# Patient Record
Sex: FEMALE | Race: WHITE | Employment: OTHER | URBAN - METROPOLITAN AREA
[De-identification: names, ages, dates, MRNs, and addresses within clinical notes are randomized per-mention and may not be internally consistent; named-entity substitution may affect disease eponyms.]

---

## 2017-02-27 PROBLEM — Z12.11 SCREEN FOR COLON CANCER: Status: ACTIVE | Noted: 2017-02-27

## 2017-02-27 PROBLEM — Z12.39 SCREENING FOR BREAST CANCER: Status: ACTIVE | Noted: 2017-02-27

## 2017-09-06 ENCOUNTER — HOSPITAL ENCOUNTER (INPATIENT)
Age: 62
LOS: 8 days | Discharge: NURSING, REHAB OR LONG-TERM CARE | DRG: 064 | End: 2017-09-15
Attending: EMERGENCY MEDICINE | Admitting: INTERNAL MEDICINE
Payer: COMMERCIAL

## 2017-09-06 ENCOUNTER — APPOINTMENT (OUTPATIENT)
Dept: CT IMAGING | Age: 62
DRG: 064 | End: 2017-09-06
Attending: EMERGENCY MEDICINE
Payer: COMMERCIAL

## 2017-09-06 DIAGNOSIS — R11.2 NON-INTRACTABLE VOMITING WITH NAUSEA, UNSPECIFIED VOMITING TYPE: ICD-10-CM

## 2017-09-06 DIAGNOSIS — J96.01 ACUTE RESPIRATORY FAILURE WITH HYPOXIA (HCC): ICD-10-CM

## 2017-09-06 DIAGNOSIS — I10 ACCELERATED HYPERTENSION: ICD-10-CM

## 2017-09-06 DIAGNOSIS — N95.2 ATROPHIC VAGINITIS: ICD-10-CM

## 2017-09-06 DIAGNOSIS — F41.9 ANXIETY: ICD-10-CM

## 2017-09-06 DIAGNOSIS — I10 ACUTE SPONTANEOUS INTRAVENTRICULAR HEMORRHAGE ASSOC W/ HYPERTENSION (HCC): ICD-10-CM

## 2017-09-06 DIAGNOSIS — F32.89 OTHER DEPRESSION: ICD-10-CM

## 2017-09-06 DIAGNOSIS — I61.5 ACUTE SPONTANEOUS INTRAVENTRICULAR HEMORRHAGE ASSOC W/ HYPERTENSION (HCC): ICD-10-CM

## 2017-09-06 DIAGNOSIS — I61.9 HEMORRHAGIC STROKE (HCC): Primary | ICD-10-CM

## 2017-09-06 DIAGNOSIS — I61.9 STROKE, HEMORRHAGIC (HCC): ICD-10-CM

## 2017-09-06 DIAGNOSIS — R09.02 HYPOXEMIA: ICD-10-CM

## 2017-09-06 DIAGNOSIS — I16.0 HYPERTENSIVE URGENCY, MALIGNANT: ICD-10-CM

## 2017-09-06 DIAGNOSIS — F98.8 ADD (ATTENTION DEFICIT DISORDER): ICD-10-CM

## 2017-09-06 DIAGNOSIS — Z12.39 SCREENING FOR BREAST CANCER: ICD-10-CM

## 2017-09-06 DIAGNOSIS — Z91.89 AT RISK FOR ASPIRATION: ICD-10-CM

## 2017-09-06 DIAGNOSIS — I10 ESSENTIAL HYPERTENSION: ICD-10-CM

## 2017-09-06 PROCEDURE — 99285 EMERGENCY DEPT VISIT HI MDM: CPT | Performed by: EMERGENCY MEDICINE

## 2017-09-06 PROCEDURE — 80053 COMPREHEN METABOLIC PANEL: CPT | Performed by: EMERGENCY MEDICINE

## 2017-09-06 PROCEDURE — 96365 THER/PROPH/DIAG IV INF INIT: CPT | Performed by: EMERGENCY MEDICINE

## 2017-09-06 PROCEDURE — 74011250636 HC RX REV CODE- 250/636

## 2017-09-06 PROCEDURE — 93005 ELECTROCARDIOGRAM TRACING: CPT | Performed by: EMERGENCY MEDICINE

## 2017-09-06 PROCEDURE — 31500 INSERT EMERGENCY AIRWAY: CPT | Performed by: EMERGENCY MEDICINE

## 2017-09-06 PROCEDURE — 85610 PROTHROMBIN TIME: CPT

## 2017-09-06 PROCEDURE — 85025 COMPLETE CBC W/AUTO DIFF WBC: CPT | Performed by: EMERGENCY MEDICINE

## 2017-09-06 PROCEDURE — 96375 TX/PRO/DX INJ NEW DRUG ADDON: CPT | Performed by: EMERGENCY MEDICINE

## 2017-09-06 PROCEDURE — 84484 ASSAY OF TROPONIN QUANT: CPT

## 2017-09-06 PROCEDURE — 77030008771 HC TU NG SALEM SUMP -A: Performed by: EMERGENCY MEDICINE

## 2017-09-06 PROCEDURE — 70450 CT HEAD/BRAIN W/O DYE: CPT

## 2017-09-06 RX ORDER — LABETALOL HYDROCHLORIDE 5 MG/ML
INJECTION, SOLUTION INTRAVENOUS
Status: COMPLETED
Start: 2017-09-06 | End: 2017-09-07

## 2017-09-06 RX ORDER — PROPOFOL 10 MG/ML
INJECTION, EMULSION INTRAVENOUS
Status: COMPLETED
Start: 2017-09-06 | End: 2017-09-07

## 2017-09-06 RX ORDER — ONDANSETRON 2 MG/ML
INJECTION INTRAMUSCULAR; INTRAVENOUS
Status: COMPLETED
Start: 2017-09-06 | End: 2017-09-06

## 2017-09-06 RX ADMIN — ONDANSETRON 4 MG: 2 INJECTION INTRAMUSCULAR; INTRAVENOUS at 23:38

## 2017-09-06 RX ADMIN — LABETALOL HYDROCHLORIDE 20 MG: 5 INJECTION INTRAVENOUS at 23:53

## 2017-09-06 NOTE — Clinical Note
Status[de-identified] Inpatient [101] Type of Bed: Intensive Care [6] Inpatient Hospitalization Certified Necessary for the Following Reasons: 4. Patient requires ICU level of care interventions (further clarification in H&P documentation) Admitting Diagnosis: [facility] [[id number]] Admitting Physician: Rita Song [[id number]] Attending Physician: Rita Song [[id number]] Estimated Length of Stay: 7+ Midnights Discharge Plan[de-identified] 2003 [facility]

## 2017-09-07 ENCOUNTER — APPOINTMENT (OUTPATIENT)
Dept: GENERAL RADIOLOGY | Age: 62
DRG: 064 | End: 2017-09-07
Attending: INTERNAL MEDICINE
Payer: COMMERCIAL

## 2017-09-07 ENCOUNTER — APPOINTMENT (OUTPATIENT)
Dept: CT IMAGING | Age: 62
DRG: 064 | End: 2017-09-07
Attending: INTERNAL MEDICINE
Payer: COMMERCIAL

## 2017-09-07 PROBLEM — I10 ACCELERATED HYPERTENSION: Status: ACTIVE | Noted: 2017-09-07

## 2017-09-07 PROBLEM — I61.9 HEMORRHAGIC STROKE (HCC): Status: ACTIVE | Noted: 2017-09-07

## 2017-09-07 PROBLEM — I61.9 STROKE, HEMORRHAGIC (HCC): Status: ACTIVE | Noted: 2017-09-07

## 2017-09-07 PROBLEM — I61.9 HEMORRHAGIC STROKE (HCC): Status: RESOLVED | Noted: 2017-09-07 | Resolved: 2017-09-07

## 2017-09-07 PROBLEM — I61.5 ACUTE SPONTANEOUS INTRAVENTRICULAR HEMORRHAGE ASSOC W/ HYPERTENSION (HCC): Status: ACTIVE | Noted: 2017-09-07

## 2017-09-07 PROBLEM — I10 ACUTE SPONTANEOUS INTRAVENTRICULAR HEMORRHAGE ASSOC W/ HYPERTENSION (HCC): Status: ACTIVE | Noted: 2017-09-07

## 2017-09-07 PROBLEM — Z91.89 AT RISK FOR ASPIRATION: Status: ACTIVE | Noted: 2017-09-07

## 2017-09-07 PROBLEM — R11.2 NON-INTRACTABLE VOMITING WITH NAUSEA: Status: ACTIVE | Noted: 2017-09-07

## 2017-09-07 LAB
ALBUMIN SERPL-MCNC: 3.7 G/DL (ref 3.2–4.6)
ALBUMIN/GLOB SERPL: 1.1 {RATIO} (ref 1.2–3.5)
ALP SERPL-CCNC: 63 U/L (ref 50–136)
ALT SERPL-CCNC: 29 U/L (ref 12–65)
AMPHET UR QL SCN: NEGATIVE
ANION GAP SERPL CALC-SCNC: 10 MMOL/L (ref 7–16)
ANION GAP SERPL CALC-SCNC: 11 MMOL/L (ref 7–16)
APTT PPP: 25.4 SEC (ref 23.5–31.7)
ARTERIAL PATENCY WRIST A: POSITIVE
ARTERIAL PATENCY WRIST A: POSITIVE
AST SERPL-CCNC: 28 U/L (ref 15–37)
ATRIAL RATE: 83 BPM
BACTERIA SPEC CULT: NORMAL
BARBITURATES UR QL SCN: NEGATIVE
BASE DEFICIT BLDA-SCNC: 4.4 MMOL/L (ref 0–2)
BASE EXCESS BLDA CALC-SCNC: 1.3 MMOL/L (ref 0–3)
BASOPHILS # BLD: 0 K/UL (ref 0–0.2)
BASOPHILS NFR BLD: 0 % (ref 0–2)
BDY SITE: ABNORMAL
BDY SITE: ABNORMAL
BENZODIAZ UR QL: NEGATIVE
BILIRUB SERPL-MCNC: 0.4 MG/DL (ref 0.2–1.1)
BUN SERPL-MCNC: 13 MG/DL (ref 8–23)
BUN SERPL-MCNC: 14 MG/DL (ref 8–23)
CALCIUM SERPL-MCNC: 8.1 MG/DL (ref 8.3–10.4)
CALCIUM SERPL-MCNC: 8.5 MG/DL (ref 8.3–10.4)
CALCULATED P AXIS, ECG09: 75 DEGREES
CALCULATED R AXIS, ECG10: 57 DEGREES
CALCULATED T AXIS, ECG11: 76 DEGREES
CANNABINOIDS UR QL SCN: NEGATIVE
CHLORIDE SERPL-SCNC: 103 MMOL/L (ref 98–107)
CHLORIDE SERPL-SCNC: 99 MMOL/L (ref 98–107)
CO2 SERPL-SCNC: 25 MMOL/L (ref 21–32)
CO2 SERPL-SCNC: 28 MMOL/L (ref 21–32)
COCAINE UR QL SCN: NEGATIVE
COHGB MFR BLD: 0 % (ref 0.5–1.5)
COHGB MFR BLD: 0.3 % (ref 0.5–1.5)
CREAT SERPL-MCNC: 0.69 MG/DL (ref 0.6–1)
CREAT SERPL-MCNC: 0.77 MG/DL (ref 0.6–1)
DIAGNOSIS, 93000: NORMAL
DIFFERENTIAL METHOD BLD: ABNORMAL
DO-HGB BLD-MCNC: 1 % (ref 0–5)
DO-HGB BLD-MCNC: 1 % (ref 0–5)
EOSINOPHIL # BLD: 0.1 K/UL (ref 0–0.8)
EOSINOPHIL NFR BLD: 2 % (ref 0.5–7.8)
ERYTHROCYTE [DISTWIDTH] IN BLOOD BY AUTOMATED COUNT: 12.5 % (ref 11.9–14.6)
ERYTHROCYTE [DISTWIDTH] IN BLOOD BY AUTOMATED COUNT: 12.6 % (ref 11.9–14.6)
FIO2 ON VENT: 40 %
FIO2 ON VENT: 60 %
GLOBULIN SER CALC-MCNC: 3.3 G/DL (ref 2.3–3.5)
GLUCOSE BLD STRIP.AUTO-MCNC: 106 MG/DL (ref 65–100)
GLUCOSE BLD STRIP.AUTO-MCNC: 121 MG/DL (ref 65–100)
GLUCOSE SERPL-MCNC: 108 MG/DL (ref 65–100)
GLUCOSE SERPL-MCNC: 120 MG/DL (ref 65–100)
HCO3 BLDA-SCNC: 19 MMOL/L (ref 22–26)
HCO3 BLDA-SCNC: 25 MMOL/L (ref 22–26)
HCT VFR BLD AUTO: 34.6 % (ref 35.8–46.3)
HCT VFR BLD AUTO: 35 % (ref 35.8–46.3)
HGB BLD-MCNC: 12.2 G/DL (ref 11.7–15.4)
HGB BLD-MCNC: 12.6 G/DL (ref 11.7–15.4)
HGB BLDMV-MCNC: 12.6 GM/DL (ref 11.7–15)
HGB BLDMV-MCNC: 13 GM/DL (ref 11.7–15)
IMM GRANULOCYTES # BLD: 0 K/UL (ref 0–0.5)
IMM GRANULOCYTES NFR BLD: 0.2 % (ref 0–5)
INR BLD: 0.9 (ref 0.9–1.2)
LYMPHOCYTES # BLD: 2.9 K/UL (ref 0.5–4.6)
LYMPHOCYTES NFR BLD: 46 % (ref 13–44)
MCH RBC QN AUTO: 28.4 PG (ref 26.1–32.9)
MCH RBC QN AUTO: 28.8 PG (ref 26.1–32.9)
MCHC RBC AUTO-ENTMCNC: 35.3 G/DL (ref 31.4–35)
MCHC RBC AUTO-ENTMCNC: 36 G/DL (ref 31.4–35)
MCV RBC AUTO: 79.9 FL (ref 79.6–97.8)
MCV RBC AUTO: 80.5 FL (ref 79.6–97.8)
METHADONE UR QL: NEGATIVE
METHGB MFR BLD: 0.5 % (ref 0–1.5)
METHGB MFR BLD: 0.6 % (ref 0–1.5)
MONOCYTES # BLD: 0.5 K/UL (ref 0.1–1.3)
MONOCYTES NFR BLD: 8 % (ref 4–12)
NEUTS SEG # BLD: 2.9 K/UL (ref 1.7–8.2)
NEUTS SEG NFR BLD: 44 % (ref 43–78)
OPIATES UR QL: NEGATIVE
OXYHGB MFR BLDA: 98 % (ref 94–97)
OXYHGB MFR BLDA: 98.6 % (ref 94–97)
P-R INTERVAL, ECG05: 178 MS
PCO2 BLDA: 32 MMHG (ref 35–45)
PCO2 BLDA: 38 MMHG (ref 35–45)
PCP UR QL: NEGATIVE
PEEP RESPIRATORY: 8 CM[H2O]
PEEP RESPIRATORY: 8 CM[H2O]
PH BLDA: 7.41 [PH] (ref 7.35–7.45)
PH BLDA: 7.44 [PH] (ref 7.35–7.45)
PLATELET # BLD AUTO: 254 K/UL (ref 150–450)
PLATELET # BLD AUTO: 269 K/UL (ref 150–450)
PMV BLD AUTO: 9.6 FL (ref 10.8–14.1)
PMV BLD AUTO: 9.6 FL (ref 10.8–14.1)
PO2 BLDA: 164 MMHG (ref 80–105)
PO2 BLDA: 243 MMHG (ref 80–105)
POTASSIUM SERPL-SCNC: 3.6 MMOL/L (ref 3.5–5.1)
POTASSIUM SERPL-SCNC: 3.6 MMOL/L (ref 3.5–5.1)
PROT SERPL-MCNC: 7 G/DL (ref 6.3–8.2)
PT BLD: 11 SECS (ref 9.6–11.6)
Q-T INTERVAL, ECG07: 384 MS
QRS DURATION, ECG06: 76 MS
QTC CALCULATION (BEZET), ECG08: 451 MS
RBC # BLD AUTO: 4.3 M/UL (ref 4.05–5.25)
RBC # BLD AUTO: 4.38 M/UL (ref 4.05–5.25)
RESP RATE: 15
RESP RATE: 15
SAO2 % BLD: 99 % (ref 92–98.5)
SAO2 % BLD: 99 % (ref 92–98.5)
SERVICE CMNT-IMP: ABNORMAL
SERVICE CMNT-IMP: ABNORMAL
SERVICE CMNT-IMP: NORMAL
SODIUM SERPL-SCNC: 137 MMOL/L (ref 136–145)
SODIUM SERPL-SCNC: 139 MMOL/L (ref 136–145)
TROPONIN I BLD-MCNC: 0 NG/ML (ref 0.02–0.05)
VENTILATION MODE VENT: ABNORMAL
VENTILATION MODE VENT: ABNORMAL
VENTRICULAR RATE, ECG03: 83 BPM
VT SETTING VENT: 450 ML
VT SETTING VENT: 450 ML
WBC # BLD AUTO: 6.5 K/UL (ref 4.3–11.1)
WBC # BLD AUTO: 8.9 K/UL (ref 4.3–11.1)

## 2017-09-07 PROCEDURE — 74011250637 HC RX REV CODE- 250/637: Performed by: INTERNAL MEDICINE

## 2017-09-07 PROCEDURE — 95816 EEG AWAKE AND DROWSY: CPT | Performed by: INTERNAL MEDICINE

## 2017-09-07 PROCEDURE — 74011000250 HC RX REV CODE- 250: Performed by: EMERGENCY MEDICINE

## 2017-09-07 PROCEDURE — 65610000001 HC ROOM ICU GENERAL

## 2017-09-07 PROCEDURE — 76937 US GUIDE VASCULAR ACCESS: CPT

## 2017-09-07 PROCEDURE — 36569 INSJ PICC 5 YR+ W/O IMAGING: CPT | Performed by: INTERNAL MEDICINE

## 2017-09-07 PROCEDURE — 85730 THROMBOPLASTIN TIME PARTIAL: CPT | Performed by: INTERNAL MEDICINE

## 2017-09-07 PROCEDURE — 94003 VENT MGMT INPAT SUBQ DAY: CPT

## 2017-09-07 PROCEDURE — 77030005402 HC CATH RAD ART LN KT TELE -B

## 2017-09-07 PROCEDURE — 77030018798 HC PMP KT ENTRL FED COVD -A

## 2017-09-07 PROCEDURE — 80307 DRUG TEST PRSMV CHEM ANLYZR: CPT | Performed by: INTERNAL MEDICINE

## 2017-09-07 PROCEDURE — 77030019605

## 2017-09-07 PROCEDURE — 85027 COMPLETE CBC AUTOMATED: CPT | Performed by: INTERNAL MEDICINE

## 2017-09-07 PROCEDURE — 94002 VENT MGMT INPAT INIT DAY: CPT

## 2017-09-07 PROCEDURE — C1751 CATH, INF, PER/CENT/MIDLINE: HCPCS

## 2017-09-07 PROCEDURE — 74011250636 HC RX REV CODE- 250/636

## 2017-09-07 PROCEDURE — 77030018719 HC DRSG PTCH ANTIMIC J&J -A

## 2017-09-07 PROCEDURE — 74011000258 HC RX REV CODE- 258: Performed by: EMERGENCY MEDICINE

## 2017-09-07 PROCEDURE — 77030018786 HC NDL GD F/USND BARD -B

## 2017-09-07 PROCEDURE — 80048 BASIC METABOLIC PNL TOTAL CA: CPT | Performed by: INTERNAL MEDICINE

## 2017-09-07 PROCEDURE — 77030032995 HC TRNSDUC BLD DBL VAMP KT EDWD -B

## 2017-09-07 PROCEDURE — 02HV33Z INSERTION OF INFUSION DEVICE INTO SUPERIOR VENA CAVA, PERCUTANEOUS APPROACH: ICD-10-PCS | Performed by: INTERNAL MEDICINE

## 2017-09-07 PROCEDURE — 70450 CT HEAD/BRAIN W/O DYE: CPT

## 2017-09-07 PROCEDURE — 99291 CRITICAL CARE FIRST HOUR: CPT | Performed by: INTERNAL MEDICINE

## 2017-09-07 PROCEDURE — 5A1945Z RESPIRATORY VENTILATION, 24-96 CONSECUTIVE HOURS: ICD-10-PCS | Performed by: EMERGENCY MEDICINE

## 2017-09-07 PROCEDURE — 74011250636 HC RX REV CODE- 250/636: Performed by: INTERNAL MEDICINE

## 2017-09-07 PROCEDURE — 82962 GLUCOSE BLOOD TEST: CPT

## 2017-09-07 PROCEDURE — 74011250636 HC RX REV CODE- 250/636: Performed by: EMERGENCY MEDICINE

## 2017-09-07 PROCEDURE — 87641 MR-STAPH DNA AMP PROBE: CPT | Performed by: INTERNAL MEDICINE

## 2017-09-07 PROCEDURE — 74011000250 HC RX REV CODE- 250

## 2017-09-07 PROCEDURE — 0BH17EZ INSERTION OF ENDOTRACHEAL AIRWAY INTO TRACHEA, VIA NATURAL OR ARTIFICIAL OPENING: ICD-10-PCS | Performed by: EMERGENCY MEDICINE

## 2017-09-07 PROCEDURE — 36415 COLL VENOUS BLD VENIPUNCTURE: CPT | Performed by: INTERNAL MEDICINE

## 2017-09-07 PROCEDURE — 71010 XR CHEST SNGL V: CPT

## 2017-09-07 PROCEDURE — 36600 WITHDRAWAL OF ARTERIAL BLOOD: CPT

## 2017-09-07 PROCEDURE — 77030013794 HC KT TRNSDUC BLD EDWD -B

## 2017-09-07 PROCEDURE — 82803 BLOOD GASES ANY COMBINATION: CPT

## 2017-09-07 RX ORDER — LORAZEPAM 2 MG/ML
2 INJECTION INTRAMUSCULAR
Status: DISCONTINUED | OUTPATIENT
Start: 2017-09-07 | End: 2017-09-10

## 2017-09-07 RX ORDER — HEPARIN 100 UNIT/ML
900 SYRINGE INTRAVENOUS EVERY 8 HOURS
Status: DISCONTINUED | OUTPATIENT
Start: 2017-09-07 | End: 2017-09-15 | Stop reason: HOSPADM

## 2017-09-07 RX ORDER — LABETALOL HYDROCHLORIDE 5 MG/ML
20 INJECTION, SOLUTION INTRAVENOUS
Status: COMPLETED | OUTPATIENT
Start: 2017-09-07 | End: 2017-09-07

## 2017-09-07 RX ORDER — LORAZEPAM 2 MG/ML
INJECTION INTRAMUSCULAR
Status: COMPLETED
Start: 2017-09-07 | End: 2017-09-07

## 2017-09-07 RX ORDER — HEPARIN 100 UNIT/ML
900 SYRINGE INTRAVENOUS AS NEEDED
Status: DISCONTINUED | OUTPATIENT
Start: 2017-09-07 | End: 2017-09-15 | Stop reason: HOSPADM

## 2017-09-07 RX ORDER — MORPHINE SULFATE 2 MG/ML
INJECTION, SOLUTION INTRAMUSCULAR; INTRAVENOUS
Status: COMPLETED
Start: 2017-09-07 | End: 2017-09-07

## 2017-09-07 RX ORDER — CHLORHEXIDINE GLUCONATE 1.2 MG/ML
15 RINSE ORAL 2 TIMES DAILY
Status: DISCONTINUED | OUTPATIENT
Start: 2017-09-07 | End: 2017-09-07

## 2017-09-07 RX ORDER — LORAZEPAM 2 MG/ML
INJECTION INTRAMUSCULAR
Status: ACTIVE
Start: 2017-09-07 | End: 2017-09-07

## 2017-09-07 RX ORDER — SODIUM CHLORIDE 0.9 % (FLUSH) 0.9 %
30 SYRINGE (ML) INJECTION AS NEEDED
Status: DISCONTINUED | OUTPATIENT
Start: 2017-09-07 | End: 2017-09-15 | Stop reason: HOSPADM

## 2017-09-07 RX ORDER — ONDANSETRON 2 MG/ML
4 INJECTION INTRAMUSCULAR; INTRAVENOUS
Status: DISCONTINUED | OUTPATIENT
Start: 2017-09-07 | End: 2017-09-15 | Stop reason: HOSPADM

## 2017-09-07 RX ORDER — LORAZEPAM 2 MG/ML
2 INJECTION INTRAMUSCULAR
Status: COMPLETED | OUTPATIENT
Start: 2017-09-07 | End: 2017-09-07

## 2017-09-07 RX ORDER — SODIUM CHLORIDE 9 MG/ML
0-75 INJECTION, SOLUTION INTRAVENOUS CONTINUOUS
Status: DISCONTINUED | OUTPATIENT
Start: 2017-09-07 | End: 2017-09-08

## 2017-09-07 RX ORDER — SODIUM CHLORIDE 0.9 % (FLUSH) 0.9 %
5-10 SYRINGE (ML) INJECTION EVERY 8 HOURS
Status: DISCONTINUED | OUTPATIENT
Start: 2017-09-07 | End: 2017-09-15 | Stop reason: HOSPADM

## 2017-09-07 RX ORDER — ACETAMINOPHEN 500 MG
500 TABLET ORAL
Status: DISCONTINUED | OUTPATIENT
Start: 2017-09-07 | End: 2017-09-15 | Stop reason: HOSPADM

## 2017-09-07 RX ORDER — PROPOFOL 10 MG/ML
50 INJECTION, EMULSION INTRAVENOUS
Status: COMPLETED | OUTPATIENT
Start: 2017-09-07 | End: 2017-09-07

## 2017-09-07 RX ORDER — NICARDIPINE HYDROCHLORIDE 0.1 MG/ML
5-15 INJECTION INTRAVENOUS
Status: DISCONTINUED | OUTPATIENT
Start: 2017-09-07 | End: 2017-09-07

## 2017-09-07 RX ORDER — MORPHINE SULFATE 2 MG/ML
2 INJECTION, SOLUTION INTRAMUSCULAR; INTRAVENOUS
Status: DISCONTINUED | OUTPATIENT
Start: 2017-09-07 | End: 2017-09-10

## 2017-09-07 RX ORDER — LORAZEPAM 2 MG/ML
INJECTION INTRAMUSCULAR
Status: DISCONTINUED
Start: 2017-09-07 | End: 2017-09-07

## 2017-09-07 RX ORDER — SODIUM CHLORIDE 0.9 % (FLUSH) 0.9 %
30 SYRINGE (ML) INJECTION EVERY 8 HOURS
Status: DISCONTINUED | OUTPATIENT
Start: 2017-09-07 | End: 2017-09-15 | Stop reason: HOSPADM

## 2017-09-07 RX ORDER — PROPOFOL 10 MG/ML
5-50 VIAL (ML) INTRAVENOUS
Status: DISCONTINUED | OUTPATIENT
Start: 2017-09-07 | End: 2017-09-09

## 2017-09-07 RX ORDER — SODIUM CHLORIDE 0.9 % (FLUSH) 0.9 %
5-10 SYRINGE (ML) INJECTION AS NEEDED
Status: DISCONTINUED | OUTPATIENT
Start: 2017-09-07 | End: 2017-09-15 | Stop reason: HOSPADM

## 2017-09-07 RX ORDER — CHLORHEXIDINE GLUCONATE 1.2 MG/ML
15 RINSE ORAL 2 TIMES DAILY
Status: DISCONTINUED | OUTPATIENT
Start: 2017-09-07 | End: 2017-09-09

## 2017-09-07 RX ORDER — LABETALOL HYDROCHLORIDE 5 MG/ML
20 INJECTION, SOLUTION INTRAVENOUS
Status: ACTIVE | OUTPATIENT
Start: 2017-08-30 | End: 2017-08-31

## 2017-09-07 RX ADMIN — PROPOFOL 50 MG: 10 INJECTION, EMULSION INTRAVENOUS at 00:20

## 2017-09-07 RX ADMIN — Medication 10 ML: at 06:49

## 2017-09-07 RX ADMIN — MORPHINE SULFATE 2 MG: 2 INJECTION, SOLUTION INTRAMUSCULAR; INTRAVENOUS at 21:04

## 2017-09-07 RX ADMIN — LEVETIRACETAM 1000 MG: 100 INJECTION, SOLUTION INTRAVENOUS at 00:18

## 2017-09-07 RX ADMIN — CHLORHEXIDINE GLUCONATE 15 ML: 1.2 RINSE ORAL at 04:01

## 2017-09-07 RX ADMIN — Medication 30 ML: at 13:06

## 2017-09-07 RX ADMIN — LORAZEPAM 2 MG: 2 INJECTION INTRAMUSCULAR at 00:08

## 2017-09-07 RX ADMIN — NICARDIPINE HYDROCHLORIDE 5 MG/HR: 0.1 INJECTION, SOLUTION INTRAVENOUS at 00:16

## 2017-09-07 RX ADMIN — CHLORHEXIDINE GLUCONATE 15 ML: 1.2 RINSE ORAL at 09:22

## 2017-09-07 RX ADMIN — Medication 10 ML: at 02:38

## 2017-09-07 RX ADMIN — MORPHINE SULFATE 2 MG: 2 INJECTION, SOLUTION INTRAMUSCULAR; INTRAVENOUS at 01:42

## 2017-09-07 RX ADMIN — LABETALOL HYDROCHLORIDE 20 MG: 5 INJECTION INTRAVENOUS at 00:00

## 2017-09-07 RX ADMIN — LORAZEPAM 2 MG: 2 INJECTION INTRAMUSCULAR at 01:39

## 2017-09-07 RX ADMIN — LABETALOL HYDROCHLORIDE 20 MG: 5 INJECTION INTRAVENOUS at 00:19

## 2017-09-07 RX ADMIN — SODIUM CHLORIDE, PRESERVATIVE FREE 900 UNITS: 5 INJECTION INTRAVENOUS at 14:17

## 2017-09-07 RX ADMIN — SODIUM CHLORIDE 250 ML: 900 INJECTION, SOLUTION INTRAVENOUS at 06:10

## 2017-09-07 RX ADMIN — MORPHINE SULFATE 2 MG: 2 INJECTION, SOLUTION INTRAMUSCULAR; INTRAVENOUS at 05:04

## 2017-09-07 RX ADMIN — SODIUM CHLORIDE 1000 ML: 900 INJECTION, SOLUTION INTRAVENOUS at 00:35

## 2017-09-07 RX ADMIN — CHLORHEXIDINE GLUCONATE 15 ML: 1.2 RINSE ORAL at 21:06

## 2017-09-07 RX ADMIN — PROPOFOL 50 MCG/KG/MIN: 10 INJECTION, EMULSION INTRAVENOUS at 00:11

## 2017-09-07 RX ADMIN — LORAZEPAM 2 MG: 2 INJECTION, SOLUTION INTRAMUSCULAR; INTRAVENOUS at 00:15

## 2017-09-07 RX ADMIN — Medication 10 ML: at 21:09

## 2017-09-07 RX ADMIN — SODIUM CHLORIDE, PRESERVATIVE FREE 900 UNITS: 5 INJECTION INTRAVENOUS at 21:07

## 2017-09-07 RX ADMIN — SODIUM CHLORIDE 75 ML/HR: 900 INJECTION, SOLUTION INTRAVENOUS at 06:50

## 2017-09-07 RX ADMIN — Medication 10 ML: at 13:07

## 2017-09-07 RX ADMIN — Medication 30 ML: at 21:09

## 2017-09-07 NOTE — ED NOTES
TRANSFER - OUT REPORT:    Verbal report given to Everton Prado RN on Kiki Marti  being transferred to ICU for routine progression of care       Report consisted of patients Situation, Background, Assessment and   Recommendations(SBAR). Information from the following report(s) SBAR was reviewed with the receiving nurse. Lines:      bilat 18 guages   Opportunity for questions and clarification was provided.       Patient transported with:   Monitor  Registered Nurse

## 2017-09-07 NOTE — PROGRESS NOTES
Dr Bettye Basurto present ordered ativan and morphine to be given at this time.   Preparing for arterial line insertion

## 2017-09-07 NOTE — PROGRESS NOTES
Spiritual Care visit. Initial visit. Visited at bedside with patient and family; patient did not respond, but  conversed and prayed for patient's recovery and health.     Visit by Jere Swan M.Ed., Th.B. ,Staff

## 2017-09-07 NOTE — ED PROVIDER NOTES
HPI Comments: Patient is a 76-year-old female brought in as a stroke alert by EMS.  states that she was in a downstairs bedroom when she called out. Last known normal was at 2230. He found her confused with a left-sided facial droop as well as right upper and lower extremity paralysis. EMS found her with similar symptoms and she  became somewhat more was unresponsive. ruddy arrival to the emergency department the patient began moving her extremities more freely but was not able to communicate clearly. Urgent CT scan was performed which demonstrated intracranial hemorrhage. Patient emergently intubated for airway protection. Patient given labetalol, propofol, Ativan for sedation and blood pressure control. We eventually achieved adequate sedation and blood pressure control. Family updated. Discussed admission with the intensivist.  My colleague spoke with Dr. Lizz Raines of neurosurgery he reported that this bleed was nonsurgical in nature and that medical management would be needed.  states only medication she takes is Premarin and lisinopril. No blood thinners. Patient is a 60 y.o. female presenting with weakness. The history is provided by the EMS personnel and the spouse. No  was used.    Extremity Weakness           Past Medical History:   Diagnosis Date    Anxiety     Depression     HTN (hypertension)     Menopause        Past Surgical History:   Procedure Laterality Date    HX  SECTION      x 3    HX OTHER SURGICAL  2012    Face lift    HX REFRACTIVE SURGERY  2006    HX SANJUANITA AND BSO           Family History:   Problem Relation Age of Onset    Alcohol abuse Mother     Cancer Mother      lung cancer    Cancer Father      throat cancer    Psychiatric Disorder Father      anxiety, depression    No Known Problems Sister        Social History     Social History    Marital status:      Spouse name: N/A    Number of children: N/A    Years of education: N/A     Occupational History    Not on file. Social History Main Topics    Smoking status: Former Smoker    Smokeless tobacco: Never Used    Alcohol use No    Drug use: No    Sexual activity: Yes     Partners: Male     Other Topics Concern    Not on file     Social History Narrative         ALLERGIES: Lisinopril and Nuts [tree nut]    Review of Systems   Unable to perform ROS: Patient unresponsive   Neurological: Positive for weakness. Vitals:    09/07/17 0051 09/07/17 0052 09/07/17 0053 09/07/17 0054   BP: 103/65   105/67   Pulse: 84 79 79 81   Resp: 16 16 11 23   SpO2: 100% 100% 100% 100%   Weight:       Height:                Physical Exam   Constitutional:   Acutely ill minimally responsive female. HENT:   Head: Normocephalic and atraumatic. Eyes: Conjunctivae are normal.   Patient with deviation of her eyes to the left. Pupils reactive   Neck: Normal range of motion. Neck supple. Cardiovascular: Normal rate and regular rhythm. Pulmonary/Chest: Effort normal. No respiratory distress. Abdominal: Soft. She exhibits no distension. Neurological:   On initial examination the patient appeared very confused. She was moving her left upper as well as left lower extremities. She has some decreased movement  Of her right upper and right lower but she was moving them. Patient had garbled speech and did not make any sense. Vitals reviewed. MDM  Number of Diagnoses or Management Options  Accelerated hypertension: new and requires workup  Hemorrhagic stroke [facility]): new and requires workup  Diagnosis management comments: Critically ill patient with intracranial hemorrhage. Intubated in the ED with no complications. With sedation and BP medications we have overshot the blood pressure somewhat and are giving IV fluids to correct for this. Patient does have some decorticate posturing evidenced. Intensivist called for admission.        Amount and/or Complexity of Data Reviewed  Clinical lab tests: ordered and reviewed (Results for orders placed or performed during the hospital encounter of 26/17/66  -METABOLIC PANEL, COMPREHENSIVE       Result                                            Value                         Ref Range                       Sodium                                            137                           136 - 145 mmol/L                Potassium                                         3.6                           3.5 - 5.1 mmol/L                Chloride                                          99                            98 - 107 mmol/L                 CO2                                               28                            21 - 32 mmol/L                  Anion gap                                         10                            7 - 16 mmol/L                   Glucose                                           108 (H)                       65 - 100 mg/dL                  BUN                                               14                            8 - 23 MG/DL                    Creatinine                                        0.69                          0.6 - 1.0 MG/DL                 GFR est AA                                        >60                           >60 ml/min/1.73m2               GFR est non-AA                                    >60                           >60 ml/min/1.73m2               Calcium                                           8.5                           8.3 - 10.4 MG/DL                Bilirubin, total                                  0.4                           0.2 - 1.1 MG/DL                 ALT (SGPT)                                        29                            12 - 65 U/L                     AST (SGOT)                                        28                            15 - 37 U/L                     Alk. phosphatase                                  63                            50 - 136 U/L Protein, total                                    7.0                           6.3 - 8.2 g/dL                  Albumin                                           3.7                           3.2 - 4.6 g/dL                  Globulin                                          3.3                           2.3 - 3.5 g/dL                  A-G Ratio                                         1.1 (L)                       1.2 - 3.5                  -CBC WITH AUTOMATED DIFF       Result                                            Value                         Ref Range                       WBC                                               6.5                           4.3 - 11.1 K/uL                 RBC                                               4.38                          4.05 - 5.25 M/uL                HGB                                               12.6                          11.7 - 15.4 g/dL                HCT                                               35.0 (L)                      35.8 - 46.3 %                   MCV                                               79.9                          79.6 - 97.8 FL                  MCH                                               28.8                          26.1 - 32.9 PG                  MCHC                                              36.0 (H)                      31.4 - 35.0 g/dL                RDW                                               12.5                          11.9 - 14.6 %                   PLATELET                                          269                           150 - 450 K/uL                  MPV                                               9.6 (L)                       10.8 - 14.1 FL                  DF                                                AUTOMATED                                                     NEUTROPHILS                                       44                            43 - 78 % LYMPHOCYTES                                       46 (H)                        13 - 44 %                       MONOCYTES                                         8                             4.0 - 12.0 %                    EOSINOPHILS                                       2                             0.5 - 7.8 %                     BASOPHILS                                         0                             0.0 - 2.0 %                     IMMATURE GRANULOCYTES                             0.2                           0.0 - 5.0 %                     ABS. NEUTROPHILS                                  2.9                           1.7 - 8.2 K/UL                  ABS. LYMPHOCYTES                                  2.9                           0.5 - 4.6 K/UL                  ABS. MONOCYTES                                    0.5                           0.1 - 1.3 K/UL                  ABS. EOSINOPHILS                                  0.1                           0.0 - 0.8 K/UL                  ABS. BASOPHILS                                    0.0                           0.0 - 0.2 K/UL                  ABS. IMM.  GRANS.                                  0.0                           0.0 - 0.5 K/UL             -BLOOD GAS, ARTERIAL       Result                                            Value                         Ref Range                       pH                                                7.44                          7.35 - 7.45                     PCO2                                              38                            35 - 45 mmHg                    PO2                                               243 (H)                       80 - 105 mmHg                   BICARBONATE                                       25                            22 - 26 mmol/L                  BASE EXCESS                                       1.3                           0 - 3 mmol/L                    TOTAL HEMOGLOBIN 12.6                          11.7 - 15.0 GM/DL               O2 SAT                                            99 (H)                        92 - 98.5 %                     Arterial O2 Hgb                                   98.6 (H)                      94 - 97 %                       CARBOXYHEMOGLOBIN                                 0.0 (L)                       0.5 - 1.5 %                     METHEMOGLOBIN                                     0.5                           0.0 - 1.5 %                     DEOXYHEMOGLOBIN                                   1                             0.0 - 5.0 %                     SITE                                              LR                                                            ALLENS TEST                                       POSITIVE                                                      MODE                                              PRVC                                                          FIO2                                              60.0                          %                               Tidal volume                                      450.0                                                         RATE                                              15.0                                                          PEEP/CPAP                                         8.0                                                           Respiratory comment:                                                                                        Dr Sharda Ryan at 9 7 2017 12 37 16 AM. Read back.   -POC PT/INR       Result                                            Value                         Ref Range                       Prothrombin time (POC)                            11.0                          9.6 - 11.6 SECS                 INR (POC)                                         0.9                           0.9 - 1.2                  -GLUCOSE, POC       Result                                            Value                         Ref Range                       Glucose (POC)                                     121 (H)                       65 - 100 mg/dL             -POC TROPONIN-I       Result                                            Value                         Ref Range                       Troponin-I (POC)                                  0 (L)                         0.02 - 0.05 ng/ml          )  Tests in the radiology section of CPT®: ordered and reviewed (Ct Head Wo Cont    Addendum Date: 9/7/2017    Addendum: Report called to ED physician, Aaron Nguyen, at 12:14 AM on 9/7/2017. Result Date: 9/7/2017  Noncontrast CT of the brain. COMPARISON: none INDICATION: Possible stroke. Facial droop. Right-sided. TECHNIQUE: Contiguous axial images were obtained from the skull base through the vertex without IV contrast. Radiation dose reduction techniques were used for this study:  Our CT scanners use one or all of the following: Automated exposure control, adjustment of the mA and/or kVp according to patient's size, iterative reconstruction. FINDINGS: There is an approximately 2.7 x 2.1 cm acute hemorrhage in the left basal ganglia. There is intraventricular extension. The left lateral ventricle, third and the fourth ventricles are nearly filled with acute blood. There is small amount of hemorrhage in the right lateral ventricle. No hydrocephalus at this time. No midline shift. Cerebellum is grossly unremarkable. There is near complete opacification of the right sphenoid sinus. Rest of the visualized paranasal sinuses and the mastoid air cells are aerated. Surrounding bones are intact. IMPRESSION: Acute hemorrhage in the left basal ganglia, with intraventricular extension. No hydrocephalus at this time. Findings likely secondary to hypertension.  DC5     )  Review and summarize past medical records: yes  Discuss the patient with other providers: yes  Independent visualization of images, tracings, or specimens: yes    Risk of Complications, Morbidity, and/or Mortality  Presenting problems: high  Diagnostic procedures: high  Management options: high    Critical Care  Total time providing critical care: 30-74 minutes (60 minutes)    Patient Progress  Patient progress: (Critically ill patient with poor prognosis)    ED Course       Intubation  Date/Time: 9/7/2017 3:08 AM  Performed by: Bony Devine  Authorized by: Bony Devine     Consent:     Consent obtained:  Emergent situation  Pre-procedure details:     Patient status:  Unresponsive    Mallampati score:  III    Pretreatment medications:  None    Paralytics:  Succinylcholine  Procedure details:     Preoxygenation:  Bag valve mask    CPR in progress: no      Intubation method:  Oral    Oral intubation technique:  Direct    Laryngoscope blade: Mac 3    Tube size (mm):  7.0    Tube type:  Cuffed    Number of attempts:  1    Ventilation between attempts: no      Cricoid pressure: no      Tube visualized through cords: yes    Placement assessment:     ETT to lip:  22    Tube secured with: Adhesive tape    Breath sounds:  Equal    Placement verification: chest rise, direct visualization, equal breath sounds and ETCO2 detector    Post-procedure details:     Patient tolerance of procedure:   Tolerated well, no immediate complications

## 2017-09-07 NOTE — INTERDISCIPLINARY ROUNDS
Interdisciplinary team rounds were held 9/7/2017 with the following team members:Care Management, Nursing, Nutrition, Palliative Care, Pastoral Care, Pharmacy, Physical Therapy, Physician, Respiratory Therapy, Speech Therapy and Clinical Coordinator. Plan of care discussed. See clinical pathway and/or care plan for interventions and desired outcomes.

## 2017-09-07 NOTE — PROGRESS NOTES
PICC Placement Note    PRE-PROCEDURE VERIFICATION  Correct Procedure: yes. Time out completed with assistant Kojo Dotson RN and all persons present in agreement with time out. Correct Site:  yes  Temperature: Temp: 97.8 °F (36.6 °C), Temperature Source: Temp Source: Axillary  Recent Labs      09/07/17   0300   09/06/17   2352   BUN  13   < >   --    CREA  0.77   < >   --    PLT  254   < >   --    INR   --    --   0.9   WBC  8.9   < >   --     < > = values in this interval not displayed. Allergies: Banana; Lisinopril; and Nuts [tree nut]  Education materials for [facility] Care given to patient or family. PROCEDURE DETAIL  A triple lumen PICC line was started for vascular access. The following documentation is in addition to the PICC properties in the lines/airways flowsheet :  Lot #: [id number]  xylocaine used: yes  Mid-Arm Circumference: 26 (cm)  Internal Catheter Length: 42 (cm)  Internal Catheter Total Length: 42 (cm)  Vein Selection for PICC:left basilic  Central Line Bundle followed yes  Complication Related to Insertion: none  Both the insertion guidewire and sherlock guidewire were removed intact all ports have positive blood return and were flush well with normal saline. The placement was verified by sapiens ecg. The ECG results state the tip overlies the lower superior vena cava.            Line is okay to use: yes    Azul Maradiaga RN

## 2017-09-07 NOTE — H&P
HISTORY AND PHYSICAL      Jodie Gottron    9/7/2017    Date of Admission:  9/6/2017  PCP:  Dr. Dev Kong    The patient's chart is reviewed and the patient is discussed with the staff. Admission was requested by Dr. Gil Bishop of the [facility] ER for hemorrhagic stroke    HPI:     Jodie Gottron is a 60 y.o. white female with h/o HTN (on amlodipine,hyzaar), anxiety presents with right sided weakness and facial droop after being found by  after calling out for help at 10:30pm. Upon arrival in the ER she was vomiting and agitated with above described weakness. Her SBP was initially 208/105mmHg. Initial GCS not clearly documented. In ER, received labetalol 60mg, propofol 50mg, ativan 4mg, and succinylcholine for intubation. She received 1g of keppra as well for seizure-like activity. CT of head revealed left basal ganglia ICH measured 3 x 2cm with spread to L lateral ventricle and 3rd/4th ventricle with some mild mildline shift. No significant edema thus far. Teleneurology was consulted and recommended ICU admission, SBP goal of 160, tight glucose control and DVT ppx. Dr. Maday Arshad was consulted and did not think surgery would benefit patient, also recommended BP control. History is obtained via chart review, from  and from Bourne (aunt) by phone. Her three children are en route to the hospital from [place].     Review of Systems  Unable to obtain due to patient status    Patient Active Problem List   Diagnosis Code    HTN (hypertension) I10    ADD (attention deficit disorder) F98.8    Depression F32.9    Anxiety F41.9    Atrophic vaginitis N95.2    Screening for breast cancer Z12.39    Stroke, hemorrhagic ([address] I61.9    Accelerated hypertension I10    Non-intractable vomiting with nausea R11.2    At risk for aspiration Z91.89    Acute spontaneous intraventricular hemorrhage assoc w/ hypertension ([address] I61.5, I10       Prior to Admission Medications Prescriptions Last Dose Informant Patient Reported? Taking? ALPRAZolam (XANAX) 0.25 mg tablet Unknown at Unknown time  No No   Sig: Take 1 Tab by mouth three (3) times daily as needed for Anxiety. Max Daily Amount: 0.75 mg. CYANOCOBALAMIN, VITAMIN B-12, (VITAMIN B-12 PO) Unknown at Unknown time  Yes No   Sig: Take  by mouth. FLUoxetine (PROZAC) 20 mg capsule Unknown at Unknown time  Yes No   FLUoxetine (PROZAC) 40 mg capsule Unknown at Unknown time  Yes No   amLODIPine (NORVASC) 5 mg tablet Unknown at Unknown time  No No   Sig: Take 1 Tab by mouth daily. estradiol (ESTRACE) 1 mg tablet Unknown at Unknown time  No No   Sig: Take 1 Tab by mouth daily. estradiol (VAGIFEM) 10 mcg tab vaginal tablet Unknown at Unknown time  No No   Sig: Insert 1 Tab into vagina every Monday and Thursday. losartan-hydroCHLOROthiazide (HYZAAR) 100-12.5 mg per tablet Unknown at Unknown time  No No   Sig: Take 1 Tab by mouth daily. magnesium 250 mg tab Unknown at Unknown time  Yes No   Sig: Take  by mouth.   meloxicam (MOBIC) 15 mg tablet Unknown at Unknown time  No No   Sig: Take 1 Tab by mouth daily. methylphenidate (RITALIN) 20 mg tablet Unknown at Unknown time  Yes No   Sig: Take 20 mg by mouth daily. Facility-Administered Medications: None       Past Medical History:   Diagnosis Date    Anxiety     Depression     HTN (hypertension)     Menopause      Past Surgical History:   Procedure Laterality Date    HX  SECTION      x 3    HX OTHER SURGICAL  2012    Face lift    HX REFRACTIVE SURGERY  2006    HX SANJUANITA AND BSO       Social History     Social History    Marital status:      Spouse name: N/A    Number of children: N/A    Years of education: N/A     Occupational History    Not on file.      Social History Main Topics    Smoking status: Former Smoker    Smokeless tobacco: Never Used    Alcohol use No    Drug use: No    Sexual activity: Yes     Partners: Male     Other Topics Concern  Not on file     Social History Narrative     Family History   Problem Relation Age of Onset    Alcohol abuse Mother     Cancer Mother      lung cancer    Cancer Father      throat cancer    Psychiatric Disorder Father      anxiety, depression    No Known Problems Sister      Allergies   Allergen Reactions    Lisinopril Cough    Nuts [Tree Nut] Unknown (comments)       Current Facility-Administered Medications   Medication Dose Route Frequency    propofol (DIPRIVAN) infusion  5-50 mcg/kg/min IntraVENous TITRATE    sodium chloride (NS) flush 5-10 mL  5-10 mL IntraVENous Q8H    sodium chloride (NS) flush 5-10 mL  5-10 mL IntraVENous PRN    morphine injection 2 mg  2 mg IntraVENous Q1H PRN           Objective:     Vitals:    09/07/17 0051 09/07/17 0052 09/07/17 0053 09/07/17 0054   BP: 103/65   105/67   Pulse: 84 79 79 81   Resp: 16 16 11 23   SpO2: 100% 100% 100% 100%   Weight:       Height:           PHYSICAL EXAM     Constitutional:  the patient is well developed and in no acute distress. Currently quite sedated s/p propofol bolus  EENMT:  Sclera clear, pupils equal, oral mucosa moist  Respiratory: CTAB  Cardiovascular:  RRR without M,G,R  Gastrointestinal: soft and non-tender; with positive bowel sounds. Musculoskeletal: warm without cyanosis. There is no lower leg edema. Skin:  no jaundice or rashes, no wounds   Neurologic:  Moving right side minimally. Some spontaneous movement of L side. CXR: none    CT head: There is an approximately 2.7 x 2.1 cm acute hemorrhage in the left basal  ganglia. There is intraventricular extension. The left lateral ventricle, third  and the fourth ventricles are nearly filled with acute blood. There is small  amount of hemorrhage in the right lateral ventricle. No hydrocephalus at this  time. No midline shift.       Recent Labs      09/06/17   2357  09/06/17   2352   WBC  6.5   --    HGB  12.6   --    HCT  35.0*   --    PLT  269   --    INR   --   0.9 Recent Labs      09/06/17   2357   NA  137   K  3.6   CL  99   GLU  108*   CO2  28   BUN  14   CREA  0.69   CA  8.5   ALB  3.7   TBILI  0.4   ALT  29   SGOT  28     Recent Labs      09/07/17   0020   PH  7.44   PCO2  38   PO2  243*   HCO3  25     No results for input(s): LCAD, LAC in the last 72 hours. Assessment:  (Medical Decision Making)     Hospital Problems  Never Reviewed          Codes Class Noted POA    Stroke, hemorrhagic ([facility] ICD-10-CM: I61.9  ICD-9-CM: 648  9/7/2017 Unknown    In basal ganglia with significant R hemiplegia. Teleneurology following. Non-intractable vomiting with nausea ICD-10-CM: R11.2  ICD-9-CM: 787.01  9/7/2017 Unknown    Likely due to stroke. Monitor for s/sx of aspiration pneumonia    At risk for aspiration ICD-10-CM: Z91.89  ICD-9-CM: V49.89  9/7/2017 Unknown    Intubated. Will assess CXR for possible aspiration event pre-intubation    Acute spontaneous intraventricular hemorrhage assoc w/ hypertension (HCC) ICD-10-CM: I61.5, I10  ICD-9-CM: 074, 401.9  9/7/2017 Unknown    At risk for hydrocephalus and if change in mental status may need CT and consideration of ventriculostomy per neurosurgery. q1h neuro checks          Plan:  (Medical Decision Making)     --Will admit for further medical management of ICH in ICU  --Goal -160mmHg with nicardipine as needed. --Place A-line for tight BP management  --PICC in am  --Normal saline @ 75/h  --HOB 30 degrees  --q1h neuro checks. Acute changes would warrant f/u imaging.  --Daily teleneurology consult. --Not currently on prophylactic seizure medications, but will monitor closely. Received 1g of keppra in ER for questionable seizure-like activity. If seizures will continue keppra, consider fosphenytoin. --SCDs  --Full code for now.   Patient has expressed to her family that she would not want long-term life support in the past.      More than 50% of the time documented was spent in face-to-face contact with the patient and in the care of the patient on the floor/unit where the patient is located. 35minutes of critical care time spent in care of patient in the ICU on admission evaluation.     Ro Rueda MD

## 2017-09-07 NOTE — PROGRESS NOTES
TRANSFER - IN REPORT:    Verbal [place] [facility] [place]  being received from ER for routine progression of care      Report consisted of patients Situation, Background, Assessment and   Recommendations(SBAR). Information from the following report(s) MAR was reviewed with the receiving nurse. Opportunity for questions and clarification was provided. Assessment completed upon patients arrival to unit and care assumed.

## 2017-09-07 NOTE — PROGRESS NOTES
Ventilator check complete; patient has a #7. 0 ET tube secured at the 23 at the lip. Patient is not able to follow commands. Breath sounds are clear. Trachea is midline, Negative for subcutaneous air, and chest excursion is symmetric. Patient is also Negative for cyanosis and is Negative for pitting edema. All alarms are set and audible. Resuscitation bag is at the head of the bed.       Ventilator Settings  Mode FIO2 Rate Tidal Volume Pressure PEEP I:E Ratio   PRVC  35 %  15  450 ml     8 cm H20  1:2.00      Peak airway pressure: 19.7 cm H2O   Minute ventilation: 6.9 l/min     ABG:   Recent Labs      09/07/17   0305  09/07/17   0020   PH  7.41  7.44   PCO2  32*  38   PO2  164*  243*   HCO3  19*  25         Amy Paiz, RT

## 2017-09-07 NOTE — PROGRESS NOTES
Physical Therapy Note:    Participated in interdisciplinary rounds including [place], Wound Care, Palliative Care NP, RN staff, MD, Respiratory therapy, and Nutrition. Patient at this time is not following commands, therefore with decreased ability to participate in therapy safely. Will continue to participate in rounds to determine appropriateness of PT/OT.     Thank you,  Shun Blum, PT, DPT

## 2017-09-07 NOTE — ED TRIAGE NOTES
Pt arrives via EMS for possible stroke. States started at 1030 pm. EMS states facial droop, flaccid right side, and slurring of words.

## 2017-09-07 NOTE — PROGRESS NOTES
Patient was intubated with a number 7.0 ET Tube. Tube placement verified by auscultation and ETCO2 monitor. ET Tube is secured at the 23 cm dwaine at the lip and on the right side. Patient was intubated by Dr Adam Collier on the 1 attempt. Breath sounds are diminished. Patient is Negative for subcutaneous air and chest excursion is symmetric. Trachea is midline. Patient is also Negative for cyanosis and is Negative for pitting edema. Patient placed on ventilator on documented settings. All alarms are set and audible. Resuscitation bag is at the head of the bed. Ventilator Settings  Mode FIO2 Rate Tidal Volume Pressure PEEP I:E Ratio   PRVC  60 % 15 450 ml     8 cm H20         Peak airway pressure:     Minute ventilation: 6.6 l/min     ABG: No results for input(s): PH, PCO2, PO2, HCO3 in the last 72 hours.

## 2017-09-07 NOTE — PROGRESS NOTES
Bedside and Verbal shift change report given to Kaylin by hospitals. Report included the following information SBAR, Kardex, ED Summary, Intake/Output, MAR, Accordion, Recent Results, Med Rec Status and Cardiac Rhythm NSR.

## 2017-09-07 NOTE — PROGRESS NOTES
Dual skin assessment. Scar noted on forehead and abdomen. Ecchymosis on right posterior hip. No skin breakdown noted. Hibiclens bath completed, allevyn applied to sacrum.

## 2017-09-07 NOTE — PROGRESS NOTES
Bilateral a-lines attempted. Great pulses but catheter wouldn't thread.   Will re-attempt in am.  Cal Moses MD

## 2017-09-07 NOTE — ED NOTES
Pt /93 23:45  Pt /105 23:51 MD ordered 20 labetalol, administered 23:52  50mg Succinylcholine administered 23:52     Pt intubated 23:54, positive color change on bag mask CO2 device. Measuring 22 at the lip.

## 2017-09-07 NOTE — PROGRESS NOTES
Problem: Nutrition Deficit  Goal: *Optimize nutritional status  Nutrition:  Nutrition Consult for TF Management. (Dr. Sonali Her)  Assessment:  Anthropometrics:              Ht - 4'10\", wgt - 55.7 kg (ICU bed 9/7/17), BMI 25.6 c/w overweight, edema - none reported. Macronutrient Needs:  Estimated calorie needs - 6365-5481 laura/day (20-25 laura/kg/day)   Estimated protein needs - 49-62 gm pro/day (1.2-1.5 gm pro/kgIBW/day) (GFR >60 ml/min)  Max CHO/day - 193 gm CHO/day (55% laura/day)   Fluid/day - 1.2-1.5 liters/day (1 ml/laura/day)  Intake/Comparative Standards: The patient is currently NPO which meets 0% of her calorie and 0% of her protein needs. Pertinent Labs/Hemodynamic Parameters:              BMP unremarkable; MAP - 105. GI Function/Activity:              Soft abdomen, active bowel sounds. Diet:              NPO. Food/Nutrition History:              60year old lady with a h/o HTN admitted with acute spontaneous intraventricular hemorrhage. She was intubated in the ED for airway protection. Diagnosis (Nutrition): Inadequate energy intake related to NPO status as evidenced by the patient being intubated and ventilated and requires TF for nutrition support. Intervention:  Meals and Snacks: NPO. Enteral Nutrition: Start TF with Jevity 1.2 @ 15 ml/hr with a 30 ml/hr water flush via OGT. Increase TF as tolerated to the goal rate of 45 ml/hr -  1296 calories/day (100% calorie goal), 60 grams protein/day (100% protein goal), 183 grams CHO/day (does not exceed max CHO limit) and 1595 ml water/day (>100% fluid goal). IV Fluid: (TF + water flush) + IVF = 75 ml/hr. Mineral Supplement Therapy: Nutrition Support Orders for Electrolyte Management Replacements are activated and placed on the MAR. Coordination of Nutrition Care by a Nutrition Professional: AM ICU rounds, collaboration with Zaki Cottrell RN. Nutrition Discharge Plan: Based on ST evaluation. Radha López  [phone]

## 2017-09-07 NOTE — ED NOTES
Pt back from CT. Pt vomited, then lost consciousness. MD at bedside, preparing to intubate at this time for protection of airway.

## 2017-09-07 NOTE — PROGRESS NOTES
NS  CT SHOWS A LEFT SIDED DOMINANT HEMISPHERE BASAL GANGLIA  ICH  DEEP LESION  NON SURGICAL LESIONS  SOME IVH  BUT NO HYDROCEPHALUS  KEEP   NOTHING TO Selin Logan MD

## 2017-09-07 NOTE — PROGRESS NOTES
Dr Elizondo [facility] notified of , patient not withdrawing to pain, weak gag reflex, propofol off.  Order received to give NS bolus 250cc and  Stat CT of head

## 2017-09-08 ENCOUNTER — APPOINTMENT (OUTPATIENT)
Dept: GENERAL RADIOLOGY | Age: 62
DRG: 064 | End: 2017-09-08
Attending: INTERNAL MEDICINE
Payer: COMMERCIAL

## 2017-09-08 PROBLEM — J96.00 ACUTE RESPIRATORY FAILURE (HCC): Status: ACTIVE | Noted: 2017-09-08

## 2017-09-08 LAB
ANION GAP SERPL CALC-SCNC: 10 MMOL/L (ref 7–16)
ARTERIAL PATENCY WRIST A: POSITIVE
BASE DEFICIT BLDA-SCNC: 1.2 MMOL/L (ref 0–2)
BDY SITE: ABNORMAL
BUN SERPL-MCNC: 12 MG/DL (ref 8–23)
CALCIUM SERPL-MCNC: 7.9 MG/DL (ref 8.3–10.4)
CHLORIDE SERPL-SCNC: 104 MMOL/L (ref 98–107)
CO2 SERPL-SCNC: 23 MMOL/L (ref 21–32)
COHGB MFR BLD: 0 % (ref 0.5–1.5)
CREAT SERPL-MCNC: 0.5 MG/DL (ref 0.6–1)
DO-HGB BLD-MCNC: 2 % (ref 0–5)
GLUCOSE SERPL-MCNC: 130 MG/DL (ref 65–100)
HCO3 BLDA-SCNC: 22 MMOL/L (ref 22–26)
HGB BLDMV-MCNC: 11.9 GM/DL (ref 11.7–15)
MAGNESIUM SERPL-MCNC: 1.9 MG/DL (ref 1.8–2.4)
METHGB MFR BLD: 0.7 % (ref 0–1.5)
OXYHGB MFR BLDA: 97.6 % (ref 94–97)
PCO2 BLDA: 33 MMHG (ref 35–45)
PEEP RESPIRATORY: 8 CM[H2O]
PH BLDA: 7.45 [PH] (ref 7.35–7.45)
PHOSPHATE SERPL-MCNC: 2.5 MG/DL (ref 2.3–3.7)
PO2 BLDA: 123 MMHG (ref 80–105)
POTASSIUM SERPL-SCNC: 3.4 MMOL/L (ref 3.5–5.1)
RESP RATE: 15
SAO2 % BLD: 98 % (ref 92–98.5)
SERVICE CMNT-IMP: ABNORMAL
SODIUM SERPL-SCNC: 137 MMOL/L (ref 136–145)
VENTILATION MODE VENT: ABNORMAL

## 2017-09-08 PROCEDURE — 65610000001 HC ROOM ICU GENERAL

## 2017-09-08 PROCEDURE — 74011000250 HC RX REV CODE- 250: Performed by: INTERNAL MEDICINE

## 2017-09-08 PROCEDURE — 84100 ASSAY OF PHOSPHORUS: CPT | Performed by: INTERNAL MEDICINE

## 2017-09-08 PROCEDURE — 82803 BLOOD GASES ANY COMBINATION: CPT

## 2017-09-08 PROCEDURE — 80048 BASIC METABOLIC PNL TOTAL CA: CPT | Performed by: INTERNAL MEDICINE

## 2017-09-08 PROCEDURE — 36600 WITHDRAWAL OF ARTERIAL BLOOD: CPT

## 2017-09-08 PROCEDURE — 99233 SBSQ HOSP IP/OBS HIGH 50: CPT | Performed by: INTERNAL MEDICINE

## 2017-09-08 PROCEDURE — 71010 XR CHEST SNGL V: CPT

## 2017-09-08 PROCEDURE — 74011250637 HC RX REV CODE- 250/637: Performed by: INTERNAL MEDICINE

## 2017-09-08 PROCEDURE — 74011000258 HC RX REV CODE- 258: Performed by: INTERNAL MEDICINE

## 2017-09-08 PROCEDURE — 94003 VENT MGMT INPAT SUBQ DAY: CPT

## 2017-09-08 PROCEDURE — 36592 COLLECT BLOOD FROM PICC: CPT

## 2017-09-08 PROCEDURE — 83735 ASSAY OF MAGNESIUM: CPT | Performed by: INTERNAL MEDICINE

## 2017-09-08 PROCEDURE — 74011250636 HC RX REV CODE- 250/636: Performed by: INTERNAL MEDICINE

## 2017-09-08 RX ORDER — NICARDIPINE HYDROCHLORIDE 0.1 MG/ML
5-15 INJECTION INTRAVENOUS
Status: DISCONTINUED | OUTPATIENT
Start: 2017-09-08 | End: 2017-09-08 | Stop reason: SDUPTHER

## 2017-09-08 RX ORDER — POTASSIUM CHLORIDE 20MEQ/15ML
40 LIQUID (ML) ORAL 2 TIMES DAILY
Status: COMPLETED | OUTPATIENT
Start: 2017-09-08 | End: 2017-09-08

## 2017-09-08 RX ORDER — FENTANYL CITRATE-0.9 % NACL/PF 25 MCG/ML
25-200 PLASTIC BAG, INJECTION (ML) INJECTION
Status: DISCONTINUED | OUTPATIENT
Start: 2017-09-09 | End: 2017-09-09

## 2017-09-08 RX ORDER — HYDRALAZINE HYDROCHLORIDE 20 MG/ML
10 INJECTION INTRAMUSCULAR; INTRAVENOUS EVERY 6 HOURS
Status: DISCONTINUED | OUTPATIENT
Start: 2017-09-08 | End: 2017-09-15 | Stop reason: HOSPADM

## 2017-09-08 RX ADMIN — SODIUM CHLORIDE 5 MG/HR: 900 INJECTION, SOLUTION INTRAVENOUS at 12:11

## 2017-09-08 RX ADMIN — SODIUM CHLORIDE, PRESERVATIVE FREE 900 UNITS: 5 INJECTION INTRAVENOUS at 21:35

## 2017-09-08 RX ADMIN — FAMOTIDINE 20 MG: 10 INJECTION, SOLUTION INTRAVENOUS at 09:17

## 2017-09-08 RX ADMIN — POTASSIUM CHLORIDE 40 MEQ: 20 SOLUTION ORAL at 05:25

## 2017-09-08 RX ADMIN — HYDRALAZINE HYDROCHLORIDE 10 MG: 20 INJECTION INTRAMUSCULAR; INTRAVENOUS at 11:17

## 2017-09-08 RX ADMIN — ONDANSETRON 4 MG: 2 INJECTION INTRAMUSCULAR; INTRAVENOUS at 11:49

## 2017-09-08 RX ADMIN — MORPHINE SULFATE 2 MG: 2 INJECTION, SOLUTION INTRAMUSCULAR; INTRAVENOUS at 18:24

## 2017-09-08 RX ADMIN — SODIUM CHLORIDE 5 MG/HR: 900 INJECTION, SOLUTION INTRAVENOUS at 18:23

## 2017-09-08 RX ADMIN — MORPHINE SULFATE 2 MG: 2 INJECTION, SOLUTION INTRAMUSCULAR; INTRAVENOUS at 00:03

## 2017-09-08 RX ADMIN — Medication 30 ML: at 13:37

## 2017-09-08 RX ADMIN — HYDROCHLOROTHIAZIDE: 12.5 CAPSULE ORAL at 10:17

## 2017-09-08 RX ADMIN — HYDRALAZINE HYDROCHLORIDE 10 MG: 20 INJECTION INTRAMUSCULAR; INTRAVENOUS at 23:30

## 2017-09-08 RX ADMIN — FAMOTIDINE 20 MG: 10 INJECTION, SOLUTION INTRAVENOUS at 21:33

## 2017-09-08 RX ADMIN — MORPHINE SULFATE 2 MG: 2 INJECTION, SOLUTION INTRAMUSCULAR; INTRAVENOUS at 06:07

## 2017-09-08 RX ADMIN — Medication 10 ML: at 21:36

## 2017-09-08 RX ADMIN — CHLORHEXIDINE GLUCONATE 15 ML: 1.2 RINSE ORAL at 09:07

## 2017-09-08 RX ADMIN — MORPHINE SULFATE 2 MG: 2 INJECTION, SOLUTION INTRAMUSCULAR; INTRAVENOUS at 07:35

## 2017-09-08 RX ADMIN — Medication 10 ML: at 05:23

## 2017-09-08 RX ADMIN — Medication 30 ML: at 05:24

## 2017-09-08 RX ADMIN — POTASSIUM CHLORIDE 40 MEQ: 20 SOLUTION ORAL at 18:29

## 2017-09-08 RX ADMIN — SODIUM CHLORIDE 5 MG/HR: 900 INJECTION, SOLUTION INTRAVENOUS at 23:26

## 2017-09-08 RX ADMIN — SODIUM CHLORIDE, PRESERVATIVE FREE 900 UNITS: 5 INJECTION INTRAVENOUS at 13:36

## 2017-09-08 RX ADMIN — Medication 30 ML: at 21:35

## 2017-09-08 RX ADMIN — HYDRALAZINE HYDROCHLORIDE 10 MG: 20 INJECTION INTRAMUSCULAR; INTRAVENOUS at 18:26

## 2017-09-08 RX ADMIN — Medication 10 ML: at 13:37

## 2017-09-08 RX ADMIN — MORPHINE SULFATE 2 MG: 2 INJECTION, SOLUTION INTRAMUSCULAR; INTRAVENOUS at 04:08

## 2017-09-08 RX ADMIN — SODIUM CHLORIDE, PRESERVATIVE FREE 900 UNITS: 5 INJECTION INTRAVENOUS at 05:24

## 2017-09-08 RX ADMIN — CHLORHEXIDINE GLUCONATE 15 ML: 1.2 RINSE ORAL at 21:36

## 2017-09-08 NOTE — PROGRESS NOTES
Spiritual Care visit. Follow up visit. Visited with family at bedside. Patient did not respond. Family, however, was encouraged about patient. Prayed for her recovery and health.     Visit by Jose Mooney M.Ed., Th.B. ,Staff

## 2017-09-08 NOTE — INTERDISCIPLINARY ROUNDS
Interdisciplinary team rounds were held 9/8/2017 with the following team members:Nursing, Nutrition, Pastoral Care, Pharmacy, Physical Therapy, Physician, Respiratory Therapy and Clinical Coordinator and the patient. Plan of care discussed. See clinical pathway and/or care plan for interventions and desired outcomes.

## 2017-09-08 NOTE — PROGRESS NOTES
Ventilator check complete; patient has a #7. 0 ET tube secured at the 23 at the lip. Patient is not able to follow commands. Breath sounds are clear. Trachea is midline, Negative for subcutaneous air, and chest excursion is symmetric. Patient is also Negative for cyanosis and is Negative for pitting edema. All alarms are set and audible.   Resuscitation bag is at the head of the bed.       Ventilator Settings  Mode FIO2 Rate Tidal Volume Pressure PEEP I:E Ratio   PRVC  35 %  15  450 ml     8 cm H20  1:2.00       Peak airway pressure: 19.7 cm H2O   Minute ventilation: 6.9 l/min      ABG:        Recent Labs       09/07/17   0305  09/07/17   0020   PH  7.41  7.44   PCO2  32*  38   PO2  164*  243*   HCO3  19*  25

## 2017-09-08 NOTE — PROGRESS NOTES
Problem: Nutrition Deficit  Goal: *Optimize nutritional status  Nutrition F/U:  TF Management for [facility]. Assessment:  Weight 56.7 kg (ICU bed 9/8/17), edema - none reported. The patient was started of TF yesterday and was able to be advanced to her goal rate without difficulty. No bowel movement reported yet , but low gastric residuals were recorded. Noted vomiting today after she was given her blood pressure meds and TF is now on hold. Labs are remarkable for potassium 3.4, given 40 meq liquid potassium earlier today. Macronutrient Needs:  Estimated calorie needs - 2729-6889 laura/day (20-25 laura/kg/day)   Estimated protein needs - 49-62 gm pro/day (1.2-1.5 gm pro/kgIBW/day) (GFR >60 ml/min)  Max CHO/day - 193 gm CHO/day (55% laura/day)   Fluid/day - 1.2-1.5 liters/day (1 ml/laura/day)  Intake/Comparative Standards:  Current intake of TF (Jevity 1.2 @ 45 ml/hr with a 30 ml/hr water flush) provides 1296 calories/day (100% calorie goal), 60 grams protein/day (100% protein goal), 183 grams CHO/day (does not exceed max CHO limit) and 1595 ml water/day (>100% fluid goal). Intervention:   Meals and Snacks: NPO. Enteral Nutrition: Resume TF at goal rate after holding it for 4 hours. Mineral Supplement Therapy: Nutrition Support Orders/Electrolyte Management Replacement Protocols are active on the MAR. 40 meq liquid potassium today. Nutrition-Related Medication Management: May need prn Reglan if high residuals become an ongoing problem. Coordination of Nutrition Care by a Nutrition Professional: AM ICU rounds, collaboration with [facility]. RN. Nutrition Discharge Plan: Too soon to determine. Marya Cadet.  Cristóbal Rosales  [phone]

## 2017-09-08 NOTE — PROGRESS NOTES
Patient is now able to open eyes and focus. Left extremities are following commands. Patient is now on pressure support mode on vent and breathing 14 respirations a minute.

## 2017-09-08 NOTE — PROGRESS NOTES
Care Daily Progress Note: 9/8/2017  Admission Date: 9/6/2017     The patient's chart is reviewed and the patient is discussed with the staff. Monty Che is a 60 y.o. white female with h/o HTN (on amlodipine,hyzaar), anxiety presents with right sided weakness and facial droop after being found by  after calling out for help at 10:30pm. Upon arrival in the ER she was vomiting and agitated with above described weakness. Her SBP was initially 208/105mmHg. Initial GCS not clearly documented.     In ER, received labetalol 60mg, propofol 50mg, ativan 4mg, and succinylcholine for intubation. She received 1g of keppra as well for seizure-like activity. CT of head revealed left basal ganglia ICH measured 3 x 2cm with spread to L lateral ventricle and 3rd/4th ventricle with some mild mildline shift. No significant edema thus far.     Teleneurology was consulted and recommended ICU admission, SBP goal of 160, tight glucose control and DVT ppx. Dr. Soila Torres was consulted and did not think surgery would benefit patient, also recommended BP control.     History is obtained via chart review, from  and from Duyen Chan (aunt) by phone. Her three children are en route to the hospital from [place].     Subjective:       On vent  Starting to wake up  Moving all extremities except RUE    Current Facility-Administered Medications   Medication Dose Route Frequency    potassium chloride (KAON 10%) 20 mEq/15 mL oral liquid 40 mEq  40 mEq Oral BID    losartan/hydroCHLOROthiazide (HYZAAR) 100/12.5 mg   Oral DAILY    famotidine (PF) (PEPCID) 20 mg in sodium chloride 0.9 % 10 mL injection  20 mg IntraVENous Q12H    hydrALAZINE (APRESOLINE) 20 mg/mL injection 10 mg  10 mg IntraVENous Q6H    propofol (DIPRIVAN) infusion  5-50 mcg/kg/min IntraVENous TITRATE    sodium chloride (NS) flush 5-10 mL  5-10 mL IntraVENous Q8H    sodium chloride (NS) flush 5-10 mL  5-10 mL IntraVENous PRN    morphine injection 2 mg  2 mg IntraVENous Q1H PRN    LORazepam (ATIVAN) injection 2 mg  2 mg IntraVENous Q2H PRN    ondansetron (ZOFRAN) injection 4 mg  4 mg IntraVENous Q6H PRN    acetaminophen (TYLENOL) tablet 500 mg  500 mg Per NG tube Q4H PRN    sodium chloride 0.9 % bolus infusion 250 mL  250 mL IntraVENous Q1H PRN    chlorhexidine (PERIDEX) 0.12 % mouthwash 15 mL  15 mL Oral BID    NUTRITIONAL SUPPORT ELECTROLYTE PRN ORDERS   Does Not Apply PRN    sodium chloride (NS) flush 30 mL  30 mL InterCATHeter Q8H    heparin (porcine) pf 900 Units  900 Units InterCATHeter Q8H    sodium chloride (NS) flush 30 mL  30 mL InterCATHeter PRN    heparin (porcine) pf 900 Units  900 Units InterCATHeter PRN       Review of Systems  Unobtainable due to patient status. Objective:     Vitals:    09/08/17 0900 09/08/17 0929 09/08/17 0959 09/08/17 1017   BP: (!) 179/94 152/81 150/80    Pulse: 97 81 81 82   Resp: 15 14 14    Temp:       SpO2: 100% 100% 100%    Weight:       Height:           Intake and Output:   09/06 1901 - 09/08 0700  In: 2016.5 [I.V.:1019.5]  Out: 1935 [Urine:1935]       Physical Exam:          Constitutional:  intubated and mechanically ventilated.   EENMT:  Sclera clear, pupils equal, oral mucosa moist  Respiratory: clear  Cardiovascular:  RRR with no M,G,R;  Gastrointestinal:  soft with no tenderness; positive bowel sounds present  Musculoskeletal:  warm with no cyanosis, no lower leg edema  Skin:  no jaundice or ecchymosis  Neurologic:moving all extremities except RUL   Psychiatric:  Starting to response      LINES:  ETT, James, NG, per iv lines     DRIPS:  None     CXR:  None today       Ventilator Settings  Mode FIO2 Rate Tidal Volume Pressure PEEP   Pressure support  30 %    450 ml  10 cm H2O  8 cm H20      Peak airway pressure: 18.2 cm H2O   Minute ventilation: 5.3 l/min     ABG:   Recent Labs      09/08/17   0340  09/07/17   0305  09/07/17   0020   PH  7.45  7.41  7.44   PCO2  33*  32*  38   PO2  123*  164*  243* HCO3  22  19*  25        LAB  Recent Labs      09/07/17   2340  09/06/17   2354   GLUCPOC  106*  121*     Recent Labs      09/07/17   0300  09/06/17   2357  09/06/17   2352   WBC  8.9  6.5   --    HGB  12.2  12.6   --    HCT  34.6*  35.0*   --    PLT  254  269   --    INR   --    --   0.9     Recent Labs      09/08/17   0405  09/07/17   0300  09/06/17   2357   NA  137  139  137   K  3.4*  3.6  3.6   CL  104  103  99   CO2  23  25  28   GLU  130*  120*  108*   BUN  12  13  14   CREA  0.50*  0.77  0.69   MG  1.9   --    --    PHOS  2.5   --    --    CA  7.9*  8.1*  8.5   ALB   --    --   3.7   SGOT   --    --   28         Assessment:  (Medical Decision Making)     Hospital Problems  Never Reviewed          Codes Class Noted POA    Acute respiratory failure ([facility] ICD-10-CM: J96.00  ICD-9-CM: 518.81  9/8/2017 Unknown        * (Principal)Stroke, hemorrhagic (HCC) ICD-10-CM: I61.9  ICD-9-CM: 547  9/7/2017 Unknown        Non-intractable vomiting with nausea ICD-10-CM: R11.2  ICD-9-CM: 787.01  9/7/2017 Unknown        At risk for aspiration ICD-10-CM: Z91.89  ICD-9-CM: V49.89  9/7/2017 Unknown        Acute spontaneous intraventricular hemorrhage assoc w/ hypertension ([facility] ICD-10-CM: I61.5, I10  ICD-9-CM: 559, 401.9  9/7/2017 Unknown              Plan:  (Medical Decision Making)     -- will try to see ih she can wake up little more before extubating her  - restart home BP meds, BP control  - start TF     More than 50% of the time documented was spent in face-to-face contact with the patient and in the care of the patient on the floor/unit where the patient is located.     Ace Ware MD

## 2017-09-08 NOTE — PROGRESS NOTES
Ventilator check complete; patient has a #7. 0 ET tube secured at the 23 at the lip. Patient is not sedated. Patient is able to follow commands. Breath sounds are clear. Trachea is midline, Negative for subcutaneous air, and chest excursion is symmetric. Patient is also Negative for cyanosis. All alarms are set and audible. Resuscitation bag is at the head of the bed.       Ventilator Settings  Mode FIO2 Rate Tidal Volume Pressure PEEP I:E Ratio   Pressure support  30 %      10 cm H2O  8 cm H20        Peak airway pressure: 18.2 cm H2O   Minute ventilation: 5.3 l/min     ABG:   Recent Labs      09/08/17   0340  09/07/17   0305  09/07/17   0020   PH  7.45  7.41  7.44   PCO2  33*  32*  38   PO2  123*  164*  243*   HCO3  22  19*  25         Angela Smallwood, RT

## 2017-09-08 NOTE — PROGRESS NOTES
Bedside shift report received from [facility] and dual neuro assessment performed. Patient is not withdrawing to pain, but moving spontaneously. She is not following commands. Pupils are sluggish to react. Patient coughs with suction. Will monitor.

## 2017-09-08 NOTE — PROGRESS NOTES
Patient vomited around ET tube. Tube feed stopped at this time and OG tube hooked to suction. Zofran given. Patient's BP has not reduced after adding PO and IV BP medications. Cardene gtt now started at low dose. Will monitor.

## 2017-09-08 NOTE — PROCEDURES
[facility]       Name:  Vanessa Coughlin   MR#:  [id number]   :  1955   Account #:  [de-identified]   Date of Adm:  2017       DATE OF ELECTROENCEPHALOGRAM: 2017     This is a 16-channel EEG to rule out seizure activity with 2   central leads and an EKG strip . The predominant background   rhythm does contain very disorganized 4-6 Hz activity that was   generalized throughout the EEG. There is no spike-and-wave or   sharp-wave activity noted throughout this EEG. There are no   paroxysmal bursts of seizure activity. The patient did not enter   into stage I or stage II sleep. Sleep spindles were not seen. Photic   stimulation was performed and did not elicit change in   background rhythm. Hyperventilation could not be performed. IMPRESSION: This electroencephalogram does not show any seizure   activity. There is no lateralizing slowing. There is generalized   encephalopathic process that may be of a toxic, metabolic, or   degenerative etiology. Clinical correlation is advised.          DO Ottoniel Mcgraw / ОЛЬГА   D:  2017   16:32   T:  2017   05:32   Job #:  [id number]

## 2017-09-09 ENCOUNTER — APPOINTMENT (OUTPATIENT)
Dept: GENERAL RADIOLOGY | Age: 62
DRG: 064 | End: 2017-09-09
Attending: INTERNAL MEDICINE
Payer: COMMERCIAL

## 2017-09-09 LAB
ALBUMIN SERPL-MCNC: 2.8 G/DL (ref 3.2–4.6)
ALBUMIN/GLOB SERPL: 0.8 {RATIO} (ref 1.2–3.5)
ALP SERPL-CCNC: 51 U/L (ref 50–136)
ALT SERPL-CCNC: 24 U/L (ref 12–65)
ANION GAP SERPL CALC-SCNC: 8 MMOL/L (ref 7–16)
ARTERIAL PATENCY WRIST A: POSITIVE
AST SERPL-CCNC: 36 U/L (ref 15–37)
BASE EXCESS BLDA CALC-SCNC: 1.1 MMOL/L (ref 0–3)
BDY SITE: ABNORMAL
BILIRUB SERPL-MCNC: 0.5 MG/DL (ref 0.2–1.1)
BUN SERPL-MCNC: 20 MG/DL (ref 8–23)
CALCIUM SERPL-MCNC: 7.9 MG/DL (ref 8.3–10.4)
CHLORIDE SERPL-SCNC: 106 MMOL/L (ref 98–107)
CO2 SERPL-SCNC: 26 MMOL/L (ref 21–32)
COHGB MFR BLD: 0.1 % (ref 0.5–1.5)
CREAT SERPL-MCNC: 0.45 MG/DL (ref 0.6–1)
DO-HGB BLD-MCNC: 2 % (ref 0–5)
GLOBULIN SER CALC-MCNC: 3.4 G/DL (ref 2.3–3.5)
GLUCOSE SERPL-MCNC: 125 MG/DL (ref 65–100)
HCO3 BLDA-SCNC: 25 MMOL/L (ref 22–26)
HGB BLDMV-MCNC: 12.4 GM/DL (ref 11.7–15)
METHGB MFR BLD: 0.6 % (ref 0–1.5)
OXYHGB MFR BLDA: 97.6 % (ref 94–97)
PCO2 BLDA: 35 MMHG (ref 35–45)
PEEP RESPIRATORY: 8 CM[H2O]
PH BLDA: 7.47 [PH] (ref 7.35–7.45)
PO2 BLDA: 123 MMHG (ref 80–105)
POTASSIUM SERPL-SCNC: 3.8 MMOL/L (ref 3.5–5.1)
PROT SERPL-MCNC: 6.2 G/DL (ref 6.3–8.2)
SAO2 % BLD: 98 % (ref 92–98.5)
SODIUM SERPL-SCNC: 140 MMOL/L (ref 136–145)
VENTILATION MODE VENT: ABNORMAL

## 2017-09-09 PROCEDURE — 80053 COMPREHEN METABOLIC PANEL: CPT | Performed by: INTERNAL MEDICINE

## 2017-09-09 PROCEDURE — 82803 BLOOD GASES ANY COMBINATION: CPT

## 2017-09-09 PROCEDURE — 74011250637 HC RX REV CODE- 250/637: Performed by: INTERNAL MEDICINE

## 2017-09-09 PROCEDURE — 74000 XR ABD (KUB): CPT

## 2017-09-09 PROCEDURE — 74011000250 HC RX REV CODE- 250: Performed by: INTERNAL MEDICINE

## 2017-09-09 PROCEDURE — 71010 XR CHEST SNGL V: CPT

## 2017-09-09 PROCEDURE — 77030018798 HC PMP KT ENTRL FED COVD -A

## 2017-09-09 PROCEDURE — 65610000001 HC ROOM ICU GENERAL

## 2017-09-09 PROCEDURE — 97162 PT EVAL MOD COMPLEX 30 MIN: CPT

## 2017-09-09 PROCEDURE — 99233 SBSQ HOSP IP/OBS HIGH 50: CPT | Performed by: INTERNAL MEDICINE

## 2017-09-09 PROCEDURE — 97530 THERAPEUTIC ACTIVITIES: CPT

## 2017-09-09 PROCEDURE — 74011000258 HC RX REV CODE- 258: Performed by: INTERNAL MEDICINE

## 2017-09-09 PROCEDURE — 77030004950 HC CATH ENTRL NG COVD -A

## 2017-09-09 PROCEDURE — 77030031642 HC BOOT FT ROOKE HFS OSBM -B

## 2017-09-09 PROCEDURE — 36600 WITHDRAWAL OF ARTERIAL BLOOD: CPT

## 2017-09-09 PROCEDURE — 74011250636 HC RX REV CODE- 250/636: Performed by: INTERNAL MEDICINE

## 2017-09-09 PROCEDURE — 94003 VENT MGMT INPAT SUBQ DAY: CPT

## 2017-09-09 PROCEDURE — 77010033678 HC OXYGEN DAILY

## 2017-09-09 RX ORDER — LABETALOL 100 MG/1
100 TABLET, FILM COATED ORAL EVERY 12 HOURS
Status: DISCONTINUED | OUTPATIENT
Start: 2017-09-09 | End: 2017-09-10

## 2017-09-09 RX ADMIN — SODIUM CHLORIDE, PRESERVATIVE FREE 900 UNITS: 5 INJECTION INTRAVENOUS at 22:39

## 2017-09-09 RX ADMIN — Medication 10 ML: at 13:46

## 2017-09-09 RX ADMIN — Medication 30 ML: at 13:46

## 2017-09-09 RX ADMIN — HYDRALAZINE HYDROCHLORIDE 10 MG: 20 INJECTION INTRAMUSCULAR; INTRAVENOUS at 11:54

## 2017-09-09 RX ADMIN — Medication 30 ML: at 22:38

## 2017-09-09 RX ADMIN — Medication 10 ML: at 22:39

## 2017-09-09 RX ADMIN — FAMOTIDINE 20 MG: 10 INJECTION, SOLUTION INTRAVENOUS at 09:05

## 2017-09-09 RX ADMIN — HYDRALAZINE HYDROCHLORIDE 10 MG: 20 INJECTION INTRAMUSCULAR; INTRAVENOUS at 05:33

## 2017-09-09 RX ADMIN — LABETALOL HYDROCHLORIDE 100 MG: 100 TABLET, FILM COATED ORAL at 20:00

## 2017-09-09 RX ADMIN — HYDRALAZINE HYDROCHLORIDE 10 MG: 20 INJECTION INTRAMUSCULAR; INTRAVENOUS at 23:06

## 2017-09-09 RX ADMIN — HYDROCHLOROTHIAZIDE: 12.5 CAPSULE ORAL at 08:58

## 2017-09-09 RX ADMIN — MORPHINE SULFATE 2 MG: 2 INJECTION, SOLUTION INTRAMUSCULAR; INTRAVENOUS at 17:37

## 2017-09-09 RX ADMIN — Medication 30 ML: at 05:20

## 2017-09-09 RX ADMIN — HYDRALAZINE HYDROCHLORIDE 10 MG: 20 INJECTION INTRAMUSCULAR; INTRAVENOUS at 17:14

## 2017-09-09 RX ADMIN — Medication 10 ML: at 05:37

## 2017-09-09 RX ADMIN — LABETALOL HYDROCHLORIDE 100 MG: 100 TABLET, FILM COATED ORAL at 11:52

## 2017-09-09 RX ADMIN — SODIUM CHLORIDE 5 MG/HR: 900 INJECTION, SOLUTION INTRAVENOUS at 10:33

## 2017-09-09 RX ADMIN — FAMOTIDINE 20 MG: 10 INJECTION, SOLUTION INTRAVENOUS at 20:00

## 2017-09-09 RX ADMIN — SODIUM CHLORIDE, PRESERVATIVE FREE 900 UNITS: 5 INJECTION INTRAVENOUS at 05:33

## 2017-09-09 RX ADMIN — SODIUM CHLORIDE 5 MG/HR: 900 INJECTION, SOLUTION INTRAVENOUS at 03:55

## 2017-09-09 RX ADMIN — SODIUM CHLORIDE, PRESERVATIVE FREE 900 UNITS: 5 INJECTION INTRAVENOUS at 13:46

## 2017-09-09 RX ADMIN — Medication 50 MCG/HR: at 00:05

## 2017-09-09 NOTE — PROGRESS NOTES
Fentanyl drip started at 50 mcg/hr per MD orders. Current /71 with . Cardene drip currently at 5 mg/hr. Will continue to monitor closely.

## 2017-09-09 NOTE — PROGRESS NOTES
Ventilator check complete; patient has a #7. 0 ET tube secured at the 22 at the lip. Patient is not sedated. Patient is able to follow commands. Breath sounds are clear. Trachea is midline, Negative for subcutaneous air, and chest excursion is symmetric. Patient is also Negative for cyanosis. All alarms are set and audible. Resuscitation bag is at the head of the bed.       Ventilator Settings  Mode FIO2 Rate Tidal Volume Pressure PEEP I:E Ratio   Pressure support  30 %      5 cm H2O  8 cm H20        Peak airway pressure: 13.7 cm H2O   Minute ventilation: 7 l/min     ABG:   Recent Labs      09/09/17   0340  09/08/17   0340  09/07/17   0305   PH  7.47*  7.45  7.41   PCO2  35  33*  32*   PO2  123*  123*  164*   HCO3  25  22  19*         Angela Smallwood, RT

## 2017-09-09 NOTE — PROGRESS NOTES
Ventilator check complete; patient has a #7. 0 ET tube secured at the 23 at the lip. Patient is not able to follow commands. Breath sounds are clear. Trachea is midline, Negative for subcutaneous air, and chest excursion is symmetric. Patient is also Negative for cyanosis and is Negative for pitting edema. All alarms are set and audible. Resuscitation bag is at the head of the bed.       Ventilator Settings  Mode FIO2 Rate Tidal Volume Pressure PEEP I:E Ratio   Pressure support  30 %    450 ml  8 cm H2O  8 cm H20  1:2      Peak airway pressure: 16.6 cm H2O   Minute ventilation: 6.9 l/min     ABG:   Recent Labs      09/09/17   0340  09/08/17   0340  09/07/17   0305   PH  7.47*  7.45  7.41   PCO2  35  33*  32*   PO2  123*  123*  164*   HCO3  25  22  19*         Torie Montemayor

## 2017-09-09 NOTE — PROGRESS NOTES
Speech Pathology Note:    Bedside Swallow Evaluation orders received. Patient just extubated. Able to attend on left (max cues on right) and follow simple commands with verbal and visual cues (move tongue left to right, open mouth). Attempted to vocalize (\"ahh\" when cued), very weak/breathy vocal sounds. Will benefit from oral care and opportunity to recover from intubation prior to PO trials to decrease aspiration risk and improve opportunity to tolerate PO. Discussed with nursing and family. Will assess first thing tomorrow AM.    Thank you for this consult. Sary Leal, MS, CCC-SLP

## 2017-09-09 NOTE — PROGRESS NOTES
MD Ana Rosa Daniels aware of pt being Sinus Tachy with HR in 110-120's, low grade temp noted at 99.7 orally. Orders received to start Fentanyl drip for pain. Will continue to monitor closely.

## 2017-09-09 NOTE — PROGRESS NOTES
Problem: Mobility Impaired (Adult and Pediatric)  Goal: *Acute Goals and Plan of Care (Insert Text)    LTG:  (1.)Ms. Audrey Serrano will move from supine to sit and sit to supine , scoot up and down and roll side to side in bed with MINIMAL ASSIST within 10 day(s). (2.)Ms. Audrey Serrano will transfer from bed to chair and chair to bed with MINIMAL ASSIST using the least restrictive device within 10 day(s). (3.)Ms. Audrey Serrano will sit EOB with CONTACT GUARD ASSIST for 15min with the least restrictive support doing bedside exercise within 10 day(s). (4.)Ms. Audrey Serrano will stand with Minimum ASSIST for 5min with the least restrictive support performing standing exer within 10 day(s). ________________________________________________________________________________________________      PHYSICAL THERAPY: INITIAL ASSESSMENT, TREATMENT DAY: DAY OF ASSESSMENT, PM 9/9/2017  INPATIENT: Hospital Day: 4  Payor: Charles Lopez / Plan: Juli Jacob PPO / Product Type: PPO /      NAME/AGE/GENDER: Wilbur Quiñonez is a 60 y.o. female           PRIMARY DIAGNOSIS: Stroke, hemorrhagic ([address]  Hemorrhagic stroke ([address] Stroke, hemorrhagic ([address] Stroke, hemorrhagic ([address]        ICD-10: Treatment Diagnosis:       · Generalized Muscle Weakness (M62.81)  · Difficulty in walking, Not elsewhere classified (R26.2)   Precaution/Allergies:  Banana; Lisinopril; and Nuts [tree nut]       ASSESSMENT:      Ms. Audrey Serrano presents supine at arrival with 4 family members present. Pt is flaccid on R UE and LE with minimal L LE use, unable to move L LE on command, but does have fairly good deliberate involuntary L LE motion. Pt spoke 2 words during tx and attempt to smile 2-3x. Eyes are pretty fixed in center with minimal periphery scanning. Able to sit unsupported for 3-4min at a time needing asst to correct sitting forward. Does not use R UE for support.  Her impairments include decreased functional mobility, decreased balance, decreased strength, decreased safety awareness, increased risk for falls. Pt would benefit from further PT while in house to address these impairments to help improve to prior level of independence. Anticipate pt will require continued skilled PT following discharge from hospital due to poor balance, safety and fall risk. This section established at most recent assessment   PROBLEM LIST (Impairments causing functional limitations):  1. Decreased Strength  2. Decreased ADL/Functional Activities  3. Decreased Transfer Abilities  4. Decreased Ambulation Ability/Technique  5. Decreased Balance  6. Decreased Cognition    INTERVENTIONS PLANNED: (Benefits and precautions of physical therapy have been discussed with the patient.)  1. Balance Exercise  2. Bed Mobility  3. Gait Training  4. Neuromuscular Re-education/Strengthening  5. Therapeutic Activites  6. Therapeutic Exercise/Strengthening  7. Transfer Training  8. Group Therapy      TREATMENT PLAN: Frequency/Duration: 3 times a week for duration of hospital stay  Rehabilitation Potential For Stated Goals: Beverley Aden REHABILITATION/EQUIPMENT: (at time of discharge pending progress): Due to the probability of continued deficits (see above) this patient will likely need continued skilled physical therapy after discharge. Equipment:   · None at this time                   HISTORY:   History of Present Injury/Illness (Reason for Referral):  41-year-old female brought in as a stroke alert by EMS.  states that she was in a downstairs bedroom when she called out. Last known normal was at 2230. He found her confused with a left-sided facial droop as well as right upper and lower extremity paralysis. EMS found her with similar symptoms and she  became somewhat more was unresponsive. ruddy arrival to the emergency department the patient began moving her extremities more freely but was not able to communicate clearly. Urgent CT scan was performed which demonstrated intracranial hemorrhage.   Patient emergently intubated for airway protection. Patient given labetalol, propofol, Ativan for sedation and blood pressure control. We eventually achieved adequate sedation and blood pressure control. Family updated. Discussed admission with the intensivist.  My colleague spoke with Dr. Soila Torres of neurosurgery he reported that this bleed was nonsurgical in nature and that medical management would be needed.  states only medication she takes is Premarin and lisinopril. No blood thinners. Past Medical History/Comorbidities:   Ms. Adela Iglesias  has a past medical history of Anxiety; Depression; HTN (hypertension); and Menopause. Ms. Adela Iglesias  has a past surgical history that includes jennifer and bso ();  section; refractive surgery (); and other surgical (). Social History/Living Environment:   Home Environment: Private residence  # Steps to Enter: 4  One/Two Story Residence: Two story, live on 1st floor  # of Interior Steps: 13  Height of Each Step (in): 6 inches  Interior Rails: Right  Lift Chair Available: No  Living Alone: No  Support Systems: Spouse/Significant Other/Partner  Patient Expects to be Discharged to[de-identified] Private residence  Current DME Used/Available at Home: None  Prior Level of Function/Work/Activity:  Completely independent.   No DMe used      Number of Personal Factors/Comorbidities that affect the Plan of Care: 1-2: MODERATE COMPLEXITY   EXAMINATION:   Most Recent Physical Functioning:   Gross Assessment:  AROM: Generally decreased, functional  Strength: Grossly decreased, non-functional  Coordination: Grossly decreased, non-functional  Tone: Abnormal  Sensation: Impaired               Posture:  Posture (WDL): Exceptions to WDL  Posture Assessment: Trunk flexion  Balance:  Sitting: Impaired  Sitting - Static: Fair (occasional)  Sitting - Dynamic: Fair (occasional) Bed Mobility:  Rolling: Maximum assistance  Supine to Sit: Maximum assistance  Sit to Supine: Maximum assistance  Scooting: Maximum assistance  Wheelchair Mobility:     Transfers:     Gait:             Body Structures Involved:  1. Nerves  2. Eyes and Ears  3. Bones  4. Joints  5. Muscles  6. Ligaments Body Functions Affected:  1. Mental  2. Sensory/Pain  3. Cardio  4. Neuromusculoskeletal  5. Movement Related Activities and Participation Affected:  1. Learning and Applying Knowledge  2. General Tasks and Demands  3. Communication  4. Mobility  5. Self Care  6. Domestic Life  7. Community, Social and [place]   Number of elements that affect the Plan of Care: 3: MODERATE COMPLEXITY   CLINICAL PRESENTATION:   Presentation: Evolving clinical presentation with changing clinical characteristics: MODERATE COMPLEXITY   CLINICAL DECISION MAKIN [facility] Mobility Inpatient Short Form  How much difficulty does the patient currently have. .. Unable A Lot A Little None   1. Turning over in bed (including adjusting bedclothes, sheets and blankets)? [ ] 1   [X] 2   [ ] 3   [ ] 4   2. Sitting down on and standing up from a chair with arms ( e.g., wheelchair, bedside commode, etc.)   [ ] 1   [X] 2   [ ] 3   [ ] 4   3. Moving from lying on back to sitting on the side of the bed? [ ] 1   [X] 2   [ ] 3   [ ] 4   How much help from another person does the patient currently need. .. Total A Lot A Little None   4. Moving to and from a bed to a chair (including a wheelchair)? [ ] 1   [X] 2   [ ] 3   [ ] 4   5. Need to walk in hospital room? [X] 1   [ ] 2   [ ] 3   [ ] 4   6. Climbing 3-5 steps with a railing? [X] 1   [ ] 2   [ ] 3   [ ] 4   © , Trustees of [address] [place] [address] [address] [address], under license to [employer]. All rights reserved    Score:  Initial: 10 Most Recent: X (Date: -- )     Interpretation of Tool:  Represents activities that are increasingly more difficult (i.e. Bed mobility, Transfers, Gait).        Score 24 23 22-20 19-15 14-10 9-7 6       Modifier CH CI CJ CK CL CM CN         · Mobility - Walking and Moving Around:               - CURRENT STATUS:    CL - 60%-79% impaired, limited or restricted               - GOAL STATUS:           CL - 60%-79% impaired, limited or restricted               - D/C STATUS:                       ---------------To be determined---------------  Payor: OYLA / Plan: Karan Jacques PPO / Product Type: PPO /       Medical Necessity:     · Skilled intervention continues to be required due to decreased function. Reason for Services/Other Comments:  · Patient continues to require skilled intervention due to medical complications and patient unable to attend/participate in therapy as expected. Use of outcome tool(s) and clinical judgement create a POC that gives a: Questionable prediction of patient's progress: MODERATE COMPLEXITY                 TREATMENT:   (In addition to Assessment/Re-Assessment sessions the following treatments were rendered)   Pre-treatment Symptoms/Complaints:  none  Pain: Initial:   Pain Intensity 1: 0  Post Session:  Not verbal      Therapeutic Activity: (    10min):  Therapeutic activities including Bed transfers, Chair transfers and  to improve mobility, strength, balance and coordination. Required maximal   to promote static and dynamic balance in sitting, promote coordination of bilateral, lower extremity(s) and promote motor control of bilateral, lower extremity(s). Assessment/Reassessment only, no treatment provided today     Braces/Orthotics/Lines/Etc:   · IV  · stafford catheter  · O2 Device: Nasal cannula  Treatment/Session Assessment:    · Response to Treatment:  See above  · Interdisciplinary Collaboration:  · Physical Therapist  · Registered Nurse  · After treatment position/precautions:  · Supine in bed  · Call light within reach  · RN notified  · Family at bedside  · Compliance with Program/Exercises: Will assess as treatment progresses. · Recommendations/Intent for next treatment session:   \"Next visit will focus on advancements to more challenging activities and reduction in assistance provided\".   Total Treatment Duration:  PT Patient Time In/Time Out  Time In: 1430  Time Out: [address], DPT

## 2017-09-09 NOTE — PROGRESS NOTES
Bedside, Verbal and Written shift change report given to Azalia Guevara RN (oncoming nurse) by Madonna Luna RN (offgoing nurse). Report included the following information SBAR, Kardex, ED Summary, Procedure Summary, Intake/Output, MAR, Accordion, Recent Results, Med Rec Status, Cardiac Rhythm Sinus Tachy and Alarm Parameters .

## 2017-09-09 NOTE — PROGRESS NOTES
Patient is now extubated and on 2L NC. Patient is following commands but voice is too weak to talk at this time.

## 2017-09-09 NOTE — PROGRESS NOTES
Care Daily Progress Note: 9/9/2017  Admission Date: 9/6/2017     The patient's chart is reviewed and the patient is discussed with the staff. Harrold Schaumann is a 60 y.o. white female with h/o HTN (on amlodipine,hyzaar), anxiety presents with right sided weakness and facial droop after being found by  after calling out for help at 10:30pm. Upon arrival in the ER she was vomiting and agitated with above described weakness. Her SBP was initially 208/105mmHg. Initial GCS not clearly documented.     In ER, received labetalol 60mg, propofol 50mg, ativan 4mg, and succinylcholine for intubation. She received 1g of keppra as well for seizure-like activity. CT of head revealed left basal ganglia ICH measured 3 x 2cm with spread to L lateral ventricle and 3rd/4th ventricle with some mild mildline shift. No significant edema thus far.     Teleneurology was consulted and recommended ICU admission, SBP goal of 160, tight glucose control and DVT ppx. Dr. Vladimir Youngblood was consulted and did not think surgery would benefit patient, also recommended BP control.     History is obtained via chart review, from  and from Fredy Story (aunt) by phone. Her three children are en route to the hospital from [facility].     Subjective:       On vent  Awake and alert following commands      Current Facility-Administered Medications   Medication Dose Route Frequency    NUTRITIONAL SUPPORT ELECTROLYTE PRN ORDERS   Does Not Apply PRN    losartan/hydroCHLOROthiazide (HYZAAR) 100/12.5 mg   Oral DAILY    famotidine (PF) (PEPCID) 20 mg in sodium chloride 0.9 % 10 mL injection  20 mg IntraVENous Q12H    hydrALAZINE (APRESOLINE) 20 mg/mL injection 10 mg  10 mg IntraVENous Q6H    niCARdipine (CARDENE) 25 mg in 0.9% sodium chloride 250 mL infusion  5-15 mg/hr IntraVENous TITRATE    fentaNYL in normal saline (pf) 25 mcg/mL infusion   mcg/hr IntraVENous TITRATE    propofol (DIPRIVAN) infusion  5-50 mcg/kg/min IntraVENous TITRATE    sodium chloride (NS) flush 5-10 mL  5-10 mL IntraVENous Q8H    sodium chloride (NS) flush 5-10 mL  5-10 mL IntraVENous PRN    morphine injection 2 mg  2 mg IntraVENous Q1H PRN    LORazepam (ATIVAN) injection 2 mg  2 mg IntraVENous Q2H PRN    ondansetron (ZOFRAN) injection 4 mg  4 mg IntraVENous Q6H PRN    acetaminophen (TYLENOL) tablet 500 mg  500 mg Per NG tube Q4H PRN    sodium chloride 0.9 % bolus infusion 250 mL  250 mL IntraVENous Q1H PRN    chlorhexidine (PERIDEX) 0.12 % mouthwash 15 mL  15 mL Oral BID    NUTRITIONAL SUPPORT ELECTROLYTE PRN ORDERS   Does Not Apply PRN    sodium chloride (NS) flush 30 mL  30 mL InterCATHeter Q8H    heparin (porcine) pf 900 Units  900 Units InterCATHeter Q8H    sodium chloride (NS) flush 30 mL  30 mL InterCATHeter PRN    heparin (porcine) pf 900 Units  900 Units InterCATHeter PRN       Review of Systems  Unobtainable due to patient status. Objective:     Vitals:    09/09/17 0629 09/09/17 0712 09/09/17 0812 09/09/17 0858   BP: 140/71   138/70   Pulse: (!) 104  (!) 107 (!) 101   Resp: 13  16    Temp:  99.2 °F (37.3 °C)     SpO2: 100%  99%    Weight:       Height:           Intake and Output:   09/07 1901 - 09/09 0700  In: 1741.4 [I.V.:386.4]  Out: 1950 [Urine:1950]  09/09 0701 - 09/09 1900  In: 770.6 [I.V.:730.6]  Out: 225 [Urine:225]    Physical Exam:          Constitutional:  intubated and mechanically ventilated.   EENMT:  Sclera clear, pupils equal, oral mucosa moist  Respiratory: clear  Cardiovascular:  RRR with no M,G,R;  Gastrointestinal:  soft with no tenderness; positive bowel sounds present  Musculoskeletal:  warm with no cyanosis, no lower leg edema  Skin:  no jaundice or ecchymosis  Neurologic:R hemiplegia with R facial droop, awake and alert   Psychiatric:  Starting to response      LINES:  ETT, James, NG, per iv lines     DRIPS:  None     CXR:  None today       Ventilator Settings  Mode FIO2 Rate Tidal Volume Pressure PEEP   Pressure support  30 %    450 ml  5 cm H2O  8 cm H20      Peak airway pressure: 13.7 cm H2O   Minute ventilation: 7 l/min     ABG:   Recent Labs      09/09/17   0340  09/08/17   0340 09/07/17   0305   PH  7.47*  7.45  7.41   PCO2  35  33*  32*   PO2  123*  123*  164*   HCO3  25  22  19*        LAB  Recent Labs      09/07/17   2340  09/06/17   2354   GLUCPOC  106*  121*     Recent Labs      09/07/17   0300  09/06/17   2357  09/06/17   2352   WBC  8.9  6.5   --    HGB  12.2  12.6   --    HCT  34.6*  35.0*   --    PLT  254  269   --    INR   --    --   0.9     Recent Labs      09/09/17   0447 09/08/17   0405 09/07/17   0300 09/06/17   2357   NA  140  137  139  137   K  3.8  3.4*  3.6  3.6   CL  106  104  103  99   CO2  26  23  25  28   GLU  125*  130*  120*  108*   BUN  20  12  13  14   CREA  0.45*  0.50*  0.77  0.69   MG   --   1.9   --    --    PHOS   --   2.5   --    --    CA  7.9*  7.9*  8.1*  8.5   ALB  2.8*   --    --   3.7   SGOT  36   --    --   28         Assessment:  (Medical Decision Making)     Patient Active Problem List   Diagnosis Code    HTN (hypertension) I10    ADD (attention deficit disorder) F98.8    Depression F32.9    Anxiety F41.9    Atrophic vaginitis N95.2    Screening for breast cancer Z12.39    St[facility] hemorrhagic [facility] I61.9    Accelerated hypertension I10    Non-intractable vomiting with nausea R11.2    At risk for aspiration Z91.89    Acute spontaneous intraventricular hemorrhage assoc w/ hypertension (HCC) I61.5, I10    Acute respiratory failure (HCC) J96.00         Plan:  (Medical Decision Making)     Hospital Problems  Never Reviewed          Codes Class Noted POA    Acute respiratory failure ([facility] ICD-10-CM: J96.00  ICD-9-CM: 518.81  9/8/2017 Unknown    Awake and alert     * (Principal)Stroke, hemorrhagic ([facility] ICD-10-CM: I61.9  ICD-9-CM: 280  9/7/2017 Unknown    With R hemiparesis    Non-intractable vomiting with nausea ICD-10-CM: R11.2  ICD-9-CM: 787.01  9/7/2017 Unknown At risk for aspiration ICD-10-CM: Z91.89  ICD-9-CM: V49.89  9/7/2017 Unknown    Place DHT through nose, then extubate opatient. Will need SLP/PT/OT eval and Rx post extubation    Acute spontaneous intraventricular hemorrhage assoc w/ hypertension (HCC) ICD-10-CM: I61.5, I10  ICD-9-CM: 431, 401.9  9/7/2017 Unknown    On cardene drip to keep SBP<160  On hyzaar at home(receiving currently) add labetalol po for alpha and beta effects 100 mg q 12            More than 50% of the time documented was spent in face-to-face contact with the patient and in the care of the patient on the floor/unit where the patient is located.     Mervat Vazquez MD

## 2017-09-10 ENCOUNTER — APPOINTMENT (OUTPATIENT)
Dept: GENERAL RADIOLOGY | Age: 62
DRG: 064 | End: 2017-09-10
Attending: INTERNAL MEDICINE
Payer: COMMERCIAL

## 2017-09-10 PROBLEM — R09.02 HYPOXEMIA: Status: ACTIVE | Noted: 2017-09-10

## 2017-09-10 PROBLEM — J96.00 ACUTE RESPIRATORY FAILURE (HCC): Status: RESOLVED | Noted: 2017-09-08 | Resolved: 2017-09-10

## 2017-09-10 PROCEDURE — 77010033678 HC OXYGEN DAILY

## 2017-09-10 PROCEDURE — 74011250636 HC RX REV CODE- 250/636: Performed by: INTERNAL MEDICINE

## 2017-09-10 PROCEDURE — 65270000029 HC RM PRIVATE

## 2017-09-10 PROCEDURE — 99233 SBSQ HOSP IP/OBS HIGH 50: CPT | Performed by: INTERNAL MEDICINE

## 2017-09-10 PROCEDURE — 74011000250 HC RX REV CODE- 250: Performed by: INTERNAL MEDICINE

## 2017-09-10 PROCEDURE — 71010 XR CHEST SNGL V: CPT

## 2017-09-10 PROCEDURE — 92610 EVALUATE SWALLOWING FUNCTION: CPT

## 2017-09-10 PROCEDURE — 74011250637 HC RX REV CODE- 250/637: Performed by: INTERNAL MEDICINE

## 2017-09-10 PROCEDURE — 65610000001 HC ROOM ICU GENERAL

## 2017-09-10 PROCEDURE — 97110 THERAPEUTIC EXERCISES: CPT

## 2017-09-10 PROCEDURE — 97530 THERAPEUTIC ACTIVITIES: CPT

## 2017-09-10 PROCEDURE — 97112 NEUROMUSCULAR REEDUCATION: CPT

## 2017-09-10 PROCEDURE — 97167 OT EVAL HIGH COMPLEX 60 MIN: CPT

## 2017-09-10 PROCEDURE — 77030018798 HC PMP KT ENTRL FED COVD -A

## 2017-09-10 RX ORDER — LABETALOL 200 MG/1
200 TABLET, FILM COATED ORAL EVERY 12 HOURS
Status: DISCONTINUED | OUTPATIENT
Start: 2017-09-10 | End: 2017-09-11

## 2017-09-10 RX ORDER — LABETALOL 100 MG/1
100 TABLET, FILM COATED ORAL
Status: COMPLETED | OUTPATIENT
Start: 2017-09-10 | End: 2017-09-10

## 2017-09-10 RX ADMIN — FAMOTIDINE 20 MG: 10 INJECTION, SOLUTION INTRAVENOUS at 21:04

## 2017-09-10 RX ADMIN — Medication 10 ML: at 05:12

## 2017-09-10 RX ADMIN — SODIUM CHLORIDE, PRESERVATIVE FREE 900 UNITS: 5 INJECTION INTRAVENOUS at 21:04

## 2017-09-10 RX ADMIN — Medication 10 ML: at 14:14

## 2017-09-10 RX ADMIN — Medication 30 ML: at 14:14

## 2017-09-10 RX ADMIN — HYDRALAZINE HYDROCHLORIDE 10 MG: 20 INJECTION INTRAMUSCULAR; INTRAVENOUS at 05:12

## 2017-09-10 RX ADMIN — Medication 30 ML: at 21:04

## 2017-09-10 RX ADMIN — SODIUM CHLORIDE, PRESERVATIVE FREE 900 UNITS: 5 INJECTION INTRAVENOUS at 05:13

## 2017-09-10 RX ADMIN — HYDRALAZINE HYDROCHLORIDE 10 MG: 20 INJECTION INTRAMUSCULAR; INTRAVENOUS at 19:17

## 2017-09-10 RX ADMIN — FAMOTIDINE 20 MG: 10 INJECTION, SOLUTION INTRAVENOUS at 08:13

## 2017-09-10 RX ADMIN — LABETALOL HYDROCHLORIDE 100 MG: 100 TABLET, FILM COATED ORAL at 08:11

## 2017-09-10 RX ADMIN — HYDROCHLOROTHIAZIDE: 12.5 CAPSULE ORAL at 08:10

## 2017-09-10 RX ADMIN — HYDRALAZINE HYDROCHLORIDE 10 MG: 20 INJECTION INTRAMUSCULAR; INTRAVENOUS at 14:14

## 2017-09-10 RX ADMIN — MORPHINE SULFATE 2 MG: 2 INJECTION, SOLUTION INTRAMUSCULAR; INTRAVENOUS at 04:01

## 2017-09-10 RX ADMIN — LABETALOL HYDROCHLORIDE 100 MG: 100 TABLET, FILM COATED ORAL at 10:26

## 2017-09-10 RX ADMIN — SODIUM CHLORIDE, PRESERVATIVE FREE 900 UNITS: 5 INJECTION INTRAVENOUS at 14:14

## 2017-09-10 RX ADMIN — Medication 30 ML: at 05:12

## 2017-09-10 RX ADMIN — Medication 10 ML: at 21:04

## 2017-09-10 RX ADMIN — LABETALOL HYDROCHLORIDE 200 MG: 200 TABLET, FILM COATED ORAL at 21:04

## 2017-09-10 RX ADMIN — MORPHINE SULFATE 2 MG: 2 INJECTION, SOLUTION INTRAMUSCULAR; INTRAVENOUS at 08:43

## 2017-09-10 NOTE — PROGRESS NOTES
Spoke to Dr Niko Holland concerning -160 range New orders received for adjustment with BP medication.

## 2017-09-10 NOTE — PROGRESS NOTES
Shift report received from Angel rowland, [facility]. Patient in bed resting comfortably with family/visitors at bedside. Patient alert, eyes open spontaneously. PERRL. Follows commands on left side only. RUE/RLE remain flaccid. Facial droop present on right side. Patient attempts to smile and can raise her eyebrows on command. Aphasic. NSR on the monitor, BP WDL, afebrile. Spo2 97% on 2L NC. Lung sounds clear. James draining clear/yellow urine. Dobhoff in place at 51 cm, infusing Jevity 1.2 @ 45 mL/hr with a 30 mL water flush. Residual 0 ml. Active BS. Transfer orders in place. Continuing to monitor.

## 2017-09-10 NOTE — PROGRESS NOTES
Care Daily Progress Note: 9/10/2017  Admission Date: 9/6/2017     The patient's chart is reviewed and the patient is discussed with the staff. Ray Jordna is a 60 y.o. white female with h/o HTN (on amlodipine,hyzaar), anxiety presents with right sided weakness and facial droop after being found by  after calling out for help at 10:30pm. Upon arrival in the ER she was vomiting and agitated with above described weakness. Her SBP was initially 208/105mmHg. Initial GCS not clearly documented.     In ER, received labetalol 60mg, propofol 50mg, ativan 4mg, and succinylcholine for intubation. She received 1g of keppra as well for seizure-like activity. CT of head revealed left basal ganglia ICH measured 3 x 2cm with spread to L lateral ventricle and 3rd/4th ventricle with some mild mildline shift. No significant edema thus far.     Teleneurology was consulted and recommended ICU admission, SBP goal of 160, tight glucose control and DVT ppx. Dr. Keegan Nash was consulted and did not think surgery would benefit patient, also recommended BP control.     History is obtained via chart review, from  and from Wilbert Husain (aunt) by phone. Her three children are en route to the hospital from [facility].     Subjective:       Extubated yesterday      Current Facility-Administered Medications   Medication Dose Route Frequency    NUTRITIONAL SUPPORT ELECTROLYTE PRN ORDERS   Does Not Apply PRN    labetalol (NORMODYNE) tablet 100 mg  100 mg Oral Q12H    losartan/hydroCHLOROthiazide (HYZAAR) 100/12.5 mg   Oral DAILY    famotidine (PF) (PEPCID) 20 mg in sodium chloride 0.9 % 10 mL injection  20 mg IntraVENous Q12H    hydrALAZINE (APRESOLINE) 20 mg/mL injection 10 mg  10 mg IntraVENous Q6H    niCARdipine (CARDENE) 25 mg in 0.9% sodium chloride 250 mL infusion  5-15 mg/hr IntraVENous TITRATE    sodium chloride (NS) flush 5-10 mL  5-10 mL IntraVENous Q8H    sodium chloride (NS) flush 5-10 mL  5-10 mL IntraVENous PRN    morphine injection 2 mg  2 mg IntraVENous Q1H PRN    LORazepam (ATIVAN) injection 2 mg  2 mg IntraVENous Q2H PRN    ondansetron (ZOFRAN) injection 4 mg  4 mg IntraVENous Q6H PRN    acetaminophen (TYLENOL) tablet 500 mg  500 mg Per NG tube Q4H PRN    sodium chloride 0.9 % bolus infusion 250 mL  250 mL IntraVENous Q1H PRN    NUTRITIONAL SUPPORT ELECTROLYTE PRN ORDERS   Does Not Apply PRN    sodium chloride (NS) flush 30 mL  30 mL InterCATHeter Q8H    heparin (porcine) pf 900 Units  900 Units InterCATHeter Q8H    sodium chloride (NS) flush 30 mL  30 mL InterCATHeter PRN    heparin (porcine) pf 900 Units  900 Units InterCATHeter PRN       Review of Systems  Unobtainable due to patient status. Objective:     Vitals:    09/10/17 0429 09/10/17 0502 09/10/17 0529 09/10/17 0630   BP: 143/64 158/76 150/72 149/76   Pulse: 86 80 96 90   Resp: 15 14 17 15   Temp:       SpO2: 98% 98% 96% 96%   Weight:       Height:           Intake and Output:   09/08 1901 - 09/10 0700  In: 1866.7 [I.V.:884.7]  Out: 2400 [Urine:2400]       Physical Exam:          Constitutional:  intubated and mechanically ventilated.   EENMT:  Sclera clear, pupils equal, oral mucosa moist  Respiratory: clear  Cardiovascular:  RRR with no M,G,R;  Gastrointestinal:  soft with no tenderness; positive bowel sounds present  Musculoskeletal:  warm with no cyanosis, no lower leg edema  Skin:  no jaundice or ecchymosis  Neurologic:moving all extremities except RUL   Psychiatric:  Starting to response      LINES: Heidi James, per iv lines     DRIPS:  None     CXR:  None today   ABG:   Recent Labs      09/09/17   0340  09/08/17   0340   PH  7.47*  7.45   PCO2  35  33*   PO2  123*  123*   HCO3  25  22        LAB    Recent Labs      09/09/17   0447  09/08/17   0405   NA  140  137   K  3.8  3.4*   CL  106  104   CO2  26  23   GLU  125*  130*   BUN  20  12   CREA  0.45*  0.50*   MG   --   1.9   PHOS   --   2.5   CA  7.9*  7.9*   ALB  2.8* --    SGOT  36   --          Assessment:  (Medical Decision Making)     Hospital Problems  Never Reviewed          Codes Class Noted POA    Acute respiratory failure ([facility] extubated  ICD-10-CM: J96.00  ICD-9-CM: 518.81  9/8/2017 Unknown        * (Principal)Stroke, hemorrhagic ([facility] ICD-10-CM: I61.9  ICD-9-CM: 469  9/7/2017 Unknown        Non-intractable vomiting with nausea ICD-10-CM: R11.2  ICD-9-CM: 787.01  9/7/2017 Unknown        At risk for aspiration ICD-10-CM: Z91.89  ICD-9-CM: V49.89  9/7/2017 Unknown        Acute spontaneous intraventricular hemorrhage assoc w/ hypertension ([facility] ICD-10-CM: I61.5, I10  ICD-9-CM: 152, 401.9  9/7/2017 Unknown              Plan:  (Medical Decision Making)     - PT, OT, speech therapy  - control BP, on Po meds  - stable for the floor     More than 50% of the time documented was spent in face-to-face contact with the patient and in the care of the patient on the floor/unit where the patient is located.     Ole Rich MD

## 2017-09-10 NOTE — PROGRESS NOTES
Bedside and Verbal shift change report given to Indira Myles RN by [facility]. Report included the following information SBAR, Kardex, ED Summary, Intake/Output, MAR, Accordion, Recent Results, Med Rec Status, Cardiac Rhythm SR/ST and Alarm Parameters .

## 2017-09-10 NOTE — PROGRESS NOTES
Bedside and Verbal shift change report given to Sarah Mckinney RN by Best Hester RN. Report included the following information SBAR, Kardex, ED Summary, Procedure Summary, Intake/Output, MAR, Accordion, Recent Results, Med Rec Status, Cardiac Rhythm SR/ST and Alarm Parameters . Dual neuro assessment performed.

## 2017-09-10 NOTE — PROGRESS NOTES
Problem: Mobility Impaired (Adult and Pediatric)  Goal: *Acute Goals and Plan of Care (Insert Text)    LTG:  (1.)Ms. Yadira Franz will move from supine to sit and sit to supine , scoot up and down and roll side to side in bed with MINIMAL ASSIST within 10 day(s). (2.)Ms. Yadira Franz will transfer from bed to chair and chair to bed with MINIMAL ASSIST using the least restrictive device within 10 day(s). (3.)Ms. Yadira Franz will sit EOB with CONTACT GUARD ASSIST for 15min with the least restrictive support doing bedside exercise within 10 day(s). (4.)Ms. Yadira Franz will stand with Minimum ASSIST for 5min with the least restrictive support performing standing exer within 10 day(s). ________________________________________________________________________________________________      PHYSICAL THERAPY: Daily Note, Treatment Day: Day of Assessment and 1st and AM 9/10/2017  INPATIENT: Hospital Day: 5  Payor: Deepti Carbajal / Plan: Karan Jacques PPO / Product Type: PPO /      NAME/AGE/GENDER: Devon Faria is a 60 y.o. female           PRIMARY DIAGNOSIS: Stroke, hemorrhagic ([address]  Hemorrhagic stroke ([facility] Stroke, hemorrhagic ([address] Stroke, hemorrhagic ([address]        ICD-10: Treatment Diagnosis:       · Generalized Muscle Weakness (M62.81)  · Difficulty in walking, Not elsewhere classified (R26.2)   Precaution/Allergies:  Banana; Lisinopril; and Nuts [tree nut]       ASSESSMENT:      Ms. Yadira Franz presents supine at arrival with 3 family members present. Patient agreeable to treatment. Patient attempting to verbalize with encouragement and able to say a few words and call her family memebers names when asked with family sitting in front of patient. AAROM and PROM right LE with verbal cues and patient and family educated in importance of ROM to help improve mobility and patients family educated in ROM exercises to be done every 1-2 hours and to encourage patient to help with movement/exercises as much as possible.  AAROM for hip/knee flexion, hip abduction with bent knee and knee straight, foot DF/PF and ankle circles. Passive stretching to right heel cord and plantar fascia all to prepare patient to be able to stand and walk and to improve mobility and strength. Pt would benefit from further PT while in house to address these impairments to help improve to prior level of independence. Anticipate pt will require continued skilled PT following discharge from hospital due to poor balance, safety and fall risk. This section established at most recent assessment   PROBLEM LIST (Impairments causing functional limitations):  1. Decreased Strength  2. Decreased ADL/Functional Activities  3. Decreased Transfer Abilities  4. Decreased Ambulation Ability/Technique  5. Decreased Balance  6. Decreased Cognition    INTERVENTIONS PLANNED: (Benefits and precautions of physical therapy have been discussed with the patient.)  1. Balance Exercise  2. Bed Mobility  3. Gait Training  4. Neuromuscular Re-education/Strengthening  5. Therapeutic Activites  6. Therapeutic Exercise/Strengthening  7. Transfer Training  8. Group Therapy      TREATMENT PLAN: Frequency/Duration: 3 times a week for duration of hospital stay  Rehabilitation Potential For Stated Goals: Manual Lace REHABILITATION/EQUIPMENT: (at time of discharge pending progress): Due to the probability of continued deficits (see above) this patient will likely need continued skilled physical therapy after discharge. Equipment:   · None at this time                   HISTORY:   History of Present Injury/Illness (Reason for Referral):  58-year-old female brought in as a stroke alert by EMS.  states that she was in a downstairs bedroom when she called out. Last known normal was at 2230. He found her confused with a left-sided facial droop as well as right upper and lower extremity paralysis. EMS found her with similar symptoms and she  became somewhat more was unresponsive.  ruddy arrival to the emergency department the patient began moving her extremities more freely but was not able to communicate clearly. Urgent CT scan was performed which demonstrated intracranial hemorrhage. Patient emergently intubated for airway protection. Patient given labetalol, propofol, Ativan for sedation and blood pressure control. We eventually achieved adequate sedation and blood pressure control. Family updated. Discussed admission with the intensivist.  My colleague spoke with Dr. Maday Arshad of neurosurgery he reported that this bleed was nonsurgical in nature and that medical management would be needed.  states only medication she takes is Premarin and lisinopril. No blood thinners. Past Medical History/Comorbidities:   Ms. Bethany Freire  has a past medical history of Anxiety; Depression; HTN (hypertension); and Menopause. Ms. Bethany Freire  has a past surgical history that includes jennifer and bso ();  section; refractive surgery (); and other surgical (). Social History/Living Environment:   Home Environment: Private residence  # Steps to Enter: 4  One/Two Story Residence: Two story, live on 1st floor  # of Interior Steps: 13  Height of Each Step (in): 6 inches  Interior Rails: Right  Lift Chair Available: No  Living Alone: No  Support Systems: Spouse/Significant Other/Partner  Patient Expects to be Discharged to[de-identified] Private residence  Current DME Used/Available at Home: None  Prior Level of Function/Work/Activity:  Completely independent. No DMe used      Number of Personal Factors/Comorbidities that affect the Plan of Care: 1-2: MODERATE COMPLEXITY   EXAMINATION:   Most Recent Physical Functioning:   Gross Assessment:                  Posture:     Balance:    Bed Mobility:     Wheelchair Mobility:     Transfers:     Gait:             Body Structures Involved:  1. Nerves  2. Eyes and Ears  3. Bones  4. Joints  5. Muscles  6.  Ligaments Body Functions Affected:  1. Mental  2. Sensory/Pain  3. Cardio  4. Neuromusculoskeletal  5. Movement Related Activities and Participation Affected:  1. Learning and Applying Knowledge  2. General Tasks and Demands  3. Communication  4. Mobility  5. Self Care  6. Domestic Life  7. Community, Social and [place]   Number of elements that affect the Plan of Care: 3: MODERATE COMPLEXITY   CLINICAL PRESENTATION:   Presentation: Evolving clinical presentation with changing clinical characteristics: MODERATE COMPLEXITY   CLINICAL DECISION MAKIN [facility] Mobility Inpatient Short Form  How much difficulty does the patient currently have. .. Unable A Lot A Little None   1. Turning over in bed (including adjusting bedclothes, sheets and blankets)? [ ] 1   [X] 2   [ ] 3   [ ] 4   2. Sitting down on and standing up from a chair with arms ( e.g., wheelchair, bedside commode, etc.)   [ ] 1   [X] 2   [ ] 3   [ ] 4   3. Moving from lying on back to sitting on the side of the bed? [ ] 1   [X] 2   [ ] 3   [ ] 4   How much help from another person does the patient currently need. .. Total A Lot A Little None   4. Moving to and from a bed to a chair (including a wheelchair)? [ ] 1   [X] 2   [ ] 3   [ ] 4   5. Need to walk in hospital room? [X] 1   [ ] 2   [ ] 3   [ ] 4   6. Climbing 3-5 steps with a railing? [X] 1   [ ] 2   [ ] 3   [ ] 4   © , Trustees of [address] [place] [address], under license to [employer]. All rights reserved    Score:  Initial: 10 Most Recent: X (Date: -- )     Interpretation of Tool:  Represents activities that are increasingly more difficult (i.e. Bed mobility, Transfers, Gait).        Score 24 23 22-20 19-15 14-10 9-7 6       Modifier CH CI CJ CK CL CM CN         · Mobility - Walking and Moving Around:               - CURRENT STATUS:    CL - 60%-79% impaired, limited or restricted               - GOAL STATUS:           CL - 60%-79% impaired, limited or restricted               - D/C STATUS:                       ---------------To be determined---------------  Payor: Rohit Nieves / Plan: [employer] PPO / Product Type: PPO /       Medical Necessity:     · Skilled intervention continues to be required due to decreased function. Reason for Services/Other Comments:  · Patient continues to require skilled intervention due to medical complications and patient unable to attend/participate in therapy as expected. Use of outcome tool(s) and clinical judgement create a POC that gives a: Questionable prediction of patient's progress: MODERATE COMPLEXITY                 TREATMENT:   (In addition to Assessment/Re-Assessment sessions the following treatments were rendered)   Pre-treatment Symptoms/Complaints:  none  Pain: Initial:      Post Session:  Not verbal        Therapeutic Exercise: ( 14 minutes):  AAROM and PROM right LE with verbal cues and patient and family educated in importance of ROM to help improve mobility and patients family educated in ROM exercises to be done every 1-2 hours and to encourage patient to help with movement/exercises as much as possible. AAROM for hip/knee flexion, hip abduction with bent knee and knee straight, foot DF/PF and ankle circles. Passive strtching to right heel cord and plantar fascia all to prepare patient to be able to stand and walk and to improve mobility and strength. Required moderate verbal and manual cues to promote proper body alignment. Progressed range, repetitions and complexity of movement as indicated. Therapeutic Activity: (    min): Therapeutic activities including Bed transfers, Chair transfers and  to improve mobility, strength, balance and coordination. Required maximal   to promote static and dynamic balance in sitting, promote coordination of bilateral, lower extremity(s) and promote motor control of bilateral, lower extremity(s).               Braces/Orthotics/Lines/Etc:   · IV  · stafford catheter  · O2 Device: Nasal cannula  Treatment/Session Assessment:    · Response to Treatment:  See above  · Interdisciplinary Collaboration:  · Physical Therapy Assistant, Occupational Therapist and Registered Nurse  · After treatment position/precautions:  · Supine in bed  · Call light within reach  · RN notified  · Family at bedside  · Compliance with Program/Exercises: Will assess as treatment progresses. · Recommendations/Intent for next treatment session: \"Next visit will focus on advancements to more challenging activities and reduction in assistance provided\".   Total Treatment Duration:  PT Patient Time In/Time Out  Time In: 1000  Time Out: [facility] [place], [place]

## 2017-09-10 NOTE — PROGRESS NOTES
Problem: Dysphagia (Adult)  Goal: *Acute Goals and Plan of Care (Insert Text)  ST. Pt. Will tolerate PO trials with SLP only with no overt s/sx of aspiration/penetration. 2. Pt. Will participate in modified barium swallow with 100% participation to fully evaluate swallow function. LTG: Pt. Will tolerate least restrictive diet without respiratory decline. SPEECH LANGUAGE PATHOLOGY: BEDSIDE SWALLOW NOTE: INITIAL ASSESSMENT     NAME/AGE/GENDER: Christopher Loya is a 60 y.o. female  DATE: 9/10/2017  PRIMARY DIAGNOSIS: Stroke, hemorrhagic ([facility]  Hemorrhagic stroke ([facility]       ICD-10: Treatment Diagnosis: R13.12 Dysphagia, Oropharyngeal Phase  INTERDISCIPLINARY COLLABORATION: Registered Nurse  PRECAUTIONS/ALLERGIES: Banana; Lisinopril; and Nuts [tree nut]   ASSESSMENT:   Based on the objective data described below, Ms. Cruz Castellon presents with increased ability to demonstrate voicing, although inconsistent. Able to say \"yeah\" with mod verbal and visual cues and said \"no\" independently when asked if she went by a different name. Patient with poor bolus control of thin liquids and applesauce, ~50% of bolus escaping anteriorly with each presentation. Poor coordination and control, decreased laryngeal movement with swallow and need for cue to initiate swallow lead to suspicion of aspiration. Indicators of possible aspiration with intake were delayed weak throat clearing, inability to voice immediately post presentations and increased respiratory rate. No overt coughing present, however sensory system may be impaired. Recommend continue NPO with trials with SLP only. Discussed findings with nursing and family. Will follow patient with hopeful improvement of tolerance at bedside of PO trials prior to recommendation for modified barium swallow. Patient will benefit from skilled intervention to address the below impairments. ?????? ? ? This section established at most recent assessment??????????  PROBLEM LIST (Impairments causing functional limitations):  1. Risk for aspiration  2. CVA  REHABILITATION POTENTIAL FOR STATED GOALS: GOOD      PLAN OF CARE:   Patient will benefit from skilled intervention to address the following impairments. RECOMMENDATIONS AND PLANNED INTERVENTIONS (Benefits and precautions of therapy have been discussed with the patient.):  · NPO with alternative means of nutrition  MEDICATIONS:  · Non-oral  COMPENSATORY STRATEGIES/MODIFICATIONS INCLUDING:  · None  OTHER RECOMMENDATIONS (including follow up treatment recommendations): · Family training/education  · Patient education  RECOMMENDED DIET MODIFICATIONS DISCUSSED WITH:  · Nursing  · Family  · Patient  FREQUENCY/DURATION: Continue to follow patient 3 times a week as able or until goals met. RECOMMENDED REHABILITATION/EQUIPMENT: (at time of discharge pending progress): Due to the probability of continued deficits (see above) this patient will likely need continued skilled speech therapy after discharge. SUBJECTIVE:   Able to nod and say \"yeah\" and \"no\" intermittently throughout assessment. Son at bedside. History of Present Injury/Illness: Ms. Ambar Jon  has a past medical history of Anxiety; Depression; HTN (hypertension); and Menopause. .  She also  has a past surgical history that includes jennifer and bso ();  section; refractive surgery (); and other surgical (). Present Symptoms:    Pain Intensity 1: 0  Pain Location 1: Generalized  Pain Intervention(s) 1: Medication (see MAR)  Current Medications:   No current facility-administered medications on file prior to encounter. Current Outpatient Prescriptions on File Prior to Encounter   Medication Sig Dispense Refill    CYANOCOBALAMIN, VITAMIN B-12, (VITAMIN B-12 PO) Take  by mouth.  losartan-hydroCHLOROthiazide (HYZAAR) 100-12.5 mg per tablet Take 1 Tab by mouth daily.  30 Tab 5    estradiol (VAGIFEM) 10 mcg tab vaginal tablet Insert 1 Tab into vagina every Monday and Thursday. 30 Tab 5    estradiol (ESTRACE) 1 mg tablet Take 1 Tab by mouth daily. 30 Tab 5    ALPRAZolam (XANAX) 0.25 mg tablet Take 1 Tab by mouth three (3) times daily as needed for Anxiety. Max Daily Amount: 0.75 mg. 30 Tab 1    amLODIPine (NORVASC) 5 mg tablet Take 1 Tab by mouth daily. 30 Tab 1    magnesium 250 mg tab Take  by mouth.  FLUoxetine (PROZAC) 40 mg capsule          FLUoxetine (PROZAC) 20 mg capsule          methylphenidate (RITALIN) 20 mg tablet Take 20 mg by mouth daily.  meloxicam (MOBIC) 15 mg tablet Take 1 Tab by mouth daily. 30 Tab 2     Current Dietary Status:  NPO with NG tube                 History of reflux:  YES   · Reflux medication:pepcid  Social History/Home Situation:    Home Environment: Private residence  # Steps to Enter: 4  One/Two Story Residence: Two story, live on 1st floor  # of Interior Steps: 13  Height of Each Step (in): 6 inches  Interior Rails: Right  Lift Chair Available: No  Living Alone: No  Support Systems: Spouse/Significant Other/Partner  Patient Expects to be Discharged to[de-identified] Private residence  Current DME Used/Available at Home: None      OBJECTIVE:   Respiratory Status:  Nasal cannula  2 l/min  CXR Results:LLL atelectasis/pneumonia  CT Results:IMPRESSION:     Acute hemorrhage in the left basal ganglia, with intraventricular extension. No  hydrocephalus at this time. No significant change compared to prior CT. Oral Motor Structure/Speech:  Oral-Motor Structure/Motor Speech  Labial: Decreased rate, Decreased seal, Right droop  Dentition: Intact  Oral Hygiene: dry  Lingual: Decreased rate, Decreased strength, Incoordinated (unable to effectively protrude)     Cognitive and Communication Status:  Neurologic State: Drowsy; Eyes open to voice  Orientation Level: Oriented to person (says yeah to correct name and nods head)  Cognition: Decreased attention/concentration;Decreased command following (decreased comprehension)  Perception: Cues to attend right visual field;Cues to attend to right side of body  Perseveration:  (mostly non verbal)  Safety/Judgement: Decreased awareness of environment;Decreased awareness of need for assistance;Decreased awareness of need for safety;Decreased insight into deficits     BEDSIDE SWALLOW EVALUATION  Oral Assessment:  Oral Assessment  Labial: Decreased rate;Decreased seal;Right droop  Dentition: Intact  Oral Hygiene: dry  Lingual: Decreased rate;Decreased strength; Incoordinated (unable to effectively protrude)  P.O. Trials:  Patient Position: upright in bed     The patient was given bite/sip amounts of the following:   Consistency Presented: Thin liquid;Puree  How Presented: Cup/sip;Spoon;SLP-fed/presented     ORAL PHASE:  Bolus Acceptance: Impaired  Bolus Formation/Control: Impaired  Propulsion: Delayed (# of seconds); Discoordination (cue to initiate)  Type of Impairment: Anterior;Delayed; Incomplete;Poor;Premature spillage;Piecemeal;Spillage;Lip closure  Oral Residue: Greater than 50% of bolus     PHARYNGEAL PHASE:  Initiation of Swallow: Delayed (# of seconds)  Laryngeal Elevation: Decreased;Weak  Aspiration Signs/Symptoms: Delayed cough/throat clear; Increase in RR  Vocal Quality: Aphonic; Low volume;Breathy           Pharyngeal Phase Characteristics: Poor endurance; Suspected pharyngeal residue     OTHER OBSERVATIONS:  Rate/bite size: Impaired   Endurance:  Impaired      Comments:        Tool Used: Dysphagia Outcome and Severity Scale (SEPIDEH)     Score Comments   Normal Diet  [ ] 7 With no strategies or extra time needed   Functional Swallow  [ ] 6 May have mild oral or pharyngeal delay         Mild Dysphagia     [ ] 5 Which may require one diet consistency restricted (those who demonstrate penetration which is entirely cleared on MBS would be included)   Mild-Moderate Dysphagia  [ ] 4 With 1-2 diet consistencies restricted         Moderate Dysphagia  [ ] 3 With 2 or more diet consistencies restricted Moderately Severe Dysphagia  [X] 2 With partial PO strategies (trials with ST only)         Severe Dysphagia  [ ] 1 With inability to tolerate any PO safely            Score:  Initial: 2 Most Recent: X (Date: -- )   Interpretation of Tool: The Dysphagia Outcome and Severity Scale (SEPIDEH) is a simple, easy-to-use, 7-point scale developed to systematically rate the functional severity of dysphagia based on objective assessment and make recommendations for diet level, independence level, and type of nutrition. Score 7 6 5 4 3 2 1   Modifier CH CI CJ CK CL CM CN   · Swallowing:               - CURRENT STATUS:           CM - 80%-99% impaired, limited or restricted               - GOAL STATUS:                   CI - 1%-19% impaired, limited or restricted               - D/C STATUS:                       ---------------To be determined---------------  Payor: YOLA / Plan: Radha Armendariz PPO / Product Type: PPO /       TREATMENT:               (In addition to Assessment/Re-Assessment sessions the following treatments were rendered)  Assessment/Reassessment only, no treatment provided today  MODALITIES:                                                                     ORAL MOTOR  EXERCISES:                                                                                                                                                                       LARYNGEAL / PHARYNGEAL EXERCISES:                                                                                                                                      __________________________________________________________________________________________________  Safety:   After treatment position/precautions:  · Call light within reach  · RN notified  · Family at bedside  · Upright in Bed  Treatment Assessment:  Patient actively participated in evaluation with mod cues.   Progression/Medical Necessity:   · Skilled intervention continues to be required due to persistent signs and symptoms of aspiration. Compliance with Program/Exercises: Will assess as treatment progresses. Reason for Continuation of Services/Other Comments:  · Patient continues to require skilled intervention due to medical complications and patient unable to attend/participate in therapy as expected. Recommendations/Intent for next treatment session: \"Treatment next visit will focus on advancements to more challenging activities and PO trials with SLP\". Determination of appropriateness for Modified barium swallow. Total Treatment Duration:  Time In: 0418  Time Out: 1000 [facility].  MS Elvis, CCC-SLP

## 2017-09-10 NOTE — PROGRESS NOTES
Problem: Self Care Deficits Care Plan (Adult)  Goal: *Acute Goals and Plan of Care (Insert Text)  1. Patient will perfom grooming with moderate assistance with objects placed right of midline. 2. Patient will perfom upper body bathing while seated in chair with moderate assistance. 3. Patient will require stand by assist to maintain sitting balance at midline 5 minutes in prep for ADL   4. Patient will increase R UE strength to 2/5 in 50% muscle groups (pre-ADL function)   5. Patient will demonstrate good attention to the right with minimal cues. 6. Patient will require minimal assist for static standing with good midline orientation and ability to take at least 75% weight through B L&UEs at walker in preparation for BLB ADL participation. 7. Patient will require moderate assist for bed to chair/BSC. 6. Patients family will be independent with R UE positioning and PROM HEP for safety and good placement of right shoulder and to prevent contractures      OCCUPATIONAL THERAPY: Initial Assessment, Daily Note and Treatment Day: 1st 9/10/2017  INPATIENT: Hospital Day: 5  Payor: Twyla Lanier / Plan: Beverly Coronel PPO / Product Type: PPO /      NAME/AGE/GENDER: Aubrey Morel is a 60 y.o. female           PRIMARY DIAGNOSIS:  Stroke, hemorrhagic ([address]  Hemorrhagic stroke ([address] Stroke, hemorrhagic ([address] Stroke, hemorrhagic ([address]        ICD-10: Treatment Diagnosis:        · Hemiplegia and hemiparesis following cerebral infarction affecting right dominant side (O38.261)   Precautions/Allergies:         Banana; Lisinopril; and Nuts [tree nut]       ASSESSMENT:      Ms. Geni Downs presents supine in bed with  and adult children present, s/p right side affected stroke and presents with decreased activity tolerance, and decreased independence for self care, functional mobility, and ADL's. At baseline, pt is newly , lives with her  and is independent with all self care.   Pt's family reports patient is normally very active and healthy, pt nods her head in agreement. Currently, she is able to comprehend many commands though she exhibits right side neglect and difficulty with right extremity movement inhibiting follow through with task. Pt tolerates neuromuscular re-ed well with increased sensation noted. Pt and family educated on [address], PROM, positioning, compensatory strategies to increase awareness and decrease right side neglect. Pt is slightly tearful, responds well to 'pep' talk with smile and shaking head in agreement to POC. Family is very supportive and in agreement to continue with training and pt assistance. Ms. Catia Ellington requires skilled OT to maximize independence with self care tasks and functional transfers. Pt left with PTA to complete lower body exercises. She has all needs in reach, pt instructed to call for assistance; RN aware. Recommend rehab/IPR -would benefit pt secondary to her and her family's willingness to particpate. This section established at most recent assessment   PROBLEM LIST (Impairments causing functional limitations):  1. Decreased Strength  2. Decreased ADL/Functional Activities  3. Decreased Transfer Abilities  4. Decreased Ambulation Ability/Technique  5. Decreased Balance  6. Decreased Activity Tolerance  7. Increased Fatigue  8. Increased Shortness of Breath  9. Decreased Flexibility/Joint Mobility  10. Decreased Skin Integrity/Hygeine  11. Decreased [place] with Home Exercise Program    INTERVENTIONS PLANNED: (Benefits and precautions of occupational therapy have been discussed with the patient.)  1. Activities of daily living training  2. Adaptive equipment training  3. Balance training  4. Clothing management  5. Cognitive training  6. Donning&doffing training  7. Anil tech training  8. Hygiene training  9. Neuromuscular re-eduation  10. Sensory reintegration training  11. Therapeutic activity  12.  Therapeutic exercise      TREATMENT PLAN: Frequency/Duration: Follow patient 5x/week to address above goals. Rehabilitation Potential For Stated Goals: EXCELLENT      RECOMMENDED REHABILITATION/EQUIPMENT: (at time of discharge pending progress): Due to the probability of continued deficits (see above) this patient will likely need continued skilled occupational therapy after discharge. Equipment:   · to be determined                      OCCUPATIONAL PROFILE AND HISTORY:   History of Present Injury/Illness (Reason for Referral):  See H&P  Past Medical History/Comorbidities:   Ms. Adela Iglesias  has a past medical history of Anxiety; Depression; HTN (hypertension); and Menopause. Ms. Adela Iglesias  has a past surgical history that includes jennifer and bso ();  section; refractive surgery (); and other surgical (). Social History/Living Environment:   Home Environment: Private residence  # Steps to Enter: 4  One/Two Story Residence: Two story, live on 1st floor  # of Interior Steps: 13  Height of Each Step (in): 6 inches  Interior Rails: Right  Lift Chair Available: No  Living Alone: No  Support Systems: Spouse/Significant Other/Partner  Patient Expects to be Discharged to[de-identified] Private residence  Current DME Used/Available at Home: None  Tub or Shower Type: Tub/Shower combination  Prior Level of Function/Work/Activity:  Ind      Number of Personal Factors/Comorbidities that affect the Plan of Care: Extensive review of physical, cognitive, and psychosocial performance (3+):  HIGH COMPLEXITY   ASSESSMENT OF OCCUPATIONAL PERFORMANCE[de-identified]   Activities of Daily Living:           Basic ADLs (From Assessment) Complex ADLs (From Assessment)   Basic ADL  Feeding: Maximum assistance  Oral Facial Hygiene/Grooming: Maximum assistance  Bathing: Maximum assistance  Upper Body Dressing: Maximum assistance  Lower Body Dressing: Total assistance  Toileting: Total assistance Instrumental ADL  Meal Preparation: Total assistance  Homemaking:  Total assistance  Medication Management: Total assistance  Financial Management: Total assistance   Grooming/Bathing/Dressing Activities of Daily Living     Cognitive Retraining  Safety/Judgement: Decreased insight into deficits; Decreased awareness of environment                       Bed/Mat Mobility  Rolling: Maximum assistance  Supine to Sit: Maximum assistance  Sit to Supine: Maximum assistance  Sit to Stand: Other (comment) (not attempted)  Scooting: Maximum assistance          Most Recent Physical Functioning:   Gross Assessment:  AROM:  (RUE non functional)  PROM: Generally decreased, functional  Strength:  (RUE flaccid)  Coordination: Grossly decreased, non-functional  Tone: Abnormal  Sensation: Impaired               Posture:  Posture (WDL): Exceptions to WDL  Posture Assessment: Trunk flexion  Balance:  Sitting: Impaired  Sitting - Static: Fair (occasional)  Sitting - Dynamic: Poor (constant support)  Standing: Impaired Bed Mobility:  Rolling: Maximum assistance  Supine to Sit: Maximum assistance  Sit to Supine: Maximum assistance  Scooting: Maximum assistance  Wheelchair Mobility:     Transfers:  Sit to Stand:  Other (comment) (not attempted)                    Patient Vitals for the past 6 hrs:       BP BP Patient Position SpO2 O2 Flow Rate (L/min) Pulse   09/10/17 1129 138/70 - 96 % - 72   09/10/17 1159 153/74 - 97 % - 74   09/10/17 1229 148/71 - 97 % - 93   09/10/17 1259 149/79 - 97 % - 82   09/10/17 1329 155/81 - 97 % - 85   09/10/17 1359 167/80 - 97 % - 81   09/10/17 1414 167/80 - - - 98   09/10/17 1429 154/75 At rest;Head of bed elevated (Comment degrees) 97 % 2 l/min 91   09/10/17 1459 144/69 - 94 % - 90   09/10/17 1529 143/69 - 95 % - 95   09/10/17 1559 143/71 - 95 % - 95   09/10/17 1629 - - 95 % - (!) 104   09/10/17 1640 151/73 - 95 % - 96        Mental Status  Neurologic State: Alert  Orientation Level: Oriented to person, Other (Comment) (oriented to family memebers)  Cognition: Decreased attention/concentration, Decreased command following (Attempts most commands)  Perception: Cues to attend to right side of body, Cues to attend right visual field  Perseveration:  (limited vocalization)  Safety/Judgement: Decreased insight into deficits, Decreased awareness of environment                               Physical Skills Involved:  1. Range of Motion  2. Balance  3. Strength  4. Activity Tolerance  5. Sensation  6. Fine Motor Control  7. Gross Motor Control  8. Vision Cognitive Skills Affected (resulting in the inability to perform in a timely and safe manner):  1. Perception  2. Sustained Attention  3. Divided Attention  4. Comprehension Psychosocial Skills Affected:  1. Habits/Routines  2. Environmental Adaptation  3. Emotional Regulation  4. Self-Awareness   Number of elements that affect the Plan of Care: 5+:  HIGH COMPLEXITY   CLINICAL DECISION MAKIN[address] [place] [address] AM-PAC 6 Clicks   Daily Activity Inpatient Short Form  How much help from another person does the patient currently need. .. Total A Lot A Little None   1. Putting on and taking off regular lower body clothing? [X] 1   [ ] 2   [ ] 3   [ ] 4   2. Bathing (including washing, rinsing, drying)? [ ] 1   [X] 2   [ ] 3   [ ] 4   3. Toileting, which includes using toilet, bedpan or urinal?   [X] 1   [ ] 2   [ ] 3   [ ] 4   4. Putting on and taking off regular upper body clothing?   [ ] 1   [X] 2   [ ] 3   [ ] 4   5. Taking care of personal grooming such as brushing teeth? [ ] 1   [X] 2   [ ] 3   [ ] 4   6. Eating meals? [ ] 1   [X] 2   [ ] 3   [ ] 4   © , Trustees of [address] [place] [address], under license to [employer]. All rights reserved    Score:  Initial: 10 Most Recent: X (Date: -- )     Interpretation of Tool:  Represents activities that are increasingly more difficult (i.e. Bed mobility, Transfers, Gait).        Score 24 23 22-20 19-15 14-10 9-7 6       Modifier CH CI CJ CK CL CM CN         · Self Care:               - CURRENT STATUS:    CL - 60%-79% impaired, limited or restricted               - GOAL STATUS:           CK - 40%-59% impaired, limited or restricted               - D/C STATUS:                       ---------------To be determined---------------  Payor: YOLA / Plan: Silke Zarco PPO / Product Type: PPO /       Medical Necessity:     · Patient demonstrates good rehab potential due to higher previous functional level. Reason for Services/Other Comments:  · Patient continues to require skilled intervention due to medical complications and patient unable to attend/participate in therapy as expected. Use of outcome tool(s) and clinical judgement create a POC that gives a: HIGH COMPLEXITY             TREATMENT:   (In addition to Assessment/Re-Assessment sessions the following treatments were rendered)      Pre-treatment Symptoms/Complaints:  Decreased ability to perform ADLs, self care, and functional mobility; decreased tolerance for activities  Pain: Initial:   Pain Intensity 1: 0  Post Session:  0      Neuromuscular Re-education: ( 15 minutes):  Exercise/activities per grid below to improve balance, coordination, kinesthetic sense and proprioception. Required maximal visual, verbal, manual and tactile cues to promote coordination of bilateral, upper extremity(s). Assessment     Braces/Orthotics/Lines/Etc:   · IV  · stafford catheter  · nasogastric tube  · O2 Device: Nasal cannula  Treatment/Session Assessment:    · Response to Treatment:  Agrees to therapy  · Interdisciplinary Collaboration:  · Physical Therapy Assistant  · Occupational Therapist  · Registered Nurse  · After treatment position/precautions:  · Supine in bed  · Bed/Chair-wheels locked  · Call light within reach  · RN notified  · Family at bedside  · Compliance with Program/Exercises: Will assess as treatment progresses. · Recommendations/Intent for next treatment session:   \"Next visit will focus on advancements to more challenging activities and reduction in assistance provided\".   Total Treatment Duration: 29 minutes  OT Patient Time In/Time Out  Time In: 0930  Time Out: 1 [facility], OTR/L

## 2017-09-11 ENCOUNTER — APPOINTMENT (OUTPATIENT)
Dept: CT IMAGING | Age: 62
DRG: 064 | End: 2017-09-11
Attending: INTERNAL MEDICINE
Payer: COMMERCIAL

## 2017-09-11 ENCOUNTER — APPOINTMENT (OUTPATIENT)
Dept: GENERAL RADIOLOGY | Age: 62
DRG: 064 | End: 2017-09-11
Attending: INTERNAL MEDICINE
Payer: COMMERCIAL

## 2017-09-11 PROBLEM — I16.0 HYPERTENSIVE URGENCY, MALIGNANT: Status: ACTIVE | Noted: 2017-09-11

## 2017-09-11 PROBLEM — Z91.89 AT RISK FOR ASPIRATION: Chronic | Status: ACTIVE | Noted: 2017-09-07

## 2017-09-11 LAB
ANION GAP SERPL CALC-SCNC: 9 MMOL/L (ref 7–16)
BASOPHILS # BLD: 0 K/UL (ref 0–0.2)
BASOPHILS NFR BLD: 0 % (ref 0–2)
BUN SERPL-MCNC: 26 MG/DL (ref 8–23)
CALCIUM SERPL-MCNC: 7.9 MG/DL (ref 8.3–10.4)
CHLORIDE SERPL-SCNC: 105 MMOL/L (ref 98–107)
CO2 SERPL-SCNC: 29 MMOL/L (ref 21–32)
CREAT SERPL-MCNC: 0.53 MG/DL (ref 0.6–1)
DIFFERENTIAL METHOD BLD: ABNORMAL
EOSINOPHIL # BLD: 0 K/UL (ref 0–0.8)
EOSINOPHIL NFR BLD: 0 % (ref 0.5–7.8)
ERYTHROCYTE [DISTWIDTH] IN BLOOD BY AUTOMATED COUNT: 13.7 % (ref 11.9–14.6)
GLUCOSE SERPL-MCNC: 116 MG/DL (ref 65–100)
HCT VFR BLD AUTO: 30.7 % (ref 35.8–46.3)
HGB BLD-MCNC: 10.5 G/DL (ref 11.7–15.4)
IMM GRANULOCYTES # BLD: 0.1 K/UL (ref 0–0.5)
IMM GRANULOCYTES NFR BLD: 0.4 % (ref 0–5)
LYMPHOCYTES # BLD: 1.5 K/UL (ref 0.5–4.6)
LYMPHOCYTES NFR BLD: 11 % (ref 13–44)
MCH RBC QN AUTO: 28.3 PG (ref 26.1–32.9)
MCHC RBC AUTO-ENTMCNC: 34.2 G/DL (ref 31.4–35)
MCV RBC AUTO: 82.7 FL (ref 79.6–97.8)
MONOCYTES # BLD: 1.7 K/UL (ref 0.1–1.3)
MONOCYTES NFR BLD: 13 % (ref 4–12)
NEUTS SEG # BLD: 10.1 K/UL (ref 1.7–8.2)
NEUTS SEG NFR BLD: 76 % (ref 43–78)
PLATELET # BLD AUTO: 231 K/UL (ref 150–450)
PMV BLD AUTO: 9.3 FL (ref 10.8–14.1)
POTASSIUM SERPL-SCNC: 3.2 MMOL/L (ref 3.5–5.1)
RBC # BLD AUTO: 3.71 M/UL (ref 4.05–5.25)
SODIUM SERPL-SCNC: 143 MMOL/L (ref 136–145)
WBC # BLD AUTO: 13.4 K/UL (ref 4.3–11.1)

## 2017-09-11 PROCEDURE — 74011000302 HC RX REV CODE- 302: Performed by: INTERNAL MEDICINE

## 2017-09-11 PROCEDURE — 77010033678 HC OXYGEN DAILY

## 2017-09-11 PROCEDURE — 74011250637 HC RX REV CODE- 250/637: Performed by: INTERNAL MEDICINE

## 2017-09-11 PROCEDURE — 74011250636 HC RX REV CODE- 250/636: Performed by: INTERNAL MEDICINE

## 2017-09-11 PROCEDURE — 97530 THERAPEUTIC ACTIVITIES: CPT

## 2017-09-11 PROCEDURE — 97164 PT RE-EVAL EST PLAN CARE: CPT

## 2017-09-11 PROCEDURE — 86580 TB INTRADERMAL TEST: CPT | Performed by: INTERNAL MEDICINE

## 2017-09-11 PROCEDURE — 74011000250 HC RX REV CODE- 250: Performed by: INTERNAL MEDICINE

## 2017-09-11 PROCEDURE — 70450 CT HEAD/BRAIN W/O DYE: CPT

## 2017-09-11 PROCEDURE — 74011000258 HC RX REV CODE- 258: Performed by: INTERNAL MEDICINE

## 2017-09-11 PROCEDURE — 85025 COMPLETE CBC W/AUTO DIFF WBC: CPT | Performed by: INTERNAL MEDICINE

## 2017-09-11 PROCEDURE — 80048 BASIC METABOLIC PNL TOTAL CA: CPT | Performed by: INTERNAL MEDICINE

## 2017-09-11 PROCEDURE — 99233 SBSQ HOSP IP/OBS HIGH 50: CPT | Performed by: INTERNAL MEDICINE

## 2017-09-11 PROCEDURE — 92526 ORAL FUNCTION THERAPY: CPT

## 2017-09-11 PROCEDURE — 77030018798 HC PMP KT ENTRL FED COVD -A

## 2017-09-11 PROCEDURE — 65610000001 HC ROOM ICU GENERAL

## 2017-09-11 PROCEDURE — 36592 COLLECT BLOOD FROM PICC: CPT

## 2017-09-11 PROCEDURE — 71010 XR CHEST SNGL V: CPT

## 2017-09-11 RX ORDER — METOPROLOL TARTRATE 5 MG/5ML
5 INJECTION INTRAVENOUS ONCE
Status: COMPLETED | OUTPATIENT
Start: 2017-09-11 | End: 2017-09-11

## 2017-09-11 RX ORDER — LABETALOL 200 MG/1
200 TABLET, FILM COATED ORAL EVERY 8 HOURS
Status: DISCONTINUED | OUTPATIENT
Start: 2017-09-11 | End: 2017-09-15 | Stop reason: HOSPADM

## 2017-09-11 RX ORDER — POTASSIUM CHLORIDE 20MEQ/15ML
40 LIQUID (ML) ORAL 2 TIMES DAILY
Status: COMPLETED | OUTPATIENT
Start: 2017-09-11 | End: 2017-09-11

## 2017-09-11 RX ADMIN — SODIUM CHLORIDE, PRESERVATIVE FREE 900 UNITS: 5 INJECTION INTRAVENOUS at 16:02

## 2017-09-11 RX ADMIN — Medication 10 ML: at 21:40

## 2017-09-11 RX ADMIN — HYDRALAZINE HYDROCHLORIDE 10 MG: 20 INJECTION INTRAMUSCULAR; INTRAVENOUS at 21:40

## 2017-09-11 RX ADMIN — SODIUM CHLORIDE 5 MG/HR: 900 INJECTION, SOLUTION INTRAVENOUS at 13:42

## 2017-09-11 RX ADMIN — Medication 30 ML: at 21:40

## 2017-09-11 RX ADMIN — POTASSIUM CHLORIDE 40 MEQ: 20 SOLUTION ORAL at 18:09

## 2017-09-11 RX ADMIN — LABETALOL HYDROCHLORIDE 200 MG: 200 TABLET, FILM COATED ORAL at 18:09

## 2017-09-11 RX ADMIN — POTASSIUM CHLORIDE 40 MEQ: 20 SOLUTION ORAL at 09:52

## 2017-09-11 RX ADMIN — Medication: at 09:53

## 2017-09-11 RX ADMIN — SODIUM CHLORIDE 5 MG/HR: 900 INJECTION, SOLUTION INTRAVENOUS at 07:18

## 2017-09-11 RX ADMIN — HYDRALAZINE HYDROCHLORIDE 10 MG: 20 INJECTION INTRAMUSCULAR; INTRAVENOUS at 08:40

## 2017-09-11 RX ADMIN — HYDRALAZINE HYDROCHLORIDE 10 MG: 20 INJECTION INTRAMUSCULAR; INTRAVENOUS at 14:40

## 2017-09-11 RX ADMIN — ACETAMINOPHEN 500 MG: 500 TABLET, FILM COATED ORAL at 18:09

## 2017-09-11 RX ADMIN — Medication 10 ML: at 16:02

## 2017-09-11 RX ADMIN — Medication 30 ML: at 16:02

## 2017-09-11 RX ADMIN — Medication 30 ML: at 05:32

## 2017-09-11 RX ADMIN — SODIUM CHLORIDE 2.5 MG/HR: 900 INJECTION, SOLUTION INTRAVENOUS at 18:41

## 2017-09-11 RX ADMIN — SODIUM CHLORIDE, PRESERVATIVE FREE 900 UNITS: 5 INJECTION INTRAVENOUS at 21:40

## 2017-09-11 RX ADMIN — METOPROLOL TARTRATE 5 MG: 5 INJECTION INTRAVENOUS at 06:24

## 2017-09-11 RX ADMIN — HYDROCHLOROTHIAZIDE: 12.5 CAPSULE ORAL at 09:53

## 2017-09-11 RX ADMIN — HYDRALAZINE HYDROCHLORIDE 10 MG: 20 INJECTION INTRAMUSCULAR; INTRAVENOUS at 01:03

## 2017-09-11 RX ADMIN — Medication 10 ML: at 05:32

## 2017-09-11 RX ADMIN — ACETAMINOPHEN 500 MG: 500 TABLET, FILM COATED ORAL at 09:53

## 2017-09-11 RX ADMIN — SODIUM CHLORIDE 5 MG/HR: 900 INJECTION, SOLUTION INTRAVENOUS at 18:09

## 2017-09-11 RX ADMIN — FAMOTIDINE 20 MG: 10 INJECTION, SOLUTION INTRAVENOUS at 20:34

## 2017-09-11 RX ADMIN — FAMOTIDINE 20 MG: 10 INJECTION, SOLUTION INTRAVENOUS at 09:52

## 2017-09-11 RX ADMIN — TUBERCULIN PURIFIED PROTEIN DERIVATIVE 5 UNITS: 5 INJECTION INTRADERMAL at 20:32

## 2017-09-11 RX ADMIN — SODIUM CHLORIDE, PRESERVATIVE FREE 900 UNITS: 5 INJECTION INTRAVENOUS at 05:31

## 2017-09-11 NOTE — PROGRESS NOTES
Patient found to be unresponsive to sternal rub. Eyes deviate right.  ST, /95 (136). Dr. Lena Armando notified. Orders received for stat head CT and to restart Cardene if needed.

## 2017-09-11 NOTE — PROGRESS NOTES
Speech therapy note  Attempted to see pt this am, however, pt unable to awake to safely accept PO trials.      Kate Gomez MA/ANASTASIA/SLP

## 2017-09-11 NOTE — PROGRESS NOTES
Alyx Rachel  Admission Date: 9/6/2017             Daily Progress Note: 9/11/2017     Tali Acuna a 60 y. o. white female with h/o HTN (on amlodipine,hyzaar), anxiety presents with right sided weakness and facial droop. CT head was ordered and revealed left basal ganglia ICH measured 3 x 2cm with spread to L lateral ventricle and 3rd/4th ventricle with some mild mildline shift.  No significant edema thus far. She had seizure-like activity. She was intubated and received Keppra. EEG negative for seizures.       Teleneurology was consulted. SBP goal of 160, tight glucose control and DVT ppx.  Dr. Dali Miranda was consulted and did not think surgery would benefit patient, also recommended BP control. Subjective: Tolerated extubation on 9/9. Speech was consulted  Developed ST with 's. mentation waxed and waned and repeat Ct head ordered. Sats appropriate on 2 lpm.  Hypertension persists     Review of Systems  Review of systems not obtained due to patient factors.      Current Facility-Administered Medications   Medication Dose Route Frequency    potassium chloride (KAON 10%) 20 mEq/15 mL oral liquid 40 mEq  40 mEq Oral BID    labetalol (NORMODYNE) tablet 200 mg  200 mg Oral Q12H    lip protectant (BLISTEX) ointment   Topical PRN    NUTRITIONAL SUPPORT ELECTROLYTE PRN ORDERS   Does Not Apply PRN    losartan/hydroCHLOROthiazide (HYZAAR) 100/12.5 mg   Oral DAILY    famotidine (PF) (PEPCID) 20 mg in sodium chloride 0.9 % 10 mL injection  20 mg IntraVENous Q12H    hydrALAZINE (APRESOLINE) 20 mg/mL injection 10 mg  10 mg IntraVENous Q6H    niCARdipine (CARDENE) 25 mg in 0.9% sodium chloride 250 mL infusion  5-15 mg/hr IntraVENous TITRATE    sodium chloride (NS) flush 5-10 mL  5-10 mL IntraVENous Q8H    sodium chloride (NS) flush 5-10 mL  5-10 mL IntraVENous PRN    ondansetron (ZOFRAN) injection 4 mg  4 mg IntraVENous Q6H PRN    acetaminophen (TYLENOL) tablet 500 mg  500 mg Per NG tube Q4H PRN    sodium chloride 0.9 % bolus infusion 250 mL  250 mL IntraVENous Q1H PRN    NUTRITIONAL SUPPORT ELECTROLYTE PRN ORDERS   Does Not Apply PRN    sodium chloride (NS) flush 30 mL  30 mL InterCATHeter Q8H    heparin (porcine) pf 900 Units  900 Units InterCATHeter Q8H    sodium chloride (NS) flush 30 mL  30 mL InterCATHeter PRN    heparin (porcine) pf 900 Units  900 Units InterCATHeter PRN         Objective:     Vitals:    09/11/17 0601 09/11/17 0618 09/11/17 0652 09/11/17 0656   BP:  (!) 209/95 191/67 191/88   Pulse: (!) 105 (!) 141 (!) 113 94   Resp: 17 28  12   Temp:       SpO2: 96% 96% 96% 95%   Weight:       Height:         Intake and Output:   09/09 1901 - 09/11 0700  In: 2644   Out: 4561 [Urine:1775]       Physical Exam:          Constitutional: the patient is weak  HEENT: Sclera clear, pupils equal, oral mucosa moist  Lungs: clear bilaterally   Cardiovascular: RRR without M,G,R -   Abd/GI: soft and non-tender; with positive bowel sounds. Ext: warm without cyanosis. There is no lower leg edema. Musculoskeletal: moves left upper extremity  Skin: no jaundice or rashes, no wounds   Neuro:alert, moved left side, unable to move right. Opens her eyes.  Follows 1/3 commands      Lines/Drains: IV, stafford, NG, PICC  Nutrition: tube feedings    CHEST XRAY:       LAB  Recent Labs      09/11/17   0405   WBC  13.4*   HGB  10.5*   HCT  30.7*   PLT  231     Recent Labs      09/11/17   0405  09/09/17   0447   NA  143  140   K  3.2*  3.8   CL  105  106   CO2  29  26   GLU  116*  125*   BUN  26*  20   CREA  0.53*  0.45*     Recent Labs      09/09/17   0340   PH  7.47*   PCO2  35   PO2  123*   HCO3  25       Assessment:     Patient Active Problem List   Diagnosis Code    HTN (hypertension) I10    ADD (attention deficit disorder) F98.8    Depression F32.9    Anxiety F41.9    Atrophic vaginitis N95.2    Screening for breast cancer Z12.39    Stroke, hemorrhagic ([facility] [address] I61.9    Accelerated hypertension I10    Non-intractable vomiting with nausea R11.2    At risk for aspiration Z91.89    Acute spontaneous intraventricular hemorrhage assoc w/ hypertension ([facility] [address] I61.5, I10    Hypoxemia R09.02       Plan     Hospital Problems  Date Reviewed: 9/11/2017          Codes Class Noted POA    Hypoxemia ICD-10-CM: R09.02  ICD-9-CM: 799.02  9/10/2017 Yes    weaning    * (Principal)Stroke, hemorrhagic ([facility] [address] ICD-10-CM: I61.9  ICD-9-CM: 552  9/7/2017 Yes    With HTN on cardene drip  Repeat CT today    Non-intractable vomiting with nausea ICD-10-CM: R11.2  ICD-9-CM: 787.01  9/7/2017 Yes        At risk for aspiration (Chronic) ICD-10-CM: Z91.89  ICD-9-CM: V49.89  9/7/2017 Yes    S./P CVA    Acute spontaneous intraventricular hemorrhage assoc w/ hypertension [facility] ICD-10-CM: I61.5, I10  ICD-9-CM: 400, 401.9  9/7/2017 Yes              -- control HTN  --CT head today  -- PT/OT/Speech when able  -- continue supportive care    Ples UZIEL Hammond    More than 50% of time documented was spent in face-to-face contact with the patient and in the care of the patient on the floor/unit where the patient is located. Lungs:  Decreased BS  Heart:  RRR with no Murmur/Rubs/Gallops    CT BRAIN:        There  are  no  early  signs  of  territorial  or  lacunar  infarction  by  CT. Left  basal  ganglia  hematoma. Bilateral  intraventricular  hemorrhage. There is  increased  size  of  the  lateral  ventricles. The  third  ventricle  also shows increased  dilatation. The  fourth  ventricle  is  stable  in  size. Is  no  evidence of rebleeding. No  gross  white  matter  abnormality  by  CT well  Aerated. MASTOIDS:    Clear. CALVARIUM  AND  SCALP:  Unremarkable. IMPRESSION: No  obstructive  hydrocephalus  at  the  level  of  the  aqueduct  of  [place]. Stable  presumed  hypertensive  hemorrhage  left  basal  ganglia  with intraventricular extension. Additional Comments:  BP still uncontrolled and back on Cardene.      I have spoken with and examined the patient. I agree with the above assessment and plan as documented.     Vincenzo Manzanares MD

## 2017-09-11 NOTE — PROGRESS NOTES
Transferred to and from [facility] with Shana Brown RN without incident. , /67. Patient now tachypneic, RR in 30's. O2 sat 93%. Awaiting CT results.

## 2017-09-11 NOTE — PROGRESS NOTES
Verbal and bedside report received from Jhonatan Vazquez. At this time pt is following commands to move L arm and L leg. She makes a grunting noise when asked to answer questions, cannot stick her tongue out. She is able to raise eyebrows to command. Cardene gtt restarted at this time.

## 2017-09-11 NOTE — INTERDISCIPLINARY ROUNDS
Interdisciplinary team rounds were held 9/11/2017 with the following team members:Care Management, Nursing, Nurse Practitioner, Nutrition, Palliative Care, Pastoral Care, Pharmacy, Physician, Respiratory Therapy, Wound Care and Clinical Coordinator and the spouse. Plan of care discussed. See clinical pathway and/or care plan for interventions and desired outcomes.

## 2017-09-11 NOTE — PROGRESS NOTES
Problem: Dysphagia (Adult)  Goal: *Acute Goals and Plan of Care (Insert Text)  ST. Pt. Will tolerate PO trials with SLP only with no overt s/sx of aspiration/penetration. 2. Pt. Will participate in modified barium swallow with 100% participation to fully evaluate swallow function. LTG: Pt. Will tolerate least restrictive diet without respiratory decline. SPEECH LANGUAGE PATHOLOGY: BEDSIDE SWALLOW NOTE: Daily Note 1     NAME/AGE/GENDER: Lon Garcia is a 60 y.o. female  DATE: 2017  PRIMARY DIAGNOSIS: Stroke, hemorrhagic ([facility] [address]  Hemorrhagic stroke ([facility] [address]       ICD-10: Treatment Diagnosis: R13.12 Dysphagia, Oropharyngeal Phase  INTERDISCIPLINARY COLLABORATION: Registered Nurse  PRECAUTIONS/ALLERGIES: Banana; Lisinopril; and Nuts [tree nut]   ASSESSMENT:    Ms. Ambar Jon presents with no overt signs/sx of aspiration with thin liquids via spoon and cup and pureed. Pt unable to draw from a straw. Spillage from right side of her mouth. Recommend continue NPO with trials with SLP only. Discussed findings with nursing. Will follow patient with hopeful improvement of tolerance at bedside of PO trials prior to recommendation for modified barium swallow. Pt did repeat the word yes and no after therapist. Pt pointed to her eyes when I asked her to close them. Pt said \"no\" when asked if she was in pain. Patient will benefit from skilled intervention to address the below impairments. ?????? ? ? This section established at most recent assessment??????????  PROBLEM LIST (Impairments causing functional limitations):  1. Risk for aspiration  2. CVA  REHABILITATION POTENTIAL FOR STATED GOALS: GOOD      PLAN OF CARE:   Patient will benefit from skilled intervention to address the following impairments.   RECOMMENDATIONS AND PLANNED INTERVENTIONS (Benefits and precautions of therapy have been discussed with the patient.):  · NPO with alternative means of nutrition  MEDICATIONS:  · Non-oral  COMPENSATORY STRATEGIES/MODIFICATIONS INCLUDING:  · None  OTHER RECOMMENDATIONS (including follow up treatment recommendations): · Family training/education  · Patient education  RECOMMENDED DIET MODIFICATIONS DISCUSSED WITH:  · Nursing  · Family  · Patient  FREQUENCY/DURATION: Continue to follow patient 3 times a week as able or until goals met. RECOMMENDED REHABILITATION/EQUIPMENT: (at time of discharge pending progress): Due to the probability of continued deficits (see above) this patient will likely need continued skilled speech therapy after discharge. SUBJECTIVE:   Pt repeated the word yes and no. Pointed to her eyes when asked to close eyes. History of Present Injury/Illness: Ms. Monica Shaver  has a past medical history of Anxiety; Depression; HTN (hypertension); and Menopause. .  She also  has a past surgical history that includes jennifer and bso ();  section; refractive surgery (); and other surgical (). Present Symptoms:    Pain Intensity 1: 0  Pain Location 1: Generalized  Pain Intervention(s) 1: Medication (see MAR)  Current Medications:   No current facility-administered medications on file prior to encounter. Current Outpatient Prescriptions on File Prior to Encounter   Medication Sig Dispense Refill    CYANOCOBALAMIN, VITAMIN B-12, (VITAMIN B-12 PO) Take  by mouth.  losartan-hydroCHLOROthiazide (HYZAAR) 100-12.5 mg per tablet Take 1 Tab by mouth daily. 30 Tab 5    estradiol (VAGIFEM) 10 mcg tab vaginal tablet Insert 1 Tab into vagina every Monday and Thursday. 30 Tab 5    estradiol (ESTRACE) 1 mg tablet Take 1 Tab by mouth daily. 30 Tab 5    ALPRAZolam (XANAX) 0.25 mg tablet Take 1 Tab by mouth three (3) times daily as needed for Anxiety. Max Daily Amount: 0.75 mg. 30 Tab 1    amLODIPine (NORVASC) 5 mg tablet Take 1 Tab by mouth daily. 30 Tab 1    magnesium 250 mg tab Take  by mouth.       FLUoxetine (PROZAC) 40 mg capsule       FLUoxetine (PROZAC) 20 mg capsule       methylphenidate (RITALIN) 20 mg tablet Take 20 mg by mouth daily.  meloxicam (MOBIC) 15 mg tablet Take 1 Tab by mouth daily. 30 Tab 2     Current Dietary Status:  NPO with NG tube                 History of reflux:  YES   · Reflux medication:pepcid  Social History/Home Situation:    Home Environment: Private residence  # Steps to Enter: 4  One/Two Story Residence: Two story, live on 1st floor  # of Interior Steps: 13  Height of Each Step (in): 6 inches  Interior Rails: Right  Lift Chair Available: No  Living Alone: No  Support Systems: Spouse/Significant Other/Partner  Patient Expects to be Discharged to[de-identified] Private residence  Current DME Used/Available at Home: None  Tub or Shower Type: Tub/Shower combination      OBJECTIVE:   Respiratory Status:  Room air  2 l/min  CXR Results:LLL atelectasis/pneumonia  CT Results:IMPRESSION:     Acute hemorrhage in the left basal ganglia, with intraventricular extension. No  hydrocephalus at this time. No significant change compared to prior CT. Oral Motor Structure/Speech:  Oral-Motor Structure/Motor Speech  Labial: Right droop, Decreased rate, Decreased seal  Dentition: Intact  Oral Hygiene: dry  Lingual: Decreased rate, Decreased strength, Incoordinated     Cognitive and Communication Status:  Neurologic State: Alert  Orientation Level: Unable to verbalize  Cognition: Decreased command following        Safety/Judgement: Fall prevention     BEDSIDE SWALLOW EVALUATION  Oral Assessment:  Oral Assessment  Labial: Right droop; Decreased rate;Decreased seal  Dentition: Intact  Oral Hygiene: dry  Lingual: Decreased rate;Decreased strength; Incoordinated  P.O. Trials:  Patient Position: upright in bed     The patient was given bite/sip amounts of the following:   Consistency Presented:  Thin liquid;Puree  How Presented: Cup/sip;SLP-fed/presented;Spoon     ORAL PHASE:  Bolus Acceptance: No impairment  Bolus Formation/Control: No impairment  Propulsion: No impairment     Oral Residue: 10-50% of bolus     PHARYNGEAL PHASE:  Initiation of Swallow: No impairment  Laryngeal Elevation: Decreased  Aspiration Signs/Symptoms: None  Vocal Quality: Aphonic; Low volume           Pharyngeal Phase Characteristics: Suspected pharyngeal residue;Poor endurance     OTHER OBSERVATIONS:  Rate/bite size: Impaired   Endurance:  Impaired      Comments: Tool Used: Dysphagia Outcome and Severity Scale (SEPIDEH)     Score Comments   Normal Diet  [ ] 7 With no strategies or extra time needed   Functional Swallow  [ ] 6 May have mild oral or pharyngeal delay         Mild Dysphagia     [ ] 5 Which may require one diet consistency restricted (those who demonstrate penetration which is entirely cleared on MBS would be included)   Mild-Moderate Dysphagia  [ ] 4 With 1-2 diet consistencies restricted         Moderate Dysphagia  [ ] 3 With 2 or more diet consistencies restricted         Moderately Severe Dysphagia  [X] 2 With partial PO strategies (trials with ST only)         Severe Dysphagia  [ ] 1 With inability to tolerate any PO safely            Score:  Initial: 2 Most Recent: X (Date: -- )   Interpretation of Tool: The Dysphagia Outcome and Severity Scale (SEPIDEH) is a simple, easy-to-use, 7-point scale developed to systematically rate the functional severity of dysphagia based on objective assessment and make recommendations for diet level, independence level, and type of nutrition.        Score 7 6 5 4 3 2 1   Modifier CH CI CJ CK CL CM CN   · Swallowing:               - CURRENT STATUS:           CM - 80%-99% impaired, limited or restricted               - GOAL STATUS:                   CI - 1%-19% impaired, limited or restricted               - D/C STATUS:                       ---------------To be determined---------------  Payor: YOLA / Plan: Edy Rider PPO / Product Type: PPO /       TREATMENT:               (In addition to Assessment/Re-Assessment sessions the following treatments were rendered)  Dysphagia Activities: Activities/Procedures listed utilized to improve progress in swallow function. Required moderate cueing to decrease aspiration risk. MODALITIES:          ORAL MOTOR  EXERCISES:         LARYNGEAL / PHARYNGEAL EXERCISES:         __________________________________________________________________________________________________  Safety:   After treatment position/precautions:  · Call light within reach  · RN notified  · Family at bedside  · Upright in Bed  Treatment Assessment:  Patient actively participated in evaluation with mod cues. Progression/Medical Necessity:   · Skilled intervention continues to be required due to medical complications. Compliance with Program/Exercises: Will assess as treatment progresses. Reason for Continuation of Services/Other Comments:  · Patient continues to require skilled intervention due to medical complications and patient unable to attend/participate in therapy as expected. Recommendations/Intent for next treatment session: \"Treatment next visit will focus on advancements to more challenging activities and PO trials with SLP\". Determination of appropriateness for Modified barium swallow.   Total Treatment Duration:Time In: 6280  Time Out: Berta Woodard MA/CCC/SLP

## 2017-09-11 NOTE — PROGRESS NOTES
Patient slow to respond will follow verbal command. Flaccid right side noted Rooke boot to right foot.

## 2017-09-11 NOTE — PROGRESS NOTES
Problem: Mobility Impaired (Adult and Pediatric)  Goal: *Acute Goals and Plan of Care (Insert Text)    Updated Goals 9/11/2017   STG:  (1.)Patient will roll side to side in bed with  MAXIMAL ASSIST within 3-5 day(s). (2.)Patient will move from supine to sit and sit to supine  in bed with  MAXIMAL ASSIST within 3-5 day(s). (3.)Patient will sit edge of bed with min to mod ASSIST with midline orientation for 15 minutes within 3-5 day(s). 4.  Patient will perform 1 sets of 10 repetitions of active range of motion exercises for left lower extremity(s) with  cueing within 3-5 day(s). 5.  Patient will tolerate sitting with bed in chair position for 1 hour 3x/day to facilitate tolerance to sitting in chair within 3-5 days. LTG:  (1.)Patient will move from supine to sit and sit to supine  and roll side to side in bed with  MODERATE ASSIST  within 7-10 day(s). (2.)Patient will transfer from bed to chair and chair to bed with  MAXIMAL ASSIST using the least restrictive device within 7-10 day(s). (3.)Patient will sit with  Good balance statically and fair dynamically for 15 minutes  within 7-10 day(s). 4.  Patient will exhibit 2/5 strength R LE to facilitate increased independence with bed mobility within 7-10 days. 5.  Family will demonstrate R ankle stretching and verbalize correct positioning and how often to change position within 7 days.   ________________________________________________________________________________________________      PHYSICAL THERAPY: Re-evaluation, Treatment Day: Day of Assessment and AM 9/11/2017  INPATIENT: Hospital Day: 6  Payor: [employer] Payer / Plan: Jeremy Galo PPO / Product Type: PPO /      NAME/AGE/GENDER: Zoey Burdick is a 60 y.o. female           PRIMARY DIAGNOSIS: Stroke, hemorrhagic ([address]  Hemorrhagic stroke ([address] Stroke, hemorrhagic ([address] Stroke, hemorrhagic ([address]        ICD-10: Treatment Diagnosis:       · Generalized Muscle Weakness (M62.81)  · Difficulty in walking, Not elsewhere classified (R26.2)   Precaution/Allergies:  Banana; Lisinopril; and Nuts Chantel Last nut]       ASSESSMENT:      Ms. Ronn Miranda presents supine in ICU with sister present. Per RN patient with decrease in neuro status last night, underwent repeat CT. Patient mostly nonverbal, did nod/shake head a couple times. Bed placed in chair position. Worked on head rotation in supported sitting. Worked on patient attending to right side. Patient assisted to unsupported sitting, sat with trunk lean right. Worked on obtaining midline in sitting, patient required max cueing and only able to maintain ~1 minute with left hand on rail. Worked on sitting balance/tolerance about 10 minutes, however, feet unable to reach floor. Patient then began sliding anteriorly, required total assist of 2 to scoot posteriorly. Left positioned in sidelying. Patient's R ankle plantarflexed. Instructed sister how to stretch R heel cord and instructed to perform 3x/day if able. Patient with slight decline in status, reassessed and goals updated. Pt would benefit from further PT while in house to address her impairments to help improve to prior level of independence. Anticipate pt will require continued skilled PT/rehab following discharge from hospital due to poor balance, safety and fall risk. This section established at most recent assessment   PROBLEM LIST (Impairments causing functional limitations):  1. Decreased Strength  2. Decreased ADL/Functional Activities  3. Decreased Transfer Abilities  4. Decreased Ambulation Ability/Technique  5. Decreased Balance  6. Decreased Cognition    INTERVENTIONS PLANNED: (Benefits and precautions of physical therapy have been discussed with the patient.)  1. Balance Exercise  2. Bed Mobility  3. Gait Training  4. Neuromuscular Re-education/Strengthening  5. Therapeutic Activites  6. Therapeutic Exercise/Strengthening  7. Transfer Training  8.  Group Therapy      TREATMENT PLAN: Frequency/Duration: 3 times a week for duration of hospital stay  Rehabilitation Potential For Stated Goals: Lorenza Dumont REHABILITATION/EQUIPMENT: (at time of discharge pending progress): Due to the probability of continued deficits (see above) this patient will likely need continued skilled physical therapy after discharge. Equipment:   · to be determined pending discharge plans. HISTORY:   History of Present Injury/Illness (Reason for Referral):  58-year-old female brought in as a stroke alert by EMS.  states that she was in a downstairs bedroom when she called out. Last known normal was at 2230. He found her confused with a left-sided facial droop as well as right upper and lower extremity paralysis. EMS found her with similar symptoms and she  became somewhat more was unresponsive. ruddy arrival to the emergency department the patient began moving her extremities more freely but was not able to communicate clearly. Urgent CT scan was performed which demonstrated intracranial hemorrhage. Patient emergently intubated for airway protection. Patient given labetalol, propofol, Ativan for sedation and blood pressure control. We eventually achieved adequate sedation and blood pressure control. Family updated. Discussed admission with the intensivist.  My colleague spoke with Dr. Soila Torres of neurosurgery he reported that this bleed was nonsurgical in nature and that medical management would be needed.  states only medication she takes is Premarin and lisinopril. No blood thinners. Past Medical History/Comorbidities:   Ms. Adela Iglesias  has a past medical history of Anxiety; Depression; HTN (hypertension); and Menopause. Ms. Adela Iglesias  has a past surgical history that includes jennifer and bso ();  section; refractive surgery (); and other surgical ().   Social History/Living Environment:   Home Environment: Private residence  # Steps to Enter: 4  One/Two Story Residence: Two story, live on 1st floor  # of Interior Steps: 13  Height of Each Step (in): 6 inches  Interior Rails: Right  Lift Chair Available: No  Living Alone: No  Support Systems: Spouse/Significant Other/Partner  Patient Expects to be Discharged to[de-identified] Private residence  Current DME Used/Available at Home: None  Tub or Shower Type: Tub/Shower combination  Prior Level of Function/Work/Activity:  Completely independent. No DMe used      Number of Personal Factors/Comorbidities that affect the Plan of Care: 1-2: MODERATE COMPLEXITY   EXAMINATION:   Most Recent Physical Functioning:   Gross Assessment:  AROM: Generally decreased, functional (L LE)  PROM: Generally decreased, functional (R LE except ankle dorsiflexion < neutral)  Strength: Grossly decreased, non-functional (no active movement R LE)  Coordination: Grossly decreased, non-functional  Tone: Abnormal               Posture:     Balance:  Sitting: Impaired  Sitting - Static: Fair (occasional)  Sitting - Dynamic: Poor (constant support) Bed Mobility:  Rolling: Total assistance  Supine to Sit: Total assistance  Sit to Supine: Total assistance  Scooting: Total assistance  Wheelchair Mobility:     Transfers:     Gait:             Body Structures Involved:  1. Nerves  2. Eyes and Ears  3. Bones  4. Joints  5. Muscles  6. Ligaments Body Functions Affected:  1. Mental  2. Sensory/Pain  3. Cardio  4. Neuromusculoskeletal  5. Movement Related Activities and Participation Affected:  1. Learning and Applying Knowledge  2. General Tasks and Demands  3. Communication  4. Mobility  5. Self Care  6. Domestic Life  7.  Community, Social and [place]   Number of elements that affect the Plan of Care: 3: MODERATE COMPLEXITY   CLINICAL PRESENTATION:   Presentation: Evolving clinical presentation with changing clinical characteristics: MODERATE COMPLEXITY   CLINICAL DECISION MAKIN [facility] Inpatient Short Form  How much difficulty does the patient currently have. .. Unable A Lot A Little None   1. Turning over in bed (including adjusting bedclothes, sheets and blankets)? [ ] 1   [X] 2   [ ] 3   [ ] 4   2. Sitting down on and standing up from a chair with arms ( e.g., wheelchair, bedside commode, etc.)   [ ] 1   [X] 2   [ ] 3   [ ] 4   3. Moving from lying on back to sitting on the side of the bed? [ ] 1   [X] 2   [ ] 3   [ ] 4   How much help from another person does the patient currently need. .. Total A Lot A Little None   4. Moving to and from a bed to a chair (including a wheelchair)? [ ] 1   [X] 2   [ ] 3   [ ] 4   5. Need to walk in hospital room? [X] 1   [ ] 2   [ ] 3   [ ] 4   6. Climbing 3-5 steps with a railing? [X] 1   [ ] 2   [ ] 3   [ ] 4   © 2007, Trustees of [address] [place] [address], under license to [employer]. All rights reserved    Score:  Initial: 10 Most Recent: X (Date: -- )     Interpretation of Tool:  Represents activities that are increasingly more difficult (i.e. Bed mobility, Transfers, Gait). Score 24 23 22-20 19-15 14-10 9-7 6       Modifier CH CI CJ CK CL CM CN         · Mobility - Walking and Moving Around:               - CURRENT STATUS:    CL - 60%-79% impaired, limited or restricted               - GOAL STATUS:           CL - 60%-79% impaired, limited or restricted               - D/C STATUS:                       ---------------To be determined---------------  Payor: YOLA / Plan: Karan Jacques PPO / Product Type: PPO /       Medical Necessity:     · Skilled intervention continues to be required due to decreased function. Reason for Services/Other Comments:  · Patient continues to require skilled intervention due to medical complications and patient unable to attend/participate in therapy as expected.    Use of outcome tool(s) and clinical judgement create a POC that gives a: Questionable prediction of patient's progress: MODERATE COMPLEXITY                 TREATMENT: (In addition to Assessment/Re-Assessment sessions the following treatments were rendered)   Pre-treatment Symptoms/Complaints:  none  Pain: Initial:   Pain Intensity 1: 0  Post Session:  Unchanged. Therapeutic Exercise: (   minutes): PROM right ankle with verbal cues and patient and family educated in importance of ROM to help improve mobility and patients family educated in ROM exercises to be done 3x/day as able Passive strtching to right heel cord and plantar fascia all to prepare patient to be able to stand and walk and to improve mobility and strength. Required moderate verbal and manual cues to promote proper body alignment. Progressed complexity of movement as indicated. DATE: 9/11/17        Straight leg raise         Hip abduct/ adduct         Heel slides  x5 AAL        Hip external/ internal rotation         Ankle dorsiflexion/ plantarflexion         Neck rotation x2 Vassar        R heel cord stretch x5 P                   Key:  A=active, AA=active assisted, P=passive, B=bilaterally, R=right, L=left        Therapeutic Activity: (    15 min): Therapeutic activities including sitting balance activities and encouragement to attend to right side to improve mobility, strength, balance and coordination. Required maximal cueing     to promote static and dynamic balance in sitting, promote coordination of bilateral, lower extremity(s) and promote motor control of bilateral, lower extremity(s). Braces/Orthotics/Lines/Etc:   · IV, stafford catheter, nasogastric tube and ICU monitors  Treatment/Session Assessment:    · Response to Treatment:  See above  · Interdisciplinary Collaboration:  · Physical Therapist, Registered Nurse and Rehabilitation Attendant  · After treatment position/precautions:  · Supine in bed  · Call light within reach  · RN notified  · Family at bedside  · Compliance with Program/Exercises: Will assess as treatment progresses.   · Recommendations/Intent for next treatment session: \"Next visit will focus on advancements to more challenging activities and reduction in assistance provided\".   Total Treatment Duration:  PT Patient Time In/Time Out  Time In: 1030  Time Out: [address], PT, DPT

## 2017-09-12 ENCOUNTER — APPOINTMENT (OUTPATIENT)
Dept: GENERAL RADIOLOGY | Age: 62
DRG: 064 | End: 2017-09-12
Attending: INTERNAL MEDICINE
Payer: COMMERCIAL

## 2017-09-12 PROBLEM — R11.2 NON-INTRACTABLE VOMITING WITH NAUSEA: Status: RESOLVED | Noted: 2017-09-07 | Resolved: 2017-09-12

## 2017-09-12 PROBLEM — R09.02 HYPOXEMIA: Status: RESOLVED | Noted: 2017-09-10 | Resolved: 2017-09-12

## 2017-09-12 LAB
MM INDURATION POC: 0 MM (ref 0–5)
PPD POC: NEGATIVE NEGATIVE

## 2017-09-12 PROCEDURE — 65270000029 HC RM PRIVATE

## 2017-09-12 PROCEDURE — 74011250637 HC RX REV CODE- 250/637: Performed by: INTERNAL MEDICINE

## 2017-09-12 PROCEDURE — 97530 THERAPEUTIC ACTIVITIES: CPT

## 2017-09-12 PROCEDURE — 71010 XR CHEST SNGL V: CPT

## 2017-09-12 PROCEDURE — 74011250636 HC RX REV CODE- 250/636: Performed by: INTERNAL MEDICINE

## 2017-09-12 PROCEDURE — 99232 SBSQ HOSP IP/OBS MODERATE 35: CPT | Performed by: INTERNAL MEDICINE

## 2017-09-12 PROCEDURE — 97110 THERAPEUTIC EXERCISES: CPT

## 2017-09-12 PROCEDURE — 92526 ORAL FUNCTION THERAPY: CPT

## 2017-09-12 PROCEDURE — 74011250637 HC RX REV CODE- 250/637: Performed by: NURSE PRACTITIONER

## 2017-09-12 PROCEDURE — 77030011943

## 2017-09-12 PROCEDURE — 77030018798 HC PMP KT ENTRL FED COVD -A

## 2017-09-12 PROCEDURE — 74011000250 HC RX REV CODE- 250: Performed by: INTERNAL MEDICINE

## 2017-09-12 PROCEDURE — 36592 COLLECT BLOOD FROM PICC: CPT

## 2017-09-12 PROCEDURE — 77030034850

## 2017-09-12 PROCEDURE — 74011000258 HC RX REV CODE- 258: Performed by: INTERNAL MEDICINE

## 2017-09-12 RX ORDER — AMLODIPINE BESYLATE 10 MG/1
10 TABLET ORAL DAILY
Status: DISCONTINUED | OUTPATIENT
Start: 2017-09-12 | End: 2017-09-15 | Stop reason: HOSPADM

## 2017-09-12 RX ADMIN — HYDROCHLOROTHIAZIDE: 12.5 CAPSULE ORAL at 08:45

## 2017-09-12 RX ADMIN — HYDRALAZINE HYDROCHLORIDE 10 MG: 20 INJECTION INTRAMUSCULAR; INTRAVENOUS at 20:57

## 2017-09-12 RX ADMIN — LABETALOL HYDROCHLORIDE 200 MG: 200 TABLET, FILM COATED ORAL at 08:46

## 2017-09-12 RX ADMIN — Medication 10 ML: at 13:54

## 2017-09-12 RX ADMIN — LABETALOL HYDROCHLORIDE 200 MG: 200 TABLET, FILM COATED ORAL at 16:37

## 2017-09-12 RX ADMIN — Medication 10 ML: at 05:15

## 2017-09-12 RX ADMIN — SODIUM CHLORIDE 7.5 MG/HR: 900 INJECTION, SOLUTION INTRAVENOUS at 01:23

## 2017-09-12 RX ADMIN — Medication 30 ML: at 13:51

## 2017-09-12 RX ADMIN — SODIUM CHLORIDE, PRESERVATIVE FREE 900 UNITS: 5 INJECTION INTRAVENOUS at 22:44

## 2017-09-12 RX ADMIN — FAMOTIDINE 20 MG: 10 INJECTION, SOLUTION INTRAVENOUS at 20:56

## 2017-09-12 RX ADMIN — SODIUM CHLORIDE 5 MG/HR: 900 INJECTION, SOLUTION INTRAVENOUS at 05:25

## 2017-09-12 RX ADMIN — SODIUM CHLORIDE, PRESERVATIVE FREE 900 UNITS: 5 INJECTION INTRAVENOUS at 13:50

## 2017-09-12 RX ADMIN — LABETALOL HYDROCHLORIDE 200 MG: 200 TABLET, FILM COATED ORAL at 00:30

## 2017-09-12 RX ADMIN — Medication 30 ML: at 22:44

## 2017-09-12 RX ADMIN — FAMOTIDINE 20 MG: 10 INJECTION, SOLUTION INTRAVENOUS at 08:53

## 2017-09-12 RX ADMIN — Medication 10 ML: at 22:44

## 2017-09-12 RX ADMIN — HYDRALAZINE HYDROCHLORIDE 10 MG: 20 INJECTION INTRAMUSCULAR; INTRAVENOUS at 13:50

## 2017-09-12 RX ADMIN — AMLODIPINE BESYLATE 10 MG: 10 TABLET ORAL at 10:32

## 2017-09-12 RX ADMIN — SODIUM CHLORIDE, PRESERVATIVE FREE 900 UNITS: 5 INJECTION INTRAVENOUS at 05:14

## 2017-09-12 RX ADMIN — Medication 30 ML: at 05:14

## 2017-09-12 RX ADMIN — HYDRALAZINE HYDROCHLORIDE 10 MG: 20 INJECTION INTRAMUSCULAR; INTRAVENOUS at 08:53

## 2017-09-12 NOTE — PROGRESS NOTES
Bedside shift change report given to Fawad Goss  (oncoming nurse) by Funmi Choudhury RN (offgoing nurse). Report included the following information SBAR, Kardex, ED Summary, Procedure Summary, Intake/Output, MAR, Recent Results, Med Rec Status and Cardiac Rhythm NSR.

## 2017-09-12 NOTE — PROGRESS NOTES
Problem: Mobility Impaired (Adult and Pediatric)  Goal: *Acute Goals and Plan of Care (Insert Text)    Updated Goals 9/11/2017   STG:  (1.)Patient will roll side to side in bed with  MAXIMAL ASSIST within 3-5 day(s). (2.)Patient will move from supine to sit and sit to supine  in bed with  MAXIMAL ASSIST within 3-5 day(s). (3.)Patient will sit edge of bed with min to mod ASSIST with midline orientation for 15 minutes within 3-5 day(s). 4.  Patient will perform 1 sets of 10 repetitions of active range of motion exercises for left lower extremity(s) with  cueing within 3-5 day(s). 5.  Patient will tolerate sitting with bed in chair position for 1 hour 3x/day to facilitate tolerance to sitting in chair within 3-5 days. LTG:  (1.)Patient will move from supine to sit and sit to supine  and roll side to side in bed with  MODERATE ASSIST  within 7-10 day(s). (2.)Patient will transfer from bed to chair and chair to bed with  MAXIMAL ASSIST using the least restrictive device within 7-10 day(s). (3.)Patient will sit with  Good balance statically and fair dynamically for 15 minutes  within 7-10 day(s). 4.  Patient will exhibit 2/5 strength R LE to facilitate increased independence with bed mobility within 7-10 days. 5.  Family will demonstrate R ankle stretching and verbalize correct positioning and how often to change position within 7 days.   ________________________________________________________________________________________________      PHYSICAL THERAPY: Daily Note, Treatment Day: 1st and AM 9/12/2017  INPATIENT: Hospital Day: 7  Payor: Yoly Baez / Plan: Hennie Goodpasture PPO / Product Type: PPO /      NAME/AGE/GENDER: Kiki Matri is a 60 y.o. female           PRIMARY DIAGNOSIS: Stroke, hemorrhagic ([address]  Hemorrhagic stroke ([address] Stroke, hemorrhagic ([address] Stroke, hemorrhagic ([address]        ICD-10: Treatment Diagnosis:       · Generalized Muscle Weakness (M62.81)  · Difficulty in walking, Not elsewhere classified (R26.2)   Precaution/Allergies:  Banana; Lisinopril; and Nuts Trice Skedee nut]       ASSESSMENT:      Ms. Tiffany Gramajor presents supine in ICU with sister present. Patient mostly nonverbal, but did attempt speaking several times today. Patient much more alert today. Worked on exercises in modified sitting. Worked on transitioning to sit, required max to total assist.  Worked on sitting balance on edge of bed. Patient with trunk lean right, with cueing able to maintain midline propping with L UE for ~10'. Worked on head rotation in supported sitting. Worked on patient attending to right side. Oriented patient to place, date, situation. Worked on sitting balance/tolerance about 20 minutes, however, feet unable to reach floor. Patient with much improved sitting balance today!! Pt would benefit from further PT while in house to address her impairments to help improve to prior level of independence. Anticipate pt will require continued skilled PT following discharge from hospital due to poor balance, safety and fall risk. This section established at most recent assessment   PROBLEM LIST (Impairments causing functional limitations):  1. Decreased Strength  2. Decreased ADL/Functional Activities  3. Decreased Transfer Abilities  4. Decreased Ambulation Ability/Technique  5. Decreased Balance  6. Decreased Cognition    INTERVENTIONS PLANNED: (Benefits and precautions of physical therapy have been discussed with the patient.)  1. Balance Exercise  2. Bed Mobility  3. Gait Training  4. Neuromuscular Re-education/Strengthening  5. Therapeutic Activites  6. Therapeutic Exercise/Strengthening  7. Transfer Training  8.  Group Therapy      TREATMENT PLAN: Frequency/Duration: 3 times a week for duration of hospital stay  Rehabilitation Potential For Stated Goals:good      RECOMMENDED REHABILITATION/EQUIPMENT: (at time of discharge pending progress): Due to the probability of continued deficits (see above) this patient will likely need continued skilled physical therapy after discharge. Equipment:   · to be determined pending discharge plans. HISTORY:   History of Present Injury/Illness (Reason for Referral):  69-year-old female brought in as a stroke alert by EMS.  states that she was in a downstairs bedroom when she called out. Last known normal was at 2230. He found her confused with a left-sided facial droop as well as right upper and lower extremity paralysis. EMS found her with similar symptoms and she  became somewhat more was unresponsive. ruddy arrival to the emergency department the patient began moving her extremities more freely but was not able to communicate clearly. Urgent CT scan was performed which demonstrated intracranial hemorrhage. Patient emergently intubated for airway protection. Patient given labetalol, propofol, Ativan for sedation and blood pressure control. We eventually achieved adequate sedation and blood pressure control. Family updated. Discussed admission with the intensivist.  My colleague spoke with Dr. Vladimir Youngblood of neurosurgery he reported that this bleed was nonsurgical in nature and that medical management would be needed.  states only medication she takes is Premarin and lisinopril. No blood thinners. Past Medical History/Comorbidities:   Ms. Naz Marcano  has a past medical history of Anxiety; Depression; HTN (hypertension); and Menopause. Ms. Naz Marcano  has a past surgical history that includes jennifer and bso ();  section; refractive surgery (); and other surgical ().   Social History/Living Environment:   Home Environment: Private residence  # Steps to Enter: 4  One/Two Story Residence: Two story, live on 1st floor  # of Interior Steps: 13  Height of Each Step (in): 6 inches  Interior Rails: Right  Lift Chair Available: No  Living Alone: No  Support Systems: Spouse/Significant Other/Partner  Patient Expects to be Discharged to[employer] residence  Current DME Used/Available at Home: None  Tub or Shower Type: Tub/Shower combination  Prior Level of Function/Work/Activity:  Completely independent. No DMe used      Number of Personal Factors/Comorbidities that affect the Plan of Care: 1-2: MODERATE COMPLEXITY   EXAMINATION:   Most Recent Physical Functioning:   Gross Assessment:  AROM: Generally decreased, functional (L LE)  PROM: Generally decreased, functional (R LE except ankle dorsiflexion < neutral)  Strength: Grossly decreased, non-functional (no active movement R LE)  Coordination: Grossly decreased, non-functional  Tone: Abnormal               Posture:     Balance:  Sitting: Impaired  Sitting - Static: Fair (occasional); Prop sitting  Sitting - Dynamic: Poor (constant support) Bed Mobility:  Rolling: Maximum assistance; Total assistance  Supine to Sit: Maximum assistance; Total assistance;Assist x2  Sit to Supine: Total assistance;Assist x2  Scooting: Total assistance;Assist x2  Duration: 28 Minutes  Wheelchair Mobility:     Transfers:     Gait:             Body Structures Involved:  1. Nerves  2. Eyes and Ears  3. Bones  4. Joints  5. Muscles  6. Ligaments Body Functions Affected:  1. Mental  2. Sensory/Pain  3. Cardio  4. Neuromusculoskeletal  5. Movement Related Activities and Participation Affected:  1. Learning and Applying Knowledge  2. General Tasks and Demands  3. Communication  4. Mobility  5. Self Care  6. Domestic Life  7. Community, Social and [place]   Number of elements that affect the Plan of Care: 3: MODERATE COMPLEXITY   CLINICAL PRESENTATION:   Presentation: Evolving clinical presentation with changing clinical characteristics: MODERATE COMPLEXITY   CLINICAL DECISION MAKIN [facility] Mobility Inpatient Short Form  How much difficulty does the patient currently have. .. Unable A Lot A Little None   1. Turning over in bed (including adjusting bedclothes, sheets and blankets)?    [ ] 1   [X] 2   [ ] 3   [ ] 4   2. Sitting down on and standing up from a chair with arms ( e.g., wheelchair, bedside commode, etc.)   [ ] 1   [X] 2   [ ] 3   [ ] 4   3. Moving from lying on back to sitting on the side of the bed? [ ] 1   [X] 2   [ ] 3   [ ] 4   How much help from another person does the patient currently need. .. Total A Lot A Little None   4. Moving to and from a bed to a chair (including a wheelchair)? [ ] 1   [X] 2   [ ] 3   [ ] 4   5. Need to walk in hospital room? [X] 1   [ ] 2   [ ] 3   [ ] 4   6. Climbing 3-5 steps with a railing? [X] 1   [ ] 2   [ ] 3   [ ] 4   © 2007, Trustees of [address] [place] [address] [address] [address], under license to [employer]. All rights reserved    Score:  Initial: 10 Most Recent: X (Date: -- )     Interpretation of Tool:  Represents activities that are increasingly more difficult (i.e. Bed mobility, Transfers, Gait). Score 24 23 22-20 19-15 14-10 9-7 6       Modifier CH CI CJ CK CL CM CN         · Mobility - Walking and Moving Around:               - CURRENT STATUS:    CL - 60%-79% impaired, limited or restricted               - GOAL STATUS:           CL - 60%-79% impaired, limited or restricted               - D/C STATUS:                       ---------------To be determined---------------  Payor: YOLA / Plan: Zaira Ortega PPO / Product Type: PPO /       Medical Necessity:     · Skilled intervention continues to be required due to decreased function. Reason for Services/Other Comments:  · Patient continues to require skilled intervention due to medical complications and patient unable to attend/participate in therapy as expected.    Use of outcome tool(s) and clinical judgement create a POC that gives a: Questionable prediction of patient's progress: MODERATE COMPLEXITY                 TREATMENT:   (In addition to Assessment/Re-Assessment sessions the following treatments were rendered)   Pre-treatment Symptoms/Complaints:  none  Pain: Initial:   Pain Intensity 1: 0  Post Session:  Unchanged. Therapeutic Exercise: (10 Minutes):  Exercises per grid below to improve mobility, strength and coordination. Required moderate verbal and tactile cues to promote proper body alignment and promote proper body posture. Progressed complexity of movement as indicated. Riley Estes DATE: 9/11/17 9/12/17       Straight leg raise         Hip abduct/ adduct         Heel slides  x5 AAL        Hip external/ internal rotation         Ankle dorsiflexion/ plantarflexion  x20 AL,   x5 NE       Neck rotation x2 Kari x2 AB       R heel cord stretch x5 P        Long arc quads  x20 AL,  x5 NE         Key:  A=active, AA=active assisted, P=passive, B=bilaterally, R=right, L=left        Therapeutic Activity: (28 Minutes   ):  Therapeutic activities including bed mobility, sitting balance activities and encouragement to attend to right side to improve mobility, strength, balance and coordination. Required maximal cueing     to promote static and dynamic balance in sitting, promote coordination of bilateral, lower extremity(s) and promote motor control of bilateral, lower extremity(s). Braces/Orthotics/Lines/Etc:   · IV, nasogastric tube and ICU monitors  Treatment/Session Assessment:    · Response to Treatment:  See above  · Interdisciplinary Collaboration:  · Physical Therapist, Registered Nurse and Rehabilitation Attendant  · After treatment position/precautions:  · Supine in bed  · Call light within reach  · RN notified  · Family at bedside  · Compliance with Program/Exercises: Will assess as treatment progresses. · Recommendations/Intent for next treatment session: \"Next visit will focus on advancements to more challenging activities and reduction in assistance provided\".   Total Treatment Duration:  PT Patient Time In/Time Out  Time In: 1145  Time Out: [facility] PT, DPT

## 2017-09-12 NOTE — PROGRESS NOTES
Spiritual Care visit. Follow up visit. Patient did not respond.   prayed at bedside for patient    Visit by Jaime Claire M.Ed., .B. ,Staff

## 2017-09-12 NOTE — PROGRESS NOTES
Problem: Dysphagia (Adult)  Goal: *Acute Goals and Plan of Care (Insert Text)  ST. Pt. Will tolerate PO trials with SLP only with no overt s/sx of aspiration/penetration. 2. Pt. Will participate in modified barium swallow with 100% participation to fully evaluate swallow function. LTG: Pt. Will tolerate least restrictive diet without respiratory decline. SPEECH LANGUAGE PATHOLOGY: BEDSIDE SWALLOW NOTE: Daily Note 2     NAME/AGE/GENDER: Zoey Burdick is a 60 y.o. female  DATE: 2017  PRIMARY DIAGNOSIS: Stroke, hemorrhagic ([facility] [address]  Hemorrhagic stroke ([facility] [address]       ICD-10: Treatment Diagnosis: R13.12 Dysphagia, Oropharyngeal Phase  INTERDISCIPLINARY COLLABORATION: Registered Nurse  PRECAUTIONS/ALLERGIES: Banana; Lisinopril; and Nuts [tree nut]   ASSESSMENT:    Ms. Tiffany Starks presents with positive signs/sx of aspiration with thin liquids via spoon and nectar thick via spoon; cough and delayed cough. Pt appeared to tolerated honey thick and pureed. Spillage from right side of her mouth and moderate residue, however, cleared with honey thick liquid rinse. Will follow patient with hopeful improvement of tolerance at bedside of PO trials prior to recommendation for modified barium swallow. Pt was able to repeat her name after therapist today and say ok when asked if ready for another bite. Noted perseveration on her own name when she was trying to state her sister's name. Patient will benefit from skilled intervention to address the below impairments. ?????? ? ? This section established at most recent assessment??????????  PROBLEM LIST (Impairments causing functional limitations):  1. Risk for aspiration  2. CVA  REHABILITATION POTENTIAL FOR STATED GOALS: GOOD      PLAN OF CARE:   Patient will benefit from skilled intervention to address the following impairments.   RECOMMENDATIONS AND PLANNED INTERVENTIONS (Benefits and precautions of therapy have been discussed with the patient.):  · NPO with alternative means of nutrition  MEDICATIONS:  · Non-oral  COMPENSATORY STRATEGIES/MODIFICATIONS INCLUDING:  · None  OTHER RECOMMENDATIONS (including follow up treatment recommendations): · Family training/education  · Patient education  RECOMMENDED DIET MODIFICATIONS DISCUSSED WITH:  · Nursing  · Family  · Patient  FREQUENCY/DURATION: Continue to follow patient 3 times a week as able or until goals met. RECOMMENDED REHABILITATION/EQUIPMENT: (at time of discharge pending progress): Due to the probability of continued deficits (see above) this patient will likely need continued skilled speech therapy after discharge. SUBJECTIVE:   alert  History of Present Injury/Illness: Ms. Ewa Rawls  has a past medical history of Anxiety; Depression; HTN (hypertension); and Menopause. .  She also  has a past surgical history that includes jennifer and bso ();  section; refractive surgery (); and other surgical (). Present Symptoms:    Pain Intensity 1: 0  Pain Location 1: Generalized  Pain Intervention(s) 1: Medication (see MAR)  Current Medications:   No current facility-administered medications on file prior to encounter. Current Outpatient Prescriptions on File Prior to Encounter   Medication Sig Dispense Refill    CYANOCOBALAMIN, VITAMIN B-12, (VITAMIN B-12 PO) Take  by mouth.  losartan-hydroCHLOROthiazide (HYZAAR) 100-12.5 mg per tablet Take 1 Tab by mouth daily. 30 Tab 5    estradiol (VAGIFEM) 10 mcg tab vaginal tablet Insert 1 Tab into vagina every Monday and Thursday. 30 Tab 5    estradiol (ESTRACE) 1 mg tablet Take 1 Tab by mouth daily. 30 Tab 5    ALPRAZolam (XANAX) 0.25 mg tablet Take 1 Tab by mouth three (3) times daily as needed for Anxiety. Max Daily Amount: 0.75 mg. 30 Tab 1    amLODIPine (NORVASC) 5 mg tablet Take 1 Tab by mouth daily. 30 Tab 1    magnesium 250 mg tab Take  by mouth.       FLUoxetine (PROZAC) 40 mg capsule       FLUoxetine (PROZAC) 20 mg capsule       methylphenidate (RITALIN) 20 mg tablet Take 20 mg by mouth daily.  meloxicam (MOBIC) 15 mg tablet Take 1 Tab by mouth daily. 30 Tab 2     Current Dietary Status:  NPO with NG tube                 History of reflux:  YES   · Reflux medication:pepcid  Social History/Home Situation:    Home Environment: Private residence  # Steps to Enter: 4  One/Two Story Residence: Two story, live on 1st floor  # of Interior Steps: 13  Height of Each Step (in): 6 inches  Interior Rails: Right  Lift Chair Available: No  Living Alone: No  Support Systems: Spouse/Significant Other/Partner  Patient Expects to be Discharged to[de-identified] Private residence  Current DME Used/Available at Home: None  Tub or Shower Type: Tub/Shower combination      OBJECTIVE:   Respiratory Status:  Room air     CXR Results:LLL atelectasis/pneumonia  CT Results:IMPRESSION:     Acute hemorrhage in the left basal ganglia, with intraventricular extension. No  hydrocephalus at this time. No significant change compared to prior CT. Oral Motor Structure/Speech:  Oral-Motor Structure/Motor Speech  Labial: Right droop, Decreased rate, Decreased seal  Dentition: Intact  Oral Hygiene: dry  Lingual: Decreased rate, Decreased strength, Incoordinated     Cognitive and Communication Status:  Neurologic State: Drowsy; Eyes open to stimulus; Alert  Orientation Level: Disoriented to situation;Disoriented to time;Oriented to person;Oriented to place  Cognition: Decreased command following; Impaired decision making;Poor safety awareness;Recognition of people/places; Decreased attention/concentration     OTHER OBSERVATIONS:  Rate/bite size: Impaired   Endurance:  Impaired      Comments:        Tool Used: Dysphagia Outcome and Severity Scale (SEPIDEH)     Score Comments   Normal Diet  [ ] 7 With no strategies or extra time needed   Functional Swallow  [ ] 6 May have mild oral or pharyngeal delay         Mild Dysphagia     [ ] 5 Which may require one diet consistency restricted (those who demonstrate penetration which is entirely cleared on MBS would be included)   Mild-Moderate Dysphagia  [ ] 4 With 1-2 diet consistencies restricted         Moderate Dysphagia  [ ] 3 With 2 or more diet consistencies restricted         Moderately Severe Dysphagia  [X] 2 With partial PO strategies (trials with ST only)         Severe Dysphagia  [ ] 1 With inability to tolerate any PO safely            Score:  Initial: 2 Most Recent: X (Date: -- )   Interpretation of Tool: The Dysphagia Outcome and Severity Scale (SEPIDEH) is a simple, easy-to-use, 7-point scale developed to systematically rate the functional severity of dysphagia based on objective assessment and make recommendations for diet level, independence level, and type of nutrition. Score 7 6 5 4 3 2 1   Modifier CH CI CJ CK CL CM CN   · Swallowing:               - CURRENT STATUS:           CM - 80%-99% impaired, limited or restricted               - GOAL STATUS:                   CI - 1%-19% impaired, limited or restricted               - D/C STATUS:                       ---------------To be determined---------------  Payor: [employer] / Plan: [place] PPO / Product Type: PPO /       TREATMENT:               (In addition to Assessment/Re-Assessment sessions the following treatments were rendered)  Dysphagia Activities: Activities/Procedures listed utilized to improve progress in swallow function. Required moderate cueing to decrease aspiration risk. MODALITIES:          ORAL MOTOR  EXERCISES:         LARYNGEAL / PHARYNGEAL EXERCISES:         __________________________________________________________________________________________________  Safety:   After treatment position/precautions:  · Call light within reach  · RN notified  · Family at bedside  · Upright in Bed  Treatment Assessment:  Patient actively participated in evaluation with mod cues.   Progression/Medical Necessity:   · Skilled intervention continues to be required due to medical complications. Compliance with Program/Exercises: Will assess as treatment progresses. Reason for Continuation of Services/Other Comments:  · Patient continues to require skilled intervention due to medical complications and patient unable to attend/participate in therapy as expected. Recommendations/Intent for next treatment session: \"Treatment next visit will focus on advancements to more challenging activities and PO trials with SLP\". Determination of appropriateness for Modified barium swallow.   Total Treatment Duration:Time In: 1661  Time Out: Doctor Noyola 91 MA/ANASTASIA/SLP

## 2017-09-12 NOTE — PROGRESS NOTES
Stafford removed at 10am. No UOP for 8 hours. Performed a bladder scan, retaining 800cc of urine. Straight cath was performed and 850cc of clear yellow urine was drained. Continuing to monitor pt output. May need to reinsert a stafford if necessary.

## 2017-09-12 NOTE — PROGRESS NOTES
Ramiro Myles  Admission Date: 9/6/2017             Daily Progress Note: 9/12/2017     Seb Tapia a 60 y. o. white female with h/o HTN (on amlodipine,hyzaar), anxiety presents with right sided weakness and facial droop. CT head was ordered and revealed left basal ganglia ICH measured 3 x 2cm with spread to L lateral ventricle and 3rd/4th ventricle with some mild mildline shift.  No significant edema thus far. She had seizure-like activity. She was intubated and received Keppra. EEG negative for seizures.       Teleneurology was consulted. SBP goal of 160, tight glucose control and DVT ppx.  Dr. Ruperto Worthy was consulted and did not think surgery would benefit patient, also recommended BP control. Tolerated extubation on 9/9. Speech was consulted      Subjective:     Conversing, much more alert today  Off cardene     Review of Systems  Review of systems not obtained due to patient factors.      Current Facility-Administered Medications   Medication Dose Route Frequency    tuberculin injection 5 Units  5 Units IntraDERMal ONCE    labetalol (NORMODYNE) tablet 200 mg  200 mg Oral Q8H    lip protectant (BLISTEX) ointment   Topical PRN    NUTRITIONAL SUPPORT ELECTROLYTE PRN ORDERS   Does Not Apply PRN    losartan/hydroCHLOROthiazide (HYZAAR) 100/12.5 mg   Oral DAILY    famotidine (PF) (PEPCID) 20 mg in sodium chloride 0.9 % 10 mL injection  20 mg IntraVENous Q12H    hydrALAZINE (APRESOLINE) 20 mg/mL injection 10 mg  10 mg IntraVENous Q6H    niCARdipine (CARDENE) 25 mg in 0.9% sodium chloride 250 mL infusion  5-15 mg/hr IntraVENous TITRATE    sodium chloride (NS) flush 5-10 mL  5-10 mL IntraVENous Q8H    sodium chloride (NS) flush 5-10 mL  5-10 mL IntraVENous PRN    ondansetron (ZOFRAN) injection 4 mg  4 mg IntraVENous Q6H PRN    acetaminophen (TYLENOL) tablet 500 mg  500 mg Per NG tube Q4H PRN    sodium chloride 0.9 % bolus infusion 250 mL  250 mL IntraVENous Q1H PRN    NUTRITIONAL SUPPORT ELECTROLYTE PRN ORDERS   Does Not Apply PRN    sodium chloride (NS) flush 30 mL  30 mL InterCATHeter Q8H    heparin (porcine) pf 900 Units  900 Units InterCATHeter Q8H    sodium chloride (NS) flush 30 mL  30 mL InterCATHeter PRN    heparin (porcine) pf 900 Units  900 Units InterCATHeter PRN         Objective:     Vitals:    09/12/17 0659 09/12/17 0714 09/12/17 0729 09/12/17 0744   BP: 142/71 141/72 144/72 149/73   Pulse: (!) 108 (!) 111 (!) 110 (!) 114   Resp: 21 20 22 16   Temp:       SpO2: 93% 91% 92% 91%   Weight:       Height:         Intake and Output:   09/10 1901 - 09/12 0700  In: 3347.7 [I.V.:1141.7]  Out: 3150 [Urine:3150]       Physical Exam:          Constitutional: the patient is weak  HEENT: Sclera clear, pupils equal, oral mucosa moist  Lungs: clear bilaterally on RA  Cardiovascular: RRR without M,G,R   Abd/GI: soft and non-tender; with positive bowel sounds. Ext: warm without cyanosis. There is no lower leg edema.   Musculoskeletal: moves left upper extremity  Skin: no jaundice or rashes, no wounds   Neuro:alert, moved left side follows commands, conversing      Lines/Drains: IV, stafford, NG, PICC  Nutrition: tube feedings    CHEST XRAY:       LAB  Recent Labs      09/11/17   0405   WBC  13.4*   HGB  10.5*   HCT  30.7*   PLT  231     Recent Labs      09/11/17   0405   NA  143   K  3.2*   CL  105   CO2  29   GLU  116*   BUN  26*   CREA  0.53*       Assessment:     Patient Active Problem List   Diagnosis Code    HTN (hypertension) I10    ADD (attention deficit disorder) F98.8    Depression F32.9    Anxiety F41.9    Atrophic vaginitis N95.2    Screening for breast cancer Z12.39    Stroke, hemorrhagic ([facility] [address] I61.9    Accelerated hypertension I10    Non-intractable vomiting with nausea R11.2    At risk for aspiration Z91.89    Acute spontaneous intraventricular hemorrhage assoc w/ hypertension ([facility] [address] I61.5, I10    Hypoxemia R09.02    Hypertensive urgency, malignant I16.0       Plan Hospital Problems  Date Reviewed: 9/11/2017          Codes Class Noted POA    Hypoxemia ICD-10-CM: R09.02  ICD-9-CM: 799.02  9/10/2017 Yes    On RA    * (Principal)Stroke, hemorrhagic ([facility] ICD-10-CM: I61.9  ICD-9-CM: 342  9/7/2017 Yes    With HTN on cardene drip  Repeat CT today    Non-intractable vomiting with nausea ICD-10-CM: R11.2  ICD-9-CM: 787.01  9/7/2017 Yes    resolved    At risk for aspiration (Chronic) ICD-10-CM: Z91.89  ICD-9-CM: V49.89  9/7/2017 Yes    S/P CVA    Acute spontaneous intraventricular hemorrhage assoc w/ hypertension ([facility] ICD-10-CM: I61.5, I10  ICD-9-CM: 476, 401.9  9/7/2017 Yes              -- wean cardene, other antihypertensives adjusted. Add norvasc. -- PT/OT/Speech when able   --advance diet as able  -- transfer to the floor. Ask hospitalist to see to assume care outside of the ICU. Ryan Nails, NP    Lungs:  clear  Heart:  RRR with no Murmur/Rubs/Gallops    Additional Comments: Pt dramatically better. Awake and speaking. BP much improved. OK for floor. Will ask hospitalists to assume care tomorrow since CC and pulmonary issues have resolved. I have spoken with and examined the patient. I agree with the above assessment and plan as documented.     Alan Leon MD

## 2017-09-12 NOTE — INTERDISCIPLINARY ROUNDS
Interdisciplinary team rounds were held 9/12/2017 with the following team members:Nursing, Nurse Practitioner, Nutrition, Occupational Therapy, Palliative Care, Pastoral Care, Pharmacy, Physical Therapy, Physician, Respiratory Therapy and Speech Therapy and the patient. Plan of care discussed. See clinical pathway and/or care plan for interventions and desired outcomes.

## 2017-09-13 ENCOUNTER — APPOINTMENT (OUTPATIENT)
Dept: GENERAL RADIOLOGY | Age: 62
DRG: 064 | End: 2017-09-13
Attending: INTERNAL MEDICINE
Payer: COMMERCIAL

## 2017-09-13 LAB
MM INDURATION POC: 0 MM (ref 0–5)
PPD POC: NORMAL NEGATIVE

## 2017-09-13 PROCEDURE — 77030027138 HC INCENT SPIROMETER -A

## 2017-09-13 PROCEDURE — 92526 ORAL FUNCTION THERAPY: CPT

## 2017-09-13 PROCEDURE — 74011250637 HC RX REV CODE- 250/637: Performed by: INTERNAL MEDICINE

## 2017-09-13 PROCEDURE — 99253 IP/OBS CNSLTJ NEW/EST LOW 45: CPT | Performed by: PHYSICAL MEDICINE & REHABILITATION

## 2017-09-13 PROCEDURE — 97530 THERAPEUTIC ACTIVITIES: CPT

## 2017-09-13 PROCEDURE — 74011250636 HC RX REV CODE- 250/636: Performed by: INTERNAL MEDICINE

## 2017-09-13 PROCEDURE — 74011000250 HC RX REV CODE- 250: Performed by: INTERNAL MEDICINE

## 2017-09-13 PROCEDURE — 77030018798 HC PMP KT ENTRL FED COVD -A

## 2017-09-13 PROCEDURE — 77010033678 HC OXYGEN DAILY

## 2017-09-13 PROCEDURE — 97110 THERAPEUTIC EXERCISES: CPT

## 2017-09-13 PROCEDURE — 65270000029 HC RM PRIVATE

## 2017-09-13 PROCEDURE — 74011250637 HC RX REV CODE- 250/637: Performed by: NURSE PRACTITIONER

## 2017-09-13 RX ORDER — MELOXICAM 7.5 MG/1
15 TABLET ORAL DAILY
Status: DISCONTINUED | OUTPATIENT
Start: 2017-09-14 | End: 2017-09-14

## 2017-09-13 RX ORDER — FACIAL-BODY WIPES
10 EACH TOPICAL DAILY PRN
Status: DISCONTINUED | OUTPATIENT
Start: 2017-09-13 | End: 2017-09-15 | Stop reason: HOSPADM

## 2017-09-13 RX ADMIN — AMLODIPINE BESYLATE 10 MG: 10 TABLET ORAL at 08:28

## 2017-09-13 RX ADMIN — ACETAMINOPHEN 500 MG: 500 TABLET, FILM COATED ORAL at 08:28

## 2017-09-13 RX ADMIN — Medication 10 ML: at 05:46

## 2017-09-13 RX ADMIN — HYDROCHLOROTHIAZIDE: 12.5 CAPSULE ORAL at 08:28

## 2017-09-13 RX ADMIN — Medication 30 ML: at 05:46

## 2017-09-13 RX ADMIN — LABETALOL HYDROCHLORIDE 200 MG: 200 TABLET, FILM COATED ORAL at 08:28

## 2017-09-13 RX ADMIN — LABETALOL HYDROCHLORIDE 200 MG: 200 TABLET, FILM COATED ORAL at 17:09

## 2017-09-13 RX ADMIN — LABETALOL HYDROCHLORIDE 200 MG: 200 TABLET, FILM COATED ORAL at 00:33

## 2017-09-13 RX ADMIN — Medication 10 ML: at 12:10

## 2017-09-13 RX ADMIN — SODIUM CHLORIDE, PRESERVATIVE FREE 900 UNITS: 5 INJECTION INTRAVENOUS at 05:46

## 2017-09-13 RX ADMIN — Medication 30 ML: at 12:09

## 2017-09-13 RX ADMIN — Medication 30 ML: at 21:59

## 2017-09-13 RX ADMIN — FAMOTIDINE 20 MG: 10 INJECTION, SOLUTION INTRAVENOUS at 21:58

## 2017-09-13 RX ADMIN — HYDRALAZINE HYDROCHLORIDE 10 MG: 20 INJECTION INTRAMUSCULAR; INTRAVENOUS at 12:08

## 2017-09-13 RX ADMIN — Medication 10 ML: at 21:59

## 2017-09-13 RX ADMIN — SODIUM CHLORIDE, PRESERVATIVE FREE 900 UNITS: 5 INJECTION INTRAVENOUS at 21:59

## 2017-09-13 RX ADMIN — ACETAMINOPHEN 500 MG: 500 TABLET, FILM COATED ORAL at 18:29

## 2017-09-13 RX ADMIN — FAMOTIDINE 20 MG: 10 INJECTION, SOLUTION INTRAVENOUS at 08:28

## 2017-09-13 RX ADMIN — SODIUM CHLORIDE, PRESERVATIVE FREE 900 UNITS: 5 INJECTION INTRAVENOUS at 12:08

## 2017-09-13 RX ADMIN — HYDRALAZINE HYDROCHLORIDE 10 MG: 20 INJECTION INTRAMUSCULAR; INTRAVENOUS at 05:46

## 2017-09-13 RX ADMIN — HYDRALAZINE HYDROCHLORIDE 10 MG: 20 INJECTION INTRAMUSCULAR; INTRAVENOUS at 17:08

## 2017-09-13 NOTE — PROGRESS NOTES
Hospitalist service will assume primary care of patient tomorrow morning, 9/14.  Informed caller with consult that all transfers of care need to be a provider to provider conversation from now on.    Zelalem Azul MD

## 2017-09-13 NOTE — PROGRESS NOTES
Pt's  called back. He states he will try to bring complete PTA medication list when he comes tonight. Notified him of transfer and new room.

## 2017-09-13 NOTE — CONSULTS
PM&R Rehab Consult    Subjective:     Date of Consultation:  September 13, 2017    Referring Physician: Dr. Juanita Barney    Patient is a 60 y.o. female who is being seen for rehab recommendations s/p L. BG ICH due to hypertensive emergency with right hemiplegia, aphasia, and dysphagia    Principal Problem:    Stroke, hemorrhagic ([facility] (9/7/2017)    Active Problems: At risk for aspiration (9/7/2017)      Acute spontaneous intraventricular hemorrhage assoc w/ hypertension ([facility] (9/7/2017)      Hypertensive urgency, malignant (9/11/2017)    HPI; Mrs. Alka Garcia is a previously functionally independent right handed 58yo WF with a PMH of anxiety/depression, HTN and ADD who presented to the ED on 9/7 with an acute onset of Right hemiplegia, right facial weakness and difficulty speaking. She apparently called out for help and her  was, fortunately, at home. EMS was called. Pt had noted malignant HTN noted upon presentation with above mentioned symptoms. She had agitation and vomiting as well. Her BP was 208/105. GCS was not documented. In ER, received labetalol 60mg, propofol 50mg, ativan 4mg, and succinylcholine for intubation. She received 1g of keppra as well for seizure-like activity. CT of head revealed left basal ganglia ICH measured 3 x 2cm with spread to L lateral ventricle and 3rd/4th ventricle with some mild mildline shift. Teleneurology was consulted and recommended ICU admission, SBP goal of 160, tight glucose control and DVT ppx. Dr. Bravo Rosas was consulted and did not think surgery would benefit patient, also recommended BP control. She was monitored closely from admission in the ICU and had been on a cardene gtt. She was successfully extubated on 9/9 and off cardene. On 9/11, pt found by nursing to be more lethargic with right gaze preference and was hypertensive. Her cardene was restarted and a STAT HCT was ordered.   It showed a stable presumed hypertensive hemorrhage in the left basal ganglia with intraventricular extension. By ,she was off cardene but still requiring IV Labetolol at times for HTN. She has been documented to be more alert and interactive with staff. She was eval by ST and is high risk for aspiration, thus she is NPO and tolerating tube feeds via a NGT. She has been seen by both PT and OT on a few occasions and is max to total assist for all care. Past Medical History:   Diagnosis Date    Anxiety     Depression     HTN (hypertension)     Menopause       Family History   Problem Relation Age of Onset    Alcohol abuse Mother     Cancer Mother      lung cancer    Cancer Father      throat cancer    Psychiatric Disorder Father      anxiety, depression    No Known Problems Sister       Social History   Substance Use Topics    Smoking status: Former Smoker    Smokeless tobacco: Never Used    Alcohol use No   Home Environment: Private residence  # Steps to Enter: 4  One/Two Story Residence: Two story, live on 1st floor  # of Interior Steps: 13  Height of Each Step (in): 6 inches  Interior Rails: Right  Lift Chair Available: No  Living Alone: No  Support Systems: Spouse/Significant Other/Partner  Patient Expects to be Discharged to[de-identified] Private residence  Current DME Used/Available at Home: None  Tub or Shower Type: Tub/Shower combination  Past Surgical History:   Procedure Laterality Date    HX  SECTION      x 3    HX OTHER SURGICAL  2012    Face lift    HX REFRACTIVE SURGERY  2006    [facility]X [facility]      Prior to Admission medications    Medication Sig Start Date End Date Taking? Authorizing Provider   CYANOCOBALAMIN, VITAMIN B-12, (VITAMIN B-12 PO) Take  by mouth. Historical Provider   losartan-hydroCHLOROthiazide (HYZAAR) 100-12.5 mg per tablet Take 1 Tab by mouth daily. 17   Lauralyn Spurling., MD   estradiol (VAGIFEM) 10 mcg tab vaginal tablet Insert 1 Tab into vagina every Monday and Thursday.  17   Lauralyn Spurling., MD   estradiol (ESTRACE) 1 mg tablet Take 1 Tab by mouth daily. 17   Alex Floyd MD   ALPRAZolam Yamileth Nest) 0.25 mg tablet Take 1 Tab by mouth three (3) times daily as needed for Anxiety. Max Daily Amount: 0.75 mg. 17   Alex Floyd MD   amLODIPine (NORVASC) 5 mg tablet Take 1 Tab by mouth daily. 17   Alex Floyd MD   magnesium 250 mg tab Take  by mouth. Historical Provider   FLUoxetine (PROZAC) 40 mg capsule  10/21/15   Historical Provider   FLUoxetine (PROZAC) 20 mg capsule  9/23/15   Historical Provider   methylphenidate (RITALIN) 20 mg tablet Take 20 mg by mouth daily. 10/21/15   Historical Provider   meloxicam (MOBIC) 15 mg tablet Take 1 Tab by mouth daily. 10/23/15   Alex Floyd MD     Allergies   Allergen Reactions    Banana Shortness of Breath    Lisinopril Cough    Nuts [Tree Nut] Unknown (comments)        Review of Systems:  Review of systems not obtained due to patient factors. Objective:     Vitals:  Blood pressure 166/74, pulse 95, temperature 97.8 °F (36.6 °C), resp. rate 21, height 4' 10\" (1.473 m), weight 126 lb 8.7 oz (57.4 kg), SpO2 94 %. Temp (24hrs), Av.4 °F (36.9 °C), Min:97.8 °F (36.6 °C), Max:98.9 °F (37.2 °C)      Intake and Output:   1901 -  0700  In: 4136.5 [I.V.:582.5]  Out: 6618 [Urine:2850]    Physical Exam:  General:  Alert, attentive to examiner. Whispers y/n to questions with approx 80% accuracy   Lungs:   Clear to auscultation bilaterally. Heart:  Regular rate and rhythm, S1, S2 stable, no murmur, click, rub or gallop. Abdomen:   Soft, non-tender. Bowel sounds present. No masses,  No organomegaly. Genitourinary: stafford   Neuro Muscular: PERRLA, does not track past midline with tracking to the right  Flaccid RUE with decreased sensation and proprioception  Severe right neglect and visual field cut  RLE 3/5 adduction, otherwise 0/5. No clonus. slt inc tone with PROM, decreased sensation RLE  L sided strength is wnl.  Attends to left well. Follows one step commands consistently for me  Whispers. Right facial droop noted  Cannot participate in cerebellar testing   Skin:  No rashes, lesions, or signs/symptoms or infection. No calf swelling. No peripheral edema         Labs/Studies:Recent Results (from the past 72 hour(s))   METABOLIC PANEL, BASIC    Collection Time: 09/11/17  4:05 AM   Result Value Ref Range    Sodium 143 136 - 145 mmol/L    Potassium 3.2 (L) 3.5 - 5.1 mmol/L    Chloride 105 98 - 107 mmol/L    CO2 29 21 - 32 mmol/L    Anion gap 9 7 - 16 mmol/L    Glucose 116 (H) 65 - 100 mg/dL    BUN 26 (H) 8 - 23 MG/DL    Creatinine 0.53 (L) 0.6 - 1.0 MG/DL    GFR est AA >60 >60 ml/min/1.73m2    GFR est non-AA >60 >60 ml/min/1.73m2    Calcium 7.9 (L) 8.3 - 10.4 MG/DL   CBC WITH AUTOMATED DIFF    Collection Time: 09/11/17  4:05 AM   Result Value Ref Range    WBC 13.4 (H) 4.3 - 11.1 K/uL    RBC 3.71 (L) 4.05 - 5.25 M/uL    HGB 10.5 (L) 11.7 - 15.4 g/dL    HCT 30.7 (L) 35.8 - 46.3 %    MCV 82.7 79.6 - 97.8 FL    MCH 28.3 26.1 - 32.9 PG    MCHC 34.2 31.4 - 35.0 g/dL    RDW 13.7 11.9 - 14.6 %    PLATELET 235 227 - 471 K/uL    MPV 9.3 (L) 10.8 - 14.1 FL    DF AUTOMATED      NEUTROPHILS 76 43 - 78 %    LYMPHOCYTES 11 (L) 13 - 44 %    MONOCYTES 13 (H) 4.0 - 12.0 %    EOSINOPHILS 0 (L) 0.5 - 7.8 %    BASOPHILS 0 0.0 - 2.0 %    IMMATURE GRANULOCYTES 0.4 0.0 - 5.0 %    ABS. NEUTROPHILS 10.1 (H) 1.7 - 8.2 K/UL    ABS. LYMPHOCYTES 1.5 0.5 - 4.6 K/UL    ABS. MONOCYTES 1.7 (H) 0.1 - 1.3 K/UL    ABS. EOSINOPHILS 0.0 0.0 - 0.8 K/UL    ABS. BASOPHILS 0.0 0.0 - 0.2 K/UL    ABS. IMM.  GRANS. 0.1 0.0 - 0.5 K/UL   PLEASE READ & DOCUMENT PPD TEST IN 24 HRS    Collection Time: 09/12/17  9:30 PM   Result Value Ref Range    PPD negative Negative    mm Induration 0 mm         Functional Assessment:  Functional Assessment  Fall in Past 12 Months: No  Decline in Gait/Transfer/Balance: No  Decline in Capacity to Feed/Dress/Bathe: No  Developmental Delay: No  Chewing/Swallowing Problems: No  Difficulty with Secretions: No  Speech Slurred/Thick/Garbled: No        Aspiration Signs/Symptoms: None  NPO           Ambulation:  Activity and Safety  Activity Level: Bed Rest  Ambulate: No (Comment)  Activity: In bed, Family/Visitors present  Activity Assistance: Partial (two people)  Weight Bearing Status: NWB (Non Weight Bearing)  NWB (Non Weight Bearing extremities: RLE (Right Lower Extremity), RUE (Right Upper Extremities)  Mode of Transportation: Stretcher, Oxygen, IV equipment  Repositioned: Head of bed elevated (degrees), Extremity elevated, upper, Extremity elevated, lower  Patient Turned: Turn on right side  Head of Bed Elevated: HOB 30  Activity Response: Tolerated well  Safety Measures: Bed/Chair alarm on, Bed/Chair-Wheels locked, Bed in low position, Call light within reach, Emergency bedside equipment, Family at bedside, Gripper socks, Side rails X 3  Venipuncture/BP Precautions: Venipuncture/BP precautions LUE     Impression/Plan:     Principal Problem:    [facility] (9/7/2017)    Active Problems: At risk for aspiration (9/7/2017)      Acute spontaneous intraventricular hemorrhage assoc w/ hypertension ([facility] [address] [facility] (9/7/2017)      Hypertensive urgency, malignant (9/11/2017)    Left Basal Ganglia hypertensive ICH with Intraventricular Extension with profound deficits in a relatively young, but healthy, premorbidly independent and active woman with few medical issues premorbidly    Recommendations: Continue Acute Rehab Program  Coordination of rehab/medical care  Counseling of PM & R care issues management  Monitoring and management of medical conditions per plan of care/orders   -continue PT/OT/ST; ROM, stretching, motor reeducation, compensatory techniques  -no family at bedside.  Encouraged patient and explained path of improvements anticipated over next several weeks to months  -ICDs for DVT prev; no anticoag due to bleed  -will need MBS and may need continued alternative feeding methods. If pt fails MBS with profound issue; may need to consider PEG  -BP mgt  Will need IRC level of care when medically cleared. Will anticipate a 4-6wk rehab stay given extent of stroke. Will follow with you and meet up with family when available.     Discussion with patient/Staff  Reviewed Therapies/Labs/Meds/Records  Thank you  Signed By:  Shaquille Mendoza MD     September 13, 2017

## 2017-09-13 NOTE — PROGRESS NOTES
TRANSFER - IN REPORT:    Verbal report received from [address] (name) on Harrold Schaumann  being received from ICU (unit) for routine progression of care      Report consisted of patients Situation, Background, Assessment and   Recommendations(SBAR). Information from the following report(s) SBAR, Kardex, Intake/Output, MAR, Recent Results and Med Rec Status was reviewed with the receiving nurse. Opportunity for questions and clarification was provided. Assessment completed upon patients arrival to unit (room 723)  and care assumed.

## 2017-09-13 NOTE — PROGRESS NOTES
Problem: Nutrition Deficit  Goal: *Optimize nutritional status  Nutrition F/U:  TF Management for [facility]. Assessment:  Weight 57.4 kg (ICU bed 9/11/17), edema - none reported. The patient was extubated on 9/9 but continues to rely on TF for 100% of nutrition needs d/t continued dysphagia. Plan for MBS tomorrow. Labs are remarkable for hypokalemia on 9/11, no recent labs. Abdomen noted as soft with active bowel sounds. No BMs recorded. Minimal GRVs recorded. Macronutrient Needs:  Estimated calorie needs - 9215-1556 laura/day (20-25 laura/kg/day)   Estimated protein needs - 49-62 gm pro/day (1.2-1.5 gm pro/kgIBW/day) (GFR >60 ml/min)  Max CHO/day - 193 gm CHO/day (55% laura/day)   Fluid/day - 1.2-1.5 liters/day (1 ml/laura/day)  Intake/Comparative Standards: Current intake of TF (Jevity 1.2 @ 45 ml/hr with a 30 ml/hr water flush) provides 1296 calories/day (100% calorie goal), 60 grams protein/day (100% protein goal), 183 grams CHO/day (does not exceed max CHO limit) and 1595 ml water/day (>100% fluid goal). Intervention:   Meals and Snacks: NPO. Enteral Nutrition: Continue current TF. Mineral Supplement Therapy: Nutrition Support Orders/Electrolyte Management Replacement Protocols are active on the MAR. Nursing miscellaneous order to check for impaction. Will order dulcolax suppository PRN. Coordination of Nutrition Care by a Nutrition Professional: AM ICU rounds  Nutrition Discharge Plan: Too soon to determine. May require PEG depending on results of MBS tomorrow.       Elsa Marks [address], [address] [place] [address], [address] [place] [address], [phone]

## 2017-09-13 NOTE — PROGRESS NOTES
Chart reviewed. Pt already referred to [facility] and Dr. Viraj Calero seen pt. Message left with [facility], liaison to see if accepted. PT/OT/Speech seeing pt and pt is NPO at present. Will discuss d/c POC with family once CM hears from [facility]. CM to follow and assist with d/c POC. Return call from [place]. States that pt is appropriate for [facility] when medically stable.

## 2017-09-13 NOTE — PROGRESS NOTES
Spoke to Dr. Elly Corado concerning pt BP. MD advised to give pt labetalol early if needed. Will continue to monitor.

## 2017-09-13 NOTE — PROGRESS NOTES
Problem: Mobility Impaired (Adult and Pediatric)  Goal: *Acute Goals and Plan of Care (Insert Text)    Updated Goals 9/11/2017   STG:  (1.)Patient will roll side to side in bed with  MAXIMAL ASSIST within 3-5 day(s). Met 9/13/2017   (2.)Patient will move from supine to sit and sit to supine  in bed with  MAXIMAL ASSIST within 3-5 day(s). Met 9/13/2017   (3.)Patient will sit edge of bed with min to mod ASSIST with midline orientation for 15 minutes within 3-5 day(s). Met 9/13/2017   4. Patient will perform 1 sets of 10 repetitions of active range of motion exercises for left lower extremity(s) with  cueing within 3-5 day(s). Met 9/13/2017   5. Patient will tolerate sitting with bed in chair position for 1 hour 3x/day to facilitate tolerance to sitting in chair within 3-5 days. Met 9/13/2017   Modified 9/13/2017 LTG:  (1.)Patient will move from supine to sit and sit to supine  and roll side to side in bed with  MODERATE ASSIST  within 7-10 day(s). (2.)Patient will transfer from bed to chair and chair to bed with  MODERATE ASSIST using the least restrictive device within 7-10 day(s). (3.)Patient will sit with  Good balance statically and fair dynamically for 15 minutes  within 7-10 day(s). 4.  Patient will exhibit 2/5 strength R LE to facilitate increased independence with bed mobility within 7-10 days. 5.  Family will demonstrate R ankle stretching and verbalize correct positioning and how often to change position within 7 days.   ________________________________________________________________________________________________      PHYSICAL THERAPY: Daily Note, Treatment Day: 2nd and AM 9/13/2017  INPATIENT: Hospital Day: 8  Payor: [employer] / Plan: Yo Arredondo PPO / Product Type: PPO /      NAME/AGE/GENDER: Harlan Walker is a 60 y.o. female           PRIMARY DIAGNOSIS: Stroke, hemorrhagic ([address]  Hemorrhagic stroke ([address] Stroke, hemorrhagic ([address] Stroke, hemorrhagic ([address]        ICD-10: Treatment Diagnosis:       · Generalized Muscle Weakness (M62.81)  · Difficulty in walking, Not elsewhere classified (R26.2)   Precaution/Allergies:  Banana; Lisinopril; and Nuts Ken Broaden nut]       ASSESSMENT:      Ms. Mitzy Cruz presents supine in ICU with daughter Garrett Ashby present. Patient  did attempt speaking several times today, but speech very difficult to understand. Worked on exercises in supine. Worked on transitioning to sit, required min to mod assist to roll and max assist to bring LE's off EOB and push into sitting. Worked on sitting balance on edge of bed. Patient with trunk lean right, with cueing able to maintain midline propping with L UE for ~20'. Worked on head rotation while sitting-patient able to turn further Kari L>R. Worked on patient attending to right side. Worked on sit to stand 3x, required assist to place R foot flat on floor due to ankle plantarflexed, knees appear to hyperextend in standing but difficult to assess while holding patient up. Standing balance poor. Patient transferred to chair with max assist, then scooted posteriorly in chair with mod to max assist.   Patient with much improved sitting balance today!! Also improved transfers. Able to follow commands slightly better. Pt would benefit from further PT while in house to address her impairments to help improve to prior level of independence. Anticipate pt will require continued skilled PT following discharge from hospital due to poor balance, safety and fall risk. This section established at most recent assessment   PROBLEM LIST (Impairments causing functional limitations):  1. Decreased Strength  2. Decreased ADL/Functional Activities  3. Decreased Transfer Abilities  4. Decreased Ambulation Ability/Technique  5. Decreased Balance  6. Decreased Cognition    INTERVENTIONS PLANNED: (Benefits and precautions of physical therapy have been discussed with the patient.)  1. Balance Exercise  2. Bed Mobility  3.  Gait Training  4. Neuromuscular Re-education/Strengthening  5. Therapeutic Activites  6. Therapeutic Exercise/Strengthening  7. Transfer Training  8. Group Therapy      TREATMENT PLAN: Frequency/Duration: 3 times a week for duration of hospital stay  Rehabilitation Potential For Stated Goals:good      RECOMMENDED REHABILITATION/EQUIPMENT: (at time of discharge pending progress): Due to the probability of continued deficits (see above) this patient will likely need continued skilled physical therapy after discharge. Equipment:   · to be determined pending discharge plans. HISTORY:   History of Present Injury/Illness (Reason for Referral):  70-year-old female brought in as a stroke alert by EMS.  states that she was in a downstairs bedroom when she called out. Last known normal was at 2230. He found her confused with a left-sided facial droop as well as right upper and lower extremity paralysis. EMS found her with similar symptoms and she  became somewhat more was unresponsive. ruddy arrival to the emergency department the patient began moving her extremities more freely but was not able to communicate clearly. Urgent CT scan was performed which demonstrated intracranial hemorrhage. Patient emergently intubated for airway protection. Patient given labetalol, propofol, Ativan for sedation and blood pressure control. We eventually achieved adequate sedation and blood pressure control. Family updated. Discussed admission with the intensivist.  My colleague spoke with Dr. Karon Jon of neurosurgery he reported that this bleed was nonsurgical in nature and that medical management would be needed.  states only medication she takes is Premarin and lisinopril. No blood thinners. Past Medical History/Comorbidities:   Ms. Marcella Cabello  has a past medical history of Anxiety; Depression; HTN (hypertension); and Menopause.   Ms. Marcella Cabello  has a past surgical history that includes jennifer and bso ();  section; refractive surgery (2006); and other surgical (2012). Social History/Living Environment:   Home Environment: Private residence  # Steps to Enter: 4  One/Two Story Residence: Two story, live on 1st floor  # of Interior Steps: 13  Height of Each Step (in): 6 inches  Interior Rails: Right  Lift Chair Available: No  Living Alone: No  Support Systems: Spouse/Significant Other/Partner  Patient Expects to be Discharged to[de-identified] Private residence  Current DME Used/Available at Home: None  Tub or Shower Type: Tub/Shower combination  Prior Level of Function/Work/Activity:  Completely independent. No DMe used      Number of Personal Factors/Comorbidities that affect the Plan of Care: 1-2: MODERATE COMPLEXITY   EXAMINATION:   Most Recent Physical Functioning:   Gross Assessment:  AROM: Generally decreased, functional (L LE)  PROM: Generally decreased, functional (R LE except ankle dorsiflexion < neutral)  Strength: Grossly decreased, non-functional (no active movement R LE)  Coordination: Grossly decreased, non-functional  Tone: Abnormal               Posture:     Balance:  Sitting: Impaired  Sitting - Static: Prop sitting  Sitting - Dynamic: Prop sitting  Standing: Impaired  Standing - Static: Constant support  Standing - Dynamic : Poor Bed Mobility:  Rolling: Moderate assistance (to L; min assist to R)  Supine to Sit: Maximum assistance  Scooting: Maximum assistance (in short sitting)  Duration: 30 Minutes  Wheelchair Mobility:     Transfers:  Sit to Stand: Maximum assistance  Stand to Sit: Maximum assistance  Bed to Chair: Maximum assistance  Gait:             Body Structures Involved:  1. Nerves  2. Eyes and Ears  3. Bones  4. Joints  5. Muscles  6. Ligaments Body Functions Affected:  1. Mental  2. Sensory/Pain  3. Cardio  4. Neuromusculoskeletal  5. Movement Related Activities and Participation Affected:  1. Learning and Applying Knowledge  2. General Tasks and Demands  3. Communication  4. Mobility  5.  Self Care  6. Domestic Life  7. Community, Social and [place]   Number of elements that affect the Plan of Care: 3: MODERATE COMPLEXITY   CLINICAL PRESENTATION:   Presentation: Evolving clinical presentation with changing clinical characteristics: MODERATE COMPLEXITY   CLINICAL DECISION MAKIN [facility] Inpatient Short Form  How much difficulty does the patient currently have. .. Unable A Lot A Little None   1. Turning over in bed (including adjusting bedclothes, sheets and blankets)? [ ] 1   [X] 2   [ ] 3   [ ] 4   2. Sitting down on and standing up from a chair with arms ( e.g., wheelchair, bedside commode, etc.)   [ ] 1   [X] 2   [ ] 3   [ ] 4   3. Moving from lying on back to sitting on the side of the bed? [ ] 1   [X] 2   [ ] 3   [ ] 4   How much help from another person does the patient currently need. .. Total A Lot A Little None   4. Moving to and from a bed to a chair (including a wheelchair)? [ ] 1   [X] 2   [ ] 3   [ ] 4   5. Need to walk in hospital room? [X] 1   [ ] 2   [ ] 3   [ ] 4   6. Climbing 3-5 steps with a railing? [X] 1   [ ] 2   [ ] 3   [ ] 4   © , Trustees of [address] [place] [address], under license to [employer]. All rights reserved    Score:  Initial: 10 Most Recent: X (Date: -- )     Interpretation of Tool:  Represents activities that are increasingly more difficult (i.e. Bed mobility, Transfers, Gait).        Score 24 23 22-20 19-15 14-10 9-7 6       Modifier CH CI CJ CK CL CM CN         · Mobility - Walking and Moving Around:               - CURRENT STATUS:    CL - 60%-79% impaired, limited or restricted               - GOAL STATUS:           CL - 60%-79% impaired, limited or restricted               - D/C STATUS:                       ---------------To be determined---------------  Payor: [employer] / Plan: [employer] PPO / Product Type: PPO /       Medical Necessity:     · Skilled intervention continues to be required due to decreased function. Reason for Services/Other Comments:  · Patient continues to require skilled intervention due to medical complications and patient unable to attend/participate in therapy as expected. Use of outcome tool(s) and clinical judgement create a POC that gives a: Questionable prediction of patient's progress: MODERATE COMPLEXITY                 TREATMENT:   (In addition to Assessment/Re-Assessment sessions the following treatments were rendered)   Pre-treatment Symptoms/Complaints:  none  Pain: Initial:   Pain Intensity 1: 3  Pain Location 1: Back  Pain Intervention(s) 1: Repositioned  Post Session:  Unchanged. Therapeutic Exercise: (10 Minutes):  Exercises per grid below to improve mobility, strength and coordination. Required moderate verbal and tactile cues to promote proper body alignment and promote proper body posture. Progressed repetitions and complexity of movement as indicated. Johanny Dobbins DATE: 9/11/17 9/12/17 9/13/17      Straight leg raise         Hip abduct/ adduct         Heel slides  x5 AAL  x10 AL, MA      Hip external/ internal rotation         Ankle dorsiflexion/ plantarflexion  x20 AL,   x5 MA       Neck rotation x2 [place] x2 AB x5 AB      R heel cord stretch x5 P        Long arc quads  x20 AL,  x5 MA       Knee squeezes in hooklying   x10 AAB      bridging   x10 AAB        Key:  A=active, AA=active assisted, P=passive, B=bilaterally, R=right, L=left        Therapeutic Activity: (30 Minutes   ):  Therapeutic activities including bed mobility, sitting/standing balance activities, stand pivot transfer, and encouragement to attend to right side to improve mobility, strength, balance and coordination. Required maximal cueing     to promote static and dynamic balance in standing, promote coordination of bilateral, lower extremity(s) and promote motor control of bilateral, lower extremity(s).               Braces/Orthotics/Lines/Etc:   · IV, nasogastric tube and ICU monitors  Treatment/Session Assessment:    · Response to Treatment:  See above  · Interdisciplinary Collaboration:  · Physical Therapist, Speech Therapist and Registered Nurse  · After treatment position/precautions:  · Up in chair, Bed alarm/tab alert on, Bed/Chair-wheels locked, Call light within reach, RN notified and Family at bedside  · Compliance with Program/Exercises: Will assess as treatment progresses. · Recommendations/Intent for next treatment session: \"Next visit will focus on advancements to more challenging activities and reduction in assistance provided\".   Total Treatment Duration:  PT Patient Time In/Time Out  Time In: 1038  Time Out: Mercedes PT, DPT

## 2017-09-13 NOTE — INTERDISCIPLINARY ROUNDS
Interdisciplinary team rounds were held 9/13/2017 with the following team members:Nursing, Nurse Practitioner, Nutrition, Palliative Care, Pastoral Care, Patient Relations, Pharmacy, Physical Therapy, Physician, Respiratory Therapy, Wound Care and Clinical Coordinator and the patient. Plan of care discussed. See clinical pathway and/or care plan for interventions and desired outcomes.

## 2017-09-13 NOTE — PROGRESS NOTES
Pt with no UOP for approx 14 hours. Bladder scan showed >500ml urine. James placed in pt for retention.

## 2017-09-13 NOTE — PROGRESS NOTES
Hospitalist Progress Note    2017  Admit Date: 2017 11:37 PM   NAME: Carmelo Higginbotham   :  [birth year]   MRN:  [id number]   Attending: Mandi Parada DO  PCP:  Norberto Gillespie MD    SUBJECTIVE:   Patient 61WQ with pmhx of htn, anxiety presents with right sided weakness and facial droop. CT head confirms left basal ganglia ICH 3x2cm with midline shift. Seizure like activity noted - intubated and placed on Keppra. Subsequent EEG negative for seizures. Teleneurology/ neurosurgery has seen. Extubated on . Was placed on cardene gtt for tight BP control     - family at bedside this AM - all in good spirits. Patient sitting in bedside recliner. Has been off cardene since yesterday. Review of Systems negative with exception of pertinent positives noted above  PHYSICAL EXAM     Visit Vitals    /75 (BP 1 Location: Right arm, BP Patient Position: At rest)    Pulse 83    Temp 98 °F (36.7 °C)    Resp 24    Ht 4' 10\" (1.473 m)    Wt 57.4 kg (126 lb 8.7 oz)    SpO2 93%    BMI 26.45 kg/m2      Temp (24hrs), Av.1 °F (36.7 °C), Min:97.8 °F (36.6 °C), Max:98.3 °F (36.8 °C)    Oxygen Therapy  O2 Sat (%): 93 % (17 1813)  Pulse via Oximetry: 89 beats per minute (17 1137)  O2 Device: Room air (17 0730)  O2 Flow Rate (L/min): 2 l/min (17 0355)  FIO2 (%): 30 % (17 0812)    Intake/Output Summary (Last 24 hours) at 17  Last data filed at 17   Gross per 24 hour   Intake             2566 ml   Output             1825 ml   Net              741 ml      General: No acute distress    Lungs:  CTA Bilaterally. Heart:  Regular rate and rhythm,  No murmur, rub, or gallop  Abdomen: Soft, Non distended, Non tender, Positive bowel sounds  Extremities: No cyanosis, clubbing or edema  Neurologic:  Right facial droop. Right side flacid paralysis. Following commands for left side - 5/5 upper and lower ext.      ASSESSMENT      Active Hospital Problems Diagnosis Date Noted    Hypertensive urgency, malignant 09/11/2017    Stroke, hemorrhagic [address] 09/07/2017    At risk for aspiration 09/07/2017    Acute spontaneous intraventricular hemorrhage assoc w/ hypertension ([facility] 09/07/2017     A/P  - hemorrhagic CVA - right hemiparesis. Clinically improving. Pt/ot/speech following  - HTN - now consistently at goal of SBP <160 off cardene. Cont norvasc/hyzaar  - dysphagia - plans for modified barium swallow 9/14.  Cont NGT feeds/ free water flushes    Dispo - place PPD and consult CM for tomorrow    DVT Prophylaxis: scds    Signed By: Aly Benitez DO     September 13, 2017

## 2017-09-13 NOTE — PROGRESS NOTES
Dual Skin Assessment    Skin assessment completed with Bridger Fuller RN. See below. Pt's back and buttocks intact. Pt with NGT in place. Pt has bruise to R hip. Otherwise skin intact.           Kimberley Castellanos RN    9/13/2017 7:52 PM

## 2017-09-13 NOTE — PROGRESS NOTES
Attempted  to complete PTA medication list verification. Called both pharmacies listed. Unable to confirm complete list from both. Prescriptions from [facility] have not been filled since may 2017. Some prescriptions recently filled at the [facility] pharmacy. Attempted to call pt's  but no answer. Will attempt to complete as soon as possible.

## 2017-09-13 NOTE — PROGRESS NOTES
Problem: Dysphagia (Adult)  Goal: *Acute Goals and Plan of Care (Insert Text)  ST. Pt. Will tolerate PO trials with SLP only with no overt s/sx of aspiration/penetration. 2. Pt. Will participate in modified barium swallow with 100% participation to fully evaluate swallow function. LTG: Pt. Will tolerate least restrictive diet without respiratory decline. SPEECH LANGUAGE PATHOLOGY: BEDSIDE SWALLOW NOTE: Daily Note 3       NAME/AGE/GENDER: Sarah Velazquez is a 60 y.o. female  DATE: 2017  PRIMARY DIAGNOSIS: Stroke, hemorrhagic ([facility] [address]  Hemorrhagic stroke ([facility] [address]       ICD-10: Treatment Diagnosis: R13.12 Dysphagia, Oropharyngeal Phase  INTERDISCIPLINARY COLLABORATION: Registered Nurse  PRECAUTIONS/ALLERGIES: Banana; Lisinopril; and Nuts [tree nut]   ASSESSMENT:   Patient seen for po trials this AM. Minimal verbalizations and low volume noted. Moderate right sided facial and labial weakness persists. She was presented with thin via tsp and cup, nectar via tsp and cup sip, and honey via cup. Impaired bolus acceptance with poor coordination and moderate bolus loss on right side. Limited awareness of bolus loss during attempts of self-feeding nectar as patient impulsively took consecutive sips by cup and subsequently poured 1/2 cup of liquid down front of gown. Immediate strong cough with thin liquids. Delayed cough and throat clear following nectar. Improved tolerance with honey and puree trials with single incident of throat clearing occurred following honey by cup with controlled bolus size. Mild-moderate oral residue observed following puree which cleared with honey sips. Recommend continue NPO at this time. Plan for modified barium swallow study on 17 to objectively assess swallow function. Patient will benefit from skilled intervention to address the below impairments. ?????? ? ? This section established at most recent assessment??????????  PROBLEM LIST (Impairments causing functional limitations):  1. Risk for aspiration  2. CVA  REHABILITATION POTENTIAL FOR STATED GOALS: GOOD      PLAN OF CARE:   Patient will benefit from skilled intervention to address the following impairments. RECOMMENDATIONS AND PLANNED INTERVENTIONS (Benefits and precautions of therapy have been discussed with the patient.):  · NPO with alternative means of nutrition  MEDICATIONS:  · Non-oral  COMPENSATORY STRATEGIES/MODIFICATIONS INCLUDING:  · None  OTHER RECOMMENDATIONS (including follow up treatment recommendations): · Family training/education  · Patient education  RECOMMENDED DIET MODIFICATIONS DISCUSSED WITH:  · Nursing  · Family  · Patient  FREQUENCY/DURATION: Continue to follow patient 3 times a week as able or until goals met. RECOMMENDED REHABILITATION/EQUIPMENT: (at time of discharge pending progress): Due to the probability of continued deficits (see above) this patient will likely need continued skilled speech therapy after discharge. SUBJECTIVE:   Cooperative. Poor awareness into deficits. History of Present Injury/Illness: Ms. Marcella Cabello  has a past medical history of Anxiety; Depression; HTN (hypertension); and Menopause. .  She also  has a past surgical history that includes jennifer and bso ();  section; refractive surgery (); and other surgical (). Present Symptoms:    Pain Intensity 1: 0  Pain Location 1: Generalized  Pain Intervention(s) 1: Medication (see MAR)  Current Medications:   No current facility-administered medications on file prior to encounter. Current Outpatient Prescriptions on File Prior to Encounter   Medication Sig Dispense Refill    CYANOCOBALAMIN, VITAMIN B-12, (VITAMIN B-12 PO) Take  by mouth.  losartan-hydroCHLOROthiazide (HYZAAR) 100-12.5 mg per tablet Take 1 Tab by mouth daily. 30 Tab 5    estradiol (VAGIFEM) 10 mcg tab vaginal tablet Insert 1 Tab into vagina every Monday and Thursday.  30 Tab 5    estradiol (ESTRACE) 1 mg tablet Take 1 Tab by mouth daily. 30 Tab 5    ALPRAZolam (XANAX) 0.25 mg tablet Take 1 Tab by mouth three (3) times daily as needed for Anxiety. Max Daily Amount: 0.75 mg. 30 Tab 1    amLODIPine (NORVASC) 5 mg tablet Take 1 Tab by mouth daily. 30 Tab 1    magnesium 250 mg tab Take  by mouth.  FLUoxetine (PROZAC) 40 mg capsule       FLUoxetine (PROZAC) 20 mg capsule       methylphenidate (RITALIN) 20 mg tablet Take 20 mg by mouth daily.  meloxicam (MOBIC) 15 mg tablet Take 1 Tab by mouth daily. 30 Tab 2     Current Dietary Status:  NPO with NG tube                 History of reflux:  YES   · Reflux medication:pepcid  Social History/Home Situation:    Home Environment: Private residence  # Steps to Enter: 4  One/Two Story Residence: Two story, live on 1st floor  # of Interior Steps: 13  Height of Each Step (in): 6 inches  Interior Rails: Right  Lift Chair Available: No  Living Alone: No  Support Systems: Spouse/Significant Other/Partner  Patient Expects to be Discharged to[de-identified] Private residence  Current DME Used/Available at Home: None  Tub or Shower Type: Tub/Shower combination      OBJECTIVE:   Respiratory Status:        CXR Results:LLL atelectasis/pneumonia  CT Results:IMPRESSION:     Acute hemorrhage in the left basal ganglia, with intraventricular extension. No  hydrocephalus at this time. No significant change compared to prior CT. Oral Motor Structure/Speech:  Oral-Motor Structure/Motor Speech  Labial: Right droop, Decreased seal  Dentition: Intact  Oral Hygiene: Dry  Lingual: Decreased rate, Decreased strength, Incoordinated     Cognitive and Communication Status:  Neurologic State: Alert  Orientation Level: Oriented to person;Oriented to place  Cognition: Decreased attention/concentration; Impaired decision making;Decreased command following     OTHER OBSERVATIONS:  Rate/bite size: Impaired   Endurance:  Impaired      Comments:        Tool Used: Dysphagia Outcome and Severity Scale (SEPIDEH)     Score Comments   Normal Diet  [ ] 7 With no strategies or extra time needed   Functional Swallow  [ ] 6 May have mild oral or pharyngeal delay         Mild Dysphagia     [ ] 5 Which may require one diet consistency restricted (those who demonstrate penetration which is entirely cleared on MBS would be included)   Mild-Moderate Dysphagia  [ ] 4 With 1-2 diet consistencies restricted         Moderate Dysphagia  [ ] 3 With 2 or more diet consistencies restricted         Moderately Severe Dysphagia  [X] 2 With partial PO strategies (trials with ST only)         Severe Dysphagia  [ ] 1 With inability to tolerate any PO safely            Score:  Initial: 2 Most Recent: X (Date: -- )   Interpretation of Tool: The Dysphagia Outcome and Severity Scale (SEPIDEH) is a simple, easy-to-use, 7-point scale developed to systematically rate the functional severity of dysphagia based on objective assessment and make recommendations for diet level, independence level, and type of nutrition. Score 7 6 5 4 3 2 1   Modifier CH CI CJ CK CL CM CN   · Swallowing:               - CURRENT STATUS:           CM - 80%-99% impaired, limited or restricted               - GOAL STATUS:                   CI - 1%-19% impaired, limited or restricted               - D/C STATUS:                       ---------------To be determined---------------  Payor: YOLA / Plan: Carmelita Castillo PPO / Product Type: PPO /       TREATMENT:               (In addition to Assessment/Re-Assessment sessions the following treatments were rendered)  Dysphagia Activities: Activities/Procedures listed utilized to improve progress in swallow function. Required moderate cueing to decrease aspiration risk.     MODALITIES:          ORAL MOTOR  EXERCISES:         LARYNGEAL / PHARYNGEAL EXERCISES:         __________________________________________________________________________________________________  Safety:   After treatment position/precautions:  · Call light within reach and RN notified  Treatment Assessment:  Patient actively participated in evaluation with mod cues. Progression/Medical Necessity:   · Skilled intervention continues to be required due to medical complications. Compliance with Program/Exercises: Will assess as treatment progresses. Reason for Continuation of Services/Other Comments:  · Patient continues to require skilled intervention due to medical complications and patient unable to attend/participate in therapy as expected. Recommendations/Intent for next treatment session: \"Treatment next visit will focus on advancements to more challenging activities and PO trials with SLP\". Determination of appropriateness for Modified barium swallow.   Total Treatment Duration:Time In: 7539  Time Out: 3760      Paolo Kearney, ABIMAEL, CCC-SLP, CBIS

## 2017-09-13 NOTE — PROGRESS NOTES
TRANSFER - OUT REPORT:    Verbal report given to Serafin bourgeois) on Radha Bales  being transferred to 7th floor (unit) for routine progression of care       Report consisted of patients Situation, Background, Assessment and   Recommendations(SBAR). Information from the following report(s) Kardex, Intake/Output, MAR, Recent Results and Cardiac Rhythm NSR was reviewed with the receiving nurse. Lines:   PICC Triple Lumen 87/22/53 Left;Basilic (Active)   Central Line Being Utilized Yes 9/13/2017  8:29 AM   Criteria for Appropriate Use Hemodynamically unstable, requiring monitoring lines, vasopressors, or volume resuscitation 9/13/2017  8:29 AM   Site Assessment Clean, dry, & intact 9/13/2017  8:29 AM   Phlebitis Assessment 0 9/13/2017  8:29 AM   Infiltration Assessment 0 9/13/2017  8:29 AM   Arm Circumference (cm) 26 cm 9/7/2017 11:17 AM   Date of Last Dressing Change 09/12/17 9/13/2017  8:29 AM   Dressing Status Clean, dry, & intact 9/13/2017  8:29 AM   External Catheter Length (cm) 0 centimeters 9/7/2017 11:17 AM   Dressing Type Transparent;Tape;Disk with Chlorhexadine gluconate (CHG) 9/12/2017  7:59 PM   Action Taken Blood drawn 9/11/2017  3:55 AM   Hub Color/Line Status Flushed 9/13/2017  8:29 AM   Positive Blood Return (Site #1) Yes 9/12/2017 10:44 PM   Hub Color/Line Status Flushed 9/13/2017  8:29 AM   Positive Blood Return (Site #2) Yes 9/12/2017 10:44 PM   Hub Color/Line Status Flushed 9/13/2017  8:29 AM   Positive Blood Return (Site #3) Yes 9/12/2017 10:44 PM   Alcohol Cap Used No 9/12/2017  7:30 AM        Opportunity for questions and clarification was provided.

## 2017-09-13 NOTE — PROGRESS NOTES
Spiritual Care visit. Follow up visit. A family member was with patient, and talking an animated conversation. Patient was awake, alert, sitting up, and talking.  affirmed her progress, and prayed for her recovery and health.     Visit by Mariama Downs M.Ed., Th.B. ,Staff

## 2017-09-13 NOTE — PROGRESS NOTES
Problem: Falls - Risk of  Goal: *Absence of Falls  Document Haley Fall Risk and appropriate interventions in the flowsheet.    Outcome: Progressing Towards Goal  Fall Risk Interventions:  Mobility Interventions: Communicate number of staff needed for ambulation/transfer, OT consult for ADLs, Patient to call before getting OOB, PT Consult for mobility concerns, PT Consult for assist device competence     Mentation Interventions: Bed/chair exit alarm, Door open when patient unattended, Evaluate medications/consider consulting pharmacy, Eyeglasses and hearing aids, Family/sitter at bedside, Increase mobility, More frequent rounding     Medication Interventions: Evaluate medications/consider consulting pharmacy, Patient to call before getting OOB     Elimination Interventions: Call light in reach, Patient to call for help with toileting needs

## 2017-09-14 ENCOUNTER — APPOINTMENT (OUTPATIENT)
Dept: GENERAL RADIOLOGY | Age: 62
DRG: 064 | End: 2017-09-14
Attending: INTERNAL MEDICINE
Payer: COMMERCIAL

## 2017-09-14 LAB
ANION GAP SERPL CALC-SCNC: 11 MMOL/L (ref 7–16)
BUN SERPL-MCNC: 31 MG/DL (ref 8–23)
CALCIUM SERPL-MCNC: 8.1 MG/DL (ref 8.3–10.4)
CHLORIDE SERPL-SCNC: 112 MMOL/L (ref 98–107)
CO2 SERPL-SCNC: 25 MMOL/L (ref 21–32)
CREAT SERPL-MCNC: 0.53 MG/DL (ref 0.6–1)
ERYTHROCYTE [DISTWIDTH] IN BLOOD BY AUTOMATED COUNT: 13.7 % (ref 11.9–14.6)
GLUCOSE SERPL-MCNC: 144 MG/DL (ref 65–100)
HCT VFR BLD AUTO: 32.6 % (ref 35.8–46.3)
HGB BLD-MCNC: 10.7 G/DL (ref 11.7–15.4)
MCH RBC QN AUTO: 27.9 PG (ref 26.1–32.9)
MCHC RBC AUTO-ENTMCNC: 32.8 G/DL (ref 31.4–35)
MCV RBC AUTO: 84.9 FL (ref 79.6–97.8)
PLATELET # BLD AUTO: 217 K/UL (ref 150–450)
PMV BLD AUTO: 9.8 FL (ref 10.8–14.1)
POTASSIUM SERPL-SCNC: 2.8 MMOL/L (ref 3.5–5.1)
RBC # BLD AUTO: 3.84 M/UL (ref 4.05–5.25)
SODIUM SERPL-SCNC: 148 MMOL/L (ref 136–145)
WBC # BLD AUTO: 14 K/UL (ref 4.3–11.1)

## 2017-09-14 PROCEDURE — 74011250637 HC RX REV CODE- 250/637: Performed by: NURSE PRACTITIONER

## 2017-09-14 PROCEDURE — 74011000255 HC RX REV CODE- 255: Performed by: INTERNAL MEDICINE

## 2017-09-14 PROCEDURE — 74011250636 HC RX REV CODE- 250/636: Performed by: INTERNAL MEDICINE

## 2017-09-14 PROCEDURE — 74011000250 HC RX REV CODE- 250: Performed by: INTERNAL MEDICINE

## 2017-09-14 PROCEDURE — 92611 MOTION FLUOROSCOPY/SWALLOW: CPT

## 2017-09-14 PROCEDURE — 74230 X-RAY XM SWLNG FUNCJ C+: CPT

## 2017-09-14 PROCEDURE — 80048 BASIC METABOLIC PNL TOTAL CA: CPT | Performed by: INTERNAL MEDICINE

## 2017-09-14 PROCEDURE — 97530 THERAPEUTIC ACTIVITIES: CPT

## 2017-09-14 PROCEDURE — 74011250636 HC RX REV CODE- 250/636: Performed by: HOSPITALIST

## 2017-09-14 PROCEDURE — 99232 SBSQ HOSP IP/OBS MODERATE 35: CPT | Performed by: PHYSICAL MEDICINE & REHABILITATION

## 2017-09-14 PROCEDURE — 74011250637 HC RX REV CODE- 250/637: Performed by: INTERNAL MEDICINE

## 2017-09-14 PROCEDURE — 65270000029 HC RM PRIVATE

## 2017-09-14 PROCEDURE — 85027 COMPLETE CBC AUTOMATED: CPT | Performed by: INTERNAL MEDICINE

## 2017-09-14 PROCEDURE — 74011250637 HC RX REV CODE- 250/637: Performed by: HOSPITALIST

## 2017-09-14 PROCEDURE — 36415 COLL VENOUS BLD VENIPUNCTURE: CPT | Performed by: INTERNAL MEDICINE

## 2017-09-14 RX ORDER — POTASSIUM CHLORIDE 20MEQ/15ML
40 LIQUID (ML) ORAL
Status: COMPLETED | OUTPATIENT
Start: 2017-09-14 | End: 2017-09-14

## 2017-09-14 RX ORDER — TRAMADOL HYDROCHLORIDE 50 MG/1
50 TABLET ORAL
Status: DISCONTINUED | OUTPATIENT
Start: 2017-09-14 | End: 2017-09-15 | Stop reason: HOSPADM

## 2017-09-14 RX ORDER — POTASSIUM CHLORIDE 20MEQ/15ML
20 LIQUID (ML) ORAL DAILY
Status: DISCONTINUED | OUTPATIENT
Start: 2017-09-14 | End: 2017-09-15 | Stop reason: HOSPADM

## 2017-09-14 RX ORDER — POTASSIUM CHLORIDE 14.9 MG/ML
20 INJECTION INTRAVENOUS
Status: COMPLETED | OUTPATIENT
Start: 2017-09-14 | End: 2017-09-14

## 2017-09-14 RX ADMIN — POTASSIUM CHLORIDE 20 MEQ: 14.9 INJECTION, SOLUTION INTRAVENOUS at 09:19

## 2017-09-14 RX ADMIN — TRAMADOL HYDROCHLORIDE 50 MG: 50 TABLET, FILM COATED ORAL at 09:23

## 2017-09-14 RX ADMIN — FAMOTIDINE 20 MG: 10 INJECTION, SOLUTION INTRAVENOUS at 09:23

## 2017-09-14 RX ADMIN — POTASSIUM CHLORIDE 20 MEQ: 20 SOLUTION ORAL at 09:24

## 2017-09-14 RX ADMIN — SODIUM CHLORIDE, PRESERVATIVE FREE 900 UNITS: 5 INJECTION INTRAVENOUS at 05:44

## 2017-09-14 RX ADMIN — LABETALOL HYDROCHLORIDE 200 MG: 200 TABLET, FILM COATED ORAL at 17:29

## 2017-09-14 RX ADMIN — Medication 30 ML: at 05:45

## 2017-09-14 RX ADMIN — BARIUM SULFATE 45 ML: 980 POWDER, FOR SUSPENSION ORAL at 10:26

## 2017-09-14 RX ADMIN — BARIUM SULFATE 15 ML: 400 PASTE ORAL at 10:27

## 2017-09-14 RX ADMIN — HYDROCHLOROTHIAZIDE: 12.5 CAPSULE ORAL at 09:23

## 2017-09-14 RX ADMIN — POTASSIUM CHLORIDE 20 MEQ: 14.9 INJECTION, SOLUTION INTRAVENOUS at 11:32

## 2017-09-14 RX ADMIN — SODIUM CHLORIDE, PRESERVATIVE FREE 900 UNITS: 5 INJECTION INTRAVENOUS at 15:00

## 2017-09-14 RX ADMIN — LABETALOL HYDROCHLORIDE 200 MG: 200 TABLET, FILM COATED ORAL at 00:28

## 2017-09-14 RX ADMIN — HYDRALAZINE HYDROCHLORIDE 10 MG: 20 INJECTION INTRAMUSCULAR; INTRAVENOUS at 11:31

## 2017-09-14 RX ADMIN — HYDRALAZINE HYDROCHLORIDE 10 MG: 20 INJECTION INTRAMUSCULAR; INTRAVENOUS at 00:25

## 2017-09-14 RX ADMIN — BARIUM SULFATE 30 ML: 400 SUSPENSION ORAL at 10:26

## 2017-09-14 RX ADMIN — HYDRALAZINE HYDROCHLORIDE 10 MG: 20 INJECTION INTRAMUSCULAR; INTRAVENOUS at 17:29

## 2017-09-14 RX ADMIN — FAMOTIDINE 20 MG: 10 INJECTION, SOLUTION INTRAVENOUS at 20:54

## 2017-09-14 RX ADMIN — TRAMADOL HYDROCHLORIDE 50 MG: 50 TABLET, FILM COATED ORAL at 17:29

## 2017-09-14 RX ADMIN — Medication 30 ML: at 11:32

## 2017-09-14 RX ADMIN — Medication 10 ML: at 05:45

## 2017-09-14 RX ADMIN — POTASSIUM CHLORIDE 40 MEQ: 20 SOLUTION ORAL at 09:23

## 2017-09-14 RX ADMIN — HYDRALAZINE HYDROCHLORIDE 10 MG: 20 INJECTION INTRAMUSCULAR; INTRAVENOUS at 05:43

## 2017-09-14 RX ADMIN — Medication 10 ML: at 20:55

## 2017-09-14 RX ADMIN — SODIUM CHLORIDE, PRESERVATIVE FREE 900 UNITS: 5 INJECTION INTRAVENOUS at 20:54

## 2017-09-14 RX ADMIN — LABETALOL HYDROCHLORIDE 200 MG: 200 TABLET, FILM COATED ORAL at 09:23

## 2017-09-14 RX ADMIN — AMLODIPINE BESYLATE 10 MG: 10 TABLET ORAL at 09:23

## 2017-09-14 RX ADMIN — Medication 30 ML: at 15:00

## 2017-09-14 NOTE — PROGRESS NOTES
Date of Outreach Update:  Awa Kay was seen and assessed. Previous Outreach assessment has been reviewed. There have been no significant clinical changes since the completion of the last dated Outreach assessment. Pt did speak much more than before, though it was slurred and she had some difficulty forming words. Her right foot was looking like it was having foot drop, so I stretched it some and put the Rooke boot on. Pt did not have feeling in her R arm and could not move it or her R leg. Will continue to follow up per outreach protocol.     Signed By:   Jesus Peck RN    September 14, 2017 6:11 AM

## 2017-09-14 NOTE — PROGRESS NOTES
[facility] CRITICAL CARE OUTREACH NURSE PROGRESS REPORT      SUBJECTIVE: Called to assess patient secondary to [employer] program protocol  . MEWS Score: 2 (09/13/17 2305)  Vitals:    09/13/17 1708 09/13/17 1813 09/13/17 2030 09/13/17 2305   BP: 165/87 127/75 135/73 165/80   Pulse:  83 80 89   Resp:  24 22 22   Temp:  98 °F (36.7 °C) 98.4 °F (36.9 °C) 99 °F (37.2 °C)   SpO2:  93% 93% 93%   Weight:       Height:               LAB DATA:    Recent Labs      09/11/17 0405   NA  143   K  3.2*   CL  105   CO2  29   AGAP  9   GLU  116*   BUN  26*   CREA  0.53*   GFRAA  >60   GFRNA  >60   CA  7.9*        Recent Labs      09/11/17 0405   WBC  13.4*   HGB  10.5*   HCT  30.7*   PLT  231          OBJECTIVE: On arrival to room, I found patient to be laying in bed. Pain Assessment  Pain Intensity 1: 0 (09/13/17 1958)  Pain Location 1: Generalized  Pain Intervention(s) 1: Medication (see MAR)  Patient Stated Pain Goal: 0                                 ASSESSMENT:  Pt alert,  no pain or distress noted. Lungs CTA. 02 sat 97% on room air. Says minimal words. BP is slightly elevated, but primary RN had already given BP meds. Continue to monitor this. PLAN:  Will continue to follow per outreach program protocol.      Jocelin Burris RN

## 2017-09-14 NOTE — PROGRESS NOTES
Pt's  Nate Session called and requested an update. After he provided this RN with pt's security code, this RN answered his questions and let him know the pt was resting quietly. Nate Session also gave this RN 3 medications to add to pt's home med list. List updated.

## 2017-09-14 NOTE — PROGRESS NOTES
Per ST, pt may be advanced to a pureed diet with nectar thick liquids. Dr. Anusha Vallejo notified and he stated that pt's NGT may be removed as well.

## 2017-09-14 NOTE — PROGRESS NOTES
Problem: Interdisciplinary Rounds  Goal: Interdisciplinary Rounds  Outcome: Progressing Towards Goal  Interdisciplinary team rounds were held 9/14/2017 with the following team members:Care Management, Physical Therapy, Physician and . Anticipate discharge to [facility] on Friday. Plan of care discussed. See clinical pathway and/or care plan for interventions and desired outcomes.

## 2017-09-14 NOTE — PROGRESS NOTES
OT Note:    OT attempted to see patient this afternoon for therapy. Pt had just returned to bed and was tired per sister. Pt is to be discharged to [facility] tomorrow. Will re-attempt to see patient if necessary.     Thanks,  Elliot Andrade

## 2017-09-14 NOTE — PROGRESS NOTES
PM&R Consult Progress Note      Patient: Elma Dickinson  Admit Date: 9/6/2017  Admit Diagnosis: Stroke, hemorrhagic ([facility] [address] Hemorrhagic stroke [facility])  Recommendations: Continue Acute Rehab Program, Coordination of rehab/medical care, Counseling of PM & R care issues management, Hospital Inpatient Rehab  - MBS this morning; Pureed with NTL. R/o aspir and dehydration. Strict aspiration precautions. Needs one on one with meals  -BP fairly well controlled with current medicine  -[facility] Friday pending insurance clearance. Low level now but I feel she has good potential to learn to compensate with ADLs using non dominant but functional arm. I believe it is unlikely that we will see much improvement in the RUE. Able to adduct RLE, which is a promising finding (usually the first muscle group to come back). Given age and premorbid functional independence, deserves acute inpt aggressive interdisciplinary team approach.  -has Rooke boot for R foot drop, likely needs more of a Rigid PRAFO  History/Subjective/Complaint:     Patient seen and examined. Records reviewed. Will say hello. Y/n responses.  Affect a bit brighter    Pain 1  Pain Scale 1: Visual (09/14/17 1035)  Pain Intensity 1: 0 (09/14/17 1035)  Patient Stated Pain Goal: 0 (09/13/17 1958)  Pain Reassessment 1: Patient sleeping (09/13/17 0929)  Pain Onset 1: now (09/13/17 1829)  Pain Location 1: Generalized (09/13/17 1829)  Pain Orientation 1: Anterior (09/13/17 1829)  Pain Description 1: Aching (09/13/17 1829)  Pain Intervention(s) 1: Medication (see MAR) (09/13/17 1829)     Objective:     Vitals:  Patient Vitals for the past 8 hrs:   BP Temp Pulse Resp SpO2   09/14/17 1119 136/82 99.1 °F (37.3 °C) 94 18 94 %   09/14/17 0737 160/81 98.8 °F (37.1 °C) 96 20 93 %      Intake and Output:  09/12 1901 - 09/14 0700  In: 2596   Out: 2500 [Urine:2500]    Allergies   Allergen Reactions    Banana Shortness of Breath    Lisinopril Cough    Nuts [Tree Nut] Unknown (comments) Current Facility-Administered Medications   Medication Dose Route Frequency    potassium chloride 20 mEq in 100 ml IVPB  20 mEq IntraVENous Q2H    traMADol (ULTRAM) tablet 50 mg  50 mg Oral Q6H PRN    potassium chloride (KAON 10%) 20 mEq/15 mL oral liquid 20 mEq  20 mEq Oral DAILY    bisacodyl (DULCOLAX) suppository 10 mg  10 mg Rectal DAILY PRN    amLODIPine (NORVASC) tablet 10 mg  10 mg Oral DAILY    labetalol (NORMODYNE) tablet 200 mg  200 mg Oral Q8H    lip protectant (BLISTEX) ointment   Topical PRN    losartan/hydroCHLOROthiazide (HYZAAR) 100/12.5 mg   Oral DAILY    famotidine (PF) (PEPCID) 20 mg in sodium chloride 0.9 % 10 mL injection  20 mg IntraVENous Q12H    hydrALAZINE (APRESOLINE) 20 mg/mL injection 10 mg  10 mg IntraVENous Q6H    niCARdipine (CARDENE) 25 mg in 0.9% sodium chloride 250 mL infusion  5-15 mg/hr IntraVENous TITRATE    sodium chloride (NS) flush 5-10 mL  5-10 mL IntraVENous Q8H    sodium chloride (NS) flush 5-10 mL  5-10 mL IntraVENous PRN    ondansetron (ZOFRAN) injection 4 mg  4 mg IntraVENous Q6H PRN    acetaminophen (TYLENOL) tablet 500 mg  500 mg Per NG tube Q4H PRN    sodium chloride (NS) flush 30 mL  30 mL InterCATHeter Q8H    heparin (porcine) pf 900 Units  900 Units InterCATHeter Q8H    sodium chloride (NS) flush 30 mL  30 mL InterCATHeter PRN    heparin (porcine) pf 900 Units  900 Units InterCATHeter PRN       Physical Exam:  No significant changes  Flaccid right hemiplegia. Slight catch RLE with PROM.  Expressive aphasia, with receptive component, right neglect      Functional Assessment:  Gross Assessment  AROM: Generally decreased, functional (L LE) (09/11/17 1325)  PROM: Generally decreased, functional (R LE except ankle dorsiflexion < neutral) (09/11/17 1325)  Strength: Grossly decreased, non-functional (no active movement R LE) (09/11/17 1325)  Coordination: Grossly decreased, non-functional (09/11/17 1325)  Tone: Abnormal (09/11/17 1325)  Sensation: Impaired (09/10/17 1000)           Bed Mobility  Rolling: Moderate assistance (to L; min assist to R) (09/13/17 1127)  Supine to Sit: Maximum assistance (09/13/17 1127)  Sit to Supine: Total assistance;Assist x2 (09/12/17 1232)  Scooting: Maximum assistance (in short sitting) (09/13/17 1127)     Balance  Sitting: Impaired (09/13/17 1127)  Sitting - Static: Prop sitting (09/13/17 1127)  Sitting - Dynamic: Prop sitting (09/13/17 1127)  Standing: Impaired (09/13/17 1127)  Standing - Static: Constant support (09/13/17 1127)  Standing - Dynamic : Poor (09/13/17 1127)       Bed/Mat Mobility  Rolling: Moderate assistance (to L; min assist to R) (09/13/17 1127)  Supine to Sit: Maximum assistance (09/13/17 1127)  Sit to Supine:  Total assistance;Assist x2 (09/12/17 1232)  Sit to Stand: Maximum assistance (09/13/17 1127)  Bed to Chair: Maximum assistance (09/13/17 1127)  Scooting: Maximum assistance (in short sitting) (09/13/17 1127)     Labs/Studies:  Recent Results (from the past 72 hour(s))   PLEASE READ & DOCUMENT PPD TEST IN 24 HRS    Collection Time: 09/12/17  9:30 PM   Result Value Ref Range    PPD negative Negative    mm Induration 0 mm   PLEASE READ & DOCUMENT PPD TEST IN 48 HRS    Collection Time: 09/13/17  9:21 PM   Result Value Ref Range    PPD neg Negative    mm Induration 0 mm   METABOLIC PANEL, BASIC    Collection Time: 09/14/17  4:35 AM   Result Value Ref Range    Sodium 148 (H) 136 - 145 mmol/L    Potassium 2.8 (LL) 3.5 - 5.1 mmol/L    Chloride 112 (H) 98 - 107 mmol/L    CO2 25 21 - 32 mmol/L    Anion gap 11 7 - 16 mmol/L    Glucose 144 (H) 65 - 100 mg/dL    BUN 31 (H) 8 - 23 MG/DL    Creatinine 0.53 (L) 0.6 - 1.0 MG/DL    GFR est AA >60 >60 ml/min/1.73m2    GFR est non-AA >60 >60 ml/min/1.73m2    Calcium 8.1 (L) 8.3 - 10.4 MG/DL   CBC W/O DIFF    Collection Time: 09/14/17  4:35 AM   Result Value Ref Range    WBC 14.0 (H) 4.3 - 11.1 K/uL    RBC 3.84 (L) 4.05 - 5.25 M/uL    HGB 10.7 (L) 11.7 - 15.4 g/dL    HCT 32.6 (L) 35.8 - 46.3 %    MCV 84.9 79.6 - 97.8 FL    MCH 27.9 26.1 - 32.9 PG    MCHC 32.8 31.4 - 35.0 g/dL    RDW 13.7 11.9 - 14.6 %    PLATELET 209 768 - 450 K/uL    MPV 9.8 (L) 10.8 - 14.1 FL        Assessment:     Principal Problem:    [facility] (9/7/2017)    Active Problems:     At risk for aspiration (9/7/2017)      Acute spontaneous intraventricular hemorrhage assoc w/ hypertension ([facility] [address] (9/7/2017)      Hypertensive urgency, malignant (9/11/2017)        Plan:     Recommendations: Continue Acute Rehab Program  Coordination of rehab/medical care  Counseling of PM & R care issues management  Monitoring and management of medical conditions per plan of care/orders  Discussion with Family/Caregiver/Staff  Reviewed Therapies/Labs/Medications/Records    Signed By:  Dong Gutierrez MD     September 14, 2017

## 2017-09-14 NOTE — PROGRESS NOTES
Problem: Dysphagia (Adult)  Goal: *Acute Goals and Plan of Care (Insert Text)  ST. Pt. Will consume pureed diet and nectar-thick liquids without overt signs or symptoms of aspiration 100% of the time. Goal updated 17  2. Pt. Will participate in modified barium swallow with 100% participation to fully evaluate swallow function. Goal met 17    LTG: Pt. Will tolerate least restrictive diet without respiratory decline. Speech language pathology: modified barium swallow study: Initial Assessment    NAME/AGE/GENDER: Sweetie Freedman is a 60 y.o. female  DATE: 2017  PRIMARY DIAGNOSIS: Stroke, hemorrhagic ([facility] [address]  Hemorrhagic stroke ([facility] [address]       ICD-10: Treatment Diagnosis: Oropharyngeal dysphagia (R13.12)  INTERDISCIPLINARY COLLABORATION: Radiologist, Dr. Darryl Rojas: Eryn Butter; Lisinopril; and Nuts [tree nut] ASSESSMENT/PLAN OF CARE:Based on the objective data described below, Ms. Albert Ibanez presents with premature spillage of all consistencies with swallows of thin and nectar triggered at pyriform sinuses and swallows of pudding and solids triggered at valleculae. Deep laryngeal penetration without aspiration observed with thin liquids. No laryngeal penetration or aspiration observed with nectar liquids by cup or straw, pudding, mixed consistency or cracker. Patient with significant right oral residue of solids. Recommend initiate pureed diet and nectar liquids. Crush meds in applesauce. Results/recommendations called to floor. Spoke with Clyde Sharif RN, who reported MD was with her and she would relay information. Patient will benefit from skilled intervention to address the below impairments. ?????? ? ? This section established at most recent assessment??????????  RECOMMENDATIONS AND PLANNED INTERVENTIONS (Benefits and precautions of therapy have been discussed with the patient.):  · PO:  Pureed  · Liquids:  nectar  MEDICATIONS:  · Crushed in puree  COMPENSATORY STRATEGIES/MODIFICATIONS INCLUDING:  · Upright for all PO  · Patient will need 1:1 assistance with all PO  OTHER RECOMMENDATIONS (including follow up treatment recommendations):   · Oral motor exercises  · Tongue based expressions  · Family training/education  · Laryngeal exercises  · Patient education  FREQUENCY/DURATION: Continue to follow patient 3 times a week for duration of hospital stay to address above goals. RECOMMENDED REHABILITATION/EQUIPMENT: (at time of discharge pending progress): Due to the probability of continued deficits (see above) this patient will likely need continued skilled speech therapy after discharge. SUBJECTIVE:   Patient pleasant and cooperative. History of Present Injury/Illness: Ms. Giana Santana  has a past medical history of Anxiety; Depression; HTN (hypertension); and Menopause. She also  has a past surgical history that includes jennifer and bso ();  section; refractive surgery (); and other surgical ().    Present Symptoms: CVA   Pain Intensity 1: 0  Pain Location 1: Generalized  Pain Orientation 1: Anterior  Pain Intervention(s) 1: Medication (see MAR)    Current Dietary Status:  NPO with NGT      Social History/Home Situation:    Home Environment: Private residence  # Steps to Enter: 4  One/Two Story Residence: Two story, live on 1st floor  # of Interior Steps: 13  Height of Each Step (in): 6 inches  Interior Rails: Right  Lift Chair Available: No  Living Alone: No  Support Systems: Spouse/Significant Other/Partner  Patient Expects to be Discharged to[de-identified] Private residence  Current DME Used/Available at Home: None  Tub or Shower Type: Tub/Shower combination  OBJECTIVE:     Cognitive/Communication Status:  Mental Status  Neurologic State: Alert  Orientation Level: Oriented to person  Cognition: Follows commands  Perception: Cues to attend to left side of body  Perseveration: No perseveration noted  Safety/Judgement: Decreased insight into deficits, Awareness of environment    Oral Assessment:  Oral Assessment  Labial: Decreased seal, Impaired coordination, Right droop  Dentition: Intact  Oral Hygiene: Dry  Lingual: Decreased rate, Decreased strength, Incoordinated  Velum: Unable to visualize    Vocal Quality: limited verbalizations    Patient Viewed:    Film Views: Lateral, Fluoro    Oral Prepatory:  The patient was given the following: Consistency Presented:  Thin liquid, Nectar thick liquid, Pudding, Mixed consistency, Solid  How Presented: Self-fed/presented, SLP-fed/presented, Cup/sip, Cup/gulp, Spoon, Straw    Oral Phase:  Bolus Acceptance: Impaired  Bolus Formation/Control: Impaired  Propulsion: Delayed (# of seconds), Discoordination  Type of Impairment: Anterior, Drooling, Premature spillage, Mastication  Oral Residue: Right, Pocketing  Initiation of Swallow: Triggered at vallecula, Triggered at pyriform sinus(es)  Oral Phase Severity: Moderate    Pharyngeal Phase:  Timing: Pooling 1-5 sec  Decreased Tongue Base Retraction?: No  Laryngeal Elevation: Incomplete laryngeal closure  Penetration: During swallow, To cords, From initial swallow  Aspiration/Timing: No evidence of aspiration  Aspiration/Penetration Score: 5 (Penetration/Visible residue-Contrast contacts the folds, but is not ejected)      Pharyngeal Dysfunction: Decreased elevation/closure  Pharyngeal Phase Severity: Mild  Pharyngeal-Esophageal Segment: No impairment    Assessment/Reassessment only, no treatment provided today    Tool Used: Dysphagia Outcome and Severity Scale (SEPIDEH)    Score Comments   Normal Diet  [] 7 With no strategies or extra time needed       Functional Swallow  [] 6 May have mild oral or pharyngeal delay       Mild Dysphagia    [] 5 Which may require one diet consistency restricted (those who demonstrate penetration which is entirely cleared on MBS would be included)   Mild-Moderate Dysphagia  [] 4 With 1-2 diet consistencies restricted       Moderate Dysphagia  [x] 3 With 2 or more diet consistencies restricted       Moderately Severe Dysphagia  [] 2 With partial PO strategies (trials with ST only)       Severe Dysphagia  [] 1 With inability to tolerate any PO safely          Score:  Initial: 2 Most Recent: 3 (Date: 9/14/17 )   Interpretation of Tool: The Dysphagia Outcome and Severity Scale (SEPIDEH) is a simple, easy-to-use, 7-point scale developed to systematically rate the functional severity of dysphagia based on objective assessment and make recommendations for diet level, independence level, and type of nutrition. Score 7 6 5 4 3 2 1   Modifier CH CI CJ CK CL CM CN   ? Swallowing:     - CURRENT STATUS: CL - 60%-79% impaired, limited or restricted    - GOAL STATUS:  CJ - 20%-39% impaired, limited or restricted    - D/C STATUS:  ---------------To be determined---------------  Payor: YOLA / Plan: Katerina Dong PPO / Product Type: PPO /   __________________________________________________________________________________________________  Safety:   After treatment position/precautions:  · Patient waiting in radiology holding bay for transport back to room.   Recommendations for treatment: diet tolerance  Total Treatment Duration:  Time In: 1005   Time Out: Von Weiss, MA, CCC-SLP

## 2017-09-14 NOTE — PROGRESS NOTES
Problem: Mobility Impaired (Adult and Pediatric)  Goal: *Acute Goals and Plan of Care (Insert Text)    Updated Goals 9/11/2017   STG:  (1.)Patient will roll side to side in bed with  MAXIMAL ASSIST within 3-5 day(s). Met 9/13/2017   (2.)Patient will move from supine to sit and sit to supine  in bed with  MAXIMAL ASSIST within 3-5 day(s). Met 9/13/2017   (3.)Patient will sit edge of bed with min to mod ASSIST with midline orientation for 15 minutes within 3-5 day(s). Met 9/13/2017   4. Patient will perform 1 sets of 10 repetitions of active range of motion exercises for left lower extremity(s) with  cueing within 3-5 day(s). Met 9/13/2017   5. Patient will tolerate sitting with bed in chair position for 1 hour 3x/day to facilitate tolerance to sitting in chair within 3-5 days. Met 9/13/2017   Modified 9/13/2017 LTG:  (1.)Patient will move from supine to sit and sit to supine  and roll side to side in bed with  MODERATE ASSIST  within 7-10 day(s). (2.)Patient will transfer from bed to chair and chair to bed with  MODERATE ASSIST using the least restrictive device within 7-10 day(s). (3.)Patient will sit with  Good balance statically and fair dynamically for 15 minutes  within 7-10 day(s). 4.  Patient will exhibit 2/5 strength R LE to facilitate increased independence with bed mobility within 7-10 days. 5.  Family will demonstrate R ankle stretching and verbalize correct positioning and how often to change position within 7 days.   ________________________________________________________________________________________________      PHYSICAL THERAPY: Daily Note, Treatment Day: 3rd and AM 9/14/2017  INPATIENT: Hospital Day: 9  Payor: Twyla Lanier / Plan: Beverly Coronel PPO / Product Type: PPO /      NAME/AGE/GENDER: Aubrey Morel is a 60 y.o. female           PRIMARY DIAGNOSIS: Stroke, hemorrhagic ([address]  Hemorrhagic stroke ([address] Stroke, hemorrhagic ([address] Stroke, hemorrhagic ([address]        ICD-10: Treatment Diagnosis:       · Generalized Muscle Weakness (M62.81)  · Difficulty in walking, Not elsewhere classified (R26.2)   Precaution/Allergies:  Banana; Lisinopril; and Nuts Gwenlyn Slay nut]       ASSESSMENT:      Ms. Veronika Reyes presents supine with daughter Elias Velásquez and aunt present. Patient  did attempt speaking several times today, but speech still difficult to understand. Worked on transitioning to sit, required min assist to roll and mod assist to bring LE's off EOB and push into sitting with cueing for sequencing. Worked on sit to stand and stand pivot to [facility]. Worked on patient attending to right side. With cueing patient able to grasp R wrist and place hand in her lap. With cueing able to pat R thigh and R arm. Worked on standing balance while patient got cleaned up. Standing balance poor. Patient then took few steps to right  To chair with mod to max assist.  Patient then scooted posteriorly in chair with mod assist for R hemipelvis. Patient with much improved sitting balance today!! Also improved transfers. Able to follow commands slightly better and increased attention to right side. Pt would benefit from further PT while in house to address her impairments to help improve to prior level of independence. Anticipate pt will require continued skilled PT following discharge from hospital due to poor balance, safety and fall risk. This section established at most recent assessment   PROBLEM LIST (Impairments causing functional limitations):  1. Decreased Strength  2. Decreased ADL/Functional Activities  3. Decreased Transfer Abilities  4. Decreased Ambulation Ability/Technique  5. Decreased Balance  6. Decreased Cognition    INTERVENTIONS PLANNED: (Benefits and precautions of physical therapy have been discussed with the patient.)  1. Balance Exercise  2. Bed Mobility  3. Gait Training  4. Neuromuscular Re-education/Strengthening  5. Therapeutic Activites  6.  Therapeutic Exercise/Strengthening  7. Transfer Training  8. Group Therapy      TREATMENT PLAN: Frequency/Duration: 3 times a week for duration of hospital stay  Rehabilitation Potential For Stated Goals:good      RECOMMENDED REHABILITATION/EQUIPMENT: (at time of discharge pending progress): Due to the probability of continued deficits (see above) this patient will likely need continued skilled physical therapy after discharge. Equipment:   · to be determined pending discharge plans. HISTORY:   History of Present Injury/Illness (Reason for Referral):  67-year-old female brought in as a stroke alert by EMS.  states that she was in a downstairs bedroom when she called out. Last known normal was at 2230. He found her confused with a left-sided facial droop as well as right upper and lower extremity paralysis. EMS found her with similar symptoms and she  became somewhat more was unresponsive. ruddy arrival to the emergency department the patient began moving her extremities more freely but was not able to communicate clearly. Urgent CT scan was performed which demonstrated intracranial hemorrhage. Patient emergently intubated for airway protection. Patient given labetalol, propofol, Ativan for sedation and blood pressure control. We eventually achieved adequate sedation and blood pressure control. Family updated. Discussed admission with the intensivist.  My colleague spoke with Dr. Deborah Torres of neurosurgery he reported that this bleed was nonsurgical in nature and that medical management would be needed.  states only medication she takes is Premarin and lisinopril. No blood thinners. Past Medical History/Comorbidities:   Ms. Etienne Humphries  has a past medical history of Anxiety; Depression; HTN (hypertension); and Menopause. Ms. Etienne Humphries  has a past surgical history that includes jennifer and bso ();  section; refractive surgery (); and other surgical ().   Social History/Living Environment:   Home Environment: Private residence  # Steps to Enter: 4  One/Two Story Residence: Two story, live on 1st floor  # of Interior Steps: 13  Height of Each Step (in): 6 inches  Interior Rails: Right  Lift Chair Available: No  Living Alone: No  Support Systems: Spouse/Significant Other/Partner  Patient Expects to be Discharged to[de-identified] Private residence  Current DME Used/Available at Home: None  Tub or Shower Type: Tub/Shower combination  Prior Level of Function/Work/Activity:  Completely independent. No DMe used      Number of Personal Factors/Comorbidities that affect the Plan of Care: 1-2: MODERATE COMPLEXITY   EXAMINATION:   Most Recent Physical Functioning:   Gross Assessment:  AROM: Generally decreased, functional (L LE)  PROM: Generally decreased, functional (R LE except ankle dorsiflexion < neutral)  Strength: Grossly decreased, non-functional (no active movement R LE)  Coordination: Grossly decreased, non-functional  Tone: Abnormal               Posture:     Balance:  Sitting: Impaired  Sitting - Static: Fair (occasional)  Sitting - Dynamic: Prop sitting  Standing: Impaired  Standing - Static: Constant support  Standing - Dynamic : Poor Bed Mobility:  Rolling: Minimum assistance  Supine to Sit: Moderate assistance  Scooting: Moderate assistance  Duration: 40 Minutes  Wheelchair Mobility:     Transfers:  Sit to Stand: Moderate assistance  Stand to Sit: Minimum assistance  Bed to Chair: Moderate assistance  Gait:     Base of Support: Center of gravity altered;Shift to left  Speed/Lulu: Slow  Step Length: Right shortened;Left shortened  Gait Abnormalities: Shuffling gait  Distance (ft): 2 Feet (ft) (sidestepping to right)  Assistive Device: Gait belt  Ambulation - Level of Assistance: Maximum assistance  Interventions: Verbal cues; Tactile cues; Safety awareness training;Visual/Demos;Manual cues       Body Structures Involved:  1. Nerves  2. Eyes and Ears  3. Bones  4. Joints  5. Muscles  6.  Ligaments Body Functions Affected:  1. Mental  2. Sensory/Pain  3. Cardio  4. Neuromusculoskeletal  5. Movement Related Activities and Participation Affected:  1. Learning and Applying Knowledge  2. General Tasks and Demands  3. Communication  4. Mobility  5. Self Care  6. Domestic Life  7. Community, Social and Panola Hollister   Number of elements that affect the Plan of Care: 3: MODERATE COMPLEXITY   CLINICAL PRESENTATION:   Presentation: Evolving clinical presentation with changing clinical characteristics: MODERATE COMPLEXITY   CLINICAL DECISION MAKIN [facility] Mobility Inpatient Short Form  How much difficulty does the patient currently have. .. Unable A Lot A Little None   1. Turning over in bed (including adjusting bedclothes, sheets and blankets)? [ ] 1   [X] 2   [ ] 3   [ ] 4   2. Sitting down on and standing up from a chair with arms ( e.g., wheelchair, bedside commode, etc.)   [ ] 1   [X] 2   [ ] 3   [ ] 4   3. Moving from lying on back to sitting on the side of the bed? [ ] 1   [X] 2   [ ] 3   [ ] 4   How much help from another person does the patient currently need. .. Total A Lot A Little None   4. Moving to and from a bed to a chair (including a wheelchair)? [ ] 1   [X] 2   [ ] 3   [ ] 4   5. Need to walk in hospital room? [X] 1   [ ] 2   [ ] 3   [ ] 4   6. Climbing 3-5 steps with a railing? [X] 1   [ ] 2   [ ] 3   [ ] 4   © , Trustees of [address] [place] [address], under license to [employer]. All rights reserved    Score:  Initial: 10 Most Recent: X (Date: -- )     Interpretation of Tool:  Represents activities that are increasingly more difficult (i.e. Bed mobility, Transfers, Gait).        Score 24 23 22-20 19-15 14-10 9-7 6       Modifier CH CI CJ CK CL CM CN         · Mobility - Walking and Moving Around:               - CURRENT STATUS:    CL - 60%-79% impaired, limited or restricted               - GOAL STATUS:           CL - 60%-79% impaired, limited or restricted               - D/C STATUS:                       ---------------To be determined---------------  Payor: Christian Freeman / Plan: [employer] PPO / Product Type: PPO /       Medical Necessity:     · Skilled intervention continues to be required due to decreased function. Reason for Services/Other Comments:  · Patient continues to require skilled intervention due to medical complications and patient unable to attend/participate in therapy as expected. Use of outcome tool(s) and clinical judgement create a POC that gives a: Questionable prediction of patient's progress: MODERATE COMPLEXITY                 TREATMENT:   (In addition to Assessment/Re-Assessment sessions the following treatments were rendered)   Pre-treatment Symptoms/Complaints:  none  Pain: Initial:   Pain Intensity 1: 0  Pain Location 1: Back  Pain Intervention(s) 1: Repositioned  Post Session:  Unchanged. Therapeutic Exercise: ( ):  Exercises per grid below to improve mobility, strength and coordination. Required moderate verbal and tactile cues to promote proper body alignment and promote proper body posture. Progressed repetitions and complexity of movement as indicated. Betha Lute DATE: 9/11/17 9/12/17 9/13/17      Straight leg raise         Hip abduct/ adduct         Heel slides  x5 AAL  x10 AL, AK      Hip external/ internal rotation         Ankle dorsiflexion/ plantarflexion  x20 AL,   x5 AK       Neck rotation x2 Kari x2 AB x5 AB      R heel cord stretch x5 P        Long arc quads  x20 AL,  x5 AK       Knee squeezes in hooklying   x10 AAB      bridging   x10 AAB        Key:  A=active, AA=active assisted, P=passive, B=bilaterally, R=right, L=left        Therapeutic Activity: (40 Minutes   ):  Therapeutic activities including bed mobility, sitting/standing balance activities, stand pivot transfer, ambulation, and encouragement to attend to right side to improve mobility, strength, balance and coordination.   Required maximal cueing   Verbal cues; Tactile cues; Safety awareness training;Visual/Demos;Manual cues to promote static and dynamic balance in standing, promote coordination of bilateral, lower extremity(s) and promote motor control of bilateral, lower extremity(s). Braces/Orthotics/Lines/Etc:   · IV and stafford catheter  Treatment/Session Assessment:    · Response to Treatment:  See above  · Interdisciplinary Collaboration:  · Physical Therapist and Registered Nurse  · After treatment position/precautions:  · Up in chair, Bed alarm/tab alert on, Bed/Chair-wheels locked, Call light within reach, RN notified and Family at bedside  · Compliance with Program/Exercises: Will assess as treatment progresses. · Recommendations/Intent for next treatment session: \"Next visit will focus on advancements to more challenging activities and reduction in assistance provided\".   Total Treatment Duration:  PT Patient Time In/Time Out  Time In: 1125  Time Out: Zandra 63, PT, DPT

## 2017-09-14 NOTE — PROGRESS NOTES
Care Management Interventions  PCP Verified by CM: Yes (Dr. Taylor Montilla)  Last Visit to PCP: 08/23/17  Transition of Care Consult (CM Consult): Discharge Planning  Discharge Durable Medical Equipment: No  Physical Therapy Consult: Yes  Occupational Therapy Consult: Yes  Speech Therapy Consult: Yes  Current Support Network: Own Home, Lives with Spouse, Family Lives Nearby  Confirm Follow Up Transport: Family  Plan discussed with Pt/Family/Caregiver: Yes  Freedom of Choice Offered: Yes  Discharge Location  Discharge Placement: Rehab hospital/unit acute [facility])    Pt has been accepted for admission to [facility] once she is medically stable for discharge. Per physiatrist progress note on 9/13/17, the issue of the PEG needs to be resolved prior to transfer. SW following to continue to assist as needed. 3072:  Per Dr. Betzy Jones can accept the pt on Friday if she is medically stable for discharge.

## 2017-09-14 NOTE — PROGRESS NOTES
Progress Note      Patient: Oumou Brown               Sex: female          MRN: [id number]           YOB: 1955      Age:  60 y.o. PCP:  Shaniqua Gallego MD  Treatment Team: Attending Provider: Derrick Harry DO; Utilization Review: Leslie Serrano, SEAN; Consulting Provider: Veronika Davila MD; Consulting Provider: Georgia Enciso MD; Care Manager: Kerri Redding, RN; Care Manager: Angelica Gallardo LMSW  Subjective:     New to me today. Pt is non verbal. No overnight events. No fevers. No cp/sob/diarrhea. Objective:   Physical Exam:   Visit Vitals    /81 (BP 1 Location: Right arm, BP Patient Position: At rest)    Pulse 96    Temp 98.8 °F (37.1 °C)    Resp 20    Ht 4' 10\" (1.473 m)    Wt 57.4 kg (126 lb 8.7 oz)    SpO2 93%    BMI 26.45 kg/m2      Temp (24hrs), Av.7 °F (37.1 °C), Min:98 °F (36.7 °C), Max:99.5 °F (37.5 °C)    Oxygen Therapy  O2 Sat (%): 93 % (17 0737)  Pulse via Oximetry: 89 beats per minute (17 1137)  O2 Device: Room air (17 0730)  O2 Flow Rate (L/min): 2 l/min (17 0355)  FIO2 (%): 30 % (17 0812)    Intake/Output Summary (Last 24 hours) at 17 1040  Last data filed at 17 0605   Gross per 24 hour   Intake              854 ml   Output             1750 ml   Net             -896 ml      General: Conscious, No acute distress  Eyes:  No pallor/icterus    HENT:             Oral Mucosa is dry, NGT in place No sinus tenderness  Neck:               Supple, No JVD  Lungs:  CTA Bilaterally, No significant wheeze/rhonchi  Heart:  S1 S2 regular  Abdomen: Soft, Positive bowel sounds, NTND, No guarding/rigidity/rebound tend. Extremities: No pedal edema  Neurologic:  Alert, awake, seems to be oriented to self and place. Rt flaccid hemiplegia  Skin:                No acute rashes  Musculoskeletal: No Acute findings  Psych:             Calm.     LAB  Recent Results (from the past 24 hour(s)) PLEASE READ & DOCUMENT PPD TEST IN 48 HRS    Collection Time: 09/13/17  9:21 PM   Result Value Ref Range    PPD neg Negative    mm Induration 0 mm   METABOLIC PANEL, BASIC    Collection Time: 09/14/17  4:35 AM   Result Value Ref Range    Sodium 148 (H) 136 - 145 mmol/L    Potassium 2.8 (LL) 3.5 - 5.1 mmol/L    Chloride 112 (H) 98 - 107 mmol/L    CO2 25 21 - 32 mmol/L    Anion gap 11 7 - 16 mmol/L    Glucose 144 (H) 65 - 100 mg/dL    BUN 31 (H) 8 - 23 MG/DL    Creatinine 0.53 (L) 0.6 - 1.0 MG/DL    GFR est AA >60 >60 ml/min/1.73m2    GFR est non-AA >60 >60 ml/min/1.73m2    Calcium 8.1 (L) 8.3 - 10.4 MG/DL   CBC W/O DIFF    Collection Time: 09/14/17  4:35 AM   Result Value Ref Range    WBC 14.0 (H) 4.3 - 11.1 K/uL    RBC 3.84 (L) 4.05 - 5.25 M/uL    HGB 10.7 (L) 11.7 - 15.4 g/dL    HCT 32.6 (L) 35.8 - 46.3 %    MCV 84.9 79.6 - 97.8 FL    MCH 27.9 26.1 - 32.9 PG    MCHC 32.8 31.4 - 35.0 g/dL    RDW 13.7 11.9 - 14.6 %    PLATELET 313 578 - 800 K/uL    MPV 9.8 (L) 10.8 - 14.1 FL       Current Medications Reviewed      Assessment/Plan     A/P  - hemorrhagic CVA - right hemiparesis. Clinically improving. Pt/ot/speech following  - HTN - Off cardene drip,  Cont norvasc/hyzaar/labetalol Po, ctn PRN hydralazine. - dysphagia - Had MBS today. Start Puree diet and nectar thick liquids per speech recomm.  -Hypokalemia - Potassium is being replaced     DVT Prophylaxis: SCD  Moderate risk patient sec. To acute hemorrhagic CVA.  CM consulted for rehab

## 2017-09-15 ENCOUNTER — HOSPITAL ENCOUNTER (INPATIENT)
Age: 62
LOS: 41 days | Discharge: HOME HEALTH | DRG: 040 | End: 2017-10-26
Attending: PHYSICAL MEDICINE & REHABILITATION | Admitting: PHYSICAL MEDICINE & REHABILITATION
Payer: COMMERCIAL

## 2017-09-15 ENCOUNTER — APPOINTMENT (OUTPATIENT)
Dept: GENERAL RADIOLOGY | Age: 62
DRG: 064 | End: 2017-09-15
Attending: HOSPITALIST
Payer: COMMERCIAL

## 2017-09-15 VITALS
TEMPERATURE: 97.8 F | WEIGHT: 126.54 LBS | BODY MASS INDEX: 26.56 KG/M2 | RESPIRATION RATE: 20 BRPM | HEIGHT: 58 IN | SYSTOLIC BLOOD PRESSURE: 179 MMHG | HEART RATE: 81 BPM | OXYGEN SATURATION: 92 % | DIASTOLIC BLOOD PRESSURE: 90 MMHG

## 2017-09-15 DIAGNOSIS — I26.99 PULMONARY EMBOLISM, OTHER: ICD-10-CM

## 2017-09-15 DIAGNOSIS — R11.2 NON-INTRACTABLE VOMITING WITH NAUSEA, UNSPECIFIED VOMITING TYPE: ICD-10-CM

## 2017-09-15 DIAGNOSIS — D69.6 THROMBOCYTOPENIA (HCC): ICD-10-CM

## 2017-09-15 DIAGNOSIS — I10 ESSENTIAL HYPERTENSION: ICD-10-CM

## 2017-09-15 DIAGNOSIS — F98.8 ATTENTION DEFICIT DISORDER (ADD) WITHOUT HYPERACTIVITY: Chronic | ICD-10-CM

## 2017-09-15 DIAGNOSIS — F32.A DEPRESSION, UNSPECIFIED DEPRESSION TYPE: ICD-10-CM

## 2017-09-15 DIAGNOSIS — F41.9 ANXIETY: ICD-10-CM

## 2017-09-15 DIAGNOSIS — I61.5 ACUTE SPONTANEOUS INTRAVENTRICULAR HEMORRHAGE ASSOC W/ HYPERTENSION (HCC): ICD-10-CM

## 2017-09-15 DIAGNOSIS — F32.89 OTHER DEPRESSION: ICD-10-CM

## 2017-09-15 DIAGNOSIS — D65 DIC (DISSEMINATED INTRAVASCULAR COAGULATION) (HCC): ICD-10-CM

## 2017-09-15 DIAGNOSIS — G25.81 RESTLESS LEG: ICD-10-CM

## 2017-09-15 DIAGNOSIS — G25.81 RESTLESS LEGS SYNDROME: ICD-10-CM

## 2017-09-15 DIAGNOSIS — R33.9 URINARY RETENTION WITH INCOMPLETE BLADDER EMPTYING: Primary | ICD-10-CM

## 2017-09-15 DIAGNOSIS — F98.8 ATTENTION DEFICIT DISORDER, UNSPECIFIED HYPERACTIVITY PRESENCE: ICD-10-CM

## 2017-09-15 DIAGNOSIS — E87.6 HYPOKALEMIA: ICD-10-CM

## 2017-09-15 DIAGNOSIS — Z12.39 SCREENING FOR BREAST CANCER: ICD-10-CM

## 2017-09-15 DIAGNOSIS — I61.6 MULTIPLE LEFT-SIDED NONTRAUMATIC LOCALIZED INTRACEREBRAL HEMORRHAGES (HCC): ICD-10-CM

## 2017-09-15 DIAGNOSIS — R09.02 HYPOXEMIA: ICD-10-CM

## 2017-09-15 DIAGNOSIS — I26.99 OTHER ACUTE PULMONARY EMBOLISM WITHOUT ACUTE COR PULMONALE (HCC): ICD-10-CM

## 2017-09-15 DIAGNOSIS — I61.9 HEMORRHAGIC STROKE (HCC): ICD-10-CM

## 2017-09-15 DIAGNOSIS — I82.491 ACUTE DEEP VEIN THROMBOSIS (DVT) OF OTHER SPECIFIED VEIN OF RIGHT LOWER EXTREMITY (HCC): ICD-10-CM

## 2017-09-15 DIAGNOSIS — E87.0 HYPERNATREMIA: ICD-10-CM

## 2017-09-15 DIAGNOSIS — F98.8 ADD (ATTENTION DEFICIT DISORDER): ICD-10-CM

## 2017-09-15 DIAGNOSIS — N95.2 ATROPHIC VAGINITIS: ICD-10-CM

## 2017-09-15 DIAGNOSIS — I10 ACCELERATED HYPERTENSION: ICD-10-CM

## 2017-09-15 DIAGNOSIS — I16.0 HYPERTENSIVE URGENCY, MALIGNANT: ICD-10-CM

## 2017-09-15 DIAGNOSIS — Z91.89 AT RISK FOR ASPIRATION: ICD-10-CM

## 2017-09-15 DIAGNOSIS — J96.01 ACUTE RESPIRATORY FAILURE WITH HYPOXIA (HCC): ICD-10-CM

## 2017-09-15 DIAGNOSIS — I10 ACUTE SPONTANEOUS INTRAVENTRICULAR HEMORRHAGE ASSOC W/ HYPERTENSION (HCC): ICD-10-CM

## 2017-09-15 DIAGNOSIS — I61.9 STROKE, HEMORRHAGIC (HCC): ICD-10-CM

## 2017-09-15 DIAGNOSIS — F32.0 MILD SINGLE CURRENT EPISODE OF MAJOR DEPRESSIVE DISORDER (HCC): ICD-10-CM

## 2017-09-15 DIAGNOSIS — I82.401 ACUTE DEEP VEIN THROMBOSIS (DVT) OF RIGHT LOWER EXTREMITY, UNSPECIFIED VEIN (HCC): ICD-10-CM

## 2017-09-15 LAB
ALBUMIN SERPL-MCNC: 2.9 G/DL (ref 3.2–4.6)
ALBUMIN/GLOB SERPL: 0.7 {RATIO} (ref 1.2–3.5)
ALP SERPL-CCNC: 69 U/L (ref 50–136)
ALT SERPL-CCNC: 69 U/L (ref 12–65)
ANION GAP SERPL CALC-SCNC: 8 MMOL/L (ref 7–16)
APPEARANCE UR: CLEAR
AST SERPL-CCNC: 41 U/L (ref 15–37)
BASOPHILS # BLD: 0 K/UL (ref 0–0.2)
BASOPHILS NFR BLD: 0 % (ref 0–2)
BILIRUB SERPL-MCNC: 1.1 MG/DL (ref 0.2–1.1)
BILIRUB UR QL: NEGATIVE
BUN SERPL-MCNC: 33 MG/DL (ref 8–23)
CALCIUM SERPL-MCNC: 7.6 MG/DL (ref 8.3–10.4)
CHLORIDE SERPL-SCNC: 114 MMOL/L (ref 98–107)
CO2 SERPL-SCNC: 26 MMOL/L (ref 21–32)
COLOR UR: YELLOW
CREAT SERPL-MCNC: 0.52 MG/DL (ref 0.6–1)
DIFFERENTIAL METHOD BLD: ABNORMAL
EOSINOPHIL # BLD: 0 K/UL (ref 0–0.8)
EOSINOPHIL NFR BLD: 0 % (ref 0.5–7.8)
ERYTHROCYTE [DISTWIDTH] IN BLOOD BY AUTOMATED COUNT: 13.8 % (ref 11.9–14.6)
EST. AVERAGE GLUCOSE BLD GHB EST-MCNC: 128 MG/DL
GLOBULIN SER CALC-MCNC: 4 G/DL (ref 2.3–3.5)
GLUCOSE SERPL-MCNC: 112 MG/DL (ref 65–100)
GLUCOSE UR STRIP.AUTO-MCNC: NEGATIVE MG/DL
HBA1C MFR BLD: 6.1 % (ref 4.8–6)
HCT VFR BLD AUTO: 33.8 % (ref 35.8–46.3)
HGB BLD-MCNC: 11.7 G/DL (ref 11.7–15.4)
HGB UR QL STRIP: NEGATIVE
IMM GRANULOCYTES # BLD: 0.7 K/UL (ref 0–0.5)
KETONES UR QL STRIP.AUTO: NEGATIVE MG/DL
LEUKOCYTE ESTERASE UR QL STRIP.AUTO: NEGATIVE
LYMPHOCYTES # BLD: 1.5 K/UL (ref 0.5–4.6)
LYMPHOCYTES NFR BLD: 9 % (ref 13–44)
MAGNESIUM SERPL-MCNC: 2.4 MG/DL (ref 1.8–2.4)
MCH RBC QN AUTO: 28.7 PG (ref 26.1–32.9)
MCHC RBC AUTO-ENTMCNC: 34.6 G/DL (ref 31.4–35)
MCV RBC AUTO: 83 FL (ref 79.6–97.8)
MONOCYTES # BLD: 1.7 K/UL (ref 0.1–1.3)
MONOCYTES NFR BLD: 10 % (ref 4–12)
NEUTS SEG # BLD: 13.4 K/UL (ref 1.7–8.2)
NEUTS SEG NFR BLD: 81 % (ref 43–78)
NITRITE UR QL STRIP.AUTO: NEGATIVE
PH UR STRIP: 7 [PH] (ref 5–9)
PHOSPHATE SERPL-MCNC: 3.2 MG/DL (ref 2.3–3.7)
PLATELET # BLD AUTO: 162 K/UL (ref 150–450)
PMV BLD AUTO: 9.4 FL (ref 10.8–14.1)
POTASSIUM SERPL-SCNC: 3.3 MMOL/L (ref 3.5–5.1)
PROT SERPL-MCNC: 6.9 G/DL (ref 6.3–8.2)
PROT UR STRIP-MCNC: NEGATIVE MG/DL
RBC # BLD AUTO: 4.07 M/UL (ref 4.05–5.25)
SODIUM SERPL-SCNC: 148 MMOL/L (ref 136–145)
SP GR UR REFRACTOMETRY: 1.01 (ref 1–1.02)
UROBILINOGEN UR QL STRIP.AUTO: 0.2 EU/DL (ref 0.2–1)
WBC # BLD AUTO: 16.5 K/UL (ref 4.3–11.1)

## 2017-09-15 PROCEDURE — 87086 URINE CULTURE/COLONY COUNT: CPT | Performed by: PHYSICAL MEDICINE & REHABILITATION

## 2017-09-15 PROCEDURE — 74011250637 HC RX REV CODE- 250/637: Performed by: PHYSICAL MEDICINE & REHABILITATION

## 2017-09-15 PROCEDURE — 83036 HEMOGLOBIN GLYCOSYLATED A1C: CPT | Performed by: HOSPITALIST

## 2017-09-15 PROCEDURE — 99223 1ST HOSP IP/OBS HIGH 75: CPT | Performed by: PHYSICAL MEDICINE & REHABILITATION

## 2017-09-15 PROCEDURE — 97167 OT EVAL HIGH COMPLEX 60 MIN: CPT

## 2017-09-15 PROCEDURE — 97535 SELF CARE MNGMENT TRAINING: CPT

## 2017-09-15 PROCEDURE — 36415 COLL VENOUS BLD VENIPUNCTURE: CPT | Performed by: PHYSICAL MEDICINE & REHABILITATION

## 2017-09-15 PROCEDURE — 65310000000 HC RM PRIVATE REHAB

## 2017-09-15 PROCEDURE — 74011250637 HC RX REV CODE- 250/637: Performed by: NURSE PRACTITIONER

## 2017-09-15 PROCEDURE — 74011250636 HC RX REV CODE- 250/636: Performed by: INTERNAL MEDICINE

## 2017-09-15 PROCEDURE — 85025 COMPLETE CBC W/AUTO DIFF WBC: CPT | Performed by: HOSPITALIST

## 2017-09-15 PROCEDURE — 84100 ASSAY OF PHOSPHORUS: CPT | Performed by: HOSPITALIST

## 2017-09-15 PROCEDURE — 97162 PT EVAL MOD COMPLEX 30 MIN: CPT

## 2017-09-15 PROCEDURE — 80053 COMPREHEN METABOLIC PANEL: CPT | Performed by: HOSPITALIST

## 2017-09-15 PROCEDURE — 74011250637 HC RX REV CODE- 250/637: Performed by: INTERNAL MEDICINE

## 2017-09-15 PROCEDURE — 97530 THERAPEUTIC ACTIVITIES: CPT

## 2017-09-15 PROCEDURE — 71010 XR CHEST SNGL V: CPT

## 2017-09-15 PROCEDURE — 83735 ASSAY OF MAGNESIUM: CPT | Performed by: HOSPITALIST

## 2017-09-15 PROCEDURE — 74011250637 HC RX REV CODE- 250/637: Performed by: HOSPITALIST

## 2017-09-15 PROCEDURE — 81003 URINALYSIS AUTO W/O SCOPE: CPT | Performed by: PHYSICAL MEDICINE & REHABILITATION

## 2017-09-15 PROCEDURE — 97110 THERAPEUTIC EXERCISES: CPT

## 2017-09-15 PROCEDURE — 87040 BLOOD CULTURE FOR BACTERIA: CPT | Performed by: PHYSICAL MEDICINE & REHABILITATION

## 2017-09-15 PROCEDURE — 74011000250 HC RX REV CODE- 250: Performed by: INTERNAL MEDICINE

## 2017-09-15 RX ORDER — HYDRALAZINE HYDROCHLORIDE 25 MG/1
25 TABLET, FILM COATED ORAL
Qty: 30 TAB | Refills: 0 | Status: SHIPPED | OUTPATIENT
Start: 2017-09-15 | End: 2017-10-26

## 2017-09-15 RX ORDER — FACIAL-BODY WIPES
10 EACH TOPICAL DAILY PRN
Status: DISCONTINUED | OUTPATIENT
Start: 2017-09-15 | End: 2017-10-26 | Stop reason: HOSPADM

## 2017-09-15 RX ORDER — FACIAL-BODY WIPES
10 EACH TOPICAL DAILY PRN
Status: CANCELLED | OUTPATIENT
Start: 2017-09-15

## 2017-09-15 RX ORDER — POTASSIUM CHLORIDE 20MEQ/15ML
20 LIQUID (ML) ORAL DAILY
Status: DISCONTINUED | OUTPATIENT
Start: 2017-09-16 | End: 2017-09-16

## 2017-09-15 RX ORDER — TRAMADOL HYDROCHLORIDE 50 MG/1
50 TABLET ORAL
Status: CANCELLED | OUTPATIENT
Start: 2017-09-15

## 2017-09-15 RX ORDER — ACETAMINOPHEN 500 MG
500 TABLET ORAL
Status: DISCONTINUED | OUTPATIENT
Start: 2017-09-15 | End: 2017-10-21

## 2017-09-15 RX ORDER — HYDRALAZINE HYDROCHLORIDE 50 MG/1
50 TABLET, FILM COATED ORAL
Status: DISCONTINUED | OUTPATIENT
Start: 2017-09-15 | End: 2017-10-26 | Stop reason: HOSPADM

## 2017-09-15 RX ORDER — POTASSIUM CHLORIDE 20MEQ/15ML
20 LIQUID (ML) ORAL DAILY
Qty: 480 ML | Refills: 0 | Status: SHIPPED | OUTPATIENT
Start: 2017-09-15 | End: 2017-10-26

## 2017-09-15 RX ORDER — LABETALOL 200 MG/1
200 TABLET, FILM COATED ORAL EVERY 8 HOURS
Qty: 60 TAB | Refills: 0 | Status: SHIPPED | OUTPATIENT
Start: 2017-09-15 | End: 2017-10-26

## 2017-09-15 RX ORDER — ACETAMINOPHEN 500 MG
500 TABLET ORAL
Status: CANCELLED | OUTPATIENT
Start: 2017-09-15

## 2017-09-15 RX ORDER — ONDANSETRON 4 MG/1
4 TABLET, ORALLY DISINTEGRATING ORAL
Status: CANCELLED | OUTPATIENT
Start: 2017-09-15

## 2017-09-15 RX ORDER — TAMSULOSIN HYDROCHLORIDE 0.4 MG/1
0.4 CAPSULE ORAL
Status: DISCONTINUED | OUTPATIENT
Start: 2017-09-15 | End: 2017-10-26 | Stop reason: HOSPADM

## 2017-09-15 RX ORDER — LABETALOL 200 MG/1
200 TABLET, FILM COATED ORAL EVERY 8 HOURS
Status: CANCELLED | OUTPATIENT
Start: 2017-09-15

## 2017-09-15 RX ORDER — LABETALOL 200 MG/1
200 TABLET, FILM COATED ORAL EVERY 8 HOURS
Status: DISCONTINUED | OUTPATIENT
Start: 2017-09-15 | End: 2017-09-28

## 2017-09-15 RX ORDER — FACIAL-BODY WIPES
10 EACH TOPICAL DAILY PRN
Status: DISCONTINUED | OUTPATIENT
Start: 2017-09-15 | End: 2017-09-15 | Stop reason: SDUPTHER

## 2017-09-15 RX ORDER — TRAMADOL HYDROCHLORIDE 50 MG/1
50 TABLET ORAL
Status: DISCONTINUED | OUTPATIENT
Start: 2017-09-15 | End: 2017-10-26 | Stop reason: HOSPADM

## 2017-09-15 RX ORDER — AMLODIPINE BESYLATE 10 MG/1
10 TABLET ORAL DAILY
Status: CANCELLED | OUTPATIENT
Start: 2017-09-16

## 2017-09-15 RX ORDER — HYDRALAZINE HYDROCHLORIDE 25 MG/1
50 TABLET, FILM COATED ORAL
Status: CANCELLED | OUTPATIENT
Start: 2017-09-15

## 2017-09-15 RX ORDER — HYDRALAZINE HYDROCHLORIDE 25 MG/1
25 TABLET, FILM COATED ORAL ONCE
Status: COMPLETED | OUTPATIENT
Start: 2017-09-15 | End: 2017-09-15

## 2017-09-15 RX ORDER — MAG HYDROX/ALUMINUM HYD/SIMETH 200-200-20
30 SUSPENSION, ORAL (FINAL DOSE FORM) ORAL
Status: DISCONTINUED | OUTPATIENT
Start: 2017-09-15 | End: 2017-10-26 | Stop reason: HOSPADM

## 2017-09-15 RX ORDER — TRAMADOL HYDROCHLORIDE 50 MG/1
50 TABLET ORAL
Qty: 30 TAB | Refills: 0 | Status: SHIPPED | OUTPATIENT
Start: 2017-09-15 | End: 2017-12-05

## 2017-09-15 RX ORDER — POTASSIUM CHLORIDE 20MEQ/15ML
20 LIQUID (ML) ORAL DAILY
Status: CANCELLED | OUTPATIENT
Start: 2017-09-16

## 2017-09-15 RX ORDER — TRAZODONE HYDROCHLORIDE 50 MG/1
50 TABLET ORAL
Status: CANCELLED | OUTPATIENT
Start: 2017-09-15

## 2017-09-15 RX ORDER — TRAZODONE HYDROCHLORIDE 50 MG/1
50 TABLET ORAL
Status: DISCONTINUED | OUTPATIENT
Start: 2017-09-15 | End: 2017-10-15

## 2017-09-15 RX ORDER — AMLODIPINE BESYLATE 10 MG/1
10 TABLET ORAL DAILY
Qty: 30 TAB | Refills: 0 | Status: SHIPPED | OUTPATIENT
Start: 2017-09-15 | End: 2017-10-26

## 2017-09-15 RX ORDER — MAG HYDROX/ALUMINUM HYD/SIMETH 200-200-20
30 SUSPENSION, ORAL (FINAL DOSE FORM) ORAL
Status: CANCELLED | OUTPATIENT
Start: 2017-09-15

## 2017-09-15 RX ORDER — ACETAMINOPHEN 500 MG
500 TABLET ORAL
Qty: 30 TAB | Refills: 0 | Status: SHIPPED | OUTPATIENT
Start: 2017-09-15 | End: 2018-02-28

## 2017-09-15 RX ORDER — ONDANSETRON 4 MG/1
4 TABLET, ORALLY DISINTEGRATING ORAL
Status: DISCONTINUED | OUTPATIENT
Start: 2017-09-15 | End: 2017-10-26 | Stop reason: HOSPADM

## 2017-09-15 RX ORDER — AMLODIPINE BESYLATE 10 MG/1
10 TABLET ORAL DAILY
Status: DISCONTINUED | OUTPATIENT
Start: 2017-09-16 | End: 2017-09-29

## 2017-09-15 RX ADMIN — POTASSIUM CHLORIDE 20 MEQ: 20 SOLUTION ORAL at 08:20

## 2017-09-15 RX ADMIN — HYDRALAZINE HYDROCHLORIDE 25 MG: 25 TABLET, FILM COATED ORAL at 10:24

## 2017-09-15 RX ADMIN — LABETALOL HYDROCHLORIDE 200 MG: 200 TABLET, FILM COATED ORAL at 17:47

## 2017-09-15 RX ADMIN — FAMOTIDINE 20 MG: 10 INJECTION, SOLUTION INTRAVENOUS at 08:20

## 2017-09-15 RX ADMIN — ACETAMINOPHEN 500 MG: 500 TABLET, FILM COATED ORAL at 22:52

## 2017-09-15 RX ADMIN — SODIUM CHLORIDE, PRESERVATIVE FREE 900 UNITS: 5 INJECTION INTRAVENOUS at 06:22

## 2017-09-15 RX ADMIN — Medication 10 ML: at 06:00

## 2017-09-15 RX ADMIN — TAMSULOSIN HYDROCHLORIDE 0.4 MG: 0.4 CAPSULE ORAL at 22:52

## 2017-09-15 RX ADMIN — AMLODIPINE BESYLATE 10 MG: 10 TABLET ORAL at 08:20

## 2017-09-15 RX ADMIN — LABETALOL HYDROCHLORIDE 200 MG: 200 TABLET, FILM COATED ORAL at 08:20

## 2017-09-15 RX ADMIN — HYDROCHLOROTHIAZIDE: 12.5 CAPSULE ORAL at 08:20

## 2017-09-15 RX ADMIN — Medication 30 ML: at 06:22

## 2017-09-15 RX ADMIN — LABETALOL HYDROCHLORIDE 200 MG: 200 TABLET, FILM COATED ORAL at 00:05

## 2017-09-15 NOTE — H&P
[address], [address] [facility]  Tel: [phone]  Fax: [phone]      HISTORY AND PHYSICAL  IRC       Admit Date: 9/15/2017  Primary Care Physician: Christopher Coelho MD  Specialty Group: Dr. Shari Archibald, and Hospitalist service and Tele Neuro    Chief Complaint : Gait dysfunction secondary to below. Admit Diagnosis: stroke, left brain involvement;ICH (intracerebral hemorrhag*  Hypertensive emergency  Acute Rehab Dx:  Gait impairment/ gait dysfunction  Debility    deconditioning  Mobility and ambulation deficits  Self Care/ADL deficits  Swallowing deficits  aphasia    Medical Dx:  Past Medical History:   Diagnosis Date    Anxiety     Depression     HTN (hypertension)     Menopause        Date of Evaluation:  September 15, 2017    HPI: Joseph Jaquez is a 60 y.o. female patient at [facility] who was admitted on 9/15/2017  by Shayan Ambriz MD with below mentioned medical history ,is being seen for Physical Medicine and Rehabilitation. Mrs. Areli Ziegler is a previously functionally independent right handed 58yo WF with a PMH of anxiety/depression, HTN and ADD who presented to the ED on 9/7 with an acute onset of Right hemiplegia, right facial weakness and difficulty speaking. She apparently called out for help and her  was, fortunately, at home. EMS was called. Pt had noted malignant HTN noted upon presentation with above mentioned symptoms. She had agitation and vomiting as well. Her BP was 208/105. GCS was not documented.  In ER, received labetalol 60mg, propofol 50mg, ativan 4mg, and succinylcholine for intubation.  She received 1g of keppra as well for seizure-like activity.  CT of head revealed left basal ganglia ICH measured 3 x 2cm with spread to L lateral ventricle and 3rd/4th ventricle with some mild mildline shift.  Teleneurology was consulted and recommended ICU admission, SBP goal of 160, tight glucose control and DVT ppx.  Dr. Nereida Carranza was consulted and did not think surgery would benefit patient, also recommended BP control. She was monitored closely from admission in the ICU and had been on a cardene gtt. She was successfully extubated on 9/9 and off cardene. On 9/11, pt found by nursing to be more lethargic with right gaze preference and was hypertensive. Her cardene was restarted and a STAT HCT was ordered. It showed a stable presumed hypertensive hemorrhage in the left basal ganglia with intraventricular extension. By 9/11,she was off cardene but still requiring IV Labetolol at times for HTN. She has been documented to be more alert and interactive with staff. She was eval by ST and is high risk for aspiration, thus she was  NPO and tolerating tube feeds via a NGT. She had a MBS 9/14 and was approved for a Pureed diet with Nectar thick liquids and her NGT was self discontinued. She has been seen by both PT and OT on a few occasions and is max to total assist for all care. Her BP is still fluctuating, but improving. Her level of alertness and motivation have also improved. Medically, she has a leukocytosis with a WBC of 14 yesterday and 16. 5 today for unclear reasons. Her K is being replaced as it was 2.8 yesterday and still low at 3.3. She has prerenal azotemia likely due to coming off IVFs and being on nectar thick liquids, with poor po. Her Na remains borderline increased at 148. She has no hx of DM but had a mildly elevated HgbA1c of 6.1 with bs's 110-120s     Physical therapy was initiated and patient was starting to mobilize. However, she shows significant functional deficits due to prolonged immobility and hospitalization and severity of stroke symptoms. Our service was consulted for rehab needs and we recommended inpatient hospital rehabilitation is reasonable and necessary due to ongoing acute medical issues which have not changed since initial pre-admission evaluation. and rehab needs still likely best managed in IRU setting. The patient was evaluated by [facility] admissions coordinators. I reviewed the preadmission screening and have approved for admission to the [facility]. The patient was cleared for transfer to rehab today. Patient continues to have ongoing  medical issues outlined above requiring physician medical supervision and functional deficits which would benefit from continued intensive therapies. Current Level of Function: (evaluated by acute therapy staff, with bed mobility, transfers, balance personally observed post-admission in the IRF setting minutes prior to submission of document)max A to dependent with all care and mobility , non ambulatory     Prior Level of Function/Home Situation:   Home Environment: Private residence  # Steps to Enter: 4  One/Two Story Residence: Two story, live on 1st floor  # of Interior Steps: 13  Height of Each Step (in): 6 inches  Interior Rails: Right  Lift Chair Available: No  Living Alone: No  Support Systems: Spouse/Significant Other/Partner  Patient Expects to be Discharged to[de-identified] Private residence  Current DME Used/Available at Home: None  Tub or Shower Type: Tub/Shower combination. ??     Past Medical History:   Diagnosis Date    Anxiety     Depression     HTN (hypertension)     Menopause       Past Surgical History:   Procedure Laterality Date    HX  SECTION      x 3    HX OTHER SURGICAL  2012    Face lift    HX REFRACTIVE SURGERY  2006    HX SANJUANITA AND BSO       Allergies   Allergen Reactions    Banana Shortness of Breath    Lisinopril Cough    Nuts [Tree Nut] Unknown (comments)      Family History   Problem Relation Age of Onset    Alcohol abuse Mother     Cancer Mother      lung cancer    Cancer Father      throat cancer    Psychiatric Disorder Father      anxiety, depression    No Known Problems Sister       Social History   Substance Use Topics    Smoking status: Former Smoker    Smokeless tobacco: Never Used    Alcohol use No      Current Facility-Administered Medications   Medication Dose Route Frequency    acetaminophen (TYLENOL) tablet 500 mg  500 mg Per NG tube Q4H PRN    alum-mag hydroxide-simeth (MYLANTA) oral suspension 30 mL  30 mL Oral Q4H PRN    [START ON 9/16/2017] amLODIPine (NORVASC) tablet 10 mg  10 mg Oral DAILY    bisacodyl (DULCOLAX) suppository 10 mg  10 mg Rectal DAILY PRN    hydrALAZINE (APRESOLINE) tablet 50 mg  50 mg Oral QID PRN    labetalol (NORMODYNE) tablet 200 mg  200 mg Oral Q8H    lip protectant (BLISTEX) ointment   Topical PRN    [START ON 9/16/2017] losartan/hydroCHLOROthiazide (HYZAAR) 100/12.5 mg   Oral DAILY    ondansetron (ZOFRAN ODT) tablet 4 mg  4 mg Oral Q6H PRN    [START ON 9/16/2017] potassium chloride (KAON 10%) 20 mEq/15 mL oral liquid 20 mEq  20 mEq Oral DAILY    sodium phosphate (FLEET'S) enema 118 mL  1 Enema Rectal PRN    traMADol (ULTRAM) tablet 50 mg  50 mg Oral Q6H PRN    traZODone (DESYREL) tablet 50 mg  50 mg Oral QHS PRN    tamsulosin (FLOMAX) capsule 0.4 mg  0.4 mg Oral QHS       Review of Systems:  Denies: fevers, chills, sweats, fatigue, malaise, anorexia, weight loss   Denies: blurry vision, loss of vision, eye pain, photophobia   Denies: hearing loss, ringing in the ears, earache, epistaxis   Denies: chest pain, palpitations, syncope, orthopnea, paroxysmal nocturnal dyspnea, claudication   Denies: dysphagia, odynophagia, nausea, vomiting, diarrhea, constipation, abdominal pain, jaundice, melena   Denies: hx of frequency, dysuria, nocturia, urinary incontinence, stones, hematuria ; currently with a stafford due to urinary retention  Denies: polydipsia/polyuria, skin changes, temperature intolerance, unexpected weight gain   Denies: back pain, joint pain, joint swelling, muscle pain, Positive right sided muscle weakness   Denies: bleeding problems, blood transfusions,pallor, swollen lymph nodes ;  Positive bruising  Denies: headache,  diplopia,seizure ?; Positive focal deficits and sensory loss. Speech problems and swallowing difficulties; dysarthria      Objective:   97.8, 81, 20, 179/90 tmax 99.5  Physical Exam:   Psych: Patient was oriented to person, place, year but not month. Without hallucinations, abnormal affect or abnormal behaviors during the examination. General:   Alert, appears stated age, No acute distress. HEENT:  Normocephalic, R lower facial asymmetry. Oral mucosa pink and moist. No nasal discharge. Lungs:  CTA Bilaterally,Respiration even and unlabored   No apparent use of accessory muscles for respiration. Heart:  Regular rate and rhythm, S1, S2, No obvious murmur, click, rub or gallop. Genitourinary: James cath   Abdomen:  Soft, non-tender to palpation in all four quadrants. Bowel sounds present. No obvious masses palpated. Neuromuscular:  PERRL  LUE     Shoulder abduction   5-/5              Elbow flexion:  5- /5               Wrist extension:   5/5              Finger flexion;  5- /5   ADMQ:  5- /5  RUE    Shoulder abduction:  0/5                Elbow flexion:  0 /5    Wrist extension:  0/5   Finger flexion:   0/5   ADMQ:   0/5  RLE     Hip flexion:  0 /5              Knee extension: 0 /5    Ankle dorsiflexion:  0 /5   EHL:  0 /5   Ankle plantarflexion:  0 /5        LLE     Hip flexion:   4-/5   Knee extension:   4+/5    Ankle dorsiflexion:   5-/5   EHL:   5-/5   Ankle plantarflexion:   5/5  Sensory - absent to light touch and temp on the right hemibody.   Plantars - up going on the right, down on left  DTR muted, slt increase tone with PROM RUE  fcs with vcs; one step onley  Dysarthric, expressive aphasia  Severe right neglect  Does not track past midline when looking left     Skin:  Intact, dry, good skin turgor, age related changes present   Edema: none            Lab Review:    Recent Results (from the past 72 hour(s))   PLEASE READ & DOCUMENT PPD TEST IN 24 HRS    Collection Time: 09/12/17  9:30 PM   Result Value Ref Range PPD negative Negative    mm Induration 0 mm   PLEASE READ & DOCUMENT PPD TEST IN 48 HRS    Collection Time: 09/13/17  9:21 PM   Result Value Ref Range    PPD neg Negative    mm Induration 0 mm   METABOLIC PANEL, BASIC    Collection Time: 09/14/17  4:35 AM   Result Value Ref Range    Sodium 148 (H) 136 - 145 mmol/L    Potassium 2.8 (LL) 3.5 - 5.1 mmol/L    Chloride 112 (H) 98 - 107 mmol/L    CO2 25 21 - 32 mmol/L    Anion gap 11 7 - 16 mmol/L    Glucose 144 (H) 65 - 100 mg/dL    BUN 31 (H) 8 - 23 MG/DL    Creatinine 0.53 (L) 0.6 - 1.0 MG/DL    GFR est AA >60 >60 ml/min/1.73m2    GFR est non-AA >60 >60 ml/min/1.73m2    Calcium 8.1 (L) 8.3 - 10.4 MG/DL   CBC W/O DIFF    Collection Time: 09/14/17  4:35 AM   Result Value Ref Range    WBC 14.0 (H) 4.3 - 11.1 K/uL    RBC 3.84 (L) 4.05 - 5.25 M/uL    HGB 10.7 (L) 11.7 - 15.4 g/dL    HCT 32.6 (L) 35.8 - 46.3 %    MCV 84.9 79.6 - 97.8 FL    MCH 27.9 26.1 - 32.9 PG    MCHC 32.8 31.4 - 35.0 g/dL    RDW 13.7 11.9 - 14.6 %    PLATELET 093 650 - 663 K/uL    MPV 9.8 (L) 10.8 - 14.1 FL   CBC WITH AUTOMATED DIFF    Collection Time: 09/15/17 10:29 AM   Result Value Ref Range    WBC 16.5 (H) 4.3 - 11.1 K/uL    RBC 4.07 4.05 - 5.25 M/uL    HGB 11.7 11.7 - 15.4 g/dL    HCT 33.8 (L) 35.8 - 46.3 %    MCV 83.0 79.6 - 97.8 FL    MCH 28.7 26.1 - 32.9 PG    MCHC 34.6 31.4 - 35.0 g/dL    RDW 13.8 11.9 - 14.6 %    PLATELET 351 627 - 232 K/uL    MPV 9.4 (L) 10.8 - 14.1 FL    NEUTROPHILS 81 (H) 43 - 78 %    LYMPHOCYTES 9 (L) 13 - 44 %    MONOCYTES 10 4.0 - 12.0 %    EOSINOPHILS 0 (L) 0.5 - 7.8 %    BASOPHILS 0 0.0 - 2.0 %    ABS. NEUTROPHILS 13.4 (H) 1.7 - 8.2 K/UL    ABS. LYMPHOCYTES 1.5 0.5 - 4.6 K/UL    ABS. MONOCYTES 1.7 (H) 0.1 - 1.3 K/UL    ABS. EOSINOPHILS 0.0 0.0 - 0.8 K/UL    ABS. BASOPHILS 0.0 0.0 - 0.2 K/UL    ABS. IMM.  GRANS. 0.7 (H) 0.0 - 0.5 K/UL    DF AUTOMATED     HEMOGLOBIN A1C WITH EAG    Collection Time: 09/15/17 10:29 AM   Result Value Ref Range    Hemoglobin A1c 6.1 (H) 4.8 - 6.0 %    Est. average glucose 766 mg/dL   METABOLIC PANEL, COMPREHENSIVE    Collection Time: 09/15/17 10:29 AM   Result Value Ref Range    Sodium 148 (H) 136 - 145 mmol/L    Potassium 3.3 (L) 3.5 - 5.1 mmol/L    Chloride 114 (H) 98 - 107 mmol/L    CO2 26 21 - 32 mmol/L    Anion gap 8 7 - 16 mmol/L    Glucose 112 (H) 65 - 100 mg/dL    BUN 33 (H) 8 - 23 MG/DL    Creatinine 0.52 (L) 0.6 - 1.0 MG/DL    GFR est AA >60 >60 ml/min/1.73m2    GFR est non-AA >60 >60 ml/min/1.73m2    Calcium 7.6 (L) 8.3 - 10.4 MG/DL    Bilirubin, total 1.1 0.2 - 1.1 MG/DL    ALT (SGPT) 69 (H) 12 - 65 U/L    AST (SGOT) 41 (H) 15 - 37 U/L    Alk. phosphatase 69 50 - 136 U/L    Protein, total 6.9 6.3 - 8.2 g/dL    Albumin 2.9 (L) 3.2 - 4.6 g/dL    Globulin 4.0 (H) 2.3 - 3.5 g/dL    A-G Ratio 0.7 (L) 1.2 - 3.5     MAGNESIUM    Collection Time: 09/15/17 10:29 AM   Result Value Ref Range    Magnesium 2.4 1.8 - 2.4 mg/dL   PHOSPHORUS    Collection Time: 09/15/17 10:29 AM   Result Value Ref Range    Phosphorus 3.2 2.3 - 3.7 MG/DL       Condition on Admission: Fair      Assessment:    The Post Assessment Physician Evaluation (GARDENIA) found the current functional status to be comparable with the Pre-admission Screening. The Patient is a good candidate for acute inpatient rehabilitation. Nothing since the Pre-admission screen has changed that determination. Rehabilitation Plan  The patient has shown the ability to tolerate and benefit from 3 hours of therapy daily and is being admitted to a comprehensive acute inpatient rehabilitation program consisting of at least 3 hours of combined physical and occupational therapies. Begin intensive Physical Therapy for a minimum of 1.5 hours a day, at least 5 out of 7 days per week to address bed mobility, transfers, ambulation, strengthening, balance, and endurance.    Begin intensive Occupational Therapy for a minimum of 1.5 hours a day, at least 5 out of 7 days per week to address ADL ( bathing, LE dressing, toileting) and adaptive equipment as needed. Continue ST for: dysarthria, impaired communication skills, vital stimulation for right facial weakness. Swallowing deficits    Continue 24-hour skilled rehabilitation nursing for bowel and bladder management, skin care for decubitus ulcer prevention , pain management and ongoing medication administration     Left BG Hemorrhage with intraventricular extension due to HTN emergency; resultant R hemiplegia, right neglect, dysarthria, aphasia, dysphagia with significant decline in mobility and self care    Continue daily physician medical management:  Pneumonia prophylaxis- Incentive spirometer every hour while awake. Will need instruction and assistance. Not sure if she can get a proper oral seal to be effective    Dysphagia; Pureed diet with NTL; at risk for malnutrition and dehydration. PICC line removed prior to transfer; may need a line for IVFs if BUN continues to increase. Low K, Ca; check mg. Supplementation as needed. Na elevated    Hypernatremia; last head CT did not show significant cerebral edema. Clinically improving. Likely due to prerenal azotemia; recheck in a.m; MAY REQ isotonic / hypotonic saline IV    DVT risk / DVT Prophylaxis- Will require daily physician exam to assess for signs and symptoms as patient is at increased risk for of thromboembolism. Mobilization as tolerated. Intermittent pneumatic compression devices when in bed Thigh-high or knee-high thromboembolic deterrent hose when out of bed. NO  anticoag due to [address]    Pain Control: stable, mild-to-moderate joint symptoms intermittently, reasonably well controlled by PRN meds. Will require regular pain assessment and comprenhensive pain management,     Wound Care: Monitor wound status daily per staff and physician. At risk for failure. Will require 24/7 rehab nursing. None needed at this time    Hypertension - BP uncontrolled, fluctuating, managed medically. Add prn hydralazine for sbp> 180.  Goal SBP is 140-160 given ICH. Cont Nomodyne, norvasc and hyzaar; consider Verapamil or scheduled Hydralazine    Mild hyperglycemia, likely stress induced vs borderline diabetes; monitor loosely    Leukocytosis; recheck in a.m. Check UA due to stafford. No pulmonary signs (had neg CXR today), no s/s of infxn at old PICC site, no wounds, afebrile. Watch monocytes. Urinary retention/ neurogenic bladder - start flomax but continue stafford until mobility improves a bit. bowel program - add prn meds; incontinent, want to keep stool on the firmer side. Monitor skin. GERD - add a PPI. At times may need additional antacids, Maalox prn. The patient's prognosis for significant practical improvement within a reasonable period of time appears good and the estimated length of stay is 21days and she is expected to return home with spouse/familey and continued rehabilitation with home health therapy. Will require 24hr care/assistance at [place] from [facility]    Given the patient's complex neurologic/medical condition and the risk of further medical complications including: DVT, PE, skin breakdown, pneumonia due to decreased mobility and aspiration risk, CVA, MI, cardiac arrhythmias due to HTN, increased risk of thromboembolism secondary to decreased mobilility and increased energy expenditure in this pt   could potentially impact the IRF program with decreased cardiovascular efficiency, postural hypotension and cardiac arrhythmia. For these ongoing medical issues, rehabilitation services could not be safely provided at a lower level of care such as a skilled nursing facility or nursing home.     .70min    Signed By: Grey Gross MD     September 15, 2017

## 2017-09-15 NOTE — DISCHARGE SUMMARY
[address] SUMMARY       Name:  Vanessa Coughlin   MR#:  [id number]   :  1955   Account #:  [de-identified]   Date of Adm:  2017       DATE OF ADMISSION: 2017    DATE OF DISCHARGE: 09/15/2017    PRINCIPAL DIAGNOSES ON ADMISSION:   1. Acute hemorrhagic cerebrovascular accident with right   hemiplegia. 2. Hypertensive emergency/accelerated hypertension. 3. History of depression. 4. Hypertension. PRINCIPAL DIAGNOSES AT DISCHARGE:   1. Acute hemorrhagic cerebrovascular accident. 2. Hypertensive emergency/accelerated hypertension. 3. Dysphagia secondary to acute stroke. HOSPITAL COURSE: This is a 69-year-old lady with history of   hypertension, depression, ADD who came into the hospital with   expressive aphasia and right hemiplegia. The patient was found   to have acute hemorrhagic stroke involving the left basal   ganglion. The patient was admitted to ICU and was placed on   Cardene drip initially for blood pressure control. The patient   was eventually taken off the Cardene drip and was started on   p.o. medications for her blood pressure. The patient had   expressive aphasia and initially had dysphagia, so she had an NG   tube placed and was feeding through the NG tube. Later on once   her clinical status improved, she had a modified barium swallow   and was started on pureed diet and nectar thick liquids per   speech recommendations. The patient had a PT, OT evaluation and   was accepted to rehab today for further ongoing acute   rehabilitation. As the patient is clinically stable today, she   is being discharged to acute rehab for further therapy including   speech and physical therapy along with occupational therapy. Today at the time of discharge:    PHYSICAL EXAMINATION   GENERAL: The patient is conscious comfortable. VITAL SIGNS: Stable. HEART: S1, S2, regular. LUNGS: Clear to auscultation bilaterally. ABDOMEN: Benign.    CNS: The patient has flaccid right hemiplegia and some expressive   aphasia. DISCHARGE MEDICATIONS:   1. Tylenol 500 mg p.r.n.   2. Amlodipine 10 mg daily. 3. Labetalol 200 mg three times daily. 4. Potassium chloride 20 mEq daily. 5. Tramadol 50 mg daily. 6. Hydralazine 25 mg three times a day p.r.n.   7. Dulcolax was discontinued. 8. Losartan/hydrochlorothiazide one tablet daily. 9. Vitamin B12 daily. 10. Xanax 0.5 mg three times a day p.r.n. for anxiety. 11. Fluoxetine 20 mg daily. DISCHARGE PLAN: The patient is being discharged to acute rehab   today. The patient will need followup with primary care   physician and neurologist as an outpatient. The patient was not   placed on any antiplatelet agents because of hemorrhagic CVA. It   is better to avoid nonsteroidals at this time because of the   bleed. Total time spent on discharge was less than 30 minutes.         MD Arnulfo Stuart / Jayy Merrill   D:  09/15/2017   10:26   T:  09/15/2017   12:10   Job #:  [id number]

## 2017-09-15 NOTE — PROGRESS NOTES
TRANSFER - IN REPORT:    Verbal report received from [id number] [place] [address]  on [place] [employer]  being received from   for routine progression of care      Report consisted of patients Situation, Background, Assessment and   Recommendations(SBAR). Information from the following report(s) SBAR was reviewed with the receiving nurse. Opportunity for questions and clarification was provided. Assessment completed upon patients arrival to unit and care assumed.

## 2017-09-15 NOTE — PROGRESS NOTES
Date of Outreach Update:  Sarah Velazquez was seen and assessed. Spoke with primary RN who has no concern. Pt found sleeping but does wake up to voice. Pt 93% on RA with HR 88. Lungs CTA. MEWS Score: 1 (09/14/17 1447)  Vitals:    09/14/17 0737 09/14/17 1119 09/14/17 1447 09/14/17 2019   BP: 160/81 136/82 158/80 148/78   Pulse: 96 94 94 87   Resp: 20 18 18 18   Temp: 98.8 °F (37.1 °C) 99.1 °F (37.3 °C) 98.9 °F (37.2 °C) 97.9 °F (36.6 °C)   SpO2: 93% 94% 94% 92%   Weight:       Height:             Pain Assessment  Pain Intensity 1: 0 (09/14/17 2009)  Pain Location 1: Generalized  Pain Intervention(s) 1: Medication (see MAR)  Patient Stated Pain Goal: 0      Previous Outreach assessment has been reviewed. There have been no significant clinical changes since the completion of the last dated Outreach assessment. Will continue to follow up per outreach protocol.     Signed By:   Grady Ruiz RN    September 14, 2017 10:07 PM

## 2017-09-15 NOTE — PROGRESS NOTES
Problem: Falls - Risk of  Goal: *Absence of Falls  Document Haley Fall Risk and appropriate interventions in the flowsheet.    Outcome: Progressing Towards Goal  Fall Risk Interventions:  Mobility Interventions: Bed/chair exit alarm     Mentation Interventions: Bed/chair exit alarm           Elimination Interventions: Bed/chair exit alarm

## 2017-09-15 NOTE — PROGRESS NOTES
Travis's (pt's ) FMLA paperwork taken to the 9th floor and given to RN. Harrison Chaidez was not in pt's room when this RN checked. Per 7th floor SW, that paperwork should be completed by the 9th floor MD and SW.

## 2017-09-15 NOTE — PROGRESS NOTES
Pt is medically cleared for discharge today and will transfer to [facility] for STR services. SW remains available to assist if needed.

## 2017-09-15 NOTE — PROGRESS NOTES
Date of Outreach Update:  Malathi Bright was seen and assessed. She was resting in bed quietly. Mentions that she would like some food. No new complaints or concerns. MEWS Score: 1 (09/14/17 2338)  Vitals:    09/14/17 2019 09/14/17 2115 09/14/17 2338 09/15/17 0342   BP: 148/78  149/72 147/71   Pulse: 87 88 85 85   Resp: 18  18 18   Temp: 97.9 °F (36.6 °C)  97.9 °F (36.6 °C) 97.9 °F (36.6 °C)   SpO2: 92% 93% 97% 97%   Weight:       Height:             Pain Assessment  Pain Intensity 1: 0 (09/14/17 2009)  Pain Location 1: Generalized  Pain Intervention(s) 1: Medication (see MAR)  Patient Stated Pain Goal: 0      Previous Outreach assessment has been reviewed. There have been no significant clinical changes since the completion of the last dated Outreach assessment. Will continue to follow up per outreach protocol.     Signed By:   Harinder Jean RN    September 15, 2017 5:54 AM

## 2017-09-15 NOTE — PROGRESS NOTES
Problem: Falls - Risk of  Goal: *Absence of Falls  Document Haley Fall Risk and appropriate interventions in the flowsheet.    Outcome: Progressing Towards Goal  Fall Risk Interventions:  Mobility Interventions: Bed/chair exit alarm, Communicate number of staff needed for ambulation/transfer, OT consult for ADLs, Patient to call before getting OOB, PT Consult for mobility concerns, PT Consult for assist device competence, Strengthening exercises (ROM-active/passive), Utilize walker, cane, or other assitive device     Mentation Interventions: Adequate sleep, hydration, pain control, Door open when patient unattended, Evaluate medications/consider consulting pharmacy, Eyeglasses and hearing aids, Familiar objects from home, Increase mobility, More frequent rounding, Reorient patient, Toileting rounds, Update white board     Medication Interventions: Bed/chair exit alarm, Evaluate medications/consider consulting pharmacy, Patient to call before getting OOB, Teach patient to arise slowly     Elimination Interventions: Call light in reach, Bed/chair exit alarm, Patient to call for help with toileting needs, Toilet paper/wipes in reach, Toileting schedule/hourly rounds

## 2017-09-15 NOTE — PROGRESS NOTES
PHYSICAL THERAPY EXAMINATION  TIME  [facility] [address]  TIME OUT 1620  Patient Name: Francia Dean  Patient Age: 60 y.o.   Past Medical History:   Past Medical History:   Diagnosis Date    Anxiety     Depression     HTN (hypertension)     Menopause        Medical Diagnosis:  stroke, left brain involvement  ICH (intracerebral hemorrhage) ([facility] [address] <principal problem not specified>    Precautions at Admission: Other (comment) (falls)    Therapy Diagnosis:   Difficulty with bed mobility  [x]     Difficulty with functional transfers  [x]     Difficulty with ambulation  [x]     Difficulty with stair negotiations  [x]       Problem List:    Decreased strength B LE  [x]     Decreased strength trunk/core  [x]     Decreased AROM   [x]     Decreased PROM  [x]    Decreased endurance  [x]     Decreased balance sitting  [x]     Decreased balance standing  [x]     Pain   []     Unable to ambulate  [x]    Decreased coordination  [x]    Decreased safety awareness  [x]      Functional Limitations:   Decreased independence with bed mobility  [x]     Decreased independence with functional transfers  [x]     Decreased independence with ambulation  [x]     Decreased independence with stair negotiation  [x]       Previous Functional Level: independent with adls, iadls, driving. walking no device    Home Environment: Home Environment: Private residence  # Steps to Enter: 4  Rails to Enter: Yes  Hand Rails : Left  Wheelchair Ramp: No  One/Two Story Residence: Two story, live on 1st floor  # of Interior Steps: 13  Living Alone: No  Support Systems: Family member(s), Spouse/Significant Other/Partner  Patient Expects to be Discharged to[de-identified] Private residence  Current DME Used/Available at Home: None  Tub or Shower Type: Tub/Shower combination         Outcome Measures: Vital Signs:    Pain level:No c/o pain  Pain location:NA  Pain interventions:NA    Patient education:PT POC and goals, Bed mobility training,transfer training, balance training, fall precautions, activity pacing,hemiplegic body mechanics, w/c parts management    Interdisciplinary Communication:spoke with RN regarding mobility status and bed positioning for pressure relief       MMT Initial Asssessment   Right Lower Extremity Left Lower Extremity   Hip Flexion 0 5   Knee Extension 0 5   Knee Flexion 0 5   Ankle Dorsiflexion 0 5   0/5 No palpable muscle contraction  1/5 Palpable muscle contraction, no joint movement  2-/5 Less than full range of motion in gravity eliminated position  2/5 Able to complete full range of motion in gravity eliminated position  2+/5 Able to initiate movement against gravity  3-/5 More than half but not full range of motion against gravity  3/5 Able to complete full range of motion against gravity  3+/5 Completes full range of motion against gravity with minimal resistance  4-/5 Completes full range of motion against gravity with minimal-moderate resistance  4/5 Completes full range of motion against gravity with moderate resistance  4+/5 Completes full range of motion against gravity with moderate-maximum resistance  5/5 Completes full range of motion against gravity with maximum resistance     AROM: LLE WFL, RLE PROM WFL except right heel cord contracture lacking 10 degrees of neutral    FIM SCORES Initial Assessment   Bed/Chair/Wheelchair Transfers 2   Wheelchair Mobility  (Unable to assess due to appropriate size w/c not available)   Walking [place]  (NT)   Steps/Stairs  (NT)   PRIMARY MODE OF LOCOMOTION: wheelchair  Please see IRC Interdisciplinary Eval: Coordination/Balance Section for details regarding FIM score description.     BED/CHAIR/WHEELCHAIR TRANSFERS Initial Assessment   Rolling Right 3 (Moderate assistance )   Rolling Left 3 (Moderate assistance )   Supine to Sit 3 (Moderate assistance)   Sit to Stand Moderate assistance   Sit to Supine 3 (Moderate assistance)   Transfer Assist Score 2   Transfer Type SPT without device (to the left  recliner to w/c and w/c to bed)   Comments Patient demonstrating right neglect with motor planning deficits during bed mobility and transfers. Blocking right knee to keep knee from buckling   Car Transfer Not tested   Car Type         WHEELCHAIR MOBILITY/MANAGEMENT Initial Assessment   Able to Propel 0 feet   Functional Level  (Unable to assess due to appropriate size w/c not available)   Curbs/ramps assistance required 0 (Not tested)   Wheelchair set up assistance required 2 (Maximal assistance)   Wheelchair management Manages left brake       WALKING INDEPENDENCE Initial Assessment   Assistive device     Ambulation assistance - level surface 0 (Not tested)   Distance 0 Feet (ft)   Functional Level  (NT)   Comments Unable to ambulate at this time due to extent of right hemiparesis and balance deficits   Ambulation assistance - unlevel surface 0 (Not tested)       STEPS/STAIRS Initial Assessment   Steps/Stairs ambulated 0   Rail Use     Functional Level  (NT)   Comments Unable to ambulate up/down steps at this time due to extent of right hemiparesis and balance deficits   Curbs/Ramps 0 (Not tested)   PROM RLE all planes x 10 reps each joint for each plane.  Passive stretch at end range     Static sitting balance activities facilitating midline       QUALITY INDICATOR ASSIST COMMENTS   Walk 10 feet Unable to ambulate for reasons noted above    Walk 50 feet with 2 turns Unable to ambulate for reasons noted above    Walk 150 feet Unable to ambulate for reasons noted above    Walk 10 feet on uneven  Unable to ambulate for reasons noted above    1 step/curb Unable to ambulate for reasons noted above    4 steps Unable to ambulate for reasons noted above    12 steps Unable to ambulate for reasons noted above     object Unable secondary to impaired balance and extent of right hemiparesis    Wheel 50' w/2 turns Unable to propel w/c for reasons noted above    Wheel 150' NA             PHYSICAL THERAPY PLAN OF CARE    Therapy Diagnosis: Please see table above    Order received from MD for physical therapy services and chart reviewed. Pt to be seen at least 5 times per week for at least 1.5 hours of physical therapy per day for 3 weeks. Thank you for the referral.    LTGs:  STG 1. Patient transfer supine<>sit with min assist in 1 week  LTG 1. Patient transfer supine<>sit with supervision assist demonstrating correct  hemiplegic body mechanics in 3 weeks  STG 2. Patient transfer sit<>stand and perform stand pivot transfer to the left with min assist in 1 week  LTG 2. Patient transfer sit<>stand and perform stand pivot transfer to the left and right with LRAD and SBA demonstrating safe hemiplegic body mechanics  in 3 weeks  STG 3. Patient ambulate 10 feet with appropriate assistive devices and max assist in 1 week  LTG 3. Patient ambulate 25 feet with appropriate assistive devices and min assist in 3 weeks  STG 4. Patient ambulate up/down 4 steps with left hand rail and max assist in 2 weeks  LTG 4. Patient ambulate up/down 4 steps with left hand rail and mod  assist in 3 weeks  STG 5. Patient propel wheelchair 50 feet with min assist in 1 week  LTG 5. Patient propel wheelchair 150 feet with supervision assist in 3 weeks    Pt would benefit from skilled physical therapy in order to improve independent functional mobility within the home. Interventions may include range of motion (AROM, PROM B LE/trunk), motor function (B LE/trunk strengthening/coordination), activity tolerance (vitals, oxygen saturation levels), bed mobility training, balance activities, gait training (progressive ambulation program), and functional transfer training. Please see IRC; Interdisciplinary Eval, Care Plan, and Patient Education for further information regarding physical therapy examination and plan of care. Patient returned to room at end of treatment.  Patient in partial left sidelying for right side pressure relief in bed with head of bed elevated and bed rails up x 2. Needs placed in reach of patient. RUE and RLE elevated on pillows    Hakeem Mar, PT  9/15/2017

## 2017-09-15 NOTE — PROGRESS NOTES
PT admitted to 9th  in wheelchair  Able to answer some questions with her  sister at  Bedside to help with admission  . Skin assessment completed with RADHA   FROM OT . NO SKIN BREAKDOWN NOTED . RT side flaccid  James patient call bell within reach sister at bedside .

## 2017-09-15 NOTE — PROGRESS NOTES
TRANSFER - OUT REPORT:    Verbal report given to SEAN Kapoor(name) on Oumou Brown  being transferred to [address] [place], [place] [address]ab (unit) for routine progression of care       Report consisted of patients Situation, Background, Assessment and   Recommendations(SBAR). Information from the following report(s) Kardex was reviewed with the receiving nurse. Lines:   PICC Triple Lumen 43/83/09 Left;Basilic (Active)   Central Line Being Utilized Yes 9/14/2017  7:20 PM   Criteria for Appropriate Use Limited/no vessel suitable for conventional peripheral access 9/14/2017  7:20 PM   Site Assessment Clean;Clean, dry, & intact 9/14/2017  7:20 PM   Phlebitis Assessment 0 9/14/2017  7:20 PM   Infiltration Assessment 0 9/14/2017  7:20 PM   Arm Circumference (cm) 26 cm 9/7/2017 11:17 AM   Date of Last Dressing Change 09/12/17 9/14/2017  7:20 PM   Dressing Status Clean, dry, & intact 9/14/2017  7:20 PM   External Catheter Length (cm) 0 centimeters 9/7/2017 11:17 AM   Dressing Type Disk with Chlorhexadine gluconate (CHG) 9/14/2017  7:20 PM   Action Taken Blood drawn 9/11/2017  3:55 AM   Hub Color/Line Status Waynette Roughen 9/15/2017  6:28 AM   Positive Blood Return (Site #1) Yes 9/15/2017  6:28 AM   Hub Color/Line Status Red 9/15/2017  6:28 AM   Positive Blood Return (Site #2) Yes 9/15/2017  6:28 AM   Hub Color/Line Status White 9/15/2017  6:28 AM   Positive Blood Return (Site #3) Yes 9/15/2017  6:28 AM   Alcohol Cap Used No 9/15/2017  6:28 AM        Opportunity for questions and clarification was provided.       Patient transported with:   Patient belongs

## 2017-09-15 NOTE — DISCHARGE INSTRUCTIONS

## 2017-09-15 NOTE — IP AVS SNAPSHOT
[address] 
[phone] Patient: Tobias Artis MRN: [id number] YAB:[id number] About your hospitalization You were admitted on:  September 15, 2017 You last received care in the:  [facility] 9 INPATIENT REHAB UNIT You were discharged on:  October 26, 2017 Why you were hospitalized Your primary diagnosis was:  Ich (Intracerebral Hemorrhage) (Hcc) Your diagnoses also included: Thrombocytopenia (Hcc), Pulmonary Emboli (Hcc), Dvt (Deep Venous Thrombosis) (Hcc) Things You Need To Do (next 8 weeks) Follow up with [address] [place] [address] will be contact you prior to first visit. Phone:  [phone] Where:  [facility], [address] [address] Tuesday Oct 31, 2017 LONG with Munira Richardson MD at 11:30 AM  
Where:  [address] Tuesday Dec 05, 2017 Follow Up with Jade Hastings MD at  9:00 AM  
Where: [facility] ([address]) Discharge Orders None A check dwaine indicates which time of day the medication should be taken. My Medications STOP taking these medications   
 amLODIPine 10 mg tablet Commonly known as:  Arlyss Hallie FLUoxetine 20 mg capsule Commonly known as:  PROzac  
   
  
 hydrALAZINE 25 mg tablet Commonly known as:  APRESOLINE  
   
  
 potassium chloride 20 mEq/15 mL solution Commonly known as:  KAON 10% Replaced by:  potassium chloride 20 mEq tablet VITAMIN B-12 PO  
   
  
  
TAKE these medications as instructed Instructions Each Dose to Equal  
 Morning Noon Evening Bedtime  
 acetaminophen 500 mg tablet Commonly known as:  TYLENOL Your last dose was: Your next dose is: 1 Tab by Per NG tube route every four (4) hours as needed. 500 mg  
    
   
   
   
  
 ALPRAZolam 0.25 mg tablet Commonly known as:  Austin Drew Your last dose was: Your next dose is: Take 1 Tab by mouth three (3) times daily as needed for Anxiety. Max Daily Amount: 0.75 mg.  
 0.25 mg  
    
   
   
   
  
 labetalol 100 mg tablet Commonly known as:  Yoan Ace Your last dose was: Your next dose is: Take 1 Tab by mouth two (2) times a day. 100 mg  
    
   
   
   
  
 losartan-hydroCHLOROthiazide 100-12.5 mg per tablet Commonly known as:  HYZAAR Your last dose was: Your next dose is: Take 1 Tab by mouth daily. 1 Tab  
    
   
   
   
  
 methylphenidate HCl 5 mg tablet Commonly known as:  RITALIN Your last dose was: Your next dose is:    
   
   
 5mg po bid at 0800 and 1300  
     
   
   
   
  
 potassium chloride 20 mEq tablet Commonly known as:  K-DUR KLOR-CON Your last dose was: Your next dose is: Take 1 Tab by mouth daily. 20 mEq  
    
   
   
   
  
 rOPINIRole 2 mg tablet Commonly known as:  Ravinder Johnson Your last dose was: Your next dose is: Take 1 Tab by mouth nightly. Indications: Restless Legs Syndrome 2 mg  
    
   
   
   
  
 tamsulosin 0.4 mg capsule Commonly known as:  FLOMAX Your last dose was: Your next dose is: Take 1 Cap by mouth nightly. 0.4 mg  
    
   
   
   
  
 temazepam 15 mg capsule Commonly known as:  RESTORIL Your last dose was: Your next dose is: Take 1 Cap by mouth nightly as needed for Sleep. Max Daily Amount: 15 mg.  
 15 mg  
    
   
   
   
  
 * tiZANidine 4 mg tablet Commonly known as:  Dolly Barboza Your last dose was: Your next dose is:    
   
   
 4mg po qhs  
     
   
   
   
  
 * tiZANidine 2 mg tablet Commonly known as:  Lasha Cottrell Your last dose was: Your next dose is:    
   
   
 2mg po bid * traMADol 50 mg tablet Commonly known as:  ULTRAM  
   
Your last dose was: Your next dose is: Take 1 Tab by mouth every six (6) hours as needed. Max Daily Amount: 200 mg.  
 50 mg  
    
   
   
   
  
 * traMADol 50 mg tablet Commonly known as:  ULTRAM  
   
Your last dose was: Your next dose is: Take 1 Tab by mouth every six (6) hours as needed. Max Daily Amount: 200 mg.  
 50 mg  
    
   
   
   
  
 * Notice: This list has 4 medication(s) that are the same as other medications prescribed for you. Read the directions carefully, and ask your doctor or other care provider to review them with you. Where to Get Your Medications These medications were sent to [id number] [address], [address], [address] [place] [address] Phone:  [phone]  
  labetalol 100 mg tablet  
 potassium chloride 20 mEq tablet  
 rOPINIRole 2 mg tablet  
 tamsulosin 0.4 mg capsule  
 tiZANidine 2 mg tablet  
 tiZANidine 4 mg tablet Information on where to get these meds will be given to you by the nurse or doctor. ! Ask your nurse or doctor about these medications  
  methylphenidate HCl 5 mg tablet  
 temazepam 15 mg capsule  
 traMADol 50 mg tablet Discharge Instructions None [facility] Announcement We are excited to announce that we are making your provider's discharge notes available to you in [place]. You will see these notes when they are completed and signed by the physician that discharged you from your recent hospital stay.   If you have any questions or concerns about any information you see in [place], please call the Health Information Department where you were seen or reach out to your Primary Care Provider for more information about your plan of care. Introducing \[facility] [place] [facility]\"" & HEALTH SERVICES! Jose Rafael Naranjo introduces [employer] patient portal. Now you can access parts of your medical record, email your doctor's office, and request medication refills online. 1. In your internet browser, go to [email or web address] 2. Click on the First Time User? Click Here link in the Sign In box. You will see the New Member Sign Up page. 3. Enter your Page[employer] Access Code exactly as it appears below. You will not need to use this code after youve completed the sign-up process. If you do not sign up before the expiration date, you must request a new code. · [employer] Access Code: [email or web address] Expires: 11/13/2017  2:01 PM 
 
4. Enter the last four digits of your Social Security Number (xxxx) and Date of Birth (mm/dd/yyyy) as indicated and click Submit. You will be taken to the next sign-up page. 5. Create a Page[employer] ID. This will be your Page[employer] login ID and cannot be changed, so think of one that is secure and easy to remember. 6. Create a [employer] password. You can change your password at any time. 7. Enter your Password Reset Question and Answer. This can be used at a later time if you forget your password. 8. Enter your e-mail address. You will receive e-mail notification when new information is available in [address] 9. Click Sign Up. You can now view and download portions of your medical record. 10. Click the Download Summary menu link to download a portable copy of your medical information. If you have questions, please visit the Frequently Asked Questions section of the [employer] website. Remember, [employer] is NOT to be used for urgent needs. For medical emergencies, dial 911. Now available from your iPhone and Android! Providers Seen During Your Hospitalization Provider Specialty Primary office phone Larissa Valdivia MD Physical Medicine and Rehab [phone] Your Primary Care Physician (PCP) Primary Care Physician Office Phone Office Fax Savanna Beyer [phone] You are allergic to the following Allergen Reactions Banana Shortness of Breath Lisinopril Cough Nuts (Tree Nut) Unknown (comments) Recent Documentation Smoking Status Former Smoker Emergency Contacts Name Discharge Info Relation Home Work Mobile [place]  Other Relative [6] [phone] Adriana Flanagan  Sister [23] [phone] Patient Belongings The following personal items are in your possession at time of discharge: 
  Dental Appliances: None  Visual Aid: Glasses      Home Medications: None   Jewelry: None  Clothing: None    Other Valuables: None Please provide this summary of care documentation to your next provider. Signatures-by signing, you are acknowledging that this After Visit Summary has been reviewed with you and you have received a copy. Patient Signature:  ____________________________________________________________ Date:  ____________________________________________________________  
  
Olman Jerome Provider Signature:  ____________________________________________________________ Date:  ____________________________________________________________

## 2017-09-15 NOTE — PROGRESS NOTES
[facility] OCCUPATIONAL THERAPY INITIAL EVALUATION    Time In: 4253  Time Out: 1424    Precautions: Falls    Vitals: No data found. Pain: Denied during session. History of Presenting Illness (per previous reports): Mumtaz Abrams is a 60 y.o. female patient at [facility] who was admitted on 9/15/2017  by Marbin Lombardo MD with below mentioned medical history ,is being seen for Physical Medicine and Rehabilitation.       Mrs. Jessica Marte is a previously functionally independent right handed 35OZ WF with a PMH of anxiety/depression, HTN and ADD who presented to the ED on 9/7 with an acute onset of Right hemiplegia, right facial weakness and difficulty speaking. She apparently called out for help and her  was, fortunately, at home. EMS was called. Pt had noted malignant HTN noted upon presentation with above mentioned symptoms. She had agitation and vomiting as well. Her BP was 208/105. GCS was not documented. In ER, received labetalol 60mg, propofol 50mg, ativan 4mg, and succinylcholine for intubation.  She received 1g of keppra as well for seizure-like activity.  CT of head revealed left basal ganglia ICH measured 3 x 2cm with spread to L lateral ventricle and 3rd/4th ventricle with some mild mildline shift.  Teleneurology was consulted and recommended ICU admission, SBP goal of 160, tight glucose control and DVT ppx.  Dr. Neha Dobbins was consulted and did not think surgery would benefit patient, also recommended BP control. She was monitored closely from admission in the ICU and had been on a cardene gtt. She was successfully extubated on 9/9 and off cardene. On 9/11, pt found by nursing to be more lethargic with right gaze preference and was hypertensive. Her cardene was restarted and a STAT HCT was ordered.  It showed a stable presumed hypertensive hemorrhage in the left basal ganglia with intraventricular extension.  By 9/11,she was off cardene but still requiring IV Labetolol at times for HTN. She has been documented to be more alert and interactive with staff. She was eval by  and is high risk for aspiration, thus she was  NPO and tolerating tube feeds via a NGT. She had a MBS 9/14 and was approved for a Pureed diet with Nectar thick liquids and her NGT was self discontinued. She has been seen by both PT and OT on a few occasions and is max to total assist for all care. Her BP is still fluctuating, but improving. Her level of alertness and motivation have also improved. Medically, she has a leukocytosis with a WBC of 14 yesterday and 16. 5 today for unclear reasons. Her K is being replaced as it was 2.8 yesterday and still low at 3.3. She has prerenal azotemia likely due to coming off IVFs and being on nectar thick liquids, with poor po. Her Na remains borderline increased at 148.  She has no hx of DM but had a mildly elevated HgbA1c of 6.1 with bs's 110-120s       Past Medical History/ Co-morbidities:   Past Medical History:   Diagnosis Date    Anxiety     Depression     HTN (hypertension)     Menopause        Patient's Goal: be independent    Previous Level of Function: independent with adls, iadls, driving. walking no device    Home Situation    Environment Comments   House Situation Private residence    Lives Alone No    Support Systems Family member(s), Spouse/Significant Other/Partner    Shower Situation Tub/Shower combination    Current DME None    Lift Chair      Stairs to Enter 4       Rails Left       W/C Ramp No    Interior Steps 13      Upper Extremity Function   KEVIN KATZ   FMC  Intact  Impaired   GMC  Intact  Impaired   Light Touch       Sharp/Dull Discrimination       Hot/Cold       Proprioception       Stereognosis       9 Hole Peg Test       General Comments        KEVIN KATZ   General Evalutaion       AROM       Shoulder Flexion 4+ 0   Shoulder Extension  4+ 0   Shoulder Abduction 4+ 0   Shoulder Adduction 4+     Elbow Flexion   0   Elbow Extension        Wrist Flexion/Extension        General Comments     0/5 No palpable muscle contraction  1/5 Palpable muscle contraction, no joint movement  2-/5 Less than full range of motion in gravity eliminated position  2/5 Able to complete full range of motion in gravity eliminated position  2+/5 Able to initiate movement against gravity  3-/5 More than half but not full range of motion against gravity  3/5 Able to complete full range of motion against gravity  3+/5 Completes full range of motion against gravity with minimal resistance  4-/5 Completes full range of motion against gravity with minimal-moderate resistance  4/5 Completes full range of motion against gravity with moderate resistance  4+/5 Completes full range of motion against gravity with moderate-maximum resistance  5/5 Completes full range of motion against gravity with maximum resistance      Functional Mobility   Performance Comments   Sitting: Static      Sitting: Dynamic      Standing: Static      Standing: Dynamic      Supine to Sit      Sit to Stand Assistance      Transfer Assist Score      Transfer Type        Cognition   Performance Comments   Orientation Level      Comprehension Level 5 (basic needs 90% of the time) Mode: Auditory  Hearing Aid:None  Glasses:    Expression (Native Language) 3 Mode: Verbal      Social Interaction/Pragmatics 4 tearful parts of session, able to self correct   Problem Solving 4 basic problem 80% of the time   Memory 4  75-89% of the time 2 steps of 3 step request   Comments       Activities of Daily Living    Score Comments   Self-Feeding       Grooming   Tasks completed by patient:        Bathing   Body Parts Bathe:        Tub/Shower Transfer [place]   Type of Shower: Other  Adaptive  Equipment:Tub transfer bench   Upper Body  Dressing/Undressing   Items Applied:       Lower Body Dressing/Undressing 1 Items Applied: Toileting 1     Toilet Transfer 1       Vision/Perception: No deficits noted.     Instrumental Activities of Daily Living   Performance Comments   Meal Preperation Total assistance    Homemaking Total assistance    Medication Management Total assistance    Financial Management Total assistance        Session: MMT, SPT with maximal assist, tone assessment, w/c assessment, Static stance and balance work. Interdisciplinary Communication: Collaborated with PT and nursing for current level of function, plan of care, and measures to assure safety during their stay. Updated board with current level of function to increase carryover between disciplines. Patient/Family Education: Patient, Family and Caregivers was/were educated On the role of OT, On POC and On IRC expectations. [place] [facility] [place] [address] [place], Activity Tolerance, Decreased ROM R side, Visual Perceptual , Safety Awareness, Strength, Pain, Sitting Balance, Standing Balance, Abnormal Muscle Tone and Cognition    Functional Limitations: ADL, IADL, Functional Transfers and Functional Mobility    Goals: Please see Care Plan    OT order received and chart reviewed. OT orders have been acknowledged. Patient will benefit from skilled OT services to address ADL, functional transfers, UE strength, UE coordination, balance, cognition and activity tolerance to maximize functional performance with daily self-care tasks and functional mobility. Treatment is likely to include ADL, Balance, Strength, Activity tolerance, Neuro-muscular re-education, Visual perceptual, Cognitive, DME, AE, Family  and Safety awareness training to increase independence with self-care. Patient will be seen for 1.5-2 hours of skilled OT services 5-6 days a week.      Afshin Manriquez OT

## 2017-09-15 NOTE — PROGRESS NOTES
Problem: Nutrition Deficit  Goal: *Optimize nutritional status  Nutrition F/U:  Assessment:  Weight 57.4 kg (ICU bed 9/11/17), edema - none reported. The patient is s/p MBS yesterday with diet advancement to pureed/nectar thickened liquids. Enteral nutrition was d/c with diet advancement, NGT out. Labs are remarkable for hypokalemia on 9/11 and 9/14. No current labs. She is receiving daily KCl supplementation. Macronutrient Needs:  Estimated calorie needs - 6452-5496 laura/day (20-25 laura/kg/day)   Estimated protein needs - 49-62 gm pro/day (1.2-1.5 gm pro/kgIBW/day) (GFR >60 ml/min)  Intake/Comparative Standards: No recorded meal intakes. I observed ~25% of breakfast consumed. Nutrition diagnosis: Inadequate oral intake r/t dysphagia, as evidenced by patient on modified diet, enteral nutrition d/c, meeting above noted needs. Intervention:   Meals and Snacks: pureed/nectar  Supplementation: Will add mighty shake TID and ensure enlive TID. Enteral Nutrition: d/c  Mineral Supplement Therapy: Nutrition Support Orders/Electrolyte Management Replacement Protocols are active on the MAR. Nutrition Discharge Plan: Too soon to determine. I would expect her to require supplementation at discharge if she continues on a pureed/nectar thickened liquids diet.        Elsa Stanley Colin 86, [address], [phone]

## 2017-09-16 LAB
ANION GAP SERPL CALC-SCNC: 11 MMOL/L (ref 7–16)
BASOPHILS # BLD: 0 K/UL (ref 0–0.2)
BASOPHILS NFR BLD: 0 % (ref 0–2)
BUN SERPL-MCNC: 35 MG/DL (ref 8–23)
CALCIUM SERPL-MCNC: 8.3 MG/DL (ref 8.3–10.4)
CHLORIDE SERPL-SCNC: 117 MMOL/L (ref 98–107)
CO2 SERPL-SCNC: 27 MMOL/L (ref 21–32)
CREAT SERPL-MCNC: 0.58 MG/DL (ref 0.6–1)
DIFFERENTIAL METHOD BLD: ABNORMAL
EOSINOPHIL # BLD: 0 K/UL (ref 0–0.8)
EOSINOPHIL NFR BLD: 0 % (ref 0.5–7.8)
ERYTHROCYTE [DISTWIDTH] IN BLOOD BY AUTOMATED COUNT: 13.9 % (ref 11.9–14.6)
GLUCOSE SERPL-MCNC: 104 MG/DL (ref 65–100)
HCT VFR BLD AUTO: 34.2 % (ref 35.8–46.3)
HGB BLD-MCNC: 11.8 G/DL (ref 11.7–15.4)
IMM GRANULOCYTES # BLD: 0.3 K/UL (ref 0–0.5)
LYMPHOCYTES # BLD: 1.6 K/UL (ref 0.5–4.6)
LYMPHOCYTES NFR BLD: 11 % (ref 13–44)
MAGNESIUM SERPL-MCNC: 2.3 MG/DL (ref 1.8–2.4)
MCH RBC QN AUTO: 28.8 PG (ref 26.1–32.9)
MCHC RBC AUTO-ENTMCNC: 34.5 G/DL (ref 31.4–35)
MCV RBC AUTO: 83.4 FL (ref 79.6–97.8)
MONOCYTES # BLD: 1.7 K/UL (ref 0.1–1.3)
MONOCYTES NFR BLD: 12 % (ref 4–12)
NEUTS SEG # BLD: 11.2 K/UL (ref 1.7–8.2)
NEUTS SEG NFR BLD: 77 % (ref 43–78)
PLATELET # BLD AUTO: 118 K/UL (ref 150–450)
PLATELET COMMENTS,PCOM: ABNORMAL
PMV BLD AUTO: 9.7 FL (ref 10.8–14.1)
POTASSIUM SERPL-SCNC: 2.9 MMOL/L (ref 3.5–5.1)
RBC # BLD AUTO: 4.1 M/UL (ref 4.05–5.25)
RBC MORPH BLD: ABNORMAL
SODIUM SERPL-SCNC: 155 MMOL/L (ref 136–145)
WBC # BLD AUTO: 14.5 K/UL (ref 4.3–11.1)
WBC MORPH BLD: ABNORMAL

## 2017-09-16 PROCEDURE — 85025 COMPLETE CBC W/AUTO DIFF WBC: CPT | Performed by: PHYSICAL MEDICINE & REHABILITATION

## 2017-09-16 PROCEDURE — 97535 SELF CARE MNGMENT TRAINING: CPT

## 2017-09-16 PROCEDURE — 74011250637 HC RX REV CODE- 250/637: Performed by: PHYSICAL MEDICINE & REHABILITATION

## 2017-09-16 PROCEDURE — 97150 GROUP THERAPEUTIC PROCEDURES: CPT

## 2017-09-16 PROCEDURE — 80048 BASIC METABOLIC PNL TOTAL CA: CPT | Performed by: PHYSICAL MEDICINE & REHABILITATION

## 2017-09-16 PROCEDURE — 92610 EVALUATE SWALLOWING FUNCTION: CPT

## 2017-09-16 PROCEDURE — 83735 ASSAY OF MAGNESIUM: CPT | Performed by: PHYSICAL MEDICINE & REHABILITATION

## 2017-09-16 PROCEDURE — 97112 NEUROMUSCULAR REEDUCATION: CPT

## 2017-09-16 PROCEDURE — 74011250636 HC RX REV CODE- 250/636: Performed by: PHYSICAL MEDICINE & REHABILITATION

## 2017-09-16 PROCEDURE — 65310000000 HC RM PRIVATE REHAB

## 2017-09-16 PROCEDURE — 74011000250 HC RX REV CODE- 250: Performed by: PHYSICAL MEDICINE & REHABILITATION

## 2017-09-16 RX ORDER — POTASSIUM CHLORIDE 20MEQ/15ML
40 LIQUID (ML) ORAL 2 TIMES DAILY WITH MEALS
Status: DISCONTINUED | OUTPATIENT
Start: 2017-09-16 | End: 2017-09-18

## 2017-09-16 RX ADMIN — LABETALOL HYDROCHLORIDE 200 MG: 200 TABLET, FILM COATED ORAL at 17:52

## 2017-09-16 RX ADMIN — LABETALOL HYDROCHLORIDE 200 MG: 200 TABLET, FILM COATED ORAL at 09:00

## 2017-09-16 RX ADMIN — AMLODIPINE BESYLATE 10 MG: 10 TABLET ORAL at 09:00

## 2017-09-16 RX ADMIN — ACETAMINOPHEN 500 MG: 500 TABLET, FILM COATED ORAL at 06:08

## 2017-09-16 RX ADMIN — LIDOCAINE HYDROCHLORIDE 1 G: 10 INJECTION, SOLUTION INFILTRATION; PERINEURAL at 00:33

## 2017-09-16 RX ADMIN — LABETALOL HYDROCHLORIDE 200 MG: 200 TABLET, FILM COATED ORAL at 01:28

## 2017-09-16 RX ADMIN — POTASSIUM CHLORIDE 20 MEQ: 20 SOLUTION ORAL at 09:00

## 2017-09-16 RX ADMIN — HYDROCHLOROTHIAZIDE: 12.5 CAPSULE ORAL at 09:00

## 2017-09-16 RX ADMIN — TAMSULOSIN HYDROCHLORIDE 0.4 MG: 0.4 CAPSULE ORAL at 21:03

## 2017-09-16 RX ADMIN — POTASSIUM CHLORIDE 40 MEQ: 20 SOLUTION ORAL at 17:53

## 2017-09-16 NOTE — PROGRESS NOTES
Pt appears flushed denies pain, lungs clear, stafford draining clear benito urine. Temp 100.6 axillary. Dr Lela Nguyen notified. orders received. Tylenol 500 mg given po. Blood Cultures x2 drawn. Rocephin 1 gram IM given in bilateral upper outer quadrants as ordered by Dr Lela Nguyen.

## 2017-09-16 NOTE — PROGRESS NOTES
Pt less lethargic. Assisted to drink thickened liquids. Temp 99.4 axillary . All other vital signs stable. James with good urine output this shift.  at bedside.

## 2017-09-16 NOTE — PROGRESS NOTES
PHYSICAL THERAPY DAILY NOTE  Time In: 0935  Time Out: 0983  Patient Seen For: AM;Balance activities; Patient education; Therapeutic exercise;Transfer training; Other (see progress notes)    Subjective: patient reporting she feels OK. C/O mild dizziness. Expressing she cannot feel her right side. Objective:Vital Signs:  Patient Vitals for the past 12 hrs:   Temp Pulse Resp BP SpO2   09/16/17 0653 98.3 °F (36.8 °C) 83 16 148/75 92 %   09/16/17 0509 99.4 °F (37.4 °C) 86 20 149/81 93 %     Pain level:No c/o pain  Pain location:NA  Pain interventions:NA    Patient education:Bed mobility training,transfer training, balance training, fall precautions, activity pacing, subluxed shoulder precautions,body mechanics, Patient verbalizing understanding and demonstrating partial understanding of patient education. Recommend follow up education. Interdisciplinary Communication:NA    Other (comment) (falls)  GROSS ASSESSMENT Daily Assessment     shoulder sling to right shoulder       BED/MAT MOBILITY Daily Assessment   Increased time and effort with cues for body mechanics Rolling Right : 3 (Moderate assistance )  Rolling Left : 0 (Not tested)  Supine to Sit : 2 (Maximal assistance)  Sit to Supine : 0 (Not tested)       TRANSFERS Daily Assessment   Verbal cues for body mechanics and sequencing Transfer Type: SPT without device (to the left, bed to w/c)  Transfer Assistance : 3 (Moderate assistance )  Sit to Stand Assistance: Moderate assistance  Car Transfers: Not tested       GAIT Daily Assessment    Amount of Assistance: 0 (Not tested)  Distance (ft): 0 Feet (ft)       STEPS or STAIRS Daily Assessment    Steps/Stairs Ambulated (#): 0  Level of Assist : 0 (Not tested)       BALANCE Daily Assessment   Static sitting balance activities facilitating upright posture and midline orientation.  SBA to min assist with cues to complete    Static standing x 2 mins x 8 with LUE support on hand rail facilitating upright midline posture, weight bearing through RLE and lateral weight shifts with mod assist blocking right knee  Sitting - Static: Fair (occasional)  Sitting - Dynamic: Poor (constant support)  Standing - Static: Poor;Constant support (with LUE support on rail)  Standing - Dynamic : Impaired       WHEELCHAIR MOBILITY Daily Assessment    Curbs/Ramps Assist Required (FIM Score): 0 (Not tested)  Wheelchair Setup Assist Required : 0 (Not tested)       LOWER EXTREMITY EXERCISES Daily Assessment   Cues to facilitating improved weight bearing through RLE Extremity: Both  Exercise Type #1: Other (comment) (LE motomed x 10 mins at level 1)  Level of Assist: Supervision          Assessment: Patient progressing towards goals. Improved sitting and standing balance today. Significant right shoulder subluxation. RN assisted patient back to room in w/c at end of treatment    Plan of Care: Continue with POC and progress as tolerated.      Edna Ya, PT  9/16/2017

## 2017-09-16 NOTE — PROGRESS NOTES
MD Ludivina,   Medical Director  [address] [place] [address], [address] [facility]  Tel: [phone]       [facility] PROGRESS NOTE    Artur Donahue Date: 9/15/2017  Admit Diagnosis: stroke, left brain involvement;ICH (intracerebral hemorrhag*  Hypertensive emergency  Acute Rehab Dx:  Gait impairment/ gait dysfunction  Debility    deconditioning  Mobility and ambulation deficits  Self Care/ADL deficits  Swallowing deficits  aphasia     Subjective     Patient seen and examined. Vss. No acute complaints. PT, OT well tolerated. Steady gains made. No new barrier to progress noted. Objective:     Current Facility-Administered Medications   Medication Dose Route Frequency    potassium chloride (KAON 10%) 20 mEq/15 mL oral liquid 40 mEq  40 mEq Oral BID WITH MEALS    acetaminophen (TYLENOL) tablet 500 mg  500 mg Per NG tube Q4H PRN    alum-mag hydroxide-simeth (MYLANTA) oral suspension 30 mL  30 mL Oral Q4H PRN    amLODIPine (NORVASC) tablet 10 mg  10 mg Oral DAILY    bisacodyl (DULCOLAX) suppository 10 mg  10 mg Rectal DAILY PRN    hydrALAZINE (APRESOLINE) tablet 50 mg  50 mg Oral QID PRN    labetalol (NORMODYNE) tablet 200 mg  200 mg Oral Q8H    lip protectant (BLISTEX) ointment   Topical PRN    losartan/hydroCHLOROthiazide (HYZAAR) 100/12.5 mg   Oral DAILY    ondansetron (ZOFRAN ODT) tablet 4 mg  4 mg Oral Q6H PRN    sodium phosphate (FLEET'S) enema 118 mL  1 Enema Rectal PRN    traMADol (ULTRAM) tablet 50 mg  50 mg Oral Q6H PRN    traZODone (DESYREL) tablet 50 mg  50 mg Oral QHS PRN    tamsulosin (FLOMAX) capsule 0.4 mg  0.4 mg Oral QHS     Review of Systems:Denies chest pain, shortness of breath, cough, visual problems, abdominal pain, dysurea, calf pain. Pertinent positives are as noted in the medical records and unremarkable otherwise.      Visit Vitals    /83 (BP Patient Position: At rest)    Pulse 79    Temp 97.3 °F (36.3 °C)    Resp 16    SpO2 92%        Physical Exam:   Psych: Patient was oriented to person, place, year but not month. Without hallucinations, abnormal affect or abnormal behaviors during the examination. General:   Alert, appears stated age, No acute distress. HEENT:  Normocephalic, R lower facial asymmetry. Oral mucosa pink and moist. No nasal discharge. Lungs:  CTA Bilaterally,Respiration even and unlabored   No apparent use of accessory muscles for respiration. Heart:  Regular rate and rhythm, S1, S2, No obvious murmur, click, rub or gallop. Genitourinary: James cath   Abdomen:  Soft, non-tender to palpation in all four quadrants. Bowel sounds present. No obvious masses palpated. Neuromuscular:  PERRL  LUE     Shoulder abduction   5-/5              Elbow flexion:  5- /5               Wrist extension:   5/5              Finger flexion;  5- /5                        ADMQ:  5- /5  RUE    Shoulder abduction:  0/5                Elbow flexion:  0 /5                         Wrist extension:  0/5                        Finger flexion:   0/5                        ADMQ:   0/5  RLE     Hip flexion:  0 /5              Knee extension: 0 /5                         Ankle dorsiflexion:  0 /5                        EHL:  0 /5                        Ankle plantarflexion:  0 /5                                                                 LLE     Hip flexion:   4-/5                        Knee extension:   4+/5                         Ankle dorsiflexion:   5-/5                        EHL:   5-/5                        Ankle plantarflexion:   5/5  Sensory - absent to light touch and temp on the right hemibody.   Plantars - up going on the right, down on left  DTR muted, slt increase tone with PROM RUE  fcs with vcs; one step onley  Dysarthric, expressive aphasia  Severe right neglect  Does not track past midline when looking left   Skin:  Intact, dry, good skin turgor, age related changes present   Edema: none            Functional Assessment:          Balance  Sitting - Static: Fair (occasional) (09/16/17 1300)  Sitting - Dynamic: Poor (constant support) (09/16/17 1300)  Standing - Static: Poor;Constant support (with LUE support on rail) (09/16/17 1300)  Standing - Dynamic : Impaired (09/16/17 1300)                     Pravin Lamar Fall Risk Assessment:  Pravin Lamar Fall Risk  Mobility: Ambulates or transfers with assist devices or assistance/unsteady gait (09/16/17 1940)  Mobility Interventions: Bed/chair exit alarm; Patient to call before getting OOB (09/16/17 1940)  Mentation: Periodic confusion (09/16/17 1940)  Mentation Interventions: Bed/chair exit alarm (09/16/17 1940)  Medication: Patient receiving anticonvulsants, sedatives(tranquilizers), psychotropics or hypnotics, hypoglycemics, narcotics, sleep aids, antihypertensives, laxatives, or diuretics (09/16/17 1940)  Medication Interventions: Bed/chair exit alarm (09/16/17 1940)  Elimination: Needs assistance with toileting (09/16/17 1940)  Elimination Interventions: Call light in reach;Bed/chair exit alarm; Patient to call for help with toileting needs; Toilet paper/wipes in reach; Toileting schedule/hourly rounds (09/16/17 1940)  Prior Fall History: Unknown (09/16/17 1940)  Total Score: 4 (09/16/17 1940)  High Fall Risk: Yes (09/16/17 1940)     Speech Assessment:         Ambulation:  Gait  Distance (ft): 0 Feet (ft) (09/16/17 1300)     Labs/Studies:  Recent Results (from the past 72 hour(s))   METABOLIC PANEL, BASIC    Collection Time: 09/14/17  4:35 AM   Result Value Ref Range    Sodium 148 (H) 136 - 145 mmol/L    Potassium 2.8 (LL) 3.5 - 5.1 mmol/L    Chloride 112 (H) 98 - 107 mmol/L    CO2 25 21 - 32 mmol/L    Anion gap 11 7 - 16 mmol/L    Glucose 144 (H) 65 - 100 mg/dL    BUN 31 (H) 8 - 23 MG/DL    Creatinine 0.53 (L) 0.6 - 1.0 MG/DL    GFR est AA >60 >60 ml/min/1.73m2    GFR est non-AA >60 >60 ml/min/1.73m2    Calcium 8.1 (L) 8.3 - 10.4 MG/DL   CBC W/O DIFF    Collection Time: 09/14/17  4:35 AM   Result Value Ref Range    WBC 14.0 (H) 4.3 - 11.1 K/uL    RBC 3.84 (L) 4.05 - 5.25 M/uL    HGB 10.7 (L) 11.7 - 15.4 g/dL    HCT 32.6 (L) 35.8 - 46.3 %    MCV 84.9 79.6 - 97.8 FL    MCH 27.9 26.1 - 32.9 PG    MCHC 32.8 31.4 - 35.0 g/dL    RDW 13.7 11.9 - 14.6 %    PLATELET 293 553 - 653 K/uL    MPV 9.8 (L) 10.8 - 14.1 FL   CBC WITH AUTOMATED DIFF    Collection Time: 09/15/17 10:29 AM   Result Value Ref Range    WBC 16.5 (H) 4.3 - 11.1 K/uL    RBC 4.07 4.05 - 5.25 M/uL    HGB 11.7 11.7 - 15.4 g/dL    HCT 33.8 (L) 35.8 - 46.3 %    MCV 83.0 79.6 - 97.8 FL    MCH 28.7 26.1 - 32.9 PG    MCHC 34.6 31.4 - 35.0 g/dL    RDW 13.8 11.9 - 14.6 %    PLATELET 755 741 - 307 K/uL    MPV 9.4 (L) 10.8 - 14.1 FL    NEUTROPHILS 81 (H) 43 - 78 %    LYMPHOCYTES 9 (L) 13 - 44 %    MONOCYTES 10 4.0 - 12.0 %    EOSINOPHILS 0 (L) 0.5 - 7.8 %    BASOPHILS 0 0.0 - 2.0 %    ABS. NEUTROPHILS 13.4 (H) 1.7 - 8.2 K/UL    ABS. LYMPHOCYTES 1.5 0.5 - 4.6 K/UL    ABS. MONOCYTES 1.7 (H) 0.1 - 1.3 K/UL    ABS. EOSINOPHILS 0.0 0.0 - 0.8 K/UL    ABS. BASOPHILS 0.0 0.0 - 0.2 K/UL    ABS. IMM. GRANS. 0.7 (H) 0.0 - 0.5 K/UL    DF AUTOMATED     HEMOGLOBIN A1C WITH EAG    Collection Time: 09/15/17 10:29 AM   Result Value Ref Range    Hemoglobin A1c 6.1 (H) 4.8 - 6.0 %    Est. average glucose 077 mg/dL   METABOLIC PANEL, COMPREHENSIVE    Collection Time: 09/15/17 10:29 AM   Result Value Ref Range    Sodium 148 (H) 136 - 145 mmol/L    Potassium 3.3 (L) 3.5 - 5.1 mmol/L    Chloride 114 (H) 98 - 107 mmol/L    CO2 26 21 - 32 mmol/L    Anion gap 8 7 - 16 mmol/L    Glucose 112 (H) 65 - 100 mg/dL    BUN 33 (H) 8 - 23 MG/DL    Creatinine 0.52 (L) 0.6 - 1.0 MG/DL    GFR est AA >60 >60 ml/min/1.73m2    GFR est non-AA >60 >60 ml/min/1.73m2    Calcium 7.6 (L) 8.3 - 10.4 MG/DL    Bilirubin, total 1.1 0.2 - 1.1 MG/DL    ALT (SGPT) 69 (H) 12 - 65 U/L    AST (SGOT) 41 (H) 15 - 37 U/L    Alk.  phosphatase 69 50 - 136 U/L    Protein, total 6.9 6.3 - 8.2 g/dL    Albumin 2.9 (L) 3.2 - 4.6 g/dL    Globulin 4.0 (H) 2.3 - 3.5 g/dL    A-G Ratio 0.7 (L) 1.2 - 3.5     MAGNESIUM    Collection Time: 09/15/17 10:29 AM   Result Value Ref Range    Magnesium 2.4 1.8 - 2.4 mg/dL   PHOSPHORUS    Collection Time: 09/15/17 10:29 AM   Result Value Ref Range    Phosphorus 3.2 2.3 - 3.7 MG/DL   URINALYSIS W/ RFLX MICROSCOPIC    Collection Time: 09/15/17  2:15 PM   Result Value Ref Range    Color YELLOW      Appearance CLEAR      Specific gravity 1.013 1.001 - 1.023      pH (UA) 7.0 5.0 - 9.0      Protein NEGATIVE  NEG mg/dL    Glucose NEGATIVE  mg/dL    Ketone NEGATIVE  NEG mg/dL    Bilirubin NEGATIVE  NEG      Blood NEGATIVE  NEG      Urobilinogen 0.2 0.2 - 1.0 EU/dL    Nitrites NEGATIVE  NEG      Leukocyte Esterase NEGATIVE  NEG     CULTURE, URINE    Collection Time: 09/15/17  2:40 PM   Result Value Ref Range    Special Requests: NO SPECIAL REQUESTS      Culture result: NO GROWTH 1 DAY     CBC WITH AUTOMATED DIFF    Collection Time: 09/16/17  3:35 PM   Result Value Ref Range    WBC 14.5 (H) 4.3 - 11.1 K/uL    RBC 4.10 4.05 - 5.25 M/uL    HGB 11.8 11.7 - 15.4 g/dL    HCT 34.2 (L) 35.8 - 46.3 %    MCV 83.4 79.6 - 97.8 FL    MCH 28.8 26.1 - 32.9 PG    MCHC 34.5 31.4 - 35.0 g/dL    RDW 13.9 11.9 - 14.6 %    PLATELET 722 (L) 922 - 450 K/uL    MPV 9.7 (L) 10.8 - 14.1 FL    NEUTROPHILS 77 43 - 78 %    LYMPHOCYTES 11 (L) 13 - 44 %    MONOCYTES 12 4.0 - 12.0 %    EOSINOPHILS 0 (L) 0.5 - 7.8 %    BASOPHILS 0 0.0 - 2.0 %    ABS. NEUTROPHILS 11.2 (H) 1.7 - 8.2 K/UL    ABS. LYMPHOCYTES 1.6 0.5 - 4.6 K/UL    ABS. MONOCYTES 1.7 (H) 0.1 - 1.3 K/UL    ABS. EOSINOPHILS 0.0 0.0 - 0.8 K/UL    ABS. BASOPHILS 0.0 0.0 - 0.2 K/UL    ABS. IMM.  GRANS. 0.3 0.0 - 0.5 K/UL    RBC COMMENTS NORMOCYTIC/NORMOCHROMIC      WBC COMMENTS OCCASIONAL      PLATELET COMMENTS DECREASED      DF AUTOMATED     METABOLIC PANEL, BASIC    Collection Time: 09/16/17  3:35 PM   Result Value Ref Range    Sodium 155 (H) 136 - 145 mmol/L    Potassium 2.9 (LL) 3.5 - 5.1 mmol/L    Chloride 117 (H) 98 - 107 mmol/L    CO2 27 21 - 32 mmol/L    Anion gap 11 7 - 16 mmol/L    Glucose 104 (H) 65 - 100 mg/dL    BUN 35 (H) 8 - 23 MG/DL    Creatinine 0.58 (L) 0.6 - 1.0 MG/DL    GFR est AA >60 >60 ml/min/1.73m2    GFR est non-AA >60 >60 ml/min/1.73m2    Calcium 8.3 8.3 - 10.4 MG/DL   MAGNESIUM    Collection Time: 09/16/17  3:35 PM   Result Value Ref Range    Magnesium 2.3 1.8 - 2.4 mg/dL       Assessment:     Problem List as of 9/16/2017  Date Reviewed: 9/12/2017          Codes Class Noted - Resolved    ICH (intracerebral hemorrhage) ([facility] ICD-10-CM: I61.9  ICD-9-CM: 715  9/15/2017 - Present        Hypertensive urgency, malignant ICD-10-CM: I16.0  ICD-9-CM: 401.0  9/11/2017 - Present        Stroke, hemorrhagic ([facility] ICD-10-CM: I61.9  ICD-9-CM: 977  9/7/2017 - Present        Accelerated hypertension ICD-10-CM: I10  ICD-9-CM: 401.0  9/7/2017 - Present        At risk for aspiration (Chronic) ICD-10-CM: Z91.89  ICD-9-CM: V49.89  9/7/2017 - Present        Acute spontaneous intraventricular hemorrhage assoc w/ hypertension ([facility] ICD-10-CM: I61.5, I10  ICD-9-CM: 621, 401.9  9/7/2017 - Present        Screening for breast cancer ICD-10-CM: Z12.39  ICD-9-CM: V76.10  2/27/2017 - Present        Atrophic vaginitis ICD-10-CM: N95.2  ICD-9-CM: 627.3  7/11/2016 - Present        HTN (hypertension) ICD-10-CM: I10  ICD-9-CM: 401.9  10/23/2015 - Present        ADD (attention deficit disorder) ICD-10-CM: F98.8  ICD-9-CM: 314.00  10/23/2015 - Present        Depression ICD-10-CM: F32.9  ICD-9-CM: 311  10/23/2015 - Present        Anxiety ICD-10-CM: F41.9  ICD-9-CM: 300.00  10/23/2015 - Present        RESOLVED: Hypoxemia ICD-10-CM: R09.02  ICD-9-CM: 799.02  9/10/2017 - 9/12/2017        RESOLVED: Acute respiratory failure ([facility] ICD-10-CM: J96.00  ICD-9-CM: 518.81  9/8/2017 - 9/10/2017        RESOLVED: Hemorrhagic stroke ([facility] ICD-10-CM: I61.9  ICD-9-CM: 378  9/7/2017 - 9/7/2017        RESOLVED: Non-intractable vomiting with nausea ICD-10-CM: R11.2  ICD-9-CM: 787.01  9/7/2017 - 9/12/2017        RESOLVED: Seborrheic keratosis, inflamed ICD-10-CM: L82.0  ICD-9-CM: 702.11  7/11/2016 - 9/20/2016        RESOLVED: Cough ICD-10-CM: R05  ICD-9-CM: 786.2  7/11/2016 - 9/20/2016        RESOLVED: Shoulder pain ICD-10-CM: M25.519  ICD-9-CM: 719.41  10/23/2015 - 2/27/2017              Plan:      Rehabilitation Plan  The patient has shown the ability to tolerate and benefit from 3 hours of therapy daily and is being admitted to a comprehensive acute inpatient rehabilitation program consisting of at least 3 hours of combined physical and occupational therapies. Begin intensive Physical Therapy for a minimum of 1.5 hours a day, at least 5 out of 7 days per week to address bed mobility, transfers, ambulation, strengthening, balance, and endurance. Begin intensive Occupational Therapy for a minimum of 1.5 hours a day, at least 5 out of 7 days per week to address ADL ( bathing, LE dressing, toileting) and adaptive equipment as needed.     Continue ST for: dysarthria, impaired communication skills, vital stimulation for right facial weakness. Swallowing deficits     Continue 24-hour skilled rehabilitation nursing for bowel and bladder management, skin care for decubitus ulcer prevention , pain management and ongoing medication administration           Continue daily physician medical management:    Left BG Hemorrhage with intraventricular extension due to HTN emergency; resultant R hemiplegia, right neglect, dysarthria, aphasia, dysphagia with significant decline in mobility and self care    Pneumonia prophylaxis- Incentive spirometer every hour while awake. Will need instruction and assistance. Not sure if she can get a proper oral seal to be effective     Dysphagia; Pureed diet with NTL; at risk for malnutrition and dehydration.  PICC line removed prior to transfer; may need a line for IVFs if BUN continues to increase. Low K, Ca; check mg. Supplementation as needed. Na elevated     Hypernatremia; last head CT did not show significant cerebral edema. Clinically improving. Likely due to prerenal azotemia;   9/16 - Na 155, may require isotonic / hypotonic saline IV. Check in am.      DVT risk / DVT Prophylaxis- Will require daily physician exam to assess for signs and symptoms as patient is at increased risk for of thromboembolism. Mobilization as tolerated. Intermittent pneumatic compression devices when in bed Thigh-high or knee-high thromboembolic deterrent hose when out of bed.   - No  anticoag due to [address]     Pain Control: stable, mild-to-moderate joint symptoms intermittently, reasonably well controlled by PRN meds. Will require regular pain assessment and comprenhensive pain management. - prn tramadol. Prn tylenol       Wound Care: Monitor wound status daily per staff and physician. At risk for failure. Will require 24/7 rehab nursing. None needed at this time     Hypertension - BP uncontrolled, fluctuating, managed medically. Add prn hydralazine for sbp> 180. Goal SBP is 140-160 given ICH. - Cont Nomodyne, norvasc and hyzaar; consider Verapamil or scheduled Hydralazine  - no new changes on dose     Mild hyperglycemia, likely stress induced vs borderline diabetes; monitor loosely     Leukocytosis; recheck in a.m. Check UA due to stafford. No pulmonary signs (had neg CXR today), no s/s of infxn at old PICC site, no wounds, afebrile. Watch monocytes. - no significant change. - did receive rocephin im x 1 9/15.   - follow Cx's     Urinary retention/ neurogenic bladder - start flomax but continue stafford until mobility improves a bit.      bowel program - add prn meds; incontinent, want to keep stool on the firmer side. Monitor skin.      GERD - add a PPI.  At times may need additional antacids, Maalox prn.          Time spent was 25 minutes with over 1/2 in direct patient care/examination, consultation and coordination of care.      Signed By: Nancy Ortiz MD     September 16, 2017

## 2017-09-16 NOTE — PROGRESS NOTES
09/16/17 1202   Mental Status   Neurologic State Alert   Orientation Level Oriented to person   Cognition Follows commands; Impulsive   Perception Cues to attend to right side of body   Perseveration No perseveration noted   Safety/Judgement Awareness of environment;Decreased awareness of need for safety;Decreased insight into deficits   Oral Assessment   Labial Decreased rate;Decreased seal;Impaired coordination;Right droop   Dentition Intact   Oral Hygiene adequate   Lingual Decreased rate; Incoordinated;Right deviation   Velum No impairment   PO Trials   Assessment Method(s) Observation;Palpation;Cervical auscultation   Patient Position upright in bed   Vocal Quality No impairment   Consistency Presented Nectar thick liquid;Pudding   How Presented Self-fed/presented;SLP-fed/presented;Spoon;Straw;Successive swallows   Bolus Acceptance No impairment   Bolus Formation/Control Impaired   Type of Impairment Anterior   Propulsion Discoordination   Oral Residue None   Initiation of Swallow Delayed (# of seconds)   Laryngeal Elevation Functional   Pharyngeal Phase Characteristics No impairment, issues, or problems    Effective Modifications Small sips and bites   Findings and Recommendations   Oral Phase Severity Moderate   Pharyngeal Phase Severity  Mild-moderate   Treatment Diagnosis   Treatment Diagnosis Oropharyngeal dysphagia (R13.12)   Swallow assessment complete. Patient had Modified Barium Swallow study performed 9/14/17 with laryngeal penetration of thin liquids and inability to clear oral cavity of solids. Swallow assessed today with pudding and nectar liquids by straw. NO overt signs or symptoms of aspiration and minimal labial residue on right of pudding. Patient instructed to take small bites and remember to check right side of mouth. Recommend continue pureed diet and nectar-thick liquids. Patient will need 1:1 supervision/setup with meals. Crush meds in puree.  Patient would benefit from thin water in isolation between meals with staff only as long as oral cavity is completely clean. Speech eval to be completed on Monday. Recommend swallowing therapy 5x/week for at least 30 min per day. Thank you for the opportunity to assist in the care of this patient.   Evelin Ardon MA, CCC-SLP  Time in: 1150   Time out:1140

## 2017-09-16 NOTE — PROGRESS NOTES
Critical value called from lab with Potassium level at 2.9, previous result of 3.3 on 9/15/17. Dr. Fara Phillips called and order received for Potassium supplement.

## 2017-09-16 NOTE — PROGRESS NOTES
Pt sitting up in bed with HOB elevated. Meds administered with thickened liquids and applesauce after crushed.  and son at bedside, providing care.

## 2017-09-17 LAB
ANION GAP SERPL CALC-SCNC: 6 MMOL/L (ref 7–16)
ANION GAP SERPL CALC-SCNC: 7 MMOL/L (ref 7–16)
BACTERIA SPEC CULT: NORMAL
BASOPHILS # BLD: 0 K/UL (ref 0–0.2)
BASOPHILS NFR BLD: 0 % (ref 0–2)
BUN SERPL-MCNC: 30 MG/DL (ref 8–23)
BUN SERPL-MCNC: 32 MG/DL (ref 8–23)
CALCIUM SERPL-MCNC: 7.9 MG/DL (ref 8.3–10.4)
CALCIUM SERPL-MCNC: 8.1 MG/DL (ref 8.3–10.4)
CHLORIDE SERPL-SCNC: 120 MMOL/L (ref 98–107)
CHLORIDE SERPL-SCNC: 123 MMOL/L (ref 98–107)
CO2 SERPL-SCNC: 27 MMOL/L (ref 21–32)
CO2 SERPL-SCNC: 28 MMOL/L (ref 21–32)
CREAT SERPL-MCNC: 0.56 MG/DL (ref 0.6–1)
CREAT SERPL-MCNC: 0.59 MG/DL (ref 0.6–1)
DIFFERENTIAL METHOD BLD: ABNORMAL
EOSINOPHIL # BLD: 0 K/UL (ref 0–0.8)
EOSINOPHIL NFR BLD: 0 % (ref 0.5–7.8)
ERYTHROCYTE [DISTWIDTH] IN BLOOD BY AUTOMATED COUNT: 13.9 % (ref 11.9–14.6)
GLUCOSE SERPL-MCNC: 108 MG/DL (ref 65–100)
GLUCOSE SERPL-MCNC: 116 MG/DL (ref 65–100)
HCT VFR BLD AUTO: 35.6 % (ref 35.8–46.3)
HGB BLD-MCNC: 12.1 G/DL (ref 11.7–15.4)
IMM GRANULOCYTES # BLD: 0.1 K/UL (ref 0–0.5)
IMM GRANULOCYTES NFR BLD: 0.9 % (ref 0–5)
LYMPHOCYTES # BLD: 1.5 K/UL (ref 0.5–4.6)
LYMPHOCYTES NFR BLD: 13 % (ref 13–44)
MCH RBC QN AUTO: 28.5 PG (ref 26.1–32.9)
MCHC RBC AUTO-ENTMCNC: 34 G/DL (ref 31.4–35)
MCV RBC AUTO: 84 FL (ref 79.6–97.8)
MONOCYTES # BLD: 1 K/UL (ref 0.1–1.3)
MONOCYTES NFR BLD: 9 % (ref 4–12)
NEUTS SEG # BLD: 9.2 K/UL (ref 1.7–8.2)
NEUTS SEG NFR BLD: 77 % (ref 43–78)
OSMOLALITY UR: 672 MOSM/KG H2O (ref 50–1400)
PLATELET # BLD AUTO: 97 K/UL (ref 150–450)
PMV BLD AUTO: 10.2 FL (ref 10.8–14.1)
POTASSIUM SERPL-SCNC: 3.1 MMOL/L (ref 3.5–5.1)
POTASSIUM SERPL-SCNC: 3.7 MMOL/L (ref 3.5–5.1)
RBC # BLD AUTO: 4.24 M/UL (ref 4.05–5.25)
SERVICE CMNT-IMP: NORMAL
SODIUM SERPL-SCNC: 155 MMOL/L (ref 136–145)
SODIUM SERPL-SCNC: 156 MMOL/L (ref 136–145)
WBC # BLD AUTO: 12 K/UL (ref 4.3–11.1)

## 2017-09-17 PROCEDURE — 36415 COLL VENOUS BLD VENIPUNCTURE: CPT | Performed by: PHYSICAL MEDICINE & REHABILITATION

## 2017-09-17 PROCEDURE — 80048 BASIC METABOLIC PNL TOTAL CA: CPT | Performed by: PHYSICAL MEDICINE & REHABILITATION

## 2017-09-17 PROCEDURE — 85025 COMPLETE CBC W/AUTO DIFF WBC: CPT | Performed by: PHYSICAL MEDICINE & REHABILITATION

## 2017-09-17 PROCEDURE — 97530 THERAPEUTIC ACTIVITIES: CPT

## 2017-09-17 PROCEDURE — 65310000000 HC RM PRIVATE REHAB

## 2017-09-17 PROCEDURE — 83935 ASSAY OF URINE OSMOLALITY: CPT | Performed by: PHYSICAL MEDICINE & REHABILITATION

## 2017-09-17 PROCEDURE — 74011000258 HC RX REV CODE- 258: Performed by: PHYSICAL MEDICINE & REHABILITATION

## 2017-09-17 PROCEDURE — 74011250637 HC RX REV CODE- 250/637: Performed by: PHYSICAL MEDICINE & REHABILITATION

## 2017-09-17 PROCEDURE — 97110 THERAPEUTIC EXERCISES: CPT

## 2017-09-17 PROCEDURE — 97116 GAIT TRAINING THERAPY: CPT

## 2017-09-17 RX ORDER — DEXTROSE MONOHYDRATE AND SODIUM CHLORIDE 5; .45 G/100ML; G/100ML
65 INJECTION, SOLUTION INTRAVENOUS CONTINUOUS
Status: DISCONTINUED | OUTPATIENT
Start: 2017-09-17 | End: 2017-09-18

## 2017-09-17 RX ADMIN — DEXTROSE MONOHYDRATE AND SODIUM CHLORIDE 65 ML/HR: 5; .45 INJECTION, SOLUTION INTRAVENOUS at 15:00

## 2017-09-17 RX ADMIN — HYDROCHLOROTHIAZIDE: 12.5 CAPSULE ORAL at 09:19

## 2017-09-17 RX ADMIN — AMLODIPINE BESYLATE 10 MG: 10 TABLET ORAL at 09:19

## 2017-09-17 RX ADMIN — TAMSULOSIN HYDROCHLORIDE 0.4 MG: 0.4 CAPSULE ORAL at 20:50

## 2017-09-17 RX ADMIN — LABETALOL HYDROCHLORIDE 200 MG: 200 TABLET, FILM COATED ORAL at 23:47

## 2017-09-17 RX ADMIN — POTASSIUM CHLORIDE 40 MEQ: 20 SOLUTION ORAL at 08:00

## 2017-09-17 RX ADMIN — POTASSIUM CHLORIDE 40 MEQ: 20 SOLUTION ORAL at 17:35

## 2017-09-17 RX ADMIN — LABETALOL HYDROCHLORIDE 200 MG: 200 TABLET, FILM COATED ORAL at 09:19

## 2017-09-17 RX ADMIN — LABETALOL HYDROCHLORIDE 200 MG: 200 TABLET, FILM COATED ORAL at 17:33

## 2017-09-17 RX ADMIN — LABETALOL HYDROCHLORIDE 200 MG: 200 TABLET, FILM COATED ORAL at 00:07

## 2017-09-17 NOTE — PROGRESS NOTES
PHYSICAL THERAPY DAILY NOTE  Time In: 0940  Time Out: 1031  Patient Seen For: AM;Transfer training;Gait training; Therapeutic exercise; Other (see progress notes)    Subjective: Patient had no complaints. Objective: No pain noted. Other (comment) (falls)  GROSS ASSESSMENT Daily Assessment            BED/MAT MOBILITY Daily Assessment    Supine to Sit : 2 (Maximal assistance)  Sit to Supine : 2 (Maximal assistance)       TRANSFERS Daily Assessment    Transfer Type: SPT without device  Transfer Assistance : 3 (Moderate assistance )  Sit to Stand Assistance: Moderate assistance       GAIT Daily Assessment    Amount of Assistance: 2 (Maximal assistance)  Distance (ft): 5 Feet (ft)  Assistive Device: Gait belt;Walker cidny;Brace/Splint; Other (comment) (used ace wrap to knee and ankle for DF. and sling for right UE)       STEPS or STAIRS Daily Assessment    Level of Assist : 0 (Not tested)       BALANCE Daily Assessment            WHEELCHAIR MOBILITY Daily Assessment            LOWER EXTREMITY EXERCISES Daily Assessment    Extremity: Both  Exercise Type #1: Supine lower extremity strengthening  Sets Performed: 2  Reps Performed: 15  Level of Assist: Maximum assistance          Assessment: Patient working hard. Plan of Care: Continue with plan of care to reach PT goals. Returned to room with call bell at reach and family present.     Gabi Wilde, PTA  9/17/2017

## 2017-09-17 NOTE — PROGRESS NOTES
Pt awake with  at bedside, assisting with needs. Head to toe assessment completed. Denies any discomfort. Using one or two words answers only. Assisted with getting dressed ready for physical therapy.

## 2017-09-17 NOTE — PROGRESS NOTES
Access Team up to floor to assess patients vascular system for IV access for IV fluids. D5NS started at 65 ml/hr.

## 2017-09-17 NOTE — PROGRESS NOTES
Problem: Falls - Risk of  Goal: *Absence of Falls  Document Haley Fall Risk and appropriate interventions in the flowsheet.    Outcome: Progressing Towards Goal  Fall Risk Interventions:  Mobility Interventions: Bed/chair exit alarm, Patient to call before getting OOB     Mentation Interventions: Bed/chair exit alarm     Medication Interventions: Bed/chair exit alarm     Elimination Interventions: Call light in reach, Bed/chair exit alarm, Patient to call for help with toileting needs, Toilet paper/wipes in reach, Toileting schedule/hourly rounds

## 2017-09-17 NOTE — PROGRESS NOTES
Patient sitting up in bed with HOB elevated,  at bedside. Hourly rounding completed throughout shift.

## 2017-09-17 NOTE — PROGRESS NOTES
MD Ludivina,   Medical Director  [address] [facility], [address] [facility]  Tel: [phone]       SFD PROGRESS NOTE    Araceli Quiroz Date: 9/15/2017  Admit Diagnosis: stroke, left brain involvement;ICH (intracerebral hemorrhag*  Hypertensive emergency  Acute Rehab Dx:  Gait impairment/ gait dysfunction  Debility    deconditioning  Mobility and ambulation deficits  Self Care/ADL deficits  Swallowing deficits  aphasia     Subjective     Patient seen and examined. Vss. Afebrile. Awake, alert. No new neurologic deficits. No acute distress. No acute complaints. Na still elevated. will start fluids. PT, OT well tolerated. Steady gains made. No new barrier to progress noted. Objective:     Current Facility-Administered Medications   Medication Dose Route Frequency    potassium chloride (KAON 10%) 20 mEq/15 mL oral liquid 40 mEq  40 mEq Oral BID WITH MEALS    acetaminophen (TYLENOL) tablet 500 mg  500 mg Per NG tube Q4H PRN    alum-mag hydroxide-simeth (MYLANTA) oral suspension 30 mL  30 mL Oral Q4H PRN    amLODIPine (NORVASC) tablet 10 mg  10 mg Oral DAILY    bisacodyl (DULCOLAX) suppository 10 mg  10 mg Rectal DAILY PRN    hydrALAZINE (APRESOLINE) tablet 50 mg  50 mg Oral QID PRN    labetalol (NORMODYNE) tablet 200 mg  200 mg Oral Q8H    lip protectant (BLISTEX) ointment   Topical PRN    losartan/hydroCHLOROthiazide (HYZAAR) 100/12.5 mg   Oral DAILY    ondansetron (ZOFRAN ODT) tablet 4 mg  4 mg Oral Q6H PRN    sodium phosphate (FLEET'S) enema 118 mL  1 Enema Rectal PRN    traMADol (ULTRAM) tablet 50 mg  50 mg Oral Q6H PRN    traZODone (DESYREL) tablet 50 mg  50 mg Oral QHS PRN    tamsulosin (FLOMAX) capsule 0.4 mg  0.4 mg Oral QHS     Review of Systems:Denies chest pain, shortness of breath, cough, visual problems, abdominal pain, dysurea, calf pain. Pertinent positives are as noted in the medical records and unremarkable otherwise.      Visit Vitals    /83 (BP Patient Position: At rest)    Pulse 79    Temp 97.3 °F (36.3 °C)    Resp 16    SpO2 92%        Physical Exam:   Psych: Patient was oriented to person, place, year but not month. Without hallucinations, abnormal affect or abnormal behaviors during the examination. General:   Alert, appears stated age, No acute distress. HEENT:  Normocephalic, R lower facial asymmetry. Oral mucosa pink and moist. No nasal discharge. Lungs:  CTA Bilaterally,Respiration even and unlabored   No apparent use of accessory muscles for respiration. Heart:  Regular rate and rhythm, S1, S2, No obvious murmur, click, rub or gallop. Genitourinary: James cath   Abdomen:  Soft, non-tender to palpation in all four quadrants. Bowel sounds present. No obvious masses palpated. Neuromuscular:  PERRL  LUE     Shoulder abduction   5-/5              Elbow flexion:  5- /5               Wrist extension:   5/5              Finger flexion;  5- /5                        ADMQ:  5- /5  RUE    Shoulder abduction:  0/5                Elbow flexion:  0 /5                         Wrist extension:  0/5                        Finger flexion:   0/5                        ADMQ:   0/5  RLE     Hip flexion:  0 /5              Knee extension: 0 /5                         Ankle dorsiflexion:  0 /5                        EHL:  0 /5                        Ankle plantarflexion:  0 /5                                                                 LLE     Hip flexion:   4-/5                        Knee extension:   4+/5                         Ankle dorsiflexion:   5-/5                        EHL:   5-/5                        Ankle plantarflexion:   5/5  Sensory - absent to light touch and temp on the right hemibody.   Plantars - up going on the right, down on left  DTR muted, slt increase tone with PROM RUE  fcs with vcs; one step onley  Dysarthric, expressive aphasia  Severe right neglect  Does not track past midline when looking left   Skin:  Intact, dry, good skin turgor, age related changes present   Edema: none                                                                                        Functional Assessment:          Balance  Sitting - Static: Fair (occasional) (09/16/17 1300)  Sitting - Dynamic: Poor (constant support) (09/16/17 1300)  Standing - Static: Poor;Constant support (with LUE support on rail) (09/16/17 1300)  Standing - Dynamic : Impaired (09/16/17 1300)                     Edgar Brunner Fall Risk Assessment:  Edgar Brunner Fall Risk  Mobility: Ambulates or transfers with assist devices or assistance/unsteady gait (09/16/17 1940)  Mobility Interventions: Bed/chair exit alarm; Patient to call before getting OOB (09/16/17 1940)  Mentation: Periodic confusion (09/16/17 1940)  Mentation Interventions: Bed/chair exit alarm (09/16/17 1940)  Medication: Patient receiving anticonvulsants, sedatives(tranquilizers), psychotropics or hypnotics, hypoglycemics, narcotics, sleep aids, antihypertensives, laxatives, or diuretics (09/16/17 1940)  Medication Interventions: Bed/chair exit alarm (09/16/17 1940)  Elimination: Needs assistance with toileting (09/16/17 1940)  Elimination Interventions: Call light in reach;Bed/chair exit alarm; Patient to call for help with toileting needs; Toilet paper/wipes in reach; Toileting schedule/hourly rounds (09/16/17 1940)  Prior Fall History: Unknown (09/16/17 1940)  Total Score: 4 (09/16/17 1940)  High Fall Risk: Yes (09/16/17 1940)     Speech Assessment:         Ambulation:  Gait  Distance (ft): 0 Feet (ft) (09/16/17 1300)     Labs/Studies:  Recent Results (from the past 72 hour(s))   CBC WITH AUTOMATED DIFF    Collection Time: 09/15/17 10:29 AM   Result Value Ref Range    WBC 16.5 (H) 4.3 - 11.1 K/uL    RBC 4.07 4.05 - 5.25 M/uL    HGB 11.7 11.7 - 15.4 g/dL    HCT 33.8 (L) 35.8 - 46.3 %    MCV 83.0 79.6 - 97.8 FL    MCH 28.7 26.1 - 32.9 PG    MCHC 34.6 31.4 - 35.0 g/dL    RDW 13.8 11.9 - 14.6 %    PLATELET 162 150 - 450 K/uL    MPV 9.4 (L) 10.8 - 14.1 FL    NEUTROPHILS 81 (H) 43 - 78 %    LYMPHOCYTES 9 (L) 13 - 44 %    MONOCYTES 10 4.0 - 12.0 %    EOSINOPHILS 0 (L) 0.5 - 7.8 %    BASOPHILS 0 0.0 - 2.0 %    ABS. NEUTROPHILS 13.4 (H) 1.7 - 8.2 K/UL    ABS. LYMPHOCYTES 1.5 0.5 - 4.6 K/UL    ABS. MONOCYTES 1.7 (H) 0.1 - 1.3 K/UL    ABS. EOSINOPHILS 0.0 0.0 - 0.8 K/UL    ABS. BASOPHILS 0.0 0.0 - 0.2 K/UL    ABS. IMM. GRANS. 0.7 (H) 0.0 - 0.5 K/UL    DF AUTOMATED     HEMOGLOBIN A1C WITH EAG    Collection Time: 09/15/17 10:29 AM   Result Value Ref Range    Hemoglobin A1c 6.1 (H) 4.8 - 6.0 %    Est. average glucose 394 mg/dL   METABOLIC PANEL, COMPREHENSIVE    Collection Time: 09/15/17 10:29 AM   Result Value Ref Range    Sodium 148 (H) 136 - 145 mmol/L    Potassium 3.3 (L) 3.5 - 5.1 mmol/L    Chloride 114 (H) 98 - 107 mmol/L    CO2 26 21 - 32 mmol/L    Anion gap 8 7 - 16 mmol/L    Glucose 112 (H) 65 - 100 mg/dL    BUN 33 (H) 8 - 23 MG/DL    Creatinine 0.52 (L) 0.6 - 1.0 MG/DL    GFR est AA >60 >60 ml/min/1.73m2    GFR est non-AA >60 >60 ml/min/1.73m2    Calcium 7.6 (L) 8.3 - 10.4 MG/DL    Bilirubin, total 1.1 0.2 - 1.1 MG/DL    ALT (SGPT) 69 (H) 12 - 65 U/L    AST (SGOT) 41 (H) 15 - 37 U/L    Alk.  phosphatase 69 50 - 136 U/L    Protein, total 6.9 6.3 - 8.2 g/dL    Albumin 2.9 (L) 3.2 - 4.6 g/dL    Globulin 4.0 (H) 2.3 - 3.5 g/dL    A-G Ratio 0.7 (L) 1.2 - 3.5     MAGNESIUM    Collection Time: 09/15/17 10:29 AM   Result Value Ref Range    Magnesium 2.4 1.8 - 2.4 mg/dL   PHOSPHORUS    Collection Time: 09/15/17 10:29 AM   Result Value Ref Range    Phosphorus 3.2 2.3 - 3.7 MG/DL   URINALYSIS W/ RFLX MICROSCOPIC    Collection Time: 09/15/17  2:15 PM   Result Value Ref Range    Color YELLOW      Appearance CLEAR      Specific gravity 1.013 1.001 - 1.023      pH (UA) 7.0 5.0 - 9.0      Protein NEGATIVE  NEG mg/dL    Glucose NEGATIVE  mg/dL    Ketone NEGATIVE  NEG mg/dL    Bilirubin NEGATIVE  NEG      Blood NEGATIVE  NEG Urobilinogen 0.2 0.2 - 1.0 EU/dL    Nitrites NEGATIVE  NEG      Leukocyte Esterase NEGATIVE  NEG     CULTURE, URINE    Collection Time: 09/15/17  2:40 PM   Result Value Ref Range    Special Requests: NO SPECIAL REQUESTS      Culture result:        10,000 to 50,000 COLONIES/mL MIXED SKIN JOSEFA ISOLATED   CULTURE, BLOOD    Collection Time: 09/15/17 11:54 PM   Result Value Ref Range    Special Requests:  rt hand     Culture result: NO GROWTH 1 DAY     CBC WITH AUTOMATED DIFF    Collection Time: 09/16/17  3:35 PM   Result Value Ref Range    WBC 14.5 (H) 4.3 - 11.1 K/uL    RBC 4.10 4.05 - 5.25 M/uL    HGB 11.8 11.7 - 15.4 g/dL    HCT 34.2 (L) 35.8 - 46.3 %    MCV 83.4 79.6 - 97.8 FL    MCH 28.8 26.1 - 32.9 PG    MCHC 34.5 31.4 - 35.0 g/dL    RDW 13.9 11.9 - 14.6 %    PLATELET 418 (L) 973 - 450 K/uL    MPV 9.7 (L) 10.8 - 14.1 FL    NEUTROPHILS 77 43 - 78 %    LYMPHOCYTES 11 (L) 13 - 44 %    MONOCYTES 12 4.0 - 12.0 %    EOSINOPHILS 0 (L) 0.5 - 7.8 %    BASOPHILS 0 0.0 - 2.0 %    ABS. NEUTROPHILS 11.2 (H) 1.7 - 8.2 K/UL    ABS. LYMPHOCYTES 1.6 0.5 - 4.6 K/UL    ABS. MONOCYTES 1.7 (H) 0.1 - 1.3 K/UL    ABS. EOSINOPHILS 0.0 0.0 - 0.8 K/UL    ABS. BASOPHILS 0.0 0.0 - 0.2 K/UL    ABS. IMM.  GRANS. 0.3 0.0 - 0.5 K/UL    RBC COMMENTS NORMOCYTIC/NORMOCHROMIC      WBC COMMENTS OCCASIONAL      PLATELET COMMENTS DECREASED      DF AUTOMATED     METABOLIC PANEL, BASIC    Collection Time: 09/16/17  3:35 PM   Result Value Ref Range    Sodium 155 (H) 136 - 145 mmol/L    Potassium 2.9 (LL) 3.5 - 5.1 mmol/L    Chloride 117 (H) 98 - 107 mmol/L    CO2 27 21 - 32 mmol/L    Anion gap 11 7 - 16 mmol/L    Glucose 104 (H) 65 - 100 mg/dL    BUN 35 (H) 8 - 23 MG/DL    Creatinine 0.58 (L) 0.6 - 1.0 MG/DL    GFR est AA >60 >60 ml/min/1.73m2    GFR est non-AA >60 >60 ml/min/1.73m2    Calcium 8.3 8.3 - 10.4 MG/DL   MAGNESIUM    Collection Time: 09/16/17  3:35 PM   Result Value Ref Range    Magnesium 2.3 1.8 - 2.4 mg/dL   CBC WITH AUTOMATED DIFF Collection Time: 09/17/17  5:31 AM   Result Value Ref Range    WBC 12.0 (H) 4.3 - 11.1 K/uL    RBC 4.24 4.05 - 5.25 M/uL    HGB 12.1 11.7 - 15.4 g/dL    HCT 35.6 (L) 35.8 - 46.3 %    MCV 84.0 79.6 - 97.8 FL    MCH 28.5 26.1 - 32.9 PG    MCHC 34.0 31.4 - 35.0 g/dL    RDW 13.9 11.9 - 14.6 %    PLATELET 97 (L) 974 - 450 K/uL    MPV 10.2 (L) 10.8 - 14.1 FL    DF AUTOMATED      NEUTROPHILS 77 43 - 78 %    LYMPHOCYTES 13 13 - 44 %    MONOCYTES 9 4.0 - 12.0 %    EOSINOPHILS 0 (L) 0.5 - 7.8 %    BASOPHILS 0 0.0 - 2.0 %    IMMATURE GRANULOCYTES 0.9 0.0 - 5.0 %    ABS. NEUTROPHILS 9.2 (H) 1.7 - 8.2 K/UL    ABS. LYMPHOCYTES 1.5 0.5 - 4.6 K/UL    ABS. MONOCYTES 1.0 0.1 - 1.3 K/UL    ABS. EOSINOPHILS 0.0 0.0 - 0.8 K/UL    ABS. BASOPHILS 0.0 0.0 - 0.2 K/UL    ABS. IMM.  GRANS. 0.1 0.0 - 0.5 K/UL   METABOLIC PANEL, BASIC    Collection Time: 09/17/17  5:31 AM   Result Value Ref Range    Sodium 155 (H) 136 - 145 mmol/L    Potassium 3.1 (L) 3.5 - 5.1 mmol/L    Chloride 120 (H) 98 - 107 mmol/L    CO2 28 21 - 32 mmol/L    Anion gap 7 7 - 16 mmol/L    Glucose 116 (H) 65 - 100 mg/dL    BUN 32 (H) 8 - 23 MG/DL    Creatinine 0.56 (L) 0.6 - 1.0 MG/DL    GFR est AA >60 >60 ml/min/1.73m2    GFR est non-AA >60 >60 ml/min/1.73m2    Calcium 8.1 (L) 8.3 - 10.4 MG/DL       Assessment:     Problem List as of 9/17/2017  Date Reviewed: 9/12/2017          Codes Class Noted - Resolved    ICH (intracerebral hemorrhage) ([facility] ICD-10-CM: I61.9  ICD-9-CM: 062  9/15/2017 - Present        Hypertensive urgency, malignant ICD-10-CM: I16.0  ICD-9-CM: 401.0  9/11/2017 - Present        Stroke, hemorrhagic ([facility] ICD-10-CM: I61.9  ICD-9-CM: 359  9/7/2017 - Present        Accelerated hypertension ICD-10-CM: I10  ICD-9-CM: 401.0  9/7/2017 - Present        At risk for aspiration (Chronic) ICD-10-CM: H88.33  ICD-9-CM: V49.89  9/7/2017 - Present        Acute spontaneous intraventricular hemorrhage assoc w/ hypertension ([facility] ICD-10-CM: I61.5, I10  ICD-9-CM: 517, 401.9 9/7/2017 - Present        Screening for breast cancer ICD-10-CM: Z12.39  ICD-9-CM: V76.10  2/27/2017 - Present        Atrophic vaginitis ICD-10-CM: N95.2  ICD-9-CM: 627.3  7/11/2016 - Present        HTN (hypertension) ICD-10-CM: I10  ICD-9-CM: 401.9  10/23/2015 - Present        ADD (attention deficit disorder) ICD-10-CM: F98.8  ICD-9-CM: 314.00  10/23/2015 - Present        Depression ICD-10-CM: F32.9  ICD-9-CM: 420  10/23/2015 - Present        Anxiety ICD-10-CM: F41.9  ICD-9-CM: 300.00  10/23/2015 - Present        RESOLVED: Hypoxemia ICD-10-CM: R09.02  ICD-9-CM: 799.02  9/10/2017 - 9/12/2017        RESOLVED: Acute respiratory failure ([facility] ICD-10-CM: J96.00  ICD-9-CM: 518.81  9/8/2017 - 9/10/2017        RESOLVED: Hemorrhagic stroke ([facility] ICD-10-CM: I61.9  ICD-9-CM: 544  9/7/2017 - 9/7/2017        RESOLVED: Non-intractable vomiting with nausea ICD-10-CM: R11.2  ICD-9-CM: 787.01  9/7/2017 - 9/12/2017        RESOLVED: Seborrheic keratosis, inflamed ICD-10-CM: L82.0  ICD-9-CM: 702.11  7/11/2016 - 9/20/2016        RESOLVED: Cough ICD-10-CM: R05  ICD-9-CM: 786.2  7/11/2016 - 9/20/2016        RESOLVED: Shoulder pain ICD-10-CM: M25.519  ICD-9-CM: 719.41  10/23/2015 - 2/27/2017              Plan:      Rehabilitation Plan  The patient has shown the ability to tolerate and benefit from 3 hours of therapy daily and is being admitted to a comprehensive acute inpatient rehabilitation program consisting of at least 3 hours of combined physical and occupational therapies. Begin intensive Physical Therapy for a minimum of 1.5 hours a day, at least 5 out of 7 days per week to address bed mobility, transfers, ambulation, strengthening, balance, and endurance.    Begin intensive Occupational Therapy for a minimum of 1.5 hours a day, at least 5 out of 7 days per week to address ADL ( bathing, LE dressing, toileting) and adaptive equipment as needed.     Continue ST for: dysarthria, impaired communication skills, vital stimulation for right facial weakness. Swallowing deficits     Continue 24-hour skilled rehabilitation nursing for bowel and bladder management, skin care for decubitus ulcer prevention , pain management and ongoing medication administration           Continue daily physician medical management:    Left BG Hemorrhage with intraventricular extension due to HTN emergency; resultant R hemiplegia, right neglect, dysarthria, aphasia, dysphagia with significant decline in mobility and self care  - no new deficits.      Dysphagia; Pureed diet with NTL; at risk for malnutrition and dehydration. PICC line removed prior to transfer; may need a line for IVFs if BUN continues to increase.      Hypernatremia; last head CT did not show significant cerebral edema. Clinically improving. 9/16 - Na 155 -> 155 (9/17)  - start D5 .5NS. Check Na q6h. Plan to decrease gradually.       DVT risk / DVT Prophylaxis-continue daily physician exam to assess for signs and symptoms as patient is at increased risk for of thromboembolism. Mobilization as tolerated. Intermittent pneumatic compression devices when in bed Thigh-high or knee-high thromboembolic deterrent hose when out of bed.   - No  anticoag due to [address]     Pain Control: stable, mild-to-moderate joint symptoms intermittently, reasonably well controlled by PRN meds. Will require regular pain assessment and comprenhensive pain management. - prn tramadol. Prn tylenol       Wound Care: Monitor wound status daily per staff and physician. At risk for failure. Will require 24/7 rehab nursing. None needed at this time     Hypertension - BP uncontrolled, fluctuating, managed medically. Add prn hydralazine for sbp> 180. Goal SBP is 140-160 given ICH. - Cont Nomodyne, norvasc and hyzaar; consider Verapamil or scheduled Hydralazine  - -160s, no new dose changes.       Mild hyperglycemia, likely stress induced vs borderline diabetes; monitor loosely     Leukocytosis; recheck in a.m.  Check UA due to stafford. No pulmonary signs (had neg CXR today), no s/s of infxn at old PICC site, no wounds, afebrile. Watch monocytes. - no significant change. - did receive rocephin im x 1 9/15.   - follow Cx's  - WBCs declining 14.5->12.0 (9/17), continue to watch, no  abx for now.     Urinary retention/ neurogenic bladder - start flomax but continue stafford until mobility improves a bit. - continued on stafford, clear yellow urine. Output good.      bowel program - add prn meds; incontinent, want to keep stool on the firmer side. Monitor skin.      GERD - add a PPI. At times may need additional antacids, Maalox prn.      Pneumonia prophylaxis- Incentive spirometer every hour while awake. Will need instruction and assistance. Not sure if she can get a proper oral seal to be effective    Time spent was 25 minutes with over 1/2 in direct patient care/examination, consultation and coordination of care. Signed By: Ney Cordova MD     September 17, 2017      Addendum - Na 156 at 9pm. BUN/ cr mild pre renal depletion. U osm wnl. Will check Ns in am.  Will need to monitor closely as fluids continued.

## 2017-09-18 LAB
ALBUMIN SERPL-MCNC: 2.5 G/DL (ref 3.2–4.6)
ALBUMIN/GLOB SERPL: 0.8 {RATIO} (ref 1.2–3.5)
ALP SERPL-CCNC: 65 U/L (ref 50–136)
ALT SERPL-CCNC: 90 U/L (ref 12–65)
ANION GAP SERPL CALC-SCNC: 9 MMOL/L (ref 7–16)
AST SERPL-CCNC: 32 U/L (ref 15–37)
BASOPHILS # BLD: 0 K/UL (ref 0–0.2)
BASOPHILS NFR BLD: 0 % (ref 0–2)
BILIRUB SERPL-MCNC: 0.7 MG/DL (ref 0.2–1.1)
BUN SERPL-MCNC: 29 MG/DL (ref 8–23)
CALCIUM SERPL-MCNC: 7.9 MG/DL (ref 8.3–10.4)
CHLORIDE SERPL-SCNC: 123 MMOL/L (ref 98–107)
CHOLEST SERPL-MCNC: 133 MG/DL
CO2 SERPL-SCNC: 24 MMOL/L (ref 21–32)
CREAT SERPL-MCNC: 0.55 MG/DL (ref 0.6–1)
DIFFERENTIAL METHOD BLD: ABNORMAL
EOSINOPHIL # BLD: 0.1 K/UL (ref 0–0.8)
EOSINOPHIL NFR BLD: 0 % (ref 0.5–7.8)
ERYTHROCYTE [DISTWIDTH] IN BLOOD BY AUTOMATED COUNT: 14.1 % (ref 11.9–14.6)
GLOBULIN SER CALC-MCNC: 3.2 G/DL (ref 2.3–3.5)
GLUCOSE SERPL-MCNC: 123 MG/DL (ref 65–100)
HCT VFR BLD AUTO: 34.3 % (ref 35.8–46.3)
HDLC SERPL-MCNC: 33 MG/DL (ref 40–60)
HDLC SERPL: 4 {RATIO}
HGB BLD-MCNC: 11.4 G/DL (ref 11.7–15.4)
IMM GRANULOCYTES # BLD: 0.1 K/UL (ref 0–0.5)
IMM GRANULOCYTES NFR BLD: 0.9 % (ref 0–5)
LDLC SERPL CALC-MCNC: 82.8 MG/DL
LIPID PROFILE,FLP: ABNORMAL
LYMPHOCYTES # BLD: 1.9 K/UL (ref 0.5–4.6)
LYMPHOCYTES NFR BLD: 15 % (ref 13–44)
MCH RBC QN AUTO: 28.4 PG (ref 26.1–32.9)
MCHC RBC AUTO-ENTMCNC: 33.2 G/DL (ref 31.4–35)
MCV RBC AUTO: 85.5 FL (ref 79.6–97.8)
MONOCYTES # BLD: 1 K/UL (ref 0.1–1.3)
MONOCYTES NFR BLD: 8 % (ref 4–12)
NEUTS SEG # BLD: 9.1 K/UL (ref 1.7–8.2)
NEUTS SEG NFR BLD: 76 % (ref 43–78)
OSMOLALITY SERPL: 320 MOSM/KG H2O (ref 275–295)
PLATELET # BLD AUTO: 83 K/UL (ref 150–450)
PMV BLD AUTO: 10.3 FL (ref 10.8–14.1)
POTASSIUM SERPL-SCNC: 3.6 MMOL/L (ref 3.5–5.1)
PROT SERPL-MCNC: 5.7 G/DL (ref 6.3–8.2)
RBC # BLD AUTO: 4.01 M/UL (ref 4.05–5.25)
SODIUM SERPL-SCNC: 151 MMOL/L (ref 136–145)
SODIUM SERPL-SCNC: 153 MMOL/L (ref 136–145)
SODIUM SERPL-SCNC: 156 MMOL/L (ref 136–145)
TRIGL SERPL-MCNC: 86 MG/DL (ref 35–150)
VLDLC SERPL CALC-MCNC: 17.2 MG/DL (ref 6–23)
WBC # BLD AUTO: 12.2 K/UL (ref 4.3–11.1)

## 2017-09-18 PROCEDURE — 51798 US URINE CAPACITY MEASURE: CPT

## 2017-09-18 PROCEDURE — 97535 SELF CARE MNGMENT TRAINING: CPT

## 2017-09-18 PROCEDURE — 84300 ASSAY OF URINE SODIUM: CPT | Performed by: PHYSICAL MEDICINE & REHABILITATION

## 2017-09-18 PROCEDURE — 84295 ASSAY OF SERUM SODIUM: CPT | Performed by: PHYSICAL MEDICINE & REHABILITATION

## 2017-09-18 PROCEDURE — 36415 COLL VENOUS BLD VENIPUNCTURE: CPT | Performed by: PHYSICAL MEDICINE & REHABILITATION

## 2017-09-18 PROCEDURE — 74011000258 HC RX REV CODE- 258: Performed by: PHYSICAL MEDICINE & REHABILITATION

## 2017-09-18 PROCEDURE — 80061 LIPID PANEL: CPT | Performed by: PHYSICAL MEDICINE & REHABILITATION

## 2017-09-18 PROCEDURE — 97116 GAIT TRAINING THERAPY: CPT

## 2017-09-18 PROCEDURE — 97112 NEUROMUSCULAR REEDUCATION: CPT

## 2017-09-18 PROCEDURE — 65310000000 HC RM PRIVATE REHAB

## 2017-09-18 PROCEDURE — 80053 COMPREHEN METABOLIC PANEL: CPT | Performed by: PHYSICAL MEDICINE & REHABILITATION

## 2017-09-18 PROCEDURE — 96125 COGNITIVE TEST BY HC PRO: CPT

## 2017-09-18 PROCEDURE — 97530 THERAPEUTIC ACTIVITIES: CPT

## 2017-09-18 PROCEDURE — 97110 THERAPEUTIC EXERCISES: CPT

## 2017-09-18 PROCEDURE — 85025 COMPLETE CBC W/AUTO DIFF WBC: CPT | Performed by: PHYSICAL MEDICINE & REHABILITATION

## 2017-09-18 PROCEDURE — 74011250637 HC RX REV CODE- 250/637: Performed by: PHYSICAL MEDICINE & REHABILITATION

## 2017-09-18 PROCEDURE — 99233 SBSQ HOSP IP/OBS HIGH 50: CPT | Performed by: PHYSICAL MEDICINE & REHABILITATION

## 2017-09-18 PROCEDURE — 83930 ASSAY OF BLOOD OSMOLALITY: CPT | Performed by: PHYSICAL MEDICINE & REHABILITATION

## 2017-09-18 RX ORDER — HYDRALAZINE HYDROCHLORIDE 25 MG/1
25 TABLET, FILM COATED ORAL 3 TIMES DAILY
Status: DISCONTINUED | OUTPATIENT
Start: 2017-09-18 | End: 2017-09-25

## 2017-09-18 RX ORDER — POTASSIUM CHLORIDE 20 MEQ/1
40 TABLET, EXTENDED RELEASE ORAL DAILY
Status: DISCONTINUED | OUTPATIENT
Start: 2017-09-18 | End: 2017-09-19

## 2017-09-18 RX ORDER — DEXTROSE MONOHYDRATE AND SODIUM CHLORIDE 5; .45 G/100ML; G/100ML
75 INJECTION, SOLUTION INTRAVENOUS CONTINUOUS
Status: DISCONTINUED | OUTPATIENT
Start: 2017-09-18 | End: 2017-09-22

## 2017-09-18 RX ADMIN — HYDRALAZINE HYDROCHLORIDE 25 MG: 25 TABLET, FILM COATED ORAL at 14:32

## 2017-09-18 RX ADMIN — HYDROCHLOROTHIAZIDE: 12.5 CAPSULE ORAL at 08:29

## 2017-09-18 RX ADMIN — LABETALOL HYDROCHLORIDE 200 MG: 200 TABLET, FILM COATED ORAL at 08:31

## 2017-09-18 RX ADMIN — TAMSULOSIN HYDROCHLORIDE 0.4 MG: 0.4 CAPSULE ORAL at 22:09

## 2017-09-18 RX ADMIN — POTASSIUM CHLORIDE 40 MEQ: 20 SOLUTION ORAL at 08:31

## 2017-09-18 RX ADMIN — HYDRALAZINE HYDROCHLORIDE 25 MG: 25 TABLET, FILM COATED ORAL at 10:24

## 2017-09-18 RX ADMIN — AMLODIPINE BESYLATE 10 MG: 10 TABLET ORAL at 08:32

## 2017-09-18 RX ADMIN — DEXTROSE MONOHYDRATE AND SODIUM CHLORIDE 65 ML/HR: 5; .45 INJECTION, SOLUTION INTRAVENOUS at 04:29

## 2017-09-18 RX ADMIN — BISACODYL 10 MG: 10 SUPPOSITORY RECTAL at 18:06

## 2017-09-18 RX ADMIN — DEXTROSE MONOHYDRATE AND SODIUM CHLORIDE 75 ML/HR: 5; .45 INJECTION, SOLUTION INTRAVENOUS at 22:14

## 2017-09-18 RX ADMIN — LABETALOL HYDROCHLORIDE 200 MG: 200 TABLET, FILM COATED ORAL at 17:30

## 2017-09-18 RX ADMIN — HYDRALAZINE HYDROCHLORIDE 25 MG: 25 TABLET, FILM COATED ORAL at 22:10

## 2017-09-18 RX ADMIN — DEXTROSE MONOHYDRATE AND SODIUM CHLORIDE 75 ML/HR: 5; .45 INJECTION, SOLUTION INTRAVENOUS at 10:21

## 2017-09-18 NOTE — PROGRESS NOTES
Pt sleeping, call light with in reach. SR up x 2, bed in low lock position. D5% 0.45 NS infusing to left hand IV at 75 ML/hr.

## 2017-09-18 NOTE — PROGRESS NOTES
09/18/17 1024   Time Spent With Patient   Time In 0835   Time Out 0902   Mental Status   Neurologic State Alert   Orientation Level Oriented to person;Oriented to place   Cognition Impaired decision making;Decreased command following; Appropriate decision making;Memory loss   Perseveration Perseverates during conversation   Safety/Judgement Fall prevention   Psychosocial   Patient Behaviors Calm   Reading Comprehension   Pre-Morbid Reading Status Literate   Words  No   Paragraph  Impaired   Overall Impairment Severity Mild-moderate   Written Expression   Pre-Morbid Dominant Hand Unknown/unable to assess      09/18/17 1025   Verbal Expression   Primary Mode of Expression Verbal   Initiation No impairment   Automatic Speech Task No impairment   Repetition No impairment   Naming Impaired   Conversation Non-fluent   Speech Characteristics Word retrieval;Perseveration   Interfering Components Attention; Impaired thought organization   Effective Techniques Provide extra time;Cueing; Word retrieval strategies   Overall Impairment Moderate   FIM Score (Verbal Expression) 3   Oral-Motor Structure/Motor Speech   Labial Decreased rate;Decreased seal;Impaired coordination;Right droop   Oral Hygiene dry   Lingual Decreased rate   Apraxic Characteristics None   Dysarthric Characteristics Decreased breath support;Decreased rate;Blended word boundaries   Overall Impairment Severity Moderate   Neuro-Linguistics/Cognitive Function   Reasoning  Inductive reasoning; Abstract reasoning; Elizabeth reasoning;Convergent thinking;Divergent thinking;Deductive reasoning;Executive function   Organizational Thought;Simple functional tasks; Sequencing   Problem Solving Simple; Functional   FIM Score (Problem Solving) 3   Mathematics Addition, simple;Division, simple;Multiplication, simple;Subtraction, simple   Memory  Short-term   FIM Score (Memory) 1   Attention  Sustained attention;Easily redirected;Divided attention   Overall Impairment Severity Moderate-severe   Pragmatics   Pragmatics Impairment Abnormal affect;Monotone; Inconsistent eye contact   Pragmatics Impairment Severity Moderate   FIM Score (Pragmatics/Social Interaction) 3   Pt completed basic cognitive-linguistic evaluation with performance as follows: simple yes/no questions 5/5, complex yes/no questions 3/5, 1-2 step commands 5/10, 3 step commands 0/5, named 1 items per minute in food & animal categories, problem solving questions 4/5, reasoning questions 4/5 and recalled 0/3 items in short term memory task. Pt presents with noted cognitive-linguistic deficits in orientation, command following/ processing, attention, thought organization, problem solving, reasoning, word retrieval, and memory. Pt would benefit from ST to address these cognitive-linguistic deficits.      Bridgette Amador MA/ANASTASIA/SLP

## 2017-09-18 NOTE — PROGRESS NOTES
Problem: Falls - Risk of  Goal: *Absence of Falls  Document Haley Fall Risk and appropriate interventions in the flowsheet.    Outcome: Progressing Towards Goal  Fall Risk Interventions:  Mobility Interventions: Bed/chair exit alarm     Mentation Interventions: Bed/chair exit alarm, Door open when patient unattended, More frequent rounding, Reorient patient     Medication Interventions: Bed/chair exit alarm, Patient to call before getting OOB, Teach patient to arise slowly     Elimination Interventions: Call light in reach

## 2017-09-18 NOTE — PROGRESS NOTES
Pt with stroke and right side flaccid. Pt sitting in wheelchair,  D5%0.45 Infusing to left IV at 75 ml/hr. Pt oriented to name,. Her , and situations. Pt mumbles words. Pt has stafford cath  Urine clear stat lock intact, see doc flow sheet. 2 person assist when taken to bathroom.

## 2017-09-18 NOTE — PROGRESS NOTES
MD Ludivina,   Medical Director  [address] [facility], [address] [facility]  Tel: [phone]       [facility] PROGRESS NOTE    Donnette Seip  Admit Date: 9/15/2017  Admit Diagnosis: stroke, left brain involvement;ICH (intracerebral hemorrhag*    Subjective     Very awake and alert. Cooperative. Feeding self; needs supervision as she is eating very fast and pocketing on the right. Denies headache, lethargy, nausea, fatigue    Objective:     Current Facility-Administered Medications   Medication Dose Route Frequency    potassium chloride (K-DUR, KLOR-CON) SR tablet 40 mEq  40 mEq Oral DAILY    dextrose 5 % - 0.45% NaCl infusion  75 mL/hr IntraVENous CONTINUOUS    acetaminophen (TYLENOL) tablet 500 mg  500 mg Per NG tube Q4H PRN    alum-mag hydroxide-simeth (MYLANTA) oral suspension 30 mL  30 mL Oral Q4H PRN    amLODIPine (NORVASC) tablet 10 mg  10 mg Oral DAILY    bisacodyl (DULCOLAX) suppository 10 mg  10 mg Rectal DAILY PRN    hydrALAZINE (APRESOLINE) tablet 50 mg  50 mg Oral QID PRN    labetalol (NORMODYNE) tablet 200 mg  200 mg Oral Q8H    lip protectant (BLISTEX) ointment   Topical PRN    losartan/hydroCHLOROthiazide (HYZAAR) 100/12.5 mg   Oral DAILY    ondansetron (ZOFRAN ODT) tablet 4 mg  4 mg Oral Q6H PRN    sodium phosphate (FLEET'S) enema 118 mL  1 Enema Rectal PRN    traMADol (ULTRAM) tablet 50 mg  50 mg Oral Q6H PRN    traZODone (DESYREL) tablet 50 mg  50 mg Oral QHS PRN    tamsulosin (FLOMAX) capsule 0.4 mg  0.4 mg Oral QHS     Review of Systems:Denies chest pain, shortness of breath, cough, headache, visual problems, abdominal pain, dysurea, calf pain. Pertinent positives are as noted in the medical records and unremarkable otherwise.      Visit Vitals    BP (!) 169/95    Pulse 65    Temp 98.1 °F (36.7 °C)    Resp 12    SpO2 93%        Physical Exam:   General: Alert and O to self, place, situation, year, not month (Aug)  No acute cardio respiratory distress. HEENT: Normocephalic,no scleral icterus  Oral mucosa moist without cyanosis. Right facial weakness   Lungs: Clear to auscultation on the right. Coarse bs left base  Respiration even and unlabored   Heart: Regular rate and rhythm, S1, S2   No  murmurs, clicks, rub or gallops   Abdomen: Soft, non-tender, nondistended. Bowel sounds present. No organomegaly. Genitourinary: stafford . Neuromuscular:      Flaccid RUE plegia with 2 fingerbreadth sublux of R shoulder  Has quad firing in RLE otherwise 0/5. Right foot plantar flexed with very tight achilles. slt increase tone on PROM RLE  -aphasia, dysarthria, fcs (one step)   Skin/extremity: No rashes, no erythema. No calf tenderness BLE                                                                              Functional Assessment:          Balance  Sitting - Static: Fair (occasional) (09/16/17 1300)  Sitting - Dynamic: Poor (constant support) (09/16/17 1300)  Standing - Static: Poor;Constant support (with LUE support on rail) (09/16/17 1300)  Standing - Dynamic : Impaired (09/16/17 1300)                     Lena Snyder Fall Risk Assessment:  Lena Snyder Fall Risk  Mobility: Ambulates or transfers with assist devices or assistance/unsteady gait (09/17/17 1940)  Mobility Interventions: Bed/chair exit alarm (09/17/17 1940)  Mentation: Alert, oriented x 3 (09/17/17 1940)  Mentation Interventions: Bed/chair exit alarm; Door open when patient unattended;More frequent rounding;Reorient patient (09/17/17 1940)  Medication: Patient receiving anticonvulsants, sedatives(tranquilizers), psychotropics or hypnotics, hypoglycemics, narcotics, sleep aids, antihypertensives, laxatives, or diuretics (09/17/17 1940)  Medication Interventions: Bed/chair exit alarm; Patient to call before getting OOB; Teach patient to arise slowly (09/17/17 1940)  Elimination: Needs assistance with toileting (09/17/17 1940)  Elimination Interventions: Call light in reach (09/17/17 1940)  Prior Fall History: Unknown (09/17/17 1940)  Total Score: 3 (09/17/17 1940)  High Fall Risk: Yes (09/17/17 1940)     Speech Assessment:         Ambulation:  Gait  Distance (ft): 5 Feet (ft) (09/17/17 1200)  Assistive Device: Gait belt;Walker cindy;Brace/Splint; Other (comment) (used ace wrap to knee and ankle for DF. and sling for right UE) (09/17/17 1200)     Labs/Studies:  Recent Results (from the past 72 hour(s))   CBC WITH AUTOMATED DIFF    Collection Time: 09/15/17 10:29 AM   Result Value Ref Range    WBC 16.5 (H) 4.3 - 11.1 K/uL    RBC 4.07 4.05 - 5.25 M/uL    HGB 11.7 11.7 - 15.4 g/dL    HCT 33.8 (L) 35.8 - 46.3 %    MCV 83.0 79.6 - 97.8 FL    MCH 28.7 26.1 - 32.9 PG    MCHC 34.6 31.4 - 35.0 g/dL    RDW 13.8 11.9 - 14.6 %    PLATELET 141 267 - 899 K/uL    MPV 9.4 (L) 10.8 - 14.1 FL    NEUTROPHILS 81 (H) 43 - 78 %    LYMPHOCYTES 9 (L) 13 - 44 %    MONOCYTES 10 4.0 - 12.0 %    EOSINOPHILS 0 (L) 0.5 - 7.8 %    BASOPHILS 0 0.0 - 2.0 %    ABS. NEUTROPHILS 13.4 (H) 1.7 - 8.2 K/UL    ABS. LYMPHOCYTES 1.5 0.5 - 4.6 K/UL    ABS. MONOCYTES 1.7 (H) 0.1 - 1.3 K/UL    ABS. EOSINOPHILS 0.0 0.0 - 0.8 K/UL    ABS. BASOPHILS 0.0 0.0 - 0.2 K/UL    ABS. IMM.  GRANS. 0.7 (H) 0.0 - 0.5 K/UL    DF AUTOMATED     HEMOGLOBIN A1C WITH EAG    Collection Time: 09/15/17 10:29 AM   Result Value Ref Range    Hemoglobin A1c 6.1 (H) 4.8 - 6.0 %    Est. average glucose 016 mg/dL   METABOLIC PANEL, COMPREHENSIVE    Collection Time: 09/15/17 10:29 AM   Result Value Ref Range    Sodium 148 (H) 136 - 145 mmol/L    Potassium 3.3 (L) 3.5 - 5.1 mmol/L    Chloride 114 (H) 98 - 107 mmol/L    CO2 26 21 - 32 mmol/L    Anion gap 8 7 - 16 mmol/L    Glucose 112 (H) 65 - 100 mg/dL    BUN 33 (H) 8 - 23 MG/DL    Creatinine 0.52 (L) 0.6 - 1.0 MG/DL    GFR est AA >60 >60 ml/min/1.73m2    GFR est non-AA >60 >60 ml/min/1.73m2    Calcium 7.6 (L) 8.3 - 10.4 MG/DL    Bilirubin, total 1.1 0.2 - 1.1 MG/DL    ALT (SGPT) 69 (H) 12 - 65 U/L    AST (SGOT) 41 (H) 15 - 37 U/L Alk. phosphatase 69 50 - 136 U/L    Protein, total 6.9 6.3 - 8.2 g/dL    Albumin 2.9 (L) 3.2 - 4.6 g/dL    Globulin 4.0 (H) 2.3 - 3.5 g/dL    A-G Ratio 0.7 (L) 1.2 - 3.5     MAGNESIUM    Collection Time: 09/15/17 10:29 AM   Result Value Ref Range    Magnesium 2.4 1.8 - 2.4 mg/dL   PHOSPHORUS    Collection Time: 09/15/17 10:29 AM   Result Value Ref Range    Phosphorus 3.2 2.3 - 3.7 MG/DL   URINALYSIS W/ RFLX MICROSCOPIC    Collection Time: 09/15/17  2:15 PM   Result Value Ref Range    Color YELLOW      Appearance CLEAR      Specific gravity 1.013 1.001 - 1.023      pH (UA) 7.0 5.0 - 9.0      Protein NEGATIVE  NEG mg/dL    Glucose NEGATIVE  mg/dL    Ketone NEGATIVE  NEG mg/dL    Bilirubin NEGATIVE  NEG      Blood NEGATIVE  NEG      Urobilinogen 0.2 0.2 - 1.0 EU/dL    Nitrites NEGATIVE  NEG      Leukocyte Esterase NEGATIVE  NEG     CULTURE, URINE    Collection Time: 09/15/17  2:40 PM   Result Value Ref Range    Special Requests: NO SPECIAL REQUESTS      Culture result:        10,000 to 50,000 COLONIES/mL MIXED SKIN JOSEFA ISOLATED   CULTURE, BLOOD    Collection Time: 09/15/17 11:54 PM   Result Value Ref Range    Special Requests:  rt hand     Culture result: NO GROWTH 2 DAYS     CBC WITH AUTOMATED DIFF    Collection Time: 09/16/17  3:35 PM   Result Value Ref Range    WBC 14.5 (H) 4.3 - 11.1 K/uL    RBC 4.10 4.05 - 5.25 M/uL    HGB 11.8 11.7 - 15.4 g/dL    HCT 34.2 (L) 35.8 - 46.3 %    MCV 83.4 79.6 - 97.8 FL    MCH 28.8 26.1 - 32.9 PG    MCHC 34.5 31.4 - 35.0 g/dL    RDW 13.9 11.9 - 14.6 %    PLATELET 261 (L) 428 - 450 K/uL    MPV 9.7 (L) 10.8 - 14.1 FL    NEUTROPHILS 77 43 - 78 %    LYMPHOCYTES 11 (L) 13 - 44 %    MONOCYTES 12 4.0 - 12.0 %    EOSINOPHILS 0 (L) 0.5 - 7.8 %    BASOPHILS 0 0.0 - 2.0 %    ABS. NEUTROPHILS 11.2 (H) 1.7 - 8.2 K/UL    ABS. LYMPHOCYTES 1.6 0.5 - 4.6 K/UL    ABS. MONOCYTES 1.7 (H) 0.1 - 1.3 K/UL    ABS. EOSINOPHILS 0.0 0.0 - 0.8 K/UL    ABS. BASOPHILS 0.0 0.0 - 0.2 K/UL    ABS. IMM.  GRANS. 0.3 0.0 - 0.5 K/UL    RBC COMMENTS NORMOCYTIC/NORMOCHROMIC      WBC COMMENTS OCCASIONAL      PLATELET COMMENTS DECREASED      DF AUTOMATED     METABOLIC PANEL, BASIC    Collection Time: 09/16/17  3:35 PM   Result Value Ref Range    Sodium 155 (H) 136 - 145 mmol/L    Potassium 2.9 (LL) 3.5 - 5.1 mmol/L    Chloride 117 (H) 98 - 107 mmol/L    CO2 27 21 - 32 mmol/L    Anion gap 11 7 - 16 mmol/L    Glucose 104 (H) 65 - 100 mg/dL    BUN 35 (H) 8 - 23 MG/DL    Creatinine 0.58 (L) 0.6 - 1.0 MG/DL    GFR est AA >60 >60 ml/min/1.73m2    GFR est non-AA >60 >60 ml/min/1.73m2    Calcium 8.3 8.3 - 10.4 MG/DL   MAGNESIUM    Collection Time: 09/16/17  3:35 PM   Result Value Ref Range    Magnesium 2.3 1.8 - 2.4 mg/dL   CBC WITH AUTOMATED DIFF    Collection Time: 09/17/17  5:31 AM   Result Value Ref Range    WBC 12.0 (H) 4.3 - 11.1 K/uL    RBC 4.24 4.05 - 5.25 M/uL    HGB 12.1 11.7 - 15.4 g/dL    HCT 35.6 (L) 35.8 - 46.3 %    MCV 84.0 79.6 - 97.8 FL    MCH 28.5 26.1 - 32.9 PG    MCHC 34.0 31.4 - 35.0 g/dL    RDW 13.9 11.9 - 14.6 %    PLATELET 97 (L) 661 - 450 K/uL    MPV 10.2 (L) 10.8 - 14.1 FL    DF AUTOMATED      NEUTROPHILS 77 43 - 78 %    LYMPHOCYTES 13 13 - 44 %    MONOCYTES 9 4.0 - 12.0 %    EOSINOPHILS 0 (L) 0.5 - 7.8 %    BASOPHILS 0 0.0 - 2.0 %    IMMATURE GRANULOCYTES 0.9 0.0 - 5.0 %    ABS. NEUTROPHILS 9.2 (H) 1.7 - 8.2 K/UL    ABS. LYMPHOCYTES 1.5 0.5 - 4.6 K/UL    ABS. MONOCYTES 1.0 0.1 - 1.3 K/UL    ABS. EOSINOPHILS 0.0 0.0 - 0.8 K/UL    ABS. BASOPHILS 0.0 0.0 - 0.2 K/UL    ABS. IMM.  GRANS. 0.1 0.0 - 0.5 K/UL   METABOLIC PANEL, BASIC    Collection Time: 09/17/17  5:31 AM   Result Value Ref Range    Sodium 155 (H) 136 - 145 mmol/L    Potassium 3.1 (L) 3.5 - 5.1 mmol/L    Chloride 120 (H) 98 - 107 mmol/L    CO2 28 21 - 32 mmol/L    Anion gap 7 7 - 16 mmol/L    Glucose 116 (H) 65 - 100 mg/dL    BUN 32 (H) 8 - 23 MG/DL    Creatinine 0.56 (L) 0.6 - 1.0 MG/DL    GFR est AA >60 >60 ml/min/1.73m2    GFR est non-AA >60 >60 ml/min/1.73m2    Calcium 8.1 (L) 8.3 - 10.4 MG/DL   OSMOLALITY, UR    Collection Time: 09/17/17 11:13 AM   Result Value Ref Range    Osmolality,urine 672 50 - 1400 MOSM/kg W0T   METABOLIC PANEL, BASIC    Collection Time: 09/17/17  9:15 PM   Result Value Ref Range    Sodium 156 (H) 136 - 145 mmol/L    Potassium 3.7 3.5 - 5.1 mmol/L    Chloride 123 (H) 98 - 107 mmol/L    CO2 27 21 - 32 mmol/L    Anion gap 6 (L) 7 - 16 mmol/L    Glucose 108 (H) 65 - 100 mg/dL    BUN 30 (H) 8 - 23 MG/DL    Creatinine 0.59 (L) 0.6 - 1.0 MG/DL    GFR est AA >60 >60 ml/min/1.73m2    GFR est non-AA >60 >60 ml/min/1.73m2    Calcium 7.9 (L) 8.3 - 10.4 MG/DL   CBC WITH AUTOMATED DIFF    Collection Time: 09/18/17  2:59 AM   Result Value Ref Range    WBC 12.2 (H) 4.3 - 11.1 K/uL    RBC 4.01 (L) 4.05 - 5.25 M/uL    HGB 11.4 (L) 11.7 - 15.4 g/dL    HCT 34.3 (L) 35.8 - 46.3 %    MCV 85.5 79.6 - 97.8 FL    MCH 28.4 26.1 - 32.9 PG    MCHC 33.2 31.4 - 35.0 g/dL    RDW 14.1 11.9 - 14.6 %    PLATELET 83 (L) 408 - 450 K/uL    MPV 10.3 (L) 10.8 - 14.1 FL    DF AUTOMATED      NEUTROPHILS 76 43 - 78 %    LYMPHOCYTES 15 13 - 44 %    MONOCYTES 8 4.0 - 12.0 %    EOSINOPHILS 0 (L) 0.5 - 7.8 %    BASOPHILS 0 0.0 - 2.0 %    IMMATURE GRANULOCYTES 0.9 0.0 - 5.0 %    ABS. NEUTROPHILS 9.1 (H) 1.7 - 8.2 K/UL    ABS. LYMPHOCYTES 1.9 0.5 - 4.6 K/UL    ABS. MONOCYTES 1.0 0.1 - 1.3 K/UL    ABS. EOSINOPHILS 0.1 0.0 - 0.8 K/UL    ABS. BASOPHILS 0.0 0.0 - 0.2 K/UL    ABS. IMM.  GRANS. 0.1 0.0 - 0.5 K/UL   LIPID PANEL    Collection Time: 09/18/17  2:59 AM   Result Value Ref Range    LIPID PROFILE          Cholesterol, total 133 <200 MG/DL    Triglyceride 86 35 - 150 MG/DL    HDL Cholesterol 33 (L) 40 - 60 MG/DL    LDL, calculated 82.8 <100 MG/DL    VLDL, calculated 17.2 6.0 - 23.0 MG/DL    CHOL/HDL Ratio 4.0     METABOLIC PANEL, COMPREHENSIVE    Collection Time: 09/18/17  2:59 AM   Result Value Ref Range    Sodium 156 (H) 136 - 145 mmol/L    Potassium 3.6 3.5 - 5.1 mmol/L Chloride 123 (H) 98 - 107 mmol/L    CO2 24 21 - 32 mmol/L    Anion gap 9 7 - 16 mmol/L    Glucose 123 (H) 65 - 100 mg/dL    BUN 29 (H) 8 - 23 MG/DL    Creatinine 0.55 (L) 0.6 - 1.0 MG/DL    GFR est AA >60 >60 ml/min/1.73m2    GFR est non-AA >60 >60 ml/min/1.73m2    Calcium 7.9 (L) 8.3 - 10.4 MG/DL    Bilirubin, total 0.7 0.2 - 1.1 MG/DL    ALT (SGPT) 90 (H) 12 - 65 U/L    AST (SGOT) 32 15 - 37 U/L    Alk.  phosphatase 65 50 - 136 U/L    Protein, total 5.7 (L) 6.3 - 8.2 g/dL    Albumin 2.5 (L) 3.2 - 4.6 g/dL    Globulin 3.2 2.3 - 3.5 g/dL    A-G Ratio 0.8 (L) 1.2 - 3.5         Assessment:     Problem List as of 9/18/2017  Date Reviewed: 9/12/2017          Codes Class Noted - Resolved    ICH (intracerebral hemorrhage) ([facility] ICD-10-CM: I61.9  ICD-9-CM: 405  9/15/2017 - Present        Hypertensive urgency, malignant ICD-10-CM: I16.0  ICD-9-CM: 401.0  9/11/2017 - Present        Stroke, hemorrhagic ([facility] ICD-10-CM: I61.9  ICD-9-CM: 772  9/7/2017 - Present        Accelerated hypertension ICD-10-CM: I10  ICD-9-CM: 401.0  9/7/2017 - Present        At risk for aspiration (Chronic) ICD-10-CM: Z91.89  ICD-9-CM: V49.89  9/7/2017 - Present        Acute spontaneous intraventricular hemorrhage assoc w/ hypertension ([facility] ICD-10-CM: I61.5, I10  ICD-9-CM: 634, 401.9  9/7/2017 - Present        Screening for breast cancer ICD-10-CM: Z12.39  ICD-9-CM: V76.10  2/27/2017 - Present        Atrophic vaginitis ICD-10-CM: N95.2  ICD-9-CM: 627.3  7/11/2016 - Present        HTN (hypertension) ICD-10-CM: I10  ICD-9-CM: 401.9  10/23/2015 - Present        ADD (attention deficit disorder) ICD-10-CM: F98.8  ICD-9-CM: 314.00  10/23/2015 - Present        Depression ICD-10-CM: F32.9  ICD-9-CM: 916  10/23/2015 - Present        Anxiety ICD-10-CM: F41.9  ICD-9-CM: 300.00  10/23/2015 - Present        RESOLVED: Hypoxemia ICD-10-CM: R09.02  ICD-9-CM: 799.02  9/10/2017 - 9/12/2017        RESOLVED: Acute respiratory failure ([facility] ICD-10-CM: J96.00  ICD-9-CM: 518.81  9/8/2017 - 9/10/2017        RESOLVED: Hemorrhagic stroke ([facility] ICD-10-CM: I61.9  ICD-9-CM: 205  9/7/2017 - 9/7/2017        RESOLVED: Non-intractable vomiting with nausea ICD-10-CM: R11.2  ICD-9-CM: 787.01  9/7/2017 - 9/12/2017        RESOLVED: Seborrheic keratosis, inflamed ICD-10-CM: L82.0  ICD-9-CM: 702.11  7/11/2016 - 9/20/2016        RESOLVED: Cough ICD-10-CM: R05  ICD-9-CM: 786.2  7/11/2016 - 9/20/2016        RESOLVED: Shoulder pain ICD-10-CM: M25.519  ICD-9-CM: 719.41  10/23/2015 - 2/27/2017            Left BG Hemorrhage with intraventricular extension due to HTN emergency; resultant R hemiplegia, right neglect, dysarthria, aphasia, dysphagia with significant decline in mobility and self care     Continue daily physician medical management:  Pneumonia prophylaxis- Incentive spirometer every hour while awake. Will need instruction and assistance. Not sure if she can get a proper oral seal to be effective     Dysphagia; Pureed diet with NTL; at risk for malnutrition and dehydration. PICC line removed prior to transfer; may need a line for IVFs if BUN continues to increase. Low K, Ca; check mg. Supplementation as needed. Na elevated  -mag ok, K much improved; cont to supplement while on diuretic     Hypernatremia; last head CT did not show significant cerebral edema. Clinically improving. Likely due to prerenal azotemia; recheck in a.m; MAY REQ isotonic / hypotonic saline IV;  -9/18 events of weekend noted; Na 156; asymptomatic, on 0.5 dextrose with 1/4 Na; na q 6hrs; pts serum osmolality was normal. Will check urine Na and urine osmolality; depending on findings, will consider consulting Hospitalist vs Nephrology  Neuro improving despite this     DVT risk / DVT Prophylaxis- Will require daily physician exam to assess for signs and symptoms as patient is at increased risk for of thromboembolism. Mobilization as tolerated.  Intermittent pneumatic compression devices when in bed Thigh-high or knee-high thromboembolic deterrent hose when out of bed. NO  anticoag due to [address]     Pain Control: stable, mild-to-moderate joint symptoms intermittently, reasonably well controlled by PRN meds. Will require regular pain assessment and comprenhensive pain management,      Wound Care: Monitor wound status daily per staff and physician. At risk for failure. Will require 24/7 rehab nursing. None needed at this time     Hypertension - BP uncontrolled, fluctuating, managed medically. Add prn hydralazine for sbp> 180. Goal SBP is 140-160 given ICH. Cont Nomodyne, norvasc and hyzaar; consider Verapamil or scheduled Hydralazine  -9/18 BP increased 169/95; add hydralazine 25 tid     Mild hyperglycemia, likely stress induced vs borderline diabetes; monitor loosely     Leukocytosis; recheck in a.m. Check UA due to stafford. No pulmonary signs (had neg CXR today), no s/s of infxn at old PICC site, no wounds, afebrile. Watch monocytes. -9/18 12.2 improving. Urine neg     Urinary retention/ neurogenic bladder - start flomax but continue stafford until mobility improves a bit. Strict I/o's     bowel program - add prn meds; incontinent, want to keep stool on the firmer side. Monitor skin.      GERD - add a PPI. At times may need additional antacids, Maalox prn.                    Time spent was 35 minutes with over 1/2 in direct patient care/examination, consultation and coordination of care.      Signed By: Nola Mistry MD     September 18, 2017

## 2017-09-18 NOTE — PROGRESS NOTES
PHYSICAL THERAPY DAILY NOTE  Time In: 1116  Time Out: 8404  Patient Seen For: AM;Transfer training;Gait training; Therapeutic exercise; Other (see progress notes)    Subjective: Patient had no complaints. Objective: No pain noted. Other (comment) (falls)  GROSS ASSESSMENT Daily Assessment            BED/MAT MOBILITY Daily Assessment    Supine to Sit : 3 (Moderate assistance)  Sit to Supine : 2 (Maximal assistance)       TRANSFERS Daily Assessment    Transfer Type: SPT without device  Transfer Assistance : 2 (Maximal assistance)       GAIT Daily Assessment    Amount of Assistance: 0 (Not tested)       STEPS or STAIRS Daily Assessment    Level of Assist : 0 (Not tested)       BALANCE Daily Assessment            WHEELCHAIR MOBILITY Daily Assessment            LOWER EXTREMITY EXERCISES Daily Assessment   Active SAQ with facilaitation. Extremity: Both  Exercise Type #1: Supine lower extremity strengthening  Sets Performed: 2  Reps Performed: 10  Level of Assist: Maximum assistance          Assessment: Patient making good progress. Plan of Care: Continue with plan of care to reach PT goals. Returned to room with call bell at reach. Notified nurse Mildred Downey of patient s location.     Vel Verdin, PTA  9/18/2017

## 2017-09-18 NOTE — PROGRESS NOTES
PHYSICAL THERAPY DAILY NOTE  Time In: 4464  Time Out: 6611  Patient Seen For: PM;Therapeutic exercise; Other (see progress notes); Transfer training;Gait training    Subjective: Patient complained of fatigue. Objective: No pain noted. Other (comment) (falls)  GROSS ASSESSMENT Daily Assessment            BED/MAT MOBILITY Daily Assessment    Supine to Sit : 2 (Maximal assistance)  Sit to Supine : 2 (Maximal assistance)       TRANSFERS Daily Assessment    Transfer Type: SPT without device  Transfer Assistance : 2 (Maximal assistance)  Sit to Stand Assistance: Moderate assistance       GAIT Daily Assessment    Amount of Assistance: 2 (Maximal assistance)  Distance (ft): 10 Feet (ft) (x 3)  Assistive Device: Gait belt;Brace/Splint; Walker cindy (used ace wrap at knee and ankle for support)       STEPS or STAIRS Daily Assessment    Level of Assist : 0 (Not tested)       BALANCE Daily Assessment            WHEELCHAIR MOBILITY Daily Assessment            LOWER EXTREMITY EXERCISES Daily Assessment    Extremity: Both  Exercise Type #1: Supine lower extremity strengthening  Sets Performed: 2  Reps Performed: 10  Level of Assist: Maximum assistance          Assessment: Patient making progress. Plan of Care: Continue with plan of care to reach PT goals. Returned to room with call bell at [facility].     Tatyana Gonzalez, PTA  9/18/2017

## 2017-09-18 NOTE — PROGRESS NOTES
09/18/17 1022   Time Spent With Patient   Time In 0748   Time Out 0832   Patient Seen For: AM;ADLs   Grooming   Grooming Assistance  Min A   Upper Body Bathing   Bathing Assistance, Upper Min A   Lower Body Bathing   Bathing Assistance, Lower  Dep   Upper Body Dressing    Dressing Assistance  Mod A   Lower Body Dressing    Dressing Assistance  Dep   Comments Total assist, patient able to assist with LLE threading. Functional Transfers   Tub or Shower Type Tub/Shower combination   Amount of Assistance Required Mod A   Adaptive Equipment Grab bars; Tub transfer bench;Walker (comment)     S: \"I am good. \" Agreeable to therapy. Focus of session was on morning ADL routine. Patient was able to stand with RLE blocked with minimal assist. SPT with moderate assist to the left. Pain not indicated during session. Educated on RLE with positioning under elbow to assist with posture and humerus alignment. Collaborated with PT, Bernard Burgess and confirmed patient is on track to reach goals as documented in the care plan. Patient tolerated session well, but strength, sensation, balance, activity tolerance, ROM, Right side awareness are still below baseline and requires skilled facilitation to successfully and safely complete ADL's and transfers. Patient ended session in w/c with call remote and phone within reach.      Nancy Boucher, KAYODER

## 2017-09-18 NOTE — PROGRESS NOTES
OT Daily Note    Time In 1302   Time Out 1346     Subjective:\"March? \" Agreeable to therapy. Pain:Denied during session  Education:Attention to RUE to increase neuro re education. Interdisciplinary Communication: Collaborated with Cindy RODRIGUEZ and patient is making progress towards goals. Precautions: Other (comment) (falls)    Balance/functional mobility Daily Assessment   Stood 3 times with focus on weight bearing shifting into right side with both RUE and RLE. Therapist blocked right leg and provided tactile cues for weight shifting. SPT from w/c to bed with moderate assist, moderate assist sit to supine. Strengthening/activity tolerance Daily Assessment   PROM completed in shoulder flexion and abduction. Arm skate completed on table all planes with writer providing visual cues and assisting with motion. Cognition Daily Assessment   Patient only alert and orientated x2. Memory significantly behind comprehension. Ended session:Supine in bed, pillow under right arm.      Brooklyn Kill OTR/L

## 2017-09-19 LAB
ANION GAP SERPL CALC-SCNC: 11 MMOL/L (ref 7–16)
BUN SERPL-MCNC: 21 MG/DL (ref 8–23)
CALCIUM SERPL-MCNC: 7.6 MG/DL (ref 8.3–10.4)
CHLORIDE SERPL-SCNC: 114 MMOL/L (ref 98–107)
CO2 SERPL-SCNC: 24 MMOL/L (ref 21–32)
COLLECTION COMMENT, COLCM: NORMAL
CREAT SERPL-MCNC: 0.5 MG/DL (ref 0.6–1)
ERYTHROCYTE [DISTWIDTH] IN BLOOD BY AUTOMATED COUNT: 13.6 % (ref 11.9–14.6)
GLUCOSE SERPL-MCNC: 116 MG/DL (ref 65–100)
HCT VFR BLD AUTO: 30.5 % (ref 35.8–46.3)
HGB BLD-MCNC: 10.7 G/DL (ref 11.7–15.4)
MCH RBC QN AUTO: 29 PG (ref 26.1–32.9)
MCHC RBC AUTO-ENTMCNC: 35.1 G/DL (ref 31.4–35)
MCV RBC AUTO: 82.7 FL (ref 79.6–97.8)
PLATELET # BLD AUTO: 45 K/UL (ref 150–450)
PMV BLD AUTO: 9.9 FL (ref 10.8–14.1)
POTASSIUM SERPL-SCNC: 3 MMOL/L (ref 3.5–5.1)
RBC # BLD AUTO: 3.69 M/UL (ref 4.05–5.25)
SODIUM SERPL-SCNC: 147 MMOL/L (ref 136–145)
SODIUM SERPL-SCNC: 148 MMOL/L (ref 136–145)
SODIUM SERPL-SCNC: 149 MMOL/L (ref 136–145)
SODIUM SERPL-SCNC: 151 MMOL/L (ref 136–145)
SODIUM UR-SCNC: 33 MMOL/L
WBC # BLD AUTO: 13.2 K/UL (ref 4.3–11.1)

## 2017-09-19 PROCEDURE — 97530 THERAPEUTIC ACTIVITIES: CPT

## 2017-09-19 PROCEDURE — 92507 TX SP LANG VOICE COMM INDIV: CPT

## 2017-09-19 PROCEDURE — 97112 NEUROMUSCULAR REEDUCATION: CPT

## 2017-09-19 PROCEDURE — 74011000258 HC RX REV CODE- 258: Performed by: PHYSICAL MEDICINE & REHABILITATION

## 2017-09-19 PROCEDURE — 77030027138 HC INCENT SPIROMETER -A

## 2017-09-19 PROCEDURE — 97110 THERAPEUTIC EXERCISES: CPT

## 2017-09-19 PROCEDURE — 65310000000 HC RM PRIVATE REHAB

## 2017-09-19 PROCEDURE — 97116 GAIT TRAINING THERAPY: CPT

## 2017-09-19 PROCEDURE — 99232 SBSQ HOSP IP/OBS MODERATE 35: CPT | Performed by: PHYSICAL MEDICINE & REHABILITATION

## 2017-09-19 PROCEDURE — 97535 SELF CARE MNGMENT TRAINING: CPT

## 2017-09-19 PROCEDURE — 74011250637 HC RX REV CODE- 250/637: Performed by: PHYSICAL MEDICINE & REHABILITATION

## 2017-09-19 PROCEDURE — 36415 COLL VENOUS BLD VENIPUNCTURE: CPT | Performed by: PHYSICAL MEDICINE & REHABILITATION

## 2017-09-19 PROCEDURE — 84295 ASSAY OF SERUM SODIUM: CPT | Performed by: PHYSICAL MEDICINE & REHABILITATION

## 2017-09-19 PROCEDURE — 80048 BASIC METABOLIC PNL TOTAL CA: CPT | Performed by: PHYSICAL MEDICINE & REHABILITATION

## 2017-09-19 PROCEDURE — 85027 COMPLETE CBC AUTOMATED: CPT | Performed by: PHYSICAL MEDICINE & REHABILITATION

## 2017-09-19 PROCEDURE — 77030012893

## 2017-09-19 RX ORDER — POTASSIUM CHLORIDE 20 MEQ/1
40 TABLET, EXTENDED RELEASE ORAL 2 TIMES DAILY
Status: DISCONTINUED | OUTPATIENT
Start: 2017-09-19 | End: 2017-09-20

## 2017-09-19 RX ADMIN — LABETALOL HYDROCHLORIDE 200 MG: 200 TABLET, FILM COATED ORAL at 08:39

## 2017-09-19 RX ADMIN — DEXTROSE MONOHYDRATE AND SODIUM CHLORIDE 75 ML/HR: 5; .45 INJECTION, SOLUTION INTRAVENOUS at 15:18

## 2017-09-19 RX ADMIN — AMLODIPINE BESYLATE 10 MG: 10 TABLET ORAL at 08:39

## 2017-09-19 RX ADMIN — HYDRALAZINE HYDROCHLORIDE 25 MG: 25 TABLET, FILM COATED ORAL at 05:25

## 2017-09-19 RX ADMIN — POTASSIUM CHLORIDE 40 MEQ: 20 TABLET, EXTENDED RELEASE ORAL at 17:06

## 2017-09-19 RX ADMIN — TAMSULOSIN HYDROCHLORIDE 0.4 MG: 0.4 CAPSULE ORAL at 22:13

## 2017-09-19 RX ADMIN — HYDRALAZINE HYDROCHLORIDE 25 MG: 25 TABLET, FILM COATED ORAL at 14:00

## 2017-09-19 RX ADMIN — HYDROCHLOROTHIAZIDE: 12.5 CAPSULE ORAL at 08:38

## 2017-09-19 RX ADMIN — LABETALOL HYDROCHLORIDE 200 MG: 200 TABLET, FILM COATED ORAL at 17:06

## 2017-09-19 RX ADMIN — POTASSIUM CHLORIDE 40 MEQ: 20 TABLET, EXTENDED RELEASE ORAL at 08:40

## 2017-09-19 RX ADMIN — HYDRALAZINE HYDROCHLORIDE 25 MG: 25 TABLET, FILM COATED ORAL at 22:13

## 2017-09-19 RX ADMIN — LABETALOL HYDROCHLORIDE 200 MG: 200 TABLET, FILM COATED ORAL at 02:40

## 2017-09-19 NOTE — PROGRESS NOTES
PHYSICAL THERAPY DAILY NOTE  Time In: 6503  Time Out: 2167  Patient Seen For: PM;Other (see progress notes); Therapeutic exercise;Gait training;Transfer training    Subjective: Patient had no complaints. Objective: No pain noted. Other (comment) (falls)  GROSS ASSESSMENT Daily Assessment            BED/MAT MOBILITY Daily Assessment    Supine to Sit : 2 (Maximal assistance)  Sit to Supine : 2 (Maximal assistance)       TRANSFERS Daily Assessment    Transfer Type: SPT without device  Transfer Assistance : 2 (Maximal assistance)  Sit to Stand Assistance: Moderate assistance       GAIT Daily Assessment    Amount of Assistance: 0 (Not tested)  Distance (ft): 25 Feet (ft)  Assistive Device: Brace/Splint;Gait belt;Walker cindy;Other (comment) (ace wrap to right knee and right ankle)       STEPS or STAIRS Daily Assessment    Level of Assist : 0 (Not tested)       BALANCE Daily Assessment            WHEELCHAIR MOBILITY Daily Assessment            LOWER EXTREMITY EXERCISES Daily Assessment   Full active SAQ with tapping facilitation. Some hip abd/add. Extremity: Both  Exercise Type #1: Supine lower extremity strengthening  Sets Performed: 2  Reps Performed: 15  Level of Assist: Maximum assistance  Exercise Type #2: Other (comment) (motomed x 10 minutes on gear 1)  Sets Performed: 1  Reps Performed: 10  Level of Assist: Supervision          Assessment: Progress everyday. Plan of Care: Continue with plan of care to reach PT goals. Returned to room to bed with call santillan at reach.     Quoc Mandujano, PTA  9/19/2017

## 2017-09-19 NOTE — PROGRESS NOTES
OT Daily Note    Time In 1301   Time Out 1350     Subjective:\"Doing okay, Im done with this (lunch)\" Agreeable to therapy. Pain:Denied during session. Education:On E-stim, functional use of RUE, positioning. Interdisciplinary Communication: Collaborated with Fatemeh RODRIGUEZ and patient is making progress towards goals. Precautions: Other (comment) (falls)    Balance/functional mobility Daily Assessment   SPT from recliner to w/c with moderate assist.       Strengthening/activity tolerance Daily Assessment   motomed completed with RUE only with motor assist to stimulate and promote closed chain strengthening for 10 minutes in forward motion, arm strapped in orthosis due to only trace strength. PROM completed in right shoulder in scapular mobility. E-Stim Daily Assessment   E-Stim used for functional grasp and release with electrodes placed on extensor digitorum and flexor digitorum. Patient completed for 2 minutes at a time for a total of 8 minutes, check multiple times for skin irritation due to poor sensation, patient tolerated very well with no signs of skin irritation. Ended session: Sitting up in w/c in gym, with Fatemeh RODRIGUEZ.      Meri Godoy OTR/L

## 2017-09-19 NOTE — PROGRESS NOTES
Pt in bed, drowsy, assisted pt up for breakfast. Meal setup, open package for pt pt on pureed diet and nectar thicken liquids, She can feed herself, needs to be observe for swallowing issues. HOB up 40 degrees. Medication  cushed and given in applesauce. Pt is incontinent to  bowel at times and wears a brief, right side UE and LE flaccid. Mumbles words. Pt has foot drop karen.,  It takes 2 people to move patient to bathroom. Pt has a stafford  draining clear yellow urine. she is on D 5 %. 045 NS to right arm IV infusing at 75ml/hr.    Pt reoriented on nurse call light, Call light with in reach

## 2017-09-19 NOTE — PROGRESS NOTES
Ingrid Jones MD,   Medical Director  [address] [facility], [address] [facility]  Tel: [phone]       [facility] PROGRESS NOTE    Ivana Pratt  Admit Date: 9/15/2017  Admit Diagnosis: stroke, left brain involvement;ICH (intracerebral hemorrhag*    Subjective     Awake alert with bright affect. Speaking more. No headache. Feels will without complaints. Slept well. Feeding self    Objective:     Current Facility-Administered Medications   Medication Dose Route Frequency    potassium chloride (K-DUR, KLOR-CON) SR tablet 40 mEq  40 mEq Oral DAILY    dextrose 5 % - 0.45% NaCl infusion  75 mL/hr IntraVENous CONTINUOUS    hydrALAZINE (APRESOLINE) tablet 25 mg  25 mg Oral TID    acetaminophen (TYLENOL) tablet 500 mg  500 mg Per NG tube Q4H PRN    alum-mag hydroxide-simeth (MYLANTA) oral suspension 30 mL  30 mL Oral Q4H PRN    amLODIPine (NORVASC) tablet 10 mg  10 mg Oral DAILY    bisacodyl (DULCOLAX) suppository 10 mg  10 mg Rectal DAILY PRN    hydrALAZINE (APRESOLINE) tablet 50 mg  50 mg Oral QID PRN    labetalol (NORMODYNE) tablet 200 mg  200 mg Oral Q8H    lip protectant (BLISTEX) ointment   Topical PRN    losartan/hydroCHLOROthiazide (HYZAAR) 100/12.5 mg   Oral DAILY    ondansetron (ZOFRAN ODT) tablet 4 mg  4 mg Oral Q6H PRN    sodium phosphate (FLEET'S) enema 118 mL  1 Enema Rectal PRN    traMADol (ULTRAM) tablet 50 mg  50 mg Oral Q6H PRN    traZODone (DESYREL) tablet 50 mg  50 mg Oral QHS PRN    tamsulosin (FLOMAX) capsule 0.4 mg  0.4 mg Oral QHS     Review of Systems:Denies chest pain, shortness of breath, cough, headache, visual problems, abdominal pain, dysurea, calf pain. Pertinent positives are as noted in the medical records and unremarkable otherwise.      Visit Vitals    /77 (BP 1 Location: Right arm)    Pulse 90    Temp 99.1 °F (37.3 °C)    Resp 20    SpO2 98%        Physical Exam:   General: Alert and age appropriately oriented. No acute cardio respiratory distress. HEENT: Normocephalic,no scleral icterus  Oral mucosa moist without cyanosis; right facial weakness with spillage of food right side of mouth   Lungs: Clear to auscultation  bilaterally. Respiration even and unlabored   Heart: Regular rate and rhythm, S1, S2   No  murmurs, clicks, rub or gallops   Abdomen: Soft, non-tender, nondistended. Bowel sounds present. No organomegaly. Genitourinary: defer   Neuromuscular:      Flaccid RUE, 2 fingerbreadth sublux of right shoulder  Dysarthric, exp aphasia. Receptive component much improved; fcs  RLE incr quad firing, adduction 3+ , 0/5 distally  Decreased lt touch on right, needs vcs to attend to right due to neglect   Skin/extremity: No rashes, no erythema.  No calf tenderness BLE  No edema                                                                            Functional Assessment:          Balance  Sitting - Static: Fair (occasional) (09/16/17 1300)  Sitting - Dynamic: Poor (constant support) (09/16/17 1300)  Standing - Static: Poor;Constant support (with LUE support on rail) (09/16/17 1300)  Standing - Dynamic : Impaired (09/16/17 1300)                     Dulce Mars Fall Risk Assessment:  Dulce Mars Fall Risk  Mobility: Ambulates or transfers with assist devices or assistance/unsteady gait (09/19/17 0347)  Mobility Interventions: Bed/chair exit alarm (09/19/17 0347)  Mentation: Alert, oriented x 3 (09/19/17 0347)  Mentation Interventions: Bed/chair exit alarm;Evaluate medications/consider consulting pharmacy;Gait belt with transfers/ambulation;Door open when patient unattended (09/18/17 0930)  Medication: Patient receiving anticonvulsants, sedatives(tranquilizers), psychotropics or hypnotics, hypoglycemics, narcotics, sleep aids, antihypertensives, laxatives, or diuretics (09/19/17 0347)  Medication Interventions: Bed/chair exit alarm (09/19/17 0347)  Elimination: Needs assistance with toileting (09/19/17 4728)  Elimination Interventions: Call light in reach (09/19/17 0347)  Prior Fall History: Unknown (09/19/17 0347)  Total Score: 3 (09/19/17 0347)  High Fall Risk: Yes (09/19/17 0347)     Speech Assessment:         Ambulation:  Gait  Distance (ft): 10 Feet (ft) (x 3) (09/18/17 1649)  Assistive Device: Gait belt;Brace/Splint; Walker cindy (used ace wrap at knee and ankle for support) (09/18/17 1649)     Labs/Studies:  Recent Results (from the past 72 hour(s))   CBC WITH AUTOMATED DIFF    Collection Time: 09/16/17  3:35 PM   Result Value Ref Range    WBC 14.5 (H) 4.3 - 11.1 K/uL    RBC 4.10 4.05 - 5.25 M/uL    HGB 11.8 11.7 - 15.4 g/dL    HCT 34.2 (L) 35.8 - 46.3 %    MCV 83.4 79.6 - 97.8 FL    MCH 28.8 26.1 - 32.9 PG    MCHC 34.5 31.4 - 35.0 g/dL    RDW 13.9 11.9 - 14.6 %    PLATELET 550 (L) 435 - 450 K/uL    MPV 9.7 (L) 10.8 - 14.1 FL    NEUTROPHILS 77 43 - 78 %    LYMPHOCYTES 11 (L) 13 - 44 %    MONOCYTES 12 4.0 - 12.0 %    EOSINOPHILS 0 (L) 0.5 - 7.8 %    BASOPHILS 0 0.0 - 2.0 %    ABS. NEUTROPHILS 11.2 (H) 1.7 - 8.2 K/UL    ABS. LYMPHOCYTES 1.6 0.5 - 4.6 K/UL    ABS. MONOCYTES 1.7 (H) 0.1 - 1.3 K/UL    ABS. EOSINOPHILS 0.0 0.0 - 0.8 K/UL    ABS. BASOPHILS 0.0 0.0 - 0.2 K/UL    ABS. IMM.  GRANS. 0.3 0.0 - 0.5 K/UL    RBC COMMENTS NORMOCYTIC/NORMOCHROMIC      WBC COMMENTS OCCASIONAL      PLATELET COMMENTS DECREASED      DF AUTOMATED     METABOLIC PANEL, BASIC    Collection Time: 09/16/17  3:35 PM   Result Value Ref Range    Sodium 155 (H) 136 - 145 mmol/L    Potassium 2.9 (LL) 3.5 - 5.1 mmol/L    Chloride 117 (H) 98 - 107 mmol/L    CO2 27 21 - 32 mmol/L    Anion gap 11 7 - 16 mmol/L    Glucose 104 (H) 65 - 100 mg/dL    BUN 35 (H) 8 - 23 MG/DL    Creatinine 0.58 (L) 0.6 - 1.0 MG/DL    GFR est AA >60 >60 ml/min/1.73m2    GFR est non-AA >60 >60 ml/min/1.73m2    Calcium 8.3 8.3 - 10.4 MG/DL   MAGNESIUM    Collection Time: 09/16/17  3:35 PM   Result Value Ref Range    Magnesium 2.3 1.8 - 2.4 mg/dL   CBC WITH AUTOMATED DIFF Collection Time: 09/17/17  5:31 AM   Result Value Ref Range    WBC 12.0 (H) 4.3 - 11.1 K/uL    RBC 4.24 4.05 - 5.25 M/uL    HGB 12.1 11.7 - 15.4 g/dL    HCT 35.6 (L) 35.8 - 46.3 %    MCV 84.0 79.6 - 97.8 FL    MCH 28.5 26.1 - 32.9 PG    MCHC 34.0 31.4 - 35.0 g/dL    RDW 13.9 11.9 - 14.6 %    PLATELET 97 (L) 446 - 450 K/uL    MPV 10.2 (L) 10.8 - 14.1 FL    DF AUTOMATED      NEUTROPHILS 77 43 - 78 %    LYMPHOCYTES 13 13 - 44 %    MONOCYTES 9 4.0 - 12.0 %    EOSINOPHILS 0 (L) 0.5 - 7.8 %    BASOPHILS 0 0.0 - 2.0 %    IMMATURE GRANULOCYTES 0.9 0.0 - 5.0 %    ABS. NEUTROPHILS 9.2 (H) 1.7 - 8.2 K/UL    ABS. LYMPHOCYTES 1.5 0.5 - 4.6 K/UL    ABS. MONOCYTES 1.0 0.1 - 1.3 K/UL    ABS. EOSINOPHILS 0.0 0.0 - 0.8 K/UL    ABS. BASOPHILS 0.0 0.0 - 0.2 K/UL    ABS. IMM.  GRANS. 0.1 0.0 - 0.5 K/UL   METABOLIC PANEL, BASIC    Collection Time: 09/17/17  5:31 AM   Result Value Ref Range    Sodium 155 (H) 136 - 145 mmol/L    Potassium 3.1 (L) 3.5 - 5.1 mmol/L    Chloride 120 (H) 98 - 107 mmol/L    CO2 28 21 - 32 mmol/L    Anion gap 7 7 - 16 mmol/L    Glucose 116 (H) 65 - 100 mg/dL    BUN 32 (H) 8 - 23 MG/DL    Creatinine 0.56 (L) 0.6 - 1.0 MG/DL    GFR est AA >60 >60 ml/min/1.73m2    GFR est non-AA >60 >60 ml/min/1.73m2    Calcium 8.1 (L) 8.3 - 10.4 MG/DL   OSMOLALITY, UR    Collection Time: 09/17/17 11:13 AM   Result Value Ref Range    Osmolality,urine 672 50 - 1400 MOSM/kg F9W   METABOLIC PANEL, BASIC    Collection Time: 09/17/17  9:15 PM   Result Value Ref Range    Sodium 156 (H) 136 - 145 mmol/L    Potassium 3.7 3.5 - 5.1 mmol/L    Chloride 123 (H) 98 - 107 mmol/L    CO2 27 21 - 32 mmol/L    Anion gap 6 (L) 7 - 16 mmol/L    Glucose 108 (H) 65 - 100 mg/dL    BUN 30 (H) 8 - 23 MG/DL    Creatinine 0.59 (L) 0.6 - 1.0 MG/DL    GFR est AA >60 >60 ml/min/1.73m2    GFR est non-AA >60 >60 ml/min/1.73m2    Calcium 7.9 (L) 8.3 - 10.4 MG/DL   CBC WITH AUTOMATED DIFF    Collection Time: 09/18/17  2:59 AM   Result Value Ref Range    WBC 12.2 (H) 4.3 - 11.1 K/uL    RBC 4.01 (L) 4.05 - 5.25 M/uL    HGB 11.4 (L) 11.7 - 15.4 g/dL    HCT 34.3 (L) 35.8 - 46.3 %    MCV 85.5 79.6 - 97.8 FL    MCH 28.4 26.1 - 32.9 PG    MCHC 33.2 31.4 - 35.0 g/dL    RDW 14.1 11.9 - 14.6 %    PLATELET 83 (L) 599 - 450 K/uL    MPV 10.3 (L) 10.8 - 14.1 FL    DF AUTOMATED      NEUTROPHILS 76 43 - 78 %    LYMPHOCYTES 15 13 - 44 %    MONOCYTES 8 4.0 - 12.0 %    EOSINOPHILS 0 (L) 0.5 - 7.8 %    BASOPHILS 0 0.0 - 2.0 %    IMMATURE GRANULOCYTES 0.9 0.0 - 5.0 %    ABS. NEUTROPHILS 9.1 (H) 1.7 - 8.2 K/UL    ABS. LYMPHOCYTES 1.9 0.5 - 4.6 K/UL    ABS. MONOCYTES 1.0 0.1 - 1.3 K/UL    ABS. EOSINOPHILS 0.1 0.0 - 0.8 K/UL    ABS. BASOPHILS 0.0 0.0 - 0.2 K/UL    ABS. IMM. GRANS. 0.1 0.0 - 0.5 K/UL   LIPID PANEL    Collection Time: 09/18/17  2:59 AM   Result Value Ref Range    LIPID PROFILE          Cholesterol, total 133 <200 MG/DL    Triglyceride 86 35 - 150 MG/DL    HDL Cholesterol 33 (L) 40 - 60 MG/DL    LDL, calculated 82.8 <100 MG/DL    VLDL, calculated 17.2 6.0 - 23.0 MG/DL    CHOL/HDL Ratio 4.0     METABOLIC PANEL, COMPREHENSIVE    Collection Time: 09/18/17  2:59 AM   Result Value Ref Range    Sodium 156 (H) 136 - 145 mmol/L    Potassium 3.6 3.5 - 5.1 mmol/L    Chloride 123 (H) 98 - 107 mmol/L    CO2 24 21 - 32 mmol/L    Anion gap 9 7 - 16 mmol/L    Glucose 123 (H) 65 - 100 mg/dL    BUN 29 (H) 8 - 23 MG/DL    Creatinine 0.55 (L) 0.6 - 1.0 MG/DL    GFR est AA >60 >60 ml/min/1.73m2    GFR est non-AA >60 >60 ml/min/1.73m2    Calcium 7.9 (L) 8.3 - 10.4 MG/DL    Bilirubin, total 0.7 0.2 - 1.1 MG/DL    ALT (SGPT) 90 (H) 12 - 65 U/L    AST (SGOT) 32 15 - 37 U/L    Alk.  phosphatase 65 50 - 136 U/L    Protein, total 5.7 (L) 6.3 - 8.2 g/dL    Albumin 2.5 (L) 3.2 - 4.6 g/dL    Globulin 3.2 2.3 - 3.5 g/dL    A-G Ratio 0.8 (L) 1.2 - 3.5     OSMOLALITY, SERUM/PLASMA    Collection Time: 09/18/17  1:21 PM   Result Value Ref Range    Osmolality, serum/plasma 320 (H) 275 - 295 MOSM/kg H2O   SODIUM Collection Time: 09/18/17  1:21 PM   Result Value Ref Range    Sodium 153 (H) 136 - 145 mmol/L   SODIUM, UR, RANDOM    Collection Time: 09/18/17  6:30 PM   Result Value Ref Range    Sodium urine, random 33 MMOL/L   SODIUM    Collection Time: 09/18/17  7:28 PM   Result Value Ref Range    Sodium 151 (H) 136 - 145 mmol/L   SODIUM    Collection Time: 09/19/17  1:02 AM   Result Value Ref Range    Sodium 151 (H) 136 - 145 mmol/L   COLLECTION COMMENT    Collection Time: 09/19/17  1:02 AM   Result Value Ref Range    Collection Comment Q6    CBC W/O DIFF    Collection Time: 09/19/17  6:18 AM   Result Value Ref Range    WBC 13.2 (H) 4.3 - 11.1 K/uL    RBC 3.69 (L) 4.05 - 5.25 M/uL    HGB 10.7 (L) 11.7 - 15.4 g/dL    HCT 30.5 (L) 35.8 - 46.3 %    MCV 82.7 79.6 - 97.8 FL    MCH 29.0 26.1 - 32.9 PG    MCHC 35.1 (H) 31.4 - 35.0 g/dL    RDW 13.6 11.9 - 14.6 %    PLATELET 45 (L) 736 - 450 K/uL    MPV 9.9 (L) 10.8 - 19.9 FL   METABOLIC PANEL, BASIC    Collection Time: 09/19/17  6:18 AM   Result Value Ref Range    Sodium 149 (H) 136 - 145 mmol/L    Potassium 3.0 (L) 3.5 - 5.1 mmol/L    Chloride 114 (H) 98 - 107 mmol/L    CO2 24 21 - 32 mmol/L    Anion gap 11 7 - 16 mmol/L    Glucose 116 (H) 65 - 100 mg/dL    BUN 21 8 - 23 MG/DL    Creatinine 0.50 (L) 0.6 - 1.0 MG/DL    GFR est AA >60 >60 ml/min/1.73m2    GFR est non-AA >60 >60 ml/min/1.73m2    Calcium 7.6 (L) 8.3 - 10.4 MG/DL       Assessment:     Problem List as of 9/19/2017  Date Reviewed: 9/12/2017          Codes Class Noted - Resolved    ICH (intracerebral hemorrhage) ([facility] ICD-10-CM: I61.9  ICD-9-CM: 389  9/15/2017 - Present        Hypertensive urgency, malignant ICD-10-CM: I16.0  ICD-9-CM: 401.0  9/11/2017 - Present        Stroke, hemorrhagic ([facility] ICD-10-CM: I61.9  ICD-9-CM: 610  9/7/2017 - Present        Accelerated hypertension ICD-10-CM: I10  ICD-9-CM: 401.0  9/7/2017 - Present        At risk for aspiration (Chronic) ICD-10-CM: T37.42  ICD-9-CM: V49.89  9/7/2017 - Present Acute spontaneous intraventricular hemorrhage assoc w/ hypertension (HCC) ICD-10-CM: I61.5, I10  ICD-9-CM: 049, 401.9  9/7/2017 - Present        Screening for breast cancer ICD-10-CM: Z12.39  ICD-9-CM: V76.10  2/27/2017 - Present        Atrophic vaginitis ICD-10-CM: N95.2  ICD-9-CM: 627.3  7/11/2016 - Present        HTN (hypertension) ICD-10-CM: I10  ICD-9-CM: 401.9  10/23/2015 - Present        ADD (attention deficit disorder) ICD-10-CM: F98.8  ICD-9-CM: 314.00  10/23/2015 - Present        Depression ICD-10-CM: F32.9  ICD-9-CM: 082  10/23/2015 - Present        Anxiety ICD-10-CM: F41.9  ICD-9-CM: 300.00  10/23/2015 - Present        RESOLVED: Hypoxemia ICD-10-CM: R09.02  ICD-9-CM: 799.02  9/10/2017 - 9/12/2017        RESOLVED: Acute respiratory failure ([facility] ICD-10-CM: J96.00  ICD-9-CM: 518.81  9/8/2017 - 9/10/2017        RESOLVED: Hemorrhagic stroke ([facility] ICD-10-CM: I61.9  ICD-9-CM: 132  9/7/2017 - 9/7/2017        RESOLVED: Non-intractable vomiting with nausea ICD-10-CM: R11.2  ICD-9-CM: 787.01  9/7/2017 - 9/12/2017        RESOLVED: Seborrheic keratosis, inflamed ICD-10-CM: L82.0  ICD-9-CM: 702.11  7/11/2016 - 9/20/2016        RESOLVED: Cough ICD-10-CM: R05  ICD-9-CM: 786.2  7/11/2016 - 9/20/2016        RESOLVED: Shoulder pain ICD-10-CM: M25.519  ICD-9-CM: 719.41  10/23/2015 - 2/27/2017               Left BG Hemorrhage with intraventricular extension due to HTN emergency; resultant R hemiplegia, right neglect, dysarthria, aphasia, dysphagia with significant decline in mobility and self care      Continue daily physician medical management:  Pneumonia prophylaxis- Incentive spirometer every hour while awake. Will need instruction and assistance. Not sure if she can get a proper oral seal to be effective      Dysphagia; Pureed diet with NTL; at risk for malnutrition and dehydration. PICC line removed prior to transfer; may need a line for IVFs if BUN continues to increase. Low K, Ca; check mg.  Supplementation as needed. Na elevated  -mag ok, K much improved; cont to supplement while on diuretic  -9/19 replace K; 3.0    Thrombocytopenia; has been trending down for no apparent reason. hgb slt down as well. Has not been on Hep products; consult hematology today 8/19      Hypernatremia; last head CT did not show significant cerebral edema. Clinically improving. Likely due to prerenal azotemia; 9/15 MAY REQ isotonic / hypotonic saline IV;  -9/18 events of weekend noted; Na 156; asymptomatic, on 0.5 dextrose with 1/4 Na; na q 6hrs; pts serum osmolality was normal. Will check urine Na and urine osmolality; depending on findings, will consider consulting Hospitalist vs Nephrology  Neuro improving despite this  -9/19 Na down to 139, urine osmo nl, serum osmo min hi; cont dex/and 1/4 NS; repeat in am. Not DI as uop not increased      DVT risk / DVT Prophylaxis- Will require daily physician exam to assess for signs and symptoms as patient is at increased risk for of thromboembolism. Mobilization as tolerated. Intermittent pneumatic compression devices when in bed Thigh-high or knee-high thromboembolic deterrent hose when out of bed. NO  anticoag due to [address]      Pain Control: stable, mild-to-moderate joint symptoms intermittently, reasonably well controlled by PRN meds. Will require regular pain assessment and comprenhensive pain management,       Wound Care: Monitor wound status daily per staff and physician. At risk for failure. Will require 24/7 rehab nursing. None needed at this time      Hypertension - BP uncontrolled, fluctuating, managed medically. Add prn hydralazine for sbp> 180. Goal SBP is 140-160 given ICH. Cont Nomodyne, norvasc and hyzaar; consider Verapamil or scheduled Hydralazine  -9/18 BP increased 169/95; add hydralazine 25 tid      Mild hyperglycemia, likely stress induced vs borderline diabetes; monitor loosely      Leukocytosis; recheck in a.m. Check UA due to stafford.  No pulmonary signs (had neg CXR today), no s/s of infxn at old PICC site, no wounds, afebrile. Watch monocytes. -9/18 12.2 improving. Urine neg  -9/19 up to 13.2, ? Reactive. No source of infxn identified. afebrile      Urinary retention/ neurogenic bladder - start flomax but continue stafford until mobility improves a bit. Strict I/o's      bowel program - add prn meds; incontinent, want to keep stool on the firmer side. Monitor skin.       GERD - add a PPI. At times may need additional antacids, Maalox prn.        Time spent was 25 minutes with over 1/2 in direct patient care/examination, consultation and coordination of care.      Signed By: Grey Gross MD     September 19, 2017

## 2017-09-19 NOTE — PROGRESS NOTES
PHYSICAL THERAPY DAILY NOTE  Time In: 1030  Time Out: 1116  Patient Seen For: AM;Transfer training;Gait training; Therapeutic exercise; Other (see progress notes)    Subjective: Patient had no complaints. Objective:Very talkative today. She could tell me the day of the week but not date. Other (comment) (falls)  GROSS ASSESSMENT Daily Assessment            BED/MAT MOBILITY Daily Assessment    Supine to Sit : 2 (Maximal assistance)  Sit to Supine : 2 (Maximal assistance)       TRANSFERS Daily Assessment    Transfer Type: SPT without device  Transfer Assistance : 2 (Maximal assistance)  Sit to Stand Assistance: Moderate assistance       GAIT Daily Assessment    Amount of Assistance: 2 (Maximal assistance)  Distance (ft): 25 Feet (ft)  Assistive Device: Brace/Splint;Gait belt;Walker cindy;Other (comment) (ace wrap to right knee and right ankle)       STEPS or STAIRS Daily Assessment    Level of Assist : 0 (Not tested)       BALANCE Daily Assessment            WHEELCHAIR MOBILITY Daily Assessment            LOWER EXTREMITY EXERCISES Daily Assessment    Extremity: Both  Exercise Type #1: Other (comment) (stretching of HC on right and PROM exs)  Sets Performed: 1  Reps Performed: 20  Level of Assist: Total assistance  Exercise Type #2: Other (comment) (motomed x 10 minutes on gear 1)  Sets Performed: 1  Reps Performed: 10  Level of Assist: Supervision          Assessment: Patient making good progress with right LE. Plan of Care: Continue with plan of care to reach PT goals. Returned to room with call bell at [facility].     Abraham Lepe, PTA  9/19/2017

## 2017-09-19 NOTE — PROGRESS NOTES
Subjective \"I want to get out of here. \"   Activity Evaluation   Strength/Endurance Patient with right hemiparesis   Balance Max (A) X2 with transfers   Social Interaction Patient was friendly and wanted to converse with this therapist but had difficulty expressing herself. She stated that she understood what was being spoken to her but what she wanted to say didn't always come out right. Patient perseverated during conversation. Cognitive Alert to person and situation   Comments Patient appears receptive to participate with recreational therapy. She was left seated in her recliner chair with all needs within reach. Patient's Recreational Therapy evaluation completed under the [facility] navigation tool on 9/19/17. Please see for specifics regarding plan of care and goals. Patient prefers to be called \"Andres. \"  Thank you for the referral.  Cezar Guillaume, CTRS

## 2017-09-19 NOTE — PROGRESS NOTES
09/19/17 1043   Time Spent With Patient   Time In 1002   Time Out 1030   Patient Seen For: AM;Oral-motor exercises; Neuro-linguistics   Mental Status   Neurologic State Alert   Orientation Level Oriented to person   Cognition Impaired decision making; Appropriate decision making   Perseveration Perseverates during conversation   Safety/Judgement Fall prevention   Pt completed oral motor exercises 2 x 10 with min cues with 80% accuracy. Pt had increased difficulty with word finding tasks. Pt needed mod cues with 60% Accuracy.    Alla Martin MA/ANASTASIA/SLP

## 2017-09-19 NOTE — PROGRESS NOTES
09/19/17 1024   Time Spent With Patient   Time In 0746   Time Out 0840   Patient Seen For: AM;ADLs   Grooming   Grooming Assistance  Mod A   Comments assist with washing hair and hands, patient able to brush teeth and hair   Upper Body Bathing   Bathing Assistance, Upper Max A   Lower Body Bathing   Bathing Assistance, Lower  Dep   Upper Body Dressing    Dressing Assistance  Mod A   Lower Body Dressing    Dressing Assistance  Dep   Functional Transfers   Tub or Shower Type Shower   Amount of Assistance Required Max A   Adaptive Equipment Tub transfer bench;Grab bars; Wheelchair     S: \"Shower, okay. \" Agreeable to therapy. Focus of session was on morning ADL routine. Patient was able to SPT with maximal assist.   2 LOB static stance on tub bench to the right when attempting to wash with LUE, good balance when sitting still or with LUE holding onto grab bar. Pain denied during session. Collaborated with Akanksha RODRIGUEZ and confirmed patient is on track to reach goals as documented in the care plan. Patient tolerated session well, but strength, balance, ROM, activity tolerance, right side awareness, memory, motor planning, sensation are still below baseline and requires skilled facilitation to successfully and safely complete ADL's and transfers. Patient ended session in recliner with call remote and phone within reach. Setup with pencil and paper activities.      Kim Jacques OTR

## 2017-09-19 NOTE — PROGRESS NOTES
Pt back from therapy, assisted pt to bathroom via wheelchair, max assist x 1-2 people. Pt right side completely flaccid. Pt had medium BM. Pt did not perform any of the 3 bladder or toileting skills. nataliia care performed by RN, . Pt back in bed, D 5%0.45 NS infusing to left hand at 75 ml/hr. HOB elevated 45 degrees, pt drinking nectar thicken water and juice. Call light with in reach of left hand. James intact with yellow urine to bag.

## 2017-09-19 NOTE — PROGRESS NOTES
Pt alert, oriented to person and place. Denies pain. D5 1/2 NS infusing at 75 cc/hr into left forearm. Site without redness or swelling. Incontinent of small bm. No breakdown or redness noted. James patent draining clear yellow urine, moderate output. Thickened liquids given po. Bed alarm on. Call bell within reach.

## 2017-09-20 LAB
ANION GAP SERPL CALC-SCNC: 9 MMOL/L (ref 7–16)
APTT PPP: 28.4 SEC (ref 23.5–31.7)
BUN SERPL-MCNC: 18 MG/DL (ref 8–23)
CALCIUM SERPL-MCNC: 7.6 MG/DL (ref 8.3–10.4)
CHLORIDE SERPL-SCNC: 113 MMOL/L (ref 98–107)
CO2 SERPL-SCNC: 25 MMOL/L (ref 21–32)
CREAT SERPL-MCNC: 0.43 MG/DL (ref 0.6–1)
D DIMER PPP FEU-MCNC: 32.79 UG/ML(FEU)
ERYTHROCYTE [DISTWIDTH] IN BLOOD BY AUTOMATED COUNT: 13.4 % (ref 11.9–14.6)
FIBRINOGEN PPP-MCNC: 136 MG/DL (ref 172–437)
GLUCOSE SERPL-MCNC: 116 MG/DL (ref 65–100)
HCT VFR BLD AUTO: 29.7 % (ref 35.8–46.3)
HGB BLD-MCNC: 10.2 G/DL (ref 11.7–15.4)
INR PPP: 1 (ref 0.9–1.2)
MCH RBC QN AUTO: 28.3 PG (ref 26.1–32.9)
MCHC RBC AUTO-ENTMCNC: 34.3 G/DL (ref 31.4–35)
MCV RBC AUTO: 82.5 FL (ref 79.6–97.8)
PLATELET # BLD AUTO: 40 K/UL (ref 150–450)
PMV BLD AUTO: 10.6 FL (ref 10.8–14.1)
POTASSIUM SERPL-SCNC: 2.8 MMOL/L (ref 3.5–5.1)
PROTHROMBIN TIME: 10.9 SEC (ref 9.6–12)
RBC # BLD AUTO: 3.6 M/UL (ref 4.05–5.25)
SODIUM SERPL-SCNC: 147 MMOL/L (ref 136–145)
WBC # BLD AUTO: 9.8 K/UL (ref 4.3–11.1)

## 2017-09-20 PROCEDURE — 85379 FIBRIN DEGRADATION QUANT: CPT | Performed by: INTERNAL MEDICINE

## 2017-09-20 PROCEDURE — 97112 NEUROMUSCULAR REEDUCATION: CPT

## 2017-09-20 PROCEDURE — 36415 COLL VENOUS BLD VENIPUNCTURE: CPT | Performed by: PHYSICAL MEDICINE & REHABILITATION

## 2017-09-20 PROCEDURE — 74011250637 HC RX REV CODE- 250/637: Performed by: PHYSICAL MEDICINE & REHABILITATION

## 2017-09-20 PROCEDURE — 86022 PLATELET ANTIBODIES: CPT | Performed by: INTERNAL MEDICINE

## 2017-09-20 PROCEDURE — 97110 THERAPEUTIC EXERCISES: CPT

## 2017-09-20 PROCEDURE — 92507 TX SP LANG VOICE COMM INDIV: CPT

## 2017-09-20 PROCEDURE — 99255 IP/OBS CONSLTJ NEW/EST HI 80: CPT | Performed by: INTERNAL MEDICINE

## 2017-09-20 PROCEDURE — 85384 FIBRINOGEN ACTIVITY: CPT | Performed by: INTERNAL MEDICINE

## 2017-09-20 PROCEDURE — 85610 PROTHROMBIN TIME: CPT | Performed by: NURSE PRACTITIONER

## 2017-09-20 PROCEDURE — 77030020253 HC SOL INJ D545NS .05 DEX .45 SAL

## 2017-09-20 PROCEDURE — 65310000000 HC RM PRIVATE REHAB

## 2017-09-20 PROCEDURE — 74011000258 HC RX REV CODE- 258: Performed by: PHYSICAL MEDICINE & REHABILITATION

## 2017-09-20 PROCEDURE — 97535 SELF CARE MNGMENT TRAINING: CPT

## 2017-09-20 PROCEDURE — 97116 GAIT TRAINING THERAPY: CPT

## 2017-09-20 PROCEDURE — 85730 THROMBOPLASTIN TIME PARTIAL: CPT | Performed by: NURSE PRACTITIONER

## 2017-09-20 PROCEDURE — 85027 COMPLETE CBC AUTOMATED: CPT | Performed by: PHYSICAL MEDICINE & REHABILITATION

## 2017-09-20 PROCEDURE — 80048 BASIC METABOLIC PNL TOTAL CA: CPT | Performed by: PHYSICAL MEDICINE & REHABILITATION

## 2017-09-20 PROCEDURE — 99232 SBSQ HOSP IP/OBS MODERATE 35: CPT | Performed by: PHYSICAL MEDICINE & REHABILITATION

## 2017-09-20 PROCEDURE — 97530 THERAPEUTIC ACTIVITIES: CPT

## 2017-09-20 RX ORDER — POTASSIUM CHLORIDE 20 MEQ/1
60 TABLET, EXTENDED RELEASE ORAL 2 TIMES DAILY
Status: DISCONTINUED | OUTPATIENT
Start: 2017-09-20 | End: 2017-09-21

## 2017-09-20 RX ADMIN — POTASSIUM CHLORIDE 60 MEQ: 20 TABLET, EXTENDED RELEASE ORAL at 17:30

## 2017-09-20 RX ADMIN — POTASSIUM CHLORIDE 40 MEQ: 20 TABLET, EXTENDED RELEASE ORAL at 08:30

## 2017-09-20 RX ADMIN — AMLODIPINE BESYLATE 10 MG: 10 TABLET ORAL at 08:31

## 2017-09-20 RX ADMIN — HYDRALAZINE HYDROCHLORIDE 25 MG: 25 TABLET, FILM COATED ORAL at 05:31

## 2017-09-20 RX ADMIN — LABETALOL HYDROCHLORIDE 200 MG: 200 TABLET, FILM COATED ORAL at 08:31

## 2017-09-20 RX ADMIN — HYDROCHLOROTHIAZIDE: 12.5 CAPSULE ORAL at 08:30

## 2017-09-20 RX ADMIN — LABETALOL HYDROCHLORIDE 200 MG: 200 TABLET, FILM COATED ORAL at 01:00

## 2017-09-20 RX ADMIN — DEXTROSE MONOHYDRATE AND SODIUM CHLORIDE 75 ML/HR: 5; .45 INJECTION, SOLUTION INTRAVENOUS at 17:35

## 2017-09-20 RX ADMIN — TAMSULOSIN HYDROCHLORIDE 0.4 MG: 0.4 CAPSULE ORAL at 20:58

## 2017-09-20 RX ADMIN — HYDRALAZINE HYDROCHLORIDE 25 MG: 25 TABLET, FILM COATED ORAL at 15:43

## 2017-09-20 RX ADMIN — LABETALOL HYDROCHLORIDE 200 MG: 200 TABLET, FILM COATED ORAL at 17:30

## 2017-09-20 RX ADMIN — HYDRALAZINE HYDROCHLORIDE 50 MG: 25 TABLET, FILM COATED ORAL at 20:58

## 2017-09-20 NOTE — PROGRESS NOTES
OT Daily Note    Time In 1034   Time Out 1116     Subjective: \"I'll go to the gym. \"  Pain: None indicated    Precautions:  (falls)    Neuro-Muscular Re-Education   Patient completed standing trials at tabletop in therapy gym for RUE weightbearing while completing visual-spatial/cognitive task using L hand. Patient transfers sit to stand with minimal assistance, maintains standing balance/weightbearing through RUE on tabletop with moderate assistance, therapist supporting at R knee and elbow. Patient tolerated 3 standing trials x 5-7 minutes each before requiring seated rest breaks. Patient participated in Pattern Play task at tabletop during standing trials, required moderate-maximal cues for problem solving. Patient agreeable to NMES applied to R shoulder in subluxation protocol seated in wheelchair before vertical mirror x 8 minutes at intensity 15 in surge pattern (intermittent rest breaks). Patient encouraged to complete AAROM shoulder shrug with each impulse, tolerated well with cues for attention to RUE, no skin issues noted upon removal of electrodes. Patient tolerated PROM stretch to R shoulder in flexion, abduction, extension, and internal/external rotation following NMES. Assessment: Patient tolerated well. Education: Purpose of therapy  Interdisciplinary Communication: Collaborated with PTA, [place] and agreed patient is progressing well and on-track to meet goals as stated in [address] [facility]. Plan: Continue to address ADL/IADL, functional mobility, activity tolerance, balance, strengthening, coordination, cognition.       Rashaun Ricketts, OTR/L

## 2017-09-20 NOTE — PROGRESS NOTES
OT Daily Note    Time In 0918   Time Out 1001     Subjective:\"I need to work even though I am tired. \" Agreeable to therapy. Pain:Denied during session. Education:On continued right arm awareness. Interdisciplinary Communication: Collaborated with Lizzy RODRIGUEZ and patient is making progress towards goals. Precautions:  (falls)    Balance/functional mobility Daily Assessment   SPT from mat to w/c with minimal assist.      Neuro Reeducation Daily Assessment   Vibration used on triceps and biceps for elbow extension and flexion, better results for flexion. Joint mobilization of scapular on left side laying. Push and pull with RUE with writer providing proximation for closed chain joint input. Digit tendon glides completed, also completed trials of grasp and release with no active movement seen. Cognition Daily Assessment   Continued orientation training with stroke recovery education. Ended session:Sitting up in w/c in room, all needs within reach.      Bianca Gomez OTR/L

## 2017-09-20 NOTE — PROGRESS NOTES
PHYSICAL THERAPY DAILY NOTE  Time In: 1300  Time Out: 9109  Patient Seen For: PM;Gait training; Therapeutic exercise; Other (see progress notes); Transfer training    Subjective: Patient had no complaints. Objective: No pain noted. Other (comment) (falls)  GROSS ASSESSMENT Daily Assessment            BED/MAT MOBILITY Daily Assessment    Supine to Sit : 2 (Maximal assistance)  Sit to Supine : 2 (Maximal assistance)       TRANSFERS Daily Assessment    Transfer Type: SPT without device  Transfer Assistance : 2 (Maximal assistance)  Sit to Stand Assistance: Moderate assistance       GAIT Daily Assessment    Amount of Assistance: 2 (Maximal assistance)  Assistive Device: Walker cindy;Gait belt; Other (comment) (used ace wrap to knee and another one to assist DF)       STEPS or STAIRS Daily Assessment    Level of Assist : 0 (Not tested)       BALANCE Daily Assessment            WHEELCHAIR MOBILITY Daily Assessment            LOWER EXTREMITY EXERCISES Daily Assessment    Extremity: Both  Exercise Type #1: Other (comment) (motomed x 10 minutes on gear 1)  Sets Performed: 1  Reps Performed: 10  Level of Assist: Maximum assistance          Assessment: Patient making good progress. Plan of Care: Continue with plan of care to reach PT goals. To OT after treatment.     Loni Henderson, PTA  9/20/2017

## 2017-09-20 NOTE — PROGRESS NOTES
Met with pt at bedside, found sitting up in [facility], pt waiting on therapy to resume, pt is alert and oriented, lives with spouse in 2 story home per notes lives on lower level, prior to admission pt was independent with adls, working, driving, no current DME in home, PCP confirmed, Insurance [employer] confirmed. Per staff they had noted family very active in pt care and will be available once pt ready for DC home. PT/OT/ST working with pt. CM will continue to follow for DC needs.

## 2017-09-20 NOTE — PROGRESS NOTES
Problem: Falls - Risk of  Goal: *Absence of Falls  Document Haley Fall Risk and appropriate interventions in the flowsheet.    Outcome: Progressing Towards Goal  Fall Risk Interventions:  Mobility Interventions: Patient to call before getting OOB     Mentation Interventions: Bed/chair exit alarm, Evaluate medications/consider consulting pharmacy, Gait belt with transfers/ambulation, Door open when patient unattended     Medication Interventions: Patient to call before getting OOB, Teach patient to arise slowly     Elimination Interventions: Bed/chair exit alarm, Call light in reach, Patient to call for help with toileting needs, Toilet paper/wipes in reach, Toileting schedule/hourly rounds

## 2017-09-20 NOTE — PROGRESS NOTES
PHYSICAL THERAPY DAILY NOTE  Time In: 4208  Time Out: 9904  Patient Seen For: AM;Transfer training;Gait training; Therapeutic exercise; Other (see progress notes)    Subjective: Patient had no complaints. Objective: No pain noted. Very talkative today. Other (comment) (falls)  GROSS ASSESSMENT Daily Assessment            BED/MAT MOBILITY Daily Assessment    Supine to Sit : 2 (Maximal assistance)  Sit to Supine : 2 (Maximal assistance)       TRANSFERS Daily Assessment    Transfer Type: SPT without device  Transfer Assistance : 2 (Maximal assistance)  Sit to Stand Assistance: Moderate assistance       GAIT Daily Assessment    Amount of Assistance: 0 (Not tested)       STEPS or STAIRS Daily Assessment    Level of Assist : 0 (Not tested)       BALANCE Daily Assessment            WHEELCHAIR MOBILITY Daily Assessment            LOWER EXTREMITY EXERCISES Daily Assessment   Active SAQ with tapping facilatation. Active hip abd/add. Extremity: Both  Exercise Type #1: Supine lower extremity strengthening  Sets Performed: 1  Reps Performed: 10  Level of Assist: Maximum assistance          Assessment: Patient making progress daily and working hard. Plan of Care: Continue with plan of care to reach PT goals. To OT after treatment.     Ana Valiente, MICHAEL  9/20/2017

## 2017-09-20 NOTE — PROGRESS NOTES
OT Daily Note    Time In 1348   Time Out 1430     Subjective:\"I am getting tired\" Agreeable to therapy. Pain:Denied during session  Interdisciplinary Communication: Collaborated with PTA, Jettie Daughters and patient is making progress towards goals. Precautions:  (falls)    Balance/functional mobility Daily Assessment   SPT from w/c to bed with minimal assist, sit to supine minimal assist at right leg. Neuro Daily Assessment   Table slides completed with focus on flexion pattern able to achieve 2/5 MMT at bicep 3 times during session. ADL Daily Assessment   SPT toilet <> w/c with moderate assist.   Toileting assist all parts and total assist x2. Resting orthosis Daily Assessment   Fitted and applied resting orthosis, PTA, [place] to check on skin and muscle in ~60 minutes. Ended session:Supine in bed, all needs within reach.      Luciano Coleman OTR/L

## 2017-09-20 NOTE — PROGRESS NOTES
Subjective \"I am glad I can work with you. \"   Activity MARYLOU card game   Strength/Endurance Patient appeared tired by the end of the session after a busy morning of therapies. She did not c/o tiredness. Patient with right hemiparesis. Balance NT   Social Interaction Patient was friendly, provided eye contact and initiated conversation with this therapist several times. Cognitive A&O ; Patient was able to understand the game, many times making steps without vc's needed. Comments Patient was assisted to her room and left seated up in her chair with all needs within reach.      Michael Rao, CTRS

## 2017-09-20 NOTE — PROGRESS NOTES
09/20/17 1043   Time Spent With Patient   Time In 1000   Time Out 1035   Patient Seen For: AM;Neuro-linguistics; Verbal activities;Oral-motor exercises   Mental Status   Neurologic State Alert   Orientation Level Oriented X4   Cognition Follows commands; Appropriate decision making; Impaired decision making;Memory loss   Perseveration Perseverates during conversation   Safety/Judgement Fall prevention   Pt completed oral motor exercises x 10 with min-mod cues with 80% accuracy. Pt tolerated mixed and solid textures with right side pocketing, however, mostly cleared with nectar thick liquids rinse. Pt completed naming tasks with min cues with 80% accuracy. Pt completed visual problem solving tasks with min cues with 80% Accuracy.    Aguilar Payne MA/CCC/SLP

## 2017-09-20 NOTE — CONSULTS
[facility] Hematology & Oncology        Inpatient Hematology / Oncology Consult Note    Reason for Consult:  stroke, left brain involvement  ICH (intracerebral hemorrhage) [facility])  Referring Physician:  Eddie Millan*    History of Present Illness:  Ms. Refugio Salgado is a 61 yo female with history of HTN, depression, ADD who came to ED 2017 with right hemiplegia and expressive aphasia and extreme hypertension (208/125). She was found to have acute hemorrhagic stroke involving the left basal ganglion. She was admitted to ICU and was placed on Cardene drip initially for blood pressure control. The patient was eventually taken off the Cardene drip and was started on p.o. medications for her blood pressure. Once stable, she transferred to 9 floor 9/15/17 and has recently been noted to have decreasing platelet counts therefore we have been consulted. Review of Systems:  Constitutional Denies fever, chills, weight loss, appetite changes, fatigue, night sweats. HEENT Denies trauma, blurry vision, hearing loss, ear pain, nosebleeds, sore throat, neck pain     Skin Denies lesions or rashes. Lungs Denies dyspnea, cough, sputum production or hemoptysis. Cardiovascular Denies chest pain, palpitations, or lower extremity edema. Gastrointestinal Denies nausea, vomiting, changes in bowel habits, bloody or black stools, abdominal pain. Hematology Denies easy bruising or bleeding, denies gingival bleeding or epistaxis. MSK Denies back pain, arthralgias, myalgias or frequent falls. Psychiatric/Behavioral The patient is not nervous/anxious.          Allergies   Allergen Reactions    Banana Shortness of Breath    Lisinopril Cough    Nuts Brock  Nut] Unknown (comments)     Past Medical History:   Diagnosis Date    Anxiety     Depression     HTN (hypertension)     Menopause      Past Surgical History:   Procedure Laterality Date    HX  SECTION      x 3    HX OTHER SURGICAL   Face lift    HX REFRACTIVE SURGERY  2006    HX SANJUANITA AND BSO  1992     Family History   Problem Relation Age of Onset    Alcohol abuse Mother     Cancer Mother      lung cancer    Cancer Father      throat cancer    Psychiatric Disorder Father      anxiety, depression    No Known Problems Sister      Social History     Social History    Marital status:      Spouse name: N/A    Number of children: N/A    Years of education: N/A     Occupational History    Not on file.      Social History Main Topics    Smoking status: Former Smoker    Smokeless tobacco: Never Used    Alcohol use No    Drug use: No    Sexual activity: Yes     Partners: Male     Other Topics Concern    Not on file     Social History Narrative     Current Facility-Administered Medications   Medication Dose Route Frequency Provider Last Rate Last Dose    potassium chloride (K-DUR, KLOR-CON) SR tablet 40 mEq  40 mEq Oral BID Juliette Bobo MD   40 mEq at 09/20/17 0830    dextrose 5 % - 0.45% NaCl infusion  75 mL/hr IntraVENous CONTINUOUS Juliette Bobo MD 75 mL/hr at 09/19/17 1518 75 mL/hr at 09/19/17 1518    hydrALAZINE (APRESOLINE) tablet 25 mg  25 mg Oral TID Juliette Bobo MD   25 mg at 09/20/17 0531    acetaminophen (TYLENOL) tablet 500 mg  500 mg Per NG tube Q4H PRN Juliette Bobo MD   500 mg at 09/16/17 6039    alum-mag hydroxide-simeth (MYLANTA) oral suspension 30 mL  30 mL Oral Q4H PRN Amy Unknown Boeck, MD        amLODIPine (NORVASC) tablet 10 mg  10 mg Oral DAILY Juliette Bobo MD   10 mg at 09/20/17 0831    bisacodyl (DULCOLAX) suppository 10 mg  10 mg Rectal DAILY PRN Amy Unknown Boeck, MD   10 mg at 09/18/17 1806    hydrALAZINE (APRESOLINE) tablet 50 mg  50 mg Oral QID PRN Amy Unknown Boeck, MD        labetalol (NORMODYNE) tablet 200 mg  200 mg Oral Q8H Juliette Bobo MD   200 mg at 09/20/17 0831    lip protectant (BLISTEX) ointment   Topical PRN Amy Jurline Shone, MD        losartan/hydroCHLOROthiazide [facility]) 100/12.5 mg   Oral DAILY Amy R Maryfrances Oppenheim, MD        ondansetron (ZOFRAN ODT) tablet 4 mg  4 mg Oral Q6H PRN Amy Jurline Shone, MD        sodium phosphate (FLEET'S) enema 118 mL  1 Enema Rectal PRN Amy R Maryfrances Oppenheim, MD        traMADol Nika Cushing) tablet 50 mg  50 mg Oral Q6H PRN Amy R Maryfrances Oppenheim, MD        traZODone (DESYREL) tablet 50 mg  50 mg Oral QHS PRN Amy R Maryfrances Oppenheim, MD        tamsulosin [facility]) capsule 0.4 mg  0.4 mg Oral QHS Amy R Maryfrances Oppenheim, MD   0.4 mg at 17 2220       OBJECTIVE:  Patient Lorenzo Castellanos for the past 8 hrs:   BP Temp Pulse Resp SpO2   17 0814 126/79 98.3 °F (36.8 °C) 90 18 93 %   17 0531 143/82 - 86 - -   17 0100 148/86 - 95 - -     Temp (24hrs), Av °F (36.7 °C), Min:97.6 °F (36.4 °C), Max:98.3 °F (36.8 °C)         Physical Exam:  Constitutional: Well developed, well nourished female in no acute distress, sitting comfortably in the bedside chair   HEENT: Normocephalic and atraumatic. Oropharynx is clear, mucous membranes are moist. Extraocular muscles are intact. Sclerae anicteric. .    Skin Warm and dry. No bruising and no rash noted. No erythema. No pallor. Respiratory Lungs are clear to auscultation bilaterally without wheezes, rales or rhonchi, normal air exchange without accessory muscle use. CVS Normal rate, regular rhythm and normal S1 and S2. No murmurs, gallops, or rubs. Abdomen Soft, nontender and nondistended, normoactive bowel sounds. Neuro Flaccid RUE, right facial weakness, mild expressive aphasia    MSK  No edema and no tenderness. Psych Appropriate mood and affect.         Labs:    Recent Results (from the past 24 hour(s))   SODIUM    Collection Time: 17 12:46 PM   Result Value Ref Range    Sodium 148 (H) 136 - 145 mmol/L   SODIUM    Collection Time: 17  6:17 PM   Result Value Ref Range    Sodium 147 (H) 136 - 145 mmol/L   CBC W/O DIFF    Collection Time: 09/20/17  7:01 AM   Result Value Ref Range    WBC 9.8 4.3 - 11.1 K/uL    RBC 3.60 (L) 4.05 - 5.25 M/uL    HGB 10.2 (L) 11.7 - 15.4 g/dL    HCT 29.7 (L) 35.8 - 46.3 %    MCV 82.5 79.6 - 97.8 FL    MCH 28.3 26.1 - 32.9 PG    MCHC 34.3 31.4 - 35.0 g/dL    RDW 13.4 11.9 - 14.6 %    PLATELET 40 (L) 023 - 450 K/uL    MPV 10.6 (L) 10.8 - 95.6 FL   METABOLIC PANEL, BASIC    Collection Time: 09/20/17  7:01 AM   Result Value Ref Range    Sodium 147 (H) 136 - 145 mmol/L    Potassium 2.8 (LL) 3.5 - 5.1 mmol/L    Chloride 113 (H) 98 - 107 mmol/L    CO2 25 21 - 32 mmol/L    Anion gap 9 7 - 16 mmol/L    Glucose 116 (H) 65 - 100 mg/dL    BUN 18 8 - 23 MG/DL    Creatinine 0.43 (L) 0.6 - 1.0 MG/DL    GFR est AA >60 >60 ml/min/1.73m2    GFR est non-AA >60 >60 ml/min/1.73m2    Calcium 7.6 (L) 8.3 - 10.4 MG/DL         ASSESSMENT:  Problem List  Date Reviewed: 9/12/2017          Codes Class Noted    ICH (intracerebral hemorrhage) ([facility] ICD-10-CM: I61.9  ICD-9-CM: 497  9/15/2017        Hypertensive urgency, malignant ICD-10-CM: I16.0  ICD-9-CM: 401.0  9/11/2017        Stroke, hemorrhagic ([facility] ICD-10-CM: I61.9  ICD-9-CM: 241  9/7/2017        Accelerated hypertension ICD-10-CM: I10  ICD-9-CM: 401.0  9/7/2017        At risk for aspiration (Chronic) ICD-10-CM: Z91.89  ICD-9-CM: V49.89  9/7/2017        Acute spontaneous intraventricular hemorrhage assoc w/ hypertension ([facility] ICD-10-CM: I61.5, I10  ICD-9-CM: 480, 401.9  9/7/2017        Screening for breast cancer ICD-10-CM: Z12.39  ICD-9-CM: V76.10  2/27/2017        Atrophic vaginitis ICD-10-CM: N95.2  ICD-9-CM: 627.3  7/11/2016        HTN (hypertension) ICD-10-CM: I10  ICD-9-CM: 401.9  10/23/2015        ADD (attention deficit disorder) ICD-10-CM: F98.8  ICD-9-CM: 314.00  10/23/2015        Depression ICD-10-CM: F32.9  ICD-9-CM: 947  10/23/2015        Anxiety ICD-10-CM: F41.9  ICD-9-CM: 300.00  10/23/2015 RECOMMENDATIONS:  Thrombocytopenia-Unable to contribute her thrombocytopenia to any medications or illness. We will check HIT panel as a precautionary measure as well as DIC panel. We will follow along loosely for results. Lab studies and imaging studies  were personally reviewed. Pertinent old records were reviewed. Thank you for allowing us to participate in the care of Ms. Cruz Kurtz. Shashi Vale, UZIEL Glover [facility] Hematology & Oncology  [address] [place] [address]  Office : [phone]  Fax : [phone]         Attending Addendum:  I personally evaluated the patient with Shashi Vale, N.P.,  and agree with the assessment, findings and plan as documented. Please check a DIC panel.               Dino Levy MD  [address]  Office : [phone]  Fax : [phone]

## 2017-09-20 NOTE — PROGRESS NOTES
Jose Gipson MD,   Medical Director  [address] [facility], [address] [facility]  Tel: [phone]       [facility] PROGRESS NOTE    Julieta Kraft  Admit Date: 9/15/2017  Admit Diagnosis: stroke, left brain involvement;ICH (intracerebral hemorrhag*    Subjective     Awake and very alert and much more talkative. Slept better per staff. Denies headache, pain, cough, sob  \" Im hungry for breakfast\"    Objective:     Current Facility-Administered Medications   Medication Dose Route Frequency    potassium chloride (K-DUR, KLOR-CON) SR tablet 60 mEq  60 mEq Oral BID    dextrose 5 % - 0.45% NaCl infusion  75 mL/hr IntraVENous CONTINUOUS    hydrALAZINE (APRESOLINE) tablet 25 mg  25 mg Oral TID    acetaminophen (TYLENOL) tablet 500 mg  500 mg Per NG tube Q4H PRN    alum-mag hydroxide-simeth (MYLANTA) oral suspension 30 mL  30 mL Oral Q4H PRN    amLODIPine (NORVASC) tablet 10 mg  10 mg Oral DAILY    bisacodyl (DULCOLAX) suppository 10 mg  10 mg Rectal DAILY PRN    hydrALAZINE (APRESOLINE) tablet 50 mg  50 mg Oral QID PRN    labetalol (NORMODYNE) tablet 200 mg  200 mg Oral Q8H    lip protectant (BLISTEX) ointment   Topical PRN    losartan/hydroCHLOROthiazide (HYZAAR) 100/12.5 mg   Oral DAILY    ondansetron (ZOFRAN ODT) tablet 4 mg  4 mg Oral Q6H PRN    sodium phosphate (FLEET'S) enema 118 mL  1 Enema Rectal PRN    traMADol (ULTRAM) tablet 50 mg  50 mg Oral Q6H PRN    traZODone (DESYREL) tablet 50 mg  50 mg Oral QHS PRN    tamsulosin (FLOMAX) capsule 0.4 mg  0.4 mg Oral QHS     Review of Systems:Denies chest pain, shortness of breath, cough, headache, visual problems, abdominal pain, dysurea, calf pain. Pertinent positives are as noted in the medical records and unremarkable otherwise. Visit Vitals    /79    Pulse 90    Temp 98.3 °F (36.8 °C)    Resp 18    SpO2 93%        Physical Exam:   General: Alert and age appropriately oriented.   No acute cardio respiratory distress. Dysarthric/exp aphasia; improved speech intell. HEENT: Normocephalic,no scleral icterus  Oral mucosa moist without cyanosis. Right lower facial weakness   Lungs: Clear to auscultation  bilaterally. Respiration even and unlabored   Heart: Regular rate and rhythm, S1, S2   No  murmurs, clicks, rub or gallops   Abdomen: Soft, non-tender, nondistended. Bowel sounds present. No organomegaly. Genitourinary: stafford   Neuromuscular:      fcs well. Flaccid RUE, right facial weakness, dysarthria , expressive aphasia improving. RLE quad firing, no active DF PF or abd; add 3-  Sensation and attention improving slowly   Skin/extremity: No rashes, no erythema. No calf tenderness BLE  No petechiae. No new bruises                                                                            Functional Assessment:          Balance  Sitting - Static: Fair (occasional) (09/16/17 1300)  Sitting - Dynamic: Poor (constant support) (09/16/17 1300)  Standing - Static: Poor;Constant support (with LUE support on rail) (09/16/17 1300)  Standing - Dynamic : Impaired (09/16/17 1300)                     Kelin Hemphill Fall Risk Assessment:  Dorlilaa Joby Fall Risk  Mobility: Ambulates or transfers with assist devices or assistance/unsteady gait (09/20/17 3542)  Mobility Interventions: Patient to call before getting OOB (09/20/17 6320)  Mentation: Alert, oriented x 3 (09/20/17 0320)  Mentation Interventions: Bed/chair exit alarm;Evaluate medications/consider consulting pharmacy;Gait belt with transfers/ambulation;Door open when patient unattended (09/18/17 0930)  Medication: Patient receiving anticonvulsants, sedatives(tranquilizers), psychotropics or hypnotics, hypoglycemics, narcotics, sleep aids, antihypertensives, laxatives, or diuretics (09/20/17 2788)  Medication Interventions: Patient to call before getting OOB; Teach patient to arise slowly (09/20/17 4008)  Elimination: Needs assistance with toileting (09/20/17 9690)  Elimination Interventions: Bed/chair exit alarm;Call light in reach; Patient to call for help with toileting needs; Toilet paper/wipes in reach; Toileting schedule/hourly rounds (09/20/17 0529)  Prior Fall History: Unknown (09/20/17 0937)  Total Score: 3 (09/20/17 0937)  High Fall Risk: Yes (09/20/17 0937)     Speech Assessment:         Ambulation:  Gait  Distance (ft): 25 Feet (ft) (09/19/17 1217)  Assistive Device: Brace/Splint;Gait belt;Walker cindy;Other (comment) (ace wrap to right knee and right ankle) (09/19/17 1217)     Labs/Studies:  Recent Results (from the past 72 hour(s))   METABOLIC PANEL, BASIC    Collection Time: 09/17/17  9:15 PM   Result Value Ref Range    Sodium 156 (H) 136 - 145 mmol/L    Potassium 3.7 3.5 - 5.1 mmol/L    Chloride 123 (H) 98 - 107 mmol/L    CO2 27 21 - 32 mmol/L    Anion gap 6 (L) 7 - 16 mmol/L    Glucose 108 (H) 65 - 100 mg/dL    BUN 30 (H) 8 - 23 MG/DL    Creatinine 0.59 (L) 0.6 - 1.0 MG/DL    GFR est AA >60 >60 ml/min/1.73m2    GFR est non-AA >60 >60 ml/min/1.73m2    Calcium 7.9 (L) 8.3 - 10.4 MG/DL   CBC WITH AUTOMATED DIFF    Collection Time: 09/18/17  2:59 AM   Result Value Ref Range    WBC 12.2 (H) 4.3 - 11.1 K/uL    RBC 4.01 (L) 4.05 - 5.25 M/uL    HGB 11.4 (L) 11.7 - 15.4 g/dL    HCT 34.3 (L) 35.8 - 46.3 %    MCV 85.5 79.6 - 97.8 FL    MCH 28.4 26.1 - 32.9 PG    MCHC 33.2 31.4 - 35.0 g/dL    RDW 14.1 11.9 - 14.6 %    PLATELET 83 (L) 581 - 450 K/uL    MPV 10.3 (L) 10.8 - 14.1 FL    DF AUTOMATED      NEUTROPHILS 76 43 - 78 %    LYMPHOCYTES 15 13 - 44 %    MONOCYTES 8 4.0 - 12.0 %    EOSINOPHILS 0 (L) 0.5 - 7.8 %    BASOPHILS 0 0.0 - 2.0 %    IMMATURE GRANULOCYTES 0.9 0.0 - 5.0 %    ABS. NEUTROPHILS 9.1 (H) 1.7 - 8.2 K/UL    ABS. LYMPHOCYTES 1.9 0.5 - 4.6 K/UL    ABS. MONOCYTES 1.0 0.1 - 1.3 K/UL    ABS. EOSINOPHILS 0.1 0.0 - 0.8 K/UL    ABS. BASOPHILS 0.0 0.0 - 0.2 K/UL    ABS. IMM.  GRANS. 0.1 0.0 - 0.5 K/UL   LIPID PANEL    Collection Time: 09/18/17  2:59 AM   Result Value Ref Range    LIPID PROFILE          Cholesterol, total 133 <200 MG/DL    Triglyceride 86 35 - 150 MG/DL    HDL Cholesterol 33 (L) 40 - 60 MG/DL    LDL, calculated 82.8 <100 MG/DL    VLDL, calculated 17.2 6.0 - 23.0 MG/DL    CHOL/HDL Ratio 4.0     METABOLIC PANEL, COMPREHENSIVE    Collection Time: 09/18/17  2:59 AM   Result Value Ref Range    Sodium 156 (H) 136 - 145 mmol/L    Potassium 3.6 3.5 - 5.1 mmol/L    Chloride 123 (H) 98 - 107 mmol/L    CO2 24 21 - 32 mmol/L    Anion gap 9 7 - 16 mmol/L    Glucose 123 (H) 65 - 100 mg/dL    BUN 29 (H) 8 - 23 MG/DL    Creatinine 0.55 (L) 0.6 - 1.0 MG/DL    GFR est AA >60 >60 ml/min/1.73m2    GFR est non-AA >60 >60 ml/min/1.73m2    Calcium 7.9 (L) 8.3 - 10.4 MG/DL    Bilirubin, total 0.7 0.2 - 1.1 MG/DL    ALT (SGPT) 90 (H) 12 - 65 U/L    AST (SGOT) 32 15 - 37 U/L    Alk.  phosphatase 65 50 - 136 U/L    Protein, total 5.7 (L) 6.3 - 8.2 g/dL    Albumin 2.5 (L) 3.2 - 4.6 g/dL    Globulin 3.2 2.3 - 3.5 g/dL    A-G Ratio 0.8 (L) 1.2 - 3.5     OSMOLALITY, SERUM/PLASMA    Collection Time: 09/18/17  1:21 PM   Result Value Ref Range    Osmolality, serum/plasma 320 (H) 275 - 295 MOSM/kg H2O   SODIUM    Collection Time: 09/18/17  1:21 PM   Result Value Ref Range    Sodium 153 (H) 136 - 145 mmol/L   SODIUM, UR, RANDOM    Collection Time: 09/18/17  6:30 PM   Result Value Ref Range    Sodium urine, random 33 MMOL/L   SODIUM    Collection Time: 09/18/17  7:28 PM   Result Value Ref Range    Sodium 151 (H) 136 - 145 mmol/L   SODIUM    Collection Time: 09/19/17  1:02 AM   Result Value Ref Range    Sodium 151 (H) 136 - 145 mmol/L   COLLECTION COMMENT    Collection Time: 09/19/17  1:02 AM   Result Value Ref Range    Collection Comment Q6    CBC W/O DIFF    Collection Time: 09/19/17  6:18 AM   Result Value Ref Range    WBC 13.2 (H) 4.3 - 11.1 K/uL    RBC 3.69 (L) 4.05 - 5.25 M/uL    HGB 10.7 (L) 11.7 - 15.4 g/dL    HCT 30.5 (L) 35.8 - 46.3 %    MCV 82.7 79.6 - 97.8 FL    MCH 29.0 26.1 - 32.9 PG    MCHC 35.1 (H) 31.4 - 35.0 g/dL    RDW 13.6 11.9 - 14.6 %    PLATELET 45 (L) 943 - 450 K/uL    MPV 9.9 (L) 10.8 - 30.4 FL   METABOLIC PANEL, BASIC    Collection Time: 09/19/17  6:18 AM   Result Value Ref Range    Sodium 149 (H) 136 - 145 mmol/L    Potassium 3.0 (L) 3.5 - 5.1 mmol/L    Chloride 114 (H) 98 - 107 mmol/L    CO2 24 21 - 32 mmol/L    Anion gap 11 7 - 16 mmol/L    Glucose 116 (H) 65 - 100 mg/dL    BUN 21 8 - 23 MG/DL    Creatinine 0.50 (L) 0.6 - 1.0 MG/DL    GFR est AA >60 >60 ml/min/1.73m2    GFR est non-AA >60 >60 ml/min/1.73m2    Calcium 7.6 (L) 8.3 - 10.4 MG/DL   SODIUM    Collection Time: 09/19/17 12:46 PM   Result Value Ref Range    Sodium 148 (H) 136 - 145 mmol/L   SODIUM    Collection Time: 09/19/17  6:17 PM   Result Value Ref Range    Sodium 147 (H) 136 - 145 mmol/L   CBC W/O DIFF    Collection Time: 09/20/17  7:01 AM   Result Value Ref Range    WBC 9.8 4.3 - 11.1 K/uL    RBC 3.60 (L) 4.05 - 5.25 M/uL    HGB 10.2 (L) 11.7 - 15.4 g/dL    HCT 29.7 (L) 35.8 - 46.3 %    MCV 82.5 79.6 - 97.8 FL    MCH 28.3 26.1 - 32.9 PG    MCHC 34.3 31.4 - 35.0 g/dL    RDW 13.4 11.9 - 14.6 %    PLATELET 40 (L) 024 - 450 K/uL    MPV 10.6 (L) 10.8 - 85.4 FL   METABOLIC PANEL, BASIC    Collection Time: 09/20/17  7:01 AM   Result Value Ref Range    Sodium 147 (H) 136 - 145 mmol/L    Potassium 2.8 (LL) 3.5 - 5.1 mmol/L    Chloride 113 (H) 98 - 107 mmol/L    CO2 25 21 - 32 mmol/L    Anion gap 9 7 - 16 mmol/L    Glucose 116 (H) 65 - 100 mg/dL    BUN 18 8 - 23 MG/DL    Creatinine 0.43 (L) 0.6 - 1.0 MG/DL    GFR est AA >60 >60 ml/min/1.73m2    GFR est non-AA >60 >60 ml/min/1.73m2    Calcium 7.6 (L) 8.3 - 10.4 MG/DL       Assessment:     Problem List as of 9/20/2017  Date Reviewed: 9/12/2017          Codes Class Noted - Resolved    ICH (intracerebral hemorrhage) ([facility] ICD-10-CM: I61.9  ICD-9-CM: 352  9/15/2017 - Present        Hypertensive urgency, malignant ICD-10-CM: I16.0  ICD-9-CM: 401.0  9/11/2017 - Present Stroke, hemorrhagic ([facility] ICD-10-CM: I61.9  ICD-9-CM: 787  9/7/2017 - Present        Accelerated hypertension ICD-10-CM: I10  ICD-9-CM: 401.0  9/7/2017 - Present        At risk for aspiration (Chronic) ICD-10-CM: Z91.89  ICD-9-CM: V49.89  9/7/2017 - Present        Acute spontaneous intraventricular hemorrhage assoc w/ hypertension ([facility] ICD-10-CM: I61.5, I10  ICD-9-CM: 783, 401.9  9/7/2017 - Present        Screening for breast cancer ICD-10-CM: Z12.39  ICD-9-CM: V76.10  2/27/2017 - Present        Atrophic vaginitis ICD-10-CM: N95.2  ICD-9-CM: 627.3  7/11/2016 - Present        HTN (hypertension) ICD-10-CM: I10  ICD-9-CM: 401.9  10/23/2015 - Present        ADD (attention deficit disorder) ICD-10-CM: F98.8  ICD-9-CM: 314.00  10/23/2015 - Present        Depression ICD-10-CM: F32.9  ICD-9-CM: 996  10/23/2015 - Present        Anxiety ICD-10-CM: F41.9  ICD-9-CM: 300.00  10/23/2015 - Present        RESOLVED: Hypoxemia ICD-10-CM: R09.02  ICD-9-CM: 799.02  9/10/2017 - 9/12/2017        RESOLVED: Acute respiratory failure ([facility] ICD-10-CM: J96.00  ICD-9-CM: 518.81  9/8/2017 - 9/10/2017        RESOLVED: Hemorrhagic stroke ([facility] ICD-10-CM: I61.9  ICD-9-CM: 438  9/7/2017 - 9/7/2017        RESOLVED: Non-intractable vomiting with nausea ICD-10-CM: R11.2  ICD-9-CM: 787.01  9/7/2017 - 9/12/2017        RESOLVED: Seborrheic keratosis, inflamed ICD-10-CM: L82.0  ICD-9-CM: 702.11  7/11/2016 - 9/20/2016        RESOLVED: Cough ICD-10-CM: R05  ICD-9-CM: 786.2  7/11/2016 - 9/20/2016        RESOLVED: Shoulder pain ICD-10-CM: M25.519  ICD-9-CM: 719.41  10/23/2015 - 2/27/2017            Left BG Hemorrhage with intraventricular extension due to HTN emergency; resultant R hemiplegia, right neglect, dysarthria, aphasia, dysphagia with significant decline in mobility and self care  Plan:   Continue daily physician medical management:  Pneumonia prophylaxis- Incentive spirometer every hour while awake. Will need instruction and assistance.  Not sure if she can get a proper oral seal to be effective      Dysphagia; Pureed diet with NTL; at risk for malnutrition and dehydration. PICC line removed prior to transfer; may need a line for IVFs if BUN continues to increase. Low K, Ca; check mg. Supplementation as needed. Na elevated  -mag ok, K much improved; cont to supplement while on diuretic  -9/19 replace K; 3.0; 2.8 this am 9/20; inc supplement; may need to add to IVFs     Thrombocytopenia; has been trending down for no apparent reason. hgb slt down as well. Has not been on Hep products; consult hematology today 9/19  -plts 40K, continues to trend down 9/20; await consult by hematology. ? reln to [address]. Mild anemia as well  Check HIT panel, d dimer , fibrinogen      Hypernatremia; last head CT did not show significant cerebral edema. Clinically improving. Likely due to prerenal azotemia; 9/15 MAY REQ isotonic / hypotonic saline IV;  -9/18 events of weekend noted; Na 156; asymptomatic, on 0.5 dextrose with 1/4 Na; na q 6hrs; pts serum osmolality was normal. Will check urine Na and urine osmolality; depending on findings, will consider consulting Hospitalist vs Nephrology  Neuro improving despite this  -9/19 Na down to 149, urine osmo nl, serum osmo min hi; cont dex/and 1/4 NS; repeat in am. Not DI as uop not increased  -9/20 Na 147; cont fluids      DVT risk / DVT Prophylaxis- Will require daily physician exam to assess for signs and symptoms as patient is at increased risk for of thromboembolism. Mobilization as tolerated. Intermittent pneumatic compression devices when in bed Thigh-high or knee-high thromboembolic deterrent hose when out of bed. NO  anticoag due to [address]      Pain Control: stable, mild-to-moderate joint symptoms intermittently, reasonably well controlled by PRN meds. Will require regular pain assessment and comprenhensive pain management,       Wound Care: Monitor wound status daily per staff and physician. At risk for failure.  Will require 24/7 rehab nursing. None needed at this time      Hypertension - BP uncontrolled, fluctuating, managed medically. Add prn hydralazine for sbp> 180. Goal SBP is 140-160 given ICH. Cont Nomodyne, norvasc and hyzaar; consider Verapamil or scheduled Hydralazine  -9/18 BP increased 169/95; add hydralazine 25 tid  -9/20 BP much improved      Mild hyperglycemia, likely stress induced vs borderline diabetes; monitor loosely      Leukocytosis; recheck in a.m. Check UA due to stafford. No pulmonary signs (had neg CXR today), no s/s of infxn at old PICC site, no wounds, afebrile. Watch monocytes. -9/18 12.2 improving. Urine neg  -9/19 up to 13.2, ? Reactive. No source of infxn identified. Afebrile  -9/20 WBC now normal      Urinary retention/ neurogenic bladder - start flomax but continue stafford until mobility improves a bit. Strict I/o's  -9/20 keeping stafford to monitor UOP until Na normalizes. No significant output to suggest DI      bowel program - add prn meds; incontinent, want to keep stool on the firmer side. Monitor skin.       GERD - add a PPI. At times may need additional antacids, Maalox prn.    -continues to benefit from and is showing improvements in a multidisc team approach. Participating well.          Time spent was 25 minutes with over 1/2 in direct patient care/examination, consultation and coordination of care.      Signed By: Adriel May MD     September 20, 2017

## 2017-09-21 ENCOUNTER — APPOINTMENT (OUTPATIENT)
Dept: CT IMAGING | Age: 62
DRG: 040 | End: 2017-09-21
Attending: PHYSICAL MEDICINE & REHABILITATION
Payer: COMMERCIAL

## 2017-09-21 LAB
ALBUMIN SERPL-MCNC: 2.4 G/DL (ref 3.2–4.6)
ALBUMIN/GLOB SERPL: 0.8 {RATIO} (ref 1.2–3.5)
ALP SERPL-CCNC: 66 U/L (ref 50–136)
ALT SERPL-CCNC: 63 U/L (ref 12–65)
AMYLASE SERPL-CCNC: 96 U/L (ref 25–115)
AST SERPL-CCNC: 29 U/L (ref 15–37)
BACTERIA SPEC CULT: NORMAL
BASOPHILS # BLD: 0 K/UL (ref 0–0.2)
BASOPHILS NFR BLD: 0 % (ref 0–2)
BILIRUB DIRECT SERPL-MCNC: 0.1 MG/DL
BILIRUB SERPL-MCNC: 0.8 MG/DL (ref 0.2–1.1)
DIFFERENTIAL METHOD BLD: ABNORMAL
EOSINOPHIL # BLD: 0.2 K/UL (ref 0–0.8)
EOSINOPHIL NFR BLD: 2 % (ref 0.5–7.8)
ERYTHROCYTE [DISTWIDTH] IN BLOOD BY AUTOMATED COUNT: 13.1 % (ref 11.9–14.6)
GLOBULIN SER CALC-MCNC: 2.9 G/DL (ref 2.3–3.5)
HCT VFR BLD AUTO: 29.4 % (ref 35.8–46.3)
HEPARIN INDUCED PLT,XHIPA: POSITIVE
HGB BLD-MCNC: 10.2 G/DL (ref 11.7–15.4)
HIT INTERPRETATION,XINTPR: POSITIVE
HIT PROFILE,XHITT: ABNORMAL
IMM GRANULOCYTES # BLD: 0 K/UL (ref 0–0.5)
IMM GRANULOCYTES NFR BLD: 0.3 % (ref 0–5)
LIPASE SERPL-CCNC: 376 U/L (ref 73–393)
LYMPHOCYTES # BLD: 1.4 K/UL (ref 0.5–4.6)
LYMPHOCYTES NFR BLD: 15 % (ref 13–44)
MCH RBC QN AUTO: 28.8 PG (ref 26.1–32.9)
MCHC RBC AUTO-ENTMCNC: 34.7 G/DL (ref 31.4–35)
MCV RBC AUTO: 83.1 FL (ref 79.6–97.8)
MONOCYTES # BLD: 0.8 K/UL (ref 0.1–1.3)
MONOCYTES NFR BLD: 9 % (ref 4–12)
NEUTS SEG # BLD: 7 K/UL (ref 1.7–8.2)
NEUTS SEG NFR BLD: 74 % (ref 43–78)
OPTICAL DENSITY READ,XHITAO: 3 ABS
PLATELET # BLD AUTO: 36 K/UL (ref 150–450)
PMV BLD AUTO: 10.5 FL (ref 10.8–14.1)
POTASSIUM SERPL-SCNC: 3.2 MMOL/L (ref 3.5–5.1)
PROT SERPL-MCNC: 5.3 G/DL (ref 6.3–8.2)
RBC # BLD AUTO: 3.54 M/UL (ref 4.05–5.25)
SERVICE CMNT-IMP: NORMAL
WBC # BLD AUTO: 9.4 K/UL (ref 4.3–11.1)

## 2017-09-21 PROCEDURE — 82150 ASSAY OF AMYLASE: CPT | Performed by: PHYSICAL MEDICINE & REHABILITATION

## 2017-09-21 PROCEDURE — 99232 SBSQ HOSP IP/OBS MODERATE 35: CPT | Performed by: PHYSICAL MEDICINE & REHABILITATION

## 2017-09-21 PROCEDURE — 74011000258 HC RX REV CODE- 258: Performed by: PHYSICAL MEDICINE & REHABILITATION

## 2017-09-21 PROCEDURE — 85025 COMPLETE CBC W/AUTO DIFF WBC: CPT | Performed by: PHYSICAL MEDICINE & REHABILITATION

## 2017-09-21 PROCEDURE — 92507 TX SP LANG VOICE COMM INDIV: CPT

## 2017-09-21 PROCEDURE — 80076 HEPATIC FUNCTION PANEL: CPT | Performed by: NURSE PRACTITIONER

## 2017-09-21 PROCEDURE — 74011250637 HC RX REV CODE- 250/637: Performed by: PHYSICAL MEDICINE & REHABILITATION

## 2017-09-21 PROCEDURE — 97116 GAIT TRAINING THERAPY: CPT

## 2017-09-21 PROCEDURE — 97530 THERAPEUTIC ACTIVITIES: CPT

## 2017-09-21 PROCEDURE — 83690 ASSAY OF LIPASE: CPT | Performed by: PHYSICAL MEDICINE & REHABILITATION

## 2017-09-21 PROCEDURE — 97110 THERAPEUTIC EXERCISES: CPT

## 2017-09-21 PROCEDURE — 97535 SELF CARE MNGMENT TRAINING: CPT

## 2017-09-21 PROCEDURE — 74011250636 HC RX REV CODE- 250/636: Performed by: PHYSICAL MEDICINE & REHABILITATION

## 2017-09-21 PROCEDURE — 70450 CT HEAD/BRAIN W/O DYE: CPT

## 2017-09-21 PROCEDURE — 84132 ASSAY OF SERUM POTASSIUM: CPT | Performed by: PHYSICAL MEDICINE & REHABILITATION

## 2017-09-21 PROCEDURE — 97112 NEUROMUSCULAR REEDUCATION: CPT

## 2017-09-21 PROCEDURE — 36415 COLL VENOUS BLD VENIPUNCTURE: CPT | Performed by: PHYSICAL MEDICINE & REHABILITATION

## 2017-09-21 PROCEDURE — 99233 SBSQ HOSP IP/OBS HIGH 50: CPT | Performed by: INTERNAL MEDICINE

## 2017-09-21 PROCEDURE — 65310000000 HC RM PRIVATE REHAB

## 2017-09-21 RX ORDER — POTASSIUM CHLORIDE 20 MEQ/1
40 TABLET, EXTENDED RELEASE ORAL 2 TIMES DAILY
Status: DISCONTINUED | OUTPATIENT
Start: 2017-09-21 | End: 2017-10-05

## 2017-09-21 RX ORDER — TIZANIDINE 2 MG/1
2 TABLET ORAL
Status: DISCONTINUED | OUTPATIENT
Start: 2017-09-21 | End: 2017-10-11

## 2017-09-21 RX ORDER — POTASSIUM CHLORIDE 29.8 MG/ML
40 INJECTION INTRAVENOUS ONCE
Status: DISCONTINUED | OUTPATIENT
Start: 2017-09-21 | End: 2017-09-21 | Stop reason: SDUPTHER

## 2017-09-21 RX ORDER — POTASSIUM CHLORIDE 14.9 MG/ML
20 INJECTION INTRAVENOUS
Status: DISCONTINUED | OUTPATIENT
Start: 2017-09-21 | End: 2017-09-22

## 2017-09-21 RX ADMIN — LABETALOL HYDROCHLORIDE 200 MG: 200 TABLET, FILM COATED ORAL at 00:09

## 2017-09-21 RX ADMIN — HYDRALAZINE HYDROCHLORIDE 25 MG: 25 TABLET, FILM COATED ORAL at 05:35

## 2017-09-21 RX ADMIN — HYDRALAZINE HYDROCHLORIDE 25 MG: 25 TABLET, FILM COATED ORAL at 16:09

## 2017-09-21 RX ADMIN — POTASSIUM CHLORIDE 60 MEQ: 20 TABLET, EXTENDED RELEASE ORAL at 08:57

## 2017-09-21 RX ADMIN — POTASSIUM CHLORIDE 40 MEQ: 400 INJECTION, SOLUTION INTRAVENOUS at 16:00

## 2017-09-21 RX ADMIN — DEXTROSE MONOHYDRATE AND SODIUM CHLORIDE 75 ML/HR: 5; .45 INJECTION, SOLUTION INTRAVENOUS at 05:47

## 2017-09-21 RX ADMIN — TIZANIDINE 2 MG: 2 TABLET ORAL at 20:50

## 2017-09-21 RX ADMIN — HYDRALAZINE HYDROCHLORIDE 25 MG: 25 TABLET, FILM COATED ORAL at 20:51

## 2017-09-21 RX ADMIN — HYDROCHLOROTHIAZIDE: 12.5 CAPSULE ORAL at 08:57

## 2017-09-21 RX ADMIN — AMLODIPINE BESYLATE 10 MG: 10 TABLET ORAL at 08:57

## 2017-09-21 RX ADMIN — POTASSIUM CHLORIDE 20 MEQ: 14.9 INJECTION, SOLUTION INTRAVENOUS at 20:57

## 2017-09-21 RX ADMIN — LABETALOL HYDROCHLORIDE 200 MG: 200 TABLET, FILM COATED ORAL at 08:57

## 2017-09-21 RX ADMIN — POTASSIUM CHLORIDE 40 MEQ: 20 TABLET, EXTENDED RELEASE ORAL at 18:24

## 2017-09-21 RX ADMIN — LABETALOL HYDROCHLORIDE 200 MG: 200 TABLET, FILM COATED ORAL at 18:23

## 2017-09-21 RX ADMIN — TAMSULOSIN HYDROCHLORIDE 0.4 MG: 0.4 CAPSULE ORAL at 20:50

## 2017-09-21 NOTE — PROGRESS NOTES
Darryl Manriquez MD,   Medical Director  [address] [facility], [address] [facility]  Tel: [phone]       SFD PROGRESS NOTE    Arturo Christian Date: 9/15/2017  Admit Diagnosis: stroke, left brain involvement;ICH (intracerebral hemorrhag*    Subjective     Sitting balance is quite good. Observed with OT earlier. No complaints except having a dry mouth. Slept well. Feels ready to get stafford out as transfers have improved     Objective:     Current Facility-Administered Medications   Medication Dose Route Frequency    potassium chloride 40 mEq IVPB  40 mEq IntraVENous ONCE    potassium chloride (K-DUR, KLOR-CON) SR tablet 40 mEq  40 mEq Oral BID    tiZANidine (ZANAFLEX) tablet 2 mg  2 mg Oral QHS    dextrose 5 % - 0.45% NaCl infusion  75 mL/hr IntraVENous CONTINUOUS    hydrALAZINE (APRESOLINE) tablet 25 mg  25 mg Oral TID    acetaminophen (TYLENOL) tablet 500 mg  500 mg Per NG tube Q4H PRN    alum-mag hydroxide-simeth (MYLANTA) oral suspension 30 mL  30 mL Oral Q4H PRN    amLODIPine (NORVASC) tablet 10 mg  10 mg Oral DAILY    bisacodyl (DULCOLAX) suppository 10 mg  10 mg Rectal DAILY PRN    hydrALAZINE (APRESOLINE) tablet 50 mg  50 mg Oral QID PRN    labetalol (NORMODYNE) tablet 200 mg  200 mg Oral Q8H    lip protectant (BLISTEX) ointment   Topical PRN    losartan/hydroCHLOROthiazide (HYZAAR) 100/12.5 mg   Oral DAILY    ondansetron (ZOFRAN ODT) tablet 4 mg  4 mg Oral Q6H PRN    sodium phosphate (FLEET'S) enema 118 mL  1 Enema Rectal PRN    traMADol (ULTRAM) tablet 50 mg  50 mg Oral Q6H PRN    traZODone (DESYREL) tablet 50 mg  50 mg Oral QHS PRN    tamsulosin (FLOMAX) capsule 0.4 mg  0.4 mg Oral QHS     Review of Systems:Denies chest pain, shortness of breath, cough, headache, visual problems, abdominal pain, dysurea, calf pain. Pertinent positives are as noted in the medical records and unremarkable otherwise.      Visit Vitals    /75    Pulse 83    Temp 98.1 °F (36.7 °C)    Resp 16    SpO2 96%        Physical Exam:   General: Alert and age appropriately oriented. dyarthria and exp aphasia  No acute cardio respiratory distress. HEENT: Normocephalic,no scleral icterus  Oral mucosa moist without cyanosis; right facial weakness   Lungs: Clear to auscultation  bilaterally. Respiration even and unlabored   Heart: Regular rate and rhythm, S1, S2   No  murmurs, clicks, rub or gallops   Abdomen: Soft, non-tender, nondistended. Bowel sounds present. No organomegaly. Genitourinary: stafford. Neuromuscular:      Stable RHP. Starting to get increased tone on the right ; roseanna 2  fcs consistently with one command. Cannot follow 2 step  Quad firing. Adduction improving RLE   Skin/extremity: No rashes, no erythema. No calf tenderness BLE  No edema                                                                            Functional Assessment:          Balance  Sitting - Static: Fair (occasional) (09/16/17 1300)  Sitting - Dynamic: Poor (constant support) (09/16/17 1300)  Standing - Static: Poor;Constant support (with LUE support on rail) (09/16/17 1300)  Standing - Dynamic : Impaired (09/16/17 1300)                     Stewart Ramus Fall Risk Assessment:  Stewart Ramus Fall Risk  Mobility: Ambulates or transfers with assist devices or assistance/unsteady gait (09/21/17 0957)  Mobility Interventions: Patient to call before getting OOB (09/21/17 0957)  Mentation: Alert, oriented x 3 (09/21/17 0957)  Mentation Interventions: Door open when patient unattended;More frequent rounding; Toileting rounds;Update white board (09/20/17 1948)  Medication: Patient receiving anticonvulsants, sedatives(tranquilizers), psychotropics or hypnotics, hypoglycemics, narcotics, sleep aids, antihypertensives, laxatives, or diuretics (09/21/17 0957)  Medication Interventions: Patient to call before getting OOB (09/21/17 0957)  Elimination: Needs assistance with toileting (09/21/17 5257)  Elimination Interventions: Call light in reach (09/21/17 0957)  Prior Fall History: Unknown (09/21/17 0957)  Total Score: 3 (09/21/17 0957)  High Fall Risk: Yes (09/21/17 0957)       Ambulation:  Gait  Distance (ft): 20 Feet (ft) (09/21/17 1244)  Assistive Device: Gait belt;Walker cindy;Other (comment) (used 3 ace wraps to knee and ankle on right) (09/21/17 1244)     Labs/Studies:  Recent Results (from the past 72 hour(s))   SODIUM, UR, RANDOM    Collection Time: 09/18/17  6:30 PM   Result Value Ref Range    Sodium urine, random 33 MMOL/L   SODIUM    Collection Time: 09/18/17  7:28 PM   Result Value Ref Range    Sodium 151 (H) 136 - 145 mmol/L   SODIUM    Collection Time: 09/19/17  1:02 AM   Result Value Ref Range    Sodium 151 (H) 136 - 145 mmol/L   COLLECTION COMMENT    Collection Time: 09/19/17  1:02 AM   Result Value Ref Range    Collection Comment Q6    CBC W/O DIFF    Collection Time: 09/19/17  6:18 AM   Result Value Ref Range    WBC 13.2 (H) 4.3 - 11.1 K/uL    RBC 3.69 (L) 4.05 - 5.25 M/uL    HGB 10.7 (L) 11.7 - 15.4 g/dL    HCT 30.5 (L) 35.8 - 46.3 %    MCV 82.7 79.6 - 97.8 FL    MCH 29.0 26.1 - 32.9 PG    MCHC 35.1 (H) 31.4 - 35.0 g/dL    RDW 13.6 11.9 - 14.6 %    PLATELET 45 (L) 305 - 450 K/uL    MPV 9.9 (L) 10.8 - 81.2 FL   METABOLIC PANEL, BASIC    Collection Time: 09/19/17  6:18 AM   Result Value Ref Range    Sodium 149 (H) 136 - 145 mmol/L    Potassium 3.0 (L) 3.5 - 5.1 mmol/L    Chloride 114 (H) 98 - 107 mmol/L    CO2 24 21 - 32 mmol/L    Anion gap 11 7 - 16 mmol/L    Glucose 116 (H) 65 - 100 mg/dL    BUN 21 8 - 23 MG/DL    Creatinine 0.50 (L) 0.6 - 1.0 MG/DL    GFR est AA >60 >60 ml/min/1.73m2    GFR est non-AA >60 >60 ml/min/1.73m2    Calcium 7.6 (L) 8.3 - 10.4 MG/DL   SODIUM    Collection Time: 09/19/17 12:46 PM   Result Value Ref Range    Sodium 148 (H) 136 - 145 mmol/L   SODIUM    Collection Time: 09/19/17  6:17 PM   Result Value Ref Range    Sodium 147 (H) 136 - 145 mmol/L   CBC W/O DIFF Collection Time: 09/20/17  7:01 AM   Result Value Ref Range    WBC 9.8 4.3 - 11.1 K/uL    RBC 3.60 (L) 4.05 - 5.25 M/uL    HGB 10.2 (L) 11.7 - 15.4 g/dL    HCT 29.7 (L) 35.8 - 46.3 %    MCV 82.5 79.6 - 97.8 FL    MCH 28.3 26.1 - 32.9 PG    MCHC 34.3 31.4 - 35.0 g/dL    RDW 13.4 11.9 - 14.6 %    PLATELET 40 (L) 710 - 450 K/uL    MPV 10.6 (L) 10.8 - 71.9 FL   METABOLIC PANEL, BASIC    Collection Time: 09/20/17  7:01 AM   Result Value Ref Range    Sodium 147 (H) 136 - 145 mmol/L    Potassium 2.8 (LL) 3.5 - 5.1 mmol/L    Chloride 113 (H) 98 - 107 mmol/L    CO2 25 21 - 32 mmol/L    Anion gap 9 7 - 16 mmol/L    Glucose 116 (H) 65 - 100 mg/dL    BUN 18 8 - 23 MG/DL    Creatinine 0.43 (L) 0.6 - 1.0 MG/DL    GFR est AA >60 >60 ml/min/1.73m2    GFR est non-AA >60 >60 ml/min/1.73m2    Calcium 7.6 (L) 8.3 - 10.4 MG/DL   HIT PROFILE    Collection Time: 09/20/17  4:50 PM   Result Value Ref Range    HIT PROFILE PERFORMED BY [facility]      HEPARIN INDUCED PLT AB.  POSITIVE      OPTICAL DENSITY READ 2.998 (H) <0.400 ABS    HIT INTERPRETATION POSITIVE     FIBRINOGEN    Collection Time: 09/20/17  4:50 PM   Result Value Ref Range    Fibrinogen 136 (L) 172 - 437 mg/dL   D DIMER    Collection Time: 09/20/17  4:50 PM   Result Value Ref Range    D DIMER 32.79 (HH) <0.55 ug/ml(FEU)   PTT    Collection Time: 09/20/17  4:50 PM   Result Value Ref Range    aPTT 28.4 23.5 - 31.7 SEC   PROTHROMBIN TIME + INR    Collection Time: 09/20/17  4:50 PM   Result Value Ref Range    Prothrombin time 10.9 9.6 - 12.0 sec    INR 1.0 0.9 - 1.2     POTASSIUM    Collection Time: 09/21/17  6:38 AM   Result Value Ref Range    Potassium 3.2 (L) 3.5 - 5.1 mmol/L   HEPATIC FUNCTION PANEL    Collection Time: 09/21/17  6:38 AM   Result Value Ref Range    Protein, total 5.3 (L) 6.3 - 8.2 g/dL    Albumin 2.4 (L) 3.2 - 4.6 g/dL    Globulin 2.9 2.3 - 3.5 g/dL    A-G Ratio 0.8 (L) 1.2 - 3.5      Bilirubin, total 0.8 0.2 - 1.1 MG/DL    Bilirubin, direct 0.1 <0.4 MG/DL    Alk. phosphatase 66 50 - 136 U/L    AST (SGOT) 29 15 - 37 U/L    ALT (SGPT) 63 12 - 65 U/L   CBC WITH AUTOMATED DIFF    Collection Time: 09/21/17  6:39 AM   Result Value Ref Range    WBC 9.4 4.3 - 11.1 K/uL    RBC 3.54 (L) 4.05 - 5.25 M/uL    HGB 10.2 (L) 11.7 - 15.4 g/dL    HCT 29.4 (L) 35.8 - 46.3 %    MCV 83.1 79.6 - 97.8 FL    MCH 28.8 26.1 - 32.9 PG    MCHC 34.7 31.4 - 35.0 g/dL    RDW 13.1 11.9 - 14.6 %    PLATELET 36 (L) 746 - 450 K/uL    MPV 10.5 (L) 10.8 - 14.1 FL    DF AUTOMATED      NEUTROPHILS 74 43 - 78 %    LYMPHOCYTES 15 13 - 44 %    MONOCYTES 9 4.0 - 12.0 %    EOSINOPHILS 2 0.5 - 7.8 %    BASOPHILS 0 0.0 - 2.0 %    IMMATURE GRANULOCYTES 0.3 0.0 - 5.0 %    ABS. NEUTROPHILS 7.0 1.7 - 8.2 K/UL    ABS. LYMPHOCYTES 1.4 0.5 - 4.6 K/UL    ABS. MONOCYTES 0.8 0.1 - 1.3 K/UL    ABS. EOSINOPHILS 0.2 0.0 - 0.8 K/UL    ABS. BASOPHILS 0.0 0.0 - 0.2 K/UL    ABS. IMM.  GRANS. 0.0 0.0 - 0.5 K/UL       Assessment:     Problem List as of 9/21/2017  Date Reviewed: 9/12/2017          Codes Class Noted - Resolved    ICH (intracerebral hemorrhage) ([facility] ICD-10-CM: I61.9  ICD-9-CM: 486  9/15/2017 - Present        Hypertensive urgency, malignant ICD-10-CM: I16.0  ICD-9-CM: 401.0  9/11/2017 - Present        Stroke, hemorrhagic ([facility] ICD-10-CM: I61.9  ICD-9-CM: 511  9/7/2017 - Present        Accelerated hypertension ICD-10-CM: I10  ICD-9-CM: 401.0  9/7/2017 - Present        At risk for aspiration (Chronic) ICD-10-CM: Z91.89  ICD-9-CM: V49.89  9/7/2017 - Present        Acute spontaneous intraventricular hemorrhage assoc w/ hypertension ([facility] ICD-10-CM: I61.5, I10  ICD-9-CM: 776, 401.9  9/7/2017 - Present        Screening for breast cancer ICD-10-CM: Z12.39  ICD-9-CM: V76.10  2/27/2017 - Present        Atrophic vaginitis ICD-10-CM: N95.2  ICD-9-CM: 627.3  7/11/2016 - Present        HTN (hypertension) ICD-10-CM: I10  ICD-9-CM: 401.9  10/23/2015 - Present        ADD (attention deficit disorder) ICD-10-CM: F98.8  ICD-9-CM: 314.00  10/23/2015 - Present        Depression ICD-10-CM: F32.9  ICD-9-CM: 959  10/23/2015 - Present        Anxiety ICD-10-CM: F41.9  ICD-9-CM: 300.00  10/23/2015 - Present        RESOLVED: Hypoxemia ICD-10-CM: R09.02  ICD-9-CM: 799.02  9/10/2017 - 9/12/2017        RESOLVED: Acute respiratory failure ([facility] ICD-10-CM: J96.00  ICD-9-CM: 518.81  9/8/2017 - 9/10/2017        RESOLVED: Hemorrhagic stroke ([facility] ICD-10-CM: I61.9  ICD-9-CM: 694  9/7/2017 - 9/7/2017        RESOLVED: Non-intractable vomiting with nausea ICD-10-CM: R11.2  ICD-9-CM: 787.01  9/7/2017 - 9/12/2017        RESOLVED: Seborrheic keratosis, inflamed ICD-10-CM: L82.0  ICD-9-CM: 702.11  7/11/2016 - 9/20/2016        RESOLVED: Cough ICD-10-CM: R05  ICD-9-CM: 786.2  7/11/2016 - 9/20/2016        RESOLVED: Shoulder pain ICD-10-CM: M25.519  ICD-9-CM: 719.41  10/23/2015 - 2/27/2017              Plan:        Left BG Hemorrhage with intraventricular extension due to HTN emergency; resultant R hemiplegia, right neglect, dysarthria, aphasia, dysphagia with significant decline in mobility and self care  Plan:   Continue daily physician medical management:  Pneumonia prophylaxis- Incentive spirometer every hour while awake. Will need instruction and assistance. Not sure if she can get a proper oral seal to be effective      Dysphagia; Pureed diet with NTL; at risk for malnutrition and dehydration. PICC line removed prior to transfer; may need a line for IVFs if BUN continues to increase. Low K, Ca; check mg. Supplementation as needed. Na elevated  -mag ok, K much improved; cont to supplement while on diuretic  -9/19 replace K; 3.0; 2.8 this am 9/20; inc supplement; may need to add to IVFs      Thrombocytopenia; has been trending down for no apparent reason. hgb slt down as well. Has not been on Hep products; consult hematology today 9/19  -plts 40K, continues to trend down 9/20; await consult by hematology. ? reln to [address].  Mild anemia as well  Check HIT panel, d dimer , fibrinogen  -9/21 plts 36k ; HIT PROFILE POSITIVE, D DIMER ELEVATED 32.79, FIBRINOGEN  ; consistent with DIC; Etiology unclear  WILL D/W HEMATOLOGY; LFTs ok, no hx of blood transfusion,no hx pancreatitis, no sign of bacteremia. Has not been on any heparin products. Can see in head injury; her ICH. Cannot r/o blood disorder (leukemia)   per hematology \"Recommend transfusing platelets for <31,721 and Cryoprecipitate for fibrinogen < 100. \"     Hypernatremia; last head CT did not show significant cerebral edema. Clinically improving. Likely due to prerenal azotemia; 9/15 MAY REQ isotonic / hypotonic saline IV;  -9/18 events of weekend noted; Na 156; asymptomatic, on 0.5 dextrose with 1/4 Na; na q 6hrs; pts serum osmolality was normal. Will check urine Na and urine osmolality; depending on findings, will consider consulting Hospitalist vs Nephrology  Neuro improving despite this  -9/19 Na down to 149, urine osmo nl, serum osmo min hi; cont dex/and 1/4 NS; repeat in am. Not DI as uop not increased  -9/20 Na 147; cont fluids; 9/21 not resulted; will stop fluids when Na below 142      DVT risk / DVT Prophylaxis- Will require daily physician exam to assess for signs and symptoms as patient is at increased risk for of thromboembolism. Mobilization as tolerated. Intermittent pneumatic compression devices when in bed Thigh-high or knee-high thromboembolic deterrent hose when out of bed. NO  anticoag due to [address]      Pain Control: stable, mild-to-moderate joint symptoms intermittently, reasonably well controlled by PRN meds. Will require regular pain assessment and comprenhensive pain management,       Wound Care: Monitor wound status daily per staff and physician. At risk for failure. Will require 24/7 rehab nursing. None needed at this time      Hypertension - BP uncontrolled, fluctuating, managed medically. Add prn hydralazine for sbp> 180. Goal SBP is 140-160 given ICH.  Cont Nomodyne, norvasc and hyzaar; consider Verapamil or scheduled Hydralazine  -9/18 BP increased 169/95; add hydralazine 25 tid  -9/20 BP much improved      Mild hyperglycemia, likely stress induced vs borderline diabetes; monitor loosely      Leukocytosis; recheck in a.m. Check UA due to stafford. No pulmonary signs (had neg CXR today), no s/s of infxn at old PICC site, no wounds, afebrile. Watch monocytes. -9/18 12.2 improving. Urine neg  -9/19 up to 13.2, ? Reactive. No source of infxn identified. Afebrile  -9/20 WBC now normal      Urinary retention/ neurogenic bladder - start flomax but continue stafford until mobility improves a bit. Strict I/o's  -9/20 keeping stafford to monitor UOP until Na normalizes. No significant output to suggest DI  -9/21 dc stafford; cont Flomax, follow bladder scan      bowel program - add prn meds; incontinent, want to keep stool on the firmer side. Monitor skin.       GERD - add a PPI. At times may need additional antacids, Maalox prn.     -continues to benefit from and is showing improvements in a multidisc team approach. Participating well.        Addendum; in therapy this afternoon, transient left eye blindness. Will f/u head CT for extension of or new ICH, especially with low plts    Time spent was 25 minutes with over 1/2 in direct patient care/examination, consultation and coordination of care.      Signed By: Chiara Liu MD     September 21, 2017

## 2017-09-21 NOTE — PROGRESS NOTES
PHYSICAL THERAPY DAILY NOTE  Time In: 1346  Time Out: 9561  Patient Seen For: PM;Therapeutic exercise; Other (see progress notes);Gait training;Transfer training    Subjective: Patient had no complaints. Objective: Daughter and aunt present for therapy to observe. Other (comment) (falls)  GROSS ASSESSMENT Daily Assessment            BED/MAT MOBILITY Daily Assessment    Supine to Sit : 2 (Maximal assistance)  Sit to Supine : 2 (Maximal assistance)       TRANSFERS Daily Assessment    Transfer Type: SPT without device  Transfer Assistance : 2 (Maximal assistance)  Sit to Stand Assistance: Moderate assistance       GAIT Daily Assessment    Amount of Assistance: 2 (Maximal assistance)  Distance (ft): 20 Feet (ft)  Assistive Device: Gait belt;Walker cindy;Other (comment) (used 3 ace wraps to knee ankle and to assist DF)       STEPS or STAIRS Daily Assessment    Level of Assist : 0 (Not tested)       BALANCE Daily Assessment            WHEELCHAIR MOBILITY Daily Assessment            LOWER EXTREMITY EXERCISES Daily Assessment   Active full SAQ and hip abd/ add in right leg. Extremity: Both  Exercise Type #1: Supine lower extremity strengthening  Sets Performed: 1  Reps Performed: 10  Level of Assist: Maximum assistance          Assessment: Patient making progress. Plan of Care: Continue with plan of care to reach PT goals. Returned to room with call santillan at reach.     Gabi Wilde, PTA  9/21/2017

## 2017-09-21 NOTE — PROGRESS NOTES
09/21/17 1230   Time Spent With Patient   Time In 1003   Time Out 1036   Patient Seen For: AM;Neuro-linguistics   Mental Status   Neurologic State Alert   Orientation Level Oriented X4   Cognition Memory loss;Decreased attention/concentration;Decreased command following; Impaired decision making   Perception Cues to attend right visual field   Perseveration Perseverates during conversation   Safety/Judgement Fall prevention   Pt seen during support group for CVA. Pt verbalized new feelings since CVA and participated in conversation with 70% intelligibility.     Yuri Hanson MA/ANASTASIA/SLP

## 2017-09-21 NOTE — PROGRESS NOTES
OT Daily Note    Time In 1040   Time Out 1116     Subjective:\"What does this do (PROM shoulder)\" Agreeable to therapy. Pain:Tolerated for most of session, some pain during E-stim in flexor synergy pattern even though extensors were being attempted to elicit. Education: On neuro recovery, PROM benefits for muscles and joints. Interdisciplinary Communication: Collaborated with [facility], [facility] and patient is making progress towards goals. Precautions:  (falls)    Balance/functional mobility Daily Assessment   Repositioning in w/c only for comfort this session. KT tape, ROM, E-stim Daily Assessment   E-stim at first for digit extension and flexion with good results but 2/3rd the amps needed from yesterday, ?increasing tone. Changed to promoting extensor patterns and flexor synergy pattern is beginning to emerge, was not able to hinder biceps. Applied KT tape to inhibit right biceps to decrease emerging synergy tone and pain. PROM completed in shoulder flexion, abduction, ER. Scapular mobilization and PROM RUE. Cognition Daily Assessment   Improving social interactions. Improving problem solving and questions related to treatment. Ended session:With [facility] in gym.      Richie Adams OTR/L

## 2017-09-21 NOTE — PROGRESS NOTES
PHYSICAL THERAPY DAILY NOTE  Time In: 6321  Time Out: 9698  Patient Seen For: AM;Therapeutic exercise; Other (see progress notes);Gait training;Transfer training    Subjective: Patient had no complaints. Objective: During exs patient complained of not being able to see out of her left eye. BP taken 118/72 and then it resolved about 2 minutes later. Informed Dr. Erenest Nissen of this during conference. Other (comment) (falls)  GROSS ASSESSMENT Daily Assessment            BED/MAT MOBILITY Daily Assessment    Supine to Sit : 2 (Maximal assistance)  Sit to Supine : 2 (Maximal assistance)       TRANSFERS Daily Assessment    Transfer Type: SPT without device  Transfer Assistance : 2 (Maximal assistance)  Sit to Stand Assistance: Moderate assistance       GAIT Daily Assessment   Therapist advancing right leg but patients standing balance is good with gait. Amount of Assistance: 2 (Maximal assistance)  Distance (ft): 20 Feet (ft)  Assistive Device: Gait belt;Walker cindy;Other (comment) (used 3 ace wraps to knee and ankle on right)       STEPS or STAIRS Daily Assessment    Level of Assist : 0 (Not tested)       BALANCE Daily Assessment            WHEELCHAIR MOBILITY Daily Assessment            LOWER EXTREMITY EXERCISES Daily Assessment    Extremity: Both  Exercise Type #1: Other (comment) (motomed x 10 minutes on gear1)  Sets Performed: 1  Reps Performed: 10  Level of Assist: Supervision          Assessment: Patient making progress. Core strength is good with sitting and standing balance. Plan of Care: Continue with plan of care to reach PT goals. Returned to room with family to assist with lunch.     Donte Artis, PTA  9/21/2017

## 2017-09-21 NOTE — PROGRESS NOTES
09/21/17 1021   Time Spent With Patient   Time In 0700   Time Out 0750   Patient Seen For: AM;ADLs   Feeding/Eating   Feeding/Eating Assistance S   Grooming   Grooming Assistance  Mod A   Upper Body Bathing   Bathing Assistance, Upper Max A   Position Performed Seated in chair   Adaptive Equipment Tub bench   Lower Body Bathing   Bathing Assistance, Lower  Dep   Position Performed Seated in chair;Standing   Adaptive Equipment Tub bench;Grab bar   Comments improving standing balance to allow writer to wash buttocks, dynamic balance still allowing forward reach to safetly was distal LE's. Upper Body Dressing    Dressing Assistance  Max A   Lower Body Dressing    Dressing Assistance  Dep   Functional Transfers   Tub or Shower Type Shower   Amount of Assistance Required Max A   Adaptive Equipment Grab bars; Wheelchair;Tub transfer bench     S: \"We are going to shower before breakfast.\" Agreeable to therapy. Focus of session was on morning ADL routine and shower. Patient was able to SPT with minimal/moderate assist.   Pain denied during session. Collaborated with Wilber RODRIUGEZ and confirmed patient is on track to reach goals as documented in the care plan. Patient tolerated session well, but strength, ROM, activity tolerance, balance, neglect are still below baseline and requires skilled facilitation to successfully and safely complete ADL's and transfers. Patient ended session in w/c with call remote and phone within reach.      Nancy Boucher, KAYODER

## 2017-09-21 NOTE — PROGRESS NOTES
Subjective \"What fun thing are we going to do? \"   Activity Connect 4 game   Strength/Endurance Patient with R hemiparesis. She commented that she was tired upon arrival to her room but was willing and motivated to participate. Balance NT   Social Interaction Patient was friendly and receptive to conversation with RT. She answered questions usually with one word but several times was able to clearly get out a complete sentence after a few attempts. Cognitive A&O X4; she needed a few minutes when provided with the directions to start the steps. She needed extra time to process what was asked of her. Comments Patient was left seated up in her w/c in her room waiting for her ST session with Laura.      Jose Elias Wilson, CTRS

## 2017-09-21 NOTE — PROGRESS NOTES
[facility]        Inpatient Hematology / Oncology Progress Note      Admission Date: 9/15/2017  1:06 PM  Reason for Admission/Hospital Course: stroke, left brain involvement  ICH (intracerebral hemorrhage) (HCC)      24 Hour Events:  Sitting up in room  Family present   Feeling well        ROS:  Constitutional: negative for fever, chills, weakness, malaise, fatigue. CV: negative for chest pain, palpitations, edema. Respiratory: negative for dyspnea, cough, wheezing. GI: negative for nausea, abdominal pain, diarrhea. 10 point review of systems is otherwise negative with the exception of the elements mentioned above in the HPI. Allergies   Allergen Reactions    Banana Shortness of Breath    Lisinopril Cough    Nuts [Tree Nut] Unknown (comments)       OBJECTIVE:  Patient Vitals for the past 8 hrs:   BP Temp Pulse Resp SpO2   17 0648 129/75 98.1 °F (36.7 °C) 83 16 96 %     Temp (24hrs), Av.9 °F (36.6 °C), Min:97.5 °F (36.4 °C), Max:98.1 °F (36.7 °C)         Physical Exam:  Constitutional: Well developed, well nourished female in no acute distress, sitting comfortably in the bedside chair   HEENT: Normocephalic and atraumatic. Oropharynx is clear, mucous membranes are moist. Extraocular muscles are intact. Sclerae anicteric. .    Skin Warm and dry. No bruising and no rash noted. No erythema. No pallor. Respiratory Lungs are clear to auscultation bilaterally without wheezes, rales or rhonchi, normal air exchange without accessory muscle use. CVS Normal rate, regular rhythm and normal S1 and S2. No murmurs, gallops, or rubs. Abdomen Soft, nontender and nondistended, normoactive bowel sounds. Neuro Flaccid RUE, right facial weakness, mild expressive aphasia    MSK  No edema and no tenderness. Psych Appropriate mood and affect.           Labs:    Recent Labs      17   0639  17   0701  17   0618   WBC  9.4  9.8  13.2*   RBC  3.54*  3.60* 3.69*   HGB  10.2*  10.2*  10.7*   HCT  29.4*  29.7*  30.5*   MCV  83.1  82.5  82.7   MCH  28.8  28.3  29.0   MCHC  34.7  34.3  35.1*   RDW  13.1  13.4  13.6   PLT  36*  40*  45*   GRANS  74   --    --    LYMPH  15   --    --    MONOS  9   --    --    EOS  2   --    --    BASOS  0   --    --    IG  0.3   --    --    DF  AUTOMATED   --    --    ANEU  7.0   --    --    ABL  1.4   --    --    ABM  0.8   --    --    TORRIE  0.2   --    --    ABB  0.0   --    --    AIG  0.0   --    --       Recent Labs      09/21/17   0638  09/20/17   0701  09/19/17   1817  09/19/17   1246  09/19/17   0618   NA   --   147*  147*  148*  149*   K  3.2*  2.8*   --    --   3.0*   CL   --   113*   --    --   114*   CO2   --   25   --    --   24   AGAP   --   9   --    --   11   GLU   --   116*   --    --   116*   BUN   --   18   --    --   21   CREA   --   0.43*   --    --   0.50*   GFRAA   --   >60   --    --   >60   GFRNA   --   >60   --    --   >60   CA   --   7.6*   --    --   7.6*   SGOT  29   --    --    --    --    AP  66   --    --    --    --    TP  5.3*   --    --    --    --    ALB  2.4*   --    --    --    --    GLOB  2.9   --    --    --    --    AGRAT  0.8*   --    --    --    --          Imaging:      ASSESSMENT:    Problem List  Date Reviewed: 9/12/2017          Codes Class Noted    ICH (intracerebral hemorrhage) ([facility] ICD-10-CM: I61.9  ICD-9-CM: 715  9/15/2017        Hypertensive urgency, malignant ICD-10-CM: I16.0  ICD-9-CM: 401.0  9/11/2017        Stroke, hemorrhagic ([address] ICD-10-CM: I61.9  ICD-9-CM: 620  9/7/2017        Accelerated hypertension ICD-10-CM: I10  ICD-9-CM: 401.0  9/7/2017        At risk for aspiration (Chronic) ICD-10-CM: Z91.89  ICD-9-CM: V49.89  9/7/2017        Acute spontaneous intraventricular hemorrhage assoc w/ hypertension (HCC) ICD-10-CM: I61.5, I10  ICD-9-CM: 431, 401.9  9/7/2017        Screening for breast cancer ICD-10-CM: Z12.39  ICD-9-CM: V76.10  2/27/2017        Atrophic vaginitis ICD-10-CM: N95.2  ICD-9-CM: 627.3  7/11/2016        HTN (hypertension) ICD-10-CM: I10  ICD-9-CM: 401.9  10/23/2015        ADD (attention deficit disorder) ICD-10-CM: F98.8  ICD-9-CM: 314.00  10/23/2015        Depression ICD-10-CM: F32.9  ICD-9-CM: 619  10/23/2015        Anxiety ICD-10-CM: F41.9  ICD-9-CM: 300.00  10/23/2015                PLAN:    HIT  9/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 MARK ordered. We will hold on any AC until MARK results. DIC  9/21 Labs confirm DIC: D dimer 32.79 fibrinogen 136. HIT positive. Recommend avoiding heparin. Recommend transfusing platelets for <47,475 and Cryoprecipitate for fibrinogen < 100. Treating the underlying cause is paramount. [facility]  [address] [place] [address]  Office : [phone]  Fax : [phone]       Attending Addendum:  I personally evaluated the patient with Lee Gage, NVeeP.,  and agree with the assessment, findings and plan as documented. Appears stable, please check her MARK.               Anahi Carrillo MD  [address] [place] [address]  Office : [phone]  Fax : [phone]

## 2017-09-21 NOTE — PROGRESS NOTES
Problem: Falls - Risk of  Goal: *Absence of Falls  Document Haley Fall Risk and appropriate interventions in the flowsheet.    Outcome: Progressing Towards Goal  Fall Risk Interventions:  Mobility Interventions: Patient to call before getting OOB, Utilize walker, cane, or other assitive device     Mentation Interventions: Door open when patient unattended, More frequent rounding, Toileting rounds, Update white board     Medication Interventions: Patient to call before getting OOB, Teach patient to arise slowly     Elimination Interventions: Call light in reach, Patient to call for help with toileting needs, Toilet paper/wipes in reach, Toileting schedule/hourly rounds, Elevated toilet seat

## 2017-09-21 NOTE — PROGRESS NOTES
OT Daily Note    Time In 1306   Time Out 1346     Subjective:\"I am ready to lay down\" Agreeable to therapy. Pain:Denied during session. Education:On expected fatigue following hemorrhages and basal ganglia strokes. Interdisciplinary Communication: Collaborated with Mey RODRIGUEZ and patient is making progress towards goals. Precautions:  (falls)    Balance/functional mobility Daily Assessment   SPT from w/c <> mat with moderate assist.  Dynamic sitting balance reaching towards feet and across midline with RUE, improving balance, able to reach feet with CGA. Side to side weight bearing into elbows one at time holding for 5 seconds, completed 1 set of 5. Strengthening/activity tolerance Daily Assessment   Sitting edge of mat completed table slides with functional reach towards cones as targets. Motomed completed with RUE in orthosis for closed chain input and ROM in wrist, elbow and shoulder joints for 10 minutes. Ended session:Sitting up in w/c with Mey RODRIGUEZ.      Sarwat Steele OTR/L

## 2017-09-22 ENCOUNTER — APPOINTMENT (OUTPATIENT)
Dept: ULTRASOUND IMAGING | Age: 62
DRG: 040 | End: 2017-09-22
Attending: PHYSICAL MEDICINE & REHABILITATION
Payer: COMMERCIAL

## 2017-09-22 LAB
ANION GAP SERPL CALC-SCNC: 8 MMOL/L (ref 7–16)
BASOPHILS # BLD: 0 K/UL (ref 0–0.2)
BASOPHILS NFR BLD: 0 % (ref 0–2)
BUN SERPL-MCNC: 13 MG/DL (ref 8–23)
CALCIUM SERPL-MCNC: 7.5 MG/DL (ref 8.3–10.4)
CHLORIDE SERPL-SCNC: 109 MMOL/L (ref 98–107)
CO2 SERPL-SCNC: 23 MMOL/L (ref 21–32)
CREAT SERPL-MCNC: 0.37 MG/DL (ref 0.6–1)
DIFFERENTIAL METHOD BLD: ABNORMAL
EOSINOPHIL # BLD: 0.2 K/UL (ref 0–0.8)
EOSINOPHIL NFR BLD: 2 % (ref 0.5–7.8)
ERYTHROCYTE [DISTWIDTH] IN BLOOD BY AUTOMATED COUNT: 13.6 % (ref 11.9–14.6)
FIBRINOGEN PPP-MCNC: 153 MG/DL (ref 172–437)
GLUCOSE SERPL-MCNC: 101 MG/DL (ref 65–100)
HCT VFR BLD AUTO: 28.3 % (ref 35.8–46.3)
HGB BLD-MCNC: 9.8 G/DL (ref 11.7–15.4)
IMM GRANULOCYTES # BLD: 0 K/UL (ref 0–0.5)
IMM GRANULOCYTES NFR BLD: 0.5 % (ref 0–5)
INR PPP: 1 (ref 0.9–1.2)
LYMPHOCYTES # BLD: 1.4 K/UL (ref 0.5–4.6)
LYMPHOCYTES NFR BLD: 16 % (ref 13–44)
MCH RBC QN AUTO: 28.2 PG (ref 26.1–32.9)
MCHC RBC AUTO-ENTMCNC: 34.6 G/DL (ref 31.4–35)
MCV RBC AUTO: 81.3 FL (ref 79.6–97.8)
MONOCYTES # BLD: 0.6 K/UL (ref 0.1–1.3)
MONOCYTES NFR BLD: 8 % (ref 4–12)
NEUTS SEG # BLD: 6.3 K/UL (ref 1.7–8.2)
NEUTS SEG NFR BLD: 74 % (ref 43–78)
PLATELET # BLD AUTO: 45 K/UL (ref 150–450)
PMV BLD AUTO: 10.4 FL (ref 10.8–14.1)
POTASSIUM SERPL-SCNC: 4 MMOL/L (ref 3.5–5.1)
PROTHROMBIN TIME: 11.2 SEC (ref 9.6–12)
RBC # BLD AUTO: 3.48 M/UL (ref 4.05–5.25)
SODIUM SERPL-SCNC: 140 MMOL/L (ref 136–145)
WBC # BLD AUTO: 8.5 K/UL (ref 4.3–11.1)

## 2017-09-22 PROCEDURE — 99233 SBSQ HOSP IP/OBS HIGH 50: CPT | Performed by: PHYSICAL MEDICINE & REHABILITATION

## 2017-09-22 PROCEDURE — 97535 SELF CARE MNGMENT TRAINING: CPT

## 2017-09-22 PROCEDURE — 85610 PROTHROMBIN TIME: CPT | Performed by: PHYSICAL MEDICINE & REHABILITATION

## 2017-09-22 PROCEDURE — 92526 ORAL FUNCTION THERAPY: CPT

## 2017-09-22 PROCEDURE — 93970 EXTREMITY STUDY: CPT

## 2017-09-22 PROCEDURE — 85025 COMPLETE CBC W/AUTO DIFF WBC: CPT | Performed by: PHYSICAL MEDICINE & REHABILITATION

## 2017-09-22 PROCEDURE — 65310000000 HC RM PRIVATE REHAB

## 2017-09-22 PROCEDURE — 97110 THERAPEUTIC EXERCISES: CPT

## 2017-09-22 PROCEDURE — 99233 SBSQ HOSP IP/OBS HIGH 50: CPT | Performed by: INTERNAL MEDICINE

## 2017-09-22 PROCEDURE — 74011250637 HC RX REV CODE- 250/637: Performed by: PHYSICAL MEDICINE & REHABILITATION

## 2017-09-22 PROCEDURE — 97530 THERAPEUTIC ACTIVITIES: CPT

## 2017-09-22 PROCEDURE — 36415 COLL VENOUS BLD VENIPUNCTURE: CPT | Performed by: PHYSICAL MEDICINE & REHABILITATION

## 2017-09-22 PROCEDURE — 97112 NEUROMUSCULAR REEDUCATION: CPT

## 2017-09-22 PROCEDURE — 85384 FIBRINOGEN ACTIVITY: CPT | Performed by: PHYSICAL MEDICINE & REHABILITATION

## 2017-09-22 PROCEDURE — 97116 GAIT TRAINING THERAPY: CPT

## 2017-09-22 PROCEDURE — 80048 BASIC METABOLIC PNL TOTAL CA: CPT | Performed by: PHYSICAL MEDICINE & REHABILITATION

## 2017-09-22 PROCEDURE — 74011000258 HC RX REV CODE- 258: Performed by: PHYSICAL MEDICINE & REHABILITATION

## 2017-09-22 RX ADMIN — TAMSULOSIN HYDROCHLORIDE 0.4 MG: 0.4 CAPSULE ORAL at 21:33

## 2017-09-22 RX ADMIN — LABETALOL HYDROCHLORIDE 200 MG: 200 TABLET, FILM COATED ORAL at 17:40

## 2017-09-22 RX ADMIN — LABETALOL HYDROCHLORIDE 200 MG: 200 TABLET, FILM COATED ORAL at 00:20

## 2017-09-22 RX ADMIN — HYDRALAZINE HYDROCHLORIDE 25 MG: 25 TABLET, FILM COATED ORAL at 21:33

## 2017-09-22 RX ADMIN — HYDRALAZINE HYDROCHLORIDE 25 MG: 25 TABLET, FILM COATED ORAL at 15:31

## 2017-09-22 RX ADMIN — HYDROCHLOROTHIAZIDE: 12.5 CAPSULE ORAL at 09:12

## 2017-09-22 RX ADMIN — HYDRALAZINE HYDROCHLORIDE 25 MG: 25 TABLET, FILM COATED ORAL at 05:42

## 2017-09-22 RX ADMIN — LABETALOL HYDROCHLORIDE 200 MG: 200 TABLET, FILM COATED ORAL at 09:13

## 2017-09-22 RX ADMIN — DEXTROSE MONOHYDRATE AND SODIUM CHLORIDE 75 ML/HR: 5; .45 INJECTION, SOLUTION INTRAVENOUS at 05:50

## 2017-09-22 RX ADMIN — TIZANIDINE 2 MG: 2 TABLET ORAL at 21:33

## 2017-09-22 RX ADMIN — POTASSIUM CHLORIDE 40 MEQ: 20 TABLET, EXTENDED RELEASE ORAL at 17:40

## 2017-09-22 RX ADMIN — AMLODIPINE BESYLATE 10 MG: 10 TABLET ORAL at 09:14

## 2017-09-22 RX ADMIN — POTASSIUM CHLORIDE 40 MEQ: 20 TABLET, EXTENDED RELEASE ORAL at 09:00

## 2017-09-22 NOTE — PROGRESS NOTES
[facility]        Inpatient Hematology / Oncology Progress Note      Admission Date: 9/15/2017  1:06 PM  Reason for Admission/Hospital Course: stroke, left brain involvement  ICH (intracerebral hemorrhage) ([facility]      24 Hour Events:  Sitting up in room  Family present   Feeling well    Platelets improving  MARK pending      ROS:  Constitutional: negative for fever, chills, weakness, malaise, +fatigue. CV: negative for chest pain, palpitations, edema. Respiratory: negative for dyspnea, cough, wheezing. GI: negative for nausea, abdominal pain, diarrhea. 10 point review of systems is otherwise negative with the exception of the elements mentioned above in the HPI. Allergies   Allergen Reactions    Banana Shortness of Breath    Lisinopril Cough    Nuts [Tree Nut] Unknown (comments)       OBJECTIVE:  Patient Vitals for the past 8 hrs:   BP Temp Pulse Resp SpO2   17 0750 126/74 98.4 °F (36.9 °C) 86 14 97 %     Temp (24hrs), Av.2 °F (36.8 °C), Min:98.1 °F (36.7 °C), Max:98.4 °F (36.9 °C)         Physical Exam:  Constitutional: Well developed, well nourished female in no acute distress, sitting comfortably wheelchair   HEENT: Normocephalic and atraumatic. Oropharynx is clear, mucous membranes are moist. Extraocular muscles are intact. Sclerae anicteric. .    Skin Warm and dry. No bruising and no rash noted. No erythema. No pallor. Respiratory Lungs are clear to auscultation bilaterally without wheezes, rales or rhonchi, normal air exchange without accessory muscle use. CVS Normal rate, regular rhythm and normal S1 and S2. No murmurs, gallops, or rubs. Abdomen Soft, nontender and nondistended, normoactive bowel sounds. Neuro Flaccid RUE, right facial weakness, mild expressive aphasia    MSK  No edema and no tenderness. Psych Appropriate mood and affect.           Labs:      Recent Labs      17   0643  17   0639  17   0701   WBC  8.5  9.4 9. 8   RBC  3.48*  3.54*  3.60*   HGB  9.8*  10.2*  10.2*   HCT  28.3*  29.4*  29.7*   MCV  81.3  83.1  82.5   MCH  28.2  28.8  28.3   MCHC  34.6  34.7  34.3   RDW  13.6  13.1  13.4   PLT  45*  36*  40*   GRANS  74  74   --    LYMPH  16  15   --    MONOS  8  9   --    EOS  2  2   --    BASOS  0  0   --    IG  0.5  0.3   --    DF  AUTOMATED  AUTOMATED   --    ANEU  6.3  7.0   --    ABL  1.4  1.4   --    ABM  0.6  0.8   --    TORRIE  0.2  0.2   --    ABB  0.0  0.0   --    AIG  0.0  0.0   --         Recent Labs      09/22/17   0643  09/21/17   0638  09/20/17   0701  09/19/17   1817   NA  140   --   147*  147*   K  4.0  3.2*  2.8*   --    CL  109*   --   113*   --    CO2  23   --   25   --    AGAP  8   --   9   --    GLU  101*   --   116*   --    BUN  13   --   18   --    CREA  0.37*   --   0.43*   --    GFRAA  >60   --   >60   --    GFRNA  >60   --   >60   --    CA  7.5*   --   7.6*   --    SGOT   --   29   --    --    AP   --   66   --    --    TP   --   5.3*   --    --    ALB   --   2.4*   --    --    GLOB   --   2.9   --    --    AGRAT   --   0.8*   --    --          Imaging:      ASSESSMENT:    Problem List  Date Reviewed: 9/12/2017          Codes Class Noted    ICH (intracerebral hemorrhage) ([facility] ICD-10-CM: I61.9  ICD-9-CM: 739  9/15/2017        Hypertensive urgency, malignant ICD-10-CM: I16.0  ICD-9-CM: 401.0  9/11/2017        Stroke, hemorrhagic ([facility] ICD-10-CM: I61.9  ICD-9-CM: 403  9/7/2017        Accelerated hypertension ICD-10-CM: I10  ICD-9-CM: 401.0  9/7/2017        At risk for aspiration (Chronic) ICD-10-CM: Z91.89  ICD-9-CM: V49.89  9/7/2017        Acute spontaneous intraventricular hemorrhage assoc w/ hypertension (HCC) ICD-10-CM: I61.5, I10  ICD-9-CM: 295, 401.9  9/7/2017        Screening for breast cancer ICD-10-CM: Z12.39  ICD-9-CM: V76.10  2/27/2017        Atrophic vaginitis ICD-10-CM: N95.2  ICD-9-CM: 627.3  7/11/2016        HTN (hypertension) ICD-10-CM: I10  ICD-9-CM: 401.9  10/23/2015        ADD (attention deficit disorder) ICD-10-CM: F98.8  ICD-9-CM: 314.00  10/23/2015        Depression ICD-10-CM: F32.9  ICD-9-CM: 755  10/23/2015        Anxiety ICD-10-CM: F41.9  ICD-9-CM: 300.00  10/23/2015                PLAN:    HIT  9/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 MARK ordered. We will hold on any AC until MARK results. 9/22 Spoke with lab-MARK results may be resulted as late at Monday. We will follow along loosely for lab results. DIC  9/21 Labs confirm DIC: D dimer 32.79 fibrinogen 136. HIT positive. Recommend avoiding heparin. Recommend transfusing platelets for <56,019 and Cryoprecipitate for fibrinogen < 100. Treating the underlying cause is paramount. 9/22 Platelets up to 61,174 from 36,000 yesterday. Continue to monitor daily. Shane Curry NP   [facility]  [address] [place] [address]  Office : [phone]  Fax : [phone]             Attending Addendum:  I personally evaluated the patient with Shane Curry N.P.,  and agree with the assessment, findings and plan as documented. Appears stable, follow-up Serotonin Release Assay, she may require an IVC filter.               Kalie Weiss MD  [address] [place] [address]  Office : [phone]  Fax : [phone]

## 2017-09-22 NOTE — PROGRESS NOTES
PT WEEKLY PROGRESS NOTE   Time In: 1106   Time Out: 1202    Subjective: \"I cannot feel my leg but I can see it move sometimes\"         Objective: Vital Signs:    Patient Vitals for the past 12 hrs:   Temp Pulse Resp BP SpO2   09/22/17 0750 98.4 °F (36.9 °C) 86 14 126/74 97 %       Pain level:  Patient had no complaints of pain in therapy today. Patient education:  Safety with mobility since she can be impulsive at times. Interdisciplinary Communication:  Coordinated with OT regarding patient schedule today. Other (comment) (falls)    Outcome Measures:     AROM: LLE primarily flaccid with some ability to initiate hip flex/ext, abd/add, and LLE knee extension with facilitation. FIM SCORES Initial Assessment Weekly Progress Assessment 9/22/2017   Bed/Chair/Wheelchair Transfers 2 2   Wheelchair Mobility  (Unable to assess due to appropriate size w/c not available) 2   Walking [place]  (NT) 1   Steps/Stairs  (NT) (NT)   Please see IRC Interdisciplinary Eval: Coordination/Balance Section for details regarding FIM score description. BED/CHAIR/WHEELCHAIR TRANSFERS Initial Assessment Weekly Progress Assessment 9/22/2017   Rolling Right 3 (Moderate assistance ) 3 (Moderate assistance )   Rolling Left 3 (Moderate assistance ) 2 (Maximal assistance)   Supine to Sit 3 (Moderate assistance) 2 (Maximal assistance)   Sit to Stand Moderate assistance Moderate assistance   Sit to Supine 3 (Moderate assistance) 2 (Maximal assistance)   Transfer Type SPT without device (to the left  recliner to w/c and w/c to bed) SPT without device. Patient is impulsive and quick to move   Comments Patient demonstrating right neglect with motor planning deficits during bed mobility and transfers. Blocking right knee to keep knee from buckling  Patient needs verbal and manual cues for bed mobility to protect her RUE. Right neglect still present.     Car Transfer Not tested Not tested   Car Type         GROSS ASSESSMENT Weekly Progress Assessment 9/22/2017   AROM Grossly decreased, non-functional   Strength Grossly decreased, non-functional   Coordination Grossly decreased, non-functional   Tone Abnormal   Sensation Impaired   PROM Within functional limits     POSTURE Weekly Progress Assessment 9/22/2017   Posture (WDL) Within defined limits   Posture Assessment       WHEELCHAIR MOBILITY/MANAGEMENT Initial Assessment Weekly Progress Assessment 9/22/2017   Able to Propel 0 feet 75 feet   Curbs/ramps assistance required 0 (Not tested) 0 (Not tested)   Wheelchair set up assistance required 2 (Maximal assistance)  2 (maximal assistance)   Wheelchair management Manages left brake Manages left brake     WALKING INDEPENDENCE Initial Assessment Weekly Progress Assessment 9/22/2017   Assistive device Gait belt, Walker cindy, Brace/Splint, Other (comment) (used ace wrap to knee and ankle for DF. and sling for right UE) Gait belt; Other (comment) (gaviria rails used for LLE support)   Ambulation assistance - level surface 0 (Not tested)     Distance 0 Feet (ft) 10 Feet (ft) (10 steps forward/ back with LLE working on RLE weight bearing and control/ support)   Comments Unable to ambulate at this time due to extent of right hemiparesis and balance deficits Patient can maintain mid-line in stance with LUE support but need continued RLE support during weight bearing and attempts at hip flexion.        GAIT Weekly Progress Assessment 9/22/2017   Gait Description (WDL)     Gait Abnormalities       STEPS/STAIRS Initial Assessment Weekly Progress Assessment 9/22/2017   Steps/Stairs ambulated 0 0   Rail Use       Comments Unable to ambulate up/down steps at this time due to extent of right hemiparesis and balance deficits  Patient not ready for up/down steps secondary to right hemiparesis and decreased balance/control   Curbs/Ramps 0 (Not tested)  0 (NT)          Assessment: Patient's affect is slightly flat but she does communicate basic needs and appears to be interacting more from what her daughter stated today in therapy. Safety remains a concern secondary to her right neglect and impulsivity. She is beginning to initiate proximal hip movements and her RLE quad can be facilitated for knee extension. Patient propelled her w/c back to her room with supervision and occasional assist secondary to right side neglect. She elected to stay in her w/c with her daughter at the bedside and the call light within reach. Plan of Care: Please see Care Plan for updated LTGs. Family Training:   To be scheduled    Marion Machado, PT  9/22/2017

## 2017-09-22 NOTE — PROGRESS NOTES
09/22/17 1239   Time Spent With Patient   Time In 1031   Time Out [address] (FIM Score) Dep   Functional Transfers   Toilet Transfer  Grab bars   Amount of Assistance Required Max A     OT Daily Note    Time In 1031   Time Out 1115     Subjective:\"I need to use the restroom\" Agreeable to therapy. Pain:Denied during session. Education: On neuro recovery. Interdisciplinary Communication: Collaborated with PTChandler July and patient is making progress towards goals. Precautions:  (falls)    Balance/functional mobility Daily Assessment   Toileting and toilet transfer completed listed above. Patient incontinent for bowel, but had some success on toilet as well. Assist x2 for hygiene and transfer back to w/c. PROM and joint mobilization Daily Assessment   Right scapular and shoulder flexion, abduction, and ER. E-Stim Daily Assessment   Used for digits extensor and flexors in reciprocating pattern on 8 second intervals. Completed 3 times for 2 minutes each. No flexor synergy pattern seen with stimuli this session. Ended session:In gym with daughter, PTChandler July to work with next.      Alexander Perkins OTR/L

## 2017-09-22 NOTE — PROGRESS NOTES
OT Daily Note    Time In 1346   Time Out 1430     Subjective:\"Why am I so much more tired than everyone else\" Agreeable to therapy. Pain:Denied during session. Education:On sponge squeezes and sensory stimulation. Interdisciplinary Communication: Collaborated with PT, Dean King and patient is making progress towards goals. Precautions:  (falls)    Neuro Daily Assessment   Motomed completed with right UE in orthosis for 10 minutes. Table slides completed with RUE 2 sets of 4, required writer assist for all motion. Joint and ROM completed in shoulder and scapula. Tendon glides completed in digits. Review sponge squeezes and exercises to complete in room. Ended session: In w/c with RTKeegan.      Cirilo Eye OTR/L

## 2017-09-22 NOTE — PROGRESS NOTES
James removed without difficulty. Pt comfortable in bed resp even and unlabored.   Call light at hand

## 2017-09-22 NOTE — PROGRESS NOTES
Subjective \"I am so tired but I want to work with you. \"   Activity [employer] game   Strength/Endurance Patient was fatigued during the session. She was eager to lay on the bed at the end of the session. Balance CGA with SPT from w/c to bed   Social Interaction Patient was friendly. She smiled, provided eye contact, and asked questions to this therapist about how I was doing. Cognitive A&O X4   Comments Patient was assisted to her room and into the bed. She was left with all needs within reach.       Mariah Conley, CTRS

## 2017-09-22 NOTE — PROGRESS NOTES
09/22/17 1307   Time Spent With Patient   Time In 1230   Time Out 1306   Patient Seen For: PM ;Dysphagia treatment   Mental Status   Neurologic State Alert   Orientation Level Oriented X4   Cognition Impaired decision making; Follows commands;Memory loss; Appropriate decision making   Perseveration No perseveration noted   Safety/Judgement Fall prevention   Pt seen with lunch of mechanical soft textures with no mixed with nectar thick liquids. Pt did not like her lunch. Provided with yogurt, pears and a chicken salad sandwich. No overt signs/sx of aspiration. Noted right side pocketing. Educated on safe swallow compensatory strategy of tongue/finger sweep, going slow and alternating bites/drinks. Reviewed menus with pt and hung on door for dietary.       Traci Gaffney MA/ANASTASIA/SLP

## 2017-09-22 NOTE — PROGRESS NOTES
Ayesha Johns MD,   Medical Director  [address] [facility], [address] [facility]  Tel: [phone]       D PROGRESS NOTE    Trude Minor  Admit Date: 9/15/2017  Admit Diagnosis: stroke, left brain involvement;ICH (intracerebral hemorrhag*    Subjective     feeling well. Denies headache , chest pain, sob, pain. Smiling and trying to converse in sentences    Objective:     Current Facility-Administered Medications   Medication Dose Route Frequency    potassium chloride (K-DUR, KLOR-CON) SR tablet 40 mEq  40 mEq Oral BID    tiZANidine (ZANAFLEX) tablet 2 mg  2 mg Oral QHS    dextrose 5 % - 0.45% NaCl infusion  75 mL/hr IntraVENous CONTINUOUS    hydrALAZINE (APRESOLINE) tablet 25 mg  25 mg Oral TID    acetaminophen (TYLENOL) tablet 500 mg  500 mg Per NG tube Q4H PRN    alum-mag hydroxide-simeth (MYLANTA) oral suspension 30 mL  30 mL Oral Q4H PRN    amLODIPine (NORVASC) tablet 10 mg  10 mg Oral DAILY    bisacodyl (DULCOLAX) suppository 10 mg  10 mg Rectal DAILY PRN    hydrALAZINE (APRESOLINE) tablet 50 mg  50 mg Oral QID PRN    labetalol (NORMODYNE) tablet 200 mg  200 mg Oral Q8H    lip protectant (BLISTEX) ointment   Topical PRN    losartan/hydroCHLOROthiazide (HYZAAR) 100/12.5 mg   Oral DAILY    ondansetron (ZOFRAN ODT) tablet 4 mg  4 mg Oral Q6H PRN    sodium phosphate (FLEET'S) enema 118 mL  1 Enema Rectal PRN    traMADol (ULTRAM) tablet 50 mg  50 mg Oral Q6H PRN    traZODone (DESYREL) tablet 50 mg  50 mg Oral QHS PRN    tamsulosin (FLOMAX) capsule 0.4 mg  0.4 mg Oral QHS     Review of Systems:Denies chest pain, shortness of breath, cough, headache, visual problems, abdominal pain, dysurea, calf pain. Pertinent positives are as noted in the medical records and unremarkable otherwise.    James removed, voiding continently without residuals    Visit Vitals    /74    Pulse 86    Temp 98.4 °F (36.9 °C)    Resp 14    SpO2 97% Physical Exam:   General: Alert and age appropriately oriented. No acute cardio respiratory distress. HEENT: Normocephalic,no scleral icterus  Oral mucosa moist without cyanosis, right facial weakness   Lungs: Clear to auscultation  bilaterally. Respiration even and unlabored   Heart: Regular rate and rhythm, S1, S2   No  murmurs, clicks, rub or gallops   Abdomen: Soft, non-tender, nondistended. Bowel sounds present. No organomegaly. Genitourinary: defer   Neuromuscular:      Subluxation right shoulder  RUE without active movement, roseanna 1+  RLE quad 1, otherwise only adduction noted  Sitting balance/core strength surprisingly good  Dysarthric, one to two word answers are intelligible otherwise, words get slurred   Skin/extremity: No rashes, no erythema.  mild calf tenderness BLE; no edema                                                                              Functional Assessment:          Balance  Sitting - Static: Fair (occasional) (09/16/17 1300)  Sitting - Dynamic: Poor (constant support) (09/16/17 1300)  Standing - Static: Poor;Constant support (with LUE support on rail) (09/16/17 1300)  Standing - Dynamic : Impaired (09/16/17 1300)                     James Aguilar Fall Risk Assessment:  James Aguilar Fall Risk  Mobility: Ambulates or transfers with assist devices or assistance/unsteady gait (09/22/17 0803)  Mobility Interventions: Patient to call before getting OOB (09/22/17 0803)  Mentation: Alert, oriented x 3 (09/22/17 0803)  Mentation Interventions: Door open when patient unattended (09/22/17 0803)  Medication: Patient receiving anticonvulsants, sedatives(tranquilizers), psychotropics or hypnotics, hypoglycemics, narcotics, sleep aids, antihypertensives, laxatives, or diuretics (09/22/17 0803)  Medication Interventions: Patient to call before getting OOB (09/22/17 2218)  Elimination: Needs assistance with toileting (09/22/17 1962)  Elimination Interventions: Call light in reach (09/22/17 6711)  Prior Fall History: Unknown (09/22/17 0803)  Total Score: 3 (09/22/17 0803)  Standard Fall Precautions: Yes (09/22/17 0803)  High Fall Risk: Yes (09/21/17 2003)     Speech Assessment:         Ambulation:  Gait  Distance (ft): 20 Feet (ft) (09/21/17 1535)  Assistive Device: Gait belt;Walker cindy;Other (comment) (used 3 ace wraps to knee ankle and to assist DF) (09/21/17 1535)     Labs/Studies:  Recent Results (from the past 72 hour(s))   SODIUM    Collection Time: 09/19/17 12:46 PM   Result Value Ref Range    Sodium 148 (H) 136 - 145 mmol/L   SODIUM    Collection Time: 09/19/17  6:17 PM   Result Value Ref Range    Sodium 147 (H) 136 - 145 mmol/L   CBC W/O DIFF    Collection Time: 09/20/17  7:01 AM   Result Value Ref Range    WBC 9.8 4.3 - 11.1 K/uL    RBC 3.60 (L) 4.05 - 5.25 M/uL    HGB 10.2 (L) 11.7 - 15.4 g/dL    HCT 29.7 (L) 35.8 - 46.3 %    MCV 82.5 79.6 - 97.8 FL    MCH 28.3 26.1 - 32.9 PG    MCHC 34.3 31.4 - 35.0 g/dL    RDW 13.4 11.9 - 14.6 %    PLATELET 40 (L) 053 - 450 K/uL    MPV 10.6 (L) 10.8 - 07.0 FL   METABOLIC PANEL, BASIC    Collection Time: 09/20/17  7:01 AM   Result Value Ref Range    Sodium 147 (H) 136 - 145 mmol/L    Potassium 2.8 (LL) 3.5 - 5.1 mmol/L    Chloride 113 (H) 98 - 107 mmol/L    CO2 25 21 - 32 mmol/L    Anion gap 9 7 - 16 mmol/L    Glucose 116 (H) 65 - 100 mg/dL    BUN 18 8 - 23 MG/DL    Creatinine 0.43 (L) 0.6 - 1.0 MG/DL    GFR est AA >60 >60 ml/min/1.73m2    GFR est non-AA >60 >60 ml/min/1.73m2    Calcium 7.6 (L) 8.3 - 10.4 MG/DL   HIT PROFILE    Collection Time: 09/20/17  4:50 PM   Result Value Ref Range    HIT PROFILE PERFORMED BY [facility]      HEPARIN INDUCED PLT AB.  POSITIVE      OPTICAL DENSITY READ 2.998 (H) <0.400 ABS    HIT INTERPRETATION POSITIVE     FIBRINOGEN    Collection Time: 09/20/17  4:50 PM   Result Value Ref Range    Fibrinogen 136 (L) 172 - 437 mg/dL   D DIMER    Collection Time: 09/20/17  4:50 PM   Result Value Ref Range    D DIMER 32.79 (HH) <0.55 ug/ml(FEU)   PTT    Collection Time: 09/20/17  4:50 PM   Result Value Ref Range    aPTT 28.4 23.5 - 31.7 SEC   PROTHROMBIN TIME + INR    Collection Time: 09/20/17  4:50 PM   Result Value Ref Range    Prothrombin time 10.9 9.6 - 12.0 sec    INR 1.0 0.9 - 1.2     POTASSIUM    Collection Time: 09/21/17  6:38 AM   Result Value Ref Range    Potassium 3.2 (L) 3.5 - 5.1 mmol/L   HEPATIC FUNCTION PANEL    Collection Time: 09/21/17  6:38 AM   Result Value Ref Range    Protein, total 5.3 (L) 6.3 - 8.2 g/dL    Albumin 2.4 (L) 3.2 - 4.6 g/dL    Globulin 2.9 2.3 - 3.5 g/dL    A-G Ratio 0.8 (L) 1.2 - 3.5      Bilirubin, total 0.8 0.2 - 1.1 MG/DL    Bilirubin, direct 0.1 <0.4 MG/DL    Alk. phosphatase 66 50 - 136 U/L    AST (SGOT) 29 15 - 37 U/L    ALT (SGPT) 63 12 - 65 U/L   CBC WITH AUTOMATED DIFF    Collection Time: 09/21/17  6:39 AM   Result Value Ref Range    WBC 9.4 4.3 - 11.1 K/uL    RBC 3.54 (L) 4.05 - 5.25 M/uL    HGB 10.2 (L) 11.7 - 15.4 g/dL    HCT 29.4 (L) 35.8 - 46.3 %    MCV 83.1 79.6 - 97.8 FL    MCH 28.8 26.1 - 32.9 PG    MCHC 34.7 31.4 - 35.0 g/dL    RDW 13.1 11.9 - 14.6 %    PLATELET 36 (L) 546 - 450 K/uL    MPV 10.5 (L) 10.8 - 14.1 FL    DF AUTOMATED      NEUTROPHILS 74 43 - 78 %    LYMPHOCYTES 15 13 - 44 %    MONOCYTES 9 4.0 - 12.0 %    EOSINOPHILS 2 0.5 - 7.8 %    BASOPHILS 0 0.0 - 2.0 %    IMMATURE GRANULOCYTES 0.3 0.0 - 5.0 %    ABS. NEUTROPHILS 7.0 1.7 - 8.2 K/UL    ABS. LYMPHOCYTES 1.4 0.5 - 4.6 K/UL    ABS. MONOCYTES 0.8 0.1 - 1.3 K/UL    ABS. EOSINOPHILS 0.2 0.0 - 0.8 K/UL    ABS. BASOPHILS 0.0 0.0 - 0.2 K/UL    ABS. IMM.  GRANS. 0.0 0.0 - 0.5 K/UL   AMYLASE    Collection Time: 09/21/17  4:30 PM   Result Value Ref Range    Amylase 96 25 - 115 U/L   LIPASE    Collection Time: 09/21/17  4:30 PM   Result Value Ref Range    Lipase 376 73 - 393 U/L   CBC WITH AUTOMATED DIFF    Collection Time: 09/22/17  6:43 AM   Result Value Ref Range    WBC 8.5 4.3 - 11.1 K/uL    RBC 3.48 (L) 4.05 - 5.25 M/uL    HGB 9.8 (L) 11.7 - 15.4 g/dL    HCT 28.3 (L) 35.8 - 46.3 %    MCV 81.3 79.6 - 97.8 FL    MCH 28.2 26.1 - 32.9 PG    MCHC 34.6 31.4 - 35.0 g/dL    RDW 13.6 11.9 - 14.6 %    PLATELET 45 (L) 177 - 450 K/uL    MPV 10.4 (L) 10.8 - 14.1 FL    DF AUTOMATED      NEUTROPHILS 74 43 - 78 %    LYMPHOCYTES 16 13 - 44 %    MONOCYTES 8 4.0 - 12.0 %    EOSINOPHILS 2 0.5 - 7.8 %    BASOPHILS 0 0.0 - 2.0 %    IMMATURE GRANULOCYTES 0.5 0.0 - 5.0 %    ABS. NEUTROPHILS 6.3 1.7 - 8.2 K/UL    ABS. LYMPHOCYTES 1.4 0.5 - 4.6 K/UL    ABS. MONOCYTES 0.6 0.1 - 1.3 K/UL    ABS. EOSINOPHILS 0.2 0.0 - 0.8 K/UL    ABS. BASOPHILS 0.0 0.0 - 0.2 K/UL    ABS. IMM.  GRANS. 0.0 0.0 - 0.5 K/UL   METABOLIC PANEL, BASIC    Collection Time: 09/22/17  6:43 AM   Result Value Ref Range    Sodium 140 136 - 145 mmol/L    Potassium 4.0 3.5 - 5.1 mmol/L    Chloride 109 (H) 98 - 107 mmol/L    CO2 23 21 - 32 mmol/L    Anion gap 8 7 - 16 mmol/L    Glucose 101 (H) 65 - 100 mg/dL    BUN 13 8 - 23 MG/DL    Creatinine 0.37 (L) 0.6 - 1.0 MG/DL    GFR est AA >60 >60 ml/min/1.73m2    GFR est non-AA >60 >60 ml/min/1.73m2    Calcium 7.5 (L) 8.3 - 10.4 MG/DL   FIBRINOGEN    Collection Time: 09/22/17  6:43 AM   Result Value Ref Range    Fibrinogen 153 (L) 172 - 437 mg/dL   PROTHROMBIN TIME + INR    Collection Time: 09/22/17  6:43 AM   Result Value Ref Range    Prothrombin time 11.2 9.6 - 12.0 sec    INR 1.0 0.9 - 1.2         Assessment:     Problem List as of 9/22/2017  Date Reviewed: 9/12/2017          Codes Class Noted - Resolved    ICH (intracerebral hemorrhage) ([facility] ICD-10-CM: I61.9  ICD-9-CM: 433  9/15/2017 - Present        Hypertensive urgency, malignant ICD-10-CM: I16.0  ICD-9-CM: 401.0  9/11/2017 - Present        Stroke, hemorrhagic ([facility] ICD-10-CM: I61.9  ICD-9-CM: 084  9/7/2017 - Present        Accelerated hypertension ICD-10-CM: I10  ICD-9-CM: 401.0  9/7/2017 - Present        At risk for aspiration (Chronic) ICD-10-CM: A61.58  ICD-9-CM: V49.89  9/7/2017 - Present        Acute spontaneous intraventricular hemorrhage assoc w/ hypertension (HCC) ICD-10-CM: I61.5, I10  ICD-9-CM: 908, 401.9  9/7/2017 - Present        Screening for breast cancer ICD-10-CM: Z12.39  ICD-9-CM: V76.10  2/27/2017 - Present        Atrophic vaginitis ICD-10-CM: N95.2  ICD-9-CM: 627.3  7/11/2016 - Present        HTN (hypertension) ICD-10-CM: I10  ICD-9-CM: 401.9  10/23/2015 - Present        ADD (attention deficit disorder) ICD-10-CM: F98.8  ICD-9-CM: 314.00  10/23/2015 - Present        Depression ICD-10-CM: F32.9  ICD-9-CM: 021  10/23/2015 - Present        Anxiety ICD-10-CM: F41.9  ICD-9-CM: 300.00  10/23/2015 - Present        RESOLVED: Hypoxemia ICD-10-CM: R09.02  ICD-9-CM: 799.02  9/10/2017 - 9/12/2017        RESOLVED: Acute respiratory failure ([facility] ICD-10-CM: J96.00  ICD-9-CM: 518.81  9/8/2017 - 9/10/2017        RESOLVED: Hemorrhagic stroke ([facility] ICD-10-CM: I61.9  ICD-9-CM: 949  9/7/2017 - 9/7/2017        RESOLVED: Non-intractable vomiting with nausea ICD-10-CM: R11.2  ICD-9-CM: 787.01  9/7/2017 - 9/12/2017        RESOLVED: Seborrheic keratosis, inflamed ICD-10-CM: L82.0  ICD-9-CM: 702.11  7/11/2016 - 9/20/2016        RESOLVED: Cough ICD-10-CM: R05  ICD-9-CM: 786.2  7/11/2016 - 9/20/2016        RESOLVED: Shoulder pain ICD-10-CM: M25.519  ICD-9-CM: 719.41  10/23/2015 - 2/27/2017               Left BG Hemorrhage with intraventricular extension due to HTN emergency; resultant R hemiplegia, right neglect, dysarthria, aphasia, dysphagia with significant decline in mobility and self care  Plan:   Continue daily physician medical management:  Pneumonia prophylaxis- Incentive spirometer every hour while awake. Will need instruction and assistance. Not sure if she can get a proper oral seal to be effective      Dysphagia; Pureed diet with NTL; at risk for malnutrition and dehydration. PICC line removed prior to transfer; may need a line for IVFs if BUN continues to increase. Low K, Ca; check mg. Supplementation as needed. Na elevated  -mag ok, K much improved; cont to supplement while on diuretic  -9/19 replace K; 3.0; 2.8 this am 9/20; inc supplement; may need to add to IVFs      Thrombocytopenia; has been trending down for no apparent reason. hgb slt down as well. Has not been on Hep products; consult hematology today 9/19  -plts 40K, continues to trend down 9/20; await consult by hematology. ? reln to [address]. Mild anemia as well  Check HIT panel, d dimer , fibrinogen  -9/21 plts 36k ; HIT PROFILE POSITIVE, D DIMER ELEVATED 32.79, FIBRINOGEN  ; consistent with DIC; Etiology unclear  WILL D/W HEMATOLOGY; LFTs ok, no hx of blood transfusion,no hx pancreatitis, no sign of bacteremia. Has not been on any heparin products. Can see in head injury; has ICH. Cannot r/o blood disorder (leukemia)   per hematology \"Recommend transfusing platelets for <74,968 and Cryoprecipitate for fibrinogen < 100. \"  -MARK pending  -9/22 bilateral LE venous duplex ordered; low suspicion but at risk and having calf tenderness  -9/22 plts improved today; monitor closely     Hypernatremia; last head CT did not show significant cerebral edema. Clinically improving. Likely due to prerenal azotemia; 9/15 MAY REQ isotonic / hypotonic saline IV;  -9/18 events of weekend noted; Na 156; asymptomatic, on 0.5 dextrose with 1/4 Na; na q 6hrs; pts serum osmolality was normal. Will check urine Na and urine osmolality; depending on findings, will consider consulting Hospitalist vs Nephrology  Neuro improving despite this  -9/19 Na down to 149, urine osmo nl, serum osmo min hi; cont dex/and 1/4 NS; repeat in am. Not DI as uop not increased  -9/20 Na 147; cont fluids; 9/21 not resulted; will stop fluids when Na below 142  -Na 140 9/22; dc IVFs    ICH/IVH; 9/22 clinically improves daily. F/u HCT yest due to transient left eye visual disturbance. Overall, resolving hemorrhage. There is more pronounced edema; expected.  Ventricular size now nl      DVT risk / DVT Prophylaxis- Will require daily physician exam to assess for signs and symptoms as patient is at increased risk for of thromboembolism. Mobilization as tolerated. Intermittent pneumatic compression devices when in bed Thigh-high or knee-high thromboembolic deterrent hose when out of bed. NO  anticoag due to [address]      Pain Control: stable, mild-to-moderate joint symptoms intermittently, reasonably well controlled by PRN meds. Will require regular pain assessment and comprenhensive pain management,       Wound Care: Monitor wound status daily per staff and physician. At risk for failure. Will require 24/7 rehab nursing. None needed at this time      Hypertension - BP uncontrolled, fluctuating, managed medically. Add prn hydralazine for sbp> 180. Goal SBP is 140-160 given ICH. Cont Nomodyne, norvasc and hyzaar; consider Verapamil or scheduled Hydralazine  -9/18 BP increased 169/95; add hydralazine 25 tid  -9/20 BP much improved  -9/22 /74      Mild hyperglycemia, likely stress induced vs borderline diabetes; monitor loosely      Leukocytosis; recheck in a.m. Check UA due to stafford. No pulmonary signs (had neg CXR today), no s/s of infxn at old PICC site, no wounds, afebrile. Watch monocytes. -9/18 12.2 improving. Urine neg  -9/19 up to 13.2, ? Reactive. No source of infxn identified. Afebrile  -9/20 WBC now normal      Urinary retention/ neurogenic bladder - start flomax but continue stafford until mobility improves a bit. Strict I/o's  -9/20 keeping stafford to monitor UOP until Na normalizes. No significant output to suggest DI  -9/21 dc stafford; cont Flomax, follow bladder scan  9/22 voiding without issue      bowel program - add prn meds; incontinent, want to keep stool on the firmer side. Monitor skin.       GERD - add a PPI. At times may need additional antacids, Maalox prn.      -continues to benefit from and is showing improvements in a multidisc team approach.  Participating well.         Addendum; in therapy this afternoon, transient left eye blindness. Will f/u head CT for extension of or new ICH, especially with low plts   Results  1. Interval decrease in size of intraventricular hemorrhage within the left  basal ganglia/thalamus with near complete resolution of intraventricular  hemorrhage. Surrounding edema is more pronounced with slight increase in  left-to-right midline shift. 2. Interval decrease in ventricular size, approaching normal in caliber.           Time spent was 35 minutes with over 1/2 in direct patient care/examination, consultation and coordination of care.      Signed By: Endeina Castaneda MD     September 22, 2017

## 2017-09-23 LAB
ANION GAP SERPL CALC-SCNC: 9 MMOL/L (ref 7–16)
BASOPHILS # BLD: 0 K/UL (ref 0–0.2)
BASOPHILS NFR BLD: 0 % (ref 0–2)
BUN SERPL-MCNC: 16 MG/DL (ref 8–23)
CALCIUM SERPL-MCNC: 8 MG/DL (ref 8.3–10.4)
CHLORIDE SERPL-SCNC: 105 MMOL/L (ref 98–107)
CO2 SERPL-SCNC: 24 MMOL/L (ref 21–32)
CREAT SERPL-MCNC: 0.54 MG/DL (ref 0.6–1)
DIFFERENTIAL METHOD BLD: ABNORMAL
EOSINOPHIL # BLD: 0.1 K/UL (ref 0–0.8)
EOSINOPHIL NFR BLD: 2 % (ref 0.5–7.8)
ERYTHROCYTE [DISTWIDTH] IN BLOOD BY AUTOMATED COUNT: 13.7 % (ref 11.9–14.6)
GLUCOSE SERPL-MCNC: 94 MG/DL (ref 65–100)
HCT VFR BLD AUTO: 28.7 % (ref 35.8–46.3)
HGB BLD-MCNC: 10 G/DL (ref 11.7–15.4)
IMM GRANULOCYTES # BLD: 0 K/UL (ref 0–0.5)
IMM GRANULOCYTES NFR BLD: 0.5 % (ref 0–5)
LYMPHOCYTES # BLD: 1.6 K/UL (ref 0.5–4.6)
LYMPHOCYTES NFR BLD: 21 % (ref 13–44)
MCH RBC QN AUTO: 28.3 PG (ref 26.1–32.9)
MCHC RBC AUTO-ENTMCNC: 34.8 G/DL (ref 31.4–35)
MCV RBC AUTO: 81.3 FL (ref 79.6–97.8)
MONOCYTES # BLD: 0.6 K/UL (ref 0.1–1.3)
MONOCYTES NFR BLD: 8 % (ref 4–12)
NEUTS SEG # BLD: 5 K/UL (ref 1.7–8.2)
NEUTS SEG NFR BLD: 69 % (ref 43–78)
PLATELET # BLD AUTO: 53 K/UL (ref 150–450)
PMV BLD AUTO: 10.6 FL (ref 10.8–14.1)
POTASSIUM SERPL-SCNC: 4.2 MMOL/L (ref 3.5–5.1)
RBC # BLD AUTO: 3.53 M/UL (ref 4.05–5.25)
SODIUM SERPL-SCNC: 138 MMOL/L (ref 136–145)
WBC # BLD AUTO: 7.3 K/UL (ref 4.3–11.1)

## 2017-09-23 PROCEDURE — 82542 COL CHROMOTOGRAPHY QUAL/QUAN: CPT | Performed by: NURSE PRACTITIONER

## 2017-09-23 PROCEDURE — 65310000000 HC RM PRIVATE REHAB

## 2017-09-23 PROCEDURE — 85025 COMPLETE CBC W/AUTO DIFF WBC: CPT | Performed by: PHYSICAL MEDICINE & REHABILITATION

## 2017-09-23 PROCEDURE — 36415 COLL VENOUS BLD VENIPUNCTURE: CPT | Performed by: PHYSICAL MEDICINE & REHABILITATION

## 2017-09-23 PROCEDURE — 74011250637 HC RX REV CODE- 250/637: Performed by: PHYSICAL MEDICINE & REHABILITATION

## 2017-09-23 PROCEDURE — 80048 BASIC METABOLIC PNL TOTAL CA: CPT | Performed by: PHYSICAL MEDICINE & REHABILITATION

## 2017-09-23 PROCEDURE — 99232 SBSQ HOSP IP/OBS MODERATE 35: CPT | Performed by: PHYSICAL MEDICINE & REHABILITATION

## 2017-09-23 PROCEDURE — 77030032490 HC SLV COMPR SCD KNE COVD -B

## 2017-09-23 RX ADMIN — HYDRALAZINE HYDROCHLORIDE 25 MG: 25 TABLET, FILM COATED ORAL at 22:33

## 2017-09-23 RX ADMIN — LABETALOL HYDROCHLORIDE 200 MG: 200 TABLET, FILM COATED ORAL at 17:45

## 2017-09-23 RX ADMIN — LABETALOL HYDROCHLORIDE 200 MG: 200 TABLET, FILM COATED ORAL at 00:30

## 2017-09-23 RX ADMIN — AMLODIPINE BESYLATE 10 MG: 10 TABLET ORAL at 08:36

## 2017-09-23 RX ADMIN — POTASSIUM CHLORIDE 40 MEQ: 20 TABLET, EXTENDED RELEASE ORAL at 17:42

## 2017-09-23 RX ADMIN — TIZANIDINE 2 MG: 2 TABLET ORAL at 22:36

## 2017-09-23 RX ADMIN — HYDRALAZINE HYDROCHLORIDE 25 MG: 25 TABLET, FILM COATED ORAL at 05:32

## 2017-09-23 RX ADMIN — HYDRALAZINE HYDROCHLORIDE 25 MG: 25 TABLET, FILM COATED ORAL at 15:22

## 2017-09-23 RX ADMIN — LABETALOL HYDROCHLORIDE 200 MG: 200 TABLET, FILM COATED ORAL at 08:36

## 2017-09-23 RX ADMIN — TRAZODONE HYDROCHLORIDE 50 MG: 50 TABLET ORAL at 00:36

## 2017-09-23 RX ADMIN — TAMSULOSIN HYDROCHLORIDE 0.4 MG: 0.4 CAPSULE ORAL at 22:36

## 2017-09-23 RX ADMIN — POTASSIUM CHLORIDE 40 MEQ: 20 TABLET, EXTENDED RELEASE ORAL at 08:35

## 2017-09-23 RX ADMIN — HYDROCHLOROTHIAZIDE: 12.5 CAPSULE ORAL at 08:35

## 2017-09-23 NOTE — PROGRESS NOTES
Haley Hyatt MD,   Medical Director  [address] [facility], [address] [facility]  Tel: [phone]       [facility] PROGRESS NOTE    Carie Childers  Admit Date: 9/15/2017  Admit Diagnosis: stroke, left brain involvement;ICH (intracerebral hemorrhag*    Subjective     Doing well. No complaints. Denies ha, cp ,sob, n. Ox4    Objective:     Current Facility-Administered Medications   Medication Dose Route Frequency    potassium chloride (K-DUR, KLOR-CON) SR tablet 40 mEq  40 mEq Oral BID    tiZANidine (ZANAFLEX) tablet 2 mg  2 mg Oral QHS    hydrALAZINE (APRESOLINE) tablet 25 mg  25 mg Oral TID    acetaminophen (TYLENOL) tablet 500 mg  500 mg Per NG tube Q4H PRN    alum-mag hydroxide-simeth (MYLANTA) oral suspension 30 mL  30 mL Oral Q4H PRN    amLODIPine (NORVASC) tablet 10 mg  10 mg Oral DAILY    bisacodyl (DULCOLAX) suppository 10 mg  10 mg Rectal DAILY PRN    hydrALAZINE (APRESOLINE) tablet 50 mg  50 mg Oral QID PRN    labetalol (NORMODYNE) tablet 200 mg  200 mg Oral Q8H    lip protectant (BLISTEX) ointment   Topical PRN    losartan/hydroCHLOROthiazide (HYZAAR) 100/12.5 mg   Oral DAILY    ondansetron (ZOFRAN ODT) tablet 4 mg  4 mg Oral Q6H PRN    sodium phosphate (FLEET'S) enema 118 mL  1 Enema Rectal PRN    traMADol (ULTRAM) tablet 50 mg  50 mg Oral Q6H PRN    traZODone (DESYREL) tablet 50 mg  50 mg Oral QHS PRN    tamsulosin (FLOMAX) capsule 0.4 mg  0.4 mg Oral QHS     Review of Systems:Denies chest pain, shortness of breath, cough, headache, visual problems, abdominal pain, dysurea, calf pain. Pertinent positives are as noted in the medical records and unremarkable otherwise. Visit Vitals    /64 (BP 1 Location: Right arm, BP Patient Position: At rest)    Pulse 84    Temp 98.3 °F (36.8 °C)    Resp 17    SpO2 95%        Physical Exam:   General: Alert and age appropriately oriented. No acute cardio respiratory distress.  Dysarthric with exp aphasia   HEENT: Normocephalic,no scleral icterus  Oral mucosa moist without cyanosis, right facial weakness   Lungs: Clear to auscultation  bilaterally. Respiration even and unlabored   Heart: Regular rate and rhythm, S1, S2   No  murmurs, clicks, rub or gallops   Abdomen: Soft, non-tender, nondistended. Bowel sounds present. No organomegaly. Genitourinary: Benign . Neuromuscular:      RUE flaccid, 2 fb sublux right shoulder  RLE quads 1+ , adduction 3+   Skin/extremity: No rashes, no erythema.  No calf tenderness BLE  Some bruising right calf; soft, neg homans                                                                            Functional Assessment:  Gross Assessment  AROM: Grossly decreased, non-functional (09/22/17 1500)  PROM: Within functional limits (09/22/17 1500)  Strength: Grossly decreased, non-functional (09/22/17 1500)  Coordination: Generally decreased, functional (09/22/17 1500)  Tone: Abnormal (09/22/17 1500)  Sensation: Impaired (09/22/17 1500)       Balance  Sitting - Static: Fair (occasional) (09/22/17 1500)  Sitting - Dynamic: Fair (occasional) (09/22/17 1500)  Standing - Static: Fair;Constant support (09/22/17 1500)  Standing - Dynamic : Impaired (09/22/17 1500)                     Catherene Bunting Fall Risk Assessment:  Catherene Bunting Fall Risk  Mobility: Ambulates or transfers with assist devices or assistance/unsteady gait (09/22/17 2000)  Mobility Interventions: Bed/chair exit alarm (09/22/17 2000)  Mentation: Alert, oriented x 3 (09/22/17 2000)  Mentation Interventions: Door open when patient unattended (09/22/17 2000)  Medication: Patient receiving anticonvulsants, sedatives(tranquilizers), psychotropics or hypnotics, hypoglycemics, narcotics, sleep aids, antihypertensives, laxatives, or diuretics (09/22/17 2000)  Medication Interventions: Patient to call before getting OOB (09/22/17 2000)  Elimination: Needs assistance with toileting (09/22/17 2000)  Elimination Interventions: Call light in reach (09/22/17 2000)  Prior Fall History: Unknown (09/22/17 2000)  Total Score: 3 (09/22/17 2000)  Standard Fall Precautions: Yes (09/22/17 1405)  High Fall Risk: Yes (09/22/17 2000)     Speech Assessment:         Ambulation:  Gait  Base of Support: Narrowed; Center of gravity altered (09/22/17 1200)  Speed/Lulu: Delayed (09/22/17 1200)  Step Length: Right shortened (09/22/17 1200)  Distance (ft): 10 Feet (ft) (10' x 2) (09/22/17 1500)  Assistive Device: Gait belt; Other (comment) (Parallel bars) (09/22/17 1500)     Labs/Studies:  Recent Results (from the past 72 hour(s))   HIT PROFILE    Collection Time: 09/20/17  4:50 PM   Result Value Ref Range    HIT PROFILE PERFORMED BY [facility] INDUCED PLT AB. POSITIVE      OPTICAL DENSITY READ 2.998 (H) <0.400 ABS    HIT INTERPRETATION POSITIVE     FIBRINOGEN    Collection Time: 09/20/17  4:50 PM   Result Value Ref Range    Fibrinogen 136 (L) 172 - 437 mg/dL   D DIMER    Collection Time: 09/20/17  4:50 PM   Result Value Ref Range    D DIMER 32.79 (HH) <0.55 ug/ml(FEU)   PTT    Collection Time: 09/20/17  4:50 PM   Result Value Ref Range    aPTT 28.4 23.5 - 31.7 SEC   PROTHROMBIN TIME + INR    Collection Time: 09/20/17  4:50 PM   Result Value Ref Range    Prothrombin time 10.9 9.6 - 12.0 sec    INR 1.0 0.9 - 1.2     POTASSIUM    Collection Time: 09/21/17  6:38 AM   Result Value Ref Range    Potassium 3.2 (L) 3.5 - 5.1 mmol/L   HEPATIC FUNCTION PANEL    Collection Time: 09/21/17  6:38 AM   Result Value Ref Range    Protein, total 5.3 (L) 6.3 - 8.2 g/dL    Albumin 2.4 (L) 3.2 - 4.6 g/dL    Globulin 2.9 2.3 - 3.5 g/dL    A-G Ratio 0.8 (L) 1.2 - 3.5      Bilirubin, total 0.8 0.2 - 1.1 MG/DL    Bilirubin, direct 0.1 <0.4 MG/DL    Alk.  phosphatase 66 50 - 136 U/L    AST (SGOT) 29 15 - 37 U/L    ALT (SGPT) 63 12 - 65 U/L   CBC WITH AUTOMATED DIFF    Collection Time: 09/21/17  6:39 AM   Result Value Ref Range    WBC 9.4 4.3 - 11.1 K/uL RBC 3.54 (L) 4.05 - 5.25 M/uL    HGB 10.2 (L) 11.7 - 15.4 g/dL    HCT 29.4 (L) 35.8 - 46.3 %    MCV 83.1 79.6 - 97.8 FL    MCH 28.8 26.1 - 32.9 PG    MCHC 34.7 31.4 - 35.0 g/dL    RDW 13.1 11.9 - 14.6 %    PLATELET 36 (L) 869 - 450 K/uL    MPV 10.5 (L) 10.8 - 14.1 FL    DF AUTOMATED      NEUTROPHILS 74 43 - 78 %    LYMPHOCYTES 15 13 - 44 %    MONOCYTES 9 4.0 - 12.0 %    EOSINOPHILS 2 0.5 - 7.8 %    BASOPHILS 0 0.0 - 2.0 %    IMMATURE GRANULOCYTES 0.3 0.0 - 5.0 %    ABS. NEUTROPHILS 7.0 1.7 - 8.2 K/UL    ABS. LYMPHOCYTES 1.4 0.5 - 4.6 K/UL    ABS. MONOCYTES 0.8 0.1 - 1.3 K/UL    ABS. EOSINOPHILS 0.2 0.0 - 0.8 K/UL    ABS. BASOPHILS 0.0 0.0 - 0.2 K/UL    ABS. IMM. GRANS. 0.0 0.0 - 0.5 K/UL   AMYLASE    Collection Time: 09/21/17  4:30 PM   Result Value Ref Range    Amylase 96 25 - 115 U/L   LIPASE    Collection Time: 09/21/17  4:30 PM   Result Value Ref Range    Lipase 376 73 - 393 U/L   CBC WITH AUTOMATED DIFF    Collection Time: 09/22/17  6:43 AM   Result Value Ref Range    WBC 8.5 4.3 - 11.1 K/uL    RBC 3.48 (L) 4.05 - 5.25 M/uL    HGB 9.8 (L) 11.7 - 15.4 g/dL    HCT 28.3 (L) 35.8 - 46.3 %    MCV 81.3 79.6 - 97.8 FL    MCH 28.2 26.1 - 32.9 PG    MCHC 34.6 31.4 - 35.0 g/dL    RDW 13.6 11.9 - 14.6 %    PLATELET 45 (L) 508 - 450 K/uL    MPV 10.4 (L) 10.8 - 14.1 FL    DF AUTOMATED      NEUTROPHILS 74 43 - 78 %    LYMPHOCYTES 16 13 - 44 %    MONOCYTES 8 4.0 - 12.0 %    EOSINOPHILS 2 0.5 - 7.8 %    BASOPHILS 0 0.0 - 2.0 %    IMMATURE GRANULOCYTES 0.5 0.0 - 5.0 %    ABS. NEUTROPHILS 6.3 1.7 - 8.2 K/UL    ABS. LYMPHOCYTES 1.4 0.5 - 4.6 K/UL    ABS. MONOCYTES 0.6 0.1 - 1.3 K/UL    ABS. EOSINOPHILS 0.2 0.0 - 0.8 K/UL    ABS. BASOPHILS 0.0 0.0 - 0.2 K/UL    ABS. IMM.  GRANS. 0.0 0.0 - 0.5 K/UL   METABOLIC PANEL, BASIC    Collection Time: 09/22/17  6:43 AM   Result Value Ref Range    Sodium 140 136 - 145 mmol/L    Potassium 4.0 3.5 - 5.1 mmol/L    Chloride 109 (H) 98 - 107 mmol/L    CO2 23 21 - 32 mmol/L    Anion gap 8 7 - 16 mmol/L    Glucose 101 (H) 65 - 100 mg/dL    BUN 13 8 - 23 MG/DL    Creatinine 0.37 (L) 0.6 - 1.0 MG/DL    GFR est AA >60 >60 ml/min/1.73m2    GFR est non-AA >60 >60 ml/min/1.73m2    Calcium 7.5 (L) 8.3 - 10.4 MG/DL   FIBRINOGEN    Collection Time: 09/22/17  6:43 AM   Result Value Ref Range    Fibrinogen 153 (L) 172 - 437 mg/dL   PROTHROMBIN TIME + INR    Collection Time: 09/22/17  6:43 AM   Result Value Ref Range    Prothrombin time 11.2 9.6 - 12.0 sec    INR 1.0 0.9 - 1.2     METABOLIC PANEL, BASIC    Collection Time: 09/23/17  7:58 AM   Result Value Ref Range    Sodium 138 136 - 145 mmol/L    Potassium 4.2 3.5 - 5.1 mmol/L    Chloride 105 98 - 107 mmol/L    CO2 24 21 - 32 mmol/L    Anion gap 9 7 - 16 mmol/L    Glucose 94 65 - 100 mg/dL    BUN 16 8 - 23 MG/DL    Creatinine 0.54 (L) 0.6 - 1.0 MG/DL    GFR est AA >60 >60 ml/min/1.73m2    GFR est non-AA >60 >60 ml/min/1.73m2    Calcium 8.0 (L) 8.3 - 10.4 MG/DL   CBC WITH AUTOMATED DIFF    Collection Time: 09/23/17  7:58 AM   Result Value Ref Range    WBC 7.3 4.3 - 11.1 K/uL    RBC 3.53 (L) 4.05 - 5.25 M/uL    HGB 10.0 (L) 11.7 - 15.4 g/dL    HCT 28.7 (L) 35.8 - 46.3 %    MCV 81.3 79.6 - 97.8 FL    MCH 28.3 26.1 - 32.9 PG    MCHC 34.8 31.4 - 35.0 g/dL    RDW 13.7 11.9 - 14.6 %    PLATELET 53 (L) 504 - 450 K/uL    MPV 10.6 (L) 10.8 - 14.1 FL    DF AUTOMATED      NEUTROPHILS 69 43 - 78 %    LYMPHOCYTES 21 13 - 44 %    MONOCYTES 8 4.0 - 12.0 %    EOSINOPHILS 2 0.5 - 7.8 %    BASOPHILS 0 0.0 - 2.0 %    IMMATURE GRANULOCYTES 0.5 0.0 - 5.0 %    ABS. NEUTROPHILS 5.0 1.7 - 8.2 K/UL    ABS. LYMPHOCYTES 1.6 0.5 - 4.6 K/UL    ABS. MONOCYTES 0.6 0.1 - 1.3 K/UL    ABS. EOSINOPHILS 0.1 0.0 - 0.8 K/UL    ABS. BASOPHILS 0.0 0.0 - 0.2 K/UL    ABS. IMM.  GRANS. 0.0 0.0 - 0.5 K/UL       Assessment:     Problem List as of 9/23/2017  Date Reviewed: 9/12/2017          Codes Class Noted - Resolved    ICH (intracerebral hemorrhage) ([facility].) ICD-10-CM: I61.9  ICD-9-CM: 618  9/15/2017 - Present Hypertensive urgency, malignant ICD-10-CM: I16.0  ICD-9-CM: 401.0  9/11/2017 - Present        Stroke, hemorrhagic ([facility] ICD-10-CM: I61.9  ICD-9-CM: 624  9/7/2017 - Present        Accelerated hypertension ICD-10-CM: I10  ICD-9-CM: 401.0  9/7/2017 - Present        At risk for aspiration (Chronic) ICD-10-CM: Z91.89  ICD-9-CM: V49.89  9/7/2017 - Present        Acute spontaneous intraventricular hemorrhage assoc w/ hypertension ([facility].) ICD-10-CM: I61.5, I10  ICD-9-CM: 871, 401.9  9/7/2017 - Present        Screening for breast cancer ICD-10-CM: Z12.39  ICD-9-CM: V76.10  2/27/2017 - Present        Atrophic vaginitis ICD-10-CM: N95.2  ICD-9-CM: 627.3  7/11/2016 - Present        HTN (hypertension) ICD-10-CM: I10  ICD-9-CM: 401.9  10/23/2015 - Present        ADD (attention deficit disorder) ICD-10-CM: F98.8  ICD-9-CM: 314.00  10/23/2015 - Present        Depression ICD-10-CM: F32.9  ICD-9-CM: 789  10/23/2015 - Present        Anxiety ICD-10-CM: F41.9  ICD-9-CM: 300.00  10/23/2015 - Present        RESOLVED: Hypoxemia ICD-10-CM: R09.02  ICD-9-CM: 799.02  9/10/2017 - 9/12/2017        RESOLVED: Acute respiratory failure ([facility].) ICD-10-CM: J96.00  ICD-9-CM: 518.81  9/8/2017 - 9/10/2017        RESOLVED: Hemorrhagic stroke ([facility].) ICD-10-CM: I61.9  ICD-9-CM: 173  9/7/2017 - 9/7/2017        RESOLVED: Non-intractable vomiting with nausea ICD-10-CM: R11.2  ICD-9-CM: 787.01  9/7/2017 - 9/12/2017        RESOLVED: Seborrheic keratosis, inflamed ICD-10-CM: L82.0  ICD-9-CM: 702.11  7/11/2016 - 9/20/2016        RESOLVED: Cough ICD-10-CM: R05  ICD-9-CM: 786.2  7/11/2016 - 9/20/2016        RESOLVED: Shoulder pain ICD-10-CM: M25.519  ICD-9-CM: 719.41  10/23/2015 - 2/27/2017            Left BG Hemorrhage with intraventricular extension due to HTN emergency; resultant R hemiplegia, right neglect, dysarthria, aphasia, dysphagia with significant decline in mobility and self care  Plan:   Continue daily physician medical management:  Pneumonia prophylaxis- Incentive spirometer every hour while awake. Will need instruction and assistance. Not sure if she can get a proper oral seal to be effective      Dysphagia; Pureed diet with NTL; at risk for malnutrition and dehydration. PICC line removed prior to transfer; may need a line for IVFs if BUN continues to increase. Low K, Ca; check mg. Supplementation as needed. Na elevated  -mag ok, K much improved; cont to supplement while on diuretic  -9/19 replace K; 3.0; 2.8 this am 9/20; inc supplement; may need to add to IVFs      Thrombocytopenia; has been trending down for no apparent reason. hgb slt down as well. Has not been on Hep products; consult hematology today 9/19  -plts 40K, continues to trend down 9/20; await consult by hematology. ? reln to [address]. Mild anemia as well  Check HIT panel, d dimer , fibrinogen  -9/21 plts 36k ; HIT PROFILE POSITIVE, D DIMER ELEVATED 32.79, FIBRINOGEN  ; consistent with DIC; Etiology unclear  WILL D/W HEMATOLOGY; LFTs ok, no hx of blood transfusion,no hx pancreatitis, no sign of bacteremia. Has not been on any heparin products. Can see in head injury; has ICH. Cannot r/o blood disorder (leukemia)   per hematology \"Recommend transfusing platelets for <61,962 and Cryoprecipitate for fibrinogen < 100. \"  -MARK pending  -9/22 bilateral LE venous duplex ordered; low suspicion but at risk and having calf tenderness  -9/22 plts improved today; monitor closely; 9/23 small right peroneal thrombus. No antic per hematology. IR refused/advised against placing IVF due to low risk of clot propagation given size and location  -9/23 counts bumping up; pts no 53k from 45 and previously 36      Hypernatremia; last head CT did not show significant cerebral edema. Clinically improving. Likely due to prerenal azotemia; 9/15 MAY REQ isotonic / hypotonic saline IV;  -9/18 events of weekend noted;  Na 156; asymptomatic, on 0.5 dextrose with 1/4 Na; na q 6hrs; pts serum osmolality was normal. Will check urine Na and urine osmolality; depending on findings, will consider consulting Hospitalist vs Nephrology  Neuro improving despite this  -9/19 Na down to 149, urine osmo nl, serum osmo min hi; cont dex/and 1/4 NS; repeat in am. Not DI as uop not increased  -9/20 Na 147; cont fluids; 9/21 not resulted; will stop fluids when Na below 142  -Na 140 9/22; dc IVFs     ICH/IVH; 9/22 clinically improves daily. F/u HCT yest due to transient left eye visual disturbance. Overall, resolving hemorrhage. There is more pronounced edema; expected. Ventricular size now nl      DVT risk / DVT Prophylaxis- Will require daily physician exam to assess for signs and symptoms as patient is at increased risk for of thromboembolism. Mobilization as tolerated. Intermittent pneumatic compression devices when in bed Thigh-high or knee-high thromboembolic deterrent hose when out of bed. NO  anticoag due to ICH/HIT      Pain Control: stable, mild-to-moderate joint symptoms intermittently, reasonably well controlled by PRN meds. Will require regular pain assessment and comprenhensive pain management,       Wound Care: Monitor wound status daily per staff and physician. At risk for failure. Will require 24/7 rehab nursing. None needed at this time      Hypertension - BP uncontrolled, fluctuating, managed medically. Add prn hydralazine for sbp> 180. Goal SBP is 140-160 given ICH. Cont Nomodyne, norvasc and hyzaar; consider Verapamil or scheduled Hydralazine  -9/18 BP increased 169/95; add hydralazine 25 tid  -9/20 BP much improved  -9/22 /74      Mild hyperglycemia, likely stress induced vs borderline diabetes; monitor loosely      Leukocytosis; recheck in a.m. Check UA due to stafford. No pulmonary signs (had neg CXR today), no s/s of infxn at old PICC site, no wounds, afebrile. Watch monocytes. -9/18 12.2 improving. Urine neg  -9/19 up to 13.2, ? Reactive. No source of infxn identified.  Afebrile  -9/20 WBC now normal      Urinary retention/ neurogenic bladder - start flomax but continue stafford until mobility improves a bit. Strict I/o's  -9/20 keeping stafford to monitor UOP until Na normalizes. No significant output to suggest DI  -9/21 dc stafford; cont Flomax, follow bladder scan  9/22 voiding without issue; incontinence but no retention      bowel program - add prn meds; incontinent, want to keep stool on the firmer side. Monitor skin.       GERD - add a PPI. At times may need additional antacids, Maalox prn.      -continues to benefit from and is showing improvements in a multidisc team approach. Participating well.  Diane Conklin   9/21   Addendum; in therapy this afternoon, transient left eye blindness. Will f/u head CT for extension of or new ICH, especially with low plts   Results  1. Interval decrease in size of intraventricular hemorrhage within the left  basal ganglia/thalamus with near complete resolution of intraventricular  hemorrhage. Surrounding edema is more pronounced with slight increase in  left-to-right midline shift. 2. Interval decrease in ventricular size, approaching normal in caliber.             Time spent was 25 minutes with over 1/2 in direct patient care/examination, consultation and coordination of care.      Signed By: Kate Fournier MD     September 23, 2017

## 2017-09-23 NOTE — PROGRESS NOTES
Pt not seen on 9/23/2017 secondary to being on hold. Will continue to follow.      Abel Esquivel MS, OTR/L  9/23/2017

## 2017-09-23 NOTE — PROGRESS NOTES
Right leg elevated off bed due to a small right peroneal vein thrombus SCD on left leg. Pt speech still garble, hard to understand at times. Pt with incontinent brief, per care performed, pt position up in bed. Pt had no difficulty in eating this am assisted by [facility] CNA. Respiration even and no labored.  She denies pain, pt a 2 person assist.

## 2017-09-23 NOTE — PROGRESS NOTES
Consult for possible IVC filter placement : reason - right peroneal vein thrombus and HIT. No evidence PE      DVT study 7/22/17 shows small right peroneal vein thrombus. Negative for popliteal vein or SFV thrombus. Platelet count 55T . Creatinine within normal limits. Peroneal vein thrombus alone not an indication for IVC filter . Would recommend continuing SCD on opposite ( left ) leg. Also, would repeat lower extremity venous duplex in about three days , sooner if symptoms worsen. If thrombus progresses Into popliteal of SFV then filter should be places. Elevate affected leg and use warm compresses.

## 2017-09-23 NOTE — PROGRESS NOTES
encourage pt to use incentive spirometer, reoriented pt on use of spirometer. Pt is to use every hour.  To prevent pneumonia

## 2017-09-24 LAB
ANION GAP SERPL CALC-SCNC: 9 MMOL/L (ref 7–16)
BASOPHILS # BLD: 0 K/UL (ref 0–0.2)
BASOPHILS NFR BLD: 0 % (ref 0–2)
BUN SERPL-MCNC: 15 MG/DL (ref 8–23)
CALCIUM SERPL-MCNC: 8.1 MG/DL (ref 8.3–10.4)
CHLORIDE SERPL-SCNC: 104 MMOL/L (ref 98–107)
CO2 SERPL-SCNC: 23 MMOL/L (ref 21–32)
CREAT SERPL-MCNC: 0.53 MG/DL (ref 0.6–1)
DIFFERENTIAL METHOD BLD: ABNORMAL
EOSINOPHIL # BLD: 0.1 K/UL (ref 0–0.8)
EOSINOPHIL NFR BLD: 1 % (ref 0.5–7.8)
ERYTHROCYTE [DISTWIDTH] IN BLOOD BY AUTOMATED COUNT: 13.9 % (ref 11.9–14.6)
GLUCOSE SERPL-MCNC: 98 MG/DL (ref 65–100)
HCT VFR BLD AUTO: 28.3 % (ref 35.8–46.3)
HGB BLD-MCNC: 9.8 G/DL (ref 11.7–15.4)
IMM GRANULOCYTES # BLD: 0 K/UL (ref 0–0.5)
IMM GRANULOCYTES NFR BLD: 0.4 % (ref 0–5)
LYMPHOCYTES # BLD: 1.5 K/UL (ref 0.5–4.6)
LYMPHOCYTES NFR BLD: 19 % (ref 13–44)
MCH RBC QN AUTO: 28.4 PG (ref 26.1–32.9)
MCHC RBC AUTO-ENTMCNC: 34.6 G/DL (ref 31.4–35)
MCV RBC AUTO: 82 FL (ref 79.6–97.8)
MONOCYTES # BLD: 0.6 K/UL (ref 0.1–1.3)
MONOCYTES NFR BLD: 7 % (ref 4–12)
NEUTS SEG # BLD: 5.6 K/UL (ref 1.7–8.2)
NEUTS SEG NFR BLD: 73 % (ref 43–78)
PLATELET # BLD AUTO: 69 K/UL (ref 150–450)
PMV BLD AUTO: 10 FL (ref 10.8–14.1)
POTASSIUM SERPL-SCNC: 4.5 MMOL/L (ref 3.5–5.1)
RBC # BLD AUTO: 3.45 M/UL (ref 4.05–5.25)
SODIUM SERPL-SCNC: 136 MMOL/L (ref 136–145)
WBC # BLD AUTO: 7.8 K/UL (ref 4.3–11.1)

## 2017-09-24 PROCEDURE — 65310000000 HC RM PRIVATE REHAB

## 2017-09-24 PROCEDURE — 99232 SBSQ HOSP IP/OBS MODERATE 35: CPT | Performed by: PHYSICAL MEDICINE & REHABILITATION

## 2017-09-24 PROCEDURE — 36415 COLL VENOUS BLD VENIPUNCTURE: CPT | Performed by: PHYSICAL MEDICINE & REHABILITATION

## 2017-09-24 PROCEDURE — 74011250637 HC RX REV CODE- 250/637: Performed by: PHYSICAL MEDICINE & REHABILITATION

## 2017-09-24 PROCEDURE — 85025 COMPLETE CBC W/AUTO DIFF WBC: CPT | Performed by: PHYSICAL MEDICINE & REHABILITATION

## 2017-09-24 PROCEDURE — 80048 BASIC METABOLIC PNL TOTAL CA: CPT | Performed by: PHYSICAL MEDICINE & REHABILITATION

## 2017-09-24 RX ADMIN — HYDRALAZINE HYDROCHLORIDE 25 MG: 25 TABLET, FILM COATED ORAL at 15:28

## 2017-09-24 RX ADMIN — TAMSULOSIN HYDROCHLORIDE 0.4 MG: 0.4 CAPSULE ORAL at 22:13

## 2017-09-24 RX ADMIN — HYDRALAZINE HYDROCHLORIDE 25 MG: 25 TABLET, FILM COATED ORAL at 05:32

## 2017-09-24 RX ADMIN — POTASSIUM CHLORIDE 40 MEQ: 20 TABLET, EXTENDED RELEASE ORAL at 17:44

## 2017-09-24 RX ADMIN — AMLODIPINE BESYLATE 10 MG: 10 TABLET ORAL at 09:42

## 2017-09-24 RX ADMIN — TIZANIDINE 2 MG: 2 TABLET ORAL at 22:13

## 2017-09-24 RX ADMIN — POTASSIUM CHLORIDE 40 MEQ: 20 TABLET, EXTENDED RELEASE ORAL at 09:39

## 2017-09-24 RX ADMIN — HYDROCHLOROTHIAZIDE: 12.5 CAPSULE ORAL at 09:39

## 2017-09-24 RX ADMIN — HYDRALAZINE HYDROCHLORIDE 25 MG: 25 TABLET, FILM COATED ORAL at 22:14

## 2017-09-24 RX ADMIN — LABETALOL HYDROCHLORIDE 200 MG: 200 TABLET, FILM COATED ORAL at 09:39

## 2017-09-24 RX ADMIN — TRAZODONE HYDROCHLORIDE 50 MG: 50 TABLET ORAL at 22:13

## 2017-09-24 RX ADMIN — LABETALOL HYDROCHLORIDE 200 MG: 200 TABLET, FILM COATED ORAL at 17:44

## 2017-09-24 NOTE — PROGRESS NOTES
Pr  Has CVA with right side flaccid. Pt has a small deep venous thrombosis to the RT peroneal  Vein. Pt is on bedrest and RT extremity elevated. Pt does not make complete sentences, and speech is hard to understand. Aphasic, pt has to fed and on a pureed diet with nectar thicken liquids, medication is crushed in applesauce. HOB elevated 45 degrees when eating. Pt reoriented to nurse call light and call light is with in reach of left hand. Zaki Quiles

## 2017-09-24 NOTE — PROGRESS NOTES
Dannielle Cruz MD,   Medical Director  [facility], [address] [facility]  Tel: [phone]       D PROGRESS NOTE    Radha Delgado Date: 9/15/2017  Admit Diagnosis: stroke, left brain involvement;ICH (intracerebral hemorrhag*    Subjective   Frustrated this a.m. Ate breakfast without assistance and spilled drink on self. Has applesauce smeared on right side of mouth and face. No headache, cp, sob, n    Objective:     Current Facility-Administered Medications   Medication Dose Route Frequency    potassium chloride (K-DUR, KLOR-CON) SR tablet 40 mEq  40 mEq Oral BID    tiZANidine (ZANAFLEX) tablet 2 mg  2 mg Oral QHS    hydrALAZINE (APRESOLINE) tablet 25 mg  25 mg Oral TID    acetaminophen (TYLENOL) tablet 500 mg  500 mg Per NG tube Q4H PRN    alum-mag hydroxide-simeth (MYLANTA) oral suspension 30 mL  30 mL Oral Q4H PRN    amLODIPine (NORVASC) tablet 10 mg  10 mg Oral DAILY    bisacodyl (DULCOLAX) suppository 10 mg  10 mg Rectal DAILY PRN    hydrALAZINE (APRESOLINE) tablet 50 mg  50 mg Oral QID PRN    labetalol (NORMODYNE) tablet 200 mg  200 mg Oral Q8H    lip protectant (BLISTEX) ointment   Topical PRN    losartan/hydroCHLOROthiazide (HYZAAR) 100/12.5 mg   Oral DAILY    ondansetron (ZOFRAN ODT) tablet 4 mg  4 mg Oral Q6H PRN    sodium phosphate (FLEET'S) enema 118 mL  1 Enema Rectal PRN    traMADol (ULTRAM) tablet 50 mg  50 mg Oral Q6H PRN    traZODone (DESYREL) tablet 50 mg  50 mg Oral QHS PRN    tamsulosin (FLOMAX) capsule 0.4 mg  0.4 mg Oral QHS     Review of Systems:Denies chest pain, shortness of breath, cough, headache, visual problems, abdominal pain, dysurea, calf pain. Pertinent positives are as noted in the medical records and unremarkable otherwise.      Visit Vitals    /69 (BP 1 Location: Right arm, BP Patient Position: At rest)    Pulse 92    Temp 98.2 °F (36.8 °C)    Resp 17    SpO2 93%        Physical Exam:   General: Alert and age appropriately oriented. No acute cardio respiratory distress. HEENT: Normocephalic,no scleral icterus  Oral mucosa moist without cyanosis   Lungs: Clear to auscultation  bilaterally. Respiration even and unlabored   Heart: Regular rate and rhythm, S1, S2   No  murmurs, clicks, rub or gallops   Abdomen: Soft, non-tender, nondistended. Bowel sounds present. No organomegaly. Genitourinary: Benign . Neuromuscular:      RUE flaccid, 2 fb sublux right shoulder  RLE quads 1+ , adduction 3+  Decreased light touch on right. Right inattn   Skin/extremity: No rashes, no erythema. No calf tenderness BLE  No edema                                                                            Functional Assessment:  Gross Assessment  AROM: Grossly decreased, non-functional (09/22/17 1500)  PROM: Within functional limits (09/22/17 1500)  Strength: Grossly decreased, non-functional (09/22/17 1500)  Coordination: Generally decreased, functional (09/22/17 1500)  Tone: Abnormal (09/22/17 1500)  Sensation: Impaired (09/22/17 1500)       Balance  Sitting - Static: Fair (occasional) (09/22/17 1500)  Sitting - Dynamic: Fair (occasional) (09/22/17 1500)  Standing - Static: Fair;Constant support (09/22/17 1500)  Standing - Dynamic : Impaired (09/22/17 1500)                     Jackie Marsh Fall Risk Assessment:  Jackie Marsh Fall Risk  Mobility: Ambulates or transfers with assist devices or assistance/unsteady gait (09/24/17 0845)  Mobility Interventions: Communicate number of staff needed for ambulation/transfer;Patient to call before getting OOB (09/24/17 0845)  Mentation: Alert, oriented x 3 (09/24/17 0845)  Mentation Interventions: Evaluate medications/consider consulting pharmacy; Door open when patient unattended (09/24/17 0845)  Medication: Patient receiving anticonvulsants, sedatives(tranquilizers), psychotropics or hypnotics, hypoglycemics, narcotics, sleep aids, antihypertensives, laxatives, or diuretics (09/24/17 0845)  Medication Interventions: Patient to call before getting OOB; Teach patient to arise slowly; Evaluate medications/consider consulting pharmacy (09/24/17 0845)  Elimination: Needs assistance with toileting (09/24/17 0845)  Elimination Interventions: Call light in reach;Elevated toilet seat;Patient to call for help with toileting needs; Toilet paper/wipes in reach; Toileting schedule/hourly rounds (09/24/17 0845)  Prior Fall History: Unknown (09/24/17 0845)  History of Falls Interventions: Door open when patient unattended;Evaluate medications/consider consulting pharmacy (09/24/17 0845)  Total Score: 3 (09/24/17 0845)  Standard Fall Precautions: Yes (09/22/17 1405)  High Fall Risk: Yes (09/24/17 0845)     Speech Assessment:         Ambulation:  Gait  Base of Support: Narrowed; Center of gravity altered (09/22/17 1200)  Speed/Lulu: Delayed (09/22/17 1200)  Step Length: Right shortened (09/22/17 1200)  Distance (ft): 10 Feet (ft) (10' x 2) (09/22/17 1500)  Assistive Device: Gait belt; Other (comment) (Parallel bars) (09/22/17 1500)     Labs/Studies:  Recent Results (from the past 72 hour(s))   AMYLASE    Collection Time: 09/21/17  4:30 PM   Result Value Ref Range    Amylase 96 25 - 115 U/L   LIPASE    Collection Time: 09/21/17  4:30 PM   Result Value Ref Range    Lipase 376 73 - 393 U/L   CBC WITH AUTOMATED DIFF    Collection Time: 09/22/17  6:43 AM   Result Value Ref Range    WBC 8.5 4.3 - 11.1 K/uL    RBC 3.48 (L) 4.05 - 5.25 M/uL    HGB 9.8 (L) 11.7 - 15.4 g/dL    HCT 28.3 (L) 35.8 - 46.3 %    MCV 81.3 79.6 - 97.8 FL    MCH 28.2 26.1 - 32.9 PG    MCHC 34.6 31.4 - 35.0 g/dL    RDW 13.6 11.9 - 14.6 %    PLATELET 45 (L) 043 - 450 K/uL    MPV 10.4 (L) 10.8 - 14.1 FL    DF AUTOMATED      NEUTROPHILS 74 43 - 78 %    LYMPHOCYTES 16 13 - 44 %    MONOCYTES 8 4.0 - 12.0 %    EOSINOPHILS 2 0.5 - 7.8 %    BASOPHILS 0 0.0 - 2.0 %    IMMATURE GRANULOCYTES 0.5 0.0 - 5.0 %    ABS.  NEUTROPHILS 6.3 1.7 - 8.2 K/UL ABS. LYMPHOCYTES 1.4 0.5 - 4.6 K/UL    ABS. MONOCYTES 0.6 0.1 - 1.3 K/UL    ABS. EOSINOPHILS 0.2 0.0 - 0.8 K/UL    ABS. BASOPHILS 0.0 0.0 - 0.2 K/UL    ABS. IMM. GRANS. 0.0 0.0 - 0.5 K/UL   METABOLIC PANEL, BASIC    Collection Time: 09/22/17  6:43 AM   Result Value Ref Range    Sodium 140 136 - 145 mmol/L    Potassium 4.0 3.5 - 5.1 mmol/L    Chloride 109 (H) 98 - 107 mmol/L    CO2 23 21 - 32 mmol/L    Anion gap 8 7 - 16 mmol/L    Glucose 101 (H) 65 - 100 mg/dL    BUN 13 8 - 23 MG/DL    Creatinine 0.37 (L) 0.6 - 1.0 MG/DL    GFR est AA >60 >60 ml/min/1.73m2    GFR est non-AA >60 >60 ml/min/1.73m2    Calcium 7.5 (L) 8.3 - 10.4 MG/DL   FIBRINOGEN    Collection Time: 09/22/17  6:43 AM   Result Value Ref Range    Fibrinogen 153 (L) 172 - 437 mg/dL   PROTHROMBIN TIME + INR    Collection Time: 09/22/17  6:43 AM   Result Value Ref Range    Prothrombin time 11.2 9.6 - 12.0 sec    INR 1.0 0.9 - 1.2     METABOLIC PANEL, BASIC    Collection Time: 09/23/17  7:58 AM   Result Value Ref Range    Sodium 138 136 - 145 mmol/L    Potassium 4.2 3.5 - 5.1 mmol/L    Chloride 105 98 - 107 mmol/L    CO2 24 21 - 32 mmol/L    Anion gap 9 7 - 16 mmol/L    Glucose 94 65 - 100 mg/dL    BUN 16 8 - 23 MG/DL    Creatinine 0.54 (L) 0.6 - 1.0 MG/DL    GFR est AA >60 >60 ml/min/1.73m2    GFR est non-AA >60 >60 ml/min/1.73m2    Calcium 8.0 (L) 8.3 - 10.4 MG/DL   CBC WITH AUTOMATED DIFF    Collection Time: 09/23/17  7:58 AM   Result Value Ref Range    WBC 7.3 4.3 - 11.1 K/uL    RBC 3.53 (L) 4.05 - 5.25 M/uL    HGB 10.0 (L) 11.7 - 15.4 g/dL    HCT 28.7 (L) 35.8 - 46.3 %    MCV 81.3 79.6 - 97.8 FL    MCH 28.3 26.1 - 32.9 PG    MCHC 34.8 31.4 - 35.0 g/dL    RDW 13.7 11.9 - 14.6 %    PLATELET 53 (L) 522 - 450 K/uL    MPV 10.6 (L) 10.8 - 14.1 FL    DF AUTOMATED      NEUTROPHILS 69 43 - 78 %    LYMPHOCYTES 21 13 - 44 %    MONOCYTES 8 4.0 - 12.0 %    EOSINOPHILS 2 0.5 - 7.8 %    BASOPHILS 0 0.0 - 2.0 %    IMMATURE GRANULOCYTES 0.5 0.0 - 5.0 %    ABS. NEUTROPHILS 5.0 1.7 - 8.2 K/UL    ABS. LYMPHOCYTES 1.6 0.5 - 4.6 K/UL    ABS. MONOCYTES 0.6 0.1 - 1.3 K/UL    ABS. EOSINOPHILS 0.1 0.0 - 0.8 K/UL    ABS. BASOPHILS 0.0 0.0 - 0.2 K/UL    ABS. IMM. GRANS. 0.0 0.0 - 0.5 K/UL   METABOLIC PANEL, BASIC    Collection Time: 09/24/17  7:19 AM   Result Value Ref Range    Sodium 136 136 - 145 mmol/L    Potassium 4.5 3.5 - 5.1 mmol/L    Chloride 104 98 - 107 mmol/L    CO2 23 21 - 32 mmol/L    Anion gap 9 7 - 16 mmol/L    Glucose 98 65 - 100 mg/dL    BUN 15 8 - 23 MG/DL    Creatinine 0.53 (L) 0.6 - 1.0 MG/DL    GFR est AA >60 >60 ml/min/1.73m2    GFR est non-AA >60 >60 ml/min/1.73m2    Calcium 8.1 (L) 8.3 - 10.4 MG/DL   CBC WITH AUTOMATED DIFF    Collection Time: 09/24/17  7:19 AM   Result Value Ref Range    WBC 7.8 4.3 - 11.1 K/uL    RBC 3.45 (L) 4.05 - 5.25 M/uL    HGB 9.8 (L) 11.7 - 15.4 g/dL    HCT 28.3 (L) 35.8 - 46.3 %    MCV 82.0 79.6 - 97.8 FL    MCH 28.4 26.1 - 32.9 PG    MCHC 34.6 31.4 - 35.0 g/dL    RDW 13.9 11.9 - 14.6 %    PLATELET 69 (L) 204 - 450 K/uL    MPV 10.0 (L) 10.8 - 14.1 FL    DF AUTOMATED      NEUTROPHILS 73 43 - 78 %    LYMPHOCYTES 19 13 - 44 %    MONOCYTES 7 4.0 - 12.0 %    EOSINOPHILS 1 0.5 - 7.8 %    BASOPHILS 0 0.0 - 2.0 %    IMMATURE GRANULOCYTES 0.4 0.0 - 5.0 %    ABS. NEUTROPHILS 5.6 1.7 - 8.2 K/UL    ABS. LYMPHOCYTES 1.5 0.5 - 4.6 K/UL    ABS. MONOCYTES 0.6 0.1 - 1.3 K/UL    ABS. EOSINOPHILS 0.1 0.0 - 0.8 K/UL    ABS. BASOPHILS 0.0 0.0 - 0.2 K/UL    ABS. IMM.  GRANS. 0.0 0.0 - 0.5 K/UL       Assessment:     Problem List as of 9/24/2017  Date Reviewed: 9/12/2017          Codes Class Noted - Resolved    ICH (intracerebral hemorrhage) ([facility] ICD-10-CM: I61.9  ICD-9-CM: 902  9/15/2017 - Present        Hypertensive urgency, malignant ICD-10-CM: I16.0  ICD-9-CM: 401.0  9/11/2017 - Present        Stroke, hemorrhagic ([facility] ICD-10-CM: I61.9  ICD-9-CM: 361  9/7/2017 - Present        Accelerated hypertension ICD-10-CM: I10  ICD-9-CM: 401.0  9/7/2017 - Present At risk for aspiration (Chronic) ICD-10-CM: Z91.89  ICD-9-CM: V49.89  9/7/2017 - Present        Acute spontaneous intraventricular hemorrhage assoc w/ hypertension ([facility] ICD-10-CM: I61.5, I10  ICD-9-CM: 221, 401.9  9/7/2017 - Present        Screening for breast cancer ICD-10-CM: Z12.39  ICD-9-CM: V76.10  2/27/2017 - Present        Atrophic vaginitis ICD-10-CM: N95.2  ICD-9-CM: 627.3  7/11/2016 - Present        HTN (hypertension) ICD-10-CM: I10  ICD-9-CM: 401.9  10/23/2015 - Present        ADD (attention deficit disorder) ICD-10-CM: F98.8  ICD-9-CM: 314.00  10/23/2015 - Present        Depression ICD-10-CM: F32.9  ICD-9-CM: 461  10/23/2015 - Present        Anxiety ICD-10-CM: F41.9  ICD-9-CM: 300.00  10/23/2015 - Present        RESOLVED: Hypoxemia ICD-10-CM: R09.02  ICD-9-CM: 799.02  9/10/2017 - 9/12/2017        RESOLVED: Acute respiratory failure ([facility] ICD-10-CM: J96.00  ICD-9-CM: 518.81  9/8/2017 - 9/10/2017        RESOLVED: Hemorrhagic stroke ([facility] ICD-10-CM: I61.9  ICD-9-CM: 941  9/7/2017 - 9/7/2017        RESOLVED: Non-intractable vomiting with nausea ICD-10-CM: R11.2  ICD-9-CM: 787.01  9/7/2017 - 9/12/2017        RESOLVED: Seborrheic keratosis, inflamed ICD-10-CM: L82.0  ICD-9-CM: 702.11  7/11/2016 - 9/20/2016        RESOLVED: Cough ICD-10-CM: R05  ICD-9-CM: 786.2  7/11/2016 - 9/20/2016        RESOLVED: Shoulder pain ICD-10-CM: M25.519  ICD-9-CM: 719.41  10/23/2015 - 2/27/2017            Left BG Hemorrhage with intraventricular extension due to HTN emergency; resultant R hemiplegia, right neglect, dysarthria, aphasia, dysphagia with significant decline in mobility and self care  Plan:   Continue daily physician medical management:  Pneumonia prophylaxis- Incentive spirometer every hour while awake. Will need instruction and assistance. Not sure if she can get a proper oral seal to be effective      Dysphagia; Pureed diet with NTL; at risk for malnutrition and dehydration.  PICC line removed prior to transfer; may need a line for IVFs if BUN continues to increase. Low K, Ca; check mg. Supplementation as needed. Na elevated  -mag ok, K much improved; cont to supplement while on diuretic  -9/19 replace K; 3.0; 2.8 this am 9/20; inc supplement; may need to add to IVFs      Thrombocytopenia; has been trending down for no apparent reason. hgb slt down as well. Has not been on Hep products; consult hematology today 9/19  -plts 40K, continues to trend down 9/20; await consult by hematology. ? reln to [address]. Mild anemia as well  Check HIT panel, d dimer , fibrinogen  -9/21 plts 36k ; HIT PROFILE POSITIVE, D DIMER ELEVATED 32.79, FIBRINOGEN  ; consistent with DIC; Etiology unclear  WILL D/W HEMATOLOGY; LFTs ok, no hx of blood transfusion,no hx pancreatitis, no sign of bacteremia. Has not been on any heparin products. Can see in head injury; has ICH. Cannot r/o blood disorder (leukemia)   per hematology \"Recommend transfusing platelets for <63,515 and Cryoprecipitate for fibrinogen < 100. \"  -MARK pending  -9/22 bilateral LE venous duplex ordered; low suspicion but at risk and having calf tenderness  -9/22 plts improved today; monitor closely; 9/23 small right peroneal thrombus. No antic per hematology. IR refused/advised against placing IVF due to low risk of clot propagation given size and location  -9/23 counts bumping up; pts now 53k from 45 and previously 36  -9/24 plts cont to improve. 69K. MARK still pending; can resume therapies      Hypernatremia; last head CT did not show significant cerebral edema. Clinically improving. Likely due to prerenal azotemia; 9/15 MAY REQ isotonic / hypotonic saline IV;  -9/18 events of weekend noted;  Na 156; asymptomatic, on 0.5 dextrose with 1/4 Na; na q 6hrs; pts serum osmolality was normal. Will check urine Na and urine osmolality; depending on findings, will consider consulting Hospitalist vs Nephrology  Neuro improving despite this  -9/19 Na down to 149, urine osmo nl, serum osmo min hi; cont dex/and 1/4 NS; repeat in am. Not DI as uop not increased  -9/20 Na 147; cont fluids; 9/21 not resulted; will stop fluids when Na below 142  -Na 140 9/22; dc IVFs      ICH/IVH; 9/22 clinically improves daily. F/u HCT yest due to transient left eye visual disturbance. Overall, resolving hemorrhage. There is more pronounced edema; expected. Ventricular size now nl      DVT risk / DVT Prophylaxis- Will require daily physician exam to assess for signs and symptoms as patient is at increased risk for of thromboembolism. Mobilization as tolerated. Intermittent pneumatic compression devices when in bed Thigh-high or knee-high thromboembolic deterrent hose when out of bed. NO  anticoag due to ICH/HIT; has right small right peroneal vein      Pain Control: stable, mild-to-moderate joint symptoms intermittently, reasonably well controlled by PRN meds. Will require regular pain assessment and comprenhensive pain management,       Wound Care: Monitor wound status daily per staff and physician. At risk for failure. Will require 24/7 rehab nursing. None needed at this time      Hypertension - BP uncontrolled, fluctuating, managed medically. Add prn hydralazine for sbp> 180. Goal SBP is 140-160 given ICH. Cont Nomodyne, norvasc and hyzaar; consider Verapamil or scheduled Hydralazine  -9/18 BP increased 169/95; add hydralazine 25 tid  -9/20 BP much improved  -9/22 /74; 9/24 bp stable; may dec hydralazine to bid      Mild hyperglycemia, likely stress induced vs borderline diabetes; monitor loosely      Leukocytosis; recheck in a.m. Check UA due to stafford. No pulmonary signs (had neg CXR today), no s/s of infxn at old PICC site, no wounds, afebrile. Watch monocytes. -9/18 12.2 improving. Urine neg  -9/19 up to 13.2, ? Reactive. No source of infxn identified. Afebrile  -9/20 WBC now normal      Urinary retention/ neurogenic bladder - start flomax but continue stafford until mobility improves a bit.  Strict I/o's  -9/20 keeping stafford to monitor UOP until Na normalizes. No significant output to suggest DI  -9/21 dc stafford; cont Flomax, follow bladder scan  9/22 voiding without issue; incontinence but no retention      bowel program - add prn meds; incontinent, want to keep stool on the firmer side. Monitor skin.       GERD - add a PPI. At times may need additional antacids, Maalox prn.      -continues to benefit from and is showing improvements in a multidisc team approach. Participating well.  Aunt Group   9/21   Addendum; in therapy this afternoon, transient left eye blindness. Will f/u head CT for extension of or new ICH, especially with low plts   Results  1. Interval decrease in size of intraventricular hemorrhage within the left  basal ganglia/thalamus with near complete resolution of intraventricular  hemorrhage. Surrounding edema is more pronounced with slight increase in  left-to-right midline shift. 2. Interval decrease in ventricular size, approaching normal in caliber.          Time spent was 25 minutes with over 1/2 in direct patient care/examination, consultation and coordination of care.      Signed By: Trevor Segura MD     September 24, 2017

## 2017-09-24 NOTE — PROGRESS NOTES
Skin intact, no breakdown noted. Tali care given for incontinent brief. righ side flaccid RLE with DVT and elevated up on pillows. Pt answers simple yes or no questions. Call light with in reach. Pt aware to call for nurse. Jensen Weeks

## 2017-09-25 ENCOUNTER — APPOINTMENT (OUTPATIENT)
Dept: INTERVENTIONAL RADIOLOGY/VASCULAR | Age: 62
DRG: 040 | End: 2017-09-25
Attending: NURSE PRACTITIONER
Payer: COMMERCIAL

## 2017-09-25 ENCOUNTER — APPOINTMENT (OUTPATIENT)
Dept: ULTRASOUND IMAGING | Age: 62
DRG: 040 | End: 2017-09-25
Attending: NURSE PRACTITIONER
Payer: COMMERCIAL

## 2017-09-25 ENCOUNTER — APPOINTMENT (OUTPATIENT)
Dept: CT IMAGING | Age: 62
DRG: 040 | End: 2017-09-25
Attending: PHYSICAL MEDICINE & REHABILITATION
Payer: COMMERCIAL

## 2017-09-25 PROBLEM — I82.409 DVT (DEEP VENOUS THROMBOSIS) (HCC): Status: ACTIVE | Noted: 2017-09-25

## 2017-09-25 PROBLEM — D69.6 THROMBOCYTOPENIA (HCC): Status: ACTIVE | Noted: 2017-09-25

## 2017-09-25 PROBLEM — I26.99 PULMONARY EMBOLI (HCC): Status: ACTIVE | Noted: 2017-09-25

## 2017-09-25 LAB
CREAT SERPL-MCNC: 0.56 MG/DL (ref 0.6–1)
FIBRINOGEN PPP-MCNC: 434 MG/DL (ref 172–437)

## 2017-09-25 PROCEDURE — C1769 GUIDE WIRE: HCPCS

## 2017-09-25 PROCEDURE — 74011000250 HC RX REV CODE- 250: Performed by: RADIOLOGY

## 2017-09-25 PROCEDURE — 74011250637 HC RX REV CODE- 250/637: Performed by: PHYSICAL MEDICINE & REHABILITATION

## 2017-09-25 PROCEDURE — 85384 FIBRINOGEN ACTIVITY: CPT | Performed by: PHYSICAL MEDICINE & REHABILITATION

## 2017-09-25 PROCEDURE — 97530 THERAPEUTIC ACTIVITIES: CPT

## 2017-09-25 PROCEDURE — 92507 TX SP LANG VOICE COMM INDIV: CPT

## 2017-09-25 PROCEDURE — 70450 CT HEAD/BRAIN W/O DYE: CPT

## 2017-09-25 PROCEDURE — 74011636320 HC RX REV CODE- 636/320: Performed by: PHYSICAL MEDICINE & REHABILITATION

## 2017-09-25 PROCEDURE — C1894 INTRO/SHEATH, NON-LASER: HCPCS

## 2017-09-25 PROCEDURE — 82565 ASSAY OF CREATININE: CPT | Performed by: PHYSICAL MEDICINE & REHABILITATION

## 2017-09-25 PROCEDURE — 65310000000 HC RM PRIVATE REHAB

## 2017-09-25 PROCEDURE — 71260 CT THORAX DX C+: CPT

## 2017-09-25 PROCEDURE — 77030012893

## 2017-09-25 PROCEDURE — 74011636320 HC RX REV CODE- 636/320: Performed by: RADIOLOGY

## 2017-09-25 PROCEDURE — 97535 SELF CARE MNGMENT TRAINING: CPT

## 2017-09-25 PROCEDURE — 74011000258 HC RX REV CODE- 258: Performed by: PHYSICAL MEDICINE & REHABILITATION

## 2017-09-25 PROCEDURE — 93971 EXTREMITY STUDY: CPT

## 2017-09-25 PROCEDURE — C1880 VENA CAVA FILTER: HCPCS

## 2017-09-25 PROCEDURE — 97112 NEUROMUSCULAR REEDUCATION: CPT

## 2017-09-25 PROCEDURE — 99232 SBSQ HOSP IP/OBS MODERATE 35: CPT | Performed by: PHYSICAL MEDICINE & REHABILITATION

## 2017-09-25 PROCEDURE — 36415 COLL VENOUS BLD VENIPUNCTURE: CPT | Performed by: PHYSICAL MEDICINE & REHABILITATION

## 2017-09-25 PROCEDURE — 76937 US GUIDE VASCULAR ACCESS: CPT

## 2017-09-25 PROCEDURE — 06H03DZ INSERTION OF INTRALUMINAL DEVICE INTO INFERIOR VENA CAVA, PERCUTANEOUS APPROACH: ICD-10-PCS | Performed by: RADIOLOGY

## 2017-09-25 PROCEDURE — 99253 IP/OBS CNSLTJ NEW/EST LOW 45: CPT | Performed by: INTERNAL MEDICINE

## 2017-09-25 PROCEDURE — 77030010507 HC ADH SKN DERMBND J&J -B

## 2017-09-25 PROCEDURE — 77030020269 HC MISC IMPL

## 2017-09-25 PROCEDURE — 74011250636 HC RX REV CODE- 250/636

## 2017-09-25 RX ORDER — HYDRALAZINE HYDROCHLORIDE 25 MG/1
25 TABLET, FILM COATED ORAL 2 TIMES DAILY
Status: DISCONTINUED | OUTPATIENT
Start: 2017-09-25 | End: 2017-09-28

## 2017-09-25 RX ORDER — LIDOCAINE HYDROCHLORIDE 20 MG/ML
40-120 INJECTION, SOLUTION INFILTRATION; PERINEURAL ONCE
Status: COMPLETED | OUTPATIENT
Start: 2017-09-25 | End: 2017-09-25

## 2017-09-25 RX ORDER — SODIUM CHLORIDE 0.9 % (FLUSH) 0.9 %
10 SYRINGE (ML) INJECTION
Status: COMPLETED | OUTPATIENT
Start: 2017-09-25 | End: 2017-09-25

## 2017-09-25 RX ADMIN — SODIUM CHLORIDE 100 ML: 900 INJECTION, SOLUTION INTRAVENOUS at 14:37

## 2017-09-25 RX ADMIN — HYDRALAZINE HYDROCHLORIDE 25 MG: 25 TABLET, FILM COATED ORAL at 05:52

## 2017-09-25 RX ADMIN — AMLODIPINE BESYLATE 10 MG: 10 TABLET ORAL at 08:47

## 2017-09-25 RX ADMIN — LABETALOL HYDROCHLORIDE 200 MG: 200 TABLET, FILM COATED ORAL at 17:51

## 2017-09-25 RX ADMIN — LIDOCAINE HYDROCHLORIDE 80 MG: 20 INJECTION, SOLUTION INFILTRATION; PERINEURAL at 16:38

## 2017-09-25 RX ADMIN — POTASSIUM CHLORIDE 40 MEQ: 20 TABLET, EXTENDED RELEASE ORAL at 17:52

## 2017-09-25 RX ADMIN — TIZANIDINE 2 MG: 2 TABLET ORAL at 21:28

## 2017-09-25 RX ADMIN — LABETALOL HYDROCHLORIDE 200 MG: 200 TABLET, FILM COATED ORAL at 01:52

## 2017-09-25 RX ADMIN — IOPAMIDOL 30 ML: 612 INJECTION, SOLUTION INTRAVENOUS at 16:52

## 2017-09-25 RX ADMIN — LABETALOL HYDROCHLORIDE 200 MG: 200 TABLET, FILM COATED ORAL at 08:47

## 2017-09-25 RX ADMIN — IOPAMIDOL 100 ML: 755 INJECTION, SOLUTION INTRAVENOUS at 14:37

## 2017-09-25 RX ADMIN — TAMSULOSIN HYDROCHLORIDE 0.4 MG: 0.4 CAPSULE ORAL at 21:28

## 2017-09-25 RX ADMIN — POTASSIUM CHLORIDE 40 MEQ: 20 TABLET, EXTENDED RELEASE ORAL at 08:48

## 2017-09-25 RX ADMIN — Medication 10 ML: at 14:37

## 2017-09-25 NOTE — PROGRESS NOTES
PHYSICAL THERAPY DAILY NOTE  Time In: 1116  Time Out: 8961  Patient Seen For: AM;Transfer training;Gait training; Therapeutic exercise; Other (see progress notes)    Subjective: Patient had no complaints. Objective: Patient complained of chest pain on left side. \" It's hard to breath right now. \"  Left leg 'cramping' with pain. She could not follow 1 step directions. \" I'm tired. \"  Dr. Valenzuela Sides to gym and put her on hold til CT. Visit Vitals    /78 (BP Patient Position: At rest)    Pulse 91    Temp 98.8 °F (37.1 °C)    Resp 16    SpO2 96%     Other (comment) (falls)  GROSS ASSESSMENT Daily Assessment            BED/MAT MOBILITY Daily Assessment    Supine to Sit : 2 (Maximal assistance)  Sit to Supine : 2 (Maximal assistance)       TRANSFERS Daily Assessment    Transfer Type: SPT without device  Transfer Assistance : 2 (Maximal assistance)  Sit to Stand Assistance: Maximum assistance       GAIT Daily Assessment    Amount of Assistance: 0 (Not tested)       STEPS or STAIRS Daily Assessment    Level of Assist : 0 (Not tested)       BALANCE Daily Assessment            WHEELCHAIR MOBILITY Daily Assessment            LOWER EXTREMITY EXERCISES Daily Assessment    Extremity: Both  Exercise Type #1: Supine lower extremity strengthening  Sets Performed: 1  Reps Performed: 10  Level of Assist: Total assistance          Assessment: Patient making good progress. Plan of Care: Continue with plan of care to reach PT goals. Returned to room to bed with nurse Familia Colin present.     Loni Henderson, PTA  9/25/2017

## 2017-09-25 NOTE — PROGRESS NOTES
Addendum; 9/25  Pt with acute onset of left upper chest discomfort. VSS, sats good. Was in therapy and therapist noted increased difficulty following commands and seemed more confused. Upon my assessment; pt flaccid on right side except for adduction RLE. Otherwise, right side stable. Could not lift LLE off table; weakness vs neglect vs receptive aphasia. Regardless, this is all new    CV rrr no m  Lungs cta b  Ext no edema  Neuro above    Plan; pt with hx of L BG ICH , HIT with known RLE peroneal DVT (not on anticoag), DIC  1. Stat CT chest r/o PE  2. Stat head Ct due to neuro changes. High risk of CVA given clotting issues. Consider MRI  3. If CT chest with evidence of PE; will have to place IVC filter    Patricia Herron MD  (updated sister with change in status and plan ); add 15:19 Radiology called; bilateral PEs, spoke with IR; plan for IVC now filter ASAP. Consult Pulmonary; sats stable, no SOB, mild chest discomfort. Called Hematology; re; treatment given HIT, DIC, ICH; ? Argatroban per protocol;  Will defer to Dr. Bell Arrow  -ADD; per hematology; no argatroban, no anticoagulants    BRANDO PAPPAS

## 2017-09-25 NOTE — PROGRESS NOTES
09/25/17 1013   Time Spent With Patient   Time In 0700   Time Out 0742   Patient Seen For: AM;ADLs   Grooming   Grooming Assistance  Mod A   Upper Body Bathing   Bathing Assistance, Upper Max A   Adaptive Equipment Tub bench   Comments improving sitting balance   Lower Body Bathing   Bathing Assistance, Lower  Max A   Adaptive Equipment Tub bench   Upper Body Dressing    Dressing Assistance  Max A   Comments assistance with R arm, reviewing cindy technique   Lower Body Dressing    Dressing Assistance  Dep   Functional Transfers   Tub or Shower Type Shower   Amount of Assistance Required Max A   Adaptive Equipment Grab bars; Tub transfer bench; Wheelchair     S: \"What is my prognosis? .\" Agreeable to therapy. Focus of session was on morning ADL routine. Patient was able to SPT with moderate assist   Pain denied during session. Collaborated with PTA, Rodolfo Jordan and confirmed patient is on track to reach goals as documented in the care plan. Patient tolerated session well, ROM, activity tolerance, strength, balance, but stren are still below baseline and requires skilled facilitation to successfully and safely complete ADL's and transfers. Patient ended session in w/c with call remote and phone within reach.      Woodie Scheuermann, KAYODER

## 2017-09-25 NOTE — PROGRESS NOTES
Patient c/o chest discomfort in gym during PT. Dr. Tamir Nicholson aware, orders rec'd. Pt returned to bed, positioned for comfort, vital signs stable.

## 2017-09-25 NOTE — PROGRESS NOTES
09/25/17 1041   Time Spent With Patient   Time In [phone]   Time Out 0931   Patient Seen For: AM;Neuro-linguistics; Oral-motor exercises   Team Conference   Team Conference Date 09/21/17   Family/Caregiver Training   Family Training Needs to be scheduled   Mental Status   Neurologic State Alert   Orientation Level Disoriented to place; Disoriented to time;Oriented to person;Oriented to situation   Cognition Impaired decision making;Memory loss;Decreased command following; Appropriate decision making   Perseveration Perseverates during conversation   Safety/Judgement Fall prevention   Comprehension (Native Language)   Primary Mode of Comprehension Auditory   Score 4   Expression (Native Language)   Primary Mode of Expression Verbal   Score 3   Social Interaction/Pragmatics   Score 4   Problem Solving   Score 3   Memory   Score 3   Pt making progress toward all her goals, however, pt would benefit from continued ST services 5 times a week to address oral motor weakness, dysphagia, speech, language and cognitive deficits. Pt completed problem solving/reasoning tasks with 80% accuracy. Pt completed word finding tasks with min-mod cues with 70% accuracy. Pt completed oral motor exercises x 10 with min-mod cues with 70% accuracy. Pt cued to over articulate and be LOUD during verbal tasks. Pt tolerating mechanical soft textures with no mixed with nectar thick liquids with no overt signs/sx of aspiration. Right side pocketing, spillage and residue. Safe swallow compensatory strategies hanging in room as reminder during meals.      Josesito Grullon MA/CCC/SLP

## 2017-09-25 NOTE — PROGRESS NOTES
IR Nurse Pre-Procedure Checklist Part 2          Consent signed: Yes    H&P complete:  Yes    Antibiotics: Not applicable    Airway/Mallampati Done: Not applicable    Shaved: Not applicable    Pregnancy Form:Not applicable    Patient Position: Yes    MD Side: Yes     Biopsy Worksheet: Not applicable    Specimen Medium: Not applicable

## 2017-09-25 NOTE — PROGRESS NOTES
TRANSFER - OUT REPORT:    Verbal report given to [facility] RN(name) on [facility]  being transferred to [facility](unit) for routine progression of care       Report consisted of patients Situation, Background, Assessment and   Recommendations(SBAR). Information from the following report(s) SBAR, Kardex, Procedure Summary and MAR was reviewed with the receiving nurse. Lines:   Peripheral IV 09/17/17 Left Forearm (Active)   Site Assessment Clean, dry, & intact 9/25/2017  5:03 AM   Phlebitis Assessment 0 9/25/2017  5:03 AM   Infiltration Assessment 0 9/25/2017  5:03 AM   Dressing Status Clean, dry, & intact 9/25/2017  5:03 AM   Dressing Type Transparent;Tape 9/25/2017  5:03 AM   Hub Color/Line Status Flushed 9/25/2017  5:03 AM   Alcohol Cap Used No 9/25/2017  5:03 AM       Peripheral IV 09/25/17 Right Antecubital (Active)        Opportunity for questions and clarification was provided.       Patient transported with:   Registered Nurse

## 2017-09-25 NOTE — PROGRESS NOTES
Subjective \"Hey. \"   Activity Question/answer activity   Strength/Endurance Patient was fatigued after her morning of therapy but was still motivated to participate with RT. Balance Mod (A) with SPT from w/c to recliner   Social Interaction Patient was able to read the question cards without any issues yet her response to the question was often difficult. She often would respond back with the question to me. She perseverated on a word when given options. Cognitive A&O to person and situation. Comments Patient was assisted to her room and into the recliner chair at her request.  She was left with all needs within reach.      Sumanth Beltrán, CTRS

## 2017-09-25 NOTE — PROGRESS NOTES
OT Daily Note    Time In 1032   Time Out 1115     Subjective:\"I am okay. \" Agreeable to therapy. Pain:Denied during session, more lethargic during session. Interdisciplinary Communication: Collaborated with Cindy RODRIGUEZ and patient is making progress towards goals. Precautions:  (falls)    Balance/functional mobility Daily Assessment   Stood 2 times with writer supporting RLE and RUE on Dycem on table top, focus on weight shifting side to side. More difficult sit to stand, harder time following commands this session. Strengthening/activity tolerance Daily Assessment   Table slides with slightly improved ability for elbow flexion. PROM completed in RUE in shoulder in flexion, abduction, ER. Scapular joint mobility level 2. Right UE ace wrapped to upper extremity exercise bike with writer facilitating motion at elbow and scapular for 4 minutes. Ended session: In w/c in gym with Cindy RODRIGUEZ.      Brooklyn Anthony OTR/L

## 2017-09-25 NOTE — CONSULTS
PULMONARY/CCM CONSULT :  2017    Date of Admission:  9/15/2017    The patient's chart has been reviewed and the chart has been discussed with nursing staff. Subjective: This patient has been seen and evaluated at the request of Dr. Solis Hernandez. Patient is a 60 y.o.  female recently admitted to [facility] and discharged to  rehab where we were asked to see again. We saw her while in ICU at [facility]. She has a h/o HTN (on amlodipine,hyzaar), anxiety and new ICH/hemorrhagic CVA. CT head was ordered and revealed left basal ganglia ICH measured 3 x 2cm with spread to L lateral ventricle and 3rd/4th ventricle with some mild mildline shift.  No significant edema thus far. She had seizure-like activity. She was intubated and received Keppra. EEG negative for seizures. Teleneurology was consulted. SBP goal of 160, tight glucose control and DVT ppx.  Dr. Nereida Carranza was consulted and did not think surgery would be of benefit for the patient. She tolerated extubation on . Speech was consulted and diet was advanced to pureed diet, nectar thick liquids. Steroids were avoided with CVA. She had ongoing thrombocytopenia and Hem/onc was consulted. She was found to be HIT positive and Heparin was avoided. DIC noted. DVT risk/Prophylaxis and unable to take any anticoagulation. Dr Marval Collet spoke with IR regarding IVC filter and the procedure was thought to have a great risk than benefit with clot size and asymptomatic. Today, she been having chest discomfort and chest CT with bilateral pulmonary emboli without evidence of right heart strain. LE ultrasound with thrombus in the right peroneal vein. We were asked to see her for new PTE with recent ICH, HIT and DIC.       Past Medical History:   Diagnosis Date    Anxiety     Depression     HTN (hypertension)     Menopause       Past Surgical History:   Procedure Laterality Date    HX  SECTION      x 3    HX OTHER SURGICAL      Face lift    HX REFRACTIVE SURGERY  2006    HX SANJUANITA AND BSO  1992      Social History   Substance Use Topics    Smoking status: Former Smoker    Smokeless tobacco: Never Used    Alcohol use No      Family History   Problem Relation Age of Onset    Alcohol abuse Mother     Cancer Mother      lung cancer    Cancer Father      throat cancer    Psychiatric Disorder Father      anxiety, depression    No Known Problems Sister       Allergies   Allergen Reactions    Banana Shortness of Breath    Lisinopril Cough    Nuts [Tree Nut] Unknown (comments)      Prior to Admission Medications   Prescriptions Last Dose Informant Patient Reported? Taking? ALPRAZolam (XANAX) 0.25 mg tablet Unknown at Unknown time  No No   Sig: Take 1 Tab by mouth three (3) times daily as needed for Anxiety. Max Daily Amount: 0.75 mg. CYANOCOBALAMIN, VITAMIN B-12, (VITAMIN B-12 PO) Unknown at Unknown time  Yes No   Sig: Take  by mouth. FLUoxetine (PROZAC) 20 mg capsule Unknown at Unknown time  Yes No   acetaminophen (TYLENOL) 500 mg tablet Unknown at Unknown time  No No   Si Tab by Per NG tube route every four (4) hours as needed. amLODIPine (NORVASC) 10 mg tablet Unknown at Unknown time  No No   Sig: Take 1 Tab by mouth daily. hydrALAZINE (APRESOLINE) 25 mg tablet Unknown at Unknown time  No No   Sig: Take 1 Tab by mouth three (3) times daily as needed. TID PRN for SBP>170 or DBP>100   labetalol (NORMODYNE) 200 mg tablet Unknown at Unknown time  No No   Sig: Take 1 Tab by mouth every eight (8) hours. losartan-hydroCHLOROthiazide (HYZAAR) 100-12.5 mg per tablet Unknown at Unknown time  No No   Sig: Take 1 Tab by mouth daily. potassium chloride (KAON 10%) 20 mEq/15 mL solution Unknown at Unknown time  No No   Sig: Take 15 mL by mouth daily. traMADol (ULTRAM) 50 mg tablet Unknown at Unknown time  No No   Sig: Take 1 Tab by mouth every six (6) hours as needed. Max Daily Amount: 200 mg.       Facility-Administered Medications: None MEDS SCHEDULED:    Current Facility-Administered Medications   Medication Dose Route Frequency    hydrALAZINE (APRESOLINE) tablet 25 mg  25 mg Oral BID    lidocaine (XYLOCAINE) 20 mg/mL (2 %) injection  mg   mg IntraDERMal ONCE    potassium chloride (K-DUR, KLOR-CON) SR tablet 40 mEq  40 mEq Oral BID    tiZANidine (ZANAFLEX) tablet 2 mg  2 mg Oral QHS    acetaminophen (TYLENOL) tablet 500 mg  500 mg Per NG tube Q4H PRN    alum-mag hydroxide-simeth (MYLANTA) oral suspension 30 mL  30 mL Oral Q4H PRN    amLODIPine (NORVASC) tablet 10 mg  10 mg Oral DAILY    bisacodyl (DULCOLAX) suppository 10 mg  10 mg Rectal DAILY PRN    hydrALAZINE (APRESOLINE) tablet 50 mg  50 mg Oral QID PRN    labetalol (NORMODYNE) tablet 200 mg  200 mg Oral Q8H    lip protectant (BLISTEX) ointment   Topical PRN    losartan/hydroCHLOROthiazide (HYZAAR) 100/12.5 mg   Oral DAILY    ondansetron (ZOFRAN ODT) tablet 4 mg  4 mg Oral Q6H PRN    sodium phosphate (FLEET'S) enema 118 mL  1 Enema Rectal PRN    traMADol (ULTRAM) tablet 50 mg  50 mg Oral Q6H PRN    traZODone (DESYREL) tablet 50 mg  50 mg Oral QHS PRN    tamsulosin (FLOMAX) capsule 0.4 mg  0.4 mg Oral QHS       Review of Systems  A comprehensive review of systems was negative except for that written in the HPI. Objective:     Vitals:    09/25/17 0152 09/25/17 0552 09/25/17 0852 09/25/17 1155   BP: 115/70 119/75 110/78 114/71   Pulse: 89 96 91 84   Resp:   16 16   Temp:   98.8 °F (37.1 °C) 98.5 °F (36.9 °C)   SpO2:   96% 96%     09/25 0701 - 09/25 1900  In: 360 [P.O.:360]  Out: -   09/23 1901 - 09/25 0700  In: 480 [P.O.:480]  Out: -       PHYSICAL EXAM     Physical Exam:   General:  Alert, cooperative, debilitated   Eyes:  Conjunctivae/corneas clear. Nose: Nares patent and moist.    Mouth/Throat: Lips, mucosa, and tongue pink and intact.     Neck: Supple, symmetrical.   Respiratory:   clear to auscultation bilaterally    Cardiovascular:  Regular rate and rhythm, S1, S2, no murmur, click, rub or gallop. GI:   Abdomen soft, non-tender. Bowel sounds active X 4 Q. Musculoskeletal: Extremities symmetrical, atraumatic, no cyanosis, no edema. Pulses: 2+ and symmetric all extremities. Skin: Skin color, texture, turgor normal.        Neurologic: 2+ strength bilateral upper and lower extremities, sensation throughout appropriate. Alert and oriented. Activity:limited, flaccid right side  Nutrition: MS, nectar thick      LABS    Recent Labs      09/24/17   0719  09/23/17   0758   WBC  7.8  7.3   HGB  9.8*  10.0*   HCT  28.3*  28.7*   PLT  69*  53*     Recent Labs      09/25/17   1139  09/24/17   0719  09/23/17   0758   NA   --   136  138   K   --   4.5  4.2   CL   --   104  105   GLU   --   98  94   CO2   --   23  24   BUN   --   15  16   CREA  0.56*  0.53*  0.54*       U/S LE  IMPRESSIONS: Thrombus within the right peroneal vein. Chest CT  IMPRESSION:     1. Bilateral pulmonary emboli without evidence of right heart strain.     2. Small pericardial effusion. Trace right pleural effusion. Dependent  atelectatic changes, right greater than left. Assessment:     Hospital Problems  Date Reviewed: 9/12/2017          Codes Class Noted POA    Thrombocytopenia ([facility] ICD-10-CM: D69.6  ICD-9-CM: 287.5  9/25/2017 Yes        Pulmonary emboli ([facility] ICD-10-CM: I26.99  ICD-9-CM: 415.19  9/25/2017 Yes        DVT (deep venous thrombosis) ([facility] ICD-10-CM: I82.409  ICD-9-CM: 453.40  9/25/2017 Yes        ICH (intracerebral hemorrhage) ([facility] ICD-10-CM: I61.9  ICD-9-CM: 621  9/15/2017 Yes              Plan:   Oxygen as needed  Anticoagulation contraindicated  IVC filter ordered today by Dr Nadir Toscano, NP-C    More than 50% of time documented was spent in face-to-face contact with the patient and in the care of the patient on the floor/unit where the patient is located.      Lungs:  clear  Heart:  RRR with no Murmur/Rubs/Gallops    Additional Comments:  She had IVC filter done and it should be adequate to prevent recurrent PTE. Anticoagulation should be avoided. Can resume PT/OT in am    I have spoken with and examined the patient. I agree with the above assessment and plan as documented.     Catalina Alcantara MD

## 2017-09-25 NOTE — PROCEDURES
Interventional Radiology Brief Procedure Note    Patient: Zeke Waldrop MRN: [id number]  SSN: [id number]    YOB: 1955  Age: 60 y.o. Sex: female      Date of Procedure: 9/25/2017    Pre-Procedure Diagnosis:  Right Peroneal Vein DVT, small Left Iliac Vein clot, Small Bi PE. Hemorrhagic CVA 9/6/17. Post-Procedure Diagnosis: SAME    Procedure(s): Convertible IVC Filter placement. Brief Description of Procedure: as above. Performed By: Raleigh Patterson MD     Assistants: None    Anesthesia: Lidocaine    Estimated Blood Loss: None    Specimens: None    Implants: See Above    Findings: Small thrombus in the L CIV. Complications: None    Recommendations: 1 hour bedrest w HOB elevated. Follow Up: I will see her in 6 months in the office to determine whether or not to convert to a stent.      Signed By: Raleigh Patterson MD     September 25, 2017

## 2017-09-25 NOTE — PROGRESS NOTES
Josué Bob MD,   Medical Director  [address] [facility], [address] [facility]  Tel: [phone]       [facility] PROGRESS NOTE    Elvia Freed  Admit Date: 9/15/2017  Admit Diagnosis: stroke, left brain involvement;ICH (intracerebral hemorrhag*    Subjective     Doing well. No complaints. Denies pain. Slept well. Using call light appropriately    Objective:     Current Facility-Administered Medications   Medication Dose Route Frequency    potassium chloride (K-DUR, KLOR-CON) SR tablet 40 mEq  40 mEq Oral BID    tiZANidine (ZANAFLEX) tablet 2 mg  2 mg Oral QHS    hydrALAZINE (APRESOLINE) tablet 25 mg  25 mg Oral TID    acetaminophen (TYLENOL) tablet 500 mg  500 mg Per NG tube Q4H PRN    alum-mag hydroxide-simeth (MYLANTA) oral suspension 30 mL  30 mL Oral Q4H PRN    amLODIPine (NORVASC) tablet 10 mg  10 mg Oral DAILY    bisacodyl (DULCOLAX) suppository 10 mg  10 mg Rectal DAILY PRN    hydrALAZINE (APRESOLINE) tablet 50 mg  50 mg Oral QID PRN    labetalol (NORMODYNE) tablet 200 mg  200 mg Oral Q8H    lip protectant (BLISTEX) ointment   Topical PRN    losartan/hydroCHLOROthiazide (HYZAAR) 100/12.5 mg   Oral DAILY    ondansetron (ZOFRAN ODT) tablet 4 mg  4 mg Oral Q6H PRN    sodium phosphate (FLEET'S) enema 118 mL  1 Enema Rectal PRN    traMADol (ULTRAM) tablet 50 mg  50 mg Oral Q6H PRN    traZODone (DESYREL) tablet 50 mg  50 mg Oral QHS PRN    tamsulosin (FLOMAX) capsule 0.4 mg  0.4 mg Oral QHS     Review of Systems:Denies chest pain, shortness of breath, cough, headache, visual problems, abdominal pain, dysurea, calf pain. Pertinent positives are as noted in the medical records and unremarkable otherwise. Visit Vitals    /78 (BP Patient Position: At rest)    Pulse 91    Temp 98.8 °F (37.1 °C)    Resp 16    SpO2 96%        Physical Exam:   General: Alert and age appropriately oriented. No acute cardio respiratory distress.    HEENT: Normocephalic,no scleral icterus  Oral mucosa moist without cyanosis. Right facial weakness   Lungs: Clear to auscultation  bilaterally. Respiration even and unlabored   Heart: Regular rate and rhythm, S1, S2   No  murmurs, clicks, rub or gallops   Abdomen: Soft, non-tender, nondistended. Bowel sounds present. No organomegaly. Genitourinary: defer. Neuromuscular:      RUE flaccid, 2 fb sublux right shoulder  RLE quads 1+ , adduction 3+  Decreased light touch on right. Right inattn   Skin/extremity: No rashes, no erythema. No calf tenderness BLE  There is no edema in LEs, calves soft                                                                            Functional Assessment:  Gross Assessment  AROM: Grossly decreased, non-functional (09/22/17 1500)  PROM: Within functional limits (09/22/17 1500)  Strength: Grossly decreased, non-functional (09/22/17 1500)  Coordination: Generally decreased, functional (09/22/17 1500)  Tone: Abnormal (09/22/17 1500)  Sensation: Impaired (09/22/17 1500)       Balance  Sitting - Static: Fair (occasional) (09/22/17 1500)  Sitting - Dynamic: Fair (occasional) (09/22/17 1500)  Standing - Static: Fair;Constant support (09/22/17 1500)  Standing - Dynamic : Impaired (09/22/17 1500)                     Kelin Hemphill Fall Risk Assessment:  Kelin Hemphill Fall Risk  Mobility: Unable to ambulate or transfer (09/25/17 5814)  Mobility Interventions: Bed/chair exit alarm;Communicate number of staff needed for ambulation/transfer;Patient to call before getting OOB (09/25/17 7695)  Mentation: Alert, oriented x 3 (09/25/17 8132)  Mentation Interventions: Bed/chair exit alarm; Door open when patient unattended;More frequent rounding (09/25/17 5491)  Medication: Patient receiving anticonvulsants, sedatives(tranquilizers), psychotropics or hypnotics, hypoglycemics, narcotics, sleep aids, antihypertensives, laxatives, or diuretics (09/25/17 7596)  Medication Interventions: Bed/chair exit alarm; Patient to call before getting OOB; Teach patient to arise slowly (09/25/17 7732)  Elimination: Incontinence (09/25/17 3726)  Elimination Interventions: Bed/chair exit alarm;Call light in reach; Patient to call for help with toileting needs; Toileting schedule/hourly rounds (09/25/17 0251)  Prior Fall History: Unknown (09/25/17 0922)  History of Falls Interventions: Bed/chair exit alarm (09/25/17 7392)  Total Score: 2 (09/25/17 0922)  Standard Fall Precautions: Yes (09/22/17 1405)  High Fall Risk: Yes (09/25/17 8217)     Speech Assessment:         Ambulation:  Gait  Base of Support: Narrowed; Center of gravity altered (09/22/17 1200)  Speed/Lulu: Delayed (09/22/17 1200)  Step Length: Right shortened (09/22/17 1200)  Distance (ft): 10 Feet (ft) (10' x 2) (09/22/17 1500)  Assistive Device: Gait belt; Other (comment) (Parallel bars) (09/22/17 1500)     Labs/Studies:  Recent Results (from the past 72 hour(s))   METABOLIC PANEL, BASIC    Collection Time: 09/23/17  7:58 AM   Result Value Ref Range    Sodium 138 136 - 145 mmol/L    Potassium 4.2 3.5 - 5.1 mmol/L    Chloride 105 98 - 107 mmol/L    CO2 24 21 - 32 mmol/L    Anion gap 9 7 - 16 mmol/L    Glucose 94 65 - 100 mg/dL    BUN 16 8 - 23 MG/DL    Creatinine 0.54 (L) 0.6 - 1.0 MG/DL    GFR est AA >60 >60 ml/min/1.73m2    GFR est non-AA >60 >60 ml/min/1.73m2    Calcium 8.0 (L) 8.3 - 10.4 MG/DL   CBC WITH AUTOMATED DIFF    Collection Time: 09/23/17  7:58 AM   Result Value Ref Range    WBC 7.3 4.3 - 11.1 K/uL    RBC 3.53 (L) 4.05 - 5.25 M/uL    HGB 10.0 (L) 11.7 - 15.4 g/dL    HCT 28.7 (L) 35.8 - 46.3 %    MCV 81.3 79.6 - 97.8 FL    MCH 28.3 26.1 - 32.9 PG    MCHC 34.8 31.4 - 35.0 g/dL    RDW 13.7 11.9 - 14.6 %    PLATELET 53 (L) 583 - 450 K/uL    MPV 10.6 (L) 10.8 - 14.1 FL    DF AUTOMATED      NEUTROPHILS 69 43 - 78 %    LYMPHOCYTES 21 13 - 44 %    MONOCYTES 8 4.0 - 12.0 %    EOSINOPHILS 2 0.5 - 7.8 %    BASOPHILS 0 0.0 - 2.0 %    IMMATURE GRANULOCYTES 0.5 0.0 - 5.0 %    ABS.  NEUTROPHILS 5.0 1.7 - 8.2 K/UL    ABS. LYMPHOCYTES 1.6 0.5 - 4.6 K/UL    ABS. MONOCYTES 0.6 0.1 - 1.3 K/UL    ABS. EOSINOPHILS 0.1 0.0 - 0.8 K/UL    ABS. BASOPHILS 0.0 0.0 - 0.2 K/UL    ABS. IMM. GRANS. 0.0 0.0 - 0.5 K/UL   METABOLIC PANEL, BASIC    Collection Time: 09/24/17  7:19 AM   Result Value Ref Range    Sodium 136 136 - 145 mmol/L    Potassium 4.5 3.5 - 5.1 mmol/L    Chloride 104 98 - 107 mmol/L    CO2 23 21 - 32 mmol/L    Anion gap 9 7 - 16 mmol/L    Glucose 98 65 - 100 mg/dL    BUN 15 8 - 23 MG/DL    Creatinine 0.53 (L) 0.6 - 1.0 MG/DL    GFR est AA >60 >60 ml/min/1.73m2    GFR est non-AA >60 >60 ml/min/1.73m2    Calcium 8.1 (L) 8.3 - 10.4 MG/DL   CBC WITH AUTOMATED DIFF    Collection Time: 09/24/17  7:19 AM   Result Value Ref Range    WBC 7.8 4.3 - 11.1 K/uL    RBC 3.45 (L) 4.05 - 5.25 M/uL    HGB 9.8 (L) 11.7 - 15.4 g/dL    HCT 28.3 (L) 35.8 - 46.3 %    MCV 82.0 79.6 - 97.8 FL    MCH 28.4 26.1 - 32.9 PG    MCHC 34.6 31.4 - 35.0 g/dL    RDW 13.9 11.9 - 14.6 %    PLATELET 69 (L) 008 - 450 K/uL    MPV 10.0 (L) 10.8 - 14.1 FL    DF AUTOMATED      NEUTROPHILS 73 43 - 78 %    LYMPHOCYTES 19 13 - 44 %    MONOCYTES 7 4.0 - 12.0 %    EOSINOPHILS 1 0.5 - 7.8 %    BASOPHILS 0 0.0 - 2.0 %    IMMATURE GRANULOCYTES 0.4 0.0 - 5.0 %    ABS. NEUTROPHILS 5.6 1.7 - 8.2 K/UL    ABS. LYMPHOCYTES 1.5 0.5 - 4.6 K/UL    ABS. MONOCYTES 0.6 0.1 - 1.3 K/UL    ABS. EOSINOPHILS 0.1 0.0 - 0.8 K/UL    ABS. BASOPHILS 0.0 0.0 - 0.2 K/UL    ABS. IMM.  GRANS. 0.0 0.0 - 0.5 K/UL       Assessment:     Problem List as of 9/25/2017  Date Reviewed: 9/12/2017          Codes Class Noted - Resolved    ICH (intracerebral hemorrhage) ([facility] ICD-10-CM: I61.9  ICD-9-CM: 589  9/15/2017 - Present        Hypertensive urgency, malignant ICD-10-CM: I16.0  ICD-9-CM: 401.0  9/11/2017 - Present        Stroke, hemorrhagic ([facility] ICD-10-CM: I61.9  ICD-9-CM: 462  9/7/2017 - Present        Accelerated hypertension ICD-10-CM: I10  ICD-9-CM: 401.0  9/7/2017 - Present        At risk for aspiration (Chronic) ICD-10-CM: Z91.89  ICD-9-CM: V49.89  9/7/2017 - Present        Acute spontaneous intraventricular hemorrhage assoc w/ hypertension ([facility] ICD-10-CM: I61.5, I10  ICD-9-CM: 969, 401.9  9/7/2017 - Present        Screening for breast cancer ICD-10-CM: Z12.39  ICD-9-CM: V76.10  2/27/2017 - Present        Atrophic vaginitis ICD-10-CM: N95.2  ICD-9-CM: 627.3  7/11/2016 - Present        HTN (hypertension) ICD-10-CM: I10  ICD-9-CM: 401.9  10/23/2015 - Present        ADD (attention deficit disorder) ICD-10-CM: F98.8  ICD-9-CM: 314.00  10/23/2015 - Present        Depression ICD-10-CM: F32.9  ICD-9-CM: 437  10/23/2015 - Present        Anxiety ICD-10-CM: F41.9  ICD-9-CM: 300.00  10/23/2015 - Present        RESOLVED: Hypoxemia ICD-10-CM: R09.02  ICD-9-CM: 799.02  9/10/2017 - 9/12/2017        RESOLVED: Acute respiratory failure ([facility] ICD-10-CM: J96.00  ICD-9-CM: 518.81  9/8/2017 - 9/10/2017        RESOLVED: Hemorrhagic stroke ([facility] ICD-10-CM: I61.9  ICD-9-CM: 159  9/7/2017 - 9/7/2017        RESOLVED: Non-intractable vomiting with nausea ICD-10-CM: R11.2  ICD-9-CM: 787.01  9/7/2017 - 9/12/2017        RESOLVED: Seborrheic keratosis, inflamed ICD-10-CM: L82.0  ICD-9-CM: 702.11  7/11/2016 - 9/20/2016        RESOLVED: Cough ICD-10-CM: R05  ICD-9-CM: 786.2  7/11/2016 - 9/20/2016        RESOLVED: Shoulder pain ICD-10-CM: M25.519  ICD-9-CM: 719.41  10/23/2015 - 2/27/2017              Plan:     Left BG Hemorrhage with intraventricular extension due to HTN emergency; resultant R hemiplegia, right neglect, dysarthria, aphasia, dysphagia with significant decline in mobility and self care  Plan:   Continue daily physician medical management:  Pneumonia prophylaxis- Incentive spirometer every hour while awake. Will need instruction and assistance. Not sure if she can get a proper oral seal to be effective      Dysphagia; Pureed diet with NTL; at risk for malnutrition and dehydration.  PICC line removed prior to transfer; may need a line for IVFs if BUN continues to increase. Low K, Ca; check mg. Supplementation as needed. Na elevated  -mag ok, K much improved; cont to supplement while on diuretic  -9/19 replace K; 3.0; 2.8 this am 9/20; inc supplement; may need to add to IVFs  -9/25 fluid status and K nl      Thrombocytopenia; has been trending down for no apparent reason. hgb slt down as well. Has not been on Hep products; consult hematology today 9/19  -plts 40K, continues to trend down 9/20; await consult by hematology. ? reln to [address]. Mild anemia as well  Check HIT panel, d dimer , fibrinogen  -9/21 plts 36k ; HIT PROFILE POSITIVE, D DIMER ELEVATED 32.79, FIBRINOGEN  ; consistent with DIC; Etiology unclear  WILL D/W HEMATOLOGY; LFTs ok, no hx of blood transfusion,no hx pancreatitis, no sign of bacteremia. Has not been on any heparin products. Can see in head injury; has ICH. Cannot r/o blood disorder (leukemia)   per hematology \"Recommend transfusing platelets for <18,740 and Cryoprecipitate for fibrinogen < 100. \"  -MARK pending  -9/22 bilateral LE venous duplex ordered; low suspicion but at risk and having calf tenderness  -9/22 plts improved today; monitor closely; 9/23 small right peroneal thrombus. No antic per hematology. IR refused/advised against placing IVF due to low risk of clot propagation given size and location  -9/23 counts bumping up; pts now 53k from 45 and previously 36  -9/24 plts cont to improve. 69K. MARK still pending; can resume therapies      Hypernatremia; last head CT did not show significant cerebral edema. Clinically improving. Likely due to prerenal azotemia; 9/15 MAY REQ isotonic / hypotonic saline IV;  -9/18 events of weekend noted;  Na 156; asymptomatic, on 0.5 dextrose with 1/4 Na; na q 6hrs; pts serum osmolality was normal. Will check urine Na and urine osmolality; depending on findings, will consider consulting Hospitalist vs Nephrology  Neuro improving despite this  -9/19 Na down to 149, urine osmo nl, serum osmo min hi; cont dex/and 1/4 NS; repeat in am. Not DI as uop not increased  -9/20 Na 147; cont fluids; 9/21 not resulted; will stop fluids when Na below 142  -Na 140 9/22; dc IVFs; 9/25 136      ICH/IVH; 9/22 clinically improves daily. F/u HCT yest due to transient left eye visual disturbance. Overall, resolving hemorrhage. There is more pronounced edema; expected. Ventricular size now nl      DVT risk / DVT Prophylaxis- Will require daily physician exam to assess for signs and symptoms as patient is at increased risk for of thromboembolism. Mobilization as tolerated. Intermittent pneumatic compression devices when in bed Thigh-high or knee-high thromboembolic deterrent hose when out of bed. NO  anticoag due to ICH/HIT; has right small right peroneal vein; 9/25 spoke with IR again today; more risk of putting in IVC filter than benefits especially since she is asymptomatic with no swelling or tenderness and small size of clot      Pain Control: stable, mild-to-moderate joint symptoms intermittently, reasonably well controlled by PRN meds. Will require regular pain assessment and comprenhensive pain management,       Wound Care: Monitor wound status daily per staff and physician. At risk for failure. Will require 24/7 rehab nursing. None needed at this time      Hypertension - BP uncontrolled, fluctuating, managed medically. Add prn hydralazine for sbp> 180. Goal SBP is 140-160 given ICH. Cont Nomodyne, norvasc and hyzaar; consider Verapamil or scheduled Hydralazine  -9/18 BP increased 169/95; add hydralazine 25 tid  -9/20 BP much improved  -9/22 /74; 9/24 bp stable; may dec hydralazine to bid  -9/25 dec hydralazine      Mild hyperglycemia, likely stress induced vs borderline diabetes; monitor loosely    Dysphagia; cont NTL, mech soft; hopefully can upgrade liquids soon 9/25      Leukocytosis; recheck in a.m. Check UA due to stafford.  No pulmonary signs (had neg CXR today), no s/s of infxn at old PICC site, no wounds, afebrile. Watch monocytes. -9/18 12.2 improving. Urine neg  -9/19 up to 13.2, ? Reactive. No source of infxn identified. Afebrile  -9/20 WBC now normal      Urinary retention/ neurogenic bladder - start flomax but continue stafford until mobility improves a bit. Strict I/o's  -9/20 keeping stafford to monitor UOP until Na normalizes. No significant output to suggest DI  -9/21 dc stafford; cont Flomax, follow bladder scan  9/22 voiding without issue; incontinence but no retention      bowel program - add prn meds; incontinent, want to keep stool on the firmer side. Monitor skin.       GERD - add a PPI. At times may need additional antacids, Maalox prn.      -continues to benefit from and is showing improvements in a multidisc team approach. Participating well.  Cortes Valdivia   9/21   Addendum; in therapy this afternoon, transient left eye blindness. Will f/u head CT for extension of or new ICH, especially with low plts   Results  1. Interval decrease in size of intraventricular hemorrhage within the left  basal ganglia/thalamus with near complete resolution of intraventricular  hemorrhage. Surrounding edema is more pronounced with slight increase in  left-to-right midline shift. 2. Interval decrease in ventricular size, approaching normal in caliber.                Time spent was 25 minutes with over 1/2 in direct patient care/examination, consultation and coordination of care.      Signed By: Fouzia Arteaga MD     September 25, 2017

## 2017-09-26 LAB
APTT PPP: 27.1 SEC (ref 23.5–31.7)
BASOPHILS # BLD: 0 K/UL (ref 0–0.2)
BASOPHILS NFR BLD: 0 % (ref 0–2)
D DIMER PPP FEU-MCNC: 11.28 UG/ML(FEU)
DIFFERENTIAL METHOD BLD: ABNORMAL
EOSINOPHIL # BLD: 0.1 K/UL (ref 0–0.8)
EOSINOPHIL NFR BLD: 1 % (ref 0.5–7.8)
ERYTHROCYTE [DISTWIDTH] IN BLOOD BY AUTOMATED COUNT: 14.2 % (ref 11.9–14.6)
HCT VFR BLD AUTO: 30.5 % (ref 35.8–46.3)
HGB BLD-MCNC: 10.6 G/DL (ref 11.7–15.4)
IMM GRANULOCYTES # BLD: 0 K/UL (ref 0–0.5)
IMM GRANULOCYTES NFR BLD: 0 % (ref 0–5)
INR PPP: 0.9 (ref 0.9–1.2)
LYMPHOCYTES # BLD: 1.2 K/UL (ref 0.5–4.6)
LYMPHOCYTES NFR BLD: 13 % (ref 13–44)
MCH RBC QN AUTO: 28.9 PG (ref 26.1–32.9)
MCHC RBC AUTO-ENTMCNC: 34.8 G/DL (ref 31.4–35)
MCV RBC AUTO: 83.1 FL (ref 79.6–97.8)
MONOCYTES # BLD: 1 K/UL (ref 0.1–1.3)
MONOCYTES NFR BLD: 11 % (ref 4–12)
NEUTS SEG # BLD: 7.2 K/UL (ref 1.7–8.2)
NEUTS SEG NFR BLD: 75 % (ref 43–78)
PLATELET # BLD AUTO: 112 K/UL (ref 150–450)
PLATELET COMMENTS,PCOM: SLIGHT
PMV BLD AUTO: 10 FL (ref 10.8–14.1)
PROTHROMBIN TIME: 10.2 SEC (ref 9.6–12)
RBC # BLD AUTO: 3.67 M/UL (ref 4.05–5.25)
RBC MORPH BLD: ABNORMAL
WBC # BLD AUTO: 9.5 K/UL (ref 4.3–11.1)
WBC MORPH BLD: ABNORMAL

## 2017-09-26 PROCEDURE — 85610 PROTHROMBIN TIME: CPT | Performed by: NURSE PRACTITIONER

## 2017-09-26 PROCEDURE — 97535 SELF CARE MNGMENT TRAINING: CPT

## 2017-09-26 PROCEDURE — 92507 TX SP LANG VOICE COMM INDIV: CPT

## 2017-09-26 PROCEDURE — 65310000000 HC RM PRIVATE REHAB

## 2017-09-26 PROCEDURE — 85379 FIBRIN DEGRADATION QUANT: CPT | Performed by: NURSE PRACTITIONER

## 2017-09-26 PROCEDURE — 99233 SBSQ HOSP IP/OBS HIGH 50: CPT | Performed by: INTERNAL MEDICINE

## 2017-09-26 PROCEDURE — 74011250637 HC RX REV CODE- 250/637: Performed by: PHYSICAL MEDICINE & REHABILITATION

## 2017-09-26 PROCEDURE — 85730 THROMBOPLASTIN TIME PARTIAL: CPT | Performed by: NURSE PRACTITIONER

## 2017-09-26 PROCEDURE — 97116 GAIT TRAINING THERAPY: CPT

## 2017-09-26 PROCEDURE — 85025 COMPLETE CBC W/AUTO DIFF WBC: CPT | Performed by: NURSE PRACTITIONER

## 2017-09-26 PROCEDURE — 99232 SBSQ HOSP IP/OBS MODERATE 35: CPT | Performed by: PHYSICAL MEDICINE & REHABILITATION

## 2017-09-26 PROCEDURE — 99232 SBSQ HOSP IP/OBS MODERATE 35: CPT | Performed by: INTERNAL MEDICINE

## 2017-09-26 PROCEDURE — 90832 PSYTX W PT 30 MINUTES: CPT | Performed by: PSYCHOLOGIST

## 2017-09-26 PROCEDURE — 36415 COLL VENOUS BLD VENIPUNCTURE: CPT | Performed by: NURSE PRACTITIONER

## 2017-09-26 PROCEDURE — 97530 THERAPEUTIC ACTIVITIES: CPT

## 2017-09-26 PROCEDURE — 90785 PSYTX COMPLEX INTERACTIVE: CPT | Performed by: PSYCHOLOGIST

## 2017-09-26 PROCEDURE — 97110 THERAPEUTIC EXERCISES: CPT

## 2017-09-26 RX ADMIN — HYDROCHLOROTHIAZIDE: 12.5 CAPSULE ORAL at 08:53

## 2017-09-26 RX ADMIN — LABETALOL HYDROCHLORIDE 200 MG: 200 TABLET, FILM COATED ORAL at 08:54

## 2017-09-26 RX ADMIN — TIZANIDINE 2 MG: 2 TABLET ORAL at 20:47

## 2017-09-26 RX ADMIN — AMLODIPINE BESYLATE 10 MG: 10 TABLET ORAL at 08:54

## 2017-09-26 RX ADMIN — HYDRALAZINE HYDROCHLORIDE 25 MG: 25 TABLET, FILM COATED ORAL at 17:51

## 2017-09-26 RX ADMIN — LABETALOL HYDROCHLORIDE 200 MG: 200 TABLET, FILM COATED ORAL at 17:50

## 2017-09-26 RX ADMIN — TRAZODONE HYDROCHLORIDE 50 MG: 50 TABLET ORAL at 20:48

## 2017-09-26 RX ADMIN — LABETALOL HYDROCHLORIDE 200 MG: 200 TABLET, FILM COATED ORAL at 00:17

## 2017-09-26 RX ADMIN — TAMSULOSIN HYDROCHLORIDE 0.4 MG: 0.4 CAPSULE ORAL at 20:48

## 2017-09-26 RX ADMIN — HYDRALAZINE HYDROCHLORIDE 25 MG: 25 TABLET, FILM COATED ORAL at 08:54

## 2017-09-26 RX ADMIN — POTASSIUM CHLORIDE 40 MEQ: 20 TABLET, EXTENDED RELEASE ORAL at 18:00

## 2017-09-26 NOTE — PROGRESS NOTES
09/26/17 1034   Time Spent With Patient   Time In 0920   Time Out 1015   Patient Seen For: AM;Neuro-linguistics; Verbal activities;Oral-motor exercises   Mental Status   Neurologic State Alert   Orientation Level Oriented to person   Cognition Impaired decision making;Memory loss;Decreased command following; Appropriate decision making   Perseveration Perseverates during conversation   Safety/Judgement Fall prevention   Pt completed oral motor exercises x 10 with 80% accuracy. Pt completed speech tasks with cued strategies to over articulation and taking a big breath when talking. Pt completed reading comprehension tasks with min-mod cues with 80% accuracy. Sessions was stopped for pt to be seen by Dr. Chelsie Patterson for about 15 min.    Traci Gaffney MA/ANASTASIA/SLP

## 2017-09-26 NOTE — PROGRESS NOTES
09/26/17 1010   Time Spent With Patient   Time In 0703   Time Out 0743   Patient Seen For: AM;ADLs   Grooming   Grooming Assistance  Min A   Upper Body Bathing   Bathing Assistance, Upper Max A   Lower Body Bathing   Bathing Assistance, Lower  Dep   Toileting   Toileting Assistance (FIM Score) Dep   Upper Body Dressing    Dressing Assistance  Mod A   Comments not able to thread R arm. Lower Body Dressing    Dressing Assistance  Max A   Comments able to assist supine in bed with pulling over waist and therading LLE. S: \"I am doing a little better. \" Agreeable to therapy. Focus of session was on morning ADL routine. Patient was able to SPT with moderate assist.   Pain denied during session. Collaborated with Kristina RODRIGUEZ and confirmed patient is on track to reach goals as documented in the care plan. Patient tolerated session well, but strength, balance, activity tolerance, ROM, safety awareness are still below baseline and requires skilled facilitation to successfully and safely complete ADL's and transfers. Patient ended session in w/c  with call remote and phone within reach.      Eliot Haddad OTR

## 2017-09-26 NOTE — PROGRESS NOTES
PSYCHOLOGY PROGRESS NOTE      Name:  Alcides Vincent    Date of Service: 9/26/2017  Location of Service:   912/01  Type of Service: Individual Psychotherapy  Duration:  30 minutes  Primary Diagnosis:   1. Urinary retention with incomplete bladder emptying    2. Hemorrhagic stroke ([address]    3. Accelerated hypertension    4. Acute spontaneous intraventricular hemorrhage assoc w/ hypertension (HCC)    5. Anxiety    6. At risk for aspiration    7. Non-intractable vomiting with nausea, unspecified vomiting type    8. Stroke, hemorrhagic ([address]    9. Acute respiratory failure with hypoxia (HCC)    10. Hypoxemia    11. Hypertensive urgency, malignant    12. Essential hypertension    13. ADD (attention deficit disorder)    14. Other depression    15. Atrophic vaginitis    16. Screening for breast cancer    17. Hypernatremia    18. Hypokalemia    19. Thrombocytopenia ([address]    20. Multiple left-sided nontraumatic localized intracerebral hemorrhages ([address]    21. DIC (disseminated intravascular coagulation) ([facility]    22. Acute deep vein thrombosis (DVT) of right lower extremity, unspecified vein (HCC)    23. Pulmonary embolism, other [facility])        Summary of Service:  Introduced self and psychological services to patient. Patient presents with mild to moderate receptive and expressive aphasia. Consulted with Speech Therapy regarding diagnostic presentation. During the session, I attempted to provide psychoeducation with patient regarding stroke teaching, as well as provide assistance in understanding her PE yesterday. It is unclear the degree to which patient comprehended this information, as she frequently repeats back the phrases of those working with her. Given the complexity of interaction, I will check back on the patient in 1 week, on 10/3/2017 at 9:30am.  I have placed this scheduling request on the board in the therapy gym.       Electronically Signed By:  Lien Sheriff,

## 2017-09-26 NOTE — PROGRESS NOTES
Francia Dean  Admission Date: 9/15/2017             Daily Progress Note: 9/26/2017   Patient is a 60 y.o.  female recently admitted to [facility] and discharged to  rehab where we were asked to see again. We saw her while in ICU at [facility]. She has a h/o HTN (on amlodipine,hyzaar), anxiety and new ICH/hemorrhagic CVA. CT head was ordered and revealed left basal ganglia ICH measured 3 x 2cm with spread to L lateral ventricle and 3rd/4th ventricle with some mild mildline shift.  No significant edema thus far. She had seizure-like activity. She was intubated and received Keppra. EEG negative for seizures. Teleneurology was consulted.  SBP goal of 160, tight glucose control and DVT ppx.  Dr. Sneha Rico was consulted and did not think surgery would be of benefit for the patient. She tolerated extubation on 9/9. Speech was consulted and diet was advanced to pureed diet, nectar thick liquids. Steroids were avoided with CVA.      She had ongoing thrombocytopenia and Hem/onc was consulted. She was found to be HIT positive and Heparin was avoided. DIC noted. DVT risk/Prophylaxis and unable to take any anticoagulation. Dr Mer He spoke with IR regarding IVC filter and the procedure was thought to have a great risk than benefit with clot size and asymptomatic. Today, she been having chest discomfort and chest CT with bilateral pulmonary emboli without evidence of right heart strain. LE ultrasound with thrombus in the right peroneal vein. We were asked to see her for new PTE with recent ICH, HIT and DIC.     Subjective:     Working with PT  On RA.     Review of Systems  Respiratory: negative for cough or dyspnea on exertion    Current Facility-Administered Medications   Medication Dose Route Frequency    hydrALAZINE (APRESOLINE) tablet 25 mg  25 mg Oral BID    potassium chloride (K-DUR, KLOR-CON) SR tablet 40 mEq  40 mEq Oral BID    tiZANidine (ZANAFLEX) tablet 2 mg  2 mg Oral QHS    acetaminophen (TYLENOL) tablet 500 mg  500 mg Per NG tube Q4H PRN    alum-mag hydroxide-simeth (MYLANTA) oral suspension 30 mL  30 mL Oral Q4H PRN    amLODIPine (NORVASC) tablet 10 mg  10 mg Oral DAILY    bisacodyl (DULCOLAX) suppository 10 mg  10 mg Rectal DAILY PRN    hydrALAZINE (APRESOLINE) tablet 50 mg  50 mg Oral QID PRN    labetalol (NORMODYNE) tablet 200 mg  200 mg Oral Q8H    lip protectant (BLISTEX) ointment   Topical PRN    losartan/hydroCHLOROthiazide (HYZAAR) 100/12.5 mg   Oral DAILY    ondansetron (ZOFRAN ODT) tablet 4 mg  4 mg Oral Q6H PRN    sodium phosphate (FLEET'S) enema 118 mL  1 Enema Rectal PRN    traMADol (ULTRAM) tablet 50 mg  50 mg Oral Q6H PRN    traZODone (DESYREL) tablet 50 mg  50 mg Oral QHS PRN    tamsulosin (FLOMAX) capsule 0.4 mg  0.4 mg Oral QHS         Objective:     Vitals:    09/25/17 1647 09/25/17 1652 09/25/17 2034 09/26/17 0717   BP: 112/64 105/64 109/69 116/69   Pulse: 85 86 88 83   Resp: 16 10 16 16   Temp:  98.1 °F (36.7 °C) 98.2 °F (36.8 °C) 98 °F (36.7 °C)   SpO2:    94%     Intake and Output:   09/24 1901 - 09/26 0700  In: 360 [P.O.:360]  Out: -        Physical Exam:          Constitutional: the patient is weak  HEENT: Sclera clear, pupils equal, oral mucosa moist  Lungs: clear bilaterally on RA  Cardiovascular: RRR without M,G,R  Abd/GI: soft and non-tender; with positive bowel sounds. Ext: warm without cyanosis. There is no lower leg edema.   Musculoskeletal:LE atrophy and weakness  Skin: no jaundice or rashes, no wounds   Neuro: no gross neuro deficits     Nutrition: MS diet        LAB  Recent Labs      09/24/17   0719   WBC  7.8   HGB  9.8*   HCT  28.3*   PLT  69*     Recent Labs      09/25/17   1139  09/24/17   0719   NA   --   136   K   --   4.5   CL   --   104   CO2   --   23   GLU   --   98   BUN   --   15   CREA  0.56*  0.53*         Assessment:     Patient Active Problem List   Diagnosis Code    HTN (hypertension) I10    ADD (attention deficit disorder) F98.8    Depression F32.9    Anxiety F41.9    Atrophic vaginitis N95.2    Screening for breast cancer Z12.39    Stroke, hemorrhagic (HCC) I61.9    Accelerated hypertension I10    At risk for aspiration Z91.89    Acute spontaneous intraventricular hemorrhage assoc w/ hypertension (HCC) I61.5, I10    Hypertensive urgency, malignant I16.0    ICH (intracerebral hemorrhage) (HCC) I61.9    Thrombocytopenia (HCC) D69.6    Pulmonary emboli (HCC) I26.99    DVT (deep venous thrombosis) ([facility] I82.409       Plan     Hospital Problems  Date Reviewed: 9/12/2017          Codes Class Noted POA    Thrombocytopenia ([facility] ICD-10-CM: D69.6  ICD-9-CM: 287.5  9/25/2017 Yes        Pulmonary emboli (HCC) ICD-10-CM: I26.99  ICD-9-CM: 415.19  9/25/2017 Yes    Now with IVC filter  On RA    DVT (deep venous thrombosis) (HCC) ICD-10-CM: I82.409  ICD-9-CM: 453.40  9/25/2017 Yes        ICH (intracerebral hemorrhage) ([facility] ICD-10-CM: I61.9  ICD-9-CM: 049  9/15/2017 Yes              -- has IVC filter  --anticoagulation contraindicated  -- on RA  --Respiratory status stable on RA. No further respiratory needs noted at this time. Will sign off of the treatment team.  Please call if we can be of further assistance. Thank you. Alvina Anguiano NP    More than 50% of time documented was spent in face-to-face contact with the patient and in the care of the patient on the floor/unit where the patient is located. The patient has been seen and examined by me personally, the chart,labs, and radiographic studies have been reviewed. Chest:CTA  Extremities: trace edema    I agree with the above assessment and plan.     Kimber Gibbs MD.

## 2017-09-26 NOTE — PROGRESS NOTES
OT Daily Note    Time In 1031   Time Out 1115     Subjective: \"Do you think this arm will be normal again? \" Agreeable to therapy. Pain:Denied during session. Education:On hopeful recovery but also unknown recovery of right arm. Interdisciplinary Communication: Collaborated with PTA, [place] and patient is making progress towards goals. Precautions:  (falls)    E-Stimulation Daily Assessment   Reciprocation stimulation used on biceps and finger extensors. Used for a total of 8 minutes, in 2 minute intervals with good results, patient enjoys working with E-stim and mentally participates with activity. Strengthening/activity tolerance Daily Assessment   Table slides completed with RUE for elbow flexion/extension with minimal flexion seen again. PROM completed in scapular mobilization and shoulder flexion, abduction, and ER. Right UE tone less spastic and less limited due to high tone this session. Cognition Daily Assessment   Improved awareness and alertness this session. Continues to have limited memory, but continues to ask relevant questions. Ended session:In w/c in gym with Moose RODRIGUEZ.      Matt Carreon OTR/MITCHELL

## 2017-09-26 NOTE — PROGRESS NOTES
PHYSICAL THERAPY DAILY NOTE  Time In: 4625  Time Out: 4122  Patient Seen For: PM;Other (see progress notes); Therapeutic exercise;Gait training;Transfer training    Subjective: Patient had no complaints. Objective: No pain noted. Visit Vitals    /69    Pulse 83    Temp 98 °F (36.7 °C)    Resp 16    SpO2 94%     Other (comment) (falls)  GROSS ASSESSMENT Daily Assessment            BED/MAT MOBILITY Daily Assessment    Supine to Sit : 2 (Maximal assistance)  Sit to Supine : 2 (Maximal assistance)       TRANSFERS Daily Assessment    Transfer Type: SPT without device  Transfer Assistance : 2 (Maximal assistance)  Sit to Stand Assistance: Maximum assistance       GAIT Daily Assessment   Used ace wrap for right knee support and another one for to assist DF. Amount of Assistance: 2 (Maximal assistance)  Distance (ft): 10 Feet (ft)  Assistive Device: Gait belt;Walker cindy       STEPS or STAIRS Daily Assessment    Level of Assist : 0 (Not tested)       BALANCE Daily Assessment            WHEELCHAIR MOBILITY Daily Assessment            LOWER EXTREMITY EXERCISES Daily Assessment    Extremity: Both  Exercise Type #1: Seated lower extremity strengthening  Sets Performed: 1  Reps Performed: 10  Level of Assist: Maximum assistance  Exercise Type #2: Other (comment) (motomed x 10 minutes)  Sets Performed: 1  Reps Performed: 10  Level of Assist: Supervision          Assessment: Patient making good progress and following commands today. Plan of Care: Continue with plan of care to reach PT goals. Returned to room with call bell at reach.           Ernie Dove, PTA  9/26/2017

## 2017-09-26 NOTE — PROGRESS NOTES
Chiara Liu MD,   Medical Director  [address] [facility], [address] [facility]  Tel: [phone]       [facility] PROGRESS NOTE    Mable Russ  Admit Date: 9/15/2017  Admit Diagnosis: stroke, left brain involvement;ICH (intracerebral hemorrhag*    Subjective     Doing well. Looks good this a.m. IVC filter successfully placed. Denies cp, sob, n. Smiling with bright affect    Objective:     Current Facility-Administered Medications   Medication Dose Route Frequency    hydrALAZINE (APRESOLINE) tablet 25 mg  25 mg Oral BID    potassium chloride (K-DUR, KLOR-CON) SR tablet 40 mEq  40 mEq Oral BID    tiZANidine (ZANAFLEX) tablet 2 mg  2 mg Oral QHS    acetaminophen (TYLENOL) tablet 500 mg  500 mg Per NG tube Q4H PRN    alum-mag hydroxide-simeth (MYLANTA) oral suspension 30 mL  30 mL Oral Q4H PRN    amLODIPine (NORVASC) tablet 10 mg  10 mg Oral DAILY    bisacodyl (DULCOLAX) suppository 10 mg  10 mg Rectal DAILY PRN    hydrALAZINE (APRESOLINE) tablet 50 mg  50 mg Oral QID PRN    labetalol (NORMODYNE) tablet 200 mg  200 mg Oral Q8H    lip protectant (BLISTEX) ointment   Topical PRN    losartan/hydroCHLOROthiazide (HYZAAR) 100/12.5 mg   Oral DAILY    ondansetron (ZOFRAN ODT) tablet 4 mg  4 mg Oral Q6H PRN    sodium phosphate (FLEET'S) enema 118 mL  1 Enema Rectal PRN    traMADol (ULTRAM) tablet 50 mg  50 mg Oral Q6H PRN    traZODone (DESYREL) tablet 50 mg  50 mg Oral QHS PRN    tamsulosin (FLOMAX) capsule 0.4 mg  0.4 mg Oral QHS     Review of Systems:Denies chest pain, shortness of breath, cough, headache, visual problems, abdominal pain, dysurea, calf pain. Pertinent positives are as noted in the medical records and unremarkable otherwise. Visit Vitals    /69    Pulse 83    Temp 98 °F (36.7 °C)    Resp 16    SpO2 94%        Physical Exam:   General: Alert and age appropriately oriented. No acute cardio respiratory distress.    HEENT: Normocephalic,no scleral icterus. Right facial   Oral mucosa moist without cyanosis   Lungs: Clear to auscultation  bilaterally. Respiration even and unlabored   Heart: Regular rate and rhythm, S1, S2   No  murmurs, clicks, rub or gallops   Abdomen: Soft, non-tender, nondistended. Bowel sounds present. No organomegaly. Genitourinary: Benign . Neuromuscular:      Dense right hemiplegia  Right sens deficit  Right VFC and neglect. Tone 2   Skin/extremity: No rashes, no erythema.  No calf tenderness BLE  Groin without swelling or drainage                                                                            Functional Assessment:  Gross Assessment  AROM: Grossly decreased, non-functional (09/22/17 1500)  PROM: Within functional limits (09/22/17 1500)  Strength: Grossly decreased, non-functional (09/22/17 1500)  Coordination: Generally decreased, functional (09/22/17 1500)  Tone: Abnormal (09/22/17 1500)  Sensation: Impaired (09/22/17 1500)       Balance  Sitting - Static: Fair (occasional) (09/22/17 1500)  Sitting - Dynamic: Fair (occasional) (09/22/17 1500)  Standing - Static: Fair;Constant support (09/22/17 1500)  Standing - Dynamic : Impaired (09/22/17 1500)                     Catherene Bunting Fall Risk Assessment:  Catherene Bunting Fall Risk  Mobility: Unable to ambulate or transfer (09/25/17 2236)  Mobility Interventions: Bed/chair exit alarm (09/25/17 2236)  Mentation: Alert, oriented x 3 (09/25/17 2236)  Mentation Interventions: Bed/chair exit alarm (09/25/17 2236)  Medication: Patient receiving anticonvulsants, sedatives(tranquilizers), psychotropics or hypnotics, hypoglycemics, narcotics, sleep aids, antihypertensives, laxatives, or diuretics (09/25/17 2236)  Medication Interventions: Bed/chair exit alarm (09/25/17 2236)  Elimination: Incontinence (09/25/17 2236)  Elimination Interventions: Bed/chair exit alarm (09/25/17 2236)  Prior Fall History: Unknown (09/25/17 2236)  History of Falls Interventions: Bed/chair exit alarm (09/25/17 2236)  Total Score: 2 (09/25/17 2236)  Standard Fall Precautions: Yes (09/22/17 1405)  High Fall Risk: Yes (09/25/17 2236)     Speech Assessment:         Ambulation:  Gait  Base of Support: Narrowed; Center of gravity altered (09/22/17 1200)  Speed/Lulu: Delayed (09/22/17 1200)  Step Length: Right shortened (09/22/17 1200)  Distance (ft): 10 Feet (ft) (10' x 2) (09/22/17 1500)  Assistive Device: Gait belt; Other (comment) (Parallel bars) (09/22/17 1500)     Labs/Studies:  Recent Results (from the past 72 hour(s))   METABOLIC PANEL, BASIC    Collection Time: 09/24/17  7:19 AM   Result Value Ref Range    Sodium 136 136 - 145 mmol/L    Potassium 4.5 3.5 - 5.1 mmol/L    Chloride 104 98 - 107 mmol/L    CO2 23 21 - 32 mmol/L    Anion gap 9 7 - 16 mmol/L    Glucose 98 65 - 100 mg/dL    BUN 15 8 - 23 MG/DL    Creatinine 0.53 (L) 0.6 - 1.0 MG/DL    GFR est AA >60 >60 ml/min/1.73m2    GFR est non-AA >60 >60 ml/min/1.73m2    Calcium 8.1 (L) 8.3 - 10.4 MG/DL   CBC WITH AUTOMATED DIFF    Collection Time: 09/24/17  7:19 AM   Result Value Ref Range    WBC 7.8 4.3 - 11.1 K/uL    RBC 3.45 (L) 4.05 - 5.25 M/uL    HGB 9.8 (L) 11.7 - 15.4 g/dL    HCT 28.3 (L) 35.8 - 46.3 %    MCV 82.0 79.6 - 97.8 FL    MCH 28.4 26.1 - 32.9 PG    MCHC 34.6 31.4 - 35.0 g/dL    RDW 13.9 11.9 - 14.6 %    PLATELET 69 (L) 868 - 450 K/uL    MPV 10.0 (L) 10.8 - 14.1 FL    DF AUTOMATED      NEUTROPHILS 73 43 - 78 %    LYMPHOCYTES 19 13 - 44 %    MONOCYTES 7 4.0 - 12.0 %    EOSINOPHILS 1 0.5 - 7.8 %    BASOPHILS 0 0.0 - 2.0 %    IMMATURE GRANULOCYTES 0.4 0.0 - 5.0 %    ABS. NEUTROPHILS 5.6 1.7 - 8.2 K/UL    ABS. LYMPHOCYTES 1.5 0.5 - 4.6 K/UL    ABS. MONOCYTES 0.6 0.1 - 1.3 K/UL    ABS. EOSINOPHILS 0.1 0.0 - 0.8 K/UL    ABS. BASOPHILS 0.0 0.0 - 0.2 K/UL    ABS. IMM.  GRANS. 0.0 0.0 - 0.5 K/UL   FIBRINOGEN    Collection Time: 09/25/17 11:39 AM   Result Value Ref Range    Fibrinogen 434 172 - 437 mg/dL   CREATININE    Collection Time: 09/25/17 11:39 AM   Result Value Ref Range    Creatinine 0.56 (L) 0.6 - 1.0 MG/DL       Assessment:     Problem List as of 9/26/2017  Date Reviewed: 9/12/2017          Codes Class Noted - Resolved    Thrombocytopenia ([facility] ICD-10-CM: D69.6  ICD-9-CM: 287.5  9/25/2017 - Present        Pulmonary emboli (HCC) ICD-10-CM: I26.99  ICD-9-CM: 415.19  9/25/2017 - Present        DVT (deep venous thrombosis) ([facility] ICD-10-CM: I82.409  ICD-9-CM: 453.40  9/25/2017 - Present        ICH (intracerebral hemorrhage) ([facility] ICD-10-CM: I61.9  ICD-9-CM: 004  9/15/2017 - Present        Hypertensive urgency, malignant ICD-10-CM: I16.0  ICD-9-CM: 401.0  9/11/2017 - Present        Stroke, hemorrhagic ([facility] ICD-10-CM: I61.9  ICD-9-CM: 850  9/7/2017 - Present        Accelerated hypertension ICD-10-CM: I10  ICD-9-CM: 401.0  9/7/2017 - Present        At risk for aspiration (Chronic) ICD-10-CM: Z91.89  ICD-9-CM: V49.89  9/7/2017 - Present        Acute spontaneous intraventricular hemorrhage assoc w/ hypertension ([facility] ICD-10-CM: I61.5, I10  ICD-9-CM: 199, 401.9  9/7/2017 - Present        Screening for breast cancer ICD-10-CM: Z12.39  ICD-9-CM: V76.10  2/27/2017 - Present        Atrophic vaginitis ICD-10-CM: N95.2  ICD-9-CM: 627.3  7/11/2016 - Present        HTN (hypertension) (Chronic) ICD-10-CM: I10  ICD-9-CM: 401.9  10/23/2015 - Present        ADD (attention deficit disorder) (Chronic) ICD-10-CM: F98.8  ICD-9-CM: 314.00  10/23/2015 - Present        Depression ICD-10-CM: F32.9  ICD-9-CM: 769  10/23/2015 - Present        Anxiety (Chronic) ICD-10-CM: F41.9  ICD-9-CM: 300.00  10/23/2015 - Present        RESOLVED: Hypoxemia ICD-10-CM: R09.02  ICD-9-CM: 799.02  9/10/2017 - 9/12/2017        RESOLVED: Acute respiratory failure ([facility] ICD-10-CM: J96.00  ICD-9-CM: 518.81  9/8/2017 - 9/10/2017        RESOLVED: Hemorrhagic stroke ([facility] ICD-10-CM: I61.9  ICD-9-CM: 287  9/7/2017 - 9/7/2017        RESOLVED: Non-intractable vomiting with nausea ICD-10-CM: R11.2  ICD-9-CM: 787.01  9/7/2017 - 9/12/2017        RESOLVED: Seborrheic keratosis, inflamed ICD-10-CM: L82.0  ICD-9-CM: 702.11  7/11/2016 - 9/20/2016        RESOLVED: Cough ICD-10-CM: R05  ICD-9-CM: 786.2  7/11/2016 - 9/20/2016        RESOLVED: Shoulder pain ICD-10-CM: M25.519  ICD-9-CM: 719.41  10/23/2015 - 2/27/2017              Plan:         Left BG Hemorrhage with intraventricular extension due to HTN emergency; resultant R hemiplegia, right neglect, dysarthria, aphasia, dysphagia with significant decline in mobility and self care  Plan:   Continue daily physician medical management:  Pneumonia prophylaxis- Incentive spirometer every hour while awake. Will need instruction and assistance. Not sure if she can get a proper oral seal to be effective      Dysphagia; Pureed diet with NTL; at risk for malnutrition and dehydration. PICC line removed prior to transfer; may need a line for IVFs if BUN continues to increase. Low K, Ca; check mg. Supplementation as needed. Na elevated  -mag ok, K much improved; cont to supplement while on diuretic  -9/19 replace K; 3.0; 2.8 this am 9/20; inc supplement; may need to add to IVFs  -9/25 fluid status and K nl      Thrombocytopenia; has been trending down for no apparent reason. hgb slt down as well. Has not been on Hep products; consult hematology today 9/19  -plts 40K, continues to trend down 9/20; await consult by hematology. ? reln to [address]. Mild anemia as well  Check HIT panel, d dimer , fibrinogen  -9/21 plts 36k ; HIT PROFILE POSITIVE, D DIMER ELEVATED 32.79, FIBRINOGEN  ; consistent with DIC; Etiology unclear  WILL D/W HEMATOLOGY; LFTs ok, no hx of blood transfusion,no hx pancreatitis, no sign of bacteremia. Has not been on any heparin products. Can see in head injury; has ICH. Cannot r/o blood disorder (leukemia)   per hematology \"Recommend transfusing platelets for <11,453 and Cryoprecipitate for fibrinogen < 100. \"  -MARK pending  -9/22 bilateral LE venous duplex ordered; low suspicion but at risk and having calf tenderness  -9/22 plts improved today; monitor closely; 9/23 small right peroneal thrombus. No antic per hematology. IR refused/advised against placing IVF due to low risk of clot propagation given size and location  -9/23 counts bumping up; pts now 53k from 45 and previously 36  -9/24 plts cont to improve. 69K. MARK still pending; can resume therapies  9/26 Ddimer pending, fibrinogen now nl    Bilateral PEs 9/25, filter placed in IR; looks good today. sats good without O2 supplement 94-97% sats; d/w Hematology, no agaratroban      Hypernatremia; last head CT did not show significant cerebral edema. Clinically improving. Likely due to prerenal azotemia; 9/15 MAY REQ isotonic / hypotonic saline IV;  -9/18 events of weekend noted; Na 156; asymptomatic, on 0.5 dextrose with 1/4 Na; na q 6hrs; pts serum osmolality was normal. Will check urine Na and urine osmolality; depending on findings, will consider consulting Hospitalist vs Nephrology  Neuro improving despite this  -9/19 Na down to 149, urine osmo nl, serum osmo min hi; cont dex/and 1/4 NS; repeat in am. Not DI as uop not increased  -9/20 Na 147; cont fluids; 9/21 not resulted; will stop fluids when Na below 142  -Na 140 9/22; dc IVFs; 9/25 136      ICH/IVH; 9/22 clinically improves daily. F/u HCT yest due to transient left eye visual disturbance. Overall, resolving hemorrhage. There is more pronounced edema; expected. Ventricular size now nl      DVT risk / DVT Prophylaxis- Will require daily physician exam to assess for signs and symptoms as patient is at increased risk for of thromboembolism. Mobilization as tolerated. Intermittent pneumatic compression devices when in bed Thigh-high or knee-high thromboembolic deterrent hose when out of bed.  NO  anticoag due to ICH/HIT; has right small right peroneal vein; 9/25 spoke with IR again today; more risk of putting in IVC filter than benefits especially since she is asymptomatic with no swelling or tenderness and small size of clot      Pain Control: stable, mild-to-moderate joint symptoms intermittently, reasonably well controlled by PRN meds. Will require regular pain assessment and comprenhensive pain management,       Wound Care: Monitor wound status daily per staff and physician. At risk for failure. Will require 24/7 rehab nursing. None needed at this time      Hypertension - BP uncontrolled, fluctuating, managed medically. Add prn hydralazine for sbp> 180. Goal SBP is 140-160 given ICH. Cont Nomodyne, norvasc and hyzaar; consider Verapamil or scheduled Hydralazine  -9/18 BP increased 169/95; add hydralazine 25 tid  -9/20 BP much improved  -9/22 /74; 9/24 bp stable; may dec hydralazine to bid  -9/25 dec hydralazine      Mild hyperglycemia, likely stress induced vs borderline diabetes; monitor loosely     Dysphagia; cont NTL, mech soft; hopefully can upgrade liquids soon 9/25      Leukocytosis; recheck in a.m. Check UA due to stafford. No pulmonary signs (had neg CXR today), no s/s of infxn at old PICC site, no wounds, afebrile. Watch monocytes. -9/18 12.2 improving. Urine neg  -9/19 up to 13.2, ? Reactive. No source of infxn identified. Afebrile  -9/20 WBC now normal      Urinary retention/ neurogenic bladder - start flomax but continue stafford until mobility improves a bit. Strict I/o's  -9/20 keeping stafford to monitor UOP until Na normalizes. No significant output to suggest DI  -9/21 dc stafford; cont Flomax, follow bladder scan  9/22 voiding without issue; incontinence but no retention      bowel program - add prn meds; incontinent, want to keep stool on the firmer side. Monitor skin.       GERD - add a PPI. At times may need additional antacids, Maalox prn.      -continues to benefit from and is showing improvements in a multidisc team approach. Participating well.  Janette Vasquez   9/21   Addendum; in therapy this afternoon, transient left eye blindness.  Will f/u head CT for extension of or new ICH, especially with low plts   Results  1. Interval decrease in size of intraventricular hemorrhage within the left  basal ganglia/thalamus with near complete resolution of intraventricular  hemorrhage. Surrounding edema is more pronounced with slight increase in  left-to-right midline shift. 2. Interval decrease in ventricular size, approaching normal in caliber.                    Time spent was 25 minutes with over 1/2 in direct patient care/examination, consultation and coordination of care.      Signed By: Enedina Castaneda MD     September 26, 2017

## 2017-09-26 NOTE — PROGRESS NOTES
[facility]        Inpatient Hematology / Oncology Progress Note      Admission Date: 9/15/2017  1:06 PM  Reason for Admission/Hospital Course: stroke, left brain involvement  ICH (intracerebral hemorrhage) (HCC)      24 Hour Events:  Platelets improving  MARK pending  Up in wheelchair working with PT      ROS:  Constitutional: negative for fever, chills, weakness, malaise, +fatigue. CV: negative for chest pain, palpitations, edema. Respiratory: negative for dyspnea, cough, wheezing. GI: negative for nausea, abdominal pain, diarrhea. 10 point review of systems is otherwise negative with the exception of the elements mentioned above in the HPI. Allergies   Allergen Reactions    Banana Shortness of Breath    Lisinopril Cough    Nuts [Tree Nut] Unknown (comments)       OBJECTIVE:  Patient Vitals for the past 8 hrs:   BP Temp Pulse Resp SpO2   17 0717 116/69 98 °F (36.7 °C) 83 16 94 %     Temp (24hrs), Av.1 °F (36.7 °C), Min:98 °F (36.7 °C), Max:98.2 °F (36.8 °C)     07 -  1900  In: 240 [P.O.:240]  Out: -     Physical Exam:  Constitutional: Well developed, well nourished female in no acute distress, sitting comfortably wheelchair working with PT   HEENT: Normocephalic and atraumatic. Oropharynx is clear, mucous membranes are moist. Extraocular muscles are intact. Sclerae anicteric. .    Skin Warm and dry. No bruising and no rash noted. No erythema. No pallor. Respiratory Lungs are clear to auscultation bilaterally without wheezes, rales or rhonchi, normal air exchange without accessory muscle use. CVS Normal rate, regular rhythm and normal S1 and S2. No murmurs, gallops, or rubs. Abdomen Soft, nontender and nondistended, normoactive bowel sounds. Neuro Flaccid RUE, right facial weakness, mild expressive aphasia    MSK  No edema and no tenderness. Psych Appropriate mood and affect.           Labs:      Recent Labs      17   1138 09/24/17   0719   WBC  9.5  7.8   RBC  3.67*  3.45*   HGB  10.6*  9.8*   HCT  30.5*  28.3*   MCV  83.1  82.0   MCH  28.9  28.4   MCHC  34.8  34.6   RDW  14.2  13.9   PLT  112*  69*   GRANS  75  73   LYMPH  13  19   MONOS  11  7   EOS  1  1   BASOS  0  0   IG  0  0.4   DF  MANUAL  AUTOMATED   ANEU  7.2  5.6   ABL  1.2  1.5   ABM  1.0  0.6   TORRIE  0.1  0.1   ABB  0.0  0.0   AIG  0.0  0.0        Recent Labs      09/25/17   1139  09/24/17   0719   NA   --   136   K   --   4.5   CL   --   104   CO2   --   23   AGAP   --   9   GLU   --   98   BUN   --   15   CREA  0.56*  0.53*   GFRAA   --   >60   GFRNA   --   >60   CA   --   8.1*         Imaging:      ASSESSMENT:    Problem List  Date Reviewed: 9/12/2017          Codes Class Noted    Thrombocytopenia ([facility] ICD-10-CM: D69.6  ICD-9-CM: 287.5  9/25/2017        Pulmonary emboli (HCC) ICD-10-CM: I26.99  ICD-9-CM: 415.19  9/25/2017        DVT (deep venous thrombosis) ([facility] ICD-10-CM: I82.409  ICD-9-CM: 453.40  9/25/2017        ICH (intracerebral hemorrhage) ([facility] ICD-10-CM: I61.9  ICD-9-CM: 353  9/15/2017        Hypertensive urgency, malignant ICD-10-CM: I16.0  ICD-9-CM: 401.0  9/11/2017        Stroke, hemorrhagic ([facility] ICD-10-CM: I61.9  ICD-9-CM: 402  9/7/2017        Accelerated hypertension ICD-10-CM: I10  ICD-9-CM: 401.0  9/7/2017        At risk for aspiration (Chronic) ICD-10-CM: Z91.89  ICD-9-CM: V49.89  9/7/2017        Acute spontaneous intraventricular hemorrhage assoc w/ hypertension ([facility] ICD-10-CM: I61.5, I10  ICD-9-CM: 230, 401.9  9/7/2017        Screening for breast cancer ICD-10-CM: Z12.39  ICD-9-CM: V76.10  2/27/2017        Atrophic vaginitis ICD-10-CM: N95.2  ICD-9-CM: 627.3  7/11/2016        HTN (hypertension) (Chronic) ICD-10-CM: I10  ICD-9-CM: 401.9  10/23/2015        ADD (attention deficit disorder) (Chronic) ICD-10-CM: F98.8  ICD-9-CM: 314.00  10/23/2015        Depression ICD-10-CM: F32.9  ICD-9-CM: 066  10/23/2015        Anxiety (Chronic) ICD-10-CM: F41.9  ICD-9-CM: 300.00  10/23/2015                PLAN:    HIT  9/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 MARK ordered. We will hold on any AC until MARK results. 9/22 Spoke with lab-MARK results may be resulted as late at Monday. We will follow along loosely for lab results. DIC  9/21 Labs confirm DIC: D dimer 32.79 fibrinogen 136. HIT positive. Recommend avoiding heparin. Recommend transfusing platelets for <52,504 and Cryoprecipitate for fibrinogen < 100. Treating the underlying cause is paramount. 9/22 Platelets up to 33,250 from 36,000 yesterday. Continue to monitor daily. Bilateral PE/RLE thrombus  9/26 sp filter placement in IR yesterday. AC contraindicated    Disposition: Bilateral PE and RLE thrombus with stat IVC filter placed yesterday in IR. Patient is too high risk for [facility]. Initial drop in platelets likely due to consumption. Platelet count trending up  69,000 today. We will sign off of treatment team. We will be available again if needed. Sadie Collado NP   [address] [place] [address] Hematology & Oncology  [address] [place] [address]  Office : [phone]  Fax : [phone]   Patient seen, examed and discussed with NP, agree with above history/assessment/plan. 60 y. o.female consulted for coagulopathy in the context of acute hemorrhagic stoke and DVT/PE, had been worked up suspecting DIC vs HIT, nonetheless contraindicated for anticoagulation given the brain bleed and placed IVCF, meanwhile plt and fibrinogen spontaneously improving, likely after a consumption coagulopathy, continue supportive care and rehab. Chapito Cho M.D.   [address]  Office : [phone]  Fax : [phone]

## 2017-09-26 NOTE — PROGRESS NOTES
PHYSICAL THERAPY DAILY NOTE  Time In: 1116  Time Out: 2677  Patient Seen For: AM;Transfer training;Gait training; Therapeutic exercise; Other (see progress notes)    Subjective: Patient had no complaints. Objective: No pain noted. During therapy O2 sats remained >90% . Visit Vitals    /69    Pulse 83    Temp 98 °F (36.7 °C)    Resp 16    SpO2 94%      Other (comment) (falls)  GROSS ASSESSMENT Daily Assessment            BED/MAT MOBILITY Daily Assessment    Supine to Sit : 2 (Maximal assistance)  Sit to Supine : 2 (Maximal assistance)       TRANSFERS Daily Assessment    Transfer Type: SPT without device  Transfer Assistance : 2 (Maximal assistance)  Sit to Stand Assistance: Maximum assistance       GAIT Daily Assessment   Patient leaning to the right pushing with strong side [left]. Amount of Assistance: 2 (Maximal assistance)  Distance (ft): 5 Feet (ft)  Assistive Device: Gait belt; Other (comment); Walker cindy (used 2 ace wraps for right knee and to assist DF)       STEPS or STAIRS Daily Assessment    Level of Assist : 0 (Not tested)       BALANCE Daily Assessment            WHEELCHAIR MOBILITY Daily Assessment            LOWER EXTREMITY EXERCISES Daily Assessment   Required assistance for SAQs today. Extremity: Both  Exercise Type #1: Supine lower extremity strengthening  Sets Performed: 1  Reps Performed: 10  Level of Assist: Total assistance          Assessment: Patient seems very eager to walk. Patient leaning more pushing with strong side. She doesn't seem to understand her deficits at times. Plan of Care: Continue with plan of care to reach PT goals. Returned to room with call santillan at [facility].     Loni Henderson, PTA  9/26/2017

## 2017-09-27 LAB
ANION GAP SERPL CALC-SCNC: 12 MMOL/L (ref 7–16)
BASOPHILS # BLD: 0 K/UL (ref 0–0.2)
BASOPHILS NFR BLD: 0 % (ref 0–2)
BUN SERPL-MCNC: 20 MG/DL (ref 8–23)
CALCIUM SERPL-MCNC: 8.2 MG/DL (ref 8.3–10.4)
CHLORIDE SERPL-SCNC: 102 MMOL/L (ref 98–107)
CO2 SERPL-SCNC: 22 MMOL/L (ref 21–32)
CREAT SERPL-MCNC: 0.54 MG/DL (ref 0.6–1)
DIFFERENTIAL METHOD BLD: ABNORMAL
EOSINOPHIL # BLD: 0.1 K/UL (ref 0–0.8)
EOSINOPHIL NFR BLD: 1 % (ref 0.5–7.8)
ERYTHROCYTE [DISTWIDTH] IN BLOOD BY AUTOMATED COUNT: 14.1 % (ref 11.9–14.6)
GLUCOSE SERPL-MCNC: 145 MG/DL (ref 65–100)
HCT VFR BLD AUTO: 27.9 % (ref 35.8–46.3)
HGB BLD-MCNC: 9.7 G/DL (ref 11.7–15.4)
IMM GRANULOCYTES # BLD: 0 K/UL (ref 0–0.5)
IMM GRANULOCYTES NFR BLD: 0.3 % (ref 0–5)
LYMPHOCYTES # BLD: 1.2 K/UL (ref 0.5–4.6)
LYMPHOCYTES NFR BLD: 18 % (ref 13–44)
MCH RBC QN AUTO: 28.8 PG (ref 26.1–32.9)
MCHC RBC AUTO-ENTMCNC: 34.8 G/DL (ref 31.4–35)
MCV RBC AUTO: 82.8 FL (ref 79.6–97.8)
MONOCYTES # BLD: 0.7 K/UL (ref 0.1–1.3)
MONOCYTES NFR BLD: 10 % (ref 4–12)
NEUTS SEG # BLD: 4.7 K/UL (ref 1.7–8.2)
NEUTS SEG NFR BLD: 71 % (ref 43–78)
PLATELET # BLD AUTO: 125 K/UL (ref 150–450)
PMV BLD AUTO: 9.8 FL (ref 10.8–14.1)
POTASSIUM SERPL-SCNC: 3.6 MMOL/L (ref 3.5–5.1)
RBC # BLD AUTO: 3.37 M/UL (ref 4.05–5.25)
SODIUM SERPL-SCNC: 136 MMOL/L (ref 136–145)
SRA 100IU/ML UFH SER-ACNC: 1 % (ref 0–20)
SRA UFH SER-IMP: ABNORMAL
UNFRAC HEPARIN LOW DOSE: 68 % (ref 0–20)
WBC # BLD AUTO: 6.7 K/UL (ref 4.3–11.1)

## 2017-09-27 PROCEDURE — 97110 THERAPEUTIC EXERCISES: CPT

## 2017-09-27 PROCEDURE — 97530 THERAPEUTIC ACTIVITIES: CPT

## 2017-09-27 PROCEDURE — 77030011943

## 2017-09-27 PROCEDURE — 85025 COMPLETE CBC W/AUTO DIFF WBC: CPT | Performed by: PHYSICAL MEDICINE & REHABILITATION

## 2017-09-27 PROCEDURE — 36415 COLL VENOUS BLD VENIPUNCTURE: CPT | Performed by: PHYSICAL MEDICINE & REHABILITATION

## 2017-09-27 PROCEDURE — 99232 SBSQ HOSP IP/OBS MODERATE 35: CPT | Performed by: PHYSICAL MEDICINE & REHABILITATION

## 2017-09-27 PROCEDURE — 92507 TX SP LANG VOICE COMM INDIV: CPT

## 2017-09-27 PROCEDURE — 74011250637 HC RX REV CODE- 250/637: Performed by: PHYSICAL MEDICINE & REHABILITATION

## 2017-09-27 PROCEDURE — 97116 GAIT TRAINING THERAPY: CPT

## 2017-09-27 PROCEDURE — 97535 SELF CARE MNGMENT TRAINING: CPT

## 2017-09-27 PROCEDURE — 80048 BASIC METABOLIC PNL TOTAL CA: CPT | Performed by: PHYSICAL MEDICINE & REHABILITATION

## 2017-09-27 PROCEDURE — 65310000000 HC RM PRIVATE REHAB

## 2017-09-27 RX ADMIN — POTASSIUM CHLORIDE 40 MEQ: 20 TABLET, EXTENDED RELEASE ORAL at 08:14

## 2017-09-27 RX ADMIN — TRAMADOL HYDROCHLORIDE 50 MG: 50 TABLET, FILM COATED ORAL at 21:05

## 2017-09-27 RX ADMIN — TRAZODONE HYDROCHLORIDE 50 MG: 50 TABLET ORAL at 21:05

## 2017-09-27 RX ADMIN — LABETALOL HYDROCHLORIDE 200 MG: 200 TABLET, FILM COATED ORAL at 08:14

## 2017-09-27 RX ADMIN — HYDROCHLOROTHIAZIDE: 12.5 CAPSULE ORAL at 08:14

## 2017-09-27 RX ADMIN — AMLODIPINE BESYLATE 10 MG: 10 TABLET ORAL at 08:14

## 2017-09-27 RX ADMIN — LABETALOL HYDROCHLORIDE 200 MG: 200 TABLET, FILM COATED ORAL at 17:14

## 2017-09-27 RX ADMIN — TAMSULOSIN HYDROCHLORIDE 0.4 MG: 0.4 CAPSULE ORAL at 21:03

## 2017-09-27 RX ADMIN — TIZANIDINE 2 MG: 2 TABLET ORAL at 21:03

## 2017-09-27 RX ADMIN — TRAMADOL HYDROCHLORIDE 50 MG: 50 TABLET, FILM COATED ORAL at 16:31

## 2017-09-27 RX ADMIN — HYDRALAZINE HYDROCHLORIDE 25 MG: 25 TABLET, FILM COATED ORAL at 08:14

## 2017-09-27 RX ADMIN — POTASSIUM CHLORIDE 40 MEQ: 20 TABLET, EXTENDED RELEASE ORAL at 17:14

## 2017-09-27 RX ADMIN — HYDRALAZINE HYDROCHLORIDE 25 MG: 25 TABLET, FILM COATED ORAL at 17:14

## 2017-09-27 NOTE — PROGRESS NOTES
09/27/17 1128   Time Spent With Patient   Time In 1045   Time Out 1120   Patient Seen For: AM;Neuro-linguistics; Oral-motor exercises; Laryngeal exercises   Mental Status   Neurologic State Alert   Orientation Level Oriented to person   Cognition Impaired decision making;Memory loss;Decreased command following;Decreased attention/concentration   Perseveration Perseverates during conversation   Safety/Judgement Fall prevention   Daughter present. Pt complete oral motor exercises x 10 and laryngeal exercises x 5. Pt needed mod-max cues to name 5 items to a given category. Pt unable to make time asked for on clock.    Yuri Hanson MA/CCC/SLP

## 2017-09-27 NOTE — PROGRESS NOTES
09/27/17 1231   Time Spent With Patient   Time In 0829   Time Out 0916   Patient Seen For: AM;ADLs   Grooming   Grooming Assistance  Mod A   Comments assist with hair and s/u for brushing teeth   Upper Body Bathing   Bathing Assistance, Upper Max A   Position Performed Seated in chair   Adaptive Equipment Tub bench   Comments still improving sitting balance. Lower Body Bathing   Bathing Assistance, Lower  Dep   Comments improving balance in sit to stand and able to hold static stance to allow writer to pull over waist.    Upper Body Dressing    Dressing Assistance  Mod A   Comments assisted with icndy technique, right arm neglect. Lower Body Dressing    Dressing Assistance  Dep   Functional Transfers   Tub or Shower Type Shower   Amount of Assistance Required Max A   Adaptive Equipment Grab bars; Tub transfer bench; Wheelchair     S: \"I can shower. \" Agreeable to therapy. Focus of session was on morning ADL routine. Patient was able to SPT with moderate assist.   Pain denied during session. Collaborated with Filiberto RODRIGUEZ and confirmed patient is on track to reach goals as documented in the care plan. Patient tolerated session well, but strength, balance, activity tolerance, ROM, right neglect are still below baseline and requires skilled facilitation to successfully and safely complete ADL's and transfers. Patient ended session in w/c with call remote and phone within reach.      Clare Patricio, KAYODER

## 2017-09-27 NOTE — PROGRESS NOTES
PHYSICAL THERAPY DAILY NOTE  Time In: 6336  Time Out: 5946  Patient Seen For: PM;Other (see progress notes); Therapeutic exercise;Gait training;Transfer training    Subjective: Patient had no complaints. Objective: No pain noted. Other (comment) (falls)  GROSS ASSESSMENT Daily Assessment            BED/MAT MOBILITY Daily Assessment    Supine to Sit : 2 (Maximal assistance)  Sit to Supine : 2 (Maximal assistance)       TRANSFERS Daily Assessment    Transfer Type: SPT without device  Transfer Assistance : 2 (Maximal assistance)  Sit to Stand Assistance: Maximum assistance       GAIT Daily Assessment    Amount of Assistance: 2 (Maximal assistance)  Distance (ft): 10 Feet (ft)  Assistive Device: Gait belt;Walker cindy;Other (comment) (used 2 ace wraps to support knee and assist DF)       STEPS or STAIRS Daily Assessment    Level of Assist : 0 (Not tested)       BALANCE Daily Assessment            WHEELCHAIR MOBILITY Daily Assessment            LOWER EXTREMITY EXERCISES Daily Assessment    Extremity: Both  Exercise Type #1: Supine lower extremity strengthening  Sets Performed: 1  Reps Performed: 10  Level of Assist: Stand-by assistance          Assessment: Patient making progress. Plan of Care: Continue with plan of care to reach PT goals. Returned to room with call santillan at [facility].     Acosta Rivas PTA  9/27/2017

## 2017-09-27 NOTE — PROGRESS NOTES
Labetalol held for BP 99/66. Pt continuing with dry briefs. Two person assist to [facility] and voided 350 of foul smelling urine. Bladder scanned for >999 of urine. pt denies urinary discomfort. In and out cathed for 1100 of clear foul smelling yellow urine with sediment. Comfort measures given. Call bell within reach.

## 2017-09-27 NOTE — PROGRESS NOTES
09/27/17 1552   Time Spent With Patient   Time In 1346   Time Out Juan Servin 83 (FIM Score) Dep   Functional Transfers   Amount of Assistance Required Max A     OT Daily Note    Time In 3880   Time Out 1430     Subjective:\"I dont have to use the restroom yet\" Agreeable to therapy. Pain:Denied during session. Education:On RUE recovery after a stroke  Interdisciplinary Communication: Collaborated with Hosea RODRIGUEZ and patient is making progress towards goals. Precautions:  (falls)    Balance/functional mobility Daily Assessment   SPT with mod/maximal assist from w/c Toilet, bed to w/c.       E-Stim Daily Assessment   Completed for 6 minutes reciprocating pattern on biceps and digit extensors with good results. PROm completed in shoulder and elbow. Ended session:In therapy gym with Hosea RODRIGUEZ.      Kevin KNIGHT/MITCHELL

## 2017-09-27 NOTE — PROGRESS NOTES
PHYSICAL THERAPY DAILY NOTE  Time In: 1116  Time Out: 2521  Patient Seen For: AM;Transfer training;Gait training; Therapeutic exercise; Other (see progress notes)    Subjective: Patient had no complaints. Objective: Pain in left leg while ambulating. Other (comment) (falls)  GROSS ASSESSMENT Daily Assessment            BED/MAT MOBILITY Daily Assessment    Supine to Sit : 2 (Maximal assistance)  Sit to Supine : 2 (Maximal assistance)       TRANSFERS Daily Assessment    Transfer Type: SPT without device  Transfer Assistance : 2 (Maximal assistance)  Sit to Stand Assistance: Maximum assistance       GAIT Daily Assessment   Requires total assistance with advancing right foot forward. Amount of Assistance: 2 (Maximal assistance)  Distance (ft): 10 Feet (ft)  Assistive Device: Gait belt;Walker cindy;Other (comment) (2 ace wraps to assist with knee stability and to assist DF)       STEPS or STAIRS Daily Assessment    Level of Assist : 0 (Not tested)       BALANCE Daily Assessment            WHEELCHAIR MOBILITY Daily Assessment            LOWER EXTREMITY EXERCISES Daily Assessment    Extremity: Both  Exercise Type #1: Other (comment) (motomed x 10 minutes on gear 2)  Sets Performed: 1  Reps Performed: 10  Level of Assist: Stand-by assistance          Assessment: Patient s family present during therapy. Plan of Care: Continue with plan of care to reach PT goals. Returned to room with call bell at reach and family present.     Dayana Chiu, PTA  9/27/2017

## 2017-09-27 NOTE — PROGRESS NOTES
Eddie Millan MD,   Medical Director  [address] [facility], [address] [facility]  Tel: [phone]       D PROGRESS NOTE    Lorenza Cantrell  Admit Date: 9/15/2017  Admit Diagnosis: stroke, left brain involvement;ICH (intracerebral hemorrhag*    Subjective     Feels well. No cp or sob. sats 92-97% on RA    Objective:     Current Facility-Administered Medications   Medication Dose Route Frequency    hydrALAZINE (APRESOLINE) tablet 25 mg  25 mg Oral BID    potassium chloride (K-DUR, KLOR-CON) SR tablet 40 mEq  40 mEq Oral BID    tiZANidine (ZANAFLEX) tablet 2 mg  2 mg Oral QHS    acetaminophen (TYLENOL) tablet 500 mg  500 mg Per NG tube Q4H PRN    alum-mag hydroxide-simeth (MYLANTA) oral suspension 30 mL  30 mL Oral Q4H PRN    amLODIPine (NORVASC) tablet 10 mg  10 mg Oral DAILY    bisacodyl (DULCOLAX) suppository 10 mg  10 mg Rectal DAILY PRN    hydrALAZINE (APRESOLINE) tablet 50 mg  50 mg Oral QID PRN    labetalol (NORMODYNE) tablet 200 mg  200 mg Oral Q8H    lip protectant (BLISTEX) ointment   Topical PRN    losartan/hydroCHLOROthiazide (HYZAAR) 100/12.5 mg   Oral DAILY    ondansetron (ZOFRAN ODT) tablet 4 mg  4 mg Oral Q6H PRN    sodium phosphate (FLEET'S) enema 118 mL  1 Enema Rectal PRN    traMADol (ULTRAM) tablet 50 mg  50 mg Oral Q6H PRN    traZODone (DESYREL) tablet 50 mg  50 mg Oral QHS PRN    tamsulosin (FLOMAX) capsule 0.4 mg  0.4 mg Oral QHS     Review of Systems:Denies chest pain, shortness of breath, cough, headache, visual problems, abdominal pain, dysurea, calf pain. Pertinent positives are as noted in the medical records and unremarkable otherwise. Visit Vitals    /74    Pulse 92    Temp 98.5 °F (36.9 °C)    Resp 14    SpO2 92%        Physical Exam:   General: Alert and age appropriately oriented. No acute cardio respiratory distress.    HEENT: Normocephalic,no scleral icterus  Oral mucosa moist without cyanosis; right facial weakness   Lungs: Clear to auscultation  bilaterally. Respiration even and unlabored   Heart: Regular rate and rhythm, S1, S2   No  murmurs, clicks, rub or gallops   Abdomen: Soft, non-tender, nondistended. Bowel sounds present. No organomegaly. Genitourinary: Benign . Neuromuscular:      Dense right hemiplegia  Right sens deficit  Right VFC and neglect. Tone 2   Skin/extremity: No rashes, no erythema. No calf tenderness BLE  Trace pedal edema on the right                                                                            Functional Assessment:  Gross Assessment  AROM: Grossly decreased, non-functional (09/22/17 1500)  PROM: Within functional limits (09/22/17 1500)  Strength: Grossly decreased, non-functional (09/22/17 1500)  Coordination: Generally decreased, functional (09/22/17 1500)  Tone: Abnormal (09/22/17 1500)  Sensation: Impaired (09/22/17 1500)       Balance  Sitting - Static: Fair (occasional) (09/22/17 1500)  Sitting - Dynamic: Fair (occasional) (09/22/17 1500)  Standing - Static: Fair;Constant support (09/22/17 1500)  Standing - Dynamic : Impaired (09/22/17 1500)                     Samuel King Fall Risk Assessment:  Samuel King Fall Risk  Mobility: Ambulates or transfers with assist devices or assistance/unsteady gait (09/27/17 0755)  Mobility Interventions: Bed/chair exit alarm; Patient to call before getting OOB (09/27/17 0755)  Mentation: Periodic confusion (09/27/17 0755)  Mentation Interventions: Door open when patient unattended; Increase mobility; Reorient patient (09/27/17 6596)  Medication: Patient receiving anticonvulsants, sedatives(tranquilizers), psychotropics or hypnotics, hypoglycemics, narcotics, sleep aids, antihypertensives, laxatives, or diuretics (09/27/17 0755)  Medication Interventions: Bed/chair exit alarm; Patient to call before getting OOB (09/27/17 0755)  Elimination: Incontinence (09/27/17 0755)  Elimination Interventions: Bed/chair exit alarm; Toileting schedule/hourly rounds (09/27/17 0755)  Prior Fall History: Unknown (09/27/17 0755)  History of Falls Interventions: Bed/chair exit alarm; Door open when patient unattended;Room close to nurse's station (09/27/17 0755)  Total Score: 4 (09/27/17 0755)  Standard Fall Precautions: Yes (09/26/17 2126)  High Fall Risk: Yes (09/27/17 0755)     Speech Assessment:         Ambulation:  Gait  Base of Support: Narrowed; Center of gravity altered (09/22/17 1200)  Speed/Lulu: Delayed (09/22/17 1200)  Step Length: Right shortened (09/22/17 1200)  Distance (ft): 10 Feet (ft) (09/26/17 1616)  Assistive Device: Gait belt;Walker cindy (09/26/17 1616)     Labs/Studies:  Recent Results (from the past 72 hour(s))   FIBRINOGEN    Collection Time: 09/25/17 11:39 AM   Result Value Ref Range    Fibrinogen 434 172 - 437 mg/dL   CREATININE    Collection Time: 09/25/17 11:39 AM   Result Value Ref Range    Creatinine 0.56 (L) 0.6 - 1.0 MG/DL   CBC WITH AUTOMATED DIFF    Collection Time: 09/26/17 11:38 AM   Result Value Ref Range    WBC 9.5 4.3 - 11.1 K/uL    RBC 3.67 (L) 4.05 - 5.25 M/uL    HGB 10.6 (L) 11.7 - 15.4 g/dL    HCT 30.5 (L) 35.8 - 46.3 %    MCV 83.1 79.6 - 97.8 FL    MCH 28.9 26.1 - 32.9 PG    MCHC 34.8 31.4 - 35.0 g/dL    RDW 14.2 11.9 - 14.6 %    PLATELET 411 (L) 892 - 450 K/uL    MPV 10.0 (L) 10.8 - 14.1 FL    NEUTROPHILS 75 43 - 78 %    LYMPHOCYTES 13 13 - 44 %    MONOCYTES 11 4.0 - 12.0 %    EOSINOPHILS 1 0.5 - 7.8 %    BASOPHILS 0 0.0 - 2.0 %    IMMATURE GRANULOCYTES 0 0.0 - 5.0 %    ABS. NEUTROPHILS 7.2 1.7 - 8.2 K/UL    ABS. LYMPHOCYTES 1.2 0.5 - 4.6 K/UL    ABS. MONOCYTES 1.0 0.1 - 1.3 K/UL    ABS. EOSINOPHILS 0.1 0.0 - 0.8 K/UL    ABS. BASOPHILS 0.0 0.0 - 0.2 K/UL    ABS. IMM.  GRANS. 0.0 0.0 - 0.5 K/UL    RBC COMMENTS NORMOCYTIC/NORMOCHROMIC      WBC COMMENTS OCCASIONAL      PLATELET COMMENTS SLIGHT      DF MANUAL     PTT    Collection Time: 09/26/17 11:38 AM   Result Value Ref Range    aPTT 27.1 23.5 - 31.7 SEC PROTHROMBIN TIME + INR    Collection Time: 09/26/17 11:38 AM   Result Value Ref Range    Prothrombin time 10.2 9.6 - 12.0 sec    INR 0.9 0.9 - 1.2     D DIMER    Collection Time: 09/26/17 11:38 AM   Result Value Ref Range    D DIMER 11.28 (HH) <0.55 ug/ml(FEU)   CBC WITH AUTOMATED DIFF    Collection Time: 09/27/17  6:32 AM   Result Value Ref Range    WBC 6.7 4.3 - 11.1 K/uL    RBC 3.37 (L) 4.05 - 5.25 M/uL    HGB 9.7 (L) 11.7 - 15.4 g/dL    HCT 27.9 (L) 35.8 - 46.3 %    MCV 82.8 79.6 - 97.8 FL    MCH 28.8 26.1 - 32.9 PG    MCHC 34.8 31.4 - 35.0 g/dL    RDW 14.1 11.9 - 14.6 %    PLATELET 490 (L) 182 - 450 K/uL    MPV 9.8 (L) 10.8 - 14.1 FL    DF AUTOMATED      NEUTROPHILS 71 43 - 78 %    LYMPHOCYTES 18 13 - 44 %    MONOCYTES 10 4.0 - 12.0 %    EOSINOPHILS 1 0.5 - 7.8 %    BASOPHILS 0 0.0 - 2.0 %    IMMATURE GRANULOCYTES 0.3 0.0 - 5.0 %    ABS. NEUTROPHILS 4.7 1.7 - 8.2 K/UL    ABS. LYMPHOCYTES 1.2 0.5 - 4.6 K/UL    ABS. MONOCYTES 0.7 0.1 - 1.3 K/UL    ABS. EOSINOPHILS 0.1 0.0 - 0.8 K/UL    ABS. BASOPHILS 0.0 0.0 - 0.2 K/UL    ABS. IMM.  GRANS. 0.0 0.0 - 0.5 K/UL   METABOLIC PANEL, BASIC    Collection Time: 09/27/17  6:32 AM   Result Value Ref Range    Sodium 136 136 - 145 mmol/L    Potassium 3.6 3.5 - 5.1 mmol/L    Chloride 102 98 - 107 mmol/L    CO2 22 21 - 32 mmol/L    Anion gap 12 7 - 16 mmol/L    Glucose 145 (H) 65 - 100 mg/dL    BUN 20 8 - 23 MG/DL    Creatinine 0.54 (L) 0.6 - 1.0 MG/DL    GFR est AA >60 >60 ml/min/1.73m2    GFR est non-AA >60 >60 ml/min/1.73m2    Calcium 8.2 (L) 8.3 - 10.4 MG/DL       Assessment:     Problem List as of 9/27/2017  Date Reviewed: 9/12/2017          Codes Class Noted - Resolved    Thrombocytopenia ([facility] ICD-10-CM: D69.6  ICD-9-CM: 287.5  9/25/2017 - Present        Pulmonary emboli (HCC) ICD-10-CM: I26.99  ICD-9-CM: 415.19  9/25/2017 - Present        DVT (deep venous thrombosis) (HCC) ICD-10-CM: I82.409  ICD-9-CM: 453.40  9/25/2017 - Present        ICH (intracerebral hemorrhage) [facility]) ICD-10-CM: I61.9  ICD-9-CM: 962  9/15/2017 - Present        Hypertensive urgency, malignant ICD-10-CM: I16.0  ICD-9-CM: 401.0  9/11/2017 - Present        Stroke, hemorrhagic ([facility] ICD-10-CM: I61.9  ICD-9-CM: 002  9/7/2017 - Present        Accelerated hypertension ICD-10-CM: I10  ICD-9-CM: 401.0  9/7/2017 - Present        At risk for aspiration (Chronic) ICD-10-CM: Z91.89  ICD-9-CM: V49.89  9/7/2017 - Present        Acute spontaneous intraventricular hemorrhage assoc w/ hypertension ([facility] ICD-10-CM: I61.5, I10  ICD-9-CM: 255, 401.9  9/7/2017 - Present        Screening for breast cancer ICD-10-CM: Z12.39  ICD-9-CM: V76.10  2/27/2017 - Present        Atrophic vaginitis ICD-10-CM: N95.2  ICD-9-CM: 627.3  7/11/2016 - Present        HTN (hypertension) (Chronic) ICD-10-CM: I10  ICD-9-CM: 401.9  10/23/2015 - Present        ADD (attention deficit disorder) (Chronic) ICD-10-CM: F98.8  ICD-9-CM: 314.00  10/23/2015 - Present        Depression ICD-10-CM: F32.9  ICD-9-CM: 620  10/23/2015 - Present        Anxiety (Chronic) ICD-10-CM: F41.9  ICD-9-CM: 300.00  10/23/2015 - Present        RESOLVED: Hypoxemia ICD-10-CM: R09.02  ICD-9-CM: 799.02  9/10/2017 - 9/12/2017        RESOLVED: Acute respiratory failure ([facility] ICD-10-CM: J96.00  ICD-9-CM: 518.81  9/8/2017 - 9/10/2017        RESOLVED: Hemorrhagic stroke ([facility] ICD-10-CM: I61.9  ICD-9-CM: 500  9/7/2017 - 9/7/2017        RESOLVED: Non-intractable vomiting with nausea ICD-10-CM: R11.2  ICD-9-CM: 787.01  9/7/2017 - 9/12/2017        RESOLVED: Seborrheic keratosis, inflamed ICD-10-CM: L82.0  ICD-9-CM: 702.11  7/11/2016 - 9/20/2016        RESOLVED: Cough ICD-10-CM: R05  ICD-9-CM: 786.2  7/11/2016 - 9/20/2016        RESOLVED: Shoulder pain ICD-10-CM: M25.519  ICD-9-CM: 719.41  10/23/2015 - 2/27/2017              Plan:         Left BG Hemorrhage with intraventricular extension due to HTN emergency; resultant R hemiplegia, right neglect, dysarthria, aphasia, dysphagia with significant decline in mobility and self care  Plan:   Continue daily physician medical management:  Pneumonia prophylaxis- Incentive spirometer every hour while awake. Will need instruction and assistance. Not sure if she can get a proper oral seal to be effective      Dysphagia; Pureed diet with NTL; at risk for malnutrition and dehydration. PICC line removed prior to transfer; may need a line for IVFs if BUN continues to increase. Low K, Ca; check mg. Supplementation as needed. Na elevated  -mag ok, K much improved; cont to supplement while on diuretic  -9/19 replace K; 3.0; 2.8 this am 9/20; inc supplement; may need to add to IVFs  -9/25 fluid status and K nl      Thrombocytopenia; has been trending down for no apparent reason. hgb slt down as well. Has not been on Hep products; consult hematology today 9/19  -plts 40K, continues to trend down 9/20; await consult by hematology. ? reln to [address]. Mild anemia as well  Check HIT panel, d dimer , fibrinogen  -9/21 plts 36k ; HIT PROFILE POSITIVE, D DIMER ELEVATED 32.79, FIBRINOGEN  ; consistent with DIC; Etiology unclear  WILL D/W HEMATOLOGY; LFTs ok, no hx of blood transfusion,no hx pancreatitis, no sign of bacteremia. Has not been on any heparin products. Can see in head injury; has ICH. Cannot r/o blood disorder (leukemia)   per hematology \"Recommend transfusing platelets for <81,731 and Cryoprecipitate for fibrinogen < 100. \"  -MARK pending  -9/22 bilateral LE venous duplex ordered; low suspicion but at risk and having calf tenderness  -9/22 plts improved today; monitor closely; 9/23 small right peroneal thrombus. No antic per hematology. IR refused/advised against placing IVF due to low risk of clot propagation given size and location  -9/23 counts bumping up; pts now 53k from 45 and previously 36  -9/24 plts cont to improve. 69K. MARK still pending; can resume therapies  9/26 Ddimer pending, fibrinogen now nl  -9/27 D dimer 11+, improved.  Per Hematology; likely due to consumption coagulopathy. Will monitor     Bilateral PEs 9/25, filter placed in IR; looks good today. sats good without O2 supplement 94-97% sats; d/w Hematology, no agaratroban  -apprec Pulm assessment. Clinically looks good and no longer symptomatic      Hypernatremia; last head CT did not show significant cerebral edema. Clinically improving. Likely due to prerenal azotemia; 9/15 MAY REQ isotonic / hypotonic saline IV;  -9/18 events of weekend noted; Na 156; asymptomatic, on 0.5 dextrose with 1/4 Na; na q 6hrs; pts serum osmolality was normal. Will check urine Na and urine osmolality; depending on findings, will consider consulting Hospitalist vs Nephrology  Neuro improving despite this  -9/19 Na down to 149, urine osmo nl, serum osmo min hi; cont dex/and 1/4 NS; repeat in am. Not DI as uop not increased  -9/20 Na 147; cont fluids; 9/21 not resulted; will stop fluids when Na below 142  -Na 140 9/22; dc IVFs; 9/25 136; 9/27 136      ICH/IVH; 9/22 clinically improves daily. F/u HCT yest due to transient left eye visual disturbance. Overall, resolving hemorrhage. There is more pronounced edema; expected. Ventricular size now nl    Anemia ; 9.6-10/ stable      DVT risk / DVT Prophylaxis- Will require daily physician exam to assess for signs and symptoms as patient is at increased risk for of thromboembolism. Mobilization as tolerated. Intermittent pneumatic compression devices when in bed Thigh-high or knee-high thromboembolic deterrent hose when out of bed. NO  anticoag due to ICH/HIT; has right small right peroneal vein; 9/25 spoke with IR again today; more risk of putting in IVC filter than benefits especially since she is asymptomatic with no swelling or tenderness and small size of clot      Pain Control: stable, mild-to-moderate joint symptoms intermittently, reasonably well controlled by PRN meds.  Will require regular pain assessment and comprenhensive pain management,       Wound Care: Monitor wound status daily per staff and physician. At risk for failure. Will require 24/7 rehab nursing. None needed at this time      Hypertension - BP uncontrolled, fluctuating, managed medically. Add prn hydralazine for sbp> 180. Goal SBP is 140-160 given ICH. Cont Nomodyne, norvasc and hyzaar; consider Verapamil or scheduled Hydralazine  -9/18 BP increased 169/95; add hydralazine 25 tid  -9/20 BP much improved  -9/22 /74; 9/24 bp stable; may dec hydralazine to bid  -9/25 dec hydralazine  -9/27 bp 116-119      Mild hyperglycemia, likely stress induced vs borderline diabetes; monitor loosely  -9/27 bs 145 on BMP; follow bs qam x 3d      Dysphagia; cont NTL, mech soft; hopefully can upgrade liquids soon 9/25      Leukocytosis; recheck in a.m. Check UA due to stafford. No pulmonary signs (had neg CXR today), no s/s of infxn at old PICC site, no wounds, afebrile. Watch monocytes. -9/18 12.2 improving. Urine neg  -9/19 up to 13.2, ? Reactive. No source of infxn identified. Afebrile  -9/20 WBC now normal      Urinary retention/ neurogenic bladder - start flomax but continue stafford until mobility improves a bit. Strict I/o's  -9/20 keeping stafford to monitor UOP until Na normalizes. No significant output to suggest DI  -9/21 dc stafford; cont Flomax, follow bladder scan  9/22 voiding without issue; incontinence but no retention      bowel program - add prn meds; incontinent, want to keep stool on the firmer side. Monitor skin.       GERD - add a PPI. At times may need additional antacids, Maalox prn.      -continues to benefit from and is showing improvements in a multidisc team approach. Participating well.  [facility]   9/21   Addendum; in therapy this afternoon, transient left eye blindness. Will f/u head CT for extension of or new ICH, especially with low plts   Results  1. Interval decrease in size of intraventricular hemorrhage within the left  basal ganglia/thalamus with near complete resolution of intraventricular  hemorrhage.  Surrounding edema is more pronounced with slight increase in  left-to-right midline shift. 2. Interval decrease in ventricular size, approaching normal in caliber.                  Time spent was 25 minutes with over 1/2 in direct patient care/examination, consultation and coordination of care.      Signed By: Ayanna Townsend MD     September 27, 2017

## 2017-09-28 LAB
APPEARANCE UR: NORMAL
BASOPHILS # BLD: 0 K/UL (ref 0–0.2)
BASOPHILS NFR BLD: 0 % (ref 0–2)
BILIRUB UR QL: NEGATIVE
COLOR UR: YELLOW
DIFFERENTIAL METHOD BLD: ABNORMAL
EOSINOPHIL # BLD: 0 K/UL (ref 0–0.8)
EOSINOPHIL NFR BLD: 0 % (ref 0.5–7.8)
ERYTHROCYTE [DISTWIDTH] IN BLOOD BY AUTOMATED COUNT: 14 % (ref 11.9–14.6)
GLUCOSE UR STRIP.AUTO-MCNC: NEGATIVE MG/DL
HCT VFR BLD AUTO: 26.6 % (ref 35.8–46.3)
HGB BLD-MCNC: 9.1 G/DL (ref 11.7–15.4)
HGB UR QL STRIP: NEGATIVE
IMM GRANULOCYTES # BLD: 0 K/UL (ref 0–0.5)
IMM GRANULOCYTES NFR BLD: 0.2 % (ref 0–5)
KETONES UR QL STRIP.AUTO: NEGATIVE MG/DL
LEUKOCYTE ESTERASE UR QL STRIP.AUTO: NEGATIVE
LYMPHOCYTES # BLD: 1.2 K/UL (ref 0.5–4.6)
LYMPHOCYTES NFR BLD: 14 % (ref 13–44)
MCH RBC QN AUTO: 29.3 PG (ref 26.1–32.9)
MCHC RBC AUTO-ENTMCNC: 34.2 G/DL (ref 31.4–35)
MCV RBC AUTO: 85.5 FL (ref 79.6–97.8)
MONOCYTES # BLD: 0.5 K/UL (ref 0.1–1.3)
MONOCYTES NFR BLD: 6 % (ref 4–12)
NEUTS SEG # BLD: 7 K/UL (ref 1.7–8.2)
NEUTS SEG NFR BLD: 80 % (ref 43–78)
NITRITE UR QL STRIP.AUTO: NEGATIVE
PH UR STRIP: 6 [PH] (ref 5–9)
PLATELET # BLD AUTO: 149 K/UL (ref 150–450)
PLATELET COMMENTS,PCOM: ADEQUATE
PMV BLD AUTO: 9.5 FL (ref 10.8–14.1)
PROT UR STRIP-MCNC: NEGATIVE MG/DL
RBC # BLD AUTO: 3.11 M/UL (ref 4.05–5.25)
RBC MORPH BLD: ABNORMAL
SP GR UR REFRACTOMETRY: 1.02 (ref 1–1.02)
UROBILINOGEN UR QL STRIP.AUTO: 1 EU/DL (ref 0.2–1)
WBC # BLD AUTO: 8.8 K/UL (ref 4.3–11.1)
WBC MORPH BLD: ABNORMAL

## 2017-09-28 PROCEDURE — 36415 COLL VENOUS BLD VENIPUNCTURE: CPT | Performed by: PHYSICAL MEDICINE & REHABILITATION

## 2017-09-28 PROCEDURE — 77030011943

## 2017-09-28 PROCEDURE — 87077 CULTURE AEROBIC IDENTIFY: CPT | Performed by: PHYSICAL MEDICINE & REHABILITATION

## 2017-09-28 PROCEDURE — 51798 US URINE CAPACITY MEASURE: CPT

## 2017-09-28 PROCEDURE — 92507 TX SP LANG VOICE COMM INDIV: CPT

## 2017-09-28 PROCEDURE — 97110 THERAPEUTIC EXERCISES: CPT

## 2017-09-28 PROCEDURE — 65310000000 HC RM PRIVATE REHAB

## 2017-09-28 PROCEDURE — 97530 THERAPEUTIC ACTIVITIES: CPT

## 2017-09-28 PROCEDURE — 99233 SBSQ HOSP IP/OBS HIGH 50: CPT | Performed by: PHYSICAL MEDICINE & REHABILITATION

## 2017-09-28 PROCEDURE — 97116 GAIT TRAINING THERAPY: CPT

## 2017-09-28 PROCEDURE — 81003 URINALYSIS AUTO W/O SCOPE: CPT | Performed by: PHYSICAL MEDICINE & REHABILITATION

## 2017-09-28 PROCEDURE — 74011250637 HC RX REV CODE- 250/637: Performed by: PHYSICAL MEDICINE & REHABILITATION

## 2017-09-28 PROCEDURE — 85025 COMPLETE CBC W/AUTO DIFF WBC: CPT | Performed by: PHYSICAL MEDICINE & REHABILITATION

## 2017-09-28 PROCEDURE — 97112 NEUROMUSCULAR REEDUCATION: CPT

## 2017-09-28 PROCEDURE — 87086 URINE CULTURE/COLONY COUNT: CPT | Performed by: PHYSICAL MEDICINE & REHABILITATION

## 2017-09-28 RX ORDER — LABETALOL 200 MG/1
200 TABLET, FILM COATED ORAL 2 TIMES DAILY
Status: DISCONTINUED | OUTPATIENT
Start: 2017-09-28 | End: 2017-09-29

## 2017-09-28 RX ADMIN — TAMSULOSIN HYDROCHLORIDE 0.4 MG: 0.4 CAPSULE ORAL at 21:59

## 2017-09-28 RX ADMIN — AMLODIPINE BESYLATE 10 MG: 10 TABLET ORAL at 08:49

## 2017-09-28 RX ADMIN — LABETALOL HYDROCHLORIDE 200 MG: 200 TABLET, FILM COATED ORAL at 00:17

## 2017-09-28 RX ADMIN — POTASSIUM CHLORIDE 40 MEQ: 20 TABLET, EXTENDED RELEASE ORAL at 08:48

## 2017-09-28 RX ADMIN — TRAMADOL HYDROCHLORIDE 50 MG: 50 TABLET, FILM COATED ORAL at 11:21

## 2017-09-28 RX ADMIN — LABETALOL HYDROCHLORIDE 200 MG: 200 TABLET, FILM COATED ORAL at 17:59

## 2017-09-28 RX ADMIN — TIZANIDINE 2 MG: 2 TABLET ORAL at 20:19

## 2017-09-28 RX ADMIN — TRAZODONE HYDROCHLORIDE 50 MG: 50 TABLET ORAL at 20:19

## 2017-09-28 RX ADMIN — TRAMADOL HYDROCHLORIDE 50 MG: 50 TABLET, FILM COATED ORAL at 20:19

## 2017-09-28 RX ADMIN — HYDROCHLOROTHIAZIDE: 12.5 CAPSULE ORAL at 08:48

## 2017-09-28 RX ADMIN — LABETALOL HYDROCHLORIDE 200 MG: 200 TABLET, FILM COATED ORAL at 08:49

## 2017-09-28 RX ADMIN — POTASSIUM CHLORIDE 40 MEQ: 20 TABLET, EXTENDED RELEASE ORAL at 17:58

## 2017-09-28 NOTE — PROGRESS NOTES
OT Daily Note    Time In 0916   Time Out 1000     Subjective:\"I am hurting a little in my back\" Agreeable to therapy. Pain:In back and left leg during movement only. Education: On neuro recovery and mental practice for RUE. Interdisciplinary Communication: Collaborated with MICHAEL, Sheree Ashley and patient is making progress towards goals. Precautions:  (falls)    Balance/functional mobility Daily Assessment   SPT from w/c to recliner, bed to w/c with moderate assist. Bed mobility moderate assist.      Strengthening/activity tolerance Daily Assessment   UE exercise bike completed for 3 minutes with minimal resistance at moderate pace to increase activity tolerance and UE strength, RUE ace wrapped with writer supporting right elbow and scapula. Bilateral  used with 0lbs in \"I\", \"O\". All in 2 sets of 20 to with writer supporting right hand and elbow. PROM completed in shoulder flexion, abduction, ER. Tendon glides in digits. Ended session: In recliner with all needs within reach.     Olivia Hayward OTR/L

## 2017-09-28 NOTE — PROGRESS NOTES
Pt resting comfortably in bed and no s/x of pain and SOB. Pt right side flaccid, sensation absent to LUE and LLE. Pt is A/O x 2, periodic confusion. Pt always express her self, limited to yes and no questions. Pt also is incontinent and wears brief. Pt is complete care.

## 2017-09-28 NOTE — PROGRESS NOTES
Adriel May MD,   Medical Director  [address] [facility], [address] [facility]  Tel: [phone]       [facility] PROGRESS NOTE    Richard Marshall  Admit Date: 9/15/2017  Admit Diagnosis: stroke, left brain involvement;ICH (intracerebral hemorrhag*    Subjective     Feeling well. Tired. RN reported foul smelling urine and inability to empty bladder. Was CICd for >500ml. A bit of suprapubic pain . No f/c     Objective:     Current Facility-Administered Medications   Medication Dose Route Frequency    hydrALAZINE (APRESOLINE) tablet 25 mg  25 mg Oral BID    potassium chloride (K-DUR, KLOR-CON) SR tablet 40 mEq  40 mEq Oral BID    tiZANidine (ZANAFLEX) tablet 2 mg  2 mg Oral QHS    acetaminophen (TYLENOL) tablet 500 mg  500 mg Per NG tube Q4H PRN    alum-mag hydroxide-simeth (MYLANTA) oral suspension 30 mL  30 mL Oral Q4H PRN    amLODIPine (NORVASC) tablet 10 mg  10 mg Oral DAILY    bisacodyl (DULCOLAX) suppository 10 mg  10 mg Rectal DAILY PRN    hydrALAZINE (APRESOLINE) tablet 50 mg  50 mg Oral QID PRN    labetalol (NORMODYNE) tablet 200 mg  200 mg Oral Q8H    lip protectant (BLISTEX) ointment   Topical PRN    losartan/hydroCHLOROthiazide (HYZAAR) 100/12.5 mg   Oral DAILY    ondansetron (ZOFRAN ODT) tablet 4 mg  4 mg Oral Q6H PRN    sodium phosphate (FLEET'S) enema 118 mL  1 Enema Rectal PRN    traMADol (ULTRAM) tablet 50 mg  50 mg Oral Q6H PRN    traZODone (DESYREL) tablet 50 mg  50 mg Oral QHS PRN    tamsulosin (FLOMAX) capsule 0.4 mg  0.4 mg Oral QHS     Review of Systems:Denies chest pain, shortness of breath, cough, headache, visual problems,  dysurea, calf pain. Pertinent positives are as noted in the medical records and unremarkable otherwise. Visit Vitals    BP 97/62    Pulse 96    Temp 98.9 °F (37.2 °C)    Resp 15    SpO2 92%        Physical Exam:   General: Alert and age appropriately oriented. No acute cardio respiratory distress. HEENT: Normocephalic,no scleral icterus  Oral mucosa moist without cyanosis   Lungs: Clear to auscultation  bilaterally. Respiration even and unlabored   Heart: Regular rate and rhythm, S1, S2   No  murmurs, clicks, rub or gallops   Abdomen: Soft, non-tender, nondistended. Bowel sounds present. No organomegaly. Genitourinary: Benign . Neuromuscular:      Right facial weakness  RUE without active movement, sensory loss  Right neglect and VFC  Quad 1+, adduction 3, o/w 0   Skin/extremity: No rashes, no erythema. No calf tenderness BLE  Trace pedal edema on the right                                                                            Functional Assessment:  Gross Assessment  AROM: Grossly decreased, non-functional (09/22/17 1500)  PROM: Within functional limits (09/22/17 1500)  Strength: Grossly decreased, non-functional (09/22/17 1500)  Coordination: Generally decreased, functional (09/22/17 1500)  Tone: Abnormal (09/22/17 1500)  Sensation: Impaired (09/22/17 1500)       Balance  Sitting - Static: Fair (occasional) (09/22/17 1500)  Sitting - Dynamic: Fair (occasional) (09/22/17 1500)  Standing - Static: Fair;Constant support (09/22/17 1500)  Standing - Dynamic : Impaired (09/22/17 1500)                     Bailey Odell Fall Risk Assessment:  Blondchandana Hover Fall Risk  Mobility: Ambulates or transfers with assist devices or assistance/unsteady gait (09/27/17 1922)  Mobility Interventions: Bed/chair exit alarm; Patient to call before getting OOB;Utilize walker, cane, or other assitive device;Utilize gait belt for transfers/ambulation (09/27/17 1922)  Mentation: Periodic confusion (09/27/17 1922)  Mentation Interventions: Door open when patient unattended;Reorient patient (09/27/17 1922)  Medication: Patient receiving anticonvulsants, sedatives(tranquilizers), psychotropics or hypnotics, hypoglycemics, narcotics, sleep aids, antihypertensives, laxatives, or diuretics (09/27/17 1922)  Medication Interventions: Bed/chair exit alarm; Patient to call before getting OOB; Teach patient to arise slowly;Utilize gait belt for transfers/ambulation (09/27/17 1922)  Elimination: Incontinence (09/27/17 1922)  Elimination Interventions: Bed/chair exit alarm;Call light in reach; Patient to call for help with toileting needs; Toilet paper/wipes in reach; Toileting schedule/hourly rounds (09/27/17 1922)  Prior Fall History: Unknown (09/27/17 1922)  History of Falls Interventions: Bed/chair exit alarm; Door open when patient unattended;Room close to nurse's station (09/27/17 0755)  Total Score: 4 (09/27/17 1922)  Standard Fall Precautions: Yes (09/26/17 2126)  High Fall Risk: Yes (09/27/17 1922)     Speech Assessment:         Ambulation:  Gait  Base of Support: Narrowed; Center of gravity altered (09/22/17 1200)  Speed/Lulu: Delayed (09/22/17 1200)  Step Length: Right shortened (09/22/17 1200)  Distance (ft): 10 Feet (ft) (09/27/17 1632)  Assistive Device: Gait belt;Walker cidny;Other (comment) (used 2 ace wraps to support knee and assist DF) (09/27/17 1632)     Labs/Studies:  Recent Results (from the past 72 hour(s))   FIBRINOGEN    Collection Time: 09/25/17 11:39 AM   Result Value Ref Range    Fibrinogen 434 172 - 437 mg/dL   CREATININE    Collection Time: 09/25/17 11:39 AM   Result Value Ref Range    Creatinine 0.56 (L) 0.6 - 1.0 MG/DL   CBC WITH AUTOMATED DIFF    Collection Time: 09/26/17 11:38 AM   Result Value Ref Range    WBC 9.5 4.3 - 11.1 K/uL    RBC 3.67 (L) 4.05 - 5.25 M/uL    HGB 10.6 (L) 11.7 - 15.4 g/dL    HCT 30.5 (L) 35.8 - 46.3 %    MCV 83.1 79.6 - 97.8 FL    MCH 28.9 26.1 - 32.9 PG    MCHC 34.8 31.4 - 35.0 g/dL    RDW 14.2 11.9 - 14.6 %    PLATELET 009 (L) 996 - 450 K/uL    MPV 10.0 (L) 10.8 - 14.1 FL    NEUTROPHILS 75 43 - 78 %    LYMPHOCYTES 13 13 - 44 %    MONOCYTES 11 4.0 - 12.0 %    EOSINOPHILS 1 0.5 - 7.8 %    BASOPHILS 0 0.0 - 2.0 %    IMMATURE GRANULOCYTES 0 0.0 - 5.0 %    ABS. NEUTROPHILS 7.2 1.7 - 8.2 K/UL    ABS.  LYMPHOCYTES 1.2 0.5 - 4.6 K/UL    ABS. MONOCYTES 1.0 0.1 - 1.3 K/UL    ABS. EOSINOPHILS 0.1 0.0 - 0.8 K/UL    ABS. BASOPHILS 0.0 0.0 - 0.2 K/UL    ABS. IMM. GRANS. 0.0 0.0 - 0.5 K/UL    RBC COMMENTS NORMOCYTIC/NORMOCHROMIC      WBC COMMENTS OCCASIONAL      PLATELET COMMENTS SLIGHT      DF MANUAL     PTT    Collection Time: 09/26/17 11:38 AM   Result Value Ref Range    aPTT 27.1 23.5 - 31.7 SEC   PROTHROMBIN TIME + INR    Collection Time: 09/26/17 11:38 AM   Result Value Ref Range    Prothrombin time 10.2 9.6 - 12.0 sec    INR 0.9 0.9 - 1.2     D DIMER    Collection Time: 09/26/17 11:38 AM   Result Value Ref Range    D DIMER 11.28 (HH) <0.55 ug/ml(FEU)   CBC WITH AUTOMATED DIFF    Collection Time: 09/27/17  6:32 AM   Result Value Ref Range    WBC 6.7 4.3 - 11.1 K/uL    RBC 3.37 (L) 4.05 - 5.25 M/uL    HGB 9.7 (L) 11.7 - 15.4 g/dL    HCT 27.9 (L) 35.8 - 46.3 %    MCV 82.8 79.6 - 97.8 FL    MCH 28.8 26.1 - 32.9 PG    MCHC 34.8 31.4 - 35.0 g/dL    RDW 14.1 11.9 - 14.6 %    PLATELET 208 (L) 559 - 450 K/uL    MPV 9.8 (L) 10.8 - 14.1 FL    DF AUTOMATED      NEUTROPHILS 71 43 - 78 %    LYMPHOCYTES 18 13 - 44 %    MONOCYTES 10 4.0 - 12.0 %    EOSINOPHILS 1 0.5 - 7.8 %    BASOPHILS 0 0.0 - 2.0 %    IMMATURE GRANULOCYTES 0.3 0.0 - 5.0 %    ABS. NEUTROPHILS 4.7 1.7 - 8.2 K/UL    ABS. LYMPHOCYTES 1.2 0.5 - 4.6 K/UL    ABS. MONOCYTES 0.7 0.1 - 1.3 K/UL    ABS. EOSINOPHILS 0.1 0.0 - 0.8 K/UL    ABS. BASOPHILS 0.0 0.0 - 0.2 K/UL    ABS. IMM.  GRANS. 0.0 0.0 - 0.5 K/UL   METABOLIC PANEL, BASIC    Collection Time: 09/27/17  6:32 AM   Result Value Ref Range    Sodium 136 136 - 145 mmol/L    Potassium 3.6 3.5 - 5.1 mmol/L    Chloride 102 98 - 107 mmol/L    CO2 22 21 - 32 mmol/L    Anion gap 12 7 - 16 mmol/L    Glucose 145 (H) 65 - 100 mg/dL    BUN 20 8 - 23 MG/DL    Creatinine 0.54 (L) 0.6 - 1.0 MG/DL    GFR est AA >60 >60 ml/min/1.73m2    GFR est non-AA >60 >60 ml/min/1.73m2    Calcium 8.2 (L) 8.3 - 10.4 MG/DL       Assessment:     Problem List as of 9/28/2017  Date Reviewed: 9/12/2017          Codes Class Noted - Resolved    Thrombocytopenia ([facility] ICD-10-CM: D69.6  ICD-9-CM: 287.5  9/25/2017 - Present        Pulmonary emboli (HCC) ICD-10-CM: I26.99  ICD-9-CM: 415.19  9/25/2017 - Present        DVT (deep venous thrombosis) ([facility] ICD-10-CM: I82.409  ICD-9-CM: 453.40  9/25/2017 - Present        ICH (intracerebral hemorrhage) ([facility] ICD-10-CM: I61.9  ICD-9-CM: 378  9/15/2017 - Present        Hypertensive urgency, malignant ICD-10-CM: I16.0  ICD-9-CM: 401.0  9/11/2017 - Present        Stroke, hemorrhagic ([facility] ICD-10-CM: I61.9  ICD-9-CM: 513  9/7/2017 - Present        Accelerated hypertension ICD-10-CM: I10  ICD-9-CM: 401.0  9/7/2017 - Present        At risk for aspiration (Chronic) ICD-10-CM: Z91.89  ICD-9-CM: V49.89  9/7/2017 - Present        Acute spontaneous intraventricular hemorrhage assoc w/ hypertension ([facility] ICD-10-CM: I61.5, I10  ICD-9-CM: 749, 401.9  9/7/2017 - Present        Screening for breast cancer ICD-10-CM: Z12.39  ICD-9-CM: V76.10  2/27/2017 - Present        Atrophic vaginitis ICD-10-CM: N95.2  ICD-9-CM: 627.3  7/11/2016 - Present        HTN (hypertension) (Chronic) ICD-10-CM: I10  ICD-9-CM: 401.9  10/23/2015 - Present        ADD (attention deficit disorder) (Chronic) ICD-10-CM: F98.8  ICD-9-CM: 314.00  10/23/2015 - Present        Depression ICD-10-CM: F32.9  ICD-9-CM: 230  10/23/2015 - Present        Anxiety (Chronic) ICD-10-CM: F41.9  ICD-9-CM: 300.00  10/23/2015 - Present        RESOLVED: Hypoxemia ICD-10-CM: R09.02  ICD-9-CM: 799.02  9/10/2017 - 9/12/2017        RESOLVED: Acute respiratory failure ([facility] ICD-10-CM: J96.00  ICD-9-CM: 518.81  9/8/2017 - 9/10/2017        RESOLVED: Hemorrhagic stroke ([facility] ICD-10-CM: I61.9  ICD-9-CM: 036  9/7/2017 - 9/7/2017        RESOLVED: Non-intractable vomiting with nausea ICD-10-CM: R11.2  ICD-9-CM: 787.01  9/7/2017 - 9/12/2017        RESOLVED: Seborrheic keratosis, inflamed ICD-10-CM: L82.0  ICD-9-CM: 702.11  7/11/2016 - 9/20/2016        RESOLVED: Cough ICD-10-CM: R05  ICD-9-CM: 786.2  7/11/2016 - 9/20/2016        RESOLVED: Shoulder pain ICD-10-CM: M25.519  ICD-9-CM: 719.41  10/23/2015 - 2/27/2017              Plan:         Left BG Hemorrhage with intraventricular extension due to HTN emergency; resultant R hemiplegia, right neglect, dysarthria, aphasia, dysphagia with significant decline in mobility and self care  Plan:   Continue daily physician medical management:  Pneumonia prophylaxis- Incentive spirometer every hour while awake. Will need instruction and assistance. Not sure if she can get a proper oral seal to be effective      Dysphagia; Pureed diet with NTL; at risk for malnutrition and dehydration. PICC line removed prior to transfer; may need a line for IVFs if BUN continues to increase. Low K, Ca; check mg. Supplementation as needed. Na elevated  -mag ok, K much improved; cont to supplement while on diuretic  -9/19 replace K; 3.0; 2.8 this am 9/20; inc supplement; may need to add to IVFs  -9/25 fluid status and K nl; 9/28 cont NTL; high risk aspiration; still needs supervision /assist with meals      Thrombocytopenia; has been trending down for no apparent reason. hgb slt down as well. Has not been on Hep products; consult hematology today 9/19  -plts 40K, continues to trend down 9/20; await consult by hematology. ? reln to [address]. Mild anemia as well  Check HIT panel, d dimer , fibrinogen  -9/21 plts 36k ; HIT PROFILE POSITIVE, D DIMER ELEVATED 32.79, FIBRINOGEN  ; consistent with DIC; Etiology unclear  WILL D/W HEMATOLOGY; LFTs ok, no hx of blood transfusion,no hx pancreatitis, no sign of bacteremia. Has not been on any heparin products. Can see in head injury; has ICH. Cannot r/o blood disorder (leukemia)   per hematology \"Recommend transfusing platelets for <85,416 and Cryoprecipitate for fibrinogen < 100. \"  -MAKR pending  -9/22 bilateral LE venous duplex ordered; low suspicion but at risk and having calf tenderness  -9/22 plts improved today; monitor closely; 9/23 small right peroneal thrombus. No antic per hematology. IR refused/advised against placing IVF due to low risk of clot propagation given size and location  -9/23 counts bumping up; pts now 53k from 45 and previously 36  -9/24 plts cont to improve. 69K. MARK still pending; can resume therapies  9/26 Ddimer pending, fibrinogen now nl  -9/27 D dimer 11+, improved. Per Hematology; likely due to consumption coagulopathy. Will monitor      Bilateral PEs 9/25, filter placed in IR; looks good today. sats good without O2 supplement 94-97% sats; d/w Hematology, no agaratroban  -apprec Pulm assessment. Clinically looks good and no longer symptomatic  -9/28 clinically stable, asymptomatic       Hypernatremia; last head CT did not show significant cerebral edema. Clinically improving. Likely due to prerenal azotemia; 9/15 MAY REQ isotonic / hypotonic saline IV;  -9/18 events of weekend noted; Na 156; asymptomatic, on 0.5 dextrose with 1/4 Na; na q 6hrs; pts serum osmolality was normal. Will check urine Na and urine osmolality; depending on findings, will consider consulting Hospitalist vs Nephrology  Neuro improving despite this  -9/19 Na down to 149, urine osmo nl, serum osmo min hi; cont dex/and 1/4 NS; repeat in am. Not DI as uop not increased  -9/20 Na 147; cont fluids; 9/21 not resulted; will stop fluids when Na below 142  -Na 140 9/22; dc IVFs; 9/25 136; 9/27 136      ICH/IVH; 9/22 clinically improves daily. F/u HCT yest due to transient left eye visual disturbance. Overall, resolving hemorrhage. There is more pronounced edema; expected. Ventricular size now nl     Anemia ; 9.6-10/ stable      DVT risk / DVT Prophylaxis- Will require daily physician exam to assess for signs and symptoms as patient is at increased risk for of thromboembolism. Mobilization as tolerated.  Intermittent pneumatic compression devices when in bed Thigh-high or knee-high thromboembolic deterrent hose when out of bed. NO  anticoag due to ICH/HIT; has right small right peroneal vein; 9/25 spoke with IR again today; more risk of putting in IVC filter than benefits especially since she is asymptomatic with no swelling or tenderness and small size of clot      Pain Control: stable, mild-to-moderate joint symptoms intermittently, reasonably well controlled by PRN meds. Will require regular pain assessment and comprenhensive pain management,       Wound Care: Monitor wound status daily per staff and physician. At risk for failure. Will require 24/7 rehab nursing. None needed at this time      Hypertension - BP uncontrolled, fluctuating, managed medically. Add prn hydralazine for sbp> 180. Goal SBP is 140-160 given ICH. Cont Normodyne, norvasc and hyzaar; consider Verapamil or scheduled Hydralazine  -9/18 BP increased 169/95; add hydralazine 25 tid  -9/20 BP much improved  -9/22 /74; 9/24 bp stable; may dec hydralazine to bid  -9/25 dec hydralazine  -9/27 bp 116-119  -9/28 SBP in the 90s to 116, asymptomatic; dc hydralazine and monitor; decrease Normodyne to bid      Mild hyperglycemia, likely stress induced vs borderline diabetes; monitor loosely  -9/27 bs 145 on BMP; follow bs qam x 3d      Dysphagia; cont NTL, mech soft; hopefully can upgrade liquids soon 9/25      Leukocytosis; recheck in a.m. Check UA due to stafford. No pulmonary signs (had neg CXR today), no s/s of infxn at old PICC site, no wounds, afebrile. Watch monocytes. -9/18 12.2 improving. Urine neg  -9/19 up to 13.2, ? Reactive. No source of infxn identified. Afebrile  -9/20 WBC now normal      Urinary retention/ neurogenic bladder - start flomax but continue stafford until mobility improves a bit. Strict I/o's  -9/20 keeping stafford to monitor UOP until Na normalizes.  No significant output to suggest DI  -9/21 dc stafford; cont Flomax, follow bladder scan  9/22 voiding without issue; incontinence but no retention  -9/28 new onset urinary retention; check UA    bowel program - add prn meds; incontinent, want to keep stool on the firmer side. Monitor skin.       GERD - add a PPI. At times may need additional antacids, Maalox prn.      -continues to benefit from and is showing improvements in a multidisc team approach. Participating well.  Cortes Valdivia   9/21   Addendum; in therapy this afternoon, transient left eye blindness. Will f/u head CT for extension of or new ICH, especially with low plts   Results  1. Interval decrease in size of intraventricular hemorrhage within the left  basal ganglia/thalamus with near complete resolution of intraventricular  hemorrhage. Surrounding edema is more pronounced with slight increase in  left-to-right midline shift. 2. Interval decrease in ventricular size, approaching normal in caliber.            Time spent was 35 minutes with over 1/2 in direct patient care/examination, consultation and coordination of care.      Signed By: Quoc Mckeon MD     September 28, 2017

## 2017-09-28 NOTE — PROGRESS NOTES
Assisted patient up to bathroom commode, patient unable to urinate, pt sitting up in wheelchair to eat dinner. RN set up meal, mechanical soft with 1 nectar thicken liquids. Visitor at bedside.  .,

## 2017-09-28 NOTE — PROGRESS NOTES
OT Daily Note    Time In 1345   Time Out 1427     Subjective:\"I think I have a bladder infection. What do they do for that? \" Agreeable to therapy. Pain:During transfers only. Interdisciplinary Communication: Collaborated with MICHAEL, Kvng Frank and patient is making progress towards goals. Precautions:  (falls)    Balance/functional mobility Daily Assessment   SPT from w/c to bed with moderate assist to right side. ROM/PROM Daily Assessment   Finishing on motomed with BLE at start of session. During problem solving at table worked on PROM at all joints in [place]. Cognition Daily Assessment   24 piece puzzle complete with simplification and clues for most pieces. Also completed geometric 2 color tone 18 piece puzzle with cues about every 3 pieces. Ended session:Supine in bed with right resting othosis donned. Call light within reach, pillow under right arm.      Mehran Briggs OTR/L

## 2017-09-28 NOTE — PROGRESS NOTES
Encourage patient to use incentive spirometer frequently. Pt c/o pain to BLE, gave Tramadol 50 mg po. Pt back in bed, RLE elevated on pillow. HOB elevated 30 degrees. Call light with in reach.

## 2017-09-28 NOTE — PROGRESS NOTES
Problem: Falls - Risk of  Goal: *Absence of Falls  Document Haley Fall Risk and appropriate interventions in the flowsheet.    Outcome: Progressing Towards Goal  Fall Risk Interventions:  Mobility Interventions: Bed/chair exit alarm, Patient to call before getting OOB, Utilize walker, cane, or other assitive device, Utilize gait belt for transfers/ambulation     Mentation Interventions: Door open when patient unattended, Reorient patient     Medication Interventions: Bed/chair exit alarm, Patient to call before getting OOB, Teach patient to arise slowly, Utilize gait belt for transfers/ambulation     Elimination Interventions: Bed/chair exit alarm, Call light in reach, Patient to call for help with toileting needs, Toilet paper/wipes in reach, Toileting schedule/hourly rounds     History of Falls Interventions: Bed/chair exit alarm, Door open when patient unattended, Room close to nurse's station

## 2017-09-28 NOTE — PROGRESS NOTES
09/28/17 1050   Time Spent With Patient   Time In 1003   Time Out 1035   Patient Seen For: AM;Neuro-linguistics; Other (comment)   Mental Status   Neurologic State Alert   Orientation Level Oriented to person   Cognition Decreased command following;Memory loss; Impaired decision making; Appropriate decision making   Perception Cues to attend right visual field   Perseveration Perseverates during conversation   Safety/Judgement Fall prevention   Pt seen with Stroke Group today. Pt had noted improvement being able to participate, interact and answer questions.     Joelle Lawrence MA/ANASTASIA/SLP

## 2017-09-28 NOTE — PROGRESS NOTES
PHYSICAL THERAPY DAILY NOTE  Time In: 1394  Time Out: 1116  Patient Seen For: AM;Transfer training;Gait training; Therapeutic exercise; Other (see progress notes)     Subjective: Patient complained of pain in both legs during therapy. Objective: Notified nurse Harlan Klein about pain meds for her legs  Other (comment) (falls)  GROSS ASSESSMENT Daily Assessment            BED/MAT MOBILITY Daily Assessment    Supine to Sit : 2 (Maximal assistance)  Sit to Supine : 2 (Maximal assistance)       TRANSFERS Daily Assessment    Transfer Type: SPT without device  Transfer Assistance : 2 (Maximal assistance)  Sit to Stand Assistance: Maximum assistance       GAIT Daily Assessment    Amount of Assistance: 0 (Not tested)       STEPS or STAIRS Daily Assessment    Level of Assist : 0 (Not tested)       BALANCE Daily Assessment            WHEELCHAIR MOBILITY Daily Assessment            LOWER EXTREMITY EXERCISES Daily Assessment    Extremity: Both  Exercise Type #1: Supine lower extremity strengthening  Sets Performed: 1  Reps Performed: 10  Level of Assist: Maximum assistance  Exercise Type #2: Other (comment) (stretching of HC and HS)  Sets Performed: 2  Reps Performed: 0  Level of Assist: Total assistance          Assessment: Patient making progress but pain in her legs seem to be hindering participation today. Plan of Care: Continue with plan of care to reach PT goals. Returned to room to bed with call santillan at reach.     Gabi Wilde, PTA  9/28/2017

## 2017-09-28 NOTE — PROGRESS NOTES
PHYSICAL THERAPY DAILY NOTE  Time In: 1300  Time Out: 4367  Patient Seen For: PM;Other (see progress notes); Therapeutic exercise;Gait training;Transfer training    Subjective: Patient complained of pain in legs. Objective:  Dr. Kaykay Sahu notified of pain. Pain meds given prior to treatment. Other (comment) (falls)  GROSS ASSESSMENT Daily Assessment            BED/MAT MOBILITY Daily Assessment    Supine to Sit : 2 (Maximal assistance)  Sit to Supine : 2 (Maximal assistance)       TRANSFERS Daily Assessment    Transfer Type: SPT without device  Transfer Assistance : 2 (Maximal assistance)  Sit to Stand Assistance: Maximum assistance       GAIT Daily Assessment    Amount of Assistance: 0 (Not tested)       STEPS or STAIRS Daily Assessment    Level of Assist : 0 (Not tested)       BALANCE Daily Assessment            WHEELCHAIR MOBILITY Daily Assessment            LOWER EXTREMITY EXERCISES Daily Assessment    Extremity: Both  Exercise Type #1: Other (comment) (motomed x 10 minutes on gear 2)  Sets Performed: 1  Reps Performed: 10  Level of Assist: Maximum assistance  Exercise Type #2: Other (comment) (stretching of HC and HS)  Sets Performed: 2  Reps Performed: 0  Level of Assist: Total assistance          Assessment: Patient participated with therapy but required some motivation today. Plan of Care: Continue with plan of care to reach PT goals. To OT after treatment.     Bárbara Perez, PTA  9/28/2017

## 2017-09-28 NOTE — PROGRESS NOTES
Pt resting comfortably in bed. Ate about 40% of her mechanical soft diet and drank 120 ml of nectar tea. Pt RLE elevated on pillow. IV to left wrist intact and patent. visitors at bedside.

## 2017-09-29 PROCEDURE — 97535 SELF CARE MNGMENT TRAINING: CPT

## 2017-09-29 PROCEDURE — 97530 THERAPEUTIC ACTIVITIES: CPT

## 2017-09-29 PROCEDURE — 99232 SBSQ HOSP IP/OBS MODERATE 35: CPT | Performed by: PHYSICAL MEDICINE & REHABILITATION

## 2017-09-29 PROCEDURE — 65310000000 HC RM PRIVATE REHAB

## 2017-09-29 PROCEDURE — 77030011943

## 2017-09-29 PROCEDURE — 97112 NEUROMUSCULAR REEDUCATION: CPT

## 2017-09-29 PROCEDURE — 51798 US URINE CAPACITY MEASURE: CPT

## 2017-09-29 PROCEDURE — 74011250637 HC RX REV CODE- 250/637: Performed by: PHYSICAL MEDICINE & REHABILITATION

## 2017-09-29 PROCEDURE — 92526 ORAL FUNCTION THERAPY: CPT

## 2017-09-29 PROCEDURE — 97110 THERAPEUTIC EXERCISES: CPT

## 2017-09-29 RX ORDER — AMLODIPINE BESYLATE 5 MG/1
2.5 TABLET ORAL DAILY
Status: DISCONTINUED | OUTPATIENT
Start: 2017-09-30 | End: 2017-09-30

## 2017-09-29 RX ORDER — LABETALOL 100 MG/1
100 TABLET, FILM COATED ORAL 2 TIMES DAILY
Status: DISCONTINUED | OUTPATIENT
Start: 2017-09-29 | End: 2017-10-26 | Stop reason: HOSPADM

## 2017-09-29 RX ORDER — CIPROFLOXACIN 500 MG/1
500 TABLET ORAL EVERY 12 HOURS
Status: DISCONTINUED | OUTPATIENT
Start: 2017-09-29 | End: 2017-10-02

## 2017-09-29 RX ADMIN — TIZANIDINE 2 MG: 2 TABLET ORAL at 20:17

## 2017-09-29 RX ADMIN — TAMSULOSIN HYDROCHLORIDE 0.4 MG: 0.4 CAPSULE ORAL at 20:17

## 2017-09-29 RX ADMIN — CIPROFLOXACIN HYDROCHLORIDE 500 MG: 500 TABLET, FILM COATED ORAL at 11:48

## 2017-09-29 RX ADMIN — POTASSIUM CHLORIDE 40 MEQ: 20 TABLET, EXTENDED RELEASE ORAL at 17:03

## 2017-09-29 RX ADMIN — CIPROFLOXACIN HYDROCHLORIDE 500 MG: 500 TABLET, FILM COATED ORAL at 20:17

## 2017-09-29 RX ADMIN — POTASSIUM CHLORIDE 40 MEQ: 20 TABLET, EXTENDED RELEASE ORAL at 08:29

## 2017-09-29 RX ADMIN — LABETALOL HYDROCHLORIDE 100 MG: 100 TABLET, FILM COATED ORAL at 17:20

## 2017-09-29 NOTE — PROGRESS NOTES
09/29/17 1534   Time Spent With Patient   Time In 1300   Time Out 1346   Lower Body Bathing   Bathing Assistance, Lower  Max A   Toileting   Toileting Assistance (FIM Score) Max A  (patient required assistance for pants management)   Adaptive Equipment Elevated seat;Walker   Functional Transfers   Toilet Transfer  Grab bars; Other (comment)  ([facility])   Amount of Assistance Required Max A     Patient requested to use the bedside commode and change shorts in the beginning of the therapy session. Please see grid above for functional independence level. Patient reported very diminished sensation on RUE and stating, \"I wish that I could feel that. \"  When therapist touched right hand. Patient rubbed a dry washcloth on RUE to increase sensory awareness of right side of the body. Therapist also used vibration via massager and joint compressions of RUE to increase sensory input. At the end of session patient reported improvement. Educated patient to use dry washcloth to provide tactile input to RUE over the weekend 5 times a day with verbal understanding. Patient was handed off to PT. Overall, patient tolerated session well without any complications.      Billie Miller, OTR/L

## 2017-09-29 NOTE — PROGRESS NOTES
09/29/17 1126   Time Spent With Patient   Time In 1000   Time Out 1033   Patient Seen For: AM;Oral-motor exercises; Laryngeal exercises; Verbal activities; Auditory/read/write teatment   Mental Status   Neurologic State Alert   Orientation Level Oriented to person;Oriented to situation   Cognition Appropriate decision making; Follows commands;Memory loss   Perception Cues to attend right visual field   Perseveration Perseverates during conversation   Safety/Judgement Fall prevention   Another daughter present. Asking questions. Answered some questions. Deferred to RN and MD instead of googling. Provided Stroke Education Book. Educated daughter on converting questions to yes/no and keeping it simple to help with pt's expressive and receptive asphasia and being able to express herself. Pt completed oral motor exercises 2 x 10 and laryngeal exercises 2 x 5 with min-mod cues with 80% accuracy. Pt completed reading tasks with 85% accuracy with noted perseveration on previous sentence. Pt need mod cues to complete word finding tasks with 70% accuracy. Recommend a re Modified barium swallow study Monday am to assess pharyngeal swallow, rule out aspiration and for possible diet upgrade.     José Miguel Hernandez MA/ANASTASIA/SLP

## 2017-09-29 NOTE — PROGRESS NOTES
PHYSICAL THERAPY DAILY NOTE  Time In: 1346  Time Out: 1430    Subjective: \"I want everything to get better\"         Objective: Vital Signs:    Patient Vitals for the past 12 hrs:   Temp Pulse Resp BP SpO2   09/29/17 1503 98.2 °F (36.8 °C) 81 - 119/78 96 %   09/29/17 0721 99.1 °F (37.3 °C) (!) 58 16 96/57 95 %       Pain level:  Patient complained of Right side groin discomfort with attempts to stand today and some with doing bridge exercises. Pain location:  Right anterior hip  Pain interventions:  Rest, positioning      Other (comment) (falls)  GROSS ASSESSMENT Daily Assessment    AROM: Generally decreased, functional  PROM: Within functional limits  Strength: Grossly decreased, non-functional  Coordination: Generally decreased, functional  Tone: Abnormal  Sensation: Impaired       BED/MAT MOBILITY Daily Assessment    Rolling Right : 3 (Moderate assistance )  Rolling Left : 2 (Maximal assistance)  Supine to Sit : 2 (Maximal assistance)  Sit to Supine : 2 (Maximal assistance)       TRANSFERS Daily Assessment   Attempted sit to stand and static standing in preparation for ambulating but patient complained of Right hip groin discomfort so we focused on w/c skills. Transfer Type: SPT without device  Transfer Assistance : 2 (Maximal assistance)  Sit to Stand Assistance: Maximum assistance  Car Transfers: Not tested       BALANCE Daily Assessment    Sitting - Static: Fair (occasional)  Sitting - Dynamic: Fair (occasional)  Standing - Static: Fair;Constant support  Standing - Dynamic : Impaired       WHEELCHAIR MOBILITY Daily Assessment   Patient did very well with w/c propulsion this p.m. With increased distance and improved steering with less need for assistance to guide wheelchair.   Able to Propel (ft): 150 feet  Functional Level: 3  Wheelchair Setup Assist Required : 2 (Maximal assistance)  Wheelchair Management: Manages left brake       LOWER EXTREMITY EXERCISES Daily Assessment   Patient demonstrated improved RLE knee extension with facilitation of the quads. She has associated movements related to hip abd/adduction and is starting to initiate RLE hip flexion in side lying. Extremity: Both  Exercise Type #1: Supine lower extremity strengthening  Sets Performed: 2  Reps Performed: 10  Level of Assist: Maximum assistance (RLE facilitation needed)  Exercise Type #2: Seated lower extremity strengthening (Sitting balance on the edge of the mat)  Level of Assist: Minimal assistance  Exercise Type #3: Seated lower extremity strengthening (motomed x 10 minutes level 2)  Level of Assist: Supervision          Assessment: Patients progress remains slow and she has difficulty understanding and following directions at times. Her RLE anterior hip was uncomfortable today with standing so gait was not attempted. Focus was placed on w/c skills, sitting balance and RLE facilitory movements. Patient was taken back to her room and assisted back to her bed after the p.m. PT session. Her daughter was at the bedside and the call light was left within reach. Plan of Care: Continue towards her revised goals.       Ralph Hartley  9/29/2017

## 2017-09-29 NOTE — PROGRESS NOTES
Ancil Halsted, MD,   Medical Director  [address] [facility], [address] [facility]  Tel: [phone]       SFD PROGRESS NOTE    Pastor Azul Date: 9/15/2017  Admit Diagnosis: stroke, left brain involvement;ICH (intracerebral hemorrhag*    Subjective     Was having multiple msk complaints with therapies but tolerated it well when I did ROM of her jnts. I think she was trying to avoid therapies. This a.m there is no c/o pain. Slept well    Objective:     Current Facility-Administered Medications   Medication Dose Route Frequency    labetalol (NORMODYNE) tablet 100 mg  100 mg Oral BID    [START ON 9/30/2017] amLODIPine (NORVASC) tablet 2.5 mg  2.5 mg Oral DAILY    [START ON 9/30/2017] losartan/hydroCHLOROthiazide (HYZAAR) 100/12.5 mg   Oral DAILY    ciprofloxacin HCl (CIPRO) tablet 500 mg  500 mg Oral Q12H    potassium chloride (K-DUR, KLOR-CON) SR tablet 40 mEq  40 mEq Oral BID    tiZANidine (ZANAFLEX) tablet 2 mg  2 mg Oral QHS    acetaminophen (TYLENOL) tablet 500 mg  500 mg Per NG tube Q4H PRN    alum-mag hydroxide-simeth (MYLANTA) oral suspension 30 mL  30 mL Oral Q4H PRN    bisacodyl (DULCOLAX) suppository 10 mg  10 mg Rectal DAILY PRN    hydrALAZINE (APRESOLINE) tablet 50 mg  50 mg Oral QID PRN    lip protectant (BLISTEX) ointment   Topical PRN    ondansetron (ZOFRAN ODT) tablet 4 mg  4 mg Oral Q6H PRN    sodium phosphate (FLEET'S) enema 118 mL  1 Enema Rectal PRN    traMADol (ULTRAM) tablet 50 mg  50 mg Oral Q6H PRN    traZODone (DESYREL) tablet 50 mg  50 mg Oral QHS PRN    tamsulosin (FLOMAX) capsule 0.4 mg  0.4 mg Oral QHS     Review of Systems:Denies chest pain, shortness of breath, cough, headache, visual problems, abdominal pain, dysurea, calf pain. Pertinent positives are as noted in the medical records and unremarkable otherwise.      Visit Vitals    BP 96/57 (BP 1 Location: Left arm, BP Patient Position: At rest)    Pulse (!) 58    Temp 99.1 °F (37.3 °C)    Resp 16    SpO2 95%        Physical Exam:   General: Alert and age appropriately oriented. No acute cardio respiratory distress. Expressive aphasia   HEENT: Normocephalic,no scleral icterus  Oral mucosa moist without cyanosis   Lungs: Clear to auscultation  bilaterally. Respiration even and unlabored   Heart: Regular rate and rhythm, S1, S2   No  murmurs, clicks, rub or gallops   Abdomen: Soft, non-tender, nondistended. Bowel sounds present. No organomegaly. Genitourinary: Benign . Neuromuscular:      Dense right hemiplegia. Maggie 2  Attends to the right a bit better. 1-2 word responses are clear but sentences tend to turn into word salad   Skin/extremity: No rashes, no erythema.  No calf tenderness BLE  No edema                                                                            Functional Assessment:  Gross Assessment  AROM: Grossly decreased, non-functional (09/22/17 1500)  PROM: Within functional limits (09/22/17 1500)  Strength: Grossly decreased, non-functional (09/22/17 1500)  Coordination: Generally decreased, functional (09/22/17 1500)  Tone: Abnormal (09/22/17 1500)  Sensation: Impaired (09/22/17 1500)       Balance  Sitting - Static: Fair (occasional) (09/22/17 1500)  Sitting - Dynamic: Fair (occasional) (09/22/17 1500)  Standing - Static: Fair;Constant support (09/22/17 1500)  Standing - Dynamic : Impaired (09/22/17 1500)                     [facility] Fall Risk Assessment:  [facility] Fall Risk  Mobility: Ambulates or transfers with assist devices or assistance/unsteady gait (09/28/17 2035)  Mobility Interventions: Utilize walker, cane, or other assitive device (09/28/17 2035)  Mentation: Periodic confusion (09/28/17 2035)  Mentation Interventions: Door open when patient unattended;Family/sitter at bedside (09/28/17 2035)  Medication: Patient receiving anticonvulsants, sedatives(tranquilizers), psychotropics or hypnotics, hypoglycemics, narcotics, sleep aids, antihypertensives, laxatives, or diuretics (09/28/17 2035)  Medication Interventions: Patient to call before getting OOB (09/28/17 2035)  Elimination: Incontinence (09/28/17 2035)  Elimination Interventions: Call light in reach; Patient to call for help with toileting needs (09/28/17 2035)  Prior Fall History: Unknown (09/28/17 2035)  History of Falls Interventions: Bed/chair exit alarm; Consult care management for discharge planning;Door open when patient unattended;Evaluate medications/consider consulting pharmacy; Room close to nurse's station (09/28/17 0811)  Total Score: 4 (09/28/17 2035)  Standard Fall Precautions: Yes (09/26/17 2126)  High Fall Risk: Yes (09/28/17 2035)     Speech Assessment:         Ambulation:  Gait  Base of Support: Narrowed; Center of gravity altered (09/22/17 1200)  Speed/Lulu: Delayed (09/22/17 1200)  Step Length: Right shortened (09/22/17 1200)  Distance (ft): 10 Feet (ft) (09/27/17 1632)  Assistive Device: Gait belt;Walker cindy;Other (comment) (used 2 ace wraps to support knee and assist DF) (09/27/17 1632)     Labs/Studies:  Recent Results (from the past 72 hour(s))   CBC WITH AUTOMATED DIFF    Collection Time: 09/26/17 11:38 AM   Result Value Ref Range    WBC 9.5 4.3 - 11.1 K/uL    RBC 3.67 (L) 4.05 - 5.25 M/uL    HGB 10.6 (L) 11.7 - 15.4 g/dL    HCT 30.5 (L) 35.8 - 46.3 %    MCV 83.1 79.6 - 97.8 FL    MCH 28.9 26.1 - 32.9 PG    MCHC 34.8 31.4 - 35.0 g/dL    RDW 14.2 11.9 - 14.6 %    PLATELET 024 (L) 893 - 450 K/uL    MPV 10.0 (L) 10.8 - 14.1 FL    NEUTROPHILS 75 43 - 78 %    LYMPHOCYTES 13 13 - 44 %    MONOCYTES 11 4.0 - 12.0 %    EOSINOPHILS 1 0.5 - 7.8 %    BASOPHILS 0 0.0 - 2.0 %    IMMATURE GRANULOCYTES 0 0.0 - 5.0 %    ABS. NEUTROPHILS 7.2 1.7 - 8.2 K/UL    ABS. LYMPHOCYTES 1.2 0.5 - 4.6 K/UL    ABS. MONOCYTES 1.0 0.1 - 1.3 K/UL    ABS. EOSINOPHILS 0.1 0.0 - 0.8 K/UL    ABS. BASOPHILS 0.0 0.0 - 0.2 K/UL    ABS. IMM.  GRANS. 0.0 0.0 - 0.5 K/UL    RBC COMMENTS NORMOCYTIC/NORMOCHROMIC      WBC COMMENTS OCCASIONAL      PLATELET COMMENTS SLIGHT      DF MANUAL     PTT    Collection Time: 09/26/17 11:38 AM   Result Value Ref Range    aPTT 27.1 23.5 - 31.7 SEC   PROTHROMBIN TIME + INR    Collection Time: 09/26/17 11:38 AM   Result Value Ref Range    Prothrombin time 10.2 9.6 - 12.0 sec    INR 0.9 0.9 - 1.2     D DIMER    Collection Time: 09/26/17 11:38 AM   Result Value Ref Range    D DIMER 11.28 (HH) <0.55 ug/ml(FEU)   CBC WITH AUTOMATED DIFF    Collection Time: 09/27/17  6:32 AM   Result Value Ref Range    WBC 6.7 4.3 - 11.1 K/uL    RBC 3.37 (L) 4.05 - 5.25 M/uL    HGB 9.7 (L) 11.7 - 15.4 g/dL    HCT 27.9 (L) 35.8 - 46.3 %    MCV 82.8 79.6 - 97.8 FL    MCH 28.8 26.1 - 32.9 PG    MCHC 34.8 31.4 - 35.0 g/dL    RDW 14.1 11.9 - 14.6 %    PLATELET 309 (L) 064 - 450 K/uL    MPV 9.8 (L) 10.8 - 14.1 FL    DF AUTOMATED      NEUTROPHILS 71 43 - 78 %    LYMPHOCYTES 18 13 - 44 %    MONOCYTES 10 4.0 - 12.0 %    EOSINOPHILS 1 0.5 - 7.8 %    BASOPHILS 0 0.0 - 2.0 %    IMMATURE GRANULOCYTES 0.3 0.0 - 5.0 %    ABS. NEUTROPHILS 4.7 1.7 - 8.2 K/UL    ABS. LYMPHOCYTES 1.2 0.5 - 4.6 K/UL    ABS. MONOCYTES 0.7 0.1 - 1.3 K/UL    ABS. EOSINOPHILS 0.1 0.0 - 0.8 K/UL    ABS. BASOPHILS 0.0 0.0 - 0.2 K/UL    ABS. IMM.  GRANS. 0.0 0.0 - 0.5 K/UL   METABOLIC PANEL, BASIC    Collection Time: 09/27/17  6:32 AM   Result Value Ref Range    Sodium 136 136 - 145 mmol/L    Potassium 3.6 3.5 - 5.1 mmol/L    Chloride 102 98 - 107 mmol/L    CO2 22 21 - 32 mmol/L    Anion gap 12 7 - 16 mmol/L    Glucose 145 (H) 65 - 100 mg/dL    BUN 20 8 - 23 MG/DL    Creatinine 0.54 (L) 0.6 - 1.0 MG/DL    GFR est AA >60 >60 ml/min/1.73m2    GFR est non-AA >60 >60 ml/min/1.73m2    Calcium 8.2 (L) 8.3 - 10.4 MG/DL   CBC WITH AUTOMATED DIFF    Collection Time: 09/28/17  8:09 AM   Result Value Ref Range    WBC 8.8 4.3 - 11.1 K/uL    RBC 3.11 (L) 4.05 - 5.25 M/uL    HGB 9.1 (L) 11.7 - 15.4 g/dL    HCT 26.6 (L) 35.8 - 46.3 %    MCV 85.5 79.6 - 97.8 FL    MCH 29.3 26.1 - 32.9 PG    MCHC 34.2 31.4 - 35.0 g/dL    RDW 14.0 11.9 - 14.6 %    PLATELET 933 (L) 590 - 450 K/uL    MPV 9.5 (L) 10.8 - 14.1 FL    DF AUTOMATED      NEUTROPHILS 80 (H) 43 - 78 %    LYMPHOCYTES 14 13 - 44 %    MONOCYTES 6 4.0 - 12.0 %    EOSINOPHILS 0 (L) 0.5 - 7.8 %    BASOPHILS 0 0.0 - 2.0 %    IMMATURE GRANULOCYTES 0.2 0.0 - 5.0 %    ABS. NEUTROPHILS 7.0 1.7 - 8.2 K/UL    ABS. LYMPHOCYTES 1.2 0.5 - 4.6 K/UL    ABS. MONOCYTES 0.5 0.1 - 1.3 K/UL    ABS. EOSINOPHILS 0.0 0.0 - 0.8 K/UL    ABS. BASOPHILS 0.0 0.0 - 0.2 K/UL    ABS. IMM.  GRANS. 0.0 0.0 - 0.5 K/UL    RBC COMMENTS NORMOCYTIC/NORMOCHROMIC      WBC COMMENTS Result Confirmed By Smear      PLATELET COMMENTS ADEQUATE     URINALYSIS W/ RFLX MICROSCOPIC    Collection Time: 09/28/17  1:15 PM   Result Value Ref Range    Color YELLOW      Appearance HAZY      Specific gravity 1.017 1.001 - 1.023      pH (UA) 6.0 5.0 - 9.0      Protein NEGATIVE  NEG mg/dL    Glucose NEGATIVE  mg/dL    Ketone NEGATIVE  NEG mg/dL    Bilirubin NEGATIVE  NEG      Blood NEGATIVE  NEG      Urobilinogen 1.0 0.2 - 1.0 EU/dL    Nitrites NEGATIVE  NEG      Leukocyte Esterase NEGATIVE  NEG     CULTURE, URINE    Collection Time: 09/28/17  1:15 PM   Result Value Ref Range    Special Requests: NO SPECIAL REQUESTS      Culture result: >100,000 COLONIES/mL GRAM POSITIVE COCCI (A)      Culture result: SUBCULTURE IN PROGRESS         Assessment:     Problem List as of 9/29/2017  Date Reviewed: 9/12/2017          Codes Class Noted - Resolved    Thrombocytopenia ([facility] ICD-10-CM: D69.6  ICD-9-CM: 287.5  9/25/2017 - Present        Pulmonary emboli (HCC) ICD-10-CM: I26.99  ICD-9-CM: 415.19  9/25/2017 - Present        DVT (deep venous thrombosis) ([facility] ICD-10-CM: I82.409  ICD-9-CM: 453.40  9/25/2017 - Present        ICH (intracerebral hemorrhage) ([facility] ICD-10-CM: I61.9  ICD-9-CM: 578  9/15/2017 - Present        Hypertensive urgency, malignant ICD-10-CM: I16.0  ICD-9-CM: 401.0  9/11/2017 - Present        Stroke, hemorrhagic ([facility] ICD-10-CM: I61.9  ICD-9-CM: 718  9/7/2017 - Present        Accelerated hypertension ICD-10-CM: I10  ICD-9-CM: 401.0  9/7/2017 - Present        At risk for aspiration (Chronic) ICD-10-CM: Z91.89  ICD-9-CM: V49.89  9/7/2017 - Present        Acute spontaneous intraventricular hemorrhage assoc w/ hypertension ([facility] ICD-10-CM: I61.5, I10  ICD-9-CM: 510, 401.9  9/7/2017 - Present        Screening for breast cancer ICD-10-CM: Z12.39  ICD-9-CM: V76.10  2/27/2017 - Present        Atrophic vaginitis ICD-10-CM: N95.2  ICD-9-CM: 627.3  7/11/2016 - Present        HTN (hypertension) (Chronic) ICD-10-CM: I10  ICD-9-CM: 401.9  10/23/2015 - Present        ADD (attention deficit disorder) (Chronic) ICD-10-CM: F98.8  ICD-9-CM: 314.00  10/23/2015 - Present        Depression ICD-10-CM: F32.9  ICD-9-CM: 967  10/23/2015 - Present        Anxiety (Chronic) ICD-10-CM: F41.9  ICD-9-CM: 300.00  10/23/2015 - Present        RESOLVED: Hypoxemia ICD-10-CM: R09.02  ICD-9-CM: 799.02  9/10/2017 - 9/12/2017        RESOLVED: Acute respiratory failure ([facility] ICD-10-CM: J96.00  ICD-9-CM: 518.81  9/8/2017 - 9/10/2017        RESOLVED: Hemorrhagic stroke ([facility] ICD-10-CM: I61.9  ICD-9-CM: 448  9/7/2017 - 9/7/2017        RESOLVED: Non-intractable vomiting with nausea ICD-10-CM: R11.2  ICD-9-CM: 787.01  9/7/2017 - 9/12/2017        RESOLVED: Seborrheic keratosis, inflamed ICD-10-CM: L82.0  ICD-9-CM: 702.11  7/11/2016 - 9/20/2016        RESOLVED: Cough ICD-10-CM: R05  ICD-9-CM: 786.2  7/11/2016 - 9/20/2016        RESOLVED: Shoulder pain ICD-10-CM: M25.519  ICD-9-CM: 719.41  10/23/2015 - 2/27/2017                     Left BG Hemorrhage with intraventricular extension due to HTN emergency; resultant R hemiplegia, right neglect, dysarthria, aphasia, dysphagia with significant decline in mobility and self care  Plan:   Continue daily physician medical management:  Pneumonia prophylaxis- Incentive spirometer every hour while awake. Will need instruction and assistance. Not sure if she can get a proper oral seal to be effective      Dysphagia; Pureed diet with NTL; at risk for malnutrition and dehydration. PICC line removed prior to transfer; may need a line for IVFs if BUN continues to increase. Low K, Ca; check mg. Supplementation as needed. Na elevated  -mag ok, K much improved; cont to supplement while on diuretic  -9/19 replace K; 3.0; 2.8 this am 9/20; inc supplement; may need to add to IVFs  -9/25 fluid status and K nl; 9/28 cont NTL; high risk aspiration; still needs supervision /assist with meals      Thrombocytopenia; has been trending down for no apparent reason. hgb slt down as well. Has not been on Hep products; consult hematology today 9/19  -plts 40K, continues to trend down 9/20; await consult by hematology. ? reln to [address]. Mild anemia as well  Check HIT panel, d dimer , fibrinogen  -9/21 plts 36k ; HIT PROFILE POSITIVE, D DIMER ELEVATED 32.79, FIBRINOGEN  ; consistent with DIC; Etiology unclear  WILL D/W HEMATOLOGY; LFTs ok, no hx of blood transfusion,no hx pancreatitis, no sign of bacteremia. Has not been on any heparin products. Can see in head injury; has ICH. Cannot r/o blood disorder (leukemia)   per hematology \"Recommend transfusing platelets for <76,237 and Cryoprecipitate for fibrinogen < 100. \"  -MARK pending  -9/22 bilateral LE venous duplex ordered; low suspicion but at risk and having calf tenderness  -9/22 plts improved today; monitor closely; 9/23 small right peroneal thrombus. No antic per hematology. IR refused/advised against placing IVF due to low risk of clot propagation given size and location  -9/23 counts bumping up; pts now 53k from 45 and previously 36  -9/24 plts cont to improve. 69K. MARK still pending; can resume therapies  9/26 Ddimer pending, fibrinogen now nl  -9/27 D dimer 11+, improved. Per Hematology; likely due to consumption coagulopathy.  Will monitor      Bilateral PEs 9/25, filter placed in IR; looks good today. sats good without O2 supplement 94-97% sats; d/w Hematology, no agaratroban  -apprec Pulm assessment. Clinically looks good and no longer symptomatic  -9/28 clinically stable, asymptomatic       Hypernatremia; last head CT did not show significant cerebral edema. Clinically improving. Likely due to prerenal azotemia; 9/15 MAY REQ isotonic / hypotonic saline IV;  -9/18 events of weekend noted; Na 156; asymptomatic, on 0.5 dextrose with 1/4 Na; na q 6hrs; pts serum osmolality was normal. Will check urine Na and urine osmolality; depending on findings, will consider consulting Hospitalist vs Nephrology  Neuro improving despite this  -9/19 Na down to 149, urine osmo nl, serum osmo min hi; cont dex/and 1/4 NS; repeat in am. Not DI as uop not increased  -9/20 Na 147; cont fluids; 9/21 not resulted; will stop fluids when Na below 142  -Na 140 9/22; dc IVFs; 9/25 136; 9/27 136      ICH/IVH; 9/22 clinically improves daily. F/u HCT yest due to transient left eye visual disturbance. Overall, resolving hemorrhage. There is more pronounced edema; expected. Ventricular size now nl      Anemia ; 9.6-10/ stable      DVT risk / DVT Prophylaxis- Will require daily physician exam to assess for signs and symptoms as patient is at increased risk for of thromboembolism. Mobilization as tolerated. Intermittent pneumatic compression devices when in bed Thigh-high or knee-high thromboembolic deterrent hose when out of bed. NO  anticoag due to ICH/HIT; has right small right peroneal vein; 9/25 spoke with IR again today; more risk of putting in IVC filter than benefits especially since she is asymptomatic with no swelling or tenderness and small size of clot      Pain Control: stable, mild-to-moderate joint symptoms intermittently, reasonably well controlled by PRN meds.  Will require regular pain assessment and comprenhensive pain management,       Wound Care: Monitor wound status daily per staff and physician. At risk for failure. Will require 24/7 rehab nursing. None needed at this time      Hypertension - BP uncontrolled, fluctuating, managed medically. Add prn hydralazine for sbp> 180. Goal SBP is 140-160 given ICH. Cont Normodyne, norvasc and hyzaar; consider Verapamil or scheduled Hydralazine  -9/18 BP increased 169/95; add hydralazine 25 tid  -9/20 BP much improved  -9/22 /74; 9/24 bp stable; may dec hydralazine to bid  -9/25 dec hydralazine  -9/27 bp 116-119  -9/28 SBP in the 90s to 116, asymptomatic; dc hydralazine and monitor; decrease Normodyne to bid  -9/29 BP still low; lower Norvasc and normodyne, cont Hyzaar; all have parameters of when to hold      Mild hyperglycemia, likely stress induced vs borderline diabetes; monitor loosely  -9/27 bs 145 on BMP; follow bs qam x 3d      Dysphagia; cont NTL, mech soft; hopefully can upgrade liquids soon 9/25  -9/29 remains on NTL      Leukocytosis; recheck in a.m. Check UA due to stafford. No pulmonary signs (had neg CXR today), no s/s of infxn at old PICC site, no wounds, afebrile. Watch monocytes. -9/18 12.2 improving. Urine neg  -9/19 up to 13.2, ? Reactive. No source of infxn identified. Afebrile  -9/20 WBC now normal      Urinary retention/ neurogenic bladder - start flomax but continue stafford until mobility improves a bit. Strict I/o's  -9/20 keeping stafford to monitor UOP until Na normalizes. No significant output to suggest DI  -9/21 dc stafford; cont Flomax, follow bladder scan  9/22 voiding without issue; incontinence but no retention  -9/28 new onset urinary retention; check UA  -9/29 UA neg but cx saying > 100K gram neg cocci; will need to f/u ID/sens; Start Cipro 500 bid     bowel program - add prn meds; incontinent, want to keep stool on the firmer side. Monitor skin.       GERD - add a PPI. At times may need additional antacids, Maalox prn.      -continues to benefit from and is showing improvements in a multidisc team approach. Participating well.  [place]   9/21   Addendum; in therapy this afternoon, transient left eye blindness. Will f/u head CT for extension of or new ICH, especially with low plts   Results  1. Interval decrease in size of intraventricular hemorrhage within the left  basal ganglia/thalamus with near complete resolution of intraventricular  hemorrhage. Surrounding edema is more pronounced with slight increase in  left-to-right midline shift. 2. Interval decrease in ventricular size, approaching normal in caliber.                    Time spent was 25 minutes with over 1/2 in direct patient care/examination, consultation and coordination of care.      Signed By: Neha Zarate MD     September 29, 2017

## 2017-09-29 NOTE — PROGRESS NOTES
PT WEEKLY PROGRESS NOTE   Time In: 0916   Time Out: 1002    Subjective: \"I had no therapy yesterday since I had to get that filter\"         Objective: Vital Signs:    Patient Vitals for the past 12 hrs:   Temp Pulse Resp BP SpO2   09/29/17 0721 99.1 °F (37.3 °C) (!) 58 16 96/57 95 %       Pain level:  Patient states her legs are sore but not in pain  Pain location:  BLEs    Other (comment) (falls)    Outcome Measures:      AROM: RLE has hip flex/ext abd/add and knee extension with much facilitation though 1/4 range. FIM SCORES Initial Assessment Weekly Progress Assessment 9/29/2017   Bed/Chair/Wheelchair Transfers 2 2   Wheelchair Mobility  (Unable to assess due to appropriate size w/c not available) 2   Walking [place]  (NT) 1   Steps/Stairs  (NT) 0 (NT)   Please see [facility] Eval: Coordination/Balance Section for details regarding FIM score description. BED/CHAIR/WHEELCHAIR TRANSFERS Initial Assessment Weekly Progress Assessment 9/29/2017   Rolling Right 3 (Moderate assistance ) 3 (Moderate assistance )   Rolling Left 3 (Moderate assistance ) 2 (Maximal assistance)   Supine to Sit 3 (Moderate assistance) 2 (Maximal assistance)   Sit to Stand Moderate assistance Maximum assistance   Sit to Supine 3 (Moderate assistance) 0 (Not tested)   Transfer Type SPT without device (to the left  recliner to w/c and w/c to bed) SPT without device   Comments Patient demonstrating right neglect with motor planning deficits during bed mobility and transfers.  Blocking right knee to keep knee from buckling  Patient remains quick to attempt standing and tries to assist with SPT but requires mod to max assistance to stabilize RLE knee   Car Transfer Not tested Not tested   Car Type         GROSS ASSESSMENT Weekly Progress Assessment 9/29/2017   AROM Grossly decreased, non-functional   Strength Grossly decreased, non-functional   Coordination Grossly decreased, non-functional   Tone Abnormal   Sensation Impaired   PROM Within functional limits     POSTURE Weekly Progress Assessment 9/29/2017   Posture (WDL)     Posture Assessment       WHEELCHAIR MOBILITY/MANAGEMENT Initial Assessment Weekly Progress Assessment 9/29/2017   Able to Propel 0 feet 60 feet   Curbs/ramps assistance required 0 (Not tested)  0 (NT)   Wheelchair set up assistance required 2 (Maximal assistance)  2 (maximal assistance)   Wheelchair management Manages left brake Manages left brake     WALKING INDEPENDENCE Initial Assessment Weekly Progress Assessment 9/29/2017   Assistive device Gait belt, Walker cindy, Brace/Splint, Other (comment) (used ace wrap to knee and ankle for DF. and sling for right UE)  Parallel bars or rail for sit to stand and standing with RLE lack of motor tone and control. Ambulation assistance - level surface 0 (Not tested)  2 (maximal assistance) for the RLE   Distance 0 Feet (ft)  10   Comments Unable to ambulate at this time due to extent of right hemiparesis and balance deficits Can manage steps in the parallel bars but cannot initiate RLE hip flexion or control RLE knee extension without manual assistance. GAIT Weekly Progress Assessment 9/29/2017   Gait Description (WDL)  Gait not functional at this time. Gait Abnormalities       STEPS/STAIRS Initial Assessment Weekly Progress Assessment 9/29/2017   Steps/Stairs ambulated 0 0 (NT secondary weakness)   Rail Use       Comments Unable to ambulate up/down steps at this time due to extent of right hemiparesis and balance deficits     Curbs/Ramps 0 (Not tested)            Assessment: Overall progress has been very slow with limited RLE motor return. Can facilitate hip flex/ext, abd/adduction and RLE knee extension but the response is delayed and inconsistent. Patient was taken to ST in her wheelchair after her morning PT session. Plan of Care: Please see Care Plan for updated LTGs.     Family Training: Needs to be scheduled    Frieda Godwin PT  9/29/2017

## 2017-09-29 NOTE — PROGRESS NOTES
OT Daily Note  Time In 1030   Time Out 1120     Pain: Patient had no complaint of pain. Functional Mobility   Pt transferred from w/c bed with mod A. Pt was dependent for scooting up in bed. Cognition   Promoting speaking throughout session with pt making intelligible sentences 50% of the time. Engaged in 5 and 6 step sequencing with min verbal cues. Sorted cards by suit with 100% accuracy and self-correction. Neuro-Muscular Re-Education   Engaged in RUE WB in standing. Pt needed mod A to maintain position. Pt would reach to the R. Pt had poor standing tolerance, but completed activity twice. Engaged in [place] for protraction/retraction and internal and external rotation. Pt demonstrated 2-/5 retraction and 3/5 internal rotation. Trace contractions could be felt on the triceps. Education   Muscles used in movements; flexion synergy     Plan: Continue with POC. Pt was left in bed with all needs within reach.      Stevie Severs OTR/L  9/29/2017

## 2017-09-30 PROCEDURE — 97116 GAIT TRAINING THERAPY: CPT

## 2017-09-30 PROCEDURE — 97533 SENSORY INTEGRATION: CPT

## 2017-09-30 PROCEDURE — 97530 THERAPEUTIC ACTIVITIES: CPT

## 2017-09-30 PROCEDURE — 65310000000 HC RM PRIVATE REHAB

## 2017-09-30 PROCEDURE — 74011250637 HC RX REV CODE- 250/637: Performed by: PHYSICAL MEDICINE & REHABILITATION

## 2017-09-30 PROCEDURE — 77030011943

## 2017-09-30 RX ADMIN — TIZANIDINE 2 MG: 2 TABLET ORAL at 21:39

## 2017-09-30 RX ADMIN — HYDROCHLOROTHIAZIDE: 12.5 CAPSULE ORAL at 08:18

## 2017-09-30 RX ADMIN — CIPROFLOXACIN HYDROCHLORIDE 500 MG: 500 TABLET, FILM COATED ORAL at 21:39

## 2017-09-30 RX ADMIN — TAMSULOSIN HYDROCHLORIDE 0.4 MG: 0.4 CAPSULE ORAL at 21:39

## 2017-09-30 RX ADMIN — CIPROFLOXACIN HYDROCHLORIDE 500 MG: 500 TABLET, FILM COATED ORAL at 08:18

## 2017-09-30 RX ADMIN — TRAZODONE HYDROCHLORIDE 50 MG: 50 TABLET ORAL at 21:39

## 2017-09-30 RX ADMIN — POTASSIUM CHLORIDE 40 MEQ: 20 TABLET, EXTENDED RELEASE ORAL at 08:18

## 2017-09-30 RX ADMIN — LABETALOL HYDROCHLORIDE 100 MG: 100 TABLET, FILM COATED ORAL at 17:01

## 2017-09-30 RX ADMIN — LABETALOL HYDROCHLORIDE 100 MG: 100 TABLET, FILM COATED ORAL at 08:18

## 2017-09-30 RX ADMIN — AMLODIPINE BESYLATE 2.5 MG: 5 TABLET ORAL at 08:18

## 2017-09-30 RX ADMIN — POTASSIUM CHLORIDE 40 MEQ: 20 TABLET, EXTENDED RELEASE ORAL at 17:02

## 2017-09-30 NOTE — PROGRESS NOTES
Problem: Falls - Risk of  Goal: *Absence of Falls  Document Haley Fall Risk and appropriate interventions in the flowsheet.    Outcome: Progressing Towards Goal  Fall Risk Interventions:  Mobility Interventions: Patient to call before getting OOB     Mentation Interventions: Door open when patient unattended     Medication Interventions: Patient to call before getting OOB     Elimination Interventions: Call light in reach, Patient to call for help with toileting needs, Toileting schedule/hourly rounds     History of Falls Interventions: Bed/chair exit alarm, Door open when patient unattended

## 2017-09-30 NOTE — PROGRESS NOTES
OT Daily Note  Time In 1203   Time Out 1236     Subjective: \"I wish it (arm) had big movements, not small. \" patient states   Pain: no pain reported, tolerable to therapy  Education: sensory integration  Interdisciplinary Communication: discussed POC with PT  Precautions:  (falls)    Visual-Perceptual/Cognition Daily Assessment   Patient completed 4 visual perceptual block designs with minimal assistance. Verbal and visual cuing required. Sensory Integration Daily Assessment   Therapist facilitated tactile input using washcloth, vertical strokes throughout RUE (hand and forearm), then transitioned to horizontal, then diagonal to assist with sensation. Followed up with patient completing tasks and instructed to continue throughout her day      Assessment: Patient was finishing with PT when therapist arrived. Transitioned to table to complete visual-perceptual and cognitive based tasks. Patient demonstrated good basic cognition for simple  tasks. Therapist facilitated in verbal cuing to assist with minimal mistakes. Continued treatment session with sensory integration. No new sensation noted in RUE by Mrs. Dusty Jean Baptiste. Concluded session with transition back to patient's room, therapist facilitated SPT from w/c to bed. Patient left with daughter in the room and food tray and call light within reach. Plan: continue with current goals and POC for improved functional performance.      Ameena Schuler, OTD, OTR/L  9/30/17

## 2017-09-30 NOTE — PROGRESS NOTES
PHYSICAL THERAPY DAILY NOTE  Time In: 1135  Time Out: 1200  Patient Seen For: AM;Therapeutic exercise;Transfer training;Gait training    Subjective: \"My leg is feeling better today\"         Objective:  Vital Signs:    Patient Vitals for the past 12 hrs:   Temp Pulse Resp BP SpO2   09/30/17 0706 98.2 °F (36.8 °C) 74 14 111/62 93 %       Pain level:  Patient had no complaints of pain during therapy today. Interdisciplinary Communication:  Discussed patient status with MD making rounds. Other (comment) (falls)  GROSS ASSESSMENT Daily Assessment    AROM: Grossly decreased, non-functional  PROM: Within functional limits  Strength: Grossly decreased, non-functional  Coordination: Grossly decreased, non-functional  Tone: Abnormal  Sensation: Impaired         TRANSFERS Daily Assessment   Improved sit to stand using gaviria rail for LUE support. Practiced weight shift r/l Transfer Type: SPT without device  Transfer Assistance : 2 (Maximal assistance)  Sit to Stand Assistance: Moderate assistance       GAIT Daily Assessment   Patient able to initiate hip flexion and RLE slide forward with RLE ace wrap for foot and LLE platform lift. Amount of Assistance: 3 (Moderate assistance) (max assist to move/accept weight on RLE)  Distance (ft): 10 Feet (ft) (10' x 2 with ace wrap and LLE platform lift)  Assistive Device: Gait belt; Other (comment) (gaviria rail on the LUE)         LOWER EXTREMITY EXERCISES Daily Assessment   Improved RLE knee extension in a seated position with light facilitory tapping to the quads. Increased ability with repetitions and  Increased range of extension.  Extremity: Both  Exercise Type #1: Seated lower extremity strengthening (motomed x 10 level 2)  Level of Assist: Supervision  Exercise Type #2: Standing lower extremity strengthening (sit to stand and weight shift with gaviria rail)  Sets Performed: 1  Reps Performed: 5  Level of Assist: Moderate assistance          Assessment: Patient participated and tolerated therapy well today. She appeared please with her ability to move her RLE knee into extension with more consistency with facilitation. She also was able to initiate RLE hip flexion in standing with a slight RLE slide forward along the floor. Patient was scheduled for OT after her morning PT session and was taken over to the OT table in her w/c. Plan of Care: Continue to progress as indicated.      Ralph Velez  9/30/2017

## 2017-09-30 NOTE — PROGRESS NOTES
Patricia Herron MD,   Medical Director  [address] [facility], [address]  Tel: [phone]       S[facility] PROGRESS NOTE    Peter Iqbal Date: 9/15/2017  Admit Diagnosis: stroke, left brain involvement  ICH (intracerebral hemorrhage) ([facility] [address]    Subjective     Was having multiple msk complaints with therapies but tolerated it well when I did ROM of her jnts. I think she was trying to avoid therapies. This a.m there is no c/o pain. Slept well    Patient seen and examined. No pain complaints. Denies lightheadedness , visual changes, palpations, SOB or nausea. Participating in therapy. Objective:     Current Facility-Administered Medications   Medication Dose Route Frequency    labetalol (NORMODYNE) tablet 100 mg  100 mg Oral BID    amLODIPine (NORVASC) tablet 2.5 mg  2.5 mg Oral DAILY    losartan/hydroCHLOROthiazide (HYZAAR) 100/12.5 mg   Oral DAILY    ciprofloxacin HCl (CIPRO) tablet 500 mg  500 mg Oral Q12H    potassium chloride (K-DUR, KLOR-CON) SR tablet 40 mEq  40 mEq Oral BID    tiZANidine (ZANAFLEX) tablet 2 mg  2 mg Oral QHS    acetaminophen (TYLENOL) tablet 500 mg  500 mg Per NG tube Q4H PRN    alum-mag hydroxide-simeth (MYLANTA) oral suspension 30 mL  30 mL Oral Q4H PRN    bisacodyl (DULCOLAX) suppository 10 mg  10 mg Rectal DAILY PRN    hydrALAZINE (APRESOLINE) tablet 50 mg  50 mg Oral QID PRN    lip protectant (BLISTEX) ointment   Topical PRN    ondansetron (ZOFRAN ODT) tablet 4 mg  4 mg Oral Q6H PRN    sodium phosphate (FLEET'S) enema 118 mL  1 Enema Rectal PRN    traMADol (ULTRAM) tablet 50 mg  50 mg Oral Q6H PRN    traZODone (DESYREL) tablet 50 mg  50 mg Oral QHS PRN    tamsulosin (FLOMAX) capsule 0.4 mg  0.4 mg Oral QHS     Review of Systems:Denies chest pain, shortness of breath, cough, headache, visual problems, abdominal pain, dysurea, calf pain.  Pertinent positives are as noted in the medical records and unremarkable otherwise. Visit Vitals    /62 (BP 1 Location: Left arm)    Pulse 74    Temp 98.2 °F (36.8 °C)    Resp 14    SpO2 93%        Physical Exam:   General: Alert and age appropriately oriented. No acute cardio respiratory distress. Expressive aphasia   HEENT: Normocephalic,no scleral icterus  Oral mucosa moist without cyanosis   Lungs: Clear to auscultation  bilaterally. Respiration even and unlabored   Heart: Regular rate and rhythm, S1, S2   No  murmurs, clicks, rub or gallops   Abdomen: Soft, non-tender, nondistended. Bowel sounds present. Genitourinary: Benign . Neuromuscular:      Dense right hemiplegia. Maggie 2  Attends to the right a bit better. 1-2 word responses are clear but sentences tend to turn into word salad   Skin/extremity: No rashes, no erythema.  No calf tenderness BLE  No edema                                                                            Functional Assessment:  Gross Assessment  AROM: Generally decreased, functional (09/29/17 1600)  PROM: Within functional limits (09/29/17 1600)  Strength: Grossly decreased, non-functional (09/29/17 1600)  Coordination: Generally decreased, functional (09/29/17 1600)  Tone: Abnormal (09/29/17 1600)  Sensation: Impaired (09/29/17 1600)       Balance  Sitting - Static: Fair (occasional) (09/29/17 1600)  Sitting - Dynamic: Fair (occasional) (09/29/17 1600)  Standing - Static: Fair;Constant support (09/29/17 1600)  Standing - Dynamic : Impaired (09/29/17 1600)           Toileting  Adaptive Equipment: Elevated seat;Walker (09/29/17 1534)         Chan Ivory Fall Risk Assessment:  Chan Ivory Fall Risk  Mobility: Ambulates or transfers with assist devices or assistance/unsteady gait (09/30/17 0723)  Mobility Interventions: Patient to call before getting OOB (09/30/17 0723)  Mentation: Periodic confusion (09/30/17 0723)  Mentation Interventions: Door open when patient unattended (09/30/17 1305)  Medication: Patient receiving anticonvulsants, sedatives(tranquilizers), psychotropics or hypnotics, hypoglycemics, narcotics, sleep aids, antihypertensives, laxatives, or diuretics (09/30/17 0723)  Medication Interventions: Patient to call before getting OOB (09/30/17 1460)  Elimination: Incontinence (09/30/17 0723)  Elimination Interventions: Call light in reach; Patient to call for help with toileting needs; Toileting schedule/hourly rounds (09/30/17 9517)  Prior Fall History: Unknown (09/30/17 0723)  History of Falls Interventions: Bed/chair exit alarm; Door open when patient unattended (09/30/17 0723)  Total Score: 4 (09/30/17 0723)  Standard Fall Precautions: Yes (09/26/17 2126)  High Fall Risk: Yes (09/30/17 0723)     Speech Assessment:         Ambulation:  Gait  Base of Support: Narrowed; Center of gravity altered (09/22/17 1200)  Speed/Lulu: Delayed (09/22/17 1200)  Step Length: Right shortened (09/22/17 1200)  Distance (ft): 10 Feet (ft) (09/27/17 1632)  Assistive Device: Gait belt;Walker cindy;Other (comment) (used 2 ace wraps to support knee and assist DF) (09/27/17 1632)     Labs/Studies:  Recent Results (from the past 72 hour(s))   CBC WITH AUTOMATED DIFF    Collection Time: 09/28/17  8:09 AM   Result Value Ref Range    WBC 8.8 4.3 - 11.1 K/uL    RBC 3.11 (L) 4.05 - 5.25 M/uL    HGB 9.1 (L) 11.7 - 15.4 g/dL    HCT 26.6 (L) 35.8 - 46.3 %    MCV 85.5 79.6 - 97.8 FL    MCH 29.3 26.1 - 32.9 PG    MCHC 34.2 31.4 - 35.0 g/dL    RDW 14.0 11.9 - 14.6 %    PLATELET 657 (L) 800 - 450 K/uL    MPV 9.5 (L) 10.8 - 14.1 FL    DF AUTOMATED      NEUTROPHILS 80 (H) 43 - 78 %    LYMPHOCYTES 14 13 - 44 %    MONOCYTES 6 4.0 - 12.0 %    EOSINOPHILS 0 (L) 0.5 - 7.8 %    BASOPHILS 0 0.0 - 2.0 %    IMMATURE GRANULOCYTES 0.2 0.0 - 5.0 %    ABS. NEUTROPHILS 7.0 1.7 - 8.2 K/UL    ABS. LYMPHOCYTES 1.2 0.5 - 4.6 K/UL    ABS. MONOCYTES 0.5 0.1 - 1.3 K/UL    ABS. EOSINOPHILS 0.0 0.0 - 0.8 K/UL    ABS. BASOPHILS 0.0 0.0 - 0.2 K/UL    ABS. IMM.  GRANS. 0.0 0.0 - 0.5 K/UL    RBC COMMENTS NORMOCYTIC/NORMOCHROMIC      WBC COMMENTS Result Confirmed By Smear      PLATELET COMMENTS ADEQUATE     URINALYSIS W/ RFLX MICROSCOPIC    Collection Time: 09/28/17  1:15 PM   Result Value Ref Range    Color YELLOW      Appearance HAZY      Specific gravity 1.017 1.001 - 1.023      pH (UA) 6.0 5.0 - 9.0      Protein NEGATIVE  NEG mg/dL    Glucose NEGATIVE  mg/dL    Ketone NEGATIVE  NEG mg/dL    Bilirubin NEGATIVE  NEG      Blood NEGATIVE  NEG      Urobilinogen 1.0 0.2 - 1.0 EU/dL    Nitrites NEGATIVE  NEG      Leukocyte Esterase NEGATIVE  NEG     CULTURE, URINE    Collection Time: 09/28/17  1:15 PM   Result Value Ref Range    Special Requests: NO SPECIAL REQUESTS      Culture result: >100,000 COLONIES/mL MIXED SKIN JOSEFA ISOLATED      Culture result: CULTURE IN PROGRESS,FURTHER UPDATES TO FOLLOW         Assessment:     Problem List as of 9/30/2017  Date Reviewed: 9/12/2017          Codes Class Noted - Resolved    Thrombocytopenia ([facility] ICD-10-CM: D69.6  ICD-9-CM: 287.5  9/25/2017 - Present        Pulmonary emboli (HCC) ICD-10-CM: I26.99  ICD-9-CM: 415.19  9/25/2017 - Present        DVT (deep venous thrombosis) ([facility] ICD-10-CM: I82.409  ICD-9-CM: 453.40  9/25/2017 - Present        ICH (intracerebral hemorrhage) ([facility] ICD-10-CM: I61.9  ICD-9-CM: 362  9/15/2017 - Present        Hypertensive urgency, malignant ICD-10-CM: I16.0  ICD-9-CM: 401.0  9/11/2017 - Present        Stroke, hemorrhagic ([facility] ICD-10-CM: I61.9  ICD-9-CM: 752  9/7/2017 - Present        Accelerated hypertension ICD-10-CM: I10  ICD-9-CM: 401.0  9/7/2017 - Present        At risk for aspiration (Chronic) ICD-10-CM: M93.72  ICD-9-CM: V49.89  9/7/2017 - Present        Acute spontaneous intraventricular hemorrhage assoc w/ hypertension ([facility] ICD-10-CM: I61.5, I10  ICD-9-CM: 431, 401.9  9/7/2017 - Present        Screening for breast cancer ICD-10-CM: Z12.39  ICD-9-CM: V76.10  2/27/2017 - Present        Atrophic vaginitis ICD-10-CM: N95.2  ICD-9-CM: 627.3  7/11/2016 - Present        HTN (hypertension) (Chronic) ICD-10-CM: I10  ICD-9-CM: 401.9  10/23/2015 - Present        ADD (attention deficit disorder) (Chronic) ICD-10-CM: F98.8  ICD-9-CM: 314.00  10/23/2015 - Present        Depression ICD-10-CM: F32.9  ICD-9-CM: 668  10/23/2015 - Present        Anxiety (Chronic) ICD-10-CM: F41.9  ICD-9-CM: 300.00  10/23/2015 - Present        RESOLVED: Hypoxemia ICD-10-CM: R09.02  ICD-9-CM: 799.02  9/10/2017 - 9/12/2017        RESOLVED: Acute respiratory failure ([facility] ICD-10-CM: J96.00  ICD-9-CM: 518.81  9/8/2017 - 9/10/2017        RESOLVED: Hemorrhagic stroke ([facility] ICD-10-CM: I61.9  ICD-9-CM: 707  9/7/2017 - 9/7/2017        RESOLVED: Non-intractable vomiting with nausea ICD-10-CM: R11.2  ICD-9-CM: 787.01  9/7/2017 - 9/12/2017        RESOLVED: Seborrheic keratosis, inflamed ICD-10-CM: L82.0  ICD-9-CM: 702.11  7/11/2016 - 9/20/2016        RESOLVED: Cough ICD-10-CM: R05  ICD-9-CM: 786.2  7/11/2016 - 9/20/2016        RESOLVED: Shoulder pain ICD-10-CM: M25.519  ICD-9-CM: 719.41  10/23/2015 - 2/27/2017              Left BG Hemorrhage with intraventricular extension due to HTN emergency; resultant R hemiplegia, right neglect, dysarthria, aphasia, dysphagia with significant decline in mobility and self care  Plan:   Continue daily physician medical management:  Pneumonia prophylaxis- Incentive spirometer every hour while awake. Will need instruction and assistance. Not sure if she can get a proper oral seal to be effective      Dysphagia; Pureed diet with NTL; at risk for malnutrition and dehydration. PICC line removed prior to transfer; may need a line for IVFs if BUN continues to increase. Low K, Ca; check mg. Supplementation as needed.  Na elevated  -mag ok, K much improved; cont to supplement while on diuretic  -9/19 replace K; 3.0; 2.8 this am 9/20; inc supplement; may need to add to IVFs  -9/25 fluid status and K nl; 9/28 cont NTL; high risk aspiration; still needs supervision Elis Actis with meals      Thrombocytopenia; has been trending down for no apparent reason. hgb slt down as well. Has not been on Hep products; consult hematology today 9/19  -plts 40K, continues to trend down 9/20; await consult by hematology. ? reln to [address]. Mild anemia as well  Check HIT panel, d dimer , fibrinogen  -9/21 plts 36k ; HIT PROFILE POSITIVE, D DIMER ELEVATED 32.79, FIBRINOGEN  ; consistent with DIC; Etiology unclear  WILL D/W HEMATOLOGY; LFTs ok, no hx of blood transfusion,no hx pancreatitis, no sign of bacteremia. Has not been on any heparin products. Can see in head injury; has ICH. Cannot r/o blood disorder (leukemia)   per hematology \"Recommend transfusing platelets for <03,350 and Cryoprecipitate for fibrinogen < 100. \"  -MARK pending  -9/22 bilateral LE venous duplex ordered; low suspicion but at risk and having calf tenderness  -9/22 plts improved today; monitor closely; 9/23 small right peroneal thrombus. No antic per hematology. IR refused/advised against placing IVF due to low risk of clot propagation given size and location  -9/23 counts bumping up; pts now 53k from 45 and previously 36  -9/24 plts cont to improve. 69K. MARK still pending; can resume therapies  9/26 Ddimer pending, fibrinogen now nl  -9/27 D dimer 11+, improved. Per Hematology; likely due to consumption coagulopathy. Will monitor      Bilateral PEs 9/25, filter placed in IR; looks good today. sats good without O2 supplement 94-97% sats; d/w Hematology, no agaratroban  -apprec Pulm assessment. Clinically looks good and no longer symptomatic  -9/28 clinically stable, asymptomatic       Hypernatremia; last head CT did not show significant cerebral edema. Clinically improving. Likely due to prerenal azotemia; 9/15 MAY REQ isotonic / hypotonic saline IV;  -9/18 events of weekend noted;  Na 156; asymptomatic, on 0.5 dextrose with 1/4 Na; na q 6hrs; pts serum osmolality was normal. Will check urine Na and urine osmolality; depending on findings, will consider consulting Hospitalist vs Nephrology  Neuro improving despite this  -9/19 Na down to 149, urine osmo nl, serum osmo min hi; cont dex/and 1/4 NS; repeat in am. Not DI as uop not increased  -9/20 Na 147; cont fluids; 9/21 not resulted; will stop fluids when Na below 142  -Na 140 9/22; dc IVFs; 9/25 136; 9/27 136      ICH/IVH; 9/22 clinically improves daily. F/u HCT yest due to transient left eye visual disturbance. Overall, resolving hemorrhage. There is more pronounced edema; expected. Ventricular size now nl      Anemia ; 9.6-10/ stable      DVT risk / DVT Prophylaxis- Will require daily physician exam to assess for signs and symptoms as patient is at increased risk for of thromboembolism. Mobilization as tolerated. Intermittent pneumatic compression devices when in bed Thigh-high or knee-high thromboembolic deterrent hose when out of bed. NO  anticoag due to ICH/HIT; has right small right peroneal vein; 9/25 spoke with IR again today; more risk of putting in IVC filter than benefits especially since she is asymptomatic with no swelling or tenderness and small size of clot      Pain Control: stable, mild-to-moderate joint symptoms intermittently, reasonably well controlled by PRN meds. Will require regular pain assessment and comprenhensive pain management,       Wound Care: Monitor wound status daily per staff and physician. At risk for failure. Will require 24/7 rehab nursing. None needed at this time      Hypertension - BP uncontrolled, fluctuating, managed medically. Add prn hydralazine for sbp> 180. Goal SBP is 140-160 given ICH.  Cont Normodyne, norvasc and hyzaar; consider Verapamil or scheduled Hydralazine  -9/18 BP increased 169/95; add hydralazine 25 tid  -9/20 BP much improved  -9/22 /74; 9/24 bp stable; may dec hydralazine to bid  -9/25 dec hydralazine  -9/27 bp 116-119  -9/28 SBP in the 90s to 116, asymptomatic; dc hydralazine and monitor; decrease Normodyne to bid  -9/29 BP still low; lower Norvasc and normodyne, cont Hyzaar; all have parameters of when to hold  - 9/30 SBP overnight and this am 108-111, will d/c norvasc, on labetolol and losartan/HCTZ , HR acceptable      Mild hyperglycemia, likely stress induced vs borderline diabetes; monitor loosely  -9/27 bs 145 on BMP; follow bs qam x 3d      Dysphagia; cont NTL, mech soft; hopefully can upgrade liquids soon 9/25  -9/29 remains on NTL. MBS scheduled for 10/2.      Leukocytosis; recheck in a.m. Check UA due to stafford. No pulmonary signs (had neg CXR today), no s/s of infxn at old PICC site, no wounds, afebrile. Watch monocytes. -9/18 12.2 improving. Urine neg  -9/19 up to 13.2, ? Reactive. No source of infxn identified. Afebrile  -9/20 WBC now normal      Urinary retention/ neurogenic bladder - start flomax but continue stafford until mobility improves a bit. Strict I/o's  -9/20 keeping stafford to monitor UOP until Na normalizes. No significant output to suggest DI  -9/21 dc stafford; cont Flomax, follow bladder scan  9/22 voiding without issue; incontinence but no retention  -9/28 new onset urinary retention; check UA  -9/29 UA neg but cx saying > 100K gram neg cocci; will need to f/u ID/sens; Start Cipro 500 bid  - 9/30 preliminary urine cx >110K mixed skin denis    bowel program - add prn meds; incontinent, want to keep stool on the firmer side. Monitor skin.       GERD - add a PPI. At times may need additional antacids, Maalox prn.      -continues to benefit from and is showing improvements in a multidisc team approach. Participating well.  Sully Stewart   9/21   Addendum; in therapy this afternoon, transient left eye blindness. Will f/u head CT for extension of or new ICH, especially with low plts   Results  1. Interval decrease in size of intraventricular hemorrhage within the left  basal ganglia/thalamus with near complete resolution of intraventricular  hemorrhage.  Surrounding edema is more pronounced with slight increase in  left-to-right midline shift. 2. Interval decrease in ventricular size, approaching normal in caliber.     Time spent was 25 minutes with over 1/2 in direct patient care/examination, consultation and coordination of care.      Signed By: Michelle Morataya MD     September 30, 2017

## 2017-10-01 LAB
BACTERIA SPEC CULT: ABNORMAL
BACTERIA SPEC CULT: ABNORMAL
SERVICE CMNT-IMP: ABNORMAL

## 2017-10-01 PROCEDURE — 74011250637 HC RX REV CODE- 250/637: Performed by: PHYSICAL MEDICINE & REHABILITATION

## 2017-10-01 PROCEDURE — 65310000000 HC RM PRIVATE REHAB

## 2017-10-01 PROCEDURE — 51798 US URINE CAPACITY MEASURE: CPT

## 2017-10-01 RX ADMIN — LABETALOL HYDROCHLORIDE 100 MG: 100 TABLET, FILM COATED ORAL at 17:00

## 2017-10-01 RX ADMIN — POTASSIUM CHLORIDE 40 MEQ: 20 TABLET, EXTENDED RELEASE ORAL at 08:14

## 2017-10-01 RX ADMIN — TAMSULOSIN HYDROCHLORIDE 0.4 MG: 0.4 CAPSULE ORAL at 20:49

## 2017-10-01 RX ADMIN — LABETALOL HYDROCHLORIDE 100 MG: 100 TABLET, FILM COATED ORAL at 08:14

## 2017-10-01 RX ADMIN — POTASSIUM CHLORIDE 40 MEQ: 20 TABLET, EXTENDED RELEASE ORAL at 17:00

## 2017-10-01 RX ADMIN — TIZANIDINE 2 MG: 2 TABLET ORAL at 20:50

## 2017-10-01 RX ADMIN — HYDROCHLOROTHIAZIDE: 12.5 CAPSULE ORAL at 08:14

## 2017-10-01 RX ADMIN — CIPROFLOXACIN HYDROCHLORIDE 500 MG: 500 TABLET, FILM COATED ORAL at 20:50

## 2017-10-01 RX ADMIN — CIPROFLOXACIN HYDROCHLORIDE 500 MG: 500 TABLET, FILM COATED ORAL at 08:14

## 2017-10-01 NOTE — PROGRESS NOTES
Fouzia Arteaga MD,   Medical Director  [address] [facility], [address] [facility]  Tel: [phone]       SFD PROGRESS NOTE    Susan Pretty Date: 9/15/2017  Admit Diagnosis: stroke, left brain involvement  ICH (intracerebral hemorrhage) ([facility] [address]    Subjective     Was having multiple msk complaints with therapies but tolerated it well when I did ROM of her jnts. I think she was trying to avoid therapies. This a.m there is no c/o pain. Slept well    Patient seen and examined. Fair appetite. Denies pain, lightheadedness ,  palpations, SOB, dysuria or nausea. Participating in therapy. Objective:     Current Facility-Administered Medications   Medication Dose Route Frequency    labetalol (NORMODYNE) tablet 100 mg  100 mg Oral BID    losartan/hydroCHLOROthiazide (HYZAAR) 100/12.5 mg   Oral DAILY    ciprofloxacin HCl (CIPRO) tablet 500 mg  500 mg Oral Q12H    potassium chloride (K-DUR, KLOR-CON) SR tablet 40 mEq  40 mEq Oral BID    tiZANidine (ZANAFLEX) tablet 2 mg  2 mg Oral QHS    acetaminophen (TYLENOL) tablet 500 mg  500 mg Per NG tube Q4H PRN    alum-mag hydroxide-simeth (MYLANTA) oral suspension 30 mL  30 mL Oral Q4H PRN    bisacodyl (DULCOLAX) suppository 10 mg  10 mg Rectal DAILY PRN    hydrALAZINE (APRESOLINE) tablet 50 mg  50 mg Oral QID PRN    lip protectant (BLISTEX) ointment   Topical PRN    ondansetron (ZOFRAN ODT) tablet 4 mg  4 mg Oral Q6H PRN    sodium phosphate (FLEET'S) enema 118 mL  1 Enema Rectal PRN    traMADol (ULTRAM) tablet 50 mg  50 mg Oral Q6H PRN    traZODone (DESYREL) tablet 50 mg  50 mg Oral QHS PRN    tamsulosin (FLOMAX) capsule 0.4 mg  0.4 mg Oral QHS     Review of Systems:Denies chest pain, shortness of breath, cough, headache, visual problems, abdominal pain, dysurea, calf pain. Pertinent positives are as noted in the medical records and unremarkable otherwise.      Visit Vitals    /71 (BP 1 Location: Left arm)    Pulse 77    Temp 98.4 °F (36.9 °C)    Resp 14    SpO2 95%        Physical Exam:   General: Alert and age appropriately oriented. No acute cardio respiratory distress. Expressive aphasia   HEENT: Normocephalic,no scleral icterus  Oral mucosa moist without cyanosis   Lungs: Clear to auscultation  bilaterally. Respiration even and unlabored   Heart: Regular rate and rhythm, S1, S2   No  murmurs, clicks, rub or gallops   Abdomen: Soft, non-tender, nondistended. Bowel sounds present. Genitourinary: Benign . Neuromuscular:      Dense right hemiplegia. Maggie 2  Attends to the right a bit better. 1-2 word responses are clear but sentences tend to turn into word salad   Skin/extremity: No rashes, no erythema.  No calf tenderness BLE  No edema                                                                            Functional Assessment:  Gross Assessment  AROM: Grossly decreased, non-functional (09/30/17 1200)  PROM: Within functional limits (09/30/17 1200)  Strength: Grossly decreased, non-functional (09/30/17 1200)  Coordination: Grossly decreased, non-functional (09/30/17 1200)  Tone: Abnormal (09/30/17 1200)  Sensation: Impaired (09/30/17 1200)       Balance  Sitting - Static: Fair (occasional) (09/29/17 1600)  Sitting - Dynamic: Fair (occasional) (09/29/17 1600)  Standing - Static: Fair;Constant support (09/29/17 1600)  Standing - Dynamic : Impaired (09/29/17 1600)           Toileting  Adaptive Equipment: Elevated seat;Walker (09/29/17 1534)         Bailey Odell Fall Risk Assessment:  Bailey Odell Fall Risk  Mobility: Ambulates or transfers with assist devices or assistance/unsteady gait (10/01/17 0720)  Mobility Interventions: Patient to call before getting OOB (10/01/17 0720)  Mentation: Alert, oriented x 3 (10/01/17 0720)  Mentation Interventions: Door open when patient unattended (10/01/17 0720)  Medication: Patient receiving anticonvulsants, sedatives(tranquilizers), psychotropics or hypnotics, hypoglycemics, narcotics, sleep aids, antihypertensives, laxatives, or diuretics (10/01/17 0720)  Medication Interventions: Patient to call before getting OOB (10/01/17 0720)  Elimination: Incontinence (10/01/17 0720)  Elimination Interventions: Call light in reach; Patient to call for help with toileting needs; Toileting schedule/hourly rounds (10/01/17 0720)  Prior Fall History: Unknown (10/01/17 0720)  History of Falls Interventions: Door open when patient unattended (10/01/17 0720)  Total Score: 3 (10/01/17 0720)  Standard Fall Precautions: Yes (09/26/17 2126)  High Fall Risk: Yes (10/01/17 0720)     Speech Assessment:         Ambulation:  Gait  Base of Support: Narrowed; Center of gravity altered (09/22/17 1200)  Speed/Lulu: Delayed (09/22/17 1200)  Step Length: Right shortened (09/22/17 1200)  Distance (ft): 10 Feet (ft) (10' x 2 with ace wrap and LLE platform lift) (09/30/17 1200)  Assistive Device: Gait belt; Other (comment) (gaviria rail on the LUE) (09/30/17 1200)     Labs/Studies:  Recent Results (from the past 72 hour(s))   URINALYSIS W/ RFLX MICROSCOPIC    Collection Time: 09/28/17  1:15 PM   Result Value Ref Range    Color YELLOW      Appearance HAZY      Specific gravity 1.017 1.001 - 1.023      pH (UA) 6.0 5.0 - 9.0      Protein NEGATIVE  NEG mg/dL    Glucose NEGATIVE  mg/dL    Ketone NEGATIVE  NEG mg/dL    Bilirubin NEGATIVE  NEG      Blood NEGATIVE  NEG      Urobilinogen 1.0 0.2 - 1.0 EU/dL    Nitrites NEGATIVE  NEG      Leukocyte Esterase NEGATIVE  NEG     CULTURE, URINE    Collection Time: 09/28/17  1:15 PM   Result Value Ref Range    Special Requests: NO SPECIAL REQUESTS      Culture result: (A)       >100,000 COLONIES/mL AEROCOCCUS URINAE There are no CLSI standardized susceptibility guidelines for this organism.     Culture result:        10,000 to 50,000  COLONIES/mL  NORMAL SKIN JOSEFA ISOLATED         Assessment:     Problem List as of 10/1/2017  Date Reviewed: 9/12/2017          Codes Class Noted - Resolved    Thrombocytopenia ([facility] ICD-10-CM: D69.6  ICD-9-CM: 287.5  9/25/2017 - Present        Pulmonary emboli (HCC) ICD-10-CM: I26.99  ICD-9-CM: 415.19  9/25/2017 - Present        DVT (deep venous thrombosis) ([facility] ICD-10-CM: I82.409  ICD-9-CM: 453.40  9/25/2017 - Present        ICH (intracerebral hemorrhage) ([facility] ICD-10-CM: I61.9  ICD-9-CM: 243  9/15/2017 - Present        Hypertensive urgency, malignant ICD-10-CM: I16.0  ICD-9-CM: 401.0  9/11/2017 - Present        Stroke, hemorrhagic ([place] [id number].) ICD-10-CM: I61.9  ICD-9-CM: 753  9/7/2017 - Present        Accelerated hypertension ICD-10-CM: I10  ICD-9-CM: 401.0  9/7/2017 - Present        At risk for aspiration (Chronic) ICD-10-CM: Z91.89  ICD-9-CM: V49.89  9/7/2017 - Present        Acute spontaneous intraventricular hemorrhage assoc w/ hypertension ([place] [address].) ICD-10-CM: I61.5, I10  ICD-9-CM: 019, 401.9  9/7/2017 - Present        Screening for breast cancer ICD-10-CM: Z12.31  ICD-9-CM: V76.10  2/27/2017 - Present        Atrophic vaginitis ICD-10-CM: N95.2  ICD-9-CM: 627.3  7/11/2016 - Present        HTN (hypertension) (Chronic) ICD-10-CM: I10  ICD-9-CM: 401.9  10/23/2015 - Present        ADD (attention deficit disorder) (Chronic) ICD-10-CM: F98.8  ICD-9-CM: 314.00  10/23/2015 - Present        Depression ICD-10-CM: F32.9  ICD-9-CM: 745  10/23/2015 - Present        Anxiety (Chronic) ICD-10-CM: F41.9  ICD-9-CM: 300.00  10/23/2015 - Present        RESOLVED: Hypoxemia ICD-10-CM: R09.02  ICD-9-CM: 799.02  9/10/2017 - 9/12/2017        RESOLVED: Acute respiratory failure ([facility] ICD-10-CM: J96.00  ICD-9-CM: 518.81  9/8/2017 - 9/10/2017        RESOLVED: Hemorrhagic stroke ([facility] ICD-10-CM: I61.9  ICD-9-CM: 980  9/7/2017 - 9/7/2017        RESOLVED: Non-intractable vomiting with nausea ICD-10-CM: R11.2  ICD-9-CM: 787.01  9/7/2017 - 9/12/2017        RESOLVED: Seborrheic keratosis, inflamed ICD-10-CM: L82.0  ICD-9-CM: 702.11  7/11/2016 - 9/20/2016        RESOLVED: Cough ICD-10-CM: R05  ICD-9-CM: 786.2  7/11/2016 - 9/20/2016        RESOLVED: Shoulder pain ICD-10-CM: M25.519  ICD-9-CM: 719.41  10/23/2015 - 2/27/2017              Left BG Hemorrhage with intraventricular extension due to HTN emergency; resultant R hemiplegia, right neglect, dysarthria, aphasia, dysphagia with significant decline in mobility and self care  Plan:   Continue daily physician medical management:  Pneumonia prophylaxis- Incentive spirometer every hour while awake. Will need instruction and assistance. Not sure if she can get a proper oral seal to be effective      Dysphagia; Pureed diet with NTL; at risk for malnutrition and dehydration. PICC line removed prior to transfer; may need a line for IVFs if BUN continues to increase. Low K, Ca; check mg. Supplementation as needed. Na elevated  -mag ok, K much improved; cont to supplement while on diuretic  -9/19 replace K; 3.0; 2.8 this am 9/20; inc supplement; may need to add to IVFs  -9/25 fluid status and K nl; 9/28 cont NTL; high risk aspiration; still needs supervision /assist with meals      Thrombocytopenia; has been trending down for no apparent reason. hgb slt down as well. Has not been on Hep products; consult hematology today 9/19  -plts 40K, continues to trend down 9/20; await consult by hematology. ? reln to [address]. Mild anemia as well  Check HIT panel, d dimer , fibrinogen  -9/21 plts 36k ; HIT PROFILE POSITIVE, D DIMER ELEVATED 32.79, FIBRINOGEN  ; consistent with DIC; Etiology unclear  WILL D/W HEMATOLOGY; LFTs ok, no hx of blood transfusion,no hx pancreatitis, no sign of bacteremia. Has not been on any heparin products. Can see in head injury; has ICH. Cannot r/o blood disorder (leukemia)   per hematology \"Recommend transfusing platelets for <19,359 and Cryoprecipitate for fibrinogen < 100. \"  -MARK pending  -9/22 bilateral LE venous duplex ordered; low suspicion but at risk and having calf tenderness  -9/22 plts improved today; monitor closely; 9/23 small right peroneal thrombus. No antic per hematology. IR refused/advised against placing IVF due to low risk of clot propagation given size and location  -9/23 counts bumping up; pts now 53k from 45 and previously 36  -9/24 plts cont to improve. 69K. MARK still pending; can resume therapies  9/26 Ddimer pending, fibrinogen now nl  -9/27 D dimer 11+, improved. Per Hematology; likely due to consumption coagulopathy. Will monitor      Bilateral PEs 9/25, filter placed in IR; looks good today. sats good without O2 supplement 94-97% sats; d/w Hematology, no agaratroban  -apprec Pulm assessment. Clinically looks good and no longer symptomatic  -9/28 clinically stable, asymptomatic       Hypernatremia; last head CT did not show significant cerebral edema. Clinically improving. Likely due to prerenal azotemia; 9/15 MAY REQ isotonic / hypotonic saline IV;  -9/18 events of weekend noted; Na 156; asymptomatic, on 0.5 dextrose with 1/4 Na; na q 6hrs; pts serum osmolality was normal. Will check urine Na and urine osmolality; depending on findings, will consider consulting Hospitalist vs Nephrology  Neuro improving despite this  -9/19 Na down to 149, urine osmo nl, serum osmo min hi; cont dex/and 1/4 NS; repeat in am. Not DI as uop not increased  -9/20 Na 147; cont fluids; 9/21 not resulted; will stop fluids when Na below 142  -Na 140 9/22; dc IVFs; 9/25 136; 9/27 136      ICH/IVH; 9/22 clinically improves daily. F/u HCT yest due to transient left eye visual disturbance. Overall, resolving hemorrhage. There is more pronounced edema; expected. Ventricular size now nl      Anemia ; 9.6-10/ stable      DVT risk / DVT Prophylaxis- Will require daily physician exam to assess for signs and symptoms as patient is at increased risk for of thromboembolism. Mobilization as tolerated. Intermittent pneumatic compression devices when in bed Thigh-high or knee-high thromboembolic deterrent hose when out of bed.  NO  anticoag due to ICH/HIT; has right small right peroneal vein; 9/25 spoke with IR again today; more risk of putting in IVC filter than benefits especially since she is asymptomatic with no swelling or tenderness and small size of clot      Pain Control: stable, mild-to-moderate joint symptoms intermittently, reasonably well controlled by PRN meds. Will require regular pain assessment and comprenhensive pain management,       Wound Care: Monitor wound status daily per staff and physician. At risk for failure. Will require 24/7 rehab nursing. None needed at this time      Hypertension - BP uncontrolled, fluctuating, managed medically. Add prn hydralazine for sbp> 180. Goal SBP is 140-160 given ICH. Cont Normodyne, norvasc and hyzaar; consider Verapamil or scheduled Hydralazine  -9/18 BP increased 169/95; add hydralazine 25 tid  -9/20 BP much improved  -9/22 /74; 9/24 bp stable; may dec hydralazine to bid  -9/25 dec hydralazine  -9/27 bp 116-119  -9/28 SBP in the 90s to 116, asymptomatic; dc hydralazine and monitor; decrease Normodyne to bid  -9/29 BP still low; lower Norvasc and normodyne, cont Hyzaar; all have parameters of when to hold  - 9/30 SBP overnight and this am 108-111, norvasc d/tamara, on labetolol and losartan/HCTZ , HR acceptable  - 10/1 HR and BP acceptable, SBP - 121 this am      Mild hyperglycemia, likely stress induced vs borderline diabetes; monitor loosely  -9/27 bs 145 on BMP; follow bs qam x 3d      Dysphagia; cont NTL, mech soft; hopefully can upgrade liquids soon 9/25  -9/29 remains on NTL. MBS scheduled for tomorrow      Leukocytosis; recheck in a.m. Check UA due to stafford. No pulmonary signs (had neg CXR today), no s/s of infxn at old PICC site, no wounds, afebrile. Watch monocytes. -9/18 12.2 improving. Urine neg  -9/19 up to 13.2, ? Reactive. No source of infxn identified. Afebrile  -9/20 WBC now normal      Urinary retention/ neurogenic bladder - start flomax but continue stafford until mobility improves a bit.  Strict I/o's  -9/20 keeping stafford to monitor UOP until Na normalizes. No significant output to suggest DI  -9/21 dc stafford; cont Flomax, follow bladder scan  9/22 voiding without issue; incontinence but no retention  -9/28 new onset urinary retention; check UA  -9/29 UA neg but cx saying > 100K gram neg cocci; will need to f/u ID/sens; Start Cipro 500 bid  - 10/1 final urine cx - + for >100K  of aerococcus urinae without sensitivities available , cipro abx # 3    bowel program - add prn meds; incontinent, want to keep stool on the firmer side. Monitor skin.       GERD - add a PPI. At times may need additional antacids, Maalox prn.      -continues to benefit from and is showing improvements in a multidisc team approach. Participating well.  [facility]   9/21   Addendum; in therapy this afternoon, transient left eye blindness. Will f/u head CT for extension of or new ICH, especially with low plts   Results  1. Interval decrease in size of intraventricular hemorrhage within the left  basal ganglia/thalamus with near complete resolution of intraventricular  hemorrhage. Surrounding edema is more pronounced with slight increase in  left-to-right midline shift. 2. Interval decrease in ventricular size, approaching normal in caliber.     Time spent was 15 minutes with over 1/2 in direct patient care/examination, consultation and coordination of care.      Signed By: Rohan Suarez MD     October 1, 2017

## 2017-10-01 NOTE — PROGRESS NOTES
Problem: Falls - Risk of  Goal: *Absence of Falls  Document Haley Fall Risk and appropriate interventions in the flowsheet.    Outcome: Progressing Towards Goal  Fall Risk Interventions:  Mobility Interventions: Patient to call before getting OOB     Mentation Interventions: Door open when patient unattended     Medication Interventions: Patient to call before getting OOB, Teach patient to arise slowly     Elimination Interventions: Call light in reach, Patient to call for help with toileting needs, Toilet paper/wipes in reach, Toileting schedule/hourly rounds     History of Falls Interventions: Bed/chair exit alarm, Door open when patient unattended

## 2017-10-01 NOTE — PROGRESS NOTES
P.M. Assessment completed. Pt assisted to [facility], voiding yellow urine and small amount formed brown stool. Affect good. Word salad noted. Resting in bed. No distress noted.

## 2017-10-02 ENCOUNTER — APPOINTMENT (OUTPATIENT)
Dept: GENERAL RADIOLOGY | Age: 62
DRG: 040 | End: 2017-10-02
Attending: PHYSICAL MEDICINE & REHABILITATION
Payer: COMMERCIAL

## 2017-10-02 PROCEDURE — 51798 US URINE CAPACITY MEASURE: CPT

## 2017-10-02 PROCEDURE — 97530 THERAPEUTIC ACTIVITIES: CPT

## 2017-10-02 PROCEDURE — 97116 GAIT TRAINING THERAPY: CPT

## 2017-10-02 PROCEDURE — 74230 X-RAY XM SWLNG FUNCJ C+: CPT

## 2017-10-02 PROCEDURE — 97110 THERAPEUTIC EXERCISES: CPT

## 2017-10-02 PROCEDURE — 92611 MOTION FLUOROSCOPY/SWALLOW: CPT

## 2017-10-02 PROCEDURE — 97535 SELF CARE MNGMENT TRAINING: CPT

## 2017-10-02 PROCEDURE — 74011000255 HC RX REV CODE- 255: Performed by: PHYSICAL MEDICINE & REHABILITATION

## 2017-10-02 PROCEDURE — 65310000000 HC RM PRIVATE REHAB

## 2017-10-02 PROCEDURE — 74011250637 HC RX REV CODE- 250/637: Performed by: PHYSICAL MEDICINE & REHABILITATION

## 2017-10-02 PROCEDURE — 99232 SBSQ HOSP IP/OBS MODERATE 35: CPT | Performed by: PHYSICAL MEDICINE & REHABILITATION

## 2017-10-02 PROCEDURE — 97112 NEUROMUSCULAR REEDUCATION: CPT

## 2017-10-02 RX ADMIN — POTASSIUM CHLORIDE 40 MEQ: 20 TABLET, EXTENDED RELEASE ORAL at 18:00

## 2017-10-02 RX ADMIN — LABETALOL HYDROCHLORIDE 100 MG: 100 TABLET, FILM COATED ORAL at 18:19

## 2017-10-02 RX ADMIN — BARIUM SULFATE 45 ML: 980 POWDER, FOR SUSPENSION ORAL at 10:25

## 2017-10-02 RX ADMIN — TRAZODONE HYDROCHLORIDE 50 MG: 50 TABLET ORAL at 21:16

## 2017-10-02 RX ADMIN — TAMSULOSIN HYDROCHLORIDE 0.4 MG: 0.4 CAPSULE ORAL at 21:16

## 2017-10-02 RX ADMIN — POTASSIUM CHLORIDE 40 MEQ: 20 TABLET, EXTENDED RELEASE ORAL at 08:35

## 2017-10-02 RX ADMIN — HYDROCHLOROTHIAZIDE: 12.5 CAPSULE ORAL at 08:35

## 2017-10-02 RX ADMIN — BARIUM SULFATE 15 ML: 400 PASTE ORAL at 10:25

## 2017-10-02 RX ADMIN — LABETALOL HYDROCHLORIDE 100 MG: 100 TABLET, FILM COATED ORAL at 08:36

## 2017-10-02 RX ADMIN — TIZANIDINE 2 MG: 2 TABLET ORAL at 21:16

## 2017-10-02 NOTE — PROGRESS NOTES
OT Daily Note    Time In 0910   Time Out 0957     Subjective: \"So what are we going to do? \" Agreeable to therapy. Pain:Not indicated during session. Education: On sensory stimulation to right UE. Interdisciplinary Communication: Collaborated with PTA, Pinkie Skiff and patient is making progress towards goals. Precautions:  (falls)    Balance/functional mobility Daily Assessment   Bed mobility min/moderate assist to the right. SPT with moderate assist.      Strengthening/activity tolerance Daily Assessment   Electric simulation completed for RUE digit flexion and extension, reciprocation at 2 minute intervals for a total of 8 minutes. Digit tendon glides completed RUE. Scapular and shoulder PROM completed all joints. Light joint mobilization completed scapula and GH joint. Table slides completed elbow flexion and extension. Shoulder shrugs completed 2 sets of 10. Improved active right elbow flexion. Ended session:In w/c all needs within reach.      Media Geno OTR/L

## 2017-10-02 NOTE — PROGRESS NOTES
Insurance update faxed to [employer] requesting additional rehab days. Received call from Graham Koyanagi with [employer] stating that pt has been approved for only 5 more days thru 10/3. She stated that MD would need to to a peer to peer review with their medical director. MD will need to call [phone] to schedule review. Information left for MD.  Continue to follow.

## 2017-10-02 NOTE — PROGRESS NOTES
PHYSICAL THERAPY DAILY NOTE  Time In: 1031  Time Out: 1118  Patient Seen For: AM;Therapeutic exercise; Other (see progress notes);Gait training;Transfer training    Subjective: Patient had no complaints. Objective: Other (comment) (falls)  GROSS ASSESSMENT Daily Assessment            BED/MAT MOBILITY Daily Assessment    Supine to Sit : 2 (Maximal assistance)  Sit to Supine : 2 (Maximal assistance)       TRANSFERS Daily Assessment    Transfer Type: SPT without device  Transfer Assistance : 2 (Maximal assistance)  Sit to Stand Assistance: Moderate assistance       GAIT Daily Assessment   Used theraband wrapped around foot, behind knee for support and around thigh. Ace wrap around knee for support. Patient able to advance right foot with mod assist. Amount of Assistance: 3 (Moderate assistance)  Distance (ft): 20 Feet (ft)  Assistive Device: Gait belt;Walker cindy;Other (comment) (theraband and ace wrap to assist right leg)       STEPS or STAIRS Daily Assessment    Level of Assist : 0 (Not tested)       BALANCE Daily Assessment            WHEELCHAIR MOBILITY Daily Assessment            LOWER EXTREMITY EXERCISES Daily Assessment   Performed 20 reps of SAQs with supervision. Hip flexion a little stronger today. HC and HS stretching in supine. Still wearing night splint to stretch HC on right . Extremity: Both  Exercise Type #1: Supine lower extremity strengthening  Sets Performed: 2  Reps Performed: 10  Level of Assist: Maximum assistance          Assessment: Patient making gains daily with strength and mobility. Impulsive at times but still requires max assist with all mobility. Plan of Care: Continue with plan of care to reach PT goals. Returned to room with call bell at reach and alarm on in recliner.     Vel Verdin, PTA  10/2/2017

## 2017-10-02 NOTE — PROGRESS NOTES
10/02/17 0820   Time Spent With Patient   Time In 0700   Time Out 0741   Patient Seen For: AM;ADLs   Feeding/Eating   Feeding/Eating Assistance S   Grooming   Grooming Assistance  Min A   Upper Body Bathing   Bathing Assistance, Upper Mod A   Position Performed Seated in chair   Adaptive Equipment Tub bench   Comments improved awareness for each body part   Lower Body Bathing   Bathing Assistance, Lower  Max A   Position Performed Seated in chair;Standing   Adaptive Equipment Tub bench;Grab bar   Comments standing balance good with use of grab bar and blocking right knee. Upper Body Dressing    Dressing Assistance  Mod A   Comments continued assist with cindy technique, slow carryover   Lower Body Dressing    Dressing Assistance  Dep   Comments Improving sitting balance, good ability to use LLE, but difficult for RLE. Functional Transfers   Tub or Shower Type Shower   Amount of Assistance Required Mod A   Adaptive Equipment Grab bars; Tub transfer bench; Wheelchair     S: \"I need to wash my face and hair. \" Agreeable to therapy. Focus of session was on morning ADL routine. Patient was able to SPT with moderate assist both directions. Pain denied during session. Collaborated with Marielle RODRIGUEZ and confirmed patient is on track to reach goals as documented in the care plan. Patient tolerated session well, but strength, ROM, activity tolerance are still below baseline and requires skilled facilitation to successfully and safely complete ADL's and transfers. Patient ended session in w/c with call remote and phone within reach.      EUGENE Hdz

## 2017-10-02 NOTE — PROGRESS NOTES
Luke Khoury MD,   Medical Director  [address] [facility], [address] [facility]  Tel: [phone]       D PROGRESS NOTE    Femi Perales Date: 9/15/2017  Admit Diagnosis: stroke, left brain involvement;ICH (intracerebral hemorrhag*    Subjective     No complaints x right hip pain and muscle soreness. Slept well. Having  MBS this morning in hopes of advancing liquids    Objective:     Current Facility-Administered Medications   Medication Dose Route Frequency    labetalol (NORMODYNE) tablet 100 mg  100 mg Oral BID    losartan/hydroCHLOROthiazide (HYZAAR) 100/12.5 mg   Oral DAILY    potassium chloride (K-DUR, KLOR-CON) SR tablet 40 mEq  40 mEq Oral BID    tiZANidine (ZANAFLEX) tablet 2 mg  2 mg Oral QHS    acetaminophen (TYLENOL) tablet 500 mg  500 mg Per NG tube Q4H PRN    alum-mag hydroxide-simeth (MYLANTA) oral suspension 30 mL  30 mL Oral Q4H PRN    bisacodyl (DULCOLAX) suppository 10 mg  10 mg Rectal DAILY PRN    hydrALAZINE (APRESOLINE) tablet 50 mg  50 mg Oral QID PRN    lip protectant (BLISTEX) ointment   Topical PRN    ondansetron (ZOFRAN ODT) tablet 4 mg  4 mg Oral Q6H PRN    sodium phosphate (FLEET'S) enema 118 mL  1 Enema Rectal PRN    traMADol (ULTRAM) tablet 50 mg  50 mg Oral Q6H PRN    traZODone (DESYREL) tablet 50 mg  50 mg Oral QHS PRN    tamsulosin (FLOMAX) capsule 0.4 mg  0.4 mg Oral QHS     Review of Systems:Denies chest pain, shortness of breath, cough, headache, visual problems, abdominal pain, dysurea, calf pain. Pertinent positives are as noted in the medical records and unremarkable otherwise. Visit Vitals    /71 (BP 1 Location: Left arm)    Pulse 78    Temp 99.3 °F (37.4 °C)    Resp 16    SpO2 97%        Physical Exam:   General: Alert and age appropriately oriented. One to two word responses are accurate. No acute cardio respiratory distress.    HEENT: Normocephalic,no scleral icterus  Oral mucosa moist without cyanosis   Lungs: Clear to auscultation  bilaterally. Respiration even and unlabored   Heart: Regular rate and rhythm, S1, S2   No  murmurs, clicks, rub or gallops   Abdomen: Soft, non-tender, nondistended. Bowel sounds present. No organomegaly. Genitourinary: Benign . Neuromuscular:      Dense right hemiplegia  Right hemisensory loss  More aware of right side, follows commands well. Using LUE to move the right     Skin/extremity: No rashes, no erythema. No calf tenderness BLE  Wound covered.                                                                             Functional Assessment:  Gross Assessment  AROM: Grossly decreased, non-functional (09/30/17 1200)  PROM: Within functional limits (09/30/17 1200)  Strength: Grossly decreased, non-functional (09/30/17 1200)  Coordination: Grossly decreased, non-functional (09/30/17 1200)  Tone: Abnormal (09/30/17 1200)  Sensation: Impaired (09/30/17 1200)       Balance  Sitting - Static: Fair (occasional) (09/29/17 1600)  Sitting - Dynamic: Fair (occasional) (09/29/17 1600)  Standing - Static: Fair;Constant support (09/29/17 1600)  Standing - Dynamic : Impaired (09/29/17 1600)           Toileting  Adaptive Equipment: Elevated seat;Walker (09/29/17 1534)         Magdalene Rutherford Fall Risk Assessment:  Magdalene Rutherford Fall Risk  Mobility: Ambulates or transfers with assist devices or assistance/unsteady gait (10/01/17 2251)  Mobility Interventions: Patient to call before getting OOB (10/01/17 2251)  Mentation: Alert, oriented x 3 (10/01/17 2251)  Mentation Interventions: Door open when patient unattended (10/01/17 2251)  Medication: Patient receiving anticonvulsants, sedatives(tranquilizers), psychotropics or hypnotics, hypoglycemics, narcotics, sleep aids, antihypertensives, laxatives, or diuretics (10/01/17 2251)  Medication Interventions: Patient to call before getting OOB (10/01/17 2251)  Elimination: Needs assistance with toileting (10/01/17 2251)  Elimination Interventions: Call light in reach (10/01/17 2251)  Prior Fall History: No (10/01/17 2251)  History of Falls Interventions: Door open when patient unattended (10/01/17 2251)  Total Score: 3 (10/01/17 2251)  Standard Fall Precautions: Yes (09/26/17 2126)  High Fall Risk: Yes (10/01/17 2251)     Speech Assessment:         Ambulation:  Gait  Base of Support: Narrowed; Center of gravity altered (09/22/17 1200)  Speed/Lulu: Delayed (09/22/17 1200)  Step Length: Right shortened (09/22/17 1200)  Distance (ft): 10 Feet (ft) (10' x 2 with ace wrap and LLE platform lift) (09/30/17 1200)  Assistive Device: Gait belt; Other (comment) (gaviria rail on the LUE) (09/30/17 1200)     Labs/Studies:  No results found for this or any previous visit (from the past 72 hour(s)).     Assessment:     Problem List as of 10/2/2017  Date Reviewed: 9/12/2017          Codes Class Noted - Resolved    Thrombocytopenia ([facility] ICD-10-CM: D69.6  ICD-9-CM: 287.5  9/25/2017 - Present        Pulmonary emboli (HCC) ICD-10-CM: I26.99  ICD-9-CM: 415.19  9/25/2017 - Present        DVT (deep venous thrombosis) ([facility] ICD-10-CM: I82.409  ICD-9-CM: 453.40  9/25/2017 - Present        ICH (intracerebral hemorrhage) ([facility] ICD-10-CM: I61.9  ICD-9-CM: 785  9/15/2017 - Present        Hypertensive urgency, malignant ICD-10-CM: I16.0  ICD-9-CM: 401.0  9/11/2017 - Present        Stroke, hemorrhagic ([facility] ICD-10-CM: I61.9  ICD-9-CM: 003  9/7/2017 - Present        Accelerated hypertension ICD-10-CM: I10  ICD-9-CM: 401.0  9/7/2017 - Present        At risk for aspiration (Chronic) ICD-10-CM: T61.17  ICD-9-CM: V49.89  9/7/2017 - Present        Acute spontaneous intraventricular hemorrhage assoc w/ hypertension ([facility] ICD-10-CM: I61.5, I10  ICD-9-CM: 360, 401.9  9/7/2017 - Present        Screening for breast cancer ICD-10-CM: Z12.31  ICD-9-CM: V76.10  2/27/2017 - Present        Atrophic vaginitis ICD-10-CM: N95.2  ICD-9-CM: 627.3  7/11/2016 - Present        HTN (hypertension) (Chronic) ICD-10-CM: I10  ICD-9-CM: 401.9  10/23/2015 - Present        ADD (attention deficit disorder) (Chronic) ICD-10-CM: F98.8  ICD-9-CM: 314.00  10/23/2015 - Present        Depression ICD-10-CM: F32.9  ICD-9-CM: 887  10/23/2015 - Present        Anxiety (Chronic) ICD-10-CM: F41.9  ICD-9-CM: 300.00  10/23/2015 - Present        RESOLVED: Hypoxemia ICD-10-CM: R09.02  ICD-9-CM: 799.02  9/10/2017 - 9/12/2017        RESOLVED: Acute respiratory failure ([facility] ICD-10-CM: J96.00  ICD-9-CM: 518.81  9/8/2017 - 9/10/2017        RESOLVED: Hemorrhagic stroke ([facility] ICD-10-CM: I61.9  ICD-9-CM: 878  9/7/2017 - 9/7/2017        RESOLVED: Non-intractable vomiting with nausea ICD-10-CM: R11.2  ICD-9-CM: 787.01  9/7/2017 - 9/12/2017        RESOLVED: Seborrheic keratosis, inflamed ICD-10-CM: L82.0  ICD-9-CM: 702.11  7/11/2016 - 9/20/2016        RESOLVED: Cough ICD-10-CM: R05  ICD-9-CM: 786.2  7/11/2016 - 9/20/2016        RESOLVED: Shoulder pain ICD-10-CM: M25.519  ICD-9-CM: 719.41  10/23/2015 - 2/27/2017              Plan:      Left BG Hemorrhage with intraventricular extension due to HTN emergency; resultant R hemiplegia, right neglect, dysarthria, aphasia, dysphagia with significant decline in mobility and self care  Plan:   Continue daily physician medical management:  Pneumonia prophylaxis- Incentive spirometer every hour while awake. Will need instruction and assistance. Not sure if she can get a proper oral seal to be effective      Dysphagia; Pureed diet with NTL; at risk for malnutrition and dehydration. PICC line removed prior to transfer; may need a line for IVFs if BUN continues to increase. Low K, Ca; check mg. Supplementation as needed.  Na elevated  -mag ok, K much improved; cont to supplement while on diuretic  -9/19 replace K; 3.0; 2.8 this am 9/20; inc supplement; may need to add to IVFs  -9/25 fluid status and K nl; 9/28 cont NTL; high risk aspiration; still needs supervision /assist with meals  -10/2 MBS today      Thrombocytopenia; has been trending down for no apparent reason. hgb slt down as well. Has not been on Hep products; consult hematology today 9/19  -plts 40K, continues to trend down 9/20; await consult by hematology. ? reln to [address]. Mild anemia as well  Check HIT panel, d dimer , fibrinogen  -9/21 plts 36k ; HIT PROFILE POSITIVE, D DIMER ELEVATED 32.79, FIBRINOGEN  ; consistent with DIC; Etiology unclear  WILL D/W HEMATOLOGY; LFTs ok, no hx of blood transfusion,no hx pancreatitis, no sign of bacteremia. Has not been on any heparin products. Can see in head injury; has ICH. Cannot r/o blood disorder (leukemia)   per hematology \"Recommend transfusing platelets for <36,451 and Cryoprecipitate for fibrinogen < 100. \"  -MARK pending  -9/22 bilateral LE venous duplex ordered; low suspicion but at risk and having calf tenderness  -9/22 plts improved today; monitor closely; 9/23 small right peroneal thrombus. No antic per hematology. IR refused/advised against placing IVF due to low risk of clot propagation given size and location  -9/23 counts bumping up; pts now 53k from 45 and previously 36  -9/24 plts cont to improve. 69K. MARK still pending; can resume therapies  9/26 Ddimer pending, fibrinogen now nl  -9/27 D dimer 11+, improved. Per Hematology; likely due to consumption coagulopathy. Will monitor  -10/2 labs have improved; MARK + 68% confirming dx of HIT      Bilateral PEs 9/25, filter placed in IR; looks good today. sats good without O2 supplement 94-97% sats; d/w Hematology, no agaratroban  -apprec Pulm assessment. Clinically looks good and no longer symptomatic  -9/28 clinically stable, asymptomatic       Hypernatremia; last head CT did not show significant cerebral edema. Clinically improving. Likely due to prerenal azotemia; 9/15 MAY REQ isotonic / hypotonic saline IV;  -9/18 events of weekend noted;  Na 156; asymptomatic, on 0.5 dextrose with 1/4 Na; na q 6hrs; pts serum osmolality was normal. Will check urine Na and urine osmolality; depending on findings, will consider consulting Hospitalist vs Nephrology  Neuro improving despite this  -9/19 Na down to 149, urine osmo nl, serum osmo min hi; cont dex/and 1/4 NS; repeat in am. Not DI as uop not increased  -9/20 Na 147; cont fluids; 9/21 not resulted; will stop fluids when Na below 142  -Na 140 9/22; dc IVFs; 9/25 136; 9/27 136      ICH/IVH; 9/22 clinically improves daily. F/u HCT yest due to transient left eye visual disturbance. Overall, resolving hemorrhage. There is more pronounced edema; expected. Ventricular size now nl      Anemia ; 9.6-10/ stable      DVT risk / DVT Prophylaxis- Will require daily physician exam to assess for signs and symptoms as patient is at increased risk for of thromboembolism. Mobilization as tolerated. Intermittent pneumatic compression devices when in bed Thigh-high or knee-high thromboembolic deterrent hose when out of bed. NO  anticoag due to ICH/HIT; has right small right peroneal vein; 9/25 spoke with IR again today; more risk of putting in IVC filter than benefits especially since she is asymptomatic with no swelling or tenderness and small size of clot      Pain Control: stable, mild-to-moderate joint symptoms intermittently, reasonably well controlled by PRN meds. Will require regular pain assessment and comprenhensive pain management,       Wound Care: Monitor wound status daily per staff and physician. At risk for failure. Will require 24/7 rehab nursing. None needed at this time      Hypertension - BP uncontrolled, fluctuating, managed medically. Add prn hydralazine for sbp> 180. Goal SBP is 140-160 given ICH.  Cont Normodyne, norvasc and hyzaar; consider Verapamil or scheduled Hydralazine  -9/18 BP increased 169/95; add hydralazine 25 tid  -9/20 BP much improved  -9/22 /74; 9/24 bp stable; may dec hydralazine to bid  -9/25 dec hydralazine  -9/27 bp 116-119  -9/28 SBP in the 90s to 116, asymptomatic; dc hydralazine and monitor; decrease Normodyne to bid  -9/29 BP still low; lower Norvasc and normodyne, cont Hyzaar; all have parameters of when to hold  - 9/30 SBP overnight and this am 108-111, will d/c norvasc, on labetolol and losartan/HCTZ , HR acceptable  -10/2 115/71; controlled. Cont current meds      Mild hyperglycemia, likely stress induced vs borderline diabetes; monitor loosely  -9/27 bs 145 on BMP; follow bs qam x 3d      Dysphagia; cont NTL, mech soft; hopefully can upgrade liquids soon 9/25  -9/29 remains on NTL. MBS scheduled for 10/2.      Leukocytosis; recheck in a.m. Check UA due to stafford. No pulmonary signs (had neg CXR today), no s/s of infxn at old PICC site, no wounds, afebrile. Watch monocytes. -9/18 12.2 improving. Urine neg  -9/19 up to 13.2, ? Reactive. No source of infxn identified. Afebrile  -9/20 WBC now normal      Urinary retention/ neurogenic bladder - start flomax but continue stafford until mobility improves a bit. Strict I/o's  -9/20 keeping stafford to monitor UOP until Na normalizes. No significant output to suggest DI  -9/21 dc stafford; cont Flomax, follow bladder scan  9/22 voiding without issue; incontinence but no retention  -9/28 new onset urinary retention; check UA  -9/29 UA neg but cx saying > 100K gram neg cocci; will need to f/u ID/sens; Start Cipro 500 bid  - 9/30 preliminary urine cx >110K mixed skin denis  -10/2 stop Cipro; cx nl denis     bowel program - add prn meds; incontinent, want to keep stool on the firmer side. Monitor skin.       GERD - add a PPI. At times may need additional antacids, Maalox prn.      -continues to benefit from and is showing improvements in a multidisc team approach. Participating well.  Richie Parsons   9/21   Addendum; in therapy this afternoon, transient left eye blindness. Will f/u head CT for extension of or new ICH, especially with low plts   Results  1.  Interval decrease in size of intraventricular hemorrhage within the left  basal ganglia/thalamus with near complete resolution of intraventricular  hemorrhage. Surrounding edema is more pronounced with slight increase in  left-to-right midline shift. 2. Interval decrease in ventricular size, approaching normal in caliber. 10/2 Ms Claudette Marrufo continues to progress with therapies and has made slow steady improvements. Her rehab course has been complicated by multiple medical issues; HIT, DIC, DVT, PE. Family has not yet been involved in care and home has not been assessed. Insurance claims to be covering thru 10/3 only. Pt is not ready to dc. We are still monitoring labs closely, daily clinical eval for further thrombosis, BP mgt with adjustment of meds etc. Spoke with primary therapists who feel discharge would be premature and not in the pts best interest especially given young age and previous good health leading to a better outcome with aggressive rehab. From the beginning, we said our ELOS was 6 wks. We are only at 2 1/2 wks. Will do peer to peer    Time spent was 25 minutes with over 1/2 in direct patient care/examination, consultation and coordination of care.      Signed By: Stanton Beck MD     October 2, 2017

## 2017-10-02 NOTE — PROGRESS NOTES
PHYSICAL THERAPY DAILY NOTE  Time In: 9314  Time Out: 1978  Patient Seen For: PM;Other (see progress notes); Therapeutic exercise;Gait training;Transfer training    Subjective: Patient had no complaints. Objective: Pain complaints in her legs when working on gait. Other (comment) (falls)  GROSS ASSESSMENT Daily Assessment            BED/MAT MOBILITY Daily Assessment    Supine to Sit : 2 (Maximal assistance)  Sit to Supine : 2 (Maximal assistance)       TRANSFERS Daily Assessment    Transfer Type: SPT without device  Transfer Assistance : 2 (Maximal assistance)  Sit to Stand Assistance: Moderate assistance       GAIT Daily Assessment   Patient able to advance her right leg forward a few times then fatigues. Amount of Assistance: 2 (Maximal assistance)  Distance (ft): 20 Feet (ft)  Assistive Device: Gait belt;Walker cindy;Other (comment) (theraband and ace wrap to assist right leg)       STEPS or STAIRS Daily Assessment    Level of Assist : 0 (Not tested)       BALANCE Daily Assessment            WHEELCHAIR MOBILITY Daily Assessment            LOWER EXTREMITY EXERCISES Daily Assessment    Extremity: Both  Exercise Type #1: Supine lower extremity strengthening  Sets Performed: 2  Reps Performed: 10  Level of Assist: Maximum assistance          Assessment: Patient increasing strength in right leg more hip flexion today. Plan of Care: Continue with plan of care to reach PT goals. Returned to room with call santillan at [facility].     Mireya Coughlin PTA  10/2/2017

## 2017-10-02 NOTE — PROGRESS NOTES
10/02/17 1120   Mental Status   Neurologic State Alert   Orientation Level Oriented to person   Cognition Follows commands   Perseveration Perseverates during conversation   Safety/Judgement Fall prevention   Oral Assessment   Labial Decreased rate   Dentition Natural   Oral Hygiene adequate   Lingual Decreased rate   Swallowing Study Trial   Type of Study Modified barium swallow   Film Views Lateral;Fluoro   Radiologist Ya Bishop   Patient Position up in chair   Consistency Presented Thin liquid;Pudding;Mixed consistency   How Presented Straw;Spoon;Cup/sip; Self-fed/presented;SLP-fed/presented   Bolus Acceptance No impairment   Bolus Formation/Control Impaired   Type of Impairment Mastication   Propulsion No impairment   Oral Residue Pocketing;Right   Initiation of Swallow Triggered at vallecula;Triggered at pyriform sinus(es)   Timing No impairment   Penetration None   Aspiration/Timing No evidence of aspiration   Pharyngeal Clearance No residue   Swallowing Physiology   Decreased Tongue Base Retraction? No   Laryngeal Elevation WFL (within functional limits)   Aspiration/Penetration Score 1 (No penetration or aspiration-Contrast does not enter the airway)   Pharyngeal Symmetry Not assessed   Pharyngeal-Esophageal Segment No impairment   Pharyngeal Dysfunction None   Findings   Oral Phase Severity Mild-moderate   Pharyngeal Phase Severity N/A   Treatment Diagnosis   Treatment Diagnosis oral phase dysphagia   Pt with no penetration or aspiration with thin liquids via spoon, cup and straw, pureed and mixed. Solid not assessed. No residue. Right side pocketing noted. Recommend diet upgrade to mechanical soft textures with thin liquids. Medications as tolerated. Will follow for oral motor exercises.     Rosendo Martins MA/ANASTASIA/SLP

## 2017-10-03 PROCEDURE — 97110 THERAPEUTIC EXERCISES: CPT

## 2017-10-03 PROCEDURE — 92507 TX SP LANG VOICE COMM INDIV: CPT

## 2017-10-03 PROCEDURE — 99232 SBSQ HOSP IP/OBS MODERATE 35: CPT | Performed by: PHYSICAL MEDICINE & REHABILITATION

## 2017-10-03 PROCEDURE — 97112 NEUROMUSCULAR REEDUCATION: CPT

## 2017-10-03 PROCEDURE — 74011250637 HC RX REV CODE- 250/637: Performed by: PHYSICAL MEDICINE & REHABILITATION

## 2017-10-03 PROCEDURE — 97530 THERAPEUTIC ACTIVITIES: CPT

## 2017-10-03 PROCEDURE — 65310000000 HC RM PRIVATE REHAB

## 2017-10-03 PROCEDURE — 97535 SELF CARE MNGMENT TRAINING: CPT

## 2017-10-03 RX ADMIN — TIZANIDINE 2 MG: 2 TABLET ORAL at 21:03

## 2017-10-03 RX ADMIN — TRAZODONE HYDROCHLORIDE 50 MG: 50 TABLET ORAL at 21:03

## 2017-10-03 RX ADMIN — TAMSULOSIN HYDROCHLORIDE 0.4 MG: 0.4 CAPSULE ORAL at 21:06

## 2017-10-03 RX ADMIN — LABETALOL HYDROCHLORIDE 100 MG: 100 TABLET, FILM COATED ORAL at 09:12

## 2017-10-03 RX ADMIN — HYDROCHLOROTHIAZIDE: 12.5 CAPSULE ORAL at 09:12

## 2017-10-03 RX ADMIN — BISACODYL 10 MG: 10 SUPPOSITORY RECTAL at 00:30

## 2017-10-03 RX ADMIN — LABETALOL HYDROCHLORIDE 100 MG: 100 TABLET, FILM COATED ORAL at 16:14

## 2017-10-03 RX ADMIN — POTASSIUM CHLORIDE 40 MEQ: 20 TABLET, EXTENDED RELEASE ORAL at 09:12

## 2017-10-03 RX ADMIN — POTASSIUM CHLORIDE 40 MEQ: 20 TABLET, EXTENDED RELEASE ORAL at 16:15

## 2017-10-03 NOTE — PROGRESS NOTES
PHYSICAL THERAPY DAILY NOTE  Time In: 1300  Time Out: 3727  Patient Seen For: PM;Other (see progress notes); Therapeutic exercise;Gait training;Transfer training    Subjective: Patient complained of fatigue. Objective: No pain noted. Other (comment) (falls)  GROSS ASSESSMENT Daily Assessment            BED/MAT MOBILITY Daily Assessment    Supine to Sit : 2 (Maximal assistance)  Sit to Supine : 2 (Maximal assistance)       TRANSFERS Daily Assessment    Transfer Type: SPT without device  Transfer Assistance : 2 (Maximal assistance)  Sit to Stand Assistance: Moderate assistance       GAIT Daily Assessment    Amount of Assistance: 0 (Not tested)       STEPS or STAIRS Daily Assessment    Level of Assist : 0 (Not tested)       BALANCE Daily Assessment            WHEELCHAIR MOBILITY Daily Assessment            LOWER EXTREMITY EXERCISES Daily Assessment   During exercises patient unable to follow some directions with exercises. Extremity: Both  Exercise Type #1: Other (comment) (nustep x 10 minutes)  Sets Performed: 1  Reps Performed: 10  Level of Assist: Moderate assistance  Exercise Type #2: Supine lower extremity strengthening  Sets Performed: 2  Reps Performed: 10  Level of Assist: Maximum assistance          Assessment: Patient making progress with strength . Plan of Care: Continue with plan of care to reach PT goals. Returned to room with call bell at reach and alarm on.     Jeannette Howe PTA  10/3/2017

## 2017-10-03 NOTE — PROGRESS NOTES
PHYSICAL THERAPY DAILY NOTE  Time In: 1001  Time Out: 4531  Patient Seen For: AM;Transfer training;Gait training; Therapeutic exercise; Other (see progress notes)    Subjective: \" I'm tired and sleepy. \"         Objective: Pain in right leg with standing exs. Other (comment) (falls)  GROSS ASSESSMENT Daily Assessment            BED/MAT MOBILITY Daily Assessment    Supine to Sit : 2 (Maximal assistance)  Sit to Supine : 2 (Maximal assistance)       TRANSFERS Daily Assessment    Transfer Type: SPT without device  Transfer Assistance : 2 (Maximal assistance)  Sit to Stand Assistance: Moderate assistance       GAIT Daily Assessment    Amount of Assistance: 0 (Not tested)       STEPS or STAIRS Daily Assessment    Level of Assist : 0 (Not tested)       BALANCE Daily Assessment            WHEELCHAIR MOBILITY Daily Assessment            LOWER EXTREMITY EXERCISES Daily Assessment   Worked in supine . She performed SAQs with no assistance with right leg. AAROM with hip flexion/ extension. HC and HS stretching. HIp abd/add performed with assistance. Extremity: Both  Exercise Type #1: Other (comment) (standing pre gait weight shifting)  Sets Performed: 5  Reps Performed: 0  Level of Assist: Moderate assistance  Exercise Type #2: Supine lower extremity strengthening  Sets Performed: 2  Reps Performed: 10  Level of Assist: Maximum assistance          Assessment: Patient making progress but fatigues easily. Plan of Care: Continue with plan of care to reach PT goals. Returned to room with call bell at reach.     Tatyana Gonzalez, MICHAEL  10/3/2017

## 2017-10-03 NOTE — PROGRESS NOTES
10/03/17 0958   Time Spent With Patient   Time In 0915   Time Out 0957   Patient Seen For: AM;Verbal activities   Mental Status   Neurologic State Alert   Orientation Level Oriented to person;Oriented to situation;Oriented to time   Cognition Impaired decision making; Follows commands; Appropriate decision making;Memory loss   Perseveration Perseverates during conversation   Safety/Judgement Fall prevention   Pt completed word finding tasks with min-mod cues with 85% Accuracy. Pt was able to produce of the word after therapist made the first sound of the word.    Pedro Altamirano MA/ANASTASIA/SLP

## 2017-10-03 NOTE — PROGRESS NOTES
OT Daily Note    Time In 1116   Time Out 1200     Subjective:\"I am feeling tired today, I didn't sleep that well. \" Agreeable to therapy. Pain:Not indicated during session. Education:On continue use of RUE in room. Interdisciplinary Communication: Collaborated with PTA, Tedd Cheadle and patient is making progress towards goals. Precautions:  (falls)    Balance/functional mobility Daily Assessment   Stood 2 times holding onto writer for support working on weight shifting and upright posture in stance. Appropriately hesitant at first to [facility] into RLE but with encouragement able to weight shift. SPT from w/c to bed with minimal assist.   Dorsiflexion resting orthosis donned. Neuro reeducation Daily Assessment   Table slides completed with increased activation of elbow flexion and scapular retraction. Bilateral  used 2 sets of 8 with 5lbs. Writer facilitated right hand and elbow for josh, improving initiation of muscles felt. Weightbearing Daily Assessment   Side to side onto elbow first at edge of mat holding for 5 seconds each 2 sets of 3. Also completed side to side into hands at edge of mat for 3 seconds each 1 set of 10. Ended session: Supine in bed, lunch setup, all needs within reach.      Vickie Lawrence OTR/L

## 2017-10-03 NOTE — PROGRESS NOTES
10/03/17 1015   Time Spent With Patient   Time In 0828   Time Out 0913   Patient Seen For: AM;ADLs   Grooming   Grooming Assistance  SBA   Upper Body Bathing   Bathing Assistance, Upper Min A   Position Performed Seated in chair   Comments improving right side awarness, cues for thoroughness. Lower Body Bathing   Bathing Assistance, Lower  Mod A   Position Performed Seated in chair;Standing   Comments improving standing balance and beginning to reach in stance to wash buttocks. Sitting able to reach forward comfortable to wash as low as right ankle, can wash entire left leg. Upper Body Dressing    Dressing Assistance  Mod A   Comments cues for hemitechnique, but improving ability to thread affected right side first.    Lower Body Dressing    Dressing Assistance  Max A   Comments no problem threading LLE, but needs assistance for RLE, first time able to pull 80% over waist in stance with steady assist.      S: \"This is like a shower (sponge bath at sink). \" Agreeable to therapy. Focus of session was on morning ADL routine, incorporation of hemitechniques. Patient was able to SPT with minimal/moderate assist to the left. Pain denied during most of session, minor pain with attempt to cross right leg over left for dressing and washing. Walt Pepe  Collaborated with Izabella RODRIGUEZ and confirmed patient is on track to reach goals as documented in the care plan. Patient tolerated session well, but strength, ROM, activity tolerance, balance, hemitechnique, posture, FMC, memory are still below baseline and requires skilled facilitation to successfully and safely complete ADL's and transfers. Patient ended session in recliner with call remote and phone within reach.      Rupali Parkinson, OTR

## 2017-10-03 NOTE — PROGRESS NOTES
Problem: Nutrition Deficit  Goal: *Optimize nutritional status  Nutrition:  Assessment based on LOS. Assessment:  Anthropometrics:              Ht - 4'10\", wgt - 57.4 kg (9/11/17 unknown source), BMI 26.5 c/w overweight, edema - none reported. Macronutrient Needs:  Estimated calorie needs - 4525-4200 laura/day (20-25 laura/kg/day)  Estimated protein needs - 41-49 gm pro/day (1-1.2 gm pro/kgIBW/day) (GFR >60 ml/min)  Intake/Comparative Standards: This patient's average intake of mechanical soft diet for the past 7 recorded days/17 meals: 56%. This potentially meets 100% of calorie and >100% of protein goals. Diet:              Mechanical soft. Food/Nutrition History:              The patient presents with no acute nutrition risk factors based on the nursing admission malnutrition screen. Diagnosis (Nutrition):  No nutrition diagnosis at this time. Intervention:  Meals and Snacks: Mechanical soft based on ST evaluation. Nutrition Discharge Plan: Unable to determine at this juncture. Alejo Campbell.  Anusha Birch  [phone]

## 2017-10-03 NOTE — PROGRESS NOTES
Tita Bishop MD,   Medical Director  [address] [facility], [address] [facility]  Tel: [phone]       [facility] PROGRESS NOTE    Bradley Huntley  Admit Date: 9/15/2017  Admit Diagnosis: stroke, left brain involvement;ICH (intracerebral hemorrhag*    Subjective     Patient seen and examined. Vss. No acute complaints. PT, OT well tolerated. slow gains made. No new barrier to progress noted. Now on thin liq s/p MBS 10/2. Encouraged activity and confidence. Reports much less anxiety which she feels will help with therapies    Objective:     Current Facility-Administered Medications   Medication Dose Route Frequency    labetalol (NORMODYNE) tablet 100 mg  100 mg Oral BID    losartan/hydroCHLOROthiazide (HYZAAR) 100/12.5 mg   Oral DAILY    potassium chloride (K-DUR, KLOR-CON) SR tablet 40 mEq  40 mEq Oral BID    tiZANidine (ZANAFLEX) tablet 2 mg  2 mg Oral QHS    acetaminophen (TYLENOL) tablet 500 mg  500 mg Per NG tube Q4H PRN    alum-mag hydroxide-simeth (MYLANTA) oral suspension 30 mL  30 mL Oral Q4H PRN    bisacodyl (DULCOLAX) suppository 10 mg  10 mg Rectal DAILY PRN    hydrALAZINE (APRESOLINE) tablet 50 mg  50 mg Oral QID PRN    lip protectant (BLISTEX) ointment   Topical PRN    ondansetron (ZOFRAN ODT) tablet 4 mg  4 mg Oral Q6H PRN    sodium phosphate (FLEET'S) enema 118 mL  1 Enema Rectal PRN    traMADol (ULTRAM) tablet 50 mg  50 mg Oral Q6H PRN    traZODone (DESYREL) tablet 50 mg  50 mg Oral QHS PRN    tamsulosin (FLOMAX) capsule 0.4 mg  0.4 mg Oral QHS     Review of Systems:Denies chest pain, shortness of breath, cough, headache, visual problems, abdominal pain, dysurea, calf pain. Pertinent positives are as noted in the medical records and unremarkable otherwise. Visit Vitals    /82    Pulse 78    Temp 98.4 °F (36.9 °C)    Resp 15    SpO2 95%        Physical Exam:   General: Alert and age appropriately oriented.  Expressive aphasia  No acute cardio respiratory distress. HEENT: Normocephalic,no scleral icterus  Oral mucosa moist without cyanosis; right facial weakness   Lungs: Clear to auscultation  bilaterally. Respiration even and unlabored   Heart: Regular rate and rhythm, S1, S2   No  murmurs, clicks, rub or gallops   Abdomen: Soft, non-tender, nondistended. Bowel sounds present. No organomegaly. Genitourinary: Benign . Neuromuscular:      2 fingerbreadth sublux of right shoulder; 0/5 RUE  RLE quad firing and adduction noted. O/w 0/5 with sensory loss and neglect. Follows all commands well this am. Asks appropriate ?s   Skin/extremity: No rashes, no erythema.  No calf tenderness BLE                                                                              Functional Assessment:  Gross Assessment  AROM: Grossly decreased, non-functional (09/30/17 1200)  PROM: Within functional limits (09/30/17 1200)  Strength: Grossly decreased, non-functional (09/30/17 1200)  Coordination: Grossly decreased, non-functional (09/30/17 1200)  Tone: Abnormal (09/30/17 1200)  Sensation: Impaired (09/30/17 1200)       Balance  Sitting - Static: Fair (occasional) (09/29/17 1600)  Sitting - Dynamic: Fair (occasional) (09/29/17 1600)  Standing - Static: Fair;Constant support (09/29/17 1600)  Standing - Dynamic : Impaired (09/29/17 1600)           Toileting  Adaptive Equipment: Elevated seat;Walker (09/29/17 1534)         Maribell Frazier Fall Risk Assessment:  Maribell Frazier Fall Risk  Mobility: Ambulates or transfers with assist devices or assistance/unsteady gait (10/02/17 2304)  Mobility Interventions: Utilize walker, cane, or other assitive device (10/02/17 2304)  Mentation: Alert, oriented x 3 (10/02/17 2304)  Mentation Interventions: Door open when patient unattended (10/02/17 2304)  Medication: Patient receiving anticonvulsants, sedatives(tranquilizers), psychotropics or hypnotics, hypoglycemics, narcotics, sleep aids, antihypertensives, laxatives, or diuretics (10/02/17 2304)  Medication Interventions: Patient to call before getting OOB (10/02/17 2304)  Elimination: Needs assistance with toileting (10/02/17 2304)  Elimination Interventions: Call light in reach (10/02/17 2304)  Prior Fall History: No (10/02/17 2304)  History of Falls Interventions: Door open when patient unattended (10/02/17 2304)  Total Score: 3 (10/02/17 2304)  Standard Fall Precautions: Yes (09/26/17 2126)  High Fall Risk: Yes (10/02/17 2304)     Speech Assessment:         Ambulation:  Gait  Base of Support: Narrowed; Center of gravity altered (09/22/17 1200)  Speed/Lulu: Delayed (09/22/17 1200)  Step Length: Right shortened (09/22/17 1200)  Distance (ft): 20 Feet (ft) (10/02/17 1647)  Assistive Device: Gait belt;Walker cindy;Other (comment) (theraband and ace wrap to assist right leg) (10/02/17 1647)     Labs/Studies:  No results found for this or any previous visit (from the past 67 hour(s)).     Assessment:     Problem List as of 10/3/2017  Date Reviewed: 9/12/2017          Codes Class Noted - Resolved    Thrombocytopenia ([facility] ICD-10-CM: D69.6  ICD-9-CM: 287.5  9/25/2017 - Present        Pulmonary emboli (HCC) ICD-10-CM: I26.99  ICD-9-CM: 415.19  9/25/2017 - Present        DVT (deep venous thrombosis) ([facility] ICD-10-CM: I82.409  ICD-9-CM: 453.40  9/25/2017 - Present        ICH (intracerebral hemorrhage) ([facility] ICD-10-CM: I61.9  ICD-9-CM: 504  9/15/2017 - Present        Hypertensive urgency, malignant ICD-10-CM: I16.0  ICD-9-CM: 401.0  9/11/2017 - Present        Stroke, hemorrhagic ([facility] ICD-10-CM: I61.9  ICD-9-CM: 427  9/7/2017 - Present        Accelerated hypertension ICD-10-CM: I10  ICD-9-CM: 401.0  9/7/2017 - Present        At risk for aspiration (Chronic) ICD-10-CM: F21.08  ICD-9-CM: V49.89  9/7/2017 - Present        Acute spontaneous intraventricular hemorrhage assoc w/ hypertension ([facility] ICD-10-CM: I61.5, I10  ICD-9-CM: 405, 401.9  9/7/2017 - Present        Screening for breast cancer ICD-10-CM: Z12.31  ICD-9-CM: V76.10  2/27/2017 - Present        Atrophic vaginitis ICD-10-CM: N95.2  ICD-9-CM: 627.3  7/11/2016 - Present        HTN (hypertension) (Chronic) ICD-10-CM: I10  ICD-9-CM: 401.9  10/23/2015 - Present        ADD (attention deficit disorder) (Chronic) ICD-10-CM: F98.8  ICD-9-CM: 314.00  10/23/2015 - Present        Depression ICD-10-CM: F32.9  ICD-9-CM: 712  10/23/2015 - Present        Anxiety (Chronic) ICD-10-CM: F41.9  ICD-9-CM: 300.00  10/23/2015 - Present        RESOLVED: Hypoxemia ICD-10-CM: R09.02  ICD-9-CM: 799.02  9/10/2017 - 9/12/2017        RESOLVED: Acute respiratory failure ([facility] ICD-10-CM: J96.00  ICD-9-CM: 518.81  9/8/2017 - 9/10/2017        RESOLVED: Hemorrhagic stroke ([facility] ICD-10-CM: I61.9  ICD-9-CM: 443  9/7/2017 - 9/7/2017        RESOLVED: Non-intractable vomiting with nausea ICD-10-CM: R11.2  ICD-9-CM: 787.01  9/7/2017 - 9/12/2017        RESOLVED: Seborrheic keratosis, inflamed ICD-10-CM: L82.0  ICD-9-CM: 702.11  7/11/2016 - 9/20/2016        RESOLVED: Cough ICD-10-CM: R05  ICD-9-CM: 786.2  7/11/2016 - 9/20/2016        RESOLVED: Shoulder pain ICD-10-CM: M25.519  ICD-9-CM: 719.41  10/23/2015 - 2/27/2017             Left BG Hemorrhage with intraventricular extension due to HTN emergency; resultant R hemiplegia, right neglect, dysarthria, aphasia, dysphagia with significant decline in mobility and self care  Plan:   Continue daily physician medical management:  Pneumonia prophylaxis- Incentive spirometer every hour while awake. Will need instruction and assistance. Not sure if she can get a proper oral seal to be effective      Dysphagia; Pureed diet with NTL; at risk for malnutrition and dehydration. PICC line removed prior to transfer; may need a line for IVFs if BUN continues to increase. Low K, Ca; check mg. Supplementation as needed.  Na elevated  -mag ok, K much improved; cont to supplement while on diuretic  -9/19 replace K; 3.0; 2.8 this am 9/20; inc supplement; may need to add to IVFs  -9/25 fluid status and K nl; 9/28 cont NTL; high risk aspiration; still needs supervision /assist with meals  -10/2 MBS today; thin liq! !      Thrombocytopenia; has been trending down for no apparent reason. hgb slt down as well. Has not been on Hep products; consult hematology today 9/19  -plts 40K, continues to trend down 9/20; await consult by hematology. ? reln to [address]. Mild anemia as well  Check HIT panel, d dimer , fibrinogen  -9/21 plts 36k ; HIT PROFILE POSITIVE, D DIMER ELEVATED 32.79, FIBRINOGEN  ; consistent with DIC; Etiology unclear  WILL D/W HEMATOLOGY; LFTs ok, no hx of blood transfusion,no hx pancreatitis, no sign of bacteremia. Has not been on any heparin products. Can see in head injury; has ICH. Cannot r/o blood disorder (leukemia)   per hematology \"Recommend transfusing platelets for <85,649 and Cryoprecipitate for fibrinogen < 100. \"  -MARK pending  -9/22 bilateral LE venous duplex ordered; low suspicion but at risk and having calf tenderness  -9/22 plts improved today; monitor closely; 9/23 small right peroneal thrombus. No antic per hematology. IR refused/advised against placing IVF due to low risk of clot propagation given size and location  -9/23 counts bumping up; pts now 53k from 45 and previously 36  -9/24 plts cont to improve. 69K. MARK still pending; can resume therapies  9/26 Ddimer pending, fibrinogen now nl  -9/27 D dimer 11+, improved. Per Hematology; likely due to consumption coagulopathy. Will monitor  -10/2 labs have improved; MARK + 68% confirming dx of HIT      Bilateral PEs 9/25, filter placed in IR; looks good today. sats good without O2 supplement 94-97% sats; d/w Hematology, no agaratroban  -apprec Pulm assessment. Clinically looks good and no longer symptomatic  -9/28 clinically stable, asymptomatic ; 10/3 stable. No sob, no O2 supplements      Hypernatremia; last head CT did not show significant cerebral edema. Clinically improving.  Likely due to prerenal azotemia; 9/15 MAY REQ isotonic / hypotonic saline IV;  -9/18 events of weekend noted; Na 156; asymptomatic, on 0.5 dextrose with 1/4 Na; na q 6hrs; pts serum osmolality was normal. Will check urine Na and urine osmolality; depending on findings, will consider consulting Hospitalist vs Nephrology  Neuro improving despite this  -9/19 Na down to 149, urine osmo nl, serum osmo min hi; cont dex/and 1/4 NS; repeat in am. Not DI as uop not increased  -9/20 Na 147; cont fluids; 9/21 not resulted; will stop fluids when Na below 142  -Na 140 9/22; dc IVFs; 9/25 136; 9/27 136; recheck 10/4      ICH/IVH; 9/22 clinically improves daily. F/u HCT yest due to transient left eye visual disturbance. Overall, resolving hemorrhage. There is more pronounced edema; expected. Ventricular size now nl      Anemia ; 9.6-10/ stable      DVT risk / DVT Prophylaxis- Will require daily physician exam to assess for signs and symptoms as patient is at increased risk for of thromboembolism. Mobilization as tolerated. Intermittent pneumatic compression devices when in bed Thigh-high or knee-high thromboembolic deterrent hose when out of bed. NO  anticoag due to ICH/HIT; has right small right peroneal vein; 9/25 spoke with IR again today; more risk of putting in IVC filter than benefits especially since she is asymptomatic with no swelling or tenderness and small size of clot  -10/3 no edema, right calf soft. No SOB      Pain Control: stable, mild-to-moderate joint symptoms intermittently, reasonably well controlled by PRN meds. Will require regular pain assessment and comprenhensive pain management,       Wound Care: Monitor wound status daily per staff and physician. At risk for failure. Will require 24/7 rehab nursing. None needed at this time      Hypertension - BP uncontrolled, fluctuating, managed medically. Add prn hydralazine for sbp> 180. Goal SBP is 140-160 given ICH.  Cont Normodyne, norvasc and hyzaar; consider Verapamil or scheduled Hydralazine  -9/18 BP increased 169/95; add hydralazine 25 tid  -9/20 BP much improved  -9/22 /74; 9/24 bp stable; may dec hydralazine to bid  -9/25 dec hydralazine  -9/27 bp 116-119  -9/28 SBP in the 90s to 116, asymptomatic; dc hydralazine and monitor; decrease Normodyne to bid  -9/29 BP still low; lower Norvasc and normodyne, cont Hyzaar; all have parameters of when to hold  - 9/30 SBP overnight and this am 108-111, will d/c norvasc, on labetolol and losartan/HCTZ , HR acceptable  -10/2 115/71; controlled. Cont current meds  -130-151/77      Mild hyperglycemia, likely stress induced vs borderline diabetes; monitor loosely  -9/27 bs 145 on BMP; follow bs qam x 3d      Dysphagia; cont NTL, mech soft; hopefully can upgrade liquids soon 9/25  -9/29 remains on NTL. MBS scheduled for 10/2.  -10/3 now on THINS! !      Leukocytosis; recheck in a.m. Check UA due to stafford. No pulmonary signs (had neg CXR today), no s/s of infxn at old PICC site, no wounds, afebrile. Watch monocytes. -9/18 12.2 improving. Urine neg  -9/19 up to 13.2, ? Reactive. No source of infxn identified. Afebrile  -9/20 WBC now normal      Urinary retention/ neurogenic bladder - start flomax but continue stafford until mobility improves a bit. Strict I/o's  -9/20 keeping stafford to monitor UOP until Na normalizes. No significant output to suggest DI  -9/21 dc stafford; cont Flomax, follow bladder scan  9/22 voiding without issue; incontinence but no retention  -9/28 new onset urinary retention; check UA  -9/29 UA neg but cx saying > 100K gram neg cocci; will need to f/u ID/sens; Start Cipro 500 bid  - 9/30 preliminary urine cx >110K mixed skin denis  -10/2 stop Cipro; cx nl denis      bowel program - add prn meds; incontinent, want to keep stool on the firmer side. Monitor skin.       GERD - add a PPI. At times may need additional antacids, Maalox prn.      -continues to benefit from and is showing improvements in a multidisc team approach. Participating well.  Celine Arshad   9/21   Addendum; in therapy this afternoon, transient left eye blindness. Will f/u head CT for extension of or new ICH, especially with low plts   Results  1. Interval decrease in size of intraventricular hemorrhage within the left  basal ganglia/thalamus with near complete resolution of intraventricular  hemorrhage. Surrounding edema is more pronounced with slight increase in  left-to-right midline shift. 2. Interval decrease in ventricular size, approaching normal in caliber.     10/2 Ms Natalie Fernandez continues to progress with therapies and has made slow steady improvements. Her rehab course has been complicated by multiple medical issues; HIT, DIC, DVT, PE. Family has not yet been involved in care and home has not been assessed. Insurance claims to be covering thru 10/3 only. Pt is not ready to dc. We are still monitoring labs closely, daily clinical eval for further thrombosis, BP mgt with adjustment of meds etc. Spoke with primary therapists who feel discharge would be premature and not in the pts best interest especially given young age and previous good health leading to a better outcome with aggressive rehab. From the beginning, we said our ELOS was 6 wks. We are only at 2 1/2 wks. Will do peer to peer/ 10/3 pts stay extended to next Monday but [employer] open to team conference notes this week for extension of stay based on progress. If not given more time, she will require SNF placement. Time spent was 25 minutes with over 1/2 in direct patient care/examination, consultation and coordination of care.      Signed By: Nola Mistry MD     October 3, 2017

## 2017-10-03 NOTE — PROGRESS NOTES
Hourly rounds completed on pt this shift. Will report to Nightshift RN. All meets met at this time.  Will continue to monitor

## 2017-10-04 LAB
ALBUMIN SERPL-MCNC: 2.6 G/DL (ref 3.2–4.6)
ALBUMIN/GLOB SERPL: 0.7 {RATIO} (ref 1.2–3.5)
ALP SERPL-CCNC: 92 U/L (ref 50–136)
ALT SERPL-CCNC: 45 U/L (ref 12–65)
ANION GAP SERPL CALC-SCNC: 8 MMOL/L (ref 7–16)
AST SERPL-CCNC: 22 U/L (ref 15–37)
BILIRUB SERPL-MCNC: 0.5 MG/DL (ref 0.2–1.1)
BUN SERPL-MCNC: 10 MG/DL (ref 8–23)
CALCIUM SERPL-MCNC: 8.6 MG/DL (ref 8.3–10.4)
CHLORIDE SERPL-SCNC: 101 MMOL/L (ref 98–107)
CO2 SERPL-SCNC: 25 MMOL/L (ref 21–32)
CREAT SERPL-MCNC: 0.46 MG/DL (ref 0.6–1)
ERYTHROCYTE [DISTWIDTH] IN BLOOD BY AUTOMATED COUNT: 13.8 % (ref 11.9–14.6)
GLOBULIN SER CALC-MCNC: 3.8 G/DL (ref 2.3–3.5)
GLUCOSE SERPL-MCNC: 106 MG/DL (ref 65–100)
HCT VFR BLD AUTO: 27.2 % (ref 35.8–46.3)
HGB BLD-MCNC: 9.4 G/DL (ref 11.7–15.4)
MCH RBC QN AUTO: 28.6 PG (ref 26.1–32.9)
MCHC RBC AUTO-ENTMCNC: 34.6 G/DL (ref 31.4–35)
MCV RBC AUTO: 82.7 FL (ref 79.6–97.8)
PLATELET # BLD AUTO: 263 K/UL (ref 150–450)
PMV BLD AUTO: 9.9 FL (ref 10.8–14.1)
POTASSIUM SERPL-SCNC: 4.8 MMOL/L (ref 3.5–5.1)
PROT SERPL-MCNC: 6.4 G/DL (ref 6.3–8.2)
RBC # BLD AUTO: 3.29 M/UL (ref 4.05–5.25)
SODIUM SERPL-SCNC: 134 MMOL/L (ref 136–145)
WBC # BLD AUTO: 6.9 K/UL (ref 4.3–11.1)

## 2017-10-04 PROCEDURE — 85027 COMPLETE CBC AUTOMATED: CPT | Performed by: PHYSICAL MEDICINE & REHABILITATION

## 2017-10-04 PROCEDURE — 97116 GAIT TRAINING THERAPY: CPT

## 2017-10-04 PROCEDURE — 97530 THERAPEUTIC ACTIVITIES: CPT

## 2017-10-04 PROCEDURE — 51798 US URINE CAPACITY MEASURE: CPT

## 2017-10-04 PROCEDURE — 65310000000 HC RM PRIVATE REHAB

## 2017-10-04 PROCEDURE — 80053 COMPREHEN METABOLIC PANEL: CPT | Performed by: PHYSICAL MEDICINE & REHABILITATION

## 2017-10-04 PROCEDURE — 74011250637 HC RX REV CODE- 250/637: Performed by: PHYSICAL MEDICINE & REHABILITATION

## 2017-10-04 PROCEDURE — 97110 THERAPEUTIC EXERCISES: CPT

## 2017-10-04 PROCEDURE — 99232 SBSQ HOSP IP/OBS MODERATE 35: CPT | Performed by: PHYSICAL MEDICINE & REHABILITATION

## 2017-10-04 PROCEDURE — 92507 TX SP LANG VOICE COMM INDIV: CPT

## 2017-10-04 PROCEDURE — 97112 NEUROMUSCULAR REEDUCATION: CPT

## 2017-10-04 PROCEDURE — 97535 SELF CARE MNGMENT TRAINING: CPT

## 2017-10-04 PROCEDURE — 36415 COLL VENOUS BLD VENIPUNCTURE: CPT | Performed by: PHYSICAL MEDICINE & REHABILITATION

## 2017-10-04 RX ADMIN — LABETALOL HYDROCHLORIDE 100 MG: 100 TABLET, FILM COATED ORAL at 16:55

## 2017-10-04 RX ADMIN — POTASSIUM CHLORIDE 40 MEQ: 20 TABLET, EXTENDED RELEASE ORAL at 08:10

## 2017-10-04 RX ADMIN — LABETALOL HYDROCHLORIDE 100 MG: 100 TABLET, FILM COATED ORAL at 08:10

## 2017-10-04 RX ADMIN — TAMSULOSIN HYDROCHLORIDE 0.4 MG: 0.4 CAPSULE ORAL at 21:43

## 2017-10-04 RX ADMIN — TIZANIDINE 2 MG: 2 TABLET ORAL at 21:43

## 2017-10-04 RX ADMIN — POTASSIUM CHLORIDE 40 MEQ: 20 TABLET, EXTENDED RELEASE ORAL at 16:54

## 2017-10-04 RX ADMIN — HYDROCHLOROTHIAZIDE: 12.5 CAPSULE ORAL at 08:10

## 2017-10-04 NOTE — PROGRESS NOTES
Valerie Smith MD,   Medical Director  [facility], [address] [facility]  Tel: [phone]       SFD PROGRESS NOTE    Yoel Ling  Admit Date: 9/15/2017  Admit Diagnosis: stroke, left brain involvement;ICH (intracerebral hemorrhag*    Subjective   No pain. no headache, cp or sob. No nausea. Slept better last noc      Objective:     Current Facility-Administered Medications   Medication Dose Route Frequency    labetalol (NORMODYNE) tablet 100 mg  100 mg Oral BID    losartan/hydroCHLOROthiazide (HYZAAR) 100/12.5 mg   Oral DAILY    potassium chloride (K-DUR, KLOR-CON) SR tablet 40 mEq  40 mEq Oral BID    tiZANidine (ZANAFLEX) tablet 2 mg  2 mg Oral QHS    acetaminophen (TYLENOL) tablet 500 mg  500 mg Per NG tube Q4H PRN    alum-mag hydroxide-simeth (MYLANTA) oral suspension 30 mL  30 mL Oral Q4H PRN    bisacodyl (DULCOLAX) suppository 10 mg  10 mg Rectal DAILY PRN    hydrALAZINE (APRESOLINE) tablet 50 mg  50 mg Oral QID PRN    lip protectant (BLISTEX) ointment   Topical PRN    ondansetron (ZOFRAN ODT) tablet 4 mg  4 mg Oral Q6H PRN    sodium phosphate (FLEET'S) enema 118 mL  1 Enema Rectal PRN    traMADol (ULTRAM) tablet 50 mg  50 mg Oral Q6H PRN    traZODone (DESYREL) tablet 50 mg  50 mg Oral QHS PRN    tamsulosin (FLOMAX) capsule 0.4 mg  0.4 mg Oral QHS     Review of Systems:Denies chest pain, shortness of breath, cough, headache, visual problems, abdominal pain, dysurea, calf pain. Pertinent positives are as noted in the medical records and unremarkable otherwise. Visit Vitals    /59    Pulse 80    Temp 98.3 °F (36.8 °C)    Resp 14    SpO2 96%        Physical Exam:   General: Alert and age appropriately oriented. No acute cardio respiratory distress. Expressive aphasia   HEENT: Normocephalic,no scleral icterus  Oral mucosa moist without cyanosis   Lungs: Clear to auscultation  bilaterally.   Respiration even and unlabored Heart: Regular rate and rhythm, S1, S2   No  murmurs, clicks, rub or gallops   Abdomen: Soft, non-tender, nondistended. Bowel sounds present. No organomegaly. Genitourinary: Benign . Neuromuscular:      Dense right hemiplegia with shoulder sublux. Pain with PROM right shoulder and hip. Maggie 2-. Right hemisensory loss, attending better to the right. fc's well and consistently . Ox4   Skin/extremity: No rashes, no erythema.  No calf tenderness BLE  No edema                                                                            Functional Assessment:  Gross Assessment  AROM: Grossly decreased, non-functional (09/30/17 1200)  PROM: Within functional limits (09/30/17 1200)  Strength: Grossly decreased, non-functional (09/30/17 1200)  Coordination: Grossly decreased, non-functional (09/30/17 1200)  Tone: Abnormal (09/30/17 1200)  Sensation: Impaired (09/30/17 1200)       Balance  Sitting - Static: Fair (occasional) (09/29/17 1600)  Sitting - Dynamic: Fair (occasional) (09/29/17 1600)  Standing - Static: Fair;Constant support (09/29/17 1600)  Standing - Dynamic : Impaired (09/29/17 1600)           Toileting  Adaptive Equipment: Elevated seat;Walker (09/29/17 1534)         [facility] Fall Risk Assessment:  [facility] Fall Risk  Mobility: Ambulates or transfers with assist devices or assistance/unsteady gait (10/03/17 2346)  Mobility Interventions: Utilize walker, cane, or other assitive device (10/03/17 2346)  Mentation: Alert, oriented x 3 (10/03/17 2346)  Mentation Interventions: Door open when patient unattended (10/03/17 2346)  Medication: Patient receiving anticonvulsants, sedatives(tranquilizers), psychotropics or hypnotics, hypoglycemics, narcotics, sleep aids, antihypertensives, laxatives, or diuretics (10/03/17 2346)  Medication Interventions: Patient to call before getting OOB (10/03/17 2346)  Elimination: Needs assistance with toileting (10/03/17 2346)  Elimination Interventions: Call light in reach (10/03/17 2346)  Prior Fall History: No (10/03/17 2346)  History of Falls Interventions: Door open when patient unattended (10/03/17 1456)  Total Score: 3 (10/03/17 2346)  Standard Fall Precautions: Yes (09/26/17 2126)  High Fall Risk: Yes (10/03/17 1456)     Speech Assessment:         Ambulation:  Gait  Base of Support: Narrowed; Center of gravity altered (09/22/17 1200)  Speed/Lulu: Delayed (09/22/17 1200)  Step Length: Right shortened (09/22/17 1200)  Distance (ft): 20 Feet (ft) (10/02/17 1647)  Assistive Device: Gait belt;Walker cindy;Other (comment) (theraband and ace wrap to assist right leg) (10/02/17 1647)     Labs/Studies:  Recent Results (from the past 72 hour(s))   METABOLIC PANEL, COMPREHENSIVE    Collection Time: 10/04/17  5:36 AM   Result Value Ref Range    Sodium 134 (L) 136 - 145 mmol/L    Potassium 4.8 3.5 - 5.1 mmol/L    Chloride 101 98 - 107 mmol/L    CO2 25 21 - 32 mmol/L    Anion gap 8 7 - 16 mmol/L    Glucose 106 (H) 65 - 100 mg/dL    BUN 10 8 - 23 MG/DL    Creatinine 0.46 (L) 0.6 - 1.0 MG/DL    GFR est AA >60 >60 ml/min/1.73m2    GFR est non-AA >60 >60 ml/min/1.73m2    Calcium 8.6 8.3 - 10.4 MG/DL    Bilirubin, total 0.5 0.2 - 1.1 MG/DL    ALT (SGPT) 45 12 - 65 U/L    AST (SGOT) 22 15 - 37 U/L    Alk.  phosphatase 92 50 - 136 U/L    Protein, total 6.4 6.3 - 8.2 g/dL    Albumin 2.6 (L) 3.2 - 4.6 g/dL    Globulin 3.8 (H) 2.3 - 3.5 g/dL    A-G Ratio 0.7 (L) 1.2 - 3.5         Assessment:     Problem List as of 10/4/2017  Date Reviewed: 9/12/2017          Codes Class Noted - Resolved    Thrombocytopenia ([address] ICD-10-CM: D69.6  ICD-9-CM: 287.5  9/25/2017 - Present        Pulmonary emboli (HCC) ICD-10-CM: I26.99  ICD-9-CM: 415.19  9/25/2017 - Present        DVT (deep venous thrombosis) ([facility] ICD-10-CM: I82.409  ICD-9-CM: 453.40  9/25/2017 - Present        ICH (intracerebral hemorrhage) ([facility] ICD-10-CM: I61.9  ICD-9-CM: 301  9/15/2017 - Present        Hypertensive urgency, malignant ICD-10-CM: I16.0  ICD-9-CM: 401.0  9/11/2017 - Present        Stroke, hemorrhagic ([facility] ICD-10-CM: I61.9  ICD-9-CM: 582  9/7/2017 - Present        Accelerated hypertension ICD-10-CM: I10  ICD-9-CM: 401.0  9/7/2017 - Present        At risk for aspiration (Chronic) ICD-10-CM: Z91.89  ICD-9-CM: V49.89  9/7/2017 - Present        Acute spontaneous intraventricular hemorrhage assoc w/ hypertension ([facility] ICD-10-CM: I61.5, I10  ICD-9-CM: 905, 401.9  9/7/2017 - Present        Screening for breast cancer ICD-10-CM: Z12.31  ICD-9-CM: V76.10  2/27/2017 - Present        Atrophic vaginitis ICD-10-CM: N95.2  ICD-9-CM: 627.3  7/11/2016 - Present        HTN (hypertension) (Chronic) ICD-10-CM: I10  ICD-9-CM: 401.9  10/23/2015 - Present        ADD (attention deficit disorder) (Chronic) ICD-10-CM: F98.8  ICD-9-CM: 314.00  10/23/2015 - Present        Depression ICD-10-CM: F32.9  ICD-9-CM: 268  10/23/2015 - Present        Anxiety (Chronic) ICD-10-CM: F41.9  ICD-9-CM: 300.00  10/23/2015 - Present        RESOLVED: Hypoxemia ICD-10-CM: R09.02  ICD-9-CM: 799.02  9/10/2017 - 9/12/2017        RESOLVED: Acute respiratory failure ([facility] ICD-10-CM: J96.00  ICD-9-CM: 518.81  9/8/2017 - 9/10/2017        RESOLVED: Hemorrhagic stroke ([facility] ICD-10-CM: I61.9  ICD-9-CM: 989  9/7/2017 - 9/7/2017        RESOLVED: Non-intractable vomiting with nausea ICD-10-CM: R11.2  ICD-9-CM: 787.01  9/7/2017 - 9/12/2017        RESOLVED: Seborrheic keratosis, inflamed ICD-10-CM: L82.0  ICD-9-CM: 702.11  7/11/2016 - 9/20/2016        RESOLVED: Cough ICD-10-CM: R05  ICD-9-CM: 786.2  7/11/2016 - 9/20/2016        RESOLVED: Shoulder pain ICD-10-CM: M25.519  ICD-9-CM: 719.41  10/23/2015 - 2/27/2017               Left BG Hemorrhage with intraventricular extension due to HTN emergency; resultant R hemiplegia, right neglect, dysarthria, aphasia, dysphagia with significant decline in mobility and self care  Plan:   Continue daily physician medical management:  Pneumonia prophylaxis- Incentive spirometer every hour while awake. Will need instruction and assistance. Not sure if she can get a proper oral seal to be effective      Dysphagia; Pureed diet with NTL; at risk for malnutrition and dehydration. PICC line removed prior to transfer; may need a line for IVFs if BUN continues to increase. Low K, Ca; check mg. Supplementation as needed. Na elevated  -mag ok, K much improved; cont to supplement while on diuretic  -9/19 replace K; 3.0; 2.8 this am 9/20; inc supplement; may need to add to IVFs  -9/25 fluid status and K nl; 9/28 cont NTL; high risk aspiration; still needs supervision /assist with meals  -10/2 MBS today; thin liq! !      Thrombocytopenia; has been trending down for no apparent reason. hgb slt down as well. Has not been on Hep products; consult hematology today 9/19  -plts 40K, continues to trend down 9/20; await consult by hematology. ? reln to [address]. Mild anemia as well  Check HIT panel, d dimer , fibrinogen  -9/21 plts 36k ; HIT PROFILE POSITIVE, D DIMER ELEVATED 32.79, FIBRINOGEN  ; consistent with DIC; Etiology unclear  WILL D/W HEMATOLOGY; LFTs ok, no hx of blood transfusion,no hx pancreatitis, no sign of bacteremia. Has not been on any heparin products. Can see in head injury; has ICH. Cannot r/o blood disorder (leukemia)   per hematology \"Recommend transfusing platelets for <79,409 and Cryoprecipitate for fibrinogen < 100. \"  -MARK pending  -9/22 bilateral LE venous duplex ordered; low suspicion but at risk and having calf tenderness  -9/22 plts improved today; monitor closely; 9/23 small right peroneal thrombus. No antic per hematology. IR refused/advised against placing IVF due to low risk of clot propagation given size and location  -9/23 counts bumping up; pts now 53k from 45 and previously 36  -9/24 plts cont to improve. 69K. MARK still pending; can resume therapies  9/26 Ddimer pending, fibrinogen now nl  -9/27 D dimer 11+, improved.  Per Hematology; likely due to consumption coagulopathy. Will monitor  -10/2 labs have improved; MARK + 68% confirming dx of HIT; 10/4 recheck cbc (last done 9/28)      Bilateral PEs 9/25, filter placed in IR; looks good today. sats good without O2 supplement 94-97% sats; d/w Hematology, no agaratroban  -apprec Pulm assessment. Clinically looks good and no longer symptomatic  -9/28 clinically stable, asymptomatic ; 10/3 stable. No sob, no O2 supplements      Hypernatremia; last head CT did not show significant cerebral edema. Clinically improving. Likely due to prerenal azotemia; 9/15 MAY REQ isotonic / hypotonic saline IV;  -9/18 events of weekend noted; Na 156; asymptomatic, on 0.5 dextrose with 1/4 Na; na q 6hrs; pts serum osmolality was normal. Will check urine Na and urine osmolality; depending on findings, will consider consulting Hospitalist vs Nephrology  Neuro improving despite this  -9/19 Na down to 149, urine osmo nl, serum osmo min hi; cont dex/and 1/4 NS; repeat in am. Not DI as uop not increased  -9/20 Na 147; cont fluids; 9/21 not resulted; will stop fluids when Na below 142  -Na 140 9/22; dc IVFs; 9/25 136; 9/27 136; recheck 10/4 134; mildly low      ICH/IVH; 9/22 clinically improves daily. F/u HCT yest due to transient left eye visual disturbance. Overall, resolving hemorrhage. There is more pronounced edema; expected. Ventricular size now nl      Anemia ; 9.6-10/ stable      DVT risk / DVT Prophylaxis- Will require daily physician exam to assess for signs and symptoms as patient is at increased risk for of thromboembolism. Mobilization as tolerated. Intermittent pneumatic compression devices when in bed Thigh-high or knee-high thromboembolic deterrent hose when out of bed. NO  anticoag due to ICH/HIT; has right small right peroneal vein; 9/25 spoke with IR again today; more risk of putting in IVC filter than benefits especially since she is asymptomatic with no swelling or tenderness and small size of clot  -10/3 no edema, right calf soft. No SOB      Pain Control: stable, mild-to-moderate joint symptoms intermittently, reasonably well controlled by PRN meds. Will require regular pain assessment and comprenhensive pain management,       Wound Care: Monitor wound status daily per staff and physician. At risk for failure. Will require 24/7 rehab nursing. None needed at this time      Hypertension - BP uncontrolled, fluctuating, managed medically. Add prn hydralazine for sbp> 180. Goal SBP is 140-160 given ICH. Cont Normodyne, norvasc and hyzaar; consider Verapamil or scheduled Hydralazine  -9/18 BP increased 169/95; add hydralazine 25 tid  -9/20 BP much improved  -9/22 /74; 9/24 bp stable; may dec hydralazine to bid  -9/25 dec hydralazine  -9/27 bp 116-119  -9/28 SBP in the 90s to 116, asymptomatic; dc hydralazine and monitor; decrease Normodyne to bid  -9/29 BP still low; lower Norvasc and normodyne, cont Hyzaar; all have parameters of when to hold  - 9/30 SBP overnight and this am 108-111, will d/c norvasc, on labetolol and losartan/HCTZ , HR acceptable  -10/2 115/71; controlled. Cont current meds  -130-151/77      Mild hyperglycemia, likely stress induced vs borderline diabetes; monitor loosely  -9/27 bs 145 on BMP; follow bs qam x 3d      Dysphagia; cont NTL, mech soft; hopefully can upgrade liquids soon 9/25  -9/29 remains on NTL. MBS scheduled for 10/2.  -10/3 now on THINS! !      Leukocytosis; recheck in a.m. Check UA due to stafford. No pulmonary signs (had neg CXR today), no s/s of infxn at old PICC site, no wounds, afebrile. Watch monocytes. -9/18 12.2 improving. Urine neg  -9/19 up to 13.2, ? Reactive. No source of infxn identified. Afebrile  -9/20 WBC now normal      Urinary retention/ neurogenic bladder - start flomax but continue stafford until mobility improves a bit. Strict I/o's  -9/20 keeping stafford to monitor UOP until Na normalizes.  No significant output to suggest DI  -9/21 dc stafford; cont Flomax, follow bladder scan  9/22 voiding without issue; incontinence but no retention  -9/28 new onset urinary retention; check UA  -9/29 UA neg but cx saying > 100K gram neg cocci; will need to f/u ID/sens; Start Cipro 500 bid  - 9/30 preliminary urine cx >110K mixed skin denis  -10/2 stop Cipro; cx nl denis      bowel program - add prn meds; incontinent, want to keep stool on the firmer side. Monitor skin.       GERD - add a PPI. At times may need additional antacids, Maalox prn.      -continues to benefit from and is showing improvements in a multidisc team approach. Participating well.  [facility]   9/21   Addendum; in therapy this afternoon, transient left eye blindness. Will f/u head CT for extension of or new ICH, especially with low plts   Results  1. Interval decrease in size of intraventricular hemorrhage within the left  basal ganglia/thalamus with near complete resolution of intraventricular  hemorrhage. Surrounding edema is more pronounced with slight increase in  left-to-right midline shift. 2. Interval decrease in ventricular size, approaching normal in caliber.      10/2 Ms Vic Abebe continues to progress with therapies and has made slow steady improvements. Her rehab course has been complicated by multiple medical issues; HIT, DIC, DVT, PE. Family has not yet been involved in care and home has not been assessed. Insurance claims to be covering thru 10/3 only. Pt is not ready to dc. We are still monitoring labs closely, daily clinical eval for further thrombosis, BP mgt with adjustment of meds etc. Spoke with primary therapists who feel discharge would be premature and not in the pts best interest especially given young age and previous good health leading to a better outcome with aggressive rehab. From the beginning, we said our ELOS was 6 wks. We are only at 2 1/2 wks. Will do peer to peer/ 10/3 pts stay extended to next Monday but Srikanth open to team conference notes this week for extension of stay based on progress.  If not given more time, she will require SNF placement.                  Time spent was 25 minutes with over 1/2 in direct patient care/examination, consultation and coordination of care.      Signed By: Akilah Zafar MD     October 4, 2017

## 2017-10-04 NOTE — PROGRESS NOTES
OT Daily Note  Time In 1114   Time Out 1158     Pain: Patient had no complaint of pain. Functional Mobility   Pt was max A with supine to sit and transfer to w/c. Neuro-Muscular Re-Education   Pt was facilitated in RUE and RLE WB. Pt demonstrated poor control and required facilitation at hip, knee, elbow, and hand. Pt would have occasional control with knee. Pt engaged in [facility] for L scapular protraction/retraction with trace movement for retraction. Also did internal/external rotation with 3/5 for internal and 0/5 for external.     Engaged in 3 step sequencing with 3/5 accurate and 1/5 she self-corrected. Pt was unable to properly articulate the story line, but when given 2 words to choose from was able to repeat the appropriate one with increased time. Pt could occasional generate an appropriate word with increased time. Education   Ability to hang out in day room. Interdisciplinary Communication: Therapy supervisor Summer Mary on pt's concerns. Plan: Continue with POC. Pt was left in w/c with family.      Nicci Carver OTR/L  10/4/2017

## 2017-10-04 NOTE — PROGRESS NOTES
PHYSICAL THERAPY DAILY NOTE  Time In: 3802  Time Out: 3252  Patient Seen For: AM;Transfer training;Gait training; Therapeutic exercise; Other (see progress notes)    Subjective: Patient had no complaints. Patient did tell Dr. Danial Underwood that she slept better last night. Objective: No pain noted. Other (comment) (falls)  GROSS ASSESSMENT Daily Assessment            BED/MAT MOBILITY Daily Assessment    Supine to Sit : 2 (Maximal assistance)  Sit to Supine : 2 (Maximal assistance)       TRANSFERS Daily Assessment    Transfer Type: SPT without device  Transfer Assistance : 2 (Maximal assistance)  Sit to Stand Assistance: Maximum assistance       GAIT Daily Assessment   Patient unable to follow directions to advance cindy walker forward. Tone worse today with gait. Amount of Assistance: 2 (Maximal assistance)  Distance (ft): 10 Feet (ft)  Assistive Device: Gait belt;Walker cindy;Other (comment) (used theraband nd ace wrap to assist DF and support of knee)       STEPS or STAIRS Daily Assessment    Level of Assist : 0 (Not tested)       BALANCE Daily Assessment            WHEELCHAIR MOBILITY Daily Assessment            LOWER EXTREMITY EXERCISES Daily Assessment    Extremity: Both  Exercise Type #1: Other (comment) (HC and aHS stretching )  Sets Performed: 2  Reps Performed: 0  Level of Assist: Total assistance  Exercise Type #2: Other (comment) (standing pregait activities with weight shifting)  Sets Performed: 3  Reps Performed: 0  Level of Assist: Maximum assistance          Assessment: Patient doesn't seem to realize all her deficits. Discussed with sister concerning therapy and how much assistance required at discharge. Plan of Care: Continue with plan of care to reach PT goals. Returned to room with call santillan at reach.     Acosta Rivas PTA  10/4/2017

## 2017-10-04 NOTE — PROGRESS NOTES
PHYSICAL THERAPY DAILY NOTE  Time In: 1300  Time Out: 1944  Patient Seen For: PM;Other (see progress notes); Therapeutic exercise;Gait training;Transfer training    Subjective: Patient complained of fatigue. \" I'm tired. \"         Objective: No pain noted. Other (comment) (falls)  GROSS ASSESSMENT Daily Assessment            BED/MAT MOBILITY Daily Assessment    Supine to Sit : 2 (Maximal assistance)  Sit to Supine : 2 (Maximal assistance)       TRANSFERS Daily Assessment    Transfer Type: SPT without device  Transfer Assistance : 2 (Maximal assistance)  Sit to Stand Assistance: Maximum assistance       GAIT Daily Assessment    Amount of Assistance: 0 (Not tested)  Distance (ft): 10 Feet (ft)  Assistive Device: Gait belt;Walker cindy;Other (comment) (used theraband nd ace wrap to assist DF and support of knee)       STEPS or STAIRS Daily Assessment    Level of Assist : 0 (Not tested)       BALANCE Daily Assessment            WHEELCHAIR MOBILITY Daily Assessment            LOWER EXTREMITY EXERCISES Daily Assessment   Patient has full SAQ and hip abd/add with min assist. Sidelying using skate board for hip flexion/ext. Extremity: Both  Exercise Type #1: Supine lower extremity strengthening  Sets Performed: 2  Reps Performed: 10  Level of Assist: Maximum assistance  Exercise Type #2: Other (comment) (standing pregait activities with weight shifting)  Sets Performed: 3  Reps Performed: 0  Level of Assist: Maximum assistance          Assessment: Patient doesn't seem to realize her deficits. She seems very frustrated with her progress. Mrs. Delio Serrano is making progress but cognition deficits regarding the purpose of therapy activities are hindering progress. Plan of Care: Continue with plan of care to reach PT goals. To recreational therapy after treatment.     Gaston Oviedo, MICHAEL  10/4/2017

## 2017-10-04 NOTE — PROGRESS NOTES
Spoke to patient's sister; she requested family conference. Conference scheduled for tomorrow at 1:30. Pt's sister to notify other family members. Spoke to insurance CM. Pt approved for 7 additional days following Dr Shun Quevedo peer to peer. Pt approved through 10/10 . Request update be sent on 10/10 to determine continued stay.

## 2017-10-04 NOTE — PROGRESS NOTES
Subjective Yanira Steinberg, how are you doing? \"   Activity Question/answer activity, outside visit   Strength/Endurance Patient c/o fatigue and tiredness towards the end of the session. She participated well but was eager to go to her bed for a nap before her visitors. Balance Patient was Min (A) with SPT to her left side from w/c to bed. Social Interaction Friendly and appeared to enjoy the interaction with this therapist.   Cognitive A&O X4   Comments Patient was assisted to her room and left seated in her bed with all needs within reach.      Shirley Rowley, CTRS

## 2017-10-04 NOTE — PROGRESS NOTES
Time In 0700   Time Out 0800     Mobility   Score Comments   Supine to Sit 2 (Maximal assistance)    Transfer Assist 2 Transfer Type: SPT without device  Comments: Lifting and lowering A     Activities of Daily Living    Score Comments   Self-Feeding 7 I   Grooming 5 Tasks completed by patient: Brushed hair  Comments: Set-up   Bathing 4 Body Parts Bathe: Abdomen, Arm, left, Arm, right, Buttocks, Chest, Lower leg and foot, left, Lower leg and foot, right, Tali area, Thigh, left, Thigh, right  Comments: A to stand; cueing for all parts   Tub/Shower Transfer Towns 2 Type of Shower: Shower  Adaptive  Equipment:Tub transfer bench, Grab bars and Wheelchair  Comments: Lifting and lowering A   Upper Body  Dressing/  Undressing 4 Items Applied:Pullover (4 steps)  Comments: A for threading RUE   Lower Body Dressing/  Undressing 2 Items Applied:Sock, left (1 step), Sock, right (1 step), Shoe, left (1 step), Shoe, right (1 step), Fasten shoe (1 step), Elastic waist pants (3 steps), Underpants (3 steps)  Adaptive Equipment: RW  Comments: A for threading RLE; R sock and shoes, tying shoes, A in standing; A adjusting underwear   Education  Anil-dressing techniques     Pt was in bed and agreeable to therapy. Pt's performance with ADL is reflected in above chart. Pt demonstrated poor comprehension of anil-dressing/bathing techniques, but responded better with visual vs verbal cues. Pt had poor ability to follow 2-step commands. Pt was left in w/c eating breakfast. Pt's communication board was updated with the most pertinent information. Continue with CLARENCE KNIGHT/MITCHELL  10/4/2017

## 2017-10-05 PROCEDURE — 97535 SELF CARE MNGMENT TRAINING: CPT

## 2017-10-05 PROCEDURE — 99232 SBSQ HOSP IP/OBS MODERATE 35: CPT | Performed by: PHYSICAL MEDICINE & REHABILITATION

## 2017-10-05 PROCEDURE — 74011250637 HC RX REV CODE- 250/637: Performed by: PHYSICAL MEDICINE & REHABILITATION

## 2017-10-05 PROCEDURE — 97110 THERAPEUTIC EXERCISES: CPT

## 2017-10-05 PROCEDURE — 97112 NEUROMUSCULAR REEDUCATION: CPT

## 2017-10-05 PROCEDURE — 92507 TX SP LANG VOICE COMM INDIV: CPT

## 2017-10-05 PROCEDURE — 65310000000 HC RM PRIVATE REHAB

## 2017-10-05 PROCEDURE — 97530 THERAPEUTIC ACTIVITIES: CPT

## 2017-10-05 PROCEDURE — 97116 GAIT TRAINING THERAPY: CPT

## 2017-10-05 RX ORDER — AMOXICILLIN 250 MG
1 CAPSULE ORAL DAILY
Status: DISCONTINUED | OUTPATIENT
Start: 2017-10-05 | End: 2017-10-26 | Stop reason: HOSPADM

## 2017-10-05 RX ORDER — POTASSIUM CHLORIDE 20 MEQ/1
20 TABLET, EXTENDED RELEASE ORAL DAILY
Status: DISCONTINUED | OUTPATIENT
Start: 2017-10-06 | End: 2017-10-26 | Stop reason: HOSPADM

## 2017-10-05 RX ORDER — POLYETHYLENE GLYCOL 3350 17 G/17G
17 POWDER, FOR SOLUTION ORAL DAILY
Status: DISCONTINUED | OUTPATIENT
Start: 2017-10-05 | End: 2017-10-26 | Stop reason: HOSPADM

## 2017-10-05 RX ORDER — METHYLPHENIDATE HYDROCHLORIDE 10 MG/1
5 TABLET ORAL 2 TIMES DAILY
Status: DISCONTINUED | OUTPATIENT
Start: 2017-10-05 | End: 2017-10-26 | Stop reason: HOSPADM

## 2017-10-05 RX ADMIN — TIZANIDINE 2 MG: 2 TABLET ORAL at 20:43

## 2017-10-05 RX ADMIN — HYDROCHLOROTHIAZIDE: 12.5 CAPSULE ORAL at 09:45

## 2017-10-05 RX ADMIN — LABETALOL HYDROCHLORIDE 100 MG: 100 TABLET, FILM COATED ORAL at 17:16

## 2017-10-05 RX ADMIN — METHYLPHENIDATE HYDROCHLORIDE 5 MG: 10 TABLET ORAL at 16:00

## 2017-10-05 RX ADMIN — STANDARDIZED SENNA CONCENTRATE AND DOCUSATE SODIUM 1 TABLET: 8.6; 5 TABLET, FILM COATED ORAL at 09:46

## 2017-10-05 RX ADMIN — LABETALOL HYDROCHLORIDE 100 MG: 100 TABLET, FILM COATED ORAL at 09:46

## 2017-10-05 RX ADMIN — POLYETHYLENE GLYCOL 3350 17 G: 17 POWDER, FOR SOLUTION ORAL at 18:27

## 2017-10-05 RX ADMIN — TAMSULOSIN HYDROCHLORIDE 0.4 MG: 0.4 CAPSULE ORAL at 20:43

## 2017-10-05 RX ADMIN — TRAZODONE HYDROCHLORIDE 50 MG: 50 TABLET ORAL at 01:05

## 2017-10-05 NOTE — PROGRESS NOTES
Team conference; tentative discharge date scheduled for 10/26 pending continued insurance approval.      Family conference today with pt, spouse, her sister, her daughter (via phone) her cousin and cousin's spouse. Dr Kaykay Sahu, therapy supervisor Stephon Angeles), clinical coordinator Arya Ellington) and SW also in attendance. Pt's medical condition and hospital course discussed by MD.  Participation and progess addressed as well as need for continued functional improvement to justify pt's continued stay with insurance company. Options at discharge discussed as well as need for 24 supervision/assistance. Family able to ask questions and voice concerns. Provided spouse with information on home modifications,  [employer] and sitter agencies. Family training scheduled with spouse for Monday at 7 AM. Continue to follow.

## 2017-10-05 NOTE — PROGRESS NOTES
OT Daily Note    Time In 1115   Time Out 1154     Subjective:\"I am ready to lay down after this\" Agreeable to therapy. Pain:Denied during session, starting to feel RUE some. Education:On aunt on push and pull exercise she can complete with patient as they have time. Interdisciplinary Communication: Collaborated with Daya Abrams and patient is making progress towards goals. Precautions:  (falls)    Balance/functional mobility Daily Assessment   Stood 2 times with raised mat on left side to promote stability with LUE and writer supported left LE. Progressed to weight shifting side to side and standing without UE support. SPT from w/c to bed with minimal assist, minimal assist sit to supine. Set up in resting night orthosis RLE. Strengthening/activity tolerance Daily Assessment   Demonstrated and instructed on AROM for RUE in elbow flexion and scapular retraction. W/c Daily Assessment   Trial of 3 different w/c cushions to find one that better positioning to increase comfort and function in w/c. Ended session:Supine in bed, all needs within reach, aunt and uncle in room.      Raymundo Dewitt OTR/MITCHELL

## 2017-10-05 NOTE — PROGRESS NOTES
Josué Bob MD,   Medical Director  [address] [facility], [address] [facility]  Tel: [phone]       SFD PROGRESS NOTE    Elvia Freed  Admit Date: 9/15/2017  Admit Diagnosis: stroke, left brain involvement;ICH (intracerebral hemorrhag*    Subjective     Patient seen and examined. Vss. No acute complaints. PT, OT well tolerated. Cognition remains a barrier to progress. Explained to pt that she is having difficulty caring over instructions with mobility strategies. She is aware of her right neglect. Objective:     Current Facility-Administered Medications   Medication Dose Route Frequency    labetalol (NORMODYNE) tablet 100 mg  100 mg Oral BID    losartan/hydroCHLOROthiazide (HYZAAR) 100/12.5 mg   Oral DAILY    potassium chloride (K-DUR, KLOR-CON) SR tablet 40 mEq  40 mEq Oral BID    tiZANidine (ZANAFLEX) tablet 2 mg  2 mg Oral QHS    acetaminophen (TYLENOL) tablet 500 mg  500 mg Per NG tube Q4H PRN    alum-mag hydroxide-simeth (MYLANTA) oral suspension 30 mL  30 mL Oral Q4H PRN    bisacodyl (DULCOLAX) suppository 10 mg  10 mg Rectal DAILY PRN    hydrALAZINE (APRESOLINE) tablet 50 mg  50 mg Oral QID PRN    lip protectant (BLISTEX) ointment   Topical PRN    ondansetron (ZOFRAN ODT) tablet 4 mg  4 mg Oral Q6H PRN    sodium phosphate (FLEET'S) enema 118 mL  1 Enema Rectal PRN    traMADol (ULTRAM) tablet 50 mg  50 mg Oral Q6H PRN    traZODone (DESYREL) tablet 50 mg  50 mg Oral QHS PRN    tamsulosin (FLOMAX) capsule 0.4 mg  0.4 mg Oral QHS     Review of Systems:Denies chest pain, shortness of breath, cough, headache, visual problems, abdominal pain, dysurea, calf pain. Pertinent positives are as noted in the medical records and unremarkable otherwise. Visit Vitals    /77    Pulse 80    Temp 99.2 °F (37.3 °C)    Resp 16    SpO2 96%        Physical Exam:   General: Alert and oriented x 3  No acute cardio respiratory distress. HEENT: Normocephalic,no scleral icterus  Oral mucosa moist without cyanosis, right lower facial weakness   Lungs: Clear to auscultation  bilaterally. Respiration even and unlabored   Heart: Regular rate and rhythm, S1, S2   No  murmurs, clicks, rub or gallops   Abdomen: Soft, non-tender, nondistended. Bowel sounds present. No organomegaly. Genitourinary: Benign . Neuromuscular:      Dense right hemiplegia with neglect and hemisensory loss  fcs well with one step, 2 step commands required vcs   Skin/extremity: No rashes, no erythema.  No calf tenderness BLE                                                                              Functional Assessment:  Gross Assessment  AROM: Grossly decreased, non-functional (09/30/17 1200)  PROM: Within functional limits (09/30/17 1200)  Strength: Grossly decreased, non-functional (09/30/17 1200)  Coordination: Grossly decreased, non-functional (09/30/17 1200)  Tone: Abnormal (09/30/17 1200)  Sensation: Impaired (09/30/17 1200)       Balance  Sitting - Static: Fair (occasional) (09/29/17 1600)  Sitting - Dynamic: Fair (occasional) (09/29/17 1600)  Standing - Static: Fair;Constant support (09/29/17 1600)  Standing - Dynamic : Impaired (09/29/17 1600)           Toileting  Adaptive Equipment: Elevated seat;Walker (09/29/17 1534)         [place] Alcantar Fall Risk Assessment:  [place] Alcantar Fall Risk  Mobility: Ambulates or transfers with assist devices or assistance/unsteady gait (10/05/17 0200)  Mobility Interventions: Utilize walker, cane, or other assitive device (10/05/17 0200)  Mentation: Alert, oriented x 3 (10/05/17 0200)  Mentation Interventions: Door open when patient unattended (10/05/17 0200)  Medication: Patient receiving anticonvulsants, sedatives(tranquilizers), psychotropics or hypnotics, hypoglycemics, narcotics, sleep aids, antihypertensives, laxatives, or diuretics (10/05/17 0200)  Medication Interventions: Bed/chair exit alarm (10/05/17 0200)  Elimination: Needs assistance with toileting (10/05/17 0200)  Elimination Interventions: Call light in reach (10/05/17 0200)  Prior Fall History: No (10/05/17 0200)  History of Falls Interventions: Door open when patient unattended (10/05/17 0200)  Total Score: 3 (10/05/17 0200)  Standard Fall Precautions: Yes (10/04/17 0750)  High Fall Risk: Yes (10/05/17 0200)     Speech Assessment:         Ambulation:  Gait  Base of Support: Narrowed; Center of gravity altered (09/22/17 1200)  Speed/Lulu: Delayed (09/22/17 1200)  Step Length: Right shortened (09/22/17 1200)  Distance (ft): 10 Feet (ft) (10/04/17 1207)  Assistive Device: Gait belt;Walker cindy;Other (comment) (used theraband nd ace wrap to assist DF and support of knee) (10/04/17 1207)     Labs/Studies:  Recent Results (from the past 72 hour(s))   METABOLIC PANEL, COMPREHENSIVE    Collection Time: 10/04/17  5:36 AM   Result Value Ref Range    Sodium 134 (L) 136 - 145 mmol/L    Potassium 4.8 3.5 - 5.1 mmol/L    Chloride 101 98 - 107 mmol/L    CO2 25 21 - 32 mmol/L    Anion gap 8 7 - 16 mmol/L    Glucose 106 (H) 65 - 100 mg/dL    BUN 10 8 - 23 MG/DL    Creatinine 0.46 (L) 0.6 - 1.0 MG/DL    GFR est AA >60 >60 ml/min/1.73m2    GFR est non-AA >60 >60 ml/min/1.73m2    Calcium 8.6 8.3 - 10.4 MG/DL    Bilirubin, total 0.5 0.2 - 1.1 MG/DL    ALT (SGPT) 45 12 - 65 U/L    AST (SGOT) 22 15 - 37 U/L    Alk.  phosphatase 92 50 - 136 U/L    Protein, total 6.4 6.3 - 8.2 g/dL    Albumin 2.6 (L) 3.2 - 4.6 g/dL    Globulin 3.8 (H) 2.3 - 3.5 g/dL    A-G Ratio 0.7 (L) 1.2 - 3.5     CBC W/O DIFF    Collection Time: 10/04/17  5:36 AM   Result Value Ref Range    WBC 6.9 4.3 - 11.1 K/uL    RBC 3.29 (L) 4.05 - 5.25 M/uL    HGB 9.4 (L) 11.7 - 15.4 g/dL    HCT 27.2 (L) 35.8 - 46.3 %    MCV 82.7 79.6 - 97.8 FL    MCH 28.6 26.1 - 32.9 PG    MCHC 34.6 31.4 - 35.0 g/dL    RDW 13.8 11.9 - 14.6 %    PLATELET 049 860 - 619 K/uL    MPV 9.9 (L) 10.8 - 14.1 FL       Assessment:     Problem List as of 10/5/2017  Date Reviewed: 9/12/2017          Codes Class Noted - Resolved    Thrombocytopenia ([facility] ICD-10-CM: D69.6  ICD-9-CM: 287.5  9/25/2017 - Present        Pulmonary emboli (H[facility]) ICD-10-CM: I26.99  ICD-9-CM: 415.19  9/25/2017 - Present        DVT (deep venous thrombosis) ([place] [address] ICD-10-CM: I82.409  ICD-9-CM: 453.40  9/25/2017 - Present        ICH (intracerebral hemorrhage) ([place] [address].) ICD-10-CM: I61.9  ICD-9-CM: 898  9/15/2017 - Present        Hypertensive urgency, malignant ICD-10-CM: I16.0  ICD-9-CM: 401.0  9/11/2017 - Present        Stroke, hemorrhagic ([place] [address].) ICD-10-CM: I61.9  ICD-9-CM: 958  9/7/2017 - Present        Accelerated hypertension ICD-10-CM: I10  ICD-9-CM: 401.0  9/7/2017 - Present        At risk for aspiration (Chronic) ICD-10-CM: Z91.89  ICD-9-CM: V49.89  9/7/2017 - Present        Acute spontaneous intraventricular hemorrhage assoc w/ hypertension ([place] [address].) ICD-10-CM: I61.5, I10  ICD-9-CM: 930, 401.9  9/7/2017 - Present        Screening for breast cancer ICD-10-CM: Z12.31  ICD-9-CM: V76.10  2/27/2017 - Present        Atrophic vaginitis ICD-10-CM: N95.2  ICD-9-CM: 627.3  7/11/2016 - Present        HTN (hypertension) (Chronic) ICD-10-CM: I10  ICD-9-CM: 401.9  10/23/2015 - Present        ADD (attention deficit disorder) (Chronic) ICD-10-CM: F98.8  ICD-9-CM: 314.00  10/23/2015 - Present        Depression ICD-10-CM: F32.9  ICD-9-CM: 713  10/23/2015 - Present        Anxiety (Chronic) ICD-10-CM: F41.9  ICD-9-CM: 300.00  10/23/2015 - Present        RESOLVED: Hypoxemia ICD-10-CM: R09.02  ICD-9-CM: 799.02  9/10/2017 - 9/12/2017        RESOLVED: Acute respiratory failure ([facility] ICD-10-CM: J96.00  ICD-9-CM: 518.81  9/8/2017 - 9/10/2017        RESOLVED: Hemorrhagic stroke ([facility] ICD-10-CM: I61.9  ICD-9-CM: 563  9/7/2017 - 9/7/2017        RESOLVED: Non-intractable vomiting with nausea ICD-10-CM: R11.2  ICD-9-CM: 787.01  9/7/2017 - 9/12/2017        RESOLVED: Seborrheic keratosis, inflamed ICD-10-CM: L82.0  ICD-9-CM: 702.11  7/11/2016 - 9/20/2016        RESOLVED: Cough ICD-10-CM: R05  ICD-9-CM: 786.2  7/11/2016 - 9/20/2016        RESOLVED: Shoulder pain ICD-10-CM: M25.519  ICD-9-CM: 719.41  10/23/2015 - 2/27/2017                Left BG Hemorrhage with intraventricular extension due to HTN emergency; resultant R hemiplegia, right neglect, dysarthria, aphasia, dysphagia with significant decline in mobility and self care  Plan:   Continue daily physician medical management:  Pneumonia prophylaxis- Incentive spirometer every hour while awake. Will need instruction and assistance. Not sure if she can get a proper oral seal to be effective      Dysphagia; Pureed diet with NTL; at risk for malnutrition and dehydration. PICC line removed prior to transfer; may need a line for IVFs if BUN continues to increase. Low K, Ca; check mg. Supplementation as needed. Na elevated  -mag ok, K much improved; cont to supplement while on diuretic  -9/19 replace K; 3.0; 2.8 this am 9/20; inc supplement; may need to add to IVFs  -9/25 fluid status and K nl; 9/28 cont NTL; high risk aspiration; still needs supervision /assist with meals  -10/2 MBS today; thin liq! !      Thrombocytopenia; has been trending down for no apparent reason. hgb slt down as well. Has not been on Hep products; consult hematology today 9/19  -plts 40K, continues to trend down 9/20; await consult by hematology. ? reln to [address]. Mild anemia as well  Check HIT panel, d dimer , fibrinogen  -9/21 plts 36k ; HIT PROFILE POSITIVE, D DIMER ELEVATED 32.79, FIBRINOGEN  ; consistent with DIC; Etiology unclear  WILL D/W HEMATOLOGY; LFTs ok, no hx of blood transfusion,no hx pancreatitis, no sign of bacteremia. Has not been on any heparin products. Can see in head injury; has ICH. Cannot r/o blood disorder (leukemia)   per hematology \"Recommend transfusing platelets for <84,614 and Cryoprecipitate for fibrinogen < 100. \"  -MARK pending  -9/22 bilateral LE venous duplex ordered; low suspicion but at risk and having calf tenderness  -9/22 plts improved today; monitor closely; 9/23 small right peroneal thrombus. No antic per hematology. IR refused/advised against placing IVF due to low risk of clot propagation given size and location  -9/23 counts bumping up; pts now 53k from 45 and previously 36  -9/24 plts cont to improve. 69K. MARK still pending; can resume therapies  9/26 Ddimer pending, fibrinogen now nl  -9/27 D dimer 11+, improved. Per Hematology; likely due to consumption coagulopathy. Will monitor  -10/2 labs have improved; MARK + 68% confirming dx of HIT; 10/4 recheck cbc (last done 9/28)    -10/5 plts 263    Bilateral PEs 9/25, filter placed in IR; looks good today. sats good without O2 supplement 94-97% sats; d/w Hematology, no agaratroban  -apprec Pulm assessment. Clinically looks good and no longer symptomatic  -9/28 clinically stable, asymptomatic ; 10/3 stable. No sob, no O2 supplements      Hypernatremia; last head CT did not show significant cerebral edema. Clinically improving. Likely due to prerenal azotemia; 9/15 MAY REQ isotonic / hypotonic saline IV;  -9/18 events of weekend noted; Na 156; asymptomatic, on 0.5 dextrose with 1/4 Na; na q 6hrs; pts serum osmolality was normal. Will check urine Na and urine osmolality; depending on findings, will consider consulting Hospitalist vs Nephrology  Neuro improving despite this  -9/19 Na down to 149, urine osmo nl, serum osmo min hi; cont dex/and 1/4 NS; repeat in am. Not DI as uop not increased  -9/20 Na 147; cont fluids; 9/21 not resulted; will stop fluids when Na below 142  -Na 140 9/22; dc IVFs; 9/25 136; 9/27 136; recheck 10/4 134; mildly low      ICH/IVH; 9/22 clinically improves daily. F/u HCT yest due to transient left eye visual disturbance. Overall, resolving hemorrhage. There is more pronounced edema; expected.  Ventricular size now nl      Anemia ; 9.6-10/ stable      DVT risk / DVT Prophylaxis- Will require daily physician exam to assess for signs and symptoms as patient is at increased risk for of thromboembolism. Mobilization as tolerated. Intermittent pneumatic compression devices when in bed Thigh-high or knee-high thromboembolic deterrent hose when out of bed. NO  anticoag due to ICH/HIT; has right small right peroneal vein; 9/25 spoke with IR again today; more risk of putting in IVC filter than benefits especially since she is asymptomatic with no swelling or tenderness and small size of clot  -10/3 no edema, right calf soft. No SOB      Pain Control: stable, mild-to-moderate joint symptoms intermittently, reasonably well controlled by PRN meds. Will require regular pain assessment and comprenhensive pain management,       Wound Care: Monitor wound status daily per staff and physician. At risk for failure. Will require 24/7 rehab nursing. None needed at this time      Hypertension - BP uncontrolled, fluctuating, managed medically. Add prn hydralazine for sbp> 180. Goal SBP is 140-160 given ICH. Cont Normodyne, norvasc and hyzaar; consider Verapamil or scheduled Hydralazine  -9/18 BP increased 169/95; add hydralazine 25 tid  -9/20 BP much improved  -9/22 /74; 9/24 bp stable; may dec hydralazine to bid  -9/25 dec hydralazine  -9/27 bp 116-119  -9/28 SBP in the 90s to 116, asymptomatic; dc hydralazine and monitor; decrease Normodyne to bid  -9/29 BP still low; lower Norvasc and normodyne, cont Hyzaar; all have parameters of when to hold  - 9/30 SBP overnight and this am 108-111, will d/c norvasc, on labetolol and losartan/HCTZ , HR acceptable  -10/2 115/71; controlled. Cont current meds  -130-151/77; 10/5 118/77      Mild hyperglycemia, likely stress induced vs borderline diabetes; monitor loosely  -9/27 bs 145 on BMP; follow bs qam x 3d      Dysphagia; cont NTL, mech soft; hopefully can upgrade liquids soon 9/25  -9/29 remains on NTL. MBS scheduled for 10/2.  -10/3 now on THINS! !      Leukocytosis; recheck in a.m. Check UA due to stafford.  No pulmonary signs (had neg CXR today), no s/s of infxn at old PICC site, no wounds, afebrile. Watch monocytes. -9/18 12.2 improving. Urine neg  -9/19 up to 13.2, ? Reactive. No source of infxn identified. Afebrile  -9/20 WBC now normal      Urinary retention/ neurogenic bladder - start flomax but continue stafford until mobility improves a bit. Strict I/o's  -9/20 keeping stafford to monitor UOP until Na normalizes. No significant output to suggest DI  -9/21 dc stafford; cont Flomax, follow bladder scan  9/22 voiding without issue; incontinence but no retention  -9/28 new onset urinary retention; check UA  -9/29 UA neg but cx saying > 100K gram neg cocci; will need to f/u ID/sens; Start Cipro 500 bid  - 9/30 preliminary urine cx >110K mixed skin denis  -10/2 stop Cipro; cx nl denis      bowel program - add prn meds; incontinent, want to keep stool on the firmer side. Monitor skin.       GERD - add a PPI. At times may need additional antacids, Maalox prn.      -continues to benefit from and is showing improvements in a multidisc team approach. Participating well. 10/5 progress slow       9/21   Addendum; in therapy this afternoon, transient left eye blindness. Will f/u head CT for extension of or new ICH, especially with low plts   Results  1. Interval decrease in size of intraventricular hemorrhage within the left  basal ganglia/thalamus with near complete resolution of intraventricular  hemorrhage. Surrounding edema is more pronounced with slight increase in  left-to-right midline shift. 2. Interval decrease in ventricular size, approaching normal in caliber.      10/2 Ms Jessica Marte continues to progress with therapies and has made slow steady improvements. Her rehab course has been complicated by multiple medical issues; HIT, DIC, DVT, PE. Family has not yet been involved in care and home has not been assessed. Insurance claims to be covering thru 10/3 only. Pt is not ready to dc.  We are still monitoring labs closely, daily clinical eval for further thrombosis, BP mgt with adjustment of meds etc. Spoke with primary therapists who feel discharge would be premature and not in the pts best interest especially given young age and previous good health leading to a better outcome with aggressive rehab. From the beginning, we said our ELOS was 6 wks. We are only at 2 1/2 wks. Will do peer to peer/ 10/3 pts stay extended to next Monday but [employer] open to team conference notes this week for extension of stay based on progress. If not given more time, she will require SNF placement.        - FAMILY CONFERENCE TODAY 10/5 REGARDING PROGRESS, NEEDS, DC PLAN                Time spent was 25 minutes with over 1/2 in direct patient care/examination, consultation and coordination of care.      Signed By: Josué Bob MD     October 5, 2017

## 2017-10-05 NOTE — PROGRESS NOTES
PHYSICAL THERAPY DAILY NOTE  Time In: 0915  Time Out: 9605  Patient Seen For: AM;Therapeutic exercise;Transfer training    Subjective: Pt. Perseverating on being constipated. Does not think she has had a BM in a couple of days. Nursing aware. Objective: Other (comment) (falls)      BED/MAT MOBILITY Daily Assessment    Supine to Sit : 3 (Moderate assistance)       TRANSFERS Daily Assessment    Transfer Type: Lateral pivot  Transfer Assistance : 3 (Moderate assistance )       BALANCE Daily Assessment   Worked on static standing w/o A/D using mirror for visual feeback w/ therapist blocking R LE form sliding or buckling. Pt. W/ increased sway w/ tendency to keep weight posteriorly, but requiring min A to stand. Pt. Then progressed to L UE reach partially out of REVA to L side w/ increased posterior LOB noted & decreased initiation of postural or protective reactions noted. Sitting - Static: Good (unsupported)  Sitting - Dynamic: Fair (occasional)  Standing - Static: Fair  Standing - Dynamic : Impaired       LOWER EXTREMITY EXERCISES Daily Assessment   Performed AAROM R LE using diagonal pattern to facilitate movement with synergy pattern. With max cueing pt.  Able to move in extensor synergy but unable to initiate movement into flexor synergy Extremity: Both  Exercise Type #1: Supine lower extremity strengthening  Sets Performed: 1  Reps Performed: 10  Level of Assist: Moderate assistance     SUPINE EXERCISES Sets Reps Comments   Ankle Pumps 1 10 PROM R LE   hooklying hip adduction 1 10 Squeezing ball w/ assistance to stabilize R LE   bridging 1 10    Hip Abduction 1 10 Using skate board   Short Arc Quad 1 10    Straight Leg Raise 1 10 AAROM R LE     Vital Signs:   Patient Vitals for the past 8 hrs:   Temp Pulse Resp BP SpO2   10/05/17 0755 99.2 °F (37.3 °C) 80 16 118/77 96 %         Pain level: no c/o pain    Patient education: explained the purpose of balance training activity    Interdisciplinary Communication: nursing made aware of pt's constipation complaints    Pt. Left up in w/c in room in NAD, call bell in reach. Assessment: Pt. Demonstrating improve standing balance using mirror for feedback. Pt. W/ poor attention to task as is distracted easily, which makes therapeutic exercise difficult @ times. Pt. also fatigues easily & requires frequent rest breaks. Pt. Cont. To function well below her baseline & benefits from cont. PT services to address. Plan of Care: Continue with POC and progress as tolerated.      Cameron Hong, PT  10/5/2017

## 2017-10-05 NOTE — PROGRESS NOTES
Subjective \"I have had a busy day. \"   Activity Hand over hand exercises and standing activity at the OT table   Strength/Endurance Patient stood for approximately 2 minutes and was ready to sit down. She wasn't comfortable standing and was unable to focus on the task before her. Balance Sit<->stand:  Min (A)   Social Interaction Patient was friendly and communicated with 2--3 word sentences. Cognitive Alert to person and place   Comments Patient was handed off to Olegario, OT at the end of the session. Her aunt and her  visited during the session.      KELSEA Vega

## 2017-10-05 NOTE — PROGRESS NOTES
PHYSICAL THERAPY DAILY NOTE  Time In: 1300  Time Out: 9121  Patient Seen For: PM;Gait training; Therapeutic exercise;Transfer training    Subjective: \"I'm so cold this afternoon. \"         Objective: Other (comment) (falls)      BED/MAT MOBILITY Daily Assessment    Supine to Sit : 4 (Minimal assistance) (HOB elevated, using rails) moving to the R side       TRANSFERS Daily Assessment   Assistance for balance & eccentric control to sit Transfer Type: SPT without device  Transfer Assistance : 3 (Moderate assistance )  Sit to Stand Assistance: Moderate assistance       GAIT Daily Assessment   Ambulated w/ theraband for dorsiflexion assist & knee cage to control hyperextension. Pt. Requires assist of therapists to stabilize ankle during stance (tendency to excessively supinate), block knee from flexing during stance, assist w/ hip flexion during swing, & facilitate weight shift on to R side Amount of Assistance: 1 (Dependent/total assistance) (mod A x2 a/ assist x3 to follow w/ chair)  Distance (ft): 20 Feet (ft)  Assistive Device: Other (comment);Gait belt (holding rail)       BALANCE Daily Assessment    Sitting - Static: Good (unsupported)  Sitting - Dynamic: Fair (occasional)  Standing - Static: Fair  Standing - Dynamic : Impaired       LOWER EXTREMITY EXERCISES Daily Assessment    Pt. Performed Motomed @ resistance level 3 x11 minutes for resistive reciprocal LE training     Vital Signs:   Patient Vitals for the past 8 hrs:   Temp Pulse Resp BP SpO2   10/05/17 0755 99.2 °F (37.3 °C) 80 16 118/77 96 %         Pain level: no c/o pain    Patient education: pt. & family educated on importance of using hemiparetic side for neuro recovery as well as positives of pt. Beginning to experience sensations R UE    Interdisciplinary Communication: discussed pt's sensory recovery w/ OT    Pt. Left in w/c in conference room, attending family conference         Assessment: During gait training pt.  Began demonstrating improved weight shift on to R LE. However, due to poor recruitment of LE flexors, she has difficulty advancing R LE during swing. Therefore, pt. Cont. To benefit from PT services to address motor deficits to improve pt's mobility w/ gait & transfers. Plan of Care: Continue with POC and progress as tolerated.      Lesly Severs, PT  10/5/2017

## 2017-10-05 NOTE — PROGRESS NOTES
10/05/17 1131   Time Spent With Patient   Time In 1003   Time Out 1047   Patient Seen For: AM;Neuro-linguistics   Mental Status   Neurologic State Alert   Orientation Level Disoriented to place; Disoriented to time   Cognition Impaired decision making; Follows commands;Memory loss;Decreased attention/concentration; Appropriate decision making   Perseveration Perseverates during conversation   Safety/Judgement Fall prevention   Pt completed making time tasks with min-mod cues with 80% accuracy. Pt completed counting money and making amounts asked with mod cues with 75% accuracy. Pt named pictures shown with 90% accuracy. Pt completed medicine management and calendar tasks with min cues with 85% accuracy.    Aguilar Payne MA/ANASTASIA/SLP

## 2017-10-06 LAB
APPEARANCE UR: NORMAL
BILIRUB UR QL: NEGATIVE
COLOR UR: YELLOW
GLUCOSE UR STRIP.AUTO-MCNC: NEGATIVE MG/DL
HGB UR QL STRIP: NEGATIVE
KETONES UR QL STRIP.AUTO: NEGATIVE MG/DL
LEUKOCYTE ESTERASE UR QL STRIP.AUTO: NEGATIVE
NITRITE UR QL STRIP.AUTO: NEGATIVE
PH UR STRIP: 7 [PH] (ref 5–9)
PROT UR STRIP-MCNC: NEGATIVE MG/DL
SP GR UR REFRACTOMETRY: 1.01 (ref 1–1.02)
UROBILINOGEN UR QL STRIP.AUTO: 1 EU/DL (ref 0.2–1)

## 2017-10-06 PROCEDURE — 51798 US URINE CAPACITY MEASURE: CPT

## 2017-10-06 PROCEDURE — 77030011943

## 2017-10-06 PROCEDURE — 81003 URINALYSIS AUTO W/O SCOPE: CPT | Performed by: PHYSICAL MEDICINE & REHABILITATION

## 2017-10-06 PROCEDURE — 87086 URINE CULTURE/COLONY COUNT: CPT | Performed by: PHYSICAL MEDICINE & REHABILITATION

## 2017-10-06 PROCEDURE — 97530 THERAPEUTIC ACTIVITIES: CPT

## 2017-10-06 PROCEDURE — 97535 SELF CARE MNGMENT TRAINING: CPT

## 2017-10-06 PROCEDURE — 92507 TX SP LANG VOICE COMM INDIV: CPT

## 2017-10-06 PROCEDURE — 99232 SBSQ HOSP IP/OBS MODERATE 35: CPT | Performed by: PHYSICAL MEDICINE & REHABILITATION

## 2017-10-06 PROCEDURE — 65310000000 HC RM PRIVATE REHAB

## 2017-10-06 PROCEDURE — 74011250637 HC RX REV CODE- 250/637: Performed by: PHYSICAL MEDICINE & REHABILITATION

## 2017-10-06 PROCEDURE — 97116 GAIT TRAINING THERAPY: CPT

## 2017-10-06 PROCEDURE — 97110 THERAPEUTIC EXERCISES: CPT

## 2017-10-06 PROCEDURE — 97532 HC OT COGNITIVE SKILL DEV 15 MIN: CPT

## 2017-10-06 RX ADMIN — HYDROCHLOROTHIAZIDE: 12.5 CAPSULE ORAL at 08:16

## 2017-10-06 RX ADMIN — METHYLPHENIDATE HYDROCHLORIDE 5 MG: 10 TABLET ORAL at 08:17

## 2017-10-06 RX ADMIN — POLYETHYLENE GLYCOL 3350 17 G: 17 POWDER, FOR SOLUTION ORAL at 08:14

## 2017-10-06 RX ADMIN — LABETALOL HYDROCHLORIDE 100 MG: 100 TABLET, FILM COATED ORAL at 16:57

## 2017-10-06 RX ADMIN — METHYLPHENIDATE HYDROCHLORIDE 5 MG: 10 TABLET ORAL at 15:41

## 2017-10-06 RX ADMIN — TAMSULOSIN HYDROCHLORIDE 0.4 MG: 0.4 CAPSULE ORAL at 20:20

## 2017-10-06 RX ADMIN — STANDARDIZED SENNA CONCENTRATE AND DOCUSATE SODIUM 1 TABLET: 8.6; 5 TABLET, FILM COATED ORAL at 08:17

## 2017-10-06 RX ADMIN — TRAZODONE HYDROCHLORIDE 50 MG: 50 TABLET ORAL at 01:11

## 2017-10-06 RX ADMIN — LABETALOL HYDROCHLORIDE 100 MG: 100 TABLET, FILM COATED ORAL at 08:16

## 2017-10-06 RX ADMIN — TIZANIDINE 2 MG: 2 TABLET ORAL at 20:20

## 2017-10-06 RX ADMIN — POTASSIUM CHLORIDE 20 MEQ: 20 TABLET, EXTENDED RELEASE ORAL at 08:14

## 2017-10-06 NOTE — PROGRESS NOTES
PHYSICAL THERAPY DAILY NOTE  Time In: 1115  Time Out: 1201  Patient Seen For: AM;Balance activities;Gait training;Transfer training    Subjective: \"I like doing different things. \" Patient agreeable to therapy. Objective: Vital Signs: No vitals taken this AM.        Pain level: No pain level given. Pain location: Unspecified  Pain interventions: Increased rest breaks. Patient education: Educated patient on weight bearing through R LE. Interdisciplinary Communication: Communicated with Maritza Webster regarding patient's POC. Other (comment) (falls)  GROSS ASSESSMENT Daily Assessment            BED/MAT MOBILITY Daily Assessment   Required for safety. Patient use of handrails and head of bed elevated. Rolling Right : 0 (Not tested)  Rolling Left : 0 (Not tested)  Supine to Sit : 3 (Moderate assistance)  Sit to Supine : 4 (Minimal assistance)       TRANSFERS Daily Assessment   Required for safety. Patient performed 5 x sit>stand with use of parallel bar for L UE. PT staggered patient's feet so that R LE was under patient's REVA and provided downward cue during stance to improve weight bearing through R LE during transfer. Transfer Type: SPT without device  Other: To patient's L  Transfer Assistance : 3 (Moderate assistance )  Sit to Stand Assistance: Moderate assistance  Car Transfers: Not tested       GAIT Daily Assessment   Patient performed pre-gait activities and weight shifting in parallel bars. PT wrapped patient's R Knee to prevent knee hyperextension in standing. PT provided assist with balance in upright and stability at R knee. Patient performed weight shifts to R to increase weight bearing on R LE and took small steps on L LE to encourage weight bearing of R LE. Amount of Assistance: 0 (Not tested)  Assistive Device: Gait belt; Other (comment) (Parallel bars)       STEPS or STAIRS Daily Assessment            BALANCE Daily Assessment    Sitting - Static: Good (unsupported)  Sitting - Dynamic: Fair (occasional)  Standing - Static: Fair       WHEELCHAIR MOBILITY Daily Assessment            LOWER EXTREMITY EXERCISES Daily Assessment          Patient returned to room lying supine in bed with all needs in reach and daughter at bedside. Assessment: Patient demonstrated improved functional strength with bed mobility and transfers this AM. Patient was motivated during session, stating several times how she wants to try everything she can to get back to how she was before. Patient continues to require assistance with upright secondary to decreased strength of R LE and prevention of knee buckling. Patient will benefit from further therapy to increase independence with transfers and household mobility. Plan of Care: Continue with POC and progress as tolerated.        Eryn Hidalgo  10/6/2017

## 2017-10-06 NOTE — PROGRESS NOTES
Zoltan Raines MD,   Medical Director  [address] [facility], [address] [facility]  Tel: [phone]       D PROGRESS NOTE    Lesia Merchant  Admit Date: 9/15/2017  Admit Diagnosis: stroke, left brain involvement;ICH (intracerebral hemorrhag*    Subjective     Per nursing, had urinary retention last noc req [facility]. Has not required this for some time now. No dysuria or freq per pt. No f/c. Pt frustrated that she has to stay sitting up until therapies this a.m. Ate about 1/2 of breakfast    Objective:     Current Facility-Administered Medications   Medication Dose Route Frequency    potassium chloride (K-DUR, KLOR-CON) SR tablet 20 mEq  20 mEq Oral DAILY    senna-docusate (PERICOLACE) 8.6-50 mg per tablet 1 Tab  1 Tab Oral DAILY    polyethylene glycol (MIRALAX) packet 17 g  17 g Oral DAILY    methylphenidate HCl (RITALIN) tablet 5 mg  5 mg Oral BID    labetalol (NORMODYNE) tablet 100 mg  100 mg Oral BID    losartan/hydroCHLOROthiazide (HYZAAR) 100/12.5 mg   Oral DAILY    tiZANidine (ZANAFLEX) tablet 2 mg  2 mg Oral QHS    acetaminophen (TYLENOL) tablet 500 mg  500 mg Per NG tube Q4H PRN    alum-mag hydroxide-simeth (MYLANTA) oral suspension 30 mL  30 mL Oral Q4H PRN    bisacodyl (DULCOLAX) suppository 10 mg  10 mg Rectal DAILY PRN    hydrALAZINE (APRESOLINE) tablet 50 mg  50 mg Oral QID PRN    lip protectant (BLISTEX) ointment   Topical PRN    ondansetron (ZOFRAN ODT) tablet 4 mg  4 mg Oral Q6H PRN    sodium phosphate (FLEET'S) enema 118 mL  1 Enema Rectal PRN    traMADol (ULTRAM) tablet 50 mg  50 mg Oral Q6H PRN    traZODone (DESYREL) tablet 50 mg  50 mg Oral QHS PRN    tamsulosin (FLOMAX) capsule 0.4 mg  0.4 mg Oral QHS     Review of Systems:Denies chest pain, shortness of breath, cough, headache, visual problems, abdominal pain, dysurea, calf pain. Pertinent positives are as noted in the medical records and unremarkable otherwise.      Visit Vitals    /74    Pulse 82    Temp 97.9 °F (36.6 °C)    Resp 16    SpO2 97%        Physical Exam:   General: Alert and age appropriately oriented. No acute cardio respiratory distress. Expressive aphasia   HEENT: Normocephalic,no scleral icterus  Oral mucosa moist without cyanosis; right lower facial weakness   Lungs: Clear to auscultation  bilaterally. Respiration even and unlabored   Heart: Regular rate and rhythm, S1, S2   No  murmurs, clicks, rub or gallops   Abdomen: Soft, non-tender, nondistended. Bowel sounds present. No organomegaly. Genitourinary: Benign . Neuromuscular:      Starting to get shoulder retraction, biceps and tric on the right 1+  RLE adduction 3+, quad 1+/2-  Right neglect and still with decreased light touch right hemibody  Difficulties with 2 step commands without vcs   Skin/extremity: No rashes, no erythema.  No calf tenderness BLE  No edema                                                                            Functional Assessment:  Gross Assessment  AROM: Grossly decreased, non-functional (09/30/17 1200)  PROM: Within functional limits (09/30/17 1200)  Strength: Grossly decreased, non-functional (09/30/17 1200)  Coordination: Grossly decreased, non-functional (09/30/17 1200)  Tone: Abnormal (09/30/17 1200)  Sensation: Impaired (09/30/17 1200)       Balance  Sitting - Static: Good (unsupported) (10/05/17 1400)  Sitting - Dynamic: Fair (occasional) (10/05/17 1400)  Standing - Static: Fair (10/05/17 1400)  Standing - Dynamic : Impaired (10/05/17 1400)           Toileting  Adaptive Equipment: Elevated seat;Walker (09/29/17 1534)         Samuel King Fall Risk Assessment:  Samuel King Fall Risk  Mobility: Ambulates or transfers with assist devices or assistance/unsteady gait (10/06/17 0715)  Mobility Interventions: Utilize gait belt for transfers/ambulation (10/06/17 0715)  Mentation: Alert, oriented x 3 (10/06/17 0715)  Mentation Interventions: Door open when patient unattended (10/06/17 0715)  Medication: Patient receiving anticonvulsants, sedatives(tranquilizers), psychotropics or hypnotics, hypoglycemics, narcotics, sleep aids, antihypertensives, laxatives, or diuretics (10/06/17 0715)  Medication Interventions: Bed/chair exit alarm (10/06/17 0715)  Elimination: Needs assistance with toileting (10/06/17 0715)  Elimination Interventions: Call light in reach (10/06/17 0715)  Prior Fall History: No (10/06/17 0715)  History of Falls Interventions: Door open when patient unattended (10/06/17 0715)  Total Score: 3 (10/06/17 0715)  Standard Fall Precautions: Yes (10/06/17 0715)  High Fall Risk: Yes (10/05/17 1039)     Speech Assessment:         Ambulation:  Gait  Base of Support: Narrowed; Center of gravity altered (09/22/17 1200)  Speed/Lulu: Delayed (09/22/17 1200)  Step Length: Right shortened (09/22/17 1200)  Distance (ft): 20 Feet (ft) (10/05/17 1400)  Assistive Device: Other (comment);Gait belt (holding rail) (10/05/17 1400)     Labs/Studies:  Recent Results (from the past 72 hour(s))   METABOLIC PANEL, COMPREHENSIVE    Collection Time: 10/04/17  5:36 AM   Result Value Ref Range    Sodium 134 (L) 136 - 145 mmol/L    Potassium 4.8 3.5 - 5.1 mmol/L    Chloride 101 98 - 107 mmol/L    CO2 25 21 - 32 mmol/L    Anion gap 8 7 - 16 mmol/L    Glucose 106 (H) 65 - 100 mg/dL    BUN 10 8 - 23 MG/DL    Creatinine 0.46 (L) 0.6 - 1.0 MG/DL    GFR est AA >60 >60 ml/min/1.73m2    GFR est non-AA >60 >60 ml/min/1.73m2    Calcium 8.6 8.3 - 10.4 MG/DL    Bilirubin, total 0.5 0.2 - 1.1 MG/DL    ALT (SGPT) 45 12 - 65 U/L    AST (SGOT) 22 15 - 37 U/L    Alk.  phosphatase 92 50 - 136 U/L    Protein, total 6.4 6.3 - 8.2 g/dL    Albumin 2.6 (L) 3.2 - 4.6 g/dL    Globulin 3.8 (H) 2.3 - 3.5 g/dL    A-G Ratio 0.7 (L) 1.2 - 3.5     CBC W/O DIFF    Collection Time: 10/04/17  5:36 AM   Result Value Ref Range    WBC 6.9 4.3 - 11.1 K/uL    RBC 3.29 (L) 4.05 - 5.25 M/uL    HGB 9.4 (L) 11.7 - 15.4 g/dL    HCT 27.2 (L) 35.8 - 46.3 %    MCV 82.7 79.6 - 97.8 FL    MCH 28.6 26.1 - 32.9 PG    MCHC 34.6 31.4 - 35.0 g/dL    RDW 13.8 11.9 - 14.6 %    PLATELET 277 496 - 083 K/uL    MPV 9.9 (L) 10.8 - 14.1 FL       Assessment:     Problem List as of 10/6/2017  Date Reviewed: 9/12/2017          Codes Class Noted - Resolved    Thrombocytopenia ([facility] ICD-10-CM: D69.6  ICD-9-CM: 287.5  9/25/2017 - Present        Pulmonary emboli ([facility]) ICD-10-CM: I26.99  ICD-9-CM: 415.19  9/25/2017 - Present        DVT (deep venous thrombosis) ([facility] ICD-10-CM: I82.409  ICD-9-CM: 453.40  9/25/2017 - Present        ICH (intracerebral hemorrhage) ([facility] ICD-10-CM: I61.9  ICD-9-CM: 368  9/15/2017 - Present        Hypertensive urgency, malignant ICD-10-CM: I16.0  ICD-9-CM: 401.0  9/11/2017 - Present        Stroke, hemorrhagic ([facility] ICD-10-CM: I61.9  ICD-9-CM: 020  9/7/2017 - Present        Accelerated hypertension ICD-10-CM: I10  ICD-9-CM: 401.0  9/7/2017 - Present        At risk for aspiration (Chronic) ICD-10-CM: Z91.89  ICD-9-CM: V49.89  9/7/2017 - Present        Acute spontaneous intraventricular hemorrhage assoc w/ hypertension ([facility] ICD-10-CM: I61.5, I10  ICD-9-CM: 992, 401.9  9/7/2017 - Present        Screening for breast cancer ICD-10-CM: Z12.31  ICD-9-CM: V76.10  2/27/2017 - Present        Atrophic vaginitis ICD-10-CM: N95.2  ICD-9-CM: 627.3  7/11/2016 - Present        HTN (hypertension) (Chronic) ICD-10-CM: I10  ICD-9-CM: 401.9  10/23/2015 - Present        ADD (attention deficit disorder) (Chronic) ICD-10-CM: F98.8  ICD-9-CM: 314.00  10/23/2015 - Present        Depression ICD-10-CM: F32.9  ICD-9-CM: 944  10/23/2015 - Present        Anxiety (Chronic) ICD-10-CM: F41.9  ICD-9-CM: 300.00  10/23/2015 - Present        RESOLVED: Hypoxemia ICD-10-CM: R09.02  ICD-9-CM: 799.02  9/10/2017 - 9/12/2017        RESOLVED: Acute respiratory failure ([facility] ICD-10-CM: J96.00  ICD-9-CM: 518.81  9/8/2017 - 9/10/2017        RESOLVED: Hemorrhagic stroke ([facility] ICD-10-CM: I61.9  ICD-9-CM: 041  9/7/2017 - 9/7/2017        RESOLVED: Non-intractable vomiting with nausea ICD-10-CM: R11.2  ICD-9-CM: 787.01  9/7/2017 - 9/12/2017        RESOLVED: Seborrheic keratosis, inflamed ICD-10-CM: L82.0  ICD-9-CM: 702.11  7/11/2016 - 9/20/2016        RESOLVED: Cough ICD-10-CM: R05  ICD-9-CM: 786.2  7/11/2016 - 9/20/2016        RESOLVED: Shoulder pain ICD-10-CM: M25.519  ICD-9-CM: 719.41  10/23/2015 - 2/27/2017             Left BG Hemorrhage with intraventricular extension due to HTN emergency; resultant R hemiplegia, right neglect, dysarthria, aphasia, dysphagia with significant decline in mobility and self care  Plan:   Continue daily physician medical management:  Pneumonia prophylaxis- Incentive spirometer every hour while awake. Will need instruction and assistance. Not sure if she can get a proper oral seal to be effective      Dysphagia; Pureed diet with NTL; at risk for malnutrition and dehydration. PICC line removed prior to transfer; may need a line for IVFs if BUN continues to increase. Low K, Ca; check mg. Supplementation as needed. Na elevated  -mag ok, K much improved; cont to supplement while on diuretic  -9/19 replace K; 3.0; 2.8 this am 9/20; inc supplement; may need to add to IVFs  -9/25 fluid status and K nl; 9/28 cont NTL; high risk aspiration; still needs supervision /assist with meals  -10/2 MBS today; thin liq! !      Thrombocytopenia; has been trending down for no apparent reason. hgb slt down as well. Has not been on Hep products; consult hematology today 9/19  -plts 40K, continues to trend down 9/20; await consult by hematology. ? reln to [address]. Mild anemia as well  Check HIT panel, d dimer , fibrinogen  -9/21 plts 36k ; HIT PROFILE POSITIVE, D DIMER ELEVATED 32.79, FIBRINOGEN  ; consistent with DIC; Etiology unclear  WILL D/W HEMATOLOGY; LFTs ok, no hx of blood transfusion,no hx pancreatitis, no sign of bacteremia. Has not been on any heparin products.  Can see in head injury; has ICH. Cannot r/o blood disorder (leukemia)   per hematology \"Recommend transfusing platelets for <44,183 and Cryoprecipitate for fibrinogen < 100. \"  -MARK pending  -9/22 bilateral LE venous duplex ordered; low suspicion but at risk and having calf tenderness  -9/22 plts improved today; monitor closely; 9/23 small right peroneal thrombus. No antic per hematology. IR refused/advised against placing IVF due to low risk of clot propagation given size and location  -9/23 counts bumping up; pts now 53k from 45 and previously 36  -9/24 plts cont to improve. 69K. MARK still pending; can resume therapies  9/26 Ddimer pending, fibrinogen now nl  -9/27 D dimer 11+, improved. Per Hematology; likely due to consumption coagulopathy. Will monitor  -10/2 labs have improved; MARK + 68% confirming dx of HIT; 10/4 recheck cbc (last done 9/28)    -10/5 plts 263     Bilateral PEs 9/25, filter placed in IR; looks good today. sats good without O2 supplement 94-97% sats; d/w Hematology, no agaratroban  -apprec Pulm assessment. Clinically looks good and no longer symptomatic  -9/28 clinically stable, asymptomatic ; 10/3 stable. No sob, no O2 supplements      Hypernatremia; last head CT did not show significant cerebral edema. Clinically improving. Likely due to prerenal azotemia; 9/15 MAY REQ isotonic / hypotonic saline IV;  -9/18 events of weekend noted; Na 156; asymptomatic, on 0.5 dextrose with 1/4 Na; na q 6hrs; pts serum osmolality was normal. Will check urine Na and urine osmolality; depending on findings, will consider consulting Hospitalist vs Nephrology  Neuro improving despite this  -9/19 Na down to 149, urine osmo nl, serum osmo min hi; cont dex/and 1/4 NS; repeat in am. Not DI as uop not increased  -9/20 Na 147; cont fluids; 9/21 not resulted; will stop fluids when Na below 142  -Na 140 9/22; dc IVFs; 9/25 136; 9/27 136; recheck 10/4 134; mildly low; f/u 10/9      ICH/IVH; 9/22 clinically improves daily.  F/u HCT yest due to transient left eye visual disturbance. Overall, resolving hemorrhage. There is more pronounced edema; expected. Ventricular size now nl      Anemia ; 9.6-10/ stable; recheck 10/9      DVT risk / DVT Prophylaxis- Will require daily physician exam to assess for signs and symptoms as patient is at increased risk for of thromboembolism. Mobilization as tolerated. Intermittent pneumatic compression devices when in bed Thigh-high or knee-high thromboembolic deterrent hose when out of bed. NO  anticoag due to ICH/HIT; has right small right peroneal vein; 9/25 spoke with IR again today; more risk of putting in IVC filter than benefits especially since she is asymptomatic with no swelling or tenderness and small size of clot  -10/3 no edema, right calf soft. No SOB      Pain Control: stable, mild-to-moderate joint symptoms intermittently, reasonably well controlled by PRN meds. Will require regular pain assessment and comprenhensive pain management,       Wound Care: Monitor wound status daily per staff and physician. At risk for failure. Will require 24/7 rehab nursing. None needed at this time      Hypertension - BP uncontrolled, fluctuating, managed medically. Add prn hydralazine for sbp> 180. Goal SBP is 140-160 given ICH. Cont Normodyne, norvasc and hyzaar; consider Verapamil or scheduled Hydralazine  -9/18 BP increased 169/95; add hydralazine 25 tid  -9/20 BP much improved  -9/22 /74; 9/24 bp stable; may dec hydralazine to bid  -9/25 dec hydralazine  -9/27 bp 116-119  -9/28 SBP in the 90s to 116, asymptomatic; dc hydralazine and monitor; decrease Normodyne to bid  -9/29 BP still low; lower Norvasc and normodyne, cont Hyzaar; all have parameters of when to hold  - 9/30 SBP overnight and this am 108-111, will d/c norvasc, on labetolol and losartan/HCTZ , HR acceptable  -10/2 115/71; controlled.  Cont current meds  -130-151/77; 10/5 118/77; 10/6 BP stable; goal 120-130 given ICH      Mild hyperglycemia, likely stress induced vs borderline diabetes; monitor loosely  -9/27 bs 145 on BMP; follow bs qam x 3d      Dysphagia; cont NTL, mech soft; hopefully can upgrade liquids soon 9/25  -9/29 remains on NTL. MBS scheduled for 10/2.  -10/3 now on THINS! !      Leukocytosis; recheck in a.m. Check UA due to stafford. No pulmonary signs (had neg CXR today), no s/s of infxn at old PICC site, no wounds, afebrile. Watch monocytes. -9/18 12.2 improving. Urine neg  -9/19 up to 13.2, ? Reactive. No source of infxn identified. Afebrile  -9/20 WBC now normal      Urinary retention/ neurogenic bladder - start flomax but continue stafford until mobility improves a bit. Strict I/o's  -9/20 keeping stafford to monitor UOP until Na normalizes. No significant output to suggest DI  -9/21 dc stafford; cont Flomax, follow bladder scan  9/22 voiding without issue; incontinence but no retention  -9/28 new onset urinary retention; check UA  -9/29 UA neg but cx saying > 100K gram neg cocci; will need to f/u ID/sens; Start Cipro 500 bid  - 9/30 preliminary urine cx >110K mixed skin denis  -10/2 stop Cipro; cx nl denis  -10/6 recurrent urinary retention. No flomax; re check UA, consider urecholine. CIC for PVRs      bowel program - add prn meds; incontinent, want to keep stool on the firmer side. Monitor skin.       GERD - add a PPI. At times may need additional antacids, Maalox prn.      -continues to benefit from and is showing improvements in a multidisc team approach. Participating well. 10/5 progress slow; 10/6 OT is seeing improvements. Starting to get movement back into the right arm. Awareness improving  -10/6 add ritalin for attn and distractibility         9/21   Addendum; in therapy this afternoon, transient left eye blindness. Will f/u head CT for extension of or new ICH, especially with low plts   Results  1.  Interval decrease in size of intraventricular hemorrhage within the left  basal ganglia/thalamus with near complete resolution of intraventricular  hemorrhage. Surrounding edema is more pronounced with slight increase in  left-to-right midline shift. 2. Interval decrease in ventricular size, approaching normal in caliber.      10/2 Ms Cleve Francis continues to progress with therapies and has made slow steady improvements. Her rehab course has been complicated by multiple medical issues; HIT, DIC, DVT, PE. Family has not yet been involved in care and home has not been assessed. Insurance claims to be covering thru 10/3 only. Pt is not ready to dc. We are still monitoring labs closely, daily clinical eval for further thrombosis, BP mgt with adjustment of meds etc. Spoke with primary therapists who feel discharge would be premature and not in the pts best interest especially given young age and previous good health leading to a better outcome with aggressive rehab. From the beginning, we said our ELOS was 6 wks. We are only at 2 1/2 wks. Will do peer to peer/ 10/3 pts stay extended to next Monday but [employer] open to team conference notes this week for extension of stay based on progress. If not given more time, she will require SNF placement.        - FAMILY [id number] REGARDING PROGRESS, NEEDS, DC PLAN; family overwhelmed with decision that need to be made. Family training Monday in case insurance denies as of 10/10           Time spent was 25 minutes with over 1/2 in direct patient care/examination, consultation and coordination of care.      Signed By: Lou Wise MD     October 6, 2017

## 2017-10-06 NOTE — PROGRESS NOTES
10/06/17 1046   Time Spent With Patient   Time In 1005   Time Out 1044   Patient Seen For: AM;Verbal activities; Neuro-linguistics   Mental Status   Neurologic State Alert   Orientation Level Oriented to person;Oriented to situation   Cognition Memory loss;Decreased command following;Decreased attention/concentration; Impaired decision making   Perception Cues to attend right visual field   Perseveration Perseverates during conversation   Safety/Judgement Fall prevention   Pt seen in room. Daughter present. Pt completed visual problem solving and word finding tasks. Pt completed visual problem solving tasks with min cues to attend to right visual field with 85% Accuracy. Pt completed simple word finding tasks of naming item from three word description with mod cues with 80% Accuracy. Assisted pt back to bed. Continue with plan of care. No questions from daughter.     John Dubon MA/ANASTASIA/SLP

## 2017-10-06 NOTE — PROGRESS NOTES
OT Daily Note  Time In 0915   Time Out 1000     Pain: Patient had no complaint of pain. Functional Mobility   Pt transferred with mod A from recliner to w/c. Cognition   Pt engaged with bi-colored visual perceptual blocks. Pt was able to complete simple designs independently without guidelines. For moderately complex designs, pt required guideline and initial teaching on how to sequence problem solving. Pt was able to carryover techniques and apply them. Pt was able to recognize mistakes and correct them. Pt required min A for the last few secondary to declining participation. Self-Care   Engaged in improving R sided awareness to improve dressing. Clothes pin were places on R shoulder and hip for her to retrieve. She did well with the shoulder ones, but required max A for balance when getting the ones on her hip and when she got frustrated she just sat down. Education   Problem solving strategies      Interdisciplinary Communication: PT Bernard Burgess on pt's performance    Plan: Continue with POC. Pt was left in w/c with all needs within reach.      Isiah Us OTR/L  10/6/2017

## 2017-10-06 NOTE — PROGRESS NOTES
OT WEEKLY PROGRESS NOTE    Time In: 7002  Time Out: 155    COMPREHENSION MODE Initial Assessment Weekly Progress Assessment 10/6/2017   Score 5 (basic needs 90% of the time) 3     EXPRESSION Initial Assessment Weekly Progress Assessment 10/6/2017   Primary Mode of Expression Verbal Verbal   Score 3 4   Comments Expresses only basic needs 75-89%, repeats words, Expresses only basic needs 75-89%, repeats words,     SOCIAL INTERACTION/ PRAGMATICS Initial Assessment Weekly Progress Assessment 10/6/2017   Score 4 4   Comments tearful parts of session, able to self correct Interacts appropriately 75-89% of the time, needs redirection for appropriate language or to initiate interaction. PROBLEM SOLVING Initial Assessment Weekly Progress Assessment 10/6/2017   Score 4 3   Comments basic problem 80% of the time Solves basic problems 50-74% of the time. MEMORY Initial Assessment Weekly Progress Assessment 10/6/2017   Score 4 2   Comments  75-89% of the time 2 steps of 3 step request Recognizes, recalls, or executes 25-49% of the time 1 step of 2 step request.     EATING Initial Assessment Weekly Progress Assessment 10/6/2017   Functional Level 7 5   Comments I Set-up     GROOMING Initial Assessment Weekly Progress Assessment 10/6/2017   Functional Level 5 7   Tasks completed by patient Brushed hair Brushed hair   Comments Set-up I     BATHING Initial Assessment Weekly Progress Assessment 10/6/2017   Functional Level 4 4   Body parts patient bathed Abdomen, Arm, left, Arm, right, Buttocks, Chest, Lower leg and foot, left, Lower leg and foot, right, Tali area, Thigh, left, Thigh, right Abdomen;Arm, left;Arm, right;Buttocks; Chest;Lower leg and foot, left; Lower leg and foot, right;Tali area; Thigh, left; Thigh, right   Comments A to stand; cueing for all parts A in standing to get buttocks     UPPER BODY DRESSING/UNDRESSING Initial Assessment Weekly Progress Assessment 10/6/2017   Functional Level 4 3   Items applied/Steps completed Pullover (4 steps) Pullover (4 steps); Bra (3 steps)   Comments A for threading RUE A to thread RUE in bra and shirt     LOWER BODY DRESSING/UNDRESSING Initial Assessment Weekly Progress Assessment 10/6/2017   Functional Level 1 3   Items applied/Steps completed Sock, left (1 step), Sock, right (1 step), Shoe, left (1 step), Shoe, right (1 step), Fasten shoe (1 step), Elastic waist pants (3 steps), Underpants (3 steps) Shoe, left (1 step); Shoe, right (1 step); Sock, left (1 step); Sock, right (1 step); Underpants (3 steps); Elastic waist pants (3 steps)   Comments A for threading RLE; R sock and shoes, tying shoes, A in standing; A adjusting underwear A for R shoe, sock, and thread RLE, and balance in standing. Cueing for engagement     Plan of Care: Please see Care Plan for updated LTGs. Family Training: To be completed on Monday 10/9    Summary of Progress: Pt has made gains in balance, R sided awareness, and some GMC of RUE allowing her to progress with LB dressing and grooming. Pt has deficits in RUE coordination and strength, balance, safety awareness, memory, cognition, and mobility that could be addressed with further skilled services. Summary of Session: Pt was in bed and agreeable to tx. Pt's performance with ADL is reflected in above chart. Pt was mod A for supine to sit. Pt required encouragement to stay out of bed after bath. Pt declined shower and opted for sponge bath and EOB. Pt was left in w/c with all needs within reach. Continue with POC.      Lilian Silva OTR/L  10/6/2017

## 2017-10-06 NOTE — PROGRESS NOTES
[facility] PT WEEKLY PROGRESS NOTE   Time In: 1300   Time Out: 1345    Subjective: \"We've done some hard stuff today. \" Patient agreeable to therapy. Objective: Other (comment) (falls)    Outcome Measures:      AROM: Grossly decreased, non-functional R UE/LE    FIM SCORES Initial Assessment Weekly Progress Assessment 10/6/2017   Bed/Chair/Wheelchair Transfers 2 4   Wheelchair Mobility  (Unable to assess due to appropriate size w/c not available) NT   Walking Dixon  (NT) NT   Steps/Stairs  (NT) NT   Please see [facility] Interdisciplinary Eval: Coordination/Balance Section for details regarding FIM score description. BED/CHAIR/WHEELCHAIR TRANSFERS Initial Assessment Weekly Progress Assessment 10/6/2017   Rolling Right 3 (Moderate assistance ) 4 (Contact guard assistance)   Rolling Left 3 (Moderate assistance ) 4 (Minimal assistance)   Supine to Sit 3 (Moderate assistance) 4 (Minimal assistance)   Sit to Stand Moderate assistance Minimal assistance   Sit to Supine 3 (Moderate assistance) 4 (Minimal assistance)   Transfer Type SPT without device (to the left  recliner to w/c and w/c to bed) SPT without device to patient's L. Patient also performed lateral pivot transfer to her left from mat table to w/c with MIN A   Comments Patient demonstrating right neglect with motor planning deficits during bed mobility and transfers. Blocking right knee to keep knee from buckling  Patient demonstrating improved strength through core musculature allowing for increased independence with bed mobility and transfers.    Car Transfer Not tested Not tested   Car Type         GROSS ASSESSMENT Weekly Progress Assessment 10/6/2017   AROM     Strength     Coordination     Tone     Sensation     PROM       POSTURE Weekly Progress Assessment 10/6/2017   Posture (WDL)     Posture Assessment       WHEELCHAIR MOBILITY/MANAGEMENT Initial Assessment Weekly Progress Assessment 10/6/2017   Able to Propel 0 feet     Curbs/ramps assistance required 0 (Not tested)  0 Not tested   Wheelchair set up assistance required 2 (Maximal assistance)     Wheelchair management Manages left brake       WALKING INDEPENDENCE Initial Assessment Weekly Progress Assessment 10/6/2017   Assistive device Gait belt, Walker cindy, Brace/Splint, Other (comment) (used ace wrap to knee and ankle for DF. and sling for right UE) Gait belt; Other (comment) (Parallel bars)   Ambulation assistance - level surface 0 (Not tested)  0 Not tested   Distance 0 Feet (ft)     Comments Unable to ambulate at this time due to extent of right hemiparesis and balance deficits No gait training performed this PM secondary to focusing on improving weight bearing and dynamic sitting balance for improved independence with bed mobility. GAIT Weekly Progress Assessment 10/6/2017   Gait Description (WDL)     Gait Abnormalities       STEPS/STAIRS Initial Assessment Weekly Progress Assessment 10/6/2017   Steps/Stairs ambulated 0  0   Rail Use       Comments Unable to ambulate up/down steps at this time due to extent of right hemiparesis and balance deficits  Not tested secondary to decreased strength of R LE. Curbs/Ramps 0 (Not tested)         Patient performed seated balance activities on alise disc sitting on edge of mat table and sitting on swiss ball. Patient required assist x2 for stability on swiss ball secondary to decreased balance and strength through R LE. Patient performed core musculature exercises, weight shifts (B sides, Posterior, Anterior) to promote weight through R LE. Assessment: Patient continues to make progress with PT. Patient is able to verbalize care and safety set up with bed mobility and transfers. Patient is motivated to return to pre-morbid level and is making progress towards goals outlined in Care Plan. Patient will benefit from further therapy to increase independence with bed mobility, transfers, and household mobility.          Plan of Care: Please see Care Plan for updated LTGs. Family Training:       Luiz Ang  10/6/2017

## 2017-10-07 PROCEDURE — 97535 SELF CARE MNGMENT TRAINING: CPT

## 2017-10-07 PROCEDURE — 97110 THERAPEUTIC EXERCISES: CPT

## 2017-10-07 PROCEDURE — 97530 THERAPEUTIC ACTIVITIES: CPT

## 2017-10-07 PROCEDURE — 65310000000 HC RM PRIVATE REHAB

## 2017-10-07 PROCEDURE — 51798 US URINE CAPACITY MEASURE: CPT

## 2017-10-07 PROCEDURE — 74011250637 HC RX REV CODE- 250/637: Performed by: PHYSICAL MEDICINE & REHABILITATION

## 2017-10-07 PROCEDURE — 97112 NEUROMUSCULAR REEDUCATION: CPT

## 2017-10-07 RX ADMIN — METHYLPHENIDATE HYDROCHLORIDE 5 MG: 10 TABLET ORAL at 16:39

## 2017-10-07 RX ADMIN — LABETALOL HYDROCHLORIDE 100 MG: 100 TABLET, FILM COATED ORAL at 09:13

## 2017-10-07 RX ADMIN — TAMSULOSIN HYDROCHLORIDE 0.4 MG: 0.4 CAPSULE ORAL at 21:19

## 2017-10-07 RX ADMIN — POTASSIUM CHLORIDE 20 MEQ: 20 TABLET, EXTENDED RELEASE ORAL at 09:13

## 2017-10-07 RX ADMIN — HYDROCHLOROTHIAZIDE: 12.5 CAPSULE ORAL at 09:11

## 2017-10-07 RX ADMIN — TRAZODONE HYDROCHLORIDE 50 MG: 50 TABLET ORAL at 21:22

## 2017-10-07 RX ADMIN — STANDARDIZED SENNA CONCENTRATE AND DOCUSATE SODIUM 1 TABLET: 8.6; 5 TABLET, FILM COATED ORAL at 09:11

## 2017-10-07 RX ADMIN — TRAZODONE HYDROCHLORIDE 50 MG: 50 TABLET ORAL at 00:14

## 2017-10-07 RX ADMIN — TIZANIDINE 2 MG: 2 TABLET ORAL at 21:19

## 2017-10-07 RX ADMIN — METHYLPHENIDATE HYDROCHLORIDE 5 MG: 10 TABLET ORAL at 09:14

## 2017-10-07 RX ADMIN — LABETALOL HYDROCHLORIDE 100 MG: 100 TABLET, FILM COATED ORAL at 18:06

## 2017-10-07 RX ADMIN — POLYETHYLENE GLYCOL 3350 17 G: 17 POWDER, FOR SOLUTION ORAL at 09:14

## 2017-10-07 NOTE — PROGRESS NOTES
Problem: Falls - Risk of  Goal: *Absence of Falls  Document Haley Fall Risk and appropriate interventions in the flowsheet.    Outcome: Progressing Towards Goal  Fall Risk Interventions:  Mobility Interventions: Bed/chair exit alarm, Patient to call before getting OOB, Utilize walker, cane, or other assitive device     Mentation Interventions: Bed/chair exit alarm, More frequent rounding, Update white board, Toileting rounds     Medication Interventions: Bed/chair exit alarm, Patient to call before getting OOB     Elimination Interventions: Bed/chair exit alarm, Call light in reach, Patient to call for help with toileting needs     History of Falls Interventions: Bed/chair exit alarm, Evaluate medications/consider consulting pharmacy

## 2017-10-07 NOTE — PROGRESS NOTES
Pt resting in bed. Shift assessment completed. Pt assisted to bedside commode with one assist and voided 450 ml. Bladder scan completed with 340 ml. Pt verbalizes no pain. Educated pt on safety and falls. Call light within reach and pt verbalized no further needs.

## 2017-10-07 NOTE — PROGRESS NOTES
10/07/17 1110   Time Spent With Patient   Time In 1004   Time Out 1105   Patient Seen For: AM;Other (see progress notes)   PT supine to sit with min A,  Bed to wc with minimal assist.  Sit to stand x2 times with raised mat. Weight shifting side to side while on mat. Weight bearing into right UE.  AAROM demonstrated and practiced for elbow flexion/extension wrist/hand flex/ext. Skateboard with RUE to facilitate mvmt. Patient taken back to bed prior to PT treatment due to hip pain while sitting. All essentials within reach. Continue POC.

## 2017-10-07 NOTE — PROGRESS NOTES
[facility] PROGRESS NOTE    Lakisha Proctor  Admit Date: 9/15/2017  Admit Diagnosis: stroke, left brain involvement;ICH (intracerebral hemorrhag*    Subjective     Patient seen and examined. Vss. Afebrile. No new complaints. Admits to no new weakness, setbacks. Po intake good today. Worked with PT and OT. Stays motivated. Objective:     Current Facility-Administered Medications   Medication Dose Route Frequency    potassium chloride (K-DUR, KLOR-CON) SR tablet 20 mEq  20 mEq Oral DAILY    senna-docusate (PERICOLACE) 8.6-50 mg per tablet 1 Tab  1 Tab Oral DAILY    polyethylene glycol (MIRALAX) packet 17 g  17 g Oral DAILY    methylphenidate HCl (RITALIN) tablet 5 mg  5 mg Oral BID    labetalol (NORMODYNE) tablet 100 mg  100 mg Oral BID    losartan/hydroCHLOROthiazide (HYZAAR) 100/12.5 mg   Oral DAILY    tiZANidine (ZANAFLEX) tablet 2 mg  2 mg Oral QHS    acetaminophen (TYLENOL) tablet 500 mg  500 mg Per NG tube Q4H PRN    alum-mag hydroxide-simeth (MYLANTA) oral suspension 30 mL  30 mL Oral Q4H PRN    bisacodyl (DULCOLAX) suppository 10 mg  10 mg Rectal DAILY PRN    hydrALAZINE (APRESOLINE) tablet 50 mg  50 mg Oral QID PRN    lip protectant (BLISTEX) ointment   Topical PRN    ondansetron (ZOFRAN ODT) tablet 4 mg  4 mg Oral Q6H PRN    sodium phosphate (FLEET'S) enema 118 mL  1 Enema Rectal PRN    traMADol (ULTRAM) tablet 50 mg  50 mg Oral Q6H PRN    traZODone (DESYREL) tablet 50 mg  50 mg Oral QHS PRN    tamsulosin (FLOMAX) capsule 0.4 mg  0.4 mg Oral QHS     Review of Systems:Denies chest pain, shortness of breath, cough, headache, visual problems, abdominal pain, dysurea, calf pain. Pertinent positives are as noted in the medical records and unremarkable otherwise. Visit Vitals    /73 (BP 1 Location: Right arm, BP Patient Position: At rest)    Pulse 75    Temp 98.2 °F (36.8 °C)    Resp 17    SpO2 93%        Physical Exam:   General: Alert and age appropriately oriented.   No acute cardio respiratory distress. Expressive aphasia   HEENT: Normocephalic,no scleral icterus  Oral mucosa moist without cyanosis; right lower facial weakness   Lungs: Clear to auscultation  bilaterally. Respiration even and unlabored   Heart: Regular rate and rhythm, S1, S2   No  murmurs, clicks, rub or gallops   Abdomen: Soft, non-tender, nondistended. Bowel sounds present. No organomegaly. Genitourinary: Benign . Neuromuscular:      Starting to get shoulder retraction, biceps and tric on the right 1+  RLE adduction 3+, quad 1+/2-  Right neglect and still with decreased light touch right hemibody  Difficulties with 2 step commands without vcs   Skin/extremity: No rashes, no erythema. No calf tenderness BLE  No edema                                                                            Functional Assessment:  Gross Assessment  AROM: Grossly decreased, non-functional (09/30/17 1200)  PROM: Within functional limits (09/30/17 1200)  Strength: Grossly decreased, non-functional (09/30/17 1200)  Coordination: Grossly decreased, non-functional (09/30/17 1200)  Tone: Abnormal (09/30/17 1200)  Sensation: Impaired (09/30/17 1200)       Balance  Sitting - Static: Good (unsupported) (10/06/17 1400)  Sitting - Dynamic: Good (unsupported) (10/06/17 1400)  Standing - Static: Fair (10/06/17 1400)  Standing - Dynamic : Impaired (10/06/17 1400)           Toileting  Adaptive Equipment: Elevated seat;Walker (09/29/17 1534)         Bailey Odell Fall Risk Assessment:  Bailey Odell Fall Risk  Mobility: Ambulates with unsteady gait and no assistance (10/07/17 0720)  Mobility Interventions: Bed/chair exit alarm; Patient to call before getting OOB;Utilize walker, cane, or other assitive device (10/07/17 0720)  Mentation: Alert, oriented x 3 (10/07/17 0720)  Mentation Interventions: Bed/chair exit alarm; More frequent rounding;Update white board; Toileting rounds (10/07/17 0720)  Medication: Patient receiving anticonvulsants, sedatives(tranquilizers), psychotropics or hypnotics, hypoglycemics, narcotics, sleep aids, antihypertensives, laxatives, or diuretics (10/07/17 0720)  Medication Interventions: Bed/chair exit alarm; Patient to call before getting OOB (10/07/17 0720)  Elimination: Needs assistance with toileting (10/07/17 0720)  Elimination Interventions: Bed/chair exit alarm;Call light in reach; Patient to call for help with toileting needs (10/07/17 0720)  Prior Fall History: No (10/07/17 0720)  History of Falls Interventions: Bed/chair exit alarm;Evaluate medications/consider consulting pharmacy (10/07/17 0720)  Total Score: 3 (10/07/17 0720)  Standard Fall Precautions: Yes (10/07/17 0720)  High Fall Risk: Yes (10/07/17 0720)     Speech Assessment:         Ambulation:  Gait  Base of Support: Narrowed; Center of gravity altered (09/22/17 1200)  Speed/Lulu: Delayed (09/22/17 1200)  Step Length: Right shortened (09/22/17 1200)  Distance (ft): 20 Feet (ft) (10/05/17 1400)  Assistive Device: Gait belt; Other (comment) (Parallel bars) (10/06/17 1200)     Labs/Studies:  Recent Results (from the past 72 hour(s))   URINALYSIS W/ RFLX MICROSCOPIC    Collection Time: 10/06/17  3:23 PM   Result Value Ref Range    Color YELLOW      Appearance CLOUDY      Specific gravity 1.007 1.001 - 1.023      pH (UA) 7.0 5.0 - 9.0      Protein NEGATIVE  NEG mg/dL    Glucose NEGATIVE  mg/dL    Ketone NEGATIVE  NEG mg/dL    Bilirubin NEGATIVE  NEG      Blood NEGATIVE  NEG      Urobilinogen 1.0 0.2 - 1.0 EU/dL    Nitrites NEGATIVE  NEG      Leukocyte Esterase NEGATIVE  NEG     CULTURE, URINE    Collection Time: 10/06/17  3:23 PM   Result Value Ref Range    Special Requests: NO SPECIAL REQUESTS      Culture result:        NO GROWTH AFTER SHORT PERIOD OF INCUBATION. FURTHER RESULTS TO FOLLOW AFTER OVERNIGHT INCUBATION.        Assessment:     Problem List as of 10/7/2017  Date Reviewed: 9/12/2017          Codes Class Noted - Resolved    Thrombocytopenia ([facility] ICD-10-CM: D69.6  ICD-9-CM: 287.5  9/25/2017 - Present        Pulmonary emboli (H[facility]) ICD-10-CM: I26.99  ICD-9-CM: 415.19  9/25/2017 - Present        DVT (deep venous thrombosis) (HCC) ICD-10-CM: I82.409  ICD-9-CM: 453.40  9/25/2017 - Present        ICH (intracerebral hemorrhage) ([facility] ICD-10-CM: I61.9  ICD-9-CM: 544  9/15/2017 - Present        Hypertensive urgency, malignant ICD-10-CM: I16.0  ICD-9-CM: 401.0  9/11/2017 - Present        Stroke, hemorrhagic ([facility] ICD-10-CM: I61.9  ICD-9-CM: 391  9/7/2017 - Present        Accelerated hypertension ICD-10-CM: I10  ICD-9-CM: 401.0  9/7/2017 - Present        At risk for aspiration (Chronic) ICD-10-CM: Z91.89  ICD-9-CM: V49.89  9/7/2017 - Present        Acute spontaneous intraventricular hemorrhage assoc w/ hypertension ([facility] ICD-10-CM: I61.5, I10  ICD-9-CM: 776, 401.9  9/7/2017 - Present        Screening for breast cancer ICD-10-CM: Z12.31  ICD-9-CM: V76.10  2/27/2017 - Present        Atrophic vaginitis ICD-10-CM: N95.2  ICD-9-CM: 627.3  7/11/2016 - Present        HTN (hypertension) (Chronic) ICD-10-CM: I10  ICD-9-CM: 401.9  10/23/2015 - Present        ADD (attention deficit disorder) (Chronic) ICD-10-CM: F98.8  ICD-9-CM: 314.00  10/23/2015 - Present        Depression ICD-10-CM: F32.9  ICD-9-CM: 226  10/23/2015 - Present        Anxiety (Chronic) ICD-10-CM: F41.9  ICD-9-CM: 300.00  10/23/2015 - Present        RESOLVED: Hypoxemia ICD-10-CM: R09.02  ICD-9-CM: 799.02  9/10/2017 - 9/12/2017        RESOLVED: Acute respiratory failure ([facility] ICD-10-CM: J96.00  ICD-9-CM: 518.81  9/8/2017 - 9/10/2017        RESOLVED: Hemorrhagic stroke ([facility] ICD-10-CM: I61.9  ICD-9-CM: 088  9/7/2017 - 9/7/2017        RESOLVED: Non-intractable vomiting with nausea ICD-10-CM: R11.2  ICD-9-CM: 787.01  9/7/2017 - 9/12/2017        RESOLVED: Seborrheic keratosis, inflamed ICD-10-CM: L82.0  ICD-9-CM: 702.11  7/11/2016 - 9/20/2016        RESOLVED: Cough ICD-10-CM: R05  ICD-9-CM: 786.2  7/11/2016 - 9/20/2016 RESOLVED: Shoulder pain ICD-10-CM: M25.519  ICD-9-CM: 719.41  10/23/2015 - 2/27/2017              Left BG Hemorrhage with intraventricular extension due to HTN emergency; resultant R hemiplegia, right neglect, dysarthria, aphasia, dysphagia with significant decline in mobility and self care    Plan:   Continue daily physician medical management:        Dysphagia; Pureed diet with NTL; at risk for malnutrition and dehydration. PICC line removed prior to transfer; may need a line for IVFs if BUN continues to increase. Low K, Ca; check mg. Supplementation as needed. Na elevated  -mag ok, K much improved; cont to supplement while on diuretic  -9/19 replace K; 3.0; 2.8 this am 9/20; inc supplement; may need to add to IVFs  -9/25 fluid status and K nl; 9/28 cont NTL; high risk aspiration; still needs supervision /assist with meals  -10/2 MBS today; thin liq!!  - 10/7 no signs of  Aspiration.         Thrombocytopenia; has been trending down for no apparent reason. hgb slt down as well. Has not been on Hep products; consult hematology today 9/19  -plts 40K, continues to trend down 9/20; await consult by hematology. ? reln to [address]. Mild anemia as well  Check HIT panel, d dimer , fibrinogen  -9/21 plts 36k ; HIT PROFILE POSITIVE, D DIMER ELEVATED 32.79, FIBRINOGEN  ; consistent with DIC; Etiology unclear  WILL D/W HEMATOLOGY; LFTs ok, no hx of blood transfusion,no hx pancreatitis, no sign of bacteremia. Has not been on any heparin products. Can see in head injury; has ICH. Cannot r/o blood disorder (leukemia)   per hematology \"Recommend transfusing platelets for <12,947 and Cryoprecipitate for fibrinogen < 100. \"  -MARK pending  -9/22 bilateral LE venous duplex ordered; low suspicion but at risk and having calf tenderness  -9/22 plts improved today; monitor closely; 9/23 small right peroneal thrombus.  No antic per hematology. IR refused/advised against placing IVF due to low risk of clot propagation given size and location  -9/23 counts bumping up; pts now 53k from 45 and previously 36  -9/24 plts cont to improve. 69K. MARK still pending; can resume therapies  9/26 Ddimer pending, fibrinogen now nl  -9/27 D dimer 11+, improved. Per Hematology; likely due to consumption coagulopathy. Will monitor  -10/2 labs have improved; MARK + 68% confirming dx of HIT; 10/4 recheck cbc (last done 9/28)    -10/5 plts 263   - 10/7 check Monday. Bilateral PEs 9/25, filter placed in IR; looks good today. sats good without O2 supplement 94-97% sats; d/w Hematology, no agaratroban  -apprec Pulm assessment. Clinically looks good and no longer symptomatic  -9/28 clinically stable, asymptomatic ; 10/3 stable. No sob, no O2 supplements  - 10/7 clinically without acute concerns for PE.       Hypernatremia; last head CT did not show significant cerebral edema. Clinically improving. Likely due to prerenal azotemia; 9/15 MAY REQ isotonic / hypotonic saline IV;  -9/18 events of weekend noted; Na 156; asymptomatic, on 0.5 dextrose with 1/4 Na; na q 6hrs; pts serum osmolality was normal. Will check urine Na and urine osmolality; depending on findings, will consider consulting Hospitalist vs Nephrology  Neuro improving despite this  -9/19 Na down to 149, urine osmo nl, serum osmo min hi; cont dex/and 1/4 NS; repeat in am. Not DI as uop not increased  -9/20 Na 147; cont fluids; 9/21 not resulted; will stop fluids when Na below 142  -Na 140 9/22; dc IVFs; 9/25 136; 9/27 136; recheck 10/4 134; mildly low; f/u 10/9      ICH/IVH; 9/22 clinically improves daily. F/u HCT yest due to transient left eye visual disturbance. Overall, resolving hemorrhage. There is more pronounced edema; expected. Ventricular size now nl  - 10/7 no clinical signs of Increased ICP.     Anemia ; 9.6-10/ stable; recheck 10/9      DVT risk / DVT Prophylaxis- Will require daily physician exam to assess for signs and symptoms as patient is at increased risk for of thromboembolism. Mobilization as tolerated. Intermittent pneumatic compression devices when in bed Thigh-high or knee-high thromboembolic deterrent hose when out of bed. NO  anticoag due to ICH/HIT; has right small right peroneal vein; 9/25 spoke with IR again today; more risk of putting in IVC filter than benefits especially since she is asymptomatic with no swelling or tenderness and small size of clot  -10/3 no edema, right calf soft. No SOB      Pain Control: stable, mild-to-moderate joint symptoms intermittently, reasonably well controlled by PRN meds. Will require regular pain assessment and comprenhensive pain management,       Wound Care: Monitor wound status daily per staff and physician. At risk for failure. Will require 24/7 rehab nursing. None needed at this time      Hypertension - BP uncontrolled, fluctuating, managed medically. Add prn hydralazine for sbp> 180. Goal SBP is 140-160 given ICH. Cont Normodyne, norvasc and hyzaar; consider Verapamil or scheduled Hydralazine  -9/18 BP increased 169/95; add hydralazine 25 tid  -9/20 BP much improved  -9/22 /74; 9/24 bp stable; may dec hydralazine to bid  -9/25 dec hydralazine  -9/27 bp 116-119  -9/28 SBP in the 90s to 116, asymptomatic; dc hydralazine and monitor; decrease Normodyne to bid  -9/29 BP still low; lower Norvasc and normodyne, cont Hyzaar; all have parameters of when to hold  - 9/30 SBP overnight and this am 108-111, will d/c norvasc, on labetolol and losartan/HCTZ , HR acceptable  -10/2 115/71; controlled. Cont current meds  -130-151/77; 10/5 118/77; 10/6 BP stable; goal 120-130 given ICH  -10/7 - -130s. No new dose changes.          Leukocytosis; recheck in a.m. Check UA due to stafford. No pulmonary signs (had neg CXR today), no s/s of infxn at old PICC site, no wounds, afebrile. Watch monocytes. -9/18 12.2 improving. Urine neg  -9/19 up to 13.2, ? Reactive. No source of infxn identified.  Afebrile  -9/20 WBC now normal      Urinary retention/ neurogenic bladder - start flomax but continue stafford until mobility improves a bit. Strict I/o's  -9/20 keeping stafford to monitor UOP until Na normalizes. No significant output to suggest DI  -9/21 dc stafford; cont Flomax, follow bladder scan  9/22 voiding without issue; incontinence but no retention  -9/28 new onset urinary retention; check UA  -9/29 UA neg but cx saying > 100K gram neg cocci; will need to f/u ID/sens; Start Cipro 500 bid  - 9/30 preliminary urine cx >110K mixed skin denis  -10/2 stop Cipro; cx nl denis  -10/6 recurrent urinary retention. No flomax; re check UA, consider urecholine. CIC for PVRs  - 10/7. No new report of retention.   [employer] Amend  bowel program - add prn meds; incontinent, want to keep stool on the firmer side. Monitor skin.       GERD - add a PPI. At times may need additional antacids, Maalox prn.      -continues to benefit from and is showing improvements in a multidisc team approach. Participating well. 10/5 progress slow; 10/6 OT is seeing improvements. Starting to get movement back into the right arm. Awareness improving  -10/6 add ritalin for attn and distractibility            Pneumonia prophylaxis- Incentive spirometer every hour while awake. Will need instruction and assistance. Not sure if she can get a proper oral seal to be effective    Time spent was 25 minutes with over 1/2 in direct patient care/examination, consultation and coordination of care.      Signed By: Guerita Coelho MD     October 7, 2017

## 2017-10-07 NOTE — PROGRESS NOTES
PHYSICAL THERAPY DAILY NOTE  Time In: 4029  Time Out: 9630  Patient Seen For: AM;Balance activities;Transfer training; Therapeutic exercise;Patient education; Wheelchair mobility    Subjective: Patient agreeable to treatment today. Has C/O pain to right hip area. Does not quantify pain level. Demonstrates pain with verbal communication. Last treatment was good and no new complaints. Objective:  Patientsemi-supine in bed and alert. SPT from EOB to wc without RW and Gabriel. Orientation Assessment :   Alert and age appropriately oriented. Affect/Behavior:   Appropriate and Cooperative. Basic Command Following:   intact. Safety/Judgment:   impaired. Pain Present:   Yes actual or suspected pain. Other (comment) (falls)  GROSS ASSESSMENT Daily Assessment    AROM: Grossly decreased, non-functional (RLE)  PROM: Within functional limits  Strength: Grossly decreased, non-functional (RLE)  Coordination: Grossly decreased, non-functional (RLE)       BED/MAT MOBILITY Daily Assessment    Rolling Right : 4 (Contact guard assistance)  Rolling Left : 4 (Contact guard assistance)  Supine to Sit : 4 (Contact guard assistance)       TRANSFERS Daily Assessment    Transfer Type: SPT without device  Transfer Assistance : 4 (Minimal assistance)  Sit to Stand Assistance: Moderate assistance       GAIT Daily Assessment   MaxA to total assist with side movement. Amount of Assistance: 0 (Not tested)       STEPS or STAIRS Daily Assessment   Did not occur.  Level of Assist : 0 (Not tested)       BALANCE Daily Assessment    Sitting - Static: Good (unsupported)  Sitting - Dynamic: Good (unsupported)  Standing - Static: Fair (with support)  Standing - Dynamic : Impaired       WHEELCHAIR MOBILITY Daily Assessment            LOWER EXTREMITY EXERCISES Daily Assessment    Extremity: Both  Exercise Type #1: Supine lower extremity strengthening  Sets Performed: 2  Reps Performed: 15  Level of Assist: Total assistance (for RLE)  Exercise Type #2: Other (comment) (NuStep)  Reps Performed:  (Level 0, 12 minutes)  Level of Assist: Supervision          Assessment: Education:  Teaching Method:   Demonstration, Explanation. PT Impairments or Limitations:   Ambulation deficits, Balance deficits, Endurance deficits, Pain limiting function, Strength deficits, Transfer deficits. Rehab Potential Physical Therapy:   Good. Grossly no focal motor deficits noted. No rashes, no erythema. No calf tenderness BLE. Patient performed all given exercises listed. Patient was taken back to room. Left in sitting position in wc, call bell and necessities in reach. Nurse notified of completion of treatment. Plan of Care: The patient has shown the ability to tolerate and benefit from physical therapy daily in a comprehensive inpatient rehabilitation program.  Continue intensive Physical Therapy  to address bed mobility, transfers, ambulation, strengthening, balance, and endurance. Continue with plan of care and focus on goals.     Shakeel Brain, PTA  10/7/2017

## 2017-10-08 PROCEDURE — 65310000000 HC RM PRIVATE REHAB

## 2017-10-08 PROCEDURE — 51798 US URINE CAPACITY MEASURE: CPT

## 2017-10-08 PROCEDURE — 74011250637 HC RX REV CODE- 250/637: Performed by: PHYSICAL MEDICINE & REHABILITATION

## 2017-10-08 RX ADMIN — METHYLPHENIDATE HYDROCHLORIDE 5 MG: 10 TABLET ORAL at 16:32

## 2017-10-08 RX ADMIN — POLYETHYLENE GLYCOL 3350 17 G: 17 POWDER, FOR SOLUTION ORAL at 08:40

## 2017-10-08 RX ADMIN — LABETALOL HYDROCHLORIDE 100 MG: 100 TABLET, FILM COATED ORAL at 08:41

## 2017-10-08 RX ADMIN — STANDARDIZED SENNA CONCENTRATE AND DOCUSATE SODIUM 1 TABLET: 8.6; 5 TABLET, FILM COATED ORAL at 08:41

## 2017-10-08 RX ADMIN — TRAZODONE HYDROCHLORIDE 50 MG: 50 TABLET ORAL at 21:01

## 2017-10-08 RX ADMIN — LABETALOL HYDROCHLORIDE 100 MG: 100 TABLET, FILM COATED ORAL at 18:16

## 2017-10-08 RX ADMIN — HYDROCHLOROTHIAZIDE: 12.5 CAPSULE ORAL at 08:40

## 2017-10-08 RX ADMIN — POTASSIUM CHLORIDE 20 MEQ: 20 TABLET, EXTENDED RELEASE ORAL at 08:41

## 2017-10-08 RX ADMIN — TIZANIDINE 2 MG: 2 TABLET ORAL at 21:03

## 2017-10-08 RX ADMIN — METHYLPHENIDATE HYDROCHLORIDE 5 MG: 10 TABLET ORAL at 08:41

## 2017-10-08 RX ADMIN — TAMSULOSIN HYDROCHLORIDE 0.4 MG: 0.4 CAPSULE ORAL at 21:03

## 2017-10-08 NOTE — PROGRESS NOTES
Problem: Falls - Risk of  Goal: *Absence of Falls  Document Haley Fall Risk and appropriate interventions in the flowsheet.    Outcome: Progressing Towards Goal  Fall Risk Interventions:  Mobility Interventions: Communicate number of staff needed for ambulation/transfer, Patient to call before getting OOB, Utilize walker, cane, or other assitive device     Mentation Interventions: Door open when patient unattended     Medication Interventions: Patient to call before getting OOB, Teach patient to arise slowly     Elimination Interventions: Call light in reach, Patient to call for help with toileting needs, Toileting schedule/hourly rounds     History of Falls Interventions: Bed/chair exit alarm           Comments:   Patient calls appropriately for assistance

## 2017-10-08 NOTE — PROGRESS NOTES
SFD PROGRESS NOTE    Alcides Vincent  Admit Date: 9/15/2017  Admit Diagnosis: stroke, left brain involvement;ICH (intracerebral hemorrhag*    Subjective     Patient seen and examined. Vss. Afebrile. No new complaints. Admits to no new weakness, setbacks. Po intake good today. Worked with PT and OT. Stays motivated. Objective:     Current Facility-Administered Medications   Medication Dose Route Frequency    potassium chloride (K-DUR, KLOR-CON) SR tablet 20 mEq  20 mEq Oral DAILY    senna-docusate (PERICOLACE) 8.6-50 mg per tablet 1 Tab  1 Tab Oral DAILY    polyethylene glycol (MIRALAX) packet 17 g  17 g Oral DAILY    methylphenidate HCl (RITALIN) tablet 5 mg  5 mg Oral BID    labetalol (NORMODYNE) tablet 100 mg  100 mg Oral BID    losartan/hydroCHLOROthiazide (HYZAAR) 100/12.5 mg   Oral DAILY    tiZANidine (ZANAFLEX) tablet 2 mg  2 mg Oral QHS    acetaminophen (TYLENOL) tablet 500 mg  500 mg Per NG tube Q4H PRN    alum-mag hydroxide-simeth (MYLANTA) oral suspension 30 mL  30 mL Oral Q4H PRN    bisacodyl (DULCOLAX) suppository 10 mg  10 mg Rectal DAILY PRN    hydrALAZINE (APRESOLINE) tablet 50 mg  50 mg Oral QID PRN    lip protectant (BLISTEX) ointment   Topical PRN    ondansetron (ZOFRAN ODT) tablet 4 mg  4 mg Oral Q6H PRN    sodium phosphate (FLEET'S) enema 118 mL  1 Enema Rectal PRN    traMADol (ULTRAM) tablet 50 mg  50 mg Oral Q6H PRN    traZODone (DESYREL) tablet 50 mg  50 mg Oral QHS PRN    tamsulosin (FLOMAX) capsule 0.4 mg  0.4 mg Oral QHS     Review of Systems:Denies chest pain, shortness of breath, cough, headache, visual problems, abdominal pain, dysurea, calf pain. Pertinent positives are as noted in the medical records and unremarkable otherwise. Visit Vitals    /65 (BP 1 Location: Left arm, BP Patient Position: At rest)    Pulse 82    Temp 98.8 °F (37.1 °C)    Resp 16    SpO2 96%        Physical Exam:   General: Alert and age appropriately oriented.   No acute cardio respiratory distress. Expressive aphasia   HEENT: Normocephalic,no scleral icterus  Oral mucosa moist without cyanosis; right lower facial weakness   Lungs: Clear to auscultation  bilaterally. Respiration even and unlabored   Heart: Regular rate and rhythm, S1, S2   No  murmurs, clicks, rub or gallops   Abdomen: Soft, non-tender, nondistended. Bowel sounds present. No organomegaly. Genitourinary: Benign . Neuromuscular:      Starting to get shoulder retraction, biceps and tric on the right 1+  RLE adduction 3+, quad 1+/2-  Right neglect and still with decreased light touch right hemibody  Difficulties with 2 step commands without vcs   Skin/extremity: No rashes, no erythema.  No calf tenderness BLE  No edema                                                                            Functional Assessment:  Gross Assessment  AROM: Grossly decreased, non-functional (RLE) (10/07/17 1300)  PROM: Within functional limits (10/07/17 1300)  Strength: Grossly decreased, non-functional (RLE) (10/07/17 1300)  Coordination: Grossly decreased, non-functional (RLE) (10/07/17 1300)  Tone: Abnormal (09/30/17 1200)  Sensation: Impaired (09/30/17 1200)       Balance  Sitting - Static: Good (unsupported) (10/07/17 1300)  Sitting - Dynamic: Good (unsupported) (10/07/17 1300)  Standing - Static: Fair (with support) (10/07/17 1300)  Standing - Dynamic : Impaired (10/07/17 1300)           Toileting  Adaptive Equipment: Elevated seat;Walker (09/29/17 1534)         Lena Snyder Fall Risk Assessment:  Lena Snyder Fall Risk  Mobility: Ambulates or transfers with assist devices or assistance/unsteady gait (10/08/17 0735)  Mobility Interventions: Communicate number of staff needed for ambulation/transfer;Patient to call before getting OOB;Utilize walker, cane, or other assitive device (10/08/17 0735)  Mentation: Alert, oriented x 3 (10/08/17 0735)  Mentation Interventions: Door open when patient unattended (10/08/17 0735)  Medication: Patient receiving anticonvulsants, sedatives(tranquilizers), psychotropics or hypnotics, hypoglycemics, narcotics, sleep aids, antihypertensives, laxatives, or diuretics (10/08/17 0735)  Medication Interventions: Patient to call before getting OOB; Teach patient to arise slowly (10/08/17 3721)  Elimination: Needs assistance with toileting (10/08/17 0735)  Elimination Interventions: Call light in reach; Patient to call for help with toileting needs; Toileting schedule/hourly rounds (10/08/17 0735)  Prior Fall History: No (10/08/17 0735)  History of Falls Interventions: Bed/chair exit alarm (10/08/17 0014)  Total Score: 3 (10/08/17 0735)  Standard Fall Precautions: Yes (10/08/17 0014)  High Fall Risk: Yes (10/08/17 0735)     Speech Assessment:         Ambulation:  Gait  Base of Support: Narrowed; Center of gravity altered (09/22/17 1200)  Speed/Lulu: Delayed (09/22/17 1200)  Step Length: Right shortened (09/22/17 1200)  Distance (ft): 20 Feet (ft) (10/05/17 1400)  Assistive Device: Gait belt; Other (comment) (Parallel bars) (10/06/17 1200)     Labs/Studies:  Recent Results (from the past 72 hour(s))   URINALYSIS W/ RFLX MICROSCOPIC    Collection Time: 10/06/17  3:23 PM   Result Value Ref Range    Color YELLOW      Appearance CLOUDY      Specific gravity 1.007 1.001 - 1.023      pH (UA) 7.0 5.0 - 9.0      Protein NEGATIVE  NEG mg/dL    Glucose NEGATIVE  mg/dL    Ketone NEGATIVE  NEG mg/dL    Bilirubin NEGATIVE  NEG      Blood NEGATIVE  NEG      Urobilinogen 1.0 0.2 - 1.0 EU/dL    Nitrites NEGATIVE  NEG      Leukocyte Esterase NEGATIVE  NEG     CULTURE, URINE    Collection Time: 10/06/17  3:23 PM   Result Value Ref Range    Special Requests: NO SPECIAL REQUESTS      Culture result:        10,000 to 50,000 COLONIES/mL MIXED SKIN JOSEFA ISOLATED       Assessment:     Problem List as of 10/8/2017  Date Reviewed: 9/12/2017          Codes Class Noted - Resolved    Thrombocytopenia ([facility] ICD-10-CM: D69.6  ICD-9-CM: 287.5 9/25/2017 - Present        Pulmonary emboli (H[facility]) ICD-10-CM: I26.99  ICD-9-CM: 415.19  9/25/2017 - Present        DVT (deep venous thrombosis) ([facility] ICD-10-CM: I82.409  ICD-9-CM: 453.40  9/25/2017 - Present        ICH (intracerebral hemorrhage) ([facility] ICD-10-CM: I61.9  ICD-9-CM: 678  9/15/2017 - Present        Hypertensive urgency, malignant ICD-10-CM: I16.0  ICD-9-CM: 401.0  9/11/2017 - Present        Stroke, hemorrhagic ([facility] ICD-10-CM: I61.9  ICD-9-CM: 408  9/7/2017 - Present        Accelerated hypertension ICD-10-CM: I10  ICD-9-CM: 401.0  9/7/2017 - Present        At risk for aspiration (Chronic) ICD-10-CM: Z91.89  ICD-9-CM: V49.89  9/7/2017 - Present        Acute spontaneous intraventricular hemorrhage assoc w/ hypertension ([facility] ICD-10-CM: I61.5, I10  ICD-9-CM: 647, 401.9  9/7/2017 - Present        Screening for breast cancer ICD-10-CM: Z12.31  ICD-9-CM: V76.10  2/27/2017 - Present        Atrophic vaginitis ICD-10-CM: N95.2  ICD-9-CM: 627.3  7/11/2016 - Present        HTN (hypertension) (Chronic) ICD-10-CM: I10  ICD-9-CM: 401.9  10/23/2015 - Present        ADD (attention deficit disorder) (Chronic) ICD-10-CM: F98.8  ICD-9-CM: 314.00  10/23/2015 - Present        Depression ICD-10-CM: F32.9  ICD-9-CM: 994  10/23/2015 - Present        Anxiety (Chronic) ICD-10-CM: F41.9  ICD-9-CM: 300.00  10/23/2015 - Present        RESOLVED: Hypoxemia ICD-10-CM: R09.02  ICD-9-CM: 799.02  9/10/2017 - 9/12/2017        RESOLVED: Acute respiratory failure ([facility] ICD-10-CM: J96.00  ICD-9-CM: 518.81  9/8/2017 - 9/10/2017        RESOLVED: Hemorrhagic stroke ([facility] ICD-10-CM: I61.9  ICD-9-CM: 476  9/7/2017 - 9/7/2017        RESOLVED: Non-intractable vomiting with nausea ICD-10-CM: R11.2  ICD-9-CM: 787.01  9/7/2017 - 9/12/2017        RESOLVED: Seborrheic keratosis, inflamed ICD-10-CM: L82.0  ICD-9-CM: 702.11  7/11/2016 - 9/20/2016        RESOLVED: Cough ICD-10-CM: R05  ICD-9-CM: 786.2  7/11/2016 - 9/20/2016        RESOLVED: Shoulder pain ICD-10-CM: X55.190  ICD-9-CM: 719.41  10/23/2015 - 2/27/2017              Left BG Hemorrhage with intraventricular extension due to HTN emergency; resultant R hemiplegia, right neglect, dysarthria, aphasia, dysphagia with significant decline in mobility and self care    Plan:   Continue daily physician medical management:        Dysphagia; Pureed diet with NTL; at risk for malnutrition and dehydration. PICC line removed prior to transfer; may need a line for IVFs if BUN continues to increase. Low K, Ca; check mg. Supplementation as needed. Na elevated  -mag ok, K much improved; cont to supplement while on diuretic  -9/19 replace K; 3.0; 2.8 this am 9/20; inc supplement; may need to add to IVFs  -9/25 fluid status and K nl; 9/28 cont NTL; high risk aspiration; still needs supervision /assist with meals  -10/2 MBS today; thin liq!!  - 10/7 no signs of  Aspiration.         Thrombocytopenia; has been trending down for no apparent reason. hgb slt down as well. Has not been on Hep products; consult hematology today 9/19  -plts 40K, continues to trend down 9/20; await consult by hematology. ? reln to [address]. Mild anemia as well  Check HIT panel, d dimer , fibrinogen  -9/21 plts 36k ; HIT PROFILE POSITIVE, D DIMER ELEVATED 32.79, FIBRINOGEN  ; consistent with DIC; Etiology unclear  WILL D/W HEMATOLOGY; LFTs ok, no hx of blood transfusion,no hx pancreatitis, no sign of bacteremia. Has not been on any heparin products. Can see in head injury; has ICH. Cannot r/o blood disorder (leukemia)   per hematology \"Recommend transfusing platelets for <01,071 and Cryoprecipitate for fibrinogen < 100. \"  -MARK pending  -9/22 bilateral LE venous duplex ordered; low suspicion but at risk and having calf tenderness  -9/22 plts improved today; monitor closely; 9/23 small right peroneal thrombus.  No antic per hematology. IR refused/advised against placing IVF due to low risk of clot propagation given size and location  -9/23 counts bumping up; pts now 53k from 45 and previously 36  -9/24 plts cont to improve. 69K. MARK still pending; can resume therapies  9/26 Ddimer pending, fibrinogen now nl  -9/27 D dimer 11+, improved. Per Hematology; likely due to consumption coagulopathy. Will monitor  -10/2 labs have improved; MARK + 68% confirming dx of HIT; 10/4 recheck cbc (last done 9/28)    -10/5 plts 263   - 10/7 check Monday.   - 10/8 -       Bilateral PEs 9/25, filter placed in IR; looks good today. sats good without O2 supplement 94-97% sats; d/w Hematology, no agaratroban  -apprec Pulm assessment. Clinically looks good and no longer symptomatic  -9/28 clinically stable, asymptomatic ; 10/3 stable. No sob, no O2 supplements  - 10/7 clinically without acute concerns for PE.   - SaO2 good on Ra.     Hypernatremia; last head CT did not show significant cerebral edema. Clinically improving. Likely due to prerenal azotemia; 9/15 MAY REQ isotonic / hypotonic saline IV;  -9/18 events of weekend noted; Na 156; asymptomatic, on 0.5 dextrose with 1/4 Na; na q 6hrs; pts serum osmolality was normal. Will check urine Na and urine osmolality; depending on findings, will consider consulting Hospitalist vs Nephrology  Neuro improving despite this  -9/19 Na down to 149, urine osmo nl, serum osmo min hi; cont dex/and 1/4 NS; repeat in am. Not DI as uop not increased  -9/20 Na 147; cont fluids; 9/21 not resulted; will stop fluids when Na below 142  -Na 140 9/22; dc IVFs; 9/25 136; 9/27 136; recheck 10/4 134; mildly low; f/u 10/9  - 10/8 asymptomatic. Check Na Monday.       ICH/IVH; 9/22 clinically improves daily. F/u HCT yest due to transient left eye visual disturbance. Overall, resolving hemorrhage. There is more pronounced edema; expected. Ventricular size now nl  - 10/7 no clinical signs of Increased ICP. - 10/8 no new concerns of hemorrhage, or neurologic decline.  Continue bp control.       Anemia ; 9.6-10/ stable; recheck 10/9      DVT risk / DVT Prophylaxis- Will require daily physician exam to assess for signs and symptoms as patient is at increased risk for of thromboembolism. Mobilization as tolerated. Intermittent pneumatic compression devices when in bed Thigh-high or knee-high thromboembolic deterrent hose when out of bed. NO  anticoag due to ICH/HIT; has right small right peroneal vein; 9/25 spoke with IR again today; more risk of putting in IVC filter than benefits especially since she is asymptomatic with no swelling or tenderness and small size of clot  -10/3 no edema, right calf soft. No SOB      Pain Control: stable, mild-to-moderate joint symptoms intermittently, reasonably well controlled by PRN meds. Will require regular pain assessment and comprenhensive pain management,       Wound Care: Monitor wound status daily per staff and physician. At risk for failure. Will require 24/7 rehab nursing. None needed at this time      Hypertension - BP uncontrolled, fluctuating, managed medically. Add prn hydralazine for sbp> 180. Goal SBP is 140-160 given ICH. Cont Normodyne, norvasc and hyzaar; consider Verapamil or scheduled Hydralazine  -9/18 BP increased 169/95; add hydralazine 25 tid  -9/20 BP much improved  -9/22 /74; 9/24 bp stable; may dec hydralazine to bid  -9/25 dec hydralazine  -9/27 bp 116-119  -9/28 SBP in the 90s to 116, asymptomatic; dc hydralazine and monitor; decrease Normodyne to bid  -9/29 BP still low; lower Norvasc and normodyne, cont Hyzaar; all have parameters of when to hold  - 9/30 SBP overnight and this am 108-111, will d/c norvasc, on labetolol and losartan/HCTZ , HR acceptable  -10/2 115/71; controlled. Cont current meds  -130-151/77; 10/5 118/77; 10/6 BP stable; goal 120-130 given ICH  -10/7 - -130s. No new dose changes. -10/8- - 130s.        Leukocytosis; recheck in a.m. Check UA due to stafford. No pulmonary signs (had neg CXR today), no s/s of infxn at old PICC site, no wounds, afebrile. Watch monocytes.   -9/18 12.2 improving. Urine neg  -9/19 up to 13.2, ? Reactive. No source of infxn identified. Afebrile  -9/20 WBC now normal  - on 10/4 WBC normal      Urinary retention/ neurogenic bladder - start flomax but continue stafford until mobility improves a bit. Strict I/o's  -9/20 keeping stafford to monitor UOP until Na normalizes. No significant output to suggest DI  -9/21 dc stafford; cont Flomax, follow bladder scan  9/22 voiding without issue; incontinence but no retention  -9/28 new onset urinary retention; check UA  -9/29 UA neg but cx saying > 100K gram neg cocci; will need to f/u ID/sens; Start Cipro 500 bid  - 9/30 preliminary urine cx >110K mixed skin denis  -10/2 stop Cipro; cx nl denis  -10/6 recurrent urinary retention. No flomax; re check UA, consider urecholine. CIC for PVRs  - 10/7. No new report of retention. - 10/8 - denies incontinence. No required CIC.     bowel program - add prn meds; incontinent, want to keep stool on the firmer side. Monitor skin.       GERD - add a PPI. At times may need additional antacids, Maalox prn.      -continues to benefit from and is showing improvements in a multidisc team approach. Participating well. 10/5 progress slow; 10/6 OT is seeing improvements. Starting to get movement back into the right arm. Awareness improving  -10/6 add ritalin for attn and distractibility    - 10/8 mood fair. Continue to monitor.           Pneumonia prophylaxis- Incentive spirometer every hour while awake. Will need instruction and assistance. Not sure if she can get a proper oral seal to be effective    Time spent was 25 minutes with over 1/2 in direct patient care/examination, consultation and coordination of care.      Signed By: Antoni Su MD     October 8, 2017

## 2017-10-09 LAB
ANION GAP SERPL CALC-SCNC: 7 MMOL/L (ref 7–16)
BACTERIA SPEC CULT: NORMAL
BUN SERPL-MCNC: 11 MG/DL (ref 8–23)
CALCIUM SERPL-MCNC: 8.7 MG/DL (ref 8.3–10.4)
CHLORIDE SERPL-SCNC: 100 MMOL/L (ref 98–107)
CO2 SERPL-SCNC: 29 MMOL/L (ref 21–32)
CREAT SERPL-MCNC: 0.58 MG/DL (ref 0.6–1)
ERYTHROCYTE [DISTWIDTH] IN BLOOD BY AUTOMATED COUNT: 13.3 % (ref 11.9–14.6)
GLUCOSE SERPL-MCNC: 115 MG/DL (ref 65–100)
HCT VFR BLD AUTO: 30.5 % (ref 35.8–46.3)
HGB BLD-MCNC: 10.5 G/DL (ref 11.7–15.4)
MCH RBC QN AUTO: 28.6 PG (ref 26.1–32.9)
MCHC RBC AUTO-ENTMCNC: 34.4 G/DL (ref 31.4–35)
MCV RBC AUTO: 83.1 FL (ref 79.6–97.8)
PLATELET # BLD AUTO: 283 K/UL (ref 150–450)
PMV BLD AUTO: 8.8 FL (ref 10.8–14.1)
POTASSIUM SERPL-SCNC: 4 MMOL/L (ref 3.5–5.1)
RBC # BLD AUTO: 3.67 M/UL (ref 4.05–5.25)
SERVICE CMNT-IMP: NORMAL
SODIUM SERPL-SCNC: 136 MMOL/L (ref 136–145)
WBC # BLD AUTO: 4.6 K/UL (ref 4.3–11.1)

## 2017-10-09 PROCEDURE — 97530 THERAPEUTIC ACTIVITIES: CPT

## 2017-10-09 PROCEDURE — 97116 GAIT TRAINING THERAPY: CPT

## 2017-10-09 PROCEDURE — 65310000000 HC RM PRIVATE REHAB

## 2017-10-09 PROCEDURE — 92507 TX SP LANG VOICE COMM INDIV: CPT

## 2017-10-09 PROCEDURE — 97535 SELF CARE MNGMENT TRAINING: CPT

## 2017-10-09 PROCEDURE — 36415 COLL VENOUS BLD VENIPUNCTURE: CPT | Performed by: PHYSICAL MEDICINE & REHABILITATION

## 2017-10-09 PROCEDURE — 80048 BASIC METABOLIC PNL TOTAL CA: CPT | Performed by: PHYSICAL MEDICINE & REHABILITATION

## 2017-10-09 PROCEDURE — 99233 SBSQ HOSP IP/OBS HIGH 50: CPT | Performed by: PHYSICAL MEDICINE & REHABILITATION

## 2017-10-09 PROCEDURE — 85027 COMPLETE CBC AUTOMATED: CPT | Performed by: PHYSICAL MEDICINE & REHABILITATION

## 2017-10-09 PROCEDURE — 97110 THERAPEUTIC EXERCISES: CPT

## 2017-10-09 PROCEDURE — 74011250637 HC RX REV CODE- 250/637: Performed by: PHYSICAL MEDICINE & REHABILITATION

## 2017-10-09 RX ORDER — AMLODIPINE BESYLATE 5 MG/1
2.5 TABLET ORAL DAILY
Status: DISCONTINUED | OUTPATIENT
Start: 2017-10-09 | End: 2017-10-24

## 2017-10-09 RX ADMIN — LABETALOL HYDROCHLORIDE 100 MG: 100 TABLET, FILM COATED ORAL at 08:17

## 2017-10-09 RX ADMIN — TAMSULOSIN HYDROCHLORIDE 0.4 MG: 0.4 CAPSULE ORAL at 20:01

## 2017-10-09 RX ADMIN — TRAZODONE HYDROCHLORIDE 50 MG: 50 TABLET ORAL at 20:07

## 2017-10-09 RX ADMIN — POTASSIUM CHLORIDE 20 MEQ: 20 TABLET, EXTENDED RELEASE ORAL at 08:17

## 2017-10-09 RX ADMIN — LABETALOL HYDROCHLORIDE 100 MG: 100 TABLET, FILM COATED ORAL at 18:18

## 2017-10-09 RX ADMIN — TIZANIDINE 2 MG: 2 TABLET ORAL at 20:01

## 2017-10-09 RX ADMIN — AMLODIPINE BESYLATE 2.5 MG: 5 TABLET ORAL at 12:07

## 2017-10-09 RX ADMIN — STANDARDIZED SENNA CONCENTRATE AND DOCUSATE SODIUM 1 TABLET: 8.6; 5 TABLET, FILM COATED ORAL at 08:17

## 2017-10-09 RX ADMIN — METHYLPHENIDATE HYDROCHLORIDE 5 MG: 10 TABLET ORAL at 08:16

## 2017-10-09 RX ADMIN — HYDROCHLOROTHIAZIDE: 12.5 CAPSULE ORAL at 08:17

## 2017-10-09 RX ADMIN — METHYLPHENIDATE HYDROCHLORIDE 5 MG: 10 TABLET ORAL at 16:43

## 2017-10-09 NOTE — PROGRESS NOTES
OT Daily Note    Time In 1032   Time Out 1115     Subjective:\"I just feel uncomfortable around my pelvis. \" Agreeable to therapy. Pain:Not tolerated well in buttocks/pelvis, attempt pressure reliefs and changing of w/c cushion. Education:Pressure reliefs (side to side, and forward), self ROM in shoulder flexion in bed. Interdisciplinary Communication: Collaborated with Pilo RODRIGUEZ and patient is making progress towards goals. Precautions:  (falls)    Balance/functional mobility Daily Assessment   SPT bed <> w/c with min/moderate assist to the left. Strengthening/activity tolerance/ROM Daily Assessment   Self ROM completed in shoulder flexion supine in bed 1 set of 8 holding for 5-10 seconds each. Also able to use elbow flexion against gravity in bed. Writer completed joint mobilization for shoulder and scapluar mobilization at level 1-2. Also completed PROM in shoulder flexion, abduction and ER. Table slides completed multiple directions with RUE with pillow case with improving flexor synergy movements. Pressure reliefs Daily Assessment   Completed in w/c removing arm rest and reaching towards table for support for lateral pressure reliefs completed on both sides. Also completed forward pressure relief reaching towards floor for pressure relief. Ended session:Supine in bed all needs within reach.      Roxanna Petersen OTR/L

## 2017-10-09 NOTE — PROGRESS NOTES
Problem: Falls - Risk of  Goal: *Absence of Falls  Document Haley Fall Risk and appropriate interventions in the flowsheet. Outcome: Progressing Towards Goal  Fall Risk Interventions:  Mobility Interventions: Bed/chair exit alarm, Patient to call before getting OOB     Mentation Interventions: Adequate sleep, hydration, pain control, Door open when patient unattended, Toileting rounds     Medication Interventions: Patient to call before getting OOB, Teach patient to arise slowly     Elimination Interventions: Call light in reach, Patient to call for help with toileting needs, Toileting schedule/hourly rounds     History of Falls Interventions: Door open when patient unattended           Comments:   Patient calls appropriately for assistance.

## 2017-10-09 NOTE — PROGRESS NOTES
10/09/17 1040   Time Spent With Patient   Time In 0920   Time Out 1000   Patient Seen For: Family/caregiver training;AM   Team Conference   Team Conference Date 10/05/17   Family/Caregiver Training   Family Training Has taken place   Mental Status   Neurologic State Alert   Orientation Level Oriented to situation;Oriented to person   Cognition Memory loss;Decreased attention/concentration;Decreased command following; Impaired decision making   Perception Cues to attend right visual field   Perseveration Perseverates during conversation   Safety/Judgement Fall prevention   Comprehension (Native Language)   Primary Mode of Comprehension Auditory   Score 4   Expression (Native Language)   Primary Mode of Expression Verbal   Score 4   Social Interaction/Pragmatics   Score 4   Problem Solving   Score 4   Memory   Score 4   Pt's  present for family training. Discussed pt's speech, language and cognitive abilities. Educated spouse on limiting distractions, using yes/no questions more to help with word finding difficulties and keeping commands/direction to simple 1-2 step. Answered questions that I could. Deferred to [facility] for most questions. ? Dr Georganna Rubinstein. Spouse seems overwhelmed and worried about care at home because he has to go to work. Pt seen in room in bed. Pt answered yes/no and 2-3 step command. Pt answered yes/no questions with 90% accuracy and followed 2-3 step commands with 90% accuracy. Continued with plan of care to address cognitive deficits in word finding, problem solving/reasioning, attention, command following, orientation and STM.    Bridgette Amador MA/CCC/SLP

## 2017-10-09 NOTE — PROGRESS NOTES
Purposeful rounds completed hourly this shift. Calls appropriately for assistance. Continent voids on BSC and toilet today. Requires assistance with clothing, performs own hygiene.

## 2017-10-09 NOTE — PROGRESS NOTES
10/09/17 0932   Time Spent With Patient   Time In 0700   Time Out 0743   Patient Seen For: AM;ADLs   Feeding/Eating   Feeding/Eating Assistance S   Grooming   Grooming Assistance  SBA   Upper Body Bathing   Bathing Assistance, Upper Min A   Position Performed Seated in chair   Adaptive Equipment Tub bench   Comments spouse trained and assisted   Lower Body Bathing   Bathing Assistance, Lower  Mod A   Position Performed Seated in chair;Standing   Adaptive Equipment Tub bench   Comments spouse assisted and their for training   Upper Body Dressing    Dressing Assistance  Mod A   Comments spouse viewed hemitechnique   Lower Body Dressing    Dressing Assistance  Max A   Comments spouse viewed cindy technique and safe way to stand. Functional Transfers   Tub or Shower Type Shower   Amount of Assistance Required Mod A   Adaptive Equipment Grab bars; Wheelchair;Tub transfer bench     S: \"I am doing well. \" Agreeable to therapy. Focus of session was on morning ADL routine and family training. Family training included but was not limited to: safety in transfers, appropriate level of assist, equipment training, cindy techniques, and demonstration for patients current level of function. Patient was able to SPT with minimal assist.   Pain denied during session. Collaborated with Yanelis RODRIGUEZ and confirmed patient is on track to reach goals as documented in the care plan. Patient tolerated session well, but strength, balance, activity tolerance, ROM, safe techniques, memory are still below baseline and requires skilled facilitation to successfully and safely complete ADL's and transfers. Patient ended session in w/c with call remote, breakfast, and phone within reach.      Nicole Eng OTR

## 2017-10-09 NOTE — PROGRESS NOTES
PHYSICAL THERAPY DAILY NOTE  Time In: 1300  Time Out: 1392  Patient Seen For: PM;Other (see progress notes); Therapeutic exercise;Gait training;Transfer training    Subjective: Patient had no complaints. \" I want to walk again. \"         Objective: no pain noted. Other (comment) (falls)  GROSS ASSESSMENT Daily Assessment            BED/MAT MOBILITY Daily Assessment    Supine to Sit : 4 (Minimal assistance)  Sit to Supine : 4 (Minimal assistance)       TRANSFERS Daily Assessment    Transfer Type: SPT without device  Transfer Assistance : 3 (Moderate assistance )  Sit to Stand Assistance: Moderate assistance       GAIT Daily Assessment   Worked on weight shifting to bear more weight thru right leg to improve transfers. Due to being impulsive patient requires a lot of verbal 1 step cues to perform correctly. She requires ace wrap around her knee for recurvatum and another ace wrap to assist DF. May need AFO for transfers at discharge. Amount of Assistance: 2 (Maximal assistance)  Distance (ft): 10 Feet (ft)  Assistive Device: Gait belt; Other (comment) (in II bars and using theraband and ace wrap for knee and to assist DF)       STEPS or STAIRS Daily Assessment    Level of Assist : 0 (Not tested)       BALANCE Daily Assessment            WHEELCHAIR MOBILITY Daily Assessment             LOWER EXTREMITY EXERCISES Daily Assessment   Patient working hard. She has been able to advance her right leg today with gait. Worked on weight shifting in standing thru her right leg. In II bars working on step thru gait to weight shift more on her right leg.  Extremity: Both  Exercise Type #1: Other (comment) (pregait standing exs in II bars.)  Sets Performed: 4  Reps Performed: 0  Level of Assist: Maximum assistance  Exercise Type #2: Other (comment) (nustep x 10 min with brace for hand and leg on nustep)  Sets Performed: 1  Reps Performed: 10  Level of Assist: Supervision          Assessment: Patient making progress with advancing right leg. She seems less frustrated with therapy and trying hard. She would benefit from further therapy to be more independent for ADLs in the home with caregiver. Patient has potential to become more independent with self care . Plan of Care: Continue with plan of care to reach PT goals. Returned to room to bed with call bell at reach. Night splint to right foot to stretch heel cord.     Donte Artis, MICHAEL  10/9/2017

## 2017-10-09 NOTE — PROGRESS NOTES
Jade Hastings MD,   Medical Director  [address] [facility], [address] [facility]  Tel: [phone]       [facility] PROGRESS NOTE    Tobias Artis  Admit Date: 9/15/2017  Admit Diagnosis: stroke, left brain involvement;ICH (intracerebral hemorrhag*    Subjective     Patients , Asencion Nageotte, is here this a.m. Pt showered with OT instruction and 's assistance. No pain. Affect bright. Denies headache or dizziness    Objective:     Current Facility-Administered Medications   Medication Dose Route Frequency    potassium chloride (K-DUR, KLOR-CON) SR tablet 20 mEq  20 mEq Oral DAILY    senna-docusate (PERICOLACE) 8.6-50 mg per tablet 1 Tab  1 Tab Oral DAILY    polyethylene glycol (MIRALAX) packet 17 g  17 g Oral DAILY    methylphenidate HCl (RITALIN) tablet 5 mg  5 mg Oral BID    labetalol (NORMODYNE) tablet 100 mg  100 mg Oral BID    losartan/hydroCHLOROthiazide (HYZAAR) 100/12.5 mg   Oral DAILY    tiZANidine (ZANAFLEX) tablet 2 mg  2 mg Oral QHS    acetaminophen (TYLENOL) tablet 500 mg  500 mg Per NG tube Q4H PRN    alum-mag hydroxide-simeth (MYLANTA) oral suspension 30 mL  30 mL Oral Q4H PRN    bisacodyl (DULCOLAX) suppository 10 mg  10 mg Rectal DAILY PRN    hydrALAZINE (APRESOLINE) tablet 50 mg  50 mg Oral QID PRN    lip protectant (BLISTEX) ointment   Topical PRN    ondansetron (ZOFRAN ODT) tablet 4 mg  4 mg Oral Q6H PRN    sodium phosphate (FLEET'S) enema 118 mL  1 Enema Rectal PRN    traMADol (ULTRAM) tablet 50 mg  50 mg Oral Q6H PRN    traZODone (DESYREL) tablet 50 mg  50 mg Oral QHS PRN    tamsulosin (FLOMAX) capsule 0.4 mg  0.4 mg Oral QHS     Review of Systems:Denies chest pain, shortness of breath, cough, headache, visual problems, abdominal pain, dysurea, calf pain. Pertinent positives are as noted in the medical records and unremarkable otherwise.      Visit Vitals    /81    Pulse 73    Temp 98.3 °F (36.8 °C)    Resp 18    SpO2 99%        Physical Exam:   General: Alert and Ox3 with one vc  No acute cardio respiratory distress. HEENT: Normocephalic,no scleral icterus  Oral mucosa moist without cyanosis. Right lower facial weakness   Lungs: Clear to auscultation  bilaterally. Respiration even and unlabored   Heart: Regular rate and rhythm, S1, S2   No  murmurs, clicks, rub or gallops   Abdomen: Soft, non-tender, nondistended. Bowel sounds present. No organomegaly. Genitourinary: Benign . Neuromuscular:      Has consistent shoulder retraction, biceps and tric on the right 1+  RLE adduction 3+, quad 1+/2-  Right neglect and still with decreased light touch right hemibody; but improving compensatory strategies  Difficulties with 2 step commands without vcs   Skin/extremity: No rashes, no erythema.  No calf tenderness BLE  No edema                                                                            Functional Assessment:  Gross Assessment  AROM: Grossly decreased, non-functional (RLE) (10/07/17 1300)  PROM: Within functional limits (10/07/17 1300)  Strength: Grossly decreased, non-functional (RLE) (10/07/17 1300)  Coordination: Grossly decreased, non-functional (RLE) (10/07/17 1300)  Tone: Abnormal (09/30/17 1200)  Sensation: Impaired (09/30/17 1200)       Balance  Sitting - Static: Good (unsupported) (10/07/17 1300)  Sitting - Dynamic: Good (unsupported) (10/07/17 1300)  Standing - Static: Fair (with support) (10/07/17 1300)  Standing - Dynamic : Impaired (10/07/17 1300)           Toileting  Adaptive Equipment: Elevated seat;Walker (09/29/17 1534)         Elly Yeager Fall Risk Assessment:  Elly Yeager Fall Risk  Mobility: Ambulates or transfers with assist devices or assistance/unsteady gait (10/08/17 2033)  Mobility Interventions: Utilize walker, cane, or other assitive device (10/08/17 2033)  Mentation: Alert, oriented x 3 (10/08/17 2033)  Mentation Interventions: Door open when patient unattended (10/08/17 2033)  Medication: Patient receiving anticonvulsants, sedatives(tranquilizers), psychotropics or hypnotics, hypoglycemics, narcotics, sleep aids, antihypertensives, laxatives, or diuretics (10/08/17 2033)  Medication Interventions: Patient to call before getting OOB (10/08/17 2033)  Elimination: Needs assistance with toileting (10/08/17 2033)  Elimination Interventions: Call light in reach (10/08/17 2033)  Prior Fall History: No (10/08/17 2033)  History of Falls Interventions: Door open when patient unattended (10/08/17 2033)  Total Score: 3 (10/08/17 2033)  Standard Fall Precautions: Yes (10/08/17 0014)  High Fall Risk: Yes (10/08/17 2033)     Speech Assessment:         Ambulation:  Gait  Base of Support: Narrowed; Center of gravity altered (09/22/17 1200)  Speed/Lulu: Delayed (09/22/17 1200)  Step Length: Right shortened (09/22/17 1200)  Distance (ft): 20 Feet (ft) (10/05/17 1400)  Assistive Device: Gait belt; Other (comment) (Parallel bars) (10/06/17 1200)     Labs/Studies:  Recent Results (from the past 72 hour(s))   URINALYSIS W/ RFLX MICROSCOPIC    Collection Time: 10/06/17  3:23 PM   Result Value Ref Range    Color YELLOW      Appearance CLOUDY      Specific gravity 1.007 1.001 - 1.023      pH (UA) 7.0 5.0 - 9.0      Protein NEGATIVE  NEG mg/dL    Glucose NEGATIVE  mg/dL    Ketone NEGATIVE  NEG mg/dL    Bilirubin NEGATIVE  NEG      Blood NEGATIVE  NEG      Urobilinogen 1.0 0.2 - 1.0 EU/dL    Nitrites NEGATIVE  NEG      Leukocyte Esterase NEGATIVE  NEG     CULTURE, URINE    Collection Time: 10/06/17  3:23 PM   Result Value Ref Range    Special Requests: NO SPECIAL REQUESTS      Culture result:        10,000 to 50,000 COLONIES/mL MIXED SKIN JOSEFA ISOLATED       Assessment:     Problem List as of 10/9/2017  Date Reviewed: 9/12/2017          Codes Class Noted - Resolved    Thrombocytopenia ([facility] ICD-10-CM: D69.6  ICD-9-CM: 287.5  9/25/2017 - Present        Pulmonary emboli ([facility] ICD-10-CM: I26.99  ICD-9-CM: 415.19  9/25/2017 - Present        DVT (deep venous thrombosis) (HCC) ICD-10-CM: I82.409  ICD-9-CM: 453.40  9/25/2017 - Present        ICH (intracerebral hemorrhage) ([facility] ICD-10-CM: I61.9  ICD-9-CM: 337  9/15/2017 - Present        Hypertensive urgency, malignant ICD-10-CM: I16.0  ICD-9-CM: 401.0  9/11/2017 - Present        Stroke, hemorrhagic ([facility] ICD-10-CM: I61.9  ICD-9-CM: 188  9/7/2017 - Present        Accelerated hypertension ICD-10-CM: I10  ICD-9-CM: 401.0  9/7/2017 - Present        At risk for aspiration (Chronic) ICD-10-CM: Z91.89  ICD-9-CM: V49.89  9/7/2017 - Present        Acute spontaneous intraventricular hemorrhage assoc w/ hypertension ([facility] ICD-10-CM: I61.5, I10  ICD-9-CM: 111, 401.9  9/7/2017 - Present        Screening for breast cancer ICD-10-CM: Z12.31  ICD-9-CM: V76.10  2/27/2017 - Present        Atrophic vaginitis ICD-10-CM: N95.2  ICD-9-CM: 627.3  7/11/2016 - Present        HTN (hypertension) (Chronic) ICD-10-CM: I10  ICD-9-CM: 401.9  10/23/2015 - Present        ADD (attention deficit disorder) (Chronic) ICD-10-CM: F98.8  ICD-9-CM: 314.00  10/23/2015 - Present        Depression ICD-10-CM: F32.9  ICD-9-CM: 302  10/23/2015 - Present        Anxiety (Chronic) ICD-10-CM: F41.9  ICD-9-CM: 300.00  10/23/2015 - Present        RESOLVED: Hypoxemia ICD-10-CM: R09.02  ICD-9-CM: 799.02  9/10/2017 - 9/12/2017        RESOLVED: Acute respiratory failure ([facility] ICD-10-CM: J96.00  ICD-9-CM: 518.81  9/8/2017 - 9/10/2017        RESOLVED: Hemorrhagic stroke ([facility] ICD-10-CM: I61.9  ICD-9-CM: 373  9/7/2017 - 9/7/2017        RESOLVED: Non-intractable vomiting with nausea ICD-10-CM: R11.2  ICD-9-CM: 787.01  9/7/2017 - 9/12/2017        RESOLVED: Seborrheic keratosis, inflamed ICD-10-CM: L82.0  ICD-9-CM: 702.11  7/11/2016 - 9/20/2016        RESOLVED: Cough ICD-10-CM: R05  ICD-9-CM: 786.2  7/11/2016 - 9/20/2016        RESOLVED: Shoulder pain ICD-10-CM: M25.519  ICD-9-CM: 719.41  10/23/2015 - 2/27/2017             Left BG Hemorrhage with intraventricular extension due to HTN emergency; resultant R hemiplegia, right neglect, dysarthria, aphasia, dysphagia with significant decline in mobility and self care  Plan:   Continue daily physician medical management:  Pneumonia prophylaxis- Incentive spirometer every hour while awake. Will need instruction and assistance. Not sure if she can get a proper oral seal to be effective  -10/9 no pulmonary issues      Dysphagia; Pureed diet with NTL; at risk for malnutrition and dehydration. PICC line removed prior to transfer; may need a line for IVFs if BUN continues to increase. Low K, Ca; check mg. Supplementation as needed. Na elevated  -mag ok, K much improved; cont to supplement while on diuretic  -9/19 replace K; 3.0; 2.8 this am 9/20; inc supplement; may need to add to IVFs  -9/25 fluid status and K nl; 9/28 cont NTL; high risk aspiration; still needs supervision /assist with meals  -10/2 MBS today; thin liq!!  -10/9 po intake, especially fluids, has improved; feeding self      Thrombocytopenia; has been trending down for no apparent reason. hgb slt down as well. Has not been on Hep products; consult hematology today 9/19  -plts 40K, continues to trend down 9/20; await consult by hematology. ? [facility] [address] [facility] [address] [facility] [address] Mild anemia as well  Check HIT panel, d dimer , fibrinogen  -9/21 plts 36k ; HIT PROFILE POSITIVE, D DIMER ELEVATED 32.79, FIBRINOGEN  ; consistent with DIC; Etiology unclear  WILL D/W HEMATOLOGY; LFTs ok, no hx of blood transfusion,no hx pancreatitis, no sign of bacteremia. Has not been on any heparin products. Can see in head injury; has ICH. Cannot r/o blood disorder (leukemia)   per hematology \"Recommend transfusing platelets for <85,817 and Cryoprecipitate for fibrinogen < 100. \"  -MARK pending  -9/22 bilateral LE venous duplex ordered; low suspicion but at risk and having calf tenderness  -9/22 plts improved today; monitor closely; 9/23 small right peroneal thrombus.  No antic per hematology. IR refused/advised against placing IVF due to low risk of clot propagation given size and location  -9/23 counts bumping up; pts now 53k from 45 and previously 36  -9/24 plts cont to improve. 69K. MARK still pending; can resume therapies  9/26 Ddimer pending, fibrinogen now nl  -9/27 D dimer 11+, improved. Per Hematology; likely due to consumption coagulopathy. Will monitor  -10/2 labs have improved; MARK + 68% confirming dx of HIT; 10/4 recheck cbc (last done 9/28)    -10/5 plts 263; 10/9 plts 283      Bilateral PEs 9/25, filter placed in IR; looks good today. sats good without O2 supplement 94-97% sats; d/w Hematology, no agaratroban  -apprec Pulm assessment. Clinically looks good and no longer symptomatic  -9/28 clinically stable, asymptomatic ; 10/3 stable. No sob, no O2 supplements; 10/9 remains asymptomatic      Hypernatremia; last head CT did not show significant cerebral edema. Clinically improving. Likely due to prerenal azotemia; 9/15 MAY REQ isotonic / hypotonic saline IV;  -9/18 events of weekend noted; Na 156; asymptomatic, on 0.5 dextrose with 1/4 Na; na q 6hrs; pts serum osmolality was normal. Will check urine Na and urine osmolality; depending on findings, will consider consulting Hospitalist vs Nephrology  Neuro improving despite this  -9/19 Na down to 149, urine osmo nl, serum osmo min hi; cont dex/and 1/4 NS; repeat in am. Not DI as uop not increased  -9/20 Na 147; cont fluids; 9/21 not resulted; will stop fluids when Na below 142  -Na 140 9/22; dc IVFs; 9/25 136; 9/27 136; recheck 10/4 134; mildly low; f/u 10/9 pending      ICH/IVH; 9/22 clinically improves daily. F/u HCT yest due to transient left eye visual disturbance. Overall, resolving hemorrhage. There is more pronounced edema; expected.  Ventricular size now nl      Anemia ; 9.6-10/ stable; recheck 10/9 10.5 improved       DVT risk / DVT Prophylaxis- Will require daily physician exam to assess for signs and symptoms as patient is at increased risk for of thromboembolism. Mobilization as tolerated. Intermittent pneumatic compression devices when in bed Thigh-high or knee-high thromboembolic deterrent hose when out of bed. NO  anticoag due to ICH/HIT; has right small right peroneal vein; 9/25 spoke with IR again today; more risk of putting in IVC filter than benefits especially since she is asymptomatic with no swelling or tenderness and small size of clot  -10/3 no edema, right calf soft. No SOB  -10/9 no calf swelling or tenderness      Pain Control: stable, mild-to-moderate joint symptoms intermittently, reasonably well controlled by PRN meds. Will require regular pain assessment and comprenhensive pain management,       Wound Care: Monitor wound status daily per staff and physician. At risk for failure. Will require 24/7 rehab nursing. None needed at this time      Hypertension - BP uncontrolled, fluctuating, managed medically. Add prn hydralazine for sbp> 180. Goal SBP is 140-160 given ICH. Cont Normodyne, norvasc and hyzaar; consider Verapamil or scheduled Hydralazine  -9/18 BP increased 169/95; add hydralazine 25 tid  -9/20 BP much improved  -9/22 /74; 9/24 bp stable; may dec hydralazine to bid  -9/25 dec hydralazine  -9/27 bp 116-119  -9/28 SBP in the 90s to 116, asymptomatic; dc hydralazine and monitor; decrease Normodyne to bid  -9/29 BP still low; lower Norvasc and normodyne, cont Hyzaar; all have parameters of when to hold  - 9/30 SBP overnight and this am 108-111, will d/c norvasc, on labetolol and losartan/HCTZ , HR acceptable  -10/2 115/71; controlled. Cont current meds  -130-151/77; 10/5 118/77; 10/6 BP stable; goal 120-130 given ICH  -10/9 148/81; add low dose Norvasc; ideally want less than 130      Mild hyperglycemia, likely stress induced vs borderline diabetes; monitor loosely  -9/27 bs 145 on BMP; follow bs qam x 3d; no issues      Dysphagia; cont NTL, mech soft; hopefully can upgrade liquids soon 9/25  -9/29 remains on NTL.  MBS scheduled for 10/2.  -10/3 now on THINS! !      Leukocytosis; recheck in a.m. Check UA due to stafford. No pulmonary signs (had neg CXR today), no s/s of infxn at old PICC site, no wounds, afebrile. Watch monocytes. -9/18 12.2 improving. Urine neg  -9/19 up to 13.2, ? Reactive. No source of infxn identified. Afebrile  -9/20 WBC now normal      Urinary retention/ neurogenic bladder - start flomax but continue stafford until mobility improves a bit. Strict I/o's  -9/20 keeping stafford to monitor UOP until Na normalizes. No significant output to suggest DI  -9/21 dc stafford; cont Flomax, follow bladder scan  9/22 voiding without issue; incontinence but no retention  -9/28 new onset urinary retention; check UA  -9/29 UA neg but cx saying > 100K gram neg cocci; will need to f/u ID/sens; Start Cipro 500 bid  - 9/30 preliminary urine cx >110K mixed skin denis  -10/2 stop Cipro; cx nl denis  -10/6 recurrent urinary retention. No flomax; re check UA, consider urecholine. CIC for PVRs      bowel program - add prn meds; incontinent, want to keep stool on the firmer side. Monitor skin.       GERD - add a PPI. At times may need additional antacids, Maalox prn.      -continues to benefit from and is showing improvements in a multidisc team approach. Participating well. 10/5 progress slow; 10/6 OT is seeing improvements. Starting to get movement back into the right arm. Awareness improving  -10/6 add ritalin for attn and distractibility   -10/9 family reports increased attn with them; await therapy reports        9/21   Addendum; in therapy this afternoon, transient left eye blindness. Will f/u head CT for extension of or new ICH, especially with low plts   Results  1. Interval decrease in size of intraventricular hemorrhage within the left  basal ganglia/thalamus with near complete resolution of intraventricular  hemorrhage. Surrounding edema is more pronounced with slight increase in  left-to-right midline shift.   2. Interval decrease in ventricular size, approaching normal in caliber.      10/2 Ms Carolyn Greene continues to progress with therapies and has made slow steady improvements. Her rehab course has been complicated by multiple medical issues; HIT, DIC, DVT, PE. Family has not yet been involved in care and home has not been assessed. Insurance claims to be covering thru 10/3 only. Pt is not ready to dc. We are still monitoring labs closely, daily clinical eval for further thrombosis, BP mgt with adjustment of meds etc. Spoke with primary therapists who feel discharge would be premature and not in the pts best interest especially given young age and previous good health leading to a better outcome with aggressive rehab. From the beginning, we said our ELOS was 6 wks. We are only at 2 1/2 wks. Will do peer to peer/ 10/3 pts stay extended to next Monday but Srikanth open to team conference notes this week for extension of stay based on progress. If not given more time, she will require SNF placement.        - FAMILY [id number] REGARDING PROGRESS, NEEDS, DC PLAN; family overwhelmed with decision that need to be made. Family training Monday in case insurance denies as of 10/10    10/9 will await [employer]'s decision; in order to dc home, as of 10/5 we were asking for 2-3 more weeks since she is improving , albeit slowly. If a further stay is denied; will need SNF; family not prepared to bring home               Time spent was 35 minutes with over 1/2 in direct patient care/examination, consultation and coordination of care.      Signed By: Ernie Ortega MD     October 9, 2017

## 2017-10-09 NOTE — PROGRESS NOTES
PHYSICAL THERAPY DAILY NOTE  Time In: 0830  Time Out: 6804  Patient Seen For: AM;Transfer training;Gait training; Therapeutic exercise; Other (see progress notes); Family training    Subjective:  present for training. Patient had no complaints. Objective:  No pain noted. Other (comment) (falls)  GROSS ASSESSMENT Daily Assessment            BED/MAT MOBILITY Daily Assessment    Supine to Sit : 4 (Contact guard assistance)  Sit to Supine : 4 (Minimal assistance)       TRANSFERS Daily Assessment   Patient impulsive at times. Instructed  in SPT using gait belt.  preformed transfer with assistance from therapist.  Transfer Type: SPT without device  Transfer Assistance : 3 (Moderate assistance )  Sit to Stand Assistance: Moderate assistance       GAIT Daily Assessment   Patient able to advance right leg today during gait. Used ace wrap and theraband to assist DF and to give some proprieception to right leg. Amount of Assistance: 2 (Maximal assistance)  Distance (ft): 20 Feet (ft)  Assistive Device: Gait belt;Walker cindy       STEPS or STAIRS Daily Assessment    Level of Assist : 0 (Not tested)       BALANCE Daily Assessment            WHEELCHAIR MOBILITY Daily Assessment            LOWER EXTREMITY EXERCISES Daily Assessment   Worked in supine. She performs full SAQs on right leg. Has hip abd /add in right leg with min assist due to weakness. Her core strength is good which assists with bed mobility and transfers. Extremity: Both  Exercise Type #1: Supine lower extremity strengthening  Sets Performed: 1  Reps Performed: 20  Level of Assist: Maximum assistance          Assessment: Patient did seem more motivated with  present today. She was able to advance right foot forward with mod assist to weight shift to the left. Plan of Care: Continue with plan of care to reach PT goals. Returned to room to bed for speech therapy.      Donte Artis PTA  10/9/2017

## 2017-10-10 PROCEDURE — 99232 SBSQ HOSP IP/OBS MODERATE 35: CPT | Performed by: PHYSICAL MEDICINE & REHABILITATION

## 2017-10-10 PROCEDURE — 97530 THERAPEUTIC ACTIVITIES: CPT

## 2017-10-10 PROCEDURE — 74011250637 HC RX REV CODE- 250/637: Performed by: PHYSICAL MEDICINE & REHABILITATION

## 2017-10-10 PROCEDURE — 97116 GAIT TRAINING THERAPY: CPT

## 2017-10-10 PROCEDURE — 97110 THERAPEUTIC EXERCISES: CPT

## 2017-10-10 PROCEDURE — 90837 PSYTX W PT 60 MINUTES: CPT | Performed by: PSYCHOLOGIST

## 2017-10-10 PROCEDURE — 65310000000 HC RM PRIVATE REHAB

## 2017-10-10 PROCEDURE — 92507 TX SP LANG VOICE COMM INDIV: CPT

## 2017-10-10 PROCEDURE — 97112 NEUROMUSCULAR REEDUCATION: CPT

## 2017-10-10 PROCEDURE — 97535 SELF CARE MNGMENT TRAINING: CPT

## 2017-10-10 RX ADMIN — AMLODIPINE BESYLATE 2.5 MG: 5 TABLET ORAL at 09:16

## 2017-10-10 RX ADMIN — TAMSULOSIN HYDROCHLORIDE 0.4 MG: 0.4 CAPSULE ORAL at 21:26

## 2017-10-10 RX ADMIN — LABETALOL HYDROCHLORIDE 100 MG: 100 TABLET, FILM COATED ORAL at 09:16

## 2017-10-10 RX ADMIN — TRAZODONE HYDROCHLORIDE 50 MG: 50 TABLET ORAL at 21:26

## 2017-10-10 RX ADMIN — METHYLPHENIDATE HYDROCHLORIDE 5 MG: 10 TABLET ORAL at 15:48

## 2017-10-10 RX ADMIN — HYDROCHLOROTHIAZIDE: 12.5 CAPSULE ORAL at 09:15

## 2017-10-10 RX ADMIN — LABETALOL HYDROCHLORIDE 100 MG: 100 TABLET, FILM COATED ORAL at 17:38

## 2017-10-10 RX ADMIN — STANDARDIZED SENNA CONCENTRATE AND DOCUSATE SODIUM 1 TABLET: 8.6; 5 TABLET, FILM COATED ORAL at 09:16

## 2017-10-10 RX ADMIN — TIZANIDINE 2 MG: 2 TABLET ORAL at 20:14

## 2017-10-10 RX ADMIN — METHYLPHENIDATE HYDROCHLORIDE 5 MG: 10 TABLET ORAL at 09:15

## 2017-10-10 NOTE — PROGRESS NOTES
Clinical update faxed to insurance company requesting additional rehab days. Received fax from Margy Cee with [employer]. Pt approved for 7 additional days with next update due on Oct 17; discussed with pt and spouse. Both were pleased with extension. Spouse to notify the other family members. Continue to follow.

## 2017-10-10 NOTE — PROGRESS NOTES
Chiara Liu MD,   Medical Director  [address] [facility], [address] [facility]  Tel: [phone]       [facility] PROGRESS NOTE    Mable Russ  Admit Date: 9/15/2017  Admit Diagnosis: stroke, left brain involvement;ICH (intracerebral hemorrhag*    Subjective      spent the day yesterday; great improvements across the board in therapies. Denies ha, cp, sob. Affect bright    Objective:     Current Facility-Administered Medications   Medication Dose Route Frequency    amLODIPine (NORVASC) tablet 2.5 mg  2.5 mg Oral DAILY    potassium chloride (K-DUR, KLOR-CON) SR tablet 20 mEq  20 mEq Oral DAILY    senna-docusate (PERICOLACE) 8.6-50 mg per tablet 1 Tab  1 Tab Oral DAILY    polyethylene glycol (MIRALAX) packet 17 g  17 g Oral DAILY    methylphenidate HCl (RITALIN) tablet 5 mg  5 mg Oral BID    labetalol (NORMODYNE) tablet 100 mg  100 mg Oral BID    losartan/hydroCHLOROthiazide (HYZAAR) 100/12.5 mg   Oral DAILY    tiZANidine (ZANAFLEX) tablet 2 mg  2 mg Oral QHS    acetaminophen (TYLENOL) tablet 500 mg  500 mg Per NG tube Q4H PRN    alum-mag hydroxide-simeth (MYLANTA) oral suspension 30 mL  30 mL Oral Q4H PRN    bisacodyl (DULCOLAX) suppository 10 mg  10 mg Rectal DAILY PRN    hydrALAZINE (APRESOLINE) tablet 50 mg  50 mg Oral QID PRN    lip protectant (BLISTEX) ointment   Topical PRN    ondansetron (ZOFRAN ODT) tablet 4 mg  4 mg Oral Q6H PRN    sodium phosphate (FLEET'S) enema 118 mL  1 Enema Rectal PRN    traMADol (ULTRAM) tablet 50 mg  50 mg Oral Q6H PRN    traZODone (DESYREL) tablet 50 mg  50 mg Oral QHS PRN    tamsulosin (FLOMAX) capsule 0.4 mg  0.4 mg Oral QHS     Review of Systems:Denies chest pain, shortness of breath, cough, headache, visual problems, abdominal pain, dysurea, calf pain. Pertinent positives are as noted in the medical records and unremarkable otherwise.      Visit Vitals    /77    Pulse 65    Temp 98.8 °F (37.1 °C)    Resp 16    SpO2 95%        Physical Exam:   General: Alert and age appropriately oriented. No acute cardio respiratory distress. HEENT: Normocephalic,no scleral icterus  Oral mucosa moist without cyanosis   Lungs: Clear to auscultation  bilaterally. Respiration even and unlabored   Heart: Regular rate and rhythm, S1, S2   No  murmurs, clicks, rub or gallops   Abdomen: Soft, non-tender, nondistended. Bowel sounds present. No organomegaly. Genitourinary: Benign . Neuromuscular:      Right hemiplegia; now with prox RUE movement and increased quads RLE  expr aphasia, right neglect but tends to compensate better   Skin/extremity: No rashes, no erythema.  No calf tenderness BLE  No periph edema                                                                            Functional Assessment:  Gross Assessment  AROM: Grossly decreased, non-functional (RLE) (10/07/17 1300)  PROM: Within functional limits (10/07/17 1300)  Strength: Grossly decreased, non-functional (RLE) (10/07/17 1300)  Coordination: Grossly decreased, non-functional (RLE) (10/07/17 1300)  Tone: Abnormal (09/30/17 1200)  Sensation: Impaired (09/30/17 1200)       Balance  Sitting - Static: Good (unsupported) (10/07/17 1300)  Sitting - Dynamic: Good (unsupported) (10/07/17 1300)  Standing - Static: Fair (with support) (10/07/17 1300)  Standing - Dynamic : Impaired (10/07/17 1300)           Toileting  Adaptive Equipment: Elevated seat;Walker (09/29/17 1534)         Jackie Marsh Fall Risk Assessment:  Jackie Marsh Fall Risk  Mobility: Ambulates or transfers with assist devices or assistance/unsteady gait (10/09/17 1926)  Mobility Interventions: Bed/chair exit alarm (10/09/17 1926)  Mentation: Alert, oriented x 3 (10/09/17 1926)  Mentation Interventions: Adequate sleep, hydration, pain control (10/09/17 1926)  Medication: Patient receiving anticonvulsants, sedatives(tranquilizers), psychotropics or hypnotics, hypoglycemics, narcotics, sleep aids, antihypertensives, laxatives, or diuretics (10/09/17 1926)  Medication Interventions: Patient to call before getting OOB (10/09/17 1926)  Elimination: Needs assistance with toileting (10/09/17 1926)  Elimination Interventions: Call light in reach (10/09/17 1926)  Prior Fall History: No (10/09/17 1926)  History of Falls Interventions: Door open when patient unattended (10/09/17 1926)  Total Score: 3 (10/09/17 1926)  Standard Fall Precautions: Yes (10/08/17 0014)  High Fall Risk: Yes (10/09/17 1926)     Speech Assessment:         Ambulation:  Gait  Base of Support: Narrowed; Center of gravity altered (09/22/17 1200)  Speed/Lulu: Delayed (09/22/17 1200)  Step Length: Right shortened (09/22/17 1200)  Distance (ft): 10 Feet (ft) (10/09/17 1651)  Assistive Device: Gait belt; Other (comment) (in II bars and using theraband and ace wrap for knee and to assist DF) (10/09/17 1651)     Labs/Studies:  Recent Results (from the past 72 hour(s))   CBC W/O DIFF    Collection Time: 10/09/17  8:34 AM   Result Value Ref Range    WBC 4.6 4.3 - 11.1 K/uL    RBC 3.67 (L) 4.05 - 5.25 M/uL    HGB 10.5 (L) 11.7 - 15.4 g/dL    HCT 30.5 (L) 35.8 - 46.3 %    MCV 83.1 79.6 - 97.8 FL    MCH 28.6 26.1 - 32.9 PG    MCHC 34.4 31.4 - 35.0 g/dL    RDW 13.3 11.9 - 14.6 %    PLATELET 550 057 - 190 K/uL    MPV 8.8 (L) 10.8 - 03.1 FL   METABOLIC PANEL, BASIC    Collection Time: 10/09/17  8:34 AM   Result Value Ref Range    Sodium 136 136 - 145 mmol/L    Potassium 4.0 3.5 - 5.1 mmol/L    Chloride 100 98 - 107 mmol/L    CO2 29 21 - 32 mmol/L    Anion gap 7 7 - 16 mmol/L    Glucose 115 (H) 65 - 100 mg/dL    BUN 11 8 - 23 MG/DL    Creatinine 0.58 (L) 0.6 - 1.0 MG/DL    GFR est AA >60 >60 ml/min/1.73m2    GFR est non-AA >60 >60 ml/min/1.73m2    Calcium 8.7 8.3 - 10.4 MG/DL       Assessment:     Problem List as of 10/10/2017  Date Reviewed: 9/12/2017          Codes Class Noted - Resolved    Thrombocytopenia ([facility] ICD-10-CM: D69.6  ICD-9-CM: 287.5  9/25/2017 - Present        Pulmonary emboli [facility]) ICD-10-CM: I26.99  ICD-9-CM: 415.19  9/25/2017 - Present        DVT (deep venous thrombosis) ([facility] ICD-10-CM: I82.409  ICD-9-CM: 453.40  9/25/2017 - Present        ICH (intracerebral hemorrhage) ([facility] ICD-10-CM: I61.9  ICD-9-CM: 132  9/15/2017 - Present        Hypertensive urgency, malignant ICD-10-CM: I16.0  ICD-9-CM: 401.0  9/11/2017 - Present        Stroke, hemorrhagic ([facility] ICD-10-CM: I61.9  ICD-9-CM: 465  9/7/2017 - Present        Accelerated hypertension ICD-10-CM: I10  ICD-9-CM: 401.0  9/7/2017 - Present        At risk for aspiration (Chronic) ICD-10-CM: Z91.89  ICD-9-CM: V49.89  9/7/2017 - Present        Acute spontaneous intraventricular hemorrhage assoc w/ hypertension ([facility] ICD-10-CM: I61.5, I10  ICD-9-CM: 779, 401.9  9/7/2017 - Present        Screening for breast cancer ICD-10-CM: Z12.31  ICD-9-CM: V76.10  2/27/2017 - Present        Atrophic vaginitis ICD-10-CM: N95.2  ICD-9-CM: 627.3  7/11/2016 - Present        HTN (hypertension) (Chronic) ICD-10-CM: I10  ICD-9-CM: 401.9  10/23/2015 - Present        ADD (attention deficit disorder) (Chronic) ICD-10-CM: F98.8  ICD-9-CM: 314.00  10/23/2015 - Present        Depression ICD-10-CM: F32.9  ICD-9-CM: 692  10/23/2015 - Present        Anxiety (Chronic) ICD-10-CM: F41.9  ICD-9-CM: 300.00  10/23/2015 - Present        RESOLVED: Hypoxemia ICD-10-CM: R09.02  ICD-9-CM: 799.02  9/10/2017 - 9/12/2017        RESOLVED: Acute respiratory failure ([facility] ICD-10-CM: J96.00  ICD-9-CM: 518.81  9/8/2017 - 9/10/2017        RESOLVED: Hemorrhagic stroke ([facility] ICD-10-CM: I61.9  ICD-9-CM: 679  9/7/2017 - 9/7/2017        RESOLVED: Non-intractable vomiting with nausea ICD-10-CM: R11.2  ICD-9-CM: 787.01  9/7/2017 - 9/12/2017        RESOLVED: Seborrheic keratosis, inflamed ICD-10-CM: L82.0  ICD-9-CM: 702.11  7/11/2016 - 9/20/2016        RESOLVED: Cough ICD-10-CM: R05  ICD-9-CM: 786.2  7/11/2016 - 9/20/2016        RESOLVED: Shoulder pain ICD-10-CM: M25.519  ICD-9-CM: 719.41  10/23/2015 - 2/27/2017                      Left BG Hemorrhage with intraventricular extension due to HTN emergency; resultant R hemiplegia, right neglect, dysarthria, aphasia, dysphagia with significant decline in mobility and self care  Plan:   Continue daily physician medical management:  Pneumonia prophylaxis- Incentive spirometer every hour while awake. Will need instruction and assistance. Not sure if she can get a proper oral seal to be effective  -10/9 no pulmonary issues      Dysphagia; Pureed diet with NTL; at risk for malnutrition and dehydration. PICC line removed prior to transfer; may need a line for IVFs if BUN continues to increase. Low K, Ca; check mg. Supplementation as needed. Na elevated  -mag ok, K much improved; cont to supplement while on diuretic  -9/19 replace K; 3.0; 2.8 this am 9/20; inc supplement; may need to add to IVFs  -9/25 fluid status and K nl; 9/28 cont NTL; high risk aspiration; still needs supervision /assist with meals  -10/2 MBS today; thin liq!!  -10/9 po intake, especially fluids, has improved; feeding self      Thrombocytopenia; has been trending down for no apparent reason. hgb slt down as well. Has not been on Hep products; consult hematology today 9/19  -plts 40K, continues to trend down 9/20; await consult by hematology. ? reln to [address]. Mild anemia as well  Check HIT panel, d dimer , fibrinogen  -9/21 plts 36k ; HIT PROFILE POSITIVE, D DIMER ELEVATED 32.79, FIBRINOGEN  ; consistent with DIC; Etiology unclear  WILL D/W HEMATOLOGY; LFTs ok, no hx of blood transfusion,no hx pancreatitis, no sign of bacteremia. Has not been on any heparin products. Can see in head injury; has ICH. Cannot r/o blood disorder (leukemia)   per hematology \"Recommend transfusing platelets for <02,247 and Cryoprecipitate for fibrinogen < 100. \"  -MARK pending  -9/22 bilateral LE venous duplex ordered; low suspicion but at risk and having calf tenderness  -9/22 plts improved today; monitor closely; 9/23 small right peroneal thrombus. No antic per hematology. IR refused/advised against placing IVF due to low risk of clot propagation given size and location  -9/23 counts bumping up; pts now 53k from 45 and previously 36  -9/24 plts cont to improve. 69K. MARK still pending; can resume therapies  9/26 Ddimer pending, fibrinogen now nl  -9/27 D dimer 11+, improved. Per Hematology; likely due to consumption coagulopathy. Will monitor  -10/2 labs have improved; MARK + 68% confirming dx of HIT; 10/4 recheck cbc (last done 9/28)    -10/5 plts 263; 10/9 plts 283      Bilateral PEs 9/25, filter placed in IR; looks good today. sats good without O2 supplement 94-97% sats; d/w Hematology, no agaratroban  -apprec Pulm assessment. Clinically looks good and no longer symptomatic  -9/28 clinically stable, asymptomatic ; 10/3 stable. No sob, no O2 supplements; 10/9 remains asymptomatic      Hypernatremia; last head CT did not show significant cerebral edema. Clinically improving. Likely due to prerenal azotemia; 9/15 MAY REQ isotonic / hypotonic saline IV;  -9/18 events of weekend noted; Na 156; asymptomatic, on 0.5 dextrose with 1/4 Na; na q 6hrs; pts serum osmolality was normal. Will check urine Na and urine osmolality; depending on findings, will consider consulting Hospitalist vs Nephrology  Neuro improving despite this  -9/19 Na down to 149, urine osmo nl, serum osmo min hi; cont dex/and 1/4 NS; repeat in am. Not DI as uop not increased  -9/20 Na 147; cont fluids; 9/21 not resulted; will stop fluids when Na below 142  -Na 140 9/22; dc IVFs; 9/25 136; 9/27 136; recheck 10/4 134; mildly low; f/u 10/9 pending      ICH/IVH; 9/22 clinically improves daily. F/u HCT yest due to transient left eye visual disturbance. Overall, resolving hemorrhage. There is more pronounced edema; expected.  Ventricular size now nl      Anemia ; 9.6-10/ stable; recheck 10/9 10.5 improved       DVT risk / DVT Prophylaxis- Will require daily physician exam to assess for signs and symptoms as patient is at increased risk for of thromboembolism. Mobilization as tolerated. Intermittent pneumatic compression devices when in bed Thigh-high or knee-high thromboembolic deterrent hose when out of bed. NO  anticoag due to ICH/HIT; has right small right peroneal vein; 9/25 spoke with IR again today; more risk of putting in IVC filter than benefits especially since she is asymptomatic with no swelling or tenderness and small size of clot  -10/3 no edema, right calf soft. No SOB  -10/9 no calf swelling or tenderness      Pain Control: stable, mild-to-moderate joint symptoms intermittently, reasonably well controlled by PRN meds. Will require regular pain assessment and comprenhensive pain management,       Wound Care: Monitor wound status daily per staff and physician. At risk for failure. Will require 24/7 rehab nursing. None needed at this time      Hypertension - BP uncontrolled, fluctuating, managed medically. Add prn hydralazine for sbp> 180. Goal SBP is 140-160 given ICH. Cont Normodyne, norvasc and hyzaar; consider Verapamil or scheduled Hydralazine  -9/18 BP increased 169/95; add hydralazine 25 tid  -9/20 BP much improved  -9/22 /74; 9/24 bp stable; may dec hydralazine to bid  -9/25 dec hydralazine  -9/27 bp 116-119  -9/28 SBP in the 90s to 116, asymptomatic; dc hydralazine and monitor; decrease Normodyne to bid  -9/29 BP still low; lower Norvasc and normodyne, cont Hyzaar; all have parameters of when to hold  - 9/30 SBP overnight and this am 108-111, will d/c norvasc, on labetolol and losartan/HCTZ , HR acceptable  -10/2 115/71; controlled.  Cont current meds  -130-151/77; 10/5 118/77; 10/6 BP stable; goal 120-130 given ICH  -10/9 148/81; add low dose Norvasc; ideally want less than 130      Mild hyperglycemia, likely stress induced vs borderline diabetes; monitor loosely  -9/27 bs 145 on BMP; follow bs qam x 3d; no issues      Dysphagia; cont NTL, mech soft; hopefully can upgrade liquids soon 9/25  -9/29 remains on NTL. MBS scheduled for 10/2.  -10/3 now on THINS! !      Leukocytosis; recheck in a.m. Check UA due to stafford. No pulmonary signs (had neg CXR today), no s/s of infxn at old PICC site, no wounds, afebrile. Watch monocytes. -9/18 12.2 improving. Urine neg  -9/19 up to 13.2, ? Reactive. No source of infxn identified. Afebrile  -9/20 WBC now normal      Urinary retention/ neurogenic bladder - start flomax but continue stafford until mobility improves a bit. Strict I/o's  -9/20 keeping stafford to monitor UOP until Na normalizes. No significant output to suggest DI  -9/21 dc stafford; cont Flomax, follow bladder scan  9/22 voiding without issue; incontinence but no retention  -9/28 new onset urinary retention; check UA  -9/29 UA neg but cx saying > 100K gram neg cocci; will need to f/u ID/sens; Start Cipro 500 bid  - 9/30 preliminary urine cx >110K mixed skin denis  -10/2 stop Cipro; cx nl denis  -10/6 recurrent urinary retention. No flomax; re check UA, consider urecholine. CIC for PVRs      bowel program - add prn meds; incontinent, want to keep stool on the firmer side. Monitor skin.       GERD - add a PPI. At times may need additional antacids, Maalox prn.      -continues to benefit from and is showing improvements in a multidisc team approach. Participating well. 10/5 progress slow; 10/6 OT is seeing improvements. Starting to get movement back into the right arm. Awareness improving  -10/6 add ritalin for attn and distractibility   -10/9 family reports increased attn with them; await therapy reports        9/21   Addendum; in therapy this afternoon, transient left eye blindness. Will f/u head CT for extension of or new ICH, especially with low plts   Results  1. Interval decrease in size of intraventricular hemorrhage within the left  basal ganglia/thalamus with near complete resolution of intraventricular  hemorrhage. Surrounding edema is more pronounced with slight increase in  left-to-right midline shift. 2. Interval decrease in ventricular size, approaching normal in caliber.      10/2 Ms Anu Lamar continues to progress with therapies and has made slow steady improvements. Her rehab course has been complicated by multiple medical issues; HIT, DIC, DVT, PE. Family has not yet been involved in care and home has not been assessed. Insurance claims to be covering thru 10/3 only. Pt is not ready to dc. We are still monitoring labs closely, daily clinical eval for further thrombosis, BP mgt with adjustment of meds etc. Spoke with primary therapists who feel discharge would be premature and not in the pts best interest especially given young age and previous good health leading to a better outcome with aggressive rehab. From the beginning, we said our ELOS was 6 wks. We are only at 2 1/2 wks. Will do peer to peer/ 10/3 pts stay extended to next Monday but Srikanth open to team conference notes this week for extension of stay based on progress. If not given more time, she will require SNF placement.        - FAMILY AIKLANBWOV31/5 REGARDING PROGRESS, NEEDS, DC PLAN; family overwhelmed with decision that need to be made. Family training Monday in case insurance denies as of 10/10     10/9 will await [employer]'s decision; in order to dc home, as of 10/5 we were asking for 2-3 more weeks since she is improving , albeit slowly. If a further stay is denied; will need SNF; family not prepared to bring home  10/10 excellent progress made. Surprising to PT. Definitely have made a step forward and would benefit from ongoing acute inpt rehab to maximize recovery                 Time spent was 25 minutes with over 1/2 in direct patient care/examination, consultation and coordination of care.      Signed By: Lele Linares MD     October 10, 2017

## 2017-10-10 NOTE — PROGRESS NOTES
Problem: Falls - Risk of  Goal: *Absence of Falls  Document Haley Fall Risk and appropriate interventions in the flowsheet.    Outcome: Progressing Towards Goal  Fall Risk Interventions:  Mobility Interventions: Bed/chair exit alarm     Mentation Interventions: Adequate sleep, hydration, pain control     Medication Interventions: Patient to call before getting OOB     Elimination Interventions: Call light in reach     History of Falls Interventions: Door open when patient unattended

## 2017-10-10 NOTE — PROGRESS NOTES
PHYSICAL THERAPY DAILY NOTE  Time In: 6066  Time Out: 6695  Patient Seen For: PM;Other (see progress notes); Patient education; Therapeutic exercise;Transfer training;Gait training;Family training    Subjective: \" I\"m ready to work. \"         Objective: Sister present for therapy. She was given stroke notebook. Practiced with SPT with therapist to assist also. Other (comment) (falls)  GROSS ASSESSMENT Daily Assessment            BED/MAT MOBILITY Daily Assessment    Supine to Sit : 6 (Modified independent)  Sit to Supine : 4 (Minimal assistance)       TRANSFERS Daily Assessment    Transfer Type: SPT without device  Transfer Assistance : 3 (Moderate assistance )  Sit to Stand Assistance: Moderate assistance       GAIT Daily Assessment    Amount of Assistance: 1 (Dependent/total assistance)  Distance (ft): 100 Feet (ft)  Assistive Device: Other (comment) (used lite gait with ace wraps and knee immobilizer for right knee and DF)       STEPS or STAIRS Daily Assessment    Level of Assist : 0 (Not tested)       BALANCE Daily Assessment            WHEELCHAIR MOBILITY Daily Assessment            LOWER EXTREMITY EXERCISES Daily Assessment   Worked in sidelying with skate board for hip flexion. Patient able to perform hip flexion with gravity eliminated. . Worked on lite gait to assist with right leg. Extremity: Both  Exercise Type #1: Supine lower extremity strengthening (also sidelying skate board for hip flexion)  Sets Performed: 1  Reps Performed: 10  Level of Assist: Minimal assistance          Assessment: Patient making great progress today and seems very motivated. She would benefit form an AFO to assist DF and knee recurvatum during transfers and gait. Plan of Care: Continue with plan of care to reach PT goals. To recreational therapy after treatment.     Pedro Santos PTA  10/10/2017

## 2017-10-10 NOTE — PROGRESS NOTES
Time In 0831   Time Out 0915     Activities of Daily Living    Score Comments   Self-Feeding 5 Set-up   Grooming 5 Tasks completed by patient: Brushed hair, Brushed teeth  Comments: S   Bathing 4 Body Parts Bathe: Abdomen, Arm, left, Arm, right, Buttocks, Chest, Lower leg and foot, left, Lower leg and foot, right, Tali area, Thigh, left, Thigh, right  Comments: A in standing   Upper Body  Dressing/  Undressing 3 Items Applied:Bra (3 steps), Pullover (4 steps)  Comments: A for LUE   Lower Body Dressing/  Undressing 3 Items Applied:Shoe, left (1 step), Shoe, right (1 step), Sock, left (1 step), Sock, right (1 step), Underpants (3 steps), Elastic waist pants (3 steps)  Adaptive Equipment: N/A; attempted dressing stick with poor ability  Comments: A for L shoe and sock and threading L foot in pants   Education  Anil-dressing     Pt was in bed and agreeable to tx. Pt wanted a sponge bath. Pt's performance with ADL is reflected in above chart. Pt was able to thread L foot into underwear today with max verbal cueing. Pt was left in room with SEAN Vazquez. Continue with POC.      Daniele Jalloh OTR/MITCHELL  10/10/2017

## 2017-10-10 NOTE — PROGRESS NOTES
10/10/17 1354   Time Spent With Patient   Time In 1306   Time Out 1345   Patient Seen For: AM;Neuro-linguistics   Mental Status   Neurologic State Alert   Orientation Level Oriented X4   Cognition Impaired decision making;Memory loss   Perception Appears intact   Perseveration Perseverates during conversation   Safety/Judgement Fall prevention   Pt seen in room. Pt in bed. Pt completed word finding and yes/no questions. \"I am not doing very well I am?\" Pt complete adding item to a category and naming opposite/synonym with min-mod cues with 85% accuracy. Pt answered yes/no questions with 90% accuracy. Pt completed automatic speech tasks with 100% accuracy. Pt's oral motor strength and dysarthria is improving. Continue with plan of care.    Shanika Amador MA/ANASTASIA/SLP

## 2017-10-10 NOTE — PROGRESS NOTES
Subjective \"Yes! Let's go outside! \"   Activity Horseshoe toss with her LUE and outside visit   Strength/Endurance Patient with R hemiparesis and was very fatigued due to it being the end of her day filled with therapy. Balance Min (A) with SPT from w/c to bed   Social Interaction Patient with a bright affect, smiling and laughing appropriately. She answered many of the questions asked of her only getting stumped on 2 questions. Cognitive A&O X4   Comments Patient's sister was present and supportive during the session. Patient was assisted to her room and into the bed. She was left with all needs within reach.      Mike Weston, KELSEA

## 2017-10-10 NOTE — PROGRESS NOTES
PSYCHOLOGY PROGRESS NOTE      Name:  Mable Russ    Date of Service: 10/10/2017  Location of Service:   912/01  Type of Service: Individual Psychotherapy  Duration: 60 minutes  Primary Diagnosis:   1. Urinary retention with incomplete bladder emptying    2. Hemorrhagic stroke ([address]    3. Accelerated hypertension    4. Acute spontaneous intraventricular hemorrhage assoc w/ hypertension (HCC)    5. Anxiety    6. At risk for aspiration    7. Non-intractable vomiting with nausea, unspecified vomiting type    8. Stroke, hemorrhagic ([address]    9. Acute respiratory failure with hypoxia (HCC)    10. Hypoxemia    11. Hypertensive urgency, malignant    12. Essential hypertension    13. ADD (attention deficit disorder)    14. Other depression    15. Atrophic vaginitis    16. Screening for breast cancer    17. Hypernatremia    18. Hypokalemia    19. Thrombocytopenia ([address]    20. Multiple left-sided nontraumatic localized intracerebral hemorrhages ([address]    21. DIC (disseminated intravascular coagulation) ([address]    22. Acute deep vein thrombosis (DVT) of right lower extremity, unspecified vein (HCC)    23. Pulmonary embolism, other ([address]    24. Depression, unspecified depression type    25. Other acute pulmonary embolism without acute cor pulmonale ([address]    26. Acute deep vein thrombosis (DVT) of other specified vein of right lower extremity ([address]    27. Attention deficit disorder, unspecified hyperactivity presence        Summary of Service:  Engaged in individual session in response to assisting pt with feelings of frustration and irritation associated with her lack of physical functioning. Encouraged pt to be clear in her communication with others, particularly in asking for more therapies. Worked on strategies to exercise her attention and concentration, and assigned memory tasks such as sudoku to assist in building these skills. Toward the end of our session, patient's mood appeared to lift.   She appears appropriately motivated at this time to continue in her scheduled therapies. Will continue to follow. Lloyd Dejesus Psy.D.   Psychologist

## 2017-10-10 NOTE — PROGRESS NOTES
PHYSICAL THERAPY DAILY NOTE  Time In: 1043  Time Out: 2452  Patient Seen For: AM;Transfer training;Gait training; Therapeutic exercise; Other (see progress notes); Family training    Subjective: Patient had no complaints. Sister present for therapy. Objective: no pain noted. Instructed in SPT with sister using gait belt. Sister Satnam Brower did perform transfer with assistance from therapist.  Other (comment) (falls)  GROSS ASSESSMENT Daily Assessment            BED/MAT MOBILITY Daily Assessment    Supine to Sit : 4 (Minimal assistance)  Sit to Supine : 4 (Minimal assistance)       TRANSFERS Daily Assessment    Transfer Type: SPT without device  Transfer Assistance : 3 (Moderate assistance )  Sit to Stand Assistance: Moderate assistance       GAIT Daily Assessment    Amount of Assistance: 0 (Not tested)       STEPS or STAIRS Daily Assessment    Level of Assist : 0 (Not tested)       BALANCE Daily Assessment            WHEELCHAIR MOBILITY Daily Assessment            LOWER EXTREMITY EXERCISES Daily Assessment               Assessment: Patient making progress. Plan of Care: Continue with plan of care to reach PT goals. To OT after treatment.     Vel Verdin, PTA  10/10/2017

## 2017-10-10 NOTE — PROGRESS NOTES
OT Daily Note  Time In 1116   Time Out 1200   Pain: Patient had no complaint of pain. Functional Mobility   Pt transferred with mod A throughout the session. Sister, Laurence Boyer, was trained on transfers on and off the toilet and mat with fair compliance with safe techniques. Neuro-Muscular Re-Education   Pt engaged in reaching down to floor to assist with dressing. Pt required CGA and demonstrated fair performance. Pt was fatigued during session and appeared to have tension with sister. Education   Flexion synergy; demonstrated SROM and stretching program to reduce synergy. Continued education and handout recommended. Interdisciplinary Communication: MICHAEL Lyle on discharge    Plan: Continue with POC. Pt was left at EOB with all needs within reach.      Komal Escobedo OTR/L  10/10/2017

## 2017-10-11 PROCEDURE — 97110 THERAPEUTIC EXERCISES: CPT

## 2017-10-11 PROCEDURE — 99232 SBSQ HOSP IP/OBS MODERATE 35: CPT | Performed by: PHYSICAL MEDICINE & REHABILITATION

## 2017-10-11 PROCEDURE — 97116 GAIT TRAINING THERAPY: CPT

## 2017-10-11 PROCEDURE — 92507 TX SP LANG VOICE COMM INDIV: CPT

## 2017-10-11 PROCEDURE — 97530 THERAPEUTIC ACTIVITIES: CPT

## 2017-10-11 PROCEDURE — 97535 SELF CARE MNGMENT TRAINING: CPT

## 2017-10-11 PROCEDURE — 65310000000 HC RM PRIVATE REHAB

## 2017-10-11 PROCEDURE — 74011250637 HC RX REV CODE- 250/637: Performed by: PHYSICAL MEDICINE & REHABILITATION

## 2017-10-11 PROCEDURE — 97112 NEUROMUSCULAR REEDUCATION: CPT

## 2017-10-11 RX ORDER — TIZANIDINE 2 MG/1
4 TABLET ORAL
Status: DISCONTINUED | OUTPATIENT
Start: 2017-10-11 | End: 2017-10-26 | Stop reason: HOSPADM

## 2017-10-11 RX ORDER — TIZANIDINE 2 MG/1
2 TABLET ORAL 2 TIMES DAILY
Status: DISCONTINUED | OUTPATIENT
Start: 2017-10-11 | End: 2017-10-26 | Stop reason: HOSPADM

## 2017-10-11 RX ADMIN — POTASSIUM CHLORIDE 20 MEQ: 20 TABLET, EXTENDED RELEASE ORAL at 08:43

## 2017-10-11 RX ADMIN — TIZANIDINE 2 MG: 2 TABLET ORAL at 18:29

## 2017-10-11 RX ADMIN — METHYLPHENIDATE HYDROCHLORIDE 5 MG: 10 TABLET ORAL at 08:41

## 2017-10-11 RX ADMIN — LABETALOL HYDROCHLORIDE 100 MG: 100 TABLET, FILM COATED ORAL at 08:43

## 2017-10-11 RX ADMIN — TAMSULOSIN HYDROCHLORIDE 0.4 MG: 0.4 CAPSULE ORAL at 20:53

## 2017-10-11 RX ADMIN — STANDARDIZED SENNA CONCENTRATE AND DOCUSATE SODIUM 1 TABLET: 8.6; 5 TABLET, FILM COATED ORAL at 08:42

## 2017-10-11 RX ADMIN — TIZANIDINE 4 MG: 2 TABLET ORAL at 20:53

## 2017-10-11 RX ADMIN — METHYLPHENIDATE HYDROCHLORIDE 5 MG: 10 TABLET ORAL at 18:28

## 2017-10-11 RX ADMIN — HYDROCHLOROTHIAZIDE: 12.5 CAPSULE ORAL at 08:41

## 2017-10-11 RX ADMIN — AMLODIPINE BESYLATE 2.5 MG: 5 TABLET ORAL at 08:43

## 2017-10-11 RX ADMIN — LABETALOL HYDROCHLORIDE 100 MG: 100 TABLET, FILM COATED ORAL at 18:34

## 2017-10-11 NOTE — PROGRESS NOTES
OT Daily Note    Time In 1301   Time Out 1350     Subjective: \" I need to put on makeup before my  gets here. \"  Pain: Not rated in buttocks, standing trials to alleviate as needed with moderate assistance    Precautions:  (falls)    Neuro-Muscular Re-Education   Patient completed standing trial at tabletop for RUE weightbearing. Patient transferred sit to stand with minimal assistance, minimal-moderate assistance for standing balance at tabletop facilitating weightbearing through RUE at elbow/hand and blocking R knee. Patient completed Pattern Play task using L hand during weightbearing, reaching across midline to R of tabletop to retrieve needed pieces. Good accuracy for assembling pattern. Tolerated standing ~10 minutes before requiring seated rest break. Patient agreeable to NMES applied to R shoulder at posterior deltoid and levator scapula seated in wheelchair. Patient tolerated at intensity 12 x 8 minutes in surge pattern, patient completes AAROM shoulder shrug seated before vertical mirror for feedback with each contraction. No skin issues, tolerated well. Self-Care   Patient transfers wheelchair <> bed SPT with moderate assistance. Patient completed bimanual task for NMR seated at sink to apply makeup using L hand, stabilizing containers in R hand with hand over hand assistance. Assessment: Patient tolerated well    Education: NMES  Interdisciplinary Communication: Collaborated with Abida RODRIGUEZ and agreed patient is progressing well and on-track to meet goals as stated in [facility]. Plan: Continue to address ADL/IADL, functional mobility, activity tolerance, balance, strengthening, coordination, education, cognition.       Jj Guadarrama, KAYODER/L

## 2017-10-11 NOTE — PROGRESS NOTES
OT Daily Note  Time In 1031   Time Out 1109     Pain: Pt c/o severe pain in bottom. Avoided sitting to reduce pain. Functional Mobility   Pt transferred with mod A throughout session. Pt was S for sit to supine. Neuro-Muscular Re-Education   Pt engaged in standing to promote WB through LLE. Pt was CGA for balance. A 2'' step was put under her RLE to promote WB. Pt LUE was stretched to promote extension. Pec tendon was massaged for release. Pt is demonstrating increased tone and spasticity. Pt recalled 2/3 of SROM exercises need mod verbal cues for proper performance. Education   SROM     Interdisciplinary Communication: MICHAEL Haile on pt's performance. Plan: Continue with POC. Pt was left in bed with all needs within reach.      Shun Tang OTR/L  10/11/2017

## 2017-10-11 NOTE — PROGRESS NOTES
Subjective \"I am sorry, I am distracted because my bottom hurts. \"   Activity Initially tried to play the game [employer] with patient but she was unable to stay focused. She c/o pain in her bottom and kept repositioning herself in her w/c. She was able to play tennis/balloon hit with her L hand without difficulty but took many breaks to move her bottom off the w/c cushion. Strength/Endurance Patient was limited and focused on her bottom pain. Balance Patient was able to reposition herself in her w/c. Social Interaction Patient was cooperative but distracted and not as sociable as in previous sessions. Cognitive A&O X4   Comments Patient was taken to the OT table and handed off to Sylvia Ramsey. Discussion between Dwight Valentin and Sylvia Ramsey regarding the pain in her bottom was exchanged.        KELSEA Neumann

## 2017-10-11 NOTE — PROGRESS NOTES
PHYSICAL THERAPY DAILY NOTE  Time In: 1525  Time Out: 0572  Patient Seen For: PM;Family training;Transfer training;Gait training; Therapeutic exercise;Patient education; Other (see progress notes)    Subjective: Patient had no complaints.  for transfer training using gait belt. Objective: Other (comment) (falls)  GROSS ASSESSMENT Daily Assessment            BED/MAT MOBILITY Daily Assessment    Supine to Sit : 4 (Minimal assistance)  Sit to Supine : 4 (Minimal assistance)       TRANSFERS Daily Assessment    Transfer Type: SPT without device  Transfer Assistance : 3 (Moderate assistance )  Sit to Stand Assistance: Minimal assistance       GAIT Daily Assessment   Patient able to advance her right leg with every step during gait. Still need to work on weight shift but more functional at this time. Great progress today. Amount of Assistance: 3 (Moderate assistance)  Distance (ft): 30 Feet (ft)  Assistive Device: Gait belt; Other (comment) (using rail at wall  heel lift in left shoe and ace wrap to DF on right)       STEPS or STAIRS Daily Assessment    Level of Assist : 0 (Not tested)       BALANCE Daily Assessment            WHEELCHAIR MOBILITY Daily Assessment            LOWER EXTREMITY EXERCISES Daily Assessment    Extremity: Both  Exercise Type #1: Other (comment) (transfer training using gait belt with )  Sets Performed: 1  Reps Performed: 10  Level of Assist: Moderate assistance (by )  Exercise Type #2: Other (comment) (worked on bed mobility using left leg to lift right)  Sets Performed: 4  Reps Performed: 0  Level of Assist: Minimal assistance          Assessment: Patient making good progress increasing movement in right leg. Patient will benefit to more therapy to be an ambulator than at w/c level for discharge. Plan of Care: Continue with plan of care to reach PT goals. Worked with  today with transfers and scheduled another training on Saturday.  Discussed option of performing home visit for suggestions to modify home. Returned to room to bed with  present.     Tatyana Gonzalez, PTA  10/11/2017

## 2017-10-11 NOTE — PROGRESS NOTES
Problem: Falls - Risk of  Goal: *Absence of Falls  Document Haley Fall Risk and appropriate interventions in the flowsheet.    Outcome: Progressing Towards Goal  Fall Risk Interventions:  Mobility Interventions: Bed/chair exit alarm, Patient to call before getting OOB     Mentation Interventions: Bed/chair exit alarm, More frequent rounding, Toileting rounds, Update white board     Medication Interventions: Bed/chair exit alarm, Patient to call before getting OOB     Elimination Interventions: Bed/chair exit alarm, Call light in reach, Patient to call for help with toileting needs     History of Falls Interventions: Bed/chair exit alarm, Door open when patient unattended, Evaluate medications/consider consulting pharmacy

## 2017-10-11 NOTE — PROGRESS NOTES
10/11/17 0920   Time Spent With Patient   Time In 0840   Time Out 0915   Patient Seen For: AM;Neuro-linguistics   Mental Status   Neurologic State Alert   Orientation Level Oriented to person;Oriented to situation   Cognition Decreased attention/concentration;Memory loss; Impaired decision making   Perception Appears intact   Perseveration Perseverates during conversation   Safety/Judgement Fall prevention   Pt seen in room, sitting up in recliner. Pt complaining of her bottom hurting. Tone in right foot. Pt completed complex problem solving tasks. Pt completed complex problem tasks with min cues. Pt was able to give simple answers, however, was unable to expand with 90% accuracy. Pt asking to work more on STM. Continue with plan of care addressing word finding, orientation, STM and problem solving/reasoning.     Magy Fuller MA/ANASTASIA/SLP

## 2017-10-11 NOTE — PROGRESS NOTES
Ayesha Johns MD,   Medical Director  [address] [facility], [address] [facility]  Tel: [phone]       D PROGRESS NOTE    Trude Minor  Admit Date: 9/15/2017  Admit Diagnosis: stroke, left brain involvement;ICH (intracerebral hemorrhag*    Subjective     Great spirits. Participating well. Excited regarding insurance extension of stay. No headache, cp, sob. Objective:     Current Facility-Administered Medications   Medication Dose Route Frequency    amLODIPine (NORVASC) tablet 2.5 mg  2.5 mg Oral DAILY    potassium chloride (K-DUR, KLOR-CON) SR tablet 20 mEq  20 mEq Oral DAILY    senna-docusate (PERICOLACE) 8.6-50 mg per tablet 1 Tab  1 Tab Oral DAILY    polyethylene glycol (MIRALAX) packet 17 g  17 g Oral DAILY    methylphenidate HCl (RITALIN) tablet 5 mg  5 mg Oral BID    labetalol (NORMODYNE) tablet 100 mg  100 mg Oral BID    losartan/hydroCHLOROthiazide (HYZAAR) 100/12.5 mg   Oral DAILY    tiZANidine (ZANAFLEX) tablet 2 mg  2 mg Oral QHS    acetaminophen (TYLENOL) tablet 500 mg  500 mg Per NG tube Q4H PRN    alum-mag hydroxide-simeth (MYLANTA) oral suspension 30 mL  30 mL Oral Q4H PRN    bisacodyl (DULCOLAX) suppository 10 mg  10 mg Rectal DAILY PRN    hydrALAZINE (APRESOLINE) tablet 50 mg  50 mg Oral QID PRN    lip protectant (BLISTEX) ointment   Topical PRN    ondansetron (ZOFRAN ODT) tablet 4 mg  4 mg Oral Q6H PRN    sodium phosphate (FLEET'S) enema 118 mL  1 Enema Rectal PRN    traMADol (ULTRAM) tablet 50 mg  50 mg Oral Q6H PRN    traZODone (DESYREL) tablet 50 mg  50 mg Oral QHS PRN    tamsulosin (FLOMAX) capsule 0.4 mg  0.4 mg Oral QHS     Review of Systems:Denies chest pain, shortness of breath, cough, headache, visual problems, abdominal pain, dysurea, calf pain. Pertinent positives are as noted in the medical records and unremarkable otherwise.      Visit Vitals    /73 (BP 1 Location: Right arm, BP Patient Position: At rest)    Pulse 72    Temp 98.8 °F (37.1 °C)    Resp 16    SpO2 94%        Physical Exam:   General: Alert and age appropriately oriented. No acute cardio respiratory distress. HEENT: Normocephalic,no scleral icterus  Oral mucosa moist without cyanosis, right facial weakness   Lungs: Clear to auscultation  bilaterally. Respiration even and unlabored   Heart: Regular rate and rhythm, S1, S2   No  murmurs, clicks, rub or gallops   Abdomen: Soft, non-tender, nondistended. Bowel sounds present. No organomegaly. Genitourinary: Benign . Neuromuscular:      Speech more spontaneous and completing sentences  Right hemiplegia; now with prox RUE movement and increased quads RLE; right neglect somewhat more aware however   Skin/extremity: No rashes, no erythema. No calf tenderness BLE  No peripheral edema                                                                            Functional Assessment:  Gross Assessment  AROM: Grossly decreased, non-functional (RLE) (10/07/17 1300)  PROM: Within functional limits (10/07/17 1300)  Strength: Grossly decreased, non-functional (RLE) (10/07/17 1300)  Coordination: Grossly decreased, non-functional (RLE) (10/07/17 1300)  Tone: Abnormal (09/30/17 1200)  Sensation: Impaired (09/30/17 1200)       Balance  Sitting - Static: Good (unsupported) (10/07/17 1300)  Sitting - Dynamic: Good (unsupported) (10/07/17 1300)  Standing - Static: Fair (with support) (10/07/17 1300)  Standing - Dynamic : Impaired (10/07/17 1300)           Toileting  Adaptive Equipment: Elevated seat;Walker (09/29/17 6464)         [facility] Fall Risk Assessment:  [facility]  Mobility: Ambulates or transfers with assist devices or assistance/unsteady gait (10/10/17 2109)  Mobility Interventions: Bed/chair exit alarm; Patient to call before getting OOB;Utilize walker, cane, or other assitive device (10/10/17 2109)  Mentation: Alert, oriented x 3 (10/10/17 2109)  Mentation Interventions: Adequate sleep, hydration, pain control;Evaluate medications/consider consulting pharmacy (10/10/17 2109)  Medication: Patient receiving anticonvulsants, sedatives(tranquilizers), psychotropics or hypnotics, hypoglycemics, narcotics, sleep aids, antihypertensives, laxatives, or diuretics (10/10/17 2109)  Medication Interventions: Patient to call before getting OOB (10/10/17 2109)  Elimination: Needs assistance with toileting (10/10/17 2109)  Elimination Interventions: Call light in reach (10/10/17 2109)  Prior Fall History: No (10/10/17 2109)  History of Falls Interventions: Door open when patient unattended;Evaluate medications/consider consulting pharmacy (10/10/17 2109)  Total Score: 3 (10/10/17 2109)  Standard Fall Precautions: Yes (10/10/17 2109)  High Fall Risk: Yes (10/10/17 0926)     Speech Assessment:         Ambulation:  Gait  Base of Support: Narrowed; Center of gravity altered (09/22/17 1200)  Speed/Lulu: Delayed (09/22/17 1200)  Step Length: Right shortened (09/22/17 1200)  Distance (ft): 100 Feet (ft) (10/10/17 1553)  Assistive Device: Other (comment) (used lite gait with ace wraps and knee immobilizer for right knee and DF) (10/10/17 1553)     Labs/Studies:  Recent Results (from the past 72 hour(s))   CBC W/O DIFF    Collection Time: 10/09/17  8:34 AM   Result Value Ref Range    WBC 4.6 4.3 - 11.1 K/uL    RBC 3.67 (L) 4.05 - 5.25 M/uL    HGB 10.5 (L) 11.7 - 15.4 g/dL    HCT 30.5 (L) 35.8 - 46.3 %    MCV 83.1 79.6 - 97.8 FL    MCH 28.6 26.1 - 32.9 PG    MCHC 34.4 31.4 - 35.0 g/dL    RDW 13.3 11.9 - 14.6 %    PLATELET 072 626 - 730 K/uL    MPV 8.8 (L) 10.8 - 06.8 FL   METABOLIC PANEL, BASIC    Collection Time: 10/09/17  8:34 AM   Result Value Ref Range    Sodium 136 136 - 145 mmol/L    Potassium 4.0 3.5 - 5.1 mmol/L    Chloride 100 98 - 107 mmol/L    CO2 29 21 - 32 mmol/L    Anion gap 7 7 - 16 mmol/L    Glucose 115 (H) 65 - 100 mg/dL    BUN 11 8 - 23 MG/DL    Creatinine 0.58 (L) 0.6 - 1.0 MG/DL    GFR est AA >60 >60 ml/min/1.73m2    GFR est non-AA >60 >60 ml/min/1.73m2    Calcium 8.7 8.3 - 10.4 MG/DL       Assessment:     Problem List as of 10/11/2017  Date Reviewed: 9/12/2017          Codes Class Noted - Resolved    Thrombocytopenia ([facility] ICD-10-CM: D69.6  ICD-9-CM: 287.5  9/25/2017 - Present        Pulmonary emboli (HCC) ICD-10-CM: I26.99  ICD-9-CM: 415.19  9/25/2017 - Present        DVT (deep venous thrombosis) ([facility] ICD-10-CM: I82.409  ICD-9-CM: 453.40  9/25/2017 - Present        ICH (intracerebral hemorrhage) ([place] [address].) ICD-10-CM: I61.9  ICD-9-CM: 948  9/15/2017 - Present        Hypertensive urgency, malignant ICD-10-CM: I16.0  ICD-9-CM: 401.0  9/11/2017 - Present        Stroke, hemorrhagic ([facility] ICD-10-CM: I61.9  ICD-9-CM: 288  9/7/2017 - Present        Accelerated hypertension ICD-10-CM: I10  ICD-9-CM: 401.0  9/7/2017 - Present        At risk for aspiration (Chronic) ICD-10-CM: Z91.89  ICD-9-CM: V49.89  9/7/2017 - Present        Acute spontaneous intraventricular hemorrhage assoc w/ hypertension ([place] [address].) ICD-10-CM: I61.5, I10  ICD-9-CM: 451, 401.9  9/7/2017 - Present        Screening for breast cancer ICD-10-CM: Z12.31  ICD-9-CM: V76.10  2/27/2017 - Present        Atrophic vaginitis ICD-10-CM: N95.2  ICD-9-CM: 627.3  7/11/2016 - Present        HTN (hypertension) (Chronic) ICD-10-CM: I10  ICD-9-CM: 401.9  10/23/2015 - Present        ADD (attention deficit disorder) (Chronic) ICD-10-CM: F98.8  ICD-9-CM: 314.00  10/23/2015 - Present        Depression ICD-10-CM: F32.9  ICD-9-CM: 856  10/23/2015 - Present        Anxiety (Chronic) ICD-10-CM: F41.9  ICD-9-CM: 300.00  10/23/2015 - Present        RESOLVED: Hypoxemia ICD-10-CM: R09.02  ICD-9-CM: 799.02  9/10/2017 - 9/12/2017        RESOLVED: Acute respiratory failure ([facility] ICD-10-CM: J96.00  ICD-9-CM: 518.81  9/8/2017 - 9/10/2017        RESOLVED: Hemorrhagic stroke ([facility] ICD-10-CM: I61.9  ICD-9-CM: 333  9/7/2017 - 9/7/2017        RESOLVED: Non-intractable vomiting with nausea ICD-10-CM: R11.2  ICD-9-CM: 787.01  9/7/2017 - 9/12/2017        RESOLVED: Seborrheic keratosis, inflamed ICD-10-CM: L82.0  ICD-9-CM: 702.11  7/11/2016 - 9/20/2016        RESOLVED: Cough ICD-10-CM: R05  ICD-9-CM: 786.2  7/11/2016 - 9/20/2016        RESOLVED: Shoulder pain ICD-10-CM: M25.519  ICD-9-CM: 719.41  10/23/2015 - 2/27/2017                    Left BG Hemorrhage with intraventricular extension due to HTN emergency; resultant R hemiplegia, right neglect, dysarthria, aphasia, dysphagia with significant decline in mobility and self care  Plan:   Continue daily physician medical management:  Pneumonia prophylaxis- Incentive spirometer every hour while awake. Will need instruction and assistance. Not sure if she can get a proper oral seal to be effective  -10/9 no pulmonary issues      Dysphagia; Pureed diet with NTL; at risk for malnutrition and dehydration. PICC line removed prior to transfer; may need a line for IVFs if BUN continues to increase. Low K, Ca; check mg. Supplementation as needed. Na elevated  -mag ok, K much improved; cont to supplement while on diuretic  -9/19 replace K; 3.0; 2.8 this am 9/20; inc supplement; may need to add to IVFs  -9/25 fluid status and K nl; 9/28 cont NTL; high risk aspiration; still needs supervision /assist with meals  -10/2 MBS today; thin liq!!  -10/9 po intake, especially fluids, has improved; feeding self      Thrombocytopenia; has been trending down for no apparent reason. hgb slt down as well. Has not been on Hep products; consult hematology today 9/19  -plts 40K, continues to trend down 9/20; await consult by hematology. ? reln to [address]. Mild anemia as well  Check HIT panel, d dimer , fibrinogen  -9/21 plts 36k ; HIT PROFILE POSITIVE, D DIMER ELEVATED 32.79, FIBRINOGEN  ; consistent with DIC; Etiology unclear  WILL D/W HEMATOLOGY; LFTs ok, no hx of blood transfusion,no hx pancreatitis, no sign of bacteremia. Has not been on any heparin products.  Can see in head injury; has ICH. Cannot r/o blood disorder (leukemia)   per hematology \"Recommend transfusing platelets for <91,477 and Cryoprecipitate for fibrinogen < 100. \"  -MARK pending  -9/22 bilateral LE venous duplex ordered; low suspicion but at risk and having calf tenderness  -9/22 plts improved today; monitor closely; 9/23 small right peroneal thrombus. No antic per hematology. IR refused/advised against placing IVF due to low risk of clot propagation given size and location  -9/23 counts bumping up; pts now 53k from 45 and previously 36  -9/24 plts cont to improve. 69K. MARK still pending; can resume therapies  9/26 Ddimer pending, fibrinogen now nl  -9/27 D dimer 11+, improved. Per Hematology; likely due to consumption coagulopathy. Will monitor  -10/2 labs have improved; MARK + 68% confirming dx of HIT; 10/4 recheck cbc (last done 9/28)    -10/5 plts 263; 10/9 plts 283      Bilateral PEs 9/25, filter placed in IR; looks good today. sats good without O2 supplement 94-97% sats; d/w Hematology, no agaratroban  -apprec Pulm assessment. Clinically looks good and no longer symptomatic  -9/28 clinically stable, asymptomatic ; 10/3 stable. No sob, no O2 supplements; 10/9 remains asymptomatic      Hypernatremia; last head CT did not show significant cerebral edema. Clinically improving. Likely due to prerenal azotemia; 9/15 MAY REQ isotonic / hypotonic saline IV;  -9/18 events of weekend noted;  Na 156; asymptomatic, on 0.5 dextrose with 1/4 Na; na q 6hrs; pts serum osmolality was normal. Will check urine Na and urine osmolality; depending on findings, will consider consulting Hospitalist vs Nephrology  Neuro improving despite this  -9/19 Na down to 149, urine osmo nl, serum osmo min hi; cont dex/and 1/4 NS; repeat in am. Not DI as uop not increased  -9/20 Na 147; cont fluids; 9/21 not resulted; will stop fluids when Na below 142  -Na 140 9/22; dc IVFs; 9/25 136; 9/27 136; recheck 10/4 134; mildly low; f/u 10/9 pending      ICH/IVH; 9/22 clinically improves daily. F/u HCT yest due to transient left eye visual disturbance. Overall, resolving hemorrhage. There is more pronounced edema; expected. Ventricular size now nl      Anemia ; 9.6-10/ stable; recheck 10/9 10.5 improved       DVT risk / DVT Prophylaxis- Will require daily physician exam to assess for signs and symptoms as patient is at increased risk for of thromboembolism. Mobilization as tolerated. Intermittent pneumatic compression devices when in bed Thigh-high or knee-high thromboembolic deterrent hose when out of bed. NO  anticoag due to ICH/HIT; has right small right peroneal vein; 9/25 spoke with IR again today; more risk of putting in IVC filter than benefits especially since she is asymptomatic with no swelling or tenderness and small size of clot  -10/3 no edema, right calf soft. No SOB  -10/9 no calf swelling or tenderness      Pain Control: stable, mild-to-moderate joint symptoms intermittently, reasonably well controlled by PRN meds. Will require regular pain assessment and comprenhensive pain management,       Wound Care: Monitor wound status daily per staff and physician. At risk for failure. Will require 24/7 rehab nursing. None needed at this time      Hypertension - BP uncontrolled, fluctuating, managed medically. Add prn hydralazine for sbp> 180. Goal SBP is 140-160 given ICH. Cont Normodyne, norvasc and hyzaar; consider Verapamil or scheduled Hydralazine  -9/18 BP increased 169/95; add hydralazine 25 tid  -9/20 BP much improved  -9/22 /74; 9/24 bp stable; may dec hydralazine to bid  -9/25 dec hydralazine  -9/27 bp 116-119  -9/28 SBP in the 90s to 116, asymptomatic; dc hydralazine and monitor; decrease Normodyne to bid  -9/29 BP still low; lower Norvasc and normodyne, cont Hyzaar; all have parameters of when to hold  - 9/30 SBP overnight and this am 108-111, will d/c norvasc, on labetolol and losartan/HCTZ , HR acceptable  -10/2 115/71; controlled.  Cont current meds  -130-151/77; 10/5 118/77; 10/6 BP stable; goal 120-130 given ICH  -10/9 148/81; add low dose Norvasc; ideally want less than 130; 10/11 130/73      Mild hyperglycemia, likely stress induced vs borderline diabetes; monitor loosely  -9/27 bs 145 on BMP; follow bs qam x 3d; no issues      Dysphagia; cont NTL, mech soft; hopefully can upgrade liquids soon 9/25  -9/29 remains on NTL. MBS scheduled for 10/2.  -10/3 now on THINS! !      Leukocytosis; recheck in a.m. Check UA due to stafford. No pulmonary signs (had neg CXR today), no s/s of infxn at old PICC site, no wounds, afebrile. Watch monocytes. -9/18 12.2 improving. Urine neg  -9/19 up to 13.2, ? Reactive. No source of infxn identified. Afebrile  -9/20 WBC now normal      Urinary retention/ neurogenic bladder - start flomax but continue stafford until mobility improves a bit. Strict I/o's  -9/20 keeping stafford to monitor UOP until Na normalizes. No significant output to suggest DI  -9/21 dc stafford; cont Flomax, follow bladder scan  9/22 voiding without issue; incontinence but no retention  -9/28 new onset urinary retention; check UA  -9/29 UA neg but cx saying > 100K gram neg cocci; will need to f/u ID/sens; Start Cipro 500 bid  - 9/30 preliminary urine cx >110K mixed skin denis  -10/2 stop Cipro; cx nl denis  -10/6 recurrent urinary retention. No flomax; re check UA, consider urecholine. CIC for PVRs  -10/9 dc scans      bowel program - add prn meds; incontinent, want to keep stool on the firmer side. Monitor skin.       GERD - add a PPI. At times may need additional antacids, Maalox prn.      -continues to benefit from and is showing improvements in a multidisc team approach. Participating well. 10/5 progress slow; 10/6 OT is seeing improvements. Starting to get movement back into the right arm.  Awareness improving  -10/6 add ritalin for attn and distractibility   -10/9 family reports increased attn with them; await therapy reports        9/21   Addendum; in therapy this afternoon, transient left eye blindness. Will f/u head CT for extension of or new ICH, especially with low plts   Results  1. Interval decrease in size of intraventricular hemorrhage within the left  basal ganglia/thalamus with near complete resolution of intraventricular  hemorrhage. Surrounding edema is more pronounced with slight increase in  left-to-right midline shift. 2. Interval decrease in ventricular size, approaching normal in caliber.      10/2 Ms Nellie Herman continues to progress with therapies and has made slow steady improvements. Her rehab course has been complicated by multiple medical issues; HIT, DIC, DVT, PE. Family has not yet been involved in care and home has not been assessed. Insurance claims to be covering thru 10/3 only. Pt is not ready to dc. We are still monitoring labs closely, daily clinical eval for further thrombosis, BP mgt with adjustment of meds etc. Spoke with primary therapists who feel discharge would be premature and not in the pts best interest especially given young age and previous good health leading to a better outcome with aggressive rehab. From the beginning, we said our ELOS was 6 wks. We are only at 2 1/2 wks. Will do peer to peer/ 10/3 pts stay extended to next Monday but Srikanth open to team conference notes this week for extension of stay based on progress. If not given more time, she will require SNF placement.        - FAMILY [id number]/5 REGARDING PROGRESS, NEEDS, DC PLAN; family overwhelmed with decision that need to be made. Family training Monday in case insurance denies as of 10/10      10/9 will await [employer]'s decision; in order to dc home, as of 10/5 we were asking for 2-3 more weeks since she is improving , albeit slowly. If a further stay is denied; will need SNF; family not prepared to bring home  10/10 excellent progress made. Surprising to PT.  Definitely have made a step forward and would benefit from ongoing acute inpt rehab to maximize recovery    -10/11 Insurance has graciously extended stay with next update 10/17! !                         Time spent was 25 minutes with over 1/2 in direct patient care/examination, consultation and coordination of care.      Signed By: Derrick Pascual MD     October 11, 2017

## 2017-10-11 NOTE — PROGRESS NOTES
PHYSICAL THERAPY DAILY NOTE  Time In: 2395  Time Out: 1005  Patient Seen For: AM;Transfer training;Gait training; Therapeutic exercise; Other (see progress notes)    Subjective: Patient complained of pain on her bottom sitting in wheelchair. Objective: She cant explain exactly where pain is. Changed cushions for wheelchair 3 times. Nothing helps. Other (comment) (falls)  GROSS ASSESSMENT Daily Assessment            BED/MAT MOBILITY Daily Assessment    Supine to Sit : 0 (Not tested)       TRANSFERS Daily Assessment    Transfer Type: SPT without device  Transfer Assistance : 3 (Moderate assistance )  Sit to Stand Assistance: Moderate assistance       GAIT Daily Assessment    Amount of Assistance: 1 (Dependent/total assistance)  Distance (ft): 100 Feet (ft)  Assistive Device: Other (comment) (lite gait)       STEPS or STAIRS Daily Assessment    Level of Assist : 0 (Not tested)       BALANCE Daily Assessment            WHEELCHAIR MOBILITY Daily Assessment            LOWER EXTREMITY EXERCISES Daily Assessment   Tone in right leg seems to be getting worse. Used theraband and ace wraps to assist right leg. Extremity: Both  Exercise Type #1: Other (comment) (lite gait with ace wraps and theraband to right leg.)  Sets Performed: 1  Reps Performed: 0  Level of Assist: Total assistance          Assessment: Patient doesn't realize her deficits which seems to be biggest barrier. She is working hard. Hip flexion has improved in right leg. Plan of Care: Continue with plan of care to reach PT goals. To recreational therapy after treatment.     Leonel Encarnacion, PTA  10/11/2017

## 2017-10-11 NOTE — PROGRESS NOTES
Problem: Falls - Risk of  Goal: *Absence of Falls  Document Haley Fall Risk and appropriate interventions in the flowsheet.    Outcome: Progressing Towards Goal  Fall Risk Interventions:  Mobility Interventions: Bed/chair exit alarm, Patient to call before getting OOB, Utilize walker, cane, or other assitive device     Mentation Interventions: Adequate sleep, hydration, pain control, Evaluate medications/consider consulting pharmacy     Medication Interventions: Patient to call before getting OOB     Elimination Interventions: Call light in reach     History of Falls Interventions: Door open when patient unattended, Evaluate medications/consider consulting pharmacy

## 2017-10-11 NOTE — PROGRESS NOTES
Time In 0736   Time Out 0830     Activities of Daily Living    Score Comments   Grooming 5 Tasks completed by patient: Brushed hair, Brushed teeth  Comments: S   Bathing 4 Body Parts Bathe: Abdomen, Arm, left, Arm, right, Buttocks, Chest, Lower leg and foot, left, Lower leg and foot, right, Tali area, Thigh, left, Thigh, right  Comments: A for balance   Tub/Shower Transfer [place] 3 Type of Shower: Shower  Adaptive  Equipment:Tub transfer bench, Grab bars and Wheelchair  Comments: A managing L leg    Upper Body  Dressing/  Undressing 4 Items Applied:Bra (3 steps), Pullover (4 steps)  Comments: A to adjust bra in back   Lower Body Dressing/  Undressing 4 Items Applied:Shoe, left (1 step), Shoe, right (1 step), Sock, left (1 step), Sock, right (1 step), Underpants (3 steps), Elastic waist pants (3 steps)  Adaptive Equipment: N/A  Comments: A for L shoe and sock; L pant leg set up for her   Education  Anil-dressing techniques     Pt was in bed and agreeable to tx. Pt's performance with ADL is reflected in above chart. Pt is demonstrating better carryover of anil-strategies, but only remembers a few steps and needs max verbal cueing for sequencing. Pt was left in w/c with all needs within reach. Continue with ANA.      Nicci Carver OTR/MITCHELL  10/11/2017

## 2017-10-12 PROCEDURE — 99232 SBSQ HOSP IP/OBS MODERATE 35: CPT | Performed by: PHYSICAL MEDICINE & REHABILITATION

## 2017-10-12 PROCEDURE — 92507 TX SP LANG VOICE COMM INDIV: CPT

## 2017-10-12 PROCEDURE — 97530 THERAPEUTIC ACTIVITIES: CPT

## 2017-10-12 PROCEDURE — 97116 GAIT TRAINING THERAPY: CPT

## 2017-10-12 PROCEDURE — 65310000000 HC RM PRIVATE REHAB

## 2017-10-12 PROCEDURE — 97112 NEUROMUSCULAR REEDUCATION: CPT

## 2017-10-12 PROCEDURE — 97535 SELF CARE MNGMENT TRAINING: CPT

## 2017-10-12 PROCEDURE — 74011250637 HC RX REV CODE- 250/637: Performed by: PHYSICAL MEDICINE & REHABILITATION

## 2017-10-12 RX ADMIN — METHYLPHENIDATE HYDROCHLORIDE 5 MG: 10 TABLET ORAL at 17:25

## 2017-10-12 RX ADMIN — LABETALOL HYDROCHLORIDE 100 MG: 100 TABLET, FILM COATED ORAL at 08:28

## 2017-10-12 RX ADMIN — METHYLPHENIDATE HYDROCHLORIDE 5 MG: 10 TABLET ORAL at 08:26

## 2017-10-12 RX ADMIN — AMLODIPINE BESYLATE 2.5 MG: 5 TABLET ORAL at 08:28

## 2017-10-12 RX ADMIN — STANDARDIZED SENNA CONCENTRATE AND DOCUSATE SODIUM 1 TABLET: 8.6; 5 TABLET, FILM COATED ORAL at 08:29

## 2017-10-12 RX ADMIN — LABETALOL HYDROCHLORIDE 100 MG: 100 TABLET, FILM COATED ORAL at 17:24

## 2017-10-12 RX ADMIN — TIZANIDINE 4 MG: 2 TABLET ORAL at 19:45

## 2017-10-12 RX ADMIN — POTASSIUM CHLORIDE 20 MEQ: 20 TABLET, EXTENDED RELEASE ORAL at 08:29

## 2017-10-12 RX ADMIN — TIZANIDINE 2 MG: 2 TABLET ORAL at 14:42

## 2017-10-12 RX ADMIN — TRAZODONE HYDROCHLORIDE 50 MG: 50 TABLET ORAL at 22:57

## 2017-10-12 RX ADMIN — HYDROCHLOROTHIAZIDE: 12.5 CAPSULE ORAL at 08:27

## 2017-10-12 RX ADMIN — TIZANIDINE 2 MG: 2 TABLET ORAL at 06:07

## 2017-10-12 RX ADMIN — TAMSULOSIN HYDROCHLORIDE 0.4 MG: 0.4 CAPSULE ORAL at 19:45

## 2017-10-12 NOTE — PROGRESS NOTES
Subjective \"Let['s go outside. \"   Activity Question/answers, outside visit   Strength/Endurance Patient with right sided weakness. She did not c/o tiredness during the session. She did c/o pain in her bottom towards the end of the session. Balance Min (A) with SPT from w/c to bed   Social Interaction Patient perseverated less about the discomfort in her bottom as she did yesterday. She was able to stay focused on the questions asked of her and either respond appropriately or comment that she didn't know the answer. Cognitive A&O X4   Comments Patient was assisted to her room and into the bed with all needs within reach.      Amrit Talavera, CTRS

## 2017-10-12 NOTE — PROGRESS NOTES
OT Daily Note    Time In 1031   Time Out 1116     Subjective: \"What does this do?\" [patient educated regarding NMR with AAROM coupled with mental focus on RUE]  Pain: None indicated    Precautions:  (falls)    Self-Care   Patient transferred bed to wheelchair and wheelchair <> toilet SPT with moderate assistance. Patient doffed shoes/shorts and donned new brief, shorts, and shoes seated on toilet with moderate assistance to doff shoes, don R shoe, and cross RLE over L knee. Moderate assistance in stance and encouragement to manage brief and pants over hips. Patient washes hands at sink with setup for soap and cues to lift RUE into sink and wash B hands. Neuro-Muscular Re-Education   Patient transferred wheelchair to mat table SPT towards L with moderate assistance. Patient completed 2 sets x 20 reps of AAROM supine on mat table, LUE supporting RUE with fingers laced for shoulder flexion, chest press, and elbow flexion exercises. Assessment: Patient educated and demonstrated good attention to RUE for AAROM exercise. Education: NMR  Interdisciplinary Communication: Collaborated with PTA, Rise Yohan and agreed patient is progressing well and on-track to meet goals as stated in POC. Plan: Continue to address ADL/IADL, functional mobility, activity tolerance, balance, strengthening, coordination, education, cognition.       Jeimy Vazquez OTR/L

## 2017-10-12 NOTE — PROGRESS NOTES
10/12/17 1010   Time Spent With Patient   Time In 0749   Time Out 0824   Patient Seen For: AM;ADLs   Grooming   Grooming Assistance  S   Comments Setup to gather materials, cues to problem solve toothpaste application   Upper Body Bathing   Bathing Assistance, Upper S   Position Performed Seated in chair   Adaptive Equipment Other (comment)  (Wheelchair at sink)   Comments Education to bathe LUE rubbing against washcloth on L thigh   Lower Body Bathing   Bathing Assistance, Lower  Min A   Position Performed Seated in chair;Standing   Adaptive Equipment Other (comment)  (Wheelchair at sink)   Comments Minimal-moderate assist for standing balance as patient bathes nataliia area/bottom, assist to support RLE over L knee for bathing   Upper Body Dressing    Dressing Assistance  Min A   Comments To help bra over RUE, cues and re-education for sequencing cindy dressing technique and encouragement to manage bra/shirt down back reaching around front and back using L hand   Lower Body Dressing    Dressing Assistance  Min A   Position Performed Seated in chair;Standing   Adaptive Equipment Used Other(comment)  (Wheelchair)   Don/Doff Anti-Embolic Stockings NA   Comments Minimal-moderate assistance in stance as patient manages underwear/pants over hips. Re-education and cues to dress RLE first, assist to support RLE over L knee. Assist to don R shoe. S: \"I don't need a shower. \" Agreeable to therapy. Focus of session was on ADL and functional mobility. Patient was able to transfer bed to wheelchair SPT with moderate assistance. Pain not indicated. Collaborated with MICHAEL, Beryl Dewitt and confirmed patient is on track to reach goals as documented in the care plan. Patient tolerated session well, but cognition, strength, coordination, balance, functional mobility are still below baseline and require skilled facilitation to successfully and safely complete ADL's and transfers.    Patient ended session in wheelchair with call remote and phone within reach, RN present.      Jeimy Vazquez, OTR/L

## 2017-10-12 NOTE — PROGRESS NOTES
PHYSICAL THERAPY DAILY NOTE  Time In: 9346  Time Out: 5040  Patient Seen For: PM;Other (see progress notes); Therapeutic exercise;Gait training;Transfer training    Subjective: Patient had no complaints. Objective: Aunt present for therapy. Other (comment) (falls)  GROSS ASSESSMENT Daily Assessment            BED/MAT MOBILITY Daily Assessment    Supine to Sit : 4 (Minimal assistance)  Sit to Supine : 4 (Minimal assistance)       TRANSFERS Daily Assessment    Transfer Type: SPT without device  Transfer Assistance : 4 (Minimal assistance)  Sit to Stand Assistance: Minimal assistance       GAIT Daily Assessment   Used knee brace for recurvatum and tone. Used ace wrap for right DF. Patient complained of lift in left shoe so removed that. She is able to advance right leg forward every step but requires assistance for knee recurvatum. Amount of Assistance: 3 (Moderate assistance)  Distance (ft): 30 Feet (ft) (x7)  Assistive Device: Gait belt; Other (comment) (used rail at wall , knee brace and ace wrap to assist right leg)       STEPS or STAIRS Daily Assessment    Level of Assist : 0 (Not tested)       BALANCE Daily Assessment            WHEELCHAIR MOBILITY Daily Assessment            LOWER EXTREMITY EXERCISES Daily Assessment               Assessment: Patient making good progress with mobility and seems to be much more motivated from family. Plan of Care: Continue with plan of care to reach PT goals. Continued training for  on Saturday and home visit next week. Will assess AFO or KAFO for mobility in the home.     Inez Walton, PTA  10/12/2017

## 2017-10-12 NOTE — PROGRESS NOTES
PHYSICAL THERAPY DAILY NOTE  Time In: 1116  Time Out: 8878  Patient Seen For: AM;Transfer training;Gait training; Therapeutic exercise; Other (see progress notes)    Subjective: Patient had no complaints. Objective: Tone in right leg is less today. Instructed patients aunt Phillip and  on how to transfer SPT using gait belt. Other (comment) (falls)  GROSS ASSESSMENT Daily Assessment            BED/MAT MOBILITY Daily Assessment    Supine to Sit : 4 (Minimal assistance)  Sit to Supine : 4 (Minimal assistance)       TRANSFERS Daily Assessment    Transfer Type: SPT without device  Transfer Assistance : 3 (Moderate assistance )  Sit to Stand Assistance: Minimal assistance       GAIT Daily Assessment    Amount of Assistance: 3 (Moderate assistance)  Distance (ft): 30 Feet (ft) (x 7)  Assistive Device: Gait belt; Other (comment) (used ace wrap to assist DF and rail at wall to ambulate)       STEPS or STAIRS Daily Assessment    Level of Assist : 0 (Not tested)       BALANCE Daily Assessment            WHEELCHAIR MOBILITY Daily Assessment            LOWER EXTREMITY EXERCISES Daily Assessment               Assessment: Patient making good progress increasing strength in hip flexion . Plan of Care: Continue with plan of care to reach PT goals. Returned to room to bed with call bell at [facility].     Leonel Encarnacion, MICHAEL  10/12/2017

## 2017-10-12 NOTE — PROGRESS NOTES
Heaven Sultana MD,   Medical Director  [address] [facility], [address]  Tel: [phone]       SFD PROGRESS NOTE    Shauna Mahan  Admit Date: 9/15/2017  Admit Diagnosis: stroke, left brain involvement;ICH (intracerebral hemorrhag*    Subjective     Doing well. Feels well. Having more tone on the right per PT. Pt notes this as well. Spasms occasionally painful. Spirits good. Very motivated    Objective:     Current Facility-Administered Medications   Medication Dose Route Frequency    tiZANidine (ZANAFLEX) tablet 2 mg  2 mg Oral BID    tiZANidine (ZANAFLEX) tablet 4 mg  4 mg Oral QHS    amLODIPine (NORVASC) tablet 2.5 mg  2.5 mg Oral DAILY    potassium chloride (K-DUR, KLOR-CON) SR tablet 20 mEq  20 mEq Oral DAILY    senna-docusate (PERICOLACE) 8.6-50 mg per tablet 1 Tab  1 Tab Oral DAILY    polyethylene glycol (MIRALAX) packet 17 g  17 g Oral DAILY    methylphenidate HCl (RITALIN) tablet 5 mg  5 mg Oral BID    labetalol (NORMODYNE) tablet 100 mg  100 mg Oral BID    losartan/hydroCHLOROthiazide (HYZAAR) 100/12.5 mg   Oral DAILY    acetaminophen (TYLENOL) tablet 500 mg  500 mg Per NG tube Q4H PRN    alum-mag hydroxide-simeth (MYLANTA) oral suspension 30 mL  30 mL Oral Q4H PRN    bisacodyl (DULCOLAX) suppository 10 mg  10 mg Rectal DAILY PRN    hydrALAZINE (APRESOLINE) tablet 50 mg  50 mg Oral QID PRN    lip protectant (BLISTEX) ointment   Topical PRN    ondansetron (ZOFRAN ODT) tablet 4 mg  4 mg Oral Q6H PRN    sodium phosphate (FLEET'S) enema 118 mL  1 Enema Rectal PRN    traMADol (ULTRAM) tablet 50 mg  50 mg Oral Q6H PRN    traZODone (DESYREL) tablet 50 mg  50 mg Oral QHS PRN    tamsulosin (FLOMAX) capsule 0.4 mg  0.4 mg Oral QHS     Review of Systems:Denies chest pain, shortness of breath, cough, headache, visual problems, abdominal pain, dysurea, calf pain.  Pertinent positives are as noted in the medical records and unremarkable otherwise. Visit Vitals    /64 (BP 1 Location: Left arm, BP Patient Position: At rest)    Pulse 79    Temp 98.2 °F (36.8 °C)    Resp 16    SpO2 95%        Physical Exam:   General: Alert and age appropriately oriented. No acute cardio respiratory distress. HEENT: Normocephalic,no scleral icterus  Oral mucosa moist without cyanosis; right lower facial weakness   Lungs: Clear to auscultation  bilaterally. Respiration even and unlabored   Heart: Regular rate and rhythm, S1, S2   No  murmurs, clicks, rub or gallops   Abdomen: Soft, non-tender, nondistended. Bowel sounds present. No organomegaly. Genitourinary: Benign . Neuromuscular:      Stable right hemiplegia. Right shoulder sublux  Tone increased RLE> RUE, no clonus but flexion synergy pattern noted. Increased attn to right with less verbal cuing if not distracted  cogn intact x for some executive dysfunction in attn   Skin/extremity: No rashes, no erythema.  No calf tenderness BLE  No edema                                                                            Functional Assessment:  Gross Assessment  AROM: Grossly decreased, non-functional (RLE) (10/07/17 1300)  PROM: Within functional limits (10/07/17 1300)  Strength: Grossly decreased, non-functional (RLE) (10/07/17 1300)  Coordination: Grossly decreased, non-functional (RLE) (10/07/17 1300)  Tone: Abnormal (09/30/17 1200)  Sensation: Impaired (09/30/17 1200)       Balance  Sitting - Static: Good (unsupported) (10/07/17 1300)  Sitting - Dynamic: Good (unsupported) (10/07/17 1300)  Standing - Static: Fair (with support) (10/07/17 1300)  Standing - Dynamic : Impaired (10/07/17 1300)           Toileting  Adaptive Equipment: Elevated seat;Walker (09/29/17 9109)         Val Rowell Fall Risk Assessment:  Val Rowell Fall Risk  Mobility: Ambulates or transfers with assist devices or assistance/unsteady gait (10/11/17 1941)  Mobility Interventions: Utilize walker, cane, or other assitive device (10/11/17 1941)  Mentation: Alert, oriented x 3 (10/11/17 1941)  Mentation Interventions: Bed/chair exit alarm; Door open when patient unattended (10/11/17 1941)  Medication: Patient receiving anticonvulsants, sedatives(tranquilizers), psychotropics or hypnotics, hypoglycemics, narcotics, sleep aids, antihypertensives, laxatives, or diuretics (10/11/17 1941)  Medication Interventions: Patient to call before getting OOB (10/11/17 1941)  Elimination: Needs assistance with toileting (10/11/17 1941)  Elimination Interventions: Call light in reach (10/11/17 1941)  Prior Fall History: No (10/11/17 1941)  History of Falls Interventions: Door open when patient unattended (10/11/17 1941)  Total Score: 3 (10/11/17 1941)  Standard Fall Precautions: Yes (10/10/17 2109)  High Fall Risk: Yes (10/11/17 1941)     Speech Assessment:         Ambulation:  Gait  Base of Support: Narrowed; Center of gravity altered (09/22/17 1200)  Speed/Lulu: Delayed (09/22/17 1200)  Step Length: Right shortened (09/22/17 1200)  Distance (ft): 30 Feet (ft) (10/11/17 1720)  Assistive Device: Gait belt; Other (comment) (using rail at wall  heel lift in left shoe and ace wrap to DF on right) (10/11/17 1720)     Labs/Studies:  No results found for this or any previous visit (from the past 72 hour(s)).     Assessment:     Problem List as of 10/12/2017  Date Reviewed: 9/12/2017          Codes Class Noted - Resolved    Thrombocytopenia ([facility] ICD-10-CM: D69.6  ICD-9-CM: 287.5  9/25/2017 - Present        Pulmonary emboli (H[facility]) ICD-10-CM: I26.99  ICD-9-CM: 415.19  9/25/2017 - Present        DVT (deep venous thrombosis) ([facility] ICD-10-CM: I82.409  ICD-9-CM: 453.40  9/25/2017 - Present        ICH (intracerebral hemorrhage) ([address] ICD-10-CM: I61.9  ICD-9-CM: 597  9/15/2017 - Present        Hypertensive urgency, malignant ICD-10-CM: I16.0  ICD-9-CM: 401.0  9/11/2017 - Present        Stroke, hemorrhagic ([facility] ICD-10-CM: I61.9  ICD-9-CM: 542  9/7/2017 - Present Accelerated hypertension ICD-10-CM: I10  ICD-9-CM: 401.0  9/7/2017 - Present        At risk for aspiration (Chronic) ICD-10-CM: Z91.89  ICD-9-CM: V49.89  9/7/2017 - Present        Acute spontaneous intraventricular hemorrhage assoc w/ hypertension ([place] [address].) ICD-10-CM: I61.5, I10  ICD-9-CM: 419, 401.9  9/7/2017 - Present        Screening for breast cancer ICD-10-CM: Z12.31  ICD-9-CM: V76.10  2/27/2017 - Present        Atrophic vaginitis ICD-10-CM: N95.2  ICD-9-CM: 627.3  7/11/2016 - Present        HTN (hypertension) (Chronic) ICD-10-CM: I10  ICD-9-CM: 401.9  10/23/2015 - Present        ADD (attention deficit disorder) (Chronic) ICD-10-CM: F98.8  ICD-9-CM: 314.00  10/23/2015 - Present        Depression ICD-10-CM: F32.9  ICD-9-CM: 179  10/23/2015 - Present        Anxiety (Chronic) ICD-10-CM: F41.9  ICD-9-CM: 300.00  10/23/2015 - Present        RESOLVED: Hypoxemia ICD-10-CM: R09.02  ICD-9-CM: 799.02  9/10/2017 - 9/12/2017        RESOLVED: Acute respiratory failure ([facility] ICD-10-CM: J96.00  ICD-9-CM: 518.81  9/8/2017 - 9/10/2017        RESOLVED: Hemorrhagic stroke ([facility] ICD-10-CM: I61.9  ICD-9-CM: 176  9/7/2017 - 9/7/2017        RESOLVED: Non-intractable vomiting with nausea ICD-10-CM: R11.2  ICD-9-CM: 787.01  9/7/2017 - 9/12/2017        RESOLVED: Seborrheic keratosis, inflamed ICD-10-CM: L82.0  ICD-9-CM: 702.11  7/11/2016 - 9/20/2016        RESOLVED: Cough ICD-10-CM: R05  ICD-9-CM: 786.2  7/11/2016 - 9/20/2016        RESOLVED: Shoulder pain ICD-10-CM: M25.519  ICD-9-CM: 719.41  10/23/2015 - 2/27/2017                    Left BG Hemorrhage with intraventricular extension due to HTN emergency; resultant R hemiplegia, right neglect, dysarthria, aphasia, dysphagia with significant decline in mobility and self care  Plan:   Continue daily physician medical management:  Pneumonia prophylaxis- Incentive spirometer every hour while awake. Will need instruction and assistance.  Not sure if she can get a proper oral seal to be effective  -10/9 no pulmonary issues      Dysphagia; Pureed diet with NTL; at risk for malnutrition and dehydration. PICC line removed prior to transfer; may need a line for IVFs if BUN continues to increase. Low K, Ca; check mg. Supplementation as needed. Na elevated  -mag ok, K much improved; cont to supplement while on diuretic  -9/19 replace K; 3.0; 2.8 this am 9/20; inc supplement; may need to add to IVFs  -9/25 fluid status and K nl; 9/28 cont NTL; high risk aspiration; still needs supervision /assist with meals  -10/2 MBS today; thin liq!!  -10/9 po intake, especially fluids, has improved; feeding self      Thrombocytopenia; has been trending down for no apparent reason. hgb slt down as well. Has not been on Hep products; consult hematology today 9/19  -plts 40K, continues to trend down 9/20; await consult by hematology. ? reln to [address]. Mild anemia as well  Check HIT panel, d dimer , fibrinogen  -9/21 plts 36k ; HIT PROFILE POSITIVE, D DIMER ELEVATED 32.79, FIBRINOGEN  ; consistent with DIC; Etiology unclear  WILL D/W HEMATOLOGY; LFTs ok, no hx of blood transfusion,no hx pancreatitis, no sign of bacteremia. Has not been on any heparin products. Can see in head injury; has ICH. Cannot r/o blood disorder (leukemia)   per hematology \"Recommend transfusing platelets for <69,695 and Cryoprecipitate for fibrinogen < 100. \"  -MARK pending  -9/22 bilateral LE venous duplex ordered; low suspicion but at risk and having calf tenderness  -9/22 plts improved today; monitor closely; 9/23 small right peroneal thrombus. No antic per hematology. IR refused/advised against placing IVF due to low risk of clot propagation given size and location  -9/23 counts bumping up; pts now 53k from 45 and previously 36  -9/24 plts cont to improve. 69K. MARK still pending; can resume therapies  9/26 Ddimer pending, fibrinogen now nl  -9/27 D dimer 11+, improved. Per Hematology; likely due to consumption coagulopathy.  Will monitor  -10/2 labs have improved; MARK + 68% confirming dx of HIT; 10/4 recheck cbc (last done 9/28)    -10/5 plts 263; 10/9 plts 283      Bilateral PEs 9/25, filter placed in IR; looks good today. sats good without O2 supplement 94-97% sats; d/w Hematology, no agaratroban  -apprec Pulm assessment. Clinically looks good and no longer symptomatic  -9/28 clinically stable, asymptomatic ; 10/3 stable. No sob, no O2 supplements; 10/9 remains asymptomatic      Hypernatremia; last head CT did not show significant cerebral edema. Clinically improving. Likely due to prerenal azotemia; 9/15 MAY REQ isotonic / hypotonic saline IV;  -9/18 events of weekend noted; Na 156; asymptomatic, on 0.5 dextrose with 1/4 Na; na q 6hrs; pts serum osmolality was normal. Will check urine Na and urine osmolality; depending on findings, will consider consulting Hospitalist vs Nephrology  Neuro improving despite this  -9/19 Na down to 149, urine osmo nl, serum osmo min hi; cont dex/and 1/4 NS; repeat in am. Not DI as uop not increased  -9/20 Na 147; cont fluids; 9/21 not resulted; will stop fluids when Na below 142  -Na 140 9/22; dc IVFs; 9/25 136; 9/27 136; recheck 10/4 134; mildly low; f/u 10/9 pending      ICH/IVH; 9/22 clinically improves daily. F/u HCT yest due to transient left eye visual disturbance. Overall, resolving hemorrhage. There is more pronounced edema; expected. Ventricular size now nl      Anemia ; 9.6-10/ stable; recheck 10/9 10.5 improved       DVT risk / DVT Prophylaxis- Will require daily physician exam to assess for signs and symptoms as patient is at increased risk for of thromboembolism. Mobilization as tolerated. Intermittent pneumatic compression devices when in bed Thigh-high or knee-high thromboembolic deterrent hose when out of bed.  NO  anticoag due to ICH/HIT; has right small right peroneal vein; 9/25 spoke with IR again today; more risk of putting in IVC filter than benefits especially since she is asymptomatic with no swelling or tenderness and small size of clot  -10/3 no edema, right calf soft. No SOB  -10/9 no calf swelling or tenderness      Pain Control: stable, mild-to-moderate joint symptoms intermittently, reasonably well controlled by PRN meds. Will require regular pain assessment and comprenhensive pain management,       Wound Care: Monitor wound status daily per staff and physician. At risk for failure. Will require 24/7 rehab nursing. None needed at this time      Hypertension - BP uncontrolled, fluctuating, managed medically. Add prn hydralazine for sbp> 180. Goal SBP is 140-160 given ICH. Cont Normodyne, norvasc and hyzaar; consider Verapamil or scheduled Hydralazine  -9/18 BP increased 169/95; add hydralazine 25 tid  -9/20 BP much improved  -9/22 /74; 9/24 bp stable; may dec hydralazine to bid  -9/25 dec hydralazine  -9/27 bp 116-119  -9/28 SBP in the 90s to 116, asymptomatic; dc hydralazine and monitor; decrease Normodyne to bid  -9/29 BP still low; lower Norvasc and normodyne, cont Hyzaar; all have parameters of when to hold  - 9/30 SBP overnight and this am 108-111, will d/c norvasc, on labetolol and losartan/HCTZ , HR acceptable  -10/2 115/71; controlled. Cont current meds  -130-151/77; 10/5 118/77; 10/6 BP stable; goal 120-130 given ICH  -10/9 148/81; add low dose Norvasc; ideally want less than 130; 10/11 130/73      Mild hyperglycemia, likely stress induced vs borderline diabetes; monitor loosely  -9/27 bs 145 on BMP; follow bs qam x 3d; no issues    Spasticity; had added zanaflex; 10/12 adjust for desired effect; change to 2mg tid and 4mg qhs; AMBULATED YESTERDAY AND ADVANCED OWN LEG!!!!!!      Dysphagia; cont NTL, mech soft; hopefully can upgrade liquids soon 9/25  -9/29 remains on NTL. MBS scheduled for 10/2.  -10/3 now on THINS! !      Leukocytosis; recheck in a.m. Check UA due to stafford. No pulmonary signs (had neg CXR today), no s/s of infxn at old PICC site, no wounds, afebrile. Watch monocytes.   -9/18 12.2 improving. Urine neg  -9/19 up to 13.2, ? Reactive. No source of infxn identified. Afebrile  -9/20 WBC now normal      Urinary retention/ neurogenic bladder - start flomax but continue stafford until mobility improves a bit. Strict I/o's  -9/20 keeping stafford to monitor UOP until Na normalizes. No significant output to suggest DI  -9/21 dc stafford; cont Flomax, follow bladder scan  9/22 voiding without issue; incontinence but no retention  -9/28 new onset urinary retention; check UA  -9/29 UA neg but cx saying > 100K gram neg cocci; will need to f/u ID/sens; Start Cipro 500 bid  - 9/30 preliminary urine cx >110K mixed skin denis  -10/2 stop Cipro; cx nl denis  -10/6 recurrent urinary retention. No flomax; re check UA, consider urecholine. CIC for PVRs  -10/9 dc scans      bowel program - add prn meds; incontinent, want to keep stool on the firmer side. Monitor skin.       GERD - add a PPI. At times may need additional antacids, Maalox prn.      -continues to benefit from and is showing improvements in a multidisc team approach. Participating well. 10/5 progress slow; 10/6 OT is seeing improvements. Starting to get movement back into the right arm. Awareness improving  -10/6 add ritalin for attn and distractibility   -10/9 family reports increased attn with them; await therapy reports        9/21   Addendum; in therapy this afternoon, transient left eye blindness. Will f/u head CT for extension of or new ICH, especially with low plts   Results  1. Interval decrease in size of intraventricular hemorrhage within the left  basal ganglia/thalamus with near complete resolution of intraventricular  hemorrhage. Surrounding edema is more pronounced with slight increase in  left-to-right midline shift. 2. Interval decrease in ventricular size, approaching normal in caliber.      10/2 Ms Cruz Kurtz continues to progress with therapies and has made slow steady improvements.  Her rehab course has been complicated by multiple medical issues; HIT, DIC, DVT, PE. Family has not yet been involved in care and home has not been assessed. Insurance claims to be covering thru 10/3 only. Pt is not ready to dc. We are still monitoring labs closely, daily clinical eval for further thrombosis, BP mgt with adjustment of meds etc. Spoke with primary therapists who feel discharge would be premature and not in the pts best interest especially given young age and previous good health leading to a better outcome with aggressive rehab. From the beginning, we said our ELOS was 6 wks. We are only at 2 1/2 wks. Will do peer to peer/ 10/3 pts stay extended to next Monday but Abelardona open to team conference notes this week for extension of stay based on progress. If not given more time, she will require SNF placement.        - FAMILY [id number] REGARDING PROGRESS, NEEDS, DC PLAN; family overwhelmed with decision that need to be made. Family training Monday in case insurance denies as of 10/10      10/9 will await [employer]'s decision; in order to dc home, as of 10/5 we were asking for 2-3 more weeks since she is improving , albeit slowly. If a further stay is denied; will need SNF; family not prepared to bring home  10/10 excellent progress made. Surprising to PT. Definitely have made a step forward and would benefit from ongoing acute inpt rehab to maximize recovery     -10/11 Insurance has graciously extended stay with next update 10/17! !                     Time spent was 25 minutes with over 1/2 in direct patient care/examination, consultation and coordination of care.      Signed By: Ancil Halsted, MD     October 12, 2017

## 2017-10-12 NOTE — PROGRESS NOTES
10/12/17 1045   Time Spent With Patient   Time In 1000   Time Out 1030   Patient Seen For: AM;Neuro-linguistics   Mental Status   Neurologic State Alert   Orientation Level Oriented X4   Cognition Memory loss; Appropriate decision making   Perception Appears intact   Perseveration No perseveration noted   Safety/Judgement Fall prevention   Pt seen in room, in bed. Pt completed word finding and STM tasks  Pt completed exclusion (which does not belong in 4 words) with 100% accuracy. Pt completed memory recall with pictures with delayed with 50% accuracy. Pt repeated numbers after therapist with 90% accuracy. Continue with plan of care.    Karina Mcneill MA/CCC/SLP

## 2017-10-13 PROCEDURE — 97535 SELF CARE MNGMENT TRAINING: CPT

## 2017-10-13 PROCEDURE — 99232 SBSQ HOSP IP/OBS MODERATE 35: CPT | Performed by: PHYSICAL MEDICINE & REHABILITATION

## 2017-10-13 PROCEDURE — 65310000000 HC RM PRIVATE REHAB

## 2017-10-13 PROCEDURE — 97530 THERAPEUTIC ACTIVITIES: CPT

## 2017-10-13 PROCEDURE — 97110 THERAPEUTIC EXERCISES: CPT

## 2017-10-13 PROCEDURE — 74011250637 HC RX REV CODE- 250/637: Performed by: PHYSICAL MEDICINE & REHABILITATION

## 2017-10-13 PROCEDURE — 97112 NEUROMUSCULAR REEDUCATION: CPT

## 2017-10-13 PROCEDURE — 97116 GAIT TRAINING THERAPY: CPT

## 2017-10-13 PROCEDURE — 92507 TX SP LANG VOICE COMM INDIV: CPT

## 2017-10-13 PROCEDURE — 97532 HC OT COGNITIVE SKILL DEV 15 MIN: CPT

## 2017-10-13 RX ORDER — ROPINIROLE 2 MG/1
2 TABLET, FILM COATED ORAL
Status: DISCONTINUED | OUTPATIENT
Start: 2017-10-13 | End: 2017-10-14

## 2017-10-13 RX ADMIN — TAMSULOSIN HYDROCHLORIDE 0.4 MG: 0.4 CAPSULE ORAL at 20:35

## 2017-10-13 RX ADMIN — STANDARDIZED SENNA CONCENTRATE AND DOCUSATE SODIUM 1 TABLET: 8.6; 5 TABLET, FILM COATED ORAL at 08:22

## 2017-10-13 RX ADMIN — TIZANIDINE 2 MG: 2 TABLET ORAL at 06:41

## 2017-10-13 RX ADMIN — LABETALOL HYDROCHLORIDE 100 MG: 100 TABLET, FILM COATED ORAL at 08:20

## 2017-10-13 RX ADMIN — TIZANIDINE 4 MG: 2 TABLET ORAL at 20:36

## 2017-10-13 RX ADMIN — TIZANIDINE 2 MG: 2 TABLET ORAL at 14:55

## 2017-10-13 RX ADMIN — HYDROCHLOROTHIAZIDE: 12.5 CAPSULE ORAL at 08:21

## 2017-10-13 RX ADMIN — METHYLPHENIDATE HYDROCHLORIDE 5 MG: 10 TABLET ORAL at 17:23

## 2017-10-13 RX ADMIN — POLYETHYLENE GLYCOL 3350 17 G: 17 POWDER, FOR SOLUTION ORAL at 14:55

## 2017-10-13 RX ADMIN — ROPINIROLE HYDROCHLORIDE 2 MG: 2 TABLET, FILM COATED ORAL at 20:36

## 2017-10-13 RX ADMIN — METHYLPHENIDATE HYDROCHLORIDE 5 MG: 10 TABLET ORAL at 08:21

## 2017-10-13 RX ADMIN — LABETALOL HYDROCHLORIDE 100 MG: 100 TABLET, FILM COATED ORAL at 17:23

## 2017-10-13 RX ADMIN — AMLODIPINE BESYLATE 2.5 MG: 5 TABLET ORAL at 08:19

## 2017-10-13 RX ADMIN — POTASSIUM CHLORIDE 20 MEQ: 20 TABLET, EXTENDED RELEASE ORAL at 08:23

## 2017-10-13 NOTE — PROGRESS NOTES
OT Daily Note  Time In 0916   Time Out 1000     Pain: Patient had no complaint of pain. Functional Mobility   Pt transferred with mod A. Pt was S for sit to supine. Self-Care   Pt applied lotion to LB with SBA. Pt doffed shoes with verbal cues. Neuro-Muscular Re-Education   Pt complete [employer] for BUE to improve bilateral integration. Engaged in C/ Hugh Hung 33 on RLE and RUE. 2'' step place under LLE to promote WB on RLE with poor effect. Pt demonstrated poor awareness of R side. Education   Benefits of [employer]     Interdisciplinary Communication: SEAN Hanson on pt's pain    Plan: Continue with POC. Pt was left in bed with all needs within reach.      Eloisa Her OTR/L  10/13/2017

## 2017-10-13 NOTE — PROGRESS NOTES
Problem: Falls - Risk of  Goal: *Absence of Falls  Document Haley Fall Risk and appropriate interventions in the flowsheet.    Outcome: Progressing Towards Goal  Fall Risk Interventions:  Mobility Interventions: Bed/chair exit alarm, Patient to call before getting OOB, Utilize walker, cane, or other assitive device     Mentation Interventions: Bed/chair exit alarm, Door open when patient unattended, Familiar objects from home, More frequent rounding, Update white board     Medication Interventions: Bed/chair exit alarm, Patient to call before getting OOB, Utilize gait belt for transfers/ambulation     Elimination Interventions: Call light in reach, Patient to call for help with toileting needs, Toileting schedule/hourly rounds     History of Falls Interventions: Bed/chair exit alarm, Door open when patient unattended, Utilize gait belt for transfer/ambulation

## 2017-10-13 NOTE — PROGRESS NOTES
Subjective \"I want to put on some makeup. \"   Activity Patient put makeup on and propelled her w/c with the use of her LUE & LE. Strength/Endurance Patient with right hemiparesis. Balance Min (A) with SPT from w/c to bed   Social Interaction Patient initiated conversation and question asking with this therapist.   Cognitive A&O X4; Patient needed directions explained through demonstration at times. Comments Patient was assisted to her room and into the bed with all needs within reach.      Shirley Rowley, MARKS

## 2017-10-13 NOTE — PROGRESS NOTES
OT Daily Note  Time In 1116   Time Out 1156     Pain: Pt c/o pain in buttocks; pt was returned to bed. Functional Mobility   Pt transferred with mod A. Pt was S for supine <> sit. Cognition   Pt engaged with 60 step sequencing task with min A. Pt required increased time, but was able to self-correct and recognize errors. Education   Improvements made     Plan: Continue with POC. Pt was left in bed with all needs within reach.      Daniele Jalloh OTR/L  10/13/2017

## 2017-10-13 NOTE — PROGRESS NOTES
Problem: Falls - Risk of  Goal: *Absence of Falls  Document Haley Fall Risk and appropriate interventions in the flowsheet.    Outcome: Progressing Towards Goal  Fall Risk Interventions:  Mobility Interventions: Utilize walker, cane, or other assitive device     Mentation Interventions: Bed/chair exit alarm, Door open when patient unattended     Medication Interventions: Bed/chair exit alarm, Patient to call before getting OOB     Elimination Interventions: Call light in reach     History of Falls Interventions: Bed/chair exit alarm, Door open when patient unattended

## 2017-10-13 NOTE — PROGRESS NOTES
OT WEEKLY PROGRESS NOTE    Time In: 5232  Time Out: 2947    COMPREHENSION MODE Initial Assessment Weekly Progress Assessment 10/13/2017   Score 5 (basic needs 90% of the time) 4     EXPRESSION Initial Assessment Weekly Progress Assessment 10/13/2017   Primary Mode of Expression Verbal Verbal   Score 3 5   Comments Expresses only basic needs 75-89%, repeats words, Needs cues to express basic needs, OR needs staff member to set up communication device,     SOCIAL INTERACTION/ PRAGMATICS Initial Assessment Weekly Progress Assessment 10/13/2017   Score 4 5   Comments tearful parts of session, able to self correct Needs supervision only under stressful or unfamiliar conditions, interacts appropriately 90% of the time, needs monitoring or encouragement for participation or interaction. PROBLEM SOLVING Initial Assessment Weekly Progress Assessment 10/13/2017   Score 4 4   Comments basic problem 80% of the time Solves basic problems 75-89% of the time. MEMORY Initial Assessment Weekly Progress Assessment 10/13/2017   Score 4 4   Comments  75-89% of the time 2 steps of 3 step request Recognizes, recalls, or executes 75-89% of the time 2 steps of 3 step request.     EATING Initial Assessment Weekly Progress Assessment 10/13/2017   Functional Level 7 7   Comments I I     GROOMING Initial Assessment Weekly Progress Assessment 10/13/2017   Functional Level 5 7   Tasks completed by patient Brushed hair Brushed hair   Comments Set-up I     BATHING Initial Assessment Weekly Progress Assessment 10/13/2017   Functional Level 4 4   Body parts patient bathed Abdomen, Arm, left, Arm, right, Buttocks, Chest, Lower leg and foot, left, Lower leg and foot, right, Tali area, Thigh, left, Thigh, right Abdomen;Arm, left;Arm, right;Buttocks; Chest;Lower leg and foot, left; Lower leg and foot, right;Tali area; Thigh, left; Thigh, right   Comments A to stand; cueing for all parts A in standing, CGA when reaching down     TUB/SHOWER TRANSFER INDEPENDENCE Initial Assessment Weekly Progress Assessment 10/13/2017   Score 2 3   Comments Lifting and lowering A Lowering A     UPPER BODY DRESSING/UNDRESSING Initial Assessment Weekly Progress Assessment 10/13/2017   Functional Level 4 4   Items applied/Steps completed Pullover (4 steps) Bra (3 steps); Pullover (4 steps)   Comments A for threading RUE A to adjust bra, max encouragement     LOWER BODY DRESSING/UNDRESSING Initial Assessment Weekly Progress Assessment 10/13/2017   Functional Level 1 4   Items applied/Steps completed Sock, left (1 step), Sock, right (1 step), Shoe, left (1 step), Shoe, right (1 step), Fasten shoe (1 step), Elastic waist pants (3 steps), Underpants (3 steps) Sock, left (1 step); Sock, right (1 step); Underpants (3 steps); Elastic waist pants (3 steps)   Comments A for threading RLE; R sock and shoes, tying shoes, A in standing; A adjusting underwear A for R sock and balance     Plan of Care: Please see Care Plan for updated LTGs. Family Training:  Has been completed    Summary of Progress: Pt is making progress with balance, safety awareness, and cognition allowing for improvements with dressing and toileting. Pt requires skilled services to address deficits in [address] [place] [address], balance, safety awareness, cognition, and sitting tolerance. Pt is limited by pain in buttocks preventing her from sitting for prolonged periods of time. Summary of Session: Pt was in bed and was mod I for supine to sit. Pt's performance with ADL is reflected in above chart. Pt did not like engaging in dressing and required max verbal cueing to participate. Discussed the importance of working on skills. Pt reports she will hire someone to assist her. Pt was left in w/c with all needs within reach. Continue with POC.      Sai Valdes OTR/MITCHELL  10/13/2017

## 2017-10-13 NOTE — PROGRESS NOTES
10/13/17 1340   Time Spent With Patient   Time In 1226   Time Out 1257   Patient Seen For: PM ;Verbal activities; Neuro-linguistics   Mental Status   Neurologic State Alert   Orientation Level Oriented X4   Cognition Decreased attention/concentration; Follows commands   Perception Appears intact   Perseveration No perseveration noted   Safety/Judgement Fall prevention        10/13/17 1340   Verbal Organization Exercises   Divergent Naming Accuracy (%) 85 %   Convergent Categorization Accuracy (%) 20 %   Memory Exercises   Digit/Word Span Accuracy (%) 50 %   Neuro-Linguistic Exercises   Verbal Reasoning Tasks Impaired   Verbal Organization Impaired   Memory Impaired   Attention  Impaired   Patient was seen for skilled speech therapy services targeting memory and verbal reasoning. Memory strategies introduced including use of categorization, repetition, and writing information down. Strategies were then implemented in function memory task- recalling 6 items for grocery shopping trip. She independently organized list into 3 categories with 100% accuracy. She immediately recalled list with 100% accuracy. She decreased to 3/6 accuracy after a 5 minute delay and 1/6 after a 10 minute filled delay. She benefits from categorical cues to increase recall. When provided with category members, she identified category (convergent naming) with 85% accuracy. She then added member to category (divergent naming) with 20% accuracy. She benefit from moderate prompts to increase accuracy with divergent naming task to 80%.      Araseli Arteaga, ABIMAEL, CCC-SLP, CBIS

## 2017-10-13 NOTE — PROGRESS NOTES
PHYSICAL THERAPY DAILY NOTE  Time In: 3069  Time Out: 8342  Patient Seen For: AM;Gait training;Patient education;Transfer training; Other (see progress notes)    Subjective: patient reporting she feels OK. Reports she has to get back in bed after she sits up for a while due to hurting in her sacral area after sitting up too long. Reports she slow feels secure when she is in the bed         Objective:Vital Signs:  Patient Vitals for the past 12 hrs:   Temp Pulse Resp BP SpO2   10/13/17 0816 - 73 - 131/80 -   10/13/17 0735 98.5 °F (36.9 °C) 75 16 106/70 94 %     Pain level:0 to 4 out of 10  Pain location:sacral/coccyx area  Pain interventions:Pain medication, rest, positioning,pressure relieving w/c cushion(attempted ROHO cushion but patient preferred foam cushion with cut out for sacral area. Patient education:Bed mobility training,transfer training, gait training, fall precautions, activity pacing,body mechanics,Patient verbalizing understanding and demonstrating partial understanding of patient education. Recommend follow up education. Interdisciplinary Communication:spoke with OT regarding ongoing pain.     Other (comment) (falls)  GROSS ASSESSMENT Daily Assessment     NA       BED/MAT MOBILITY Daily Assessment   Increased time and effort to complete with cues for body mechanics   Rolling Right : 0 (Not tested)  Rolling Left : 4 (Minimal assistance)  Supine to Sit : 4 (Minimal assistance)  Sit to Supine : 0 (Not tested)       TRANSFERS Daily Assessment   Increased time and effort to complete with cues for body mechanics   Transfer Type: SPT without device (bed to w/c to the left)  Transfer Assistance : 4 (Contact guard assistance)  Sit to Stand Assistance: Stand-by assistance  Car Transfers: Not tested       GAIT Daily Assessment   Gait training inhibiting right knee hyperextension and hip retraction at midstance and facilitating improved hip and knee flexion at midswing  Amount of Assistance: 1 (Dependent/total assistance) (mod assist to advance RLE and mod to max assist to stabilize right knee/hip)  Distance (ft): 20 Feet (ft) (20ft x 5 )  Assistive Device: Gait belt; Other (comment) (left hand rail,ace wrap for DF assist, Bregg knee brace LLE)       STEPS or STAIRS Daily Assessment    Steps/Stairs Ambulated (#): 0  Level of Assist : 0 (Not tested)       BALANCE Daily Assessment   Static standing with LUE support on hand rail facilitating upright midline posture inhibiting right knee hyperextension Sitting - Static: Good (unsupported)  Sitting - Dynamic: Fair (occasional)  Standing - Static: Fair (with Left UE support on hand rail)  Standing - Dynamic : Impaired       WHEELCHAIR MOBILITY Daily Assessment    Curbs/Ramps Assist Required (FIM Score): 0 (Not tested)  Wheelchair Setup Assist Required : 0 (Not tested)       LOWER EXTREMITY EXERCISES Daily Assessment     NA          Assessment: Progressing towards SBA with SPT to left and supervision with bed mobility. Difficulty managing RLE during functional mobility secondary to weakness and sensory deficits. RLE extensor tone has decreased. Achilles tendon contracture contributing to knee hyperextension as well as RLE weakness and sensory deficits.        Patient to OT at end of treatment    Plan of Care: complete weekly assessment this PM    Mira Real, PT  10/13/2017

## 2017-10-13 NOTE — PROGRESS NOTES
Problem: Mobility Impaired (Adult and Pediatric)  Goal: *Therapy Goal (Edit Goal, Insert Text)  STG 4. Patient ambulate up/down 4 steps with left hand rail and max assist in 2 weeks (not applicable yet 1/19/04) (10/6/17: remains not applicable secondary to slow progress and medical complications)  LTG 4. Patient ambulate up/down 4 steps with left hand rail and mod assist in 3 weeks (not applicable secondary to slow progress and lack of return in her RLE). Stairs to be dependent at a wheelchair level.    Variance: Patient slowly responding

## 2017-10-13 NOTE — PROGRESS NOTES
Grey Gross MD,   Medical Director  [address] [facility], [address] [facility]  Tel: [phone]       SFD PROGRESS NOTE    Ivana Pratt  Admit Date: 9/15/2017  Admit Diagnosis: stroke, left brain involvement;ICH (intracerebral hemorrhag*    Subjective     Doing ok but had a restless night. Kept waking up. Otherwise doing ok. Ambulation improving    Objective:     Current Facility-Administered Medications   Medication Dose Route Frequency    tiZANidine (ZANAFLEX) tablet 2 mg  2 mg Oral BID    tiZANidine (ZANAFLEX) tablet 4 mg  4 mg Oral QHS    amLODIPine (NORVASC) tablet 2.5 mg  2.5 mg Oral DAILY    potassium chloride (K-DUR, KLOR-CON) SR tablet 20 mEq  20 mEq Oral DAILY    senna-docusate (PERICOLACE) 8.6-50 mg per tablet 1 Tab  1 Tab Oral DAILY    polyethylene glycol (MIRALAX) packet 17 g  17 g Oral DAILY    methylphenidate HCl (RITALIN) tablet 5 mg  5 mg Oral BID    labetalol (NORMODYNE) tablet 100 mg  100 mg Oral BID    losartan/hydroCHLOROthiazide (HYZAAR) 100/12.5 mg   Oral DAILY    acetaminophen (TYLENOL) tablet 500 mg  500 mg Per NG tube Q4H PRN    alum-mag hydroxide-simeth (MYLANTA) oral suspension 30 mL  30 mL Oral Q4H PRN    bisacodyl (DULCOLAX) suppository 10 mg  10 mg Rectal DAILY PRN    hydrALAZINE (APRESOLINE) tablet 50 mg  50 mg Oral QID PRN    lip protectant (BLISTEX) ointment   Topical PRN    ondansetron (ZOFRAN ODT) tablet 4 mg  4 mg Oral Q6H PRN    sodium phosphate (FLEET'S) enema 118 mL  1 Enema Rectal PRN    traMADol (ULTRAM) tablet 50 mg  50 mg Oral Q6H PRN    traZODone (DESYREL) tablet 50 mg  50 mg Oral QHS PRN    tamsulosin (FLOMAX) capsule 0.4 mg  0.4 mg Oral QHS     Review of Systems:Denies chest pain, shortness of breath, cough, headache, visual problems, abdominal pain, dysurea, calf pain. Pertinent positives are as noted in the medical records and unremarkable otherwise.      Visit Vitals    /80 (BP 1 Location: Right arm, BP Patient Position: At rest)    Pulse 73    Temp 98.5 °F (36.9 °C)    Resp 16    SpO2 94%        Physical Exam:   General: Alert and age appropriately oriented. No acute cardio respiratory distress. HEENT: Normocephalic,no scleral icterus  Oral mucosa moist without cyanosis, right facial weakness   Lungs: Clear to auscultation  bilaterally. Respiration even and unlabored   Heart: Regular rate and rhythm, S1, S2   No  murmurs, clicks, rub or gallops   Abdomen: Soft, non-tender, nondistended. Bowel sounds present. No organomegaly. Genitourinary: Benign . Neuromuscular:      Right hemiplegia. prox shoulder retraction, a trace of deltoid o/w 0/5  RLE KE 2+, hip flexion 2+, 0/5 distally. No increased tone in RUE appreciated. Tone RLE flex synergy  Speech fluency improving   Skin/extremity: No rashes, no erythema. No calf tenderness BLE  No edema                                                                            Functional Assessment:  Gross Assessment  AROM: Grossly decreased, non-functional (RLE) (10/07/17 1300)  PROM: Within functional limits (10/07/17 1300)  Strength: Grossly decreased, non-functional (RLE) (10/07/17 1300)  Coordination: Grossly decreased, non-functional (RLE) (10/07/17 1300)  Tone: Abnormal (09/30/17 1200)  Sensation: Impaired (09/30/17 1200)       Balance  Sitting - Static: Good (unsupported) (10/07/17 1300)  Sitting - Dynamic: Good (unsupported) (10/07/17 1300)  Standing - Static: Fair (with support) (10/07/17 1300)  Standing - Dynamic : Impaired (10/07/17 1300)           Toileting  Adaptive Equipment: Elevated seat;Walker (09/29/17 7559)         Kelin Hemphill Fall Risk Assessment:  Kelin Hemphill Fall Risk  Mobility: Ambulates or transfers with assist devices or assistance/unsteady gait (10/13/17 3235)  Mobility Interventions: Bed/chair exit alarm; Patient to call before getting OOB;Utilize walker, cane, or other assitive device (10/13/17 1494)  Mentation: Alert, oriented x 3 (10/13/17 5156)  Mentation Interventions: Bed/chair exit alarm; Door open when patient unattended;Familiar objects from home;More frequent rounding;Update white board (10/13/17 1810)  Medication: Patient receiving anticonvulsants, sedatives(tranquilizers), psychotropics or hypnotics, hypoglycemics, narcotics, sleep aids, antihypertensives, laxatives, or diuretics (10/13/17 0733)  Medication Interventions: Bed/chair exit alarm; Patient to call before getting OOB;Utilize gait belt for transfers/ambulation (10/13/17 8487)  Elimination: Needs assistance with toileting (10/13/17 0733)  Elimination Interventions: Call light in reach; Patient to call for help with toileting needs; Toileting schedule/hourly rounds (10/13/17 7560)  Prior Fall History: No (10/13/17 9378)  History of Falls Interventions: Bed/chair exit alarm; Door open when patient unattended;Utilize gait belt for transfer/ambulation (10/13/17 0733)  Total Score: 3 (10/13/17 0733)  Standard Fall Precautions: Yes (10/10/17 2109)  High Fall Risk: Yes (10/13/17 0733)     Speech Assessment:         Ambulation:  Gait  Base of Support: Narrowed; Center of gravity altered (09/22/17 1200)  Speed/Lulu: Delayed (09/22/17 1200)  Step Length: Right shortened (09/22/17 1200)  Distance (ft): 30 Feet (ft) (x7) (10/12/17 1611)  Assistive Device: Gait belt; Other (comment) (used rail at wall , knee brace and ace wrap to assist right leg) (10/12/17 1611)     Labs/Studies:  No results found for this or any previous visit (from the past 67 hour(s)).     Assessment:     Problem List as of 10/13/2017  Date Reviewed: 9/12/2017          Codes Class Noted - Resolved    Thrombocytopenia ([facility] ICD-10-CM: D69.6  ICD-9-CM: 287.5  9/25/2017 - Present        Pulmonary emboli (HCC) ICD-10-CM: I26.99  ICD-9-CM: 415.19  9/25/2017 - Present        DVT (deep venous thrombosis) ([facility] ICD-10-CM: I82.409  ICD-9-CM: 453.40  9/25/2017 - Present        * (Principal)ICH (intracerebral hemorrhage) ([facility] ICD-10-CM: I61.9  ICD-9-CM: 328  9/15/2017 - Present        Hypertensive urgency, malignant ICD-10-CM: I16.0  ICD-9-CM: 401.0  9/11/2017 - Present        Stroke, hemorrhagic ([facility] ICD-10-CM: I61.9  ICD-9-CM: 246  9/7/2017 - Present        Accelerated hypertension ICD-10-CM: I10  ICD-9-CM: 401.0  9/7/2017 - Present        At risk for aspiration (Chronic) ICD-10-CM: Z91.89  ICD-9-CM: V49.89  9/7/2017 - Present        Acute spontaneous intraventricular hemorrhage assoc w/ hypertension ([place] [address].) ICD-10-CM: I61.5, I10  ICD-9-CM: 752, 401.9  9/7/2017 - Present        Screening for breast cancer ICD-10-CM: Z12.31  ICD-9-CM: V76.10  2/27/2017 - Present        Atrophic vaginitis ICD-10-CM: N95.2  ICD-9-CM: 627.3  7/11/2016 - Present        HTN (hypertension) (Chronic) ICD-10-CM: I10  ICD-9-CM: 401.9  10/23/2015 - Present        Depression ICD-10-CM: F32.9  ICD-9-CM: 590  10/23/2015 - Present        Anxiety (Chronic) ICD-10-CM: F41.9  ICD-9-CM: 300.00  10/23/2015 - Present        RESOLVED: Hypoxemia ICD-10-CM: R09.02  ICD-9-CM: 799.02  9/10/2017 - 9/12/2017        RESOLVED: Acute respiratory failure ([facility] ICD-10-CM: J96.00  ICD-9-CM: 518.81  9/8/2017 - 9/10/2017        RESOLVED: Hemorrhagic stroke ([facility] ICD-10-CM: I61.9  ICD-9-CM: 457  9/7/2017 - 9/7/2017        RESOLVED: Non-intractable vomiting with nausea ICD-10-CM: R11.2  ICD-9-CM: 787.01  9/7/2017 - 9/12/2017        RESOLVED: Seborrheic keratosis, inflamed ICD-10-CM: L82.0  ICD-9-CM: 702.11  7/11/2016 - 9/20/2016        RESOLVED: Cough ICD-10-CM: R05  ICD-9-CM: 786.2  7/11/2016 - 9/20/2016        RESOLVED: ADD (attention deficit disorder) (Chronic) ICD-10-CM: F98.8  ICD-9-CM: 314.00  10/23/2015 - 10/13/2017        RESOLVED: Shoulder pain ICD-10-CM: M25.519  ICD-9-CM: 719.41  10/23/2015 - 2/27/2017                    Left BG Hemorrhage with intraventricular extension due to HTN emergency; resultant R hemiplegia, right neglect, dysarthria, aphasia, dysphagia with significant decline in mobility and self care  Plan:   Continue daily physician medical management:  Pneumonia prophylaxis- Incentive spirometer every hour while awake. Will need instruction and assistance. Not sure if she can get a proper oral seal to be effective  -10/9 no pulmonary issues      Dysphagia; Pureed diet with NTL; at risk for malnutrition and dehydration. PICC line removed prior to transfer; may need a line for IVFs if BUN continues to increase. Low K, Ca; check mg. Supplementation as needed. Na elevated  -mag ok, K much improved; cont to supplement while on diuretic  -9/19 replace K; 3.0; 2.8 this am 9/20; inc supplement; may need to add to IVFs  -9/25 fluid status and K nl; 9/28 cont NTL; high risk aspiration; still needs supervision /assist with meals  -10/2 MBS today; thin liq!!  -10/9 po intake, especially fluids, has improved; feeding self      Thrombocytopenia; has been trending down for no apparent reason. hgb slt down as well. Has not been on Hep products; consult hematology today 9/19  -plts 40K, continues to trend down 9/20; await consult by hematology. ? reln to [address]. Mild anemia as well  Check HIT panel, d dimer , fibrinogen  -9/21 plts 36k ; HIT PROFILE POSITIVE, D DIMER ELEVATED 32.79, FIBRINOGEN  ; consistent with DIC; Etiology unclear  WILL D/W HEMATOLOGY; LFTs ok, no hx of blood transfusion,no hx pancreatitis, no sign of bacteremia. Has not been on any heparin products. Can see in head injury; has ICH. Cannot r/o blood disorder (leukemia)   per hematology \"Recommend transfusing platelets for <43,233 and Cryoprecipitate for fibrinogen < 100. \"  -MARK pending  -9/22 bilateral LE venous duplex ordered; low suspicion but at risk and having calf tenderness  -9/22 plts improved today; monitor closely; 9/23 small right peroneal thrombus.  No antic per hematology. IR refused/advised against placing IVF due to low risk of clot propagation given size and location  -9/23 counts bumping up; pts now 53k from 45 and previously 36  -9/24 plts cont to improve. 69K. MARK still pending; can resume therapies  9/26 Ddimer pending, fibrinogen now nl  -9/27 D dimer 11+, improved. Per Hematology; likely due to consumption coagulopathy. Will monitor  -10/2 labs have improved; MARK + 68% confirming dx of HIT; 10/4 recheck cbc (last done 9/28)    -10/5 plts 263; 10/9 plts 283      Bilateral PEs 9/25, filter placed in IR; looks good today. sats good without O2 supplement 94-97% sats; d/w Hematology, no agaratroban  -apprec Pulm assessment. Clinically looks good and no longer symptomatic  -9/28 clinically stable, asymptomatic ; 10/3 stable. No sob, no O2 supplements; 10/9 remains asymptomatic      Hypernatremia; last head CT did not show significant cerebral edema. Clinically improving. Likely due to prerenal azotemia; 9/15 MAY REQ isotonic / hypotonic saline IV;  -9/18 events of weekend noted; Na 156; asymptomatic, on 0.5 dextrose with 1/4 Na; na q 6hrs; pts serum osmolality was normal. Will check urine Na and urine osmolality; depending on findings, will consider consulting Hospitalist vs Nephrology  Neuro improving despite this  -9/19 Na down to 149, urine osmo nl, serum osmo min hi; cont dex/and 1/4 NS; repeat in am. Not DI as uop not increased  -9/20 Na 147; cont fluids; 9/21 not resulted; will stop fluids when Na below 142  -Na 140 9/22; dc IVFs; 9/25 136; 9/27 136; recheck 10/4 134; mildly low; f/u 10/9 pending      ICH/IVH; 9/22 clinically improves daily. F/u HCT yest due to transient left eye visual disturbance. Overall, resolving hemorrhage. There is more pronounced edema; expected. Ventricular size now nl      Anemia ; 9.6-10/ stable; recheck 10/9 10.5 improved       DVT risk / DVT Prophylaxis- Will require daily physician exam to assess for signs and symptoms as patient is at increased risk for of thromboembolism. Mobilization as tolerated.  Intermittent pneumatic compression devices when in bed Thigh-high or knee-high thromboembolic deterrent hose when out of bed. NO  anticoag due to ICH/HIT; has right small right peroneal vein; 9/25 spoke with IR again today; more risk of putting in IVC filter than benefits especially since she is asymptomatic with no swelling or tenderness and small size of clot  -10/3 no edema, right calf soft. No SOB  -10/9 no calf swelling or tenderness      Pain Control: stable, mild-to-moderate joint symptoms intermittently, reasonably well controlled by PRN meds. Will require regular pain assessment and comprenhensive pain management,       Wound Care: Monitor wound status daily per staff and physician. At risk for failure. Will require 24/7 rehab nursing. None needed at this time      Hypertension - BP uncontrolled, fluctuating, managed medically. Add prn hydralazine for sbp> 180. Goal SBP is 140-160 given ICH. Cont Normodyne, norvasc and hyzaar; consider Verapamil or scheduled Hydralazine  -9/18 BP increased 169/95; add hydralazine 25 tid  -9/20 BP much improved  -9/22 /74; 9/24 bp stable; may dec hydralazine to bid  -9/25 dec hydralazine  -9/27 bp 116-119  -9/28 SBP in the 90s to 116, asymptomatic; dc hydralazine and monitor; decrease Normodyne to bid  -9/29 BP still low; lower Norvasc and normodyne, cont Hyzaar; all have parameters of when to hold  - 9/30 SBP overnight and this am 108-111, will d/c norvasc, on labetolol and losartan/HCTZ , HR acceptable  -10/2 115/71; controlled. Cont current meds  -130-151/77; 10/5 118/77; 10/6 BP stable; goal 120-130 given ICH  -10/9 148/81; add low dose Norvasc; ideally want less than 130; 10/11 130/73  10/13 remains better controlled      Mild hyperglycemia, likely stress induced vs borderline diabetes; monitor loosely  -9/27 bs 145 on BMP; follow bs qam x 3d; no issues     Spasticity; had added zanaflex; 10/12 adjust for desired effect; change to 2mg tid and 4mg qhs; AMBULATED YESTERDAY AND ADVANCED OWN LEG!!!!!!  -10/13 ?  Component of RLS at [facility]; consider trial of Requip      Dysphagia; cont NTL, mech soft; hopefully can upgrade liquids soon 9/25  -9/29 remains on NTL. MBS scheduled for 10/2.  -10/3 now on THINS! !      Leukocytosis; recheck in a.m. Check UA due to stafford. No pulmonary signs (had neg CXR today), no s/s of infxn at old PICC site, no wounds, afebrile. Watch monocytes. -9/18 12.2 improving. Urine neg  -9/19 up to 13.2, ? Reactive. No source of infxn identified. Afebrile  -9/20 WBC now normal      Urinary retention/ neurogenic bladder - start flomax but continue stafford until mobility improves a bit. Strict I/o's  -9/20 keeping stafford to monitor UOP until Na normalizes. No significant output to suggest DI  -9/21 dc stafford; cont Flomax, follow bladder scan  9/22 voiding without issue; incontinence but no retention  -9/28 new onset urinary retention; check UA  -9/29 UA neg but cx saying > 100K gram neg cocci; will need to f/u ID/sens; Start Cipro 500 bid  - 9/30 preliminary urine cx >110K mixed skin denis  -10/2 stop Cipro; cx nl denis  -10/6 recurrent urinary retention. No flomax; re check UA, consider urecholine. CIC for PVRs  -10/9 dc scans; 10/13 per nursing intermittent need for CIC      bowel program - add prn meds; incontinent, want to keep stool on the firmer side. Monitor skin.       GERD - add a PPI. At times may need additional antacids, Maalox prn.      -continues to benefit from and is showing improvements in a multidisc team approach. Participating well. 10/5 progress slow; 10/6 OT is seeing improvements. Starting to get movement back into the right arm. Awareness improving  -10/6 add ritalin for attn and distractibility   -10/9 family reports increased attn with them; await therapy reports   -10/13 continues to benefit from [address]   9/21   Addendum; in therapy this afternoon, transient left eye blindness. Will f/u head CT for extension of or new ICH, especially with low plts   Results  1.  Interval decrease in size of intraventricular hemorrhage within the left  basal ganglia/thalamus with near complete resolution of intraventricular  hemorrhage. Surrounding edema is more pronounced with slight increase in  left-to-right midline shift. 2. Interval decrease in ventricular size, approaching normal in caliber.           - FAMILY [id number]/5 REGARDING PROGRESS, NEEDS, DC PLAN; family overwhelmed with decision that need to be made. Family training Monday in case insurance denies as of 10/10      10/9 will await [employer]'s decision; in order to dc home, as of 10/5 we were asking for 2-3 more weeks since she is improving , albeit slowly. If a further stay is denied; will need SNF; family not prepared to bring home  10/10 excellent progress made. Surprising to PT. Definitely have made a step forward and would benefit from ongoing acute inpt rehab to maximize recovery      -10/11 Insurance has graciously extended stay with next update 10/17! !  -10/13 continues to show progress in PT, continues with OT and ST; speech improvements noted                 Time spent was 25 minutes with over 1/2 in direct patient care/examination, consultation and coordination of care.      Signed By: Betzy Serrano MD     October 13, 2017

## 2017-10-13 NOTE — PROGRESS NOTES
PT WEEKLY PROGRESS NOTE   Time In: 1302   Time Out: 1346    Subjective: patient reporting she is ready to go to therapy. Reports she wants to try walking again. Reports no pain this PM. Reports she was able to rest in the bed after AM therapy. Reports no pain with PRAFO on right foot         Objective: Vital Signs:  Patient Vitals for the past 12 hrs:   Temp Pulse Resp BP SpO2   10/13/17 1530 99.2 °F (37.3 °C) 70 18 128/80 98 %   10/13/17 0816 - 73 - 131/80 -   10/13/17 0735 98.5 °F (36.9 °C) 75 16 106/70 94 %     Pain level:No c/o pain this PM  Pain location:NA  Pain interventions:NA    Patient education:Bed mobility training,transfer training, gait training, fall precautions, activity pacing, body mechanics, w/c mobility and parts management, Patient verbalizing understanding and demonstrating partial understanding of patient education. Recommend follow up education. Interdisciplinary Communication:spoke with RN regarding PRAFO wearing schedule. At this time patient to keep PRAFO on when in bed and remove PRN for redness across dorsum of foot. Patient able to transfer with [facility] on. RN to discuss with night shift RN. Other (comment) (falls)    Outcome Measures:     FIM SCORES Initial Assessment Weekly Progress Assessment 10/13/2017   Bed/Chair/Wheelchair Transfers 2 4   Wheelchair Mobility  (Unable to assess due to appropriate size w/c not available) 5   Walking Stone  (NT) 1   Steps/Stairs  (NT) NT   Please see C Interdisciplinary Eval: Coordination/Balance Section for details regarding FIM score description.     BED/CHAIR/WHEELCHAIR TRANSFERS Initial Assessment Weekly Progress Assessment 10/13/2017   Rolling Right 3 (Moderate assistance ) 5 (Supervision)   Rolling Left 3 (Moderate assistance ) 4 (Minimal assistance)   Supine to Sit 3 (Moderate assistance) 4 (Minimal assistance)   Sit to Stand Moderate assistance Stand-by assistance   Sit to Supine 3 (Moderate assistance) 5 (Supervision)   Transfer Type SPT without device (to the left  recliner to w/c and w/c to bed) SPT without device (to the left w/c<>mat)   Comments Patient demonstrating right neglect with motor planning deficits during bed mobility and transfers.  Blocking right knee to keep knee from buckling   Increased time and effort to complete bed mobility and transfers with cues for body mechanics      Car Transfer Not tested Not tested   Car Type         GROSS ASSESSMENT Weekly Progress Assessment 10/13/2017   AROM  (LLE WFL, RLE AAROM WFL except DF -10) PF contractor   Strength  (LLE WFL, RLE hip flex 2/5, hip ABD -2/5, ADD +2/5, knee ext +4/5 influenced by extensor spasticity, knee flex 1/5, PF +2/5, DF 0/5   Coordination  (LLE intact, RLE impaired)   Tone  (extensor spasticity)   Sensation Impaired (RLE to light touch and proprioception)   PROM  (WFL except right ankle PF contracture)     POSTURE Weekly Progress Assessment 10/13/2017   Posture (WDL) Exceptions to WDL   Posture Assessment Forward head;Genu recurvatum right;Other (comment) (ankle PF)     WHEELCHAIR MOBILITY/MANAGEMENT Initial Assessment Weekly Progress Assessment 10/13/2017   Able to Propel 0 feet 150 feet   Curbs/ramps assistance required 0 (Not tested) 0 (Not tested)   Wheelchair set up assistance required 2 (Maximal assistance)  mod assist with leg rests using LUE to lock brakes   Wheelchair management Manages left brake Manages left brake;Manages right brake     WALKING INDEPENDENCE Initial Assessment Weekly Progress Assessment 10/13/2017   Assistive device Gait belt, Walker cindy, Brace/Splint, Other (comment) (used ace wrap to knee and ankle for DF. and sling for right UE) Gait belt;Walker cindy;Brace/Splint (PRAFO, hand rail)   Ambulation assistance - level surface 0 (Not tested)  Mod assist to advance RLE and mod to max assist to stabilize hip and knee during midstance   Distance 0 Feet (ft) 30 Feet (ft) (30ft x 1 with hemiwalker,20ft x 3 with left hand rail)   Comments Unable to ambulate at this time due to extent of right hemiparesis and balance deficits Able to advance to gait with hemiwalker this PM. Improved control of ankle DF and knee hyperextension using PRAFO     GAIT Weekly Progress Assessment 10/13/2017   Gait Description (WDL)  slow start/stop step to hemiplegic gait pattern leading with RLE and stepping to with LLE   Gait Abnormalities  decreased ankle PF and knee flexion at terminal stance, decreased knee flex and ankle DF at midswing, decreased knee ext and ankle DF at initial contact. Hip retraction and knee hyperextension at midstance. STEPS/STAIRS Initial Assessment Weekly Progress Assessment 10/13/2017   Steps/Stairs ambulated 0 0   Rail Use    NA   Comments Unable to ambulate up/down steps at this time due to extent of right hemiparesis and balance deficits  Unable to ambulate up/down steps at this time due to extent of right hemiparesis and sensory deficits in RLE   Curbs/Ramps 0 (Not tested)  NA     SUPINE EXERCISES Sets Reps Comments   Ankle Pumps 1 20 PROM with heel cord stretch with dependent assist   Bridging 2 10 Min assist   Heel Slides 2 10 Mod assist   Hip Abduction 2 10 Mod assist   Short Arc Quad 2 10 Min assist            Assessment: patient progressing towards goals. Patient has meet wheelchair mobility goal and has met all revised STGs per eval and reassessments. LTGs revised with good potential to meet these goals in the next 1 to 2 weeks. Patient demonstrating improved motivation and participation over the last 2 to 3 days with an improvement in functional mobility. RLE strength slow to improve but patient learning hemiplegic body mechanics to compensate for weakness during functional mobility. Improving with ability to propel w/c using LUE and LLE. Orthotist in to assess patient this week for custom AFO. At this time patient would benefit from a custom AFO for DF assist and to control genu recurvatum.  Anticipate AFO will improve patient's ability to ambulate with less assistance from PT. Anticipate patient's primary mode of locomotion at D/C will be at the wheelchair level and she will be at a SBA to supervision level with transfers and bed mobility. Recommend cont inpatient rehab for another week then ressess in order to maximize rehab potential. Family training has been initiated and recommending follow up training        Patient to recreational therapy at end of treatment    Plan of Care: patient seen for weekly assessment. Goals updated and revised. Please see Care Plan for updated LTGs.     Family Training: Needs to be scheduled (ongoing)    Leila Morris, PT  10/13/2017

## 2017-10-13 NOTE — PROGRESS NOTES
Problem: Nutrition Deficit  Goal: *Optimize nutritional status  Nutrition F/U:  Assessment:  The patient's intake has improved during her admission on rehab, Banana and tree nut allergies are noted. Her menu choices are posted on her door. Last reported bowel movement was on 10/12. Macronutrient Needs:  Estimated calorie needs - 9178-8169 laura/day (20-25 laura/kg/day)  Estimated protein needs - 41-49 gm pro/day (1-1.2 gm pro/kgIBW/day) (GFR >60 ml/min)  Intake/Comparative Standards: This patient's average meal intake of mechanical soft diet for the past 7 recorded days/14 meals: 64%. This meets 100% of calorie and >100% of protein needs. Intervention:   Meals and Snacks: Mechanical soft diet per ST. Nutrition Discharge Plan: Too soon to determine. Blaise Gowers.  [facility]  [phone]

## 2017-10-14 PROCEDURE — 65310000000 HC RM PRIVATE REHAB

## 2017-10-14 PROCEDURE — 74011250637 HC RX REV CODE- 250/637: Performed by: PHYSICAL MEDICINE & REHABILITATION

## 2017-10-14 PROCEDURE — 97110 THERAPEUTIC EXERCISES: CPT

## 2017-10-14 PROCEDURE — 97116 GAIT TRAINING THERAPY: CPT

## 2017-10-14 PROCEDURE — 97530 THERAPEUTIC ACTIVITIES: CPT

## 2017-10-14 PROCEDURE — 99232 SBSQ HOSP IP/OBS MODERATE 35: CPT | Performed by: PHYSICAL MEDICINE & REHABILITATION

## 2017-10-14 RX ORDER — ROPINIROLE 0.25 MG/1
1 TABLET, FILM COATED ORAL
Status: DISCONTINUED | OUTPATIENT
Start: 2017-10-14 | End: 2017-10-15

## 2017-10-14 RX ADMIN — METHYLPHENIDATE HYDROCHLORIDE 5 MG: 10 TABLET ORAL at 09:46

## 2017-10-14 RX ADMIN — TIZANIDINE 2 MG: 2 TABLET ORAL at 15:31

## 2017-10-14 RX ADMIN — AMLODIPINE BESYLATE 2.5 MG: 5 TABLET ORAL at 09:45

## 2017-10-14 RX ADMIN — POLYETHYLENE GLYCOL 3350 17 G: 17 POWDER, FOR SOLUTION ORAL at 09:44

## 2017-10-14 RX ADMIN — POTASSIUM CHLORIDE 20 MEQ: 20 TABLET, EXTENDED RELEASE ORAL at 09:45

## 2017-10-14 RX ADMIN — TAMSULOSIN HYDROCHLORIDE 0.4 MG: 0.4 CAPSULE ORAL at 20:08

## 2017-10-14 RX ADMIN — BISACODYL 10 MG: 10 SUPPOSITORY RECTAL at 20:14

## 2017-10-14 RX ADMIN — STANDARDIZED SENNA CONCENTRATE AND DOCUSATE SODIUM 1 TABLET: 8.6; 5 TABLET, FILM COATED ORAL at 09:45

## 2017-10-14 RX ADMIN — TIZANIDINE 4 MG: 2 TABLET ORAL at 20:08

## 2017-10-14 RX ADMIN — LABETALOL HYDROCHLORIDE 100 MG: 100 TABLET, FILM COATED ORAL at 17:19

## 2017-10-14 RX ADMIN — TIZANIDINE 2 MG: 2 TABLET ORAL at 04:49

## 2017-10-14 RX ADMIN — ROPINIROLE HYDROCHLORIDE 1 MG: 0.25 TABLET, FILM COATED ORAL at 20:09

## 2017-10-14 NOTE — PROGRESS NOTES
PHYSICAL THERAPY DAILY NOTE  Time In: 1025  Time Out: 9240  Patient Seen For: AM;Transfer training;Gait training; Therapeutic exercise; Other (see progress notes)    Subjective: Patient had no complaints. Objective:  present for training. He was instructed in stretches, how to apply night brace to stretch HC, and mobility assisting using gait belt. Other (comment) (falls)  GROSS ASSESSMENT Daily Assessment            BED/MAT MOBILITY Daily Assessment    Supine to Sit : 4 (Minimal assistance)  Sit to Supine : 5 (Supervision)       TRANSFERS Daily Assessment    Transfer Type: SPT without device  Transfer Assistance : 4 (Contact guard assistance)  Sit to Stand Assistance: Stand-by assistance       GAIT Daily Assessment    Amount of Assistance: 2 (Maximal assistance)  Distance (ft): 30 Feet (ft)  Assistive Device: Gait belt;Walker cindy;Other (comment) (3 ace wraps for Df, tone, and recurvatum)       STEPS or STAIRS Daily Assessment    Level of Assist : 0 (Not tested)       BALANCE Daily Assessment            WHEELCHAIR MOBILITY Daily Assessment            LOWER EXTREMITY EXERCISES Daily Assessment    Extremity: Both  Exercise Type #1: Supine lower extremity strengthening  Sets Performed: 2  Reps Performed: 10  Level of Assist: Total assistance (instructed  in HC, HS and hip abd stretching)          Assessment: Patient making great progress. Hip flexion getting stronger. Plan of Care: Continue with plan of care to reach PT goals. Returned to room with call santillan at [facility].     Elizabet Priest PTA  10/14/2017

## 2017-10-14 NOTE — PROGRESS NOTES
Assessment completed, respiration even and unlabored, instructed to call for needs or assist , educated on risk and needs not to get up without assist, call light in reach, pt voids continently, rt foot brace intact, denies any c/o at present

## 2017-10-14 NOTE — PROGRESS NOTES
Rested at long intervals, pt refused to wear foot brace any longer states it bothers her foot, noted small red area on top of rt inner ankle , area will manjit, denies any other c/o, call light in reach

## 2017-10-14 NOTE — PROGRESS NOTES
assisted pt to bsc, pt became dizzy, pt stated she thinks its medication she is taking ,will pass on info for MD in report, pt agreeable with plan, resp even and unlabored, A & O  x4, call light in reach, pt denies any other needs or c/o at present

## 2017-10-14 NOTE — PROGRESS NOTES
Problem: Falls - Risk of  Goal: *Absence of Falls  Document Haley Fall Risk and appropriate interventions in the flowsheet.    Outcome: Progressing Towards Goal  Fall Risk Interventions:  Mobility Interventions: Bed/chair exit alarm     Mentation Interventions: Bed/chair exit alarm     Medication Interventions: Bed/chair exit alarm     Elimination Interventions: Call light in reach     History of Falls Interventions: Bed/chair exit alarm

## 2017-10-15 PROCEDURE — 74011250637 HC RX REV CODE- 250/637: Performed by: PHYSICAL MEDICINE & REHABILITATION

## 2017-10-15 PROCEDURE — 65310000000 HC RM PRIVATE REHAB

## 2017-10-15 RX ORDER — ROPINIROLE 2 MG/1
2 TABLET, FILM COATED ORAL
Status: DISCONTINUED | OUTPATIENT
Start: 2017-10-15 | End: 2017-10-26 | Stop reason: HOSPADM

## 2017-10-15 RX ADMIN — TIZANIDINE 2 MG: 2 TABLET ORAL at 15:02

## 2017-10-15 RX ADMIN — TAMSULOSIN HYDROCHLORIDE 0.4 MG: 0.4 CAPSULE ORAL at 20:58

## 2017-10-15 RX ADMIN — POLYETHYLENE GLYCOL 3350 17 G: 17 POWDER, FOR SOLUTION ORAL at 09:21

## 2017-10-15 RX ADMIN — TIZANIDINE 4 MG: 2 TABLET ORAL at 20:57

## 2017-10-15 RX ADMIN — METHYLPHENIDATE HYDROCHLORIDE 5 MG: 10 TABLET ORAL at 09:20

## 2017-10-15 RX ADMIN — ROPINIROLE HYDROCHLORIDE 2 MG: 2 TABLET, FILM COATED ORAL at 20:58

## 2017-10-15 RX ADMIN — HYDROCHLOROTHIAZIDE: 12.5 CAPSULE ORAL at 09:20

## 2017-10-15 RX ADMIN — STANDARDIZED SENNA CONCENTRATE AND DOCUSATE SODIUM 1 TABLET: 8.6; 5 TABLET, FILM COATED ORAL at 09:21

## 2017-10-15 RX ADMIN — TEMAZEPAM 22.5 MG: 15 CAPSULE ORAL at 20:58

## 2017-10-15 RX ADMIN — LABETALOL HYDROCHLORIDE 100 MG: 100 TABLET, FILM COATED ORAL at 17:45

## 2017-10-15 RX ADMIN — TRAZODONE HYDROCHLORIDE 50 MG: 50 TABLET ORAL at 01:52

## 2017-10-15 RX ADMIN — POTASSIUM CHLORIDE 20 MEQ: 20 TABLET, EXTENDED RELEASE ORAL at 09:21

## 2017-10-15 RX ADMIN — AMLODIPINE BESYLATE 2.5 MG: 5 TABLET ORAL at 09:21

## 2017-10-15 RX ADMIN — LABETALOL HYDROCHLORIDE 100 MG: 100 TABLET, FILM COATED ORAL at 09:21

## 2017-10-15 RX ADMIN — TIZANIDINE 2 MG: 2 TABLET ORAL at 05:52

## 2017-10-15 NOTE — PROGRESS NOTES
Assessment completed, respiration even and unlabored, instructed to call for needs or assist , educated on risk and needs not to get up without assist, call light in reach,

## 2017-10-15 NOTE — PROGRESS NOTES
Pt wore rt foot brace this shift, given bowel program this shift, (see mar), awake most of shift given trazodone for sleep, ( see mar), stated bm on 10/14/17, pt rested at short intervals, voids continently on bsc and staff would empty, denies any c/o at present, resp even and unlabored , call light in reach

## 2017-10-15 NOTE — PROGRESS NOTES
Ayanna Townsend MD,   Medical Director  [address] [facility], [address] [facility]  Tel: [phone]       D PROGRESS NOTE    Doloris Angle  Admit Date: 9/15/2017  Admit Diagnosis: stroke, left brain involvement;ICH (intracerebral hemorrhag*    Subjective     Doing ok but still not sleeping well. Very perseverative on this. Cannot decipher what the etiology is. Pt nonspecific in complaints. No pain reported. Objective:     Current Facility-Administered Medications   Medication Dose Route Frequency    temazepam (RESTORIL) capsule 22.5 mg  22.5 mg Oral QHS    rOPINIRole (REQUIP) tablet 2 mg  2 mg Oral QHS    tiZANidine (ZANAFLEX) tablet 2 mg  2 mg Oral BID    tiZANidine (ZANAFLEX) tablet 4 mg  4 mg Oral QHS    amLODIPine (NORVASC) tablet 2.5 mg  2.5 mg Oral DAILY    potassium chloride (K-DUR, KLOR-CON) SR tablet 20 mEq  20 mEq Oral DAILY    senna-docusate (PERICOLACE) 8.6-50 mg per tablet 1 Tab  1 Tab Oral DAILY    polyethylene glycol (MIRALAX) packet 17 g  17 g Oral DAILY    methylphenidate HCl (RITALIN) tablet 5 mg  5 mg Oral BID    labetalol (NORMODYNE) tablet 100 mg  100 mg Oral BID    losartan/hydroCHLOROthiazide (HYZAAR) 100/12.5 mg   Oral DAILY    acetaminophen (TYLENOL) tablet 500 mg  500 mg Per NG tube Q4H PRN    alum-mag hydroxide-simeth (MYLANTA) oral suspension 30 mL  30 mL Oral Q4H PRN    bisacodyl (DULCOLAX) suppository 10 mg  10 mg Rectal DAILY PRN    hydrALAZINE (APRESOLINE) tablet 50 mg  50 mg Oral QID PRN    lip protectant (BLISTEX) ointment   Topical PRN    ondansetron (ZOFRAN ODT) tablet 4 mg  4 mg Oral Q6H PRN    sodium phosphate (FLEET'S) enema 118 mL  1 Enema Rectal PRN    traMADol (ULTRAM) tablet 50 mg  50 mg Oral Q6H PRN    tamsulosin (FLOMAX) capsule 0.4 mg  0.4 mg Oral QHS     Review of Systems:Denies chest pain, shortness of breath, cough, headache, visual problems, abdominal pain, dysurea, calf pain.  Pertinent positives are as noted in the medical records and unremarkable otherwise. Visit Vitals    /69 (BP 1 Location: Left arm)    Pulse 77    Temp 98.6 °F (37 °C)    Resp 16    SpO2 95%        Physical Exam:   General: Alert and oriented. No acute cardio respiratory distress. HEENT: Normocephalic,no scleral icterus  Oral mucosa moist without cyanosis   Lungs: Clear to auscultation  bilaterally. Respiration even and unlabored   Heart: Regular rate and rhythm, S1, S2   No  murmurs, clicks, rub or gallops   Abdomen: Soft, non-tender, nondistended. Bowel sounds present. No organomegaly. Genitourinary: Benign . Neuromuscular:      No incr in hypertonicity  No clonus at right ankle  Right hemiplegia. prox shoulder retraction, a trace of deltoid o/w 0/5  RLE KE 2+, hip flexion 2+, 0/5 distally   Skin/extremity: No rashes, no erythema.  No calf tenderness BLE  No edema                                                                            Functional Assessment:  Gross Assessment  AROM:  (LLE WFL, RLE AAROM WFL except DF -10) (10/13/17 1300)  PROM:  (WFL except right ankle PF contracture) (10/13/17 1300)  Strength:  (LLE WFL, RLE) (10/13/17 1300)  Coordination:  (LLE intact, RLE impaired) (10/13/17 1300)  Tone:  (extensor spasticity) (10/13/17 1300)  Sensation: Impaired (RLE to light touch and proprioception) (10/13/17 1300)       Balance  Sitting - Static: Good (unsupported) (10/13/17 1300)  Sitting - Dynamic: Fair (occasional) (10/13/17 1300)  Standing - Static: Fair (with hand rail) (10/13/17 1300)  Standing - Dynamic : Impaired (10/13/17 1300)           Toileting  Adaptive Equipment: Elevated seat;Walker (09/29/17 7466)         Jabari Mayorga Fall Risk Assessment:  Jabari Mayorga Fall Risk  Mobility: Ambulates or transfers with assist devices or assistance/unsteady gait (10/15/17 1126)  Mobility Interventions: Bed/chair exit alarm (10/15/17 1126)  Mentation: Alert, oriented x 3 (10/15/17 1126)  Mentation Interventions: Bed/chair exit alarm (10/15/17 1126)  Medication: Patient receiving anticonvulsants, sedatives(tranquilizers), psychotropics or hypnotics, hypoglycemics, narcotics, sleep aids, antihypertensives, laxatives, or diuretics (10/15/17 1126)  Medication Interventions: Bed/chair exit alarm (10/15/17 1126)  Elimination: Needs assistance with toileting (10/15/17 1126)  Elimination Interventions: Call light in reach (10/15/17 1126)  Prior Fall History: No (10/15/17 1126)  History of Falls Interventions: Bed/chair exit alarm (10/15/17 1126)  Total Score: 3 (10/15/17 1126)  Standard Fall Precautions: Yes (10/15/17 1126)  High Fall Risk: Yes (10/15/17 1126)     Speech Assessment:         Ambulation:  Gait  Base of Support: Narrowed; Center of gravity altered (09/22/17 1200)  Speed/Lulu: Delayed (09/22/17 1200)  Step Length: Right shortened (09/22/17 1200)  Distance (ft): 30 Feet (ft) (10/14/17 1207)  Assistive Device: Gait belt;Walker cindy;Other (comment) (3 ace wraps for Df, tone, and recurvatum) (10/14/17 1207)     Labs/Studies:  No results found for this or any previous visit (from the past 72 hour(s)).     Assessment:     Problem List as of 10/15/2017  Date Reviewed: 9/12/2017          Codes Class Noted - Resolved    Thrombocytopenia ([facility] ICD-10-CM: D69.6  ICD-9-CM: 287.5  9/25/2017 - Present        Pulmonary emboli (HCC) ICD-10-CM: I26.99  ICD-9-CM: 415.19  9/25/2017 - Present        DVT (deep venous thrombosis) ([facility] ICD-10-CM: I82.409  ICD-9-CM: 453.40  9/25/2017 - Present        * (Principal)ICH (intracerebral hemorrhage) ([facility] ICD-10-CM: I61.9  ICD-9-CM: 699  9/15/2017 - Present        Hypertensive urgency, malignant ICD-10-CM: I16.0  ICD-9-CM: 401.0  9/11/2017 - Present        Stroke, hemorrhagic ([facility] ICD-10-CM: I61.9  ICD-9-CM: 076  9/7/2017 - Present        Accelerated hypertension ICD-10-CM: I10  ICD-9-CM: 401.0  9/7/2017 - Present        At risk for aspiration (Chronic) ICD-10-CM: Z91.89  ICD-9-CM: V49.89  9/7/2017 - Present        Acute spontaneous intraventricular hemorrhage assoc w/ hypertension (HCC) ICD-10-CM: I61.5, I10  ICD-9-CM: 857, 401.9  9/7/2017 - Present        Screening for breast cancer ICD-10-CM: Z12.31  ICD-9-CM: V76.10  2/27/2017 - Present        Atrophic vaginitis ICD-10-CM: N95.2  ICD-9-CM: 627.3  7/11/2016 - Present        HTN (hypertension) (Chronic) ICD-10-CM: I10  ICD-9-CM: 401.9  10/23/2015 - Present        Depression ICD-10-CM: F32.9  ICD-9-CM: 464  10/23/2015 - Present        Anxiety (Chronic) ICD-10-CM: F41.9  ICD-9-CM: 300.00  10/23/2015 - Present        RESOLVED: Hypoxemia ICD-10-CM: R09.02  ICD-9-CM: 799.02  9/10/2017 - 9/12/2017        RESOLVED: Acute respiratory failure ([facility] ICD-10-CM: J96.00  ICD-9-CM: 518.81  9/8/2017 - 9/10/2017        RESOLVED: Hemorrhagic stroke ([facility] ICD-10-CM: I61.9  ICD-9-CM: 391  9/7/2017 - 9/7/2017        RESOLVED: Non-intractable vomiting with nausea ICD-10-CM: R11.2  ICD-9-CM: 787.01  9/7/2017 - 9/12/2017        RESOLVED: Seborrheic keratosis, inflamed ICD-10-CM: L82.0  ICD-9-CM: 702.11  7/11/2016 - 9/20/2016        RESOLVED: Cough ICD-10-CM: R05  ICD-9-CM: 786.2  7/11/2016 - 9/20/2016        RESOLVED: ADD (attention deficit disorder) (Chronic) ICD-10-CM: F98.8  ICD-9-CM: 314.00  10/23/2015 - 10/13/2017        RESOLVED: Shoulder pain ICD-10-CM: M25.519  ICD-9-CM: 719.41  10/23/2015 - 2/27/2017                Left BG Hemorrhage with intraventricular extension due to HTN emergency; resultant R hemiplegia, right neglect, dysarthria, aphasia, dysphagia with significant decline in mobility and self care  Plan:   Continue daily physician medical management:  Pneumonia prophylaxis- Incentive spirometer every hour while awake. Will need instruction and assistance. Not sure if she can get a proper oral seal to be effective  -10/9 no pulmonary issues      Dysphagia; Pureed diet with NTL; at risk for malnutrition and dehydration.  PICC line removed prior to transfer; may need a line for IVFs if BUN continues to increase. Low K, Ca; check mg. Supplementation as needed. Na elevated  -mag ok, K much improved; cont to supplement while on diuretic  -9/19 replace K; 3.0; 2.8 this am 9/20; inc supplement; may need to add to IVFs  -9/25 fluid status and K nl; 9/28 cont NTL; high risk aspiration; still needs supervision /assist with meals  -10/2 MBS today; thin liq!!  -10/9 po intake, especially fluids, has improved; feeding self      Thrombocytopenia; has been trending down for no apparent reason. hgb slt down as well. Has not been on Hep products; consult hematology today 9/19  -plts 40K, continues to trend down 9/20; await consult by hematology. ? reln to [address]. Mild anemia as well  Check HIT panel, d dimer , fibrinogen  -9/21 plts 36k ; HIT PROFILE POSITIVE, D DIMER ELEVATED 32.79, FIBRINOGEN  ; consistent with DIC; Etiology unclear  WILL D/W HEMATOLOGY; LFTs ok, no hx of blood transfusion,no hx pancreatitis, no sign of bacteremia. Has not been on any heparin products. Can see in head injury; has ICH. Cannot r/o blood disorder (leukemia)   per hematology \"Recommend transfusing platelets for <89,830 and Cryoprecipitate for fibrinogen < 100. \"  -MARK pending  -9/22 bilateral LE venous duplex ordered; low suspicion but at risk and having calf tenderness  -9/22 plts improved today; monitor closely; 9/23 small right peroneal thrombus. No antic per hematology. IR refused/advised against placing IVF due to low risk of clot propagation given size and location  -9/23 counts bumping up; pts now 53k from 45 and previously 36  -9/24 plts cont to improve. 69K. MARK still pending; can resume therapies  9/26 Ddimer pending, fibrinogen now nl  -9/27 D dimer 11+, improved. Per Hematology; likely due to consumption coagulopathy.  Will monitor  -10/2 labs have improved; MARK + 68% confirming dx of HIT; 10/4 recheck cbc (last done 9/28)    -10/5 plts 263; 10/9 plts 283      Bilateral PEs 9/25, filter placed in IR; looks good today. sats good without O2 supplement 94-97% sats; d/w Hematology, no agaratroban  -apprec Pulm assessment. Clinically looks good and no longer symptomatic  -9/28 clinically stable, asymptomatic ; 10/3 stable. No sob, no O2 supplements; 10/9 remains asymptomatic      Hypernatremia; last head CT did not show significant cerebral edema. Clinically improving. Likely due to prerenal azotemia; 9/15 MAY REQ isotonic / hypotonic saline IV;  -9/18 events of weekend noted; Na 156; asymptomatic, on 0.5 dextrose with 1/4 Na; na q 6hrs; pts serum osmolality was normal. Will check urine Na and urine osmolality; depending on findings, will consider consulting Hospitalist vs Nephrology  Neuro improving despite this  -9/19 Na down to 149, urine osmo nl, serum osmo min hi; cont dex/and 1/4 NS; repeat in am. Not DI as uop not increased  -9/20 Na 147; cont fluids; 9/21 not resulted; will stop fluids when Na below 142  -Na 140 9/22; dc IVFs; 9/25 136; 9/27 136; recheck 10/4 134; mildly low; f/u 10/9 pending      ICH/IVH; 9/22 clinically improves daily. F/u HCT yest due to transient left eye visual disturbance. Overall, resolving hemorrhage. There is more pronounced edema; expected. Ventricular size now nl      Anemia ; 9.6-10/ stable; recheck 10/9 10.5 improved       DVT risk / DVT Prophylaxis- Will require daily physician exam to assess for signs and symptoms as patient is at increased risk for of thromboembolism. Mobilization as tolerated. Intermittent pneumatic compression devices when in bed Thigh-high or knee-high thromboembolic deterrent hose when out of bed. NO  anticoag due to ICH/HIT; has right small right peroneal vein; 9/25 spoke with IR again today; more risk of putting in IVC filter than benefits especially since she is asymptomatic with no swelling or tenderness and small size of clot  -10/3 no edema, right calf soft.  No SOB  -10/9 no calf swelling or tenderness      Pain Control: stable, mild-to-moderate joint symptoms intermittently, reasonably well controlled by PRN meds. Will require regular pain assessment and comprenhensive pain management,       Wound Care: Monitor wound status daily per staff and physician. At risk for failure. Will require 24/7 rehab nursing. None needed at this time      Hypertension - BP uncontrolled, fluctuating, managed medically. Add prn hydralazine for sbp> 180. Goal SBP is 140-160 given ICH. Cont Normodyne, norvasc and hyzaar; consider Verapamil or scheduled Hydralazine  -9/18 BP increased 169/95; add hydralazine 25 tid  -9/20 BP much improved  -9/22 /74; 9/24 bp stable; may dec hydralazine to bid  -9/25 dec hydralazine  -9/27 bp 116-119  -9/28 SBP in the 90s to 116, asymptomatic; dc hydralazine and monitor; decrease Normodyne to bid  -9/29 BP still low; lower Norvasc and normodyne, cont Hyzaar; all have parameters of when to hold  - 9/30 SBP overnight and this am 108-111, will d/c norvasc, on labetolol and losartan/HCTZ , HR acceptable  -10/2 115/71; controlled. Cont current meds  -130-151/77; 10/5 118/77; 10/6 BP stable; goal 120-130 given ICH  -10/9 148/81; add low dose Norvasc; ideally want less than 130; 10/11 130/73  10/13 remains better controlled      Mild hyperglycemia, likely stress induced vs borderline diabetes; monitor loosely  -9/27 bs 145 on BMP; follow bs qam x 3d; no issues      Spasticity; had added zanaflex; 10/12 adjust for desired effect; change to 2mg tid and 4mg qhs; AMBULATED YESTERDAY AND ADVANCED OWN LEG!!!!!!  -10/13 ? Component of RLS at noc; consider trial of Requip  -10/14 increased trazadone, slept better. Add low dose requip at noc  -10/15 ongoing c/o nonspecific insomnia; will try Restoril instead of Trazadone and inc Requip      Dysphagia; cont NTL, mech soft; hopefully can upgrade liquids soon 9/25  -9/29 remains on NTL. MBS scheduled for 10/2.  -10/3 now on THINS! !      Leukocytosis; recheck in a.m. Check UA due to stafford. No pulmonary signs (had neg CXR today), no s/s of infxn at old PICC site, no wounds, afebrile. Watch monocytes. -9/18 12.2 improving. Urine neg  -9/19 up to 13.2, ? Reactive. No source of infxn identified. Afebrile  -9/20 WBC now normal      Urinary retention/ neurogenic bladder - start flomax but continue stafford until mobility improves a bit. Strict I/o's  -9/20 keeping stafford to monitor UOP until Na normalizes. No significant output to suggest DI  -9/21 dc stafford; cont Flomax, follow bladder scan  9/22 voiding without issue; incontinence but no retention  -9/28 new onset urinary retention; check UA  -9/29 UA neg but cx saying > 100K gram neg cocci; will need to f/u ID/sens; Start Cipro 500 bid  - 9/30 preliminary urine cx >110K mixed skin denis  -10/2 stop Cipro; cx nl denis  -10/6 recurrent urinary retention. No flomax; re check UA, consider urecholine. CIC for PVRs  -10/9 dc scans; 10/13 per nursing intermittent need for CIC      bowel program - add prn meds; incontinent, want to keep stool on the firmer side. Monitor skin.       GERD - add a PPI. At times may need additional antacids, Maalox prn.      -continues to benefit from and is showing improvements in a multidisc team approach. Participating well. 10/5 progress slow; 10/6 OT is seeing improvements. Starting to get movement back into the right arm. Awareness improving  -10/6 add ritalin for attn and distractibility   -10/9 family reports increased attn with them; await therapy reports   -10/13 continues to benefit from [address]   9/21   Addendum; in therapy this afternoon, transient left eye blindness. Will f/u head CT for extension of or new ICH, especially with low plts   Results  1. Interval decrease in size of intraventricular hemorrhage within the left  basal ganglia/thalamus with near complete resolution of intraventricular  hemorrhage.  Surrounding edema is more pronounced with slight increase in  left-to-right midline shift. 2. Interval decrease in ventricular size, approaching normal in caliber.           - FAMILY [id number] REGARDING PROGRESS, NEEDS, DC PLAN; family overwhelmed with decision that need to be made. Family training Monday in case insurance denies as of 10/10        10/10 excellent progress made. Surprising to PT. Definitely have made a step forward and would benefit from ongoing acute inpt rehab to maximize recovery      -10/13 continues to show progress in PT, continues with OT and ST; speech improvements noted             Time spent was 25 minutes with over 1/2 in direct patient care/examination, consultation and coordination of care.      Signed By: Bhavani Roberts MD     October 15, 2017

## 2017-10-16 LAB
ANION GAP SERPL CALC-SCNC: 8 MMOL/L (ref 7–16)
BUN SERPL-MCNC: 12 MG/DL (ref 8–23)
CALCIUM SERPL-MCNC: 8.8 MG/DL (ref 8.3–10.4)
CHLORIDE SERPL-SCNC: 101 MMOL/L (ref 98–107)
CO2 SERPL-SCNC: 28 MMOL/L (ref 21–32)
CREAT SERPL-MCNC: 0.49 MG/DL (ref 0.6–1)
ERYTHROCYTE [DISTWIDTH] IN BLOOD BY AUTOMATED COUNT: 13.7 % (ref 11.9–14.6)
GLUCOSE SERPL-MCNC: 94 MG/DL (ref 65–100)
HCT VFR BLD AUTO: 29.5 % (ref 35.8–46.3)
HGB BLD-MCNC: 10 G/DL (ref 11.7–15.4)
MCH RBC QN AUTO: 28.8 PG (ref 26.1–32.9)
MCHC RBC AUTO-ENTMCNC: 33.9 G/DL (ref 31.4–35)
MCV RBC AUTO: 85 FL (ref 79.6–97.8)
PLATELET # BLD AUTO: 220 K/UL (ref 150–450)
PMV BLD AUTO: 9 FL (ref 10.8–14.1)
POTASSIUM SERPL-SCNC: 4.3 MMOL/L (ref 3.5–5.1)
RBC # BLD AUTO: 3.47 M/UL (ref 4.05–5.25)
SODIUM SERPL-SCNC: 137 MMOL/L (ref 136–145)
WBC # BLD AUTO: 4.8 K/UL (ref 4.3–11.1)

## 2017-10-16 PROCEDURE — 97112 NEUROMUSCULAR REEDUCATION: CPT

## 2017-10-16 PROCEDURE — 97110 THERAPEUTIC EXERCISES: CPT

## 2017-10-16 PROCEDURE — 97532 HC OT COGNITIVE SKILL DEV 15 MIN: CPT

## 2017-10-16 PROCEDURE — 97530 THERAPEUTIC ACTIVITIES: CPT

## 2017-10-16 PROCEDURE — 97535 SELF CARE MNGMENT TRAINING: CPT

## 2017-10-16 PROCEDURE — 99232 SBSQ HOSP IP/OBS MODERATE 35: CPT | Performed by: PHYSICAL MEDICINE & REHABILITATION

## 2017-10-16 PROCEDURE — 85027 COMPLETE CBC AUTOMATED: CPT | Performed by: PHYSICAL MEDICINE & REHABILITATION

## 2017-10-16 PROCEDURE — 97116 GAIT TRAINING THERAPY: CPT

## 2017-10-16 PROCEDURE — 74011250637 HC RX REV CODE- 250/637: Performed by: PHYSICAL MEDICINE & REHABILITATION

## 2017-10-16 PROCEDURE — 51798 US URINE CAPACITY MEASURE: CPT

## 2017-10-16 PROCEDURE — 80048 BASIC METABOLIC PNL TOTAL CA: CPT | Performed by: PHYSICAL MEDICINE & REHABILITATION

## 2017-10-16 PROCEDURE — 92507 TX SP LANG VOICE COMM INDIV: CPT

## 2017-10-16 PROCEDURE — 36415 COLL VENOUS BLD VENIPUNCTURE: CPT | Performed by: PHYSICAL MEDICINE & REHABILITATION

## 2017-10-16 PROCEDURE — 65310000000 HC RM PRIVATE REHAB

## 2017-10-16 RX ADMIN — LABETALOL HYDROCHLORIDE 100 MG: 100 TABLET, FILM COATED ORAL at 17:12

## 2017-10-16 RX ADMIN — TIZANIDINE 2 MG: 2 TABLET ORAL at 15:36

## 2017-10-16 RX ADMIN — POTASSIUM CHLORIDE 20 MEQ: 20 TABLET, EXTENDED RELEASE ORAL at 08:47

## 2017-10-16 RX ADMIN — ROPINIROLE HYDROCHLORIDE 2 MG: 2 TABLET, FILM COATED ORAL at 21:16

## 2017-10-16 RX ADMIN — METHYLPHENIDATE HYDROCHLORIDE 5 MG: 10 TABLET ORAL at 08:00

## 2017-10-16 RX ADMIN — TIZANIDINE 4 MG: 2 TABLET ORAL at 21:16

## 2017-10-16 RX ADMIN — AMLODIPINE BESYLATE 2.5 MG: 5 TABLET ORAL at 08:47

## 2017-10-16 RX ADMIN — POLYETHYLENE GLYCOL 3350 17 G: 17 POWDER, FOR SOLUTION ORAL at 08:48

## 2017-10-16 RX ADMIN — TAMSULOSIN HYDROCHLORIDE 0.4 MG: 0.4 CAPSULE ORAL at 21:16

## 2017-10-16 RX ADMIN — LABETALOL HYDROCHLORIDE 100 MG: 100 TABLET, FILM COATED ORAL at 08:47

## 2017-10-16 RX ADMIN — STANDARDIZED SENNA CONCENTRATE AND DOCUSATE SODIUM 1 TABLET: 8.6; 5 TABLET, FILM COATED ORAL at 08:46

## 2017-10-16 RX ADMIN — TIZANIDINE 2 MG: 2 TABLET ORAL at 05:28

## 2017-10-16 RX ADMIN — HYDROCHLOROTHIAZIDE: 12.5 CAPSULE ORAL at 08:47

## 2017-10-16 RX ADMIN — METHYLPHENIDATE HYDROCHLORIDE 5 MG: 10 TABLET ORAL at 15:33

## 2017-10-16 NOTE — PROGRESS NOTES
Jade Hastings MD,   Medical Director  [address] [facility], [address] [facility]  Tel: [phone]       SFD PROGRESS NOTE    Tobias Artis  Admit Date: 9/15/2017  Admit Diagnosis: stroke, left brain involvement;ICH (intracerebral hemorrhag*    Subjective     SLEPT!!! Pt slept all noc. I even woke her up at 7:20. Woke up easily. No lethargy. No complaints. Objective:     Current Facility-Administered Medications   Medication Dose Route Frequency    temazepam (RESTORIL) capsule 22.5 mg  22.5 mg Oral QHS PRN    rOPINIRole (REQUIP) tablet 2 mg  2 mg Oral QHS    tiZANidine (ZANAFLEX) tablet 2 mg  2 mg Oral BID    tiZANidine (ZANAFLEX) tablet 4 mg  4 mg Oral QHS    amLODIPine (NORVASC) tablet 2.5 mg  2.5 mg Oral DAILY    potassium chloride (K-DUR, KLOR-CON) SR tablet 20 mEq  20 mEq Oral DAILY    senna-docusate (PERICOLACE) 8.6-50 mg per tablet 1 Tab  1 Tab Oral DAILY    polyethylene glycol (MIRALAX) packet 17 g  17 g Oral DAILY    methylphenidate HCl (RITALIN) tablet 5 mg  5 mg Oral BID    labetalol (NORMODYNE) tablet 100 mg  100 mg Oral BID    losartan/hydroCHLOROthiazide (HYZAAR) 100/12.5 mg   Oral DAILY    acetaminophen (TYLENOL) tablet 500 mg  500 mg Per NG tube Q4H PRN    alum-mag hydroxide-simeth (MYLANTA) oral suspension 30 mL  30 mL Oral Q4H PRN    bisacodyl (DULCOLAX) suppository 10 mg  10 mg Rectal DAILY PRN    hydrALAZINE (APRESOLINE) tablet 50 mg  50 mg Oral QID PRN    lip protectant (BLISTEX) ointment   Topical PRN    ondansetron (ZOFRAN ODT) tablet 4 mg  4 mg Oral Q6H PRN    sodium phosphate (FLEET'S) enema 118 mL  1 Enema Rectal PRN    traMADol (ULTRAM) tablet 50 mg  50 mg Oral Q6H PRN    tamsulosin (FLOMAX) capsule 0.4 mg  0.4 mg Oral QHS     Review of Systems:Denies chest pain, shortness of breath, cough, headache, visual problems, abdominal pain, dysurea, calf pain.  Pertinent positives are as noted in the medical records and unremarkable otherwise. Visit Vitals    /83    Pulse 76    Temp 98.4 °F (36.9 °C)    Resp 14    SpO2 96%        Physical Exam:   General: Alert and age appropriately oriented. No acute cardio respiratory distress. HEENT: Normocephalic,no scleral icterus  Oral mucosa moist without cyanosis, right facial weakness   Lungs: Clear to auscultation  bilaterally. Respiration even and unlabored   Heart: Regular rate and rhythm, S1, S2   No  murmurs, clicks, rub or gallops   Abdomen: Soft, non-tender, nondistended. Bowel sounds present. No organomegaly. Genitourinary: Benign . Neuromuscular: Tone 2+ RLE with PROM  Speech fluency improving. Word finding difficulties  No clonus at right ankle  Right hemiplegia. prox shoulder retraction, a trace of deltoid o/w 0/5  RLE KE 2+, hip flexion 2+, 0/5 distally   Skin/extremity: No rashes, no erythema.  No calf tenderness BLE  No peripheral edema                                                                            Functional Assessment:  Gross Assessment  AROM:  (LLE WFL, RLE AAROM WFL except DF -10) (10/13/17 1300)  PROM:  (WFL except right ankle PF contracture) (10/13/17 1300)  Strength:  (LLE WFL, RLE) (10/13/17 1300)  Coordination:  (LLE intact, RLE impaired) (10/13/17 1300)  Tone:  (extensor spasticity) (10/13/17 1300)  Sensation: Impaired (RLE to light touch and proprioception) (10/13/17 1300)       Balance  Sitting - Static: Good (unsupported) (10/13/17 1300)  Sitting - Dynamic: Fair (occasional) (10/13/17 1300)  Standing - Static: Fair (with hand rail) (10/13/17 1300)  Standing - Dynamic : Impaired (10/13/17 1300)           Toileting  Adaptive Equipment: Elevated seat;Walker (09/29/17 2614)         Samuel King Fall Risk Assessment:  Samuel King Fall Risk  Mobility: Ambulates or transfers with assist devices or assistance/unsteady gait (10/15/17 2025)  Mobility Interventions: Bed/chair exit alarm (10/15/17 2025)  Mentation: Alert, oriented x 3 (10/15/17 2025)  Mentation Interventions: Bed/chair exit alarm (10/15/17 2025)  Medication: Patient receiving anticonvulsants, sedatives(tranquilizers), psychotropics or hypnotics, hypoglycemics, narcotics, sleep aids, antihypertensives, laxatives, or diuretics (10/15/17 2025)  Medication Interventions: Bed/chair exit alarm; Patient to call before getting OOB (10/15/17 2025)  Elimination: Needs assistance with toileting (10/15/17 2025)  Elimination Interventions: Call light in reach (10/15/17 2025)  Prior Fall History: No (10/15/17 2025)  History of Falls Interventions: Bed/chair exit alarm (10/15/17 2025)  Total Score: 3 (10/15/17 2025)  Standard Fall Precautions: Yes (10/15/17 1126)  High Fall Risk: Yes (10/15/17 2025)     Speech Assessment:         Ambulation:  Gait  Base of Support: Narrowed; Center of gravity altered (09/22/17 1200)  Speed/Lulu: Delayed (09/22/17 1200)  Step Length: Right shortened (09/22/17 1200)  Distance (ft): 30 Feet (ft) (10/14/17 1207)  Assistive Device: Gait belt;Walker cindy;Other (comment) (3 ace wraps for Df, tone, and recurvatum) (10/14/17 1207)     Labs/Studies:  Recent Results (from the past 72 hour(s))   CBC W/O DIFF    Collection Time: 10/16/17  6:05 AM   Result Value Ref Range    WBC 4.8 4.3 - 11.1 K/uL    RBC 3.47 (L) 4.05 - 5.25 M/uL    HGB 10.0 (L) 11.7 - 15.4 g/dL    HCT 29.5 (L) 35.8 - 46.3 %    MCV 85.0 79.6 - 97.8 FL    MCH 28.8 26.1 - 32.9 PG    MCHC 33.9 31.4 - 35.0 g/dL    RDW 13.7 11.9 - 14.6 %    PLATELET 985 289 - 102 K/uL    MPV 9.0 (L) 10.8 - 88.1 FL   METABOLIC PANEL, BASIC    Collection Time: 10/16/17  6:05 AM   Result Value Ref Range    Sodium 137 136 - 145 mmol/L    Potassium 4.3 3.5 - 5.1 mmol/L    Chloride 101 98 - 107 mmol/L    CO2 28 21 - 32 mmol/L    Anion gap 8 7 - 16 mmol/L    Glucose 94 65 - 100 mg/dL    BUN 12 8 - 23 MG/DL    Creatinine 0.49 (L) 0.6 - 1.0 MG/DL    GFR est AA >60 >60 ml/min/1.73m2    GFR est non-AA >60 >60 ml/min/1.73m2    Calcium 8.8 8.3 - 10.4 MG/DL       Assessment:     Problem List as of 10/16/2017  Date Reviewed: 9/12/2017          Codes Class Noted - Resolved    Thrombocytopenia ([facility] ICD-10-CM: D69.6  ICD-9-CM: 287.5  9/25/2017 - Present        Pulmonary emboli (HCC) ICD-10-CM: I26.99  ICD-9-CM: 415.19  9/25/2017 - Present        DVT (deep venous thrombosis) ([facility] ICD-10-CM: I82.409  ICD-9-CM: 453.40  9/25/2017 - Present        * (Principal)ICH (intracerebral hemorrhage) ([facility] ICD-10-CM: I61.9  ICD-9-CM: 596  9/15/2017 - Present        Hypertensive urgency, malignant ICD-10-CM: I16.0  ICD-9-CM: 401.0  9/11/2017 - Present        Stroke, hemorrhagic ([place] [address].) ICD-10-CM: I61.9  ICD-9-CM: 687  9/7/2017 - Present        Accelerated hypertension ICD-10-CM: I10  ICD-9-CM: 401.0  9/7/2017 - Present        At risk for aspiration (Chronic) ICD-10-CM: Z91.89  ICD-9-CM: V49.89  9/7/2017 - Present        Acute spontaneous intraventricular hemorrhage assoc w/ hypertension ([facility] ICD-10-CM: I61.5, I10  ICD-9-CM: 197, 401.9  9/7/2017 - Present        Screening for breast cancer ICD-10-CM: Z12.31  ICD-9-CM: V76.10  2/27/2017 - Present        Atrophic vaginitis ICD-10-CM: N95.2  ICD-9-CM: 627.3  7/11/2016 - Present        HTN (hypertension) (Chronic) ICD-10-CM: I10  ICD-9-CM: 401.9  10/23/2015 - Present        Depression ICD-10-CM: F32.9  ICD-9-CM: 694  10/23/2015 - Present        Anxiety (Chronic) ICD-10-CM: F41.9  ICD-9-CM: 300.00  10/23/2015 - Present        RESOLVED: Hypoxemia ICD-10-CM: R09.02  ICD-9-CM: 799.02  9/10/2017 - 9/12/2017        RESOLVED: Acute respiratory failure ([facility] ICD-10-CM: J96.00  ICD-9-CM: 518.81  9/8/2017 - 9/10/2017        RESOLVED: Hemorrhagic stroke ([facility] ICD-10-CM: I61.9  ICD-9-CM: 635  9/7/2017 - 9/7/2017        RESOLVED: Non-intractable vomiting with nausea ICD-10-CM: R11.2  ICD-9-CM: 787.01  9/7/2017 - 9/12/2017        RESOLVED: Seborrheic keratosis, inflamed ICD-10-CM: L82.0  ICD-9-CM: 702.11  7/11/2016 - 9/20/2016        RESOLVED: Cough ICD-10-CM: R05  ICD-9-CM: 786.2  7/11/2016 - 9/20/2016        RESOLVED: ADD (attention deficit disorder) (Chronic) ICD-10-CM: F98.8  ICD-9-CM: 314.00  10/23/2015 - 10/13/2017        RESOLVED: Shoulder pain ICD-10-CM: M25.519  ICD-9-CM: 719.41  10/23/2015 - 2/27/2017              Plan:         Left BG Hemorrhage with intraventricular extension due to HTN emergency; resultant R hemiplegia, right neglect, dysarthria, aphasia, dysphagia with significant decline in mobility and self care  Plan:   Continue daily physician medical management:  Pneumonia prophylaxis- Incentive spirometer every hour while awake. Will need instruction and assistance. Not sure if she can get a proper oral seal to be effective  -10/9 no pulmonary issues      Dysphagia; Pureed diet with NTL; at risk for malnutrition and dehydration. PICC line removed prior to transfer; may need a line for IVFs if BUN continues to increase. Low K, Ca; check mg. Supplementation as needed. Na elevated  -mag ok, K much improved; cont to supplement while on diuretic  -9/19 replace K; 3.0; 2.8 this am 9/20; inc supplement; may need to add to IVFs  -9/25 fluid status and K nl; 9/28 cont NTL; high risk aspiration; still needs supervision /assist with meals  -10/2 MBS today; thin liq!!  -10/9 po intake, especially fluids, has improved; feeding self      Thrombocytopenia; has been trending down for no apparent reason. hgb slt down as well. Has not been on Hep products; consult hematology today 9/19  -plts 40K, continues to trend down 9/20; await consult by hematology. ? reln to [address]. Mild anemia as well  Check HIT panel, d dimer , fibrinogen  -9/21 plts 36k ; HIT PROFILE POSITIVE, D DIMER ELEVATED 32.79, FIBRINOGEN  ; consistent with DIC; Etiology unclear  WILL D/W HEMATOLOGY; LFTs ok, no hx of blood transfusion,no hx pancreatitis, no sign of bacteremia. Has not been on any heparin products. Can see in head injury; has ICH.  Cannot r/o blood disorder (leukemia)   per hematology \"Recommend transfusing platelets for <53,665 and Cryoprecipitate for fibrinogen < 100. \"  -MARK pending  -9/22 bilateral LE venous duplex ordered; low suspicion but at risk and having calf tenderness  -9/22 plts improved today; monitor closely; 9/23 small right peroneal thrombus. No antic per hematology. IR refused/advised against placing IVF due to low risk of clot propagation given size and location  -9/23 counts bumping up; pts now 53k from 45 and previously 36  -9/24 plts cont to improve. 69K. MARK still pending; can resume therapies  9/26 Ddimer pending, fibrinogen now nl  -9/27 D dimer 11+, improved. Per Hematology; likely due to consumption coagulopathy. Will monitor  -10/2 labs have improved; MARK + 68% confirming dx of HIT; 10/4 recheck cbc (last done 9/28)    -10/5 plts 263; 10/9 plts 283      Bilateral PEs 9/25, filter placed in IR; looks good today. sats good without O2 supplement 94-97% sats; d/w Hematology, no agaratroban  -apprec Pulm assessment. Clinically looks good and no longer symptomatic  -9/28 clinically stable, asymptomatic ; 10/3 stable. No sob, no O2 supplements; 10/9 remains asymptomatic      Hypernatremia; last head CT did not show significant cerebral edema. Clinically improving. Likely due to prerenal azotemia; 9/15 MAY REQ isotonic / hypotonic saline IV;  -9/18 events of weekend noted; Na 156; asymptomatic, on 0.5 dextrose with 1/4 Na; na q 6hrs; pts serum osmolality was normal. Will check urine Na and urine osmolality; depending on findings, will consider consulting Hospitalist vs Nephrology  Neuro improving despite this  -9/19 Na down to 149, urine osmo nl, serum osmo min hi; cont dex/and 1/4 NS; repeat in am. Not DI as uop not increased  -9/20 Na 147; cont fluids; 9/21 not resulted; will stop fluids when Na below 142  -Na 140 9/22; dc IVFs; 9/25 136; 9/27 136; recheck 10/4 134; mildly low; f/u 10/9 pending      ICH/IVH; 9/22 clinically improves daily.  F/u HCT yest due to transient left eye visual disturbance. Overall, resolving hemorrhage. There is more pronounced edema; expected. Ventricular size now nl      Anemia ; 9.6-10/ stable; recheck 10/9 10.5 improved       DVT risk / DVT Prophylaxis- Will require daily physician exam to assess for signs and symptoms as patient is at increased risk for of thromboembolism. Mobilization as tolerated. Intermittent pneumatic compression devices when in bed Thigh-high or knee-high thromboembolic deterrent hose when out of bed. NO  anticoag due to ICH/HIT; has right small right peroneal vein; 9/25 spoke with IR again today; more risk of putting in IVC filter than benefits especially since she is asymptomatic with no swelling or tenderness and small size of clot  -10/3 no edema, right calf soft. No SOB  -10/9 no calf swelling or tenderness      Pain Control: stable, mild-to-moderate joint symptoms intermittently, reasonably well controlled by PRN meds. Will require regular pain assessment and comprenhensive pain management,       Wound Care: Monitor wound status daily per staff and physician. At risk for failure. Will require 24/7 rehab nursing. None needed at this time      Hypertension - BP uncontrolled, fluctuating, managed medically. Add prn hydralazine for sbp> 180. Goal SBP is 140-160 given ICH. Cont Normodyne, norvasc and hyzaar; consider Verapamil or scheduled Hydralazine  -9/18 BP increased 169/95; add hydralazine 25 tid  -9/20 BP much improved  -9/22 /74; 9/24 bp stable; may dec hydralazine to bid  -9/25 dec hydralazine  -9/27 bp 116-119  -9/28 SBP in the 90s to 116, asymptomatic; dc hydralazine and monitor; decrease Normodyne to bid  -9/29 BP still low; lower Norvasc and normodyne, cont Hyzaar; all have parameters of when to hold  - 9/30 SBP overnight and this am 108-111, will d/c norvasc, on labetolol and losartan/HCTZ , HR acceptable  -10/2 115/71; controlled.  Cont current meds  -130-151/77; 10/5 118/77; 10/6 BP stable; goal 120-130 given ICH  -10/9 148/81; add low dose Norvasc; ideally want less than 130; 10/11 130/73  10/13 remains better controlled      Mild hyperglycemia, likely stress induced vs borderline diabetes; monitor loosely  -9/27 bs 145 on BMP; follow bs qam x 3d; no issues      Spasticity; had added zanaflex; 10/12 adjust for desired effect; change to 2mg tid and 4mg qhs; AMBULATED YESTERDAY AND ADVANCED OWN LEG!!!!!!  -10/13 ? Component of RLS at noc; consider trial of Requip  -10/14 increased trazadone, slept better. Add low dose requip at noc  -10/15 ongoing c/o nonspecific insomnia; will try Restoril instead of Trazadone and inc Requip      Dysphagia; cont NTL, mech soft; hopefully can upgrade liquids soon 9/25  -9/29 remains on NTL. MBS scheduled for 10/2.  -10/3 now on THINS! !      Leukocytosis; recheck in a.m. Check UA due to stafford. No pulmonary signs (had neg CXR today), no s/s of infxn at old PICC site, no wounds, afebrile. Watch monocytes. -9/18 12.2 improving. Urine neg  -9/19 up to 13.2, ? Reactive. No source of infxn identified. Afebrile  -9/20 WBC now normal      Urinary retention/ neurogenic bladder - start flomax but continue stafford until mobility improves a bit. Strict I/o's  -9/20 keeping stafford to monitor UOP until Na normalizes. No significant output to suggest DI  -9/21 dc stafford; cont Flomax, follow bladder scan  9/22 voiding without issue; incontinence but no retention  -9/28 new onset urinary retention; check UA  -9/29 UA neg but cx saying > 100K gram neg cocci; will need to f/u ID/sens; Start Cipro 500 bid  - 9/30 preliminary urine cx >110K mixed skin denis  -10/2 stop Cipro; cx nl denis  -10/6 recurrent urinary retention. No flomax; re check UA, consider urecholine. CIC for PVRs  -10/9 dc scans; 10/13 per nursing intermittent need for CIC      bowel program - add prn meds; incontinent, want to keep stool on the firmer side. Monitor skin.       GERD - add a PPI.  At times may need additional antacids, Maalox prn.      -continues to benefit from and is showing improvements in a multidisc team approach. Participating well. 10/5 progress slow; 10/6 OT is seeing improvements. Starting to get movement back into the right arm. Awareness improving  -10/6 add ritalin for attn and distractibility   -10/9 family reports increased attn with them; await therapy reports   -10/13 continues to benefit from [address]   9/21   Addendum; in therapy this afternoon, transient left eye blindness. Will f/u head CT for extension of or new ICH, especially with low plts   Results  1. Interval decrease in size of intraventricular hemorrhage within the left  basal ganglia/thalamus with near complete resolution of intraventricular  hemorrhage. Surrounding edema is more pronounced with slight increase in  left-to-right midline shift. 2. Interval decrease in ventricular size, approaching normal in caliber.           - FAMILY [id number] REGARDING PROGRESS, NEEDS, DC PLAN; family overwhelmed with decision that need to be made. Family training Monday in case insurance denies as of 10/10         10/10 excellent progress made. Surprising to PT. Definitely have made a step forward and would benefit from ongoing acute inpt rehab to maximize recovery      -10/13 continues to show progress in PT, continues with OT and ST; speech improvements noted              Time spent was 25 minutes with over 1/2 in direct patient care/examination, consultation and coordination of care.      Signed By: Praveena Clark MD     October 16, 2017

## 2017-10-16 NOTE — PROGRESS NOTES
PHYSICAL THERAPY DAILY NOTE  Time In: 1030  Time Out: 1116  Patient Seen For: AM;Transfer training;Gait training; Therapeutic exercise; Other (see progress notes)    Subjective: Patient had no complaints. Objective: Other (comment) (falls)  GROSS ASSESSMENT Daily Assessment            BED/MAT MOBILITY Daily Assessment    Supine to Sit : 6 (Modified independent)  Sit to Supine : 5 (Supervision)       TRANSFERS Daily Assessment    Transfer Type: SPT without device  Transfer Assistance : 4 (Contact guard assistance)  Sit to Stand Assistance: Stand-by assistance       GAIT Daily Assessment   Worked on gait step length. Tone seems to kick in when right foot advancing . In bed night splint rubbing foot. Discontinued use of that per Dr. Lela Nguyen. Instructed fmaily in HC stretching when in room. Amount of Assistance: 2 (Maximal assistance)  Distance (ft): 30 Feet (ft)  Assistive Device: Gait belt;Walker cindy;Other (comment) (ace wraps to assist DF)       STEPS or STAIRS Daily Assessment    Level of Assist : 0 (Not tested)       BALANCE Daily Assessment            WHEELCHAIR MOBILITY Daily Assessment            LOWER EXTREMITY EXERCISES Daily Assessment    Extremity: Both  Exercise Type #1: Other (comment) (gait and weigh shifting on right leg)  Sets Performed: 5  Reps Performed: 0  Level of Assist: Minimal assistance          Assessment: Patient making great progress and seems to understand some of her deficits. Plan of Care: Continue with plan of care to reach PT goals. Returned to room with call bell at [facility].     Jeannette Howe, PTA  10/16/2017

## 2017-10-16 NOTE — PROGRESS NOTES
Assessment completed. Pt sitting on side of bed eating breakfast, she denies pain, denies shortness of breath. Bowel sounds in all quadrants. Pt stated she did not have a BM on yesterday. Right side upper and lower extremities flaccid. meal set up was performed and patient ate with out difficult. Open packages as needed. Pt drools when drinking liquids, took medication whole with out difficulty. Call light with in reach.

## 2017-10-16 NOTE — PROGRESS NOTES
PHYSICAL THERAPY DAILY NOTE  Time In: 7231  Time Out: 5308  Patient Seen For: PM;Other (see progress notes); Therapeutic exercise;Gait training;Transfer training    Subjective: Patient had no complaints. Objective: No pain noted. Other (comment) (falls)  GROSS ASSESSMENT Daily Assessment            BED/MAT MOBILITY Daily Assessment    Supine to Sit : 6 (Modified independent)  Sit to Supine : 5 (Supervision)       TRANSFERS Daily Assessment    Transfer Type: SPT without device  Transfer Assistance : 4 (Contact guard assistance)  Sit to Stand Assistance: Stand-by assistance       GAIT Daily Assessment    Amount of Assistance: 2 (Maximal assistance)  Distance (ft): 30 Feet (ft)  Assistive Device: Gait belt;Walker cindy;Other (comment) (ace wraps for DF)       STEPS or STAIRS Daily Assessment    Level of Assist : 0 (Not tested)       BALANCE Daily Assessment            WHEELCHAIR MOBILITY Daily Assessment            LOWER EXTREMITY EXERCISES Daily Assessment    Extremity: Both  Exercise Type #1: Other (comment) (gait and weigh shifting on right leg)  Sets Performed: 5  Reps Performed: 0  Level of Assist: Minimal assistance          Assessment: Patient making good progress toward mobility. She is improving with transfers and ambulation. Plan of Care: Continue with plan of care to reach PT goals. Returned to room with call santillan at [facility].     Gabi Wilde, PTA  10/16/2017

## 2017-10-16 NOTE — PROGRESS NOTES
Time In 0745   Time Out 0918     Mobility   Score Comments   Supine to Sit 6 (Modified independent)    Transfer Assist 3 Transfer Type: SPT without device  Comments: Lowering A     Activities of Daily Living    Score Comments   Self-Feeding 7 I   Grooming 5 Tasks completed by patient: Brushed hair, Brushed teeth  Comments: Set-up   Bathing 4 Body Parts Bathe: Abdomen, Arm, left, Arm, right, Buttocks, Lower leg and foot, left, Chest, Lower leg and foot, right, Tali area, Thigh, left, Thigh, right  Comments: CGA in standing   Tub/Shower Transfer [place] 3 Type of Shower: Shower  Adaptive  Equipment:Tub transfer bench, Grab bars and Wheelchair  Comments: Lowering A   Upper Body  Dressing/  Undressing 5 Items Applied:Pullover (4 steps), Bra (3 steps)  Comments: S/U   Lower Body Dressing/  Undressing 4 Items Applied:Shoe, left (1 step), Shoe, right (1 step), Sock, left (1 step), Sock, right (1 step), Underpants (3 steps), Elastic waist pants (3 steps)  Adaptive Equipment: Dressing stick  Comments: A for R sock/shoe   Education  Importance of dressing skills     Pt was in bed and agreeable to tx. Pt's performance with ADL is reflected in above chart. Pt is doing better with LB dressing and carrying over more strategies. Took videos of pt's SROM program for her to complete at home. Pt engaged in pattern replication task with no errors. Pt was left in bed with all needs within reach. Continue with POC.      Christophe KNIGHT/MITCHELL  10/16/2017

## 2017-10-16 NOTE — PROGRESS NOTES
10/16/17 1032   Time Spent With Patient   Time In 1000   Time Out 1025   Patient Seen For: AM;Neuro-linguistics   Team Conference   Team Conference Date 10/12/17   Family/Caregiver Training   Family Training Has taken place   Mental Status   Neurologic State Alert   Orientation Level Oriented to person;Oriented to situation   Cognition Memory loss; Impaired decision making;Decreased attention/concentration   Perception Appears intact   Perseveration No perseveration noted   Safety/Judgement Fall prevention   Comprehension (Native Language)   Primary Mode of Comprehension Auditory   Score 4   Expression (Native Language)   Primary Mode of Expression Verbal   Score 5   Social Interaction/Pragmatics   Score 5   Problem Solving   Score 4   Memory   Score 4   Pt making progress toward her goals, however, would benefit from continued ST services 5 times a week to address cognitive deficits in; STM, word finding and problem solving/reasoning tasks. Pt seen in room, laying in bed. Reviewed STM strategies and completed STM tasks. Pt completed immediate STM task with 85% accuracy. Pt completed delayed STM recall of a list for vacation with less than 50% accuracy. Improved to 80% accuracy with cueing. Continue with plan of care.    Shashi Garza MA/CCC/SLP

## 2017-10-17 PROCEDURE — 74011250637 HC RX REV CODE- 250/637: Performed by: PHYSICAL MEDICINE & REHABILITATION

## 2017-10-17 PROCEDURE — 65310000000 HC RM PRIVATE REHAB

## 2017-10-17 PROCEDURE — 92507 TX SP LANG VOICE COMM INDIV: CPT

## 2017-10-17 PROCEDURE — 97116 GAIT TRAINING THERAPY: CPT

## 2017-10-17 PROCEDURE — 97112 NEUROMUSCULAR REEDUCATION: CPT

## 2017-10-17 PROCEDURE — 97535 SELF CARE MNGMENT TRAINING: CPT

## 2017-10-17 PROCEDURE — 99232 SBSQ HOSP IP/OBS MODERATE 35: CPT | Performed by: PHYSICAL MEDICINE & REHABILITATION

## 2017-10-17 PROCEDURE — 97530 THERAPEUTIC ACTIVITIES: CPT

## 2017-10-17 RX ADMIN — POTASSIUM CHLORIDE 20 MEQ: 20 TABLET, EXTENDED RELEASE ORAL at 08:46

## 2017-10-17 RX ADMIN — LABETALOL HYDROCHLORIDE 100 MG: 100 TABLET, FILM COATED ORAL at 08:49

## 2017-10-17 RX ADMIN — AMLODIPINE BESYLATE 2.5 MG: 5 TABLET ORAL at 08:49

## 2017-10-17 RX ADMIN — METHYLPHENIDATE HYDROCHLORIDE 5 MG: 10 TABLET ORAL at 15:18

## 2017-10-17 RX ADMIN — STANDARDIZED SENNA CONCENTRATE AND DOCUSATE SODIUM 1 TABLET: 8.6; 5 TABLET, FILM COATED ORAL at 08:46

## 2017-10-17 RX ADMIN — ROPINIROLE HYDROCHLORIDE 2 MG: 2 TABLET, FILM COATED ORAL at 20:36

## 2017-10-17 RX ADMIN — LABETALOL HYDROCHLORIDE 100 MG: 100 TABLET, FILM COATED ORAL at 17:16

## 2017-10-17 RX ADMIN — TIZANIDINE 2 MG: 2 TABLET ORAL at 15:15

## 2017-10-17 RX ADMIN — TIZANIDINE 4 MG: 2 TABLET ORAL at 20:35

## 2017-10-17 RX ADMIN — BISACODYL 10 MG: 10 SUPPOSITORY RECTAL at 07:30

## 2017-10-17 RX ADMIN — POLYETHYLENE GLYCOL 3350 17 G: 17 POWDER, FOR SOLUTION ORAL at 08:46

## 2017-10-17 RX ADMIN — TIZANIDINE 2 MG: 2 TABLET ORAL at 06:01

## 2017-10-17 RX ADMIN — HYDROCHLOROTHIAZIDE: 12.5 CAPSULE ORAL at 08:50

## 2017-10-17 RX ADMIN — METHYLPHENIDATE HYDROCHLORIDE 5 MG: 10 TABLET ORAL at 07:29

## 2017-10-17 RX ADMIN — TEMAZEPAM 22.5 MG: 15 CAPSULE ORAL at 23:57

## 2017-10-17 RX ADMIN — TAMSULOSIN HYDROCHLORIDE 0.4 MG: 0.4 CAPSULE ORAL at 20:36

## 2017-10-17 NOTE — PROGRESS NOTES
PHYSICAL THERAPY DAILY NOTE  Time In: 1115  Time Out: 9886  Patient Seen For: AM;Transfer training;Gait training; Other (see progress notes)    Subjective: Patient had no complaints. Objective: Pain in left HS after gait training. Other (comment) (falls)  GROSS ASSESSMENT Daily Assessment            BED/MAT MOBILITY Daily Assessment    Supine to Sit : 6 (Modified independent)  Sit to Supine : 6 (Modified independent)       TRANSFERS Daily Assessment    Transfer Type: SPT without device  Transfer Assistance : 4 (Minimal assistance)  Sit to Stand Assistance: Contact guard assistance       GAIT Daily Assessment    Amount of Assistance: 3 (Moderate assistance)  Distance (ft): 30 Feet (ft)  Assistive Device: Gait belt;Walker cindy;Other (comment) (used theraband and ace wraps to assist DF and assist tone)       STEPS or STAIRS Daily Assessment    Level of Assist : 0 (Not tested)       BALANCE Daily Assessment            WHEELCHAIR MOBILITY Daily Assessment            LOWER EXTREMITY EXERCISES Daily Assessment               Assessment: Patient making great progress. She is using hemiwalker for gait. Plan of Care: Continue with plan of care to reach PT goals. Returned to room with call santillan at reach.     Diallo Juarez, PTA  10/17/2017

## 2017-10-17 NOTE — PROGRESS NOTES
OT Daily Note  Time In 1300   Time Out 1345   Pain: Patient had no complaint of pain. Neuro-Muscular Re-Education   Pt engaged in [address] with ball and dowel soheila with fair performance. Pt demonstrates limited R shoulder flexion due to tone. Cognition   Engaged in 16 pc block puzzle with min verbal cues and visual cue. Pt refused to do another. Education   Stroke and stroke recovery education completed; recommended continued family training. Interdisciplinary Communication: Mehnaz Copping on family training    Plan: Continue with POC. Pt was left awaiting PTA Lazara.      [place] Search OTR/L  10/17/2017

## 2017-10-17 NOTE — PROGRESS NOTES
Salena Gagnon MD,   Medical Director  [address] [facility], [address] [facility]  Tel: [phone]       [facility] PROGRESS NOTE    Karuan Cummings  Admit Date: 9/15/2017  Admit Diagnosis: stroke, left brain involvement;ICH (intracerebral hemorrhag*    Subjective     Patient seen and examined. Vss. No acute complaints. PT, OT well tolerated. Steady gains made. No new barrier to progress noted. Slept on and off    Objective:     Current Facility-Administered Medications   Medication Dose Route Frequency    temazepam (RESTORIL) capsule 22.5 mg  22.5 mg Oral QHS PRN    rOPINIRole (REQUIP) tablet 2 mg  2 mg Oral QHS    tiZANidine (ZANAFLEX) tablet 2 mg  2 mg Oral BID    tiZANidine (ZANAFLEX) tablet 4 mg  4 mg Oral QHS    amLODIPine (NORVASC) tablet 2.5 mg  2.5 mg Oral DAILY    potassium chloride (K-DUR, KLOR-CON) SR tablet 20 mEq  20 mEq Oral DAILY    senna-docusate (PERICOLACE) 8.6-50 mg per tablet 1 Tab  1 Tab Oral DAILY    polyethylene glycol (MIRALAX) packet 17 g  17 g Oral DAILY    methylphenidate HCl (RITALIN) tablet 5 mg  5 mg Oral BID    labetalol (NORMODYNE) tablet 100 mg  100 mg Oral BID    losartan/hydroCHLOROthiazide (HYZAAR) 100/12.5 mg   Oral DAILY    acetaminophen (TYLENOL) tablet 500 mg  500 mg Per NG tube Q4H PRN    alum-mag hydroxide-simeth (MYLANTA) oral suspension 30 mL  30 mL Oral Q4H PRN    bisacodyl (DULCOLAX) suppository 10 mg  10 mg Rectal DAILY PRN    hydrALAZINE (APRESOLINE) tablet 50 mg  50 mg Oral QID PRN    lip protectant (BLISTEX) ointment   Topical PRN    ondansetron (ZOFRAN ODT) tablet 4 mg  4 mg Oral Q6H PRN    sodium phosphate (FLEET'S) enema 118 mL  1 Enema Rectal PRN    traMADol (ULTRAM) tablet 50 mg  50 mg Oral Q6H PRN    tamsulosin (FLOMAX) capsule 0.4 mg  0.4 mg Oral QHS     Review of Systems:Denies chest pain, shortness of breath, cough, headache, visual problems, abdominal pain, dysurea, calf pain.  Pertinent positives are as noted in the medical records and unremarkable otherwise. Visit Vitals    /75    Pulse 82    Temp 98.2 °F (36.8 °C)    Resp 14    SpO2 96%        Physical Exam:   General: Alert and age appropriately oriented. No acute cardio respiratory distress. HEENT: Normocephalic,no scleral icterus  Oral mucosa moist without cyanosis; right lower facial weakness   Lungs: Clear to auscultation  bilaterally. Respiration even and unlabored   Heart: Regular rate and rhythm, S1, S2   No  murmurs, clicks, rub or gallops   Abdomen: Soft, non-tender, nondistended. Bowel sounds present. No organomegaly. Genitourinary: Benign . Neuromuscular: Tone 2+ RLE with PROM  Speech fluency improving. Word finding difficulties  No clonus at right ankle  Right hemiplegia. prox shoulder retraction, a trace of deltoid o/w 0/5  RLE KE 2+, hip flexion 2+, 0/5 distally   Skin/extremity: No rashes, no erythema.  No calf tenderness BLE  No edema                                                                            Functional Assessment:  Gross Assessment  AROM:  (LLE WFL, RLE AAROM WFL except DF -10) (10/13/17 1300)  PROM:  (WFL except right ankle PF contracture) (10/13/17 1300)  Strength:  (LLE WFL, RLE) (10/13/17 1300)  Coordination:  (LLE intact, RLE impaired) (10/13/17 1300)  Tone:  (extensor spasticity) (10/13/17 1300)  Sensation: Impaired (RLE to light touch and proprioception) (10/13/17 1300)       Balance  Sitting - Static: Good (unsupported) (10/13/17 1300)  Sitting - Dynamic: Fair (occasional) (10/13/17 1300)  Standing - Static: Fair (with hand rail) (10/13/17 1300)  Standing - Dynamic : Impaired (10/13/17 1300)           Toileting  Adaptive Equipment: Elevated seat;Walker (09/29/17 2569)         Robyn Davies Fall Risk Assessment:  Robyn Davies Fall Risk  Mobility: Ambulates or transfers with assist devices or assistance/unsteady gait (10/17/17 8518)  Mobility Interventions: Communicate number of staff needed for ambulation/transfer;Patient to call before getting OOB;Utilize walker, cane, or other assitive device (10/17/17 0813)  Mentation: Alert, oriented x 3 (10/17/17 0813)  Mentation Interventions: Adequate sleep, hydration, pain control;Bed/chair exit alarm; Door open when patient unattended;Room close to nurse's station (10/17/17 0813)  Medication: Patient receiving anticonvulsants, sedatives(tranquilizers), psychotropics or hypnotics, hypoglycemics, narcotics, sleep aids, antihypertensives, laxatives, or diuretics (10/17/17 0813)  Medication Interventions: Patient to call before getting OOB; Teach patient to arise slowly; Bed/chair exit alarm (10/17/17 5654)  Elimination: Needs assistance with toileting (10/17/17 0813)  Elimination Interventions: Call light in reach; Patient to call for help with toileting needs; Toileting schedule/hourly rounds (10/17/17 0813)  Prior Fall History: No (10/17/17 0813)  History of Falls Interventions: Bed/chair exit alarm; Door open when patient unattended;Evaluate medications/consider consulting pharmacy; Room close to nurse's station (10/17/17 0813)  Total Score: 3 (10/17/17 0813)  Standard Fall Precautions: Yes (10/15/17 1126)  High Fall Risk: Yes (10/17/17 0813)     Speech Assessment:         Ambulation:  Gait  Base of Support: Narrowed; Center of gravity altered (09/22/17 1200)  Speed/Lulu: Delayed (09/22/17 1200)  Step Length: Right shortened (09/22/17 1200)  Distance (ft): 30 Feet (ft) (10/16/17 1651)  Assistive Device: Gait belt;Walker cindy;Other (comment) (ace wraps for DF) (10/16/17 1651)     Labs/Studies:  Recent Results (from the past 72 hour(s))   CBC W/O DIFF    Collection Time: 10/16/17  6:05 AM   Result Value Ref Range    WBC 4.8 4.3 - 11.1 K/uL    RBC 3.47 (L) 4.05 - 5.25 M/uL    HGB 10.0 (L) 11.7 - 15.4 g/dL    HCT 29.5 (L) 35.8 - 46.3 %    MCV 85.0 79.6 - 97.8 FL    MCH 28.8 26.1 - 32.9 PG    MCHC 33.9 31.4 - 35.0 g/dL    RDW 13.7 11.9 - 14.6 %    PLATELET 051 251 - 861 K/uL    MPV 9.0 (L) 10.8 - 36.2 FL   METABOLIC PANEL, BASIC    Collection Time: 10/16/17  6:05 AM   Result Value Ref Range    Sodium 137 136 - 145 mmol/L    Potassium 4.3 3.5 - 5.1 mmol/L    Chloride 101 98 - 107 mmol/L    CO2 28 21 - 32 mmol/L    Anion gap 8 7 - 16 mmol/L    Glucose 94 65 - 100 mg/dL    BUN 12 8 - 23 MG/DL    Creatinine 0.49 (L) 0.6 - 1.0 MG/DL    GFR est AA >60 >60 ml/min/1.73m2    GFR est non-AA >60 >60 ml/min/1.73m2    Calcium 8.8 8.3 - 10.4 MG/DL       Assessment:     Problem List as of 10/17/2017  Date Reviewed: 9/12/2017          Codes Class Noted - Resolved    Thrombocytopenia ([facility] ICD-10-CM: D69.6  ICD-9-CM: 287.5  9/25/2017 - Present        Pulmonary emboli (HCC) ICD-10-CM: I26.99  ICD-9-CM: 415.19  9/25/2017 - Present        DVT (deep venous thrombosis) ([facility] ICD-10-CM: I82.409  ICD-9-CM: 453.40  9/25/2017 - Present        * (Principal)ICH (intracerebral hemorrhage) ([facility] ICD-10-CM: I61.9  ICD-9-CM: 275  9/15/2017 - Present        Hypertensive urgency, malignant ICD-10-CM: I16.0  ICD-9-CM: 401.0  9/11/2017 - Present        Stroke, hemorrhagic ([facility] ICD-10-CM: I61.9  ICD-9-CM: 267  9/7/2017 - Present        Accelerated hypertension ICD-10-CM: I10  ICD-9-CM: 401.0  9/7/2017 - Present        At risk for aspiration (Chronic) ICD-10-CM: A80.05  ICD-9-CM: V49.89  9/7/2017 - Present        Acute spontaneous intraventricular hemorrhage assoc w/ hypertension ([facility] ICD-10-CM: I61.5, I10  ICD-9-CM: 496, 401.9  9/7/2017 - Present        Screening for breast cancer ICD-10-CM: Z12.31  ICD-9-CM: V76.10  2/27/2017 - Present        Atrophic vaginitis ICD-10-CM: N95.2  ICD-9-CM: 627.3  7/11/2016 - Present        HTN (hypertension) (Chronic) ICD-10-CM: I10  ICD-9-CM: 401.9  10/23/2015 - Present        Depression ICD-10-CM: F32.9  ICD-9-CM: 613  10/23/2015 - Present        Anxiety (Chronic) ICD-10-CM: F41.9  ICD-9-CM: 300.00  10/23/2015 - Present        RESOLVED: Hypoxemia ICD-10-CM: R09.02  ICD-9-CM: 799.02  9/10/2017 - 9/12/2017        RESOLVED: Acute respiratory failure ([facility] ICD-10-CM: J96.00  ICD-9-CM: 518.81  9/8/2017 - 9/10/2017        RESOLVED: Hemorrhagic stroke ([facility] ICD-10-CM: I61.9  ICD-9-CM: 859  9/7/2017 - 9/7/2017        RESOLVED: Non-intractable vomiting with nausea ICD-10-CM: R11.2  ICD-9-CM: 787.01  9/7/2017 - 9/12/2017        RESOLVED: Seborrheic keratosis, inflamed ICD-10-CM: L82.0  ICD-9-CM: 702.11  7/11/2016 - 9/20/2016        RESOLVED: Cough ICD-10-CM: R05  ICD-9-CM: 786.2  7/11/2016 - 9/20/2016        RESOLVED: ADD (attention deficit disorder) (Chronic) ICD-10-CM: F98.8  ICD-9-CM: 314.00  10/23/2015 - 10/13/2017        RESOLVED: Shoulder pain ICD-10-CM: M25.519  ICD-9-CM: 719.41  10/23/2015 - 2/27/2017              Plan:          Left BG Hemorrhage with intraventricular extension due to HTN emergency; resultant R hemiplegia, right neglect, dysarthria, aphasia, dysphagia with significant decline in mobility and self care  Plan:   Continue daily physician medical management:  Pneumonia prophylaxis- Incentive spirometer every hour while awake. Will need instruction and assistance. Not sure if she can get a proper oral seal to be effective  -10/9 no pulmonary issues      Dysphagia; Pureed diet with NTL; at risk for malnutrition and dehydration. PICC line removed prior to transfer; may need a line for IVFs if BUN continues to increase. Low K, Ca; check mg. Supplementation as needed. Na elevated  -mag ok, K much improved; cont to supplement while on diuretic  -9/19 replace K; 3.0; 2.8 this am 9/20; inc supplement; may need to add to IVFs  -9/25 fluid status and K nl; 9/28 cont NTL; high risk aspiration; still needs supervision /assist with meals  -10/2 MBS today; thin liq!!  -10/9 po intake, especially fluids, has improved; feeding self      Thrombocytopenia; has been trending down for no apparent reason. hgb slt down as well.  Has not been on Hep products; consult hematology today 9/19  -plts 40K, continues to trend down 9/20; await consult by hematology. ? reln to [address]. Mild anemia as well  Check HIT panel, d dimer , fibrinogen  -9/21 plts 36k ; HIT PROFILE POSITIVE, D DIMER ELEVATED 32.79, FIBRINOGEN  ; consistent with DIC; Etiology unclear  WILL D/W HEMATOLOGY; LFTs ok, no hx of blood transfusion,no hx pancreatitis, no sign of bacteremia. Has not been on any heparin products. Can see in head injury; has ICH. Cannot r/o blood disorder (leukemia)   per hematology \"Recommend transfusing platelets for <98,814 and Cryoprecipitate for fibrinogen < 100. \"  -MARK pending  -9/22 bilateral LE venous duplex ordered; low suspicion but at risk and having calf tenderness  -9/22 plts improved today; monitor closely; 9/23 small right peroneal thrombus. No antic per hematology. IR refused/advised against placing IVF due to low risk of clot propagation given size and location  -9/23 counts bumping up; pts now 53k from 45 and previously 36  -9/24 plts cont to improve. 69K. MARK still pending; can resume therapies  9/26 Ddimer pending, fibrinogen now nl  -9/27 D dimer 11+, improved. Per Hematology; likely due to consumption coagulopathy. Will monitor  -10/2 labs have improved; MARK + 68% confirming dx of HIT; 10/4 recheck cbc (last done 9/28)    -10/5 plts 263; 10/9 plts 283      Bilateral PEs 9/25, filter placed in IR; looks good today. sats good without O2 supplement 94-97% sats; d/w Hematology, no agaratroban  -apprec Pulm assessment. Clinically looks good and no longer symptomatic  -9/28 clinically stable, asymptomatic ; 10/3 stable. No sob, no O2 supplements; 10/9 remains asymptomatic      Hypernatremia; last head CT did not show significant cerebral edema. Clinically improving. Likely due to prerenal azotemia; 9/15 MAY REQ isotonic / hypotonic saline IV;  -9/18 events of weekend noted;  Na 156; asymptomatic, on 0.5 dextrose with 1/4 Na; na q 6hrs; pts serum osmolality was normal. Will check urine Na and urine osmolality; depending on findings, will consider consulting Hospitalist vs Nephrology  Neuro improving despite this  -9/19 Na down to 149, urine osmo nl, serum osmo min hi; cont dex/and 1/4 NS; repeat in am. Not DI as uop not increased  -9/20 Na 147; cont fluids; 9/21 not resulted; will stop fluids when Na below 142  -Na 140 9/22; dc IVFs; 9/25 136; 9/27 136; recheck 10/4 134; mildly low; f/u 10/9 pending  -10/17 nl      ICH/IVH; 9/22 clinically improves daily. F/u HCT yest due to transient left eye visual disturbance. Overall, resolving hemorrhage. There is more pronounced edema; expected. Ventricular size now nl      Anemia ; 9.6-10/ stable; recheck 10/9 10.5 improved       DVT risk / DVT Prophylaxis- Will require daily physician exam to assess for signs and symptoms as patient is at increased risk for of thromboembolism. Mobilization as tolerated. Intermittent pneumatic compression devices when in bed Thigh-high or knee-high thromboembolic deterrent hose when out of bed. NO  anticoag due to ICH/HIT; has right small right peroneal vein; 9/25 spoke with IR again today; more risk of putting in IVC filter than benefits especially since she is asymptomatic with no swelling or tenderness and small size of clot  -10/3 no edema, right calf soft. No SOB  -10/9 no calf swelling or tenderness  -10/17 platelets normal    Pain Control: stable, mild-to-moderate joint symptoms intermittently, reasonably well controlled by PRN meds. Will require regular pain assessment and comprenhensive pain management,       Wound Care: Monitor wound status daily per staff and physician. At risk for failure. Will require 24/7 rehab nursing. None needed at this time      Hypertension - BP uncontrolled, fluctuating, managed medically. Add prn hydralazine for sbp> 180. Goal SBP is 140-160 given ICH.  Cont Normodyne, norvasc and hyzaar; consider Verapamil or scheduled Hydralazine  -9/18 BP increased 169/95; add hydralazine 25 tid  -9/20 BP much improved  -9/22 /74; 9/24 bp stable; may dec hydralazine to bid  -9/25 dec hydralazine  -9/27 bp 116-119  -9/28 SBP in the 90s to 116, asymptomatic; dc hydralazine and monitor; decrease Normodyne to bid  -9/29 BP still low; lower Norvasc and normodyne, cont Hyzaar; all have parameters of when to hold  - 9/30 SBP overnight and this am 108-111, will d/c norvasc, on labetolol and losartan/HCTZ , HR acceptable  -10/2 115/71; controlled. Cont current meds  -130-151/77; 10/5 118/77; 10/6 BP stable; goal 120-130 given ICH  -10/9 148/81; add low dose Norvasc; ideally want less than 130; 10/11 130/73  10/13 remains better controlled  -10/17 /75      Mild hyperglycemia, likely stress induced vs borderline diabetes; monitor loosely  -9/27 bs 145 on BMP; follow bs qam x 3d; no issues      Spasticity; had added zanaflex; 10/12 adjust for desired effect; change to 2mg tid and 4mg qhs; AMBULATED YESTERDAY AND ADVANCED OWN LEG!!!!!!  -10/13 ? Component of RLS at noc; consider trial of Requip  -10/14 increased trazadone, slept better. Add low dose requip at noc  -10/15 ongoing c/o nonspecific insomnia; will try Restoril instead of Trazadone and inc Requip  -10/17 good response to med changes      Dysphagia; cont NTL, mech soft; hopefully can upgrade liquids soon 9/25  -9/29 remains on NTL. MBS scheduled for 10/2.  -10/3 now on THINS! !      Leukocytosis; recheck in a.m. Check UA due to stafford. No pulmonary signs (had neg CXR today), no s/s of infxn at old PICC site, no wounds, afebrile. Watch monocytes. -9/18 12.2 improving. Urine neg  -9/19 up to 13.2, ? Reactive. No source of infxn identified. Afebrile  -9/20 WBC now normal      Urinary retention/ neurogenic bladder - start flomax but continue stafford until mobility improves a bit. Strict I/o's  -9/20 keeping stafford to monitor UOP until Na normalizes.  No significant output to suggest DI  -9/21 dc stafford; cont Flomax, follow bladder scan  9/22 voiding without issue; incontinence but no retention  -9/28 new onset urinary retention; check UA  -9/29 UA neg but cx saying > 100K gram neg cocci; will need to f/u ID/sens; Start Cipro 500 bid  - 9/30 preliminary urine cx >110K mixed skin denis  -10/2 stop Cipro; cx nl denis  -10/6 recurrent urinary retention. No flomax; re check UA, consider urecholine. CIC for PVRs  -10/9 dc scans; 10/13 per nursing intermittent need for CIC      bowel program - add prn meds; incontinent, want to keep stool on the firmer side. Monitor skin.       GERD - add a PPI. At times may need additional antacids, Maalox prn.      -continues to benefit from and is showing improvements in a multidisc team approach. Participating well. 10/5 progress slow; 10/6 OT is seeing improvements. Starting to get movement back into the right arm. Awareness improving  -10/6 add ritalin for attn and distractibility   -10/9 family reports increased attn with them; await therapy reports   -10/13 continues to benefit from [facility]   =====================================================    9/21   Addendum; in therapy this afternoon, transient left eye blindness. Will f/u head CT for extension of or new ICH, especially with low plts   Results  1. Interval decrease in size of intraventricular hemorrhage within the left  basal ganglia/thalamus with near complete resolution of intraventricular  hemorrhage. Surrounding edema is more pronounced with slight increase in  left-to-right midline shift. 2. Interval decrease in ventricular size, approaching normal in caliber.           - FAMILY [id number] REGARDING PROGRESS, NEEDS, DC PLAN; family overwhelmed with decision that need to be made. Family training Monday in case insurance denies as of 10/10          10/10 excellent progress made. Surprising to PT.  Definitely have made a step forward and would benefit from ongoing acute inpt rehab to maximize recovery      -10/13 continues to show progress in PT, continues with OT and ST; speech improvements noted  10/17 continues to participate well and benefit from inpt rehab             Time spent was 25 minutes with over 1/2 in direct patient care/examination, consultation and coordination of care.      Signed By: Sidney Slater MD     October 17, 2017

## 2017-10-17 NOTE — PROGRESS NOTES
Received call from Armani Ye with [address] [place] [address] . Pt approved for 8 additional days through 10/25 with dc on 10/26. Notified pt and family. HV scheduled with spouse for Thursday. Will continue to follow.

## 2017-10-17 NOTE — PROGRESS NOTES
10/17/17 1211   Time Spent With Patient   Time In 1010   Time Out 1045   Patient Seen For: AM;Neuro-linguistics   Mental Status   Neurologic State Alert   Orientation Level Oriented X4   Cognition Memory loss;Decreased attention/concentration; Impaired decision making; Appropriate decision making   Perception Appears intact   Perseveration No perseveration noted   Safety/Judgement Fall prevention   Pt seen in room, in bed. Restless. Complaining of stomach hurting. Pt completed STM and word finding tasks. Pt completed verbal problem solving and sequencing tasks. Pt completed familiar STM tasks of a list of words from yesterday with 0/8. Mod-max cues to recall 4/8. Pt completed word finding tasks with 70% accuracy. Pt becoming more aware of her deficits in [facility] and word finding difficulties. \"Why can I not remember? \" \"I can see the word, just not say it. \" Pt completed verbal problem solving tasks with 90% accuracy. Pt completed simple sequencing task of 4 steps with 90% Accuracy. Continue with plan of care.       Thaddeus John MA/CCC/SLP

## 2017-10-17 NOTE — PROGRESS NOTES
Pt is A/O x 4, denies pain, respirations even and non labored. Pt dang had a good appetite and drinking fluids. Pt has had a BM today and voiding. Pt takes medication whole and is on a mechanical soft diet. Pt has food drop and is wearing a new boot for only a few hr. Today.

## 2017-10-17 NOTE — PROGRESS NOTES
Time In 0747   Time Out 0830     Mobility   Score Comments   Transfer Assist 4 Transfer Type: SPT without device  Comments: CGA     Activities of Daily Living    Score Comments   Grooming 5 Tasks completed by patient: Brushed hair, Brushed teeth, Shaved/applied makeup (optional)  Comments: Cueing   Bathing 4 Body Parts Bathe: Abdomen, Arm, left, Arm, right, Buttocks, Chest, Lower leg and foot, left, Lower leg and foot, right, Tali area, Thigh, left, Thigh, right  Comments: CGA in standing   Upper Body  Dressing/  Undressing 5 Items Applied:Pullover (4 steps), Bra (3 steps)  Comments: Set-up   Lower Body Dressing/  Undressing 4 Items Applied:Sock, left (1 step), Sock, right (1 step), Underpants (3 steps), Elastic waist pants (3 steps)  Adaptive Equipment: N/A  Comments: A in standing   Toileting 4 CGA   Toilet Transfer 4 CGA   Education  Anil-dressing techniques     Pt was on BSC and agreeable to tx. Pt's performance with ADL is reflected in above chart. Pt was able to don both socks today. Pt was left in bed with all needs within reach. Continue with POC.      Rebecca KNIGHT/MITCHELL  10/17/2017

## 2017-10-17 NOTE — PROGRESS NOTES
Pt resting in bed, denies pain, denies shortness of breath. Right side flaccid, pt has slightly right facial droop, pt able to stand and pivot with contact guard assist. Pt ate all her breakfast this am. Pt encourage to call for any needs.

## 2017-10-17 NOTE — PROGRESS NOTES
Subjective \"Are you all ready for your trip? \"   Activity Corn hole reach and toss game with the use of her LUE. Strength/Endurance Patient had more energy and with less c/o tiredness due to her RT session being in the am.  She did request to go back to bed due to being \"uncomfortable. \"   Balance Min (A) with SPT from bed to w/c   Social Interaction Patient initiated conversation and joked around appropriately with this therapist.   Cognitive A&O X4   Comments Patient was assisted to her room and into the bed. She was left with all needs within reach.      Mariah Conley, CTRS

## 2017-10-17 NOTE — PROGRESS NOTES
Problem: Falls - Risk of  Goal: *Absence of Falls  Document Haley Fall Risk and appropriate interventions in the flowsheet.    Outcome: Progressing Towards Goal  Fall Risk Interventions:  Mobility Interventions: Utilize walker, cane, or other assitive device     Mentation Interventions: Bed/chair exit alarm, Door open when patient unattended     Medication Interventions: Patient to call before getting OOB     Elimination Interventions: Call light in reach     History of Falls Interventions: Door open when patient unattended

## 2017-10-17 NOTE — PROGRESS NOTES
Pt able to stand and pivot from bed to wheelchair, pt needed assist with pulling pants down and up. Also nataliia care by RN. Pt then pivot back to wheelchair and to bed. spech more clearer .

## 2017-10-17 NOTE — PROGRESS NOTES
PHYSICAL THERAPY DAILY NOTE  Time In: 6623  Time Out: 9800  Patient Seen For: PM;Other (see progress notes); Therapeutic exercise;Gait training;Transfer training    Subjective: Patient had no complaints. Objective: No pain noted. Other (comment) (falls)  GROSS ASSESSMENT Daily Assessment            BED/MAT MOBILITY Daily Assessment    Supine to Sit : 6 (Modified independent)  Sit to Supine : 6 (Modified independent)       TRANSFERS Daily Assessment    Transfer Type: SPT without device  Transfer Assistance : 4 (Minimal assistance)  Sit to Stand Assistance: Contact guard assistance       GAIT Daily Assessment    Amount of Assistance: 0 (Not tested)  Distance (ft): 30 Feet (ft)  Assistive Device: Gait belt;Walker cindy;Other (comment) (used theraband and ace wraps to assist DF and assist tone)       STEPS or STAIRS Daily Assessment    Level of Assist : 0 (Not tested)       BALANCE Daily Assessment            WHEELCHAIR MOBILITY Daily Assessment            LOWER EXTREMITY EXERCISES Daily Assessment    Extremity: Both  Exercise Type #1: Other (comment) (stretching of HC to get casted for custom AFO)  Sets Performed: 5  Reps Performed: 0  Level of Assist: Minimal assistance          Assessment: Patient making great progress. Plan of Care: Continue with plan of care to reach PT goals. Returned to room with call santillan at [facility].     Acosta Rivas PTA  10/17/2017

## 2017-10-18 PROCEDURE — 97535 SELF CARE MNGMENT TRAINING: CPT

## 2017-10-18 PROCEDURE — 97116 GAIT TRAINING THERAPY: CPT

## 2017-10-18 PROCEDURE — 97150 GROUP THERAPEUTIC PROCEDURES: CPT

## 2017-10-18 PROCEDURE — 99232 SBSQ HOSP IP/OBS MODERATE 35: CPT | Performed by: PHYSICAL MEDICINE & REHABILITATION

## 2017-10-18 PROCEDURE — 97530 THERAPEUTIC ACTIVITIES: CPT

## 2017-10-18 PROCEDURE — 97112 NEUROMUSCULAR REEDUCATION: CPT

## 2017-10-18 PROCEDURE — 92507 TX SP LANG VOICE COMM INDIV: CPT

## 2017-10-18 PROCEDURE — 65310000000 HC RM PRIVATE REHAB

## 2017-10-18 PROCEDURE — 74011250637 HC RX REV CODE- 250/637: Performed by: PHYSICAL MEDICINE & REHABILITATION

## 2017-10-18 RX ORDER — TEMAZEPAM 15 MG/1
15 CAPSULE ORAL
Status: DISCONTINUED | OUTPATIENT
Start: 2017-10-18 | End: 2017-10-26 | Stop reason: HOSPADM

## 2017-10-18 RX ADMIN — POLYETHYLENE GLYCOL 3350 17 G: 17 POWDER, FOR SOLUTION ORAL at 08:20

## 2017-10-18 RX ADMIN — LABETALOL HYDROCHLORIDE 100 MG: 100 TABLET, FILM COATED ORAL at 08:21

## 2017-10-18 RX ADMIN — TIZANIDINE 2 MG: 2 TABLET ORAL at 06:27

## 2017-10-18 RX ADMIN — TEMAZEPAM 15 MG: 15 CAPSULE ORAL at 23:01

## 2017-10-18 RX ADMIN — HYDROCHLOROTHIAZIDE: 12.5 CAPSULE ORAL at 08:21

## 2017-10-18 RX ADMIN — AMLODIPINE BESYLATE 2.5 MG: 5 TABLET ORAL at 08:21

## 2017-10-18 RX ADMIN — ROPINIROLE HYDROCHLORIDE 2 MG: 2 TABLET, FILM COATED ORAL at 21:56

## 2017-10-18 RX ADMIN — TIZANIDINE 2 MG: 2 TABLET ORAL at 13:21

## 2017-10-18 RX ADMIN — STANDARDIZED SENNA CONCENTRATE AND DOCUSATE SODIUM 1 TABLET: 8.6; 5 TABLET, FILM COATED ORAL at 08:22

## 2017-10-18 RX ADMIN — POTASSIUM CHLORIDE 20 MEQ: 20 TABLET, EXTENDED RELEASE ORAL at 08:21

## 2017-10-18 RX ADMIN — TAMSULOSIN HYDROCHLORIDE 0.4 MG: 0.4 CAPSULE ORAL at 21:56

## 2017-10-18 RX ADMIN — METHYLPHENIDATE HYDROCHLORIDE 5 MG: 10 TABLET ORAL at 08:21

## 2017-10-18 RX ADMIN — LABETALOL HYDROCHLORIDE 100 MG: 100 TABLET, FILM COATED ORAL at 17:59

## 2017-10-18 RX ADMIN — TIZANIDINE 4 MG: 2 TABLET ORAL at 21:56

## 2017-10-18 RX ADMIN — METHYLPHENIDATE HYDROCHLORIDE 5 MG: 10 TABLET ORAL at 15:59

## 2017-10-18 NOTE — PROGRESS NOTES
Problem: Falls - Risk of  Goal: *Absence of Falls  Document Haley Fall Risk and appropriate interventions in the flowsheet.    Outcome: Progressing Towards Goal  Fall Risk Interventions:  Mobility Interventions: Patient to call before getting OOB, Utilize walker, cane, or other assitive device     Mentation Interventions: Room close to nurse's station, Toileting rounds     Medication Interventions: Patient to call before getting OOB     Elimination Interventions: Call light in reach     History of Falls Interventions: Door open when patient unattended

## 2017-10-18 NOTE — PROGRESS NOTES
PHYSICAL THERAPY DAILY NOTE  Time In: 0831  Time Out: 5199  Patient Seen For: AM;Gait training;Patient education;Transfer training; Wheelchair mobility; Other (see progress notes)    Subjective: Patient reporting she wants to walk. Reports she is still uncomfortable in sitting and prefers to be in her bed when not in therapy. Reports she took sleeping medication late last night and she is drowsy today. Objective:Vital Signs:  Patient Vitals for the past 12 hrs:   Temp Pulse Resp BP SpO2   10/18/17 0734 99.8 °F (37.7 °C) 92 16 111/69 99 %     Pain level:no c/o pain  Pain location:NA  Pain interventions:NA    Patient education:Bed mobility training,transfer training, gait training, fall precautions, activity pacing,body mechanics, w/c mobility and parts management, Patient verbalizing understanding and demonstrating partial understanding of patient education. Recommend follow up education. Interdisciplinary Communication:NA    Other (comment) (falls)  GROSS ASSESSMENT Daily Assessment     NA       BED/MAT MOBILITY Daily Assessment   Increased time and effort to complete with cues for body mechanics   Rolling Right : 0 (Not tested)  Rolling Left : 0 (Not tested)  Supine to Sit : 5 (Supervision)  Sit to Supine : 4 (Minimal assistance)       TRANSFERS Daily Assessment   Increased time and effort to complete with cues for body mechanics  Transfers to her left w/c<>bed Transfer Type: SPT without device  Transfer Assistance : 4 (Contact guard assistance)  Sit to Stand Assistance: Stand-by assistance  Car Transfers: Not tested       GAIT Daily Assessment    Amount of Assistance: 3 (Moderate assistance)  Distance (ft): 30 Feet (ft) (30ft x 3 with hemiwalker, 20 ft x 1 with hemiwalker)  Assistive Device: Walker cindy;Other (comment) (left hand rail, ace wrap for right DF assist and knee contro)   Gait training with slow start/ stop hemiplegic gait pattern leading with RLE and stepping to with LLE.  Min assist to advance RLE with patient able to occasionally advance RLE on her own but assist for safe foot placement. Max assist to control right knee in stance in order to inhibit hyperextension. STEPS or STAIRS Daily Assessment    Steps/Stairs Ambulated (#): 0  Level of Assist : 0 (Not tested)       BALANCE Daily Assessment   Static standing with LUE support on hand rail or hemiwalker facilitating symmetrical weight bearing with min assist and cues, right knee blocked to prevent hyperextension Sitting - Static: Good (unsupported)  Sitting - Dynamic: Fair (occasional)  Standing - Static: Fair (with hand rail or hemiwalker)  Standing - Dynamic : Impaired       WHEELCHAIR MOBILITY Daily Assessment   Using LUE and LLE to propel w/c, cues to attend to objects on right Able to Propel (ft): 150 feet  Functional Level: 5  Curbs/Ramps Assist Required (FIM Score): 0 (Not tested)  Wheelchair Setup Assist Required : 4 (Minimal assistance)  Wheelchair Management: Manages left brake;Manages right brake       LOWER EXTREMITY EXERCISES Daily Assessment     NA          Assessment: Gait cont to slowly improve including ability to advance RLE. WIll need custom AFO to control right knee hyperextension in stance due to RLE weakness and heel cord tightness       Patient returned to room at end of treatment. Patient supine in bed with head of bed elevated and bed rails up x 2. Needs placed in reach of patient. Dorsal PF splint on RLE with pillow under leg     Plan of Care: Continue with POC and progress as tolerated.      Lorraine Lamar, PT  10/18/2017

## 2017-10-18 NOTE — PROGRESS NOTES
10/18/17 1125   Time Spent With Patient   Time In 1047   Time Out 1120   Patient Seen For: AM;Neuro-linguistics   Mental Status   Neurologic State Alert   Orientation Level Oriented X4   Cognition Impaired decision making;Memory loss   Perception Appears intact   Perseveration No perseveration noted   Safety/Judgement Fall prevention   Pt completed STM tasks with min-mod cues with 70% accuracy. Pt completed word finding tasks with 85% Accuracy. Pt working hard and becoming more aware of her cognitive deficits.     Fatemeh Balderrama MA/ANASTASIA/SLP

## 2017-10-18 NOTE — PROGRESS NOTES
Time In 0743   Time Out 0827     Mobility   Score Comments   Supine to Sit 6 (Modified independent)    Transfer Assist 3 Transfer Type: SPT without device  Comments: Lowering A     Activities of Daily Living    Score Comments   Grooming 5 Tasks completed by patient: Brushed hair, Brushed teeth  Comments: Set-up   Bathing 4 Body Parts Bathe: Abdomen, Arm, left, Arm, right, Buttocks, Chest, Lower leg and foot, right, Lower leg and foot, left, Tali area, Thigh, left, Thigh, right  Comments: A in standing; cueing for initation and to get all parts   Tub/Shower Transfer Benson 4 Type of Shower: Shower  Adaptive  Equipment:Tub transfer bench, Grab bars and Cane  Comments: Steadying A   Upper Body  Dressing/  Undressing 4 Items Applied:Bra (3 steps), Pullover (4 steps)  Comments: A to adjust bra in back   Lower Body Dressing/  Undressing 4 Items Applied:Shoe, left (1 step), Shoe, right (1 step), Sock, left (1 step), Sock, right (1 step), Underpants (3 steps), Elastic waist pants (3 steps)  Adaptive Equipment: W/C  Comments: A for R shoe and balance in standing   Education  Importance of sitting up     Pt was in bed and agreeable to tx. Pt's performance with ADL is reflected in above chart. Pt required more cues for initation and sequencing today compared to yesterday. Pt was left at sink blow drying hair with SEAN Manuel. Continue with POC.      Warnell Para OTR/L  10/18/2017

## 2017-10-18 NOTE — PROGRESS NOTES
PHYSICAL THERAPY DAILY NOTE  Time In: 0694  Time Out: 1521  Patient Seen For: PM;Balance activities; Patient education; Therapeutic exercise;Transfer training; Wheelchair mobility; Other (see progress notes)    Subjective: patient reporting she has had the right foot splint on for about 1.5 hours. Reports no pain. Objective:Vital Signs:  Patient Vitals for the past 12 hrs:   Temp Pulse Resp BP SpO2   10/18/17 0734 99.8 °F (37.7 °C) 92 16 111/69 99 %     Pain level:No c/o pain  Pain location:NA  Pain interventions:NA    Patient education:Bed mobility training,transfer training, fall precautions, activity pacing, body mechanics, w/c mobility and parts management, Patient verbalizing understanding and demonstrating partial understanding of patient education. Recommend follow up education. Interdisciplinary Communication:NA    Other (comment) (falls)  GROSS ASSESSMENT Daily Assessment     removed Dorsal PF splint from right foot/ankle, 3inch diameter area of erythema noted at ankle joint secondary to patient PF and inverting with brace on. At this time brace is not able to control both planes of movement or extensor spasticity in order to keep ankle/foot in proper alignment. Recommending only wearing brace 1 hour at a time.  Patient will need custom AFO to control both planes of movement        BED/MAT MOBILITY Daily Assessment   Increased time and effort to complete with cues for body mechanics  Cues to attend to RLE Rolling Right : 5 (Supervision)  Rolling Left : 5 (Supervision)  Supine to Sit : 5 (Supervision)  Sit to Supine : 5 (Supervision)       TRANSFERS Daily Assessment   Increased time and effort to complete with cues for body mechanics   Transfer Type: SPT without device (w/c<>mat level surfaces to the left)  Transfer Assistance : 4 (Contact guard assistance)  Sit to Stand Assistance: Stand-by assistance  Car Transfers: Not tested       GAIT Daily Assessment    NA       STEPS or STAIRS Daily Assessment Steps/Stairs Ambulated (#): 0  Level of Assist : 0 (Not tested)       BALANCE Daily Assessment    Sitting - Static: Good (unsupported)  Sitting - Dynamic: Fair (occasional)  Standing - Static: Not tested  Standing - Dynamic : Impaired       WHEELCHAIR MOBILITY Daily Assessment    Able to Propel (ft): 150 feet (using LUE and LLE to propel w/c)  Functional Level: 5  Curbs/Ramps Assist Required (FIM Score): 0 (Not tested)  Wheelchair Setup Assist Required : 4 (Minimal assistance)  Wheelchair Management: Manages left brake;Manages right brake       LOWER EXTREMITY EXERCISES Daily Assessment    Extremity: Right  Exercise Type #1: Supine lower extremity strengthening  Sets Performed: 2  Reps Performed: 10  Level of Assist: Moderate assistance  Exercise Type #2: Other (comment) (left sidelying RLE hip flex<>ext and knee flex<>ext)  Sets Performed: 3  Reps Performed: 10  Level of Assist: Moderate assistance  Exercise Type #3: Other (comment) (Passive right heel cord stretch)  Sets Performed: 3  Reps Performed: 10  Level of Assist: Total assistance          Assessment:Difficult to find a ankle/foot positioning splint to control foot drop and ankle inversion. Patient with strong extensor spacticity making positioning more difficult. Patient returned to room at end of treatment. Patient supine in bed with head of bed elevated and bed rails up x 2. Needs placed in reach of patient. Plan of Care: Continue with POC and progress as tolerated.      Roya Pete, PT  10/18/2017

## 2017-10-18 NOTE — PROGRESS NOTES
Aby Vasqeuz MD,   Medical Director  [facility], [address]  Tel: [phone]       D PROGRESS NOTE    Balwinder Garcia  Admit Date: 9/15/2017  Admit Diagnosis: stroke, left brain involvement;ICH (intracerebral hemorrhag*    Subjective     Doing well x a little sleepy still. RN reports she slept all noc and was like a rag doll when they put her on BSC at 4am. Pt very hesitant to allow me to decrease restoril. Insists she was making up for lost sleep. No headache, cp, sob.      Objective:     Current Facility-Administered Medications   Medication Dose Route Frequency    temazepam (RESTORIL) capsule 15 mg  15 mg Oral QHS PRN    rOPINIRole (REQUIP) tablet 2 mg  2 mg Oral QHS    tiZANidine (ZANAFLEX) tablet 2 mg  2 mg Oral BID    tiZANidine (ZANAFLEX) tablet 4 mg  4 mg Oral QHS    amLODIPine (NORVASC) tablet 2.5 mg  2.5 mg Oral DAILY    potassium chloride (K-DUR, KLOR-CON) SR tablet 20 mEq  20 mEq Oral DAILY    senna-docusate (PERICOLACE) 8.6-50 mg per tablet 1 Tab  1 Tab Oral DAILY    polyethylene glycol (MIRALAX) packet 17 g  17 g Oral DAILY    methylphenidate HCl (RITALIN) tablet 5 mg  5 mg Oral BID    labetalol (NORMODYNE) tablet 100 mg  100 mg Oral BID    losartan/hydroCHLOROthiazide (HYZAAR) 100/12.5 mg   Oral DAILY    acetaminophen (TYLENOL) tablet 500 mg  500 mg Per NG tube Q4H PRN    alum-mag hydroxide-simeth (MYLANTA) oral suspension 30 mL  30 mL Oral Q4H PRN    bisacodyl (DULCOLAX) suppository 10 mg  10 mg Rectal DAILY PRN    hydrALAZINE (APRESOLINE) tablet 50 mg  50 mg Oral QID PRN    lip protectant (BLISTEX) ointment   Topical PRN    ondansetron (ZOFRAN ODT) tablet 4 mg  4 mg Oral Q6H PRN    sodium phosphate (FLEET'S) enema 118 mL  1 Enema Rectal PRN    traMADol (ULTRAM) tablet 50 mg  50 mg Oral Q6H PRN    tamsulosin (FLOMAX) capsule 0.4 mg  0.4 mg Oral QHS     Review of Systems:Denies chest pain, shortness of breath, cough, headache, visual problems, abdominal pain, dysurea, calf pain. Pertinent positives are as noted in the medical records and unremarkable otherwise. Visit Vitals    /69    Pulse 92    Temp 99.8 °F (37.7 °C)    Resp 16    SpO2 99%        Physical Exam:   General: Alert and age appropriately oriented. No acute cardio respiratory distress. HEENT: Normocephalic,no scleral icterus  Oral mucosa moist without cyanosis. Right facial weakness   Lungs: Clear to auscultation  bilaterally. Respiration even and unlabored   Heart: Regular rate and rhythm, S1, S2   No  murmurs, clicks, rub or gallops   Abdomen: Soft, non-tender, nondistended. Bowel sounds present. No organomegaly. Genitourinary: Benign . Neuromuscular:      Right hemiplegia. prox shoulder retraction, a trace of deltoid o/w 0/5  RLE KE 2+, hip flexion 2+, 0/5 distally   Skin/extremity: No rashes, no erythema. No calf tenderness BLE  Wound covered.                                                                             Functional Assessment:  Gross Assessment  AROM:  (LLE WFL, RLE AAROM WFL except DF -10) (10/13/17 1300)  PROM:  (WFL except right ankle PF contracture) (10/13/17 1300)  Strength:  (LLE WFL, RLE) (10/13/17 1300)  Coordination:  (LLE intact, RLE impaired) (10/13/17 1300)  Tone:  (extensor spasticity) (10/13/17 1300)  Sensation: Impaired (RLE to light touch and proprioception) (10/13/17 1300)       Balance  Sitting - Static: Good (unsupported) (10/13/17 1300)  Sitting - Dynamic: Fair (occasional) (10/13/17 1300)  Standing - Static: Fair (with hand rail) (10/13/17 1300)  Standing - Dynamic : Impaired (10/13/17 1300)           Toileting  Adaptive Equipment: Elevated seat;Walker (09/29/17 8185)         Dulce Mars Fall Risk Assessment:  Dulce Mars Fall Risk  Mobility: Ambulates or transfers with assist devices or assistance/unsteady gait (10/17/17 6061)  Mobility Interventions: Patient to call before getting OOB;Utilize walker, cane, or other assitive device (10/17/17 2121)  Mentation: Alert, oriented x 3 (10/17/17 2121)  Mentation Interventions: Room close to nurse's station; Toileting rounds (10/17/17 2121)  Medication: Patient receiving anticonvulsants, sedatives(tranquilizers), psychotropics or hypnotics, hypoglycemics, narcotics, sleep aids, antihypertensives, laxatives, or diuretics (10/17/17 2121)  Medication Interventions: Patient to call before getting OOB (10/17/17 2121)  Elimination: Needs assistance with toileting (10/17/17 2121)  Elimination Interventions: Call light in reach (10/17/17 2121)  Prior Fall History: No (10/17/17 2121)  History of Falls Interventions: Door open when patient unattended (10/17/17 2121)  Total Score: 3 (10/17/17 2121)  Standard Fall Precautions: Yes (10/15/17 1126)  High Fall Risk: Yes (10/17/17 2121)     Speech Assessment:         Ambulation:  Gait  Base of Support: Narrowed; Center of gravity altered (09/22/17 1200)  Speed/Lulu: Delayed (09/22/17 1200)  Step Length: Right shortened (09/22/17 1200)  Distance (ft): 30 Feet (ft) (10/17/17 1235)  Assistive Device: Gait belt;Walker cindy;Other (comment) (used theraband and ace wraps to assist DF and assist tone) (10/17/17 1235)     Labs/Studies:  Recent Results (from the past 72 hour(s))   CBC W/O DIFF    Collection Time: 10/16/17  6:05 AM   Result Value Ref Range    WBC 4.8 4.3 - 11.1 K/uL    RBC 3.47 (L) 4.05 - 5.25 M/uL    HGB 10.0 (L) 11.7 - 15.4 g/dL    HCT 29.5 (L) 35.8 - 46.3 %    MCV 85.0 79.6 - 97.8 FL    MCH 28.8 26.1 - 32.9 PG    MCHC 33.9 31.4 - 35.0 g/dL    RDW 13.7 11.9 - 14.6 %    PLATELET 732 949 - 894 K/uL    MPV 9.0 (L) 10.8 - 67.7 FL   METABOLIC PANEL, BASIC    Collection Time: 10/16/17  6:05 AM   Result Value Ref Range    Sodium 137 136 - 145 mmol/L    Potassium 4.3 3.5 - 5.1 mmol/L    Chloride 101 98 - 107 mmol/L    CO2 28 21 - 32 mmol/L    Anion gap 8 7 - 16 mmol/L    Glucose 94 65 - 100 mg/dL    BUN 12 8 - 23 MG/DL Creatinine 0.49 (L) 0.6 - 1.0 MG/DL    GFR est AA >60 >60 ml/min/1.73m2    GFR est non-AA >60 >60 ml/min/1.73m2    Calcium 8.8 8.3 - 10.4 MG/DL       Assessment:     Problem List as of 10/18/2017  Date Reviewed: 9/12/2017          Codes Class Noted - Resolved    Thrombocytopenia ([facility] ICD-10-CM: D69.6  ICD-9-CM: 287.5  9/25/2017 - Present        Pulmonary emboli (H[facility]) ICD-10-CM: I26.99  ICD-9-CM: 415.19  9/25/2017 - Present        DVT (deep venous thrombosis) ([facility] ICD-10-CM: I82.409  ICD-9-CM: 453.40  9/25/2017 - Present        * (Principal)ICH (intracerebral hemorrhage) ([facility] ICD-10-CM: I61.9  ICD-9-CM: 517  9/15/2017 - Present        Hypertensive urgency, malignant ICD-10-CM: I16.0  ICD-9-CM: 401.0  9/11/2017 - Present        Stroke, hemorrhagic ([facility] ICD-10-CM: I61.9  ICD-9-CM: 364  9/7/2017 - Present        Accelerated hypertension ICD-10-CM: I10  ICD-9-CM: 401.0  9/7/2017 - Present        At risk for aspiration (Chronic) ICD-10-CM: Z91.89  ICD-9-CM: V49.89  9/7/2017 - Present        Acute spontaneous intraventricular hemorrhage assoc w/ hypertension ([facility] ICD-10-CM: I61.5, I10  ICD-9-CM: 431, 401.9  9/7/2017 - Present        Screening for breast cancer ICD-10-CM: Z12.31  ICD-9-CM: V76.10  2/27/2017 - Present        Atrophic vaginitis ICD-10-CM: N95.2  ICD-9-CM: 627.3  7/11/2016 - Present        HTN (hypertension) (Chronic) ICD-10-CM: I10  ICD-9-CM: 401.9  10/23/2015 - Present        Depression ICD-10-CM: F32.9  ICD-9-CM: 311  10/23/2015 - Present        Anxiety (Chronic) ICD-10-CM: F41.9  ICD-9-CM: 300.00  10/23/2015 - Present        RESOLVED: Hypoxemia ICD-10-CM: R09.02  ICD-9-CM: 799.02  9/10/2017 - 9/12/2017        RESOLVED: Acute respiratory failure ([facility] ICD-10-CM: J96.00  ICD-9-CM: 518.81  9/8/2017 - 9/10/2017        RESOLVED: Hemorrhagic stroke ([facility] ICD-10-CM: I61.9  ICD-9-CM: 376  9/7/2017 - 9/7/2017        RESOLVED: Non-intractable vomiting with nausea ICD-10-CM: R11.2  ICD-9-CM: 787.01  9/7/2017 - 9/12/2017        RESOLVED: Seborrheic keratosis, inflamed ICD-10-CM: L82.0  ICD-9-CM: 702.11  7/11/2016 - 9/20/2016        RESOLVED: Cough ICD-10-CM: R05  ICD-9-CM: 786.2  7/11/2016 - 9/20/2016        RESOLVED: ADD (attention deficit disorder) (Chronic) ICD-10-CM: F98.8  ICD-9-CM: 314.00  10/23/2015 - 10/13/2017        RESOLVED: Shoulder pain ICD-10-CM: M25.519  ICD-9-CM: 719.41  10/23/2015 - 2/27/2017            Plan:           Left BG Hemorrhage with intraventricular extension due to HTN emergency; resultant R hemiplegia, right neglect, dysarthria, aphasia, dysphagia with significant decline in mobility and self care  Plan:   Continue daily physician medical management:  Pneumonia prophylaxis- Incentive spirometer every hour while awake. Will need instruction and assistance. Not sure if she can get a proper oral seal to be effective  -10/9 no pulmonary issues      Dysphagia; Pureed diet with NTL; at risk for malnutrition and dehydration. PICC line removed prior to transfer; may need a line for IVFs if BUN continues to increase. Low K, Ca; check mg. Supplementation as needed. Na elevated  -mag ok, K much improved; cont to supplement while on diuretic  -9/19 replace K; 3.0; 2.8 this am 9/20; inc supplement; may need to add to IVFs  -9/25 fluid status and K nl; 9/28 cont NTL; high risk aspiration; still needs supervision /assist with meals  -10/2 MBS today; thin liq!!  -10/9 po intake, especially fluids, has improved; feeding self      Thrombocytopenia; has been trending down for no apparent reason. hgb slt down as well. Has not been on Hep products; consult hematology today 9/19  -plts 40K, continues to trend down 9/20; await consult by hematology. ? reln to [address]  Mild anemia as well  Check HIT panel, d dimer , fibrinogen  -9/21 plts 36k ; HIT PROFILE POSITIVE, D DIMER ELEVATED 32.79, FIBRINOGEN  ; consistent with DIC; Etiology unclear  WILL D/W HEMATOLOGY; LFTs ok, no hx of blood transfusion,no hx pancreatitis, no sign of bacteremia. Has not been on any heparin products. Can see in head injury; has ICH. Cannot r/o blood disorder (leukemia)   per hematology \"Recommend transfusing platelets for <17,106 and Cryoprecipitate for fibrinogen < 100. \"  -MARK pending  -9/22 bilateral LE venous duplex ordered; low suspicion but at risk and having calf tenderness  -9/22 plts improved today; monitor closely; 9/23 small right peroneal thrombus. No antic per hematology. IR refused/advised against placing IVF due to low risk of clot propagation given size and location  -9/23 counts bumping up; pts now 53k from 45 and previously 36  -9/24 plts cont to improve. 69K. MARK still pending; can resume therapies  9/26 Ddimer pending, fibrinogen now nl  -9/27 D dimer 11+, improved. Per Hematology; likely due to consumption coagulopathy. Will monitor  -10/2 labs have improved; MARK + 68% confirming dx of HIT; 10/4 recheck cbc (last done 9/28)    -10/5 plts 263; 10/9 plts 283      Bilateral PEs 9/25, filter placed in IR; looks good today. sats good without O2 supplement 94-97% sats; d/w Hematology, no agaratroban  -apprec Pulm assessment. Clinically looks good and no longer symptomatic  -9/28 clinically stable, asymptomatic ; 10/3 stable. No sob, no O2 supplements; 10/9 remains asymptomatic      Hypernatremia; last head CT did not show significant cerebral edema. Clinically improving. Likely due to prerenal azotemia; 9/15 MAY REQ isotonic / hypotonic saline IV;  -9/18 events of weekend noted;  Na 156; asymptomatic, on 0.5 dextrose with 1/4 Na; na q 6hrs; pts serum osmolality was normal. Will check urine Na and urine osmolality; depending on findings, will consider consulting Hospitalist vs Nephrology  Neuro improving despite this  -9/19 Na down to 149, urine osmo nl, serum osmo min hi; cont dex/and 1/4 NS; repeat in am. Not DI as uop not increased  -9/20 Na 147; cont fluids; 9/21 not resulted; will stop fluids when Na below 142  -Na 140 9/22; dc IVFs; 9/25 136; 9/27 136; recheck 10/4 134; mildly low; f/u 10/9 pending  -10/17 nl      ICH/IVH; 9/22 clinically improves daily. F/u HCT yest due to transient left eye visual disturbance. Overall, resolving hemorrhage. There is more pronounced edema; expected. Ventricular size now nl    Insomnia; 10/18 had dc'd Trazadone and changed to restoril, added requip for restless leg syndrome. Overall improved but too drowsy this a.m. Also receiving zanaflex. Her tone and restless leg improved. Will dec Restoril due to a.m sleepiness      Anemia ; 9.6-10/ stable; recheck 10/9 10.5 improved       DVT risk / DVT Prophylaxis- Will require daily physician exam to assess for signs and symptoms as patient is at increased risk for of thromboembolism. Mobilization as tolerated. Intermittent pneumatic compression devices when in bed Thigh-high or knee-high thromboembolic deterrent hose when out of bed. NO  anticoag due to ICH/HIT; has right small right peroneal vein; 9/25 spoke with IR again today; more risk of putting in IVC filter than benefits especially since she is asymptomatic with no swelling or tenderness and small size of clot  -10/3 no edema, right calf soft. No SOB  -10/9 no calf swelling or tenderness  -10/17 platelets normal     Pain Control: stable, mild-to-moderate joint symptoms intermittently, reasonably well controlled by PRN meds. Will require regular pain assessment and comprenhensive pain management,       Wound Care: Monitor wound status daily per staff and physician. At risk for failure. Will require 24/7 rehab nursing. None needed at this time      Hypertension - BP uncontrolled, fluctuating, managed medically. Add prn hydralazine for sbp> 180. Goal SBP is 140-160 given ICH.  Cont Normodyne, norvasc and hyzaar; consider Verapamil or scheduled Hydralazine  -9/18 BP increased 169/95; add hydralazine 25 tid  -9/20 BP much improved  -9/22 /74; 9/24 bp stable; may dec hydralazine to bid  -9/25 dec hydralazine  -9/27 bp 116-119  -9/28 SBP in the 90s to 116, asymptomatic; dc hydralazine and monitor; decrease Normodyne to bid  -9/29 BP still low; lower Norvasc and normodyne, cont Hyzaar; all have parameters of when to hold  - 9/30 SBP overnight and this am 108-111, will d/c norvasc, on labetolol and losartan/HCTZ , HR acceptable  -10/2 115/71; controlled. Cont current meds  -130-151/77; 10/5 118/77; 10/6 BP stable; goal 120-130 given ICH  -10/9 148/81; add low dose Norvasc; ideally want less than 130; 10/11 130/73  10/13 remains better controlled  -10/17 /75      Mild hyperglycemia, likely stress induced vs borderline diabetes; monitor loosely  -9/27 bs 145 on BMP; follow bs qam x 3d; no issues      Spasticity; had added zanaflex; 10/12 adjust for desired effect; change to 2mg tid and 4mg qhs; AMBULATED YESTERDAY AND ADVANCED OWN LEG!!!!!!  -10/13 ? Component of RLS at noc; consider trial of Requip  -10/14 increased trazadone, slept better. Add low dose requip at noc  -10/15 ongoing c/o nonspecific insomnia; will try Restoril instead of Trazadone and inc Requip  -10/17 good response to med changes      Dysphagia; cont NTL, mech soft; hopefully can upgrade liquids soon 9/25  -9/29 remains on NTL. MBS scheduled for 10/2.  -10/3 now on THINS! !      Leukocytosis; recheck in a.m. Check UA due to stafford. No pulmonary signs (had neg CXR today), no s/s of infxn at old PICC site, no wounds, afebrile. Watch monocytes. -9/18 12.2 improving. Urine neg  -9/19 up to 13.2, ? Reactive. No source of infxn identified. Afebrile  -9/20 WBC now normal      Urinary retention/ neurogenic bladder - start flomax but continue stafford until mobility improves a bit. Strict I/o's  -9/20 keeping stafford to monitor UOP until Na normalizes.  No significant output to suggest DI  -9/21 dc stafford; cont Flomax, follow bladder scan  9/22 voiding without issue; incontinence but no retention  -9/28 new onset urinary retention; check UA  -9/29 UA neg but cx saying > 100K gram neg cocci; will need to f/u ID/sens; Start Cipro 500 bid  - 9/30 preliminary urine cx >110K mixed skin denis  -10/2 stop Cipro; cx nl denis  -10/6 recurrent urinary retention. No flomax; re check UA, consider urecholine. CIC for PVRs  -10/9 dc scans; 10/13 per nursing intermittent need for CIC      bowel program - add prn meds; incontinent, want to keep stool on the firmer side. Monitor skin.       GERD - add a PPI. At times may need additional antacids, Maalox prn.      -continues to benefit from and is showing improvements in a multidisc team approach. Participating well. 10/5 progress slow; 10/6 OT is seeing improvements. Starting to get movement back into the right arm. Awareness improving  -10/6 add ritalin for attn and distractibility   -10/9 family reports increased attn with them; await therapy reports   -10/13 continues to benefit from [facility]   =====================================================    9/21   Addendum; in therapy this afternoon, transient left eye blindness. Will f/u head CT for extension of or new ICH, especially with low plts   Results  1. Interval decrease in size of intraventricular hemorrhage within the left  basal ganglia/thalamus with near complete resolution of intraventricular  hemorrhage. Surrounding edema is more pronounced with slight increase in  left-to-right midline shift. 2. Interval decrease in ventricular size, approaching normal in caliber.           - FAMILY [id number] REGARDING PROGRESS, NEEDS, DC PLAN; family overwhelmed with decision that need to be made. Family training Monday in case insurance denies as of 10/10          10/10 excellent progress made. Surprising to PT.  Definitely have made a step forward and would benefit from ongoing acute inpt rehab to maximize recovery      -10/13 continues to show progress in PT, continues with OT and ST; speech improvements noted  10/17 continues to participate well and benefit from inpt rehab              Time spent was 25 minutes with over 1/2 in direct patient care/examination, consultation and coordination of care.      Signed By: Stephany Diaz MD     October 18, 2017

## 2017-10-19 PROCEDURE — 97112 NEUROMUSCULAR REEDUCATION: CPT

## 2017-10-19 PROCEDURE — 97116 GAIT TRAINING THERAPY: CPT

## 2017-10-19 PROCEDURE — 74011250637 HC RX REV CODE- 250/637: Performed by: PHYSICAL MEDICINE & REHABILITATION

## 2017-10-19 PROCEDURE — 97530 THERAPEUTIC ACTIVITIES: CPT

## 2017-10-19 PROCEDURE — 65310000000 HC RM PRIVATE REHAB

## 2017-10-19 PROCEDURE — 99232 SBSQ HOSP IP/OBS MODERATE 35: CPT | Performed by: PHYSICAL MEDICINE & REHABILITATION

## 2017-10-19 PROCEDURE — 97532 HC OT COGNITIVE SKILL DEV 15 MIN: CPT

## 2017-10-19 PROCEDURE — 97110 THERAPEUTIC EXERCISES: CPT

## 2017-10-19 PROCEDURE — 92507 TX SP LANG VOICE COMM INDIV: CPT

## 2017-10-19 PROCEDURE — 97535 SELF CARE MNGMENT TRAINING: CPT

## 2017-10-19 RX ADMIN — STANDARDIZED SENNA CONCENTRATE AND DOCUSATE SODIUM 1 TABLET: 8.6; 5 TABLET, FILM COATED ORAL at 08:23

## 2017-10-19 RX ADMIN — ROPINIROLE HYDROCHLORIDE 2 MG: 2 TABLET, FILM COATED ORAL at 20:31

## 2017-10-19 RX ADMIN — TAMSULOSIN HYDROCHLORIDE 0.4 MG: 0.4 CAPSULE ORAL at 20:31

## 2017-10-19 RX ADMIN — HYDROCHLOROTHIAZIDE: 12.5 CAPSULE ORAL at 08:23

## 2017-10-19 RX ADMIN — AMLODIPINE BESYLATE 2.5 MG: 5 TABLET ORAL at 08:23

## 2017-10-19 RX ADMIN — TIZANIDINE 2 MG: 2 TABLET ORAL at 15:16

## 2017-10-19 RX ADMIN — LABETALOL HYDROCHLORIDE 100 MG: 100 TABLET, FILM COATED ORAL at 17:26

## 2017-10-19 RX ADMIN — LABETALOL HYDROCHLORIDE 100 MG: 100 TABLET, FILM COATED ORAL at 08:24

## 2017-10-19 RX ADMIN — TIZANIDINE 4 MG: 2 TABLET ORAL at 20:31

## 2017-10-19 RX ADMIN — METHYLPHENIDATE HYDROCHLORIDE 5 MG: 10 TABLET ORAL at 08:22

## 2017-10-19 RX ADMIN — POTASSIUM CHLORIDE 20 MEQ: 20 TABLET, EXTENDED RELEASE ORAL at 08:24

## 2017-10-19 RX ADMIN — METHYLPHENIDATE HYDROCHLORIDE 5 MG: 10 TABLET ORAL at 17:30

## 2017-10-19 RX ADMIN — TIZANIDINE 2 MG: 2 TABLET ORAL at 06:22

## 2017-10-19 NOTE — PROGRESS NOTES
PHYSICAL THERAPY DAILY NOTE  Time In: 7866  Time Out: 0250  Patient Seen For: PM;Therapeutic exercise;Gait training;Transfer training    Subjective: Pt was asking to come to therapy after lunch. Said she was glad we went to do home visit. Objective: Other (comment) (falls)  GROSS ASSESSMENT Daily Assessment            BED/MAT MOBILITY Daily Assessment           TRANSFERS Daily Assessment   Pt requires therapist for hand support when performing SPT Transfer Type: SPT without device  Transfer Assistance : 4 (Minimal assistance)  Sit to Stand Assistance: Stand-by assistance  Car Transfers: Not tested       GAIT Daily Assessment   Instructed in gait training with cindy walker for 2x30 ft, then progressed to wide base quad cane for 1x30 ft. Pt able to advance RLE most of the time; however, therapist assistance needed to stabilize RLE during stance to limit hyperextension. Ace wrap used on RLE to position knee in flexion and to provide dorsiflexion. Amount of Assistance: 3 (Moderate assistance)  Distance (ft): 30 Feet (ft) (3x30 ft)  Assistive Device: Walker cindy;Gait belt;Brace/Splint;Cane, quad (ace wrap to RLE)       STEPS or STAIRS Daily Assessment            BALANCE Daily Assessment            WHEELCHAIR MOBILITY Daily Assessment    Wheelchair Management: Manages left brake;Manages right brake       LOWER EXTREMITY EXERCISES Daily Assessment   Emphasis on symmetry training Extremity: Both  Exercise Type #1: Other (comment) (motomed)  Sets Performed: 1  Reps Performed: 10 (minutes)  Level of Assist: Supervision          Assessment: Pt showed improved ability to advance RLE during gait training. No regression in gait pattern noted when using quad cane instead of cindy walker. Returned pt to room and transferred to bed with call bell and needs in reach. Plan of Care: Continue with PT POC for strengthening and gait training.     Angelique Gray  10/19/2017

## 2017-10-19 NOTE — PROGRESS NOTES
OT WEEKLY PROGRESS NOTE    Time In: 4619  Time Out: 5148    COMPREHENSION MODE Initial Assessment Weekly Progress Assessment 10/19/2017   Score 5 (basic needs 90% of the time) 4     EXPRESSION Initial Assessment Weekly Progress Assessment 10/19/2017   Primary Mode of Expression Verbal Verbal   Score 3 6   Comments Expresses only basic needs 75-89%, repeats words, Mild difficulty     SOCIAL INTERACTION/ PRAGMATICS Initial Assessment Weekly Progress Assessment 10/19/2017   Score 4 5   Comments tearful parts of session, able to self correct Needs supervision only under stressful or unfamiliar conditions, interacts appropriately 90% of the time, needs monitoring or encouragement for participation or interaction. PROBLEM SOLVING Initial Assessment Weekly Progress Assessment 10/19/2017   Score 4 4   Comments basic problem 80% of the time Solves basic problems 75-89% of the time. MEMORY Initial Assessment Weekly Progress Assessment 10/19/2017   Score 4 4   Comments  75-89% of the time 2 steps of 3 step request Recognizes, recalls, or executes 75-89% of the time 2 steps of 3 step request.     EATING Initial Assessment Weekly Progress Assessment 10/19/2017   Functional Level 7 7   Comments I I     GROOMING Initial Assessment Weekly Progress Assessment 10/19/2017   Functional Level 5 7   Tasks completed by patient Brushed hair Brushed hair   Comments Set-up I     BATHING Initial Assessment Weekly Progress Assessment 10/19/2017   Functional Level 4 4   Body parts patient bathed Abdomen, Arm, left, Arm, right, Buttocks, Chest, Lower leg and foot, left, Lower leg and foot, right, Tali area, Thigh, left, Thigh, right Abdomen;Arm, left;Arm, right;Buttocks; Chest;Lower leg and foot, left; Lower leg and foot, right;Tali area; Thigh, left; Thigh, right   Comments A to stand; cueing for all parts A in standing; 1 LOB     UPPER BODY DRESSING/UNDRESSING Initial Assessment Weekly Progress Assessment 10/19/2017   Functional Level 4 4   Items applied/Steps completed Pullover (4 steps) Pullover (4 steps); Bra (3 steps)   Comments A for threading RUE A to adjust bra and push shirt over R shoulder     LOWER BODY DRESSING/UNDRESSING Initial Assessment Weekly Progress Assessment 10/19/2017   Functional Level 1 4   Items applied/Steps completed Sock, left (1 step), Sock, right (1 step), Shoe, left (1 step), Shoe, right (1 step), Fasten shoe (1 step), Elastic waist pants (3 steps), Underpants (3 steps) Sock, left (1 step); Sock, right (1 step); Underpants (3 steps); Elastic waist pants (3 steps)   Comments A for threading RLE; R sock and shoes, tying shoes, A in standing; A adjusting underwear CGA     Plan of Care: Please see Care Plan for updated LTGs. Family Training:  Completed, more needed    Summary of Progress: Pt is making significant gains in bathing, dressing, toileting, and balance allowing for gains in self-care and mobility improving her ability to return home. Pt demonstrates decreased balance, safety awareness, cognition, and RUE ROM which would benefit from skilled intervention at this time. Summary of Session: Pt was in bed and mod I for supine to sit. Pt's performance with ADL is reflected in above chart. Pt was left at EOB eating breakfast.     Continue with POC.      Ronal KNIGHT/MITCHELL  10/19/2017

## 2017-10-19 NOTE — PROGRESS NOTES
Betzy Serrano MD,   Medical Director  [address] [facility], [address] [facility]  Tel: [phone]       [facility] PROGRESS NOTE    Mable Russ  Admit Date: 9/15/2017  Admit Diagnosis: stroke, left brain involvement;ICH (intracerebral hemorrhag*    Subjective     Doing well. No complaints. CVA education discussed. Slept well    Objective:     Current Facility-Administered Medications   Medication Dose Route Frequency    temazepam (RESTORIL) capsule 15 mg  15 mg Oral QHS PRN    rOPINIRole (REQUIP) tablet 2 mg  2 mg Oral QHS    tiZANidine (ZANAFLEX) tablet 2 mg  2 mg Oral BID    tiZANidine (ZANAFLEX) tablet 4 mg  4 mg Oral QHS    amLODIPine (NORVASC) tablet 2.5 mg  2.5 mg Oral DAILY    potassium chloride (K-DUR, KLOR-CON) SR tablet 20 mEq  20 mEq Oral DAILY    senna-docusate (PERICOLACE) 8.6-50 mg per tablet 1 Tab  1 Tab Oral DAILY    polyethylene glycol (MIRALAX) packet 17 g  17 g Oral DAILY    methylphenidate HCl (RITALIN) tablet 5 mg  5 mg Oral BID    labetalol (NORMODYNE) tablet 100 mg  100 mg Oral BID    losartan/hydroCHLOROthiazide (HYZAAR) 100/12.5 mg   Oral DAILY    acetaminophen (TYLENOL) tablet 500 mg  500 mg Per NG tube Q4H PRN    alum-mag hydroxide-simeth (MYLANTA) oral suspension 30 mL  30 mL Oral Q4H PRN    bisacodyl (DULCOLAX) suppository 10 mg  10 mg Rectal DAILY PRN    hydrALAZINE (APRESOLINE) tablet 50 mg  50 mg Oral QID PRN    lip protectant (BLISTEX) ointment   Topical PRN    ondansetron (ZOFRAN ODT) tablet 4 mg  4 mg Oral Q6H PRN    sodium phosphate (FLEET'S) enema 118 mL  1 Enema Rectal PRN    traMADol (ULTRAM) tablet 50 mg  50 mg Oral Q6H PRN    tamsulosin (FLOMAX) capsule 0.4 mg  0.4 mg Oral QHS     Review of Systems:Denies chest pain, shortness of breath, cough, headache, visual problems, abdominal pain, dysurea, calf pain. Pertinent positives are as noted in the medical records and unremarkable otherwise.      Visit Vitals    /67    Pulse 91    Temp 98 °F (36.7 °C)    Resp 14    SpO2 99%        Physical Exam:   General: Alert and age appropriately oriented. No acute cardio respiratory distress. HEENT: Normocephalic,no scleral icterus  Oral mucosa moist without cyanosis, right facial weakness   Lungs: Clear to auscultation  bilaterally. Respiration even and unlabored   Heart: Regular rate and rhythm, S1, S2   No  murmurs, clicks, rub or gallops   Abdomen: Soft, non-tender, nondistended. Bowel sounds present. No organomegaly. Genitourinary: Benign . Neuromuscular:      RUE shoulder retraction and protraction, abd 2+; o/w , triceps, wrist all 0/5  RLE 4, foot drop noted. Exp aphasia continues to improve   Skin/extremity: No rashes, no erythema.  No calf tenderness BLE  No edema                                                                            Functional Assessment:  Gross Assessment  AROM:  (LLE WFL, RLE AAROM WFL except DF -10) (10/13/17 1300)  PROM:  (WFL except right ankle PF contracture) (10/13/17 1300)  Strength:  (LLE WFL, RLE) (10/13/17 1300)  Coordination:  (LLE intact, RLE impaired) (10/13/17 1300)  Tone:  (extensor spasticity) (10/13/17 1300)  Sensation: Impaired (RLE to light touch and proprioception) (10/13/17 1300)       Balance  Sitting - Static: Good (unsupported) (10/18/17 1600)  Sitting - Dynamic: Fair (occasional) (10/18/17 1600)  Standing - Static: Not tested (10/18/17 1600)  Standing - Dynamic : Impaired (10/18/17 1100)           Toileting  Adaptive Equipment: Elevated seat;Walker (09/29/17 1534)         Chan Ivory Fall Risk Assessment:  Chan Ivory Fall Risk  Mobility: Ambulates or transfers with assist devices or assistance/unsteady gait (10/19/17 0734)  Mobility Interventions: Patient to call before getting OOB (10/19/17 0734)  Mentation: Alert, oriented x 3 (10/19/17 0734)  Mentation Interventions: More frequent rounding (10/19/17 0734)  Medication: Patient receiving anticonvulsants, sedatives(tranquilizers), psychotropics or hypnotics, hypoglycemics, narcotics, sleep aids, antihypertensives, laxatives, or diuretics (10/19/17 0734)  Medication Interventions: Patient to call before getting OOB (10/19/17 0734)  Elimination: Needs assistance with toileting (10/19/17 0734)  Elimination Interventions: Call light in reach (10/19/17 0734)  Prior Fall History: No (10/19/17 0734)  History of Falls Interventions: Door open when patient unattended (10/17/17 2121)  Total Score: 3 (10/19/17 0734)  Standard Fall Precautions: Yes (10/15/17 1126)  High Fall Risk: Yes (10/19/17 0734)     Speech Assessment:         Ambulation:  Gait  Base of Support: Narrowed; Center of gravity altered (09/22/17 1200)  Speed/Lulu: Delayed (09/22/17 1200)  Step Length: Right shortened (09/22/17 1200)  Distance (ft): 0 Feet (ft) (10/18/17 1600)  Assistive Device: Walker cindy;Other (comment) (left hand rail, ace wrap for right DF assist and knee contro) (10/18/17 1100)     Labs/Studies:  No results found for this or any previous visit (from the past 72 hour(s)).     Assessment:     Problem List as of 10/19/2017  Date Reviewed: 9/12/2017          Codes Class Noted - Resolved    Thrombocytopenia ([facility] ICD-10-CM: D69.6  ICD-9-CM: 287.5  9/25/2017 - Present        Pulmonary emboli (HCC) ICD-10-CM: I26.99  ICD-9-CM: 415.19  9/25/2017 - Present        DVT (deep venous thrombosis) ([facility] ICD-10-CM: I82.409  ICD-9-CM: 453.40  9/25/2017 - Present        * (Principal)ICH (intracerebral hemorrhage) ([facility] ICD-10-CM: I61.9  ICD-9-CM: 636  9/15/2017 - Present        Hypertensive urgency, malignant ICD-10-CM: I16.0  ICD-9-CM: 401.0  9/11/2017 - Present        Stroke, hemorrhagic ([facility] ICD-10-CM: I61.9  ICD-9-CM: 470  9/7/2017 - Present        Accelerated hypertension ICD-10-CM: I10  ICD-9-CM: 401.0  9/7/2017 - Present        At risk for aspiration (Chronic) ICD-10-CM: W24.29  ICD-9-CM: V49.89  9/7/2017 - Present        Acute spontaneous intraventricular hemorrhage assoc w/ hypertension (HCC) ICD-10-CM: I61.5, I10  ICD-9-CM: 616, 401.9  9/7/2017 - Present        Screening for breast cancer ICD-10-CM: Z12.31  ICD-9-CM: V76.10  2/27/2017 - Present        Atrophic vaginitis ICD-10-CM: N95.2  ICD-9-CM: 627.3  7/11/2016 - Present        HTN (hypertension) (Chronic) ICD-10-CM: I10  ICD-9-CM: 401.9  10/23/2015 - Present        Depression ICD-10-CM: F32.9  ICD-9-CM: 458  10/23/2015 - Present        Anxiety (Chronic) ICD-10-CM: F41.9  ICD-9-CM: 300.00  10/23/2015 - Present        RESOLVED: Hypoxemia ICD-10-CM: R09.02  ICD-9-CM: 799.02  9/10/2017 - 9/12/2017        RESOLVED: Acute respiratory failure (HCC) ICD-10-CM: J96.00  ICD-9-CM: 518.81  9/8/2017 - 9/10/2017        RESOLVED: Hemorrhagic stroke ([facility] ICD-10-CM: I61.9  ICD-9-CM: 153  9/7/2017 - 9/7/2017        RESOLVED: Non-intractable vomiting with nausea ICD-10-CM: R11.2  ICD-9-CM: 787.01  9/7/2017 - 9/12/2017        RESOLVED: Seborrheic keratosis, inflamed ICD-10-CM: L82.0  ICD-9-CM: 702.11  7/11/2016 - 9/20/2016        RESOLVED: Cough ICD-10-CM: R05  ICD-9-CM: 786.2  7/11/2016 - 9/20/2016        RESOLVED: ADD (attention deficit disorder) (Chronic) ICD-10-CM: F98.8  ICD-9-CM: 314.00  10/23/2015 - 10/13/2017        RESOLVED: Shoulder pain ICD-10-CM: M25.519  ICD-9-CM: 719.41  10/23/2015 - 2/27/2017                     Plan:            Left BG Hemorrhage with intraventricular extension due to HTN emergency; resultant R hemiplegia, right neglect, dysarthria, aphasia, dysphagia with significant decline in mobility and self care  Plan:   Continue daily physician medical management:  Pneumonia prophylaxis- Incentive spirometer every hour while awake. Will need instruction and assistance. Not sure if she can get a proper oral seal to be effective  -10/9 no pulmonary issues      Dysphagia; Pureed diet with NTL; at risk for malnutrition and dehydration.  PICC line removed prior to transfer; may need a line for IVFs if BUN continues to increase. Low K, Ca; check mg. Supplementation as needed. Na elevated  -mag ok, K much improved; cont to supplement while on diuretic  -9/19 replace K; 3.0; 2.8 this am 9/20; inc supplement; may need to add to IVFs  -9/25 fluid status and K nl; 9/28 cont NTL; high risk aspiration; still needs supervision /assist with meals  -10/2 MBS today; thin liq!!  -10/9 po intake, especially fluids, has improved; feeding self      Thrombocytopenia; has been trending down for no apparent reason. hgb slt down as well. Has not been on Hep products; consult hematology today 9/19  -plts 40K, continues to trend down 9/20; await consult by hematology. ? reln to [address]. Mild anemia as well  Check HIT panel, d dimer , fibrinogen  -9/21 plts 36k ; HIT PROFILE POSITIVE, D DIMER ELEVATED 32.79, FIBRINOGEN  ; consistent with DIC; Etiology unclear  WILL D/W HEMATOLOGY; LFTs ok, no hx of blood transfusion,no hx pancreatitis, no sign of bacteremia. Has not been on any heparin products. Can see in head injury; has ICH. Cannot r/o blood disorder (leukemia)   per hematology \"Recommend transfusing platelets for <57,362 and Cryoprecipitate for fibrinogen < 100. \"  -MARK pending  -9/22 bilateral LE venous duplex ordered; low suspicion but at risk and having calf tenderness  -9/22 plts improved today; monitor closely; 9/23 small right peroneal thrombus. No antic per hematology. IR refused/advised against placing IVF due to low risk of clot propagation given size and location  -9/23 counts bumping up; pts now 53k from 45 and previously 36  -9/24 plts cont to improve. 69K. MARK still pending; can resume therapies  9/26 Ddimer pending, fibrinogen now nl  -9/27 D dimer 11+, improved. Per Hematology; likely due to consumption coagulopathy.  Will monitor  -10/2 labs have improved; MARK + 68% confirming dx of HIT; 10/4 recheck cbc (last done 9/28)    -10/5 plts 263; 10/9 plts 283      Bilateral PEs 9/25, filter placed in IR; looks good today. sats good without O2 supplement 94-97% sats; d/w Hematology, no agaratroban  -apprec Pulm assessment. Clinically looks good and no longer symptomatic  -9/28 clinically stable, asymptomatic ; 10/3 stable. No sob, no O2 supplements; 10/9 remains asymptomatic      Hypernatremia; last head CT did not show significant cerebral edema. Clinically improving. Likely due to prerenal azotemia; 9/15 MAY REQ isotonic / hypotonic saline IV;  -9/18 events of weekend noted; Na 156; asymptomatic, on 0.5 dextrose with 1/4 Na; na q 6hrs; pts serum osmolality was normal. Will check urine Na and urine osmolality; depending on findings, will consider consulting Hospitalist vs Nephrology  Neuro improving despite this  -9/19 Na down to 149, urine osmo nl, serum osmo min hi; cont dex/and 1/4 NS; repeat in am. Not DI as uop not increased  -9/20 Na 147; cont fluids; 9/21 not resulted; will stop fluids when Na below 142  -Na 140 9/22; dc IVFs; 9/25 136; 9/27 136; recheck 10/4 134; mildly low; f/u 10/9 pending  -10/17 nl      ICH/IVH; 9/22 clinically improves daily. F/u HCT yest due to transient left eye visual disturbance. Overall, resolving hemorrhage. There is more pronounced edema; expected. Ventricular size now nl     Insomnia; 10/18 had dc'd Trazadone and changed to restoril, added requip for restless leg syndrome. Overall improved but too drowsy this a.m. Also receiving zanaflex. Her tone and restless leg improved. Will dec Restoril due to a.m sleepiness      Anemia ; 9.6-10/ stable; recheck 10/9 10.5 improved       DVT risk / DVT Prophylaxis- Will require daily physician exam to assess for signs and symptoms as patient is at increased risk for of thromboembolism. Mobilization as tolerated. Intermittent pneumatic compression devices when in bed Thigh-high or knee-high thromboembolic deterrent hose when out of bed.  NO  anticoag due to ICH/HIT; has right small right peroneal vein; 9/25 spoke with IR again today; more risk of putting in IVC filter than benefits especially since she is asymptomatic with no swelling or tenderness and small size of clot  -10/3 no edema, right calf soft. No SOB  -10/9 no calf swelling or tenderness  -10/17 platelets normal      Pain Control: stable, mild-to-moderate joint symptoms intermittently, reasonably well controlled by PRN meds. Will require regular pain assessment and comprenhensive pain management,       Wound Care: Monitor wound status daily per staff and physician. At risk for failure. Will require 24/7 rehab nursing. None needed at this time      Hypertension - BP uncontrolled, fluctuating, managed medically. Add prn hydralazine for sbp> 180. Goal SBP is 140-160 given ICH. Cont Normodyne, norvasc and hyzaar; consider Verapamil or scheduled Hydralazine  -9/18 BP increased 169/95; add hydralazine 25 tid  -9/20 BP much improved  -9/22 /74; 9/24 bp stable; may dec hydralazine to bid  -9/25 dec hydralazine  -9/27 bp 116-119  -9/28 SBP in the 90s to 116, asymptomatic; dc hydralazine and monitor; decrease Normodyne to bid  -9/29 BP still low; lower Norvasc and normodyne, cont Hyzaar; all have parameters of when to hold  - 9/30 SBP overnight and this am 108-111, will d/c norvasc, on labetolol and losartan/HCTZ , HR acceptable  -10/2 115/71; controlled. Cont current meds  -130-151/77; 10/5 118/77; 10/6 BP stable; goal 120-130 given ICH  -10/9 148/81; add low dose Norvasc; ideally want less than 130; 10/11 130/73  10/13 remains better controlled  -10/17 /75      Mild hyperglycemia, likely stress induced vs borderline diabetes; monitor loosely  -9/27 bs 145 on BMP; follow bs qam x 3d; no issues      Spasticity; had added zanaflex; 10/12 adjust for desired effect; change to 2mg tid and 4mg qhs; AMBULATED YESTERDAY AND ADVANCED OWN LEG!!!!!!  -10/13 ? Component of RLS at noc; consider trial of Requip  -10/14 increased trazadone, slept better.  Add low dose requip at noc  -10/15 ongoing c/o nonspecific insomnia; will try Restoril instead of Trazadone and inc Requip  -10/17 good response to med changes      Dysphagia; cont NTL, mech soft; hopefully can upgrade liquids soon 9/25  -9/29 remains on NTL. MBS scheduled for 10/2.  -10/3 now on THINS! !      Leukocytosis; recheck in a.m. Check UA due to stafford. No pulmonary signs (had neg CXR today), no s/s of infxn at old PICC site, no wounds, afebrile. Watch monocytes. -9/18 12.2 improving. Urine neg  -9/19 up to 13.2, ? Reactive. No source of infxn identified. Afebrile  -9/20 WBC now normal      Urinary retention/ neurogenic bladder - start flomax but continue stafford until mobility improves a bit. Strict I/o's  -9/20 keeping stafford to monitor UOP until Na normalizes. No significant output to suggest DI  -9/21 dc stafford; cont Flomax, follow bladder scan  9/22 voiding without issue; incontinence but no retention  -9/28 new onset urinary retention; check UA  -9/29 UA neg but cx saying > 100K gram neg cocci; will need to f/u ID/sens; Start Cipro 500 bid  - 9/30 preliminary urine cx >110K mixed skin denis  -10/2 stop Cipro; cx nl denis  -10/6 recurrent urinary retention. No flomax; re check UA, consider urecholine. CIC for PVRs  -10/9 dc scans; 10/13 per nursing intermittent need for CIC      bowel program - add prn meds; incontinent, want to keep stool on the firmer side. Monitor skin.       GERD - add a PPI. At times may need additional antacids, Maalox prn.      -continues to benefit from and is showing improvements in a multidisc team approach. Participating well. 10/5 progress slow; 10/6 OT is seeing improvements. Starting to get movement back into the right arm.  Awareness improving  -10/6 add ritalin for attn and distractibility   -10/9 family reports increased attn with them; await therapy reports   -10/13 continues to benefit from [facility]   =====================================================    9/21   Addendum; in therapy this afternoon, transient left eye blindness. Will f/u head CT for extension of or new ICH, especially with low plts   Results  1. Interval decrease in size of intraventricular hemorrhage within the left  basal ganglia/thalamus with near complete resolution of intraventricular  hemorrhage. Surrounding edema is more pronounced with slight increase in  left-to-right midline shift. 2. Interval decrease in ventricular size, approaching normal in caliber.           - FAMILY UUXIATFFHL85/5 REGARDING PROGRESS, NEEDS, DC PLAN; family overwhelmed with decision that need to be made. Family training Monday in case insurance denies as of 10/10          10/10 excellent progress made. Surprising to PT. Definitely have made a step forward and would benefit from ongoing acute inpt rehab to maximize recovery      -10/13 continues to show progress in PT, continues with OT and ST; speech improvements noted  10/17 continues to participate well and benefit from inpt rehab             Time spent was 25 minutes with over 1/2 in direct patient care/examination, consultation and coordination of care.      Signed By: Aby Vasquez MD     October 19, 2017

## 2017-10-19 NOTE — PROGRESS NOTES
OT Daily Note  Time In 1114   Time Out 1200   Precautions: Aspen collar, C-spine precautions, Falls, confusion  Home: Lives alone with tenant in basement, family regularly checks-in, paid caregiver for part of day     Pain: Pt c/o shoulder pain, but did not require intervention. Functional Mobility   Pt was a LPT with board x2. Pt was dependent for sit to supine. Cognition   Engaged in 3 step sequencing and pt completed 1/5 correctly. When pt was directed towards correct answers pt would just giggle and talk around issue. Pt reported there could be more than one answer. Pt was very tangential throughout session. Neuro-Muscular Re-Education   Pt sat at EOB with CGA/SBA. Pt demonstrated increased balance. Pt has a tight spot on back that kinesotape was applied to for relief. Education   Importance of gabe lift     Interdisciplinary Communication: Team conference. Plan: Continue with POC. Pt was left in bed with all needs within reach.      Stevie Severs OTR/L  10/19/2017

## 2017-10-19 NOTE — PROGRESS NOTES
OT Daily Note  Time In 1330   Time Out 1345     Met with pt's , Adell Holstein, for a home eval. Recommended grab bar by the toilet and two in the shower as well as a shower chair. Recommended removing shower door and putting in a curtain. Adell Holstein was receptive to all suggestions. Pt should be able to have good accessibility to the home and in the kitchen. Set up a family training for Monday at 7 for the dressing component.      Lilian Silva OTR/L  10/19/2017

## 2017-10-19 NOTE — PROGRESS NOTES
PHYSICAL THERAPY DAILY NOTE  Time In: 3664  Time Out: 0915  Patient Seen For: AM;Therapeutic exercise;Transfer training    Subjective: Pt had no complaints of pain during treatment today. Objective: Other (comment) (falls)  GROSS ASSESSMENT Daily Assessment            BED/MAT MOBILITY Daily Assessment   Pt required therapist assistance to position RLE and held on to therapist to pull herself up. Supine to Sit : 4 (Minimal assistance)  Sit to Supine : 5 (Supervision)       TRANSFERS Daily Assessment   Pt able to stand with CGA, but required min assist from therapist to perform SPT. Transfer Type: SPT without device  Transfer Assistance : 4 (Minimal assistance)  Sit to Stand Assistance: Contact guard assistance  Car Transfers: Not tested       GAIT Daily Assessment            STEPS or STAIRS Daily Assessment            BALANCE Daily Assessment            WHEELCHAIR MOBILITY Daily Assessment            LOWER EXTREMITY EXERCISES Daily Assessment   Ex #1: supine single knee to chest (hip flexion), SAQ, hip abd/adduction, and bridging. 2x15 each activity    Ex #2: Stretching to R heel cords (gastroc and soleus) and hamstring to improve ROM for ambulation. Extremity: Right  Exercise Type #1: Supine lower extremity strengthening  Sets Performed: 2  Reps Performed: 15  Level of Assist: Minimal assistance  Exercise Type #2: Other (comment) (RLE stretching)  Sets Performed: 3  Reps Performed: 30 (seconds holding)  Level of Assist: Total assistance          Assessment: Pt showed AROM in all exercises, but required therapist to perform PROM for SAQ, hip flexion, and hip abd/adduction. Heel cords still tight, but improved motion seen with stretching. Still unable to achieve 0 degrees dorsiflexion. Pt taken to OT immediately after treatment. Plan of Care: Continue with PT POC and progress as tolerated.     Angelique Gray  10/19/2017

## 2017-10-19 NOTE — PROGRESS NOTES
OT Daily Note  Time In 0915   Time Out 1000     Pain: Patient had no complaint of pain. Functional Mobility   Pt transferred with CGA throughout session. Neuro-Muscular Re-Education   Engaged in SROM program and reiterated how often pt should complete program. Pt demonstrated fair quality. Completed AAROM for RUE on a ball in standing. Pt had poor standing activity tolerance with activity. Pt is demonstrated increased movement. Pt engaged in sitting on a physio ball to promote weightshifting to RLE to improve ability to complete LB dressing. Rolley Sic was utilized to A with balance. Pt demonstrated good quality of movement, but had spasms on RLE. Self-Care   Pt completed make-up with S.      Education   Importance of WB. Interdisciplinary Communication: MICHAEL Leonard on performance. Plan: Continue with POC. Pt was left in bed with all needs within reach.      Stevie Severs OTR/L  10/19/2017

## 2017-10-19 NOTE — PROGRESS NOTES
10/19/17 1215   Time Spent With Patient   Time In 1134   Time Out 1200   Patient Seen For: AM;Verbal activities; Neuro-linguistics   Mental Status   Neurologic State Alert   Orientation Level Disoriented to place;Oriented to person;Oriented to time;Oriented to situation   Cognition Memory loss; Impaired decision making   Perception Appears intact   Perseveration Perseverates during conversation   Safety/Judgement Fall prevention   Pt seen in room, sitting up on side of bed. Pt completed word finding and STM tasks  Pt needed min cues to complete word finding tasks with 90% accuracy. Pt needed mod cues to complete STM tasks with 70% accuracy. Continue with plan of care.    Michelle Lucas MA/ANASTASIA/SLP

## 2017-10-20 PROCEDURE — 92507 TX SP LANG VOICE COMM INDIV: CPT

## 2017-10-20 PROCEDURE — 97535 SELF CARE MNGMENT TRAINING: CPT

## 2017-10-20 PROCEDURE — 97150 GROUP THERAPEUTIC PROCEDURES: CPT

## 2017-10-20 PROCEDURE — 65310000000 HC RM PRIVATE REHAB

## 2017-10-20 PROCEDURE — 74011250637 HC RX REV CODE- 250/637: Performed by: PHYSICAL MEDICINE & REHABILITATION

## 2017-10-20 PROCEDURE — 97530 THERAPEUTIC ACTIVITIES: CPT

## 2017-10-20 PROCEDURE — 97110 THERAPEUTIC EXERCISES: CPT

## 2017-10-20 PROCEDURE — 97112 NEUROMUSCULAR REEDUCATION: CPT

## 2017-10-20 PROCEDURE — 99232 SBSQ HOSP IP/OBS MODERATE 35: CPT | Performed by: PHYSICAL MEDICINE & REHABILITATION

## 2017-10-20 PROCEDURE — 97116 GAIT TRAINING THERAPY: CPT

## 2017-10-20 RX ADMIN — TIZANIDINE 2 MG: 2 TABLET ORAL at 15:29

## 2017-10-20 RX ADMIN — TIZANIDINE 2 MG: 2 TABLET ORAL at 05:54

## 2017-10-20 RX ADMIN — LABETALOL HYDROCHLORIDE 100 MG: 100 TABLET, FILM COATED ORAL at 17:30

## 2017-10-20 RX ADMIN — METHYLPHENIDATE HYDROCHLORIDE 5 MG: 10 TABLET ORAL at 15:29

## 2017-10-20 RX ADMIN — POTASSIUM CHLORIDE 20 MEQ: 20 TABLET, EXTENDED RELEASE ORAL at 08:17

## 2017-10-20 RX ADMIN — ROPINIROLE HYDROCHLORIDE 2 MG: 2 TABLET, FILM COATED ORAL at 20:42

## 2017-10-20 RX ADMIN — LABETALOL HYDROCHLORIDE 100 MG: 100 TABLET, FILM COATED ORAL at 08:17

## 2017-10-20 RX ADMIN — TEMAZEPAM 15 MG: 15 CAPSULE ORAL at 00:11

## 2017-10-20 RX ADMIN — AMLODIPINE BESYLATE 2.5 MG: 5 TABLET ORAL at 08:17

## 2017-10-20 RX ADMIN — STANDARDIZED SENNA CONCENTRATE AND DOCUSATE SODIUM 1 TABLET: 8.6; 5 TABLET, FILM COATED ORAL at 08:17

## 2017-10-20 RX ADMIN — HYDROCHLOROTHIAZIDE: 12.5 CAPSULE ORAL at 08:17

## 2017-10-20 RX ADMIN — METHYLPHENIDATE HYDROCHLORIDE 5 MG: 10 TABLET ORAL at 08:17

## 2017-10-20 RX ADMIN — TAMSULOSIN HYDROCHLORIDE 0.4 MG: 0.4 CAPSULE ORAL at 20:43

## 2017-10-20 RX ADMIN — TEMAZEPAM 15 MG: 15 CAPSULE ORAL at 23:28

## 2017-10-20 RX ADMIN — TIZANIDINE 4 MG: 2 TABLET ORAL at 20:43

## 2017-10-20 NOTE — PROGRESS NOTES
PHYSICAL THERAPY DAILY NOTE  Time In: 1435  Time Out: 4146  Patient Seen For: PM;Balance activities; Family training;Gait training;Patient education; Therapeutic exercise;Transfer training; Other (see progress notes)    Subjective: Patient reporting she wants to walk again like she did this AM. Family asking questions about how to help patient with LE HEP         Objective:Vital Signs:  Patient Vitals for the past 12 hrs:   Temp Pulse Resp BP SpO2   10/20/17 1624 98.1 °F (36.7 °C) 90 16 135/87 97 %   10/20/17 0801 98.3 °F (36.8 °C) 94 16 123/81 95 %     Pain level:No c/o pain  Pain location:NA  Pain interventions:NA    Patient education:Bed mobility training,transfer training, gait training, fall precautions, activity pacing, body mechanics, w/c mobility and parts management, fanmily training as noted below. Patient verbalizing understanding and demonstrating partial understanding of patient education. Recommend follow up education. Interdisciplinary Communication:NA    Other (comment) (falls)  GROSS ASSESSMENT Daily Assessment    NA       BED/MAT MOBILITY Daily Assessment   Increased time and effort to complete with cues for body mechanics   Rolling Right : 6 (Modified independent)  Rolling Left : 5 (Supervision) (cues to attend to RUE and RLE)  Supine to Sit : 5 (Stand-by assistance)  Sit to Supine : 6 (Modified independent) (LLE assisting RLE)       TRANSFERS Daily Assessment   Increased time and effort to complete with cues for body mechanics   Transfer Type: SPT without device (to the left w/c<>mat and w/c to chair)  Transfer Assistance : 5 (Stand-by assistance) (cues to attend to RLE positioning)  Sit to Stand Assistance: Stand-by assistance (cues for body mechanics)  Car Transfers: Not tested       GAIT Daily Assessment    Amount of Assistance: 3 (Moderate assistance)  Distance (ft): 100 Feet (ft)  Assistive Device: Cane, quad;Gait belt; Other (comment) (ace wrap to control right ankle inversion,ace wrap for DF )   Gait training facilitating slow start/stop step to hemiplegic gait pattern leading with RLE with min assist to advance RLE and mod assist to inhibit right knee hyperextension. STEPS or STAIRS Daily Assessment    Steps/Stairs Ambulated (#): 0  Level of Assist : 0 (Not tested)       BALANCE Daily Assessment   Static standing with quad cane facilitating upright weight bearing with symmetrical weight bearing with cues and CGA Sitting - Static: Good (unsupported)  Sitting - Dynamic: Fair (occasional)  Standing - Static: Fair  Standing - Dynamic : Impaired (with quad cane)       WHEELCHAIR MOBILITY Daily Assessment   Cues to attend to objects on the right Able to Propel (ft): 150 feet (using LUE and LLE)  Functional Level: 5  Wheelchair Setup Assist Required : 4 (Minimal assistance) (with right leg rest)  Wheelchair Management: Manages left brake;Manages right brake       LOWER EXTREMITY EXERCISES Daily Assessment   Instructed patient's family members on how to assist patient LE HEP . Extremity: Right  Exercise Type #1: Supine lower extremity strengthening  Sets Performed: 3  Reps Performed: 10  Level of Assist: Moderate assistance  Exercise Type #2: Other (comment) (passive right heel cord stretch 10 sec x 20 reps)  Level of Assist: Total assistance          Assessment: Family demonstrating good understanding of how to assist patient with LE HEP. Patient return to room at end of treatment and remained up in chair with needs in reach. Posey alarm on. Knee immobilizer on  LE. Plan of Care: Continue with POC and progress as tolerated.      Leila Morris, PT  10/20/2017

## 2017-10-20 NOTE — PROGRESS NOTES
Pt resting quietly in bed. Assisted to get into [facility] and to [facility]. Alert and oriented x4. Lungs sounds clear bilaterally with respirations even and unlabored. Bowel sounds active in all quadrants. +2 pulses bilaterally in upper and lower extremities. Right side flaccid. Instructed to call for assistance. Call light in reach. Voiced understanding and identified call light.

## 2017-10-20 NOTE — PROGRESS NOTES
PT WEEKLY PROGRESS NOTE   Time In: 7469   Time Out: 0918    Subjective: patient reporting she enjoys walking. Reports she felt better about her walking today. Reports she hopes her AFO gets her soon so she can walk better         Objective: Vital Signs:  Patient Vitals for the past 12 hrs:   Temp Pulse Resp BP SpO2   10/20/17 0801 98.3 °F (36.8 °C) 94 16 123/81 95 %     Pain level:No c/o pain  Pain location:NA  Pain interventions:NA    Patient education:Bed mobility training,transfer training, gait training, fall precautions, activity pacing, body mechanics, w/c mobility and parts management, Patient verbalizing understanding and demonstrating partial understanding of patient education. Recommend follow up education. Interdisciplinary Communication:spoke with OT regarding progress towards goals and functional level    Other (comment) (falls)    Outcome Measures:     FIM SCORES Initial Assessment Weekly Progress Assessment 10/20/2017   Bed/Chair/Wheelchair Transfers 2 5   Wheelchair Mobility  (Unable to assess due to appropriate size w/c not available) 5   Walking [place]  (NT) 2   Steps/Stairs  (NT) NT   Please see C Interdisciplinary Eval: Coordination/Balance Section for details regarding FIM score description.     BED/CHAIR/WHEELCHAIR TRANSFERS Initial Assessment Weekly Progress Assessment 10/20/2017   Rolling Right 3 (Moderate assistance ) 6 (Modified independent)   Rolling Left 3 (Moderate assistance ) 5 (Supervision) (cues to attend to RUE and RLE)   Supine to Sit 3 (Moderate assistance) 5 (Stand-by assistance)   Sit to Stand Moderate assistance Stand-by assistance (cues for right foot positioning and weight bearing through R)   Sit to Supine 3 (Moderate assistance) 6 (Modified independent) (using LLE to assist RLE)   Transfer Type SPT without device (to the left  recliner to w/c and w/c to bed) SPT without device (bed<>w/c and w/c<>mat level surfaces to her left)   Comments Patient demonstrating right neglect with motor planning deficits during bed mobility and transfers. Blocking right knee to keep knee from buckling   Increased time and effort to complete bed mobility with cues for hemiplegic body mechanics, cues for right foot placement during SPT     Car Transfer Not tested Not tested   Car Type         GROSS ASSESSMENT Weekly Progress Assessment 10/20/2017   AROM  (RLE AAROM WFL except PF contracture of -10)   Strength LLE WFL, RLE hip flex 2/5, hip ABD -2/5, ADD +2/5, knee ext +4/5 influenced by extensor spasticity, knee flex 1/5, PF +2/5, DF 0/5   Coordination  (LLE intact, RLE impaired)   Tone  (extensor spasticity)   Sensation Impaired (RLE)   PROM  (WFL except right ankle PF contracture)     POSTURE Weekly Progress Assessment 10/20/2017   Posture (WDL) Exceptions to WDL   Posture Assessment Forward head;Genu recurvatum right;Rounded shoulders (weight shift to LLE)     WHEELCHAIR MOBILITY/MANAGEMENT Initial Assessment Weekly Progress Assessment 10/20/2017   Able to Propel 0 feet 150 feet (using LUE and LLE)   Curbs/ramps assistance required 0 (Not tested)  NT   Wheelchair set up assistance required 2 (Maximal assistance)  Min assist with right leg rest   Wheelchair management Manages left brake Manages left brake;Manages right brake     WALKING INDEPENDENCE Initial Assessment Weekly Progress Assessment 10/20/2017   Assistive device Gait belt, Walker cindy, Brace/Splint, Other (comment) (used ace wrap to knee and ankle for DF. and sling for right UE) Walker cindy;Gait belt; Other (comment) (ace wrap to control right ankle inversion,ace wrap for DF )   Ambulation assistance - level surface 0 (Not tested)  min assist to advance RLE and mod assist to inhibit right knee hyperextension   Distance 0 Feet (ft) 100 Feet (ft)   Comments Unable to ambulate at this time due to extent of right hemiparesis and balance deficits      GAIT Weekly Progress Assessment 10/20/2017   Gait Description (WDL)  slow start/stop step to hemiplegic gait pattern leading with RLE and stepping to with LLE   Gait Abnormalities  decreased RLE ankle PF and knee flex at terminal stance, decreased right knee flex and ankle DF at midswing, decreased knee ext and ankle DF at initial contact, hip retraction and knee hyperextension at midstance RLE     STEPS/STAIRS Initial Assessment Weekly Progress Assessment 10/20/2017   Steps/Stairs ambulated 0 0   Rail Use    NA   Comments Unable to ambulate up/down steps at this time due to extent of right hemiparesis and balance deficits  Unable to ambulate up/down steps at this time due to extent of RLE weakness and right heelo cord tightness   Curbs/Ramps 0 (Not tested)       SUPINE EXERCISES Sets Reps Comments   Ankle Pumps 3 10 Passive heel cord stretch x 10 seconds x 10 reps   Heel Slides 3 10 Mod assist   Hip Abduction 3 10 With skate   Short Arc Quad 3 10 Min assist   Bridging 3 10 Min assist            Assessment: Patient cont to make good progress towards goals. Patient has met bed mobility and transfer revised LTGs. Patient has progress to a SBA to supervision level with bed mobility and SBA with transfers to her left. Patient has met w/c mobility goal and is able to progress wheelchair 150ft with supervision using her LUE and LLE, cues to attend to objects on her right. RLE cont to slowly improve and gait potential to cont to improve. Patient seen by orthotist for casting for custom AFO for RLE. Should see a significant improvement in ability to advance RLE and inhibition of right knee hyperextension once patient receives custom AFO. Should receive AFO for assessment next week with plan to advance gait with custom AFO. Patient has potential to become a household ambulator and able to ambulate with assistance from family. Recommend cont inpatient rehab with plan to reassess next week. Patient returned to room at end of treatment. Patient supine in bed with head of bed elevated and bed rails up x 2. Needs placed in reach of patient. Plan of Care: Patient seen for weekly assessment. Goals updated and revised. Please see Care Plan for updated LTGs.     Family Training: Needs to be scheduled    Darrion Leone, PT  10/20/2017

## 2017-10-20 NOTE — PROGRESS NOTES
OT Daily Note    Time In 1346   Time Out 1430     Subjective:\"I need to work this arm. \" Agreeable to therapy. Pain:Denied during session. Education: On arm stretches, including wrist extension. Interdisciplinary Communication: Collaborated with [facility] and patient is making progress towards goals. Precautions:  (falls)    Balance/functional mobility Daily Assessment   Stood 1 time for comfort ~1 minutes with CGA. SPT from bed to w/c with minimal assist.       Strengthening/activity tolerance Daily Assessment   Table slides first with pillow case than with arm skate, focus on elbow extension/flexion and retraction/protraction/horizontal adduction. PROM completed in scapular all motions. Self ROM completed in shoulder flexion and wrist extension. AROM in UE flexor pattern with additon of some anterior deltoid.          Ended session:In w/c in therapy gym with PT, [address] OTR/L

## 2017-10-20 NOTE — PROGRESS NOTES
10/20/17 1011   Time Spent With Patient   Time In 0935   Time Out 1010   Patient Seen For: AM;Neuro-linguistics   Team Conference   Team Conference Date 10/19/17   Family/Caregiver Training   Family Training Needs to be scheduled   Mental Status   Neurologic State Alert   Orientation Level Oriented X4   Cognition Memory loss; Impaired decision making   Perception Appears intact   Perseveration No perseveration noted   Safety/Judgement Fall prevention   Comprehension (Native Language)   Primary Mode of Comprehension Auditory   Score 4   Expression (Native Language)   Primary Mode of Expression Verbal   Score 5   Social Interaction/Pragmatics   Score 6   Problem Solving   Score 4   Memory   Score 4   Pt making progress toward her goals, however, would benefit from continued ST services to address cognitive deficits in [facility] and word finding. Pt completed STM tasks from [facility] 10: Pt recalled 12/16 words after using the strategy of breaking words into categories. Pt has increased difficulties with recall when there are distractions (cleaning lady, talking outside of her door and morning prayer)  7/16. Pt was able to recall 6/7 of familiar pictures without distraction. Pt recalled unfamiliar words 4/4 x 3 with distraction.     Magy Fuller MA/CCC/SLP

## 2017-10-20 NOTE — PROGRESS NOTES
10/20/17 0817   Time Spent With Patient   Time In 0700   Time Out 0746   Patient Seen For: AM;ADLs   Feeding/Eating   Feeding/Eating Assistance S   Grooming   Grooming Assistance  Min A   Upper Body Bathing   Bathing Assistance, Upper S   Position Performed Seated in chair   Adaptive Equipment Tub bench;Grab bar   Comments greatly improved sitting balance and right side attention   Lower Body Bathing   Bathing Assistance, Lower  CGA   Position Performed Standing   Comments good reach towards BLE. Only CGA in stance for safety. Upper Body Dressing    Dressing Assistance  Min A   Comments minimal assist as shirt and bra got stuck on right shoulder, good hemitechnique. Lower Body Dressing    Dressing Assistance  Min A   Comments Assist with shoes due to time. Patient able to cross RLE onver left with left arm to be able to thread right leg first.    Functional Transfers   Tub or Shower Type Shower   Amount of Assistance Required Min A   Adaptive Equipment Grab bars; Wheelchair;Tub transfer bench     S: \"I would like to shower. \" Agreeable to therapy. Focus of session was on morning ADL routine. Patient was able to SPT with minimal assist, stood for dressing with CGA. Pain denied during session. Collaborated with PT, Brenton Oliveros and confirmed patient is on track to reach goals as documented in the care plan. Patient tolerated session well, but strength, balance, activity tolerance, ROM are still below baseline and requires skilled facilitation to successfully and safely complete ADL's and transfers. Patient ended session at edge of bed with call remote, breakfast, and phone within reach.      Jurgen Conteh, KAYODER

## 2017-10-20 NOTE — PROGRESS NOTES
Rosemarie Galicia MD,   Medical Director  [address] [facility], [address] [facility]  Tel: [phone]       D PROGRESS NOTE    Kate Elliott  Admit Date: 9/15/2017  Admit Diagnosis: stroke, left brain involvement;ICH (intracerebral hemorrhag*    Subjective     Doing well. No cp, sob, n/v. Slept well. Good spirits    Objective:     Current Facility-Administered Medications   Medication Dose Route Frequency    temazepam (RESTORIL) capsule 15 mg  15 mg Oral QHS PRN    rOPINIRole (REQUIP) tablet 2 mg  2 mg Oral QHS    tiZANidine (ZANAFLEX) tablet 2 mg  2 mg Oral BID    tiZANidine (ZANAFLEX) tablet 4 mg  4 mg Oral QHS    amLODIPine (NORVASC) tablet 2.5 mg  2.5 mg Oral DAILY    potassium chloride (K-DUR, KLOR-CON) SR tablet 20 mEq  20 mEq Oral DAILY    senna-docusate (PERICOLACE) 8.6-50 mg per tablet 1 Tab  1 Tab Oral DAILY    polyethylene glycol (MIRALAX) packet 17 g  17 g Oral DAILY    methylphenidate HCl (RITALIN) tablet 5 mg  5 mg Oral BID    labetalol (NORMODYNE) tablet 100 mg  100 mg Oral BID    losartan/hydroCHLOROthiazide (HYZAAR) 100/12.5 mg   Oral DAILY    acetaminophen (TYLENOL) tablet 500 mg  500 mg Per NG tube Q4H PRN    alum-mag hydroxide-simeth (MYLANTA) oral suspension 30 mL  30 mL Oral Q4H PRN    bisacodyl (DULCOLAX) suppository 10 mg  10 mg Rectal DAILY PRN    hydrALAZINE (APRESOLINE) tablet 50 mg  50 mg Oral QID PRN    lip protectant (BLISTEX) ointment   Topical PRN    ondansetron (ZOFRAN ODT) tablet 4 mg  4 mg Oral Q6H PRN    sodium phosphate (FLEET'S) enema 118 mL  1 Enema Rectal PRN    traMADol (ULTRAM) tablet 50 mg  50 mg Oral Q6H PRN    tamsulosin (FLOMAX) capsule 0.4 mg  0.4 mg Oral QHS     Review of Systems:Denies chest pain, shortness of breath, cough, headache, visual problems, abdominal pain, dysurea, calf pain. Pertinent positives are as noted in the medical records and unremarkable otherwise.      Visit Vitals    BP 123/81    Pulse 94    Temp 98.3 °F (36.8 °C)    Resp 16    SpO2 95%        Physical Exam:   General: Alert and age appropriately oriented. No acute cardio respiratory distress. HEENT: Normocephalic,no scleral icterus  Oral mucosa moist without cyanosis; right facial weakness   Lungs: Clear to auscultation  bilaterally. Respiration even and unlabored   Heart: Regular rate and rhythm, S1, S2   No  murmurs, clicks, rub or gallops   Abdomen: Soft, non-tender, nondistended. Bowel sounds present. No organomegaly. Genitourinary: Benign . Neuromuscular:      RUE shoulder retraction and protraction, abd 2+; o/w , triceps, wrist all 0/5  RLE 4- hip flexion, 3+ KE, foot drop noted. Exp aphasia continues to improvel; noted plantar flexion and eversion right foot. Skin/extremity: No rashes, no erythema.  No calf tenderness BLE  No edema                                                                            Functional Assessment:  Gross Assessment  AROM:  (RLE AAROM WFL except PF contracture of -10) (10/20/17 0900)  PROM:  (WFL except right ankle PF contracture) (10/20/17 0900)  Strength:  (LLE WFL, RLE) (10/20/17 0900)  Coordination:  (LLE intact, RLE impaired) (10/20/17 0900)  Tone:  (extensor spasticity) (10/20/17 0900)  Sensation: Impaired (RLE) (10/20/17 0900)       Balance  Sitting - Static: Good (unsupported) (10/20/17 0900)  Sitting - Dynamic: Fair (occasional) (10/20/17 0900)  Standing - Static: Fair (with quad cane) (10/20/17 0900)  Standing - Dynamic : Impaired (10/20/17 0900)           Toileting  Adaptive Equipment: Elevated seat;Walker (09/29/17 2304)         Elly Yeager Fall Risk Assessment:  Elly Yeager Fall Risk  Mobility: Ambulates or transfers with assist devices or assistance/unsteady gait (10/20/17 0904)  Mobility Interventions: Bed/chair exit alarm;Communicate number of staff needed for ambulation/transfer (10/20/17 0904)  Mentation: Alert, oriented x 3 (10/20/17 0904)  Mentation Interventions: Door open when patient unattended;Bed/chair exit alarm (10/20/17 0553)  Medication: Patient receiving anticonvulsants, sedatives(tranquilizers), psychotropics or hypnotics, hypoglycemics, narcotics, sleep aids, antihypertensives, laxatives, or diuretics (10/20/17 0904)  Medication Interventions: Bed/chair exit alarm (10/20/17 0904)  Elimination: Needs assistance with toileting (10/20/17 0904)  Elimination Interventions: Call light in reach;Bed/chair exit alarm (10/20/17 0904)  Prior Fall History: No (10/20/17 0904)  History of Falls Interventions: Door open when patient unattended (10/20/17 0904)  Total Score: 3 (10/20/17 0904)  Standard Fall Precautions: Yes (10/15/17 1126)  High Fall Risk: Yes (10/19/17 2030)     Speech Assessment:         Ambulation:  Gait  Base of Support: Narrowed; Center of gravity altered (09/22/17 1200)  Speed/Lulu: Delayed (09/22/17 1200)  Step Length: Right shortened (09/22/17 1200)  Distance (ft): 100 Feet (ft) (10/20/17 0900)  Assistive Device: Walker cindy;Gait belt; Other (comment) (ace wrap to control right ankle inversion,ace wrap for DF ) (10/20/17 0900)     Labs/Studies:  No results found for this or any previous visit (from the past 72 hour(s)).     Assessment:     Problem List as of 10/20/2017  Date Reviewed: 9/12/2017          Codes Class Noted - Resolved    Thrombocytopenia ([facility] ICD-10-CM: D69.6  ICD-9-CM: 287.5  9/25/2017 - Present        Pulmonary emboli ([facility]) ICD-10-CM: I26.99  ICD-9-CM: 415.19  9/25/2017 - Present        DVT (deep venous thrombosis) ([facility] ICD-10-CM: I82.409  ICD-9-CM: 453.40  9/25/2017 - Present        * (Principal)ICH (intracerebral hemorrhage) ([facility] ICD-10-CM: I61.9  ICD-9-CM: 507  9/15/2017 - Present        Hypertensive urgency, malignant ICD-10-CM: I16.0  ICD-9-CM: 401.0  9/11/2017 - Present        Stroke, hemorrhagic ([facility] ICD-10-CM: I61.9  ICD-9-CM: 446  9/7/2017 - Present        Accelerated hypertension ICD-10-CM: I10  ICD-9-CM: 401.0  9/7/2017 - Present        At risk for aspiration (Chronic) ICD-10-CM: Z91.89  ICD-9-CM: V49.89  9/7/2017 - Present        Acute spontaneous intraventricular hemorrhage assoc w/ hypertension ([facility] ICD-10-CM: I61.5, I10  ICD-9-CM: 027, 401.9  9/7/2017 - Present        Screening for breast cancer ICD-10-CM: Z12.31  ICD-9-CM: V76.10  2/27/2017 - Present        Atrophic vaginitis ICD-10-CM: N95.2  ICD-9-CM: 627.3  7/11/2016 - Present        HTN (hypertension) (Chronic) ICD-10-CM: I10  ICD-9-CM: 401.9  10/23/2015 - Present        Depression ICD-10-CM: F32.9  ICD-9-CM: 692  10/23/2015 - Present        Anxiety (Chronic) ICD-10-CM: F41.9  ICD-9-CM: 300.00  10/23/2015 - Present        RESOLVED: Hypoxemia ICD-10-CM: R09.02  ICD-9-CM: 799.02  9/10/2017 - 9/12/2017        RESOLVED: Acute respiratory failure ([facility] ICD-10-CM: J96.00  ICD-9-CM: 518.81  9/8/2017 - 9/10/2017        RESOLVED: Hemorrhagic stroke ([facility] ICD-10-CM: I61.9  ICD-9-CM: 233  9/7/2017 - 9/7/2017        RESOLVED: Non-intractable vomiting with nausea ICD-10-CM: R11.2  ICD-9-CM: 787.01  9/7/2017 - 9/12/2017        RESOLVED: Seborrheic keratosis, inflamed ICD-10-CM: L82.0  ICD-9-CM: 702.11  7/11/2016 - 9/20/2016        RESOLVED: Cough ICD-10-CM: R05  ICD-9-CM: 786.2  7/11/2016 - 9/20/2016        RESOLVED: ADD (attention deficit disorder) (Chronic) ICD-10-CM: F98.8  ICD-9-CM: 314.00  10/23/2015 - 10/13/2017        RESOLVED: Shoulder pain ICD-10-CM: M25.519  ICD-9-CM: 719.41  10/23/2015 - 2/27/2017              Plan:         Left BG Hemorrhage with intraventricular extension due to HTN emergency; resultant R hemiplegia, right neglect, dysarthria, aphasia, dysphagia with significant decline in mobility and self care  Plan:   Continue daily physician medical management:  Pneumonia prophylaxis- Incentive spirometer every hour while awake. Will need instruction and assistance.  Not sure if she can get a proper oral seal to be effective  -10/9 no pulmonary issues      Dysphagia; Pureed diet with NTL; at risk for malnutrition and dehydration. PICC line removed prior to transfer; may need a line for IVFs if BUN continues to increase. Low K, Ca; check mg. Supplementation as needed. Na elevated  -mag ok, K much improved; cont to supplement while on diuretic  -9/19 replace K; 3.0; 2.8 this am 9/20; inc supplement; may need to add to IVFs  -9/25 fluid status and K nl; 9/28 cont NTL; high risk aspiration; still needs supervision /assist with meals  -10/2 MBS today; thin liq!!  -10/9 po intake, especially fluids, has improved; feeding self      Thrombocytopenia; has been trending down for no apparent reason. hgb slt down as well. Has not been on Hep products; consult hematology today 9/19  -plts 40K, continues to trend down 9/20; await consult by hematology. ? reln to [address]. Mild anemia as well  Check HIT panel, d dimer , fibrinogen  -9/21 plts 36k ; HIT PROFILE POSITIVE, D DIMER ELEVATED 32.79, FIBRINOGEN  ; consistent with DIC; Etiology unclear  WILL D/W HEMATOLOGY; LFTs ok, no hx of blood transfusion,no hx pancreatitis, no sign of bacteremia. Has not been on any heparin products. Can see in head injury; has ICH. Cannot r/o blood disorder (leukemia)   per hematology \"Recommend transfusing platelets for <21,059 and Cryoprecipitate for fibrinogen < 100. \"  -MARK pending  -9/22 bilateral LE venous duplex ordered; low suspicion but at risk and having calf tenderness  -9/22 plts improved today; monitor closely; 9/23 small right peroneal thrombus. No antic per hematology. IR refused/advised against placing IVF due to low risk of clot propagation given size and location  -9/23 counts bumping up; pts now 53k from 45 and previously 36  -9/24 plts cont to improve. 69K. MARK still pending; can resume therapies  9/26 Ddimer pending, fibrinogen now nl  -9/27 D dimer 11+, improved. Per Hematology; likely due to consumption coagulopathy.  Will monitor  -10/2 labs have improved; MARK + 68% confirming dx of HIT; 10/4 recheck cbc (last done 9/28)    -10/5 plts 263; 10/9 plts 283      Bilateral PEs 9/25, filter placed in IR; looks good today. sats good without O2 supplement 94-97% sats; d/w Hematology, no agaratroban  -apprec Pulm assessment. Clinically looks good and no longer symptomatic  -9/28 clinically stable, asymptomatic ; 10/3 stable. No sob, no O2 supplements; 10/9 remains asymptomatic      Hypernatremia; last head CT did not show significant cerebral edema. Clinically improving. Likely due to prerenal azotemia; 9/15 MAY REQ isotonic / hypotonic saline IV;  -9/18 events of weekend noted; Na 156; asymptomatic, on 0.5 dextrose with 1/4 Na; na q 6hrs; pts serum osmolality was normal. Will check urine Na and urine osmolality; depending on findings, will consider consulting Hospitalist vs Nephrology  Neuro improving despite this  -9/19 Na down to 149, urine osmo nl, serum osmo min hi; cont dex/and 1/4 NS; repeat in am. Not DI as uop not increased  -9/20 Na 147; cont fluids; 9/21 not resulted; will stop fluids when Na below 142  -Na 140 9/22; dc IVFs; 9/25 136; 9/27 136; recheck 10/4 134; mildly low; f/u 10/9 pending  -10/17 nl      ICH/IVH; 9/22 clinically improves daily. F/u HCT yest due to transient left eye visual disturbance. Overall, resolving hemorrhage. There is more pronounced edema; expected. Ventricular size now nl      Insomnia; 10/18 had dc'd Trazadone and changed to restoril, added requip for restless leg syndrome. Overall improved but too drowsy this a.m. Also receiving zanaflex. Her tone and restless leg improved. Will dec Restoril due to a.m sleepiness      Anemia ; 9.6-10/ stable; recheck 10/9 10.5 improved       DVT risk / DVT Prophylaxis- Will require daily physician exam to assess for signs and symptoms as patient is at increased risk for of thromboembolism. Mobilization as tolerated.  Intermittent pneumatic compression devices when in bed Thigh-high or knee-high thromboembolic deterrent hose when out of bed. NO  anticoag due to ICH/HIT; has right small right peroneal vein; 9/25 spoke with IR again today; more risk of putting in IVC filter than benefits especially since she is asymptomatic with no swelling or tenderness and small size of clot  -10/3 no edema, right calf soft. No SOB  -10/9 no calf swelling or tenderness  -10/17 platelets normal      Pain Control: stable, mild-to-moderate joint symptoms intermittently, reasonably well controlled by PRN meds. Will require regular pain assessment and comprenhensive pain management,       Wound Care: Monitor wound status daily per staff and physician. At risk for failure. Will require 24/7 rehab nursing. None needed at this time      Hypertension - BP uncontrolled, fluctuating, managed medically. Add prn hydralazine for sbp> 180. Goal SBP is 140-160 given ICH. Cont Normodyne, norvasc and hyzaar; consider Verapamil or scheduled Hydralazine  -9/18 BP increased 169/95; add hydralazine 25 tid  -9/20 BP much improved  -9/22 /74; 9/24 bp stable; may dec hydralazine to bid  -9/25 dec hydralazine  -9/27 bp 116-119  -9/28 SBP in the 90s to 116, asymptomatic; dc hydralazine and monitor; decrease Normodyne to bid  -9/29 BP still low; lower Norvasc and normodyne, cont Hyzaar; all have parameters of when to hold  - 9/30 SBP overnight and this am 108-111, will d/c norvasc, on labetolol and losartan/HCTZ , HR acceptable  -10/2 115/71; controlled. Cont current meds  -130-151/77; 10/5 118/77; 10/6 BP stable; goal 120-130 given ICH  -10/9 148/81; add low dose Norvasc; ideally want less than 130; 10/11 130/73  10/13 remains better controlled  -10/17 /75      Mild hyperglycemia, likely stress induced vs borderline diabetes; monitor loosely  -9/27 bs 145 on BMP; follow bs qam x 3d; no issues      Spasticity; had added zanaflex; 10/12 adjust for desired effect; change to 2mg tid and 4mg qhs; AMBULATED YESTERDAY AND ADVANCED OWN LEG!!!!!!  -10/13 ?  Component of RLS at noc; consider trial of Requip  -10/14 increased trazadone, slept better. Add low dose requip at noc  -10/15 ongoing c/o nonspecific insomnia; will try Restoril instead of Trazadone and inc Requip  -10/17 good response to med changes  10/20 laxity with PF bilaterally. 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 Have tried two braces to RLE for dorsiflexion/neutral at ankle. Both have caused skin irritation due to pressure. Pops out of both types. Will need custom fit. Will contact Orthotist      Dysphagia; cont NTL, mech soft; hopefully can upgrade liquids soon 9/25  -9/29 remains on NTL. MBS scheduled for 10/2.  -10/3 now on THINS! !      Leukocytosis; recheck in a.m. Check UA due to stafford. No pulmonary signs (had neg CXR today), no s/s of infxn at old PICC site, no wounds, afebrile. Watch monocytes. -9/18 12.2 improving. Urine neg  -9/19 up to 13.2, ? Reactive. No source of infxn identified. Afebrile  -9/20 WBC now normal      Urinary retention/ neurogenic bladder - start flomax but continue stafford until mobility improves a bit. Strict I/o's  -9/20 keeping stafford to monitor UOP until Na normalizes. No significant output to suggest DI  -9/21 dc stafford; cont Flomax, follow bladder scan  9/22 voiding without issue; incontinence but no retention  -9/28 new onset urinary retention; check UA  -9/29 UA neg but cx saying > 100K gram neg cocci; will need to f/u ID/sens; Start Cipro 500 bid  - 9/30 preliminary urine cx >110K mixed skin denis  -10/2 stop Cipro; cx nl denis  -10/6 recurrent urinary retention. No flomax; re check UA, consider urecholine. CIC for PVRs  -10/9 dc scans; 10/13 per nursing intermittent need for CIC      bowel program - add prn meds; incontinent, want to keep stool on the firmer side. Monitor skin.       GERD - add a PPI. At times may need additional antacids, Maalox prn.         =====================================================    9/21   Addendum; in therapy this afternoon, transient left eye blindness.  Will f/u head CT for extension of or new ICH, especially with low plts   Results  1. Interval decrease in size of intraventricular hemorrhage within the left  basal ganglia/thalamus with near complete resolution of intraventricular  hemorrhage. Surrounding edema is more pronounced with slight increase in  left-to-right midline shift. 2. Interval decrease in ventricular size, approaching normal in caliber.           - FAMILY [id number] REGARDING PROGRESS, NEEDS, DC PLAN; family overwhelmed with decision that need to be made. Family training Monday in case insurance denies as of 10/10          10/10 excellent progress made. Surprising to PT. Definitely have made a step forward and would benefit from ongoing acute inpt rehab to maximize recovery      -10/13 continues to show progress in PT, continues with OT and ST; speech improvements noted  10/17 continues to participate well and benefit from inpt rehab                   Time spent was 25 minutes with over 1/2 in direct patient care/examination, consultation and coordination of care.      Signed By: Hayley Patel MD     October 20, 2017

## 2017-10-21 PROCEDURE — 97112 NEUROMUSCULAR REEDUCATION: CPT

## 2017-10-21 PROCEDURE — 65310000000 HC RM PRIVATE REHAB

## 2017-10-21 PROCEDURE — 74011250637 HC RX REV CODE- 250/637: Performed by: PHYSICAL MEDICINE & REHABILITATION

## 2017-10-21 PROCEDURE — 97530 THERAPEUTIC ACTIVITIES: CPT

## 2017-10-21 RX ORDER — ACETAMINOPHEN 500 MG
500 TABLET ORAL
Status: DISCONTINUED | OUTPATIENT
Start: 2017-10-21 | End: 2017-10-26 | Stop reason: HOSPADM

## 2017-10-21 RX ADMIN — TAMSULOSIN HYDROCHLORIDE 0.4 MG: 0.4 CAPSULE ORAL at 21:01

## 2017-10-21 RX ADMIN — METHYLPHENIDATE HYDROCHLORIDE 5 MG: 10 TABLET ORAL at 17:26

## 2017-10-21 RX ADMIN — AMLODIPINE BESYLATE 2.5 MG: 5 TABLET ORAL at 08:30

## 2017-10-21 RX ADMIN — STANDARDIZED SENNA CONCENTRATE AND DOCUSATE SODIUM 1 TABLET: 8.6; 5 TABLET, FILM COATED ORAL at 08:31

## 2017-10-21 RX ADMIN — TIZANIDINE 2 MG: 2 TABLET ORAL at 14:50

## 2017-10-21 RX ADMIN — ACETAMINOPHEN 500 MG: 500 TABLET, FILM COATED ORAL at 12:25

## 2017-10-21 RX ADMIN — LABETALOL HYDROCHLORIDE 100 MG: 100 TABLET, FILM COATED ORAL at 17:25

## 2017-10-21 RX ADMIN — ROPINIROLE HYDROCHLORIDE 2 MG: 2 TABLET, FILM COATED ORAL at 21:01

## 2017-10-21 RX ADMIN — HYDROCHLOROTHIAZIDE: 12.5 CAPSULE ORAL at 08:31

## 2017-10-21 RX ADMIN — TIZANIDINE 4 MG: 2 TABLET ORAL at 21:01

## 2017-10-21 RX ADMIN — POTASSIUM CHLORIDE 20 MEQ: 20 TABLET, EXTENDED RELEASE ORAL at 08:31

## 2017-10-21 RX ADMIN — LABETALOL HYDROCHLORIDE 100 MG: 100 TABLET, FILM COATED ORAL at 08:31

## 2017-10-21 RX ADMIN — TIZANIDINE 2 MG: 2 TABLET ORAL at 05:17

## 2017-10-21 RX ADMIN — METHYLPHENIDATE HYDROCHLORIDE 5 MG: 10 TABLET ORAL at 08:30

## 2017-10-21 NOTE — PROGRESS NOTES
Problem: Falls - Risk of  Goal: *Absence of Falls  Document Haley Fall Risk and appropriate interventions in the flowsheet.    Outcome: Progressing Towards Goal  Fall Risk Interventions:  Mobility Interventions: Patient to call before getting OOB    Mentation Interventions: Adequate sleep, hydration, pain control, Toileting rounds    Medication Interventions: Bed/chair exit alarm    Elimination Interventions: Call light in reach    History of Falls Interventions: Door open when patient unattended

## 2017-10-21 NOTE — PROGRESS NOTES
Chiara Liu MD,   Medical Director  [address] [facility], [address] [facility]  Tel: [phone]       D PROGRESS NOTE    Mable Russ  Admit Date: 9/15/2017  Admit Diagnosis: stroke, left brain involvement  ICH (intracerebral hemorrhage) ([facility]    Subjective     Doing well. No cp, sob, n/v. Slept well. Good spirits. Patient seen and examined. Reports anal spasms improved with tylenol. Denies rectal bleeding or h/o hemorrhoids. Bowel and flatus sensation. No abdominal pain. Slept well overnight. Participating in therapy.     Objective:     Current Facility-Administered Medications   Medication Dose Route Frequency    acetaminophen (TYLENOL) tablet 500 mg  500 mg Oral Q4H PRN    temazepam (RESTORIL) capsule 15 mg  15 mg Oral QHS PRN    rOPINIRole (REQUIP) tablet 2 mg  2 mg Oral QHS    tiZANidine (ZANAFLEX) tablet 2 mg  2 mg Oral BID    tiZANidine (ZANAFLEX) tablet 4 mg  4 mg Oral QHS    amLODIPine (NORVASC) tablet 2.5 mg  2.5 mg Oral DAILY    potassium chloride (K-DUR, KLOR-CON) SR tablet 20 mEq  20 mEq Oral DAILY    senna-docusate (PERICOLACE) 8.6-50 mg per tablet 1 Tab  1 Tab Oral DAILY    polyethylene glycol (MIRALAX) packet 17 g  17 g Oral DAILY    methylphenidate HCl (RITALIN) tablet 5 mg  5 mg Oral BID    labetalol (NORMODYNE) tablet 100 mg  100 mg Oral BID    losartan/hydroCHLOROthiazide (HYZAAR) 100/12.5 mg   Oral DAILY    alum-mag hydroxide-simeth (MYLANTA) oral suspension 30 mL  30 mL Oral Q4H PRN    bisacodyl (DULCOLAX) suppository 10 mg  10 mg Rectal DAILY PRN    hydrALAZINE (APRESOLINE) tablet 50 mg  50 mg Oral QID PRN    lip protectant (BLISTEX) ointment   Topical PRN    ondansetron (ZOFRAN ODT) tablet 4 mg  4 mg Oral Q6H PRN    sodium phosphate (FLEET'S) enema 118 mL  1 Enema Rectal PRN    traMADol (ULTRAM) tablet 50 mg  50 mg Oral Q6H PRN    tamsulosin (FLOMAX) capsule 0.4 mg  0.4 mg Oral QHS     Review of Systems:Denies chest pain, shortness of breath, cough, headache, visual problems, abdominal pain, dysurea, calf pain. Pertinent positives are as noted in the medical records and unremarkable otherwise. Visit Vitals    /72 (BP 1 Location: Left arm, BP Patient Position: At rest)    Pulse 88    Temp 97.9 °F (36.6 °C)    Resp 18    SpO2 98%        Physical Exam:   General: Alert and age appropriately oriented. No acute cardio respiratory distress. HEENT: Normocephalic,no scleral icterus  Oral mucosa moist without cyanosis; right facial weakness   Lungs: Clear to auscultation  bilaterally. Respiration even and unlabored   Heart: Regular rate and rhythm, S1, S2   No  murmurs, clicks, rub or gallops   Abdomen: Soft, non-tender, nondistended. Bowel sounds present. Genitourinary: Benign . Neuromuscular:      RUE shoulder retraction and protraction, abd 2+; o/w , triceps, wrist all 0/5. RUE - Maggie 1+  RLE 4- hip flexion, 3+ KE, foot drop noted. Exp aphasia continues to improvel; noted plantar flexion and eversion right foot. Skin/extremity: No rashes, no erythema.  No calf tenderness BLE  No edema                                                                            Functional Assessment:  Gross Assessment  AROM:  (RLE AAROM WFL except PF contracture of -10) (10/20/17 0900)  PROM:  (WFL except right ankle PF contracture) (10/20/17 0900)  Strength:  (LLE WFL, RLE) (10/20/17 0900)  Coordination:  (LLE intact, RLE impaired) (10/20/17 0900)  Tone:  (extensor spasticity) (10/20/17 0900)  Sensation: Impaired (RLE) (10/20/17 0900)       Balance  Sitting - Static: Good (unsupported) (10/20/17 1600)  Sitting - Dynamic: Fair (occasional) (10/20/17 1600)  Standing - Static: Fair (10/20/17 1600)  Standing - Dynamic : Impaired (with quad cane) (10/20/17 1600)           Toileting  Adaptive Equipment: Elevated seat;Walker (09/29/17 9684)         Dulce Mars Fall Risk Assessment:  Dulce Mars Fall Risk  Mobility: Ambulates or transfers with assist devices or assistance/unsteady gait (10/21/17 0741)  Mobility Interventions: Patient to call before getting OOB (10/21/17 0741)  Mentation: Alert, oriented x 3 (10/21/17 0741)  Mentation Interventions: Adequate sleep, hydration, pain control; Toileting rounds (10/21/17 0741)  Medication: Patient receiving anticonvulsants, sedatives(tranquilizers), psychotropics or hypnotics, hypoglycemics, narcotics, sleep aids, antihypertensives, laxatives, or diuretics (10/21/17 0741)  Medication Interventions: Bed/chair exit alarm (10/21/17 0741)  Elimination: Needs assistance with toileting (10/21/17 0741)  Elimination Interventions: Call light in reach (10/21/17 0741)  Prior Fall History: No (10/21/17 0741)  History of Falls Interventions: Door open when patient unattended (10/21/17 0741)  Total Score: 3 (10/21/17 0741)  Standard Fall Precautions: Yes (10/15/17 1126)  High Fall Risk: Yes (10/21/17 0216)     Speech Assessment:         Ambulation:  Gait  Base of Support: Narrowed; Center of gravity altered (09/22/17 1200)  Speed/Lulu: Delayed (09/22/17 1200)  Step Length: Right shortened (09/22/17 1200)  Distance (ft): 100 Feet (ft) (10/20/17 1600)  Assistive Device: Cane, quad;Gait belt; Other (comment) (ace wrap to control right ankle inversion,ace wrap for DF ) (10/20/17 1600)     Labs/Studies:  No results found for this or any previous visit (from the past 72 hour(s)).     Assessment:     Problem List as of 10/21/2017  Date Reviewed: 9/12/2017          Codes Class Noted - Resolved    Thrombocytopenia ([facility] ICD-10-CM: D69.6  ICD-9-CM: 287.5  9/25/2017 - Present        Pulmonary emboli (HCC) ICD-10-CM: I26.99  ICD-9-CM: 415.19  9/25/2017 - Present        DVT (deep venous thrombosis) ([facility] ICD-10-CM: I82.409  ICD-9-CM: 453.40  9/25/2017 - Present        * (Principal)ICH (intracerebral hemorrhage) ([facility] ICD-10-CM: I61.9  ICD-9-CM: 853  9/15/2017 - Present        Hypertensive urgency, malignant ICD-10-CM: I16.0  ICD-9-CM: 401.0  9/11/2017 - Present        Stroke, hemorrhagic ([facility] ICD-10-CM: I61.9  ICD-9-CM: 702  9/7/2017 - Present        Accelerated hypertension ICD-10-CM: I10  ICD-9-CM: 401.0  9/7/2017 - Present        At risk for aspiration (Chronic) ICD-10-CM: Z91.89  ICD-9-CM: V49.89  9/7/2017 - Present        Acute spontaneous intraventricular hemorrhage assoc w/ hypertension ([facility] ICD-10-CM: I61.5, I10  ICD-9-CM: 637, 401.9  9/7/2017 - Present        Screening for breast cancer ICD-10-CM: Z12.31  ICD-9-CM: V76.10  2/27/2017 - Present        Atrophic vaginitis ICD-10-CM: N95.2  ICD-9-CM: 627.3  7/11/2016 - Present        HTN (hypertension) (Chronic) ICD-10-CM: I10  ICD-9-CM: 401.9  10/23/2015 - Present        Depression ICD-10-CM: F32.9  ICD-9-CM: 443  10/23/2015 - Present        Anxiety (Chronic) ICD-10-CM: F41.9  ICD-9-CM: 300.00  10/23/2015 - Present        RESOLVED: Hypoxemia ICD-10-CM: R09.02  ICD-9-CM: 799.02  9/10/2017 - 9/12/2017        RESOLVED: Acute respiratory failure ([facility] ICD-10-CM: J96.00  ICD-9-CM: 518.81  9/8/2017 - 9/10/2017        RESOLVED: Hemorrhagic stroke ([facility] ICD-10-CM: I61.9  ICD-9-CM: 557  9/7/2017 - 9/7/2017        RESOLVED: Non-intractable vomiting with nausea ICD-10-CM: R11.2  ICD-9-CM: 787.01  9/7/2017 - 9/12/2017        RESOLVED: Seborrheic keratosis, inflamed ICD-10-CM: L82.0  ICD-9-CM: 702.11  7/11/2016 - 9/20/2016        RESOLVED: Cough ICD-10-CM: R05  ICD-9-CM: 786.2  7/11/2016 - 9/20/2016        RESOLVED: ADD (attention deficit disorder) (Chronic) ICD-10-CM: F98.8  ICD-9-CM: 314.00  10/23/2015 - 10/13/2017        RESOLVED: Shoulder pain ICD-10-CM: M25.519  ICD-9-CM: 719.41  10/23/2015 - 2/27/2017              Plan:         Left BG Hemorrhage with intraventricular extension due to HTN emergency; resultant R hemiplegia, right neglect, dysarthria, aphasia, dysphagia with significant decline in mobility and self care  Plan:   Continue daily physician medical management:  Pneumonia prophylaxis- Incentive spirometer every hour while awake. Will need instruction and assistance. Not sure if she can get a proper oral seal to be effective  -10/9 no pulmonary issues      Dysphagia; Pureed diet with NTL; at risk for malnutrition and dehydration. PICC line removed prior to transfer; may need a line for IVFs if BUN continues to increase. Low K, Ca; check mg. Supplementation as needed. Na elevated  -mag ok, K much improved; cont to supplement while on diuretic  -9/19 replace K; 3.0; 2.8 this am 9/20; inc supplement; may need to add to IVFs  -9/25 fluid status and K nl; 9/28 cont NTL; high risk aspiration; still needs supervision /assist with meals  -10/2 MBS today; thin liq!!  -10/9 po intake, especially fluids, has improved; feeding self      Thrombocytopenia; has been trending down for no apparent reason. hgb slt down as well. Has not been on Hep products; consult hematology today 9/19  -plts 40K, continues to trend down 9/20; await consult by hematology. ? reln to [address]. Mild anemia as well  Check HIT panel, d dimer , fibrinogen  -9/21 plts 36k ; HIT PROFILE POSITIVE, D DIMER ELEVATED 32.79, FIBRINOGEN  ; consistent with DIC; Etiology unclear  WILL D/W HEMATOLOGY; LFTs ok, no hx of blood transfusion,no hx pancreatitis, no sign of bacteremia. Has not been on any heparin products. Can see in head injury; has ICH. Cannot r/o blood disorder (leukemia)   per hematology \"Recommend transfusing platelets for <47,059 and Cryoprecipitate for fibrinogen < 100. \"  -MARK pending  -9/22 bilateral LE venous duplex ordered; low suspicion but at risk and having calf tenderness  -9/22 plts improved today; monitor closely; 9/23 small right peroneal thrombus. No antic per hematology. IR refused/advised against placing IVF due to low risk of clot propagation given size and location  -9/23 counts bumping up; pts now 53k from 45 and previously 36  -9/24 plts cont to improve. 69K.  MARK still pending; can resume therapies  9/26 Ddimer pending, fibrinogen now nl  -9/27 D dimer 11+, improved. Per Hematology; likely due to consumption coagulopathy. Will monitor  -10/2 labs have improved; MARK + 68% confirming dx of HIT; 10/4 recheck cbc (last done 9/28)    -10/5 plts 263; 10/9 plts 283      Bilateral PEs 9/25, filter placed in IR; looks good today. sats good without O2 supplement 94-97% sats; d/w Hematology, no agaratroban  -apprec Pulm assessment. Clinically looks good and no longer symptomatic  -9/28 clinically stable, asymptomatic ; 10/3 stable. No sob, no O2 supplements; 10/9 remains asymptomatic      Hypernatremia; last head CT did not show significant cerebral edema. Clinically improving. Likely due to prerenal azotemia; 9/15 MAY REQ isotonic / hypotonic saline IV;  -9/18 events of weekend noted; Na 156; asymptomatic, on 0.5 dextrose with 1/4 Na; na q 6hrs; pts serum osmolality was normal. Will check urine Na and urine osmolality; depending on findings, will consider consulting Hospitalist vs Nephrology  Neuro improving despite this  -9/19 Na down to 149, urine osmo nl, serum osmo min hi; cont dex/and 1/4 NS; repeat in am. Not DI as uop not increased  -9/20 Na 147; cont fluids; 9/21 not resulted; will stop fluids when Na below 142  -Na 140 9/22; dc IVFs; 9/25 136; 9/27 136; recheck 10/4 134; mildly low; f/u 10/9 pending  -10/17 nl      ICH/IVH; 9/22 clinically improves daily. F/u HCT yest due to transient left eye visual disturbance. Overall, resolving hemorrhage. There is more pronounced edema; expected. Ventricular size now nl      Insomnia; 10/18 had dc'd Trazadone and changed to restoril, added requip for restless leg syndrome. Overall improved but too drowsy this a.m. Also receiving zanaflex. Her tone and restless leg improved.  Will dec Restoril due to a.m sleepiness      Anemia ; 9.6-10/ stable; recheck 10/9 10.5 improved       DVT risk / DVT Prophylaxis- Will require daily physician exam to assess for signs and symptoms as patient is at increased risk for of thromboembolism. Mobilization as tolerated. Intermittent pneumatic compression devices when in bed Thigh-high or knee-high thromboembolic deterrent hose when out of bed. NO  anticoag due to ICH/HIT; has right small right peroneal vein; 9/25 spoke with IR again today; more risk of putting in IVC filter than benefits especially since she is asymptomatic with no swelling or tenderness and small size of clot  -10/3 no edema, right calf soft. No SOB  -10/9 no calf swelling or tenderness  -10/17 platelets normal      Pain Control: stable, mild-to-moderate joint symptoms intermittently, reasonably well controlled by PRN meds. Will require regular pain assessment and comprenhensive pain management,       Wound Care: Monitor wound status daily per staff and physician. At risk for failure. Will require 24/7 rehab nursing. None needed at this time      Hypertension - BP uncontrolled, fluctuating, managed medically. Add prn hydralazine for sbp> 180. Goal SBP is 140-160 given ICH. Cont Normodyne, norvasc and hyzaar; consider Verapamil or scheduled Hydralazine  -9/18 BP increased 169/95; add hydralazine 25 tid  -9/20 BP much improved  -9/22 /74; 9/24 bp stable; may dec hydralazine to bid  -9/25 dec hydralazine  -9/27 bp 116-119  -9/28 SBP in the 90s to 116, asymptomatic; dc hydralazine and monitor; decrease Normodyne to bid  -9/29 BP still low; lower Norvasc and normodyne, cont Hyzaar; all have parameters of when to hold  - 9/30 SBP overnight and this am 108-111, will d/c norvasc, on labetolol and losartan/HCTZ , HR acceptable  -10/2 115/71; controlled.  Cont current meds  -130-151/77; 10/5 118/77; 10/6 BP stable; goal 120-130 given ICH  -10/9 148/81; add low dose Norvasc; ideally want less than 130; 10/11 130/73  10/13 remains better controlled  -10/17 /75  -10/21 HR and BP acceptable      Mild hyperglycemia, likely stress induced vs borderline diabetes; monitor loosely  -9/27 bs 145 on BMP; follow bs qam x 3d; no issues      Spasticity; had added zanaflex; 10/12 adjust for desired effect; change to 2mg tid and 4mg qhs; AMBULATED YESTERDAY AND ADVANCED OWN LEG!!!!!!  -10/13 ? Component of RLS at noc; consider trial of Requip  -10/14 increased trazadone, slept better. Add low dose requip at noc  -10/15 ongoing c/o nonspecific insomnia; will try Restoril instead of Trazadone and inc Requip  -10/17 good response to med changes  10/20 laxity with PF bilaterally. 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 Have tried two braces to RLE for dorsiflexion/neutral at ankle. Both have caused skin irritation due to pressure. Pops out of both types. Will need custom fit. Will contact Orthotist      Dysphagia; cont NTL, mech soft; hopefully can upgrade liquids soon 9/25  -9/29 remains on NTL. MBS scheduled for 10/2.  -10/3 now on THINS! !      Leukocytosis; recheck in a.m. Check UA due to stafford. No pulmonary signs (had neg CXR today), no s/s of infxn at old PICC site, no wounds, afebrile. Watch monocytes. -9/18 12.2 improving. Urine neg  -9/19 up to 13.2, ? Reactive. No source of infxn identified. Afebrile  -9/20 WBC now normal      Urinary retention/ neurogenic bladder - start flomax but continue stafford until mobility improves a bit. Strict I/o's  -9/20 keeping stafford to monitor UOP until Na normalizes. No significant output to suggest DI  -9/21 dc stafford; cont Flomax, follow bladder scan  9/22 voiding without issue; incontinence but no retention  -9/28 new onset urinary retention; check UA  -9/29 UA neg but cx saying > 100K gram neg cocci; will need to f/u ID/sens; Start Cipro 500 bid  - 9/30 preliminary urine cx >110K mixed skin denis  -10/2 stop Cipro; cx nl denis  -10/6 recurrent urinary retention. No flomax; re check UA, consider urecholine.  CIC for PVRs  -10/9 dc scans; 10/13 per nursing intermittent need for CIC      bowel program - add prn meds; incontinent, want to keep stool on the firmer side. Monitor skin.       GERD - add a PPI. At times may need additional antacids, Maalox prn.         =====================================================    9/21   Addendum; in therapy this afternoon, transient left eye blindness. Will f/u head CT for extension of or new ICH, especially with low plts   Results  1. Interval decrease in size of intraventricular hemorrhage within the left  basal ganglia/thalamus with near complete resolution of intraventricular  hemorrhage. Surrounding edema is more pronounced with slight increase in  left-to-right midline shift. 2. Interval decrease in ventricular size, approaching normal in caliber.           - FAMILY [id number]/5 REGARDING PROGRESS, NEEDS, DC PLAN; family overwhelmed with decision that need to be made. Family training Monday in case insurance denies as of 10/10          10/10 excellent progress made. Surprising to PT. Definitely have made a step forward and would benefit from ongoing acute inpt rehab to maximize recovery      -10/13 continues to show progress in PT, continues with OT and ST; speech improvements noted  10/17 continues to participate well and benefit from inpt rehab        Time spent was 15 minutes with over 1/2 in direct patient care/examination, consultation and coordination of care.      Signed By: Tramaine Trivedi MD     October 21, 2017

## 2017-10-21 NOTE — PROGRESS NOTES
End Of Shift Functional Summary, Nursing      TOILETING/ BLADDER/ BOWEL    TOILET TRANSFER:  Pt requires moderate assistance. Pt uses nurse's shoulders and waist.    BLADDER:  Pt does not have a stafford catheter that staff manages. Pt does take medication. Pt is continent. of bladder and voids in bedside commode  Pt requires staff to empty device Pt has had 0 bladder accidents during this shift.  (An accident is when the episode is not contained in a brief AND/OR the clothing/linen requires changing/cleaning up.)    BOWEL:  Pt does take medication. Pt is continent of bowel and uses bedside commode. Pt requires staff to empty device    Pt has had 0 bowel accidents during this shift . (An accident is when the episode is not contained in a brief AND/OR the clothing/linen requires changing/cleaning up.)    BED/CHAIR TRANSFER  Pt requires moderate assistance. Patient requires the assistance of 1 staff member(s). Pt uses nurse      Documentation reviewed and plan of care discussed/reviewed with   patient assistant during the shift.

## 2017-10-21 NOTE — PROGRESS NOTES
Problem: Falls - Risk of  Goal: *Absence of Falls  Document Haley Fall Risk and appropriate interventions in the flowsheet.    Outcome: Progressing Towards Goal  Fall Risk Interventions:  Mobility Interventions: Bed/chair exit alarm, Patient to call before getting OOB, Strengthening exercises (ROM-active/passive), Utilize walker, cane, or other assitive device    Mentation Interventions: Bed/chair exit alarm, Evaluate medications/consider consulting pharmacy, Increase mobility    Medication Interventions: Bed/chair exit alarm, Evaluate medications/consider consulting pharmacy, Patient to call before getting OOB    Elimination Interventions: Call light in reach, Bed/chair exit alarm, Patient to call for help with toileting needs, Toileting schedule/hourly rounds    History of Falls Interventions: Consult care management for discharge planning, Evaluate medications/consider consulting pharmacy, Bed/chair exit alarm

## 2017-10-21 NOTE — PROGRESS NOTES
PHYSICAL THERAPY DAILY NOTE  Time In: 1300  Time Out: 4017  Patient Seen For: PM;Patient education; Therapeutic exercise;Transfer training    Subjective: \" I can't feel your hand on my hand\"         Objective: Pt rates intermittent pain shooting in RUE, 0/10 pain rated before and after PT session. Other (comment) (falls)  GROSS ASSESSMENT Daily Assessment            BED/MAT MOBILITY Daily Assessment    Rolling Right : 5 (Stand-by assistance)  Rolling Left : 5 (Stand-by assistance)  Supine to Sit : 4 (Contact guard assistance)  Sit to Supine : 4 (Minimal assistance)       TRANSFERS Daily Assessment    Transfer Type: SPT without device  Transfer Assistance : 4 (Minimal assistance)       GAIT Daily Assessment            STEPS or STAIRS Daily Assessment            BALANCE Daily Assessment   Pt standing with mat table behind, PTA seated on pts right side using NEURO-INDIA techniques  Min a- mod a with proper handplacment for pelvic and trunk control in stance with pt weight shifting L<>R 2 min for 3 sets and RLE forward weight shifting 2 minfor 1 set to increase WB through RLE for proprioception and for controlled pre-gait. Sitting - Static: Good (unsupported)  Standing - Static: Poor  Standing - Dynamic : Impaired       WHEELCHAIR MOBILITY Daily Assessment            LOWER EXTREMITY EXERCISES Daily Assessment   Seated EOM pts hands by side palms down weight shifting L<>R to increase WB RUE for proprioception, blue physioball in front of pt with pts left hand on right hand for WB to perform ball rollouts to increase lumbar ROM and mobility and trunk control CGA. Extremity: Both  Exercise Type #1: Supine lower extremity strengthening  Sets Performed: 3  Reps Performed: 15          Assessment: Pt displays impaired sensation RUE and RUE flaccidity, right sided weakness secondary to CVA. Education provided about WB purposes on right side and flexion contracture prevention RUE with pt verbal understanding.           Plan of Care: Continue with current POC to help pt achieve safe functional mobility.      Bard Garcia, MICHAEL  10/21/2017

## 2017-10-22 PROCEDURE — 65310000000 HC RM PRIVATE REHAB

## 2017-10-22 PROCEDURE — 74011250637 HC RX REV CODE- 250/637: Performed by: PHYSICAL MEDICINE & REHABILITATION

## 2017-10-22 RX ADMIN — TEMAZEPAM 15 MG: 15 CAPSULE ORAL at 23:48

## 2017-10-22 RX ADMIN — METHYLPHENIDATE HYDROCHLORIDE 5 MG: 10 TABLET ORAL at 08:45

## 2017-10-22 RX ADMIN — TAMSULOSIN HYDROCHLORIDE 0.4 MG: 0.4 CAPSULE ORAL at 20:42

## 2017-10-22 RX ADMIN — TIZANIDINE 2 MG: 2 TABLET ORAL at 04:49

## 2017-10-22 RX ADMIN — HYDROCHLOROTHIAZIDE: 12.5 CAPSULE ORAL at 08:47

## 2017-10-22 RX ADMIN — ACETAMINOPHEN 500 MG: 500 TABLET, FILM COATED ORAL at 04:49

## 2017-10-22 RX ADMIN — ROPINIROLE HYDROCHLORIDE 2 MG: 2 TABLET, FILM COATED ORAL at 20:41

## 2017-10-22 RX ADMIN — TIZANIDINE 4 MG: 2 TABLET ORAL at 20:42

## 2017-10-22 RX ADMIN — LABETALOL HYDROCHLORIDE 100 MG: 100 TABLET, FILM COATED ORAL at 08:50

## 2017-10-22 RX ADMIN — ACETAMINOPHEN 500 MG: 500 TABLET, FILM COATED ORAL at 13:25

## 2017-10-22 RX ADMIN — STANDARDIZED SENNA CONCENTRATE AND DOCUSATE SODIUM 1 TABLET: 8.6; 5 TABLET, FILM COATED ORAL at 08:51

## 2017-10-22 RX ADMIN — POTASSIUM CHLORIDE 20 MEQ: 20 TABLET, EXTENDED RELEASE ORAL at 08:46

## 2017-10-22 RX ADMIN — AMLODIPINE BESYLATE 2.5 MG: 5 TABLET ORAL at 08:49

## 2017-10-22 RX ADMIN — METHYLPHENIDATE HYDROCHLORIDE 5 MG: 10 TABLET ORAL at 17:10

## 2017-10-22 RX ADMIN — TIZANIDINE 2 MG: 2 TABLET ORAL at 13:25

## 2017-10-22 RX ADMIN — TEMAZEPAM 15 MG: 15 CAPSULE ORAL at 00:40

## 2017-10-22 RX ADMIN — POLYETHYLENE GLYCOL 3350 17 G: 17 POWDER, FOR SOLUTION ORAL at 08:42

## 2017-10-22 RX ADMIN — LABETALOL HYDROCHLORIDE 100 MG: 100 TABLET, FILM COATED ORAL at 17:10

## 2017-10-22 NOTE — PROGRESS NOTES
End Of Shift Functional Summary, Nursing      TOILETING/ BLADDER/ BOWEL    TOILET TRANSFER:  Pt requires moderate assistance. Pt uses ***. BLADDER:  Pt {DOES/DOES NOT/WILD CARD (D):17533} have a stafford catheter that staff manages. Pt {DOES/DOES NOT/WILD CARD (D):65024} take medication. Pt is {CONTINENT/INCONTINENT:60519856}. of bladder and voids in {[facility] VOIDING METHOD:79008065}  Pt requires staff to {[facility] BLADDER ASSISTANCE:98878565} Pt has had {NUMBERS 1-10:75615} bladder accidents during this shift requiring {ASSISTANCE:13092101} to clean up. (An accident is when the episode is not contained in a brief AND/OR the clothing/linen requires changing/cleaning up.)    BOWEL:  Pt {DOES/DOES NOT/WILD CARD (D):68040} take medication. Pt is {CONTINENT/INCONTINENT:81828711} of bowel and uses {[facility] VOIDING METHOD:93820542}. Pt requires staff to {[facility] BLADDER ASSISTANCE:28755515}    Pt has had {NUMBERS 1-10:45588} bowel accidents during this shift requiring {ASSISTANCE:43684116} from staff to clean up. (An accident is when the episode is not contained in a brief AND/OR the clothing/linen requires changing/cleaning up.)    BED/CHAIR TRANSFER  Pt requires {ASSISTANCE:50537875}. Patient requires the assistance of *** staff member(s). Pt uses {[facility] CANE/WALKER:85343816}    BATHING  Pt requires {BATHIN}. Pt requires assistance with {VLXN:58156130}    SHOWER TRANSFER:  Pt requires {ASSISTANCE:38680125}  Patient requires the assistance of *** staff member(s). Pt uses {DEVICES:68308508}    DRESSING  UPPER BODY:  Pt requires {DRESSIN} Pt requires {ASSISTANCE:98393263}. LOWER BODY:  Pt requires {DRESSIN}. Pt requires {ASSISTANCE:18105681}    EATING  Pt requires {[facility] OGIKBS:64484660}. Pt {[facility] WEARS/DOES NOT EQU} dentures. TUBE FEEDINGS:  Pt {does/does not:409142} receive nutrition through tube feedings. Patient requires {IR TUBE GTPOCMK:07818277} with feedings.     GROOMING  Pt requires {[facility] GROOMIN} with {[facility] GROOMING ASSISTANCE:39162117}. Documentation reviewed and plan of care discussed/reviewed with   {[facility] DISCUSSED AKBD:97179095} during the shift.

## 2017-10-22 NOTE — PROGRESS NOTES
Problem: Falls - Risk of  Goal: *Absence of Falls  Document Haley Fall Risk and appropriate interventions in the flowsheet.    Outcome: Progressing Towards Goal  Fall Risk Interventions:  Mobility Interventions: Utilize walker, cane, or other assitive device    Mentation Interventions: Door open when patient unattended    Medication Interventions: Patient to call before getting OOB    Elimination Interventions: Call light in reach    History of Falls Interventions: Door open when patient unattended

## 2017-10-22 NOTE — PROGRESS NOTES
Problem: Falls - Risk of  Goal: *Absence of Falls  Document Haley Fall Risk and appropriate interventions in the flowsheet.    Outcome: Progressing Towards Goal  Fall Risk Interventions:  Mobility Interventions: Patient to call before getting OOB, Utilize walker, cane, or other assitive device    Mentation Interventions: Adequate sleep, hydration, pain control, Door open when patient unattended, More frequent rounding    Medication Interventions: Patient to call before getting OOB    Elimination Interventions: Call light in reach, Patient to call for help with toileting needs    History of Falls Interventions: Door open when patient unattended

## 2017-10-22 NOTE — PROGRESS NOTES
Sidney Slater MD,   Medical Director  [address] [facility], [address] [facility]  Tel: [phone]       [facility] PROGRESS NOTE    Jose Fermin  Admit Date: 9/15/2017  Admit Diagnosis: stroke, left brain involvement  ICH (intracerebral hemorrhage) ([facility] [address]    Subjective     Doing well. No cp, sob, n/v. Slept well. Good spirits. Patient seen and examined. No pain complaints. Denies lightheadedness, palpations, SOB or nausea. Family training scheduled tomorrow. Participating in therapy.     Objective:     Current Facility-Administered Medications   Medication Dose Route Frequency    acetaminophen (TYLENOL) tablet 500 mg  500 mg Oral Q4H PRN    temazepam (RESTORIL) capsule 15 mg  15 mg Oral QHS PRN    rOPINIRole (REQUIP) tablet 2 mg  2 mg Oral QHS    tiZANidine (ZANAFLEX) tablet 2 mg  2 mg Oral BID    tiZANidine (ZANAFLEX) tablet 4 mg  4 mg Oral QHS    amLODIPine (NORVASC) tablet 2.5 mg  2.5 mg Oral DAILY    potassium chloride (K-DUR, KLOR-CON) SR tablet 20 mEq  20 mEq Oral DAILY    senna-docusate (PERICOLACE) 8.6-50 mg per tablet 1 Tab  1 Tab Oral DAILY    polyethylene glycol (MIRALAX) packet 17 g  17 g Oral DAILY    methylphenidate HCl (RITALIN) tablet 5 mg  5 mg Oral BID    labetalol (NORMODYNE) tablet 100 mg  100 mg Oral BID    losartan/hydroCHLOROthiazide (HYZAAR) 100/12.5 mg   Oral DAILY    alum-mag hydroxide-simeth (MYLANTA) oral suspension 30 mL  30 mL Oral Q4H PRN    bisacodyl (DULCOLAX) suppository 10 mg  10 mg Rectal DAILY PRN    hydrALAZINE (APRESOLINE) tablet 50 mg  50 mg Oral QID PRN    lip protectant (BLISTEX) ointment   Topical PRN    ondansetron (ZOFRAN ODT) tablet 4 mg  4 mg Oral Q6H PRN    sodium phosphate (FLEET'S) enema 118 mL  1 Enema Rectal PRN    traMADol (ULTRAM) tablet 50 mg  50 mg Oral Q6H PRN    tamsulosin (FLOMAX) capsule 0.4 mg  0.4 mg Oral QHS     Review of Systems:Denies chest pain, shortness of breath, cough, headache, visual problems, abdominal pain, dysurea, calf pain. Pertinent positives are as noted in the medical records and unremarkable otherwise. Visit Vitals    /73 (BP 1 Location: Right arm, BP Patient Position: At rest)    Pulse 86    Temp 98.4 °F (36.9 °C)    Resp 16    SpO2 95%        Physical Exam:   General: Alert and age appropriately oriented. No acute cardio respiratory distress. HEENT: Normocephalic,no scleral icterus  Oral mucosa moist without cyanosis; right facial weakness   Lungs: Clear to auscultation  bilaterally. Respiration even and unlabored   Heart: Regular rate and rhythm, S1, S2   No  murmurs, clicks, rub or gallops   Abdomen: Soft, non-tender, nondistended. Bowel sounds present. Genitourinary: Benign . Neuromuscular:      RUE shoulder retraction and protraction, abd 2+; o/w , triceps, wrist all 0/5. RUE - Maggie 1  RLE 4- hip flexion, 3+ KE, foot drop noted. Exp aphasia continues to improvel; noted plantar flexion and eversion right foot. Skin/extremity: No rashes, no erythema.  No calf tenderness BLE  No edema                                                                            Functional Assessment:  Gross Assessment  AROM:  (RLE AAROM WFL except PF contracture of -10) (10/20/17 0900)  PROM:  (WFL except right ankle PF contracture) (10/20/17 0900)  Strength:  (LLE WFL, RLE) (10/20/17 0900)  Coordination:  (LLE intact, RLE impaired) (10/20/17 0900)  Tone:  (extensor spasticity) (10/20/17 0900)  Sensation: Impaired (RLE) (10/20/17 0900)       Balance  Sitting - Static: Good (unsupported) (10/21/17 1400)  Sitting - Dynamic: Fair (occasional) (10/20/17 1600)  Standing - Static: Poor (10/21/17 1400)  Standing - Dynamic : Impaired (10/21/17 1400)           Toileting  Adaptive Equipment: Elevated seat;Walker (09/29/17 0134)         Bailey Odell Fall Risk Assessment:  Bailey Odell Fall Risk  Mobility: Ambulates or transfers with assist devices or assistance/unsteady gait (10/22/17 0800)  Mobility Interventions: Patient to call before getting OOB;Utilize walker, cane, or other assitive device (10/22/17 0800)  Mentation: Alert, oriented x 3 (10/22/17 0800)  Mentation Interventions: Adequate sleep, hydration, pain control;Door open when patient unattended;More frequent rounding (10/22/17 0800)  Medication: Patient receiving anticonvulsants, sedatives(tranquilizers), psychotropics or hypnotics, hypoglycemics, narcotics, sleep aids, antihypertensives, laxatives, or diuretics (10/22/17 0800)  Medication Interventions: Patient to call before getting OOB (10/22/17 0800)  Elimination: Needs assistance with toileting (10/22/17 0800)  Elimination Interventions: Call light in reach; Patient to call for help with toileting needs (10/22/17 0800)  Prior Fall History: No (10/22/17 0800)  History of Falls Interventions: Door open when patient unattended (10/21/17 2320)  Total Score: 3 (10/22/17 0800)  Standard Fall Precautions: Yes (10/15/17 1126)  High Fall Risk: Yes (10/22/17 0800)     Speech Assessment:         Ambulation:  Gait  Base of Support: Narrowed; Center of gravity altered (09/22/17 1200)  Speed/Lulu: Delayed (09/22/17 1200)  Step Length: Right shortened (09/22/17 1200)  Distance (ft): 100 Feet (ft) (10/20/17 1600)  Assistive Device: Cane, quad;Gait belt; Other (comment) (ace wrap to control right ankle inversion,ace wrap for DF ) (10/20/17 1600)     Labs/Studies:  No results found for this or any previous visit (from the past 72 hour(s)).     Assessment:     Problem List as of 10/22/2017  Date Reviewed: 9/12/2017          Codes Class Noted - Resolved    Thrombocytopenia ([facility] [address] ICD-10-CM: D69.6  ICD-9-CM: 287.5  9/25/2017 - Present        Pulmonary emboli (HCC) ICD-10-CM: I26.99  ICD-9-CM: 415.19  9/25/2017 - Present        DVT (deep venous thrombosis) ([facility] ICD-10-CM: I82.409  ICD-9-CM: 453.40  9/25/2017 - Present        * (Principal)ICH (intracerebral hemorrhage) ([facility] ICD-10-CM: I61.9  ICD-9-CM: 424  9/15/2017 - Present        Hypertensive urgency, malignant ICD-10-CM: I16.0  ICD-9-CM: 401.0  9/11/2017 - Present        Stroke, hemorrhagic ([facility] ICD-10-CM: I61.9  ICD-9-CM: 902  9/7/2017 - Present        Accelerated hypertension ICD-10-CM: I10  ICD-9-CM: 401.0  9/7/2017 - Present        At risk for aspiration (Chronic) ICD-10-CM: Z91.89  ICD-9-CM: V49.89  9/7/2017 - Present        Acute spontaneous intraventricular hemorrhage assoc w/ hypertension ([facility] ICD-10-CM: I61.5, I10  ICD-9-CM: 344, 401.9  9/7/2017 - Present        Screening for breast cancer ICD-10-CM: Z12.31  ICD-9-CM: V76.10  2/27/2017 - Present        Atrophic vaginitis ICD-10-CM: N95.2  ICD-9-CM: 627.3  7/11/2016 - Present        HTN (hypertension) (Chronic) ICD-10-CM: I10  ICD-9-CM: 401.9  10/23/2015 - Present        Depression ICD-10-CM: F32.9  ICD-9-CM: 624  10/23/2015 - Present        Anxiety (Chronic) ICD-10-CM: F41.9  ICD-9-CM: 300.00  10/23/2015 - Present        RESOLVED: Hypoxemia ICD-10-CM: R09.02  ICD-9-CM: 799.02  9/10/2017 - 9/12/2017        RESOLVED: Acute respiratory failure ([facility] ICD-10-CM: J96.00  ICD-9-CM: 518.81  9/8/2017 - 9/10/2017        RESOLVED: Hemorrhagic stroke ([facility] ICD-10-CM: I61.9  ICD-9-CM: 900  9/7/2017 - 9/7/2017        RESOLVED: Non-intractable vomiting with nausea ICD-10-CM: R11.2  ICD-9-CM: 787.01  9/7/2017 - 9/12/2017        RESOLVED: Seborrheic keratosis, inflamed ICD-10-CM: L82.0  ICD-9-CM: 702.11  7/11/2016 - 9/20/2016        RESOLVED: Cough ICD-10-CM: R05  ICD-9-CM: 786.2  7/11/2016 - 9/20/2016        RESOLVED: ADD (attention deficit disorder) (Chronic) ICD-10-CM: F98.8  ICD-9-CM: 314.00  10/23/2015 - 10/13/2017        RESOLVED: Shoulder pain ICD-10-CM: M25.519  ICD-9-CM: 719.41  10/23/2015 - 2/27/2017              Plan:         Left BG Hemorrhage with intraventricular extension due to HTN emergency; resultant R hemiplegia, right neglect, dysarthria, aphasia, dysphagia with significant decline in mobility and self care  Plan:   Continue daily physician medical management:  Pneumonia prophylaxis- Incentive spirometer every hour while awake. Will need instruction and assistance. Not sure if she can get a proper oral seal to be effective  -10/9 no pulmonary issues      Dysphagia; Pureed diet with NTL; at risk for malnutrition and dehydration. PICC line removed prior to transfer; may need a line for IVFs if BUN continues to increase. Low K, Ca; check mg. Supplementation as needed. Na elevated  -mag ok, K much improved; cont to supplement while on diuretic  -9/19 replace K; 3.0; 2.8 this am 9/20; inc supplement; may need to add to IVFs  -9/25 fluid status and K nl; 9/28 cont NTL; high risk aspiration; still needs supervision /assist with meals  -10/2 MBS today; thin liq!!  -10/9 po intake, especially fluids, has improved; feeding self      Thrombocytopenia; has been trending down for no apparent reason. hgb slt down as well. Has not been on Hep products; consult hematology today 9/19  -plts 40K, continues to trend down 9/20; await consult by hematology. ? reln to [address]. Mild anemia as well  Check HIT panel, d dimer , fibrinogen  -9/21 plts 36k ; HIT PROFILE POSITIVE, D DIMER ELEVATED 32.79, FIBRINOGEN  ; consistent with DIC; Etiology unclear  WILL D/W HEMATOLOGY; LFTs ok, no hx of blood transfusion,no hx pancreatitis, no sign of bacteremia. Has not been on any heparin products. Can see in head injury; has ICH. Cannot r/o blood disorder (leukemia)   per hematology \"Recommend transfusing platelets for <18,192 and Cryoprecipitate for fibrinogen < 100. \"  -MARK pending  -9/22 bilateral LE venous duplex ordered; low suspicion but at risk and having calf tenderness  -9/22 plts improved today; monitor closely; 9/23 small right peroneal thrombus.  No antic per hematology. IR refused/advised against placing IVF due to low risk of clot propagation given size and location  -9/23 counts bumping up; pts now 53k from 45 and previously 36  -9/24 plts cont to improve. 69K. MARK still pending; can resume therapies  9/26 Ddimer pending, fibrinogen now nl  -9/27 D dimer 11+, improved. Per Hematology; likely due to consumption coagulopathy. Will monitor  -10/2 labs have improved; MARK + 68% confirming dx of HIT; 10/4 recheck cbc (last done 9/28)    -10/5 plts 263; 10/9 plts 283      Bilateral PEs 9/25, filter placed in IR; looks good today. sats good without O2 supplement 94-97% sats; d/w Hematology, no agaratroban  -apprec Pulm assessment. Clinically looks good and no longer symptomatic  -9/28 clinically stable, asymptomatic ; 10/3 stable. No sob, no O2 supplements; 10/9 remains asymptomatic      Hypernatremia; last head CT did not show significant cerebral edema. Clinically improving. Likely due to prerenal azotemia; 9/15 MAY REQ isotonic / hypotonic saline IV;  -9/18 events of weekend noted; Na 156; asymptomatic, on 0.5 dextrose with 1/4 Na; na q 6hrs; pts serum osmolality was normal. Will check urine Na and urine osmolality; depending on findings, will consider consulting Hospitalist vs Nephrology  Neuro improving despite this  -9/19 Na down to 149, urine osmo nl, serum osmo min hi; cont dex/and 1/4 NS; repeat in am. Not DI as uop not increased  -9/20 Na 147; cont fluids; 9/21 not resulted; will stop fluids when Na below 142  -Na 140 9/22; dc IVFs; 9/25 136; 9/27 136; recheck 10/4 134; mildly low; f/u 10/9 pending  -10/17 nl      ICH/IVH; 9/22 clinically improves daily. F/u HCT yest due to transient left eye visual disturbance. Overall, resolving hemorrhage. There is more pronounced edema; expected. Ventricular size now nl      Insomnia; 10/18 had dc'd Trazadone and changed to restoril, added requip for restless leg syndrome. Overall improved but too drowsy this a.m. Also receiving zanaflex. Her tone and restless leg improved.  Will dec Restoril due to a.m sleepiness      Anemia ; 9.6-10/ stable; recheck 10/9 10.5 improved     DVT risk / DVT Prophylaxis- Will require daily physician exam to assess for signs and symptoms as patient is at increased risk for of thromboembolism. Mobilization as tolerated. Intermittent pneumatic compression devices when in bed Thigh-high or knee-high thromboembolic deterrent hose when out of bed. NO  anticoag due to ICH/HIT; has right small right peroneal vein; 9/25 spoke with IR again today; more risk of putting in IVC filter than benefits especially since she is asymptomatic with no swelling or tenderness and small size of clot  -10/3 no edema, right calf soft. No SOB  -10/9 no calf swelling or tenderness  -10/17 platelets normal      Pain Control: stable, mild-to-moderate joint symptoms intermittently, reasonably well controlled by PRN meds. Will require regular pain assessment and comprenhensive pain management,       Wound Care: Monitor wound status daily per staff and physician. At risk for failure. Will require 24/7 rehab nursing. None needed at this time      Hypertension - BP uncontrolled, fluctuating, managed medically. Add prn hydralazine for sbp> 180. Goal SBP is 140-160 given ICH. Cont Normodyne, norvasc and hyzaar; consider Verapamil or scheduled Hydralazine  -9/18 BP increased 169/95; add hydralazine 25 tid  -9/20 BP much improved  -9/22 /74; 9/24 bp stable; may dec hydralazine to bid  -9/25 dec hydralazine  -9/27 bp 116-119  -9/28 SBP in the 90s to 116, asymptomatic; dc hydralazine and monitor; decrease Normodyne to bid  -9/29 BP still low; lower Norvasc and normodyne, cont Hyzaar; all have parameters of when to hold  - 9/30 SBP overnight and this am 108-111, will d/c norvasc, on labetolol and losartan/HCTZ , HR acceptable  -10/2 115/71; controlled.  Cont current meds  -130-151/77; 10/5 118/77; 10/6 BP stable; goal 120-130 given ICH  -10/9 148/81; add low dose Norvasc; ideally want less than 130; 10/11 130/73  10/13 remains better controlled  -10/17 /75  -10/22 HR and BP acceptable      Mild hyperglycemia, likely stress induced vs borderline diabetes; monitor loosely  -9/27 bs 145 on BMP; follow bs qam x 3d; no issues      Spasticity; had added zanaflex; 10/12 adjust for desired effect; change to 2mg tid and 4mg qhs; AMBULATED YESTERDAY AND ADVANCED OWN LEG!!!!!!  -10/13 ? Component of RLS at noc; consider trial of Requip  -10/14 increased trazadone, slept better. Add low dose requip at noc  -10/15 ongoing c/o nonspecific insomnia; will try Restoril instead of Trazadone and inc Requip  -10/17 good response to med changes  10/20 laxity with PF bilaterally. 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 Have tried two braces to RLE for dorsiflexion/neutral at ankle. Both have caused skin irritation due to pressure. Pops out of both types. Will need custom fit. Will contact Orthotist      Dysphagia; cont NTL, mech soft; hopefully can upgrade liquids soon 9/25  -9/29 remains on NTL. MBS scheduled for 10/2.  -10/3 now on THINS! !      Leukocytosis; recheck in a.m. Check UA due to stafford. No pulmonary signs (had neg CXR today), no s/s of infxn at old PICC site, no wounds, afebrile. Watch monocytes. -9/18 12.2 improving. Urine neg  -9/19 up to 13.2, ? Reactive. No source of infxn identified. Afebrile  -9/20 WBC now normal      Urinary retention/ neurogenic bladder - start flomax but continue stafford until mobility improves a bit. Strict I/o's  -9/20 keeping stafford to monitor UOP until Na normalizes. No significant output to suggest DI  -9/21 dc stafford; cont Flomax, follow bladder scan  9/22 voiding without issue; incontinence but no retention  -9/28 new onset urinary retention; check UA  -9/29 UA neg but cx saying > 100K gram neg cocci; will need to f/u ID/sens; Start Cipro 500 bid  - 9/30 preliminary urine cx >110K mixed skin denis  -10/2 stop Cipro; cx nl denis  -10/6 recurrent urinary retention. No flomax; re check UA, consider urecholine.  CIC for PVRs  -10/9 dc scans; 10/13 per nursing intermittent need for CIC      bowel program - add prn meds; incontinent, want to keep stool on the firmer side. Monitor skin.       GERD - add a PPI. At times may need additional antacids, Maalox prn.         =====================================================    9/21   Addendum; in therapy this afternoon, transient left eye blindness. Will f/u head CT for extension of or new ICH, especially with low plts   Results  1. Interval decrease in size of intraventricular hemorrhage within the left  basal ganglia/thalamus with near complete resolution of intraventricular  hemorrhage. Surrounding edema is more pronounced with slight increase in  left-to-right midline shift. 2. Interval decrease in ventricular size, approaching normal in caliber.           - FAMILY [id number]/5 REGARDING PROGRESS, NEEDS, DC PLAN; family overwhelmed with decision that need to be made. Family training Monday in case insurance denies as of 10/10          10/10 excellent progress made. Surprising to PT. Definitely have made a step forward and would benefit from ongoing acute inpt rehab to maximize recovery      -10/13 continues to show progress in PT, continues with OT and ST; speech improvements noted  10/17 continues to participate well and benefit from inpt rehab        Time spent was 15 minutes with over 1/2 in direct patient care/examination, consultation and coordination of care.      Signed By: Irma Estrada MD     October 22, 2017

## 2017-10-22 NOTE — PROGRESS NOTES
End Of Shift Functional Summary, Nursing      TOILETING/ BLADDER/ BOWEL    TOILET TRANSFER:  Pt requires minimal assistance. Pt uses wheelchair. BLADDER:  Pt does not have a stafford catheter that staff manages. Pt does take medication. Pt is continent. of bladder and voids in bedside commode  Pt requires staff to position device and empty device Pt has had 0 bladder accidents during this shift requiring minimal assistance to clean up. (An accident is when the episode is not contained in a brief AND/OR the clothing/linen requires changing/cleaning up.)    BOWEL:  Pt does take medication. Pt is continent of bowel and uses bedside commode. Pt requires staff to position device and empty device    Pt has had 0 bowel accidents during this shift requiring standby assistance/setup from staff to clean up. (An accident is when the episode is not contained in a brief AND/OR the clothing/linen requires changing/cleaning up.)    BED/CHAIR TRANSFER  Pt requires minimal assistance. Patient requires the assistance of 1 staff member(s). Pt uses walker      EATING  Pt requires no assistance. Pt does not wear dentures. TUBE FEEDINGS:  Pt does not  receive nutrition through tube feedings. Documentation reviewed and plan of care discussed/reviewed with   patient, patient assistant and family/spouse during the shift.

## 2017-10-23 ENCOUNTER — HOME HEALTH ADMISSION (OUTPATIENT)
Dept: HOME HEALTH SERVICES | Facility: HOME HEALTH | Age: 62
End: 2017-10-23
Payer: COMMERCIAL

## 2017-10-23 PROCEDURE — 97112 NEUROMUSCULAR REEDUCATION: CPT

## 2017-10-23 PROCEDURE — 65310000000 HC RM PRIVATE REHAB

## 2017-10-23 PROCEDURE — 97535 SELF CARE MNGMENT TRAINING: CPT

## 2017-10-23 PROCEDURE — 97532 HC OT COGNITIVE SKILL DEV 15 MIN: CPT

## 2017-10-23 PROCEDURE — 97110 THERAPEUTIC EXERCISES: CPT

## 2017-10-23 PROCEDURE — 97116 GAIT TRAINING THERAPY: CPT

## 2017-10-23 PROCEDURE — 99232 SBSQ HOSP IP/OBS MODERATE 35: CPT | Performed by: PHYSICAL MEDICINE & REHABILITATION

## 2017-10-23 PROCEDURE — 96155: CPT | Performed by: PSYCHOLOGIST

## 2017-10-23 PROCEDURE — 74011250637 HC RX REV CODE- 250/637: Performed by: PHYSICAL MEDICINE & REHABILITATION

## 2017-10-23 PROCEDURE — 97530 THERAPEUTIC ACTIVITIES: CPT

## 2017-10-23 RX ADMIN — POTASSIUM CHLORIDE 20 MEQ: 20 TABLET, EXTENDED RELEASE ORAL at 08:27

## 2017-10-23 RX ADMIN — HYDROCHLOROTHIAZIDE: 12.5 CAPSULE ORAL at 08:26

## 2017-10-23 RX ADMIN — STANDARDIZED SENNA CONCENTRATE AND DOCUSATE SODIUM 1 TABLET: 8.6; 5 TABLET, FILM COATED ORAL at 08:27

## 2017-10-23 RX ADMIN — TIZANIDINE 4 MG: 2 TABLET ORAL at 20:44

## 2017-10-23 RX ADMIN — AMLODIPINE BESYLATE 2.5 MG: 5 TABLET ORAL at 08:27

## 2017-10-23 RX ADMIN — LABETALOL HYDROCHLORIDE 100 MG: 100 TABLET, FILM COATED ORAL at 08:26

## 2017-10-23 RX ADMIN — ROPINIROLE HYDROCHLORIDE 2 MG: 2 TABLET, FILM COATED ORAL at 20:44

## 2017-10-23 RX ADMIN — TEMAZEPAM 15 MG: 15 CAPSULE ORAL at 22:01

## 2017-10-23 RX ADMIN — METHYLPHENIDATE HYDROCHLORIDE 5 MG: 10 TABLET ORAL at 08:26

## 2017-10-23 RX ADMIN — TAMSULOSIN HYDROCHLORIDE 0.4 MG: 0.4 CAPSULE ORAL at 20:44

## 2017-10-23 RX ADMIN — LABETALOL HYDROCHLORIDE 100 MG: 100 TABLET, FILM COATED ORAL at 17:07

## 2017-10-23 RX ADMIN — TIZANIDINE 2 MG: 2 TABLET ORAL at 14:09

## 2017-10-23 RX ADMIN — TIZANIDINE 2 MG: 2 TABLET ORAL at 05:41

## 2017-10-23 RX ADMIN — METHYLPHENIDATE HYDROCHLORIDE 5 MG: 10 TABLET ORAL at 17:07

## 2017-10-23 RX ADMIN — ACETAMINOPHEN 500 MG: 500 TABLET, FILM COATED ORAL at 08:27

## 2017-10-23 NOTE — PROGRESS NOTES
OT Daily Note  Time In 1346   Time Out 1430     Pain: Patient had no complaint of pain. Functional Mobility   Pt transferred with CGA throughout session. Neuro-Muscular Re-Education   Pt was engaged in tall kneeling for R sided return. Pt demonstrated increased ease into quadruped. Pt tolerated tall kneeling for shorter period of time and increased instability. Pt engaged with cindy-glide for RUE internal/external rotation and scaption. Pt had active internal rotation, but required guiding for all other movement. Cognition   Pt engaged with Celina Valera to improve sequencing and problem solving. Pt could do 1-5 steps, but past that would get confused. Education   Shoes for AFO     Interdisciplinary Communication: PTA Manuel Lyle on home situation    Plan: Continue with POC. Pt was left awaiting PTA Katerina Escobedo OTR/L  10/23/2017

## 2017-10-23 NOTE — PROGRESS NOTES
Problem: Falls - Risk of  Goal: *Absence of Falls  Document Haley Fall Risk and appropriate interventions in the flowsheet.    Outcome: Progressing Towards Goal  Fall Risk Interventions:  Mobility Interventions: Bed/chair exit alarm, Patient to call before getting OOB, Strengthening exercises (ROM-active/passive), Utilize walker, cane, or other assitive device    Mentation Interventions: Bed/chair exit alarm, Increase mobility    Medication Interventions: Bed/chair exit alarm, Patient to call before getting OOB    Elimination Interventions: Call light in reach, Bed/chair exit alarm, Patient to call for help with toileting needs    History of Falls Interventions: Consult care management for discharge planning, Bed/chair exit alarm

## 2017-10-23 NOTE — PROGRESS NOTES
Hayley Patel MD,   Medical Director  [address] [facility], [address] [place] [address]  Tel: [phone]       D PROGRESS NOTE    Elvia Freed  Admit Date: 9/15/2017  Admit Diagnosis: stroke, left brain involvement;ICH (intracerebral hemorrhag*    Subjective     Doing well. No complaints . No cp, sob,n. Nervous about dc end of week    Objective:     Current Facility-Administered Medications   Medication Dose Route Frequency    acetaminophen (TYLENOL) tablet 500 mg  500 mg Oral Q4H PRN    temazepam (RESTORIL) capsule 15 mg  15 mg Oral QHS PRN    rOPINIRole (REQUIP) tablet 2 mg  2 mg Oral QHS    tiZANidine (ZANAFLEX) tablet 2 mg  2 mg Oral BID    tiZANidine (ZANAFLEX) tablet 4 mg  4 mg Oral QHS    amLODIPine (NORVASC) tablet 2.5 mg  2.5 mg Oral DAILY    potassium chloride (K-DUR, KLOR-CON) SR tablet 20 mEq  20 mEq Oral DAILY    senna-docusate (PERICOLACE) 8.6-50 mg per tablet 1 Tab  1 Tab Oral DAILY    polyethylene glycol (MIRALAX) packet 17 g  17 g Oral DAILY    methylphenidate HCl (RITALIN) tablet 5 mg  5 mg Oral BID    labetalol (NORMODYNE) tablet 100 mg  100 mg Oral BID    losartan/hydroCHLOROthiazide (HYZAAR) 100/12.5 mg   Oral DAILY    alum-mag hydroxide-simeth (MYLANTA) oral suspension 30 mL  30 mL Oral Q4H PRN    bisacodyl (DULCOLAX) suppository 10 mg  10 mg Rectal DAILY PRN    hydrALAZINE (APRESOLINE) tablet 50 mg  50 mg Oral QID PRN    lip protectant (BLISTEX) ointment   Topical PRN    ondansetron (ZOFRAN ODT) tablet 4 mg  4 mg Oral Q6H PRN    sodium phosphate (FLEET'S) enema 118 mL  1 Enema Rectal PRN    traMADol (ULTRAM) tablet 50 mg  50 mg Oral Q6H PRN    tamsulosin (FLOMAX) capsule 0.4 mg  0.4 mg Oral QHS     Review of Systems:Denies chest pain, shortness of breath, cough, headache, visual problems, abdominal pain, dysurea, calf pain. Pertinent positives are as noted in the medical records and unremarkable otherwise.      Visit Vitals    /81 (BP 1 Location: Right arm, BP Patient Position: At rest)    Pulse 94    Temp 98 °F (36.7 °C)    Resp 17    SpO2 96%        Physical Exam:   General: Alert and age appropriately oriented. No acute cardio respiratory distress. HEENT: Normocephalic,no scleral icterus  Oral mucosa moist without cyanosis   Lungs: Clear to auscultation  bilaterally. Respiration even and unlabored   Heart: Regular rate and rhythm, S1, S2   No  murmurs, clicks, rub or gallops   Abdomen: Soft, non-tender, nondistended. Bowel sounds present. No organomegaly. Genitourinary: Benign . Neuromuscular:        RUE shoulder retraction and protraction, abd 2+; o/w , triceps, wrist all 0/5  RLE 4- hip flexion, 3+ KE, foot drop noted. Exp aphasia continues to improvel; noted plantar flexion and eversion right foot        Skin/extremity: No rashes, no erythema.  No calf tenderness BLE                                                                              Functional Assessment:  Gross Assessment  AROM:  (RLE AAROM WFL except PF contracture of -10) (10/20/17 0900)  PROM:  (WFL except right ankle PF contracture) (10/20/17 0900)  Strength:  (LLE WFL, RLE) (10/20/17 0900)  Coordination:  (LLE intact, RLE impaired) (10/20/17 0900)  Tone:  (extensor spasticity) (10/20/17 0900)  Sensation: Impaired (RLE) (10/20/17 0900)       Balance  Sitting - Static: Good (unsupported) (10/21/17 1400)  Sitting - Dynamic: Fair (occasional) (10/20/17 1600)  Standing - Static: Poor (10/21/17 1400)  Standing - Dynamic : Impaired (10/21/17 1400)           Toileting  Adaptive Equipment: Elevated seat;Walker (09/29/17 1534)         [facility] Fall Risk Assessment:  [facility] Fall Risk  Mobility: Ambulates or transfers with assist devices or assistance/unsteady gait (10/22/17 2125)  Mobility Interventions: Utilize walker, cane, or other assitive device (10/22/17 2125)  Mentation: Alert, oriented x 3 (10/22/17 2125)  Mentation Interventions: Adequate sleep, hydration, pain control (10/22/17 2125)  Medication: Patient receiving anticonvulsants, sedatives(tranquilizers), psychotropics or hypnotics, hypoglycemics, narcotics, sleep aids, antihypertensives, laxatives, or diuretics (10/22/17 2125)  Medication Interventions: Patient to call before getting OOB (10/22/17 2125)  Elimination: Needs assistance with toileting (10/22/17 2125)  Elimination Interventions: Call light in reach (10/22/17 2125)  Prior Fall History: No (10/22/17 2125)  History of Falls Interventions: Door open when patient unattended (10/22/17 2125)  Total Score: 3 (10/22/17 2125)  Standard Fall Precautions: Yes (10/15/17 1126)  High Fall Risk: Yes (10/22/17 2125)     Speech Assessment:         Ambulation:  Gait  Base of Support: Narrowed; Center of gravity altered (09/22/17 1200)  Speed/Lulu: Delayed (09/22/17 1200)  Step Length: Right shortened (09/22/17 1200)  Distance (ft): 100 Feet (ft) (10/20/17 1600)  Assistive Device: Cane, quad;Gait belt; Other (comment) (ace wrap to control right ankle inversion,ace wrap for DF ) (10/20/17 1600)     Labs/Studies:  No results found for this or any previous visit (from the past 72 hour(s)).     Assessment:     Problem List as of 10/23/2017  Date Reviewed: 9/12/2017          Codes Class Noted - Resolved    Thrombocytopenia ([facility] ICD-10-CM: D69.6  ICD-9-CM: 287.5  9/25/2017 - Present        Pulmonary emboli (HCC) ICD-10-CM: I26.99  ICD-9-CM: 415.19  9/25/2017 - Present        DVT (deep venous thrombosis) ([facility] ICD-10-CM: I82.409  ICD-9-CM: 453.40  9/25/2017 - Present        * (Principal)ICH (intracerebral hemorrhage) ([facility] ICD-10-CM: I61.9  ICD-9-CM: 102  9/15/2017 - Present        Hypertensive urgency, malignant ICD-10-CM: I16.0  ICD-9-CM: 401.0  9/11/2017 - Present        Stroke, hemorrhagic ([facility] ICD-10-CM: I61.9  ICD-9-CM: 851  9/7/2017 - Present        Accelerated hypertension ICD-10-CM: I10  ICD-9-CM: 401.0  9/7/2017 - Present        At risk for aspiration (Chronic) ICD-10-CM: Z91.89  ICD-9-CM: V49.89  9/7/2017 - Present        Acute spontaneous intraventricular hemorrhage assoc w/ hypertension ([facility] ICD-10-CM: I61.5, I10  ICD-9-CM: 407, 401.9  9/7/2017 - Present        Screening for breast cancer ICD-10-CM: Z12.31  ICD-9-CM: V76.10  2/27/2017 - Present        Atrophic vaginitis ICD-10-CM: N95.2  ICD-9-CM: 627.3  7/11/2016 - Present        HTN (hypertension) (Chronic) ICD-10-CM: I10  ICD-9-CM: 401.9  10/23/2015 - Present        Depression ICD-10-CM: F32.9  ICD-9-CM: 277  10/23/2015 - Present        Anxiety (Chronic) ICD-10-CM: F41.9  ICD-9-CM: 300.00  10/23/2015 - Present        RESOLVED: Hypoxemia ICD-10-CM: R09.02  ICD-9-CM: 799.02  9/10/2017 - 9/12/2017        RESOLVED: Acute respiratory failure ([facility] ICD-10-CM: J96.00  ICD-9-CM: 518.81  9/8/2017 - 9/10/2017        RESOLVED: Hemorrhagic stroke ([facility] ICD-10-CM: I61.9  ICD-9-CM: 195  9/7/2017 - 9/7/2017        RESOLVED: Non-intractable vomiting with nausea ICD-10-CM: R11.2  ICD-9-CM: 787.01  9/7/2017 - 9/12/2017        RESOLVED: Seborrheic keratosis, inflamed ICD-10-CM: L82.0  ICD-9-CM: 702.11  7/11/2016 - 9/20/2016        RESOLVED: Cough ICD-10-CM: R05  ICD-9-CM: 786.2  7/11/2016 - 9/20/2016        RESOLVED: ADD (attention deficit disorder) (Chronic) ICD-10-CM: F98.8  ICD-9-CM: 314.00  10/23/2015 - 10/13/2017        RESOLVED: Shoulder pain ICD-10-CM: M25.519  ICD-9-CM: 719.41  10/23/2015 - 2/27/2017             Plan:          Left BG Hemorrhage with intraventricular extension due to HTN emergency; resultant R hemiplegia, right neglect, dysarthria, aphasia, dysphagia with significant decline in mobility and self care  Plan:   Continue daily physician medical management:  Pneumonia prophylaxis- Incentive spirometer every hour while awake. Will need instruction and assistance.  Not sure if she can get a proper oral seal to be effective  -10/9 no pulmonary issues      Dysphagia; Pureed diet with NTL; at risk for malnutrition and dehydration. PICC line removed prior to transfer; may need a line for IVFs if BUN continues to increase. Low K, Ca; check mg. Supplementation as needed. Na elevated  -mag ok, K much improved; cont to supplement while on diuretic  -9/19 replace K; 3.0; 2.8 this am 9/20; inc supplement; may need to add to IVFs  -9/25 fluid status and K nl; 9/28 cont NTL; high risk aspiration; still needs supervision /assist with meals  -10/2 MBS today; thin liq!!  -10/9 po intake, especially fluids, has improved; feeding self      Thrombocytopenia; has been trending down for no apparent reason. hgb slt down as well. Has not been on Hep products; consult hematology today 9/19  -plts 40K, continues to trend down 9/20; await consult by hematology. ? reln to [address]. Mild anemia as well  Check HIT panel, d dimer , fibrinogen  -9/21 plts 36k ; HIT PROFILE POSITIVE, D DIMER ELEVATED 32.79, FIBRINOGEN  ; consistent with DIC; Etiology unclear  WILL D/W HEMATOLOGY; LFTs ok, no hx of blood transfusion,no hx pancreatitis, no sign of bacteremia. Has not been on any heparin products. Can see in head injury; has ICH. Cannot r/o blood disorder (leukemia)   per hematology \"Recommend transfusing platelets for <68,504 and Cryoprecipitate for fibrinogen < 100. \"  -MARK pending  -9/22 bilateral LE venous duplex ordered; low suspicion but at risk and having calf tenderness  -9/22 plts improved today; monitor closely; 9/23 small right peroneal thrombus. No antic per hematology. IR refused/advised against placing IVF due to low risk of clot propagation given size and location  -9/23 counts bumping up; pts now 53k from 45 and previously 36  -9/24 plts cont to improve. 69K. MARK still pending; can resume therapies  9/26 Ddimer pending, fibrinogen now nl  -9/27 D dimer 11+, improved. Per Hematology; likely due to consumption coagulopathy.  Will monitor  -10/2 labs have improved; MARK + 68% confirming dx of HIT; 10/4 recheck cbc (last done 9/28)    -10/5 plts 263; 10/9 plts 283      Bilateral PEs 9/25, filter placed in IR; looks good today. sats good without O2 supplement 94-97% sats; d/w Hematology, no agaratroban  -apprec Pulm assessment. Clinically looks good and no longer symptomatic  -9/28 clinically stable, asymptomatic ; 10/3 stable. No sob, no O2 supplements; 10/9 remains asymptomatic      Hypernatremia; last head CT did not show significant cerebral edema. Clinically improving. Likely due to prerenal azotemia; 9/15 MAY REQ isotonic / hypotonic saline IV;  -9/18 events of weekend noted; Na 156; asymptomatic, on 0.5 dextrose with 1/4 Na; na q 6hrs; pts serum osmolality was normal. Will check urine Na and urine osmolality; depending on findings, will consider consulting Hospitalist vs Nephrology  Neuro improving despite this  -9/19 Na down to 149, urine osmo nl, serum osmo min hi; cont dex/and 1/4 NS; repeat in am. Not DI as uop not increased  -9/20 Na 147; cont fluids; 9/21 not resulted; will stop fluids when Na below 142  -Na 140 9/22; dc IVFs; 9/25 136; 9/27 136; recheck 10/4 134; mildly low; f/u 10/9 pending  -10/17 nl      ICH/IVH; 9/22 clinically improves daily. F/u HCT yest due to transient left eye visual disturbance. Overall, resolving hemorrhage. There is more pronounced edema; expected. Ventricular size now nl      Insomnia; 10/18 had dc'd Trazadone and changed to restoril, added requip for restless leg syndrome. Overall improved but too drowsy this a.m. Also receiving zanaflex. Her tone and restless leg improved. Will dec Restoril due to a.m sleepiness      Anemia ; 9.6-10/ stable; recheck 10/9 10.5 improved       DVT risk / DVT Prophylaxis- Will require daily physician exam to assess for signs and symptoms as patient is at increased risk for of thromboembolism. Mobilization as tolerated. Intermittent pneumatic compression devices when in bed Thigh-high or knee-high thromboembolic deterrent hose when out of bed.  NO  anticoag due to ICH/HIT; has right small right peroneal vein; 9/25 spoke with IR again today; more risk of putting in IVC filter than benefits especially since she is asymptomatic with no swelling or tenderness and small size of clot  -10/3 no edema, right calf soft. No SOB  -10/9 no calf swelling or tenderness  -10/17 platelets normal      Pain Control: stable, mild-to-moderate joint symptoms intermittently, reasonably well controlled by PRN meds. Will require regular pain assessment and comprenhensive pain management,       Wound Care: Monitor wound status daily per staff and physician. At risk for failure. Will require 24/7 rehab nursing. None needed at this time      Hypertension - BP uncontrolled, fluctuating, managed medically. Add prn hydralazine for sbp> 180. Goal SBP is 140-160 given ICH. Cont Normodyne, norvasc and hyzaar; consider Verapamil or scheduled Hydralazine  -9/18 BP increased 169/95; add hydralazine 25 tid  -9/20 BP much improved  -9/22 /74; 9/24 bp stable; may dec hydralazine to bid  -9/25 dec hydralazine  -9/27 bp 116-119  -9/28 SBP in the 90s to 116, asymptomatic; dc hydralazine and monitor; decrease Normodyne to bid  -9/29 BP still low; lower Norvasc and normodyne, cont Hyzaar; all have parameters of when to hold  - 9/30 SBP overnight and this am 108-111, will d/c norvasc, on labetolol and losartan/HCTZ , HR acceptable  -10/2 115/71; controlled. Cont current meds  -130-151/77; 10/5 118/77; 10/6 BP stable; goal 120-130 given ICH  -10/9 148/81; add low dose Norvasc; ideally want less than 130; 10/11 130/73  10/13 remains better controlled  -10/17 /75, 10/23 remains controlled      Mild hyperglycemia, likely stress induced vs borderline diabetes; monitor loosely  -9/27 bs 145 on BMP; follow bs qam x 3d; no issues      Spasticity; had added zanaflex; 10/12 adjust for desired effect; change to 2mg tid and 4mg qhs; AMBULATED YESTERDAY AND ADVANCED OWN LEG!!!!!!  -10/13 ?  Component of RLS at noc; consider trial of Requip  -10/14 increased trazadone, slept better. Add low dose requip at noc  -10/15 ongoing c/o nonspecific insomnia; will try Restoril instead of Trazadone and inc Requip  -10/17 good response to med changes  10/20 laxity with PF bilaterally. 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 Have tried two braces to RLE for dorsiflexion/neutral at ankle. Both have caused skin irritation due to pressure. Pops out of both types. Will need custom fit. Will contact Orthotist      Dysphagia; cont NTL, mech soft; hopefully can upgrade liquids soon 9/25  -9/29 remains on NTL. MBS scheduled for 10/2.  -10/3 now on THINS! !      Leukocytosis; recheck in a.m. Check UA due to stafford. No pulmonary signs (had neg CXR today), no s/s of infxn at old PICC site, no wounds, afebrile. Watch monocytes. -9/18 12.2 improving. Urine neg  -9/19 up to 13.2, ? Reactive. No source of infxn identified. Afebrile  -9/20 WBC now normal      Urinary retention/ neurogenic bladder - start flomax but continue stafford until mobility improves a bit. Strict I/o's  -9/20 keeping stafford to monitor UOP until Na normalizes. No significant output to suggest DI  -9/21 dc stafford; cont Flomax, follow bladder scan  9/22 voiding without issue; incontinence but no retention  -9/28 new onset urinary retention; check UA  -9/29 UA neg but cx saying > 100K gram neg cocci; will need to f/u ID/sens; Start Cipro 500 bid  - 9/30 preliminary urine cx >110K mixed skin denis  -10/2 stop Cipro; cx nl denis  -10/6 recurrent urinary retention. No flomax; re check UA, consider urecholine. CIC for PVRs  -10/9 dc scans; 10/13 per nursing intermittent need for CIC      bowel program - add prn meds; incontinent, want to keep stool on the firmer side. Monitor skin.       GERD - add a PPI. At times may need additional antacids, Maalox prn.          =====================================================    9/21   Addendum; in therapy this afternoon, transient left eye blindness.  Will f/u head CT for extension of or new ICH, especially with low plts   Results  1. Interval decrease in size of intraventricular hemorrhage within the left  basal ganglia/thalamus with near complete resolution of intraventricular  hemorrhage. Surrounding edema is more pronounced with slight increase in  left-to-right midline shift. 2. Interval decrease in ventricular size, approaching normal in caliber.           - FAMILY [id number] REGARDING PROGRESS, NEEDS, DC PLAN; family overwhelmed with decision that need to be made. Family training Monday in case insurance denies as of 10/10          10/10 excellent progress made. Surprising to PT. Definitely have made a step forward and would benefit from ongoing acute inpt rehab to maximize recovery      -10/13 continues to show progress in PT, continues with OT and ST; speech improvements noted  10/17 continues to participate well and benefit from inpt rehab           Time spent was 25 minutes with over 1/2 in direct patient care/examination, consultation and coordination of care.      Signed By: Nona Howard MD     October 23, 2017

## 2017-10-23 NOTE — CONSULTS
Psychology signing off. Spoke with patient's aunt, Carmen Ramírez, to direct her to outpatient psychiatry for treatment of patient's hoarding upon discharge. Patient no longer has need for inpatient psychological services. Electronically Signed By:  Lien Gonzalez Psy.D.   [address] [facility]

## 2017-10-23 NOTE — PROGRESS NOTES
PHYSICAL THERAPY DAILY NOTE  Time In: 8004  Time Out: 4023  Patient Seen For: PM;Transfer training;Gait training; Other (see progress notes)    Subjective: Patient had no complaints. Objective: No pain noted. Other (comment) (falls)  GROSS ASSESSMENT Daily Assessment            BED/MAT MOBILITY Daily Assessment    Supine to Sit : 6 (Modified independent)  Sit to Supine : 6 (Modified independent)       TRANSFERS Daily Assessment    Transfer Type: SPT without device  Transfer Assistance : 4 (Minimal assistance)  Sit to Stand Assistance: Stand-by assistance       GAIT Daily Assessment    Amount of Assistance: 3 (Moderate assistance)  Distance (ft): 100 Feet (ft)  Assistive Device: Gait belt;Cane, quad;Brace/Splint (2 ace wraps to assist DF and ankle support)       STEPS or STAIRS Daily Assessment    Level of Assist : 0 (Not tested)       BALANCE Daily Assessment            WHEELCHAIR MOBILITY Daily Assessment            LOWER EXTREMITY EXERCISES Daily Assessment    Extremity: Both  Exercise Type #1: Other (comment) (motomed x 10 minutes)  Sets Performed: 1  Reps Performed: 10  Level of Assist: Moderate assistance          Assessment: Patient getting more movement in right leg and is very excited about it. Plan of Care: Continue with plan of care to reach PT goals. Returned to room to bed with call santillan at reach.     Antionette Cummings, PTA  10/23/2017

## 2017-10-23 NOTE — PROGRESS NOTES
End Of Shift Functional Summary, Nursing      TOILETING/ BLADDER/ BOWEL    TOILET TRANSFER:  Pt requires moderate assistance. Pt uses wheelchair. BLADDER:  Pt does not have a stafford catheter that staff manages. Pt does take medication. Pt is continent. of bladder and voids in bedside commode  Pt requires staff to empty device Pt has had 0 bladder accidents during this shift .  (An accident is when the episode is not contained in a brief AND/OR the clothing/linen requires changing/cleaning up.)    BOWEL:  Pt does take medication. Pt is continent of bowel and uses bedside commode. Pt requires staff to empty device    Pt has had 0 bowel accidents during this shift  (An accident is when the episode is not contained in a brief AND/OR the clothing/linen requires changing/cleaning up.)    BED/CHAIR TRANSFER  Pt requires moderate assistance. Patient requires the assistance of 1 staff member(s). Pt uses cane        EATING  Pt requires setup. Pt does not wear dentures. TUBE FEEDINGS:  Pt does not  receive nutrition through tube feedings. Documentation reviewed and plan of care discussed/reviewed with   patient assistant during the shift.

## 2017-10-23 NOTE — PROGRESS NOTES
PSYCHOLOGY PROGRESS NOTE      Name:  Bradley Huntley    Date of Service: 10/23/2017  Location of Service:   [facility]  Type of Service: Health and Behavior Intervention- w/ family, no pt  Duration:  30 minutes  Primary Diagnosis:   1. Urinary retention with incomplete bladder emptying    2. Hemorrhagic stroke ([address]    3. Accelerated hypertension    4. Acute spontaneous intraventricular hemorrhage assoc w/ hypertension (HCC)    5. Anxiety    6. At risk for aspiration    7. Non-intractable vomiting with nausea, unspecified vomiting type    8. Stroke, hemorrhagic ([address]    9. Acute respiratory failure with hypoxia (HCC)    10. Hypoxemia    11. Hypertensive urgency, malignant    12. Essential hypertension    13. ADD (attention deficit disorder)    14. Other depression    15. Atrophic vaginitis    16. Screening for breast cancer    17. Hypernatremia    18. Hypokalemia    19. Thrombocytopenia ([address]    20. Multiple left-sided nontraumatic localized intracerebral hemorrhages ([address]    21. DIC (disseminated intravascular coagulation) ([address]    22. Acute deep vein thrombosis (DVT) of right lower extremity, unspecified vein (HCC)    23. Pulmonary embolism, other ([address]    24. Depression, unspecified depression type    25. Other acute pulmonary embolism without acute cor pulmonale ([address]    26. Acute deep vein thrombosis (DVT) of other specified vein of right lower extremity ([address]    27. Attention deficit disorder, unspecified hyperactivity presence    28. Attention deficit disorder (ADD) without hyperactivity    29. Mild single current episode of major depressive disorder ([address]    30. Restless legs syndrome    31. Restless leg        Summary of Service:  Engaged in phone consult with Lola Perez, [phone]. She expressed concerned about the severity of the patient's hoarding disorder. Recommended OP psychiatry appointment for assessment and treatment for hoarding disorder upon discharge.   Addressed Pippa's concerns regarding hoarding. Malika Lin indicated understanding and she will move forward with OP referrals. Engaged in review of records and consultation with resources to help with Pippa's decision making. Provided clinical benchmarks for severity and importance of family accompanying the patient to OP psychiatry appointment. Will sign off. Grant Hall Psy.D.   Psychologist

## 2017-10-23 NOTE — PROGRESS NOTES
PHYSICAL THERAPY DAILY NOTE  Time In: 0830  Time Out: 9783  Patient Seen For: AM;Transfer training;Gait training; Therapeutic exercise; Other (see progress notes)    Subjective: Patient had no complaints. Objective: No pain noted. Other (comment) (falls)  GROSS ASSESSMENT Daily Assessment            BED/MAT MOBILITY Daily Assessment    Supine to Sit : 6 (Modified independent)  Sit to Supine : 6 (Modified independent)       TRANSFERS Daily Assessment    Transfer Type: SPT without device  Transfer Assistance : 4 (Minimal assistance)  Sit to Stand Assistance: Stand-by assistance       GAIT Daily Assessment    Amount of Assistance: 3 (Moderate assistance)  Distance (ft): 100 Feet (ft)  Assistive Device: Gait belt;Cane, quad;Brace/Splint (used ace wraps to right foot for DF and ankle support)       STEPS or STAIRS Daily Assessment    Level of Assist : 0 (Not tested)       BALANCE Daily Assessment            WHEELCHAIR MOBILITY Daily Assessment            LOWER EXTREMITY EXERCISES Daily Assessment    Extremity: Both  Exercise Type #1: Supine lower extremity strengthening  Sets Performed: 1  Reps Performed: 20  Level of Assist: Moderate assistance          Assessment: Patient making progress but extension tone is the biggest barrier. Custom AFO being made to assist this for transfers and gait. Plan of Care: Continue with plan of care to reach PT goals. Returned to room with call santillan at reach.     Izzy Stevens PTA  10/23/2017

## 2017-10-23 NOTE — PROGRESS NOTES
Time In 0701   Time Out 0745     Activities of Daily Living    Score Comments   Self-Feeding 7 I   Grooming 7 Tasks completed by patient: Shaved/applied makeup (optional), Brushed hair  Comments: I   Bathing 5 Body Parts Bathe: Abdomen, Arm, left, Arm, right, Buttocks, Chest, Lower leg and foot, left, Lower leg and foot, right, Tali area, Thigh, left, Thigh, right  Comments: SBA in standing   Tub/Shower Transfer Aston 4 Type of Shower: Shower  Adaptive  Equipment:Tub transfer bench, Grab bars and Wheelchair  Comments: CGA   Upper Body  Dressing/  Undressing 4 Items Applied:Pullover (4 steps), Bra (3 steps)  Comments: A to adjust bra   Lower Body Dressing/  Undressing 4 Items Applied:Shoe, left (1 step), Shoe, right (1 step), Sock, left (1 step), Sock, right (1 step), Underpants (3 steps), Elastic waist pants (3 steps)  Adaptive Equipment: N/A  Comments: A for R shoe   Education  Spouse, Travis in for family training with verbalization of understanding for techniques. Pt was in bed and agreeable to tx. Pt's performance with ADL is reflected in above chart. Pt was left at EOB eating breakfast with spouse Ray Pelayo present. Continue with POC.      Saritha KNIGHT/MITCHELL  10/23/2017

## 2017-10-23 NOTE — PROGRESS NOTES
Pt sitting on side of bed. Denies needs or concerns at this time. Alert and oriented x4. Lungs sounds clear bilaterally with respirations even and unlabored. Bowel sounds active in all quadrants. +2 pulses bilaterally in upper and lower extremities. Right arm flaccid. Right leg floppy. Instructed to call for assistance. Call light in reach. Voiced understanding and identified call light.

## 2017-10-23 NOTE — PROGRESS NOTES
Patient is calm with loving family at bedside. Encouraged patient in her road to recovery. She demonstrates a positive attitude towards her illness and those caring for her. No specific spiritual needs expressed.     Carlyn John, staff Robert galeano [address], [address] [facility]  /   [email or web address]

## 2017-10-23 NOTE — PROGRESS NOTES
End Of Shift Functional Summary, Nursing      TOILETING/ BLADDER/ BOWEL    TOILET TRANSFER:  Pt requires minimal assistance. Pt uses grab bars. BLADDER:  Pt does not have a stafford catheter that staff manages. Pt does take medication. Pt is continent. of bladder and voids in bedside commode  Pt requires staff to empty device Pt has had 0 bladder accidents during this shift.  (An accident is when the episode is not contained in a brief AND/OR the clothing/linen requires changing/cleaning up.)    BOWEL:  Pt does take medication. Pt is continent of bowel and uses bedside commode. Pt requires staff to empty device    Pt has had 0 bowel accidents during this shift. (An accident is when the episode is not contained in a brief AND/OR the clothing/linen requires changing/cleaning up.)    BED/CHAIR TRANSFER  Pt requires minimal assistance. Patient requires the assistance of 1 staff member(s). Pt uses staff member. EATING  Pt requires setup. Pt does not wear dentures. TUBE FEEDINGS:  Pt does not  receive nutrition through tube feedings. Patient requires no assistance with feedings. Documentation reviewed and plan of care discussed/reviewed with   oncoming nurse and patient assistant during the shift.

## 2017-10-24 PROCEDURE — 97112 NEUROMUSCULAR REEDUCATION: CPT

## 2017-10-24 PROCEDURE — 97116 GAIT TRAINING THERAPY: CPT

## 2017-10-24 PROCEDURE — 97530 THERAPEUTIC ACTIVITIES: CPT

## 2017-10-24 PROCEDURE — 74011250637 HC RX REV CODE- 250/637: Performed by: PHYSICAL MEDICINE & REHABILITATION

## 2017-10-24 PROCEDURE — 99232 SBSQ HOSP IP/OBS MODERATE 35: CPT | Performed by: PHYSICAL MEDICINE & REHABILITATION

## 2017-10-24 PROCEDURE — 97110 THERAPEUTIC EXERCISES: CPT

## 2017-10-24 PROCEDURE — 97535 SELF CARE MNGMENT TRAINING: CPT

## 2017-10-24 PROCEDURE — 65310000000 HC RM PRIVATE REHAB

## 2017-10-24 PROCEDURE — 92507 TX SP LANG VOICE COMM INDIV: CPT

## 2017-10-24 RX ORDER — ROPINIROLE 2 MG/1
2 TABLET, FILM COATED ORAL
Qty: 30 TAB | Refills: 4 | Status: SHIPPED | OUTPATIENT
Start: 2017-10-24 | End: 2017-12-05

## 2017-10-24 RX ORDER — TIZANIDINE 4 MG/1
TABLET ORAL
Qty: 30 TAB | Refills: 6 | Status: SHIPPED | OUTPATIENT
Start: 2017-10-24 | End: 2018-01-02 | Stop reason: DRUGHIGH

## 2017-10-24 RX ORDER — METHYLPHENIDATE HYDROCHLORIDE 5 MG/1
TABLET ORAL
Qty: 60 TAB | Refills: 0 | Status: SHIPPED | OUTPATIENT
Start: 2017-10-24 | End: 2017-11-30 | Stop reason: SDUPTHER

## 2017-10-24 RX ORDER — POTASSIUM CHLORIDE 20 MEQ/1
20 TABLET, EXTENDED RELEASE ORAL DAILY
Qty: 30 TAB | Refills: 0 | Status: SHIPPED | OUTPATIENT
Start: 2017-10-25 | End: 2018-02-28

## 2017-10-24 RX ORDER — TAMSULOSIN HYDROCHLORIDE 0.4 MG/1
0.4 CAPSULE ORAL
Qty: 30 CAP | Refills: 4 | Status: SHIPPED | OUTPATIENT
Start: 2017-10-24 | End: 2018-02-28

## 2017-10-24 RX ORDER — TRAMADOL HYDROCHLORIDE 50 MG/1
50 TABLET ORAL
Qty: 120 TAB | Refills: 0 | Status: SHIPPED | OUTPATIENT
Start: 2017-10-24 | End: 2017-12-05

## 2017-10-24 RX ORDER — LABETALOL 100 MG/1
100 TABLET, FILM COATED ORAL 2 TIMES DAILY
Qty: 60 TAB | Refills: 4 | Status: SHIPPED | OUTPATIENT
Start: 2017-10-24 | End: 2017-12-13 | Stop reason: SDUPTHER

## 2017-10-24 RX ORDER — TIZANIDINE 2 MG/1
TABLET ORAL
Qty: 60 TAB | Refills: 4 | Status: SHIPPED | OUTPATIENT
Start: 2017-10-24 | End: 2018-01-10

## 2017-10-24 RX ORDER — TEMAZEPAM 15 MG/1
15 CAPSULE ORAL
Qty: 30 CAP | Refills: 0 | Status: SHIPPED | OUTPATIENT
Start: 2017-10-24 | End: 2017-12-05

## 2017-10-24 RX ADMIN — TIZANIDINE 2 MG: 2 TABLET ORAL at 17:57

## 2017-10-24 RX ADMIN — TIZANIDINE 2 MG: 2 TABLET ORAL at 05:16

## 2017-10-24 RX ADMIN — ROPINIROLE HYDROCHLORIDE 2 MG: 2 TABLET, FILM COATED ORAL at 20:58

## 2017-10-24 RX ADMIN — LABETALOL HYDROCHLORIDE 100 MG: 100 TABLET, FILM COATED ORAL at 17:57

## 2017-10-24 RX ADMIN — TAMSULOSIN HYDROCHLORIDE 0.4 MG: 0.4 CAPSULE ORAL at 20:58

## 2017-10-24 RX ADMIN — TIZANIDINE 4 MG: 2 TABLET ORAL at 20:58

## 2017-10-24 RX ADMIN — POLYETHYLENE GLYCOL 3350 17 G: 17 POWDER, FOR SOLUTION ORAL at 09:19

## 2017-10-24 RX ADMIN — ACETAMINOPHEN 500 MG: 500 TABLET, FILM COATED ORAL at 09:18

## 2017-10-24 RX ADMIN — STANDARDIZED SENNA CONCENTRATE AND DOCUSATE SODIUM 1 TABLET: 8.6; 5 TABLET, FILM COATED ORAL at 09:19

## 2017-10-24 RX ADMIN — METHYLPHENIDATE HYDROCHLORIDE 5 MG: 10 TABLET ORAL at 09:19

## 2017-10-24 RX ADMIN — TEMAZEPAM 15 MG: 15 CAPSULE ORAL at 23:41

## 2017-10-24 RX ADMIN — POTASSIUM CHLORIDE 20 MEQ: 20 TABLET, EXTENDED RELEASE ORAL at 09:19

## 2017-10-24 NOTE — PROGRESS NOTES
10/24/17 1056   Time Spent With Patient   Time In 1010   Time Out 1045   Patient Seen For: AM;Neuro-linguistics   Mental Status   Neurologic State Alert   Orientation Level Oriented X4   Cognition Appropriate decision making;Memory loss   Perception Appears intact   Perseveration No perseveration noted   Safety/Judgement Fall prevention   Pt completed problem solving tasks with 90% accuracy. Pt completed word finding tasks with min cues with 90% accuracy. Pt completed STM tasks with min cues with 85% Accuracy.    Blanquita Bouchre MA/ANASTASIA/SLP

## 2017-10-24 NOTE — PROGRESS NOTES
Order for shower chair and quad cane received. Insurance will not pay for either (paying for wc). Discussed with spouse and daughter; agreeable to cost.  Ordered from [employer].  Dtr to .

## 2017-10-24 NOTE — PROGRESS NOTES
Patricia Herron MD,   Medical Director  [address] [facility], [address] [facility]  Tel: [phone]       SFD PROGRESS NOTE    Jose Fermin  Admit Date: 9/15/2017  Admit Diagnosis: stroke, left brain involvement;ICH (intracerebral hemorrhag*    Subjective     Slept well. Pleased with progress. Denies pain. Continent. Eating well    Objective:     Current Facility-Administered Medications   Medication Dose Route Frequency    acetaminophen (TYLENOL) tablet 500 mg  500 mg Oral Q4H PRN    temazepam (RESTORIL) capsule 15 mg  15 mg Oral QHS PRN    rOPINIRole (REQUIP) tablet 2 mg  2 mg Oral QHS    tiZANidine (ZANAFLEX) tablet 2 mg  2 mg Oral BID    tiZANidine (ZANAFLEX) tablet 4 mg  4 mg Oral QHS    amLODIPine (NORVASC) tablet 2.5 mg  2.5 mg Oral DAILY    potassium chloride (K-DUR, KLOR-CON) SR tablet 20 mEq  20 mEq Oral DAILY    senna-docusate (PERICOLACE) 8.6-50 mg per tablet 1 Tab  1 Tab Oral DAILY    polyethylene glycol (MIRALAX) packet 17 g  17 g Oral DAILY    methylphenidate HCl (RITALIN) tablet 5 mg  5 mg Oral BID    labetalol (NORMODYNE) tablet 100 mg  100 mg Oral BID    losartan/hydroCHLOROthiazide (HYZAAR) 100/12.5 mg   Oral DAILY    alum-mag hydroxide-simeth (MYLANTA) oral suspension 30 mL  30 mL Oral Q4H PRN    bisacodyl (DULCOLAX) suppository 10 mg  10 mg Rectal DAILY PRN    hydrALAZINE (APRESOLINE) tablet 50 mg  50 mg Oral QID PRN    lip protectant (BLISTEX) ointment   Topical PRN    ondansetron (ZOFRAN ODT) tablet 4 mg  4 mg Oral Q6H PRN    sodium phosphate (FLEET'S) enema 118 mL  1 Enema Rectal PRN    traMADol (ULTRAM) tablet 50 mg  50 mg Oral Q6H PRN    tamsulosin (FLOMAX) capsule 0.4 mg  0.4 mg Oral QHS     Review of Systems:Denies chest pain, shortness of breath, cough, headache, visual problems, abdominal pain, dysurea, calf pain. Pertinent positives are as noted in the medical records and unremarkable otherwise.      Visit Vitals    /74    Pulse 81    Temp 98.1 °F (36.7 °C)    Resp 16    SpO2 95%        Physical Exam:   General: Alert and age appropriately oriented. No acute cardio respiratory distress. HEENT: Normocephalic,no scleral icterus  Oral mucosa moist without cyanosis   Lungs: Clear to auscultation  bilaterally. Respiration even and unlabored   Heart: Regular rate and rhythm, S1, S2   No  murmurs, clicks, rub or gallops   Abdomen: Soft, non-tender, nondistended. Bowel sounds present. No organomegaly. Genitourinary: Benign . Neuromuscular:      RUE with shoulder retraction, slt abd, protraction o/w RUE 0/5  RLE can now flex at hip and knee, slt extention. 0/5 DF/PF  Attends to right more. Speech more fluent. Right facial weakness   Skin/extremity: No rashes, no erythema. No calf tenderness BLE  No edema                                                                            Functional Assessment:  Gross Assessment  AROM:  (RLE AAROM WFL except PF contracture of -10) (10/20/17 0900)  PROM:  (WFL except right ankle PF contracture) (10/20/17 0900)  Strength:  (LLE WFL, RLE) (10/20/17 0900)  Coordination:  (LLE intact, RLE impaired) (10/20/17 0900)  Tone:  (extensor spasticity) (10/20/17 0900)  Sensation: Impaired (RLE) (10/20/17 0900)       Balance  Sitting - Static: Good (unsupported) (10/21/17 1400)  Sitting - Dynamic: Fair (occasional) (10/20/17 1600)  Standing - Static: Poor (10/21/17 1400)  Standing - Dynamic : Impaired (10/21/17 1400)           Toileting  Adaptive Equipment: Elevated seat;Walker (09/29/17 1534)         [facility] President Fall Risk Assessment:  [facility]  Mobility: Ambulates or transfers with assist devices or assistance/unsteady gait (10/23/17 1931)  Mobility Interventions: Bed/chair exit alarm (10/23/17 1931)  Mentation: Alert, oriented x 3 (10/23/17 1931)  Mentation Interventions: More frequent rounding; Toileting rounds;Update white board (10/23/17 1931)  Medication: Patient receiving anticonvulsants, sedatives(tranquilizers), psychotropics or hypnotics, hypoglycemics, narcotics, sleep aids, antihypertensives, laxatives, or diuretics (10/23/17 1931)  Medication Interventions: Patient to call before getting OOB; Teach patient to arise slowly (10/23/17 1931)  Elimination: Needs assistance with toileting (10/23/17 1931)  Elimination Interventions: Bed/chair exit alarm;Call light in reach; Patient to call for help with toileting needs; Toilet paper/wipes in reach; Toileting schedule/hourly rounds (10/23/17 1931)  Prior Fall History: No (10/23/17 1931)  History of Falls Interventions: Bed/chair exit alarm; Door open when patient unattended (10/23/17 1931)  Total Score: 3 (10/23/17 1931)  Standard Fall Precautions: Yes (10/15/17 1126)  High Fall Risk: Yes (10/23/17 1931)     Speech Assessment:         Ambulation:  Gait  Base of Support: Narrowed; Center of gravity altered (09/22/17 1200)  Speed/Lulu: Delayed (09/22/17 1200)  Step Length: Right shortened (09/22/17 1200)  Distance (ft): 100 Feet (ft) (10/23/17 1541)  Assistive Device: Gait belt;Cane, quad;Brace/Splint (2 ace wraps to assist DF and ankle support) (10/23/17 1541)     Labs/Studies:  No results found for this or any previous visit (from the past 72 hour(s)).     Assessment:     Problem List as of 10/24/2017  Date Reviewed: 9/12/2017          Codes Class Noted - Resolved    Thrombocytopenia ([facility] ICD-10-CM: D69.6  ICD-9-CM: 287.5  9/25/2017 - Present        Pulmonary emboli (HCC) ICD-10-CM: I26.99  ICD-9-CM: 415.19  9/25/2017 - Present        DVT (deep venous thrombosis) ([facility] ICD-10-CM: I82.409  ICD-9-CM: 453.40  9/25/2017 - Present        * (Principal)ICH (intracerebral hemorrhage) ([facility] ICD-10-CM: I61.9  ICD-9-CM: 949  9/15/2017 - Present        Hypertensive urgency, malignant ICD-10-CM: I16.0  ICD-9-CM: 401.0  9/11/2017 - Present        Stroke, hemorrhagic ([facility] ICD-10-CM: I61.9  ICD-9-CM: 238  9/7/2017 - Present        Accelerated hypertension ICD-10-CM: I10  ICD-9-CM: 401.0  9/7/2017 - Present        At risk for aspiration (Chronic) ICD-10-CM: Z91.89  ICD-9-CM: V49.89  9/7/2017 - Present        Acute spontaneous intraventricular hemorrhage assoc w/ hypertension ([facility] ICD-10-CM: I61.5, I10  ICD-9-CM: 815, 401.9  9/7/2017 - Present        Screening for breast cancer ICD-10-CM: Z12.31  ICD-9-CM: V76.10  2/27/2017 - Present        Atrophic vaginitis ICD-10-CM: N95.2  ICD-9-CM: 627.3  7/11/2016 - Present        HTN (hypertension) (Chronic) ICD-10-CM: I10  ICD-9-CM: 401.9  10/23/2015 - Present        Depression ICD-10-CM: F32.9  ICD-9-CM: 554  10/23/2015 - Present        Anxiety (Chronic) ICD-10-CM: F41.9  ICD-9-CM: 300.00  10/23/2015 - Present        RESOLVED: Hypoxemia ICD-10-CM: R09.02  ICD-9-CM: 799.02  9/10/2017 - 9/12/2017        RESOLVED: Acute respiratory failure ([facility] ICD-10-CM: J96.00  ICD-9-CM: 518.81  9/8/2017 - 9/10/2017        RESOLVED: Hemorrhagic stroke ([facility] ICD-10-CM: I61.9  ICD-9-CM: 439  9/7/2017 - 9/7/2017        RESOLVED: Non-intractable vomiting with nausea ICD-10-CM: R11.2  ICD-9-CM: 787.01  9/7/2017 - 9/12/2017        RESOLVED: Seborrheic keratosis, inflamed ICD-10-CM: L82.0  ICD-9-CM: 702.11  7/11/2016 - 9/20/2016        RESOLVED: Cough ICD-10-CM: R05  ICD-9-CM: 786.2  7/11/2016 - 9/20/2016        RESOLVED: ADD (attention deficit disorder) (Chronic) ICD-10-CM: F98.8  ICD-9-CM: 314.00  10/23/2015 - 10/13/2017        RESOLVED: Shoulder pain ICD-10-CM: M25.519  ICD-9-CM: 719.41  10/23/2015 - 2/27/2017                        Plan:           Left BG Hemorrhage with intraventricular extension due to HTN emergency; resultant R hemiplegia, right neglect, dysarthria, aphasia, dysphagia with significant decline in mobility and self care  Plan:   Continue daily physician medical management:  Pneumonia prophylaxis- Incentive spirometer every hour while awake. Will need instruction and assistance.  Not sure if she can get a proper oral seal to be effective  -10/9 no pulmonary issues      Dysphagia; Pureed diet with NTL; at risk for malnutrition and dehydration. PICC line removed prior to transfer; may need a line for IVFs if BUN continues to increase. Low K, Ca; check mg. Supplementation as needed. Na elevated  -mag ok, K much improved; cont to supplement while on diuretic  -9/19 replace K; 3.0; 2.8 this am 9/20; inc supplement; may need to add to IVFs  -9/25 fluid status and K nl; 9/28 cont NTL; high risk aspiration; still needs supervision /assist with meals  -10/2 MBS today; thin liq!!  -10/9 po intake, especially fluids, has improved; feeding self      Thrombocytopenia; has been trending down for no apparent reason. hgb slt down as well. Has not been on Hep products; consult hematology today 9/19  -plts 40K, continues to trend down 9/20; await consult by hematology. ? reln to [address]. Mild anemia as well  Check HIT panel, d dimer , fibrinogen  -9/21 plts 36k ; HIT PROFILE POSITIVE, D DIMER ELEVATED 32.79, FIBRINOGEN  ; consistent with DIC; Etiology unclear  WILL D/W HEMATOLOGY; LFTs ok, no hx of blood transfusion,no hx pancreatitis, no sign of bacteremia. Has not been on any heparin products. Can see in head injury; has ICH. Cannot r/o blood disorder (leukemia)   per hematology \"Recommend transfusing platelets for <94,210 and Cryoprecipitate for fibrinogen < 100. \"  -MARK pending  -9/22 bilateral LE venous duplex ordered; low suspicion but at risk and having calf tenderness  -9/22 plts improved today; monitor closely; 9/23 small right peroneal thrombus. No antic per hematology. IR refused/advised against placing IVF due to low risk of clot propagation given size and location  -9/23 counts bumping up; pts now 53k from 45 and previously 36  -9/24 plts cont to improve. 69K. MARK still pending; can resume therapies  9/26 Ddimer pending, fibrinogen now nl  -9/27 D dimer 11+, improved. Per Hematology; likely due to consumption coagulopathy.  Will monitor  -10/2 labs have improved; MARK + 68% confirming dx of HIT; 10/4 recheck cbc (last done 9/28)    -10/5 plts 263; 10/9 plts 283      Bilateral PEs 9/25, filter placed in IR; looks good today. sats good without O2 supplement 94-97% sats; d/w Hematology, no agaratroban  -apprec Pulm assessment. Clinically looks good and no longer symptomatic  -9/28 clinically stable, asymptomatic ; 10/3 stable. No sob, no O2 supplements; 10/9 remains asymptomatic      Hypernatremia; last head CT did not show significant cerebral edema. Clinically improving. Likely due to prerenal azotemia; 9/15 MAY REQ isotonic / hypotonic saline IV;  -9/18 events of weekend noted; Na 156; asymptomatic, on 0.5 dextrose with 1/4 Na; na q 6hrs; pts serum osmolality was normal. Will check urine Na and urine osmolality; depending on findings, will consider consulting Hospitalist vs Nephrology  Neuro improving despite this  -9/19 Na down to 149, urine osmo nl, serum osmo min hi; cont dex/and 1/4 NS; repeat in am. Not DI as uop not increased  -9/20 Na 147; cont fluids; 9/21 not resulted; will stop fluids when Na below 142  -Na 140 9/22; dc IVFs; 9/25 136; 9/27 136; recheck 10/4 134; mildly low; f/u 10/9 pending  -10/17 nl      ICH/IVH; 9/22 clinically improves daily. F/u HCT yest due to transient left eye visual disturbance. Overall, resolving hemorrhage. There is more pronounced edema; expected. Ventricular size now nl      Insomnia; 10/18 had dc'd Trazadone and changed to restoril, added requip for restless leg syndrome. Overall improved but too drowsy this a.m. Also receiving zanaflex. Her tone and restless leg improved. Will dec Restoril due to a.m sleepiness      Anemia ; 9.6-10/ stable; recheck 10/9 10.5 improved       DVT risk / DVT Prophylaxis- Will require daily physician exam to assess for signs and symptoms as patient is at increased risk for of thromboembolism. Mobilization as tolerated.  Intermittent pneumatic compression devices when in bed Thigh-high or knee-high thromboembolic deterrent hose when out of bed. NO  anticoag due to ICH/HIT; has right small right peroneal vein; 9/25 spoke with IR again today; more risk of putting in IVC filter than benefits especially since she is asymptomatic with no swelling or tenderness and small size of clot  -10/3 no edema, right calf soft. No SOB  -10/9 no calf swelling or tenderness  -10/17 platelets normal      Pain Control: stable, mild-to-moderate joint symptoms intermittently, reasonably well controlled by PRN meds. Will require regular pain assessment and comprenhensive pain management,       Wound Care: Monitor wound status daily per staff and physician. At risk for failure. Will require 24/7 rehab nursing. None needed at this time      Hypertension - BP uncontrolled, fluctuating, managed medically. Add prn hydralazine for sbp> 180. Goal SBP is 140-160 given ICH. Cont Normodyne, norvasc and hyzaar; consider Verapamil or scheduled Hydralazine  -9/18 BP increased 169/95; add hydralazine 25 tid  -9/20 BP much improved  -9/22 /74; 9/24 bp stable; may dec hydralazine to bid  -9/25 dec hydralazine  -9/27 bp 116-119  -9/28 SBP in the 90s to 116, asymptomatic; dc hydralazine and monitor; decrease Normodyne to bid  -9/29 BP still low; lower Norvasc and normodyne, cont Hyzaar; all have parameters of when to hold  - 9/30 SBP overnight and this am 108-111, will d/c norvasc, on labetolol and losartan/HCTZ , HR acceptable  -10/2 115/71; controlled.  Cont current meds  -130-151/77; 10/5 118/77; 10/6 BP stable; goal 120-130 given ICH  -10/9 148/81; add low dose Norvasc; ideally want less than 130; 10/11 130/73  10/13 remains better controlled  -10/17 /75, 10/23 remains controlled  -10/24 109/74 max ; will dc Norvasc (only on 2.5mg)      Mild hyperglycemia, likely stress induced vs borderline diabetes; monitor loosely  -9/27 bs 145 on BMP; follow bs qam x 3d; no issues      Spasticity; had added zanaflex; 10/12 adjust for desired effect; change to 2mg tid and 4mg qhs; AMBULATED YESTERDAY AND ADVANCED OWN LEG!!!!!!  -10/13 ? Component of RLS at noc; consider trial of Requip  -10/14 increased trazadone, slept better. Add low dose requip at noc  -10/15 ongoing c/o nonspecific insomnia; will try Restoril instead of Trazadone and inc Requip  -10/17 good response to med changes  10/20 laxity with PF bilaterally. 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 Have tried two braces to RLE for dorsiflexion/neutral at ankle. Both have caused skin irritation due to pressure. Pops out of both types. Will need custom fit. Will contact Orthotist  -10/24 stable. Uses tone to ambulate      Dysphagia; cont NTL, mech soft; hopefully can upgrade liquids soon 9/25  -9/29 remains on NTL. MBS scheduled for 10/2.  -10/3 now on THINS! !      Leukocytosis; recheck in a.m. Check UA due to stafford. No pulmonary signs (had neg CXR today), no s/s of infxn at old PICC site, no wounds, afebrile. Watch monocytes. -9/18 12.2 improving. Urine neg  -9/19 up to 13.2, ? Reactive. No source of infxn identified. Afebrile  -9/20 WBC now normal      Urinary retention/ neurogenic bladder - start flomax but continue stafford until mobility improves a bit. Strict I/o's  -9/20 keeping stafford to monitor UOP until Na normalizes. No significant output to suggest DI  -9/21 dc stafford; cont Flomax, follow bladder scan  9/22 voiding without issue; incontinence but no retention  -9/28 new onset urinary retention; check UA  -9/29 UA neg but cx saying > 100K gram neg cocci; will need to f/u ID/sens; Start Cipro 500 bid  - 9/30 preliminary urine cx >110K mixed skin denis  -10/2 stop Cipro; cx nl denis  -10/6 recurrent urinary retention. No flomax; re check UA, consider urecholine. CIC for PVRs  -10/9 dc scans; 10/13 per nursing intermittent need for CIC  -10/24 no further CIC required      bowel program - add prn meds; incontinent, want to keep stool on the firmer side.  Monitor skin.       GERD - add a PPI. At times may need additional antacids, Maalox prn.           =====================================================    9/21   Addendum; in therapy this afternoon, transient left eye blindness. Will f/u head CT for extension of or new ICH, especially with low plts   Results  1. Interval decrease in size of intraventricular hemorrhage within the left  basal ganglia/thalamus with near complete resolution of intraventricular  hemorrhage. Surrounding edema is more pronounced with slight increase in  left-to-right midline shift. 2. Interval decrease in ventricular size, approaching normal in caliber.           - FAMILY [id number] REGARDING PROGRESS, NEEDS, DC PLAN; family overwhelmed with decision that need to be made. Family training Monday in case insurance denies as of 10/10          10/10 excellent progress made. Surprising to PT. Definitely have made a step forward and would benefit from ongoing acute inpt rehab to maximize recovery      -10/13 continues to show progress in PT, continues with OT and ST; speech improvements noted  10/17 continues to participate well and benefit from inpt rehab                   Time spent was 25 minutes with over 1/2 in direct patient care/examination, consultation and coordination of care.      Signed By: Sherrell Wilson MD     October 24, 2017

## 2017-10-24 NOTE — PROGRESS NOTES
End Of Shift Functional Summary, Nursing      TOILETING/ BLADDER/ BOWEL    TOILET TRANSFER:  Pt requires moderate assistance. Pt uses wheelchair. BLADDER:  Pt does not have a stafford catheter that staff manages. Pt does take medication. Pt is continent. of bladder and voids in bedside commode  Pt requires staff to empty device Pt has had 0 bladder accidents during this shift .  (An accident is when the episode is not contained in a brief AND/OR the clothing/linen requires changing/cleaning up.)    BOWEL:  Pt does take medication. Pt is continent of bowel and uses bedside commode. Pt requires staff to empty device    Pt has had 0 bowel accidents during this shift re (An accident is when the episode is not contained in a brief AND/OR the clothing/linen requires changing/cleaning up.)    BED/CHAIR TRANSFER  Pt requires moderate assistance. Patient requires the assistance of 1 staff member(s). Pt uses walker      EATING  Pt requires setup. Pt does not wear dentures. TUBE FEEDINGS:  Pt does not  receive nutrition through tube feedings. Documentation reviewed and plan of care discussed/reviewed with   patient assistant during the shift.

## 2017-10-24 NOTE — PROGRESS NOTES
PHYSICAL THERAPY DAILY NOTE  Time In: 0915  Time Out: 1003  Patient Seen For: AM;Therapeutic exercise; Other (see progress notes)    Subjective: Pt c/o pain in her buttocks before and throughout treatment. No complaints of pain anywhere else. Objective: Other (comment) (falls)  GROSS ASSESSMENT Daily Assessment            BED/MAT MOBILITY Daily Assessment    Supine to Sit : 6 (Modified independent)  Sit to Supine : 4 (Minimal assistance)       TRANSFERS Daily Assessment    Transfer Type: SPT without device  Transfer Assistance : 4 (Contact guard assistance)  Sit to Stand Assistance: Stand-by assistance  Car Transfers: Not tested       GAIT Daily Assessment            STEPS or STAIRS Daily Assessment            BALANCE Daily Assessment            WHEELCHAIR MOBILITY Daily Assessment            LOWER EXTREMITY EXERCISES Daily Assessment   Ex #1: exercises included SAQ sitting on EOB and BLE ankle dorsiflexion (max assistance on R)    Ex #2: aggressive stretching to R ankle to increase dorsiflexion. Also stretching to R hip muscles (extensors, IR, ER, adductors) Extremity: Right  Exercise Type #1: Seated lower extremity strengthening  Sets Performed: 3  Reps Performed: 10  Level of Assist: Minimal assistance  Exercise Type #2: Other (comment) (stretching RLE)  Sets Performed: 3  Reps Performed: 30 (seconds)  Level of Assist: Total assistance          Assessment: Pt showed decreased R ankle ROM since yesterday, but improved with stretching. Instructed pt to have family members stretch ankle and to lay in bed with knees bent and foot flat to stretch R dorsiflexion. Pt able to complete full SAQ with therapist manual facilitation and pt moving both legs at the same time. Returned pt to room to sit on EOB with call bell and needs in reach. ST to directly follow treatment. Plan of Care: Continue per PT POC to prepare for d/c on Thursday.     Angelique MANDA Marina  10/24/2017

## 2017-10-24 NOTE — PROGRESS NOTES
Problem: Falls - Risk of  Goal: *Absence of Falls  Document Haley Fall Risk and appropriate interventions in the flowsheet.    Outcome: Progressing Towards Goal  Fall Risk Interventions:  Mobility Interventions: Bed/chair exit alarm    Mentation Interventions: More frequent rounding, Toileting rounds, Update white board    Medication Interventions: Patient to call before getting OOB, Teach patient to arise slowly    Elimination Interventions: Bed/chair exit alarm, Call light in reach, Patient to call for help with toileting needs, Toilet paper/wipes in reach, Toileting schedule/hourly rounds    History of Falls Interventions: Bed/chair exit alarm, Door open when patient unattended

## 2017-10-24 NOTE — PROGRESS NOTES
Problem: Falls - Risk of  Goal: *Absence of Falls  Document Haley Fall Risk and appropriate interventions in the flowsheet.    Outcome: Progressing Towards Goal  Fall Risk Interventions:  Mobility Interventions: Bed/chair exit alarm    Mentation Interventions: More frequent rounding    Medication Interventions: Patient to call before getting OOB    Elimination Interventions: Bed/chair exit alarm    History of Falls Interventions: Bed/chair exit alarm

## 2017-10-24 NOTE — PROGRESS NOTES
PHYSICAL THERAPY DAILY NOTE  Time In: 7941  Time Out: 8361  Patient Seen For: PM;Other (see progress notes); Therapeutic exercise;Gait training;Transfer training    Subjective: Patient had no complaints. Objective: No pain noted. Daughter present for training this PM. Discussed how much assistance in the home she will require. Daughter seems to show good knowledge of how much. She observed several transfers. Other (comment) (falls)  GROSS ASSESSMENT Daily Assessment            BED/MAT MOBILITY Daily Assessment    Supine to Sit : 6 (Modified independent)  Sit to Supine : 6 (Modified independent)       TRANSFERS Daily Assessment    Transfer Type: SPT without device  Transfer Assistance : 4 (Contact guard assistance)  Sit to Stand Assistance: Stand-by assistance  Car Transfers: Not tested       GAIT Daily Assessment    Amount of Assistance: 3 (Moderate assistance)  Distance (ft): 100 Feet (ft)  Assistive Device: Gait belt;Brace/Splint;Cane, quad; Other (comment) (ace wraps to assist right knee and ankle awaiting an custom AFO)       STEPS or STAIRS Daily Assessment    Level of Assist : 0 (Not tested)       BALANCE Daily Assessment            WHEELCHAIR MOBILITY Daily Assessment            LOWER EXTREMITY EXERCISES Daily Assessment    Extremity: Right  Exercise Type #1: Seated lower extremity strengthening  Sets Performed: 3  Reps Performed: 10  Level of Assist: Minimal assistance  Exercise Type #2: Other (comment) (stretching RLE)  Sets Performed: 3  Reps Performed: 30 (seconds)  Level of Assist: Total assistance   Awaiting w/c for home. Family buying NBQC to assist with transfers in the home. Custom AFO to assist extension tone and DF on the right. Assessment: Patient making good progress but still unsafe to transfer alone. Plan of Care: Continue with plan of care to reach PT goals. Returned to room with daughter present with call bell at [facility].     Gaston Oviedo, PTA  10/24/2017

## 2017-10-24 NOTE — PROGRESS NOTES
End Of Shift Functional Summary, Nursing      TOILETING/ BLADDER/ BOWEL    TOILET TRANSFER:  Pt requires minimal assistance. Pt uses grab bars. BLADDER:  Pt does not have a stafford catheter that staff manages. Pt does not take medication. Pt is continent. of bladder and voids in bedside commode  Pt requires staff to position device and empty device Pt has had 0 bladder accidents during this shift. (An accident is when the episode is not contained in a brief AND/OR the clothing/linen requires changing/cleaning up.)    BOWEL:  Pt does take medication. Pt is continent of bowel and uses bedside commode. Pt requires staff to empty device    Pt has had 0 bowel accidents during this shift. (An accident is when the episode is not contained in a brief AND/OR the clothing/linen requires changing/cleaning up.)      Documentation reviewed and plan of care discussed/reviewed with   patient during the shift.

## 2017-10-24 NOTE — PROGRESS NOTES
End Of Shift Functional Summary, Nursing        BLADDER:  Pt does not have a stafford catheter that staff manages. Pt does not take medication. Pt is continent. of bladder and voids in bedside commode  Pt requires staff to position device Pt has had 0 bladder accidents during this shift requiring moderate assistance to clean up. (An accident is when the episode is not contained in a brief AND/OR the clothing/linen requires changing/cleaning up.)    BOWEL:  Pt does take medication. Pt is continent of bowel and uses toilet. Pt requires staff to     Pt has had 0 bowel accidents during this shift requiring minimal assistance from staff to clean up. (An accident is when the episode is not contained in a brief AND/OR the clothing/linen requires changing/cleaning up.)    BED/CHAIR TRANSFER  Pt requires {ASSISTANCE:[id number]}. Patient requires the assistance of *** staff member(s).   Pt uses {[facility] CANE/WALKER:[id number]}

## 2017-10-25 PROCEDURE — 92507 TX SP LANG VOICE COMM INDIV: CPT

## 2017-10-25 PROCEDURE — 65310000000 HC RM PRIVATE REHAB

## 2017-10-25 PROCEDURE — 97530 THERAPEUTIC ACTIVITIES: CPT

## 2017-10-25 PROCEDURE — 99232 SBSQ HOSP IP/OBS MODERATE 35: CPT | Performed by: PHYSICAL MEDICINE & REHABILITATION

## 2017-10-25 PROCEDURE — 97535 SELF CARE MNGMENT TRAINING: CPT

## 2017-10-25 PROCEDURE — 97110 THERAPEUTIC EXERCISES: CPT

## 2017-10-25 PROCEDURE — 97116 GAIT TRAINING THERAPY: CPT

## 2017-10-25 PROCEDURE — 97150 GROUP THERAPEUTIC PROCEDURES: CPT

## 2017-10-25 PROCEDURE — 74011250637 HC RX REV CODE- 250/637: Performed by: PHYSICAL MEDICINE & REHABILITATION

## 2017-10-25 RX ADMIN — LABETALOL HYDROCHLORIDE 100 MG: 100 TABLET, FILM COATED ORAL at 08:33

## 2017-10-25 RX ADMIN — METHYLPHENIDATE HYDROCHLORIDE 5 MG: 10 TABLET ORAL at 16:45

## 2017-10-25 RX ADMIN — POTASSIUM CHLORIDE 20 MEQ: 20 TABLET, EXTENDED RELEASE ORAL at 08:34

## 2017-10-25 RX ADMIN — POLYETHYLENE GLYCOL 3350 17 G: 17 POWDER, FOR SOLUTION ORAL at 08:34

## 2017-10-25 RX ADMIN — TIZANIDINE 2 MG: 2 TABLET ORAL at 14:09

## 2017-10-25 RX ADMIN — TIZANIDINE 4 MG: 2 TABLET ORAL at 22:03

## 2017-10-25 RX ADMIN — STANDARDIZED SENNA CONCENTRATE AND DOCUSATE SODIUM 1 TABLET: 8.6; 5 TABLET, FILM COATED ORAL at 08:35

## 2017-10-25 RX ADMIN — TIZANIDINE 2 MG: 2 TABLET ORAL at 05:23

## 2017-10-25 RX ADMIN — ACETAMINOPHEN 500 MG: 500 TABLET, FILM COATED ORAL at 08:34

## 2017-10-25 RX ADMIN — TAMSULOSIN HYDROCHLORIDE 0.4 MG: 0.4 CAPSULE ORAL at 22:04

## 2017-10-25 RX ADMIN — LABETALOL HYDROCHLORIDE 100 MG: 100 TABLET, FILM COATED ORAL at 17:09

## 2017-10-25 RX ADMIN — HYDROCHLOROTHIAZIDE: 12.5 CAPSULE ORAL at 08:33

## 2017-10-25 RX ADMIN — METHYLPHENIDATE HYDROCHLORIDE 5 MG: 10 TABLET ORAL at 08:34

## 2017-10-25 RX ADMIN — TEMAZEPAM 15 MG: 15 CAPSULE ORAL at 22:04

## 2017-10-25 RX ADMIN — ROPINIROLE HYDROCHLORIDE 2 MG: 2 TABLET, FILM COATED ORAL at 22:00

## 2017-10-25 NOTE — PROGRESS NOTES
Subjective \"I am ready to go home tomorrow! \"   Activity [employer] game in which she was the \"caller\" of the cards   Strength/Endurance Patient tolerated the session without c/o tiredness during the session. She showed this therapist how she was able to lift her RUE and LE up which was a change since last week. Balance Min (A) with SPT from w/c to the chair. Patient was offered a chair to sit in and she commented about how much more comfortable it was for her. Social Interaction Friendly and in good spirits   Cognitive A&O X4   Comments Patient was assisted to her room and left seated up on the sided of the bed awaiting for Jeralene Dines. Pt D/C summary completed on 10/25/17. Please see for specifics in [address] Patient expected to be d/c'd to home on 10/26/17.   Amrit Talavera, CTRS

## 2017-10-25 NOTE — PROGRESS NOTES
OT Daily Note  Time In 1030   Time Out 1115     Pain: Patient had no complaint of pain. Functional Mobility   Pt transferred with CGA. Neuro-Muscular Re-Education   Pt engaged with a 24 pc puzzle while standing to promote RLE neuro re-ed. Pt reports leg feeling more wobbly. Therapist explained she was steady and pt was SBA throughout session. Explained that she was probably regaining sensation. Completed another similar standing task with pt having fewer c/o of wobbliness. Pt engaged in SROM and recalled 2/3 exercises and daughter Yovanny Perez was educated. Pt demonstrated fair form. Education   SROM exercises     Plan: Continue with POC. Pt was left was left with PT Frantz Baca.      Catalina Alcantara OTR/L  10/25/2017

## 2017-10-25 NOTE — PROGRESS NOTES
PHYSICAL THERAPY DISCHARGE SUMMARY   TIME IN 1115  TIME OUT 1159   Precautions at discharge:  Other (comment) (fall precautions, decreased sensation RLE )    Problem List:    Decreased strength B LE  [x]     Decreased strength trunk/core  [x]     Decreased AROM   [x]     Decreased PROM  [x]     Decreased balance sitting  [x]     Decreased balance standing  [x]     Decreased endurance  [x]     Pain  [x]       Functional Limitations:   Decreased independence with bed mobility  [x]     Decreased independence with functional transfers  [x]     Decreased independence with ambulation  [x]     Decreased independence with stair negotiation  [x]            Outcome Measures: Vital Signs:  Patient Vitals for the past 12 hrs:   Temp Pulse Resp BP SpO2   10/25/17 0731 98.3 °F (36.8 °C) 88 12 119/70 94 %     Pain level: 0 to 4 out of 10  Pain location:sacral coccyx area with prolonged sitting  Pain interventions: rest, positioning,pressure relief    Patient education:I    Interdisciplinary Communication:spoke with PTA regarding DME and D/C plans     MMT Initial Asssessment   Right Lower Extremity Left Lower Extremity   Hip Flexion 0 5   Knee Extension 0 5   Knee Flexion 0 5   Ankle Dorsiflexion 0 5      MMT Discharge Assessment   Right Lower Extremity Left Lower Extremity   Hip Flexion 2+ 5   Knee Extension 2+ 5   Knee Flexion 1 5   Ankle Dorsiflexion 0 5   0/5 No palpable muscle contraction  1/5 Palpable muscle contraction, no joint movement  2-/5 Less than full range of motion in gravity eliminated position  2/5 Able to complete full range of motion in gravity eliminated position  2+/5 Able to initiate movement against gravity  3-/5 More than half but not full range of motion against gravity  3/5 Able to complete full range of motion against gravity  3+/5 Completes full range of motion against gravity with minimal resistance  4-/5 Completes full range of motion against gravity with minimal-moderate resistance  4/5 Completes full range of motion against gravity with moderate resistance  4+/5 Completes full range of motion against gravity with moderate-maximum resistance  5/5 Completes full range of motion against gravity with maximum resistance     AROM: LLE WFL, RLE AAROM WFL except - 5 degree DF secondary to PF contracture    FIM SCORES Initial Assessment Discharge Assessment   Bed/Chair/Wheelchair Transfers 2 5   Wheelchair Mobility  (Unable to assess due to appropriate size w/c not available) 5   Walking [place]  (NT) 4   Steps/Stairs  (NT)  (NT)   PRIMARY MODE OF LOCOMOTION: wheelchair  Please see IRC Interdisciplinary Eval: Coordination/Balance Section for details regarding FIM score description. BED/CHAIR/WHEELCHAIR TRANSFERS Initial Assessment Discharge Assessment   Rolling Right 3 (Moderate assistance ) 6 (Modified independent)   Rolling Left 3 (Moderate assistance ) 6 (Modified independent)   Supine to Sit 3 (Moderate assistance) 6 (Modified independent)   Sit to Stand Moderate assistance Stand-by assistance   Sit to Supine 3 (Moderate assistance) 6 (Modified independent)   Transfer Assist Score 2 5   Transfer Type SPT without device (to the left  recliner to w/c and w/c to bed) SPT without device (to the left w/c<>mat and w/c to bed)   Comments Patient demonstrating right neglect with motor planning deficits during bed mobility and transfers.  Blocking right knee to keep knee from buckling Increased time and effort to complete bed mobility and transfers using hemiplegic body mechanics assisting RLE with LUE,occasional cues to attend to RUE and RLE during bed mobility and transfers   Car Transfer Not tested Minimum assistance (SPT with gait belt)   Car Type rehab car rehab car       [facility] MOBILITY/MANAGEMENT Initial Assessment Discharge Assessment   Able to Propel 0 feet 200 feet (using LUE and LLE)cues to attend to objects on her right   Functional Level  (Unable to assess due to appropriate size w/c not available) 5 Curbs/ramps assistance required 0 (Not tested) 4 (Minimal assistance) (up/down 20ft ramp,up backwards,down forwards)   Wheelchair set up assistance required 2 (Maximal assistance) 4 (Minimal assistance) (with right leg rest)   Wheelchair management Manages left brake Manages left brake;Manages right brake       WALKING INDEPENDENCE Initial Assessment Discharge Assessment   Assistive device Gait belt, Walker cindy, Brace/Splint, Other (comment) (used ace wrap to knee and ankle for DF. and sling for right UE) Gait belt;Cane, quad (ace wrap for right DF assist)   Ambulation assistance - level surface 0 (Not tested) 4 (Minimal assistance)   Distance 0 Feet (ft) 150 Feet (ft)   Functional Level  (NT) 4   Comments Unable to ambulate at this time due to extent of right hemiparesis and balance deficits slow start/stop step to hemiplegic gait pattern leading with RLE and stepping to with LLE. Able to advance RLE with verbal cues demonstrating asymetrical step length with occasional ataxia during RLE swing phase. Right knee hyperextension at midstance with min assist to inhibit hyperextension.  Decreased step clearance and step length RLE, decreased right ankle PF and knee flex at terminal stance, decreased knee flex and ankle DF at midswing, decreased knee ext and ankle DF at initial contact   Ambulation assistance - unlevel surface 0 (Not tested) 0 (Not tested)       STEPS/STAIRS Initial Assessment Discharge Assessment   Steps/Stairs ambulated 0 0   Rail Use       Functional Level  (NT)  (NT)   Comments Unable to ambulate up/down steps at this time due to extent of right hemiparesis and balance deficits Unable to ambulate up/down steps at this time secondary to extent of  right hemiparesis   Curbs/Ramps 0 (Not tested) 0 (Not tested)     QUALITY INDICATOR ASSIST COMMENTS   Walk 10 feet Min assist with quad cane and DF assist    Walk 50 feet with 2 turns Min assist with quad cane and DF assist    Walk 150 feet Min assist with quad cane and DF assist    Walk 10 feet on uneven  Unable secondary to extent of RLE weakness    1 step/curb Unable secondary to extent of RLE weakness    4 steps Unable secondary to extent of RLE weakness    12 steps Unable secondary to extent of RLE weakness     object Min assist    Wheel 48' w/2 turns supervision    Wheel 150' supevision             PHYSICAL THERAPY PLAN OF CARE    LTGs: patient met 4 out of 6 revised LTGs per reassessments. Refer to care plan for details    Pt would benefit from continued skilled physical therapy in order to improve independent functional mobility within the home with use of least restrictive device. Interventions may include range of motion (AROM, PROM B LE/trunk), motor function (B LE/trunk strengthening/coordination), activity tolerance (vitals, oxygen saturation levels), bed mobility training, balance activities, gait training (progressive ambulation program), and functional transfer training. HEP handout:[  SUPINE EXERCISES Sets Reps Comments   Ankle DF<>PF 2 10 AAROM to PROM with max assist   Bridging 2 10 Min assist   Hip+knee flex<>ext 2 10 Mod assist   Hip Abduction 2 10 Mod assist   Short Arc Quad 2 10 Mod assist     Pt to be discharged 10/26/17 with assistance provided by family. family training complete 10/24/17 and week of 10/20/17  Therapy Recommendations upon discharge: Mickey Vazquez needs at discharge: [employer] to provide wheelchair, [employer] to provide custom AFO, family to purchase quad cane      Please see IRC; Interdisciplinary Eval, Care Plan, and Patient Education for further information regarding physical therapy discharge summary and plan of care.      Tomas Aguilar, PT  10/25/2017

## 2017-10-25 NOTE — PROGRESS NOTES
End Of Shift Functional Summary, Nursing      BLADDER: Pt does take medication. Pt is continent. of bladder and voids in toilet      BOWEL:  Pt does take medication. Pt is continent of bowel and uses toilet. BED/CHAIR TRANSFER  Pt requires minimal assistance. Patient requires the assistance of 1 staff member(s). Pt uses walker    EATING  Pt requires setup. Documentation reviewed and plan of care discussed/reviewed with   physician, therapists, oncoming nurse and patient assistant during the shift.

## 2017-10-25 NOTE — PROGRESS NOTES
PHYSICAL THERAPY DAILY NOTE  Time In: 1300  Time Out: 6755  Patient Seen For: PM;Gait training;Transfer training; Therapeutic exercise; Other (see progress notes)    Subjective: Patient had no complaints. Objective: No pain noted. Other (comment) (falls)  GROSS ASSESSMENT Daily Assessment            BED/MAT MOBILITY Daily Assessment    Rolling Right : 6 (Modified independent)  Rolling Left : 6 (Modified independent)  Supine to Sit : 6 (Modified independent)  Sit to Supine : 6 (Modified independent)       TRANSFERS Daily Assessment    Transfer Type: SPT without device  Transfer Assistance : 4 (Contact guard assistance)  Sit to Stand Assistance: Stand-by assistance  Car Transfers: Minimum assistance (SPT with gait belt)  Car Type: rehab car       GAIT Daily Assessment    Amount of Assistance: 3 (Moderate assistance)  Distance (ft): 100 Feet (ft)  Assistive Device: Gait belt;Cane, quad; Other (comment) (2 ace wraps for right DF and ankle support)       STEPS or STAIRS Daily Assessment    Steps/Stairs Ambulated (#): 0  Level of Assist : 0 (Not tested)       BALANCE Daily Assessment    Sitting - Static: Good (unsupported)  Sitting - Dynamic: Good (unsupported)  Standing - Static: Fair (with quad cane)       WHEELCHAIR MOBILITY Daily Assessment    Able to Propel (ft): 200 feet (using LUE and LLE)  Functional Level: 5  Curbs/Ramps Assist Required (FIM Score): 4 (Minimal assistance) (up/down 20ft ramp,up backwards,down forwards)  Wheelchair Setup Assist Required : 4 (Minimal assistance) (with right leg rest)  Wheelchair Management: Manages left brake;Manages right brake       LOWER EXTREMITY EXERCISES Daily Assessment               Assessment: Patient making good progress. Plan of Care: Continue with plan of care to reach PT goals. Returned to room to bed with call bell at reach.     Maulik Meza, PTA  10/25/2017

## 2017-10-25 NOTE — PROGRESS NOTES
End Of Shift Functional Summary, Nursing      TOILETING/ BLADDER/ BOWEL    . BLADDER:  Pt does not have a stafford catheter that staff manages. Pt does not take medication. Pt is continent. of bladder and voids in bedside commode  Pt requires staff to empty device Pt has had 0 bladder accidents during this . (An accident is when the episode is not contained in a brief AND/OR the clothing/linen requires changing/cleaning up.)    BOWEL:  Pt does take medication. Pt is continent of bowel and uses bedside commode.   Pt requires staff to empty device    Pt has had 0 bowel accidents during this shift r (An accident is when the episode is not contained in a brief AND/OR the clothing/linen requires changing/cleaning up.)

## 2017-10-25 NOTE — PROGRESS NOTES
Salena Gagnon MD,   Medical Director  [address] [facility], [address] [facility]  Tel: [phone]       D PROGRESS NOTE    Karuna Goes  Admit Date: 9/15/2017  Admit Diagnosis: stroke, left brain involvement;ICH (intracerebral hemorrhag*    Subjective     Slept well. Less restless leg. Did not wake her up last noc. Feels that klonopin and requip are working. Excited about dc tomorrow     Objective:     Current Facility-Administered Medications   Medication Dose Route Frequency    acetaminophen (TYLENOL) tablet 500 mg  500 mg Oral Q4H PRN    temazepam (RESTORIL) capsule 15 mg  15 mg Oral QHS PRN    rOPINIRole (REQUIP) tablet 2 mg  2 mg Oral QHS    tiZANidine (ZANAFLEX) tablet 2 mg  2 mg Oral BID    tiZANidine (ZANAFLEX) tablet 4 mg  4 mg Oral QHS    potassium chloride (K-DUR, KLOR-CON) SR tablet 20 mEq  20 mEq Oral DAILY    senna-docusate (PERICOLACE) 8.6-50 mg per tablet 1 Tab  1 Tab Oral DAILY    polyethylene glycol (MIRALAX) packet 17 g  17 g Oral DAILY    methylphenidate HCl (RITALIN) tablet 5 mg  5 mg Oral BID    labetalol (NORMODYNE) tablet 100 mg  100 mg Oral BID    losartan/hydroCHLOROthiazide (HYZAAR) 100/12.5 mg   Oral DAILY    alum-mag hydroxide-simeth (MYLANTA) oral suspension 30 mL  30 mL Oral Q4H PRN    bisacodyl (DULCOLAX) suppository 10 mg  10 mg Rectal DAILY PRN    hydrALAZINE (APRESOLINE) tablet 50 mg  50 mg Oral QID PRN    lip protectant (BLISTEX) ointment   Topical PRN    ondansetron (ZOFRAN ODT) tablet 4 mg  4 mg Oral Q6H PRN    sodium phosphate (FLEET'S) enema 118 mL  1 Enema Rectal PRN    traMADol (ULTRAM) tablet 50 mg  50 mg Oral Q6H PRN    tamsulosin (FLOMAX) capsule 0.4 mg  0.4 mg Oral QHS     Review of Systems:Denies chest pain, shortness of breath, cough, headache, visual problems, abdominal pain, dysurea, calf pain. Pertinent positives are as noted in the medical records and unremarkable otherwise.      Visit Vitals    /85    Pulse 93    Temp 98 °F (36.7 °C)    Resp 17    SpO2 97%        Physical Exam:   General: Alert and age appropriately oriented. No acute cardio respiratory distress. HEENT: Normocephalic,no scleral icterus  Oral mucosa moist without cyanosis; right lower facial weakness   Lungs: Clear to auscultation  bilaterally. Respiration even and unlabored   Heart: Regular rate and rhythm, S1, S2   No  murmurs, clicks, rub or gallops   Abdomen: Soft, non-tender, nondistended. Bowel sounds present. No organomegaly. Genitourinary: Benign . Neuromuscular:      RUE unchanged, non functional. Sublux 1 cm  RLE prox 3+/4-; better control  DF o/5; compensates with brace   Skin/extremity: No rashes, no erythema.  No calf tenderness BLE  No edema                                                                            Functional Assessment:  Gross Assessment  AROM:  (RLE AAROM WFL except PF contracture of -10) (10/20/17 0900)  PROM:  (WFL except right ankle PF contracture) (10/20/17 0900)  Strength:  (LLE WFL, RLE) (10/20/17 0900)  Coordination:  (LLE intact, RLE impaired) (10/20/17 0900)  Tone:  (extensor spasticity) (10/20/17 0900)  Sensation: Impaired (RLE) (10/20/17 0900)       Balance  Sitting - Static: Good (unsupported) (10/21/17 1400)  Sitting - Dynamic: Fair (occasional) (10/20/17 1600)  Standing - Static: Poor (10/21/17 1400)  Standing - Dynamic : Impaired (10/21/17 1400)           Toileting  Adaptive Equipment: Elevated seat;Walker (09/29/17 1534)         Yolanda Beans Fall Risk Assessment:  Yolanda Beans Fall Risk  Mobility: Ambulates or transfers with assist devices or assistance/unsteady gait (10/24/17 2010)  Mobility Interventions: Bed/chair exit alarm (10/24/17 2010)  Mentation: Alert, oriented x 3 (10/24/17 2010)  Mentation Interventions: More frequent rounding (10/24/17 2010)  Medication: Patient receiving anticonvulsants, sedatives(tranquilizers), psychotropics or hypnotics, hypoglycemics, narcotics, sleep aids, antihypertensives, laxatives, or diuretics (10/24/17 2010)  Medication Interventions: Patient to call before getting OOB (10/24/17 2010)  Elimination: Needs assistance with toileting (10/24/17 2010)  Elimination Interventions: Bed/chair exit alarm; Patient to call for help with toileting needs; Toilet paper/wipes in reach; Toileting schedule/hourly rounds (10/24/17 2010)  Prior Fall History: No (10/24/17 2010)  History of Falls Interventions: Bed/chair exit alarm (10/24/17 0700)  Total Score: 3 (10/24/17 2010)  Standard Fall Precautions: Yes (10/15/17 1126)  High Fall Risk: Yes (10/24/17 2010)     Speech Assessment:         Ambulation:  Gait  Base of Support: Narrowed; Center of gravity altered (09/22/17 1200)  Speed/Lulu: Delayed (09/22/17 1200)  Step Length: Right shortened (09/22/17 1200)  Distance (ft): 100 Feet (ft) (10/24/17 1544)  Assistive Device: Gait belt;Brace/Splint;Cane, quad; Other (comment) (ace wraps to assist right knee and ankle awaiting an custom AFO) (10/24/17 1544)     Labs/Studies:  No results found for this or any previous visit (from the past 72 hour(s)).     Assessment:     Problem List as of 10/25/2017  Date Reviewed: 9/12/2017          Codes Class Noted - Resolved    Thrombocytopenia ([email or web address] ICD-10-CM: D69.6  ICD-9-CM: 287.5  9/25/2017 - Present        Pulmonary emboli (HCC) ICD-10-CM: I26.99  ICD-9-CM: 415.19  9/25/2017 - Present        DVT (deep venous thrombosis) ([facility] [email or web address] 75.) ICD-10-CM: I82.409  ICD-9-CM: 453.40  9/25/2017 - Present        * (Principal)ICH (intracerebral hemorrhage) ([facility] [email or web address] 75.) ICD-10-CM: I61.9  ICD-9-CM: 596  9/15/2017 - Present        Hypertensive urgency, malignant ICD-10-CM: I16.0  ICD-9-CM: 401.0  9/11/2017 - Present        Stroke, hemorrhagic ([facility] ICD-10-CM: I61.9  ICD-9-CM: 609  9/7/2017 - Present        Accelerated hypertension ICD-10-CM: I10  ICD-9-CM: 401.0  9/7/2017 - Present        At risk for aspiration (Chronic) ICD-10-CM: Z91.89  ICD-9-CM: V49.89 9/7/2017 - Present        Acute spontaneous intraventricular hemorrhage assoc w/ hypertension (HCC) ICD-10-CM: I61.5, I10  ICD-9-CM: 812, 401.9  9/7/2017 - Present        Screening for breast cancer ICD-10-CM: Z12.31  ICD-9-CM: V76.10  2/27/2017 - Present        Atrophic vaginitis ICD-10-CM: N95.2  ICD-9-CM: 627.3  7/11/2016 - Present        HTN (hypertension) (Chronic) ICD-10-CM: I10  ICD-9-CM: 401.9  10/23/2015 - Present        Depression ICD-10-CM: F32.9  ICD-9-CM: 857  10/23/2015 - Present        Anxiety (Chronic) ICD-10-CM: F41.9  ICD-9-CM: 300.00  10/23/2015 - Present        RESOLVED: Hypoxemia ICD-10-CM: R09.02  ICD-9-CM: 799.02  9/10/2017 - 9/12/2017        RESOLVED: Acute respiratory failure (HCC) ICD-10-CM: J96.00  ICD-9-CM: 518.81  9/8/2017 - 9/10/2017        RESOLVED: Hemorrhagic stroke ([facility] ICD-10-CM: I61.9  ICD-9-CM: 187  9/7/2017 - 9/7/2017        RESOLVED: Non-intractable vomiting with nausea ICD-10-CM: R11.2  ICD-9-CM: 787.01  9/7/2017 - 9/12/2017        RESOLVED: Seborrheic keratosis, inflamed ICD-10-CM: L82.0  ICD-9-CM: 702.11  7/11/2016 - 9/20/2016        RESOLVED: Cough ICD-10-CM: R05  ICD-9-CM: 786.2  7/11/2016 - 9/20/2016        RESOLVED: ADD (attention deficit disorder) (Chronic) ICD-10-CM: F98.8  ICD-9-CM: 314.00  10/23/2015 - 10/13/2017        RESOLVED: Shoulder pain ICD-10-CM: M25.519  ICD-9-CM: 719.41  10/23/2015 - 2/27/2017                     Plan:            Left BG Hemorrhage with intraventricular extension due to HTN emergency; resultant R hemiplegia, right neglect, dysarthria, aphasia, dysphagia with significant decline in mobility and self care  Plan:   Continue daily physician medical management:  Pneumonia prophylaxis- Incentive spirometer every hour while awake. Will need instruction and assistance. Not sure if she can get a proper oral seal to be effective  -10/9 no pulmonary issues      Dysphagia; Pureed diet with NTL; at risk for malnutrition and dehydration.  PICC line removed prior to transfer; may need a line for IVFs if BUN continues to increase. Low K, Ca; check mg. Supplementation as needed. Na elevated  -mag ok, K much improved; cont to supplement while on diuretic  -9/19 replace K; 3.0; 2.8 this am 9/20; inc supplement; may need to add to IVFs  -9/25 fluid status and K nl; 9/28 cont NTL; high risk aspiration; still needs supervision /assist with meals  -10/2 MBS today; thin liq!!  -10/9 po intake, especially fluids, has improved; feeding self      Thrombocytopenia; has been trending down for no apparent reason. hgb slt down as well. Has not been on Hep products; consult hematology today 9/19  -plts 40K, continues to trend down 9/20; await consult by hematology. ? reln to [address]. Mild anemia as well  Check HIT panel, d dimer , fibrinogen  -9/21 plts 36k ; HIT PROFILE POSITIVE, D DIMER ELEVATED 32.79, FIBRINOGEN  ; consistent with DIC; Etiology unclear  WILL D/W HEMATOLOGY; LFTs ok, no hx of blood transfusion,no hx pancreatitis, no sign of bacteremia. Has not been on any heparin products. Can see in head injury; has ICH. Cannot r/o blood disorder (leukemia)   per hematology \"Recommend transfusing platelets for <54,115 and Cryoprecipitate for fibrinogen < 100. \"  -MARK pending  -9/22 bilateral LE venous duplex ordered; low suspicion but at risk and having calf tenderness  -9/22 plts improved today; monitor closely; 9/23 small right peroneal thrombus. No antic per hematology. IR refused/advised against placing IVF due to low risk of clot propagation given size and location  -9/23 counts bumping up; pts now 53k from 45 and previously 36  -9/24 plts cont to improve. 69K. MARK still pending; can resume therapies  9/26 Ddimer pending, fibrinogen now nl  -9/27 D dimer 11+, improved. Per Hematology; likely due to consumption coagulopathy.  Will monitor  -10/2 labs have improved; MARK + 68% confirming dx of HIT; 10/4 recheck cbc (last done 9/28)    -10/5 plts 263; 10/9 plts 283      Bilateral PEs 9/25, filter placed in IR; looks good today. sats good without O2 supplement 94-97% sats; d/w Hematology, no agaratroban  -apprec Pulm assessment. Clinically looks good and no longer symptomatic  -9/28 clinically stable, asymptomatic ; 10/3 stable. No sob, no O2 supplements; 10/9 remains asymptomatic      Hypernatremia; last head CT did not show significant cerebral edema. Clinically improving. Likely due to prerenal azotemia; 9/15 MAY REQ isotonic / hypotonic saline IV;  -9/18 events of weekend noted; Na 156; asymptomatic, on 0.5 dextrose with 1/4 Na; na q 6hrs; pts serum osmolality was normal. Will check urine Na and urine osmolality; depending on findings, will consider consulting Hospitalist vs Nephrology  Neuro improving despite this  -9/19 Na down to 149, urine osmo nl, serum osmo min hi; cont dex/and 1/4 NS; repeat in am. Not DI as uop not increased  -9/20 Na 147; cont fluids; 9/21 not resulted; will stop fluids when Na below 142  -Na 140 9/22; dc IVFs; 9/25 136; 9/27 136; recheck 10/4 134; mildly low; f/u 10/9 pending  -10/17 nl      ICH/IVH; 9/22 clinically improves daily. F/u HCT yest due to transient left eye visual disturbance. Overall, resolving hemorrhage. There is more pronounced edema; expected. Ventricular size now nl      Insomnia; 10/18 had dc'd Trazadone and changed to restoril, added requip for restless leg syndrome. Overall improved but too drowsy this a.m. Also receiving zanaflex. Her tone and restless leg improved. Will dec Restoril due to a.m sleepiness      Anemia ; 9.6-10/ stable; recheck 10/9 10.5 improved       DVT risk / DVT Prophylaxis- Will require daily physician exam to assess for signs and symptoms as patient is at increased risk for of thromboembolism. Mobilization as tolerated. Intermittent pneumatic compression devices when in bed Thigh-high or knee-high thromboembolic deterrent hose when out of bed.  NO  anticoag due to ICH/HIT; has right small right peroneal vein; 9/25 spoke with IR again today; more risk of putting in IVC filter than benefits especially since she is asymptomatic with no swelling or tenderness and small size of clot  -10/3 no edema, right calf soft. No SOB  -10/9 no calf swelling or tenderness  -10/17 platelets normal      Pain Control: stable, mild-to-moderate joint symptoms intermittently, reasonably well controlled by PRN meds. Will require regular pain assessment and comprenhensive pain management,       Wound Care: Monitor wound status daily per staff and physician. At risk for failure. Will require 24/7 rehab nursing. None needed at this time      Hypertension - BP uncontrolled, fluctuating, managed medically. Add prn hydralazine for sbp> 180. Goal SBP is 140-160 given ICH. Cont Normodyne, norvasc and hyzaar; consider Verapamil or scheduled Hydralazine  -9/18 BP increased 169/95; add hydralazine 25 tid  -9/20 BP much improved  -9/22 /74; 9/24 bp stable; may dec hydralazine to bid  -9/25 dec hydralazine  -9/27 bp 116-119  -9/28 SBP in the 90s to 116, asymptomatic; dc hydralazine and monitor; decrease Normodyne to bid  -9/29 BP still low; lower Norvasc and normodyne, cont Hyzaar; all have parameters of when to hold  - 9/30 SBP overnight and this am 108-111, will d/c norvasc, on labetolol and losartan/HCTZ , HR acceptable  -10/2 115/71; controlled.  Cont current meds  -130-151/77; 10/5 118/77; 10/6 BP stable; goal 120-130 given ICH  -10/9 148/81; add low dose Norvasc; ideally want less than 130; 10/11 130/73  10/13 remains better controlled  -10/17 /75, 10/23 remains controlled  -10/24 109/74 max ; will dc Norvasc (only on 2.5mg); 10.25 130/85      Mild hyperglycemia, likely stress induced vs borderline diabetes; monitor loosely  -9/27 bs 145 on BMP; follow bs qam x 3d; no issues      Spasticity; had added zanaflex; 10/12 adjust for desired effect; change to 2mg tid and 4mg qhs; AMBULATED YESTERDAY AND ADVANCED OWN LEG!!!!!!  -10/13 ? Component of RLS at noc; consider trial of Requip  -10/14 increased trazadone, slept better. Add low dose requip at noc  -10/15 ongoing c/o nonspecific insomnia; will try Restoril instead of Trazadone and inc Requip  -10/17 good response to med changes  10/20 laxity with PF bilaterally. 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 Have tried two braces to RLE for dorsiflexion/neutral at ankle. Both have caused skin irritation due to pressure. Pops out of both types. Will need custom fit. Will contact Orthotist  -10/24 stable. Uses tone to ambulate      Dysphagia; cont NTL, mech soft; hopefully can upgrade liquids soon 9/25  -9/29 remains on NTL. MBS scheduled for 10/2.  -10/3 now on THINS! !      Leukocytosis; recheck in a.m. Check UA due to stafford. No pulmonary signs (had neg CXR today), no s/s of infxn at old PICC site, no wounds, afebrile. Watch monocytes. -9/18 12.2 improving. Urine neg  -9/19 up to 13.2, ? Reactive. No source of infxn identified. Afebrile  -9/20 WBC now normal      Urinary retention/ neurogenic bladder - start flomax but continue stafford until mobility improves a bit. Strict I/o's  -9/20 keeping stafford to monitor UOP until Na normalizes. No significant output to suggest DI  -9/21 dc stafford; cont Flomax, follow bladder scan  9/22 voiding without issue; incontinence but no retention  -9/28 new onset urinary retention; check UA  -9/29 UA neg but cx saying > 100K gram neg cocci; will need to f/u ID/sens; Start Cipro 500 bid  - 9/30 preliminary urine cx >110K mixed skin denis  -10/2 stop Cipro; cx nl denis  -10/6 recurrent urinary retention. No flomax; re check UA, consider urecholine. CIC for PVRs  -10/9 dc scans; 10/13 per nursing intermittent need for CIC  -10/24 no further CIC required      bowel program - add prn meds; incontinent, want to keep stool on the firmer side. Monitor skin.       GERD - add a PPI.  At times may need additional antacids, Maalox prn.           =====================================================    9/21   Addendum; in therapy this afternoon, transient left eye blindness. Will f/u head CT for extension of or new ICH, especially with low plts   Results  1. Interval decrease in size of intraventricular hemorrhage within the left  basal ganglia/thalamus with near complete resolution of intraventricular  hemorrhage. Surrounding edema is more pronounced with slight increase in  left-to-right midline shift. 2. Interval decrease in ventricular size, approaching normal in caliber.           - FAMILY [id number] REGARDING PROGRESS, NEEDS, DC PLAN; family overwhelmed with decision that need to be made. Family training Monday in case insurance denies as of 10/10          10/10 excellent progress made. Surprising to PT. Definitely have made a step forward and would benefit from ongoing acute inpt rehab to maximize recovery      -10/13 continues to show progress in PT, continues with OT and ST; speech improvements noted  10/17 continues to participate well and benefit from inpt rehab                        Time spent was 25 minutes with over 1/2 in direct patient care/examination, consultation and coordination of care.      Signed By: Sharri Alfaro MD     October 25, 2017

## 2017-10-25 NOTE — PROGRESS NOTES
Problem: Falls - Risk of  Goal: *Absence of Falls  Document Haley Fall Risk and appropriate interventions in the flowsheet.    Outcome: Progressing Towards Goal  Fall Risk Interventions:  Mobility Interventions: Bed/chair exit alarm    Mentation Interventions: More frequent rounding    Medication Interventions: Patient to call before getting OOB    Elimination Interventions: Bed/chair exit alarm, Patient to call for help with toileting needs, Toilet paper/wipes in reach, Toileting schedule/hourly rounds    History of Falls Interventions: Bed/chair exit alarm

## 2017-10-25 NOTE — PROGRESS NOTES
HH referral made to [facility] per pt preference.  PT,OT,ST & HHA ordered. Spoke to [place], ; she request that she be contacted to confirm [facility].

## 2017-10-25 NOTE — PROGRESS NOTES
OT DISCHARGE REPORT    Time In: 0740  Time Out: 0820    COMPREHENSION MODE Initial Assessment Discharge Assessment 10/25/2017   Score 5 (basic needs 90% of the time) 4     EXPRESSION Initial Assessment Discharge Assessment 10/25/2017   Primary Mode of Expression Verbal Verbal   Score 3 4   Comments Expresses only basic needs 75-89%, repeats words, Expresses only basic needs 75-89%, repeats words, OR expresses self with assist from staff to occlude trach. SOCIAL INTERACTION/ PRAGMATICS Initial Assessment Discharge Assessment 10/25/2017   Score 4 5   Comments tearful parts of session, able to self correct Needs supervision only under stressful or unfamiliar conditions, interacts appropriately 90% of the time, needs monitoring or encouragement for participation or interaction. PROBLEM SOLVING Initial Assessment Discharge Assessment 10/25/2017   Score 4 4   Comments basic problem 80% of the time Solves basic problems 75-89% of the time. MEMORY Initial Assessment Discharge Assessment 10/25/2017   Score 4 4   Comments  75-89% of the time 2 steps of 3 step request Recognizes, recalls, or executes 75-89% of the time 2 steps of 3 step request.     EATING Initial Assessment Discharge Assessment 10/25/2017   Functional Level 1 5   Comments  Set-up     BATHING Initial Assessment Discharge Assessment 10/25/2017   Functional Level 4 5   Body parts patient bathed Abdomen, Arm, left, Arm, right, Buttocks, Chest, Lower leg and foot, left, Lower leg and foot, right, Tali area, Thigh, left, Thigh, right Abdomen;Arm, left;Arm, right;Buttocks; Chest;Lower leg and foot, left; Lower leg and foot, right;Tali area; Thigh, left; Thigh, right   Comments A to stand; cueing for all parts SBA     TUB/SHOWER TRANSFER INDEPENDENCE Initial Assessment Discharge Assessment 10/25/2017   Score 2 4  Type of Shower: Shower  Adaptive  Equipment:Tub transfer bench, Grab bars and Wheelchair   Comments Lifting and lowering A CGA     UPPER BODY DRESSING/UNDRESSING Initial Assessment Discharge Assessment 10/25/2017   Functional Level 4 4   Items applied/Steps completed Pullover (4 steps) Pullover (4 steps); Bra (3 steps)   Comments A for threading RUE A to adjust bra     LOWER BODY DRESSING/UNDRESSING Initial Assessment Discharge Assessment 10/25/2017   Functional Level 1 4   Items applied/Steps completed Sock, left (1 step), Sock, right (1 step), Shoe, left (1 step), Shoe, right (1 step), Fasten shoe (1 step), Elastic waist pants (3 steps), Underpants (3 steps) Shoe, left (1 step); Shoe, right (1 step); Sock, left (1 step); Sock, right (1 step); Underpants (3 steps); Elastic waist pants (3 steps)   Comments A for threading RLE; R sock and shoes, tying shoes, A in standing; A adjusting underwear A for R shoe     Plan of Care: Please see Care Plan for progress towards goals during stay  Precautions at Discharge: Falls  Discharge Location: Home with spouse  Family Training: Completed with spouse     Recommendations/Functional Level:    Pt requires 24/7 supervision and some A with dressing. Recommended Continuing Therapy:  HH-OT  Residual Deficits:  RUE FMC, GMC, and strength, balance, safety awareness, and cognition. Progress over LOS: Pt has made significant gains in balance, sequencing, safety awareness, and learning of cindy-techniques allowing her to complete transfers and ADL with min A allowing her to return home with support. Summary of Session: Pt was in bed and agreeable to tx. Pt's performance with ADL is reflected in above chart. Pt was told to stay in the w/c because she had her next therapy in 10 minutes. Pt was worried about her bottom. Pt was told she would be fine.      Sola Alvarado OTR/L  10/25/2017

## 2017-10-25 NOTE — DISCHARGE SUMMARY
REHABILITATION DISCHARGE SUMMARY     Date of admission to [facility]; 9/15/2107  Discharge Date:  10/26/2017    Primary Care Physician: Dr. Damir Camacho    Admission Condition: fair  Discharged Condition: good    Hospital Course: The patient was admitted to [facility] at [facility] on 9/15/2017 s/p hemorrhagic L BG CVA due to hypertensive urgency. Mrs. Joanne Randle is a previously functionally independent right handed 60yo WF with a PMH of anxiety/depression, HTN and ADD who presented to the ED on 9/7 with an acute onset of Right hemiplegia, right facial weakness and difficulty speaking. She apparently called out for help and her  was, fortunately, at home. EMS was called. Pt had noted malignant HTN noted upon presentation with above mentioned symptoms. She had agitation and vomiting as well. Her BP was 208/105. GCS was not documented. In ER, received labetalol 60mg, propofol 50mg, ativan 4mg, and succinylcholine for intubation.  She received 1g of keppra as well for seizure-like activity.  CT of head revealed left basal ganglia ICH measured 3 x 2cm with spread to L lateral ventricle and 3rd/4th ventricle with some mild mildline shift.  Teleneurology was consulted and recommended ICU admission, SBP goal of 160, tight glucose control and DVT ppx.  Dr. Sherryle Counter was consulted and did not think surgery would benefit patient, also recommended BP control. She was monitored closely from admission in the ICU and had been on a cardene gtt. She was successfully extubated on 9/9 and off cardene. On 9/11, pt found by nursing to be more lethargic with right gaze preference and was hypertensive. Her cardene was restarted and a STAT HCT was ordered.  It showed a stable presumed hypertensive hemorrhage in the left basal ganglia with intraventricular extension. By 9/11,she was off cardene but still requiring IV Labetolol at times for HTN. She has been documented to be more alert and interactive with staff.  She was eval by  and is high risk for aspiration, thus she was  NPO and tolerating tube feeds via a NGT. She had a MBS 9/14 and was approved for a Pureed diet with Nectar thick liquids and her NGT was self discontinued. She has been seen by both PT and OT on a few occasions and is max to total assist for all care. Her BP is still fluctuating, but improving. Her level of alertness and motivation have also improved. Medically, she has a leukocytosis with a WBC of 14 yesterday and 16. 5 today for unclear reasons. Her K is being replaced as it was 2.8 yesterday and still low at 3.3. She has prerenal azotemia likely due to coming off IVFs and being on nectar thick liquids, with poor po. Her Na remains borderline increased at 148. She has no hx of DM but had a mildly elevated HgbA1c of 6.1 with bs's 110-120s     Rehabilitation Course: From a medical aspect; we initially had a rough course. She was noted to have declining platelets. She was not on heparin but tested positive for HIT. She was subsequently found to have a small DVT in the LLE. She could not be anticoagulated due to [address] and thrombocytopenia. IR was consulted for an IVC filter but initially did not feel it was needed because it was felt that the risk of propogation of the clot was not significant enough to weigh against the risks of the procedure itself. Unfortunatley, 3 days later, she was c/o chest discomfort and mild SOB. A CT angio of the chest revealed bilateral pulmonary emboli without evidence of right heart strain. A filter was placed that same day. She was seen in consultation by Hematology due to HIT as well as elevated d dimer and elements of DIC as well. Fortunately, she had no further problems and her labs self corrected. She had a f/u CT of the head on 9/21; . Interval decrease in size of intraventricular hemorrhage within the left basal ganglia/thalamus with near complete resolution of intraventricular hemorrhage.  Surrounding edema is more pronounced with slight increase in left-to-right midline shift. Interval decrease in ventricular size, approaching normal in caliber. This was obtained due to excessive sleepiness. Retrospectively, pt was having significant sleeping difficulties at noc due to restless limb syndrome on the right. This was remedied by Requip and low dose Klonopin.    A second Follow up HCT on 9/25 revealed continued interval maturation of left basal ganglia hemorrhage, now with subacute. Surrounding perilesional edema is stable to slightly increased. Stable mild left to right subfalcine herniation, measuring 5 mm. Ventricles are stable in caliber. Her BP is now controlled with Normodyne and  Hyzaar. She has a hx of ADD per pt/family but no testing to prove this. She was initially very easily distracted in therapies, thus ritalin was started with an excellent response. As her progress improved, especially in the RLE, she developed increasing tone. This is being managed by Zanaflex. She uses some of this tone to ambulate, thus do not want to over treat it. Unfortunately, we have seen no functional return in her RUE, except with shoulder protraction and retraction, with some abduction. 0/5 , WE, triceps, trace biceps.    Her hgb is currently 10.0  plts 220K  Renal fxn is normal and K has normalized with supplementation to 4.3; KCL has been decreased         Functional Level On Admission:   max A to dependent with all care and mobility , non ambulatory   Functional Level At Discharge:     FIM SCORES Initial Assessment Discharge Assessment   Bed/Chair/Wheelchair Transfers 2 5   Wheelchair Mobility  (Unable to assess due to appropriate size w/c not available) 5   Walking [place]  (NT) 4   Steps/Stairs  (NT)  (NT)   PRIMARY MODE OF LOCOMOTION: wheelchair  Please see C Interdisciplinary Eval: Coordination/Balance Section for details regarding FIM score description.     BED/CHAIR/WHEELCHAIR TRANSFERS Initial Assessment Discharge Assessment   Rolling Right 3 (Moderate assistance ) 6 (Modified independent)   Rolling Left 3 (Moderate assistance ) 6 (Modified independent)   Supine to Sit 3 (Moderate assistance) 6 (Modified independent)   Sit to Stand Moderate assistance Stand-by assistance   Sit to Supine 3 (Moderate assistance) 6 (Modified independent)   Transfer Assist Score 2 5   Transfer Type SPT without device (to the left  recliner to w/c and w/c to bed) SPT without device (to the left w/c<>mat and w/c to bed)   Comments Patient demonstrating right neglect with motor planning deficits during bed mobility and transfers.  Blocking right knee to keep knee from buckling Increased time and effort to complete bed mobility and transfers using hemiplegic body mechanics assisting RLE with LUE,occasional cues to attend to RUE and RLE during bed mobility and transfers   Car Transfer Not tested Minimum assistance (SPT with gait belt)   Car Type rehab car rehab car         WHEELCHAIR MOBILITY/MANAGEMENT Initial Assessment Discharge Assessment   Able to Propel 0 feet 200 feet (using LUE and LLE)cues to attend to objects on her right   Functional Level  (Unable to assess due to appropriate size w/c not available) 5   Curbs/ramps assistance required 0 (Not tested) 4 (Minimal assistance) (up/down 20ft ramp,up backwards,down forwards)   Wheelchair set up assistance required 2 (Maximal assistance) 4 (Minimal assistance) (with right leg rest)   Wheelchair management Manages left brake Manages left brake;Manages right brake         WALKING INDEPENDENCE Initial Assessment Discharge Assessment   Assistive device Gait belt, Walker cindy, Brace/Splint, Other (comment) (used ace wrap to knee and ankle for DF. and sling for right UE) Gait belt;Cane, quad (ace wrap for right DF assist)   Ambulation assistance - level surface 0 (Not tested) 4 (Minimal assistance)   Distance 0 Feet (ft) 150 Feet (ft)   Functional Level  (NT) 4   Comments Unable to ambulate at this time due to extent of right hemiparesis and balance deficits slow start/stop step to hemiplegic gait pattern leading with RLE and stepping to with LLE. Able to advance RLE with verbal cues demonstrating asymetrical step length with occasional ataxia during RLE swing phase. Right knee hyperextension at midstance with min assist to inhibit hyperextension.  Decreased step clearance and step length RLE, decreased right ankle PF and knee flex at terminal stance, decreased knee flex and ankle DF at midswing, decreased knee ext and ankle DF at initial contact   Ambulation assistance - unlevel surface 0 (Not tested) 0 (Not tested)         STEPS/STAIRS Initial Assessment Discharge Assessment   Steps/Stairs ambulated 0 0   Rail Use       Functional Level  (NT)  (NT)   Comments Unable to ambulate up/down steps at this time due to extent of right hemiparesis and balance deficits Unable to ambulate up/down steps at this time secondary to extent of  right hemiparesis   Curbs/Ramps 0 (Not tested) 0 (Not tested)      QUALITY INDICATOR ASSIST COMMENTS   Walk 10 feet Min assist with quad cane and DF assist     Walk 50 feet with 2 turns Min assist with quad cane and DF assist     Walk 150 feet Min assist with quad cane and DF assist     Walk 10 feet on uneven  Unable secondary to extent of RLE weakness     1 step/curb Unable secondary to extent of RLE weakness     4 steps Unable secondary to extent of RLE weakness     12 steps Unable secondary to extent of RLE weakness      object Min assist     Wheel 50' w/2 turns supervision     Wheel 150' supevision                 EXPRESSION Initial Assessment Discharge Assessment 10/25/2017   Primary Mode of Expression Verbal Verbal   Score 3 4   Comments Expresses only basic needs 75-89%, repeats words, Expresses only basic needs 75-89%, repeats words, OR expresses self with assist from staff to occlude trach.      SOCIAL INTERACTION/ PRAGMATICS Initial Assessment Discharge Assessment 10/25/2017   Score 4 5 Comments tearful parts of session, able to self correct Needs supervision only under stressful or unfamiliar conditions, interacts appropriately 90% of the time, needs monitoring or encouragement for participation or interaction.      PROBLEM SOLVING Initial Assessment Discharge Assessment 10/25/2017   Score 4 4   Comments basic problem 80% of the time Solves basic problems 75-89% of the time.      MEMORY Initial Assessment Discharge Assessment 10/25/2017   Score 4 4   Comments  75-89% of the time 2 steps of 3 step request Recognizes, recalls, or executes 75-89% of the time 2 steps of 3 step request.      EATING Initial Assessment Discharge Assessment 10/25/2017   Functional Level 1 5   Comments   Set-up      BATHING Initial Assessment Discharge Assessment 10/25/2017   Functional Level 4 5   Body parts patient bathed Abdomen, Arm, left, Arm, right, Buttocks, Chest, Lower leg and foot, left, Lower leg and foot, right, Tali area, Thigh, left, Thigh, right Abdomen;Arm, left;Arm, right;Buttocks; Chest;Lower leg and foot, left; Lower leg and foot, right;Tali area; Thigh, left; Thigh, right   Comments A to stand; cueing for all parts SBA      TUB/SHOWER TRANSFER INDEPENDENCE Initial Assessment Discharge Assessment 10/25/2017   Score 2 4  Type of Shower: Shower  Adaptive  Equipment:Tub transfer bench, Grab bars and Wheelchair   Comments Lifting and lowering A CGA      UPPER BODY DRESSING/UNDRESSING Initial Assessment Discharge Assessment 10/25/2017   Functional Level 4 4   Items applied/Steps completed Pullover (4 steps) Pullover (4 steps); Bra (3 steps)   Comments A for threading RUE A to adjust bra      LOWER BODY DRESSING/UNDRESSING Initial Assessment Discharge Assessment 10/25/2017   Functional Level 1 4   Items applied/Steps completed Sock, left (1 step), Sock, right (1 step), Shoe, left (1 step), Shoe, right (1 step), Fasten shoe (1 step), Elastic waist pants (3 steps), Underpants (3 steps) Shoe, left (1 step); Shoe, right (1 step); Sock, left (1 step); Sock, right (1 step); Underpants (3 steps); Elastic waist pants (3 steps)   Comments A for threading RLE; R sock and shoes, tying shoes, A in standing; A adjusting underwear A for R shoe      Plan of Care: Please see Care Plan for progress towards goals during stay  Precautions at Discharge: Falls  Discharge Location: Home with spouse  Family Training: Completed with spouse      Recommendations/Functional Level:    Pt requires 24/7 supervision and some A with dressing. Recommended Continuing Therapy:  HH-OT  Residual Deficits:  RUE FMC, GMC, and strength, balance, safety awareness, and cognition.      Progress over LOS: Pt has made significant gains in balance, sequencing, safety awareness, and learning of cindy-techniques allowing her to complete transfers and ADL with min A allowing her to return home with support.            Mental Status   Neurologic State Alert   Orientation Level Oriented X4   Cognition Memory loss; Appropriate decision making   Perception Appears intact   Perseveration No perseveration noted   Safety/Judgement Fall prevention   Pt has reached max rehab potential at this time and will be discharged to home tomorrow with spouse. Recommend continued home health speech therapy services to address oral motor strength and cognitive deficits in STM, word finding and problem solving/reasoning tasks. Daughter present. Questions answered.    Pt completed visual problem solving and word finding tasks with min cues with 90% accuracy.          Past Medical History:   Diagnosis Date    Anxiety     Depression     HTN (hypertension)     Menopause       Past Surgical History:   Procedure Laterality Date    HX  SECTION      x 3    HX OTHER SURGICAL  2012    Face lift    HX REFRACTIVE SURGERY  2006    HX SANJUANITA AND BSO  1992      Family History   Problem Relation Age of Onset    Alcohol abuse Mother     Cancer Mother      lung cancer    Cancer Father      throat cancer    Psychiatric Disorder Father      anxiety, depression    No Known Problems Sister       Social History   Substance Use Topics    Smoking status: Former Smoker    Smokeless tobacco: Never Used    Alcohol use No     Prior to Admission medications    Medication Sig Start Date End Date Taking? Authorizing Provider   traMADol (ULTRAM) 50 mg tablet Take 1 Tab by mouth every six (6) hours as needed. Max Daily Amount: 200 mg. 10/24/17  Yes Juilette Maddox MD   tiZANidine (ZANAFLEX) 4 mg tablet 4mg po qhs 10/24/17  Yes Juliette Husain MD   tiZANidine (ZANAFLEX) 2 mg tablet 2mg po bid 10/24/17  Yes Juliette Husain MD   temazepam (RESTORIL) 15 mg capsule Take 1 Cap by mouth nightly as needed for Sleep. Max Daily Amount: 15 mg. 10/24/17  Yes Juliette Maddox MD   tamsulosin (FLOMAX) 0.4 mg capsule Take 1 Cap by mouth nightly. 10/24/17  Yes Juliette Maddox MD   rOPINIRole (REQUIP) 2 mg tablet Take 1 Tab by mouth nightly. Indications: Restless Legs Syndrome 10/24/17  Yes Juliette Maddox MD   potassium chloride (K-DUR, KLOR-CON) 20 mEq tablet Take 1 Tab by mouth daily. 10/25/17  Yes Juliette Maddox MD   methylphenidate HCl (RITALIN) 5 mg tablet 5mg po bid at 0800 and 1300 10/24/17  Yes Juliette Maddox MD   labetalol (NORMODYNE) 100 mg tablet Take 1 Tab by mouth two (2) times a day. 10/24/17  Yes Juliette Maddox MD   acetaminophen (TYLENOL) 500 mg tablet 1 Tab by Per NG tube route every four (4) hours as needed. 9/15/17   Ashanti Cabrera MD   amLODIPine (NORVASC) 10 mg tablet Take 1 Tab by mouth daily. 9/15/17   Ashanti Cabrera MD   labetalol (NORMODYNE) 200 mg tablet Take 1 Tab by mouth every eight (8) hours. 9/15/17   Ashanti Cabrera MD   potassium chloride (KAON 10%) 20 mEq/15 mL solution Take 15 mL by mouth daily. 9/15/17   Ashanti Cabrera MD   traMADol (ULTRAM) 50 mg tablet Take 1 Tab by mouth every six (6) hours as needed.  Max Daily Amount: 200 mg. 9/15/17   Ruben Mondragon MD   hydrALAZINE (APRESOLINE) 25 mg tablet Take 1 Tab by mouth three (3) times daily as needed. TID PRN for SBP>170 or DBP>100 9/15/17   Ruben Mondragon MD   CYANOCOBALAMIN, VITAMIN B-12, (VITAMIN B-12 PO) Take  by mouth. Historical Provider   losartan-hydroCHLOROthiazide (HYZAAR) 100-12.5 mg per tablet Take 1 Tab by mouth daily. 8/23/17   Mayda Reina MD   ALPRAZolam Ashlyn Ridgel) 0.25 mg tablet Take 1 Tab by mouth three (3) times daily as needed for Anxiety. Max Daily Amount: 0.75 mg. 8/23/17   Mayda Reina MD   FLUoxetine (PROZAC) 20 mg capsule  9/23/15   Historical Provider     Allergies   Allergen Reactions    Banana Shortness of Breath    Lisinopril Cough    Nuts [Tree Nut] Unknown (comments)        Lab Review: No results found for this or any previous visit (from the past 72 hour(s)).     Functional Assessment:  Gross Assessment  AROM:  (RLE AAROM WFL except PF contracture of -10) (10/20/17 0900)  PROM:  (WFL except right ankle PF contracture) (10/20/17 0900)  Strength:  (LLE WFL, RLE) (10/20/17 0900)  Coordination:  (LLE intact, RLE impaired) (10/20/17 0900)  Tone:  (extensor spasticity) (10/20/17 0900)  Sensation: Impaired (RLE) (10/20/17 0900)       Balance  Sitting - Static: Good (unsupported) (10/25/17 1200)  Sitting - Dynamic: Good (unsupported) (10/25/17 1200)  Standing - Static: Fair (with quad cane) (10/25/17 1200)  Standing - Dynamic : Impaired (10/21/17 1400)           Toileting  Adaptive Equipment: Elevated seat;Walker (09/29/17 4324)         [facility] Fall Risk Assessment:  Jackie Marsh Fall Risk  Mobility: Ambulates or transfers with assist devices or assistance/unsteady gait (10/24/17 2010)  Mobility Interventions: Bed/chair exit alarm (10/24/17 2010)  Mentation: Alert, oriented x 3 (10/24/17 2010)  Mentation Interventions: More frequent rounding (10/24/17 2010)  Medication: Patient receiving anticonvulsants, sedatives(tranquilizers), psychotropics or hypnotics, hypoglycemics, narcotics, sleep aids, antihypertensives, laxatives, or diuretics (10/24/17 2010)  Medication Interventions: Patient to call before getting OOB (10/24/17 2010)  Elimination: Needs assistance with toileting (10/24/17 2010)  Elimination Interventions: Bed/chair exit alarm; Patient to call for help with toileting needs; Toilet paper/wipes in reach; Toileting schedule/hourly rounds (10/24/17 2010)  Prior Fall History: No (10/24/17 2010)  History of Falls Interventions: Bed/chair exit alarm (10/24/17 0700)  Total Score: 3 (10/24/17 2010)  Standard Fall Precautions: Yes (10/15/17 1126)  High Fall Risk: Yes (10/24/17 2010)     Speech Assessment:         Ambulation:  Gait  Base of Support: Narrowed; Center of gravity altered (09/22/17 1200)  Speed/Lulu: Delayed (09/22/17 1200)  Step Length: Right shortened (09/22/17 1200)  Distance (ft): 150 Feet (ft) (10/25/17 1200)  Assistive Device: Gait belt;Cane, quad (ace wrap for right DF assist) (10/25/17 1200)     Visit Vitals    /70    Pulse 88    Temp 98.3 °F (36.8 °C)    Resp 12    SpO2 94%      Intake and Output:    10/23 1901 - 10/25 0700  In: 360 [P.O.:360]  Out: -     Discharge Exam:  General: Alert and age appropriately oriented. No acute cardio respiratory distress. HEENT: Normocephalic,no scleral icterus  Oral mucosa moist without cyanosis; right lower facial weakness   Lungs: Clear to auscultation  bilaterally. Respiration even and unlabored   Heart: Regular rate and rhythm, S1, S2   No  murmurs, clicks, rub or gallops   Abdomen: Soft, non-tender, nondistended. Bowel sounds present. No organomegaly. Genitourinary: Benign . Neuromuscular:     RUE unchanged, non functional. Sublux 1 cm  RLE prox 3+/4-; better control  DF o/5; compensates with brace   Skin/extremity: No rashes, no erythema.  No calf tenderness BLE  No edema     Problem List as of 10/25/2017  Date Reviewed: 9/12/2017          Codes Class Noted - Resolved    Thrombocytopenia ([facility] ICD-10-CM: D69.6  ICD-9-CM: 287.5  9/25/2017 - Present        Pulmonary emboli (HCC) ICD-10-CM: I26.99  ICD-9-CM: 415.19  9/25/2017 - Present        DVT (deep venous thrombosis) ([place] [id number] ICD-10-CM: I82.409  ICD-9-CM: 453.40  9/25/2017 - Present        * (Principal)ICH (intracerebral hemorrhage) ([facility] ICD-10-CM: I61.9  ICD-9-CM: 079  9/15/2017 - Present        Hypertensive urgency, malignant ICD-10-CM: I16.0  ICD-9-CM: 401.0  9/11/2017 - Present        Stroke, hemorrhagic ([place] [address].) ICD-10-CM: I61.9  ICD-9-CM: 816  9/7/2017 - Present        Accelerated hypertension ICD-10-CM: I10  ICD-9-CM: 401.0  9/7/2017 - Present        At risk for aspiration (Chronic) ICD-10-CM: Z91.89  ICD-9-CM: V49.89  9/7/2017 - Present        Acute spontaneous intraventricular hemorrhage assoc w/ hypertension ([place] [address].) ICD-10-CM: I61.5, I10  ICD-9-CM: 725, 401.9  9/7/2017 - Present        Screening for breast cancer ICD-10-CM: Z12.31  ICD-9-CM: V76.10  2/27/2017 - Present        Atrophic vaginitis ICD-10-CM: N95.2  ICD-9-CM: 627.3  7/11/2016 - Present        HTN (hypertension) (Chronic) ICD-10-CM: I10  ICD-9-CM: 401.9  10/23/2015 - Present        Depression ICD-10-CM: F32.9  ICD-9-CM: 432  10/23/2015 - Present        Anxiety (Chronic) ICD-10-CM: F41.9  ICD-9-CM: 300.00  10/23/2015 - Present        RESOLVED: Hypoxemia ICD-10-CM: R09.02  ICD-9-CM: 799.02  9/10/2017 - 9/12/2017        RESOLVED: Acute respiratory failure ([facility] ICD-10-CM: J96.00  ICD-9-CM: 518.81  9/8/2017 - 9/10/2017        RESOLVED: Hemorrhagic stroke ([facility] ICD-10-CM: I61.9  ICD-9-CM: 494  9/7/2017 - 9/7/2017        RESOLVED: Non-intractable vomiting with nausea ICD-10-CM: R11.2  ICD-9-CM: 787.01  9/7/2017 - 9/12/2017        RESOLVED: Seborrheic keratosis, inflamed ICD-10-CM: L82.0  ICD-9-CM: 702.11  7/11/2016 - 9/20/2016        RESOLVED: Cough ICD-10-CM: R05  ICD-9-CM: 786.2  7/11/2016 - 9/20/2016        RESOLVED: ADD (attention deficit disorder) (Chronic) ICD-10-CM: F98.8  ICD-9-CM: 314.00  10/23/2015 - 10/13/2017        RESOLVED: Shoulder pain ICD-10-CM: M25.519  ICD-9-CM: 719.41  10/23/2015 - 2/27/2017              Discharge Instructions:   1. Diet: mechanical soft, low salt  2. Activity: Activity as tolerated with supervision; no driving, no ambulation without assistance, no lifting, pushing or pulling  3. Wound Care: None needed  Current Discharge Medication List      START taking these medications    Details   !! traMADol (ULTRAM) 50 mg tablet Take 1 Tab by mouth every six (6) hours as needed. Max Daily Amount: 200 mg. Qty: 120 Tab, Refills: 0    Associated Diagnoses: Hemorrhagic stroke ([facility] Accelerated hypertension; Acute spontaneous intraventricular hemorrhage assoc w/ hypertension ([facility] Anxiety; At risk for aspiration; Non-intractable vomiting with nausea, unspecified vomiting type; Stroke, hemorrhagic ([facility] Acute respiratory failure with hypoxia ([facility] Hypoxemia; Hypertensive urgency, malignant; Essential hypertension; Other depression; Atrophic vaginitis; Screening for breast cancer      !! tiZANidine (ZANAFLEX) 4 mg tablet 4mg po qhs  Qty: 30 Tab, Refills: 6    Associated Diagnoses: Multiple left-sided nontraumatic localized intracerebral hemorrhages ([address]      ! ! tiZANidine (ZANAFLEX) 2 mg tablet 2mg po bid  Qty: 60 Tab, Refills: 4    Associated Diagnoses: Multiple left-sided nontraumatic localized intracerebral hemorrhages (HCC)      temazepam (RESTORIL) 15 mg capsule Take 1 Cap by mouth nightly as needed for Sleep. Max Daily Amount: 15 mg.  Qty: 30 Cap, Refills: 0    Associated Diagnoses: Anxiety      tamsulosin (FLOMAX) 0.4 mg capsule Take 1 Cap by mouth nightly. Qty: 30 Cap, Refills: 4    Associated Diagnoses: Urinary retention with incomplete bladder emptying      rOPINIRole (REQUIP) 2 mg tablet Take 1 Tab by mouth nightly.  Indications: Restless Legs Syndrome  Qty: 30 Tab, Refills: 4    Associated Diagnoses: Restless leg potassium chloride (K-DUR, KLOR-CON) 20 mEq tablet Take 1 Tab by mouth daily. Qty: 30 Tab, Refills: 0    Associated Diagnoses: Hypokalemia      methylphenidate HCl (RITALIN) 5 mg tablet 5mg po bid at 0800 and 1300  Qty: 60 Tab, Refills: 0    Associated Diagnoses: Multiple left-sided nontraumatic localized intracerebral hemorrhages ([address]       ! ! - Potential duplicate medications found. Please discuss with provider. CONTINUE these medications which have CHANGED    Details   labetalol (NORMODYNE) 100 mg tablet Take 1 Tab by mouth two (2) times a day. Qty: 60 Tab, Refills: 4    Associated Diagnoses: Hemorrhagic stroke ([address] Accelerated hypertension; Acute spontaneous intraventricular hemorrhage assoc w/ hypertension ([address] Anxiety; At risk for aspiration; Non-intractable vomiting with nausea, unspecified vomiting type; Stroke, hemorrhagic ([address] Acute respiratory failure with hypoxia ([address] Hypoxemia; Hypertensive urgency, malignant; Essential hypertension; Other depression; Atrophic vaginitis; Screening for breast cancer         CONTINUE these medications which have NOT CHANGED    Details   acetaminophen (TYLENOL) 500 mg tablet 1 Tab by Per NG tube route every four (4) hours as needed. Qty: 30 Tab, Refills: 0      !! traMADol (ULTRAM) 50 mg tablet Take 1 Tab by mouth every six (6) hours as needed. Max Daily Amount: 200 mg. Qty: 30 Tab, Refills: 0      losartan-hydroCHLOROthiazide (HYZAAR) 100-12.5 mg per tablet Take 1 Tab by mouth daily. Qty: 30 Tab, Refills: 5    Associated Diagnoses: Essential hypertension      ALPRAZolam (XANAX) 0.25 mg tablet Take 1 Tab by mouth three (3) times daily as needed for Anxiety. Max Daily Amount: 0.75 mg. Qty: 30 Tab, Refills: 1    Associated Diagnoses: Anxiety       ! ! - Potential duplicate medications found. Please discuss with provider.       STOP taking these medications       amLODIPine (NORVASC) 10 mg tablet Comments:   Reason for Stopping:         potassium chloride (KAON 10%) 20 mEq/15 mL solution Comments:   Reason for Stopping:         hydrALAZINE (APRESOLINE) 25 mg tablet Comments:   Reason for Stopping:         CYANOCOBALAMIN, VITAMIN B-12, (VITAMIN B-12 PO) Comments:   Reason for Stopping:         FLUoxetine (PROZAC) 20 mg capsule Comments:   Reason for Stopping:               Rehabilitation Management:   1. Devices: quad cane, right AFO, light weight wc  2.  Consult: Rehab team including PT, OT, recreational therapy, and  and Speech therapy    Disposition: home with [facility] PT/OT/ST    Follow-up with myself in 4-6 wks and with Dr. Douglas Fernando in 1-2 wks    Signed By: Jenifer Johnson MD     October 26, 2017

## 2017-10-25 NOTE — PROGRESS NOTES
Problem: Mobility Impaired (Adult and Pediatric)  Goal: *Therapy Goal (Edit Goal, Insert Text)  STG 3. Patient ambulate 10 feet with appropriate assistive devices and max assist in 1 week (met 9/22/17)  LTG 3. Patient ambulate 25 feet with appropriate assistive devices and min assist in 3 weeks (not applicable at this time secondary to slow progress and lack of motor return in her RLE)  (Cont with LTG as of 10/13/17)(revised 10/20/17. Patient ambulate 100ft with quad cane and custom right AFO and min assist in 1 week)         Outcome: Not Met  Variance: Other (add note)  Comments: Patient unable to meet goal secondary to not receiving custom AFO prior to Discharge assessment.

## 2017-10-25 NOTE — PROGRESS NOTES
10/25/17 1010   Time Spent With Patient   Time In 0935   Time Out 1009   Patient Seen For: AM;Neuro-linguistics; Verbal activities;Oral-motor exercises   Mental Status   Neurologic State Alert   Orientation Level Oriented X4   Cognition Memory loss; Appropriate decision making   Perception Appears intact   Perseveration No perseveration noted   Safety/Judgement Fall prevention   Pt has reached max rehab potential at this time and will be discharged to home tomorrow with spouse. Recommend continued home health speech therapy services to address oral motor strength and cognitive deficits in STM, word finding and problem solving/reasoning tasks. Daughter present. Questions answered. Pt completed visual problem solving and word finding tasks with min cues with 90% accuracy.     Magy Fuller MA/ANASTASIA/SLP

## 2017-10-26 VITALS
HEART RATE: 90 BPM | OXYGEN SATURATION: 95 % | RESPIRATION RATE: 18 BRPM | DIASTOLIC BLOOD PRESSURE: 79 MMHG | SYSTOLIC BLOOD PRESSURE: 138 MMHG | TEMPERATURE: 97.8 F

## 2017-10-26 PROCEDURE — 99239 HOSP IP/OBS DSCHRG MGMT >30: CPT | Performed by: PHYSICAL MEDICINE & REHABILITATION

## 2017-10-26 PROCEDURE — 74011250637 HC RX REV CODE- 250/637: Performed by: PHYSICAL MEDICINE & REHABILITATION

## 2017-10-26 RX ADMIN — POLYETHYLENE GLYCOL 3350 17 G: 17 POWDER, FOR SOLUTION ORAL at 08:02

## 2017-10-26 RX ADMIN — TIZANIDINE 2 MG: 2 TABLET ORAL at 14:41

## 2017-10-26 RX ADMIN — STANDARDIZED SENNA CONCENTRATE AND DOCUSATE SODIUM 1 TABLET: 8.6; 5 TABLET, FILM COATED ORAL at 08:02

## 2017-10-26 RX ADMIN — LABETALOL HYDROCHLORIDE 100 MG: 100 TABLET, FILM COATED ORAL at 08:02

## 2017-10-26 RX ADMIN — POTASSIUM CHLORIDE 20 MEQ: 20 TABLET, EXTENDED RELEASE ORAL at 08:02

## 2017-10-26 RX ADMIN — TIZANIDINE 2 MG: 2 TABLET ORAL at 06:36

## 2017-10-26 RX ADMIN — HYDROCHLOROTHIAZIDE: 12.5 CAPSULE ORAL at 08:02

## 2017-10-26 RX ADMIN — METHYLPHENIDATE HYDROCHLORIDE 5 MG: 10 TABLET ORAL at 08:02

## 2017-10-26 NOTE — PROGRESS NOTES
PT discharged teaching completed with pt and spouse. Gave pt handouts and answered questions. Prescriptions given to family member per pt request. Pt discharged and left floor via wheel chair with her  and myself.

## 2017-10-26 NOTE — PROGRESS NOTES
Care Management Interventions  PCP Verified by CM: Yes  Mode of Transport at Discharge: Other (see comment) (family )  Transition of Care Consult (CM Consult): Home Health, Other (PT, OT, ST and HHA)  [facility] [place] - INPATIENT: Yes  Discharge Durable Medical Equipment: Yes (WC, Cushion. shower chair and quad cane)  Physical Therapy Consult: Yes  Occupational Therapy Consult: Yes  Speech Therapy Consult: Yes  Current Support Network: Lives with Spouse, Own Home, Family Lives Nearby  Confirm Follow Up Transport: Family  Plan discussed with Pt/Family/Caregiver: Yes  Freedom of Choice Offered: Yes  Discharge Location  Discharge Placement: Home with home health [facility])    Pt to discharge to home with spouse and sitters to provide 24/7.  [facility]. F2F and AVS completed. Services to be provided by [facility].

## 2017-10-26 NOTE — PROGRESS NOTES
[facility] [place] - INPATIENT  Face to Face Encounter    Patients Name: Bradley Huntley    YOB: 1955    Ordering Physician: Yanelis Arellano MD    Primary Diagnosis: stroke, left brain involvement  ICH (intracerebral hemorrhage) [facility])    Date of Face to Face:   10/26/2017                                  Face to Face Encounter findings are related to primary reason for home care:   Yes.     1. I certify that the patient needs intermittent care as follows: physical therapy: strengthening, stretching/ROM, transfer training, gait/stair training, balance training and pt/caregiver education  occupational therapy:  ADL safety (ie. cooking, bathing, dressing) and pt/caregiver education  speech therapy:  cognition training and pt/caregiver education    2. I certify that this patient is homebound, that is: 1) patient requires the use of a wheelchair device, special transportation, or assistance of another to leave the home; or 2) patient's condition makes leaving the home medically contraindicated; and 3) patient has a normal inability to leave the home and leaving the home requires considerable and taxing effort. Patient may leave the home for infrequent and short duration for medical reasons, and occasional absences for non-medical reasons. Homebound status is due to the following functional limitations: Patient with strength deficits limiting the performance of all ADL's without caregiver assistance or the use of an assistive device. Patient with poor safety awareness and is at risk for falls without assistance of another person and the use of an assistive device. Patient with poor ambulation endurance limiting their safe ability to ascend/descend the required number of steps to leave the home.     3. I certify that this patient is under my care and that I, or a nurse practitioner or  [id number], or clinical nurse specialist, or certified nurse midwife, working with me, had a Face-to-Face Encounter that meets the physician Face-to-Face Encounter requirements. The following are the clinical findings from the [address] encounter that support the need for skilled services and is a summary of the encounter: see medical record    See attached progess note      NANCY Dillon  10/26/2017      THE FOLLOWING TO BE COMPLETED BY THE COMMUNITY PHYSICIAN:    I concur with the findings described above from the F2F encounter that this patient is homebound and in need of a skilled service.     Certifying Physician: _____________________________________      Printed Certifying Physician Name: _____________________________________    Date: _________________

## 2017-10-26 NOTE — PROGRESS NOTES
End Of Shift Functional Summary, Nursing      TOILET TRANSFER:  Pt requires minimal assistance. Pt uses grab bars. BLADDER:  Pt does not have a stafford catheter that staff manages. Pt is continent. of bladder and voids in bedside commode. Pt requires staff to empty device. Pt has had 0 bladder accidents during this shift. .  (An accident is when the episode is not contained in a brief AND/OR the clothing/linen requires changing/cleaning up.)    BOWEL:    Pt is continent of bowel and uses bedside commode. Pt requires staff to empty device    Pt has had 0 bowel accidents during this shift requiring minimal assistance from staff to clean up.  (An accident is when the episode is not contained in a brief AND/OR the clothing/linen requires changing/cleaning up.)

## 2017-10-26 NOTE — PROGRESS NOTES
End Of Shift Functional Summary, Nursing      TOILETING:  Does patient need assist with clothing management and/or pericare? yes    TOILET TRANSFER:  Pt requires minimal assistance. Pt uses wheelchair. BLADDER:  Pt does not have a stfaford catheter that staff manages. Pt does take medication. Pt is continent. of bladder and voids in bedside commode  Pt requires staff to empty device Pt has had 0 bladder accidents during this shift requiring minimal assistance to clean up. (An accident is when the episode is not contained in a brief AND/OR the clothing/linen requires changing/cleaning up.)    BOWEL:  Pt does take medication. Pt is continent of bowel and uses bedside commode. Pt requires staff to empty device    Pt has had 0 bowel accidents during this shift requiring minimal assistance from staff to clean up.  (An accident is when the episode is not contained in a brief AND/OR the clothing/linen requires changing/cleaning up.)

## 2017-10-26 NOTE — PROGRESS NOTES
End Of Shift Functional Summary, Nursing      TOILETING:  Does patient need assist with clothing management and/or pericare? Yes: Comment: yes    TOILET TRANSFER:  Pt requires moderate assistance. Pt uses wheelchair. BLADDER:  Pt does not have a stafford catheter that staff manages. Pt does take medication. Pt is continent. of bladder and voids in bedside commode  Pt requires staff to position device and empty device Pt has had 0 bladder accidents during this shift   (An accident is when the episode is not contained in a brief AND/OR the clothing/linen requires changing/cleaning up.)    BOWEL:  Pt does take medication. Pt is continent of bowel and uses bedside commode. Pt requires staff to position device and empty device    Pt has had 0 bowel accidents during this shift r(An accident is when the episode is not contained in a brief AND/OR the clothing/linen requires changing/cleaning up.)    BED/CHAIR TRANSFER  Pt requires moderate assistance. Patient requires the assistance of 1 staff member(s). Pt uses cane    BATHING  Pt requires contact guard assistance. Pt requires assistance with left lower leg and foot and right lower leg and foot    SHOWER TRANSFER:  Pt requires moderate assistance  Patient requires the assistance of 1 staff member(s). Pt uses tub transfer bench    DRESSING  UPPER BODY:  Pt requires staff to get clothing Pt requires moderate assistance. LOWER BODY:  Pt requires staff to get clothing. Pt requires moderate assistance    EATING  Pt requires setup. Pt does not wear dentures. TUBE FEEDINGS:  Pt does not  receive nutrition through tube feedings. GROOMING  Pt requires setup of supplies with oral care, hair, washing face and washing hands. Documentation reviewed and plan of care discussed/reviewed with   patient assistant during the shift.

## 2017-10-28 ENCOUNTER — HOME CARE VISIT (OUTPATIENT)
Dept: SCHEDULING | Facility: HOME HEALTH | Age: 62
End: 2017-10-28
Payer: COMMERCIAL

## 2017-10-28 VITALS
TEMPERATURE: 97.4 F | HEART RATE: 84 BPM | OXYGEN SATURATION: 96 % | DIASTOLIC BLOOD PRESSURE: 88 MMHG | RESPIRATION RATE: 20 BRPM | SYSTOLIC BLOOD PRESSURE: 150 MMHG

## 2017-10-28 PROCEDURE — G0151 HHCP-SERV OF PT,EA 15 MIN: HCPCS

## 2017-10-28 PROCEDURE — 400013 HH SOC

## 2017-10-30 ENCOUNTER — HOME CARE VISIT (OUTPATIENT)
Dept: SCHEDULING | Facility: HOME HEALTH | Age: 62
End: 2017-10-30
Payer: COMMERCIAL

## 2017-10-30 VITALS
OXYGEN SATURATION: 97 % | RESPIRATION RATE: 18 BRPM | DIASTOLIC BLOOD PRESSURE: 80 MMHG | TEMPERATURE: 97.7 F | SYSTOLIC BLOOD PRESSURE: 138 MMHG | HEART RATE: 98 BPM

## 2017-10-30 PROCEDURE — G0157 HHC PT ASSISTANT EA 15: HCPCS

## 2017-10-31 PROBLEM — K59.00 CONSTIPATION: Status: ACTIVE | Noted: 2017-10-31

## 2017-10-31 PROBLEM — I63.9 CVA (CEREBRAL VASCULAR ACCIDENT) (HCC): Status: ACTIVE | Noted: 2017-01-01

## 2017-10-31 PROBLEM — I26.99 PTE (PULMONARY THROMBOEMBOLISM) (HCC): Status: ACTIVE | Noted: 2017-01-01

## 2017-11-01 ENCOUNTER — HOME CARE VISIT (OUTPATIENT)
Dept: SCHEDULING | Facility: HOME HEALTH | Age: 62
End: 2017-11-01
Payer: COMMERCIAL

## 2017-11-01 VITALS
DIASTOLIC BLOOD PRESSURE: 80 MMHG | TEMPERATURE: 98.6 F | SYSTOLIC BLOOD PRESSURE: 146 MMHG | HEART RATE: 78 BPM | RESPIRATION RATE: 16 BRPM

## 2017-11-01 PROCEDURE — G0157 HHC PT ASSISTANT EA 15: HCPCS

## 2017-11-02 ENCOUNTER — HOME CARE VISIT (OUTPATIENT)
Dept: SCHEDULING | Facility: HOME HEALTH | Age: 62
End: 2017-11-02
Payer: COMMERCIAL

## 2017-11-02 ENCOUNTER — HOME CARE VISIT (OUTPATIENT)
Dept: HOME HEALTH SERVICES | Facility: HOME HEALTH | Age: 62
End: 2017-11-02
Payer: COMMERCIAL

## 2017-11-02 VITALS
RESPIRATION RATE: 16 BRPM | DIASTOLIC BLOOD PRESSURE: 82 MMHG | SYSTOLIC BLOOD PRESSURE: 148 MMHG | TEMPERATURE: 97.5 F | HEART RATE: 74 BPM

## 2017-11-02 PROCEDURE — G0153 HHCP-SVS OF S/L PATH,EA 15MN: HCPCS

## 2017-11-03 ENCOUNTER — HOME CARE VISIT (OUTPATIENT)
Dept: SCHEDULING | Facility: HOME HEALTH | Age: 62
End: 2017-11-03
Payer: COMMERCIAL

## 2017-11-03 VITALS
HEART RATE: 91 BPM | DIASTOLIC BLOOD PRESSURE: 80 MMHG | OXYGEN SATURATION: 98 % | TEMPERATURE: 96.4 F | SYSTOLIC BLOOD PRESSURE: 122 MMHG

## 2017-11-03 PROCEDURE — G0152 HHCP-SERV OF OT,EA 15 MIN: HCPCS

## 2017-11-06 ENCOUNTER — HOME CARE VISIT (OUTPATIENT)
Dept: SCHEDULING | Facility: HOME HEALTH | Age: 62
End: 2017-11-06
Payer: COMMERCIAL

## 2017-11-06 VITALS
RESPIRATION RATE: 17 BRPM | SYSTOLIC BLOOD PRESSURE: 128 MMHG | HEART RATE: 93 BPM | TEMPERATURE: 97.1 F | DIASTOLIC BLOOD PRESSURE: 82 MMHG

## 2017-11-06 PROCEDURE — G0158 HHC OT ASSISTANT EA 15: HCPCS

## 2017-11-07 ENCOUNTER — HOME CARE VISIT (OUTPATIENT)
Dept: SCHEDULING | Facility: HOME HEALTH | Age: 62
End: 2017-11-07
Payer: COMMERCIAL

## 2017-11-07 VITALS
DIASTOLIC BLOOD PRESSURE: 58 MMHG | OXYGEN SATURATION: 99 % | TEMPERATURE: 96.3 F | SYSTOLIC BLOOD PRESSURE: 120 MMHG | RESPIRATION RATE: 16 BRPM | HEART RATE: 76 BPM

## 2017-11-07 VITALS
HEART RATE: 72 BPM | SYSTOLIC BLOOD PRESSURE: 144 MMHG | TEMPERATURE: 97.6 F | DIASTOLIC BLOOD PRESSURE: 82 MMHG | RESPIRATION RATE: 16 BRPM

## 2017-11-07 PROCEDURE — G0153 HHCP-SVS OF S/L PATH,EA 15MN: HCPCS

## 2017-11-07 PROCEDURE — G0157 HHC PT ASSISTANT EA 15: HCPCS

## 2017-11-08 ENCOUNTER — HOME CARE VISIT (OUTPATIENT)
Dept: SCHEDULING | Facility: HOME HEALTH | Age: 62
End: 2017-11-08
Payer: COMMERCIAL

## 2017-11-08 VITALS
TEMPERATURE: 97.2 F | HEART RATE: 90 BPM | RESPIRATION RATE: 16 BRPM | SYSTOLIC BLOOD PRESSURE: 143 MMHG | DIASTOLIC BLOOD PRESSURE: 89 MMHG

## 2017-11-08 VITALS
TEMPERATURE: 97.6 F | SYSTOLIC BLOOD PRESSURE: 142 MMHG | HEART RATE: 72 BPM | RESPIRATION RATE: 16 BRPM | DIASTOLIC BLOOD PRESSURE: 82 MMHG

## 2017-11-08 PROCEDURE — G0158 HHC OT ASSISTANT EA 15: HCPCS

## 2017-11-08 PROCEDURE — G0153 HHCP-SVS OF S/L PATH,EA 15MN: HCPCS

## 2017-11-09 ENCOUNTER — HOME CARE VISIT (OUTPATIENT)
Dept: SCHEDULING | Facility: HOME HEALTH | Age: 62
End: 2017-11-09
Payer: COMMERCIAL

## 2017-11-09 VITALS
RESPIRATION RATE: 18 BRPM | DIASTOLIC BLOOD PRESSURE: 82 MMHG | SYSTOLIC BLOOD PRESSURE: 138 MMHG | TEMPERATURE: 97.9 F | HEART RATE: 68 BPM

## 2017-11-09 PROCEDURE — G0157 HHC PT ASSISTANT EA 15: HCPCS

## 2017-11-13 ENCOUNTER — HOME CARE VISIT (OUTPATIENT)
Dept: SCHEDULING | Facility: HOME HEALTH | Age: 62
End: 2017-11-13
Payer: COMMERCIAL

## 2017-11-13 VITALS
HEART RATE: 78 BPM | RESPIRATION RATE: 16 BRPM | TEMPERATURE: 96.5 F | SYSTOLIC BLOOD PRESSURE: 132 MMHG | DIASTOLIC BLOOD PRESSURE: 84 MMHG

## 2017-11-13 PROCEDURE — G0158 HHC OT ASSISTANT EA 15: HCPCS

## 2017-11-14 ENCOUNTER — HOME CARE VISIT (OUTPATIENT)
Dept: SCHEDULING | Facility: HOME HEALTH | Age: 62
End: 2017-11-14
Payer: COMMERCIAL

## 2017-11-14 VITALS
DIASTOLIC BLOOD PRESSURE: 80 MMHG | TEMPERATURE: 97.5 F | HEART RATE: 72 BPM | RESPIRATION RATE: 16 BRPM | SYSTOLIC BLOOD PRESSURE: 142 MMHG

## 2017-11-14 VITALS
RESPIRATION RATE: 16 BRPM | TEMPERATURE: 98.3 F | HEART RATE: 72 BPM | DIASTOLIC BLOOD PRESSURE: 72 MMHG | SYSTOLIC BLOOD PRESSURE: 128 MMHG

## 2017-11-14 PROCEDURE — G0157 HHC PT ASSISTANT EA 15: HCPCS

## 2017-11-14 PROCEDURE — G0153 HHCP-SVS OF S/L PATH,EA 15MN: HCPCS

## 2017-11-15 ENCOUNTER — HOME CARE VISIT (OUTPATIENT)
Dept: SCHEDULING | Facility: HOME HEALTH | Age: 62
End: 2017-11-15
Payer: COMMERCIAL

## 2017-11-15 VITALS
DIASTOLIC BLOOD PRESSURE: 76 MMHG | SYSTOLIC BLOOD PRESSURE: 124 MMHG | HEART RATE: 78 BPM | RESPIRATION RATE: 16 BRPM | TEMPERATURE: 97.6 F

## 2017-11-15 VITALS
DIASTOLIC BLOOD PRESSURE: 76 MMHG | RESPIRATION RATE: 16 BRPM | HEART RATE: 91 BPM | TEMPERATURE: 97.3 F | SYSTOLIC BLOOD PRESSURE: 124 MMHG

## 2017-11-15 PROCEDURE — G0158 HHC OT ASSISTANT EA 15: HCPCS

## 2017-11-15 PROCEDURE — G0153 HHCP-SVS OF S/L PATH,EA 15MN: HCPCS

## 2017-11-16 ENCOUNTER — HOME CARE VISIT (OUTPATIENT)
Dept: SCHEDULING | Facility: HOME HEALTH | Age: 62
End: 2017-11-16
Payer: COMMERCIAL

## 2017-11-16 PROCEDURE — G0157 HHC PT ASSISTANT EA 15: HCPCS

## 2017-11-17 VITALS
TEMPERATURE: 97.7 F | DIASTOLIC BLOOD PRESSURE: 82 MMHG | HEART RATE: 80 BPM | SYSTOLIC BLOOD PRESSURE: 130 MMHG | RESPIRATION RATE: 16 BRPM

## 2017-11-20 ENCOUNTER — HOME CARE VISIT (OUTPATIENT)
Dept: SCHEDULING | Facility: HOME HEALTH | Age: 62
End: 2017-11-20
Payer: COMMERCIAL

## 2017-11-20 VITALS
SYSTOLIC BLOOD PRESSURE: 124 MMHG | RESPIRATION RATE: 16 BRPM | DIASTOLIC BLOOD PRESSURE: 70 MMHG | TEMPERATURE: 97.7 F | HEART RATE: 78 BPM

## 2017-11-20 VITALS
TEMPERATURE: 97.8 F | RESPIRATION RATE: 16 BRPM | DIASTOLIC BLOOD PRESSURE: 60 MMHG | SYSTOLIC BLOOD PRESSURE: 124 MMHG | HEART RATE: 76 BPM

## 2017-11-20 PROCEDURE — G0157 HHC PT ASSISTANT EA 15: HCPCS

## 2017-11-20 PROCEDURE — G0153 HHCP-SVS OF S/L PATH,EA 15MN: HCPCS

## 2017-11-21 ENCOUNTER — HOME CARE VISIT (OUTPATIENT)
Dept: SCHEDULING | Facility: HOME HEALTH | Age: 62
End: 2017-11-21
Payer: COMMERCIAL

## 2017-11-21 VITALS
TEMPERATURE: 96.4 F | SYSTOLIC BLOOD PRESSURE: 160 MMHG | DIASTOLIC BLOOD PRESSURE: 88 MMHG | RESPIRATION RATE: 18 BRPM | HEART RATE: 68 BPM

## 2017-11-21 VITALS
TEMPERATURE: 97.4 F | HEART RATE: 78 BPM | SYSTOLIC BLOOD PRESSURE: 130 MMHG | DIASTOLIC BLOOD PRESSURE: 90 MMHG | OXYGEN SATURATION: 98 %

## 2017-11-21 PROCEDURE — G0151 HHCP-SERV OF PT,EA 15 MIN: HCPCS

## 2017-11-21 PROCEDURE — G0152 HHCP-SERV OF OT,EA 15 MIN: HCPCS

## 2017-11-27 ENCOUNTER — HOME CARE VISIT (OUTPATIENT)
Dept: SCHEDULING | Facility: HOME HEALTH | Age: 62
End: 2017-11-27
Payer: COMMERCIAL

## 2017-11-27 PROCEDURE — G0152 HHCP-SERV OF OT,EA 15 MIN: HCPCS

## 2017-11-28 ENCOUNTER — HOME CARE VISIT (OUTPATIENT)
Dept: SCHEDULING | Facility: HOME HEALTH | Age: 62
End: 2017-11-28
Payer: COMMERCIAL

## 2017-11-28 VITALS
TEMPERATURE: 97.7 F | SYSTOLIC BLOOD PRESSURE: 126 MMHG | RESPIRATION RATE: 16 BRPM | HEART RATE: 78 BPM | DIASTOLIC BLOOD PRESSURE: 78 MMHG

## 2017-11-28 VITALS
TEMPERATURE: 97.6 F | OXYGEN SATURATION: 98 % | DIASTOLIC BLOOD PRESSURE: 80 MMHG | SYSTOLIC BLOOD PRESSURE: 125 MMHG | HEART RATE: 87 BPM

## 2017-11-28 VITALS
DIASTOLIC BLOOD PRESSURE: 78 MMHG | TEMPERATURE: 97.7 F | HEART RATE: 78 BPM | SYSTOLIC BLOOD PRESSURE: 126 MMHG | RESPIRATION RATE: 16 BRPM

## 2017-11-28 PROCEDURE — G0153 HHCP-SVS OF S/L PATH,EA 15MN: HCPCS

## 2017-11-28 PROCEDURE — G0157 HHC PT ASSISTANT EA 15: HCPCS

## 2017-11-29 ENCOUNTER — HOME CARE VISIT (OUTPATIENT)
Dept: SCHEDULING | Facility: HOME HEALTH | Age: 62
End: 2017-11-29
Payer: COMMERCIAL

## 2017-11-29 VITALS
SYSTOLIC BLOOD PRESSURE: 170 MMHG | OXYGEN SATURATION: 98 % | TEMPERATURE: 98 F | HEART RATE: 91 BPM | DIASTOLIC BLOOD PRESSURE: 102 MMHG

## 2017-11-29 PROCEDURE — G0152 HHCP-SERV OF OT,EA 15 MIN: HCPCS

## 2017-11-30 ENCOUNTER — HOME CARE VISIT (OUTPATIENT)
Dept: SCHEDULING | Facility: HOME HEALTH | Age: 62
End: 2017-11-30
Payer: COMMERCIAL

## 2017-11-30 VITALS
RESPIRATION RATE: 16 BRPM | TEMPERATURE: 97.6 F | HEART RATE: 72 BPM | DIASTOLIC BLOOD PRESSURE: 72 MMHG | SYSTOLIC BLOOD PRESSURE: 124 MMHG

## 2017-11-30 PROCEDURE — G0153 HHCP-SVS OF S/L PATH,EA 15MN: HCPCS

## 2017-11-30 PROCEDURE — G0157 HHC PT ASSISTANT EA 15: HCPCS

## 2017-12-01 VITALS
SYSTOLIC BLOOD PRESSURE: 124 MMHG | HEART RATE: 72 BPM | TEMPERATURE: 97.6 F | RESPIRATION RATE: 16 BRPM | DIASTOLIC BLOOD PRESSURE: 72 MMHG

## 2017-12-04 ENCOUNTER — HOME CARE VISIT (OUTPATIENT)
Dept: SCHEDULING | Facility: HOME HEALTH | Age: 62
End: 2017-12-04
Payer: COMMERCIAL

## 2017-12-04 VITALS — TEMPERATURE: 98.4 F | SYSTOLIC BLOOD PRESSURE: 138 MMHG | HEART RATE: 80 BPM | DIASTOLIC BLOOD PRESSURE: 70 MMHG

## 2017-12-04 VITALS
RESPIRATION RATE: 16 BRPM | HEART RATE: 80 BPM | SYSTOLIC BLOOD PRESSURE: 138 MMHG | TEMPERATURE: 98.4 F | DIASTOLIC BLOOD PRESSURE: 70 MMHG

## 2017-12-04 PROCEDURE — G0157 HHC PT ASSISTANT EA 15: HCPCS

## 2017-12-04 PROCEDURE — G0152 HHCP-SERV OF OT,EA 15 MIN: HCPCS

## 2017-12-06 ENCOUNTER — HOME CARE VISIT (OUTPATIENT)
Dept: SCHEDULING | Facility: HOME HEALTH | Age: 62
End: 2017-12-06
Payer: COMMERCIAL

## 2017-12-06 VITALS
RESPIRATION RATE: 16 BRPM | DIASTOLIC BLOOD PRESSURE: 76 MMHG | TEMPERATURE: 98.3 F | HEART RATE: 72 BPM | SYSTOLIC BLOOD PRESSURE: 136 MMHG

## 2017-12-06 PROCEDURE — G0157 HHC PT ASSISTANT EA 15: HCPCS

## 2017-12-08 ENCOUNTER — HOME CARE VISIT (OUTPATIENT)
Dept: SCHEDULING | Facility: HOME HEALTH | Age: 62
End: 2017-12-08
Payer: COMMERCIAL

## 2017-12-08 VITALS
TEMPERATURE: 97.9 F | HEART RATE: 86 BPM | SYSTOLIC BLOOD PRESSURE: 140 MMHG | DIASTOLIC BLOOD PRESSURE: 85 MMHG | OXYGEN SATURATION: 97 %

## 2017-12-08 PROCEDURE — G0152 HHCP-SERV OF OT,EA 15 MIN: HCPCS

## 2017-12-11 ENCOUNTER — HOME CARE VISIT (OUTPATIENT)
Dept: SCHEDULING | Facility: HOME HEALTH | Age: 62
End: 2017-12-11
Payer: COMMERCIAL

## 2017-12-11 ENCOUNTER — APPOINTMENT (OUTPATIENT)
Dept: PHYSICAL THERAPY | Age: 62
End: 2017-12-11

## 2017-12-11 PROCEDURE — G0157 HHC PT ASSISTANT EA 15: HCPCS

## 2017-12-12 ENCOUNTER — HOME CARE VISIT (OUTPATIENT)
Dept: SCHEDULING | Facility: HOME HEALTH | Age: 62
End: 2017-12-12
Payer: COMMERCIAL

## 2017-12-12 VITALS
DIASTOLIC BLOOD PRESSURE: 88 MMHG | RESPIRATION RATE: 17 BRPM | TEMPERATURE: 98.5 F | HEART RATE: 82 BPM | SYSTOLIC BLOOD PRESSURE: 138 MMHG

## 2017-12-12 VITALS
DIASTOLIC BLOOD PRESSURE: 88 MMHG | SYSTOLIC BLOOD PRESSURE: 158 MMHG | HEART RATE: 76 BPM | TEMPERATURE: 96.5 F | RESPIRATION RATE: 18 BRPM

## 2017-12-12 PROCEDURE — G0151 HHCP-SERV OF PT,EA 15 MIN: HCPCS

## 2017-12-13 PROBLEM — F33.0 MILD EPISODE OF RECURRENT MAJOR DEPRESSIVE DISORDER (HCC): Status: ACTIVE | Noted: 2017-12-13

## 2017-12-15 ENCOUNTER — HOSPITAL ENCOUNTER (OUTPATIENT)
Dept: PHYSICAL THERAPY | Age: 62
Discharge: HOME | End: 2017-12-15
Attending: PHYSICAL MEDICINE & REHABILITATION
Payer: COMMERCIAL

## 2017-12-15 PROCEDURE — 97162 PT EVAL MOD COMPLEX 30 MIN: CPT

## 2017-12-15 NOTE — PROGRESS NOTES
Lakisha Proctor  : 1955 [facility] [address] [address],[address] [facility], [place] [address].  [phone]   [phone]          OUTPATIENT ORTHOPAEDIC PHYSICAL THERAPY    NAME/AGE/GENDER: Lakisha Proctor is a 60 y.o. female    DATE: 12/15/2017                       Ambulatory/Rehab Services H2 Model Falls Risk Assessment    Risk Factor Pts. ·   Confusion/Disorientation/Impulsivity  [x]    4 ·   Symptomatic Depression  []   2 ·   Altered Elimination  []   1 ·   Dizziness/Vertigo  []   1 ·   Gender (Male)  []   1 ·   Any administered antiepileptics (anticonvulsants):  []   2 ·   Any administered benzodiazepines:  []   1 ·   Visual Impairment (specify):  [x]   1 ·   Portable Oxygen Use  []   1 ·   Orthostatic ? BP  []   1 ·   History of Recent Falls (within 3 mos.)  []   5     Ability to Rise from Chair (choose one) Pts. ·   Ability to rise in a single movement  [x]   0 ·   Pushes up, successful in one attempt  []   1 ·   Multiple attempts, but unsuccessful  []   3 ·   Unable to rise without assistance  []   4   Total: (5 or greater = High Risk) 5     Falls Prevention Plan:   []                Physical Limitations to Exercise (specify):   []                Mobility Assistance Device (type):   []                Exercise/Equipment Adaptation (specify):    © [employer] [facility] [place] . [facility] States Patent #[id number].  Federal Law prohibits the replication, distribution or use without written permission from [employer] of [address] [place] [address]

## 2017-12-15 NOTE — THERAPY EVALUATION
Odilia Oas  : 1955  Primary: Radha Denny Ppo  Secondary:  [address] at [place] [id number] [facility], [address] [place] [address],[address], [facility].  [phone]   [phone]          OUTPATIENT PHYSICAL THERAPY:Initial Assessment 12/15/2017    ICD-10: Treatment Diagnosis:   · Other abnormalities of gait and mobility (R26.89)  · Difficulty in walking, not elsewhere classified (R26.2)  Precautions/Allergies:   Banana; Lisinopril; and Nuts [tree nut]   Fall Risk Score: 5 (? 5 = High Risk)  MD Orders: Evaluate and Treat MEDICAL/REFERRING DIAGNOSIS:  Weakness due to cerebrovascular accident (CVA) ([facility] [I63.9, R53.1]   DATE OF ONSET: CVA: 2017  REFERRING PHYSICIAN: Juliette Kraus*  RETURN PHYSICIAN APPOINTMENT: TBD     INITIAL ASSESSMENT:  Ms. Claudette Marrufo presents with decreased mobility, decreased strength, decreased gait, and decreased balance secondary to CVA. After discussing with patient, she agreed she would benefit from physical therapy to improve above deficits. Please sign this plan of treatment if you concur. Thank you for the opportunity to serve this patient. PROBLEM LIST (Impacting functional limitations):  1. Decreased Strength  2. Decreased Ambulation Ability/Technique  3. Decreased Balance  4. Decreased Activity Tolerance INTERVENTIONS PLANNED:  1. Balance Exercise  2. Gait Training  3. Home Exercise Program (HEP)  4. Neuromuscular Re-education/Strengthening  5. Range of Motion (ROM)  6. Therapeutic Exercise/Strengthening   TREATMENT PLAN:  Effective Dates: 12/15/2017 TO 2/15/2018. Frequency/Duration: 2 times a week for 8 weeks  GOALS: (Goals have been discussed and agreed upon with patient.)  Short-Term Functional Goals: Time Frame: 3 weeks  1. Patient will be independent with home exercise program without exacerbation of symptoms or cueing needed. Discharge Goals: Time Frame: 8 weeks  1.  Patient will be independent with all ADLs with minimal fear of falling and loss of balance with daily tasks. 2. Patient will report no fear avoidance with social or recreational activities due to fear of falling. 3. Patient will score greater than or equal to 45/56 on Gipson Balance Scale with minimal effect of imbalance on patient's ability to manage every day life activities. Rehabilitation Potential For Stated Goals: Good  Regarding Estefania Iraheta's therapy, I certify that the treatment plan above will be carried out by a therapist or under their direction. Thank you for this referral,  Camille Ortiz, PT     Referring Physician Signature: Juliette Scott*              Date                    The information in this section was collected on 12/15/2017 (except where otherwise noted). HISTORY:   History of Present Injury/Illness (Reason for Referral):  Patient reports she had a severe stroke on 2017. She reports that she was in the hospital and [facility] for about 61-62 days. She reports that she has progressed really well, but is still having trouble walking and using her right side (UE and LE). She reports that she walks using her AFO and quad cane all the time. She reports that she tries to be as active as possible. She reports that she is scared that she is going to fall, even though she has not. She reports she would like to work on strengthening her left leg so her balance is better and she can walk without fear of falling. Past Medical History/Comorbidities:   Ms. Fabiola Johnson  has a past medical history of Anxiety; CVA (cerebral vascular accident) ([address] (2017); Depression; HTN (hypertension); Menopause; and PTE (pulmonary thromboembolism) ([address] (2017). Ms. Fabiola Johnson  has a past surgical history that includes jennifer and bso ();  section; refractive surgery (); other surgical (); and other surgical (2017).   Social History/Living Environment:   Home Environment: Private residence  Living Alone: No  Support Systems: Family member(s), Friends \ neighbors; Spouse  Prior Level of Function/Work/Activity:  Independent  Dominant Side:         RIGHT  Personal Factors:          Sex:  female        Age:  60 y.o. Current Medications:       Current Outpatient Prescriptions:     HYDROcodone-acetaminophen (NORCO) 5-325 mg per tablet, TAKE ONE TABLET BY MOUTH EVERY 6 HOURS AS NEEDED FOR PAIN, Disp: , Rfl: 0    labetalol (NORMODYNE) 200 mg tablet, Take 1 Tab by mouth two (2) times a day., Disp: 60 Tab, Rfl: 5    FLUoxetine (PROZAC) 20 mg tablet, Take 1 Tab by mouth daily. , Disp: 30 Tab, Rfl: 1    ALPRAZolam (XANAX) 0.25 mg tablet, Take 1 Tab by mouth three (3) times daily as needed for Anxiety. Max Daily Amount: 0.75 mg., Disp: 30 Tab, Rfl: 1    rOPINIRole (REQUIP) 2 mg tablet, Take 2 mg by mouth nightly. for restless leg, Disp: , Rfl:     temazepam (RESTORIL) 15 mg capsule, Take 15 mg by mouth nightly as needed for Sleep., Disp: , Rfl:     methylphenidate HCl (RITALIN) 5 mg tablet, Take 1 Tab (5 mg total) by mouth two (2) times a day. Max Daily Amount: 10 mg, Disp: 60 Tab, Rfl: 0    polyethylene glycol (MIRALAX) 17 gram/dose powder, Take 17 g by mouth daily. , Disp: 510 g, Rfl: 2    tiZANidine (ZANAFLEX) 4 mg tablet, 4mg po qhs, Disp: 30 Tab, Rfl: 6    tiZANidine (ZANAFLEX) 2 mg tablet, 2mg po bid, Disp: 60 Tab, Rfl: 4    tamsulosin (FLOMAX) 0.4 mg capsule, Take 1 Cap by mouth nightly. (Patient not taking: Reported on 12/12/2017), Disp: 30 Cap, Rfl: 4    potassium chloride (K-DUR, KLOR-CON) 20 mEq tablet, Take 1 Tab by mouth daily. , Disp: 30 Tab, Rfl: 0    acetaminophen (TYLENOL) 500 mg tablet, 1 Tab by Per NG tube route every four (4) hours as needed. (Patient not taking: Reported on 12/12/2017), Disp: 30 Tab, Rfl: 0    losartan-hydroCHLOROthiazide (HYZAAR) 100-12.5 mg per tablet, Take 1 Tab by mouth daily. , Disp: 30 Tab, Rfl: 5   Date Last Reviewed:  12/15/2017    Number of Personal Factors/Comorbidities that affect the Plan of Care: 1-2: MODERATE COMPLEXITY   EXAMINATION:   Observation/Orthostatic Postural Assessment:          Posture Assessment: Rounded shoulders, Forward head   Functional Mobility:         Gait/Ambulation:     Speed/Lulu: Pace decreased (<100 feet/min)  Step Length: Left shortened, Right lengthened  Swing Pattern: Left asymmetrical, Right asymmetrical  Stance: Left increased, Right decreased  Gait Abnormalities: Antalgic, Circumduction, Foot drop, Path deviations, Trendelenburg  Ambulation - Level of Assistance: Modified independent  Assistive Device: Cane, quad  · Interventions: Verbal cues, Safety awareness training          Transfers:     Sit to Stand: Independent  Stand to Sit: Independent  Stand Pivot Transfers: Independent  · Bed to Chair: Independent          Bed Mobility:     Rolling: Independent  Supine to Sit: Independent  Sit to Supine: Independent  · Scooting: Independent    Strength:          L UE STRENGTH: 4/5 shoulder flexion, 4/5 shoulder abduction, 4/5 shoulder extension, 4/5 elbow flexion, 4/5 elbow extension. R UE STRENGTH: 2/5 shoulder flexion, 2/5 shoulder abduction, 2/5 shoulder extension, 2/5 elbow flexion, 2/5 elbow extension. L LE STRENGTH:  4+/5 hip flexion, 4+/5 hip abduction, 4+/5 hip adduction, 4+/5 hip extension, 4+/5 knee extension, 4+/5 knee flexion, 1/5 ankle dorsiflexion, 1/5 ankle plantarflexion, 1/5 ankle inversion, 1/5 ankle eversion. Sensation:         Intact for light touch and proprioception  Postural Control & Balance:  · Gipson Balance Scale:  28/56.   (A score less than 45/56 indicates high risk of falls)      Body Structures Involved:  1. Nerves  2. Muscles Body Functions Affected:  1. Neuromusculoskeletal  2. Movement Related Activities and Participation Affected:  1. Mobility  2.  Self Care   Number of elements (examined above) that affect the Plan of Care: 4+: HIGH COMPLEXITY   CLINICAL PRESENTATION:   Presentation: Evolving clinical presentation with changing clinical characteristics: MODERATE COMPLEXITY   CLINICAL DECISION MAKING:   Outcome Measure: Tool Used: Gipson Balance Scale  Score:  Initial: 28/56 Most Recent: X/56 (Date: -- )   Interpretation of Score: Each section is scored on a 0-4 scale, 0 representing the patients inability to perform the task and 4 representing independence. The scores of each section are added together for a total score of 56. The higher the patients score, the more independent the patient is. Any score below 45 indicates increased risk for falls. Score 56 55-45 44-34 33-23 22-12 11-1 0   Modifier CH CI CJ CK CL CM CN     Medical Necessity:   · Patient is expected to demonstrate progress in strength, range of motion, balance and coordination to improve safety during daily activities. · Patient demonstrates good rehab potential due to higher previous functional level. · Skilled intervention continues to be required due to decreased mobility. Reason for Services/Other Comments:  · Patient continues to demonstrate capacity to improve overall mobility which will increase independence and increase safety. Use of outcome tool(s) and clinical judgement create a POC that gives a: Questionable prediction of patient's progress: MODERATE COMPLEXITY            TREATMENT:   (In addition to Assessment/Re-Assessment sessions the following treatments were rendered)  Pre-treatment Symptoms/Complaints:  12/15/2017: Patient reports she is ready to go. She reports she just wants to walk normally again. Pain: Initial: Pain Intensity 1: 0  Post Session:  0/10     NEUROMUSCULAR RE-EDUCATION: (10 minutes):  Exercise/activities per grid below to improve balance, coordination, kinesthetic sense, posture and proprioception. Required minimal verbal and manual cues to promote static and dynamic balance in standing, promote coordination of bilateral, lower extremity(s) and promote motor control of bilateral, lower extremity(s).    Date:  12/15/2017 Activity/Exercise Parameters   Continue with HEP given by Home Health PT HEP   Full weight shift onto R LE to improve balance and strength HEP   Walking during the day HEP      [employer] Portal  Treatment/Session Assessment:    · Response to Treatment:  Patient tolerated assessment without complaints of increased pain, but minimal increased fatigue with walking. Patient verbalized and demonstrated understanding of HEP. · Compliance with Program/Exercises: Will assess as treatment progresses. · Recommendations/Intent for next treatment session: \"Next visit will focus on advancements to more challenging activities\".   Total Treatment Duration:  PT Patient Time In/Time Out  Time In: 1530  Time Out: 315 [facility], PT

## 2017-12-18 ENCOUNTER — APPOINTMENT (OUTPATIENT)
Dept: PHYSICAL THERAPY | Age: 62
End: 2017-12-18

## 2017-12-20 ENCOUNTER — HOSPITAL ENCOUNTER (OUTPATIENT)
Dept: PHYSICAL THERAPY | Age: 62
Discharge: HOME | End: 2017-12-20
Attending: PHYSICAL MEDICINE & REHABILITATION
Payer: COMMERCIAL

## 2017-12-20 PROCEDURE — 97166 OT EVAL MOD COMPLEX 45 MIN: CPT

## 2017-12-20 NOTE — THERAPY EVALUATION
Tobias Artis  : 1955  Primary: Katja Mochad Ppo  Secondary:  [address] at [place] [id number] [facility], [address] [place] [address],[address], [facility] [address]  [phone]   [phone]        OUTPATIENT OCCUPATIONAL THERAPY: Initial Assessment 2017    ICD-10: Treatment Diagnosis: Hemiplegia and hemiparesis following cerebral infarction affecting right dominant side (I69.351)  Precautions/Allergies:   Banana; Lisinopril; and Nuts [tree nut]   Fall Risk Score: 5 (? 5 = High Risk)  MD Orders: Referral to occupational therapy MEDICAL/REFERRING DIAGNOSIS:   Weakness due to cerebrovascular accident (CVA) ([facility] [address] [I63.9, R53.1]   DATE OF ONSET: 2017   REFERRING PHYSICIAN: Juliette Henson*  RETURN PHYSICIAN APPOINTMENT: unknown     INITIAL ASSESSMENT:  Ms. Sonia Wade presents with impaired active movement, strength, coordination and sensation of the dominant right upper extremity that is affecting her ability to complete activities of daily living independently. Feel she may benefit from skilled occupational therapy to maximize safety and independence with activities of daily living. PLAN OF CARE:   PROBLEM LIST:  1. Decreased Strength  2. Decreased ADL/Functional Activities  3. Decreased Transfer Abilities  4. Decreased Balance  5. Decreased Flexibility/Joint Mobility  6. Decreased Cognition INTERVENTIONS PLANNED:  1. Activities of daily living training  2. Manual therapy training  3. Modalities  4. Neuromuscular re-eduation  5. Sensory reintegration training  6. Splinting  7. Therapeutic activity  8. Therapeutic exercise   TREATMENT PLAN:  Effective Dates: 17 TO 3/20/18. Frequency/Duration: 3 times a week for 12 weeks  GOALS: (Goals have been discussed and agreed upon with patient.)  Short-Term Functional Goals: Time Frame: 6 weeks  1. Patient will demonstrate independence with home exercise program for right upper extremity range of motion.   2. Patient will increase use of right upper extremity as a functional assist in at least 25% of daily activities. 3. Patient will bathe with moderate assistance. Discharge Goals: Time Frame: 12 weeks  1. Patient will bathe with minimal assistance. 2. Patient will feed self with modified independence and adaptive equipment as needed. 3. Patient will dress with modified independence and adaptive equipment as needed. 4. Patient will use right upper extremity as a functional assist in at least 50% of daily activities. Rehabilitation Potential For Stated Goals: Good  Regarding Michele Iraheta's therapy, I certify that the treatment plan above will be carried out by a therapist or under their direction. Thank you for this referral,  Claudette Navas, OT     Referring Physician Signature: Juliette Claudio* _________________________  Date _________            The information in this section was collected on 17 (except where otherwise noted). OCCUPATIONAL PROFILE & HISTORY:   History of Present Injury/Illness (Reason for Referral):  CVA with hospital and [facility] stay then home health therapy. Past Medical History/Comorbidities:   Ms. Marc Hair  has a past medical history of Anxiety; CVA (cerebral vascular accident) ([facility] [address] (2017); Depression; HTN (hypertension); Menopause; and PTE (pulmonary thromboembolism) ([facility] [address] (2017). Ms. Marc Hair  has a past surgical history that includes hx jennifer and bso (); hx  section; hx refractive surgery (); hx other surgical (); and hx other surgical (2017).   Social History/Living Environment:   # Steps to Enter: 6  Rails to Enter: Yes  Hand Rails : Bilateral  Wheelchair Ramp: Yes  One/Two Story Residence: Two story, live on 1st floor  # of Interior Steps: 13  Living Alone: Yes  Support Systems: Spouse/Significant Other/Partner  Current DME Used/Available at [facility]: Wheelchair, [place], quad, [address] chair, Grab bars, Brace/Splint (AFO on left foot)  Tub or Shower Type: Shower  Prior Level of Function/Work/Activity:  Does seasonal taxes at [employer]  Dominant Side:         RIGHT  Current Medications:    Current Outpatient Prescriptions:     HYDROcodone-acetaminophen (NORCO) 5-325 mg per tablet, TAKE ONE TABLET BY MOUTH EVERY 6 HOURS AS NEEDED FOR PAIN, Disp: , Rfl: 0    labetalol (NORMODYNE) 200 mg tablet, Take 1 Tab by mouth two (2) times a day., Disp: 60 Tab, Rfl: 5    FLUoxetine (PROZAC) 20 mg tablet, Take 1 Tab by mouth daily. , Disp: 30 Tab, Rfl: 1    ALPRAZolam (XANAX) 0.25 mg tablet, Take 1 Tab by mouth three (3) times daily as needed for Anxiety. Max Daily Amount: 0.75 mg., Disp: 30 Tab, Rfl: 1    rOPINIRole (REQUIP) 2 mg tablet, Take 2 mg by mouth nightly. for restless leg, Disp: , Rfl:     temazepam (RESTORIL) 15 mg capsule, Take 15 mg by mouth nightly as needed for Sleep., Disp: , Rfl:     methylphenidate HCl (RITALIN) 5 mg tablet, Take 1 Tab (5 mg total) by mouth two (2) times a day. Max Daily Amount: 10 mg, Disp: 60 Tab, Rfl: 0    polyethylene glycol (MIRALAX) 17 gram/dose powder, Take 17 g by mouth daily. , Disp: 510 g, Rfl: 2    tiZANidine (ZANAFLEX) 4 mg tablet, 4mg po qhs, Disp: 30 Tab, Rfl: 6    tiZANidine (ZANAFLEX) 2 mg tablet, 2mg po bid, Disp: 60 Tab, Rfl: 4    tamsulosin (FLOMAX) 0.4 mg capsule, Take 1 Cap by mouth nightly. (Patient not taking: Reported on 12/12/2017), Disp: 30 Cap, Rfl: 4    potassium chloride (K-DUR, KLOR-CON) 20 mEq tablet, Take 1 Tab by mouth daily. , Disp: 30 Tab, Rfl: 0    acetaminophen (TYLENOL) 500 mg tablet, 1 Tab by Per NG tube route every four (4) hours as needed. (Patient not taking: Reported on 12/12/2017), Disp: 30 Tab, Rfl: 0    losartan-hydroCHLOROthiazide (HYZAAR) 100-12.5 mg per tablet, Take 1 Tab by mouth daily. , Disp: 30 Tab, Rfl: 5            Date Last Reviewed:  12/20/2017   Complexity Level : Expanded review of therapy/medical records (1-2):  MODERATE COMPLEXITY   ASSESSMENT OF OCCUPATIONAL PERFORMANCE:   ROM: RUE AROM  R Shoulder Flexion: 0  R Shoulder ABduction: 75  R Shoulder External Rotation: 0  R Scapular Elevation: 0  R Scapular Protraction:  (at least 50% of full range)  R Elbow Flexion: 0  R Elbow Extension: 0  R Forearm Pronation: 0  R Forearm Supination: 0  R Wrist Flexion: 0  R Wrist Extension: 0  R Digital Opposition: 0  R Composite Fist: 0     Balance:             · Sitting: Intact  · Standing: With support     Coordination:             · Fine Motor Skills-Upper: Right Impaired, Left Intact  · Gross Motor Skills-Upper: Right Impaired, Left Intact     Mental Status:             · Neurologic State: Alert  · Orientation Level: Oriented X4  · Cognition: Memory loss, Follows commands  · Perseveration: No perseveration noted  · Safety/Judgement: Insight into deficits     Vision:             · Tracking: Able to track stimulus in all quadrants w/o difficulty  · Visual Fields: Difficulty detecting stimulus  in left lateral quadrant  · Acuity: Able to read employee name badge without difficulty  · Corrective Lenses: Reading glasses     Activities of Daily Living:           Basic ADLs (From Assessment) Complex ADLs (From Assessment)   Basic ADL  Feeding: Minimum assistance (Using left hand 100% of the time)  Oral Facial Hygiene/Grooming: Minimum assistance (Using left hand 100% of the time)  Bathing: Maximum assistance  Upper Body Dressing: Moderate assistance  Lower Body Dressing: Moderate assistance  Toileting: Modified independent Instrumental ADL  Meal Preparation: Total assistance  Homemaking:  Total assistance  Medication Management: Maximum assistance  Financial Management: Maximum assistance   Grooming/Bathing/Dressing Activities of Daily Living     Cognitive Retraining  Safety/Judgement: Insight into deficits                 Functional Transfers  Toilet Transfer : Modified independent  Shower Transfer: Minimum assistance     Bed/Mat Mobility  Rolling: Independent  Supine to Sit: Independent  Sit to Supine: Independent  Sit to Stand: Modified independent  Bed to Chair: Modified independent  Scooting: Independent     Sensation:         Light touch absent distal to right elbow     Physical Skills Involved:  1. Range of Motion  2. Balance  3. Sensation  4. Fine Motor Control  5. Gross Motor Control Cognitive Skills Affected (resulting in the inability to perform in a timely and safe manner):  1. Executive Function  2. Sustained Attention  3. Divided Attention  4. Comprehension Psychosocial Skills Affected:  1. None   Number of elements that affect the Plan of Care: 5+:  HIGH COMPLEXITY   CLINICAL DECISION MAKING:   Outcome Measure: Tool Used: 325 \[facility] [place] [facility]\"" [address] [address] AM-[facility] Daily Activity Outpatient Short Form  Score:  Initial: 18 Most Recent: X (Date: -- )   Interpretation of Tool:  Represents clinically-significant functional categories (i.e., basic and instrumental activities of daily living, fine motor activities). Score 60 59-55 54-47 46-34 32-21 20-16 15   Modifier CH CI CJ CK CL CM CN     Medical Necessity:   · Patient demonstrates good rehab potential due to higher previous functional level. Reason for Services/Other Comments:  · Patient continues to require skilled intervention due to decreased safety and independence in activities of daily living. Clinical Decision-Making Assessment: Moderately difficult to predict patient's progress at this time due to the extent of her limitations in functional movement and use of dominant right upper extremity. Assessment process, impact of co-morbidities, assessment modification\need for assistance, and selection of interventions: Analytical Complexity:MODERATE COMPLEXITY   TREATMENT:   (In addition to Assessment/Re-Assessment sessions the following treatments were rendered)    Pre-treatment Symptoms/Complaints:  Patient states, \"I want to be able to use the microwave and fix a meal for myself. I want to be able to make tea and do the sodoku puzzles again. \"  Pain: Initial:   Pain Intensity 1: 0  Post Session:  0/10     Assessment/Reassessment only, no treatment provided today    Treatment/Session Assessment:    · Response to Treatment:  Patient agreeable to plan of care. · Compliance with Program/Exercises: Will assess as treatment progresses. · Recommendations/Intent for next treatment session: \"Next visit will focus on advancements to more challenging activities and reduction in assistance provided\".   Total Treatment Duration:  OT Patient Time In/Time Out  Time In: 1100  Time Out: [address], OT

## 2017-12-22 ENCOUNTER — HOSPITAL ENCOUNTER (OUTPATIENT)
Dept: PHYSICAL THERAPY | Age: 62
Discharge: HOME | End: 2017-12-22
Attending: PHYSICAL MEDICINE & REHABILITATION
Payer: COMMERCIAL

## 2017-12-22 PROCEDURE — 97112 NEUROMUSCULAR REEDUCATION: CPT

## 2017-12-22 PROCEDURE — 97110 THERAPEUTIC EXERCISES: CPT

## 2017-12-22 NOTE — PROGRESS NOTES
Karuna Cummings  : 1955  Primary: Sandra Gallegos Ppo  Secondary:  [address] at [place] [address] [facility], [address] [place] [address],[address], [facility] [address]  [phone]   [phone]          OUTPATIENT PHYSICAL THERAPY:Daily Note 2017    ICD-10: Treatment Diagnosis:   · Other abnormalities of gait and mobility (R26.89)  · Difficulty in walking, not elsewhere classified (R26.2)  Precautions/Allergies:   Banana; Lisinopril; and Nuts [tree nut]   Fall Risk Score: 5 (? 5 = High Risk)  MD Orders: Evaluate and Treat MEDICAL/REFERRING DIAGNOSIS:  Weakness due to cerebrovascular accident (CVA) ([facility] [address] [I63.9, R53.1]   DATE OF ONSET: CVA: 2017  REFERRING PHYSICIAN: Maryfrances Oppenheim, Amy*  RETURN PHYSICIAN APPOINTMENT: TBD     INITIAL ASSESSMENT:  Ms. Cruz Kurtz presents with decreased mobility, decreased strength, decreased gait, and decreased balance secondary to CVA. After discussing with patient, she agreed she would benefit from physical therapy to improve above deficits. Please sign this plan of treatment if you concur. Thank you for the opportunity to serve this patient. PROBLEM LIST (Impacting functional limitations):  1. Decreased Strength  2. Decreased Ambulation Ability/Technique  3. Decreased Balance  4. Decreased Activity Tolerance INTERVENTIONS PLANNED:  1. Balance Exercise  2. Gait Training  3. Home Exercise Program (HEP)  4. Neuromuscular Re-education/Strengthening  5. Range of Motion (ROM)  6. Therapeutic Exercise/Strengthening   TREATMENT PLAN:  Effective Dates: 12/15/2017 TO 2/15/2018. Frequency/Duration: 2 times a week for 8 weeks  GOALS: (Goals have been discussed and agreed upon with patient.)  Short-Term Functional Goals: Time Frame: 3 weeks  1. Patient will be independent with home exercise program without exacerbation of symptoms or cueing needed. Discharge Goals: Time Frame: 8 weeks  1.  Patient will be independent with all ADLs with minimal fear of falling and loss of balance with daily tasks. 2. Patient will report no fear avoidance with social or recreational activities due to fear of falling. 3. Patient will score greater than or equal to 45/56 on Gipson Balance Scale with minimal effect of imbalance on patient's ability to manage every day life activities. Rehabilitation Potential For Stated Goals: Good  Regarding Ayesha Iraheta's therapy, I certify that the treatment plan above will be carried out by a therapist or under their direction. Thank you for this referral,  Francois Cota PT     Referring Physician Signature: Juliette Monaco*              Date                    The information in this section was collected on 12/15/2017 (except where otherwise noted). HISTORY:   History of Present Injury/Illness (Reason for Referral):  Patient reports she had a severe stroke on 2017. She reports that she was in the hospital and [facility] for about 61-62 days. She reports that she has progressed really well, but is still having trouble walking and using her right side (UE and LE). She reports that she walks using her AFO and quad cane all the time. She reports that she tries to be as active as possible. She reports that she is scared that she is going to fall, even though she has not. She reports she would like to work on strengthening her right leg so her balance is better and she can walk without fear of falling. Past Medical History/Comorbidities:   Ms. Brijesh Lees  has a past medical history of Anxiety; CVA (cerebral vascular accident) ([facility] [address] (2017); Depression; HTN (hypertension); Menopause; and PTE (pulmonary thromboembolism) ([facility] [address] (2017). Ms. Brijesh Lees  has a past surgical history that includes hx jennifer and bso (); hx  section; hx refractive surgery (); hx other surgical (); and hx other surgical (2017).   Social History/Living Environment:   Home Environment: Private residence  Living Alone: No  Support Systems: Family member(s), Friends \ neighbors; Spouse  Prior Level of Function/Work/Activity:  Independent  Dominant Side:         RIGHT  Personal Factors:          Sex:  female        Age:  60 y.o. Current Medications:       Current Outpatient Prescriptions:     HYDROcodone-acetaminophen (NORCO) 5-325 mg per tablet, TAKE ONE TABLET BY MOUTH EVERY 6 HOURS AS NEEDED FOR PAIN, Disp: , Rfl: 0    labetalol (NORMODYNE) 200 mg tablet, Take 1 Tab by mouth two (2) times a day., Disp: 60 Tab, Rfl: 5    FLUoxetine (PROZAC) 20 mg tablet, Take 1 Tab by mouth daily. , Disp: 30 Tab, Rfl: 1    ALPRAZolam (XANAX) 0.25 mg tablet, Take 1 Tab by mouth three (3) times daily as needed for Anxiety. Max Daily Amount: 0.75 mg., Disp: 30 Tab, Rfl: 1    rOPINIRole (REQUIP) 2 mg tablet, Take 2 mg by mouth nightly. for restless leg, Disp: , Rfl:     temazepam (RESTORIL) 15 mg capsule, Take 15 mg by mouth nightly as needed for Sleep., Disp: , Rfl:     methylphenidate HCl (RITALIN) 5 mg tablet, Take 1 Tab (5 mg total) by mouth two (2) times a day. Max Daily Amount: 10 mg, Disp: 60 Tab, Rfl: 0    polyethylene glycol (MIRALAX) 17 gram/dose powder, Take 17 g by mouth daily. , Disp: 510 g, Rfl: 2    tiZANidine (ZANAFLEX) 4 mg tablet, 4mg po qhs, Disp: 30 Tab, Rfl: 6    tiZANidine (ZANAFLEX) 2 mg tablet, 2mg po bid, Disp: 60 Tab, Rfl: 4    tamsulosin (FLOMAX) 0.4 mg capsule, Take 1 Cap by mouth nightly. (Patient not taking: Reported on 12/12/2017), Disp: 30 Cap, Rfl: 4    potassium chloride (K-DUR, KLOR-CON) 20 mEq tablet, Take 1 Tab by mouth daily. , Disp: 30 Tab, Rfl: 0    acetaminophen (TYLENOL) 500 mg tablet, 1 Tab by Per NG tube route every four (4) hours as needed. (Patient not taking: Reported on 12/12/2017), Disp: 30 Tab, Rfl: 0    losartan-hydroCHLOROthiazide (HYZAAR) 100-12.5 mg per tablet, Take 1 Tab by mouth daily. , Disp: 30 Tab, Rfl: 5   Date Last Reviewed:  12/22/2017    Number of Personal Factors/Comorbidities that affect the Plan of Care: 1-2: MODERATE COMPLEXITY   EXAMINATION:   Observation/Orthostatic Postural Assessment:          Posture Assessment: Rounded shoulders, Forward head   Functional Mobility:         Gait/Ambulation:     Speed/Lulu: Pace decreased (<100 feet/min)  Step Length: Left shortened, Right lengthened  Swing Pattern: Left asymmetrical, Right asymmetrical  Stance: Left increased, Right decreased  Gait Abnormalities: Antalgic, Circumduction, Foot drop, Path deviations, Trendelenburg  Ambulation - Level of Assistance: Modified independent  Assistive Device: Cane, quad  · Interventions: Verbal cues, Safety awareness training          Transfers:     Sit to Stand: Independent  Stand to Sit: Independent  Stand Pivot Transfers: Independent  · Bed to Chair: Independent          Bed Mobility:     Rolling: Independent  Supine to Sit: Independent  Sit to Supine: Independent  · Scooting: Independent    Strength:          L UE STRENGTH: 4/5 shoulder flexion, 4/5 shoulder abduction, 4/5 shoulder extension, 4/5 elbow flexion, 4/5 elbow extension. R UE STRENGTH: 2/5 shoulder flexion, 2/5 shoulder abduction, 2/5 shoulder extension, 2/5 elbow flexion, 2/5 elbow extension. R LE STRENGTH:  4+/5 hip flexion, 4+/5 hip abduction, 4+/5 hip adduction, 4+/5 hip extension, 4+/5 knee extension, 4+/5 knee flexion, 1/5 ankle dorsiflexion, 1/5 ankle plantarflexion, 1/5 ankle inversion, 1/5 ankle eversion. Sensation:         Intact for light touch and proprioception  Postural Control & Balance:  · Gipson Balance Scale:  28/56.   (A score less than 45/56 indicates high risk of falls)      Body Structures Involved:  1. Nerves  2. Muscles Body Functions Affected:  1. Neuromusculoskeletal  2. Movement Related Activities and Participation Affected:  1. Mobility  2.  Self Care   Number of elements (examined above) that affect the Plan of Care: 4+: HIGH COMPLEXITY   CLINICAL PRESENTATION:   Presentation: Evolving clinical presentation with changing clinical characteristics: MODERATE COMPLEXITY   CLINICAL DECISION MAKING:   Outcome Measure: Tool Used: Gipson Balance Scale  Score:  Initial: 28/56 Most Recent: X/56 (Date: -- )   Interpretation of Score: Each section is scored on a 0-4 scale, 0 representing the patients inability to perform the task and 4 representing independence. The scores of each section are added together for a total score of 56. The higher the patients score, the more independent the patient is. Any score below 45 indicates increased risk for falls. Score 56 55-45 44-34 33-23 22-12 11-1 0   Modifier CH CI CJ CK CL CM CN     Medical Necessity:   · Patient is expected to demonstrate progress in strength, range of motion, balance and coordination to improve safety during daily activities. · Patient demonstrates good rehab potential due to higher previous functional level. · Skilled intervention continues to be required due to decreased mobility. Reason for Services/Other Comments:  · Patient continues to demonstrate capacity to improve overall mobility which will increase independence and increase safety. Use of outcome tool(s) and clinical judgement create a POC that gives a: Questionable prediction of patient's progress: MODERATE COMPLEXITY            TREATMENT:   (In addition to Assessment/Re-Assessment sessions the following treatments were rendered)  Pre-treatment Symptoms/Complaints:  12/22/2017: Patient reports she is doing well. \"I have been walking in my house\". Pain: Initial: Pain Intensity 1: 0  Post Session:  0/10     NEUROMUSCULAR RE-EDUCATION: (25 minutes):  Exercise/activities per grid below to improve balance, coordination, kinesthetic sense, posture and proprioception. Required minimal verbal and manual cues to promote static and dynamic balance in standing, promote coordination of bilateral, lower extremity(s) and promote motor control of bilateral, lower extremity(s).    Date:  12/22/2017   Activity/Exercise Parameters   Stepping over half foam  2x10 reps with right LE   Step taps 4 inch  2x10B   Step ups 4 inch  2x10 leading with right LE   [place] Eyes open/eyes closed   Walking in the clinic with quad cane 100 feet                                  THERAPEUTIC EXERCISE: (20 minutes):  Exercises per grid below to improve strength. Required minimal verbal cues to promote proper body alignment. Progressed repetitions as indicated. Date:  12/22/17 Date:   Date:     Activity/Exercise Parameters Parameters Parameters   Sit to stand from chair X 10     Standing hip flexion 2x10 right LE     Standing hip abduction 2x10 right LE     Seated LAQs 2x10 right LE     Standing hamstrings curls  2x10 right LE with assist                     [employer] [facility]  Treatment/Session Assessment:    · Response to Treatment:  Patient tolerated treatment without complaints. Patient needed some rest breaks due to increased fatigue. · Compliance with Program/Exercises: Compliant. · Recommendations/Intent for next treatment session: \"Next visit will focus on advancements to more challenging activities\".   Total Treatment Duration:  PT Patient Time In/Time Out  Time In: 0945  Time Out: 55 A. Shelton Funk, PT

## 2017-12-22 NOTE — PROGRESS NOTES
Shauna Mahan  : 1955  Primary: Gonsalo Cason Ppo  Secondary:  [address] at [place] [address] [facility], [address] [place] [address],[address], [place] [address].  [phone]   [phone]        OUTPATIENT OCCUPATIONAL THERAPY: Daily Note 2017    ICD-10: Treatment Diagnosis: Hemiplegia and hemiparesis following cerebral infarction affecting right dominant side (I69.351)  Precautions/Allergies:   Banana; Lisinopril; and Nuts [tree nut]   Fall Risk Score: 5 (? 5 = High Risk)  MD Orders: Referral to occupational therapy MEDICAL/REFERRING DIAGNOSIS:   Cerebral infarction, unspecified [I63.9]  Weakness [R53.1]   DATE OF ONSET: 2017   REFERRING PHYSICIAN: Juliette Bishop*  RETURN PHYSICIAN APPOINTMENT: unknown     INITIAL ASSESSMENT:  Ms. Alyx Serrano presents with impaired active movement, strength, coordination and sensation of the dominant right upper extremity that is affecting her ability to complete activities of daily living independently. Feel she may benefit from skilled occupational therapy to maximize safety and independence with activities of daily living. PLAN OF CARE:   PROBLEM LIST:  1. Decreased Strength  2. Decreased ADL/Functional Activities  3. Decreased Transfer Abilities  4. Decreased Balance  5. Decreased Flexibility/Joint Mobility  6. Decreased Cognition INTERVENTIONS PLANNED:  1. Activities of daily living training  2. Manual therapy training  3. Modalities  4. Neuromuscular re-eduation  5. Sensory reintegration training  6. Splinting  7. Therapeutic activity  8. Therapeutic exercise   TREATMENT PLAN:  Effective Dates: 17 TO 3/20/18. Frequency/Duration: 3 times a week for 12 weeks  GOALS: (Goals have been discussed and agreed upon with patient.)  Short-Term Functional Goals: Time Frame: 6 weeks  1. Patient will demonstrate independence with home exercise program for right upper extremity range of motion.   2. Patient will increase use of right upper extremity as a functional assist in at least 25% of daily activities. 3. Patient will bathe with moderate assistance. Discharge Goals: Time Frame: 12 weeks  1. Patient will bathe with minimal assistance. 2. Patient will feed self with modified independence and adaptive equipment as needed. 3. Patient will dress with modified independence and adaptive equipment as needed. 4. Patient will use right upper extremity as a functional assist in at least 50% of daily activities. Rehabilitation Potential For Stated Goals: Good  Regarding Early Racheal Iraheta's therapy, I certify that the treatment plan above will be carried out by a therapist or under their direction. Thank you for this referral,  Yves Walker, OT     Referring Physician Signature: Juliette Barrios* _________________________  Date _________            The information in this section was collected on 17 (except where otherwise noted). OCCUPATIONAL PROFILE & HISTORY:   History of Present Injury/Illness (Reason for Referral):  CVA with hospital and [facility] stay then home health therapy. Past Medical History/Comorbidities:   Ms. Vincent Oneill  has a past medical history of Anxiety; CVA (cerebral vascular accident) ([facility] (2017); Depression; HTN (hypertension); Menopause; and PTE (pulmonary thromboembolism) ([facility] (2017). Ms. Vincent Oneill  has a past surgical history that includes hx jennifer and bso (); hx  section; hx refractive surgery (); hx other surgical (); and hx other surgical (2017).   Social History/Living Environment:      Prior Level of Function/Work/Activity:  Does seasonal taxes at [employer]  Dominant Side:         RIGHT  Current Medications:    Current Outpatient Prescriptions:     HYDROcodone-acetaminophen (NORCO) 5-325 mg per tablet, TAKE ONE TABLET BY MOUTH EVERY 6 HOURS AS NEEDED FOR PAIN, Disp: , Rfl: 0    labetalol (NORMODYNE) 200 mg tablet, Take 1 Tab by mouth two (2) times a day., Disp: 60 Tab, Rfl: 5   FLUoxetine (PROZAC) 20 mg tablet, Take 1 Tab by mouth daily. , Disp: 30 Tab, Rfl: 1    ALPRAZolam (XANAX) 0.25 mg tablet, Take 1 Tab by mouth three (3) times daily as needed for Anxiety. Max Daily Amount: 0.75 mg., Disp: 30 Tab, Rfl: 1    rOPINIRole (REQUIP) 2 mg tablet, Take 2 mg by mouth nightly. for restless leg, Disp: , Rfl:     temazepam (RESTORIL) 15 mg capsule, Take 15 mg by mouth nightly as needed for Sleep., Disp: , Rfl:     methylphenidate HCl (RITALIN) 5 mg tablet, Take 1 Tab (5 mg total) by mouth two (2) times a day. Max Daily Amount: 10 mg, Disp: 60 Tab, Rfl: 0    polyethylene glycol (MIRALAX) 17 gram/dose powder, Take 17 g by mouth daily. , Disp: 510 g, Rfl: 2    tiZANidine (ZANAFLEX) 4 mg tablet, 4mg po qhs, Disp: 30 Tab, Rfl: 6    tiZANidine (ZANAFLEX) 2 mg tablet, 2mg po bid, Disp: 60 Tab, Rfl: 4    tamsulosin (FLOMAX) 0.4 mg capsule, Take 1 Cap by mouth nightly. (Patient not taking: Reported on 12/12/2017), Disp: 30 Cap, Rfl: 4    potassium chloride (K-DUR, KLOR-CON) 20 mEq tablet, Take 1 Tab by mouth daily. , Disp: 30 Tab, Rfl: 0    acetaminophen (TYLENOL) 500 mg tablet, 1 Tab by Per NG tube route every four (4) hours as needed. (Patient not taking: Reported on 12/12/2017), Disp: 30 Tab, Rfl: 0    losartan-hydroCHLOROthiazide (HYZAAR) 100-12.5 mg per tablet, Take 1 Tab by mouth daily. , Disp: 30 Tab, Rfl: 5            Date Last Reviewed:  12/22/2017   Complexity Level : Expanded review of therapy/medical records (1-2):  MODERATE COMPLEXITY   ASSESSMENT OF OCCUPATIONAL PERFORMANCE:   ROM:                   Balance:                   Coordination:                   Mental Status:                   Vision:                   Activities of Daily Living:           Basic ADLs (From Assessment) Complex ADLs (From Assessment)         Grooming/Bathing/Dressing Activities of Daily Living                                   Sensation:         Light touch absent distal to right elbow     Physical Skills Involved:  1. Range of Motion  2. Balance  3. Sensation  4. Fine Motor Control  5. Gross Motor Control Cognitive Skills Affected (resulting in the inability to perform in a timely and safe manner):  1. Executive Function  2. Sustained Attention  3. Divided Attention  4. Comprehension Psychosocial Skills Affected:  1. None   Number of elements that affect the Plan of Care: 5+:  HIGH COMPLEXITY   CLINICAL DECISION MAKING:   Outcome Measure: Tool Used: [facility] [address] [address] AM-PAC Daily Activity Outpatient Short Form  Score:  Initial: 18 Most Recent: X (Date: -- )   Interpretation of Tool:  Represents clinically-significant functional categories (i.e., basic and instrumental activities of daily living, fine motor activities). Score 60 59-55 54-47 46-34 32-21 20-16 15   Modifier CH CI CJ CK CL CM CN     Medical Necessity:   · Patient demonstrates good rehab potential due to higher previous functional level. Reason for Services/Other Comments:  · Patient continues to require skilled intervention due to decreased safety and independence in activities of daily living. Clinical Decision-Making Assessment: Moderately difficult to predict patient's progress at this time due to the extent of her limitations in functional movement and use of dominant right upper extremity. Assessment process, impact of co-morbidities, assessment modification\need for assistance, and selection of interventions: Analytical Complexity:MODERATE COMPLEXITY   TREATMENT:   (In addition to Assessment/Re-Assessment sessions the following treatments were rendered)    Pre-treatment Symptoms/Complaints:  Patient states, \"Am I really doing okay? \"  Pain: Initial:   Pain Intensity 1: 0  Post Session:  0/10     Therapeutic Exercise: (  35 minutes):  Exercises per grid below to improve dynamic movement of arm - right and hand - right to improve functional lifting, carrying, reaching and grasping.   Required moderate visual and verbal cues to promote proper body mechanics. Progressed range, repetitions and complexity of movement as indicated. HEP issued today:    Issued yellow foam block for  exercises. Access Code: [facility]   URL: [email or web address]   Date: 12/22/2017   Prepared by: Guerda Mike     Exercises   Seated Shoulder Shrugs - 10 reps - 2 sets - 3 hold - 2x daily - 5x weekly   Seated Scapular Retraction - 10 reps - 2 sets - 3 hold - 2x daily - 5x weekly   Supine Shoulder Abduction AROM - 10 reps - 2 sets - 3 hold - 1x daily - 5x weekly   Seated Shoulder Horizontal Abduction - Thumbs Up - 10 reps - 2 sets - 3 hold - 1x daily - 5x weekly        Date:  12/22/17 Date:   Date:     Activity/Exercise Parameters Parameters Parameters   Shoulder shrugs AROM  2 x 10     Scapular protraction/retraction AROM  2 x 10     Shoulder abduction In supine x 10 reps  Seated x 10 reps     Elbow flexion/extension AROM with assist to fully extend x 10 reps     PNF D1/D2 diagonals X 5 reps     Hand helper 5 pounds x 25 reps with assist to extend fingers     Tabletop skateboard X 10 mintues in all planes of motion         Neuromuscular Re-education: (  10 minutes):  Exercise/activities per grid below to improve balance, kinesthetic sense and proprioception. Required moderate visual and verbal cues to promote coordination of right, upper extremity(s) and promote motor control of right, upper extremity(s). Patient used right upper extremity in base of support when standing at counter to perform lower extremity exercises; used dycem on edge of sink to assist with stabilization of the right  on the counter during dynamic balance challenges. Noted improved functional use of right upper extremity as a stabilizer in standing balance. Treatment/Session Assessment:    · Response to Treatment:  Patient demonstrating improving movement of the right upper extremity. · Compliance with Program/Exercises:  Will assess as treatment progresses. · Recommendations/Intent for next treatment session: \"Next visit will focus on advancements to more challenging activities and reduction in assistance provided\".   Total Treatment Duration:  OT Patient Time In/Time Out  Time In: 1030  Time Out: [address] [facility] Un[place], OT

## 2018-01-02 ENCOUNTER — HOSPITAL ENCOUNTER (OUTPATIENT)
Dept: PHYSICAL THERAPY | Age: 63
Discharge: HOME | End: 2018-01-02
Attending: PHYSICAL MEDICINE & REHABILITATION
Payer: COMMERCIAL

## 2018-01-02 NOTE — PROGRESS NOTES
Therapy Center at [address], [address] [place] [address],[address] [place], [address]  Phone: [phone]   Fax: [phone]    OUTPATIENT DAILY NOTE    NAME/AGE/GENDER: Bita Arteaga is a 60 y.o. female. DATE: 1/2/2018    Patient cancelled her appointment for today due to unknown reasons. Will plan to follow up on next scheduled visit.     Marti Mojica, PT

## 2018-01-05 ENCOUNTER — HOSPITAL ENCOUNTER (OUTPATIENT)
Dept: PHYSICAL THERAPY | Age: 63
Discharge: HOME | End: 2018-01-05
Attending: PHYSICAL MEDICINE & REHABILITATION
Payer: COMMERCIAL

## 2018-01-05 PROCEDURE — 97110 THERAPEUTIC EXERCISES: CPT

## 2018-01-05 PROCEDURE — 97112 NEUROMUSCULAR REEDUCATION: CPT

## 2018-01-05 NOTE — PROGRESS NOTES
Algoma Memos  : 1955  Primary: Judy Carson Ppo  Secondary:  [address] at [facility]  [address], [address], [address]  [phone]   [phone]          OUTPATIENT PHYSICAL THERAPY:Daily Note 2018    ICD-10: Treatment Diagnosis:   · Other abnormalities of gait and mobility (R26.89)  · Difficulty in walking, not elsewhere classified (R26.2)  Precautions/Allergies:   Banana; Lisinopril; and Nuts [tree nut]   Fall Risk Score: 5 (? 5 = High Risk)  MD Orders: Evaluate and Treat MEDICAL/REFERRING DIAGNOSIS:  Weakness due to [facility]vascular [facility] [I63.9, R53.1]   DATE OF ONSET: CVA: 2017  REFERRING PHYSICIAN: Juliette Walters*  RETURN PHYSICIAN APPOINTMENT: TBD     INITIAL ASSESSMENT:  Ms. Jaja Johns presents with decreased mobility, decreased strength, decreased gait, and decreased balance secondary to CVA. After discussing with patient, she agreed she would benefit from physical therapy to improve above deficits. Please sign this plan of treatment if you concur. Thank you for the opportunity to serve this patient. PROBLEM LIST (Impacting functional limitations):  1. Decreased Strength  2. Decreased Ambulation Ability/Technique  3. Decreased Balance  4. Decreased Activity Tolerance INTERVENTIONS PLANNED:  1. Balance Exercise  2. Gait Training  3. Home Exercise Program (HEP)  4. Neuromuscular Re-education/Strengthening  5. Range of Motion (ROM)  6. Therapeutic Exercise/Strengthening   TREATMENT PLAN:  Effective Dates: 12/15/2017 TO 2/15/2018. Frequency/Duration: 2 times a week for 8 weeks  GOALS: (Goals have been discussed and agreed upon with patient.)  Short-Term Functional Goals: Time Frame: 3 weeks  1. Patient will be independent with home exercise program without exacerbation of symptoms or cueing needed. Discharge Goals: Time Frame: 8 weeks  1.  Patient will be independent with all ADLs with minimal fear of falling and loss of balance with daily tasks. 2. Patient will report no fear avoidance with social or recreational activities due to fear of falling. 3. Patient will score greater than or equal to 45/56 on Gipson Balance Scale with minimal effect of imbalance on patient's ability to manage every day life activities. Rehabilitation Potential For Stated Goals: Good  Regarding Cam Iraheta's therapy, I certify that the treatment plan above will be carried out by a therapist or under their direction. Thank you for this referral,  Joy Mcnamara, PT     Referring Physician Signature: Juliette Belle*              Date                    The information in this section was collected on 12/15/2017 (except where otherwise noted). HISTORY:   History of Present Injury/Illness (Reason for Referral):  Patient reports she had a severe stroke on 2017. She reports that she was in the hospital and [facility] for about 61-62 days. She reports that she has progressed really well, but is still having trouble walking and using her right side (UE and LE). She reports that she walks using her AFO and quad cane all the time. She reports that she tries to be as active as possible. She reports that she is scared that she is going to fall, even though she has not. She reports she would like to work on strengthening her right leg so her balance is better and she can walk without fear of falling. Past Medical History/Comorbidities:   Ms. Tyson Sinclair  has a past medical history of Anxiety; CVA (cerebral vascular accident) ([facility] [address] (2017); Depression; HTN (hypertension); Menopause; and PTE (pulmonary thromboembolism) ([facility] [address] (2017). Ms. Tyson Sinclair  has a past surgical history that includes hx jennifer and bso (); hx  section; hx refractive surgery (); hx other surgical (); and hx other surgical (2017).   Social History/Living Environment:   Home Environment: Private residence  Living Alone: No  Support Systems: Family member(s), Friends \ neighbors; Spouse  Prior Level of Function/Work/Activity:  Independent  Dominant Side:         RIGHT  Personal Factors:          Sex:  female        Age:  60 y.o. Current Medications:       Current Outpatient Prescriptions:     tiZANidine (ZANAFLEX) 4 mg tablet, 4mg po TID, Disp: 90 Tab, Rfl: 6    HYDROcodone-acetaminophen (NORCO) 5-325 mg per tablet, TAKE ONE TABLET BY MOUTH EVERY 6 HOURS AS NEEDED FOR PAIN, Disp: , Rfl: 0    labetalol (NORMODYNE) 200 mg tablet, Take 1 Tab by mouth two (2) times a day., Disp: 60 Tab, Rfl: 5    FLUoxetine (PROZAC) 20 mg tablet, Take 1 Tab by mouth daily. , Disp: 30 Tab, Rfl: 1    ALPRAZolam (XANAX) 0.25 mg tablet, Take 1 Tab by mouth three (3) times daily as needed for Anxiety. Max Daily Amount: 0.75 mg., Disp: 30 Tab, Rfl: 1    rOPINIRole (REQUIP) 2 mg tablet, Take 2 mg by mouth nightly. for restless leg, Disp: , Rfl:     temazepam (RESTORIL) 15 mg capsule, Take 15 mg by mouth nightly as needed for Sleep., Disp: , Rfl:     methylphenidate HCl (RITALIN) 5 mg tablet, Take 1 Tab (5 mg total) by mouth two (2) times a day. Max Daily Amount: 10 mg, Disp: 60 Tab, Rfl: 0    polyethylene glycol (MIRALAX) 17 gram/dose powder, Take 17 g by mouth daily. , Disp: 510 g, Rfl: 2    tiZANidine (ZANAFLEX) 2 mg tablet, 2mg po bid, Disp: 60 Tab, Rfl: 4    tamsulosin (FLOMAX) 0.4 mg capsule, Take 1 Cap by mouth nightly. (Patient not taking: Reported on 12/12/2017), Disp: 30 Cap, Rfl: 4    potassium chloride (K-DUR, KLOR-CON) 20 mEq tablet, Take 1 Tab by mouth daily. , Disp: 30 Tab, Rfl: 0    acetaminophen (TYLENOL) 500 mg tablet, 1 Tab by Per NG tube route every four (4) hours as needed. (Patient not taking: Reported on 12/12/2017), Disp: 30 Tab, Rfl: 0    losartan-hydroCHLOROthiazide (HYZAAR) 100-12.5 mg per tablet, Take 1 Tab by mouth daily. , Disp: 30 Tab, Rfl: 5   Date Last Reviewed:  1/5/2018    Number of Personal Factors/Comorbidities that affect the Plan of Care: 1-2: MODERATE COMPLEXITY   EXAMINATION:   Observation/Orthostatic Postural Assessment:          Posture Assessment: Rounded shoulders, Forward head   Functional Mobility:         Gait/Ambulation:     Speed/Lulu: Pace decreased (<100 feet/min)  Step Length: Left shortened, Right lengthened  Swing Pattern: Left asymmetrical, Right asymmetrical  Stance: Left increased, Right decreased  Gait Abnormalities: Antalgic, Circumduction, Foot drop, Path deviations, Trendelenburg  Ambulation - Level of Assistance: Modified independent  Assistive Device: Cane, quad  · Interventions: Verbal cues, Safety awareness training          Transfers:     Sit to Stand: Independent  Stand to Sit: Independent  Stand Pivot Transfers: Independent  · Bed to Chair: Independent          Bed Mobility:     Rolling: Independent  Supine to Sit: Independent  Sit to Supine: Independent  · Scooting: Independent    Strength:          L UE STRENGTH: 4/5 shoulder flexion, 4/5 shoulder abduction, 4/5 shoulder extension, 4/5 elbow flexion, 4/5 elbow extension. R UE STRENGTH: 2/5 shoulder flexion, 2/5 shoulder abduction, 2/5 shoulder extension, 2/5 elbow flexion, 2/5 elbow extension. R LE STRENGTH:  4+/5 hip flexion, 4+/5 hip abduction, 4+/5 hip adduction, 4+/5 hip extension, 4+/5 knee extension, 4+/5 knee flexion, 1/5 ankle dorsiflexion, 1/5 ankle plantarflexion, 1/5 ankle inversion, 1/5 ankle eversion. Sensation:         Intact for light touch and proprioception  Postural Control & Balance:  · Gipson Balance Scale:  28/56.   (A score less than 45/56 indicates high risk of falls)      Body Structures Involved:  1. Nerves  2. Muscles Body Functions Affected:  1. Neuromusculoskeletal  2. Movement Related Activities and Participation Affected:  1. Mobility  2.  Self Care   Number of elements (examined above) that affect the Plan of Care: 4+: HIGH COMPLEXITY   CLINICAL PRESENTATION:   Presentation: Evolving clinical presentation with changing clinical characteristics: MODERATE COMPLEXITY   CLINICAL DECISION MAKING:   Outcome Measure: Tool Used: Gipson Balance Scale  Score:  Initial: 28/56 Most Recent: X/56 (Date: -- )   Interpretation of Score: Each section is scored on a 0-4 scale, 0 representing the patients inability to perform the task and 4 representing independence. The scores of each section are added together for a total score of 56. The higher the patients score, the more independent the patient is. Any score below 45 indicates increased risk for falls. Score 56 55-45 44-34 33-23 22-12 11-1 0   Modifier CH CI CJ CK CL CM CN     Medical Necessity:   · Patient is expected to demonstrate progress in strength, range of motion, balance and coordination to improve safety during daily activities. · Patient demonstrates good rehab potential due to higher previous functional level. · Skilled intervention continues to be required due to decreased mobility. Reason for Services/Other Comments:  · Patient continues to demonstrate capacity to improve overall mobility which will increase independence and increase safety. Use of outcome tool(s) and clinical judgement create a POC that gives a: Questionable prediction of patient's progress: MODERATE COMPLEXITY            TREATMENT:   (In addition to Assessment/Re-Assessment sessions the following treatments were rendered)  Pre-treatment Symptoms/Complaints:  1/5/2018: Patient has no complaints. Pain: Initial: Pain Intensity 1: 0  Post Session:  0/10     NEUROMUSCULAR RE-EDUCATION: (25 minutes):  Exercise/activities per grid below to improve balance, coordination, kinesthetic sense, posture and proprioception. Required minimal verbal and manual cues to promote static and dynamic balance in standing, promote coordination of bilateral, lower extremity(s) and promote motor control of bilateral, lower extremity(s).    Date:  1/5/2018   Activity/Exercise Parameters   Stepping over half foam  2x10 reps with right LE   Step taps 4 inch  2x10B   Step ups 4 inch  2x10 leading with right LE   [place] Eyes open/eyes closed   Walking in the clinic with quad cane 175 feet in hallway                                  THERAPEUTIC EXERCISE: (20 minutes):  Exercises per grid below to improve strength. Required minimal verbal cues to promote proper body alignment. Progressed repetitions as indicated. Date:  12/22/17 Date:  1/5/2018 Date:     Activity/Exercise Parameters Parameters Parameters   Sit to stand from chair X 10 X 10    Standing hip flexion 2x10 right LE 2x10 right LE    Standing hip abduction 2x10 right LE 2x10 right LE    Seated LAQs 2x10 right LE 2x10 right LE    Standing hamstrings curls  2x10 right LE with assist 2x10 right LE with assist                    [employer] Portal  Treatment/Session Assessment:    · Response to Treatment:  Patient needed verbal cues to shift weight to her right lower extremity during ambulation. Patient reports fatigue after treatment. · Compliance with Program/Exercises: Compliant. · Recommendations/Intent for next treatment session: \"Next visit will focus on advancements to more challenging activities\".   Total Treatment Duration:  PT Patient Time In/Time Out  Time In: 1030  Time Out: [address], PT

## 2018-01-05 NOTE — PROGRESS NOTES
Michael Montalvo  : 1955  Primary: [facility]  Secondary:  [address] at [place] [address] [facility], [address] [place] [address],[address], [facility] [address].  [phone]   [phone]        OUTPATIENT OCCUPATIONAL THERAPY: Daily Note 2018    ICD-10: Treatment Diagnosis: Hemiplegia and hemiparesis following cerebral infarction affecting right dominant side (I69.351)  Precautions/Allergies:   Banana; Lisinopril; and Nuts [tree nut]   Fall Risk Score: 5 (? 5 = High Risk)  MD Orders: Referral to occupational therapy MEDICAL/REFERRING DIAGNOSIS:   Cerebral infarction, unspecified [I63.9]  Weakness [R53.1]   DATE OF ONSET: 2017   REFERRING PHYSICIAN: Juliette Mistry*  RETURN PHYSICIAN APPOINTMENT: unknown     INITIAL ASSESSMENT:  Ms. Neli Montemayor presents with impaired active movement, strength, coordination and sensation of the dominant right upper extremity that is affecting her ability to complete activities of daily living independently. Feel she may benefit from skilled occupational therapy to maximize safety and independence with activities of daily living. PLAN OF CARE:   PROBLEM LIST:  1. Decreased Strength  2. Decreased ADL/Functional Activities  3. Decreased Transfer Abilities  4. Decreased Balance  5. Decreased Flexibility/Joint Mobility  6. Decreased Cognition INTERVENTIONS PLANNED:  1. Activities of daily living training  2. Manual therapy training  3. Modalities  4. Neuromuscular re-eduation  5. Sensory reintegration training  6. Splinting  7. Therapeutic activity  8. Therapeutic exercise   TREATMENT PLAN:  Effective Dates: 17 TO 3/20/18. Frequency/Duration: 3 times a week for 12 weeks  GOALS: (Goals have been discussed and agreed upon with patient.)  Short-Term Functional Goals: Time Frame: 6 weeks  1. Patient will demonstrate independence with home exercise program for right upper extremity range of motion.   2. Patient will increase use of right upper extremity as a functional assist in at least 25% of daily activities. 3. Patient will bathe with moderate assistance. Discharge Goals: Time Frame: 12 weeks  1. Patient will bathe with minimal assistance. 2. Patient will feed self with modified independence and adaptive equipment as needed. 3. Patient will dress with modified independence and adaptive equipment as needed. 4. Patient will use right upper extremity as a functional assist in at least 50% of daily activities. Rehabilitation Potential For Stated Goals: Good  Regarding Chito Iraheta's therapy, I certify that the treatment plan above will be carried out by a therapist or under their direction. Thank you for this referral,  Afshin Banks, OT     Referring Physician Signature: Juliette Izquierdo* _________________________  Date _________            The information in this section was collected on 17 (except where otherwise noted). OCCUPATIONAL PROFILE & HISTORY:   History of Present Injury/Illness (Reason for Referral):  CVA with hospital and [facility] stay then home health therapy. Past Medical History/Comorbidities:   Ms. Laina Valiente  has a past medical history of Anxiety; CVA (cerebral vascular accident) ([facility] (2017); Depression; HTN (hypertension); Menopause; and PTE (pulmonary thromboembolism) ([facility] (2017). Ms. Laina Valiente  has a past surgical history that includes hx jennifer and bso (); hx  section; hx refractive surgery (); hx other surgical (); and hx other surgical (2017).   Social History/Living Environment:      Prior Level of Function/Work/Activity:  Does seasonal taxes at [employer]  Dominant Side:         RIGHT  Current Medications:    Current Outpatient Prescriptions:     tiZANidine (ZANAFLEX) 4 mg tablet, 4mg po TID, Disp: 90 Tab, Rfl: 6    HYDROcodone-acetaminophen (NORCO) 5-325 mg per tablet, TAKE ONE TABLET BY MOUTH EVERY 6 HOURS AS NEEDED FOR PAIN, Disp: , Rfl: 0    labetalol (NORMODYNE) 200 mg tablet, Take 1 Tab by mouth two (2) times a day., Disp: 60 Tab, Rfl: 5    FLUoxetine (PROZAC) 20 mg tablet, Take 1 Tab by mouth daily. , Disp: 30 Tab, Rfl: 1    ALPRAZolam (XANAX) 0.25 mg tablet, Take 1 Tab by mouth three (3) times daily as needed for Anxiety. Max Daily Amount: 0.75 mg., Disp: 30 Tab, Rfl: 1    rOPINIRole (REQUIP) 2 mg tablet, Take 2 mg by mouth nightly. for restless leg, Disp: , Rfl:     temazepam (RESTORIL) 15 mg capsule, Take 15 mg by mouth nightly as needed for Sleep., Disp: , Rfl:     methylphenidate HCl (RITALIN) 5 mg tablet, Take 1 Tab (5 mg total) by mouth two (2) times a day. Max Daily Amount: 10 mg, Disp: 60 Tab, Rfl: 0    polyethylene glycol (MIRALAX) 17 gram/dose powder, Take 17 g by mouth daily. , Disp: 510 g, Rfl: 2    tiZANidine (ZANAFLEX) 2 mg tablet, 2mg po bid, Disp: 60 Tab, Rfl: 4    tamsulosin (FLOMAX) 0.4 mg capsule, Take 1 Cap by mouth nightly. (Patient not taking: Reported on 12/12/2017), Disp: 30 Cap, Rfl: 4    potassium chloride (K-DUR, KLOR-CON) 20 mEq tablet, Take 1 Tab by mouth daily. , Disp: 30 Tab, Rfl: 0    acetaminophen (TYLENOL) 500 mg tablet, 1 Tab by Per NG tube route every four (4) hours as needed. (Patient not taking: Reported on 12/12/2017), Disp: 30 Tab, Rfl: 0    losartan-hydroCHLOROthiazide (HYZAAR) 100-12.5 mg per tablet, Take 1 Tab by mouth daily. , Disp: 30 Tab, Rfl: 5            Date Last Reviewed:  1/5/2018   Complexity Level : Expanded review of therapy/medical records (1-2):  MODERATE COMPLEXITY   ASSESSMENT OF OCCUPATIONAL PERFORMANCE:   ROM:                   Balance:                   Coordination:                   Mental Status:                   Vision:                   Activities of Daily Living:           Basic ADLs (From Assessment) Complex ADLs (From Assessment)         Grooming/Bathing/Dressing Activities of Daily Living                                   Sensation:         Light touch absent distal to right elbow     Physical Skills Involved:  1. Range of Motion  2. Balance  3. Sensation  4. Fine Motor Control  5. Gross Motor Control Cognitive Skills Affected (resulting in the inability to perform in a timely and safe manner):  1. Executive Function  2. Sustained Attention  3. Divided Attention  4. Comprehension Psychosocial Skills Affected:  1. None   Number of elements that affect the Plan of Care: 5+:  HIGH COMPLEXITY   CLINICAL DECISION MAKING:   Outcome Measure: Tool Used: [address] [facility] [address] [address] AM-PAC Daily Activity Outpatient Short Form  Score:  Initial: 18 Most Recent: X (Date: -- )   Interpretation of Tool:  Represents clinically-significant functional categories (i.e., basic and instrumental activities of daily living, fine motor activities). Score 60 59-55 54-47 46-34 32-21 20-16 15   Modifier CH CI CJ CK CL CM CN     Medical Necessity:   · Patient demonstrates good rehab potential due to higher previous functional level. Reason for Services/Other Comments:  · Patient continues to require skilled intervention due to decreased safety and independence in activities of daily living. Clinical Decision-Making Assessment: Moderately difficult to predict patient's progress at this time due to the extent of her limitations in functional movement and use of dominant right upper extremity. Assessment process, impact of co-morbidities, assessment modification\need for assistance, and selection of interventions: Analytical Complexity:MODERATE COMPLEXITY   TREATMENT:   (In addition to Assessment/Re-Assessment sessions the following treatments were rendered)    Pre-treatment Symptoms/Complaints:  Patient states, \"Is it tone in my foot; is that what you call it? \"  Pain: Initial:   Pain Intensity 1: 0  Post Session:  0/10     Therapeutic Exercise: (  30 minutes):  Exercises per grid below to improve dynamic movement of arm - right and hand - right to improve functional lifting, carrying, reaching and grasping.   Required moderate visual and verbal cues to promote proper body mechanics. Progressed range, repetitions and complexity of movement as indicated. Date:  12/22/17 Date:  1/5/18 Date:     Activity/Exercise Parameters Parameters Parameters   Shoulder shrugs AROM  2 x 10 AROM  1 x 10    Scapular protraction/retraction AROM  2 x 10 AROM  1 x 10    Shoulder abduction In supine x 10 reps  Seated x 10 reps     Elbow flexion/extension AROM with assist to fully extend x 10 reps     PNF D1/D2 diagonals X 5 reps     Hand helper 5 pounds x 25 reps with assist to extend fingers     Tabletop skateboard X 10 mintues in all planes of motion     Shoulder flexion/extension  SROM in supine  2 x 10    Chest press  Holding cane in supine   2 x 10    UBE  Min assist to sustain right  on handle   Level 0  7 mintues    Modified push-ups   Hands on countertop with dycem under right hand  2 x 10              Neuromuscular Re-education: (  10 minutes):  Exercise/activities per grid below to improve balance, kinesthetic sense and proprioception. Required moderate visual and verbal cues to promote coordination of right, upper extremity(s) and promote motor control of right, upper extremity(s). Patient completed lateral weight shifts onto right elbow/forearm sitting at edge of mat x 6 reps; used right upper extremity in base of support during dynamic reach outside of base of support with left hand x 10 reps. Treatment/Session Assessment:    · Response to Treatment:  Patient demonstrating improving movement and functional use in base of support of the right upper extremity. · Compliance with Program/Exercises: Will assess as treatment progresses. · Recommendations/Intent for next treatment session: \"Next visit will focus on advancements to more challenging activities and reduction in assistance provided\".   Total Treatment Duration:  OT Patient Time In/Time Out  Time In: 0950  Time Out: [address] [place], [place]

## 2018-01-12 PROBLEM — F33.0 MILD EPISODE OF RECURRENT MAJOR DEPRESSIVE DISORDER (HCC): Status: RESOLVED | Noted: 2017-12-13 | Resolved: 2018-01-12

## 2018-01-15 ENCOUNTER — HOSPITAL ENCOUNTER (OUTPATIENT)
Dept: PHYSICAL THERAPY | Age: 63
Discharge: HOME | End: 2018-01-15
Attending: PHYSICAL MEDICINE & REHABILITATION
Payer: COMMERCIAL

## 2018-01-15 PROCEDURE — 97112 NEUROMUSCULAR REEDUCATION: CPT

## 2018-01-15 PROCEDURE — 97110 THERAPEUTIC EXERCISES: CPT

## 2018-01-15 PROCEDURE — 97535 SELF CARE MNGMENT TRAINING: CPT

## 2018-01-15 NOTE — PROGRESS NOTES
Laura Snow  : 1955  Primary: [place] [facility]  Secondary:  [address] at [facility], [address] [place] [address],[address], [facility] [address].  [phone]   [phone]          OUTPATIENT PHYSICAL THERAPY:Daily Note 1/15/2018    ICD-10: Treatment Diagnosis:   · Other abnormalities of gait and mobility (R26.89)  · Difficulty in walking, not elsewhere classified (R26.2)  Precautions/Allergies:   Banana; Lisinopril; and Nuts [tree nut]   Fall Risk Score: 5 (? 5 = High Risk)  MD Orders: Evaluate and Treat MEDICAL/REFERRING DIAGNOSIS:  Weakness due to cerebrovascular accident ([facility]) [facility] [I63.9, R53.1]   DATE OF ONSET: CVA: 2017  REFERRING PHYSICIAN: Juliette Ferreira*  RETURN PHYSICIAN APPOINTMENT: TBD     INITIAL ASSESSMENT:  Ms. Ning Alarcon presents with decreased mobility, decreased strength, decreased gait, and decreased balance secondary to CVA. After discussing with patient, she agreed she would benefit from physical therapy to improve above deficits. Please sign this plan of treatment if you concur. Thank you for the opportunity to serve this patient. PROBLEM LIST (Impacting functional limitations):  1. Decreased Strength  2. Decreased Ambulation Ability/Technique  3. Decreased Balance  4. Decreased Activity Tolerance INTERVENTIONS PLANNED:  1. Balance Exercise  2. Gait Training  3. Home Exercise Program (HEP)  4. Neuromuscular Re-education/Strengthening  5. Range of Motion (ROM)  6. Therapeutic Exercise/Strengthening   TREATMENT PLAN:  Effective Dates: 12/15/2017 TO 2/15/2018. Frequency/Duration: 2 times a week for 8 weeks  GOALS: (Goals have been discussed and agreed upon with patient.)  Short-Term Functional Goals: Time Frame: 3 weeks  1. Patient will be independent with home exercise program without exacerbation of symptoms or cueing needed. Discharge Goals: Time Frame: 8 weeks  1.  Patient will be independent with all ADLs with minimal fear of falling and loss of balance with daily tasks. 2. Patient will report no fear avoidance with social or recreational activities due to fear of falling. 3. Patient will score greater than or equal to 45/56 on Gipson Balance Scale with minimal effect of imbalance on patient's ability to manage every day life activities. Rehabilitation Potential For Stated Goals: Good  Regarding Gutierrez Alvaradodwin's therapy, I certify that the treatment plan above will be carried out by a therapist or under their direction. Thank you for this referral,  Brittany Carver PT     Referring Physician Signature: Julietet Pettit*              Date                    The information in this section was collected on 12/15/2017 (except where otherwise noted). HISTORY:   History of Present Injury/Illness (Reason for Referral):  Patient reports she had a severe stroke on 2017. She reports that she was in the hospital and [facility] for about 61-62 days. She reports that she has progressed really well, but is still having trouble walking and using her right side (UE and LE). She reports that she walks using her AFO and quad cane all the time. She reports that she tries to be as active as possible. She reports that she is scared that she is going to fall, even though she has not. She reports she would like to work on strengthening her right leg so her balance is better and she can walk without fear of falling. Past Medical History/Comorbidities:   Ms. Marta Kulkarni  has a past medical history of Anxiety; CVA (cerebral vascular accident) ([address] (2017); Depression; HTN (hypertension); Menopause; and PTE (pulmonary thromboembolism) ([address] (2017). Ms. Marta Kulkarni  has a past surgical history that includes hx jennifer and bso (); hx  section; hx refractive surgery (); hx other surgical (); and hx other surgical (2017).   Social History/Living Environment:   Home Environment: Private residence  Living Alone: No  Support Systems: Family member(s), Friends \ neighbors; Spouse  Prior Level of Function/Work/Activity:  Independent  Dominant Side:         RIGHT  Personal Factors:          Sex:  female        Age:  60 y.o. Current Medications:       Current Outpatient Prescriptions:     FLUoxetine (PROZAC) 20 mg tablet, Take 2 Tabs by mouth daily. , Disp: 60 Tab, Rfl: 3    methylphenidate HCl (RITALIN) 20 mg tablet, Take 1 Tab (20 mg total) by mouth daily. Max Daily Amount: 20 mg, Disp: 30 Tab, Rfl: 0    [START ON 2/12/2018] methylphenidate HCl (RITALIN) 20 mg tablet, Take 1 Tab (20 mg total) by mouth dailyEarliest Fill Date: 2/12/18. Max Daily Amount: 20 mg, Disp: 30 Tab, Rfl: 0    [START ON 3/12/2018] methylphenidate HCl (RITALIN) 20 mg tablet, Take 1 Tab (20 mg total) by mouth dailyEarliest Fill Date: 3/12/18. Max Daily Amount: 20 mg, Disp: 30 Tab, Rfl: 0    tiZANidine (ZANAFLEX) 4 mg tablet, 4mg po TID, Disp: 90 Tab, Rfl: 6    HYDROcodone-acetaminophen (NORCO) 5-325 mg per tablet, TAKE ONE TABLET BY MOUTH EVERY 6 HOURS AS NEEDED FOR PAIN, Disp: , Rfl: 0    labetalol (NORMODYNE) 200 mg tablet, Take 1 Tab by mouth two (2) times a day., Disp: 60 Tab, Rfl: 5    ALPRAZolam (XANAX) 0.25 mg tablet, Take 1 Tab by mouth three (3) times daily as needed for Anxiety. Max Daily Amount: 0.75 mg., Disp: 30 Tab, Rfl: 1    rOPINIRole (REQUIP) 2 mg tablet, Take 2 mg by mouth nightly. for restless leg, Disp: , Rfl:     temazepam (RESTORIL) 15 mg capsule, Take 15 mg by mouth nightly as needed for Sleep., Disp: , Rfl:     polyethylene glycol (MIRALAX) 17 gram/dose powder, Take 17 g by mouth daily. , Disp: 510 g, Rfl: 2    tamsulosin (FLOMAX) 0.4 mg capsule, Take 1 Cap by mouth nightly., Disp: 30 Cap, Rfl: 4    potassium chloride (K-DUR, KLOR-CON) 20 mEq tablet, Take 1 Tab by mouth daily. , Disp: 30 Tab, Rfl: 0    acetaminophen (TYLENOL) 500 mg tablet, 1 Tab by Per NG tube route every four (4) hours as needed. , Disp: 30 Tab, Rfl: 0   losartan-hydroCHLOROthiazide (HYZAAR) 100-12.5 mg per tablet, Take 1 Tab by mouth daily. , Disp: 30 Tab, Rfl: 5   Date Last Reviewed:  1/15/2018    Number of Personal Factors/Comorbidities that affect the Plan of Care: 1-2: MODERATE COMPLEXITY   EXAMINATION:   Observation/Orthostatic Postural Assessment:          Posture Assessment: Rounded shoulders, Forward head   Functional Mobility:         Gait/Ambulation:     Speed/Lulu: Pace decreased (<100 feet/min)  Step Length: Left shortened, Right lengthened  Swing Pattern: Left asymmetrical, Right asymmetrical  Stance: Left increased, Right decreased  Gait Abnormalities: Antalgic, Circumduction, Foot drop, Path deviations, Trendelenburg  Ambulation - Level of Assistance: Modified independent  Assistive Device: Cane, quad  · Interventions: Verbal cues, Safety awareness training          Transfers:     Sit to Stand: Independent  Stand to Sit: Independent  Stand Pivot Transfers: Independent  · Bed to Chair: Independent          Bed Mobility:     Rolling: Independent  Supine to Sit: Independent  Sit to Supine: Independent  · Scooting: Independent    Strength:          L UE STRENGTH: 4/5 shoulder flexion, 4/5 shoulder abduction, 4/5 shoulder extension, 4/5 elbow flexion, 4/5 elbow extension. R UE STRENGTH: 2/5 shoulder flexion, 2/5 shoulder abduction, 2/5 shoulder extension, 2/5 elbow flexion, 2/5 elbow extension. R LE STRENGTH:  4+/5 hip flexion, 4+/5 hip abduction, 4+/5 hip adduction, 4+/5 hip extension, 4+/5 knee extension, 4+/5 knee flexion, 1/5 ankle dorsiflexion, 1/5 ankle plantarflexion, 1/5 ankle inversion, 1/5 ankle eversion. Sensation:         Intact for light touch and proprioception  Postural Control & Balance:  · Gipson Balance Scale:  28/56.   (A score less than 45/56 indicates high risk of falls)      Body Structures Involved:  1. Nerves  2. Muscles Body Functions Affected:  1. Neuromusculoskeletal  2.  Movement Related Activities and Participation Affected:  1. Mobility  2. Self Care   Number of elements (examined above) that affect the Plan of Care: 4+: HIGH COMPLEXITY   CLINICAL PRESENTATION:   Presentation: Evolving clinical presentation with changing clinical characteristics: MODERATE COMPLEXITY   CLINICAL DECISION MAKING:   Outcome Measure: Tool Used: Gipson Balance Scale  Score:  Initial: 28/56 Most Recent: X/56 (Date: -- )   Interpretation of Score: Each section is scored on a 0-4 scale, 0 representing the patients inability to perform the task and 4 representing independence. The scores of each section are added together for a total score of 56. The higher the patients score, the more independent the patient is. Any score below 45 indicates increased risk for falls. Score 56 55-45 44-34 33-23 22-12 11-1 0   Modifier CH CI CJ CK CL CM CN     Medical Necessity:   · Patient is expected to demonstrate progress in strength, range of motion, balance and coordination to improve safety during daily activities. · Patient demonstrates good rehab potential due to higher previous functional level. · Skilled intervention continues to be required due to decreased mobility. Reason for Services/Other Comments:  · Patient continues to demonstrate capacity to improve overall mobility which will increase independence and increase safety. Use of outcome tool(s) and clinical judgement create a POC that gives a: Questionable prediction of patient's progress: MODERATE COMPLEXITY            TREATMENT:   (In addition to Assessment/Re-Assessment sessions the following treatments were rendered)  Pre-treatment Symptoms/Complaints:  1/15/2018: Patient reports she is doing well. Pain: Initial: Pain Intensity 1: 0  Post Session:  0/10     NEUROMUSCULAR RE-EDUCATION: (25 minutes):  Exercise/activities per grid below to improve balance, coordination, kinesthetic sense, posture and proprioception.   Required minimal verbal and manual cues to promote static and dynamic balance in standing, promote coordination of bilateral, lower extremity(s) and promote motor control of bilateral, lower extremity(s). Date:  1/15/2018   Activity/Exercise Parameters   Stepping over half foam  2x10 reps with right LE   Step taps 4 inch  2x10B   Step ups 4 inch  2x10 leading with right LE   [place] Eyes open/eyes closed   Walking in the clinic with quad cane 175 feet in hallway                                  THERAPEUTIC EXERCISE: (20 minutes):  Exercises per grid below to improve strength. Required minimal verbal cues to promote proper body alignment. Progressed repetitions as indicated. Date:  12/22/17 Date:  1/5/2018 Date:  1/15/2018   Activity/Exercise Parameters Parameters Parameters   Sit to stand from chair X 10 X 10 X 10   Standing hip flexion 2x10 right LE 2x10 right LE 2x10 right LE   Standing hip abduction 2x10 right LE 2x10 right LE 2x10 right LE   Seated LAQs 2x10 right LE 2x10 right LE 2x10 right LE   Standing hamstrings curls  2x10 right LE with assist 2x10 right LE with assist                    [employer] Portal  Treatment/Session Assessment:    · Response to Treatment:  Patient asked if she still needs to use her right AFO during ambulation. I explained she needs to continue using right AFO during ambulation due to having minimal ankle dorsiflexion strength. Patient has increased tone in her right lower extremity. I recommend patient consult her doctor for Botox injections in her right ankle to try and decrease tone. Patient needed several rest breaks due to increased fatigue. · Compliance with Program/Exercises: Compliant. · Recommendations/Intent for next treatment session: \"Next visit will focus on advancements to more challenging activities\".   Total Treatment Duration:  PT Patient Time In/Time Out  Time In: 1300  Time Out: 2000 Cy Hernandez

## 2018-01-15 NOTE — PROGRESS NOTES
Chuckie Walden  : 1955  Primary: Eneida Poncecristy Ppo  Secondary:  [address] at [place] [address] [facility], [address] [place] [address],[address], [place] [address].  [phone]   [phone]        OUTPATIENT OCCUPATIONAL THERAPY: Daily Note 1/15/2018    ICD-10: Treatment Diagnosis: Hemiplegia and hemiparesis following cerebral infarction affecting right dominant side (I69.351)  Precautions/Allergies:   Banana; Lisinopril; and Nuts [tree nut]   Fall Risk Score: 5 (? 5 = High Risk)  MD Orders: Referral to occupational therapy MEDICAL/REFERRING DIAGNOSIS:   Cerebral infarction, unspecified [I63.9]  Weakness [R53.1]   DATE OF ONSET: 2017   REFERRING PHYSICIAN: Juliette Ray*  RETURN PHYSICIAN APPOINTMENT: unknown     INITIAL ASSESSMENT:  Ms. Radha Celaya presents with impaired active movement, strength, coordination and sensation of the dominant right upper extremity that is affecting her ability to complete activities of daily living independently. Feel she may benefit from skilled occupational therapy to maximize safety and independence with activities of daily living. PLAN OF CARE:   PROBLEM LIST:  1. Decreased Strength  2. Decreased ADL/Functional Activities  3. Decreased Transfer Abilities  4. Decreased Balance  5. Decreased Flexibility/Joint Mobility  6. Decreased Cognition INTERVENTIONS PLANNED:  1. Activities of daily living training  2. Manual therapy training  3. Modalities  4. Neuromuscular re-eduation  5. Sensory reintegration training  6. Splinting  7. Therapeutic activity  8. Therapeutic exercise   TREATMENT PLAN:  Effective Dates: 17 TO 3/20/18. Frequency/Duration: 3 times a week for 12 weeks  GOALS: (Goals have been discussed and agreed upon with patient.)  Short-Term Functional Goals: Time Frame: 6 weeks  1. Patient will demonstrate independence with home exercise program for right upper extremity range of motion.   2. Patient will increase use of right upper extremity as a functional assist in at least 25% of daily activities. 3. Patient will bathe with moderate assistance. Discharge Goals: Time Frame: 12 weeks  1. Patient will bathe with minimal assistance. 2. Patient will feed self with modified independence and adaptive equipment as needed. 3. Patient will dress with modified independence and adaptive equipment as needed. 4. Patient will use right upper extremity as a functional assist in at least 50% of daily activities. Rehabilitation Potential For Stated Goals: Good  Regarding Carl Iraheta's therapy, I certify that the treatment plan above will be carried out by a therapist or under their direction. Thank you for this referral,  Andrés Luna, OT     Referring Physician Signature: Juliette Zimmer* _________________________  Date _________            The information in this section was collected on 17 (except where otherwise noted). OCCUPATIONAL PROFILE & HISTORY:   History of Present Injury/Illness (Reason for Referral):  CVA with hospital and [facility] stay then home health therapy. Past Medical History/Comorbidities:   Ms. Larissa Jones  has a past medical history of Anxiety; CVA (cerebral vascular accident) ([facility] (2017); Depression; HTN (hypertension); Menopause; and PTE (pulmonary thromboembolism) ([facility] (2017). Ms. Larissa Jones  has a past surgical history that includes hx jennifer and bso (); hx  section; hx refractive surgery (); hx other surgical (); and hx other surgical (2017). Social History/Living Environment:      Prior Level of Function/Work/Activity:  Does seasonal taxes at [employer]  Dominant Side:         RIGHT  Current Medications:    Current Outpatient Prescriptions:     FLUoxetine (PROZAC) 20 mg tablet, Take 2 Tabs by mouth daily. , Disp: 60 Tab, Rfl: 3    methylphenidate HCl (RITALIN) 20 mg tablet, Take 1 Tab (20 mg total) by mouth daily.   Max Daily Amount: 20 mg, Disp: 30 Tab, Rfl: 0    [START ON 2/12/2018] methylphenidate HCl (RITALIN) 20 mg tablet, Take 1 Tab (20 mg total) by mouth dailyEarliest Fill Date: 2/12/18. Max Daily Amount: 20 mg, Disp: 30 Tab, Rfl: 0    [START ON 3/12/2018] methylphenidate HCl (RITALIN) 20 mg tablet, Take 1 Tab (20 mg total) by mouth dailyEarliest Fill Date: 3/12/18. Max Daily Amount: 20 mg, Disp: 30 Tab, Rfl: 0    tiZANidine (ZANAFLEX) 4 mg tablet, 4mg po TID, Disp: 90 Tab, Rfl: 6    HYDROcodone-acetaminophen (NORCO) 5-325 mg per tablet, TAKE ONE TABLET BY MOUTH EVERY 6 HOURS AS NEEDED FOR PAIN, Disp: , Rfl: 0    labetalol (NORMODYNE) 200 mg tablet, Take 1 Tab by mouth two (2) times a day., Disp: 60 Tab, Rfl: 5    ALPRAZolam (XANAX) 0.25 mg tablet, Take 1 Tab by mouth three (3) times daily as needed for Anxiety. Max Daily Amount: 0.75 mg., Disp: 30 Tab, Rfl: 1    rOPINIRole (REQUIP) 2 mg tablet, Take 2 mg by mouth nightly. for restless leg, Disp: , Rfl:     temazepam (RESTORIL) 15 mg capsule, Take 15 mg by mouth nightly as needed for Sleep., Disp: , Rfl:     polyethylene glycol (MIRALAX) 17 gram/dose powder, Take 17 g by mouth daily. , Disp: 510 g, Rfl: 2    tamsulosin (FLOMAX) 0.4 mg capsule, Take 1 Cap by mouth nightly., Disp: 30 Cap, Rfl: 4    potassium chloride (K-DUR, KLOR-CON) 20 mEq tablet, Take 1 Tab by mouth daily. , Disp: 30 Tab, Rfl: 0    acetaminophen (TYLENOL) 500 mg tablet, 1 Tab by Per NG tube route every four (4) hours as needed. , Disp: 30 Tab, Rfl: 0    losartan-hydroCHLOROthiazide (HYZAAR) 100-12.5 mg per tablet, Take 1 Tab by mouth daily. , Disp: 30 Tab, Rfl: 5            Date Last Reviewed:  1/15/2018   Complexity Level : Expanded review of therapy/medical records (1-2):  MODERATE COMPLEXITY   ASSESSMENT OF OCCUPATIONAL PERFORMANCE:   ROM:                   Balance:                   Coordination:                   Mental Status:                   Vision:                   Activities of Daily Living:           Basic ADLs (From Assessment) Complex ADLs (From Assessment)         Grooming/Bathing/Dressing Activities of Daily Living                                   Sensation:         Light touch absent distal to right elbow     Physical Skills Involved:  1. Range of Motion  2. Balance  3. Sensation  4. Fine Motor Control  5. Gross Motor Control Cognitive Skills Affected (resulting in the inability to perform in a timely and safe manner):  1. Executive Function  2. Sustained Attention  3. Divided Attention  4. Comprehension Psychosocial Skills Affected:  1. None   Number of elements that affect the Plan of Care: 5+:  HIGH COMPLEXITY   CLINICAL DECISION MAKING:   Outcome Measure: Tool Used: [facility] [address] AM-[facility] Daily Activity Outpatient Short Form  Score:  Initial: 18 Most Recent: X (Date: -- )   Interpretation of Tool:  Represents clinically-significant functional categories (i.e., basic and instrumental activities of daily living, fine motor activities). Score 60 59-55 54-47 46-34 32-21 20-16 15   Modifier CH CI CJ CK CL CM CN     Medical Necessity:   · Patient demonstrates good rehab potential due to higher previous functional level. Reason for Services/Other Comments:  · Patient continues to require skilled intervention due to decreased safety and independence in activities of daily living. Clinical Decision-Making Assessment: Moderately difficult to predict patient's progress at this time due to the extent of her limitations in functional movement and use of dominant right upper extremity. Assessment process, impact of co-morbidities, assessment modification\need for assistance, and selection of interventions: Analytical Complexity:MODERATE COMPLEXITY   TREATMENT:   (In addition to Assessment/Re-Assessment sessions the following treatments were rendered)    Pre-treatment Symptoms/Complaints:  Patient states, \"I want to use my right hand as much as I can. \"  Pain: Initial:   Pain Intensity 1: 0  Post Session:  0/10     Therapeutic Exercise: (  35 minutes):  Exercises per grid below to improve dynamic movement of arm - right and hand - right to improve functional lifting, carrying, reaching and grasping. Required moderate visual and verbal cues to promote proper body mechanics. Progressed range, repetitions and complexity of movement as indicated. Date:  12/22/17 Date:  1/5/18 Date:  1/15/18   Activity/Exercise Parameters Parameters Parameters   Shoulder shrugs AROM  2 x 10 AROM  1 x 10 AROM  1 x 10   Scapular protraction/retraction AROM  2 x 10 AROM  1 x 10 AROM  1 x 10   Shoulder abduction In supine x 10 reps  Seated x 10 reps     Elbow flexion/extension AROM with assist to fully extend x 10 reps  AROM with assist to fully extend x 10 reps   PNF D1/D2 diagonals X 5 reps     Hand helper 5 pounds x 25 reps with assist to extend fingers  15 pounds x 25 reps with assist to extend fingers   Tabletop skateboard X 10 mintues in all planes of motion  X 3 mintues in all planes of motion   Shoulder flexion/extension  SROM in supine  2 x 10 Seated x 10 reps  SROM   Chest press  Holding cane in supine   2 x 10    UBE  Min assist to sustain right  on handle   Level 0  7 mintues Min assist to sustain right  on handle   Level 0  7 mintues   Modified push-ups   Hands on countertop with dycem under right hand  2 x 10 Hands on countertop with dycem under right hand  2 x 10   Resistive clothespin   Yellow  1 x 15  Issued for HEP     Self Care: (10 minutes): Procedure(s) (per grid) utilized to improve and/or restore self-care/home management as related to self feeding. Required minimal visual and verbal cueing to facilitate activities of daily living skills. Patient worked on [employer] of large, foam utensil lara and then universal cuff to then bring spoon from tabletop to mouth to simulate functional self-feeding using right hand x 10 reps.              Treatment/Session Assessment:    · Response to Treatment:  Patient demonstrating improving movement and functional use in base of support of the right upper extremity. · Compliance with Program/Exercises: Will assess as treatment progresses. · Recommendations/Intent for next treatment session: \"Next visit will focus on advancements to more challenging activities and reduction in assistance provided\".   Total Treatment Duration:  OT Patient Time In/Time Out  Time In: 1345  Time Out: Javier Nichole

## 2018-01-17 ENCOUNTER — HOSPITAL ENCOUNTER (OUTPATIENT)
Dept: PHYSICAL THERAPY | Age: 63
Discharge: HOME | End: 2018-01-17
Attending: PHYSICAL MEDICINE & REHABILITATION
Payer: COMMERCIAL

## 2018-01-19 ENCOUNTER — HOSPITAL ENCOUNTER (OUTPATIENT)
Dept: PHYSICAL THERAPY | Age: 63
Discharge: HOME | End: 2018-01-19
Attending: PHYSICAL MEDICINE & REHABILITATION
Payer: COMMERCIAL

## 2018-01-19 PROCEDURE — 97112 NEUROMUSCULAR REEDUCATION: CPT

## 2018-01-19 PROCEDURE — 97110 THERAPEUTIC EXERCISES: CPT

## 2018-01-19 NOTE — PROGRESS NOTES
Lexi Wallace  : 1955  Primary: Manas Lancaster Ppo  Secondary:  [address] at [place] [id number] [facility], [address] [place] [address],[address], [facility] [address]  [phone]   [phone]          OUTPATIENT PHYSICAL THERAPY:Daily Note 2018    ICD-10: Treatment Diagnosis:   · Other abnormalities of gait and mobility (R26.89)  · Difficulty in walking, not elsewhere classified (R26.2)  Precautions/Allergies:   Banana; Lisinopril; and Nuts [tree nut]   Fall Risk Score: 5 (? 5 = High Risk)  MD Orders: Evaluate and Treat MEDICAL/REFERRING DIAGNOSIS:  Weakness due to cerebrovascular accident (CVA) ([facility] [I63.9, R53.1]   DATE OF ONSET: CVA: 2017  REFERRING PHYSICIAN: Juliette Parker*  RETURN PHYSICIAN APPOINTMENT: TBD     INITIAL ASSESSMENT:  Ms. Elizabeth Pham presents with decreased mobility, decreased strength, decreased gait, and decreased balance secondary to CVA. After discussing with patient, she agreed she would benefit from physical therapy to improve above deficits. Please sign this plan of treatment if you concur. Thank you for the opportunity to serve this patient. PROBLEM LIST (Impacting functional limitations):  1. Decreased Strength  2. Decreased Ambulation Ability/Technique  3. Decreased Balance  4. Decreased Activity Tolerance INTERVENTIONS PLANNED:  1. Balance Exercise  2. Gait Training  3. Home Exercise Program (HEP)  4. Neuromuscular Re-education/Strengthening  5. Range of Motion (ROM)  6. Therapeutic Exercise/Strengthening   TREATMENT PLAN:  Effective Dates: 12/15/2017 TO 2/15/2018. Frequency/Duration: 2 times a week for 8 weeks  GOALS: (Goals have been discussed and agreed upon with patient.)  Short-Term Functional Goals: Time Frame: 3 weeks  1. Patient will be independent with home exercise program without exacerbation of symptoms or cueing needed. Discharge Goals: Time Frame: 8 weeks  1.  Patient will be independent with all ADLs with minimal fear of falling and loss of balance with daily tasks. 2. Patient will report no fear avoidance with social or recreational activities due to fear of falling. 3. Patient will score greater than or equal to 45/56 on Gipson Balance Scale with minimal effect of imbalance on patient's ability to manage every day life activities. Rehabilitation Potential For Stated Goals: Good  Regarding King Meghana Alvaradodwin's therapy, I certify that the treatment plan above will be carried out by a therapist or under their direction. Thank you for this referral,  Karuna Ortiz PT     Referring Physician Signature: Juliette Spain*              Date                    The information in this section was collected on 12/15/2017 (except where otherwise noted). HISTORY:   History of Present Injury/Illness (Reason for Referral):  Patient reports she had a severe stroke on 2017. She reports that she was in the hospital and [facility] for about 61-62 days. She reports that she has progressed really well, but is still having trouble walking and using her right side (UE and LE). She reports that she walks using her AFO and quad cane all the time. She reports that she tries to be as active as possible. She reports that she is scared that she is going to fall, even though she has not. She reports she would like to work on strengthening her right leg so her balance is better and she can walk without fear of falling. Past Medical History/Comorbidities:   Ms. Gela Easley  has a past medical history of Anxiety; CVA (cerebral vascular accident) ([facility] (2017); Depression; HTN (hypertension); Menopause; and PTE (pulmonary thromboembolism) ([address] (2017). Ms. Gela Easley  has a past surgical history that includes hx jennifer and bso (); hx  section; hx refractive surgery (); hx other surgical (); and hx other surgical (2017).   Social History/Living Environment:   Home Environment: Private residence  Living Alone: No  Support Systems: Family member(s), Friends \ neighbors; Spouse  Prior Level of Function/Work/Activity:  Independent  Dominant Side:         RIGHT  Personal Factors:          Sex:  female        Age:  60 y.o. Current Medications:       Current Outpatient Prescriptions:     FLUoxetine (PROZAC) 20 mg tablet, Take 2 Tabs by mouth daily. , Disp: 60 Tab, Rfl: 3    methylphenidate HCl (RITALIN) 20 mg tablet, Take 1 Tab (20 mg total) by mouth daily. Max Daily Amount: 20 mg, Disp: 30 Tab, Rfl: 0    [START ON 2/12/2018] methylphenidate HCl (RITALIN) 20 mg tablet, Take 1 Tab (20 mg total) by mouth dailyEarliest Fill Date: 2/12/18. Max Daily Amount: 20 mg, Disp: 30 Tab, Rfl: 0    [START ON 3/12/2018] methylphenidate HCl (RITALIN) 20 mg tablet, Take 1 Tab (20 mg total) by mouth dailyEarliest Fill Date: 3/12/18. Max Daily Amount: 20 mg, Disp: 30 Tab, Rfl: 0    tiZANidine (ZANAFLEX) 4 mg tablet, 4mg po TID, Disp: 90 Tab, Rfl: 6    HYDROcodone-acetaminophen (NORCO) 5-325 mg per tablet, TAKE ONE TABLET BY MOUTH EVERY 6 HOURS AS NEEDED FOR PAIN, Disp: , Rfl: 0    labetalol (NORMODYNE) 200 mg tablet, Take 1 Tab by mouth two (2) times a day., Disp: 60 Tab, Rfl: 5    ALPRAZolam (XANAX) 0.25 mg tablet, Take 1 Tab by mouth three (3) times daily as needed for Anxiety. Max Daily Amount: 0.75 mg., Disp: 30 Tab, Rfl: 1    rOPINIRole (REQUIP) 2 mg tablet, Take 2 mg by mouth nightly. for restless leg, Disp: , Rfl:     temazepam (RESTORIL) 15 mg capsule, Take 15 mg by mouth nightly as needed for Sleep., Disp: , Rfl:     polyethylene glycol (MIRALAX) 17 gram/dose powder, Take 17 g by mouth daily. , Disp: 510 g, Rfl: 2    tamsulosin (FLOMAX) 0.4 mg capsule, Take 1 Cap by mouth nightly., Disp: 30 Cap, Rfl: 4    potassium chloride (K-DUR, KLOR-CON) 20 mEq tablet, Take 1 Tab by mouth daily. , Disp: 30 Tab, Rfl: 0    acetaminophen (TYLENOL) 500 mg tablet, 1 Tab by Per NG tube route every four (4) hours as needed. , Disp: 30 Tab, Rfl: 0   losartan-hydroCHLOROthiazide (HYZAAR) 100-12.5 mg per tablet, Take 1 Tab by mouth daily. , Disp: 30 Tab, Rfl: 5   Date Last Reviewed:  1/19/2018    Number of Personal Factors/Comorbidities that affect the Plan of Care: 1-2: MODERATE COMPLEXITY   EXAMINATION:   Observation/Orthostatic Postural Assessment:          Posture Assessment: Rounded shoulders, Forward head   Functional Mobility:         Gait/Ambulation:     Speed/Lulu: Pace decreased (<100 feet/min)  Step Length: Left shortened, Right lengthened  Swing Pattern: Left asymmetrical, Right asymmetrical  Stance: Left increased, Right decreased  Gait Abnormalities: Antalgic, Circumduction, Foot drop, Path deviations, Trendelenburg  Ambulation - Level of Assistance: Modified independent  Assistive Device: Cane, quad  · Interventions: Verbal cues, Safety awareness training          Transfers:     Sit to Stand: Independent  Stand to Sit: Independent  Stand Pivot Transfers: Independent  · Bed to Chair: Independent          Bed Mobility:     Rolling: Independent  Supine to Sit: Independent  Sit to Supine: Independent  · Scooting: Independent    Strength:          L UE STRENGTH: 4/5 shoulder flexion, 4/5 shoulder abduction, 4/5 shoulder extension, 4/5 elbow flexion, 4/5 elbow extension. R UE STRENGTH: 2/5 shoulder flexion, 2/5 shoulder abduction, 2/5 shoulder extension, 2/5 elbow flexion, 2/5 elbow extension. R LE STRENGTH:  4+/5 hip flexion, 4+/5 hip abduction, 4+/5 hip adduction, 4+/5 hip extension, 4+/5 knee extension, 4+/5 knee flexion, 1/5 ankle dorsiflexion, 1/5 ankle plantarflexion, 1/5 ankle inversion, 1/5 ankle eversion. Sensation:         Intact for light touch and proprioception  Postural Control & Balance:  · Gipson Balance Scale:  28/56.   (A score less than 45/56 indicates high risk of falls)      Body Structures Involved:  1. Nerves  2. Muscles Body Functions Affected:  1. Neuromusculoskeletal  2.  Movement Related Activities and Participation Affected:  1. Mobility  2. Self Care   Number of elements (examined above) that affect the Plan of Care: 4+: HIGH COMPLEXITY   CLINICAL PRESENTATION:   Presentation: Evolving clinical presentation with changing clinical characteristics: MODERATE COMPLEXITY   CLINICAL DECISION MAKING:   Outcome Measure: Tool Used: Gipson Balance Scale  Score:  Initial: 28/56 Most Recent: X/56 (Date: -- )   Interpretation of Score: Each section is scored on a 0-4 scale, 0 representing the patients inability to perform the task and 4 representing independence. The scores of each section are added together for a total score of 56. The higher the patients score, the more independent the patient is. Any score below 45 indicates increased risk for falls. Score 56 55-45 44-34 33-23 22-12 11-1 0   Modifier CH CI CJ CK CL CM CN     Medical Necessity:   · Patient is expected to demonstrate progress in strength, range of motion, balance and coordination to improve safety during daily activities. · Patient demonstrates good rehab potential due to higher previous functional level. · Skilled intervention continues to be required due to decreased mobility. Reason for Services/Other Comments:  · Patient continues to demonstrate capacity to improve overall mobility which will increase independence and increase safety. Use of outcome tool(s) and clinical judgement create a POC that gives a: Questionable prediction of patient's progress: MODERATE COMPLEXITY            TREATMENT:   (In addition to Assessment/Re-Assessment sessions the following treatments were rendered)  Pre-treatment Symptoms/Complaints:  1/19/2018: Patient reports she is doing well. Pain: Initial: Pain Intensity 1: 0  Post Session:  0/10     NEUROMUSCULAR RE-EDUCATION: (25 minutes):  Exercise/activities per grid below to improve balance, coordination, kinesthetic sense, posture and proprioception.   Required minimal verbal and manual cues to promote static and dynamic balance in standing, promote coordination of bilateral, lower extremity(s) and promote motor control of bilateral, lower extremity(s). Date:  1/19/2018   Activity/Exercise Parameters   Stepping over half foam  2x10 reps with right LE   Step taps X   Step ups 4 inch  2x10 leading with right LE   [place] X   Walking in the clinic with quad cane 175 feet in hallway                                  THERAPEUTIC EXERCISE: (20 minutes):  Exercises per grid below to improve strength. Required minimal verbal cues to promote proper body alignment. Progressed repetitions as indicated. Date:  1/19/2018 Date:  1/5/2018 Date:  1/15/2018   Activity/Exercise Parameters Parameters Parameters   Sit to stand from chair X 10 X 10 X 10   Standing hip flexion X 2x10 right LE 2x10 right LE   Standing hip abduction X 2x10 right LE 2x10 right LE   Seated LAQs 2x10 right LE 2x10 right LE 2x10 right LE   Standing hamstrings curls  2x10 right LE with assist 2x10 right LE with assist    Bridging 2x10 with right lower extremity     Supine straight leg raise 2x10 with right lower extremity     Supine PNF 2x10 with right lower extremity     Left sidelying hip abduction 2x10 with right lower extremity                           [facility] Portal  Treatment/Session Assessment:    · Response to Treatment:  Patient's right lower extremity fatigues quickly with exercise. Patient needs verbal cues to weight shift on right lower extremity with ambulation. Patient continues to be very motivated with therapy. · Compliance with Program/Exercises: Compliant. · Recommendations/Intent for next treatment session: \"Next visit will focus on advancements to more challenging activities\". Will reassess next appointment.    Total Treatment Duration:  PT Patient Time In/Time Out  Time In: 1020  Time Out: [address] [address]

## 2018-01-19 NOTE — PROGRESS NOTES
Tyrone Brittle  : 1955  Primary: Winkler Achilles Ppo  Secondary:  [address] at [place] [id number] [facility], [address] [place] [address],[address], [place] [address].  [phone]   [phone]        OUTPATIENT OCCUPATIONAL THERAPY: Daily Note 2018    ICD-10: Treatment Diagnosis: Hemiplegia and hemiparesis following cerebral infarction affecting right dominant side (I69.351)  Precautions/Allergies:   Banana; Lisinopril; and Nuts [tree nut]   Fall Risk Score: 5 (? 5 = High Risk)  MD Orders: Referral to occupational therapy MEDICAL/REFERRING DIAGNOSIS:   Cerebral infarction, unspecified [I63.9]  Weakness [R53.1]   DATE OF ONSET: 2017   REFERRING PHYSICIAN: Juliette Odom*  RETURN PHYSICIAN APPOINTMENT: unknown     INITIAL ASSESSMENT:  Ms. Scott Lr presents with impaired active movement, strength, coordination and sensation of the dominant right upper extremity that is affecting her ability to complete activities of daily living independently. Feel she may benefit from skilled occupational therapy to maximize safety and independence with activities of daily living. PLAN OF CARE:   PROBLEM LIST:  1. Decreased Strength  2. Decreased ADL/Functional Activities  3. Decreased Transfer Abilities  4. Decreased Balance  5. Decreased Flexibility/Joint Mobility  6. Decreased Cognition INTERVENTIONS PLANNED:  1. Activities of daily living training  2. Manual therapy training  3. Modalities  4. Neuromuscular re-eduation  5. Sensory reintegration training  6. Splinting  7. Therapeutic activity  8. Therapeutic exercise   TREATMENT PLAN:  Effective Dates: 17 TO 3/20/18. Frequency/Duration: 3 times a week for 12 weeks  GOALS: (Goals have been discussed and agreed upon with patient.)  Short-Term Functional Goals: Time Frame: 6 weeks  1. Patient will demonstrate independence with home exercise program for right upper extremity range of motion.   2. Patient will increase use of right upper extremity as a functional assist in at least 25% of daily activities. 3. Patient will bathe with moderate assistance. Discharge Goals: Time Frame: 12 weeks  1. Patient will bathe with minimal assistance. 2. Patient will feed self with modified independence and adaptive equipment as needed. 3. Patient will dress with modified independence and adaptive equipment as needed. 4. Patient will use right upper extremity as a functional assist in at least 50% of daily activities. Rehabilitation Potential For Stated Goals: Good  Regarding Case Iraheta's therapy, I certify that the treatment plan above will be carried out by a therapist or under their direction. Thank you for this referral,  Mey Delarosa, OT     Referring Physician Signature: Juliette Alexandra* _________________________  Date _________            The information in this section was collected on 17 (except where otherwise noted). OCCUPATIONAL PROFILE & HISTORY:   History of Present Injury/Illness (Reason for Referral):  CVA with hospital and [facility] stay then home health therapy. Past Medical History/Comorbidities:   Ms. Eusebio Dixon  has a past medical history of Anxiety; CVA (cerebral vascular accident) ([facility] [address] (2017); Depression; HTN (hypertension); Menopause; and PTE (pulmonary thromboembolism) ([facility] [address] (2017). Ms. Eusebio Dixon  has a past surgical history that includes hx jennifer and bso (); hx  section; hx refractive surgery (); hx other surgical (); and hx other surgical (2017). Social History/Living Environment:      Prior Level of Function/Work/Activity:  Does seasonal taxes at [employer]  Dominant Side:         RIGHT  Current Medications:    Current Outpatient Prescriptions:     FLUoxetine (PROZAC) 20 mg tablet, Take 2 Tabs by mouth daily. , Disp: 60 Tab, Rfl: 3    methylphenidate HCl (RITALIN) 20 mg tablet, Take 1 Tab (20 mg total) by mouth daily.   Max Daily Amount: 20 mg, Disp: 30 Tab, Rfl: 0    [START ON 2/12/2018] methylphenidate HCl (RITALIN) 20 mg tablet, Take 1 Tab (20 mg total) by mouth dailyEarliest Fill Date: 2/12/18. Max Daily Amount: 20 mg, Disp: 30 Tab, Rfl: 0    [START ON 3/12/2018] methylphenidate HCl (RITALIN) 20 mg tablet, Take 1 Tab (20 mg total) by mouth dailyEarliest Fill Date: 3/12/18. Max Daily Amount: 20 mg, Disp: 30 Tab, Rfl: 0    tiZANidine (ZANAFLEX) 4 mg tablet, 4mg po TID, Disp: 90 Tab, Rfl: 6    HYDROcodone-acetaminophen (NORCO) 5-325 mg per tablet, TAKE ONE TABLET BY MOUTH EVERY 6 HOURS AS NEEDED FOR PAIN, Disp: , Rfl: 0    labetalol (NORMODYNE) 200 mg tablet, Take 1 Tab by mouth two (2) times a day., Disp: 60 Tab, Rfl: 5    ALPRAZolam (XANAX) 0.25 mg tablet, Take 1 Tab by mouth three (3) times daily as needed for Anxiety. Max Daily Amount: 0.75 mg., Disp: 30 Tab, Rfl: 1    rOPINIRole (REQUIP) 2 mg tablet, Take 2 mg by mouth nightly. for restless leg, Disp: , Rfl:     temazepam (RESTORIL) 15 mg capsule, Take 15 mg by mouth nightly as needed for Sleep., Disp: , Rfl:     polyethylene glycol (MIRALAX) 17 gram/dose powder, Take 17 g by mouth daily. , Disp: 510 g, Rfl: 2    tamsulosin (FLOMAX) 0.4 mg capsule, Take 1 Cap by mouth nightly., Disp: 30 Cap, Rfl: 4    potassium chloride (K-DUR, KLOR-CON) 20 mEq tablet, Take 1 Tab by mouth daily. , Disp: 30 Tab, Rfl: 0    acetaminophen (TYLENOL) 500 mg tablet, 1 Tab by Per NG tube route every four (4) hours as needed. , Disp: 30 Tab, Rfl: 0    losartan-hydroCHLOROthiazide (HYZAAR) 100-12.5 mg per tablet, Take 1 Tab by mouth daily. , Disp: 30 Tab, Rfl: 5            Date Last Reviewed:  1/19/2018   Complexity Level : Expanded review of therapy/medical records (1-2):  MODERATE COMPLEXITY   ASSESSMENT OF OCCUPATIONAL PERFORMANCE:   ROM:                   Balance:                   Coordination:                   Mental Status:                   Vision:                   Activities of Daily Living:           Basic ADLs (From Assessment) Complex ADLs (From Assessment)         Grooming/Bathing/Dressing Activities of Daily Living                                   Sensation:         Light touch absent distal to right elbow     Physical Skills Involved:  1. Range of Motion  2. Balance  3. Sensation  4. Fine Motor Control  5. Gross Motor Control Cognitive Skills Affected (resulting in the inability to perform in a timely and safe manner):  1. Executive Function  2. Sustained Attention  3. Divided Attention  4. Comprehension Psychosocial Skills Affected:  1. None   Number of elements that affect the Plan of Care: 5+:  HIGH COMPLEXITY   CLINICAL DECISION MAKING:   Outcome Measure: Tool Used: [address] [facility] [address] [facility] Daily Activity Outpatient Short Form  Score:  Initial: 18 Most Recent: X (Date: -- )   Interpretation of Tool:  Represents clinically-significant functional categories (i.e., basic and instrumental activities of daily living, fine motor activities). Score 60 59-55 54-47 46-34 32-21 20-16 15   Modifier CH CI CJ CK CL CM CN     Medical Necessity:   · Patient demonstrates good rehab potential due to higher previous functional level. Reason for Services/Other Comments:  · Patient continues to require skilled intervention due to decreased safety and independence in activities of daily living. Clinical Decision-Making Assessment: Moderately difficult to predict patient's progress at this time due to the extent of her limitations in functional movement and use of dominant right upper extremity. Assessment process, impact of co-morbidities, assessment modification\need for assistance, and selection of interventions: Analytical Complexity:MODERATE COMPLEXITY   TREATMENT:   (In addition to Assessment/Re-Assessment sessions the following treatments were rendered)    Pre-treatment Symptoms/Complaints:  Patient states, \"I have been using my clothespin at home while I watch TV. I am getting it to open and close. \"  Pain: Initial:   Pain Intensity 1: 0  Post Session:  0/10     Therapeutic Exercise: (  35 minutes):  Exercises per grid below to improve dynamic movement of arm - right and hand - right to improve functional lifting, carrying, reaching and grasping. Required moderate visual and verbal cues to promote proper body mechanics. Progressed range, repetitions and complexity of movement as indicated. Date:  12/22/17 Date:  1/5/18 Date:  1/15/18 Date:  1/19/18   Activity/Exercise Parameters Parameters Parameters    Shoulder shrugs AROM  2 x 10 AROM  1 x 10 AROM  1 x 10 AROM  1 x 10   Scapular protraction/retraction AROM  2 x 10 AROM  1 x 10 AROM  1 x 10 AROM  1 x 10   Shoulder abduction In supine x 10 reps  Seated x 10 reps      Elbow flexion/extension AROM with assist to fully extend x 10 reps  AROM with assist to fully extend x 10 reps AROM with assist to fully extend x 10 reps in supine   PNF D1/D2 diagonals X 5 reps      Hand helper 5 pounds x 25 reps with assist to extend fingers  15 pounds x 25 reps with assist to extend fingers 15 pounds x 25 reps with assist to extend fingers   Tabletop skateboard X 10 mintues in all planes of motion  X 3 mintues in all planes of motion    Shoulder flexion/extension  SROM in supine  2 x 10 Seated x 10 reps  SROM Seated x 10 reps  SROM   Chest press  Holding cane in supine   2 x 10     UBE  Min assist to sustain right  on handle   Level 0  7 mintues Min assist to sustain right  on handle   Level 0  7 mintues Min assist to sustain right  on handle   Level 0  7 mintues   Modified push-ups   Hands on countertop with dycem under right hand  2 x 10 Hands on countertop with dycem under right hand  2 x 10 Hands on countertop with dycem under right hand  2 x 10   Resistive clothespin   Yellow  1 x 15  Issued for HEP Yellow  1 x 15   Shoulder arc    5 rings with assist to grasp ring initially        Access Code: [email or web address]   URL: [email or web address]   Date: 01/19/2018   Prepared by: Robb Max Exercises   Supine Shoulder Flexion PROM - 10 reps - 2 sets - 3 hold - 1x daily - 5x weekly   Push-Up on Counter - 10 reps - 2 sets - 1x daily - 5x weekly   Seated Shoulder Shrugs - 10 reps - 2 sets - 1x daily - 5x weekly   Seated Scapular Retraction - 10 reps - 2 sets - 1x daily - 5x weekly   Seated Weight Shifting with Arm Support - 10 reps - 2 sets - 1x daily - 5x weekly   Seated Shoulder Flexion Self PROM - 10 reps - 2 sets - 1x daily - 5x weekly   Supine Elbow Flexion Extension AROM - 10 reps - 2 sets - 1x daily - 5x weekly   Seated Elbow Flexion Extension AROM - 10 reps - 2 sets - 1x daily - 5x weekly     Neuromuscular Re-education: (  15 minutes ):  Exercise/activities per grid below to improve balance, coordination, kinesthetic sense and proprioception. Required moderate visual and verbal cues to promote static and dynamic balance in sitting, promote coordination of right, upper extremity(s) and promote motor control of right, upper extremity(s). Patient completed lateral weight shifts onto elbows/forearms in sitting at edge of mat x 10 reps with dynamic reach outside of base of support to increase proprioceptive input to right upper extremity as well as to challenge dynamic sitting balance. Working on use of right upper extremity to push self back up from lateral lean to midline. Treatment/Session Assessment:    · Response to Treatment:  Patient demonstrating improving movement and functional use in base of support of the right upper extremity. Demonstrating increasing independence with HEP. · Compliance with Program/Exercises: Will assess as treatment progresses. · Recommendations/Intent for next treatment session: \"Next visit will focus on advancements to more challenging activities and reduction in assistance provided\".   Total Treatment Duration:  OT Patient Time In/Time Out  Time In: 0920  Time Out: [address], OT

## 2018-01-22 ENCOUNTER — HOSPITAL ENCOUNTER (OUTPATIENT)
Dept: PHYSICAL THERAPY | Age: 63
Discharge: HOME | End: 2018-01-22
Attending: PHYSICAL MEDICINE & REHABILITATION
Payer: COMMERCIAL

## 2018-01-22 PROCEDURE — 97112 NEUROMUSCULAR REEDUCATION: CPT

## 2018-01-22 PROCEDURE — 97110 THERAPEUTIC EXERCISES: CPT

## 2018-01-22 NOTE — PROGRESS NOTES
Sanket Marqeuz  : 1955  Primary: Owenadriana Carranza Ppo  Secondary:  [address] at [address], [address] [place] [address],[address], [facility] [address]  [phone]   [phone]        OUTPATIENT OCCUPATIONAL THERAPY: Daily Note 2018    ICD-10: Treatment Diagnosis: Hemiplegia and hemiparesis following cerebral infarction affecting right dominant side (I69.351)  Precautions/Allergies:   Banana; Lisinopril; and Nuts [tree nut]   Fall Risk Score: 5 (? 5 = High Risk)  MD Orders: Referral to occupational therapy MEDICAL/REFERRING DIAGNOSIS:   Cerebral infarction, unspecified [I63.9]  Weakness [R53.1]   DATE OF ONSET: 2017   REFERRING PHYSICIAN: Juliette Cao*  RETURN PHYSICIAN APPOINTMENT: unknown     INITIAL ASSESSMENT:  Ms. Keegan Coe presents with impaired active movement, strength, coordination and sensation of the dominant right upper extremity that is affecting her ability to complete activities of daily living independently. Feel she may benefit from skilled occupational therapy to maximize safety and independence with activities of daily living. PLAN OF CARE:   PROBLEM LIST:  1. Decreased Strength  2. Decreased ADL/Functional Activities  3. Decreased Transfer Abilities  4. Decreased Balance  5. Decreased Flexibility/Joint Mobility  6. Decreased Cognition INTERVENTIONS PLANNED:  1. Activities of daily living training  2. Manual therapy training  3. Modalities  4. Neuromuscular re-eduation  5. Sensory reintegration training  6. Splinting  7. Therapeutic activity  8. Therapeutic exercise   TREATMENT PLAN:  Effective Dates: 17 TO 3/20/18. Frequency/Duration: 3 times a week for 12 weeks  GOALS: (Goals have been discussed and agreed upon with patient.)  Short-Term Functional Goals: Time Frame: 6 weeks  1. Patient will demonstrate independence with home exercise program for right upper extremity range of motion.   2. Patient will increase use of right upper extremity as a functional assist in at least 25% of daily activities. 3. Patient will bathe with moderate assistance. Discharge Goals: Time Frame: 12 weeks  1. Patient will bathe with minimal assistance. 2. Patient will feed self with modified independence and adaptive equipment as needed. 3. Patient will dress with modified independence and adaptive equipment as needed. 4. Patient will use right upper extremity as a functional assist in at least 50% of daily activities. Rehabilitation Potential For Stated Goals: Good  Regarding Shantel Iraheta's therapy, I certify that the treatment plan above will be carried out by a therapist or under their direction. Thank you for this referral,  Emma Mullins, OT     Referring Physician Signature: Juliette Odom* _________________________  Date _________            The information in this section was collected on 17 (except where otherwise noted). OCCUPATIONAL PROFILE & HISTORY:   History of Present Injury/Illness (Reason for Referral):  CVA with hospital and [facility] stay then home health therapy. Past Medical History/Comorbidities:   Ms. Scott Lr  has a past medical history of Anxiety; CVA (cerebral vascular accident) ([facility] [address] (2017); Depression; HTN (hypertension); Menopause; and PTE (pulmonary thromboembolism) ([facility] [address] (2017). Ms. Scott Lr  has a past surgical history that includes hx jennifer and bso (); hx  section; hx refractive surgery (); hx other surgical (); and hx other surgical (2017). Social History/Living Environment:      Prior Level of Function/Work/Activity:  Does seasonal taxes at [employer]  Dominant Side:         RIGHT  Current Medications:    Current Outpatient Prescriptions:     FLUoxetine (PROZAC) 20 mg tablet, Take 2 Tabs by mouth daily. , Disp: 60 Tab, Rfl: 3    methylphenidate HCl (RITALIN) 20 mg tablet, Take 1 Tab (20 mg total) by mouth daily.   Max Daily Amount: 20 mg, Disp: 30 Tab, Rfl: 0    [START ON 2/12/2018] methylphenidate HCl (RITALIN) 20 mg tablet, Take 1 Tab (20 mg total) by mouth dailyEarliest Fill Date: 2/12/18. Max Daily Amount: 20 mg, Disp: 30 Tab, Rfl: 0    [START ON 3/12/2018] methylphenidate HCl (RITALIN) 20 mg tablet, Take 1 Tab (20 mg total) by mouth dailyEarliest Fill Date: 3/12/18. Max Daily Amount: 20 mg, Disp: 30 Tab, Rfl: 0    tiZANidine (ZANAFLEX) 4 mg tablet, 4mg po TID, Disp: 90 Tab, Rfl: 6    HYDROcodone-acetaminophen (NORCO) 5-325 mg per tablet, TAKE ONE TABLET BY MOUTH EVERY 6 HOURS AS NEEDED FOR PAIN, Disp: , Rfl: 0    labetalol (NORMODYNE) 200 mg tablet, Take 1 Tab by mouth two (2) times a day., Disp: 60 Tab, Rfl: 5    ALPRAZolam (XANAX) 0.25 mg tablet, Take 1 Tab by mouth three (3) times daily as needed for Anxiety. Max Daily Amount: 0.75 mg., Disp: 30 Tab, Rfl: 1    rOPINIRole (REQUIP) 2 mg tablet, Take 2 mg by mouth nightly. for restless leg, Disp: , Rfl:     temazepam (RESTORIL) 15 mg capsule, Take 15 mg by mouth nightly as needed for Sleep., Disp: , Rfl:     polyethylene glycol (MIRALAX) 17 gram/dose powder, Take 17 g by mouth daily. , Disp: 510 g, Rfl: 2    tamsulosin (FLOMAX) 0.4 mg capsule, Take 1 Cap by mouth nightly., Disp: 30 Cap, Rfl: 4    potassium chloride (K-DUR, KLOR-CON) 20 mEq tablet, Take 1 Tab by mouth daily. , Disp: 30 Tab, Rfl: 0    acetaminophen (TYLENOL) 500 mg tablet, 1 Tab by Per NG tube route every four (4) hours as needed. , Disp: 30 Tab, Rfl: 0    losartan-hydroCHLOROthiazide (HYZAAR) 100-12.5 mg per tablet, Take 1 Tab by mouth daily. , Disp: 30 Tab, Rfl: 5            Date Last Reviewed:  1/22/2018   Complexity Level : Expanded review of therapy/medical records (1-2):  MODERATE COMPLEXITY   ASSESSMENT OF OCCUPATIONAL PERFORMANCE:   ROM:                   Balance:                   Coordination:                   Mental Status:                   Vision:                   Activities of Daily Living:           Basic ADLs (From Assessment) Complex ADLs (From Assessment)         Grooming/Bathing/Dressing Activities of Daily Living                                   Sensation:         Light touch absent distal to right elbow     Physical Skills Involved:  1. Range of Motion  2. Balance  3. Sensation  4. Fine Motor Control  5. Gross Motor Control Cognitive Skills Affected (resulting in the inability to perform in a timely and safe manner):  1. Executive Function  2. Sustained Attention  3. Divided Attention  4. Comprehension Psychosocial Skills Affected:  1. None   Number of elements that affect the Plan of Care: 5+:  HIGH COMPLEXITY   CLINICAL DECISION MAKING:   Outcome Measure: Tool Used: [address] [facility] [address] AM-[facility] Daily Activity Outpatient Short Form  Score:  Initial: 18 Most Recent: X (Date: -- )   Interpretation of Tool:  Represents clinically-significant functional categories (i.e., basic and instrumental activities of daily living, fine motor activities). Score 60 59-55 54-47 46-34 32-21 20-16 15   Modifier CH CI CJ CK CL CM CN     Medical Necessity:   · Patient demonstrates good rehab potential due to higher previous functional level. Reason for Services/Other Comments:  · Patient continues to require skilled intervention due to decreased safety and independence in activities of daily living. Clinical Decision-Making Assessment: Moderately difficult to predict patient's progress at this time due to the extent of her limitations in functional movement and use of dominant right upper extremity. Assessment process, impact of co-morbidities, assessment modification\need for assistance, and selection of interventions: Analytical Complexity:MODERATE COMPLEXITY   TREATMENT:   (In addition to Assessment/Re-Assessment sessions the following treatments were rendered)    Pre-treatment Symptoms/Complaints:  Patient states, \"I will start trying to use my right hand to eat tonight at dinner. \"  Pain: Initial:   Pain Intensity 1: 0  Post Session:  0/10 Therapeutic Exercise: (  25 minutes):  Exercises per grid below to improve dynamic movement of arm - right and hand - right to improve functional lifting, carrying, reaching and grasping. Required moderate visual and verbal cues to promote proper body mechanics. Progressed range, repetitions and complexity of movement as indicated.        Date:  12/22/17 Date:  1/5/18 Date:  1/15/18 Date:  1/19/18 Date:  1/22/18   Activity/Exercise Parameters Parameters Parameters     Shoulder shrugs AROM  2 x 10 AROM  1 x 10 AROM  1 x 10 AROM  1 x 10 AROM  1 x 10   Scapular protraction/retraction AROM  2 x 10 AROM  1 x 10 AROM  1 x 10 AROM  1 x 10 AROM  1 x 10   Shoulder abduction In supine x 10 reps  Seated x 10 reps       Elbow flexion/extension AROM with assist to fully extend x 10 reps  AROM with assist to fully extend x 10 reps AROM with assist to fully extend x 10 reps in supine    PNF D1/D2 diagonals X 5 reps       Hand helper 5 pounds x 25 reps with assist to extend fingers  15 pounds x 25 reps with assist to extend fingers 15 pounds x 25 reps with assist to extend fingers 15 pounds x 25 reps with assist to extend fingers   Tabletop skateboard X 10 mintues in all planes of motion  X 3 mintues in all planes of motion     Shoulder flexion/extension  SROM in supine  2 x 10 Seated x 10 reps  SROM Seated x 10 reps  SROM    Chest press  Holding cane in supine   2 x 10      UBE  Min assist to sustain right  on handle   Level 0  7 mintues Min assist to sustain right  on handle   Level 0  7 mintues Min assist to sustain right  on handle   Level 0  7 mintues Min assist to sustain right  on handle   Level 0  5 mintues   Modified push-ups   Hands on countertop with dycem under right hand  2 x 10 Hands on countertop with dycem under right hand  2 x 10 Hands on countertop with dycem under right hand  2 x 10 Hands on large therapy ball on mat  2 x 10   Resistive clothespin   Yellow  1 x 15  Issued for HEP Yellow  1 x 15 Yellow  1 x 15   Shoulder arc    5 rings with assist to grasp ring initially         Access Code: [email or web address]   URL: [email or web address]   Date: 01/19/2018   Prepared by: Matt Otero     Exercises   Supine Shoulder Flexion PROM - 10 reps - 2 sets - 3 hold - 1x daily - 5x weekly   Push-Up on Counter - 10 reps - 2 sets - 1x daily - 5x weekly   Seated Shoulder Shrugs - 10 reps - 2 sets - 1x daily - 5x weekly   Seated Scapular Retraction - 10 reps - 2 sets - 1x daily - 5x weekly   Seated Weight Shifting with Arm Support - 10 reps - 2 sets - 1x daily - 5x weekly   Seated Shoulder Flexion Self PROM - 10 reps - 2 sets - 1x daily - 5x weekly   Supine Elbow Flexion Extension AROM - 10 reps - 2 sets - 1x daily - 5x weekly   Seated Elbow Flexion Extension AROM - 10 reps - 2 sets - 1x daily - 5x weekly     Neuromuscular Re-education: (  20 minutes ):  Exercise/activities per grid below to improve balance, coordination, kinesthetic sense and proprioception. Required moderate visual and verbal cues to promote static and dynamic balance in sitting, promote coordination of right, upper extremity(s) and promote motor control of right, upper extremity(s). Patient in prone on elbows x 3 minutes with lateral weight shifts. She then moved from prone on elbows toward quadruped x 3 reps; worked on shifting weight forward onto arms; this was limited somewhat by increased tone in right lower extremity. She then worked on pushing large therapy ball on mat table forward and backward and side to side in standing using right upper extremity x 30 reps in each direction. Treatment/Session Assessment:    · Response to Treatment:  Patient demonstrating improving movement and functional use in base of support of the right upper extremity in prone on elbows and quadruped.  Continuing to encourage functional use of right upper extremity at home; patient agreeable to using universal cuff at home to begin eating with right hand.  · Compliance with Program/Exercises: Will assess as treatment progresses. · Recommendations/Intent for next treatment session: \"Next visit will focus on advancements to more challenging activities and reduction in assistance provided\".   Total Treatment Duration:  OT Patient Time In/Time Out  Time In: 9258  Time Out: Javier Dill

## 2018-01-22 NOTE — PROGRESS NOTES
Laure Jeni  : 1955  Primary: Shorty Noonan Ppo  Secondary:  [address]  at [facility], [address], [facility]. [address].  [phone]   [phone]          OUTPATIENT PHYSICAL THERAPY:Daily Note and Progress Report 2018    ICD-10: Treatment Diagnosis:   · Other abnormalities of gait and mobility (R26.89)  · Difficulty in walking, not elsewhere classified (R26.2)  Precautions/Allergies:   Banana; Lisinopril; and Nuts [tree nut]   Fall Risk Score: 5 (? 5 = High Risk)  MD Orders: Evaluate and Treat MEDICAL/REFERRING DIAGNOSIS:  Weakness due to cerebrovascular accident (CVA) ([facility] [I63.9, R53.1]   DATE OF ONSET: CVA: 2017  REFERRING PHYSICIAN: Kathlynn Snellen, Amy*  RETURN PHYSICIAN APPOINTMENT: TBD     INITIAL ASSESSMENT:  Ms. Malika Jarquin presents with decreased mobility, decreased strength, decreased gait, and decreased balance secondary to CVA. After discussing with patient, she agreed she would benefit from physical therapy to improve above deficits. Please sign this plan of treatment if you concur. Thank you for the opportunity to serve this patient. Progress note on 2018:   Patient has attended six scheduled physical therapy appointments from 12/15/2017 to 2018. Patient is very motivated and compliant with therapy. Patient continues to experience increased tone in right ankle affecting ambulation. Patient would benefit from a trial of Botox to see if this would decrease the tone in right ankle and improve ambulation. Patient would benefit from continuing skilled physical therapy to address problems and goals. Thank you for this referral.    PROBLEM LIST (Impacting functional limitations):  1. Decreased Strength  2. Decreased Ambulation Ability/Technique  3. Decreased Balance  4. Decreased Activity Tolerance INTERVENTIONS PLANNED:  1. Balance Exercise  2. Gait Training  3. Home Exercise Program (HEP)  4.  Neuromuscular Re-education/Strengthening  5. Range of Motion (ROM)  6. Therapeutic Exercise/Strengthening   TREATMENT PLAN:  Effective Dates: 12/15/2017 TO 2/15/2018. Frequency/Duration: 2 times a week for 8 weeks  GOALS: (Goals have been discussed and agreed upon with patient.)  Short-Term Functional Goals: Time Frame: 3 weeks  1. Patient will be independent with home exercise program without exacerbation of symptoms or cueing needed. Goal met. Discharge Goals: Time Frame: 8 weeks  1. Patient will be independent with all ADLs with minimal fear of falling and loss of balance with daily tasks. Goal ongoing. 2. Patient will report no fear avoidance with social or recreational activities due to fear of falling. Goal ongoing. 3. Patient will score greater than or equal to 45/56 on Gipson Balance Scale with minimal effect of imbalance on patient's ability to manage every day life activities. Goal ongoing. Rehabilitation Potential For Stated Goals: Good  Regarding Arie Iraheta's therapy, I certify that the treatment plan above will be carried out by a therapist or under their direction. Thank you for this referral,  Neisha Reddy PT     Referring Physician Signature: Juliette Willoughby*              Date                    The information in this section was collected on 12/15/2017 (except where otherwise noted). HISTORY:   History of Present Injury/Illness (Reason for Referral):  Patient reports she had a severe stroke on 9/6/2017. She reports that she was in the hospital and [facility] for about 61-62 days. She reports that she has progressed really well, but is still having trouble walking and using her right side (UE and LE). She reports that she walks using her AFO and quad cane all the time. She reports that she tries to be as active as possible. She reports that she is scared that she is going to fall, even though she has not.   She reports she would like to work on strengthening her right leg so her balance is better and she can walk without fear of falling. Past Medical History/Comorbidities:   Ms. Xiong Patient  has a past medical history of Anxiety; CVA (cerebral vascular accident) ([facility] (2017); Depression; HTN (hypertension); Menopause; and P[facility]emb[facility]) [facility]). Ms. Xiong Patient  has a past surgical history that includes hx jennifer and bso (); hx  section; hx refractive surgery (); hx other surgical (); and hx other surgical (). Social History/Living Environment:   Home Environment: Private residence  Living Alone: No  Support Systems: Family member(s), Friends \ neighbors; Spouse  Prior Level of Function/Work/Activity:  Independent  Dominant Side:         RIGHT  Personal Factors:          Sex:  female        Age:  60 y.o. Current Medications:       Current Outpatient Prescriptions:     FLUoxetine (PROZAC) 20 mg tablet, Take 2 Tabs by mouth daily. , Disp: 60 Tab, Rfl: 3    methylphenidate HCl (RITALIN) 20 mg tablet, Take 1 Tab (20 mg total) by mouth daily. Max Daily Amount: 20 mg, Disp: 30 Tab, Rfl: 0    [START ON 2018] methylphenidate HCl (RITALIN) 20 mg tablet, Take 1 Tab (20 mg total) by mouth dailyEarliest Fill Date: 18. Max Daily Amount: 20 mg, Disp: 30 Tab, Rfl: 0    [START ON 3/12/2018] methylphenidate HCl (RITALIN) 20 mg tablet, Take 1 Tab (20 mg total) by mouth dailyEarliest Fill Date: 3/12/18. Max Daily Amount: 20 mg, Disp: 30 Tab, Rfl: 0    tiZANidine (ZANAFLEX) 4 mg tablet, 4mg po TID, Disp: 90 Tab, Rfl: 6    HYDROcodone-acetaminophen (NORCO) 5-325 mg per tablet, TAKE ONE TABLET BY MOUTH EVERY 6 HOURS AS NEEDED FOR PAIN, Disp: , Rfl: 0    labetalol (NORMODYNE) 200 mg tablet, Take 1 Tab by mouth two (2) times a day., Disp: 60 Tab, Rfl: 5    ALPRAZolam (XANAX) 0.25 mg tablet, Take 1 Tab by mouth three (3) times daily as needed for Anxiety.  Max Daily Amount: 0.75 mg., Disp: 30 Tab, Rfl: 1    rOPINIRole (REQUIP) 2 mg tablet, Take 2 mg by mouth nightly. for restless leg, Disp: , Rfl:     temazepam (RESTORIL) 15 mg capsule, Take 15 mg by mouth nightly as needed for Sleep., Disp: , Rfl:     polyethylene glycol (MIRALAX) 17 gram/dose powder, Take 17 g by mouth daily. , Disp: 510 g, Rfl: 2    tamsulosin (FLOMAX) 0.4 mg capsule, Take 1 Cap by mouth nightly., Disp: 30 Cap, Rfl: 4    potassium chloride (K-DUR, KLOR-CON) 20 mEq tablet, Take 1 Tab by mouth daily. , Disp: 30 Tab, Rfl: 0    acetaminophen (TYLENOL) 500 mg tablet, 1 Tab by Per NG tube route every four (4) hours as needed. , Disp: 30 Tab, Rfl: 0    losartan-hydroCHLOROthiazide (HYZAAR) 100-12.5 mg per tablet, Take 1 Tab by mouth daily. , Disp: 30 Tab, Rfl: 5   Date Last Reviewed:  1/22/2018    Number of Personal Factors/Comorbidities that affect the Plan of Care: 1-2: MODERATE COMPLEXITY   EXAMINATION:   Observation/Orthostatic Postural Assessment:          Posture Assessment: Rounded shoulders, Forward head   Functional Mobility:         Gait/Ambulation:     Speed/Lulu: Pace decreased (<100 feet/min)  Step Length: Left shortened, Right lengthened  Swing Pattern: Left asymmetrical, Right asymmetrical  Stance: Left increased, Right decreased  Gait Abnormalities: Antalgic, Circumduction, Foot drop, Path deviations, Trendelenburg  Ambulation - Level of Assistance: Modified independent  Assistive Device: Cane, quad  · Interventions: Verbal cues, Safety awareness training          Transfers:     Sit to Stand: Independent  Stand to Sit: Independent  Stand Pivot Transfers: Independent  · Bed to Chair: Independent          Bed Mobility:     Rolling: Independent  Supine to Sit: Independent  Sit to Supine: Independent  · Scooting: Independent    Strength:          L UE STRENGTH: 4/5 shoulder flexion, 4/5 shoulder abduction, 4/5 shoulder extension, 4/5 elbow flexion, 4/5 elbow extension.         R UE STRENGTH: 2/5 shoulder flexion, 2/5 shoulder abduction, 2/5 shoulder extension, 2/5 elbow flexion, 2/5 elbow extension. R LE STRENGTH:  4+/5 hip flexion, 4+/5 hip abduction, 4+/5 hip adduction, 4+/5 hip extension, 4+/5 knee extension, 4+/5 knee flexion, 1/5 ankle dorsiflexion, 1/5 ankle plantarflexion, 1/5 ankle inversion, 1/5 ankle eversion. Sensation:         Intact for light touch and proprioception  Postural Control & Balance:  · Gipson Balance Scale:  28/56.   (A score less than 45/56 indicates high risk of falls)      Body Structures Involved:  1. Nerves  2. Muscles Body Functions Affected:  1. Neuromusculoskeletal  2. Movement Related Activities and Participation Affected:  1. Mobility  2. Self Care   Number of elements (examined above) that affect the Plan of Care: 4+: HIGH COMPLEXITY   CLINICAL PRESENTATION:   Presentation: Evolving clinical presentation with changing clinical characteristics: MODERATE COMPLEXITY   CLINICAL DECISION MAKING:   Outcome Measure: Tool Used: Gipson Balance Scale  Score:  Initial: 28/56 Most Recent: X/56 (Date: -- )   Interpretation of Score: Each section is scored on a 0-4 scale, 0 representing the patients inability to perform the task and 4 representing independence. The scores of each section are added together for a total score of 56. The higher the patients score, the more independent the patient is. Any score below 45 indicates increased risk for falls. Score 56 55-45 44-34 33-23 22-12 11-1 0   Modifier CH CI CJ CK CL CM CN     Medical Necessity:   · Patient is expected to demonstrate progress in strength, range of motion, balance and coordination to improve safety during daily activities. · Patient demonstrates good rehab potential due to higher previous functional level. · Skilled intervention continues to be required due to decreased mobility. Reason for Services/Other Comments:  · Patient continues to demonstrate capacity to improve overall mobility which will increase independence and increase safety.    Use of outcome tool(s) and clinical judgement create a POC that gives a: Questionable prediction of patient's progress: MODERATE COMPLEXITY            TREATMENT:   (In addition to Assessment/Re-Assessment sessions the following treatments were rendered)  Pre-treatment Symptoms/Complaints:  1/22/2018: Patient has no specific complaints. Pain: Initial: Pain Intensity 1: 0  Post Session:  0/10     NEUROMUSCULAR RE-EDUCATION: (25 minutes):  Exercise/activities per grid below to improve balance, coordination, kinesthetic sense, posture and proprioception. Required minimal verbal and manual cues to promote static and dynamic balance in standing, promote coordination of bilateral, lower extremity(s) and promote motor control of bilateral, lower extremity(s). Date:  1/22/2018   Activity/Exercise Parameters   Stepping over half foam  2x10 reps with right LE   Step taps X   Step ups 4 inch  2x10 leading with right LE   [place] X   Walking in the clinic with quad cane 175 feet in hallway                                  THERAPEUTIC EXERCISE: (15 minutes):  Exercises per grid below to improve strength. Required minimal verbal cues to promote proper body alignment. Progressed repetitions as indicated.    Date:  1/19/2018 Date:  1/22/2018 Date:  1/15/2018   Activity/Exercise Parameters Parameters Parameters   Sit to stand from chair X 10 2 X 10 with left leg resting on 4 inch step  X 10   Standing hip flexion X X 2x10 right LE   Standing hip abduction X X 2x10 right LE   Seated LAQs 2x10 right LE 2x10 right LE 2# 2x10 right LE   Standing hamstrings curls  2x10 right LE with assist 2x10 right LE with assist    Bridging 2x10 with right lower extremity 2x10 with right lower extremity    Supine straight leg raise 2x10 with right lower extremity 2x10 with right lower extremity 2#    Supine PNF 2x10 with right lower extremity 2x10 with right lower extremity    Left sidelying hip abduction 2x10 with right lower extremity 2x10 with right lower extremity 2# [facility]  Treatment/Session Assessment:    · Response to Treatment:  Patient tolerated exercises without complaints. Patient fatigued after treatment. Continue to progress to tolerance  · Compliance with Program/Exercises: Compliant. · Recommendations/Intent for next treatment session: \"Next visit will focus on advancements to more challenging activities\".    Total Treatment Duration:  PT Patient Time In/Time Out  Time In: 1305  Time Out: 2000 Cy Hernandez

## 2018-01-24 ENCOUNTER — HOSPITAL ENCOUNTER (OUTPATIENT)
Dept: PHYSICAL THERAPY | Age: 63
Discharge: HOME | End: 2018-01-24
Attending: PHYSICAL MEDICINE & REHABILITATION
Payer: COMMERCIAL

## 2018-01-24 PROCEDURE — 97112 NEUROMUSCULAR REEDUCATION: CPT

## 2018-01-24 PROCEDURE — 97110 THERAPEUTIC EXERCISES: CPT

## 2018-01-24 PROCEDURE — 97535 SELF CARE MNGMENT TRAINING: CPT

## 2018-01-24 NOTE — PROGRESS NOTES
Viridiana Zamudio  : 1955  Primary: Stefan Me Ppo  Secondary:  [address] at [place] [id number] [facility], [address] [place] [address],[address], [facility] [address].  [phone]   [phone]          OUTPATIENT PHYSICAL THERAPY:Daily Note 2018    ICD-10: Treatment Diagnosis:   · Other abnormalities of gait and mobility (R26.89)  · Difficulty in walking, not elsewhere classified (R26.2)  Precautions/Allergies:   Banana; Lisinopril; and Nuts [tree nut]   Fall Risk Score: 5 (? 5 = High Risk)  MD Orders: Evaluate and Treat MEDICAL/REFERRING DIAGNOSIS:  Weakness due to cerebrovascular accident (CV[facility]) ([facility] [I63.9, R53.1]   DATE OF ONSET: CVA: 2017  REFERRING PHYSICIAN: Juliette Choi*  RETURN PHYSICIAN APPOINTMENT: TBD     INITIAL ASSESSMENT:  Ms. Maria Isabel Rojo presents with decreased mobility, decreased strength, decreased gait, and decreased balance secondary to CVA. After discussing with patient, she agreed she would benefit from physical therapy to improve above deficits. Please sign this plan of treatment if you concur. Thank you for the opportunity to serve this patient. Progress note on 2018:   Patient has attended six scheduled physical therapy appointments from 12/15/2017 to 2018. Patient is very motivated and compliant with therapy. Patient continues to experience increased tone in right ankle affecting ambulation. Patient would benefit from a trial of Botox to see if this would decrease the tone in right ankle and improve ambulation. Patient would benefit from continuing skilled physical therapy to address problems and goals. Thank you for this referral.    PROBLEM LIST (Impacting functional limitations):  1. Decreased Strength  2. Decreased Ambulation Ability/Technique  3. Decreased Balance  4. Decreased Activity Tolerance INTERVENTIONS PLANNED:  1. Balance Exercise  2. Gait Training  3. Home Exercise Program (HEP)  4.  Neuromuscular Re-education/Strengthening  5. Range of Motion (ROM)  6. Therapeutic Exercise/Strengthening   TREATMENT PLAN:  Effective Dates: 12/15/2017 TO 2/15/2018. Frequency/Duration: 2 times a week for 8 weeks  GOALS: (Goals have been discussed and agreed upon with patient.)  Short-Term Functional Goals: Time Frame: 3 weeks  1. Patient will be independent with home exercise program without exacerbation of symptoms or cueing needed. Goal met. Discharge Goals: Time Frame: 8 weeks  1. Patient will be independent with all ADLs with minimal fear of falling and loss of balance with daily tasks. Goal ongoing. 2. Patient will report no fear avoidance with social or recreational activities due to fear of falling. Goal ongoing. 3. Patient will score greater than or equal to 45/56 on Gipson Balance Scale with minimal effect of imbalance on patient's ability to manage every day life activities. Goal ongoing. Rehabilitation Potential For Stated Goals: Good  Regarding Aliza Iraheta's therapy, I certify that the treatment plan above will be carried out by a therapist or under their direction. Thank you for this referral,  Zaira Medina PT     Referring Physician Signature: Juliette Dale*              Date                    The information in this section was collected on 12/15/2017 (except where otherwise noted). HISTORY:   History of Present Injury/Illness (Reason for Referral):  Patient reports she had a severe stroke on 9/6/2017. She reports that she was in the hospital and [facility] for about 61-62 days. She reports that she has progressed really well, but is still having trouble walking and using her right side (UE and LE). She reports that she walks using her AFO and quad cane all the time. She reports that she tries to be as active as possible. She reports that she is scared that she is going to fall, even though she has not.   She reports she would like to work on strengthening her right leg so her balance is better and she can walk without fear of falling. Past Medical History/Comorbidities:   Ms. Rosita Kinney  has a past medical history of Anxiety; CVA (cerebral vascular accident) ([facility] (2017); Depression; HTN (hypertension); Menopause; and PTE (pulmonary throm[facility]embolism) ([facility] ([facility]). Ms. Rosita Kinney  has a past surgical history that includes hx jennifer and bso (); hx  section; hx refractive surgery (); hx other surgical (); and hx other surgical (). Social History/Living Environment:   Home Environment: Private residence  Living Alone: No  Support Systems: Family member(s), Friends \ neighbors; Spouse  Prior Level of Function/Work/Activity:  Independent  Dominant Side:         RIGHT  Personal Factors:          Sex:  female        Age:  60 y.o. Current Medications:       Current Outpatient Prescriptions:     FLUoxetine (PROZAC) 20 mg tablet, Take 2 Tabs by mouth daily. , Disp: 60 Tab, Rfl: 3    methylphenidate HCl (RITALIN) 20 mg tablet, Take 1 Tab (20 mg total) by mouth daily. Max Daily Amount: 20 mg, Disp: 30 Tab, Rfl: 0    [START ON 2018] methylphenidate HCl (RITALIN) 20 mg tablet, Take 1 Tab (20 mg total) by mouth dailyEarliest Fill Date: 18. Max Daily Amount: 20 mg, Disp: 30 Tab, Rfl: 0    [START ON 3/12/2018] methylphenidate HCl (RITALIN) 20 mg tablet, Take 1 Tab (20 mg total) by mouth dailyEarliest Fill Date: 3/12/18. Max Daily Amount: 20 mg, Disp: 30 Tab, Rfl: 0    tiZANidine (ZANAFLEX) 4 mg tablet, 4mg po TID, Disp: 90 Tab, Rfl: 6    HYDROcodone-acetaminophen (NORCO) 5-325 mg per tablet, TAKE ONE TABLET BY MOUTH EVERY 6 HOURS AS NEEDED FOR PAIN, Disp: , Rfl: 0    labetalol (NORMODYNE) 200 mg tablet, Take 1 Tab by mouth two (2) times a day., Disp: 60 Tab, Rfl: 5    ALPRAZolam (XANAX) 0.25 mg tablet, Take 1 Tab by mouth three (3) times daily as needed for Anxiety.  Max Daily Amount: 0.75 mg., Disp: 30 Tab, Rfl: 1    rOPINIRole (REQUIP) 2 mg tablet, Take 2 mg by mouth nightly. for restless leg, Disp: , Rfl:     temazepam (RESTORIL) 15 mg capsule, Take 15 mg by mouth nightly as needed for Sleep., Disp: , Rfl:     polyethylene glycol (MIRALAX) 17 gram/dose powder, Take 17 g by mouth daily. , Disp: 510 g, Rfl: 2    tamsulosin (FLOMAX) 0.4 mg capsule, Take 1 Cap by mouth nightly., Disp: 30 Cap, Rfl: 4    potassium chloride (K-DUR, KLOR-CON) 20 mEq tablet, Take 1 Tab by mouth daily. , Disp: 30 Tab, Rfl: 0    acetaminophen (TYLENOL) 500 mg tablet, 1 Tab by Per NG tube route every four (4) hours as needed. , Disp: 30 Tab, Rfl: 0    losartan-hydroCHLOROthiazide (HYZAAR) 100-12.5 mg per tablet, Take 1 Tab by mouth daily. , Disp: 30 Tab, Rfl: 5   Date Last Reviewed:  1/24/2018    Number of Personal Factors/Comorbidities that affect the Plan of Care: 1-2: MODERATE COMPLEXITY   EXAMINATION:   Observation/Orthostatic Postural Assessment:          Posture Assessment: Rounded shoulders, Forward head   Functional Mobility:         Gait/Ambulation:     Speed/Lulu: Pace decreased (<100 feet/min)  Step Length: Left shortened, Right lengthened  Swing Pattern: Left asymmetrical, Right asymmetrical  Stance: Left increased, Right decreased  Gait Abnormalities: Antalgic, Circumduction, Foot drop, Path deviations, Trendelenburg  Ambulation - Level of Assistance: Modified independent  Assistive Device: Cane, quad  · Interventions: Verbal cues, Safety awareness training          Transfers:     Sit to Stand: Independent  Stand to Sit: Independent  Stand Pivot Transfers: Independent  · Bed to Chair: Independent          Bed Mobility:     Rolling: Independent  Supine to Sit: Independent  Sit to Supine: Independent  · Scooting: Independent    Strength:          L UE STRENGTH: 4/5 shoulder flexion, 4/5 shoulder abduction, 4/5 shoulder extension, 4/5 elbow flexion, 4/5 elbow extension.         R UE STRENGTH: 2/5 shoulder flexion, 2/5 shoulder abduction, 2/5 shoulder extension, 2/5 elbow flexion, 2/5 elbow extension. R LE STRENGTH:  4+/5 hip flexion, 4+/5 hip abduction, 4+/5 hip adduction, 4+/5 hip extension, 4+/5 knee extension, 4+/5 knee flexion, 1/5 ankle dorsiflexion, 1/5 ankle plantarflexion, 1/5 ankle inversion, 1/5 ankle eversion. Sensation:         Intact for light touch and proprioception  Postural Control & Balance:  · Gipson Balance Scale:  28/56.   (A score less than 45/56 indicates high risk of falls)      Body Structures Involved:  1. Nerves  2. Muscles Body Functions Affected:  1. Neuromusculoskeletal  2. Movement Related Activities and Participation Affected:  1. Mobility  2. Self Care   Number of elements (examined above) that affect the Plan of Care: 4+: HIGH COMPLEXITY   CLINICAL PRESENTATION:   Presentation: Evolving clinical presentation with changing clinical characteristics: MODERATE COMPLEXITY   CLINICAL DECISION MAKING:   Outcome Measure: Tool Used: Gipson Balance Scale  Score:  Initial: 28/56 Most Recent: X/56 (Date: -- )   Interpretation of Score: Each section is scored on a 0-4 scale, 0 representing the patients inability to perform the task and 4 representing independence. The scores of each section are added together for a total score of 56. The higher the patients score, the more independent the patient is. Any score below 45 indicates increased risk for falls. Score 56 55-45 44-34 33-23 22-12 11-1 0   Modifier CH CI CJ CK CL CM CN     Medical Necessity:   · Patient is expected to demonstrate progress in strength, range of motion, balance and coordination to improve safety during daily activities. · Patient demonstrates good rehab potential due to higher previous functional level. · Skilled intervention continues to be required due to decreased mobility. Reason for Services/Other Comments:  · Patient continues to demonstrate capacity to improve overall mobility which will increase independence and increase safety.    Use of outcome tool(s) and clinical judgement create a POC that gives a: Questionable prediction of patient's progress: MODERATE COMPLEXITY            TREATMENT:   (In addition to Assessment/Re-Assessment sessions the following treatments were rendered)  Pre-treatment Symptoms/Complaints:  1/24/2018: Patient has no complaints this morning. Pain: Initial: Pain Intensity 1: 0  Post Session:  0/10     NEUROMUSCULAR RE-EDUCATION: (30 minutes):  Exercise/activities per grid below to improve balance, coordination, kinesthetic sense, posture and proprioception. Required minimal verbal and manual cues to promote static and dynamic balance in standing, promote coordination of bilateral, lower extremity(s) and promote motor control of bilateral, lower extremity(s). Date:  1/22/2018 Date   1/24/18   Activity/Exercise Parameters    Stepping over half foam  2x10 reps with right LE 2x10 reps with left LE, working on weightbearing through R LE and keeping R foot flat on floor (foot tends to invert)   Step taps X    Step ups 4 inch  2x10 leading with right LE 4 inch  2x10 leading with right LE working on weight bearing through [address] X    Walking in the clinic with quad cane 175 feet in hallway 175 feet in hallway                                         THERAPEUTIC EXERCISE: (20 minutes):  Exercises per grid below to improve mobility and strength. Required minimal verbal cues to promote proper body alignment. Progressed repetitions as indicated.    Date:  1/19/2018 Date:  1/22/2018 Date:  1/24/2018   Activity/Exercise Parameters Parameters Parameters   Sit to stand from chair X 10 2 X 10 with left leg resting on 4 inch step  2 X 10 with left leg resting on 4 inch step    Standing hip flexion X X    Standing hip abduction X X    Seated LAQs 2x10 right LE 2x10 right LE 2#    Standing hamstrings curls  2x10 right LE with assist 2x10 right LE with assist    Bridging 2x10 with right lower extremity 2x10 with right lower extremity 2x10 with right lower extremity   Supine straight leg raise 2x10 with right lower extremity 2x10 with right lower extremity 2# 2x10 with right lower extremity 2# with verbal and manual cuing to try to keep quads engaged   Supine PNF 2x10 with right lower extremity 2x10 with right lower extremity D1 and D2  2x10 each with R LE    Left sidelying hip abduction 2x10 with right lower extremity 2x10 with right lower extremity 2# Tried to do this exercise but hip flexors engaging too much and patient unable to maintain form   L sidelying R clam shells   2 x 10 reps R LE   R foot and gastrocsoleus stretching   X 8 minutes trying to get foot to neutral position   Standing minisquats in bars   2 x 10 reps with cuing for posture/position       [facility]  Treatment/Session Assessment:    · Response to Treatment:  Patient tolerated exercises without complaints. R foot/ankle is tight. Unable to get to neutral position today and patient states lateral dorsum of R foot is sore from turning in brace. Need to keep stretching. R hip abduction is weak as well; need to work on exercises that she can do correctly to strengthen these stabilizing muscles. .  Continue to progress to tolerance  · Compliance with Program/Exercises: Compliant. · Recommendations/Intent for next treatment session: \"Next visit will focus on advancements to more challenging activities\".    Total Treatment Duration:  PT Patient Time In/Time Out  Time In: 0835  Time Out: 43 John Vazquez PT

## 2018-01-24 NOTE — PROGRESS NOTES
Viridiana Zamuido  : 1955  Primary: Stefan Ulrich Ppo  Secondary:  [address]  [facility] [address] [address], [facility] [address].  [phone]   [phone]        OUTPATIENT OCCUPATIONAL THERAPY: Daily Note 2018    ICD-10: Treatment Diagnosis: Hemiplegia and hemiparesis following cerebral infarction affecting right dominant side (I69.351)  Precautions/Allergies:   Banana; Lisinopril; and Nuts [tree nut]   Fall Risk Score: 5 (? 5 = High Risk)  MD Orders: Referral to occupational therapy MEDICAL/REFERRING DIAGNOSIS:   Cerebral infarction, unspecified [I63.9]  Weakness [R53.1]   DATE OF ONSET: 2017   REFERRING PHYSICIAN: Juliette Choi*  RETURN PHYSICIAN APPOINTMENT: unknown     INITIAL ASSESSMENT:  Ms. Maria Isabel Rojo presents with impaired active movement, strength, coordination and sensation of the dominant right upper extremity that is affecting her ability to complete activities of daily living independently. Feel she may benefit from skilled occupational therapy to maximize safety and independence with activities of daily living. PLAN OF CARE:   PROBLEM LIST:  1. Decreased Strength  2. Decreased ADL/Functional Activities  3. Decreased Transfer Abilities  4. Decreased Balance  5. Decreased Flexibility/Joint Mobility  6. Decreased Cognition INTERVENTIONS PLANNED:  1. Activities of daily living training  2. Manual therapy training  3. Modalities  4. Neuromuscular re-eduation  5. Sensory reintegration training  6. Splinting  7. Therapeutic activity  8. Therapeutic exercise   TREATMENT PLAN:  Effective Dates: 17 TO 3/20/18. Frequency/Duration: 3 times a week for 12 weeks  GOALS: (Goals have been discussed and agreed upon with patient.)  Short-Term Functional Goals: Time Frame: 6 weeks  1. Patient will demonstrate independence with home exercise program for right upper extremity range of motion.   2. Patient will increase use of right upper extremity as a functional assist in at least 25% of daily activities. 3. Patient will bathe with moderate assistance. Discharge Goals: Time Frame: 12 weeks  1. Patient will bathe with minimal assistance. 2. Patient will feed self with modified independence and adaptive equipment as needed. 3. Patient will dress with modified independence and adaptive equipment as needed. 4. Patient will use right upper extremity as a functional assist in at least 50% of daily activities. Rehabilitation Potential For Stated Goals: Good  Regarding Mickeal Jeanette Iraheta's therapy, I certify that the treatment plan above will be carried out by a therapist or under their direction. Thank you for this referral,  Daryl Mcgowan, OT     Referring Physician Signature: Juliette Cao* _________________________  Date _________            The information in this section was collected on 17 (except where otherwise noted). OCCUPATIONAL PROFILE & HISTORY:   History of Present Injury/Illness (Reason for Referral):  CVA with hospital and [facility] stay then home health therapy. Past Medical History/Comorbidities:   Ms. Keegan Coe  has a past medical history of Anxiety; CVA (cerebral vascular accident) ([facility] [address] (2017); Depression; HTN (hypertension); Menopause; and PTE (pulmonary thromboembolism) ([facility] [address] (2017). Ms. Keegan Coe  has a past surgical history that includes hx jennifer and bso (); hx  section; hx refractive surgery (); hx other surgical (); and hx other surgical (2017). Social History/Living Environment:      Prior Level of Function/Work/Activity:  Does seasonal taxes at [employer]  Dominant Side:         RIGHT  Current Medications:    Current Outpatient Prescriptions:     FLUoxetine (PROZAC) 20 mg tablet, Take 2 Tabs by mouth daily. , Disp: 60 Tab, Rfl: 3    methylphenidate HCl (RITALIN) 20 mg tablet, Take 1 Tab (20 mg total) by mouth daily.   Max Daily Amount: 20 mg, Disp: 30 Tab, Rfl: 0    [START ON 2/12/2018] methylphenidate HCl (RITALIN) 20 mg tablet, Take 1 Tab (20 mg total) by mouth dailyEarliest Fill Date: 2/12/18. Max Daily Amount: 20 mg, Disp: 30 Tab, Rfl: 0    [START ON 3/12/2018] methylphenidate HCl (RITALIN) 20 mg tablet, Take 1 Tab (20 mg total) by mouth dailyEarliest Fill Date: 3/12/18. Max Daily Amount: 20 mg, Disp: 30 Tab, Rfl: 0    tiZANidine (ZANAFLEX) 4 mg tablet, 4mg po TID, Disp: 90 Tab, Rfl: 6    HYDROcodone-acetaminophen (NORCO) 5-325 mg per tablet, TAKE ONE TABLET BY MOUTH EVERY 6 HOURS AS NEEDED FOR PAIN, Disp: , Rfl: 0    labetalol (NORMODYNE) 200 mg tablet, Take 1 Tab by mouth two (2) times a day., Disp: 60 Tab, Rfl: 5    ALPRAZolam (XANAX) 0.25 mg tablet, Take 1 Tab by mouth three (3) times daily as needed for Anxiety. Max Daily Amount: 0.75 mg., Disp: 30 Tab, Rfl: 1    rOPINIRole (REQUIP) 2 mg tablet, Take 2 mg by mouth nightly. for restless leg, Disp: , Rfl:     temazepam (RESTORIL) 15 mg capsule, Take 15 mg by mouth nightly as needed for Sleep., Disp: , Rfl:     polyethylene glycol (MIRALAX) 17 gram/dose powder, Take 17 g by mouth daily. , Disp: 510 g, Rfl: 2    tamsulosin (FLOMAX) 0.4 mg capsule, Take 1 Cap by mouth nightly., Disp: 30 Cap, Rfl: 4    potassium chloride (K-DUR, KLOR-CON) 20 mEq tablet, Take 1 Tab by mouth daily. , Disp: 30 Tab, Rfl: 0    acetaminophen (TYLENOL) 500 mg tablet, 1 Tab by Per NG tube route every four (4) hours as needed. , Disp: 30 Tab, Rfl: 0    losartan-hydroCHLOROthiazide (HYZAAR) 100-12.5 mg per tablet, Take 1 Tab by mouth daily. , Disp: 30 Tab, Rfl: 5            Date Last Reviewed:  1/24/2018   Complexity Level : Expanded review of therapy/medical records (1-2):  MODERATE COMPLEXITY   ASSESSMENT OF OCCUPATIONAL PERFORMANCE:   ROM:                   Balance:                   Coordination:                   Mental Status:                   Vision:                   Activities of Daily Living:           Basic ADLs (From Assessment) Complex ADLs (From Assessment)         Grooming/Bathing/Dressing Activities of Daily Living                                   Sensation:         Light touch absent distal to right elbow     Physical Skills Involved:  1. Range of Motion  2. Balance  3. Sensation  4. Fine Motor Control  5. Gross Motor Control Cognitive Skills Affected (resulting in the inability to perform in a timely and safe manner):  1. Executive Function  2. Sustained Attention  3. Divided Attention  4. Comprehension Psychosocial Skills Affected:  1. None   Number of elements that affect the Plan of Care: 5+:  HIGH COMPLEXITY   CLINICAL DECISION MAKING:   Outcome Measure: Tool Used: [address] [facility] [id number] AM-[facility] Daily Activity Outpatient Short Form  Score:  Initial: 18 Most Recent: X (Date: -- )   Interpretation of Tool:  Represents clinically-significant functional categories (i.e., basic and instrumental activities of daily living, fine motor activities). Score 60 59-55 54-47 46-34 32-21 20-16 15   Modifier CH CI CJ CK CL CM CN     Medical Necessity:   · Patient demonstrates good rehab potential due to higher previous functional level. Reason for Services/Other Comments:  · Patient continues to require skilled intervention due to decreased safety and independence in activities of daily living. Clinical Decision-Making Assessment: Moderately difficult to predict patient's progress at this time due to the extent of her limitations in functional movement and use of dominant right upper extremity. Assessment process, impact of co-morbidities, assessment modification\need for assistance, and selection of interventions: Analytical Complexity:MODERATE COMPLEXITY   TREATMENT:   (In addition to Assessment/Re-Assessment sessions the following treatments were rendered)    Pre-treatment Symptoms/Complaints:  Patient states, \"I feel much better about using this cuff at home now, and I will work on tying my shoes too. \"  Pain: Initial:   Pain Intensity 1: 0  Post Session:  0/10     Therapeutic Exercise: (  10 minutes):  Exercises per grid below to improve dynamic movement of arm - right and hand - right to improve functional lifting, carrying, reaching and grasping. Required moderate visual and verbal cues to promote proper body mechanics. Progressed range, repetitions and complexity of movement as indicated.        Date:  12/22/17 Date:  1/5/18 Date:  1/15/18 Date:  1/19/18 Date:  1/22/18 Date:  1/24/18   Activity/Exercise Parameters Parameters Parameters      Shoulder shrugs AROM  2 x 10 AROM  1 x 10 AROM  1 x 10 AROM  1 x 10 AROM  1 x 10 AROM  1 x 10   Scapular protraction/retraction AROM  2 x 10 AROM  1 x 10 AROM  1 x 10 AROM  1 x 10 AROM  1 x 10 AROM  1 x 10   Shoulder abduction In supine x 10 reps  Seated x 10 reps        Elbow flexion/extension AROM with assist to fully extend x 10 reps  AROM with assist to fully extend x 10 reps AROM with assist to fully extend x 10 reps in supine  AROM with assist to fully extend x 10 reps   PNF D1/D2 diagonals X 5 reps        Hand helper 5 pounds x 25 reps with assist to extend fingers  15 pounds x 25 reps with assist to extend fingers 15 pounds x 25 reps with assist to extend fingers 15 pounds x 25 reps with assist to extend fingers    Tabletop skateboard X 10 mintues in all planes of motion  X 3 mintues in all planes of motion      Shoulder flexion/extension  SROM in supine  2 x 10 Seated x 10 reps  SROM Seated x 10 reps  SROM     Chest press  Holding cane in supine   2 x 10       UBE  Min assist to sustain right  on handle   Level 0  7 mintues Min assist to sustain right  on handle   Level 0  7 mintues Min assist to sustain right  on handle   Level 0  7 mintues Min assist to sustain right  on handle   Level 0  5 mintues    Modified push-ups   Hands on countertop with dycem under right hand  2 x 10 Hands on countertop with dycem under right hand  2 x 10 Hands on countertop with dycem under right hand  2 x 10 Hands on large therapy ball on mat  2 x 10 Hands on large therapy ball on mat  1 x 10   Resistive clothespin   Yellow  1 x 15  Issued for HEP Yellow  1 x 15 Yellow  1 x 15    Shoulder arc    5 rings with assist to grasp ring initially          Access Code: [email or web address]   URL: [email or web address]   Date: 01/19/2018   Prepared by: Toi Richards     Exercises   Supine Shoulder Flexion PROM - 10 reps - 2 sets - 3 hold - 1x daily - 5x weekly   Push-Up on Counter - 10 reps - 2 sets - 1x daily - 5x weekly   Seated Shoulder Shrugs - 10 reps - 2 sets - 1x daily - 5x weekly   Seated Scapular Retraction - 10 reps - 2 sets - 1x daily - 5x weekly   Seated Weight Shifting with Arm Support - 10 reps - 2 sets - 1x daily - 5x weekly   Seated Shoulder Flexion Self PROM - 10 reps - 2 sets - 1x daily - 5x weekly   Supine Elbow Flexion Extension AROM - 10 reps - 2 sets - 1x daily - 5x weekly   Seated Elbow Flexion Extension AROM - 10 reps - 2 sets - 1x daily - 5x weekly     Neuromuscular Re-education: (  20 minutes ):  Exercise/activities per grid below to improve balance, coordination, kinesthetic sense and proprioception. Required moderate visual and verbal cues to promote static and dynamic balance in sitting, promote coordination of right, upper extremity(s) and promote motor control of right, upper extremity(s). Patient in prone on elbows x 3 minutes with push ups. She then moved from prone on elbows toward quadruped x 3 reps; worked on shifting weight forward onto arms with increased success compared to last visit. She then worked on pushing large therapy ball on mat table forward and backward and side to side in standing using right upper extremity x 30 reps in each direction. Self Care: (15 minutes): Procedure(s) (per grid) utilized to improve and/or restore self-care/home management as related to dressing and self feeding.  Required moderate visual and verbal cueing to facilitate activities of daily living skills and compensatory activities. Patient used universal cuff with fork to practice simulated self feeding, bringing hand toward mouth successfully x 15 with assistance to place object on fork initially. She then participated in tying laces on dressing board using right hand as a functional assist to hold one end of the lace while tying; able to complete with cuing. Treatment/Session Assessment:    · Response to Treatment:  Patient demonstrating improving movement and functional use in base of support of the right upper extremity in prone on elbows and quadruped. Patient tried using universal cuff with fork to eat at home since last visit but reported difficulty so addressed this skill in today's session. · Compliance with Program/Exercises: Will assess as treatment progresses. · Recommendations/Intent for next treatment session: \"Next visit will focus on advancements to more challenging activities and reduction in assistance provided\".   Total Treatment Duration:  OT Patient Time In/Time Out  Time In: 0930  Time Out: [address], OT

## 2018-01-25 ENCOUNTER — APPOINTMENT (OUTPATIENT)
Dept: PHYSICAL THERAPY | Age: 63
End: 2018-01-25
Attending: PHYSICAL MEDICINE & REHABILITATION
Payer: COMMERCIAL

## 2018-01-26 ENCOUNTER — HOSPITAL ENCOUNTER (OUTPATIENT)
Dept: PHYSICAL THERAPY | Age: 63
Discharge: HOME | End: 2018-01-26
Attending: PHYSICAL MEDICINE & REHABILITATION
Payer: COMMERCIAL

## 2018-01-26 PROCEDURE — 97110 THERAPEUTIC EXERCISES: CPT

## 2018-01-26 PROCEDURE — 97112 NEUROMUSCULAR REEDUCATION: CPT

## 2018-01-26 PROCEDURE — 97535 SELF CARE MNGMENT TRAINING: CPT

## 2018-01-26 NOTE — PROGRESS NOTES
Wai Fulton  : 1955  Primary: Gerardo Rivas Ppo  Secondary:  [address] at [facility]  [place] [address]  [phone]   [phone]          OUTPATIENT PHYSICAL THERAPY:Daily Note 2018    ICD-10: Treatment Diagnosis:   · Other abnormalities of gait and mobility (R26.89)  · Difficulty in walking, not elsewhere classified (R26.2)  Precautions/Allergies:   Banana; Lisinopril; and Nuts [tree nut]   Fall Risk Score: 5 (? 5 = High Risk)  MD Orders: Evaluate and Treat MEDICAL/REFERRING DIAGNOSIS:  Weakness due to cerebrovascular accident ([facility]) ([facility] [I63.9, R53.1]   DATE OF ONSET: CVA: 2017  REFERRING PHYSICIAN: Juliette Calle*  RETURN PHYSICIAN APPOINTMENT: TBD     INITIAL ASSESSMENT:  Ms. Estefania Buchanan presents with decreased mobility, decreased strength, decreased gait, and decreased balance secondary to CVA. After discussing with patient, she agreed she would benefit from physical therapy to improve above deficits. Please sign this plan of treatment if you concur. Thank you for the opportunity to serve this patient. Progress note on 2018:   Patient has attended six scheduled physical therapy appointments from 12/15/2017 to 2018. Patient is very motivated and compliant with therapy. Patient continues to experience increased tone in right ankle affecting ambulation. Patient would benefit from a trial of Botox to see if this would decrease the tone in right ankle and improve ambulation. Patient would benefit from continuing skilled physical therapy to address problems and goals. Thank you for this referral.    PROBLEM LIST (Impacting functional limitations):  1. Decreased Strength  2. Decreased Ambulation Ability/Technique  3. Decreased Balance  4. Decreased Activity Tolerance INTERVENTIONS PLANNED:  1. Balance Exercise  2. Gait Training  3. Home Exercise Program (HEP)  4.  Neuromuscular Re-education/Strengthening  5. Range of Motion (ROM)  6. Therapeutic Exercise/Strengthening   TREATMENT PLAN:  Effective Dates: 12/15/2017 TO 2/15/2018. Frequency/Duration: 2 times a week for 8 weeks  GOALS: (Goals have been discussed and agreed upon with patient.)  Short-Term Functional Goals: Time Frame: 3 weeks  1. Patient will be independent with home exercise program without exacerbation of symptoms or cueing needed. Goal met. Discharge Goals: Time Frame: 8 weeks  1. Patient will be independent with all ADLs with minimal fear of falling and loss of balance with daily tasks. Goal ongoing. 2. Patient will report no fear avoidance with social or recreational activities due to fear of falling. Goal ongoing. 3. Patient will score greater than or equal to 45/56 on Gipson Balance Scale with minimal effect of imbalance on patient's ability to manage every day life activities. Goal ongoing. Rehabilitation Potential For Stated Goals: Good  Regarding Case Iraheta's therapy, I certify that the treatment plan above will be carried out by a therapist or under their direction. Thank you for this referral,  Sadie Liriano PT     Referring Physician Signature: Juliette Alexandra*              Date                    The information in this section was collected on 12/15/2017 (except where otherwise noted). HISTORY:   History of Present Injury/Illness (Reason for Referral):  Patient reports she had a severe stroke on 9/6/2017. She reports that she was in the [facility] and [facility] about [facility]. She reports that she has progressed really well, but is still having trouble walking and using her right side (UE and LE). She reports that she walks using her AFO and quad cane all the time. She reports that she tries to be as active as possible. She reports that she is scared that she is going to fall, even though she has not.   She reports she would like to work on strengthening her right leg so her balance is better and she can walk without fear of falling. Past Medical History/Comorbidities:   Ms. Ciera Montoya  has a past medical history of Anxiety; CVA (cerebral vascular accident) ([facility] (2017); Depression; HTN (hypertension); Menopause; and P[facility] throm[facility]ol[facility]) [facility]). Ms. Ciera Montoya  has a past surgical history that includes hx jennifer and bso (); hx  section; hx refractive surgery (); hx other surgical (); and hx other surgical (). Social History/Living Environment:   Home Environment: Private residence  Living Alone: No  Support Systems: Family member(s), Friends \ neighbors; Spouse  Prior Level of Function/Work/Activity:  Independent  Dominant Side:         RIGHT  Personal Factors:          Sex:  female        Age:  60 y.o. Current Medications:       Current Outpatient Prescriptions:     FLUoxetine (PROZAC) 20 mg tablet, Take 2 Tabs by mouth daily. , Disp: 60 Tab, Rfl: 3    methylphenidate HCl (RITALIN) 20 mg tablet, Take 1 Tab (20 mg total) by mouth daily. Max Daily Amount: 20 mg, Disp: 30 Tab, Rfl: 0    [START ON 2018] methylphenidate HCl (RITALIN) 20 mg tablet, Take 1 Tab (20 mg total) by mouth dailyEarliest Fill Date: 18. Max Daily Amount: 20 mg, Disp: 30 Tab, Rfl: 0    [START ON 3/12/2018] methylphenidate HCl (RITALIN) 20 mg tablet, Take 1 Tab (20 mg total) by mouth dailyEarliest Fill Date: 3/12/18. Max Daily Amount: 20 mg, Disp: 30 Tab, Rfl: 0    tiZANidine (ZANAFLEX) 4 mg tablet, 4mg po TID, Disp: 90 Tab, Rfl: 6    HYDROcodone-acetaminophen (NORCO) 5-325 mg per tablet, TAKE ONE TABLET BY MOUTH EVERY 6 HOURS AS NEEDED FOR PAIN, Disp: , Rfl: 0    labetalol (NORMODYNE) 200 mg tablet, Take 1 Tab by mouth two (2) times a day., Disp: 60 Tab, Rfl: 5    ALPRAZolam (XANAX) 0.25 mg tablet, Take 1 Tab by mouth three (3) times daily as needed for Anxiety.  Max Daily Amount: 0.75 mg., Disp: 30 Tab, Rfl: 1    rOPINIRole (REQUIP) 2 mg tablet, Take 2 mg by mouth nightly. for restless leg, Disp: , Rfl:     temazepam (RESTORIL) 15 mg capsule, Take 15 mg by mouth nightly as needed for Sleep., Disp: , Rfl:     polyethylene glycol (MIRALAX) 17 gram/dose powder, Take 17 g by mouth daily. , Disp: 510 g, Rfl: 2    tamsulosin (FLOMAX) 0.4 mg capsule, Take 1 Cap by mouth nightly., Disp: 30 Cap, Rfl: 4    potassium chloride (K-DUR, KLOR-CON) 20 mEq tablet, Take 1 Tab by mouth daily. , Disp: 30 Tab, Rfl: 0    acetaminophen (TYLENOL) 500 mg tablet, 1 Tab by Per NG tube route every four (4) hours as needed. , Disp: 30 Tab, Rfl: 0    losartan-hydroCHLOROthiazide (HYZAAR) 100-12.5 mg per tablet, Take 1 Tab by mouth daily. , Disp: 30 Tab, Rfl: 5   Date Last Reviewed:  1/26/2018    Number of Personal Factors/Comorbidities that affect the Plan of Care: 1-2: MODERATE COMPLEXITY   EXAMINATION:   Observation/Orthostatic Postural Assessment:          Posture Assessment: Rounded shoulders, Forward head   Functional Mobility:         Gait/Ambulation:     Speed/Lulu: Pace decreased (<100 feet/min)  Step Length: Left shortened, Right lengthened  Swing Pattern: Left asymmetrical, Right asymmetrical  Stance: Left increased, Right decreased  Gait Abnormalities: Antalgic, Circumduction, Foot drop, Path deviations, Trendelenburg  Ambulation - Level of Assistance: Modified independent  Assistive Device: Cane, quad  · Interventions: Verbal cues, Safety awareness training          Transfers:     Sit to Stand: Independent  Stand to Sit: Independent  Stand Pivot Transfers: Independent  · Bed to Chair: Independent          Bed Mobility:     Rolling: Independent  Supine to Sit: Independent  Sit to Supine: Independent  · Scooting: Independent    Strength:          L UE STRENGTH: 4/5 shoulder flexion, 4/5 shoulder abduction, 4/5 shoulder extension, 4/5 elbow flexion, 4/5 elbow extension.         R UE STRENGTH: 2/5 shoulder flexion, 2/5 shoulder abduction, 2/5 shoulder extension, 2/5 elbow flexion, 2/5 elbow extension. R LE STRENGTH:  4+/5 hip flexion, 4+/5 hip abduction, 4+/5 hip adduction, 4+/5 hip extension, 4+/5 knee extension, 4+/5 knee flexion, 1/5 ankle dorsiflexion, 1/5 ankle plantarflexion, 1/5 ankle inversion, 1/5 ankle eversion. Sensation:         Intact for light touch and proprioception  Postural Control & Balance:  · Gipson Balance Scale:  28/56.   (A score less than 45/56 indicates high risk of falls)      Body Structures Involved:  1. Nerves  2. Muscles Body Functions Affected:  1. Neuromusculoskeletal  2. Movement Related Activities and Participation Affected:  1. Mobility  2. Self Care   Number of elements (examined above) that affect the Plan of Care: 4+: HIGH COMPLEXITY   CLINICAL PRESENTATION:   Presentation: Evolving clinical presentation with changing clinical characteristics: MODERATE COMPLEXITY   CLINICAL DECISION MAKING:   Outcome Measure: Tool Used: Gipson Balance Scale  Score:  Initial: 28/56 Most Recent: X/56 (Date: -- )   Interpretation of Score: Each section is scored on a 0-4 scale, 0 representing the patients inability to perform the task and 4 representing independence. The scores of each section are added together for a total score of 56. The higher the patients score, the more independent the patient is. Any score below 45 indicates increased risk for falls. Score 56 55-45 44-34 33-23 22-12 11-1 0   Modifier CH CI CJ CK CL CM CN     Medical Necessity:   · Patient is expected to demonstrate progress in strength, range of motion, balance and coordination to improve safety during daily activities. · Patient demonstrates good rehab potential due to higher previous functional level. · Skilled intervention continues to be required due to decreased mobility. Reason for Services/Other Comments:  · Patient continues to demonstrate capacity to improve overall mobility which will increase independence and increase safety.    Use of outcome tool(s) and clinical judgement create a POC that gives a: Questionable prediction of patient's progress: MODERATE COMPLEXITY            TREATMENT:   (In addition to Assessment/Re-Assessment sessions the following treatments were rendered)  Pre-treatment Symptoms/Complaints:  1/26/2018: Patient reports she is tired today. \"my right foot has been hurting\". Pain: Initial: Pain Intensity 1: 4  Pain Location 1: Foot  Pain Orientation 1: Right  Post Session:  4/10     NEUROMUSCULAR RE-EDUCATION: (25 minutes):  Exercise/activities per grid below to improve balance, coordination, kinesthetic sense, posture and proprioception. Required minimal verbal and manual cues to promote static and dynamic balance in standing, promote coordination of bilateral, lower extremity(s) and promote motor control of bilateral, lower extremity(s). Date:  1/26/2018 Date   1/24/18   Activity/Exercise Parameters    Stepping over half foam  2x10 reps with right LE 2x10 reps with left LE, working on weightbearing through R LE and keeping R foot flat on floor (foot tends to invert)   Step taps X    Step ups 4 inch  2x10 leading with right LE 4 inch  2x10 leading with right LE working on weight bearing through [address] X    Walking in the clinic with quad cane 175 feet in hallway 175 feet in hallway                                         THERAPEUTIC EXERCISE: (16 minutes):  Exercises per grid below to improve mobility and strength. Required minimal verbal cues to promote proper body alignment. Progressed repetitions as indicated.    Date:  1/26/2018 Date:  1/22/2018 Date:  1/24/2018   Activity/Exercise Parameters Parameters Parameters   Sit to stand from chair 2 X 10 with left leg resting on 4 inch step 2 X 10 with left leg resting on 4 inch step  2 X 10 with left leg resting on 4 inch step    Standing hip flexion X X    Standing hip abduction X X    Seated LAQs 2x10 right LE  2# 2x10 right LE 2#    Standing hamstrings curls  X 2x10 right LE with assist    Bridging 2x10 with right lower extremity 2x10 with right lower extremity 2x10 with right lower extremity   Supine straight leg raise 2x10 with right lower extremity  2# 2x10 with right lower extremity 2# 2x10 with right lower extremity 2# with verbal and manual cuing to try to keep quads engaged   Supine PNF 2x10 with right lower extremity 2x10 with right lower extremity D1 and D2  2x10 each with R LE    Left sidelying hip abduction 2x10 with right lower extremity  2# 2x10 with right lower extremity 2# Tried to do this exercise but hip flexors engaging too much and patient unable to maintain form   L sidelying R clam shells X  2 x 10 reps R LE   R foot and gastrocsoleus stretching   X 8 minutes trying to get foot to neutral position   Standing minisquats in bars   2 x 10 reps with cuing for posture/position       "[employer] Portal  Treatment/Session Assessment:    · Response to Treatment:  Patient tolerated exercises without complaints. Patient needed rest breaks due to increased fatigue. Continue to progress to tolerance. · Compliance with Program/Exercises: Compliant. · Recommendations/Intent for next treatment session: \"Next visit will focus on advancements to more challenging activities\".    Total Treatment Duration:  PT Patient Time In/Time Out  Time In: 0949  Time Out: [address], PT

## 2018-01-26 NOTE — PROGRESS NOTES
Kasie Rossi  : 1955  Primary: Hayley Hall Ppo  Secondary:  [address] at [place] [id number] [facility], [address] [place] [address],[address], [address] [facility]  [phone]   [phone]        OUTPATIENT OCCUPATIONAL THERAPY: Daily Note 2018    ICD-10: Treatment Diagnosis: Hemiplegia and hemiparesis following cerebral infarction affecting right dominant side (I69.351)  Precautions/Allergies:   Banana; Lisinopril; and Nuts [tree nut]   Fall Risk Score: 5 (? 5 = High Risk)  MD Orders: Referral to occupational therapy MEDICAL/REFERRING DIAGNOSIS:   Cerebral infarction, unspecified [I63.9]  Weakness [R53.1]   DATE OF ONSET: 2017   REFERRING PHYSICIAN: Juliette Stout*  RETURN PHYSICIAN APPOINTMENT: unknown     INITIAL ASSESSMENT:  Ms. Aron Segovia presents with impaired active movement, strength, coordination and sensation of the dominant right upper extremity that is affecting her ability to complete activities of daily living independently. Feel she may benefit from skilled occupational therapy to maximize safety and independence with activities of daily living. PLAN OF CARE:   PROBLEM LIST:  1. Decreased Strength  2. Decreased ADL/Functional Activities  3. Decreased Transfer Abilities  4. Decreased Balance  5. Decreased Flexibility/Joint Mobility  6. Decreased Cognition INTERVENTIONS PLANNED:  1. Activities of daily living training  2. Manual therapy training  3. Modalities  4. Neuromuscular re-eduation  5. Sensory reintegration training  6. Splinting  7. Therapeutic activity  8. Therapeutic exercise   TREATMENT PLAN:  Effective Dates: 17 TO 3/20/18. Frequency/Duration: 3 times a week for 12 weeks  GOALS: (Goals have been discussed and agreed upon with patient.)  Short-Term Functional Goals: Time Frame: 6 weeks  1. Patient will demonstrate independence with home exercise program for right upper extremity range of motion.   2. Patient will increase use of right upper extremity as a functional assist in at least 25% of daily activities. 3. Patient will bathe with moderate assistance. Discharge Goals: Time Frame: 12 weeks  1. Patient will bathe with minimal assistance. 2. Patient will feed self with modified independence and adaptive equipment as needed. 3. Patient will dress with modified independence and adaptive equipment as needed. 4. Patient will use right upper extremity as a functional assist in at least 50% of daily activities. Rehabilitation Potential For Stated Goals: Good  Regarding Shantel Iraheta's therapy, I certify that the treatment plan above will be carried out by a therapist or under their direction. Thank you for this referral,  Emma Mullins, OT     Referring Physician Signature: Juliette Odom* _________________________  Date _________            The information in this section was collected on 17 (except where otherwise noted). OCCUPATIONAL PROFILE & HISTORY:   History of Present Injury/Illness (Reason for Referral):  CVA with hospital and [facility] stay then home health therapy. Past Medical History/Comorbidities:   Ms. Scott Lr  has a past medical history of Anxiety; CVA (cerebral vascular accident) ([facility] [address] (2017); Depression; HTN (hypertension); Menopause; and PTE (pulmonary thromboembolism) ([facility] [address] (2017). Ms. Scott Lr  has a past surgical history that includes hx jennifer and bso (); hx  section; hx refractive surgery (); hx other surgical (); and hx other surgical (2017). Social History/Living Environment:      Prior Level of Function/Work/Activity:  Does seasonal taxes at [employer]  Dominant Side:         RIGHT  Current Medications:    Current Outpatient Prescriptions:     FLUoxetine (PROZAC) 20 mg tablet, Take 2 Tabs by mouth daily. , Disp: 60 Tab, Rfl: 3    methylphenidate HCl (RITALIN) 20 mg tablet, Take 1 Tab (20 mg total) by mouth daily.   Max Daily Amount: 20 mg, Disp: 30 Tab, Rfl: 0    [START ON 2/12/2018] methylphenidate HCl (RITALIN) 20 mg tablet, Take 1 Tab (20 mg total) by mouth dailyEarliest Fill Date: 2/12/18. Max Daily Amount: 20 mg, Disp: 30 Tab, Rfl: 0    [START ON 3/12/2018] methylphenidate HCl (RITALIN) 20 mg tablet, Take 1 Tab (20 mg total) by mouth dailyEarliest Fill Date: 3/12/18. Max Daily Amount: 20 mg, Disp: 30 Tab, Rfl: 0    tiZANidine (ZANAFLEX) 4 mg tablet, 4mg po TID, Disp: 90 Tab, Rfl: 6    HYDROcodone-acetaminophen (NORCO) 5-325 mg per tablet, TAKE ONE TABLET BY MOUTH EVERY 6 HOURS AS NEEDED FOR PAIN, Disp: , Rfl: 0    labetalol (NORMODYNE) 200 mg tablet, Take 1 Tab by mouth two (2) times a day., Disp: 60 Tab, Rfl: 5    ALPRAZolam (XANAX) 0.25 mg tablet, Take 1 Tab by mouth three (3) times daily as needed for Anxiety. Max Daily Amount: 0.75 mg., Disp: 30 Tab, Rfl: 1    rOPINIRole (REQUIP) 2 mg tablet, Take 2 mg by mouth nightly. for restless leg, Disp: , Rfl:     temazepam (RESTORIL) 15 mg capsule, Take 15 mg by mouth nightly as needed for Sleep., Disp: , Rfl:     polyethylene glycol (MIRALAX) 17 gram/dose powder, Take 17 g by mouth daily. , Disp: 510 g, Rfl: 2    tamsulosin (FLOMAX) 0.4 mg capsule, Take 1 Cap by mouth nightly., Disp: 30 Cap, Rfl: 4    potassium chloride (K-DUR, KLOR-CON) 20 mEq tablet, Take 1 Tab by mouth daily. , Disp: 30 Tab, Rfl: 0    acetaminophen (TYLENOL) 500 mg tablet, 1 Tab by Per NG tube route every four (4) hours as needed. , Disp: 30 Tab, Rfl: 0    losartan-hydroCHLOROthiazide (HYZAAR) 100-12.5 mg per tablet, Take 1 Tab by mouth daily. , Disp: 30 Tab, Rfl: 5            Date Last Reviewed:  1/26/2018   Complexity Level : Expanded review of therapy/medical records (1-2):  MODERATE COMPLEXITY   ASSESSMENT OF OCCUPATIONAL PERFORMANCE:   ROM:                   Balance:                   Coordination:                   Mental Status:                   Vision:                   Activities of Daily Living:           Basic ADLs (From Assessment) Complex ADLs (From Assessment)         Grooming/Bathing/Dressing Activities of Daily Living                                   Sensation:         Light touch absent distal to right elbow     Physical Skills Involved:  1. Range of Motion  2. Balance  3. Sensation  4. Fine Motor Control  5. Gross Motor Control Cognitive Skills Affected (resulting in the inability to perform in a timely and safe manner):  1. Executive Function  2. Sustained Attention  3. Divided Attention  4. Comprehension Psychosocial Skills Affected:  1. None   Number of elements that affect the Plan of Care: 5+:  HIGH COMPLEXITY   CLINICAL DECISION MAKING:   Outcome Measure: Tool Used: [facility] [address] [facility] Daily Activity Outpatient Short Form  Score:  Initial: 18 Most Recent: X (Date: -- )   Interpretation of Tool:  Represents clinically-significant functional categories (i.e., basic and instrumental activities of daily living, fine motor activities). Score 60 59-55 54-47 46-34 32-21 20-16 15   Modifier CH CI CJ CK CL CM CN     Medical Necessity:   · Patient demonstrates good rehab potential due to higher previous functional level. Reason for Services/Other Comments:  · Patient continues to require skilled intervention due to decreased safety and independence in activities of daily living. Clinical Decision-Making Assessment: Moderately difficult to predict patient's progress at this time due to the extent of her limitations in functional movement and use of dominant right upper extremity. Assessment process, impact of co-morbidities, assessment modification\need for assistance, and selection of interventions: Analytical Complexity:MODERATE COMPLEXITY   TREATMENT:   (In addition to Assessment/Re-Assessment sessions the following treatments were rendered)    Pre-treatment Symptoms/Complaints:  Patient states, \"I am seeing now how I can use my right hand more now; I have gotten used to doing everything with my left hand. \"  Pain: Initial:   Pain Intensity 1: 0  Post Session:  0/10     Therapeutic Exercise: (  35 minutes):  Exercises per grid below to improve dynamic movement of arm - right and hand - right to improve functional lifting, carrying, reaching and grasping. Required moderate visual and verbal cues to promote proper body mechanics. Progressed range, repetitions and complexity of movement as indicated.        Date:  12/22/17 Date:  1/5/18 Date:  1/15/18 Date:  1/19/18 Date:  1/22/18 Date:  1/24/18 Date:  1/26/18   Activity/Exercise Parameters Parameters Parameters       Shoulder shrugs AROM  2 x 10 AROM  1 x 10 AROM  1 x 10 AROM  1 x 10 AROM  1 x 10 AROM  1 x 10    Scapular protraction/retraction AROM  2 x 10 AROM  1 x 10 AROM  1 x 10 AROM  1 x 10 AROM  1 x 10 AROM  1 x 10 3 x 10 with red theratubing - seated rows   Shoulder abduction In supine x 10 reps  Seated x 10 reps         Elbow flexion/extension AROM with assist to fully extend x 10 reps  AROM with assist to fully extend x 10 reps AROM with assist to fully extend x 10 reps in supine  AROM with assist to fully extend x 10 reps    PNF D1/D2 diagonals X 5 reps         Hand helper 5 pounds x 25 reps with assist to extend fingers  15 pounds x 25 reps with assist to extend fingers 15 pounds x 25 reps with assist to extend fingers 15 pounds x 25 reps with assist to extend fingers  15 pounds x 25 reps with assist to extend fingers   Tabletop skateboard X 10 mintues in all planes of motion  X 3 mintues in all planes of motion       Shoulder flexion/extension  SROM in supine  2 x 10 Seated x 10 reps  SROM Seated x 10 reps  SROM   2 x 10 with red theratubing - seated    Chest press  Holding cane in supine   2 x 10        UBE  Min assist to sustain right  on handle   Level 0  7 mintues Min assist to sustain right  on handle   Level 0  7 mintues Min assist to sustain right  on handle   Level 0  7 mintues Min assist to sustain right  on handle   Level 0  5 mintues  Min assist to sustain right  on handle   Level 0  5 mintues   Modified push-ups   Hands on countertop with dycem under right hand  2 x 10 Hands on countertop with dycem under right hand  2 x 10 Hands on countertop with dycem under right hand  2 x 10 Hands on large therapy ball on mat  2 x 10 Hands on large therapy ball on mat  1 x 10 Hands on countertop with dycem under right hand  3 x 10   Resistive clothespin   Yellow  1 x 15  Issued for HEP Yellow  1 x 15 Yellow  1 x 15  Yellow  1 x 10   Shoulder arc    5 rings with assist to grasp ring initially       Wrist flexion/extension       PROM  1 x 10 with stretch at end range of extension       Access Code: [email or web address]   URL: [email or web address]   Date: 01/19/2018   Prepared by: Demian Fontana     Exercises   Supine Shoulder Flexion PROM - 10 reps - 2 sets - 3 hold - 1x daily - 5x weekly   Push-Up on Counter - 10 reps - 2 sets - 1x daily - 5x weekly   Seated Shoulder Shrugs - 10 reps - 2 sets - 1x daily - 5x weekly   Seated Scapular Retraction - 10 reps - 2 sets - 1x daily - 5x weekly   Seated Weight Shifting with Arm Support - 10 reps - 2 sets - 1x daily - 5x weekly   Seated Shoulder Flexion Self PROM - 10 reps - 2 sets - 1x daily - 5x weekly   Supine Elbow Flexion Extension AROM - 10 reps - 2 sets - 1x daily - 5x weekly   Seated Elbow Flexion Extension AROM - 10 reps - 2 sets - 1x daily - 5x weekly     Self Care: (15 minutes): Procedure(s) (per grid) utilized to improve and/or restore self-care/home management as related to dressing and self feeding. Required moderate visual and verbal cueing to facilitate activities of daily living skills and compensatory activities. Patient participated in tying laces on dressing board x 3 using right hand as a functional assist to hold one end of the lace while tying; able to complete with cuing. She donned jacket with moderate assistance.    Treatment/Session Assessment:    · Response to Treatment:  Patient reports using right hand for the first few bites of most meals now with use of universal cuff and assistance from left hand. · Compliance with Program/Exercises: Will assess as treatment progresses. · Recommendations/Intent for next treatment session: \"Next visit will focus on advancements to more challenging activities and reduction in assistance provided\".   Total Treatment Duration:  OT Patient Time In/Time Out  Time In: 1030  Time Out: [address], OT

## 2018-01-29 ENCOUNTER — HOSPITAL ENCOUNTER (OUTPATIENT)
Dept: PHYSICAL THERAPY | Age: 63
Discharge: HOME | End: 2018-01-29
Attending: PHYSICAL MEDICINE & REHABILITATION
Payer: COMMERCIAL

## 2018-01-29 PROCEDURE — 97110 THERAPEUTIC EXERCISES: CPT

## 2018-01-29 PROCEDURE — 97112 NEUROMUSCULAR REEDUCATION: CPT

## 2018-01-29 NOTE — PROGRESS NOTES
Michael Montalvo  : 1955  Primary: [facility]  Secondary:  [address] at [place] [id number] [facility], [address] [place] [address],[address]  [phone]   [phone]          OUTPATIENT PHYSICAL THERAPY:Daily Note 2018    ICD-10: Treatment Diagnosis:   · Other abnormalities of gait and mobility (R26.89)  · Difficulty in walking, not elsewhere classified (R26.2)  Precautions/Allergies:   Banana; Lisinopril; and Nuts [tree nut]   Fall Risk Score: 5 (? 5 = High Risk)  MD Orders: Evaluate and Treat MEDICAL/REFERRING DIAGNOSIS:  Weakness due to cerebrovascular accident (CVA) ([facility] [I63.9, R53.1]   DATE OF ONSET: CVA: 2017  REFERRING PHYSICIAN: Juliette Mistry*  RETURN PHYSICIAN APPOINTMENT: TBD     INITIAL ASSESSMENT:  Ms. Neli Montemayor presents with decreased mobility, decreased strength, decreased gait, and decreased balance secondary to CVA. After discussing with patient, she agreed she would benefit from physical therapy to improve above deficits. Please sign this plan of treatment if you concur. Thank you for the opportunity to serve this patient. Progress note on 2018:   Patient has attended six scheduled physical therapy appointments from 12/15/2017 to 2018. Patient is very motivated and compliant with therapy. Patient continues to experience increased tone in right ankle affecting ambulation. Patient would benefit from a trial of Botox to see if this would decrease the tone in right ankle and improve ambulation. Patient would benefit from continuing skilled physical therapy to address problems and goals. Thank you for this referral.    PROBLEM LIST (Impacting functional limitations):  1. Decreased Strength  2. Decreased Ambulation Ability/Technique  3. Decreased Balance  4. Decreased Activity Tolerance INTERVENTIONS PLANNED:  1. Balance Exercise  2. Gait Training  3. Home Exercise Program (HEP)  4.  Neuromuscular Re-education/Strengthening  5. Range of Motion (ROM)  6. Therapeutic Exercise/Strengthening   TREATMENT PLAN:  Effective Dates: 12/15/2017 TO 2/15/2018. Frequency/Duration: 2 times a week for 8 weeks  GOALS: (Goals have been discussed and agreed upon with patient.)  Short-Term Functional Goals: Time Frame: 3 weeks  1. Patient will be independent with home exercise program without exacerbation of symptoms or cueing needed. Goal met. Discharge Goals: Time Frame: 8 weeks  1. Patient will be independent with all ADLs with minimal fear of falling and loss of balance with daily tasks. Goal ongoing. 2. Patient will report no fear avoidance with social or recreational activities due to fear of falling. Goal ongoing. 3. Patient will score greater than or equal to 45/56 on Gipson Balance Scale with minimal effect of imbalance on patient's ability to manage every day life activities. Goal ongoing. Rehabilitation Potential For Stated Goals: Good  Regarding Rico Cameron Alvaradodwin's therapy, I certify that the treatment plan above will be carried out by a therapist or under their direction. Thank you for this referral,  Mar Carreon PT     Referring Physician Signature: Juliette Choi*              Date                    The information in this section was collected on 12/15/2017 (except where otherwise noted). HISTORY:   History of Present Injury/Illness (Reason for Referral):  Patient reports she had a severe stroke on 9/6/2017. She reports that she was in the hospital and [facility] for about 61-62 days. She reports that she has progressed really well, but is still having trouble walking and using her right side (UE and LE). She reports that she walks using her AFO and quad cane all the time. She reports that she tries to be as active as possible. She reports that she is scared that she is going to fall, even though she has not.   She reports she would like to work on strengthening her right leg so her balance is better and she can walk without fear of falling. Past Medical History/Comorbidities:   Ms. Marta Kulkarni  has a past medical history of Anxiety; CVA (cerebral vascular accident) ([facility] (2017); Depression; HTN (hypertension); Menopause; and PTE (pulmonary thromboembolism) ([facility] (2017). Ms. Marta Kulkarni  has a past surgical history that includes hx jennifer and bso (); hx  section; hx refractive surgery (); hx other surgical (); and hx other surgical (). Social History/Living Environment:   Home Environment: Private residence  Living Alone: No  Support Systems: Family member(s), Friends \ neighbors; Spouse  Prior Level of Function/Work/Activity:  Independent  Dominant Side:         RIGHT  Personal Factors:          Sex:  female        Age:  60 y.o. Current Medications:       Current Outpatient Prescriptions:     FLUoxetine (PROZAC) 20 mg tablet, Take 2 Tabs by mouth daily. , Disp: 60 Tab, Rfl: 3    methylphenidate HCl (RITALIN) 20 mg tablet, Take 1 Tab (20 mg total) by mouth daily. Max Daily Amount: 20 mg, Disp: 30 Tab, Rfl: 0    [START ON 2018] methylphenidate HCl (RITALIN) 20 mg tablet, Take 1 Tab (20 mg total) by mouth dailyEarliest Fill Date: 18. Max Daily Amount: 20 mg, Disp: 30 Tab, Rfl: 0    [START ON 3/12/2018] methylphenidate HCl (RITALIN) 20 mg tablet, Take 1 Tab (20 mg total) by mouth dailyEarliest Fill Date: 3/12/18. Max Daily Amount: 20 mg, Disp: 30 Tab, Rfl: 0    tiZANidine (ZANAFLEX) 4 mg tablet, 4mg po TID, Disp: 90 Tab, Rfl: 6    HYDROcodone-acetaminophen (NORCO) 5-325 mg per tablet, TAKE ONE TABLET BY MOUTH EVERY 6 HOURS AS NEEDED FOR PAIN, Disp: , Rfl: 0    labetalol (NORMODYNE) 200 mg tablet, Take 1 Tab by mouth two (2) times a day., Disp: 60 Tab, Rfl: 5    ALPRAZolam (XANAX) 0.25 mg tablet, Take 1 Tab by mouth three (3) times daily as needed for Anxiety.  Max Daily Amount: 0.75 mg., Disp: 30 Tab, Rfl: 1    rOPINIRole (REQUIP) 2 mg tablet, Take 2 mg by mouth nightly. for restless leg, Disp: , Rfl:     temazepam (RESTORIL) 15 mg capsule, Take 15 mg by mouth nightly as needed for Sleep., Disp: , Rfl:     polyethylene glycol (MIRALAX) 17 gram/dose powder, Take 17 g by mouth daily. , Disp: 510 g, Rfl: 2    tamsulosin (FLOMAX) 0.4 mg capsule, Take 1 Cap by mouth nightly., Disp: 30 Cap, Rfl: 4    potassium chloride (K-DUR, KLOR-CON) 20 mEq tablet, Take 1 Tab by mouth daily. , Disp: 30 Tab, Rfl: 0    acetaminophen (TYLENOL) 500 mg tablet, 1 Tab by Per NG tube route every four (4) hours as needed. , Disp: 30 Tab, Rfl: 0    losartan-hydroCHLOROthiazide (HYZAAR) 100-12.5 mg per tablet, Take 1 Tab by mouth daily. , Disp: 30 Tab, Rfl: 5   Date Last Reviewed:  1/29/2018    Number of Personal Factors/Comorbidities that affect the Plan of Care: 1-2: MODERATE COMPLEXITY   EXAMINATION:   Observation/Orthostatic Postural Assessment:          Posture Assessment: Rounded shoulders, Forward head   Functional Mobility:         Gait/Ambulation:     Speed/Lulu: Pace decreased (<100 feet/min)  Step Length: Left shortened, Right lengthened  Swing Pattern: Left asymmetrical, Right asymmetrical  Stance: Left increased, Right decreased  Gait Abnormalities: Antalgic, Circumduction, Foot drop, Path deviations, Trendelenburg  Ambulation - Level of Assistance: Modified independent  Assistive Device: Cane, quad  · Interventions: Verbal cues, Safety awareness training          Transfers:     Sit to Stand: Independent  Stand to Sit: Independent  Stand Pivot Transfers: Independent  · Bed to Chair: Independent          Bed Mobility:     Rolling: Independent  Supine to Sit: Independent  Sit to Supine: Independent  · Scooting: Independent    Strength:          L UE STRENGTH: 4/5 shoulder flexion, 4/5 shoulder abduction, 4/5 shoulder extension, 4/5 elbow flexion, 4/5 elbow extension.         R UE STRENGTH: 2/5 shoulder flexion, 2/5 shoulder abduction, 2/5 shoulder extension, 2/5 elbow flexion, 2/5 elbow extension. R LE STRENGTH:  4+/5 hip flexion, 4+/5 hip abduction, 4+/5 hip adduction, 4+/5 hip extension, 4+/5 knee extension, 4+/5 knee flexion, 1/5 ankle dorsiflexion, 1/5 ankle plantarflexion, 1/5 ankle inversion, 1/5 ankle eversion. Sensation:         Intact for light touch and proprioception  Postural Control & Balance:  · Gipson Balance Scale:  28/56.   (A score less than 45/56 indicates high risk of falls)      Body Structures Involved:  1. Nerves  2. Muscles Body Functions Affected:  1. Neuromusculoskeletal  2. Movement Related Activities and Participation Affected:  1. Mobility  2. Self Care   Number of elements (examined above) that affect the Plan of Care: 4+: HIGH COMPLEXITY   CLINICAL PRESENTATION:   Presentation: Evolving clinical presentation with changing clinical characteristics: MODERATE COMPLEXITY   CLINICAL DECISION MAKING:   Outcome Measure: Tool Used: Gipson Balance Scale  Score:  Initial: 28/56 Most Recent: X/56 (Date: -- )   Interpretation of Score: Each section is scored on a 0-4 scale, 0 representing the patients inability to perform the task and 4 representing independence. The scores of each section are added together for a total score of 56. The higher the patients score, the more independent the patient is. Any score below 45 indicates increased risk for falls. Score 56 55-45 44-34 33-23 22-12 11-1 0   Modifier CH CI CJ CK CL CM CN     Medical Necessity:   · Patient is expected to demonstrate progress in strength, range of motion, balance and coordination to improve safety during daily activities. · Patient demonstrates good rehab potential due to higher previous functional level. · Skilled intervention continues to be required due to decreased mobility. Reason for Services/Other Comments:  · Patient continues to demonstrate capacity to improve overall mobility which will increase independence and increase safety.    Use of outcome tool(s) and clinical judgement create a POC that gives a: Questionable prediction of patient's progress: MODERATE COMPLEXITY            TREATMENT:   (In addition to Assessment/Re-Assessment sessions the following treatments were rendered)  Pre-treatment Symptoms/Complaints:  1/29/2018: Patient reports her foot is feeling better today. Pain: Initial: Pain Intensity 1: 0  Post Session:  0/10     NEUROMUSCULAR RE-EDUCATION: (25 minutes):  Exercise/activities per grid below to improve balance, coordination, kinesthetic sense, posture and proprioception. Required minimal verbal and manual cues to promote static and dynamic balance in standing, promote coordination of bilateral, lower extremity(s) and promote motor control of bilateral, lower extremity(s). Date:  1/26/2018 Date   1/29/18   Activity/Exercise Parameters    Stepping over half foam  2x10 reps with right LE 2x10 reps with left LE, working on weightbearing through R LE and keeping R foot flat on floor (foot tends to invert)   Step taps X    Step ups 4 inch  2x10 leading with right LE 6 inch  2x10 leading with right LE working on weight bearing through [address] X    Walking in the clinic with quad cane 175 feet in hallway 200 feet in hallway +up and down ramp                                         THERAPEUTIC EXERCISE: (20 minutes):  Exercises per grid below to improve mobility and strength. Required minimal verbal cues to promote proper body alignment. Progressed repetitions as indicated.    Date:  1/26/2018 Date:  1/29/2018 Date:  1/24/2018   Activity/Exercise Parameters Parameters Parameters   Sit to stand from chair 2 X 10 with left leg resting on 4 inch step 2 X 10 with left leg resting on 4 inch step  2 X 10 with left leg resting on 4 inch step    Standing hip flexion X X    Standing hip abduction X X    Seated LAQs 2x10 right LE  2# 2x10 right LE 2#    Standing hamstrings curls  X X    Bridging 2x10 with right lower extremity 2x10 with right lower extremity 2x10 with right lower extremity   Supine straight leg raise 2x10 with right lower extremity  2# 2x10 with right lower extremity 2# 2x10 with right lower extremity 2# with verbal and manual cuing to try to keep quads engaged   Supine PNF 2x10 with right lower extremity 2x10 with right lower extremity D1 and D2  2x10 each with R LE    Left sidelying hip abduction 2x10 with right lower extremity  2# 2x10 with right lower extremity 2# Tried to do this exercise but hip flexors engaging too much and patient unable to maintain form   L sidelying R clam shells X  2 x 10 reps R LE   R foot and gastrocsoleus stretching   X 8 minutes trying to get foot to neutral position   Standing minisquats in bars   2 x 10 reps with cuing for posture/position       [facility]  Treatment/Session Assessment:    · Response to Treatment:  Patient walked further distance without complaints. Patient needs verbal cues to increase weight bearing on right lower extremity during ambulation. Continue to progress to tolerance. · Compliance with Program/Exercises: Compliant. · Recommendations/Intent for next treatment session: \"Next visit will focus on advancements to more challenging activities\".    Total Treatment Duration:  PT Patient Time In/Time Out  Time In: 1300  Time Out: 2000 Cy Hernandez

## 2018-01-29 NOTE — PROGRESS NOTES
Abimael Rubalcava  : 1955  Primary: Ewelina Marcano Ppo  Secondary:  [address]  [facility] [address] [address], [facility] [address]  [phone]   [phone]        OUTPATIENT OCCUPATIONAL THERAPY: Daily Note 2018    ICD-10: Treatment Diagnosis: Hemiplegia and hemiparesis following cerebral infarction affecting right dominant side (I69.351)  Precautions/Allergies:   Banana; Lisinopril; and Nuts [tree nut]   Fall Risk Score: 5 (? 5 = High Risk)  MD Orders: Referral to occupational therapy MEDICAL/REFERRING DIAGNOSIS:   Cerebral infarction, unspecified [I63.9]  Weakness [R53.1]   DATE OF ONSET: 2017   REFERRING PHYSICIAN: Juliette Izquierdo*  RETURN PHYSICIAN APPOINTMENT: unknown     INITIAL ASSESSMENT:  Ms. Laina Valiente presents with impaired active movement, strength, coordination and sensation of the dominant right upper extremity that is affecting her ability to complete activities of daily living independently. Feel she may benefit from skilled occupational therapy to maximize safety and independence with activities of daily living. PLAN OF CARE:   PROBLEM LIST:  1. Decreased Strength  2. Decreased ADL/Functional Activities  3. Decreased Transfer Abilities  4. Decreased Balance  5. Decreased Flexibility/Joint Mobility  6. Decreased Cognition INTERVENTIONS PLANNED:  1. Activities of daily living training  2. Manual therapy training  3. Modalities  4. Neuromuscular re-eduation  5. Sensory reintegration training  6. Splinting  7. Therapeutic activity  8. Therapeutic exercise   TREATMENT PLAN:  Effective Dates: 17 TO 3/20/18. Frequency/Duration: 3 times a week for 12 weeks  GOALS: (Goals have been discussed and agreed upon with patient.)  Short-Term Functional Goals: Time Frame: 6 weeks  1. Patient will demonstrate independence with home exercise program for right upper extremity range of motion.   2. Patient will increase use of right upper extremity as a functional assist in at least 25% of daily activities. 3. Patient will bathe with moderate assistance. Discharge Goals: Time Frame: 12 weeks  1. Patient will bathe with minimal assistance. 2. Patient will feed self with modified independence and adaptive equipment as needed. 3. Patient will dress with modified independence and adaptive equipment as needed. 4. Patient will use right upper extremity as a functional assist in at least 50% of daily activities. Rehabilitation Potential For Stated Goals: Good  Regarding Jaime Iraheta's therapy, I certify that the treatment plan above will be carried out by a therapist or under their direction. Thank you for this referral,  Micky Yang, OT     Referring Physician Signature: Juliette Ray* _________________________  Date _________            The information in this section was collected on 17 (except where otherwise noted). OCCUPATIONAL PROFILE & HISTORY:   History of Present Injury/Illness (Reason for Referral):  CVA with hospital and [facility] stay then home health therapy. Past Medical History/Comorbidities:   Ms. Radha Celaya  has a past medical history of Anxiety; CVA (cerebral vascular accident) ([facility] [address] (2017); Depression; HTN (hypertension); Menopause; and PTE (pulmonary thromboembolism) ([facility] [address] (2017). Ms. Radha Celaya  has a past surgical history that includes hx jennifer and bso (); hx  section; hx refractive surgery (); hx other surgical (); and hx other surgical (2017). Social History/Living Environment:      Prior Level of Function/Work/Activity:  Does seasonal taxes at [employer]  Dominant Side:         RIGHT  Current Medications:    Current Outpatient Prescriptions:     FLUoxetine (PROZAC) 20 mg tablet, Take 2 Tabs by mouth daily. , Disp: 60 Tab, Rfl: 3    methylphenidate HCl (RITALIN) 20 mg tablet, Take 1 Tab (20 mg total) by mouth daily.   Max Daily Amount: 20 mg, Disp: 30 Tab, Rfl: 0    [START ON 2/12/2018] methylphenidate HCl (RITALIN) 20 mg tablet, Take 1 Tab (20 mg total) by mouth dailyEarliest Fill Date: 2/12/18. Max Daily Amount: 20 mg, Disp: 30 Tab, Rfl: 0    [START ON 3/12/2018] methylphenidate HCl (RITALIN) 20 mg tablet, Take 1 Tab (20 mg total) by mouth dailyEarliest Fill Date: 3/12/18. Max Daily Amount: 20 mg, Disp: 30 Tab, Rfl: 0    tiZANidine (ZANAFLEX) 4 mg tablet, 4mg po TID, Disp: 90 Tab, Rfl: 6    HYDROcodone-acetaminophen (NORCO) 5-325 mg per tablet, TAKE ONE TABLET BY MOUTH EVERY 6 HOURS AS NEEDED FOR PAIN, Disp: , Rfl: 0    labetalol (NORMODYNE) 200 mg tablet, Take 1 Tab by mouth two (2) times a day., Disp: 60 Tab, Rfl: 5    ALPRAZolam (XANAX) 0.25 mg tablet, Take 1 Tab by mouth three (3) times daily as needed for Anxiety. Max Daily Amount: 0.75 mg., Disp: 30 Tab, Rfl: 1    rOPINIRole (REQUIP) 2 mg tablet, Take 2 mg by mouth nightly. for restless leg, Disp: , Rfl:     temazepam (RESTORIL) 15 mg capsule, Take 15 mg by mouth nightly as needed for Sleep., Disp: , Rfl:     polyethylene glycol (MIRALAX) 17 gram/dose powder, Take 17 g by mouth daily. , Disp: 510 g, Rfl: 2    tamsulosin (FLOMAX) 0.4 mg capsule, Take 1 Cap by mouth nightly., Disp: 30 Cap, Rfl: 4    potassium chloride (K-DUR, KLOR-CON) 20 mEq tablet, Take 1 Tab by mouth daily. , Disp: 30 Tab, Rfl: 0    acetaminophen (TYLENOL) 500 mg tablet, 1 Tab by Per NG tube route every four (4) hours as needed. , Disp: 30 Tab, Rfl: 0    losartan-hydroCHLOROthiazide (HYZAAR) 100-12.5 mg per tablet, Take 1 Tab by mouth daily. , Disp: 30 Tab, Rfl: 5            Date Last Reviewed:  1/29/2018   Complexity Level : Expanded review of therapy/medical records (1-2):  MODERATE COMPLEXITY   ASSESSMENT OF OCCUPATIONAL PERFORMANCE:   ROM:                   Balance:                   Coordination:                   Mental Status:                   Vision:                   Activities of Daily Living:           Basic ADLs (From Assessment) Complex ADLs (From Assessment)         Grooming/Bathing/Dressing Activities of Daily Living                                   Sensation:         Light touch absent distal to right elbow     Physical Skills Involved:  1. Range of Motion  2. Balance  3. Sensation  4. Fine Motor Control  5. Gross Motor Control Cognitive Skills Affected (resulting in the inability to perform in a timely and safe manner):  1. Executive Function  2. Sustained Attention  3. Divided Attention  4. Comprehension Psychosocial Skills Affected:  1. None   Number of elements that affect the Plan of Care: 5+:  HIGH COMPLEXITY   CLINICAL DECISION MAKING:   Outcome Measure: Tool Used: [address] [facility] [address] AM-[facility] Daily Activity Outpatient Short Form  Score:  Initial: 18 Most Recent: X (Date: -- )   Interpretation of Tool:  Represents clinically-significant functional categories (i.e., basic and instrumental activities of daily living, fine motor activities). Score 60 59-55 54-47 46-34 32-21 20-16 15   Modifier CH CI CJ CK CL CM CN     Medical Necessity:   · Patient demonstrates good rehab potential due to higher previous functional level. Reason for Services/Other Comments:  · Patient continues to require skilled intervention due to decreased safety and independence in activities of daily living. Clinical Decision-Making Assessment: Moderately difficult to predict patient's progress at this time due to the extent of her limitations in functional movement and use of dominant right upper extremity. Assessment process, impact of co-morbidities, assessment modification\need for assistance, and selection of interventions: Analytical Complexity:MODERATE COMPLEXITY   TREATMENT:   (In addition to Assessment/Re-Assessment sessions the following treatments were rendered)    Pre-treatment Symptoms/Complaints:  Patient states, \"It is getting a little easier to use my right hand to eat; the more I do it, the better it gets.  I had no idea I could hold my water bottle with my right hand; I am so excited. \"  Pain: Initial:   Pain Intensity 1: 0  Post Session:  0/10     Therapeutic Exercise: (  35 minutes):  Exercises per grid below to improve dynamic movement of arm - right and hand - right to improve functional lifting, carrying, reaching and grasping. Required moderate visual and verbal cues to promote proper body mechanics. Progressed range, repetitions and complexity of movement as indicated.        Date:  12/22/17 Date:  1/5/18 Date:  1/15/18 Date:  1/19/18 Date:  1/22/18 Date:  1/24/18 Date:  1/26/18 Date:  1/29/18   Activity/Exercise Parameters Parameters Parameters        Shoulder shrugs AROM  2 x 10 AROM  1 x 10 AROM  1 x 10 AROM  1 x 10 AROM  1 x 10 AROM  1 x 10  AROM  1 x 10   Scapular protraction/retraction AROM  2 x 10 AROM  1 x 10 AROM  1 x 10 AROM  1 x 10 AROM  1 x 10 AROM  1 x 10 3 x 10 with red theratubing - seated rows AROM  1 x 10   Shoulder abduction In supine x 10 reps  Seated x 10 reps          Elbow flexion/extension AROM with assist to fully extend x 10 reps  AROM with assist to fully extend x 10 reps AROM with assist to fully extend x 10 reps in supine  AROM with assist to fully extend x 10 reps     PNF D1/D2 diagonals X 5 reps       X 5 reps   Hand helper 5 pounds x 25 reps with assist to extend fingers  15 pounds x 25 reps with assist to extend fingers 15 pounds x 25 reps with assist to extend fingers 15 pounds x 25 reps with assist to extend fingers  15 pounds x 25 reps with assist to extend fingers    Tabletop skateboard X 10 mintues in all planes of motion  X 3 mintues in all planes of motion        Shoulder flexion/extension  SROM in supine  2 x 10 Seated x 10 reps  SROM Seated x 10 reps  SROM   2 x 10 with red theratubing - seated  Seated x 10 reps  SROM   Chest press  Holding cane in supine   2 x 10         UBE  Min assist to sustain right  on handle   Level 0  7 mintues Min assist to sustain right  on handle   Level 0  7 mintues Min assist to sustain right  on handle   Level 0  7 mintues Min assist to sustain right  on handle   Level 0  5 mintues  Min assist to sustain right  on handle   Level 0  5 mintues Min assist to sustain right  on handle   Level 0  5 mintues   Modified push-ups   Hands on countertop with dycem under right hand  2 x 10 Hands on countertop with dycem under right hand  2 x 10 Hands on countertop with dycem under right hand  2 x 10 Hands on large therapy ball on mat  2 x 10 Hands on large therapy ball on mat  1 x 10 Hands on countertop with dycem under right hand  3 x 10    Resistive clothespin   Yellow  1 x 15  Issued for HEP Yellow  1 x 15 Yellow  1 x 15  Yellow  1 x 10 Yellow  1 x 10   Shoulder arc    5 rings with assist to grasp ring initially     5 rings with assist from right hand to grasp ring initially   Wrist flexion/extension       PROM  1 x 10 with stretch at end range of extension        Access Code: [email or web address]   URL: [email or web address]   Date: 01/19/2018   Prepared by: Arlette Mandujano     Exercises   Supine Shoulder Flexion PROM - 10 reps - 2 sets - 3 hold - 1x daily - 5x weekly   Push-Up on Counter - 10 reps - 2 sets - 1x daily - 5x weekly   Seated Shoulder Shrugs - 10 reps - 2 sets - 1x daily - 5x weekly   Seated Scapular Retraction - 10 reps - 2 sets - 1x daily - 5x weekly   Seated Weight Shifting with Arm Support - 10 reps - 2 sets - 1x daily - 5x weekly   Seated Shoulder Flexion Self PROM - 10 reps - 2 sets - 1x daily - 5x weekly   Supine Elbow Flexion Extension AROM - 10 reps - 2 sets - 1x daily - 5x weekly   Seated Elbow Flexion Extension AROM - 10 reps - 2 sets - 1x daily - 5x weekly     Neuromuscular Re-education: (  10 minutes):  Exercise/activities per grid below to improve kinesthetic sense and proprioception.   Required minimal visual and verbal cues to promote static and dynamic balance in standing, promote coordination of right, upper extremity(s) and promote motor control of right, upper extremity(s). Patient completed prone power up on elbows and lateral weight shifts on elbows x 10 reps each. She grasped water bottle with right hand to hold it while opening the top with left hand then using both hands to bring it to her mouth to drink with increasing incorporation of the right hand into functional movements and daily activities. Treatment/Session Assessment:    · Response to Treatment:  Patient reports using right hand for the first few bites of most meals now with use of universal cuff and assistance from left hand with increasing success. Patient able to hold water bottle with left hand while navigating through the clinic today. · Compliance with Program/Exercises: Will assess as treatment progresses. · Recommendations/Intent for next treatment session: \"Next visit will focus on advancements to more challenging activities and reduction in assistance provided\".   Total Treatment Duration:  OT Patient Time In/Time Out  Time In: 2301  Time Out: Javier Alcala

## 2018-01-31 ENCOUNTER — HOSPITAL ENCOUNTER (OUTPATIENT)
Dept: PHYSICAL THERAPY | Age: 63
Discharge: HOME | End: 2018-01-31
Attending: PHYSICAL MEDICINE & REHABILITATION
Payer: COMMERCIAL

## 2018-01-31 PROCEDURE — 97110 THERAPEUTIC EXERCISES: CPT

## 2018-01-31 PROCEDURE — 97535 SELF CARE MNGMENT TRAINING: CPT

## 2018-01-31 PROCEDURE — 97112 NEUROMUSCULAR REEDUCATION: CPT

## 2018-01-31 NOTE — PROGRESS NOTES
Abimael Rubalcava  : 1955  Primary: Ewelina Bianchis Ppo  Secondary:  [address] at [place] [id number] [facility], [address], [place] [address].  [phone]   [phone]        OUTPATIENT OCCUPATIONAL THERAPY: Daily Note and Progress Report 2018    ICD-10: Treatment Diagnosis: Hemiplegia and hemiparesis following cerebral infarction affecting right dominant side (I69.351)  Precautions/Allergies:   Banana; Lisinopril; and Nuts [tree nut]   Fall Risk Score: 5 (? 5 = High Risk)  MD Orders: Referral to occupational therapy MEDICAL/REFERRING DIAGNOSIS:   Cerebral infarction, unspecified [I63.9]  Weakness [R53.1]   DATE OF ONSET: 2017   REFERRING PHYSICIAN: Juliette Izquierdo*  RETURN PHYSICIAN APPOINTMENT: unknown   18: Ms. Laina Valiente is making excellent progress toward her goals; she is demonstrating improving range of motion and functional use of the right upper extremity in activities of daily living. Feel she will continue to benefit from skilled occupational therapy to maximize safety and independence with activities of daily living. INITIAL ASSESSMENT:  Ms. Laina Valiente presents with impaired active movement, strength, coordination and sensation of the dominant right upper extremity that is affecting her ability to complete activities of daily living independently. Feel she may benefit from skilled occupational therapy to maximize safety and independence with activities of daily living. PLAN OF CARE:   PROBLEM LIST:  1. Decreased Strength  2. Decreased ADL/Functional Activities  3. Decreased Transfer Abilities  4. Decreased Balance  5. Decreased Flexibility/Joint Mobility  6. Decreased Cognition INTERVENTIONS PLANNED:  1. Activities of daily living training  2. Manual therapy training  3. Modalities  4. Neuromuscular re-eduation  5. Sensory reintegration training  6. Splinting  7. Therapeutic activity  8.  Therapeutic exercise   TREATMENT PLAN:  Effective Dates: 17 TO 3/20/18. Frequency/Duration: 3 times a week for 12 weeks  GOALS: (Goals have been discussed and agreed upon with patient.)  Short-Term Functional Goals: Time Frame: 6 weeks  1. Patient will demonstrate independence with home exercise program for right upper extremity range of motion. Met  2. Patient will increase use of right upper extremity as a functional assist in at least 25% of daily activities. Met  3. Patient will bathe with moderate assistance. Continue to address  Discharge Goals: Time Frame: 12 weeks  1. Patient will bathe with minimal assistance. Continue to address  2. Patient will feed self with modified independence and adaptive equipment as needed. Continue to address  3. Patient will dress with modified independence and adaptive equipment as needed. Continue to address  4. Patient will use right upper extremity as a functional assist in at least 50% of daily activities. Continue to address  Rehabilitation Potential For Stated Goals: Good  Regarding Juan Diego Iraheta's therapy, I certify that the treatment plan above will be carried out by a therapist or under their direction. Thank you for this referral,  Lali Monroe, OT     Referring Physician Signature: Juliette Parker* _________________________  Date _________            The information in this section was collected on 18 (except where otherwise noted). OCCUPATIONAL PROFILE & HISTORY:   History of Present Injury/Illness (Reason for Referral):  CVA with hospital and [facility] stay then home health therapy. Past Medical History/Comorbidities:   Ms. Elizabeth Pham  has a past medical history of Anxiety; CVA (cerebral vascular accident) ([facility] [address] (2017); Depression; HTN (hypertension); Menopause; and PTE (pulmonary thromboembolism) ([facility] [address] (2017).   Ms. Elizabeth Pham  has a past surgical history that includes hx jennifer and bso (); hx  section; hx refractive surgery (); hx other surgical (); and hx other surgical (2017). Social History/Living Environment:      Prior Level of Function/Work/Activity:  Does seasonal taxes at [employer]  Dominant Side:         RIGHT  Current Medications:    Current Outpatient Prescriptions:     FLUoxetine (PROZAC) 20 mg tablet, Take 2 Tabs by mouth daily. , Disp: 60 Tab, Rfl: 3    methylphenidate HCl (RITALIN) 20 mg tablet, Take 1 Tab (20 mg total) by mouth daily. Max Daily Amount: 20 mg, Disp: 30 Tab, Rfl: 0    [START ON 2/12/2018] methylphenidate HCl (RITALIN) 20 mg tablet, Take 1 Tab (20 mg total) by mouth dailyEarliest Fill Date: 2/12/18. Max Daily Amount: 20 mg, Disp: 30 Tab, Rfl: 0    [START ON 3/12/2018] methylphenidate HCl (RITALIN) 20 mg tablet, Take 1 Tab (20 mg total) by mouth dailyEarliest Fill Date: 3/12/18. Max Daily Amount: 20 mg, Disp: 30 Tab, Rfl: 0    tiZANidine (ZANAFLEX) 4 mg tablet, 4mg po TID, Disp: 90 Tab, Rfl: 6    HYDROcodone-acetaminophen (NORCO) 5-325 mg per tablet, TAKE ONE TABLET BY MOUTH EVERY 6 HOURS AS NEEDED FOR PAIN, Disp: , Rfl: 0    labetalol (NORMODYNE) 200 mg tablet, Take 1 Tab by mouth two (2) times a day., Disp: 60 Tab, Rfl: 5    ALPRAZolam (XANAX) 0.25 mg tablet, Take 1 Tab by mouth three (3) times daily as needed for Anxiety. Max Daily Amount: 0.75 mg., Disp: 30 Tab, Rfl: 1    rOPINIRole (REQUIP) 2 mg tablet, Take 2 mg by mouth nightly. for restless leg, Disp: , Rfl:     temazepam (RESTORIL) 15 mg capsule, Take 15 mg by mouth nightly as needed for Sleep., Disp: , Rfl:     polyethylene glycol (MIRALAX) 17 gram/dose powder, Take 17 g by mouth daily. , Disp: 510 g, Rfl: 2    tamsulosin (FLOMAX) 0.4 mg capsule, Take 1 Cap by mouth nightly., Disp: 30 Cap, Rfl: 4    potassium chloride (K-DUR, KLOR-CON) 20 mEq tablet, Take 1 Tab by mouth daily. , Disp: 30 Tab, Rfl: 0    acetaminophen (TYLENOL) 500 mg tablet, 1 Tab by Per NG tube route every four (4) hours as needed. , Disp: 30 Tab, Rfl: 0    losartan-hydroCHLOROthiazide (HYZAAR) 100-12.5 mg per tablet, Take 1 Tab by mouth daily. , Disp: 30 Tab, Rfl: 5            Date Last Reviewed:  1/31/2018   Complexity Level : Expanded review of therapy/medical records (1-2):  MODERATE COMPLEXITY   ASSESSMENT OF OCCUPATIONAL PERFORMANCE:   ROM:             RUE AROM  R Shoulder Flexion: 0  R Shoulder ABduction: 100  R Shoulder External Rotation: 0  R Scapular Elevation:  (75% of full range)  R Scapular Protraction:  (100% of full range)  R Elbow Flexion: 90  R Elbow Extension: 80  R Forearm Pronation: 0  R Forearm Supination:  (25% of full range)  R Wrist Flexion: 0  R Wrist Extension: 0  R Digital Opposition: 0  R Composite Fist:  (25% of full range)     Balance:             Sitting: Intact  Standing: With support  Standing - Static: Good  Standing - Dynamic : Fair     Coordination:             Fine Motor Skills-Upper: Left Intact, Right Impaired  Gross Motor Skills-Upper: Left Intact, Right Impaired     Mental Status:             Neurologic State: Alert  Orientation Level: Oriented X4  Cognition: Appropriate decision making  Perseveration: No perseveration noted  Safety/Judgement: Insight into deficits     Vision:                   Activities of Daily Living:           Basic ADLs (From Assessment) Complex ADLs (From Assessment)   Basic ADL  Feeding: Minimum assistance (Using right hand 25% of each meal)  Oral Facial Hygiene/Grooming: Minimum assistance (Using right hand 25% of time)  Bathing: Maximum assistance  Upper Body Dressing: Minimum assistance  Lower Body Dressing: Moderate assistance  Toileting: Modified independent Instrumental ADL  Meal Preparation: Total assistance  Homemaking:  Total assistance  Medication Management: Maximum assistance  Financial Management: Maximum assistance   Grooming/Bathing/Dressing Activities of Daily Living     Cognitive Retraining  Safety/Judgement: Insight into deficits                 Functional Transfers  Toilet Transfer : Modified independent  Shower Transfer: Stand-by asssistance     Bed/Mat Mobility  Rolling: Independent  Supine to Sit: Independent  Sit to Supine: Independent  Sit to Stand: Modified independent  Bed to Chair: Modified independent  Scooting: Independent     Sensation:         Light touch absent distal to right elbow     Physical Skills Involved:  1. Range of Motion  2. Balance  3. Sensation  4. Fine Motor Control  5. Gross Motor Control Cognitive Skills Affected (resulting in the inability to perform in a timely and safe manner):  1. Executive Function  2. Sustained Attention  3. Divided Attention  4. Comprehension Psychosocial Skills Affected:  1. None   Number of elements that affect the Plan of Care: 5+:  HIGH COMPLEXITY   CLINICAL DECISION MAKING:   Outcome Measure: Tool Used: [address] [facility] [address] AM-PAC Daily Activity Outpatient Short Form  Score:  Initial: 18 Most Recent: 28 (Date: 1/31/18 )   Interpretation of Tool:  Represents clinically-significant functional categories (i.e., basic and instrumental activities of daily living, fine motor activities). Score 60 59-55 54-47 46-34 32-21 20-16 15   Modifier CH CI CJ CK CL CM CN     Medical Necessity:   · Patient demonstrates good rehab potential due to higher previous functional level. Reason for Services/Other Comments:  · Patient continues to require skilled intervention due to decreased safety and independence in activities of daily living. Clinical Decision-Making Assessment: Moderately difficult to predict patient's progress at this time due to the extent of her limitations in functional movement and use of dominant right upper extremity.     Assessment process, impact of co-morbidities, assessment modification\need for assistance, and selection of interventions: Analytical Complexity:MODERATE COMPLEXITY   TREATMENT:   (In addition to Assessment/Re-Assessment sessions the following treatments were rendered)    Pre-treatment Symptoms/Complaints:  Patient states, \"I am going to keep finding new ways to use my right hand at home. \"  Pain: Initial:   Pain Intensity 1: 0  Post Session:  0/10     Therapeutic Exercise: (  35 minutes):  Exercises per grid below to improve dynamic movement of arm - right and hand - right to improve functional lifting, carrying, reaching and grasping. Required moderate visual and verbal cues to promote proper body mechanics. Progressed range, repetitions and complexity of movement as indicated.        Date:  1/5/18 Date:  1/15/18 Date:  1/19/18 Date:  1/22/18 Date:  1/24/18 Date:  1/26/18 Date:  1/29/18 Date:  1/31/18   Activity/Exercise Parameters Parameters         Shoulder shrugs AROM  1 x 10 AROM  1 x 10 AROM  1 x 10 AROM  1 x 10 AROM  1 x 10  AROM  1 x 10 AROM  1 x 5   Scapular protraction/retraction AROM  1 x 10 AROM  1 x 10 AROM  1 x 10 AROM  1 x 10 AROM  1 x 10 3 x 10 with red theratubing - seated rows AROM  1 x 10 AROM  1 x 5   Shoulder abduction        AROM  1 x 5   Elbow flexion/extension  AROM with assist to fully extend x 10 reps AROM with assist to fully extend x 10 reps in supine  AROM with assist to fully extend x 10 reps   AROM  1 x 5   PNF D1/D2 diagonals       X 5 reps    Hand helper  15 pounds x 25 reps with assist to extend fingers 15 pounds x 25 reps with assist to extend fingers 15 pounds x 25 reps with assist to extend fingers  15 pounds x 25 reps with assist to extend fingers  15 pounds x 25 reps with assist to extend fingers   Tabletop skateboard  X 3 mintues in all planes of motion         Shoulder flexion/extension SROM in supine  2 x 10 Seated x 10 reps  SROM Seated x 10 reps  SROM   2 x 10 with red theratubing - seated  Seated x 10 reps  SROM    Chest press Holding cane in supine   2 x 10          UBE Min assist to sustain right  on handle   Level 0  7 mintues Min assist to sustain right  on handle   Level 0  7 mintues Min assist to sustain right  on handle   Level 0  7 mintues Min assist to sustain right  on handle   Level 0  5 mintues  Min assist to sustain right  on handle   Level 0  5 mintues Min assist to sustain right  on handle   Level 0  5 mintues    Modified push-ups  Hands on countertop with dycem under right hand  2 x 10 Hands on countertop with dycem under right hand  2 x 10 Hands on countertop with dycem under right hand  2 x 10 Hands on large therapy ball on mat  2 x 10 Hands on large therapy ball on mat  1 x 10 Hands on countertop with dycem under right hand  3 x 10     Resistive clothespin  Yellow  1 x 15  Issued for HEP Yellow  1 x 15 Yellow  1 x 15  Yellow  1 x 10 Yellow  1 x 10 Yellow  1 x 10   Shoulder arc   5 rings with assist to grasp ring initially     5 rings with assist from right hand to grasp ring initially 7 rings with assist from right hand to grasp ring initially   Wrist flexion/extension      PROM  1 x 10 with stretch at end range of extension         Access Code: [email or web address]   URL: [email or web address]   Date: 01/19/2018   Prepared by: Scotty Phillips     Exercises   Supine Shoulder Flexion PROM - 10 reps - 2 sets - 3 hold - 1x daily - 5x weekly   Push-Up on Counter - 10 reps - 2 sets - 1x daily - 5x weekly   Seated Shoulder Shrugs - 10 reps - 2 sets - 1x daily - 5x weekly   Seated Scapular Retraction - 10 reps - 2 sets - 1x daily - 5x weekly   Seated Weight Shifting with Arm Support - 10 reps - 2 sets - 1x daily - 5x weekly   Seated Shoulder Flexion Self PROM - 10 reps - 2 sets - 1x daily - 5x weekly   Supine Elbow Flexion Extension AROM - 10 reps - 2 sets - 1x daily - 5x weekly   Seated Elbow Flexion Extension AROM - 10 reps - 2 sets - 1x daily - 5x weekly     Self Care: (10 minutes): Procedure(s) (per grid) utilized to improve and/or restore self-care/home management as related to dressing, bathing and self feeding. Required minimal verbal cueing to facilitate activities of daily living skills.   Patient educated re: use of right upper extremity in self care activities; she is now using right hand to eat 50% of each meal. She was educated today re: increasing independence with bathing and incorporation of right upper extremity to wash chest, stomach and tops of thighs; patient agreeable to use right hand first, then left hand before having her caregiver assist with bathing. Treatment/Session Assessment:    · Response to Treatment:  Patient reports using right hand for 50% of most meals now with use of universal cuff and assistance from left hand with increasing success. Patient agreeable to begin using right hand to assist with increasing independence with bathing. · Compliance with Program/Exercises: Will assess as treatment progresses. · Recommendations/Intent for next treatment session: \"Next visit will focus on advancements to more challenging activities and reduction in assistance provided\".   Total Treatment Duration:  OT Patient Time In/Time Out  Time In: 0930  Time Out: [address], OT

## 2018-01-31 NOTE — PROGRESS NOTES
Warren Wallace  : 1955  Primary: Trent Naranjo Ppo  Secondary:  [address] at [place] [id number] [facility], [address] [place] [address],[address]  [phone]   [phone]          OUTPATIENT PHYSICAL THERAPY:Daily Note 2018    ICD-10: Treatment Diagnosis:   · Other abnormalities of gait and mobility (R26.89)  · Difficulty in walking, not elsewhere classified (R26.2)  Precautions/Allergies:   Banana; Lisinopril; and Nuts [tree nut]   Fall Risk Score: 5 (? 5 = High Risk)  MD Orders: Evaluate and Treat MEDICAL/REFERRING DIAGNOSIS:  Weakness due [facility] cerebro[facility]) [facility] [I63.9, R53.1]   DATE OF ONSET: CVA: 2017  REFERRING PHYSICIAN: Juliette Zimmer*  RETURN PHYSICIAN APPOINTMENT: TBD     INITIAL ASSESSMENT:  Ms. Larissa Jones presents with decreased mobility, decreased strength, decreased gait, and decreased balance secondary to CVA. After discussing with patient, she agreed she would benefit from physical therapy to improve above deficits. Please sign this plan of treatment if you concur. Thank you for the opportunity to serve this patient. Progress note on 2018:   Patient has attended six scheduled physical therapy appointments from 12/15/2017 to 2018. Patient is very motivated and compliant with therapy. Patient continues to experience increased tone in right ankle affecting ambulation. Patient would benefit from a trial of Botox to see if this would decrease the tone in right ankle and improve ambulation. Patient would benefit from continuing skilled physical therapy to address problems and goals. Thank you for this referral.    PROBLEM LIST (Impacting functional limitations):  1. Decreased Strength  2. Decreased Ambulation Ability/Technique  3. Decreased Balance  4. Decreased Activity Tolerance INTERVENTIONS PLANNED:  1. Balance Exercise  2. Gait Training  3. Home Exercise Program (HEP)  4.  Neuromuscular Re-education/Strengthening  5. Range of Motion (ROM)  6. Therapeutic Exercise/Strengthening   TREATMENT PLAN:  Effective Dates: 12/15/2017 TO 2/15/2018. Frequency/Duration: 2 times a week for 8 weeks  GOALS: (Goals have been discussed and agreed upon with patient.)  Short-Term Functional Goals: Time Frame: 3 weeks  1. Patient will be independent with home exercise program without exacerbation of symptoms or cueing needed. Goal met. Discharge Goals: Time Frame: 8 weeks  1. Patient will be independent with all ADLs with minimal fear of falling and loss of balance with daily tasks. Goal ongoing. 2. Patient will report no fear avoidance with social or recreational activities due to fear of falling. Goal ongoing. 3. Patient will score greater than or equal to 45/56 on Gipson Balance Scale with minimal effect of imbalance on patient's ability to manage every day life activities. Goal ongoing. Rehabilitation Potential For Stated Goals: Good  Regarding Pericojay Cates Iraheta's therapy, I certify that the treatment plan above will be carried out by a therapist or under their direction. Thank you for this referral,  Donita Hu PT     Referring Physician Signature: Juliette Chang*              Date                    The information in this section was collected on 12/15/2017 (except where otherwise noted). HISTORY:   History of Present Injury/Illness (Reason for Referral):  Patient reports she had a severe stroke on 9/6/2017. She reports that she was in the hospital and [facility] for about 61-62 days. She reports that she has progressed really well, but is still having trouble walking and using her right side (UE and LE). She reports that she walks using her AFO and quad cane all the time. She reports that she tries to be as active as possible. She reports that she is scared that she is going to fall, even though she has not.   She reports she would like to work on strengthening her right leg so her balance is better and she can walk without fear of falling. Past Medical History/Comorbidities:   Ms. Estefania Buchanan  has a past medical history of Anxiety; CVA (cerebral vascular accident) ([facility] (2017); Depression; HTN (hypertension); Menopause; and PTE [facility] throm[facility]embolism) [facility]). Ms. Estefania Buchanan  has a past surgical history that includes hx jennifer and bso (); hx  section; hx refractive surgery (); hx other surgical (); and hx other surgical (). Social History/Living Environment:   Home Environment: Private residence  Living Alone: No  Support Systems: Family member(s), Friends \ neighbors; Spouse  Prior Level of Function/Work/Activity:  Independent  Dominant Side:         RIGHT  Personal Factors:          Sex:  female        Age:  60 y.o. Current Medications:       Current Outpatient Prescriptions:     FLUoxetine (PROZAC) 20 mg tablet, Take 2 Tabs by mouth daily. , Disp: 60 Tab, Rfl: 3    methylphenidate HCl (RITALIN) 20 mg tablet, Take 1 Tab (20 mg total) by mouth daily. Max Daily Amount: 20 mg, Disp: 30 Tab, Rfl: 0    [START ON 2018] methylphenidate HCl (RITALIN) 20 mg tablet, Take 1 Tab (20 mg total) by mouth dailyEarliest Fill Date: 18. Max Daily Amount: 20 mg, Disp: 30 Tab, Rfl: 0    [START ON 3/12/2018] methylphenidate HCl (RITALIN) 20 mg tablet, Take 1 Tab (20 mg total) by mouth dailyEarliest Fill Date: 3/12/18. Max Daily Amount: 20 mg, Disp: 30 Tab, Rfl: 0    tiZANidine (ZANAFLEX) 4 mg tablet, 4mg po TID, Disp: 90 Tab, Rfl: 6    HYDROcodone-acetaminophen (NORCO) 5-325 mg per tablet, TAKE ONE TABLET BY MOUTH EVERY 6 HOURS AS NEEDED FOR PAIN, Disp: , Rfl: 0    labetalol (NORMODYNE) 200 mg tablet, Take 1 Tab by mouth two (2) times a day., Disp: 60 Tab, Rfl: 5    ALPRAZolam (XANAX) 0.25 mg tablet, Take 1 Tab by mouth three (3) times daily as needed for Anxiety.  Max Daily Amount: 0.75 mg., Disp: 30 Tab, Rfl: 1    rOPINIRole (REQUIP) 2 mg tablet, Take 2 mg by mouth nightly. for restless leg, Disp: , Rfl:     temazepam (RESTORIL) 15 mg capsule, Take 15 mg by mouth nightly as needed for Sleep., Disp: , Rfl:     polyethylene glycol (MIRALAX) 17 gram/dose powder, Take 17 g by mouth daily. , Disp: 510 g, Rfl: 2    tamsulosin (FLOMAX) 0.4 mg capsule, Take 1 Cap by mouth nightly., Disp: 30 Cap, Rfl: 4    potassium chloride (K-DUR, KLOR-CON) 20 mEq tablet, Take 1 Tab by mouth daily. , Disp: 30 Tab, Rfl: 0    acetaminophen (TYLENOL) 500 mg tablet, 1 Tab by Per NG tube route every four (4) hours as needed. , Disp: 30 Tab, Rfl: 0    losartan-hydroCHLOROthiazide (HYZAAR) 100-12.5 mg per tablet, Take 1 Tab by mouth daily. , Disp: 30 Tab, Rfl: 5   Date Last Reviewed:  1/31/2018    Number of Personal Factors/Comorbidities that affect the Plan of Care: 1-2: MODERATE COMPLEXITY   EXAMINATION:   Observation/Orthostatic Postural Assessment:          Posture Assessment: Rounded shoulders, Forward head   Functional Mobility:         Gait/Ambulation:     Speed/Lulu: Pace decreased (<100 feet/min)  Step Length: Left shortened, Right lengthened  Swing Pattern: Left asymmetrical, Right asymmetrical  Stance: Left increased, Right decreased  Gait Abnormalities: Antalgic, Circumduction, Foot drop, Path deviations, Trendelenburg  Ambulation - Level of Assistance: Modified independent  Assistive Device: Cane, quad  · Interventions: Verbal cues, Safety awareness training          Transfers:     Sit to Stand: Independent  Stand to Sit: Independent  Stand Pivot Transfers: Independent  · Bed to Chair: Independent          Bed Mobility:     Rolling: Independent  Supine to Sit: Independent  Sit to Supine: Independent  · Scooting: Independent    Strength:          L UE STRENGTH: 4/5 shoulder flexion, 4/5 shoulder abduction, 4/5 shoulder extension, 4/5 elbow flexion, 4/5 elbow extension.         R UE STRENGTH: 2/5 shoulder flexion, 2/5 shoulder abduction, 2/5 shoulder extension, 2/5 elbow flexion, 2/5 elbow extension. R LE STRENGTH:  4+/5 hip flexion, 4+/5 hip abduction, 4+/5 hip adduction, 4+/5 hip extension, 4+/5 knee extension, 4+/5 knee flexion, 1/5 ankle dorsiflexion, 1/5 ankle plantarflexion, 1/5 ankle inversion, 1/5 ankle eversion. Sensation:         Intact for light touch and proprioception  Postural Control & Balance:  · Gipson Balance Scale:  28/56.   (A score less than 45/56 indicates high risk of falls)      Body Structures Involved:  1. Nerves  2. Muscles Body Functions Affected:  1. Neuromusculoskeletal  2. Movement Related Activities and Participation Affected:  1. Mobility  2. Self Care   Number of elements (examined above) that affect the Plan of Care: 4+: HIGH COMPLEXITY   CLINICAL PRESENTATION:   Presentation: Evolving clinical presentation with changing clinical characteristics: MODERATE COMPLEXITY   CLINICAL DECISION MAKING:   Outcome Measure: Tool Used: Gipson Balance Scale  Score:  Initial: 28/56 Most Recent: X/56 (Date: -- )   Interpretation of Score: Each section is scored on a 0-4 scale, 0 representing the patients inability to perform the task and 4 representing independence. The scores of each section are added together for a total score of 56. The higher the patients score, the more independent the patient is. Any score below 45 indicates increased risk for falls. Score 56 55-45 44-34 33-23 22-12 11-1 0   Modifier CH CI CJ CK CL CM CN     Medical Necessity:   · Patient is expected to demonstrate progress in strength, range of motion, balance and coordination to improve safety during daily activities. · Patient demonstrates good rehab potential due to higher previous functional level. · Skilled intervention continues to be required due to decreased mobility. Reason for Services/Other Comments:  · Patient continues to demonstrate capacity to improve overall mobility which will increase independence and increase safety.    Use of outcome tool(s) and clinical judgement create a POC that gives a: Questionable prediction of patient's progress: MODERATE COMPLEXITY            TREATMENT:   (In addition to Assessment/Re-Assessment sessions the following treatments were rendered)  Pre-treatment Symptoms/Complaints:  1/31/2018: \"I'm doing well. \"  Pain: Initial: Pain Intensity 1: 0  Post Session:  0/10     NEUROMUSCULAR RE-EDUCATION: (25 minutes):  Exercise/activities per grid below to improve balance, coordination, kinesthetic sense, posture and proprioception. Required minimal verbal and manual cues to promote static and dynamic balance in standing, promote coordination of bilateral, lower extremity(s) and promote motor control of bilateral, lower extremity(s). Date:  1/26/2018 Date   1/29/18 Date   1/31/18   Activity/Exercise Parameters     Stepping over half foam  2x10 reps with right LE 2x10 reps with left LE, working on weightbearing through R LE and keeping R foot flat on floor (foot tends to invert) 2x10 reps with left LE, working on weightbearing through R LE and keeping R foot flat on floor (foot tends to invert)   Step taps X  Onto 6 inch step with L LE working on weight bearing through R LE   Step ups 4 inch  2x10 leading with right LE 6 inch  2x10 leading with right LE working on weight bearing through R LE 6 inch  x10 leading with right LE working on weight bearing through [address] X     Walking in the clinic with quad cane 175 feet in hallway 200 feet in hallway +up and down ramp                                                 THERAPEUTIC EXERCISE: (20 minutes):  Exercises per grid below to improve mobility and strength. Required minimal verbal cues to promote proper body alignment. Progressed repetitions as indicated.    Date:  1/26/2018 Date:  1/29/2018 Date:  1/31/2018   Activity/Exercise Parameters Parameters Parameters   Sit to stand from chair 2 X 10 with left leg resting on 4 inch step 2 X 10 with left leg resting on 4 inch step     Standing hip flexion X X Standing hip abduction X X    Seated LAQs 2x10 right LE  2# 2x10 right LE 2# 2x10 right LE 2#   Standing hamstrings curls  X X    Bridging 2x10 with right lower extremity 2x10 with right lower extremity 2x10 with right lower extremity, lifting L LE   Supine straight leg raise 2x10 with right lower extremity  2# 2x10 with right lower extremity 2# 2x10 with right lower extremity 2# with verbal and manual cuing to try to keep quads engaged   Supine PNF 2x10 with right lower extremity 2x10 with right lower extremity D1 and D2  2x10 each with R LE    Left sidelying hip abduction 2x10 with right lower extremity  2# 2x10 with right lower extremity 2#    L sidelying: picking up and moving R LE from in front of L foot to behind L foot (working on hip abd and ext) and stabilizing trunk   2 x 15 reps   L sidelying R clam shells X  2 x 10 reps R LE   R foot and gastrocsoleus stretching   X 8 minutes trying to get foot to neutral position   Standing minisquats in bars          [facility] Portal  Treatment/Session Assessment:    · Response to Treatment:  Patient did well activating R hip extensor and abductor muscles today. Continue to progress to tolerance. · Compliance with Program/Exercises: Compliant. · Recommendations/Intent for next treatment session: \"Next visit will focus on advancements to more challenging activities\".    Total Treatment Duration:  PT Patient Time In/Time Out  Time In: 1015  Time Out: [address]

## 2018-02-01 ENCOUNTER — APPOINTMENT (OUTPATIENT)
Dept: PHYSICAL THERAPY | Age: 63
End: 2018-02-01
Attending: PHYSICAL MEDICINE & REHABILITATION
Payer: COMMERCIAL

## 2018-02-02 ENCOUNTER — HOSPITAL ENCOUNTER (OUTPATIENT)
Dept: PHYSICAL THERAPY | Age: 63
Discharge: HOME | End: 2018-02-02
Attending: PHYSICAL MEDICINE & REHABILITATION
Payer: COMMERCIAL

## 2018-02-02 PROCEDURE — 97110 THERAPEUTIC EXERCISES: CPT

## 2018-02-02 PROCEDURE — 97112 NEUROMUSCULAR REEDUCATION: CPT

## 2018-02-02 PROCEDURE — 97535 SELF CARE MNGMENT TRAINING: CPT

## 2018-02-02 NOTE — PROGRESS NOTES
Laura Snow  : 1955  Primary: [facility]  Secondary:  [id number] at [place] [id number] [facility], [address] [place] [address],[address], [facility].  [phone]   [phone]        OUTPATIENT OCCUPATIONAL THERAPY: Daily Note 2018    ICD-10: Treatment Diagnosis: Hemiplegia and hemiparesis following cerebral infarction affecting right dominant side (I69.351)  Precautions/Allergies:   Banana; Lisinopril; and Nuts [tree nut]   Fall Risk Score: 5 (? 5 = High Risk)  MD Orders: Referral to occupational therapy MEDICAL/REFERRING DIAGNOSIS:   Cerebral infarction, unspecified [I63.9]  Weakness [R53.1]   DATE OF ONSET: 2017   REFERRING PHYSICIAN: Juliette Ferreira*  RETURN PHYSICIAN APPOINTMENT: unknown   18: Ms. Ning Alarcon is making excellent progress toward her goals; she is demonstrating improving range of motion and functional use of the right upper extremity in activities of daily living. Feel she will continue to benefit from skilled occupational therapy to maximize safety and independence with activities of daily living. INITIAL ASSESSMENT:  Ms. Ning Alarcon presents with impaired active movement, strength, coordination and sensation of the dominant right upper extremity that is affecting her ability to complete activities of daily living independently. Feel she may benefit from skilled occupational therapy to maximize safety and independence with activities of daily living. PLAN OF CARE:   PROBLEM LIST:  1. Decreased Strength  2. Decreased ADL/Functional Activities  3. Decreased Transfer Abilities  4. Decreased Balance  5. Decreased Flexibility/Joint Mobility  6. Decreased Cognition INTERVENTIONS PLANNED:  1. Activities of daily living training  2. Manual therapy training  3. Modalities  4. Neuromuscular re-eduation  5. Sensory reintegration training  6. Splinting  7. Therapeutic activity  8.  Therapeutic exercise   TREATMENT PLAN:  Effective Dates: 17 TO 3/20/18. Frequency/Duration: 3 times a week for 12 weeks  GOALS: (Goals have been discussed and agreed upon with patient.)  Short-Term Functional Goals: Time Frame: 6 weeks  1. Patient will demonstrate independence with home exercise program for right upper extremity range of motion. Met  2. Patient will increase use of right upper extremity as a functional assist in at least 25% of daily activities. Met  3. Patient will bathe with moderate assistance. Continue to address  Discharge Goals: Time Frame: 12 weeks  1. Patient will bathe with minimal assistance. Continue to address  2. Patient will feed self with modified independence and adaptive equipment as needed. Continue to address  3. Patient will dress with modified independence and adaptive equipment as needed. Continue to address  4. Patient will use right upper extremity as a functional assist in at least 50% of daily activities. Continue to address  Rehabilitation Potential For Stated Goals: Good  Regarding Emilia Iraheta's therapy, I certify that the treatment plan above will be carried out by a therapist or under their direction. Thank you for this referral,  Pawan Dodge OT     Referring Physician Signature: Juliette Pritchett* _________________________  Date _________            The information in this section was collected on 18 (except where otherwise noted). OCCUPATIONAL PROFILE & HISTORY:   History of Present Injury/Illness (Reason for Referral):  CVA with hospital and [facility] stay then home health therapy. Past Medical History/Comorbidities:   Ms. Sánchez Rodrigues  has a past medical history of Anxiety; CVA (cerebral vascular accident) ([facility] [address] (2017); Depression; HTN (hypertension); Menopause; and PTE (pulmonary thromboembolism) ([facility] [address] (2017). Ms. Sánchez Rodrigues  has a past surgical history that includes hx jennifer and bso (); hx  section; hx refractive surgery (); hx other surgical (); and hx other surgical ().   Social History/Living Environment:      Prior Level of Function/Work/Activity:  Does seasonal taxes at [employer]  Dominant Side:         RIGHT  Current Medications:    Current Outpatient Prescriptions:     FLUoxetine (PROZAC) 20 mg tablet, Take 2 Tabs by mouth daily. , Disp: 60 Tab, Rfl: 3    methylphenidate HCl (RITALIN) 20 mg tablet, Take 1 Tab (20 mg total) by mouth daily. Max Daily Amount: 20 mg, Disp: 30 Tab, Rfl: 0    [START ON 2/12/2018] methylphenidate HCl (RITALIN) 20 mg tablet, Take 1 Tab (20 mg total) by mouth dailyEarliest Fill Date: 2/12/18. Max Daily Amount: 20 mg, Disp: 30 Tab, Rfl: 0    [START ON 3/12/2018] methylphenidate HCl (RITALIN) 20 mg tablet, Take 1 Tab (20 mg total) by mouth dailyEarliest Fill Date: 3/12/18. Max Daily Amount: 20 mg, Disp: 30 Tab, Rfl: 0    tiZANidine (ZANAFLEX) 4 mg tablet, 4mg po TID, Disp: 90 Tab, Rfl: 6    HYDROcodone-acetaminophen (NORCO) 5-325 mg per tablet, TAKE ONE TABLET BY MOUTH EVERY 6 HOURS AS NEEDED FOR PAIN, Disp: , Rfl: 0    labetalol (NORMODYNE) 200 mg tablet, Take 1 Tab by mouth two (2) times a day., Disp: 60 Tab, Rfl: 5    ALPRAZolam (XANAX) 0.25 mg tablet, Take 1 Tab by mouth three (3) times daily as needed for Anxiety. Max Daily Amount: 0.75 mg., Disp: 30 Tab, Rfl: 1    rOPINIRole (REQUIP) 2 mg tablet, Take 2 mg by mouth nightly. for restless leg, Disp: , Rfl:     temazepam (RESTORIL) 15 mg capsule, Take 15 mg by mouth nightly as needed for Sleep., Disp: , Rfl:     polyethylene glycol (MIRALAX) 17 gram/dose powder, Take 17 g by mouth daily. , Disp: 510 g, Rfl: 2    tamsulosin (FLOMAX) 0.4 mg capsule, Take 1 Cap by mouth nightly., Disp: 30 Cap, Rfl: 4    potassium chloride (K-DUR, KLOR-CON) 20 mEq tablet, Take 1 Tab by mouth daily. , Disp: 30 Tab, Rfl: 0    acetaminophen (TYLENOL) 500 mg tablet, 1 Tab by Per NG tube route every four (4) hours as needed. , Disp: 30 Tab, Rfl: 0    losartan-hydroCHLOROthiazide (HYZAAR) 100-12.5 mg per tablet, Take 1 Tab by mouth daily. , Disp: 30 Tab, Rfl: 5            Date Last Reviewed:  2/2/2018   Complexity Level : Expanded review of therapy/medical records (1-2):  MODERATE COMPLEXITY   ASSESSMENT OF OCCUPATIONAL PERFORMANCE:   ROM:                   Balance:                   Coordination:                   Mental Status:                   Vision:                   Activities of Daily Living:           Basic ADLs (From Assessment) Complex ADLs (From Assessment)         Grooming/Bathing/Dressing Activities of Daily Living                                   Sensation:         Light touch absent distal to right elbow     Physical Skills Involved:  1. Range of Motion  2. Balance  3. Sensation  4. Fine Motor Control  5. Gross Motor Control Cognitive Skills Affected (resulting in the inability to perform in a timely and safe manner):  1. Executive Function  2. Sustained Attention  3. Divided Attention  4. Comprehension Psychosocial Skills Affected:  1. None   Number of elements that affect the Plan of Care: 5+:  HIGH COMPLEXITY   CLINICAL DECISION MAKING:   Outcome Measure: Tool Used: [address] [facility] [address] AM-[facility] Daily Activity Outpatient Short Form  Score:  Initial: 18 Most Recent: 28 (Date: 1/31/18 )   Interpretation of Tool:  Represents clinically-significant functional categories (i.e., basic and instrumental activities of daily living, fine motor activities). Score 60 59-55 54-47 46-34 32-21 20-16 15   Modifier CH CI CJ CK CL CM CN     Medical Necessity:   · Patient demonstrates good rehab potential due to higher previous functional level. Reason for Services/Other Comments:  · Patient continues to require skilled intervention due to decreased safety and independence in activities of daily living. Clinical Decision-Making Assessment: Moderately difficult to predict patient's progress at this time due to the extent of her limitations in functional movement and use of dominant right upper extremity.     Assessment process, impact of co-morbidities, assessment modification\need for assistance, and selection of interventions: Analytical Complexity:MODERATE COMPLEXITY   TREATMENT:   (In addition to Assessment/Re-Assessment sessions the following treatments were rendered)    Pre-treatment Symptoms/Complaints:  Patient states, \"I will try using this foam handle at home. \"  Pain: Initial:   Pain Intensity 1: 0  Post Session:  0/10     Therapeutic Exercise: (  30 minutes):  Exercises per grid below to improve dynamic movement of arm - right and hand - right to improve functional lifting, carrying, reaching and grasping. Required moderate visual and verbal cues to promote proper body mechanics. Progressed range, repetitions and complexity of movement as indicated.        Date:  1/15/18 Date:  1/19/18 Date:  1/22/18 Date:  1/24/18 Date:  1/26/18 Date:  1/29/18 Date:  1/31/18 Date:  2/2/18   Activity/Exercise Parameters          Shoulder shrugs AROM  1 x 10 AROM  1 x 10 AROM  1 x 10 AROM  1 x 10  AROM  1 x 10 AROM  1 x 5 AROM  1 x 5   Scapular protraction/retraction AROM  1 x 10 AROM  1 x 10 AROM  1 x 10 AROM  1 x 10 3 x 10 with red theratubing - seated rows AROM  1 x 10 AROM  1 x 5 3 x 10 with red theratubing - seated rows   Shoulder abduction       AROM  1 x 5    Elbow flexion/extension AROM with assist to fully extend x 10 reps AROM with assist to fully extend x 10 reps in supine  AROM with assist to fully extend x 10 reps   AROM  1 x 5 AROM with assist to fully extend x 10 reps   PNF D1/D2 diagonals      X 5 reps     Hand helper 15 pounds x 25 reps with assist to extend fingers 15 pounds x 25 reps with assist to extend fingers 15 pounds x 25 reps with assist to extend fingers  15 pounds x 25 reps with assist to extend fingers  15 pounds x 25 reps with assist to extend fingers 15 pounds x 10 reps with assist to extend fingers   Tabletop skateboard X 3 mintues in all planes of motion          Shoulder flexion/extension Seated x 10 reps  SROM Seated x 10 reps  SROM   2 x 10 with red theratubing - seated  Seated x 10 reps  SROM     Chest press           UBE Min assist to sustain right  on handle   Level 0  7 mintues Min assist to sustain right  on handle   Level 0  7 mintues Min assist to sustain right  on handle   Level 0  5 mintues  Min assist to sustain right  on handle   Level 0  5 mintues Min assist to sustain right  on handle   Level 0  5 mintues  Min assist to sustain right  on handle   Level 0  5 mintues   Modified push-ups  Hands on countertop with dycem under right hand  2 x 10 Hands on countertop with dycem under right hand  2 x 10 Hands on large therapy ball on mat  2 x 10 Hands on large therapy ball on mat  1 x 10 Hands on countertop with dycem under right hand  3 x 10   Hands on countertop with dycem under right hand  2 x 10   Resistive clothespin Yellow  1 x 15  Issued for HEP Yellow  1 x 15 Yellow  1 x 15  Yellow  1 x 10 Yellow  1 x 10 Yellow  1 x 10 Yellow  1 x 10   Shoulder arc  5 rings with assist to grasp ring initially     5 rings with assist from right hand to grasp ring initially 7 rings with assist from right hand to grasp ring initially    Wrist flexion/extension     PROM  1 x 10 with stretch at end range of extension          Access Code: [email or web address]   URL: [email or web address]   Date: 01/19/2018   Prepared by: Saurav Canales     Exercises   Supine Shoulder Flexion PROM - 10 reps - 2 sets - 3 hold - 1x daily - 5x weekly   Push-Up on Counter - 10 reps - 2 sets - 1x daily - 5x weekly   Seated Shoulder Shrugs - 10 reps - 2 sets - 1x daily - 5x weekly   Seated Scapular Retraction - 10 reps - 2 sets - 1x daily - 5x weekly   Seated Weight Shifting with Arm Support - 10 reps - 2 sets - 1x daily - 5x weekly   Seated Shoulder Flexion Self PROM - 10 reps - 2 sets - 1x daily - 5x weekly   Supine Elbow Flexion Extension AROM - 10 reps - 2 sets - 1x daily - 5x weekly   Seated Elbow Flexion Extension AROM - 10 reps - 2 sets - 1x daily - 5x weekly     Self Care: (15 minutes): Procedure(s) (per grid) utilized to improve and/or restore self-care/home management as related to dressing, bathing and self feeding. Required minimal verbal cueing to facilitate activities of daily living skills. Patient educated re: use of right upper extremity in self care activities; she is now using right hand to eat 50% of each meal. She was educated today re: use of red foam handle on fork and toothbrush to begin using active  during eating and grooming activities. She was able to grasp handle and bring to her mouth x 5 reps. She then simulated brushing her hair holding hairbrush with right hand, supported by left hand. Treatment/Session Assessment:    · Response to Treatment:  Patient reports using right hand for 50% of most meals now with use of universal cuff and assistance from left hand with increasing success. Patient will work on using foam handle on utensils to feed self next week. Patient will not have OT next week secondary to therapist being out of town; she is agreeable to work on all home exercises and activities next week. · Compliance with Program/Exercises: Will assess as treatment progresses. · Recommendations/Intent for next treatment session: \"Next visit will focus on advancements to more challenging activities and reduction in assistance provided\".   Total Treatment Duration:  OT Patient Time In/Time Out  Time In: 1120  Time Out: [facility],

## 2018-02-02 NOTE — PROGRESS NOTES
Phillip Alexander  : 1955  Primary: Jake Onofre Ppo  Secondary:  [address] at [address], [address] [place] [address],[address], [facility] [facility].  [phone]   [phone]          OUTPATIENT PHYSICAL THERAPY:Daily Note 2018    ICD-10: Treatment Diagnosis:   · Other abnormalities of gait and mobility (R26.89)  · Difficulty in walking, not elsewhere classified (R26.2)  Precautions/Allergies:   Banana; Lisinopril; and Nuts [tree nut]   Fall Risk Score: 5 (? 5 = High Risk)  MD Orders: Evaluate and Treat MEDICAL/REFERRING DIAGNOSIS:  Weakness due to cerebrovascular accident (CVA) ([facility] [I63.9, R53.1]   DATE OF ONSET: CVA: 2017  REFERRING PHYSICIAN: Juliette Anglin*  RETURN PHYSICIAN APPOINTMENT: TBD     INITIAL ASSESSMENT:  Ms. Merced Adams presents with decreased mobility, decreased strength, decreased gait, and decreased balance secondary to CVA. After discussing with patient, she agreed she would benefit from physical therapy to improve above deficits. Please sign this plan of treatment if you concur. Thank you for the opportunity to serve this patient. Progress note on 2018:   Patient has attended six scheduled physical therapy appointments from 12/15/2017 to 2018. Patient is very motivated and compliant with therapy. Patient continues to experience increased tone in right ankle affecting ambulation. Patient would benefit from a trial of Botox to see if this would decrease the tone in right ankle and improve ambulation. Patient would benefit from continuing skilled physical therapy to address problems and goals. Thank you for this referral.    PROBLEM LIST (Impacting functional limitations):  1. Decreased Strength  2. Decreased Ambulation Ability/Technique  3. Decreased Balance  4. Decreased Activity Tolerance INTERVENTIONS PLANNED:  1. Balance Exercise  2. Gait Training  3. Home Exercise Program (HEP)  4.  Neuromuscular Re-education/Strengthening  5. Range of Motion (ROM)  6. Therapeutic Exercise/Strengthening   TREATMENT PLAN:  Effective Dates: 12/15/2017 TO 2/15/2018. Frequency/Duration: 2-3 times a week for 8 weeks  GOALS: (Goals have been discussed and agreed upon with patient.)  Short-Term Functional Goals: Time Frame: 3 weeks  1. Patient will be independent with home exercise program without exacerbation of symptoms or cueing needed. Goal met. Discharge Goals: Time Frame: 8 weeks  1. Patient will be independent with all ADLs with minimal fear of falling and loss of balance with daily tasks. Goal ongoing. 2. Patient will report no fear avoidance with social or recreational activities due to fear of falling. Goal ongoing. 3. Patient will score greater than or equal to 45/56 on Gipson Balance Scale with minimal effect of imbalance on patient's ability to manage every day life activities. Goal ongoing. Rehabilitation Potential For Stated Goals: Good  Regarding Dread Iraheta's therapy, I certify that the treatment plan above will be carried out by a therapist or under their direction. Thank you for this referral,  Mary Ann Barraza PT     Referring Physician Signature: Juliette Bello*              Date                    The information in this section was collected on 12/15/2017 (except where otherwise noted). HISTORY:   History of Present Injury/Illness (Reason for Referral):  Patient reports she had a severe stroke on 9/6/2017. She reports that she was in the hospital and [facility] for about 61-62 days. She reports that she has progressed really well, but is still having trouble walking and using her right side (UE and LE). She reports that she walks using her AFO and quad cane all the time. She reports that she tries to be as active as possible. She reports that she is scared that she is going to fall, even though she has not.   She reports she would like to work on strengthening her right leg so her balance is better and she can walk without fear of falling. Past Medical History/Comorbidities:   Ms. Sánchez Rodrigues  has a past medical history of Anxiety; CVA (cerebral vascular accident) ([facility] (2017); Depression; HTN (hypertension); Menopause; and PTE (pulmonary thromboembolism) ([facility] (2017). Ms. Sánchez Rodrigues  has a past surgical history that includes hx jennifer and bso (); hx  section; hx refractive surgery (); hx other surgical (); and hx other surgical (). Social History/Living Environment:   Home Environment: Private residence  Living Alone: No  Support Systems: Family member(s), Friends \ neighbors; Spouse  Prior Level of Function/Work/Activity:  Independent  Dominant Side:         RIGHT  Personal Factors:          Sex:  female        Age:  60 y.o. Current Medications:       Current Outpatient Prescriptions:     FLUoxetine (PROZAC) 20 mg tablet, Take 2 Tabs by mouth daily. , Disp: 60 Tab, Rfl: 3    methylphenidate HCl (RITALIN) 20 mg tablet, Take 1 Tab (20 mg total) by mouth daily. Max Daily Amount: 20 mg, Disp: 30 Tab, Rfl: 0    [START ON 2018] methylphenidate HCl (RITALIN) 20 mg tablet, Take 1 Tab (20 mg total) by mouth dailyEarliest Fill Date: 18. Max Daily Amount: 20 mg, Disp: 30 Tab, Rfl: 0    [START ON 3/12/2018] methylphenidate HCl (RITALIN) 20 mg tablet, Take 1 Tab (20 mg total) by mouth dailyEarliest Fill Date: 3/12/18. Max Daily Amount: 20 mg, Disp: 30 Tab, Rfl: 0    tiZANidine (ZANAFLEX) 4 mg tablet, 4mg po TID, Disp: 90 Tab, Rfl: 6    HYDROcodone-acetaminophen (NORCO) 5-325 mg per tablet, TAKE ONE TABLET BY MOUTH EVERY 6 HOURS AS NEEDED FOR PAIN, Disp: , Rfl: 0    labetalol (NORMODYNE) 200 mg tablet, Take 1 Tab by mouth two (2) times a day., Disp: 60 Tab, Rfl: 5    ALPRAZolam (XANAX) 0.25 mg tablet, Take 1 Tab by mouth three (3) times daily as needed for Anxiety.  Max Daily Amount: 0.75 mg., Disp: 30 Tab, Rfl: 1    rOPINIRole (REQUIP) 2 mg tablet, Take 2 mg by mouth nightly. for restless leg, Disp: , Rfl:     temazepam (RESTORIL) 15 mg capsule, Take 15 mg by mouth nightly as needed for Sleep., Disp: , Rfl:     polyethylene glycol (MIRALAX) 17 gram/dose powder, Take 17 g by mouth daily. , Disp: 510 g, Rfl: 2    tamsulosin (FLOMAX) 0.4 mg capsule, Take 1 Cap by mouth nightly., Disp: 30 Cap, Rfl: 4    potassium chloride (K-DUR, KLOR-CON) 20 mEq tablet, Take 1 Tab by mouth daily. , Disp: 30 Tab, Rfl: 0    acetaminophen (TYLENOL) 500 mg tablet, 1 Tab by Per NG tube route every four (4) hours as needed. , Disp: 30 Tab, Rfl: 0    losartan-hydroCHLOROthiazide (HYZAAR) 100-12.5 mg per tablet, Take 1 Tab by mouth daily. , Disp: 30 Tab, Rfl: 5   Date Last Reviewed:  2/2/2018    Number of Personal Factors/Comorbidities that affect the Plan of Care: 1-2: MODERATE COMPLEXITY   EXAMINATION:   Observation/Orthostatic Postural Assessment:          Posture Assessment: Rounded shoulders, Forward head   Functional Mobility:         Gait/Ambulation:     Speed/Lulu: Pace decreased (<100 feet/min)  Step Length: Left shortened, Right lengthened  Swing Pattern: Left asymmetrical, Right asymmetrical  Stance: Left increased, Right decreased  Gait Abnormalities: Antalgic, Circumduction, Foot drop, Path deviations, Trendelenburg  Ambulation - Level of Assistance: Modified independent  Assistive Device: Cane, quad  · Interventions: Verbal cues, Safety awareness training          Transfers:     Sit to Stand: Independent  Stand to Sit: Independent  Stand Pivot Transfers: Independent  · Bed to Chair: Independent          Bed Mobility:     Rolling: Independent  Supine to Sit: Independent  Sit to Supine: Independent  · Scooting: Independent    Strength:          L UE STRENGTH: 4/5 shoulder flexion, 4/5 shoulder abduction, 4/5 shoulder extension, 4/5 elbow flexion, 4/5 elbow extension.         R UE STRENGTH: 2/5 shoulder flexion, 2/5 shoulder abduction, 2/5 shoulder extension, 2/5 elbow flexion, 2/5 elbow extension. R LE STRENGTH:  4+/5 hip flexion, 4+/5 hip abduction, 4+/5 hip adduction, 4+/5 hip extension, 4+/5 knee extension, 4+/5 knee flexion, 1/5 ankle dorsiflexion, 1/5 ankle plantarflexion, 1/5 ankle inversion, 1/5 ankle eversion. Sensation:         Intact for light touch and proprioception  Postural Control & Balance:  · Gipson Balance Scale:  28/56.   (A score less than 45/56 indicates high risk of falls)      Body Structures Involved:  1. Nerves  2. Muscles Body Functions Affected:  1. Neuromusculoskeletal  2. Movement Related Activities and Participation Affected:  1. Mobility  2. Self Care   Number of elements (examined above) that affect the Plan of Care: 4+: HIGH COMPLEXITY   CLINICAL PRESENTATION:   Presentation: Evolving clinical presentation with changing clinical characteristics: MODERATE COMPLEXITY   CLINICAL DECISION MAKING:   Outcome Measure: Tool Used: Gipson Balance Scale  Score:  Initial: 28/56 Most Recent: X/56 (Date: -- )   Interpretation of Score: Each section is scored on a 0-4 scale, 0 representing the patients inability to perform the task and 4 representing independence. The scores of each section are added together for a total score of 56. The higher the patients score, the more independent the patient is. Any score below 45 indicates increased risk for falls. Score 56 55-45 44-34 33-23 22-12 11-1 0   Modifier CH CI CJ CK CL CM CN     Medical Necessity:   · Patient is expected to demonstrate progress in strength, range of motion, balance and coordination to improve safety during daily activities. · Patient demonstrates good rehab potential due to higher previous functional level. · Skilled intervention continues to be required due to decreased mobility. Reason for Services/Other Comments:  · Patient continues to demonstrate capacity to improve overall mobility which will increase independence and increase safety.    Use of outcome tool(s) and clinical judgement create a POC that gives a: Questionable prediction of patient's progress: MODERATE COMPLEXITY            TREATMENT:   (In addition to Assessment/Re-Assessment sessions the following treatments were rendered)  Pre-treatment Symptoms/Complaints:  2/2/2018: Patient has no specific complaints. Pain: Initial: Pain Intensity 1: 0  Post Session:  0/10     NEUROMUSCULAR RE-EDUCATION: (25 minutes):  Exercise/activities per grid below to improve balance, coordination, kinesthetic sense, posture and proprioception. Required minimal verbal and manual cues to promote static and dynamic balance in standing, promote coordination of bilateral, lower extremity(s) and promote motor control of bilateral, lower extremity(s). Date:  2/2/2018 Date   1/29/18 Date   1/31/18   Activity/Exercise Parameters     Stepping over half foam  2x10 reps with right LE 2x10 reps with left LE, working on weightbearing through R LE and keeping R foot flat on floor (foot tends to invert) 2x10 reps with left LE, working on weightbearing through R LE and keeping R foot flat on floor (foot tends to invert)   Step taps X  Onto 6 inch step with L LE working on weight bearing through R LE   Step ups 6 inch  2x10 leading with right LE 6 inch  2x10 leading with right LE working on weight bearing through R LE 6 inch  x10 leading with right LE working on weight bearing through [address] X     Walking in the clinic with quad cane 200 feet in hallway +up and down ramp 200 feet in hallway +up and down ramp                                                 THERAPEUTIC EXERCISE: (20 minutes):  Exercises per grid below to improve mobility and strength. Required minimal verbal cues to promote proper body alignment. Progressed repetitions as indicated.    Date:  2/2/2018 Date:  1/29/2018 Date:  1/31/2018   Activity/Exercise Parameters Parameters Parameters   Sit to stand from chair X 2 X 10 with left leg resting on 4 inch step     Standing hip flexion X X    Standing hip abduction X X    Seated LAQs 2x10 right LE  2# 2x10 right LE 2# 2x10 right LE 2#   Standing hamstrings curls  X X    Bridging 2x10 with right lower extremity 2x10 with right lower extremity 2x10 with right lower extremity, lifting L LE   Supine straight leg raise 2x10 with right lower extremity  2# 2x10 with right lower extremity 2# 2x10 with right lower extremity 2# with verbal and manual cuing to try to keep quads engaged   Supine PNF  2x10 with right lower extremity D1 and D2  2x10 each with R LE    Left sidelying hip abduction  2x10 with right lower extremity 2#    L sidelying: picking up and moving R LE from in front of L foot to behind L foot (working on hip abd and ext) and stabilizing trunk 2x10 reps  2 x 15 reps   L sidelying R clam shells X  2 x 10 reps R LE   R foot and gastrocsoleus stretching X 8 minutes trying to get foot to neutral position  X 8 minutes trying to get foot to neutral position   Standing minisquats in bars          [employer] Portal  Treatment/Session Assessment:    · Response to Treatment:  Patient tolerated exercises without complaints. Patient reports right ankle felt better after stretching. Continue to progress to tolerance. · Compliance with Program/Exercises: Compliant. · Recommendations/Intent for next treatment session: \"Next visit will focus on advancements to more challenging activities\".    Total Treatment Duration:  PT Patient Time In/Time Out  Time In: 1030  Time Out: [address], PT

## 2018-02-05 ENCOUNTER — HOSPITAL ENCOUNTER (OUTPATIENT)
Dept: PHYSICAL THERAPY | Age: 63
Discharge: HOME | End: 2018-02-05
Attending: PHYSICAL MEDICINE & REHABILITATION
Payer: COMMERCIAL

## 2018-02-05 PROCEDURE — 97110 THERAPEUTIC EXERCISES: CPT

## 2018-02-05 PROCEDURE — 97112 NEUROMUSCULAR REEDUCATION: CPT

## 2018-02-05 NOTE — PROGRESS NOTES
Warren Wallace  : 1955  Primary: Trent Naranjo Ppo  Secondary:  [address] at [facility], [address] [facility], [address]  [phone]   [phone]          OUTPATIENT PHYSICAL THERAPY:Daily Note 2018    ICD-10: Treatment Diagnosis:   · Other abnormalities of gait and mobility (R26.89)  · Difficulty in walking, not elsewhere classified (R26.2)  Precautions/Allergies:   Banana; Lisinopril; and Nuts [tree nut]   Fall Risk Score: 5 (? 5 = High Risk)  MD Orders: Evaluate and Treat MEDICAL/REFERRING DIAGNOSIS:  Weakness due [facility] [I63.9, R53.1]   DATE OF ONSET: CVA: 2017  REFERRING PHYSICIAN: Juliette Zimmer*  RETURN PHYSICIAN APPOINTMENT: TBD     INITIAL ASSESSMENT:  Ms. Larissa Jones presents with decreased mobility, decreased strength, decreased gait, and decreased balance secondary to CVA. After discussing with patient, she agreed she would benefit from physical therapy to improve above deficits. Please sign this plan of treatment if you concur. Thank you for the opportunity to serve this patient. Progress note on 2018:   Patient has attended six scheduled physical therapy appointments from 12/15/2017 to 2018. Patient is very motivated and compliant with therapy. Patient continues to experience increased tone in right ankle affecting ambulation. Patient would benefit from a trial of Botox to see if this would decrease the tone in right ankle and improve ambulation. Patient would benefit from continuing skilled physical therapy to address problems and goals. Thank you for this referral.    PROBLEM LIST (Impacting functional limitations):  1. Decreased Strength  2. Decreased Ambulation Ability/Technique  3. Decreased Balance  4. Decreased Activity Tolerance INTERVENTIONS PLANNED:  1. Balance Exercise  2. Gait Training  3. Home Exercise Program (HEP)  4.  Neuromuscular Re-education/Strengthening  5. Range of Motion (ROM)  6. Therapeutic Exercise/Strengthening   TREATMENT PLAN:  Effective Dates: 12/15/2017 TO 2/15/2018. Frequency/Duration: 2-3 times a week for 8 weeks  GOALS: (Goals have been discussed and agreed upon with patient.)  Short-Term Functional Goals: Time Frame: 3 weeks  1. Patient will be independent with home exercise program without exacerbation of symptoms or cueing needed. Goal met. Discharge Goals: Time Frame: 8 weeks  1. Patient will be independent with all ADLs with minimal fear of falling and loss of balance with daily tasks. Goal ongoing. 2. Patient will report no fear avoidance with social or recreational activities due to fear of falling. Goal ongoing. 3. Patient will score greater than or equal to 45/56 on Gipson Balance Scale with minimal effect of imbalance on patient's ability to manage every day life activities. Goal ongoing. Rehabilitation Potential For Stated Goals: Good  Regarding Araceli Iraheta's therapy, I certify that the treatment plan above will be carried out by a therapist or under their direction. Thank you for this referral,  Sadie Liriano PT     Referring Physician Signature: Juliette Alexandra*              Date                    The information in this section was collected on 12/15/2017 (except where otherwise noted). HISTORY:   History of Present Injury/Illness (Reason for Referral):  Patient reports she had a severe stroke on 9/6/2017. She reports that she was in the hospital and [facility] for about 61-62 days. She reports that she has progressed really well, but is still having trouble walking and using her right side (UE and LE). She reports that she walks using her AFO and quad cane all the time. She reports that she tries to be as active as possible. She reports that she is scared that she is going to fall, even though she has not.   She reports she would like to work on strengthening her right leg so her balance is better and she can walk without fear of falling. Past Medical History/Comorbidities:   Ms. Ning Alarcon  has a past medical history of Anxiety; CVA (cerebral vascular accident) ([facility] (2017); Depression; HTN (hypertension); Menopause; and PTE [facility]ol[facility]) [facility]). Ms. Ning Alarcon  has a past surgical history that includes hx jennifer and bso (); hx  section; hx refractive surgery (); hx other surgical (); and hx other surgical (). Social History/Living Environment:   Home Environment: Private residence  Living Alone: No  Support Systems: Family member(s), Friends \ neighbors; Spouse  Prior Level of Function/Work/Activity:  Independent  Dominant Side:         RIGHT  Personal Factors:          Sex:  female        Age:  60 y.o. Current Medications:       Current Outpatient Prescriptions:     FLUoxetine (PROZAC) 20 mg tablet, Take 2 Tabs by mouth daily. , Disp: 60 Tab, Rfl: 3    methylphenidate HCl (RITALIN) 20 mg tablet, Take 1 Tab (20 mg total) by mouth daily. Max Daily Amount: 20 mg, Disp: 30 Tab, Rfl: 0    [START ON 2018] methylphenidate HCl (RITALIN) 20 mg tablet, Take 1 Tab (20 mg total) by mouth dailyEarliest Fill Date: 18. Max Daily Amount: 20 mg, Disp: 30 Tab, Rfl: 0    [START ON 3/12/2018] methylphenidate HCl (RITALIN) 20 mg tablet, Take 1 Tab (20 mg total) by mouth dailyEarliest Fill Date: 3/12/18. Max Daily Amount: 20 mg, Disp: 30 Tab, Rfl: 0    tiZANidine (ZANAFLEX) 4 mg tablet, 4mg po TID, Disp: 90 Tab, Rfl: 6    HYDROcodone-acetaminophen (NORCO) 5-325 mg per tablet, TAKE ONE TABLET BY MOUTH EVERY 6 HOURS AS NEEDED FOR PAIN, Disp: , Rfl: 0    labetalol (NORMODYNE) 200 mg tablet, Take 1 Tab by mouth two (2) times a day., Disp: 60 Tab, Rfl: 5    ALPRAZolam (XANAX) 0.25 mg tablet, Take 1 Tab by mouth three (3) times daily as needed for Anxiety.  Max Daily Amount: 0.75 mg., Disp: 30 Tab, Rfl: 1    rOPINIRole (REQUIP) 2 mg tablet, Take 2 mg by mouth nightly. for restless leg, Disp: , Rfl:     temazepam (RESTORIL) 15 mg capsule, Take 15 mg by mouth nightly as needed for Sleep., Disp: , Rfl:     polyethylene glycol (MIRALAX) 17 gram/dose powder, Take 17 g by mouth daily. , Disp: 510 g, Rfl: 2    tamsulosin (FLOMAX) 0.4 mg capsule, Take 1 Cap by mouth nightly., Disp: 30 Cap, Rfl: 4    potassium chloride (K-DUR, KLOR-CON) 20 mEq tablet, Take 1 Tab by mouth daily. , Disp: 30 Tab, Rfl: 0    acetaminophen (TYLENOL) 500 mg tablet, 1 Tab by Per NG tube route every four (4) hours as needed. , Disp: 30 Tab, Rfl: 0    losartan-hydroCHLOROthiazide (HYZAAR) 100-12.5 mg per tablet, Take 1 Tab by mouth daily. , Disp: 30 Tab, Rfl: 5   Date Last Reviewed:  2/5/2018    Number of Personal Factors/Comorbidities that affect the Plan of Care: 1-2: MODERATE COMPLEXITY   EXAMINATION:   Observation/Orthostatic Postural Assessment:          Posture Assessment: Rounded shoulders, Forward head   Functional Mobility:         Gait/Ambulation:     Speed/Lulu: Pace decreased (<100 feet/min)  Step Length: Left shortened, Right lengthened  Swing Pattern: Left asymmetrical, Right asymmetrical  Stance: Left increased, Right decreased  Gait Abnormalities: Antalgic, Circumduction, Foot drop, Path deviations, Trendelenburg  Ambulation - Level of Assistance: Modified independent  Assistive Device: Cane, quad  · Interventions: Verbal cues, Safety awareness training          Transfers:     Sit to Stand: Independent  Stand to Sit: Independent  Stand Pivot Transfers: Independent  · Bed to Chair: Independent          Bed Mobility:     Rolling: Independent  Supine to Sit: Independent  Sit to Supine: Independent  · Scooting: Independent    Strength:          L UE STRENGTH: 4/5 shoulder flexion, 4/5 shoulder abduction, 4/5 shoulder extension, 4/5 elbow flexion, 4/5 elbow extension.         R UE STRENGTH: 2/5 shoulder flexion, 2/5 shoulder abduction, 2/5 shoulder extension, 2/5 elbow flexion, 2/5 elbow extension. R LE STRENGTH:  4+/5 hip flexion, 4+/5 hip abduction, 4+/5 hip adduction, 4+/5 hip extension, 4+/5 knee extension, 4+/5 knee flexion, 1/5 ankle dorsiflexion, 1/5 ankle plantarflexion, 1/5 ankle inversion, 1/5 ankle eversion. Sensation:         Intact for light touch and proprioception  Postural Control & Balance:  · Gipson Balance Scale:  28/56.   (A score less than 45/56 indicates high risk of falls)      Body Structures Involved:  1. Nerves  2. Muscles Body Functions Affected:  1. Neuromusculoskeletal  2. Movement Related Activities and Participation Affected:  1. Mobility  2. Self Care   Number of elements (examined above) that affect the Plan of Care: 4+: HIGH COMPLEXITY   CLINICAL PRESENTATION:   Presentation: Evolving clinical presentation with changing clinical characteristics: MODERATE COMPLEXITY   CLINICAL DECISION MAKING:   Outcome Measure: Tool Used: Gipson Balance Scale  Score:  Initial: 28/56 Most Recent: X/56 (Date: -- )   Interpretation of Score: Each section is scored on a 0-4 scale, 0 representing the patients inability to perform the task and 4 representing independence. The scores of each section are added together for a total score of 56. The higher the patients score, the more independent the patient is. Any score below 45 indicates increased risk for falls. Score 56 55-45 44-34 33-23 22-12 11-1 0   Modifier CH CI CJ CK CL CM CN     Medical Necessity:   · Patient is expected to demonstrate progress in strength, range of motion, balance and coordination to improve safety during daily activities. · Patient demonstrates good rehab potential due to higher previous functional level. · Skilled intervention continues to be required due to decreased mobility. Reason for Services/Other Comments:  · Patient continues to demonstrate capacity to improve overall mobility which will increase independence and increase safety.    Use of outcome tool(s) and clinical judgement create a POC that gives a: Questionable prediction of patient's progress: MODERATE COMPLEXITY            TREATMENT:   (In addition to Assessment/Re-Assessment sessions the following treatments were rendered)  Pre-treatment Symptoms/Complaints:  2/5/2018: Patient has no specific complaints. Pain: Initial: Pain Intensity 1: 0  Post Session:  0/10     NEUROMUSCULAR RE-EDUCATION: (20 minutes):  Exercise/activities per grid below to improve balance, coordination, kinesthetic sense, posture and proprioception. Required minimal verbal and manual cues to promote static and dynamic balance in standing, promote coordination of bilateral, lower extremity(s) and promote motor control of bilateral, lower extremity(s). Date:  2/2/2018 Date   2/5/18 Date   1/31/18   Activity/Exercise Parameters     Stepping over half foam  2x10 reps with right LE X 2x10 reps with left LE, working on weightbearing through R LE and keeping R foot flat on floor (foot tends to invert)   Step taps X  Onto 6 inch step with L LE working on weight bearing through R LE   Step ups 6 inch  2x10 leading with right LE 6 inch  2x10 leading with right LE working on weight bearing through R LE 6 inch  x10 leading with right LE working on weight bearing through [address] X     Walking in the clinic with quad cane 200 feet in hallway +up and down ramp 200 feet in hallway +up and down ramp                                                 THERAPEUTIC EXERCISE: (25 minutes):  Exercises per grid below to improve mobility and strength. Required minimal verbal cues to promote proper body alignment. Progressed repetitions as indicated.    Date:  2/2/2018 Date:  2/5/2018 Date:  1/31/2018   Activity/Exercise Parameters Parameters Parameters   Sit to stand from chair X X     Standing hip flexion X X    Standing hip abduction X X    Seated LAQs 2x10 right LE  2# 2x10 right LE 2# 2x10 right LE 2#   Standing hamstrings curls  X X    Bridging 2x10 with right lower extremity 2x10 with right lower extremity 2x10 with right lower extremity, lifting L LE   Supine straight leg raise 2x10 with right lower extremity  2# 2x10 with right lower extremity 2# 2x10 with right lower extremity 2# with verbal and manual cuing to try to keep quads engaged   Supine PNF  2x10 with right lower extremity D1 and D2  2x10 each with R LE    Left sidelying hip abduction      L sidelying: picking up and moving R LE from in front of L foot to behind L foot (working on hip abd and ext) and stabilizing trunk 2x10 reps 2x10 reps 2 x 15 reps   L sidelying R clam shells X 2x10 reps right lower extremity 2 x 10 reps R LE   R foot and gastrocsoleus stretching X 8 minutes trying to get foot to neutral position X 8 minutes trying to get foot to neutral position X 8 minutes trying to get foot to neutral position   Standing minisquats in bars          [facility] Portal  Treatment/Session Assessment:    · Response to Treatment:  Patient had increased tone with right foot stretching. Patient needed more verbal cues for right weight shifting during stance phase while ambulating. Continue to progress to tolerance. · Compliance with Program/Exercises: Compliant. · Recommendations/Intent for next treatment session: \"Next visit will focus on advancements to more challenging activities\".    Total Treatment Duration:  PT Patient Time In/Time Out  Time In: 1300  Time Out: 2000 Cy Hernandez

## 2018-02-07 ENCOUNTER — HOSPITAL ENCOUNTER (OUTPATIENT)
Dept: PHYSICAL THERAPY | Age: 63
Discharge: HOME | End: 2018-02-07
Attending: PHYSICAL MEDICINE & REHABILITATION
Payer: COMMERCIAL

## 2018-02-07 PROCEDURE — 97112 NEUROMUSCULAR REEDUCATION: CPT

## 2018-02-07 PROCEDURE — 97110 THERAPEUTIC EXERCISES: CPT

## 2018-02-07 NOTE — PROGRESS NOTES
Stanley Nail  : 1955  Primary: Oneil Collazo Ppo  Secondary:  [address] at [place] [address] [facility], [address] [place] [address],[address], [facility] [address].  [phone]   [phone]          OUTPATIENT PHYSICAL THERAPY:Daily Note 2018    ICD-10: Treatment Diagnosis:   · Other abnormalities of gait and mobility (R26.89)  · Difficulty in walking, not elsewhere classified (R26.2)  Precautions/Allergies:   Banana; Lisinopril; and Nuts [tree nut]   Fall Risk Score: 5 (? 5 = High Risk)  MD Orders: Evaluate and Treat MEDICAL/REFERRING DIAGNOSIS:  Weakness due to cerebrovascular accident (CVA) ([facility] [I63.9, R53.1]   DATE OF ONSET: CVA: 2017  REFERRING PHYSICIAN: Juliette Miller*  RETURN PHYSICIAN APPOINTMENT: TBD     INITIAL ASSESSMENT:  Ms. Lala Alarcon presents with decreased mobility, decreased strength, decreased gait, and decreased balance secondary to CVA. After discussing with patient, she agreed she would benefit from physical therapy to improve above deficits. Please sign this plan of treatment if you concur. Thank you for the opportunity to serve this patient. Progress note on 2018:   Patient has attended six scheduled physical therapy appointments from 12/15/2017 to 2018. Patient is very motivated and compliant with therapy. Patient continues to experience increased tone in right ankle affecting ambulation. Patient would benefit from a trial of Botox to see if this would decrease the tone in right ankle and improve ambulation. Patient would benefit from continuing skilled physical therapy to address problems and goals. Thank you for this referral.    PROBLEM LIST (Impacting functional limitations):  1. Decreased Strength  2. Decreased Ambulation Ability/Technique  3. Decreased Balance  4. Decreased Activity Tolerance INTERVENTIONS PLANNED:  1. Balance Exercise  2. Gait Training  3. Home Exercise Program (HEP)  4.  Neuromuscular Re-education/Strengthening  5. Range of Motion (ROM)  6. Therapeutic Exercise/Strengthening   TREATMENT PLAN:  Effective Dates: 12/15/2017 TO 2/15/2018. Frequency/Duration: 2-3 times a week for 8 weeks  GOALS: (Goals have been discussed and agreed upon with patient.)  Short-Term Functional Goals: Time Frame: 3 weeks  1. Patient will be independent with home exercise program without exacerbation of symptoms or cueing needed. Goal met. Discharge Goals: Time Frame: 8 weeks  1. Patient will be independent with all ADLs with minimal fear of falling and loss of balance with daily tasks. Goal ongoing. 2. Patient will report no fear avoidance with social or recreational activities due to fear of falling. Goal ongoing. 3. Patient will score greater than or equal to 45/56 on Gipson Balance Scale with minimal effect of imbalance on patient's ability to manage every day life activities. Goal ongoing. Rehabilitation Potential For Stated Goals: Good  Regarding Jaime Iraheta's therapy, I certify that the treatment plan above will be carried out by a therapist or under their direction. Thank you for this referral,  Charlene Bo PT     Referring Physician Signature: Juliette Ray*              Date                    The information in this section was collected on 12/15/2017 (except where otherwise noted). HISTORY:   History of Present Injury/Illness (Reason for Referral):  Patient reports she had a severe stroke on 9/6/2017. She reports that she was in the hospital and [facility] for about 61-62 days. She reports that she has progressed really well, but is still having trouble walking and using her right side (UE and LE). She reports that she walks using her AFO and quad cane all the time. She reports that she tries to be as active as possible. She reports that she is scared that she is going to fall, even though she has not.   She reports she would like to work on strengthening her right leg so her balance is better and she can walk without fear of falling. Past Medical History/Comorbidities:   Ms. Eusebio Dixon  has a past medical history of Anxiety; CVA (cerebral vascular accident) ([facility] (2017); Depression; HTN (hypertension); Menopause; and P[facility]embolism) ([facility] ([facility]). Ms. Eusebio Dixon  has a past surgical history that includes hx jennifer and bso (); hx  section; hx refractive surgery (); hx other surgical (); and hx other surgical (). Social History/Living Environment:   Home Environment: Private residence  Living Alone: No  Support Systems: Family member(s), Friends \ neighbors; Spouse  Prior Level of Function/Work/Activity:  Independent  Dominant Side:         RIGHT  Personal Factors:          Sex:  female        Age:  60 y.o. Current Medications:       Current Outpatient Prescriptions:     FLUoxetine (PROZAC) 20 mg tablet, Take 2 Tabs by mouth daily. , Disp: 60 Tab, Rfl: 3    methylphenidate HCl (RITALIN) 20 mg tablet, Take 1 Tab (20 mg total) by mouth daily. Max Daily Amount: 20 mg, Disp: 30 Tab, Rfl: 0    [START ON 2018] methylphenidate HCl (RITALIN) 20 mg tablet, Take 1 Tab (20 mg total) by mouth dailyEarliest Fill Date: 18. Max Daily Amount: 20 mg, Disp: 30 Tab, Rfl: 0    [START ON 3/12/2018] methylphenidate HCl (RITALIN) 20 mg tablet, Take 1 Tab (20 mg total) by mouth dailyEarliest Fill Date: 3/12/18. Max Daily Amount: 20 mg, Disp: 30 Tab, Rfl: 0    tiZANidine (ZANAFLEX) 4 mg tablet, 4mg po TID, Disp: 90 Tab, Rfl: 6    HYDROcodone-acetaminophen (NORCO) 5-325 mg per tablet, TAKE ONE TABLET BY MOUTH EVERY 6 HOURS AS NEEDED FOR PAIN, Disp: , Rfl: 0    labetalol (NORMODYNE) 200 mg tablet, Take 1 Tab by mouth two (2) times a day., Disp: 60 Tab, Rfl: 5    ALPRAZolam (XANAX) 0.25 mg tablet, Take 1 Tab by mouth three (3) times daily as needed for Anxiety.  Max Daily Amount: 0.75 mg., Disp: 30 Tab, Rfl: 1    rOPINIRole (REQUIP) 2 mg tablet, Take 2 mg by mouth nightly. for restless leg, Disp: , Rfl:     temazepam (RESTORIL) 15 mg capsule, Take 15 mg by mouth nightly as needed for Sleep., Disp: , Rfl:     polyethylene glycol (MIRALAX) 17 gram/dose powder, Take 17 g by mouth daily. , Disp: 510 g, Rfl: 2    tamsulosin (FLOMAX) 0.4 mg capsule, Take 1 Cap by mouth nightly., Disp: 30 Cap, Rfl: 4    potassium chloride (K-DUR, KLOR-CON) 20 mEq tablet, Take 1 Tab by mouth daily. , Disp: 30 Tab, Rfl: 0    acetaminophen (TYLENOL) 500 mg tablet, 1 Tab by Per NG tube route every four (4) hours as needed. , Disp: 30 Tab, Rfl: 0    losartan-hydroCHLOROthiazide (HYZAAR) 100-12.5 mg per tablet, Take 1 Tab by mouth daily. , Disp: 30 Tab, Rfl: 5   Date Last Reviewed:  2/7/2018    Number of Personal Factors/Comorbidities that affect the Plan of Care: 1-2: MODERATE COMPLEXITY   EXAMINATION:   Observation/Orthostatic Postural Assessment:          Posture Assessment: Rounded shoulders, Forward head   Functional Mobility:         Gait/Ambulation:     Speed/Lulu: Pace decreased (<100 feet/min)  Step Length: Left shortened, Right lengthened  Swing Pattern: Left asymmetrical, Right asymmetrical  Stance: Left increased, Right decreased  Gait Abnormalities: Antalgic, Circumduction, Foot drop, Path deviations, Trendelenburg  Ambulation - Level of Assistance: Modified independent  Assistive Device: Cane, quad  · Interventions: Verbal cues, Safety awareness training          Transfers:     Sit to Stand: Independent  Stand to Sit: Independent  Stand Pivot Transfers: Independent  · Bed to Chair: Independent          Bed Mobility:     Rolling: Independent  Supine to Sit: Independent  Sit to Supine: Independent  · Scooting: Independent    Strength:          L UE STRENGTH: 4/5 shoulder flexion, 4/5 shoulder abduction, 4/5 shoulder extension, 4/5 elbow flexion, 4/5 elbow extension.         R UE STRENGTH: 2/5 shoulder flexion, 2/5 shoulder abduction, 2/5 shoulder extension, 2/5 elbow flexion, 2/5 elbow extension. R LE STRENGTH:  4+/5 hip flexion, 4+/5 hip abduction, 4+/5 hip adduction, 4+/5 hip extension, 4+/5 knee extension, 4+/5 knee flexion, 1/5 ankle dorsiflexion, 1/5 ankle plantarflexion, 1/5 ankle inversion, 1/5 ankle eversion. Sensation:         Intact for light touch and proprioception  Postural Control & Balance:  · Gipson Balance Scale:  28/56.   (A score less than 45/56 indicates high risk of falls)      Body Structures Involved:  1. Nerves  2. Muscles Body Functions Affected:  1. Neuromusculoskeletal  2. Movement Related Activities and Participation Affected:  1. Mobility  2. Self Care   Number of elements (examined above) that affect the Plan of Care: 4+: HIGH COMPLEXITY   CLINICAL PRESENTATION:   Presentation: Evolving clinical presentation with changing clinical characteristics: MODERATE COMPLEXITY   CLINICAL DECISION MAKING:   Outcome Measure: Tool Used: Gipson Balance Scale  Score:  Initial: 28/56 Most Recent: X/56 (Date: -- )   Interpretation of Score: Each section is scored on a 0-4 scale, 0 representing the patients inability to perform the task and 4 representing independence. The scores of each section are added together for a total score of 56. The higher the patients score, the more independent the patient is. Any score below 45 indicates increased risk for falls. Score 56 55-45 44-34 33-23 22-12 11-1 0   Modifier CH CI CJ CK CL CM CN     Medical Necessity:   · Patient is expected to demonstrate progress in strength, range of motion, balance and coordination to improve safety during daily activities. · Patient demonstrates good rehab potential due to higher previous functional level. · Skilled intervention continues to be required due to decreased mobility. Reason for Services/Other Comments:  · Patient continues to demonstrate capacity to improve overall mobility which will increase independence and increase safety.    Use of outcome tool(s) and clinical judgement create a POC that gives a: Questionable prediction of patient's progress: MODERATE COMPLEXITY            TREATMENT:   (In addition to Assessment/Re-Assessment sessions the following treatments were rendered)  Pre-treatment Symptoms/Complaints:  2/7/2018: no problems. To see MD on 2/28./18  Pain: Initial: Pain Intensity 1: 0  Post Session:  0/10     NEUROMUSCULAR RE-EDUCATION: (20 minutes):  Exercise/activities per grid below to improve balance, coordination, kinesthetic sense, posture and proprioception. Required minimal verbal and manual cues to promote static and dynamic balance in standing, promote coordination of bilateral, lower extremity(s) and promote motor control of bilateral, lower extremity(s). Date:  2/2/2018 Date   2/5/18 Date   2/7/18   Activity/Exercise Parameters     Stepping over half foam  2x10 reps with right LE X 2x10 reps with left LE, working on weightbearing through R LE and keeping R foot flat on floor (foot tends to invert)   Step taps X  Onto 6 inch step with L LE working on weight bearing through R LE   Step ups 6 inch  2x10 leading with right LE 6 inch  2x10 leading with right LE working on weight bearing through R LE 6 inch  x10 leading with right LE working on weight bearing through [address] X     Walking in the clinic with quad cane 200 feet in hallway +up and down ramp 200 feet in hallway +up and down ramp About 100 feet with quad cane, working on weightbearing more on R foot through midstance and terminal stance, and stepping bigger with L LE                                                THERAPEUTIC EXERCISE: (25 minutes):  Exercises per grid below to improve mobility and strength. Required minimal verbal cues to promote proper body alignment. Progressed repetitions as indicated.    Date:  2/2/2018 Date:  2/5/2018 Date:  2/7/2018   Activity/Exercise Parameters Parameters Parameters   Sit to stand from chair X X     Standing hip flexion X X    Standing hip abduction X X    Seated LAQs 2x10 right LE  2# 2x10 right LE 2# 2x10 right LE 2#   Standing hamstrings curls  X X    Bridging 2x10 with right lower extremity 2x10 with right lower extremity 2x10 with right lower extremity, lifting L LE   Supine straight leg raise 2x10 with right lower extremity  2# 2x10 with right lower extremity 2# 2x10 with right lower extremity 2# with verbal and manual cuing to try to keep quads engaged   Supine PNF  2x10 with right lower extremity D1 and D2  2x10 each with R LE    Left sidelying hip abduction      L sidelying: picking up and moving R LE from in front of L foot to behind L foot (working on hip abd and ext) and stabilizing trunk 2x10 reps 2x10 reps 2 x 15 reps   L sidelying R clam shells X 2x10 reps right lower extremity 2 x 10 reps R LE   R foot and gastrocsoleus stretching X 8 minutes trying to get foot to neutral position X 8 minutes trying to get foot to neutral position X 8 minutes trying to get foot to neutral position   Standing minisquats in bars          [facility] Portal  Treatment/Session Assessment:    · Response to Treatment:  Patient tolerated session well. Patient was doing well weight bearing through midstance and terminal stance on R foot, but inversion tone was kicking in strongly. .  Continue to progress to tolerance. · Compliance with Program/Exercises: Compliant. · Recommendations/Intent for next treatment session: \"Next visit will focus on advancements to more challenging activities\".    Total Treatment Duration:  PT Patient Time In/Time Out  Time In: 1315  Time Out: 1465 [facility] Donny Brown

## 2018-02-08 NOTE — PROGRESS NOTES
Michael Montalvo  : 1955  Primary: Sandoval Guerrero Ppo  Secondary:  [facility] [place] [address], [place] [address].  [phone]   [phone]     OUTPATIENT DAILY NOTE    NAME/AGE/GENDER: Michael Montalvo is a 60 y.o. female. DATE: 2018    Patient cancelled appointment on 2018 due to a dentist appointment. Will plan to follow up on next scheduled visit.     Claudean Doll, PT

## 2018-02-09 ENCOUNTER — APPOINTMENT (OUTPATIENT)
Dept: PHYSICAL THERAPY | Age: 63
End: 2018-02-09
Attending: PHYSICAL MEDICINE & REHABILITATION
Payer: COMMERCIAL

## 2018-02-12 ENCOUNTER — HOSPITAL ENCOUNTER (OUTPATIENT)
Dept: PHYSICAL THERAPY | Age: 63
Discharge: HOME | End: 2018-02-12
Attending: PHYSICAL MEDICINE & REHABILITATION
Payer: COMMERCIAL

## 2018-02-12 PROCEDURE — 97112 NEUROMUSCULAR REEDUCATION: CPT

## 2018-02-12 PROCEDURE — 97535 SELF CARE MNGMENT TRAINING: CPT

## 2018-02-12 PROCEDURE — 97110 THERAPEUTIC EXERCISES: CPT

## 2018-02-12 NOTE — PROGRESS NOTES
Oval Blocker  : 1955  Primary: Deobrah Noel Ppo  Secondary:  [address]  [facility] [address] [facility] [address] [address], [address], [place] [address].  [phone]   [phone]        OUTPATIENT OCCUPATIONAL THERAPY: Daily Note 2018    ICD-10: Treatment Diagnosis: Hemiplegia and hemiparesis following cerebral infarction affecting right dominant side (I69.351)  Precautions/Allergies:   Banana; Lisinopril; and Nuts [tree nut]   Fall Risk Score: 5 (? 5 = High Risk)  MD Orders: Referral to occupational therapy MEDICAL/REFERRING DIAGNOSIS:   Cerebral infarction, unspecified [I63.9]  Weakness [R53.1]   DATE OF ONSET: 2017   REFERRING PHYSICIAN: Juliette Chang*  RETURN PHYSICIAN APPOINTMENT: unknown   18: Ms. Ciera Montoya is making excellent progress toward her goals; she is demonstrating improving range of motion and functional use of the right upper extremity in activities of daily living. Feel she will continue to benefit from skilled occupational therapy to maximize safety and independence with activities of daily living. INITIAL ASSESSMENT:  Ms. Ciera Montoya presents with impaired active movement, strength, coordination and sensation of the dominant right upper extremity that is affecting her ability to complete activities of daily living independently. Feel she may benefit from skilled occupational therapy to maximize safety and independence with activities of daily living. PLAN OF CARE:   PROBLEM LIST:  1. Decreased Strength  2. Decreased ADL/Functional Activities  3. Decreased Transfer Abilities  4. Decreased Balance  5. Decreased Flexibility/Joint Mobility  6. Decreased Cognition INTERVENTIONS PLANNED:  1. Activities of daily living training  2. Manual therapy training  3. Modalities  4. Neuromuscular re-eduation  5. Sensory reintegration training  6. Splinting  7. Therapeutic activity  8.  Therapeutic exercise   TREATMENT PLAN:  Effective Dates: 17 TO 3/20/18. Frequency/Duration: 3 times a week for 12 weeks  GOALS: (Goals have been discussed and agreed upon with patient.)  Short-Term Functional Goals: Time Frame: 6 weeks  1. Patient will demonstrate independence with home exercise program for right upper extremity range of motion. Met  2. Patient will increase use of right upper extremity as a functional assist in at least 25% of daily activities. Met  3. Patient will bathe with moderate assistance. Continue to address  Discharge Goals: Time Frame: 12 weeks  1. Patient will bathe with minimal assistance. Continue to address  2. Patient will feed self with modified independence and adaptive equipment as needed. Continue to address  3. Patient will dress with modified independence and adaptive equipment as needed. Continue to address  4. Patient will use right upper extremity as a functional assist in at least 50% of daily activities. Continue to address  Rehabilitation Potential For Stated Goals: Good  Regarding Analia Iraheta's therapy, I certify that the treatment plan above will be carried out by a therapist or under their direction. Thank you for this referral,  Mora Lefort, OT     Referring Physician Signature: Juliette Ferreira* _________________________  Date _________            The information in this section was collected on 18 (except where otherwise noted). OCCUPATIONAL PROFILE & HISTORY:   History of Present Injury/Illness (Reason for Referral):  CVA with hospital and [facility] stay then home health therapy. Past Medical History/Comorbidities:   Ms. Ning Alarcon  has a past medical history of Anxiety; CVA (cerebral vascular accident) ([facility] [address] (2017); Depression; HTN (hypertension); Menopause; and PTE (pulmonary thromboembolism) ([facility] [address] (2017). Ms. Ning Alarcon  has a past surgical history that includes hx jennifer and bso (); hx  section; hx refractive surgery (); hx other surgical (); and hx other surgical (2017).   Social History/Living Environment:      Prior Level of Function/Work/Activity:  Does seasonal taxes at [employer]  Dominant Side:         RIGHT  Current Medications:    Current Outpatient Prescriptions:     FLUoxetine (PROZAC) 20 mg tablet, Take 2 Tabs by mouth daily. , Disp: 60 Tab, Rfl: 3    methylphenidate HCl (RITALIN) 20 mg tablet, Take 1 Tab (20 mg total) by mouth daily. Max Daily Amount: 20 mg, Disp: 30 Tab, Rfl: 0    methylphenidate HCl (RITALIN) 20 mg tablet, Take 1 Tab (20 mg total) by mouth dailyEarliest Fill Date: 2/12/18. Max Daily Amount: 20 mg, Disp: 30 Tab, Rfl: 0    [START ON 3/12/2018] methylphenidate HCl (RITALIN) 20 mg tablet, Take 1 Tab (20 mg total) by mouth dailyEarliest Fill Date: 3/12/18. Max Daily Amount: 20 mg, Disp: 30 Tab, Rfl: 0    tiZANidine (ZANAFLEX) 4 mg tablet, 4mg po TID, Disp: 90 Tab, Rfl: 6    HYDROcodone-acetaminophen (NORCO) 5-325 mg per tablet, TAKE ONE TABLET BY MOUTH EVERY 6 HOURS AS NEEDED FOR PAIN, Disp: , Rfl: 0    labetalol (NORMODYNE) 200 mg tablet, Take 1 Tab by mouth two (2) times a day., Disp: 60 Tab, Rfl: 5    ALPRAZolam (XANAX) 0.25 mg tablet, Take 1 Tab by mouth three (3) times daily as needed for Anxiety. Max Daily Amount: 0.75 mg., Disp: 30 Tab, Rfl: 1    rOPINIRole (REQUIP) 2 mg tablet, Take 2 mg by mouth nightly. for restless leg, Disp: , Rfl:     temazepam (RESTORIL) 15 mg capsule, Take 15 mg by mouth nightly as needed for Sleep., Disp: , Rfl:     polyethylene glycol (MIRALAX) 17 gram/dose powder, Take 17 g by mouth daily. , Disp: 510 g, Rfl: 2    tamsulosin (FLOMAX) 0.4 mg capsule, Take 1 Cap by mouth nightly., Disp: 30 Cap, Rfl: 4    potassium chloride (K-DUR, KLOR-CON) 20 mEq tablet, Take 1 Tab by mouth daily. , Disp: 30 Tab, Rfl: 0    acetaminophen (TYLENOL) 500 mg tablet, 1 Tab by Per NG tube route every four (4) hours as needed. , Disp: 30 Tab, Rfl: 0    losartan-hydroCHLOROthiazide (HYZAAR) 100-12.5 mg per tablet, Take 1 Tab by mouth daily. , Disp: 30 Tab, Rfl: 5            Date Last Reviewed:  2/12/2018   Complexity Level : Expanded review of therapy/medical records (1-2):  MODERATE COMPLEXITY   ASSESSMENT OF OCCUPATIONAL PERFORMANCE:   ROM:                   Balance:                   Coordination:                   Mental Status:                   Vision:                   Activities of Daily Living:           Basic ADLs (From Assessment) Complex ADLs (From Assessment)         Grooming/Bathing/Dressing Activities of Daily Living                                   Sensation:         Light touch absent distal to right elbow     Physical Skills Involved:  1. Range of Motion  2. Balance  3. Sensation  4. Fine Motor Control  5. Gross Motor Control Cognitive Skills Affected (resulting in the inability to perform in a timely and safe manner):  1. Executive Function  2. Sustained Attention  3. Divided Attention  4. Comprehension Psychosocial Skills Affected:  1. None   Number of elements that affect the Plan of Care: 5+:  HIGH COMPLEXITY   CLINICAL DECISION MAKING:   Outcome Measure: Tool Used: [facility] AM-PAC Daily Activity Outpatient Short Form  Score:  Initial: 18 Most Recent: 28 (Date: 1/31/18 )   Interpretation of Tool:  Represents clinically-significant functional categories (i.e., basic and instrumental activities of daily living, fine motor activities). Score 60 59-55 54-47 46-34 32-21 20-16 15   Modifier CH CI CJ CK CL CM CN     Medical Necessity:   · Patient demonstrates good rehab potential due to higher previous functional level. Reason for Services/Other Comments:  · Patient continues to require skilled intervention due to decreased safety and independence in activities of daily living. Clinical Decision-Making Assessment: Moderately difficult to predict patient's progress at this time due to the extent of her limitations in functional movement and use of dominant right upper extremity.     Assessment process, impact of co-morbidities, assessment modification\need for assistance, and selection of interventions: Analytical Complexity:MODERATE COMPLEXITY   TREATMENT:   (In addition to Assessment/Re-Assessment sessions the following treatments were rendered)    Pre-treatment Symptoms/Complaints:  Patient states, \"I will keep using my right hand as much as I can; it is messy but I can see that it is getting better\"  Pain: Initial:   Pain Intensity 1: 0  Post Session:  0/10     Therapeutic Exercise: (  20 minutes):  Exercises per grid below to improve dynamic movement of arm - right and hand - right to improve functional lifting, carrying, reaching and grasping. Required moderate visual and verbal cues to promote proper body mechanics. Progressed range, repetitions and complexity of movement as indicated.        Date:  1/19/18 Date:  1/22/18 Date:  1/24/18 Date:  1/26/18 Date:  1/29/18 Date:  1/31/18 Date:  2/2/18 Date:  2/12/18   Activity/Exercise           Shoulder shrugs AROM  1 x 10 AROM  1 x 10 AROM  1 x 10  AROM  1 x 10 AROM  1 x 5 AROM  1 x 5    Scapular protraction/retraction AROM  1 x 10 AROM  1 x 10 AROM  1 x 10 3 x 10 with red theratubing - seated rows AROM  1 x 10 AROM  1 x 5 3 x 10 with red theratubing - seated rows 2 x 10 with green theratubing - seated rows with elbow extension assisted by tubing   Shoulder abduction      AROM  1 x 5     Elbow flexion/extension AROM with assist to fully extend x 10 reps in supine  AROM with assist to fully extend x 10 reps   AROM  1 x 5 AROM with assist to fully extend x 10 reps    PNF D1/D2 diagonals     X 5 reps      Hand helper 15 pounds x 25 reps with assist to extend fingers 15 pounds x 25 reps with assist to extend fingers  15 pounds x 25 reps with assist to extend fingers  15 pounds x 25 reps with assist to extend fingers 15 pounds x 10 reps with assist to extend fingers 20 pounds x 5 reps with assist to extend fingers   Tabletop skateboard           Shoulder flexion/extension Seated x 10 reps  SROM   2 x 10 with red theratubing - seated  Seated x 10 reps  SROM      Chest press           UBE Min assist to sustain right  on handle   Level 0  7 mintues Min assist to sustain right  on handle   Level 0  5 mintues  Min assist to sustain right  on handle   Level 0  5 mintues Min assist to sustain right  on handle   Level 0  5 mintues  Min assist to sustain right  on handle   Level 0  5 mintues SBA-Min assist to sustain right  on handle   Level 0  5 mintues  Used dycem and wrist brace to keep wrist in neutral   Modified push-ups  Hands on countertop with dycem under right hand  2 x 10 Hands on large therapy ball on mat  2 x 10 Hands on large therapy ball on mat  1 x 10 Hands on countertop with dycem under right hand  3 x 10   Hands on countertop with dycem under right hand  2 x 10 Hands on countertop with dycem under right hand  1 x 10   Resistive clothespin Yellow  1 x 15 Yellow  1 x 15  Yellow  1 x 10 Yellow  1 x 10 Yellow  1 x 10 Yellow  1 x 10 Yellow  1 x 10   Shoulder arc 5 rings with assist to grasp ring initially     5 rings with assist from right hand to grasp ring initially 7 rings with assist from right hand to grasp ring initially     Wrist flexion/extension    PROM  1 x 10 with stretch at end range of extension           Access Code: [email or web address]   URL: [email or web address]   Date: 01/19/2018   Prepared by: Theisraelis Senior     Exercises   Supine Shoulder Flexion PROM - 10 reps - 2 sets - 3 hold - 1x daily - 5x weekly   Push-Up on Counter - 10 reps - 2 sets - 1x daily - 5x weekly   Seated Shoulder Shrugs - 10 reps - 2 sets - 1x daily - 5x weekly   Seated Scapular Retraction - 10 reps - 2 sets - 1x daily - 5x weekly   Seated Weight Shifting with Arm Support - 10 reps - 2 sets - 1x daily - 5x weekly   Seated Shoulder Flexion Self PROM - 10 reps - 2 sets - 1x daily - 5x weekly   Supine Elbow Flexion Extension AROM - 10 reps - 2 sets - 1x daily - 5x weekly   Seated Elbow Flexion Extension AROM - 10 reps - 2 sets - 1x daily - 5x weekly     Self Care: (15 minutes): Procedure(s) (per grid) utilized to improve and/or restore self-care/home management as related to dressing, bathing and self feeding. Required minimal verbal cueing to facilitate activities of daily living skills. Patient educated re: use of right upper extremity in self care activities; she is now using right hand to eat 50% of each meal. Continued education today re: use of red foam handle on fork and toothbrush for active  during eating and grooming activities. She was able to grasp handle and bring to her mouth x 15 reps. Working on lifting arm up to mouth and minimizing leaning over to bring mouth down to utensil with increasing success. Neuromuscular Re-education: (  15 minutes):  Exercise/activities per grid below to improve coordination, kinesthetic sense and proprioception. Required moderate visual and verbal cues to promote coordination of right, upper extremity(s) and promote motor control of right, upper extremity(s). Patient used red foam handle on pen to attempt handwriting for the first time since her stroke. She was able to complete some letter formations with varying degrees of legibility with assistance from left hand to steady right hand/forearm. Treatment/Session Assessment:    · Response to Treatment:  Patient reports using right hand for 50% of most meals now with use of red foam built-up handle and assistance from left hand with increasing success. · Compliance with Program/Exercises: Will assess as treatment progresses. · Recommendations/Intent for next treatment session: \"Next visit will focus on advancements to more challenging activities and reduction in assistance provided\".   Total Treatment Duration:  OT Patient Time In/Time Out  Time In: 1345  Time Out: [address] [facility] Jes Hernandez

## 2018-02-12 NOTE — THERAPY RECERTIFICATION
Viridiana Zamudio  : 1955  Primary: Stefan Ulrich Ppo  Secondary:  [address]  at [facility]  [address], [address], [address]  [phone]   [phone]          OUTPATIENT PHYSICAL [address]    ICD-10: Treatment Diagnosis:   · Other abnormalities of gait and mobility (R26.89)  · Difficulty in walking, not elsewhere classified (R26.2)  Precautions/Allergies:   Banana; Lisinopril; and Nuts [tree nut]   Fall Risk Score: 5 (? 5 = High Risk)  MD Orders: Evaluate and Treat MEDICAL/REFERRING DIAGNOSIS:  Weakness due to cerebrovascular accident (CVA) ([facility] [I63.9, R53.1]   DATE OF ONSET: CVA: 2017  REFERRING PHYSICIAN: Juliette Choi*  RETURN PHYSICIAN APPOINTMENT: TBD     INITIAL ASSESSMENT:  Ms. Maria Isabel Rojo presents with decreased mobility, decreased strength, decreased gait, and decreased balance secondary to CVA. After discussing with patient, she agreed she would benefit from physical therapy to improve above deficits. Please sign this plan of treatment if you concur. Thank you for the opportunity to serve this patient. Recertification note on 2018:   Patient has attended fourteen scheduled physical therapy appointments from 12/15/2017 to 2018. Patient is very motivated and compliant with therapy. Patient complaints of increased irritation in her right AFO due to increased tone. Patient is continuing to demonstrate improved ambulation and improved strength in right lower extremity . Patient would benefit from continuing skilled physical therapy to address problems and goals. Thank you for this referral.    PROBLEM LIST (Impacting functional limitations):  1. Decreased Strength  2. Decreased Ambulation Ability/Technique  3. Decreased Balance  4. Decreased Activity Tolerance INTERVENTIONS PLANNED:  1. Balance Exercise  2. Gait Training  3. Home Exercise Program (HEP)  4.  Neuromuscular Re-education/Strengthening  5. Range of Motion (ROM)  6. Therapeutic Exercise/Strengthening   TREATMENT PLAN:  Effective Dates: 2/12/2018 TO 5/7/2018. Frequency/Duration: 2-3 times a week for 12 weeks  GOALS: (Goals have been discussed and agreed upon with patient.)  Short-Term Functional Goals: Time Frame: 3 weeks  1. Patient will be independent with home exercise program without exacerbation of symptoms or cueing needed. Goal met. Discharge Goals: Time Frame: 8 weeks  1. Patient will be independent with all ADLs with minimal fear of falling and loss of balance with daily tasks. Goal ongoing. 2. Patient will report no fear avoidance with social or recreational activities due to fear of falling. Goal ongoing. 3. Patient will score greater than or equal to 45/56 on Gipson Balance Scale with minimal effect of imbalance on patient's ability to manage every day life activities. Goal ongoing. Rehabilitation Potential For Stated Goals: Good  Regarding Gavino Alvaradodwin's therapy, I certify that the treatment plan above will be carried out by a therapist or under their direction. Thank you for this referral,  Dori Cummins PT     Referring Physician Signature: Juliette Stout*              Date                    The information in this section was collected on 12/15/2017 (except where otherwise noted). HISTORY:   History of Present Injury/Illness (Reason for Referral):  Patient reports she had a severe stroke on 9/6/2017. She reports that she was in the hospital and [facility] for about 61-62 days. She reports that she has progressed really well, but is still having trouble walking and using her right side (UE and LE). She reports that she walks using her AFO and quad cane all the time. She reports that she tries to be as active as possible. She reports that she is scared that she is going to fall, even though she has not.   She reports she would like to work on strengthening her right leg so her balance is better and she can walk without fear of falling. Past Medical History/Comorbidities:   Ms. Zoey Booth  has a past medical history of Anxiety; CVA (cerebral vascular accident) ([facility] (2017); Depression; HTN (hypertension); Menopause; and PTE (pulmonary throm[facility]embolism) ([facility] ([facility]). Ms. Zoey Booth  has a past surgical history that includes hx jennifer and bso (); hx  section; hx refractive surgery (); hx other surgical (); and hx other surgical (). Social History/Living Environment:   Home Environment: Private residence  Living Alone: No  Support Systems: Family member(s), Friends \ neighbors; Spouse  Prior Level of Function/Work/Activity:  Independent  Dominant Side:         RIGHT  Personal Factors:          Sex:  female        Age:  60 y.o. Current Medications:       Current Outpatient Prescriptions:     FLUoxetine (PROZAC) 20 mg tablet, Take 2 Tabs by mouth daily. , Disp: 60 Tab, Rfl: 3    methylphenidate HCl (RITALIN) 20 mg tablet, Take 1 Tab (20 mg total) by mouth daily. Max Daily Amount: 20 mg, Disp: 30 Tab, Rfl: 0    methylphenidate HCl (RITALIN) 20 mg tablet, Take 1 Tab (20 mg total) by mouth dailyEarliest Fill Date: 18. Max Daily Amount: 20 mg, Disp: 30 Tab, Rfl: 0    [START ON 3/12/2018] methylphenidate HCl (RITALIN) 20 mg tablet, Take 1 Tab (20 mg total) by mouth dailyEarliest Fill Date: 3/12/18. Max Daily Amount: 20 mg, Disp: 30 Tab, Rfl: 0    tiZANidine (ZANAFLEX) 4 mg tablet, 4mg po TID, Disp: 90 Tab, Rfl: 6    HYDROcodone-acetaminophen (NORCO) 5-325 mg per tablet, TAKE ONE TABLET BY MOUTH EVERY 6 HOURS AS NEEDED FOR PAIN, Disp: , Rfl: 0    labetalol (NORMODYNE) 200 mg tablet, Take 1 Tab by mouth two (2) times a day., Disp: 60 Tab, Rfl: 5    ALPRAZolam (XANAX) 0.25 mg tablet, Take 1 Tab by mouth three (3) times daily as needed for Anxiety. Max Daily Amount: 0.75 mg., Disp: 30 Tab, Rfl: 1    rOPINIRole (REQUIP) 2 mg tablet, Take 2 mg by mouth nightly.  for restless leg, Disp: , Rfl:     temazepam (RESTORIL) 15 mg capsule, Take 15 mg by mouth nightly as needed for Sleep., Disp: , Rfl:     polyethylene glycol (MIRALAX) 17 gram/dose powder, Take 17 g by mouth daily. , Disp: 510 g, Rfl: 2    tamsulosin (FLOMAX) 0.4 mg capsule, Take 1 Cap by mouth nightly., Disp: 30 Cap, Rfl: 4    potassium chloride (K-DUR, KLOR-CON) 20 mEq tablet, Take 1 Tab by mouth daily. , Disp: 30 Tab, Rfl: 0    acetaminophen (TYLENOL) 500 mg tablet, 1 Tab by Per NG tube route every four (4) hours as needed. , Disp: 30 Tab, Rfl: 0    losartan-hydroCHLOROthiazide (HYZAAR) 100-12.5 mg per tablet, Take 1 Tab by mouth daily. , Disp: 30 Tab, Rfl: 5   Date Last Reviewed:  2/12/2018    Number of Personal Factors/Comorbidities that affect the Plan of Care: 1-2: MODERATE COMPLEXITY   EXAMINATION:   Observation/Orthostatic Postural Assessment:          Posture Assessment: Rounded shoulders, Forward head   Functional Mobility:         Gait/Ambulation:     Speed/Lulu: Pace decreased (<100 feet/min)  Step Length: Left shortened, Right lengthened  Swing Pattern: Left asymmetrical, Right asymmetrical  Stance: Left increased, Right decreased  Gait Abnormalities: Antalgic, Circumduction, Foot drop, Path deviations, Trendelenburg  Ambulation - Level of Assistance: Modified independent  Assistive Device: Cane, quad  · Interventions: Verbal cues, Safety awareness training          Transfers:     Sit to Stand: Independent  Stand to Sit: Independent  Stand Pivot Transfers: Independent  · Bed to Chair: Independent          Bed Mobility:     Rolling: Independent  Supine to Sit: Independent  Sit to Supine: Independent  · Scooting: Independent    Strength:          L UE STRENGTH: 4/5 shoulder flexion, 4/5 shoulder abduction, 4/5 shoulder extension, 4/5 elbow flexion, 4/5 elbow extension. R UE STRENGTH: 2/5 shoulder flexion, 2/5 shoulder abduction, 2/5 shoulder extension, 2/5 elbow flexion, 2/5 elbow extension.    R LE STRENGTH:  4+/5 hip flexion, 4+/5 hip abduction, 4+/5 hip adduction, 4+/5 hip extension, 4+/5 knee extension, 4+/5 knee flexion, 1/5 ankle dorsiflexion, 1/5 ankle plantarflexion, 1/5 ankle inversion, 1/5 ankle eversion. Sensation:         Intact for light touch and proprioception  Postural Control & Balance:  · Gipson Balance Scale:  33/56.   (A score less than 45/56 indicates high risk of falls)  . Score improved from 28/56 on initial evaluation. Body Structures Involved:  1. Nerves  2. Muscles Body Functions Affected:  1. Neuromusculoskeletal  2. Movement Related Activities and Participation Affected:  1. Mobility  2. Self Care   Number of elements (examined above) that affect the Plan of Care: 4+: HIGH COMPLEXITY   CLINICAL PRESENTATION:   Presentation: Evolving clinical presentation with changing clinical characteristics: MODERATE COMPLEXITY   CLINICAL DECISION MAKING:   Outcome Measure: Tool Used: Gipson Balance Scale  Score:  Initial: 28/56 Most Recent: 33/56 (Date: 2- )   Interpretation of Score: Each section is scored on a 0-4 scale, 0 representing the patients inability to perform the task and 4 representing independence. The scores of each section are added together for a total score of 56. The higher the patients score, the more independent the patient is. Any score below 45 indicates increased risk for falls. Score 56 55-45 44-34 33-23 22-12 11-1 0   Modifier CH CI CJ CK CL CM CN     Medical Necessity:   · Patient is expected to demonstrate progress in strength, range of motion, balance and coordination to improve safety during daily activities. · Patient demonstrates good rehab potential due to higher previous functional level. · Skilled intervention continues to be required due to decreased mobility. Reason for Services/Other Comments:  · Patient continues to demonstrate capacity to improve overall mobility which will increase independence and increase safety.    Use of outcome tool(s) and clinical judgement create a POC that gives a: Questionable prediction of patient's progress: MODERATE COMPLEXITY            TREATMENT:   (In addition to Assessment/Re-Assessment sessions the following treatments were rendered)  Pre-treatment Symptoms/Complaints:  2/12/2018: Patient reports no falls. \"Doing okay\". Pain: Initial: Pain Intensity 1: 0  Post Session:  0/10     NEUROMUSCULAR RE-EDUCATION: (20 minutes):  Exercise/activities per grid below to improve balance, coordination, kinesthetic sense, posture and proprioception. Required minimal verbal and manual cues to promote static and dynamic balance in standing, promote coordination of bilateral, lower extremity(s) and promote motor control of bilateral, lower extremity(s). Date:  2/12/2018 Date   2/5/18 Date   2/7/18   Activity/Exercise Parameters     Stepping over half foam  2x10 reps with left LE, working on weightbearing through R LE and keeping R foot flat on floor (foot tends to invert) X 2x10 reps with left LE, working on weightbearing through R LE and keeping R foot flat on floor (foot tends to invert)   Step taps X  Onto 6 inch step with L LE working on weight bearing through R LE   Step ups 6 inch  x10 leading with right LE working on weight bearing through R LE 6 inch  2x10 leading with right LE working on weight bearing through R LE 6 inch  x10 leading with right LE working on weight bearing through [address] X     Walking in the clinic with quad cane 200 feet in hallway +up and down ramp, working on weightbearing more on R foot through midstance and terminal stance, and stepping bigger with L  feet in hallway +up and down ramp About 100 feet with quad cane, working on weightbearing more on R foot through midstance and terminal stance, and stepping bigger with L LE                                                THERAPEUTIC EXERCISE: (25 minutes):  Exercises per grid below to improve mobility and strength.   Required minimal verbal cues to promote proper body alignment. Progressed repetitions as indicated. Date:  2/12/2018 Date:  2/5/2018 Date:  2/7/2018   Activity/Exercise Parameters Parameters Parameters   Sit to stand from chair X X     Standing hip flexion X X    Standing hip abduction X X    Seated LAQs 2x10 right LE  3# 2x10 right LE 2# 2x10 right LE 2#   Standing hamstrings curls  X X    Bridging 2x10 with right lower extremity, lifting L LE 2x10 with right lower extremity 2x10 with right lower extremity, lifting L LE   Supine straight leg raise 2x10 with right lower extremity  3# 2x10 with right lower extremity 2# 2x10 with right lower extremity 2# with verbal and manual cuing to try to keep quads engaged   Supine PNF D1 and D2  2x10 each with R LE 2x10 with right lower extremity D1 and D2  2x10 each with R LE    Left sidelying hip abduction      L sidelying: picking up and moving R LE from in front of L foot to behind L foot (working on hip abd and ext) and stabilizing trunk 2x10 reps 2x10 reps 2 x 15 reps   L sidelying R clam shells 2 x 10 reps R LE 2x10 reps right lower extremity 2 x 10 reps R LE   R foot and gastrocsoleus stretching X 8 minutes trying to get foot to neutral position X 8 minutes trying to get foot to neutral position X 8 minutes trying to get foot to neutral position   Standing minisquats in bars          [facility]  Treatment/Session Assessment:    · Response to Treatment:  Patient tolerated treatment without complaints. Patient walking further distance with less rest breaks demonstrating improved strength/improved mobility. Continue to progress to tolerance. · Compliance with Program/Exercises: Compliant. · Recommendations/Intent for next treatment session: \"Next visit will focus on advancements to more challenging activities\".    Total Treatment Duration:  PT Patient Time In/Time Out  Time In: 1300  Time Out: 2000 [address] Hernandez

## 2018-02-14 ENCOUNTER — HOSPITAL ENCOUNTER (OUTPATIENT)
Dept: PHYSICAL THERAPY | Age: 63
Discharge: HOME | End: 2018-02-14
Attending: PHYSICAL MEDICINE & REHABILITATION
Payer: COMMERCIAL

## 2018-02-14 PROCEDURE — 97112 NEUROMUSCULAR REEDUCATION: CPT

## 2018-02-14 PROCEDURE — 97110 THERAPEUTIC EXERCISES: CPT

## 2018-02-14 NOTE — PROGRESS NOTES
Oval Blocker  : 1955  Primary: Andrewjaylajarrod Noel Ppo  Secondary:  [address] at [facility]  [address] [facility], [address] [place] [address] [address], [facility] [facility]  [phone]   [phone]        OUTPATIENT OCCUPATIONAL THERAPY: Daily Note 2018    ICD-10: Treatment Diagnosis: Hemiplegia and hemiparesis following cerebral infarction affecting right dominant side (I69.351)  Precautions/Allergies:   Banana; Lisinopril; and Nuts [tree nut]   Fall Risk Score: 5 (? 5 = High Risk)  MD Orders: Referral to occupational therapy MEDICAL/REFERRING DIAGNOSIS:   Cerebral infarction, unspecified [I63.9]  Weakness [R53.1]   DATE OF ONSET: 2017   REFERRING PHYSICIAN: Juliette Chang*  RETURN PHYSICIAN APPOINTMENT: unknown   18: Ms. Ciera Montoya is making excellent progress toward her goals; she is demonstrating improving range of motion and functional use of the right upper extremity in activities of daily living. Feel she will continue to benefit from skilled occupational therapy to maximize safety and independence with activities of daily living. INITIAL ASSESSMENT:  Ms. Ciera Montoya presents with impaired active movement, strength, coordination and sensation of the dominant right upper extremity that is affecting her ability to complete activities of daily living independently. Feel she may benefit from skilled occupational therapy to maximize safety and independence with activities of daily living. PLAN OF CARE:   PROBLEM LIST:  1. Decreased Strength  2. Decreased ADL/Functional Activities  3. Decreased Transfer Abilities  4. Decreased Balance  5. Decreased Flexibility/Joint Mobility  6. Decreased Cognition INTERVENTIONS PLANNED:  1. Activities of daily living training  2. Manual therapy training  3. Modalities  4. Neuromuscular re-eduation  5. Sensory reintegration training  6. Splinting  7. Therapeutic activity  8.  Therapeutic exercise   TREATMENT PLAN:  Effective Dates: 17 TO 3/20/18. Frequency/Duration: 3 times a week for 12 weeks  GOALS: (Goals have been discussed and agreed upon with patient.)  Short-Term Functional Goals: Time Frame: 6 weeks  1. Patient will demonstrate independence with home exercise program for right upper extremity range of motion. Met  2. Patient will increase use of right upper extremity as a functional assist in at least 25% of daily activities. Met  3. Patient will bathe with moderate assistance. Continue to address  Discharge Goals: Time Frame: 12 weeks  1. Patient will bathe with minimal assistance. Continue to address  2. Patient will feed self with modified independence and adaptive equipment as needed. Continue to address  3. Patient will dress with modified independence and adaptive equipment as needed. Continue to address  4. Patient will use right upper extremity as a functional assist in at least 50% of daily activities. Continue to address  Rehabilitation Potential For Stated Goals: Good  Regarding Gavino Alvaradodwin's therapy, I certify that the treatment plan above will be carried out by a therapist or under their direction. Thank you for this referral,  Fransico Celestin OT     Referring Physician Signature: Juliette Ferrera* _________________________  Date _________            The information in this section was collected on 18 (except where otherwise noted). OCCUPATIONAL PROFILE & HISTORY:   History of Present Injury/Illness (Reason for Referral):  CVA with hospital and [facility] stay then home health therapy. Past Medical History/Comorbidities:   Ms. Herman Nance  has a past medical history of Anxiety; CVA (cerebral vascular accident) ([facility] [address] (2017); Depression; HTN (hypertension); Menopause; and PTE (pulmonary thromboembolism) ([facility] [address] (2017). Ms. Herman Nance  has a past surgical history that includes hx jennifer and bso (); hx  section; hx refractive surgery (); hx other surgical (); and hx other surgical (2017).   Social History/Living Environment:      Prior Level of Function/Work/Activity:  Does seasonal taxes at [employer]  Dominant Side:         RIGHT  Current Medications:    Current Outpatient Prescriptions:     FLUoxetine (PROZAC) 20 mg tablet, Take 2 Tabs by mouth daily. , Disp: 60 Tab, Rfl: 3    methylphenidate HCl (RITALIN) 20 mg tablet, Take 1 Tab (20 mg total) by mouth daily. Max Daily Amount: 20 mg, Disp: 30 Tab, Rfl: 0    methylphenidate HCl (RITALIN) 20 mg tablet, Take 1 Tab (20 mg total) by mouth dailyEarliest Fill Date: 2/12/18. Max Daily Amount: 20 mg, Disp: 30 Tab, Rfl: 0    [START ON 3/12/2018] methylphenidate HCl (RITALIN) 20 mg tablet, Take 1 Tab (20 mg total) by mouth dailyEarliest Fill Date: 3/12/18. Max Daily Amount: 20 mg, Disp: 30 Tab, Rfl: 0    tiZANidine (ZANAFLEX) 4 mg tablet, 4mg po TID, Disp: 90 Tab, Rfl: 6    HYDROcodone-acetaminophen (NORCO) 5-325 mg per tablet, TAKE ONE TABLET BY MOUTH EVERY 6 HOURS AS NEEDED FOR PAIN, Disp: , Rfl: 0    labetalol (NORMODYNE) 200 mg tablet, Take 1 Tab by mouth two (2) times a day., Disp: 60 Tab, Rfl: 5    ALPRAZolam (XANAX) 0.25 mg tablet, Take 1 Tab by mouth three (3) times daily as needed for Anxiety. Max Daily Amount: 0.75 mg., Disp: 30 Tab, Rfl: 1    rOPINIRole (REQUIP) 2 mg tablet, Take 2 mg by mouth nightly. for restless leg, Disp: , Rfl:     temazepam (RESTORIL) 15 mg capsule, Take 15 mg by mouth nightly as needed for Sleep., Disp: , Rfl:     polyethylene glycol (MIRALAX) 17 gram/dose powder, Take 17 g by mouth daily. , Disp: 510 g, Rfl: 2    tamsulosin (FLOMAX) 0.4 mg capsule, Take 1 Cap by mouth nightly., Disp: 30 Cap, Rfl: 4    potassium chloride (K-DUR, KLOR-CON) 20 mEq tablet, Take 1 Tab by mouth daily. , Disp: 30 Tab, Rfl: 0    acetaminophen (TYLENOL) 500 mg tablet, 1 Tab by Per NG tube route every four (4) hours as needed. , Disp: 30 Tab, Rfl: 0    losartan-hydroCHLOROthiazide (HYZAAR) 100-12.5 mg per tablet, Take 1 Tab by mouth daily. , Disp: 30 Tab, Rfl: 5            Date Last Reviewed:  2/14/2018   Complexity Level : Expanded review of therapy/medical records (1-2):  MODERATE COMPLEXITY   ASSESSMENT OF OCCUPATIONAL PERFORMANCE:   ROM:                   Balance:                   Coordination:                   Mental Status:                   Vision:                   Activities of Daily Living:           Basic ADLs (From Assessment) Complex ADLs (From Assessment)         Grooming/Bathing/Dressing Activities of Daily Living                                   Sensation:         Light touch absent distal to right elbow     Physical Skills Involved:  1. Range of Motion  2. Balance  3. Sensation  4. Fine Motor Control  5. Gross Motor Control Cognitive Skills Affected (resulting in the inability to perform in a timely and safe manner):  1. Executive Function  2. Sustained Attention  3. Divided Attention  4. Comprehension Psychosocial Skills Affected:  1. None   Number of elements that affect the Plan of Care: 5+:  HIGH COMPLEXITY   CLINICAL DECISION MAKING:   Outcome Measure: Tool Used: [address] [facility] [address] AM-[facility] Daily Activity Outpatient Short Form  Score:  Initial: 18 Most Recent: 28 (Date: 1/31/18 )   Interpretation of Tool:  Represents clinically-significant functional categories (i.e., basic and instrumental activities of daily living, fine motor activities). Score 60 59-55 54-47 46-34 32-21 20-16 15   Modifier CH CI CJ CK CL CM CN     Medical Necessity:   · Patient demonstrates good rehab potential due to higher previous functional level. Reason for Services/Other Comments:  · Patient continues to require skilled intervention due to decreased safety and independence in activities of daily living. Clinical Decision-Making Assessment: Moderately difficult to predict patient's progress at this time due to the extent of her limitations in functional movement and use of dominant right upper extremity.     Assessment process, impact of co-morbidities, assessment modification\need for assistance, and selection of interventions: Analytical Complexity:MODERATE COMPLEXITY   TREATMENT:   (In addition to Assessment/Re-Assessment sessions the following treatments were rendered)    Pre-treatment Symptoms/Complaints:  Patient states, \"I have been doing all my exercises. \"  Pain: Initial:   Pain Intensity 1: 0  Post Session:  0/10     Therapeutic Exercise: (  25 minutes):  Exercises per grid below to improve dynamic movement of arm - right and hand - right to improve functional lifting, carrying, reaching and grasping. Required moderate visual and verbal cues to promote proper body mechanics. Progressed range, repetitions and complexity of movement as indicated.        Date:  1/22/18 Date:  1/24/18 Date:  1/26/18 Date:  1/29/18 Date:  1/31/18 Date:  2/2/18 Date:  2/12/18 Date:  2/14/18   Activity/Exercise           Shoulder shrugs AROM  1 x 10 AROM  1 x 10  AROM  1 x 10 AROM  1 x 5 AROM  1 x 5  AROM  1 x 10   Scapular protraction/retraction AROM  1 x 10 AROM  1 x 10 3 x 10 with red theratubing - seated rows AROM  1 x 10 AROM  1 x 5 3 x 10 with red theratubing - seated rows 2 x 10 with green theratubing - seated rows with elbow extension assisted by tubing 2 x 10 with green theratubing - seated rows with elbow extension assisted by tubing   Shoulder abduction     AROM  1 x 5      Elbow flexion/extension  AROM with assist to fully extend x 10 reps   AROM  1 x 5 AROM with assist to fully extend x 10 reps     PNF D1/D2 diagonals    X 5 reps       Hand helper 15 pounds x 25 reps with assist to extend fingers  15 pounds x 25 reps with assist to extend fingers  15 pounds x 25 reps with assist to extend fingers 15 pounds x 10 reps with assist to extend fingers 20 pounds x 5 reps with assist to extend fingers 20 pounds x 5 reps with assist to extend fingers   Tabletop skateboard           Shoulder flexion/extension   2 x 10 with red theratubing - seated  Seated x 10 reps  SROM       Chest press           UBE Min assist to sustain right  on handle   Level 0  5 mintues  Min assist to sustain right  on handle   Level 0  5 mintues Min assist to sustain right  on handle   Level 0  5 mintues  Min assist to sustain right  on handle   Level 0  5 mintues SBA-Min assist to sustain right  on handle   Level 0  5 mintues  Used dycem and wrist brace to keep wrist in neutral Min assist to sustain right  on handle   Level 0  5 mintues   Modified push-ups  Hands on large therapy ball on mat  2 x 10 Hands on large therapy ball on mat  1 x 10 Hands on countertop with dycem under right hand  3 x 10   Hands on countertop with dycem under right hand  2 x 10 Hands on countertop with dycem under right hand  1 x 10 Hands on countertop with dycem under right hand  2 x 10   Resistive clothespin Yellow  1 x 15  Yellow  1 x 10 Yellow  1 x 10 Yellow  1 x 10 Yellow  1 x 10 Yellow  1 x 10    Shoulder arc    5 rings with assist from right hand to grasp ring initially 7 rings with assist from right hand to grasp ring initially   5 tabs on small hill only with hand over hand assist   Wrist flexion/extension   PROM  1 x 10 with stretch at end range of extension            Access Code: [email or web address]   URL: [email or web address]   Date: 01/19/2018   Prepared by: Cirilo Gutierrez     Exercises   Supine Shoulder Flexion PROM - 10 reps - 2 sets - 3 hold - 1x daily - 5x weekly   Push-Up on Counter - 10 reps - 2 sets - 1x daily - 5x weekly   Seated Shoulder Shrugs - 10 reps - 2 sets - 1x daily - 5x weekly   Seated Scapular Retraction - 10 reps - 2 sets - 1x daily - 5x weekly   Seated Weight Shifting with Arm Support - 10 reps - 2 sets - 1x daily - 5x weekly   Seated Shoulder Flexion Self PROM - 10 reps - 2 sets - 1x daily - 5x weekly   Supine Elbow Flexion Extension AROM - 10 reps - 2 sets - 1x daily - 5x weekly   Seated Elbow Flexion Extension AROM - 10 reps - 2 sets - 1x daily - 5x weekly     Neuromuscular Re-education: (  20 minutes):  Exercise/activities per grid below to improve coordination, kinesthetic sense and proprioception. Required moderate visual and verbal cues to promote coordination of right, upper extremity(s) and promote motor control of right, upper extremity(s). Patient used  pen to complete handwriting task, writing the alphabet then signing her name, then beginning a worksheet tracing the letters of the alphabet; she will complete at home in print and cursive as homework. She was able to complete letter formations with improving degrees of legibility with assistance from left hand to steady right hand/forearm compared to last visit. Treatment/Session Assessment:    · Response to Treatment:  Patient reports using right hand in daily activities more naturally with increased practice. · Compliance with Program/Exercises: Will assess as treatment progresses. · Recommendations/Intent for next treatment session: \"Next visit will focus on advancements to more challenging activities and reduction in assistance provided\".   Total Treatment Duration:  OT Patient Time In/Time Out  Time In: 1440  Time Out: Ed Blankenship, OT

## 2018-02-14 NOTE — PROGRESS NOTES
Julia Serrano  : 1955  Primary: Stephen Higgins Ppo  Secondary:  [address] at [facility]  [address] [facility], [address] [place] [address],[address], [facility] [address]  [phone]   [phone]          OUTPATIENT PHYSICAL THERAPY:Daily Note 2018    ICD-10: Treatment Diagnosis:   · Other abnormalities of gait and mobility (R26.89)  · Difficulty in walking, not elsewhere classified (R26.2)  Precautions/Allergies:   Banana; Lisinopril; and Nuts [tree nut]   Fall Risk Score: 5 (? 5 = High Risk)  MD Orders: Evaluate and Treat MEDICAL/REFERRING DIAGNOSIS:  Weakness due to cerebrovascular accident (CVA) ([facility] [address] [I63.9, R53.1]   DATE OF ONSET: CVA: 2017  REFERRING PHYSICIAN: Juliette Willoughby*  RETURN PHYSICIAN APPOINTMENT: TBD     INITIAL ASSESSMENT:  Ms. Layla Ang presents with decreased mobility, decreased strength, decreased gait, and decreased balance secondary to CVA. After discussing with patient, she agreed she would benefit from physical therapy to improve above deficits. Please sign this plan of treatment if you concur. Thank you for the opportunity to serve this patient. Recertification note on 2018:   Patient has attended fourteen scheduled physical therapy appointments from 12/15/2017 to 2018. Patient is very motivated and compliant with therapy. Patient complaints of increased irritation in her right AFO due to increased tone. Patient is continuing to demonstrate improved ambulation and improved strength in right lower extremity . Patient would benefit from continuing skilled physical therapy to address problems and goals. Thank you for this referral.    PROBLEM LIST (Impacting functional limitations):  1. Decreased Strength  2. Decreased Ambulation Ability/Technique  3. Decreased Balance  4. Decreased Activity Tolerance INTERVENTIONS PLANNED:  1. Balance Exercise  2. Gait Training  3. Home Exercise Program (HEP)  4.  Neuromuscular Re-education/Strengthening  5. Range of Motion (ROM)  6. Therapeutic Exercise/Strengthening   TREATMENT PLAN:  Effective Dates: 2/12/2018 TO 5/7/2018. Frequency/Duration: 2-3 times a week for 12 weeks  GOALS: (Goals have been discussed and agreed upon with patient.)  Short-Term Functional Goals: Time Frame: 3 weeks  1. Patient will be independent with home exercise program without exacerbation of symptoms or cueing needed. Goal met. Discharge Goals: Time Frame: 8 weeks  1. Patient will be independent with all ADLs with minimal fear of falling and loss of balance with daily tasks. Goal ongoing. 2. Patient will report no fear avoidance with social or recreational activities due to fear of falling. Goal ongoing. 3. Patient will score greater than or equal to 45/56 on Gipson Balance Scale with minimal effect of imbalance on patient's ability to manage every day life activities. Goal ongoing. Rehabilitation Potential For Stated Goals: Good  Regarding Janne Schlatter Iraheta's therapy, I certify that the treatment plan above will be carried out by a therapist or under their direction. Thank you for this referral,  Toshia Wade PT     Referring Physician Signature: Kathlynn Snellen, Amy*              Date                    The information in this section was collected on 12/15/2017 (except where otherwise noted). HISTORY:   History of Present Injury/Illness (Reason for Referral):  Patient reports she had a severe stroke on 9/6/2017. She reports that she was in the hospital and [facility] for about 61-62 days. She reports that she has progressed really well, but is still having trouble walking and using her right side (UE and LE). She reports that she walks using her AFO and quad cane all the time. She reports that she tries to be as active as possible. She reports that she is scared that she is going to fall, even though she has not.   She reports she would like to work on strengthening her right leg so her balance is better and she can walk without fear of falling. Past Medical History/Comorbidities:   Ms. Lisa Robbins  has a past medical history of Anxiety; CVA (cerebral vascular accident) ([facility] [address] (2017); Depression; HTN (hypertension); Menopause; and [facility] Ms. Lisa Robbins  has a past surgical history that includes hx jennifer and bso (); hx  section; hx refractive surgery (); hx other surgical (); and hx other surgical (). Social History/Living Environment:   Home Environment: Private residence  Living Alone: No  Support Systems: Family member(s), Friends \ neighbors; Spouse  Prior Level of Function/Work/Activity:  Independent  Dominant Side:         RIGHT  Personal Factors:          Sex:  female        Age:  60 y.o. Current Medications:       Current Outpatient Prescriptions:     FLUoxetine (PROZAC) 20 mg tablet, Take 2 Tabs by mouth daily. , Disp: 60 Tab, Rfl: 3    methylphenidate HCl (RITALIN) 20 mg tablet, Take 1 Tab (20 mg total) by mouth daily. Max Daily Amount: 20 mg, Disp: 30 Tab, Rfl: 0    methylphenidate HCl (RITALIN) 20 mg tablet, Take 1 Tab (20 mg total) by mouth dailyEarliest Fill Date: 18. Max Daily Amount: 20 mg, Disp: 30 Tab, Rfl: 0    [START ON 3/12/2018] methylphenidate HCl (RITALIN) 20 mg tablet, Take 1 Tab (20 mg total) by mouth dailyEarliest Fill Date: 3/12/18. Max Daily Amount: 20 mg, Disp: 30 Tab, Rfl: 0    tiZANidine (ZANAFLEX) 4 mg tablet, 4mg po TID, Disp: 90 Tab, Rfl: 6    HYDROcodone-acetaminophen (NORCO) 5-325 mg per tablet, TAKE ONE TABLET BY MOUTH EVERY 6 HOURS AS NEEDED FOR PAIN, Disp: , Rfl: 0    labetalol (NORMODYNE) 200 mg tablet, Take 1 Tab by mouth two (2) times a day., Disp: 60 Tab, Rfl: 5    ALPRAZolam (XANAX) 0.25 mg tablet, Take 1 Tab by mouth three (3) times daily as needed for Anxiety. Max Daily Amount: 0.75 mg., Disp: 30 Tab, Rfl: 1    rOPINIRole (REQUIP) 2 mg tablet, Take 2 mg by mouth nightly.  for restless leg, Disp: , Rfl:     temazepam (RESTORIL) 15 mg capsule, Take 15 mg by mouth nightly as needed for Sleep., Disp: , Rfl:     polyethylene glycol (MIRALAX) 17 gram/dose powder, Take 17 g by mouth daily. , Disp: 510 g, Rfl: 2    tamsulosin (FLOMAX) 0.4 mg capsule, Take 1 Cap by mouth nightly., Disp: 30 Cap, Rfl: 4    potassium chloride (K-DUR, KLOR-CON) 20 mEq tablet, Take 1 Tab by mouth daily. , Disp: 30 Tab, Rfl: 0    acetaminophen (TYLENOL) 500 mg tablet, 1 Tab by Per NG tube route every four (4) hours as needed. , Disp: 30 Tab, Rfl: 0    losartan-hydroCHLOROthiazide (HYZAAR) 100-12.5 mg per tablet, Take 1 Tab by mouth daily. , Disp: 30 Tab, Rfl: 5   Date Last Reviewed:  2/14/2018    Number of Personal Factors/Comorbidities that affect the Plan of Care: 1-2: MODERATE COMPLEXITY   EXAMINATION:   Observation/Orthostatic Postural Assessment:          Posture Assessment: Rounded shoulders, Forward head   Functional Mobility:         Gait/Ambulation:     Speed/Lulu: Pace decreased (<100 feet/min)  Step Length: Left shortened, Right lengthened  Swing Pattern: Left asymmetrical, Right asymmetrical  Stance: Left increased, Right decreased  Gait Abnormalities: Antalgic, Circumduction, Foot drop, Path deviations, Trendelenburg  Ambulation - Level of Assistance: Modified independent  Assistive Device: Cane, quad  · Interventions: Verbal cues, Safety awareness training          Transfers:     Sit to Stand: Independent  Stand to Sit: Independent  Stand Pivot Transfers: Independent  · Bed to Chair: Independent          Bed Mobility:     Rolling: Independent  Supine to Sit: Independent  Sit to Supine: Independent  · Scooting: Independent    Strength:          L UE STRENGTH: 4/5 shoulder flexion, 4/5 shoulder abduction, 4/5 shoulder extension, 4/5 elbow flexion, 4/5 elbow extension. R UE STRENGTH: 2/5 shoulder flexion, 2/5 shoulder abduction, 2/5 shoulder extension, 2/5 elbow flexion, 2/5 elbow extension.    R LE STRENGTH:  4+/5 hip flexion, 4+/5 hip abduction, 4+/5 hip adduction, 4+/5 hip extension, 4+/5 knee extension, 4+/5 knee flexion, 1/5 ankle dorsiflexion, 1/5 ankle plantarflexion, 1/5 ankle inversion, 1/5 ankle eversion. Sensation:         Intact for light touch and proprioception  Postural Control & Balance:  · Gipson Balance Scale:  33/56.   (A score less than 45/56 indicates high risk of falls)  . Score improved from 28/56 on initial evaluation. Body Structures Involved:  1. Nerves  2. Muscles Body Functions Affected:  1. Neuromusculoskeletal  2. Movement Related Activities and Participation Affected:  1. Mobility  2. Self Care   Number of elements (examined above) that affect the Plan of Care: 4+: HIGH COMPLEXITY   CLINICAL PRESENTATION:   Presentation: Evolving clinical presentation with changing clinical characteristics: MODERATE COMPLEXITY   CLINICAL DECISION MAKING:   Outcome Measure: Tool Used: Gipson Balance Scale  Score:  Initial: 28/56 Most Recent: 33/56 (Date: 2- )   Interpretation of Score: Each section is scored on a 0-4 scale, 0 representing the patients inability to perform the task and 4 representing independence. The scores of each section are added together for a total score of 56. The higher the patients score, the more independent the patient is. Any score below 45 indicates increased risk for falls. Score 56 55-45 44-34 33-23 22-12 11-1 0   Modifier CH CI CJ CK CL CM CN     Medical Necessity:   · Patient is expected to demonstrate progress in strength, range of motion, balance and coordination to improve safety during daily activities. · Patient demonstrates good rehab potential due to higher previous functional level. · Skilled intervention continues to be required due to decreased mobility. Reason for Services/Other Comments:  · Patient continues to demonstrate capacity to improve overall mobility which will increase independence and increase safety.    Use of outcome tool(s) and clinical judgement create a POC that gives a: Questionable prediction of patient's progress: MODERATE COMPLEXITY            TREATMENT:   (In addition to Assessment/Re-Assessment sessions the following treatments were rendered)  Pre-treatment Symptoms/Complaints:  2/14/2018: Doing fine. Haven't walked much today. Pain: Initial: Pain Intensity 1: 0  Post Session:  0/10     NEUROMUSCULAR RE-EDUCATION: (20 minutes):  Exercise/activities per grid below to improve balance, coordination, kinesthetic sense, posture and proprioception. Required minimal verbal and manual cues to promote static and dynamic balance in standing, promote coordination of bilateral, lower extremity(s) and promote motor control of bilateral, lower extremity(s). Date:  2/12/2018 Date   2/14/18   Activity/Exercise Parameters    Stepping over half foam  2x10 reps with left LE, working on weightbearing through R LE and keeping R foot flat on floor (foot tends to invert) 2x10 reps with left LE, working on weightbearing through R LE and keeping R foot flat on floor (foot tends to invert)   Step taps X Onto 6 inch step with L LE working on weight bearing through R LE   Step ups 6 inch  x10 leading with right LE working on weight bearing through R LE 6 inch  x10 leading with L LE working on weight bearing through [address] X    Walking in the clinic with quad cane 200 feet in hallway +up and down ramp, working on weightbearing more on R foot through midstance and terminal stance, and stepping bigger with L LE About 100 feet with quad cane, working on weightbearing more on R foot through midstance and terminal stance, and stepping bigger with L LE                                         THERAPEUTIC EXERCISE: (25 minutes):  Exercises per grid below to improve mobility and strength. Required minimal verbal cues to promote proper body alignment. Progressed repetitions as indicated.    Date:  2/12/2018 Date:  2/14/2018   Activity/Exercise Parameters Parameters   Sit to stand from chair X X    Standing hip flexion X X   Standing hip abduction X X   Seated LAQs 2x10 right LE  3#    Standing hamstrings curls  X X   Bridging 2x10 with right lower extremity, lifting L LE 2x10 with right lower extremity   Supine straight leg raise 2x10 with right lower extremity  3# 2x10 with right lower extremity    Supine PNF D1 and D2  2x10 each with R LE 2x10 with right lower extremity D1 and D2   Left sidelying hip abduction     L sidelying: picking up and moving R LE from in front of L foot to behind L foot (working on hip abd and ext) and stabilizing trunk 2x10 reps 2x10 reps, working on lifting highter   L sidelying R clam shells 2 x 10 reps R LE 2x10 reps right lower extremity   R foot and gastrocsoleus stretching X 8 minutes trying to get foot to neutral position X 8 minutes trying to get foot to neutral position   Standing minisquats in bars         [facility]  Treatment/Session Assessment:    · Response to Treatment:  Patient tolerated treatment without complaints. Patient did better with walking, weight bearing through R LE today. Continue to progress to tolerance. · Compliance with Program/Exercises: Compliant. · Recommendations/Intent for next treatment session: \"Next visit will focus on advancements to more challenging activities\".    Total Treatment Duration:  PT Patient Time In/Time Out  Time In: 1345  Time Out: [address] [employer]

## 2018-02-15 ENCOUNTER — HOSPITAL ENCOUNTER (OUTPATIENT)
Dept: PHYSICAL THERAPY | Age: 63
Discharge: HOME | End: 2018-02-15
Attending: PHYSICAL MEDICINE & REHABILITATION
Payer: COMMERCIAL

## 2018-02-15 PROCEDURE — 97110 THERAPEUTIC EXERCISES: CPT

## 2018-02-15 PROCEDURE — 97112 NEUROMUSCULAR REEDUCATION: CPT

## 2018-02-15 PROCEDURE — 97535 SELF CARE MNGMENT TRAINING: CPT

## 2018-02-15 NOTE — PROGRESS NOTES
Branden Stokes  : 1955  Primary: Whit Pericosharri Ppo  Secondary:  [address] at [facility]  [address] [facility], [address] [place] [address],[address], [facility] [address]  [phone]   [phone]          OUTPATIENT PHYSICAL THERAPY:Daily Note 2/15/2018    ICD-10: Treatment Diagnosis:   · Other abnormalities of gait and mobility (R26.89)  · Difficulty in walking, not elsewhere classified (R26.2)  Precautions/Allergies:   Banana; Lisinopril; and Nuts [tree nut]   Fall Risk Score: 5 (? 5 = High Risk)  MD Orders: Evaluate and Treat MEDICAL/REFERRING DIAGNOSIS:  Weakness due to cerebrovascular accident (CVA) ([facility] [address] [I63.9, R53.1]   DATE OF ONSET: CVA: 2017  REFERRING PHYSICIAN: Juliette Talbot*  RETURN PHYSICIAN APPOINTMENT: TBD     INITIAL ASSESSMENT:  Ms. Lucio Rush presents with decreased mobility, decreased strength, decreased gait, and decreased balance secondary to CVA. After discussing with patient, she agreed she would benefit from physical therapy to improve above deficits. Please sign this plan of treatment if you concur. Thank you for the opportunity to serve this patient. Recertification note on 2018:   Patient has attended fourteen scheduled physical therapy appointments from 12/15/2017 to 2018. Patient is very motivated and compliant with therapy. Patient complaints of increased irritation in her right AFO due to increased tone. Patient is continuing to demonstrate improved ambulation and improved strength in right lower extremity . Patient would benefit from continuing skilled physical therapy to address problems and goals. Thank you for this referral.    PROBLEM LIST (Impacting functional limitations):  1. Decreased Strength  2. Decreased Ambulation Ability/Technique  3. Decreased Balance  4. Decreased Activity Tolerance INTERVENTIONS PLANNED:  1. Balance Exercise  2. Gait Training  3. Home Exercise Program (HEP)  4.  Neuromuscular Re-education/Strengthening  5. Range of Motion (ROM)  6. Therapeutic Exercise/Strengthening   TREATMENT PLAN:  Effective Dates: 2/12/2018 TO 5/7/2018. Frequency/Duration: 2-3 times a week for 12 weeks  GOALS: (Goals have been discussed and agreed upon with patient.)  Short-Term Functional Goals: Time Frame: 3 weeks  1. Patient will be independent with home exercise program without exacerbation of symptoms or cueing needed. Goal met. Discharge Goals: Time Frame: 8 weeks  1. Patient will be independent with all ADLs with minimal fear of falling and loss of balance with daily tasks. Goal ongoing. 2. Patient will report no fear avoidance with social or recreational activities due to fear of falling. Goal ongoing. 3. Patient will score greater than or equal to 45/56 on Gipson Balance Scale with minimal effect of imbalance on patient's ability to manage every day life activities. Goal ongoing. Rehabilitation Potential For Stated Goals: Good  Regarding Verneta Goldberg Baldwin's therapy, I certify that the treatment plan above will be carried out by a therapist or under their direction. Thank you for this referral,  Desi Alexandra PT     Referring Physician Signature: Juliette Johnson*              Date                    The information in this section was collected on 12/15/2017 (except where otherwise noted). HISTORY:   History of Present Injury/Illness (Reason for Referral):  Patient reports she had a severe stroke on 9/6/2017. She reports that she was in the hospital and [facility] for about 61-62 days. She reports that she has progressed really well, but is still having trouble walking and using her right side (UE and LE). She reports that she walks using her AFO and quad cane all the time. She reports that she tries to be as active as possible. She reports that she is scared that she is going to fall, even though she has not.   She reports she would like to work on strengthening her right leg so her balance is better and she can walk without fear of falling. Past Medical History/Comorbidities:   Ms. Silva Fitch  has a past medical history of Anxiety; CVA (cerebral vascular accident) ([facility] [address] (2017); Depression; HTN (hypertension); Menopause; and [facility] ([facility]). Ms. Silva Fitch  has a past surgical history that includes hx jennifer and bso (); hx  section; hx refractive surgery (); hx other surgical (); and hx other surgical (). Social History/Living Environment:   Home Environment: Private residence  Living Alone: No  Support Systems: Family member(s), Friends \ neighbors; Spouse  Prior Level of Function/Work/Activity:  Independent  Dominant Side:         RIGHT  Personal Factors:          Sex:  female        Age:  60 y.o. Current Medications:       Current Outpatient Prescriptions:     FLUoxetine (PROZAC) 20 mg tablet, Take 2 Tabs by mouth daily. , Disp: 60 Tab, Rfl: 3    methylphenidate HCl (RITALIN) 20 mg tablet, Take 1 Tab (20 mg total) by mouth daily. Max Daily Amount: 20 mg, Disp: 30 Tab, Rfl: 0    methylphenidate HCl (RITALIN) 20 mg tablet, Take 1 Tab (20 mg total) by mouth dailyEarliest Fill Date: 18. Max Daily Amount: 20 mg, Disp: 30 Tab, Rfl: 0    [START ON 3/12/2018] methylphenidate HCl (RITALIN) 20 mg tablet, Take 1 Tab (20 mg total) by mouth dailyEarliest Fill Date: 3/12/18. Max Daily Amount: 20 mg, Disp: 30 Tab, Rfl: 0    tiZANidine (ZANAFLEX) 4 mg tablet, 4mg po TID, Disp: 90 Tab, Rfl: 6    HYDROcodone-acetaminophen (NORCO) 5-325 mg per tablet, TAKE ONE TABLET BY MOUTH EVERY 6 HOURS AS NEEDED FOR PAIN, Disp: , Rfl: 0    labetalol (NORMODYNE) 200 mg tablet, Take 1 Tab by mouth two (2) times a day., Disp: 60 Tab, Rfl: 5    ALPRAZolam (XANAX) 0.25 mg tablet, Take 1 Tab by mouth three (3) times daily as needed for Anxiety. Max Daily Amount: 0.75 mg., Disp: 30 Tab, Rfl: 1    rOPINIRole (REQUIP) 2 mg tablet, Take 2 mg by mouth nightly.  for restless leg, Disp: , Rfl:     temazepam (RESTORIL) 15 mg capsule, Take 15 mg by mouth nightly as needed for Sleep., Disp: , Rfl:     polyethylene glycol (MIRALAX) 17 gram/dose powder, Take 17 g by mouth daily. , Disp: 510 g, Rfl: 2    tamsulosin (FLOMAX) 0.4 mg capsule, Take 1 Cap by mouth nightly., Disp: 30 Cap, Rfl: 4    potassium chloride (K-DUR, KLOR-CON) 20 mEq tablet, Take 1 Tab by mouth daily. , Disp: 30 Tab, Rfl: 0    acetaminophen (TYLENOL) 500 mg tablet, 1 Tab by Per NG tube route every four (4) hours as needed. , Disp: 30 Tab, Rfl: 0    losartan-hydroCHLOROthiazide (HYZAAR) 100-12.5 mg per tablet, Take 1 Tab by mouth daily. , Disp: 30 Tab, Rfl: 5   Date Last Reviewed:  2/15/2018    Number of Personal Factors/Comorbidities that affect the Plan of Care: 1-2: MODERATE COMPLEXITY   EXAMINATION:   Observation/Orthostatic Postural Assessment:          Posture Assessment: Rounded shoulders, Forward head   Functional Mobility:         Gait/Ambulation:     Speed/Lulu: Pace decreased (<100 feet/min)  Step Length: Left shortened, Right lengthened  Swing Pattern: Left asymmetrical, Right asymmetrical  Stance: Left increased, Right decreased  Gait Abnormalities: Antalgic, Circumduction, Foot drop, Path deviations, Trendelenburg  Ambulation - Level of Assistance: Modified independent  Assistive Device: Cane, quad  · Interventions: Verbal cues, Safety awareness training          Transfers:     Sit to Stand: Independent  Stand to Sit: Independent  Stand Pivot Transfers: Independent  · Bed to Chair: Independent          Bed Mobility:     Rolling: Independent  Supine to Sit: Independent  Sit to Supine: Independent  · Scooting: Independent    Strength:          L UE STRENGTH: 4/5 shoulder flexion, 4/5 shoulder abduction, 4/5 shoulder extension, 4/5 elbow flexion, 4/5 elbow extension. R UE STRENGTH: 2/5 shoulder flexion, 2/5 shoulder abduction, 2/5 shoulder extension, 2/5 elbow flexion, 2/5 elbow extension.    R LE STRENGTH:  4+/5 hip flexion, 4+/5 hip abduction, 4+/5 hip adduction, 4+/5 hip extension, 4+/5 knee extension, 4+/5 knee flexion, 1/5 ankle dorsiflexion, 1/5 ankle plantarflexion, 1/5 ankle inversion, 1/5 ankle eversion. Sensation:         Intact for light touch and proprioception  Postural Control & Balance:  · Gipson Balance Scale:  33/56.   (A score less than 45/56 indicates high risk of falls)  . Score improved from 28/56 on initial evaluation. Body Structures Involved:  1. Nerves  2. Muscles Body Functions Affected:  1. Neuromusculoskeletal  2. Movement Related Activities and Participation Affected:  1. Mobility  2. Self Care   Number of elements (examined above) that affect the Plan of Care: 4+: HIGH COMPLEXITY   CLINICAL PRESENTATION:   Presentation: Evolving clinical presentation with changing clinical characteristics: MODERATE COMPLEXITY   CLINICAL DECISION MAKING:   Outcome Measure: Tool Used: Gipson Balance Scale  Score:  Initial: 28/56 Most Recent: 33/56 (Date: 2- )   Interpretation of Score: Each section is scored on a 0-4 scale, 0 representing the patients inability to perform the task and 4 representing independence. The scores of each section are added together for a total score of 56. The higher the patients score, the more independent the patient is. Any score below 45 indicates increased risk for falls. Score 56 55-45 44-34 33-23 22-12 11-1 0   Modifier CH CI CJ CK CL CM CN     Medical Necessity:   · Patient is expected to demonstrate progress in strength, range of motion, balance and coordination to improve safety during daily activities. · Patient demonstrates good rehab potential due to higher previous functional level. · Skilled intervention continues to be required due to decreased mobility. Reason for Services/Other Comments:  · Patient continues to demonstrate capacity to improve overall mobility which will increase independence and increase safety.    Use of outcome tool(s) and clinical judgement create a POC that gives a: Questionable prediction of patient's progress: MODERATE COMPLEXITY            TREATMENT:   (In addition to Assessment/Re-Assessment sessions the following treatments were rendered)  Pre-treatment Symptoms/Complaints:  2/15/2018: \"doing fine. No problems. \"  Pain: Initial: Pain Intensity 1: 0  Post Session:  0/10     NEUROMUSCULAR RE-EDUCATION: (20 minutes):  Exercise/activities per grid below to improve balance, coordination, kinesthetic sense, posture and proprioception. Required minimal verbal and manual cues to promote static and dynamic balance in standing, promote coordination of bilateral, lower extremity(s) and promote motor control of bilateral, lower extremity(s).    Date:  2/12/2018 Date   2/14/18 Date   2/15/18   Activity/Exercise Parameters     Stepping over half foam  2x10 reps with left LE, working on weightbearing through R LE and keeping R foot flat on floor (foot tends to invert) 2x10 reps with left LE, working on weightbearing through R LE and keeping R foot flat on floor (foot tends to invert) 2x10 reps with left LE, working on weightbearing through R LE and keeping R foot flat on floor (foot tends to invert)   Step taps X Onto 6 inch step with L LE working on weight bearing through R LE Onto 6 inch step with L LE working on weight bearing through R LE   Step ups 6 inch  x10 leading with right LE working on weight bearing through R LE 6 inch  x10 leading with L LE working on weight bearing through R LE 6 inch  x10 leading with L LE working on weight bearing through [address] X     Walking in the clinic with quad cane 200 feet in hallway +up and down ramp, working on weightbearing more on R foot through midstance and terminal stance, and stepping bigger with L LE About 100 feet with quad cane, working on weightbearing more on R foot through midstance and terminal stance, and stepping bigger with L LE About 100 feet with quad cane, working on weightbearing more on R foot through midstance and terminal stance, and stepping bigger with L LE                                                THERAPEUTIC EXERCISE: (25 minutes):  Exercises per grid below to improve mobility and strength. Required minimal verbal cues to promote proper body alignment. Progressed repetitions as indicated. Date:  2/12/2018 Date:  2/14/2018 Date   2/15/18   Activity/Exercise Parameters Parameters    Sit to stand from chair X X     Standing hip flexion X X    Standing hip abduction X X    Seated LAQs 2x10 right LE  3#     Standing hamstrings curls  X X    Bridging 2x10 with right lower extremity, lifting L LE 2x10 with right lower extremity 2x10 with right lower extremity   Supine straight leg raise 2x10 with right lower extremity  3# 2x10 with right lower extremity  2x10 with right lower extremity    Supine PNF D1 and D2  2x10 each with R LE 2x10 with right lower extremity D1 and D2 2x10 with right lower extremity D1 and D2   Left sidelying hip abduction      L sidelying: picking up and moving R LE from in front of L foot to behind L foot (working on hip abd and ext) and stabilizing trunk 2x10 reps 2x10 reps, working on lifting highter x10 reps, working on lifting higher   L sidelying R clam shells 2 x 10 reps R LE 2x10 reps right lower extremity 2x10 reps right lower extremity   R foot and gastrocsoleus stretching X 8 minutes trying to get foot to neutral position X 8 minutes trying to get foot to neutral position X 8 minutes trying to get foot to neutral position   Standing minisquats in bars          [employer] Portal  Treatment/Session Assessment:    · Response to Treatment:  Patient tolerated treatment without complaints. Patient is demonstrating improving gait pattern, able to demonstrate better mid and terminal stance R LE. Continue to progress to tolerance. · Compliance with Program/Exercises: Compliant.   · Recommendations/Intent for next treatment session: \"Next visit will focus on advancements to more challenging activities\".    Total Treatment Duration:  PT Patient Time In/Time Out  Time In: 1030  Time Out: Harvey Chow

## 2018-02-15 NOTE — PROGRESS NOTES
George Osler  : 1955  Primary: Garfield Rodriguez Ppo  Secondary:  [address]  at [facility], [address] [address], [facility] [address].  [phone]   [phone]        OUTPATIENT OCCUPATIONAL THERAPY: Daily Note 2/15/2018    ICD-10: Treatment Diagnosis: Hemiplegia and hemiparesis following cerebral infarction affecting right dominant side (I69.351)  Precautions/Allergies:   Banana; Lisinopril; and Nuts [tree nut]   Fall Risk Score: 5 (? 5 = High Risk)  MD Orders: Referral to occupational therapy MEDICAL/REFERRING DIAGNOSIS:   Cerebral infarction, unspecified [I63.9]  Weakness [R53.1]   DATE OF ONSET: 2017   REFERRING PHYSICIAN: Juliette Grider*  RETURN PHYSICIAN APPOINTMENT: unknown   18: Ms. Marcus Dunn is making excellent progress toward her goals; she is demonstrating improving range of motion and functional use of the right upper extremity in activities of daily living. Feel she will continue to benefit from skilled occupational therapy to maximize safety and independence with activities of daily living. INITIAL ASSESSMENT:  Ms. Marcus Dunn presents with impaired active movement, strength, coordination and sensation of the dominant right upper extremity that is affecting her ability to complete activities of daily living independently. Feel she may benefit from skilled occupational therapy to maximize safety and independence with activities of daily living. PLAN OF CARE:   PROBLEM LIST:  1. Decreased Strength  2. Decreased ADL/Functional Activities  3. Decreased Transfer Abilities  4. Decreased Balance  5. Decreased Flexibility/Joint Mobility  6. Decreased Cognition INTERVENTIONS PLANNED:  1. Activities of daily living training  2. Manual therapy training  3. Modalities  4. Neuromuscular re-eduation  5. Sensory reintegration training  6. Splinting  7. Therapeutic activity  8.  Therapeutic exercise   TREATMENT PLAN:  Effective Dates: 17 TO 3/20/18. Frequency/Duration: 3 times a week for 12 weeks  GOALS: (Goals have been discussed and agreed upon with patient.)  Short-Term Functional Goals: Time Frame: 6 weeks  1. Patient will demonstrate independence with home exercise program for right upper extremity range of motion. Met  2. Patient will increase use of right upper extremity as a functional assist in at least 25% of daily activities. Met  3. Patient will bathe with moderate assistance. Continue to address  Discharge Goals: Time Frame: 12 weeks  1. Patient will bathe with minimal assistance. Continue to address  2. Patient will feed self with modified independence and adaptive equipment as needed. Continue to address  3. Patient will dress with modified independence and adaptive equipment as needed. Continue to address  4. Patient will use right upper extremity as a functional assist in at least 50% of daily activities. Continue to address  Rehabilitation Potential For Stated Goals: Good  Regarding Meredith Iraheta's therapy, I certify that the treatment plan above will be carried out by a therapist or under their direction. Thank you for this referral,  Eusebio Chan OT     Referring Physician Signature: Juliette Calle* _________________________  Date _________            The information in this section was collected on 18 (except where otherwise noted). OCCUPATIONAL PROFILE & HISTORY:   History of Present Injury/Illness (Reason for Referral):  CVA with hospital and [facility] stay then home health therapy. Past Medical History/Comorbidities:   Ms. Estefania Buchanan  has a past medical history of Anxiety; CVA (cerebral vascular accident) ([facility] [address] (2017); Depression; HTN (hypertension); Menopause; and PTE (pulmonary thromboembolism) ([facility] [address] (2017). Ms. Estefania Buchanan  has a past surgical history that includes hx jennifer and bso (); hx  section; hx refractive surgery (); hx other surgical (); and hx other surgical ().   Social History/Living Environment:      Prior Level of Function/Work/Activity:  Does seasonal taxes at [employer]  Dominant Side:         RIGHT  Current Medications:    Current Outpatient Prescriptions:     FLUoxetine (PROZAC) 20 mg tablet, Take 2 Tabs by mouth daily. , Disp: 60 Tab, Rfl: 3    methylphenidate HCl (RITALIN) 20 mg tablet, Take 1 Tab (20 mg total) by mouth daily. Max Daily Amount: 20 mg, Disp: 30 Tab, Rfl: 0    methylphenidate HCl (RITALIN) 20 mg tablet, Take 1 Tab (20 mg total) by mouth dailyEarliest Fill Date: 2/12/18. Max Daily Amount: 20 mg, Disp: 30 Tab, Rfl: 0    [START ON 3/12/2018] methylphenidate HCl (RITALIN) 20 mg tablet, Take 1 Tab (20 mg total) by mouth dailyEarliest Fill Date: 3/12/18. Max Daily Amount: 20 mg, Disp: 30 Tab, Rfl: 0    tiZANidine (ZANAFLEX) 4 mg tablet, 4mg po TID, Disp: 90 Tab, Rfl: 6    HYDROcodone-acetaminophen (NORCO) 5-325 mg per tablet, TAKE ONE TABLET BY MOUTH EVERY 6 HOURS AS NEEDED FOR PAIN, Disp: , Rfl: 0    labetalol (NORMODYNE) 200 mg tablet, Take 1 Tab by mouth two (2) times a day., Disp: 60 Tab, Rfl: 5    ALPRAZolam (XANAX) 0.25 mg tablet, Take 1 Tab by mouth three (3) times daily as needed for Anxiety. Max Daily Amount: 0.75 mg., Disp: 30 Tab, Rfl: 1    rOPINIRole (REQUIP) 2 mg tablet, Take 2 mg by mouth nightly. for restless leg, Disp: , Rfl:     temazepam (RESTORIL) 15 mg capsule, Take 15 mg by mouth nightly as needed for Sleep., Disp: , Rfl:     polyethylene glycol (MIRALAX) 17 gram/dose powder, Take 17 g by mouth daily. , Disp: 510 g, Rfl: 2    tamsulosin (FLOMAX) 0.4 mg capsule, Take 1 Cap by mouth nightly., Disp: 30 Cap, Rfl: 4    potassium chloride (K-DUR, KLOR-CON) 20 mEq tablet, Take 1 Tab by mouth daily. , Disp: 30 Tab, Rfl: 0    acetaminophen (TYLENOL) 500 mg tablet, 1 Tab by Per NG tube route every four (4) hours as needed. , Disp: 30 Tab, Rfl: 0    losartan-hydroCHLOROthiazide (HYZAAR) 100-12.5 mg per tablet, Take 1 Tab by mouth daily. , Disp: 30 Tab, Rfl: 5            Date Last Reviewed:  2/15/2018   Complexity Level : Expanded review of therapy/medical records (1-2):  MODERATE COMPLEXITY   ASSESSMENT OF OCCUPATIONAL PERFORMANCE:   ROM:                   Balance:                   Coordination:                   Mental Status:                   Vision:                   Activities of Daily Living:           Basic ADLs (From Assessment) Complex ADLs (From Assessment)         Grooming/Bathing/Dressing Activities of Daily Living                                   Sensation:         Light touch absent distal to right elbow     Physical Skills Involved:  1. Range of Motion  2. Balance  3. Sensation  4. Fine Motor Control  5. Gross Motor Control Cognitive Skills Affected (resulting in the inability to perform in a timely and safe manner):  1. Executive Function  2. Sustained Attention  3. Divided Attention  4. Comprehension Psychosocial Skills Affected:  1. None   Number of elements that affect the Plan of Care: 5+:  HIGH COMPLEXITY   CLINICAL DECISION MAKING:   Outcome Measure: Tool Used: [address] [facility] [address] AM-[facility] Daily Activity Outpatient Short Form  Score:  Initial: 18 Most Recent: 28 (Date: 1/31/18 )   Interpretation of Tool:  Represents clinically-significant functional categories (i.e., basic and instrumental activities of daily living, fine motor activities). Score 60 59-55 54-47 46-34 32-21 20-16 15   Modifier CH CI CJ CK CL CM CN     Medical Necessity:   · Patient demonstrates good rehab potential due to higher previous functional level. Reason for Services/Other Comments:  · Patient continues to require skilled intervention due to decreased safety and independence in activities of daily living. Clinical Decision-Making Assessment: Moderately difficult to predict patient's progress at this time due to the extent of her limitations in functional movement and use of dominant right upper extremity.     Assessment process, impact of co-morbidities, assessment modification\need for assistance, and selection of interventions: Analytical Complexity:MODERATE COMPLEXITY   TREATMENT:   (In addition to Assessment/Re-Assessment sessions the following treatments were rendered)    Pre-treatment Symptoms/Complaints:  Patient states, \"I will make sure to work on these exercises at home. \"  Pain: Initial:   Pain Intensity 1: 0  Post Session:  0/10     Therapeutic Exercise: (  20 minutes):  Exercises per grid below to improve dynamic movement of arm - right and hand - right to improve functional lifting, carrying, reaching and grasping. Required moderate visual and verbal cues to promote proper body mechanics. Progressed range, repetitions and complexity of movement as indicated.        Date:  1/24/18 Date:  1/26/18 Date:  1/29/18 Date:  1/31/18 Date:  2/2/18 Date:  2/12/18 Date:  2/14/18 Date:  2/15/18   Activity/Exercise           Shoulder shrugs AROM  1 x 10  AROM  1 x 10 AROM  1 x 5 AROM  1 x 5  AROM  1 x 10 AROM  1 x 10   Scapular protraction/retraction AROM  1 x 10 3 x 10 with red theratubing - seated rows AROM  1 x 10 AROM  1 x 5 3 x 10 with red theratubing - seated rows 2 x 10 with green theratubing - seated rows with elbow extension assisted by tubing 2 x 10 with green theratubing - seated rows with elbow extension assisted by tubing AROM  1 x 10   Shoulder abduction    AROM  1 x 5       Elbow flexion/extension AROM with assist to fully extend x 10 reps   AROM  1 x 5 AROM with assist to fully extend x 10 reps   AROM with assist to fully extend x 10 reps   PNF D1/D2 diagonals   X 5 reps        Hand helper  15 pounds x 25 reps with assist to extend fingers  15 pounds x 25 reps with assist to extend fingers 15 pounds x 10 reps with assist to extend fingers 20 pounds x 5 reps with assist to extend fingers 20 pounds x 5 reps with assist to extend fingers 20 pounds x 5 reps with assist to extend fingers   Tabletop skateboard           Shoulder flexion/extension  2 x 10 with red theratubing - seated  Seated x 10 reps  SROM        Chest press           UBE  Min assist to sustain right  on handle   Level 0  5 mintues Min assist to sustain right  on handle   Level 0  5 mintues  Min assist to sustain right  on handle   Level 0  5 mintues SBA-Min assist to sustain right  on handle   Level 0  5 mintues  Used dycem and wrist brace to keep wrist in neutral Min assist to sustain right  on handle   Level 0  5 mintues Min assist to sustain right  on handle   Level 0  5 mintues    Used dycem and wrist brace to keep wrist in neutral   Modified push-ups  Hands on large therapy ball on mat  1 x 10 Hands on countertop with dycem under right hand  3 x 10   Hands on countertop with dycem under right hand  2 x 10 Hands on countertop with dycem under right hand  1 x 10 Hands on countertop with dycem under right hand  2 x 10    Resistive clothespin  Yellow  1 x 10 Yellow  1 x 10 Yellow  1 x 10 Yellow  1 x 10 Yellow  1 x 10     Shoulder arc   5 rings with assist from right hand to grasp ring initially 7 rings with assist from right hand to grasp ring initially   5 tabs on small hill only with hand over hand assist    Wrist flexion/extension  PROM  1 x 10 with stretch at end range of extension             Access Code: [email or web address]   URL: [email or web address]   Date: 01/19/2018   Prepared by: Matt Otero     Exercises   Supine Shoulder Flexion PROM - 10 reps - 2 sets - 3 hold - 1x daily - 5x weekly   Push-Up on Counter - 10 reps - 2 sets - 1x daily - 5x weekly   Seated Shoulder Shrugs - 10 reps - 2 sets - 1x daily - 5x weekly   Seated Scapular Retraction - 10 reps - 2 sets - 1x daily - 5x weekly   Seated Weight Shifting with Arm Support - 10 reps - 2 sets - 1x daily - 5x weekly   Seated Shoulder Flexion Self PROM - 10 reps - 2 sets - 1x daily - 5x weekly   Supine Elbow Flexion Extension AROM - 10 reps - 2 sets - 1x daily - 5x weekly Seated Elbow Flexion Extension AROM - 10 reps - 2 sets - 1x daily - 5x weekly     Neuromuscular Re-education: (  15 minutes):  Exercise/activities per grid below to improve coordination, kinesthetic sense and proprioception. Required moderate visual and verbal cues to promote coordination of right, upper extremity(s) and promote motor control of right, upper extremity(s). Patient in prone then up on forearms x 10 then on outstretched hands x 5 reps. She then completed lateral weight shifts onto forearms while reaching forward with the opposite arm x 10 reps to increase proprioceptive input onto right arm. Self Care: (10 minutes): Procedure(s) (per grid) utilized to improve and/or restore self-care/home management as related to self feeding. Required moderate visual and verbal cueing to facilitate activities of daily living skills and compensatory activities using adaptive utensils to simulate self-feeding bringing utensil from table to mouth x 15 reps. Treatment/Session Assessment:    · Response to Treatment:  Patient reports using right hand in daily activities more naturally with increased practice for feeding and grooming tasks. · Compliance with Program/Exercises: Will assess as treatment progresses. Reviewed HEP. · Recommendations/Intent for next treatment session: \"Next visit will focus on advancements to more challenging activities and reduction in assistance provided\".   Total Treatment Duration:  OT Patient Time In/Time Out  Time In: 0945  Time Out: 333 Ana Rich OT

## 2018-02-16 ENCOUNTER — APPOINTMENT (OUTPATIENT)
Dept: PHYSICAL THERAPY | Age: 63
End: 2018-02-16
Attending: PHYSICAL MEDICINE & REHABILITATION
Payer: COMMERCIAL

## 2018-02-19 ENCOUNTER — HOSPITAL ENCOUNTER (OUTPATIENT)
Dept: PHYSICAL THERAPY | Age: 63
Discharge: HOME | End: 2018-02-19
Attending: PHYSICAL MEDICINE & REHABILITATION
Payer: COMMERCIAL

## 2018-02-19 PROCEDURE — 97110 THERAPEUTIC EXERCISES: CPT

## 2018-02-19 PROCEDURE — 97112 NEUROMUSCULAR REEDUCATION: CPT

## 2018-02-19 PROCEDURE — 97535 SELF CARE MNGMENT TRAINING: CPT

## 2018-02-19 NOTE — PROGRESS NOTES
Julia Serrano  : 1955  Primary: Stephen Higgins Ppo  Secondary:  [address] at [place] [address] [facility], [address] [place] [address],[address], [facility] [address] [facility].  [phone]   [phone]        OUTPATIENT OCCUPATIONAL THERAPY: Daily Note 2018    ICD-10: Treatment Diagnosis: Hemiplegia and hemiparesis following cerebral infarction affecting right dominant side (I69.351)  Precautions/Allergies:   Banana; Lisinopril; and Nuts [tree nut]   Fall Risk Score: 5 (? 5 = High Risk)  MD Orders: Referral to occupational therapy MEDICAL/REFERRING DIAGNOSIS:   Cerebral infarction, unspecified [I63.9]  Weakness [R53.1]   DATE OF ONSET: 2017   REFERRING PHYSICIAN: Jason Willoughby  RETURN PHYSICIAN APPOINTMENT: unknown   18: Ms. Layla Ang is making excellent progress toward her goals; she is demonstrating improving range of motion and functional use of the right upper extremity in activities of daily living. Feel she will continue to benefit from skilled occupational therapy to maximize safety and independence with activities of daily living. INITIAL ASSESSMENT:  Ms. Layla Ang presents with impaired active movement, strength, coordination and sensation of the dominant right upper extremity that is affecting her ability to complete activities of daily living independently. Feel she may benefit from skilled occupational therapy to maximize safety and independence with activities of daily living. PLAN OF CARE:   PROBLEM LIST:  1. Decreased Strength  2. Decreased ADL/Functional Activities  3. Decreased Transfer Abilities  4. Decreased Balance  5. Decreased Flexibility/Joint Mobility  6. Decreased Cognition INTERVENTIONS PLANNED:  1. Activities of daily living training  2. Manual therapy training  3. Modalities  4. Neuromuscular re-eduation  5. Sensory reintegration training  6. Splinting  7. Therapeutic activity  8.  Therapeutic exercise   TREATMENT PLAN:  Effective Dates: 17 TO 3/20/18. Frequency/Duration: 3 times a week for 12 weeks  GOALS: (Goals have been discussed and agreed upon with patient.)  Short-Term Functional Goals: Time Frame: 6 weeks  1. Patient will demonstrate independence with home exercise program for right upper extremity range of motion. Met  2. Patient will increase use of right upper extremity as a functional assist in at least 25% of daily activities. Met  3. Patient will bathe with moderate assistance. Continue to address  Discharge Goals: Time Frame: 12 weeks  1. Patient will bathe with minimal assistance. Continue to address  2. Patient will feed self with modified independence and adaptive equipment as needed. Continue to address  3. Patient will dress with modified independence and adaptive equipment as needed. Continue to address  4. Patient will use right upper extremity as a functional assist in at least 50% of daily activities. Continue to address  Rehabilitation Potential For Stated Goals: Good  Regarding Case Iraheta's therapy, I certify that the treatment plan above will be carried out by a therapist or under their direction. Thank you for this referral,  Mey Delarosa, OT     Referring Physician Signature: Juliette Alexandra* _________________________  Date _________            The information in this section was collected on 18 (except where otherwise noted). OCCUPATIONAL PROFILE & HISTORY:   History of Present Injury/Illness (Reason for Referral):  CVA with hospital and [facility] stay then home health therapy. Past Medical History/Comorbidities:   Ms. Eusebio Dixon  has a past medical history of Anxiety; CVA (cerebral vascular accident) ([facility] [address] (2017); Depression; HTN (hypertension); Menopause; and PTE (pulmonary thromboembolism) ([facility] [address] (2017). Ms. Eusebio Dixon  has a past surgical history that includes hx jennifer and bso (); hx  section; hx refractive surgery (); hx other surgical (); and hx other surgical ().   Social History/Living Environment:      Prior Level of Function/Work/Activity:  Does seasonal taxes at [employer]  Dominant Side:         RIGHT  Current Medications:    Current Outpatient Prescriptions:     FLUoxetine (PROZAC) 20 mg tablet, Take 2 Tabs by mouth daily. , Disp: 60 Tab, Rfl: 3    methylphenidate HCl (RITALIN) 20 mg tablet, Take 1 Tab (20 mg total) by mouth daily. Max Daily Amount: 20 mg, Disp: 30 Tab, Rfl: 0    methylphenidate HCl (RITALIN) 20 mg tablet, Take 1 Tab (20 mg total) by mouth dailyEarliest Fill Date: 2/12/18. Max Daily Amount: 20 mg, Disp: 30 Tab, Rfl: 0    [START ON 3/12/2018] methylphenidate HCl (RITALIN) 20 mg tablet, Take 1 Tab (20 mg total) by mouth dailyEarliest Fill Date: 3/12/18. Max Daily Amount: 20 mg, Disp: 30 Tab, Rfl: 0    tiZANidine (ZANAFLEX) 4 mg tablet, 4mg po TID, Disp: 90 Tab, Rfl: 6    HYDROcodone-acetaminophen (NORCO) 5-325 mg per tablet, TAKE ONE TABLET BY MOUTH EVERY 6 HOURS AS NEEDED FOR PAIN, Disp: , Rfl: 0    labetalol (NORMODYNE) 200 mg tablet, Take 1 Tab by mouth two (2) times a day., Disp: 60 Tab, Rfl: 5    ALPRAZolam (XANAX) 0.25 mg tablet, Take 1 Tab by mouth three (3) times daily as needed for Anxiety. Max Daily Amount: 0.75 mg., Disp: 30 Tab, Rfl: 1    rOPINIRole (REQUIP) 2 mg tablet, Take 2 mg by mouth nightly. for restless leg, Disp: , Rfl:     temazepam (RESTORIL) 15 mg capsule, Take 15 mg by mouth nightly as needed for Sleep., Disp: , Rfl:     polyethylene glycol (MIRALAX) 17 gram/dose powder, Take 17 g by mouth daily. , Disp: 510 g, Rfl: 2    tamsulosin (FLOMAX) 0.4 mg capsule, Take 1 Cap by mouth nightly., Disp: 30 Cap, Rfl: 4    potassium chloride (K-DUR, KLOR-CON) 20 mEq tablet, Take 1 Tab by mouth daily. , Disp: 30 Tab, Rfl: 0    acetaminophen (TYLENOL) 500 mg tablet, 1 Tab by Per NG tube route every four (4) hours as needed. , Disp: 30 Tab, Rfl: 0    losartan-hydroCHLOROthiazide (HYZAAR) 100-12.5 mg per tablet, Take 1 Tab by mouth daily. , Disp: 30 Tab, Rfl: 5            Date Last Reviewed:  2/19/2018   Complexity Level : Expanded review of therapy/medical records (1-2):  MODERATE COMPLEXITY   ASSESSMENT OF OCCUPATIONAL PERFORMANCE:   ROM:                   Balance:                   Coordination:                   Mental Status:                   Vision:                   Activities of Daily Living:           Basic ADLs (From Assessment) Complex ADLs (From Assessment)         Grooming/Bathing/Dressing Activities of Daily Living                                   Sensation:         Light touch absent distal to right elbow     Physical Skills Involved:  1. Range of Motion  2. Balance  3. Sensation  4. Fine Motor Control  5. Gross Motor Control Cognitive Skills Affected (resulting in the inability to perform in a timely and safe manner):  1. Executive Function  2. Sustained Attention  3. Divided Attention  4. Comprehension Psychosocial Skills Affected:  1. None   Number of elements that affect the Plan of Care: 5+:  HIGH COMPLEXITY   CLINICAL DECISION MAKING:   Outcome Measure: Tool Used: [address] [facility] [address] [facility] Daily Activity Outpatient Short Form  Score:  Initial: 18 Most Recent: 28 (Date: 1/31/18 )   Interpretation of Tool:  Represents clinically-significant functional categories (i.e., basic and instrumental activities of daily living, fine motor activities). Score 60 59-55 54-47 46-34 32-21 20-16 15   Modifier CH CI CJ CK CL CM CN     Medical Necessity:   · Patient demonstrates good rehab potential due to higher previous functional level. Reason for Services/Other Comments:  · Patient continues to require skilled intervention due to decreased safety and independence in activities of daily living. Clinical Decision-Making Assessment: Moderately difficult to predict patient's progress at this time due to the extent of her limitations in functional movement and use of dominant right upper extremity.     Assessment process, impact of co-morbidities, assessment modification\need for assistance, and selection of interventions: Analytical Complexity:MODERATE COMPLEXITY   TREATMENT:   (In addition to Assessment/Re-Assessment sessions the following treatments were rendered)    Pre-treatment Symptoms/Complaints:  Patient states, \"I am tired today; I have had appointments all day. But I am so excited; it is finally starting to feel natural to eat with my right hand. \"  Pain: Initial:   Pain Intensity 1: 0  Post Session:  0/10     Therapeutic Exercise: (  20 minutes):  Exercises per grid below to improve dynamic movement of arm - right and hand - right to improve functional lifting, carrying, reaching and grasping. Required moderate visual and verbal cues to promote proper body mechanics. Progressed range, repetitions and complexity of movement as indicated.        Date:  1/26/18 Date:  1/29/18 Date:  1/31/18 Date:  2/2/18 Date:  2/12/18 Date:  2/14/18 Date:  2/15/18 Date:   2/19/18   Activity/Exercise           Shoulder shrugs  AROM  1 x 10 AROM  1 x 5 AROM  1 x 5  AROM  1 x 10 AROM  1 x 10 AROM  2 x 10   Scapular protraction/retraction 3 x 10 with red theratubing - seated rows AROM  1 x 10 AROM  1 x 5 3 x 10 with red theratubing - seated rows 2 x 10 with green theratubing - seated rows with elbow extension assisted by tubing 2 x 10 with green theratubing - seated rows with elbow extension assisted by tubing AROM  1 x 10 AROM  2 x 10   Shoulder abduction   AROM  1 x 5        Elbow flexion/extension   AROM  1 x 5 AROM with assist to fully extend x 10 reps   AROM with assist to fully extend x 10 reps    PNF D1/D2 diagonals  X 5 reps         Hand helper 15 pounds x 25 reps with assist to extend fingers  15 pounds x 25 reps with assist to extend fingers 15 pounds x 10 reps with assist to extend fingers 20 pounds x 5 reps with assist to extend fingers 20 pounds x 5 reps with assist to extend fingers 20 pounds x 5 reps with assist to extend fingers Tabletop skateboard           Shoulder flexion/extension 2 x 10 with red theratubing - seated  Seated x 10 reps  SROM         Chest press           UBE Min assist to sustain right  on handle   Level 0  5 mintues Min assist to sustain right  on handle   Level 0  5 mintues  Min assist to sustain right  on handle   Level 0  5 mintues SBA-Min assist to sustain right  on handle   Level 0  5 mintues  Used dycem and wrist brace to keep wrist in neutral Min assist to sustain right  on handle   Level 0  5 mintues Min assist to sustain right  on handle   Level 0  5 mintues    Used dycem and wrist brace to keep wrist in neutral Min assist to sustain right  on handle   Level 0  5 mintues   Modified push-ups  Hands on countertop with dycem under right hand  3 x 10   Hands on countertop with dycem under right hand  2 x 10 Hands on countertop with dycem under right hand  1 x 10 Hands on countertop with dycem under right hand  2 x 10  Hands on countertop with dycem under right hand  2 x 10   Resistive clothespin Yellow  1 x 10 Yellow  1 x 10 Yellow  1 x 10 Yellow  1 x 10 Yellow  1 x 10      Shoulder arc  5 rings with assist from right hand to grasp ring initially 7 rings with assist from right hand to grasp ring initially   5 tabs on small hill only with hand over hand assist     Wrist flexion/extension PROM  1 x 10 with stretch at end range of extension              Access Code: [email or web address]   URL: [email or web address]   Date: 01/19/2018   Prepared by: Duc Maki     Exercises   Supine Shoulder Flexion PROM - 10 reps - 2 sets - 3 hold - 1x daily - 5x weekly   Push-Up on Counter - 10 reps - 2 sets - 1x daily - 5x weekly   Seated Shoulder Shrugs - 10 reps - 2 sets - 1x daily - 5x weekly   Seated Scapular Retraction - 10 reps - 2 sets - 1x daily - 5x weekly   Seated Weight Shifting with Arm Support - 10 reps - 2 sets - 1x daily - 5x weekly   Seated Shoulder Flexion Self PROM - 10 reps - 2 sets - 1x daily - 5x weekly   Supine Elbow Flexion Extension AROM - 10 reps - 2 sets - 1x daily - 5x weekly   Seated Elbow Flexion Extension AROM - 10 reps - 2 sets - 1x daily - 5x weekly     Neuromuscular Re-education: (  15 minutes):  Exercise/activities per grid below to improve coordination, kinesthetic sense and proprioception. Required moderate visual and verbal cues to promote coordination of right, upper extremity(s) and promote motor control of right, upper extremity(s). Patient in prone then up on forearms x 10 then on outstretched hands x 5 reps. She then completed lateral weight shifts onto forearms while reaching forward with the opposite arm x 10 reps to increase proprioceptive input onto right arm. Self Care: (10 minutes): Procedure(s) (per grid) utilized to improve and/or restore self-care/home management as related to dressing. Required moderate visual and verbal cueing to facilitate activities of daily living skills and compensatory activities donning AFO and shoes and tying shoes with minimal assistance. Treatment/Session Assessment:    · Response to Treatment:  Patient reports using right hand in daily activities more naturally with increased practice for feeding and grooming tasks. · Compliance with Program/Exercises: Will assess as treatment progresses. Reviewed HEP. · Recommendations/Intent for next treatment session: \"Next visit will focus on advancements to more challenging activities and reduction in assistance provided\".   Total Treatment Duration:  OT Patient Time In/Time Out  Time In: 1300  Time Out: 0455 [facility]

## 2018-02-19 NOTE — PROGRESS NOTES
Laure Ngo  : 1955  Primary: [facility] Noonan Ppo  Secondary:  [address] at [facility]  [address], [address], [address]  [phone]   [phone]          OUTPATIENT PHYSICAL THERAPY:Daily Note 2018    ICD-10: Treatment Diagnosis:   · Other abnormalities of gait and mobility (R26.89)  · Difficulty in walking, not elsewhere classified (R26.2)  Precautions/Allergies:   Banana; Lisinopril; and Nuts [tree nut]   Fall Risk Score: 5 (? 5 = High Risk)  MD Orders: Evaluate and Treat MEDICAL/REFERRING DIAGNOSIS:  Weakness due to cerebrovascular accident (CVA) ([facility] [address] [I63.9, R53.1]   DATE OF ONSET: CVA: 2017  REFERRING PHYSICIAN: Kathlynn Snellen, Amy*  RETURN PHYSICIAN APPOINTMENT: TBD     INITIAL ASSESSMENT:  Ms. Malika Jarquin presents with decreased mobility, decreased strength, decreased gait, and decreased balance secondary to CVA. After discussing with patient, she agreed she would benefit from physical therapy to improve above deficits. Please sign this plan of treatment if you concur. Thank you for the opportunity to serve this patient. Recertification note on 2018:   Patient has attended fourteen scheduled physical therapy appointments from 12/15/2017 to 2018. Patient is very motivated and compliant with therapy. Patient complaints of increased irritation in her right AFO due to increased tone. Patient is continuing to demonstrate improved ambulation and improved strength in right lower extremity . Patient would benefit from continuing skilled physical therapy to address problems and goals. Thank you for this referral.    PROBLEM LIST (Impacting functional limitations):  1. Decreased Strength  2. Decreased Ambulation Ability/Technique  3. Decreased Balance  4. Decreased Activity Tolerance INTERVENTIONS PLANNED:  1. Balance Exercise  2. Gait Training  3. Home Exercise Program (HEP)  4.  Neuromuscular Re-education/Strengthening  5. Range of Motion (ROM)  6. Therapeutic Exercise/Strengthening   TREATMENT PLAN:  Effective Dates: 2/12/2018 TO 5/7/2018. Frequency/Duration: 2-3 times a week for 12 weeks  GOALS: (Goals have been discussed and agreed upon with patient.)  Short-Term Functional Goals: Time Frame: 3 weeks  1. Patient will be independent with home exercise program without exacerbation of symptoms or cueing needed. Goal met. Discharge Goals: Time Frame: 8 weeks  1. Patient will be independent with all ADLs with minimal fear of falling and loss of balance with daily tasks. Goal ongoing. 2. Patient will report no fear avoidance with social or recreational activities due to fear of falling. Goal ongoing. 3. Patient will score greater than or equal to 45/56 on Gipson Balance Scale with minimal effect of imbalance on patient's ability to manage every day life activities. Goal ongoing. Rehabilitation Potential For Stated Goals: Good  Regarding Case Iraheta's therapy, I certify that the treatment plan above will be carried out by a therapist or under their direction. Thank you for this referral,  Sadie Liriano PT     Referring Physician Signature: Juliette Alexandra*              Date                    The information in this section was collected on 12/15/2017 (except where otherwise noted). HISTORY:   History of Present Injury/Illness (Reason for Referral):  Patient reports she had a severe stroke on 9/6/2017. She reports that she was in the hospital and [facility] for about 61-62 days. She reports that she has progressed really well, but is still having trouble walking and using her right side (UE and LE). She reports that she walks using her AFO and quad cane all the time. She reports that she tries to be as active as possible. She reports that she is scared that she is going to fall, even though she has not.   She reports she would like to work on strengthening her right leg so her balance is better and she can walk without fear of falling. Past Medical History/Comorbidities:   Ms. Ciera Montoya  has a past medical history of Anxiety; CVA (cerebral vascular accident) ([facility] [address] (2017); Depression; HTN (hypertension); Menopause; and [facility] Ms. Ciera Montoya  has a past surgical history that includes hx jennifer and bso (); hx  section; hx refractive surgery (); hx other surgical (); and hx other surgical (). Social History/Living Environment:   Home Environment: Private residence  Living Alone: No  Support Systems: Family member(s), Friends \ neighbors; Spouse  Prior Level of Function/Work/Activity:  Independent  Dominant Side:         RIGHT  Personal Factors:          Sex:  female        Age:  60 y.o. Current Medications:       Current Outpatient Prescriptions:     FLUoxetine (PROZAC) 20 mg tablet, Take 2 Tabs by mouth daily. , Disp: 60 Tab, Rfl: 3    methylphenidate HCl (RITALIN) 20 mg tablet, Take 1 Tab (20 mg total) by mouth daily. Max Daily Amount: 20 mg, Disp: 30 Tab, Rfl: 0    methylphenidate HCl (RITALIN) 20 mg tablet, Take 1 Tab (20 mg total) by mouth dailyEarliest Fill Date: 18. Max Daily Amount: 20 mg, Disp: 30 Tab, Rfl: 0    [START ON 3/12/2018] methylphenidate HCl (RITALIN) 20 mg tablet, Take 1 Tab (20 mg total) by mouth dailyEarliest Fill Date: 3/12/18. Max Daily Amount: 20 mg, Disp: 30 Tab, Rfl: 0    tiZANidine (ZANAFLEX) 4 mg tablet, 4mg po TID, Disp: 90 Tab, Rfl: 6    HYDROcodone-acetaminophen (NORCO) 5-325 mg per tablet, TAKE ONE TABLET BY MOUTH EVERY 6 HOURS AS NEEDED FOR PAIN, Disp: , Rfl: 0    labetalol (NORMODYNE) 200 mg tablet, Take 1 Tab by mouth two (2) times a day., Disp: 60 Tab, Rfl: 5    ALPRAZolam (XANAX) 0.25 mg tablet, Take 1 Tab by mouth three (3) times daily as needed for Anxiety. Max Daily Amount: 0.75 mg., Disp: 30 Tab, Rfl: 1    rOPINIRole (REQUIP) 2 mg tablet, Take 2 mg by mouth nightly.  for restless leg, Disp: , Rfl:     temazepam (RESTORIL) 15 mg capsule, Take 15 mg by mouth nightly as needed for Sleep., Disp: , Rfl:     polyethylene glycol (MIRALAX) 17 gram/dose powder, Take 17 g by mouth daily. , Disp: 510 g, Rfl: 2    tamsulosin (FLOMAX) 0.4 mg capsule, Take 1 Cap by mouth nightly., Disp: 30 Cap, Rfl: 4    potassium chloride (K-DUR, KLOR-CON) 20 mEq tablet, Take 1 Tab by mouth daily. , Disp: 30 Tab, Rfl: 0    acetaminophen (TYLENOL) 500 mg tablet, 1 Tab by Per NG tube route every four (4) hours as needed. , Disp: 30 Tab, Rfl: 0    losartan-hydroCHLOROthiazide (HYZAAR) 100-12.5 mg per tablet, Take 1 Tab by mouth daily. , Disp: 30 Tab, Rfl: 5   Date Last Reviewed:  2/19/2018    Number of Personal Factors/Comorbidities that affect the Plan of Care: 1-2: MODERATE COMPLEXITY   EXAMINATION:   Observation/Orthostatic Postural Assessment:          Posture Assessment: Rounded shoulders, Forward head   Functional Mobility:         Gait/Ambulation:     Speed/Lulu: Pace decreased (<100 feet/min)  Step Length: Left shortened, Right lengthened  Swing Pattern: Left asymmetrical, Right asymmetrical  Stance: Left increased, Right decreased  Gait Abnormalities: Antalgic, Circumduction, Foot drop, Path deviations, Trendelenburg  Ambulation - Level of Assistance: Modified independent  Assistive Device: Cane, quad  · Interventions: Verbal cues, Safety awareness training          Transfers:     Sit to Stand: Independent  Stand to Sit: Independent  Stand Pivot Transfers: Independent  · Bed to Chair: Independent          Bed Mobility:     Rolling: Independent  Supine to Sit: Independent  Sit to Supine: Independent  · Scooting: Independent    Strength:          L UE STRENGTH: 4/5 shoulder flexion, 4/5 shoulder abduction, 4/5 shoulder extension, 4/5 elbow flexion, 4/5 elbow extension. R UE STRENGTH: 2/5 shoulder flexion, 2/5 shoulder abduction, 2/5 shoulder extension, 2/5 elbow flexion, 2/5 elbow extension.    R LE STRENGTH:  4+/5 hip flexion, 4+/5 hip abduction, 4+/5 hip adduction, 4+/5 hip extension, 4+/5 knee extension, 4+/5 knee flexion, 1/5 ankle dorsiflexion, 1/5 ankle plantarflexion, 1/5 ankle inversion, 1/5 ankle eversion. Sensation:         Intact for light touch and proprioception  Postural Control & Balance:  · Gipson Balance Scale:  33/56.   (A score less than 45/56 indicates high risk of falls)  . Score improved from 28/56 on initial evaluation. Body Structures Involved:  1. Nerves  2. Muscles Body Functions Affected:  1. Neuromusculoskeletal  2. Movement Related Activities and Participation Affected:  1. Mobility  2. Self Care   Number of elements (examined above) that affect the Plan of Care: 4+: HIGH COMPLEXITY   CLINICAL PRESENTATION:   Presentation: Evolving clinical presentation with changing clinical characteristics: MODERATE COMPLEXITY   CLINICAL DECISION MAKING:   Outcome Measure: Tool Used: Gipson Balance Scale  Score:  Initial: 28/56 Most Recent: 33/56 (Date: 2- )   Interpretation of Score: Each section is scored on a 0-4 scale, 0 representing the patients inability to perform the task and 4 representing independence. The scores of each section are added together for a total score of 56. The higher the patients score, the more independent the patient is. Any score below 45 indicates increased risk for falls. Score 56 55-45 44-34 33-23 22-12 11-1 0   Modifier CH CI CJ CK CL CM CN     Medical Necessity:   · Patient is expected to demonstrate progress in strength, range of motion, balance and coordination to improve safety during daily activities. · Patient demonstrates good rehab potential due to higher previous functional level. · Skilled intervention continues to be required due to decreased mobility. Reason for Services/Other Comments:  · Patient continues to demonstrate capacity to improve overall mobility which will increase independence and increase safety.    Use of outcome tool(s) and clinical judgement create a POC that gives a: Questionable prediction of patient's progress: MODERATE COMPLEXITY            TREATMENT:   (In addition to Assessment/Re-Assessment sessions the following treatments were rendered)  Pre-treatment Symptoms/Complaints:  2/19/2018: \"Tired today. I had a doctor's appointment earlier today. \"  Pain: Initial: Pain Intensity 1: 0  Post Session:  0/10     NEUROMUSCULAR RE-EDUCATION: (15 minutes):  Exercise/activities per grid below to improve balance, coordination, kinesthetic sense, posture and proprioception. Required minimal verbal and manual cues to promote static and dynamic balance in standing, promote coordination of bilateral, lower extremity(s) and promote motor control of bilateral, lower extremity(s).    Date:  2/19/2018 Date   2/14/18 Date   2/15/18   Activity/Exercise Parameters     Stepping over half foam  2x10 reps with left LE, working on weightbearing through R LE and keeping R foot flat on floor (foot tends to invert) 2x10 reps with left LE, working on weightbearing through R LE and keeping R foot flat on floor (foot tends to invert) 2x10 reps with left LE, working on weightbearing through R LE and keeping R foot flat on floor (foot tends to invert)   Step taps X Onto 6 inch step with L LE working on weight bearing through R LE Onto 6 inch step with L LE working on weight bearing through R LE   Step ups 6 inch  x10 leading with right LE working on weight bearing through R LE 6 inch  x10 leading with L LE working on weight bearing through R LE 6 inch  x10 leading with L LE working on weight bearing through [address] X     Walking in the clinic with quad cane X About 100 feet with quad cane, working on weightbearing more on R foot through midstance and terminal stance, and stepping bigger with L LE About 100 feet with quad cane, working on weightbearing more on R foot through midstance and terminal stance, and stepping bigger with L LE THERAPEUTIC EXERCISE: (30 minutes):  Exercises per grid below to improve mobility and strength. Required minimal verbal cues to promote proper body alignment. Progressed repetitions as indicated. Date:  2/19/2018 Date:  2/14/2018 Date   2/15/18   Activity/Exercise Parameters Parameters    Sit to stand from chair X X     Standing hip flexion X X    Standing hip abduction X X    Seated LAQs 2x10 right LE  3#     Standing hamstrings curls  X X    Bridging 2x10 with right lower extremity 2x10 with right lower extremity 2x10 with right lower extremity   Supine straight leg raise 2x10 with right lower extremity  3# 2x10 with right lower extremity  2x10 with right lower extremity    Supine PNF D1 and D2  2x10 each with R LE 2x10 with right lower extremity D1 and D2 2x10 with right lower extremity D1 and D2   Left sidelying hip abduction      L sidelying: picking up and moving R LE from in front of L foot to behind L foot (working on hip abd and ext) and stabilizing trunk 2x10 reps 2x10 reps, working on lifting highter x10 reps, working on lifting higher   L sidelying R clam shells 2 x 10 reps R LE 2x10 reps right lower extremity 2x10 reps right lower extremity   R foot and gastrocsoleus stretching X 8 minutes trying to get foot to neutral position X 8 minutes trying to get foot to neutral position X 8 minutes trying to get foot to neutral position   Standing minisquats in bars          [facility] Portal  Treatment/Session Assessment:    · Response to Treatment:  Patient tolerated treatment without issues. Continue to progress to tolerance. · Compliance with Program/Exercises: Compliant. · Recommendations/Intent for next treatment session: \"Next visit will focus on advancements to more challenging activities\".    Total Treatment Duration:  PT Patient Time In/Time Out  Time In: 1345  Time Out: [address]

## 2018-02-21 ENCOUNTER — HOSPITAL ENCOUNTER (OUTPATIENT)
Dept: PHYSICAL THERAPY | Age: 63
Discharge: HOME | End: 2018-02-21
Attending: PHYSICAL MEDICINE & REHABILITATION
Payer: COMMERCIAL

## 2018-02-21 PROCEDURE — 97112 NEUROMUSCULAR REEDUCATION: CPT

## 2018-02-21 PROCEDURE — 97110 THERAPEUTIC EXERCISES: CPT

## 2018-02-21 NOTE — PROGRESS NOTES
Sanket Marquez  : 1955  Primary: Owen Carranza Ppo  Secondary:  [address] at [place] [address] [facility], [address] [place] [address] [address], [address] [facility]  [phone]   [phone]        OUTPATIENT OCCUPATIONAL THERAPY: Daily Note 2018    ICD-10: Treatment Diagnosis: Hemiplegia and hemiparesis following cerebral infarction affecting right dominant side (I69.351)  Precautions/Allergies:   Banana; Lisinopril; and Nuts [tree nut]   Fall Risk Score: 5 (? 5 = High Risk)  MD Orders: Referral to occupational therapy MEDICAL/REFERRING DIAGNOSIS:   Cerebral infarction, unspecified [I63.9]  Weakness [R53.1]   DATE OF ONSET: 2017   REFERRING PHYSICIAN: Juliette Cao*  RETURN PHYSICIAN APPOINTMENT: unknown   18: Ms. Keegan Coe is making excellent progress toward her goals; she is demonstrating improving range of motion and functional use of the right upper extremity in activities of daily living. Feel she will continue to benefit from skilled occupational therapy to maximize safety and independence with activities of daily living. INITIAL ASSESSMENT:  Ms. Keegan Coe presents with impaired active movement, strength, coordination and sensation of the dominant right upper extremity that is affecting her ability to complete activities of daily living independently. Feel she may benefit from skilled occupational therapy to maximize safety and independence with activities of daily living. PLAN OF CARE:   PROBLEM LIST:  1. Decreased Strength  2. Decreased ADL/Functional Activities  3. Decreased Transfer Abilities  4. Decreased Balance  5. Decreased Flexibility/Joint Mobility  6. Decreased Cognition INTERVENTIONS PLANNED:  1. Activities of daily living training  2. Manual therapy training  3. Modalities  4. Neuromuscular re-eduation  5. Sensory reintegration training  6. Splinting  7. Therapeutic activity  8.  Therapeutic exercise   TREATMENT PLAN:  Effective Dates: 17 TO 3/20/18. Frequency/Duration: 3 times a week for 12 weeks  GOALS: (Goals have been discussed and agreed upon with patient.)  Short-Term Functional Goals: Time Frame: 6 weeks  1. Patient will demonstrate independence with home exercise program for right upper extremity range of motion. Met  2. Patient will increase use of right upper extremity as a functional assist in at least 25% of daily activities. Met  3. Patient will bathe with moderate assistance. Continue to address  Discharge Goals: Time Frame: 12 weeks  1. Patient will bathe with minimal assistance. Continue to address  2. Patient will feed self with modified independence and adaptive equipment as needed. Continue to address  3. Patient will dress with modified independence and adaptive equipment as needed. Continue to address  4. Patient will use right upper extremity as a functional assist in at least 50% of daily activities. Continue to address  Rehabilitation Potential For Stated Goals: Good  Regarding Connie Iraheta's therapy, I certify that the treatment plan above will be carried out by a therapist or under their direction. Thank you for this referral,  Emma Mullins, OT     Referring Physician Signature: Juliette Odom* _________________________  Date _________            The information in this section was collected on 18 (except where otherwise noted). OCCUPATIONAL PROFILE & HISTORY:   History of Present Injury/Illness (Reason for Referral):  CVA with hospital and [facility] stay then home health therapy. Past Medical History/Comorbidities:   Ms. Scott Lr  has a past medical history of Anxiety; CVA (cerebral vascular accident) ([facility] [address] (2017); Depression; HTN (hypertension); Menopause; and PTE (pulmonary thromboembolism) ([facility] [address] (2017). Ms. Scott Lr  has a past surgical history that includes hx jennifer and bso (); hx  section; hx refractive surgery (); hx other surgical (); and hx other surgical (2017).   Social History/Living Environment:      Prior Level of Function/Work/Activity:  Does seasonal taxes at [employer]  Dominant Side:         RIGHT  Current Medications:    Current Outpatient Prescriptions:     FLUoxetine (PROZAC) 20 mg tablet, Take 2 Tabs by mouth daily. , Disp: 60 Tab, Rfl: 3    methylphenidate HCl (RITALIN) 20 mg tablet, Take 1 Tab (20 mg total) by mouth daily. Max Daily Amount: 20 mg, Disp: 30 Tab, Rfl: 0    methylphenidate HCl (RITALIN) 20 mg tablet, Take 1 Tab (20 mg total) by mouth dailyEarliest Fill Date: 2/12/18. Max Daily Amount: 20 mg, Disp: 30 Tab, Rfl: 0    [START ON 3/12/2018] methylphenidate HCl (RITALIN) 20 mg tablet, Take 1 Tab (20 mg total) by mouth dailyEarliest Fill Date: 3/12/18. Max Daily Amount: 20 mg, Disp: 30 Tab, Rfl: 0    tiZANidine (ZANAFLEX) 4 mg tablet, 4mg po TID, Disp: 90 Tab, Rfl: 6    HYDROcodone-acetaminophen (NORCO) 5-325 mg per tablet, TAKE ONE TABLET BY MOUTH EVERY 6 HOURS AS NEEDED FOR PAIN, Disp: , Rfl: 0    labetalol (NORMODYNE) 200 mg tablet, Take 1 Tab by mouth two (2) times a day., Disp: 60 Tab, Rfl: 5    ALPRAZolam (XANAX) 0.25 mg tablet, Take 1 Tab by mouth three (3) times daily as needed for Anxiety. Max Daily Amount: 0.75 mg., Disp: 30 Tab, Rfl: 1    rOPINIRole (REQUIP) 2 mg tablet, Take 2 mg by mouth nightly. for restless leg, Disp: , Rfl:     temazepam (RESTORIL) 15 mg capsule, Take 15 mg by mouth nightly as needed for Sleep., Disp: , Rfl:     polyethylene glycol (MIRALAX) 17 gram/dose powder, Take 17 g by mouth daily. , Disp: 510 g, Rfl: 2    tamsulosin (FLOMAX) 0.4 mg capsule, Take 1 Cap by mouth nightly., Disp: 30 Cap, Rfl: 4    potassium chloride (K-DUR, KLOR-CON) 20 mEq tablet, Take 1 Tab by mouth daily. , Disp: 30 Tab, Rfl: 0    acetaminophen (TYLENOL) 500 mg tablet, 1 Tab by Per NG tube route every four (4) hours as needed. , Disp: 30 Tab, Rfl: 0    losartan-hydroCHLOROthiazide (HYZAAR) 100-12.5 mg per tablet, Take 1 Tab by mouth daily. , Disp: 30 Tab, Rfl: 5            Date Last Reviewed:  2/21/2018   Complexity Level : Expanded review of therapy/medical records (1-2):  MODERATE COMPLEXITY   ASSESSMENT OF OCCUPATIONAL PERFORMANCE:   ROM:                   Balance:                   Coordination:                   Mental Status:                   Vision:                   Activities of Daily Living:           Basic ADLs (From Assessment) Complex ADLs (From Assessment)         Grooming/Bathing/Dressing Activities of Daily Living                                   Sensation:         Light touch absent distal to right elbow     Physical Skills Involved:  1. Range of Motion  2. Balance  3. Sensation  4. Fine Motor Control  5. Gross Motor Control Cognitive Skills Affected (resulting in the inability to perform in a timely and safe manner):  1. Executive Function  2. Sustained Attention  3. Divided Attention  4. Comprehension Psychosocial Skills Affected:  1. None   Number of elements that affect the Plan of Care: 5+:  HIGH COMPLEXITY   CLINICAL DECISION MAKING:   Outcome Measure: Tool Used: [address] [facility] [address] AM-[facility] Daily Activity Outpatient Short Form  Score:  Initial: 18 Most Recent: 28 (Date: 1/31/18 )   Interpretation of Tool:  Represents clinically-significant functional categories (i.e., basic and instrumental activities of daily living, fine motor activities). Score 60 59-55 54-47 46-34 32-21 20-16 15   Modifier CH CI CJ CK CL CM CN     Medical Necessity:   · Patient demonstrates good rehab potential due to higher previous functional level. Reason for Services/Other Comments:  · Patient continues to require skilled intervention due to decreased safety and independence in activities of daily living. Clinical Decision-Making Assessment: Moderately difficult to predict patient's progress at this time due to the extent of her limitations in functional movement and use of dominant right upper extremity.     Assessment process, impact of co-morbidities, assessment modification\need for assistance, and selection of interventions: Analytical Complexity:MODERATE COMPLEXITY   TREATMENT:   (In addition to Assessment/Re-Assessment sessions the following treatments were rendered)    Pre-treatment Symptoms/Complaints:  Patient states, \"I have had a busy day; I am tired after PT. \"  Pain: Initial:   Pain Intensity 1: 0  Post Session:  0/10     Therapeutic Exercise: (  30 minutes):  Exercises per grid below to improve dynamic movement of arm - right and hand - right to improve functional lifting, carrying, reaching and grasping. Required moderate visual and verbal cues to promote proper body mechanics. Progressed range, repetitions and complexity of movement as indicated.        Date:  1/29/18 Date:  1/31/18 Date:  2/2/18 Date:  2/12/18 Date:  2/14/18 Date:  2/15/18 Date:   2/19/18 Date:  2/21/18   Activity/Exercise           Shoulder shrugs AROM  1 x 10 AROM  1 x 5 AROM  1 x 5  AROM  1 x 10 AROM  1 x 10 AROM  2 x 10 AROM  2 x 10 with mirror for visual feedback   Scapular protraction/retraction AROM  1 x 10 AROM  1 x 5 3 x 10 with red theratubing - seated rows 2 x 10 with green theratubing - seated rows with elbow extension assisted by tubing 2 x 10 with green theratubing - seated rows with elbow extension assisted by tubing AROM  1 x 10 AROM  2 x 10 AROM  2 x 10 with passive self range into elbow extension at end range   Shoulder abduction  AROM  1 x 5         Elbow flexion/extension  AROM  1 x 5 AROM with assist to fully extend x 10 reps   AROM with assist to fully extend x 10 reps     PNF D1/D2 diagonals X 5 reps          Hand helper  15 pounds x 25 reps with assist to extend fingers 15 pounds x 10 reps with assist to extend fingers 20 pounds x 5 reps with assist to extend fingers 20 pounds x 5 reps with assist to extend fingers 20 pounds x 5 reps with assist to extend fingers  20 pounds x 5 reps with assist to extend fingers   Tabletop skateboard Shoulder flexion/extension Seated x 10 reps  SROM          Chest press           UBE Min assist to sustain right  on handle   Level 0  5 mintues  Min assist to sustain right  on handle   Level 0  5 mintues SBA-Min assist to sustain right  on handle   Level 0  5 mintues  Used dycem and wrist brace to keep wrist in neutral Min assist to sustain right  on handle   Level 0  5 mintues Min assist to sustain right  on handle   Level 0  5 mintues    Used dycem and wrist brace to keep wrist in neutral Min assist to sustain right  on handle   Level 0  5 mintues Min assist to sustain right  on handle   Level 0  5 mintues   Modified push-ups    Hands on countertop with dycem under right hand  2 x 10 Hands on countertop with dycem under right hand  1 x 10 Hands on countertop with dycem under right hand  2 x 10  Hands on countertop with dycem under right hand  2 x 10 Hands on countertop with dycem under right hand  2 x 10   Resistive clothespin Yellow  1 x 10 Yellow  1 x 10 Yellow  1 x 10 Yellow  1 x 10    Yellow x 3 to  pieces of sponge and release in to container   Shoulder arc 5 rings with assist from right hand to grasp ring initially 7 rings with assist from right hand to grasp ring initially   5 tabs on small hill only with hand over hand assist      Wrist flexion/extension               Access Code: [email or web address]   URL: [email or web address]   Date: 01/19/2018   Prepared by: Henry Marks     Exercises   Supine Shoulder Flexion PROM - 10 reps - 2 sets - 3 hold - 1x daily - 5x weekly   Push-Up on Counter - 10 reps - 2 sets - 1x daily - 5x weekly   Seated Shoulder Shrugs - 10 reps - 2 sets - 1x daily - 5x weekly   Seated Scapular Retraction - 10 reps - 2 sets - 1x daily - 5x weekly   Seated Weight Shifting with Arm Support - 10 reps - 2 sets - 1x daily - 5x weekly   Seated Shoulder Flexion Self PROM - 10 reps - 2 sets - 1x daily - 5x weekly   Supine Elbow Flexion Extension AROM - 10 reps - 2 sets - 1x daily - 5x weekly   Seated Elbow Flexion Extension AROM - 10 reps - 2 sets - 1x daily - 5x weekly     Neuromuscular Re-education: (  15 minutes):  Exercise/activities per grid below to improve coordination, kinesthetic sense and proprioception. Required moderate visual and verbal cues to promote coordination of right, upper extremity(s) and promote motor control of right, upper extremity(s). Patient in prone then up on forearms x 10 then on outstretched hands x 5 reps. She then completed lateral weight shifts onto forearms while reaching forward with the opposite arm x 10 reps to increase proprioceptive input onto right arm. Also completed lateral weight shift onto right forearm/elbow seated at edge of mat with dynamic reach with left arm out of base of support x 5. Treatment/Session Assessment:    · Response to Treatment:  Patient reports using right hand in daily activities more naturally with increased practice for feeding and grooming tasks. Noted improved functional grasp and release with clothespin for moving objects. · Compliance with Program/Exercises: Will assess as treatment progresses. · Recommendations/Intent for next treatment session: \"Next visit will focus on advancements to more challenging activities and reduction in assistance provided\".   Total Treatment Duration:  OT Patient Time In/Time Out  Time In: 4570  Time Out: 2844 [facility], OT

## 2018-02-21 NOTE — PROGRESS NOTES
Aditi Dry  : 1955  Primary: [facility]  Secondary:  [address] at [facility]  [address] [facility], [address] [place] [address],[address], [address] [facility]  [phone]   [phone]          OUTPATIENT PHYSICAL THERAPY:Daily Note 2018    ICD-10: Treatment Diagnosis:   · Other abnormalities of gait and mobility (R26.89)  · Difficulty in walking, not elsewhere classified (R26.2)  Precautions/Allergies:   Banana; Lisinopril; and Nuts [tree nut]   Fall Risk Score: 5 (? 5 = High Risk)  MD Orders: Evaluate and Treat MEDICAL/REFERRING DIAGNOSIS:  Weakness due to cerebrovascular accident ([facility]) ([facility] [I63.9, R53.1]   DATE OF ONSET: CVA: 2017  REFERRING PHYSICIAN: Juliette Pettit*  RETURN PHYSICIAN APPOINTMENT: TBD     INITIAL ASSESSMENT:  Ms. Marta Kulkarni presents with decreased mobility, decreased strength, decreased gait, and decreased balance secondary to CVA. After discussing with patient, she agreed she would benefit from physical therapy to improve above deficits. Please sign this plan of treatment if you concur. Thank you for the opportunity to serve this patient. Recertification note on 2018:   Patient has attended fourteen scheduled physical therapy appointments from 12/15/2017 to 2018. Patient is very motivated and compliant with therapy. Patient complaints of increased irritation in her right AFO due to increased tone. Patient is continuing to demonstrate improved ambulation and improved strength in right lower extremity . Patient would benefit from continuing skilled physical therapy to address problems and goals. Thank you for this referral.    PROBLEM LIST (Impacting functional limitations):  1. Decreased Strength  2. Decreased Ambulation Ability/Technique  3. Decreased Balance  4. Decreased Activity Tolerance INTERVENTIONS PLANNED:  1. Balance Exercise  2. Gait Training  3. Home Exercise Program (HEP)  4.  Neuromuscular Re-education/Strengthening  5. Range of Motion (ROM)  6. Therapeutic Exercise/Strengthening   TREATMENT PLAN:  Effective Dates: 2/12/2018 TO 5/7/2018. Frequency/Duration: 2-3 times a week for 12 weeks  GOALS: (Goals have been discussed and agreed upon with patient.)  Short-Term Functional Goals: Time Frame: 3 weeks  1. Patient will be independent with home exercise program without exacerbation of symptoms or cueing needed. Goal met. Discharge Goals: Time Frame: 8 weeks  1. Patient will be independent with all ADLs with minimal fear of falling and loss of balance with daily tasks. Goal ongoing. 2. Patient will report no fear avoidance with social or recreational activities due to fear of falling. Goal ongoing. 3. Patient will score greater than or equal to 45/56 on Gipson Balance Scale with minimal effect of imbalance on patient's ability to manage every day life activities. Goal ongoing. Rehabilitation Potential For Stated Goals: Good  Regarding Connie Iraheta's therapy, I certify that the treatment plan above will be carried out by a therapist or under their direction. Thank you for this referral,  Tim Senior PT     Referring Physician Signature: Juliette Odom*              Date                    The information in this section was collected on 12/15/2017 (except where otherwise noted). HISTORY:   History of Present Injury/Illness (Reason for Referral):  Patient reports she had a severe stroke on 9/6/2017. She reports that she was in the hospital and [facility] for about 61-62 days. She reports that she has progressed really well, but is still having trouble walking and using her right side (UE and LE). She reports that she walks using her AFO and quad cane all the time. She reports that she tries to be as active as possible. She reports that she is scared that she is going to fall, even though she has not.   She reports she would like to work on strengthening her right leg so her balance is better and she can walk without fear of falling. Past Medical History/Comorbidities:   Ms. Sánchez Rodrigues  has a past medical history of Anxiety; CVA (cerebral vascular accident) ([facility] (2017); Depression; HTN (hypertension); Menopause; and PTE (pulmonary thromboembolism) ([facility] (2017). Ms. Sánchez Rodrigues  has a past surgical history that includes hx jennifer and bso (); hx  section; hx refractive surgery (); hx other surgical (); and hx other surgical (). Social History/Living Environment:   Home Environment: Private residence  Living Alone: No  Support Systems: Family member(s), Friends \ neighbors; Spouse  Prior Level of Function/Work/Activity:  Independent  Dominant Side:         RIGHT  Personal Factors:          Sex:  female        Age:  60 y.o. Current Medications:       Current Outpatient Prescriptions:     FLUoxetine (PROZAC) 20 mg tablet, Take 2 Tabs by mouth daily. , Disp: 60 Tab, Rfl: 3    methylphenidate HCl (RITALIN) 20 mg tablet, Take 1 Tab (20 mg total) by mouth daily. Max Daily Amount: 20 mg, Disp: 30 Tab, Rfl: 0    methylphenidate HCl (RITALIN) 20 mg tablet, Take 1 Tab (20 mg total) by mouth dailyEarliest Fill Date: 18. Max Daily Amount: 20 mg, Disp: 30 Tab, Rfl: 0    [START ON 3/12/2018] methylphenidate HCl (RITALIN) 20 mg tablet, Take 1 Tab (20 mg total) by mouth dailyEarliest Fill Date: 3/12/18. Max Daily Amount: 20 mg, Disp: 30 Tab, Rfl: 0    tiZANidine (ZANAFLEX) 4 mg tablet, 4mg po TID, Disp: 90 Tab, Rfl: 6    HYDROcodone-acetaminophen (NORCO) 5-325 mg per tablet, TAKE ONE TABLET BY MOUTH EVERY 6 HOURS AS NEEDED FOR PAIN, Disp: , Rfl: 0    labetalol (NORMODYNE) 200 mg tablet, Take 1 Tab by mouth two (2) times a day., Disp: 60 Tab, Rfl: 5    ALPRAZolam (XANAX) 0.25 mg tablet, Take 1 Tab by mouth three (3) times daily as needed for Anxiety. Max Daily Amount: 0.75 mg., Disp: 30 Tab, Rfl: 1    rOPINIRole (REQUIP) 2 mg tablet, Take 2 mg by mouth nightly.  for restless leg, Disp: , Rfl:     temazepam (RESTORIL) 15 mg capsule, Take 15 mg by mouth nightly as needed for Sleep., Disp: , Rfl:     polyethylene glycol (MIRALAX) 17 gram/dose powder, Take 17 g by mouth daily. , Disp: 510 g, Rfl: 2    tamsulosin (FLOMAX) 0.4 mg capsule, Take 1 Cap by mouth nightly., Disp: 30 Cap, Rfl: 4    potassium chloride (K-DUR, KLOR-CON) 20 mEq tablet, Take 1 Tab by mouth daily. , Disp: 30 Tab, Rfl: 0    acetaminophen (TYLENOL) 500 mg tablet, 1 Tab by Per NG tube route every four (4) hours as needed. , Disp: 30 Tab, Rfl: 0    losartan-hydroCHLOROthiazide (HYZAAR) 100-12.5 mg per tablet, Take 1 Tab by mouth daily. , Disp: 30 Tab, Rfl: 5   Date Last Reviewed:  2/21/2018    Number of Personal Factors/Comorbidities that affect the Plan of Care: 1-2: MODERATE COMPLEXITY   EXAMINATION:   Observation/Orthostatic Postural Assessment:          Posture Assessment: Rounded shoulders, Forward head   Functional Mobility:         Gait/Ambulation:     Speed/Lulu: Pace decreased (<100 feet/min)  Step Length: Left shortened, Right lengthened  Swing Pattern: Left asymmetrical, Right asymmetrical  Stance: Left increased, Right decreased  Gait Abnormalities: Antalgic, Circumduction, Foot drop, Path deviations, Trendelenburg  Ambulation - Level of Assistance: Modified independent  Assistive Device: Cane, quad  · Interventions: Verbal cues, Safety awareness training          Transfers:     Sit to Stand: Independent  Stand to Sit: Independent  Stand Pivot Transfers: Independent  · Bed to Chair: Independent          Bed Mobility:     Rolling: Independent  Supine to Sit: Independent  Sit to Supine: Independent  · Scooting: Independent    Strength:          L UE STRENGTH: 4/5 shoulder flexion, 4/5 shoulder abduction, 4/5 shoulder extension, 4/5 elbow flexion, 4/5 elbow extension. R UE STRENGTH: 2/5 shoulder flexion, 2/5 shoulder abduction, 2/5 shoulder extension, 2/5 elbow flexion, 2/5 elbow extension.    R LE STRENGTH:  4+/5 hip flexion, 4+/5 hip abduction, 4+/5 hip adduction, 4+/5 hip extension, 4+/5 knee extension, 4+/5 knee flexion, 1/5 ankle dorsiflexion, 1/5 ankle plantarflexion, 1/5 ankle inversion, 1/5 ankle eversion. Sensation:         Intact for light touch and proprioception  Postural Control & Balance:  · Gipson Balance Scale:  33/56.   (A score less than 45/56 indicates high risk of falls)  . Score improved from 28/56 on initial evaluation. Body Structures Involved:  1. Nerves  2. Muscles Body Functions Affected:  1. Neuromusculoskeletal  2. Movement Related Activities and Participation Affected:  1. Mobility  2. Self Care   Number of elements (examined above) that affect the Plan of Care: 4+: HIGH COMPLEXITY   CLINICAL PRESENTATION:   Presentation: Evolving clinical presentation with changing clinical characteristics: MODERATE COMPLEXITY   CLINICAL DECISION MAKING:   Outcome Measure: Tool Used: Gipson Balance Scale  Score:  Initial: 28/56 Most Recent: 33/56 (Date: 2- )   Interpretation of Score: Each section is scored on a 0-4 scale, 0 representing the patients inability to perform the task and 4 representing independence. The scores of each section are added together for a total score of 56. The higher the patients score, the more independent the patient is. Any score below 45 indicates increased risk for falls. Score 56 55-45 44-34 33-23 22-12 11-1 0   Modifier CH CI CJ CK CL CM CN     Medical Necessity:   · Patient is expected to demonstrate progress in strength, range of motion, balance and coordination to improve safety during daily activities. · Patient demonstrates good rehab potential due to higher previous functional level. · Skilled intervention continues to be required due to decreased mobility. Reason for Services/Other Comments:  · Patient continues to demonstrate capacity to improve overall mobility which will increase independence and increase safety.    Use of outcome tool(s) and clinical judgement create a POC that gives a: Questionable prediction of patient's progress: MODERATE COMPLEXITY            TREATMENT:   (In addition to Assessment/Re-Assessment sessions the following treatments were rendered)  Pre-treatment Symptoms/Complaints:  2/21/2018: \"Doing fine. \"  Pain: Initial: Pain Intensity 1: 0  Post Session:  0/10     NEUROMUSCULAR RE-EDUCATION: (15 minutes):  Exercise/activities per grid below to improve balance, coordination, kinesthetic sense, posture and proprioception. Required minimal verbal and manual cues to promote static and dynamic balance in standing, promote coordination of bilateral, lower extremity(s) and promote motor control of bilateral, lower extremity(s).    Date:  2/19/2018 Date   2/14/18 Date   2/15/18 Date  2/21/18   Activity/Exercise Parameters      Stepping over half foam  2x10 reps with left LE, working on weightbearing through R LE and keeping R foot flat on floor (foot tends to invert) 2x10 reps with left LE, working on weightbearing through R LE and keeping R foot flat on floor (foot tends to invert) 2x10 reps with left LE, working on weightbearing through R LE and keeping R foot flat on floor (foot tends to invert) 2x10 reps with left LE, working on weightbearing through R LE and keeping R foot flat on floor (foot tends to invert)   Step taps X Onto 6 inch step with L LE working on weight bearing through R LE Onto 6 inch step with L LE working on weight bearing through R LE Onto 6 inch step with L LE working on weight bearing through R LE   Step ups 6 inch  x10 leading with right LE working on weight bearing through R LE 6 inch  x10 leading with L LE working on weight bearing through R LE 6 inch  x10 leading with L LE working on weight bearing through R LE    [place] X      Walking in the clinic with quad cane X About 100 feet with quad cane, working on weightbearing more on R foot through midstance and terminal stance, and stepping bigger with L LE About 100 feet with quad cane, working on weightbearing more on R foot through midstance and terminal stance, and stepping bigger with L LE About 100 feet with quad cane, working on weightbearing more on R foot through midstance and terminal stance, and stepping bigger with L LE; added working on shifting R hips forward over R foot better midstance to terminal stance                                                       THERAPEUTIC EXERCISE: (25 minutes):  Exercises per grid below to improve mobility and strength. Required minimal verbal cues to promote proper body alignment. Progressed repetitions as indicated.    Date:  2/19/2018 Date:  2/14/2018 Date   2/15/18 Date   2/21/18   Activity/Exercise Parameters Parameters     Sit to stand from chair X X      Standing hip flexion X X     Standing hip abduction X X     Seated LAQs 2x10 right LE  3#      Standing hamstrings curls  X X     Bridging 2x10 with right lower extremity 2x10 with right lower extremity 2x10 with right lower extremity 2x10 with right lower extremity   Supine straight leg raise 2x10 with right lower extremity  3# 2x10 with right lower extremity  2x10 with right lower extremity  2x10 with right lower extremity    Supine PNF D1 and D2  2x10 each with R LE 2x10 with right lower extremity D1 and D2 2x10 with right lower extremity D1 and D2 2x10 with right lower extremity D1 and D2   Left sidelying hip abduction       L sidelying: picking up and moving R LE from in front of L foot to behind L foot (working on hip abd and ext) and stabilizing trunk 2x10 reps 2x10 reps, working on lifting highter x10 reps, working on lifting higher x10 reps, working on lifting higher   L sidelying R clam shells 2 x 10 reps R LE 2x10 reps right lower extremity 2x10 reps right lower extremity 2x10 reps right lower extremity   R foot and gastrocsoleus stretching X 8 minutes trying to get foot to neutral position X 8 minutes trying to get foot to neutral position X 8 minutes trying to get foot to neutral position X 8 minutes trying to get foot to neutral position   Standing minisquats in bars           [employer]  Treatment/Session Assessment:    · Response to Treatment:  Patient tolerated session well. R ankle PROM to neutral. Worked on gait pattern shifting R hip forward over R foot better during R midstance to R terminal stance. Continue to progress to tolerance. · Compliance with Program/Exercises: Compliant. · Recommendations/Intent for next treatment session: \"Next visit will focus on advancements to more challenging activities\".    Total Treatment Duration:  PT Patient Time In/Time Out  Time In: 1350  Time Out: 9050 Airline Lisa Hutchinson

## 2018-02-23 ENCOUNTER — HOSPITAL ENCOUNTER (OUTPATIENT)
Dept: PHYSICAL THERAPY | Age: 63
Discharge: HOME | End: 2018-02-23
Attending: PHYSICAL MEDICINE & REHABILITATION
Payer: COMMERCIAL

## 2018-02-23 PROCEDURE — 97110 THERAPEUTIC EXERCISES: CPT

## 2018-02-23 PROCEDURE — 97112 NEUROMUSCULAR REEDUCATION: CPT

## 2018-02-23 NOTE — PROGRESS NOTES
Tyrone Brittle  : 1955  Primary: [facility]  Secondary:  [address] at [place] [id number] [facility], [address] [place] [address],[address], [place] [address].  [phone]   [phone]        OUTPATIENT OCCUPATIONAL THERAPY: Daily Note 2018    ICD-10: Treatment Diagnosis: Hemiplegia and hemiparesis following cerebral infarction affecting right dominant side (I69.351)  Precautions/Allergies:   Banana; Lisinopril; and Nuts [tree nut]   Fall Risk Score: 5 (? 5 = High Risk)  MD Orders: Referral to occupational therapy MEDICAL/REFERRING DIAGNOSIS:   Cerebral infarction, unspecified [I63.9]  Weakness [R53.1]   DATE OF ONSET: 2017   REFERRING PHYSICIAN: Juliette Odom*  RETURN PHYSICIAN APPOINTMENT: unknown   18: Ms. Scott Lr is making excellent progress toward her goals; she is demonstrating improving range of motion and functional use of the right upper extremity in activities of daily living. Feel she will continue to benefit from skilled occupational therapy to maximize safety and independence with activities of daily living. INITIAL ASSESSMENT:  Ms. Scott Lr presents with impaired active movement, strength, coordination and sensation of the dominant right upper extremity that is affecting her ability to complete activities of daily living independently. Feel she may benefit from skilled occupational therapy to maximize safety and independence with activities of daily living. PLAN OF CARE:   PROBLEM LIST:  1. Decreased Strength  2. Decreased ADL/Functional Activities  3. Decreased Transfer Abilities  4. Decreased Balance  5. Decreased Flexibility/Joint Mobility  6. Decreased Cognition INTERVENTIONS PLANNED:  1. Activities of daily living training  2. Manual therapy training  3. Modalities  4. Neuromuscular re-eduation  5. Sensory reintegration training  6. Splinting  7. Therapeutic activity  8.  Therapeutic exercise   TREATMENT PLAN:  Effective Dates: 17 TO 3/20/18. Frequency/Duration: 3 times a week for 12 weeks  GOALS: (Goals have been discussed and agreed upon with patient.)  Short-Term Functional Goals: Time Frame: 6 weeks  1. Patient will demonstrate independence with home exercise program for right upper extremity range of motion. Met  2. Patient will increase use of right upper extremity as a functional assist in at least 25% of daily activities. Met  3. Patient will bathe with moderate assistance. Continue to address  Discharge Goals: Time Frame: 12 weeks  1. Patient will bathe with minimal assistance. Continue to address  2. Patient will feed self with modified independence and adaptive equipment as needed. Continue to address  3. Patient will dress with modified independence and adaptive equipment as needed. Continue to address  4. Patient will use right upper extremity as a functional assist in at least 50% of daily activities. Continue to address  Rehabilitation Potential For Stated Goals: Good  Regarding Cam Iraheta's therapy, I certify that the treatment plan above will be carried out by a therapist or under their direction. Thank you for this referral,  Marilu Russo OT     Referring Physician Signature: Juliette Belle* _________________________  Date _________            The information in this section was collected on 18 (except where otherwise noted). OCCUPATIONAL PROFILE & HISTORY:   History of Present Injury/Illness (Reason for Referral):  CVA with hospital and [facility] stay then home health therapy. Past Medical History/Comorbidities:   Ms. Tyson Sinclair  has a past medical history of Anxiety; CVA (cerebral vascular accident) ([facility] [address] (2017); Depression; HTN (hypertension); Menopause; and PTE (pulmonary thromboembolism) ([facility] [address] (2017). Ms. Tyson Sinclair  has a past surgical history that includes hx jennifer and bso (); hx  section; hx refractive surgery (); hx other surgical (); and hx other surgical ().   Social History/Living Environment:      Prior Level of Function/Work/Activity:  Does seasonal taxes at [employer]  Dominant Side:         RIGHT  Current Medications:    Current Outpatient Prescriptions:     FLUoxetine (PROZAC) 20 mg tablet, Take 2 Tabs by mouth daily. , Disp: 60 Tab, Rfl: 3    methylphenidate HCl (RITALIN) 20 mg tablet, Take 1 Tab (20 mg total) by mouth daily. Max Daily Amount: 20 mg, Disp: 30 Tab, Rfl: 0    methylphenidate HCl (RITALIN) 20 mg tablet, Take 1 Tab (20 mg total) by mouth dailyEarliest Fill Date: 2/12/18. Max Daily Amount: 20 mg, Disp: 30 Tab, Rfl: 0    [START ON 3/12/2018] methylphenidate HCl (RITALIN) 20 mg tablet, Take 1 Tab (20 mg total) by mouth dailyEarliest Fill Date: 3/12/18. Max Daily Amount: 20 mg, Disp: 30 Tab, Rfl: 0    tiZANidine (ZANAFLEX) 4 mg tablet, 4mg po TID, Disp: 90 Tab, Rfl: 6    HYDROcodone-acetaminophen (NORCO) 5-325 mg per tablet, TAKE ONE TABLET BY MOUTH EVERY 6 HOURS AS NEEDED FOR PAIN, Disp: , Rfl: 0    labetalol (NORMODYNE) 200 mg tablet, Take 1 Tab by mouth two (2) times a day., Disp: 60 Tab, Rfl: 5    ALPRAZolam (XANAX) 0.25 mg tablet, Take 1 Tab by mouth three (3) times daily as needed for Anxiety. Max Daily Amount: 0.75 mg., Disp: 30 Tab, Rfl: 1    rOPINIRole (REQUIP) 2 mg tablet, Take 2 mg by mouth nightly. for restless leg, Disp: , Rfl:     temazepam (RESTORIL) 15 mg capsule, Take 15 mg by mouth nightly as needed for Sleep., Disp: , Rfl:     polyethylene glycol (MIRALAX) 17 gram/dose powder, Take 17 g by mouth daily. , Disp: 510 g, Rfl: 2    tamsulosin (FLOMAX) 0.4 mg capsule, Take 1 Cap by mouth nightly., Disp: 30 Cap, Rfl: 4    potassium chloride (K-DUR, KLOR-CON) 20 mEq tablet, Take 1 Tab by mouth daily. , Disp: 30 Tab, Rfl: 0    acetaminophen (TYLENOL) 500 mg tablet, 1 Tab by Per NG tube route every four (4) hours as needed. , Disp: 30 Tab, Rfl: 0    losartan-hydroCHLOROthiazide (HYZAAR) 100-12.5 mg per tablet, Take 1 Tab by mouth daily. , Disp: 30 Tab, Rfl: 5            Date Last Reviewed:  2/23/2018   Complexity Level : Expanded review of therapy/medical records (1-2):  MODERATE COMPLEXITY   ASSESSMENT OF OCCUPATIONAL PERFORMANCE:   ROM:                   Balance:                   Coordination:                   Mental Status:                   Vision:                   Activities of Daily Living:           Basic ADLs (From Assessment) Complex ADLs (From Assessment)         Grooming/Bathing/Dressing Activities of Daily Living                                   Sensation:         Light touch absent distal to right elbow     Physical Skills Involved:  1. Range of Motion  2. Balance  3. Sensation  4. Fine Motor Control  5. Gross Motor Control Cognitive Skills Affected (resulting in the inability to perform in a timely and safe manner):  1. Executive Function  2. Sustained Attention  3. Divided Attention  4. Comprehension Psychosocial Skills Affected:  1. None   Number of elements that affect the Plan of Care: 5+:  HIGH COMPLEXITY   CLINICAL DECISION MAKING:   Outcome Measure: Tool Used: [address] [facility] [address] AM-[facility] Daily Activity Outpatient Short Form  Score:  Initial: 18 Most Recent: 28 (Date: 1/31/18 )   Interpretation of Tool:  Represents clinically-significant functional categories (i.e., basic and instrumental activities of daily living, fine motor activities). Score 60 59-55 54-47 46-34 32-21 20-16 15   Modifier CH CI CJ CK CL CM CN     Medical Necessity:   · Patient demonstrates good rehab potential due to higher previous functional level. Reason for Services/Other Comments:  · Patient continues to require skilled intervention due to decreased safety and independence in activities of daily living. Clinical Decision-Making Assessment: Moderately difficult to predict patient's progress at this time due to the extent of her limitations in functional movement and use of dominant right upper extremity.     Assessment process, impact of co-morbidities, assessment modification\need for assistance, and selection of interventions: Analytical Complexity:MODERATE COMPLEXITY   TREATMENT:   (In addition to Assessment/Re-Assessment sessions the following treatments were rendered)    Pre-treatment Symptoms/Complaints:  Patient states, \"I squeezed the clothespin this morning at home and I did really well. \"  Pain: Initial:   Pain Intensity 1: 0  Post Session:  0/10     Therapeutic Exercise: (  45 minutes):  Exercises per grid below to improve dynamic movement of arm - right and hand - right to improve functional lifting, carrying, reaching and grasping. Required moderate visual and verbal cues to promote proper body mechanics. Progressed range, repetitions and complexity of movement as indicated.        Date:  1/31/18 Date:  2/2/18 Date:  2/12/18 Date:  2/14/18 Date:  2/15/18 Date:   2/19/18 Date:  2/21/18 Date:  2/23/18   Activity/Exercise           Shoulder shrugs AROM  1 x 5 AROM  1 x 5  AROM  1 x 10 AROM  1 x 10 AROM  2 x 10 AROM  2 x 10 with mirror for visual feedback AROM  2 x 10 with mirror for visual feedback   Scapular protraction/retraction AROM  1 x 5 3 x 10 with red theratubing - seated rows 2 x 10 with green theratubing - seated rows with elbow extension assisted by tubing 2 x 10 with green theratubing - seated rows with elbow extension assisted by tubing AROM  1 x 10 AROM  2 x 10 AROM  2 x 10 with passive self range into elbow extension at end range AROM  1 x 10 with passive self range into elbow extension at end range    1 x10 with green theratubing seated   Shoulder abduction AROM  1 x 5          Elbow flexion/extension AROM  1 x 5 AROM with assist to fully extend x 10 reps   AROM with assist to fully extend x 10 reps   Reviewed HEP   PNF D1/D2 diagonals           Hand helper 15 pounds x 25 reps with assist to extend fingers 15 pounds x 10 reps with assist to extend fingers 20 pounds x 5 reps with assist to extend fingers 20 pounds x 5 reps with assist to extend fingers 20 pounds x 5 reps with assist to extend fingers  20 pounds x 5 reps with assist to extend fingers    Tabletop skateboard        10 minutes  AROM in all available planes; AAROM into external rotation and horizontal abduction   Shoulder flexion/extension        Reviewed HEP   Chest press           UBE  Min assist to sustain right  on handle   Level 0  5 mintues SBA-Min assist to sustain right  on handle   Level 0  5 mintues  Used dycem and wrist brace to keep wrist in neutral Min assist to sustain right  on handle   Level 0  5 mintues Min assist to sustain right  on handle   Level 0  5 mintues    Used dycem and wrist brace to keep wrist in neutral Min assist to sustain right  on handle   Level 0  5 mintues Min assist to sustain right  on handle   Level 0  5 mintues Min assist to sustain right  on handle   Level 0  6 mintues   Modified push-ups   Hands on countertop with dycem under right hand  2 x 10 Hands on countertop with dycem under right hand  1 x 10 Hands on countertop with dycem under right hand  2 x 10  Hands on countertop with dycem under right hand  2 x 10 Hands on countertop with dycem under right hand  2 x 10 Hands on countertop with dycem under right hand  4 x 10   Resistive clothespin Yellow  1 x 10 Yellow  1 x 10 Yellow  1 x 10    Yellow x 3 to  pieces of sponge and release in to container Attempted x 1 with max assist   Shoulder arc 7 rings with assist from right hand to grasp ring initially   5 tabs on small hill only with hand over hand assist       Wrist flexion/extension           Punching bag        Side hook x 10 reps       Access Code: [email or web address]   URL: [email or web address]   Date: 01/19/2018   Prepared by: Demian Fontana     Exercises   Supine Shoulder Flexion PROM - 10 reps - 2 sets - 3 hold - 1x daily - 5x weekly   Push-Up on Counter - 10 reps - 2 sets - 1x daily - 5x weekly   Seated Shoulder Shrugs - 10 reps - 2 sets - 1x daily - 5x weekly   Seated Scapular Retraction - 10 reps - 2 sets - 1x daily - 5x weekly   Seated Weight Shifting with Arm Support - 10 reps - 2 sets - 1x daily - 5x weekly   Seated Shoulder Flexion Self PROM - 10 reps - 2 sets - 1x daily - 5x weekly   Supine Elbow Flexion Extension AROM - 10 reps - 2 sets - 1x daily - 5x weekly   Seated Elbow Flexion Extension AROM - 10 reps - 2 sets - 1x daily - 5x weekly                 Treatment/Session Assessment:    · Response to Treatment:  Patient reports using right hand in daily activities more naturally with increased practice for feeding and grooming tasks. Noted improved functional movement of right arm for punching activity today. She reports a decreased  at times during meals; encouraged use of universal cuff during those meals as needed. · Compliance with Program/Exercises: Will assess as treatment progresses. · Recommendations/Intent for next treatment session: \"Next visit will focus on advancements to more challenging activities and reduction in assistance provided\".   Total Treatment Duration:  OT Patient Time In/Time Out  Time In: 2005  Time Out: Javier Dominguez

## 2018-02-23 NOTE — PROGRESS NOTES
Bita Arteaga  : 1955  Primary: Ariana Gillis Ppo  Secondary:  [address] at [place] [address] [facility], [address] [place] [address],[address], [facility] [address]  [phone]   [phone]          OUTPATIENT PHYSICAL THERAPY:Daily Note 2018    ICD-10: Treatment Diagnosis:   · Other abnormalities of gait and mobility (R26.89)  · Difficulty in walking, not elsewhere classified (R26.2)  Precautions/Allergies:   Banana; Lisinopril; and Nuts [tree nut]   Fall Risk Score: 5 (? 5 = High Risk)  MD Orders: Evaluate and Treat MEDICAL/REFERRING DIAGNOSIS:  Weakness due to cerebrovascular accident (CVA) ([facility] [address] [I63.9, R53.1]   DATE OF ONSET: CVA: 2017  REFERRING PHYSICIAN: Juliette Pritchett*  RETURN PHYSICIAN APPOINTMENT: TBD     INITIAL ASSESSMENT:  Ms. Sánchez Rodrigues presents with decreased mobility, decreased strength, decreased gait, and decreased balance secondary to CVA. After discussing with patient, she agreed she would benefit from physical therapy to improve above deficits. Please sign this plan of treatment if you concur. Thank you for the opportunity to serve this patient. Recertification note on 2018:   Patient has attended fourteen scheduled physical therapy appointments from 12/15/2017 to 2018. Patient is very motivated and compliant with therapy. Patient complaints of increased irritation in her right AFO due to increased tone. Patient is continuing to demonstrate improved ambulation and improved strength in right lower extremity . Patient would benefit from continuing skilled physical therapy to address problems and goals. Thank you for this referral.    PROBLEM LIST (Impacting functional limitations):  1. Decreased Strength  2. Decreased Ambulation Ability/Technique  3. Decreased Balance  4. Decreased Activity Tolerance INTERVENTIONS PLANNED:  1. Balance Exercise  2. Gait Training  3. Home Exercise Program (HEP)  4.  Neuromuscular Re-education/Strengthening  5. Range of Motion (ROM)  6. Therapeutic Exercise/Strengthening   TREATMENT PLAN:  Effective Dates: 2/12/2018 TO 5/7/2018. Frequency/Duration: 2-3 times a week for 12 weeks  GOALS: (Goals have been discussed and agreed upon with patient.)  Short-Term Functional Goals: Time Frame: 3 weeks  1. Patient will be independent with home exercise program without exacerbation of symptoms or cueing needed. Goal met. Discharge Goals: Time Frame: 8 weeks  1. Patient will be independent with all ADLs with minimal fear of falling and loss of balance with daily tasks. Goal ongoing. 2. Patient will report no fear avoidance with social or recreational activities due to fear of falling. Goal ongoing. 3. Patient will score greater than or equal to 45/56 on Gipson Balance Scale with minimal effect of imbalance on patient's ability to manage every day life activities. Goal ongoing. Rehabilitation Potential For Stated Goals: Good  Regarding Kimberlyn Iraheta's therapy, I certify that the treatment plan above will be carried out by a therapist or under their direction. Thank you for this referral,  Claudean Doll, PT     Referring Physician Signature: Juliette Mistry*              Date                    The information in this section was collected on 12/15/2017 (except where otherwise noted). HISTORY:   History of Present Injury/Illness (Reason for Referral):  Patient reports she had a severe stroke on 9/6/2017. She reports that she was in the hospital and [facility] for about 61-62 days. She reports that she has progressed really well, but is still having trouble walking and using her right side (UE and LE). She reports that she walks using her AFO and quad cane all the time. She reports that she tries to be as active as possible. She reports that she is scared that she is going to fall, even though she has not.   She reports she would like to work on strengthening her right leg so her balance is better and she can walk without fear of falling. Past Medical History/Comorbidities:   Ms. Ciera Montoya  has a past medical history of Anxiety; CVA (cerebral vascular accident) ([facility] [address] (2017); Depression; HTN (hypertension); Menopause; and P[facility]). Ms. Ciera Montoya  has a past surgical history that includes hx jennifer and bso (); hx  section; hx refractive surgery (); hx other surgical (); and hx other surgical (). Social History/Living Environment:   Home Environment: Private residence  Living Alone: No  Support Systems: Family member(s), Friends \ neighbors; Spouse  Prior Level of Function/Work/Activity:  Independent  Dominant Side:         RIGHT  Personal Factors:          Sex:  female        Age:  60 y.o. Current Medications:       Current Outpatient Prescriptions:     FLUoxetine (PROZAC) 20 mg tablet, Take 2 Tabs by mouth daily. , Disp: 60 Tab, Rfl: 3    methylphenidate HCl (RITALIN) 20 mg tablet, Take 1 Tab (20 mg total) by mouth daily. Max Daily Amount: 20 mg, Disp: 30 Tab, Rfl: 0    methylphenidate HCl (RITALIN) 20 mg tablet, Take 1 Tab (20 mg total) by mouth dailyEarliest Fill Date: 18. Max Daily Amount: 20 mg, Disp: 30 Tab, Rfl: 0    [START ON 3/12/2018] methylphenidate HCl (RITALIN) 20 mg tablet, Take 1 Tab (20 mg total) by mouth dailyEarliest Fill Date: 3/12/18. Max Daily Amount: 20 mg, Disp: 30 Tab, Rfl: 0    tiZANidine (ZANAFLEX) 4 mg tablet, 4mg po TID, Disp: 90 Tab, Rfl: 6    HYDROcodone-acetaminophen (NORCO) 5-325 mg per tablet, TAKE ONE TABLET BY MOUTH EVERY 6 HOURS AS NEEDED FOR PAIN, Disp: , Rfl: 0    labetalol (NORMODYNE) 200 mg tablet, Take 1 Tab by mouth two (2) times a day., Disp: 60 Tab, Rfl: 5    ALPRAZolam (XANAX) 0.25 mg tablet, Take 1 Tab by mouth three (3) times daily as needed for Anxiety. Max Daily Amount: 0.75 mg., Disp: 30 Tab, Rfl: 1    rOPINIRole (REQUIP) 2 mg tablet, Take 2 mg by mouth nightly.  for restless leg, Disp: , Rfl:     temazepam (RESTORIL) 15 mg capsule, Take 15 mg by mouth nightly as needed for Sleep., Disp: , Rfl:     polyethylene glycol (MIRALAX) 17 gram/dose powder, Take 17 g by mouth daily. , Disp: 510 g, Rfl: 2    tamsulosin (FLOMAX) 0.4 mg capsule, Take 1 Cap by mouth nightly., Disp: 30 Cap, Rfl: 4    potassium chloride (K-DUR, KLOR-CON) 20 mEq tablet, Take 1 Tab by mouth daily. , Disp: 30 Tab, Rfl: 0    acetaminophen (TYLENOL) 500 mg tablet, 1 Tab by Per NG tube route every four (4) hours as needed. , Disp: 30 Tab, Rfl: 0    losartan-hydroCHLOROthiazide (HYZAAR) 100-12.5 mg per tablet, Take 1 Tab by mouth daily. , Disp: 30 Tab, Rfl: 5   Date Last Reviewed:  2/23/2018    Number of Personal Factors/Comorbidities that affect the Plan of Care: 1-2: MODERATE COMPLEXITY   EXAMINATION:   Observation/Orthostatic Postural Assessment:          Posture Assessment: Rounded shoulders, Forward head   Functional Mobility:         Gait/Ambulation:     Speed/Lulu: Pace decreased (<100 feet/min)  Step Length: Left shortened, Right lengthened  Swing Pattern: Left asymmetrical, Right asymmetrical  Stance: Left increased, Right decreased  Gait Abnormalities: Antalgic, Circumduction, Foot drop, Path deviations, Trendelenburg  Ambulation - Level of Assistance: Modified independent  Assistive Device: Cane, quad  · Interventions: Verbal cues, Safety awareness training          Transfers:     Sit to Stand: Independent  Stand to Sit: Independent  Stand Pivot Transfers: Independent  · Bed to Chair: Independent          Bed Mobility:     Rolling: Independent  Supine to Sit: Independent  Sit to Supine: Independent  · Scooting: Independent    Strength:          L UE STRENGTH: 4/5 shoulder flexion, 4/5 shoulder abduction, 4/5 shoulder extension, 4/5 elbow flexion, 4/5 elbow extension. R UE STRENGTH: 2/5 shoulder flexion, 2/5 shoulder abduction, 2/5 shoulder extension, 2/5 elbow flexion, 2/5 elbow extension.    R LE STRENGTH:  4+/5 hip flexion, 4+/5 hip abduction, 4+/5 hip adduction, 4+/5 hip extension, 4+/5 knee extension, 4+/5 knee flexion, 1/5 ankle dorsiflexion, 1/5 ankle plantarflexion, 1/5 ankle inversion, 1/5 ankle eversion. Sensation:         Intact for light touch and proprioception  Postural Control & Balance:  · Gipson Balance Scale:  33/56.   (A score less than 45/56 indicates high risk of falls)  . Score improved from 28/56 on initial evaluation. Body Structures Involved:  1. Nerves  2. Muscles Body Functions Affected:  1. Neuromusculoskeletal  2. Movement Related Activities and Participation Affected:  1. Mobility  2. Self Care   Number of elements (examined above) that affect the Plan of Care: 4+: HIGH COMPLEXITY   CLINICAL PRESENTATION:   Presentation: Evolving clinical presentation with changing clinical characteristics: MODERATE COMPLEXITY   CLINICAL DECISION MAKING:   Outcome Measure: Tool Used: Gipson Balance Scale  Score:  Initial: 28/56 Most Recent: 33/56 (Date: 2- )   Interpretation of Score: Each section is scored on a 0-4 scale, 0 representing the patients inability to perform the task and 4 representing independence. The scores of each section are added together for a total score of 56. The higher the patients score, the more independent the patient is. Any score below 45 indicates increased risk for falls. Score 56 55-45 44-34 33-23 22-12 11-1 0   Modifier CH CI CJ CK CL CM CN     Medical Necessity:   · Patient is expected to demonstrate progress in strength, range of motion, balance and coordination to improve safety during daily activities. · Patient demonstrates good rehab potential due to higher previous functional level. · Skilled intervention continues to be required due to decreased mobility. Reason for Services/Other Comments:  · Patient continues to demonstrate capacity to improve overall mobility which will increase independence and increase safety.    Use of outcome tool(s) and clinical judgement create a POC that gives a: Questionable prediction of patient's progress: MODERATE COMPLEXITY            TREATMENT:   (In addition to Assessment/Re-Assessment sessions the following treatments were rendered)  Pre-treatment Symptoms/Complaints:  2/23/2018: \"I have been doing my exercises. My foot is not hurting today. \"  Pain: Initial: Pain Intensity 1: 0  Post Session:  0/10     NEUROMUSCULAR RE-EDUCATION: (25 minutes):  Exercise/activities per grid below to improve balance, coordination, kinesthetic sense, posture and proprioception. Required minimal verbal and manual cues to promote static and dynamic balance in standing, promote coordination of bilateral, lower extremity(s) and promote motor control of bilateral, lower extremity(s). Date:  2/19/2018 Date   2/23/18 Date   2/15/18 Date  2/21/18   Activity/Exercise Parameters      Stepping over half foam  2x10 reps with left LE, working on weightbearing through R LE and keeping R foot flat on floor (foot tends to invert) X 2x10 reps with left LE, working on weightbearing through R LE and keeping R foot flat on floor (foot tends to invert) 2x10 reps with left LE, working on weightbearing through R LE and keeping R foot flat on floor (foot tends to invert)   Step taps X X Onto 6 inch step with L LE working on weight bearing through R LE Onto 6 inch step with L LE working on weight bearing through R LE   Step ups 6 inch  x10 leading with right LE working on weight bearing through R LE 6 inch  x10 leading with L LE working on weight bearing through R LE 6 inch  x10 leading with L LE working on weight bearing through [address] X      Walking in the clinic with quad cane X About 100 feet with quad cane, working on weightbearing more on R foot through midstance and terminal stance, and stepping bigger with L LE. Tactile cues to pelvis to disassociate pelvis from trunk.   About 100 feet with quad cane, working on weightbearing more on R foot through midstance and terminal stance, and stepping bigger with L LE About 100 feet with quad cane, working on weightbearing more on R foot through midstance and terminal stance, and stepping bigger with L LE; added working on shifting R hips forward over R foot better midstance to terminal stance                                                       THERAPEUTIC EXERCISE: (20 minutes):  Exercises per grid below to improve mobility and strength. Required minimal verbal cues to promote proper body alignment. Progressed repetitions as indicated.    Date:  2/19/2018 Date:  2/23/2018 Date   2/15/18 Date   2/21/18   Activity/Exercise Parameters Parameters     Sit to stand from chair X X      Left sidelying alternating isometrics on pelvis X X     Standing hip abduction X X     Seated LAQs 2x10 right LE  3#      Standing hamstrings curls  X X     Bridging 2x10 with right lower extremity 2x10 with right lower extremity 2x10 with right lower extremity 2x10 with right lower extremity   Supine straight leg raise 2x10 with right lower extremity  3# 2x10 with right lower extremity 3#  2x10 with right lower extremity  2x10 with right lower extremity    Supine PNF D1 and D2  2x10 each with R LE 2x10 with right lower extremity D1 and D2 2x10 with right lower extremity D1 and D2 2x10 with right lower extremity D1 and D2   Left sidelying hip abduction       L sidelying: picking up and moving R LE from in front of L foot to behind L foot (working on hip abd and ext) and stabilizing trunk 2x10 reps 2x10 reps, working on lifting highter 3# x10 reps, working on lifting higher x10 reps, working on lifting higher   L sidelying R clam shells 2 x 10 reps R LE 2x10 reps right lower extremity 3# 2x10 reps right lower extremity 2x10 reps right lower extremity   R foot and gastrocsoleus stretching X 8 minutes trying to get foot to neutral position X 8 minutes trying to get foot to neutral position X 8 minutes trying to get foot to neutral position X 8 minutes trying to get foot to neutral position   Standing minisquats in bars           [employer] [facility]  Treatment/Session Assessment:    · Response to Treatment:  Patient tolerated exercises without complaints. Continue to progress to tolerance. · Compliance with Program/Exercises: Compliant. · Recommendations/Intent for next treatment session: \"Next visit will focus on advancements to more challenging activities\".    Total Treatment Duration:  PT Patient Time In/Time Out  Time In: 1300  Time Out: 2000 Cy Hernandez

## 2018-02-26 ENCOUNTER — APPOINTMENT (OUTPATIENT)
Dept: PHYSICAL THERAPY | Age: 63
End: 2018-02-26
Attending: PHYSICAL MEDICINE & REHABILITATION
Payer: COMMERCIAL

## 2018-02-26 ENCOUNTER — HOSPITAL ENCOUNTER (OUTPATIENT)
Dept: PHYSICAL THERAPY | Age: 63
Discharge: HOME | End: 2018-02-26
Attending: PHYSICAL MEDICINE & REHABILITATION
Payer: COMMERCIAL

## 2018-02-26 NOTE — PROGRESS NOTES
Ms. Laina Valiente cancelled her OT appointment today because her PT appointment had to be cancelled due to the PT leaving early because of a sick child. Ms. Laina Valiente chose not to come to the clinic today for one therapy session.     Afshin Banks, OT

## 2018-02-28 ENCOUNTER — HOSPITAL ENCOUNTER (OUTPATIENT)
Dept: PHYSICAL THERAPY | Age: 63
Discharge: HOME | End: 2018-02-28
Attending: PHYSICAL MEDICINE & REHABILITATION
Payer: COMMERCIAL

## 2018-02-28 PROCEDURE — 97112 NEUROMUSCULAR REEDUCATION: CPT

## 2018-02-28 PROCEDURE — 97110 THERAPEUTIC EXERCISES: CPT

## 2018-02-28 NOTE — PROGRESS NOTES
Sanket Marquez  : 1955  Primary: Owen Carranza Ppo  Secondary:  [address] at [place] [id number] [facility], [address] [place] [address],[address], [facility] [address].  [phone]   [phone]        OUTPATIENT OCCUPATIONAL THERAPY: Daily Note 2018    ICD-10: Treatment Diagnosis: Hemiplegia and hemiparesis following cerebral infarction affecting right dominant side (I69.351)  Precautions/Allergies:   Banana; Lisinopril; and Nuts [tree nut]   Fall Risk Score: 5 (? 5 = High Risk)  MD Orders: Referral to occupational therapy MEDICAL/REFERRING DIAGNOSIS:   Cerebral infarction, unspecified [I63.9]  Weakness [R53.1]   DATE OF ONSET: 2017   REFERRING PHYSICIAN: Juliette Cao*  RETURN PHYSICIAN APPOINTMENT: unknown   18: Ms. Keegan Coe is making excellent progress toward her goals; she is demonstrating improving range of motion and functional use of the right upper extremity in activities of daily living. Feel she will continue to benefit from skilled occupational therapy to maximize safety and independence with activities of daily living. INITIAL ASSESSMENT:  Ms. Keegan Coe presents with impaired active movement, strength, coordination and sensation of the dominant right upper extremity that is affecting her ability to complete activities of daily living independently. Feel she may benefit from skilled occupational therapy to maximize safety and independence with activities of daily living. PLAN OF CARE:   PROBLEM LIST:  1. Decreased Strength  2. Decreased ADL/Functional Activities  3. Decreased Transfer Abilities  4. Decreased Balance  5. Decreased Flexibility/Joint Mobility  6. Decreased Cognition INTERVENTIONS PLANNED:  1. Activities of daily living training  2. Manual therapy training  3. Modalities  4. Neuromuscular re-eduation  5. Sensory reintegration training  6. Splinting  7. Therapeutic activity  8.  Therapeutic exercise   TREATMENT PLAN:  Effective Dates: 17 TO 3/20/18. Frequency/Duration: 3 times a week for 12 weeks  GOALS: (Goals have been discussed and agreed upon with patient.)  Short-Term Functional Goals: Time Frame: 6 weeks  1. Patient will demonstrate independence with home exercise program for right upper extremity range of motion. Met  2. Patient will increase use of right upper extremity as a functional assist in at least 25% of daily activities. Met  3. Patient will bathe with moderate assistance. Continue to address  Discharge Goals: Time Frame: 12 weeks  1. Patient will bathe with minimal assistance. Continue to address  2. Patient will feed self with modified independence and adaptive equipment as needed. Continue to address  3. Patient will dress with modified independence and adaptive equipment as needed. Continue to address  4. Patient will use right upper extremity as a functional assist in at least 50% of daily activities. Continue to address  Rehabilitation Potential For Stated Goals: Good  Regarding Mary Iraheta's therapy, I certify that the treatment plan above will be carried out by a therapist or under their direction. Thank you for this referral,  Julio C Lawrence OT     Referring Physician Signature: Juliette Anglin* _________________________  Date _________            The information in this section was collected on 18 (except where otherwise noted). OCCUPATIONAL PROFILE & HISTORY:   History of Present Injury/Illness (Reason for Referral):  CVA with hospital and [facility] stay then home health therapy. Past Medical History/Comorbidities:   Ms. Merced Adams  has a past medical history of Anxiety; CVA (cerebral vascular accident) ([address] (2017); Depression; HTN (hypertension); Menopause; and PTE (pulmonary thromboembolism) ([facility] (2017). Ms. Merced Adams  has a past surgical history that includes hx jennifer and bso (); hx  section; hx refractive surgery (); hx other surgical (); and hx other surgical (2017).   Social History/Living Environment:      Prior Level of Function/Work/Activity:  Does seasonal taxes at [employer]  Dominant Side:         RIGHT  Current Medications:    Current Outpatient Prescriptions:     FLUoxetine (PROZAC) 20 mg tablet, Take 2 Tabs by mouth daily. , Disp: 60 Tab, Rfl: 3    methylphenidate HCl (RITALIN) 20 mg tablet, Take 1 Tab (20 mg total) by mouth daily. Max Daily Amount: 20 mg, Disp: 30 Tab, Rfl: 0    methylphenidate HCl (RITALIN) 20 mg tablet, Take 1 Tab (20 mg total) by mouth dailyEarliest Fill Date: 2/12/18. Max Daily Amount: 20 mg, Disp: 30 Tab, Rfl: 0    [START ON 3/12/2018] methylphenidate HCl (RITALIN) 20 mg tablet, Take 1 Tab (20 mg total) by mouth dailyEarliest Fill Date: 3/12/18. Max Daily Amount: 20 mg, Disp: 30 Tab, Rfl: 0    tiZANidine (ZANAFLEX) 4 mg tablet, 4mg po TID, Disp: 90 Tab, Rfl: 6    HYDROcodone-acetaminophen (NORCO) 5-325 mg per tablet, TAKE ONE TABLET BY MOUTH EVERY 6 HOURS AS NEEDED FOR PAIN, Disp: , Rfl: 0    labetalol (NORMODYNE) 200 mg tablet, Take 1 Tab by mouth two (2) times a day., Disp: 60 Tab, Rfl: 5    ALPRAZolam (XANAX) 0.25 mg tablet, Take 1 Tab by mouth three (3) times daily as needed for Anxiety. Max Daily Amount: 0.75 mg., Disp: 30 Tab, Rfl: 1    rOPINIRole (REQUIP) 2 mg tablet, Take 2 mg by mouth nightly. for restless leg, Disp: , Rfl:     temazepam (RESTORIL) 15 mg capsule, Take 15 mg by mouth nightly as needed for Sleep., Disp: , Rfl:     polyethylene glycol (MIRALAX) 17 gram/dose powder, Take 17 g by mouth daily. , Disp: 510 g, Rfl: 2    tamsulosin (FLOMAX) 0.4 mg capsule, Take 1 Cap by mouth nightly., Disp: 30 Cap, Rfl: 4    potassium chloride (K-DUR, KLOR-CON) 20 mEq tablet, Take 1 Tab by mouth daily. , Disp: 30 Tab, Rfl: 0    acetaminophen (TYLENOL) 500 mg tablet, 1 Tab by Per NG tube route every four (4) hours as needed. , Disp: 30 Tab, Rfl: 0    losartan-hydroCHLOROthiazide (HYZAAR) 100-12.5 mg per tablet, Take 1 Tab by mouth daily. , Disp: 30 Tab, Rfl: 5            Date Last Reviewed:  2/28/2018   Complexity Level : Expanded review of therapy/medical records (1-2):  MODERATE COMPLEXITY   ASSESSMENT OF OCCUPATIONAL PERFORMANCE:   ROM:                   Balance:                   Coordination:                   Mental Status:                   Vision:                   Activities of Daily Living:           Basic ADLs (From Assessment) Complex ADLs (From Assessment)         Grooming/Bathing/Dressing Activities of Daily Living                                   Sensation:         Light touch absent distal to right elbow     Physical Skills Involved:  1. Range of Motion  2. Balance  3. Sensation  4. Fine Motor Control  5. Gross Motor Control Cognitive Skills Affected (resulting in the inability to perform in a timely and safe manner):  1. Executive Function  2. Sustained Attention  3. Divided Attention  4. Comprehension Psychosocial Skills Affected:  1. None   Number of elements that affect the Plan of Care: 5+:  HIGH COMPLEXITY   CLINICAL DECISION MAKING:   Outcome Measure: Tool Used: [address] [facility] [address] AM-[facility] Daily Activity Outpatient Short Form  Score:  Initial: 18 Most Recent: 28 (Date: 1/31/18 )   Interpretation of Tool:  Represents clinically-significant functional categories (i.e., basic and instrumental activities of daily living, fine motor activities). Score 60 59-55 54-47 46-34 32-21 20-16 15   Modifier CH CI CJ CK CL CM CN     Medical Necessity:   · Patient demonstrates good rehab potential due to higher previous functional level. Reason for Services/Other Comments:  · Patient continues to require skilled intervention due to decreased safety and independence in activities of daily living. Clinical Decision-Making Assessment: Moderately difficult to predict patient's progress at this time due to the extent of her limitations in functional movement and use of dominant right upper extremity.     Assessment process, impact of co-morbidities, assessment modification\need for assistance, and selection of interventions: Analytical Complexity:MODERATE COMPLEXITY   TREATMENT:   (In addition to Assessment/Re-Assessment sessions the following treatments were rendered)    Pre-treatment Symptoms/Complaints:  Patient states, \"I want my arm to do more. \"  Pain: Initial:   Pain Intensity 1: 0  Post Session:  0/10     Therapeutic Exercise: (  35 minutes):  Exercises per grid below to improve dynamic movement of arm - right and hand - right to improve functional lifting, carrying, reaching and grasping. Required moderate visual and verbal cues to promote proper body mechanics. Progressed range, repetitions and complexity of movement as indicated.        Date:  2/2/18 Date:  2/12/18 Date:  2/14/18 Date:  2/15/18 Date:   2/19/18 Date:  2/21/18 Date:  2/23/18 Date:  2/28/18   Activity/Exercise           Shoulder shrugs AROM  1 x 5  AROM  1 x 10 AROM  1 x 10 AROM  2 x 10 AROM  2 x 10 with mirror for visual feedback AROM  2 x 10 with mirror for visual feedback AROM  2 x 10 with mirror for visual feedback   Scapular protraction/retraction 3 x 10 with red theratubing - seated rows 2 x 10 with green theratubing - seated rows with elbow extension assisted by tubing 2 x 10 with green theratubing - seated rows with elbow extension assisted by tubing AROM  1 x 10 AROM  2 x 10 AROM  2 x 10 with passive self range into elbow extension at end range AROM  1 x 10 with passive self range into elbow extension at end range    1 x10 with green theratubing seated AROM  1 x 10 with passive self range into elbow extension at end range   Shoulder abduction           Elbow flexion/extension AROM with assist to fully extend x 10 reps   AROM with assist to fully extend x 10 reps   Reviewed HEP Reviewed HEP   PNF D1/D2 diagonals        1 x 3 in supine   Hand helper 15 pounds x 10 reps with assist to extend fingers 20 pounds x 5 reps with assist to extend fingers 20 pounds x 5 reps with assist to extend fingers 20 pounds x 5 reps with assist to extend fingers  20 pounds x 5 reps with assist to extend fingers  20 pounds x 5 reps with assist to extend fingers   Tabletop skateboard       10 minutes  AROM in all available planes; AAROM into external rotation and horizontal abduction    Shoulder flexion/extension       Reviewed HEP    Chest press           UBE Min assist to sustain right  on handle   Level 0  5 mintues SBA-Min assist to sustain right  on handle   Level 0  5 mintues  Used dycem and wrist brace to keep wrist in neutral Min assist to sustain right  on handle   Level 0  5 mintues Min assist to sustain right  on handle   Level 0  5 mintues    Used dycem and wrist brace to keep wrist in neutral Min assist to sustain right  on handle   Level 0  5 mintues Min assist to sustain right  on handle   Level 0  5 mintues Min assist to sustain right  on handle   Level 0  6 mintues Min assist to sustain right  on handle   Level 0  5 mintues   Modified push-ups  Hands on countertop with dycem under right hand  2 x 10 Hands on countertop with dycem under right hand  1 x 10 Hands on countertop with dycem under right hand  2 x 10  Hands on countertop with dycem under right hand  2 x 10 Hands on countertop with dycem under right hand  2 x 10 Hands on countertop with dycem under right hand  4 x 10 Hands on countertop with dycem under right hand  3 x 10   Resistive clothespin Yellow  1 x 10 Yellow  1 x 10    Yellow x 3 to  pieces of sponge and release in to container Attempted x 1 with max assist Yellow  1 x 10   Shoulder arc   5 tabs on small hill only with hand over hand assist        Wrist flexion/extension           Punching bag       Side hook x 10 reps        Access Code: [email or web address]   URL: [email or web address]   Date: 01/19/2018   Prepared by: Rosa Senior     Exercises   Supine Shoulder Flexion PROM - 10 reps - 2 sets - 3 hold - 1x daily - 5x weekly   Push-Up on Counter - 10 reps - 2 sets - 1x daily - 5x weekly   Seated Shoulder Shrugs - 10 reps - 2 sets - 1x daily - 5x weekly   Seated Scapular Retraction - 10 reps - 2 sets - 1x daily - 5x weekly   Seated Weight Shifting with Arm Support - 10 reps - 2 sets - 1x daily - 5x weekly   Seated Shoulder Flexion Self PROM - 10 reps - 2 sets - 1x daily - 5x weekly   Supine Elbow Flexion Extension AROM - 10 reps - 2 sets - 1x daily - 5x weekly   Seated Elbow Flexion Extension AROM - 10 reps - 2 sets - 1x daily - 5x weekly     Neuromuscular Re-education: (  10 mintues):  Exercise/activities per grid below to improve balance, coordination, kinesthetic sense and proprioception. Required moderate verbal and tactile cues to promote static and dynamic balance in sitting, promote coordination of right, upper extremity(s) and promote motor control of right, upper extremity(s). Patient completed lateral weight shifts sitting at edge of mat x 10 reps each onto elbow/forearms and onto outstretched hands with dynamic reach with left hand outside of base of support, using right upper extremity to maintain balance. She then completed upper body extension in prone x 5 reps then attempted quadruped x 3 reps. Treatment/Session Assessment:    · Response to Treatment:  Patient reports using right hand in daily activities more naturally with increased practice for feeding and grooming tasks. She reports a decreased  at times during meals; encouraged use of universal cuff during those meals as needed. · Compliance with Program/Exercises: Will assess as treatment progresses. · Recommendations/Intent for next treatment session: \"Next visit will focus on advancements to more challenging activities and reduction in assistance provided\".   Total Treatment Duration:  OT Patient Time In/Time Out  Time In: 1680  Time Out: [address] [facility],

## 2018-02-28 NOTE — PROGRESS NOTES
Neisha Kemar  : 1955  Primary: Hilda Walker Ppo  Secondary:  [address] at [address], [address] [place] [address],[address], [facility] [address]  [phone]   [phone]          OUTPATIENT PHYSICAL THERAPY:Daily Note 2018    ICD-10: Treatment Diagnosis:   · Other abnormalities of gait and mobility (R26.89)  · Difficulty in walking, not elsewhere classified (R26.2)  Precautions/Allergies:   Banana; Lisinopril; and Nuts [tree nut]   Fall Risk Score: 5 (? 5 = High Risk)  MD Orders: Evaluate and Treat MEDICAL/REFERRING DIAGNOSIS:  Weakness due to cerebrovascular accident (CVA) ([facility] [address] [I63.9, R53.1]   DATE OF ONSET: CVA: 2017  REFERRING PHYSICIAN: Juliette Belle*  RETURN PHYSICIAN APPOINTMENT: TBD     INITIAL ASSESSMENT:  Ms. Tyson Sinclair presents with decreased mobility, decreased strength, decreased gait, and decreased balance secondary to CVA. After discussing with patient, she agreed she would benefit from physical therapy to improve above deficits. Please sign this plan of treatment if you concur. Thank you for the opportunity to serve this patient. Recertification note on 2018:   Patient has attended fourteen scheduled physical therapy appointments from 12/15/2017 to 2018. Patient is very motivated and compliant with therapy. Patient complaints of increased irritation in her right AFO due to increased tone. Patient is continuing to demonstrate improved ambulation and improved strength in right lower extremity . Patient would benefit from continuing skilled physical therapy to address problems and goals. Thank you for this referral.    PROBLEM LIST (Impacting functional limitations):  1. Decreased Strength  2. Decreased Ambulation Ability/Technique  3. Decreased Balance  4. Decreased Activity Tolerance INTERVENTIONS PLANNED:  1. Balance Exercise  2. Gait Training  3. Home Exercise Program (HEP)  4.  Neuromuscular Re-education/Strengthening  5. Range of Motion (ROM)  6. Therapeutic Exercise/Strengthening   TREATMENT PLAN:  Effective Dates: 2/12/2018 TO 5/7/2018. Frequency/Duration: 2-3 times a week for 12 weeks  GOALS: (Goals have been discussed and agreed upon with patient.)  Short-Term Functional Goals: Time Frame: 3 weeks  1. Patient will be independent with home exercise program without exacerbation of symptoms or cueing needed. Goal met. Discharge Goals: Time Frame: 8 weeks  1. Patient will be independent with all ADLs with minimal fear of falling and loss of balance with daily tasks. Goal ongoing. 2. Patient will report no fear avoidance with social or recreational activities due to fear of falling. Goal ongoing. 3. Patient will score greater than or equal to 45/56 on Gipson Balance Scale with minimal effect of imbalance on patient's ability to manage every day life activities. Goal ongoing. Rehabilitation Potential For Stated Goals: Good  Regarding Emilia Iraheta's therapy, I certify that the treatment plan above will be carried out by a therapist or under their direction. Thank you for this referral,  Santos Blanco, FRANCOIS     Referring Physician Signature: Juliette Pritchett*              Date                    The information in this section was collected on 12/15/2017 (except where otherwise noted). HISTORY:   History of Present Injury/Illness (Reason for Referral):  Patient reports she had a severe stroke on 9/6/2017. She reports that she was in the hospital and [facility] for about 61-62 days. She reports that she has progressed really well, but is still having trouble walking and using her right side (UE and LE). She reports that she walks using her AFO and quad cane all the time. She reports that she tries to be as active as possible. She reports that she is scared that she is going to fall, even though she has not.   She reports she would like to work on strengthening her right leg so her balance is better and she can walk without fear of falling. Past Medical History/Comorbidities:   Ms. Sandrita Adams  has a past medical history of Anxiety; CVA (cerebral vascular accident) ([facility] [address] (2017); Depression; HTN (hypertension); Menopause; and [facility]). Ms. Sandrita Adams  has a past surgical history that includes hx jennifer and bso (); hx  section; hx refractive surgery (); hx other surgical (); and hx other surgical (). Social History/Living Environment:   Home Environment: Private residence  Living Alone: No  Support Systems: Family member(s), Friends \ neighbors; Spouse  Prior Level of Function/Work/Activity:  Independent  Dominant Side:         RIGHT  Personal Factors:          Sex:  female        Age:  60 y.o. Current Medications:       Current Outpatient Prescriptions:     FLUoxetine (PROZAC) 20 mg tablet, Take 2 Tabs by mouth daily. , Disp: 60 Tab, Rfl: 3    methylphenidate HCl (RITALIN) 20 mg tablet, Take 1 Tab (20 mg total) by mouth daily. Max Daily Amount: 20 mg, Disp: 30 Tab, Rfl: 0    methylphenidate HCl (RITALIN) 20 mg tablet, Take 1 Tab (20 mg total) by mouth dailyEarliest Fill Date: 18. Max Daily Amount: 20 mg, Disp: 30 Tab, Rfl: 0    [START ON 3/12/2018] methylphenidate HCl (RITALIN) 20 mg tablet, Take 1 Tab (20 mg total) by mouth dailyEarliest Fill Date: 3/12/18. Max Daily Amount: 20 mg, Disp: 30 Tab, Rfl: 0    tiZANidine (ZANAFLEX) 4 mg tablet, 4mg po TID, Disp: 90 Tab, Rfl: 6    HYDROcodone-acetaminophen (NORCO) 5-325 mg per tablet, TAKE ONE TABLET BY MOUTH EVERY 6 HOURS AS NEEDED FOR PAIN, Disp: , Rfl: 0    labetalol (NORMODYNE) 200 mg tablet, Take 1 Tab by mouth two (2) times a day., Disp: 60 Tab, Rfl: 5    ALPRAZolam (XANAX) 0.25 mg tablet, Take 1 Tab by mouth three (3) times daily as needed for Anxiety. Max Daily Amount: 0.75 mg., Disp: 30 Tab, Rfl: 1    rOPINIRole (REQUIP) 2 mg tablet, Take 2 mg by mouth nightly.  for restless leg, Disp: , Rfl:     temazepam (RESTORIL) 15 mg capsule, Take 15 mg by mouth nightly as needed for Sleep., Disp: , Rfl:     polyethylene glycol (MIRALAX) 17 gram/dose powder, Take 17 g by mouth daily. , Disp: 510 g, Rfl: 2    tamsulosin (FLOMAX) 0.4 mg capsule, Take 1 Cap by mouth nightly., Disp: 30 Cap, Rfl: 4    potassium chloride (K-DUR, KLOR-CON) 20 mEq tablet, Take 1 Tab by mouth daily. , Disp: 30 Tab, Rfl: 0    acetaminophen (TYLENOL) 500 mg tablet, 1 Tab by Per NG tube route every four (4) hours as needed. , Disp: 30 Tab, Rfl: 0    losartan-hydroCHLOROthiazide (HYZAAR) 100-12.5 mg per tablet, Take 1 Tab by mouth daily. , Disp: 30 Tab, Rfl: 5   Date Last Reviewed:  2/28/2018    Number of Personal Factors/Comorbidities that affect the Plan of Care: 1-2: MODERATE COMPLEXITY   EXAMINATION:   Observation/Orthostatic Postural Assessment:          Posture Assessment: Rounded shoulders, Forward head   Functional Mobility:         Gait/Ambulation:     Speed/Lulu: Pace decreased (<100 feet/min)  Step Length: Left shortened, Right lengthened  Swing Pattern: Left asymmetrical, Right asymmetrical  Stance: Left increased, Right decreased  Gait Abnormalities: Antalgic, Circumduction, Foot drop, Path deviations, Trendelenburg  Ambulation - Level of Assistance: Modified independent  Assistive Device: Cane, quad  · Interventions: Verbal cues, Safety awareness training          Transfers:     Sit to Stand: Independent  Stand to Sit: Independent  Stand Pivot Transfers: Independent  · Bed to Chair: Independent          Bed Mobility:     Rolling: Independent  Supine to Sit: Independent  Sit to Supine: Independent  · Scooting: Independent    Strength:          L UE STRENGTH: 4/5 shoulder flexion, 4/5 shoulder abduction, 4/5 shoulder extension, 4/5 elbow flexion, 4/5 elbow extension. R UE STRENGTH: 2/5 shoulder flexion, 2/5 shoulder abduction, 2/5 shoulder extension, 2/5 elbow flexion, 2/5 elbow extension.    R LE STRENGTH:  4+/5 hip flexion, 4+/5 hip abduction, 4+/5 hip adduction, 4+/5 hip extension, 4+/5 knee extension, 4+/5 knee flexion, 1/5 ankle dorsiflexion, 1/5 ankle plantarflexion, 1/5 ankle inversion, 1/5 ankle eversion. Sensation:         Intact for light touch and proprioception  Postural Control & Balance:  · Gipson Balance Scale:  33/56.   (A score less than 45/56 indicates high risk of falls)  . Score improved from 28/56 on initial evaluation. Body Structures Involved:  1. Nerves  2. Muscles Body Functions Affected:  1. Neuromusculoskeletal  2. Movement Related Activities and Participation Affected:  1. Mobility  2. Self Care   Number of elements (examined above) that affect the Plan of Care: 4+: HIGH COMPLEXITY   CLINICAL PRESENTATION:   Presentation: Evolving clinical presentation with changing clinical characteristics: MODERATE COMPLEXITY   CLINICAL DECISION MAKING:   Outcome Measure: Tool Used: Gipson Balance Scale  Score:  Initial: 28/56 Most Recent: 33/56 (Date: 2- )   Interpretation of Score: Each section is scored on a 0-4 scale, 0 representing the patients inability to perform the task and 4 representing independence. The scores of each section are added together for a total score of 56. The higher the patients score, the more independent the patient is. Any score below 45 indicates increased risk for falls. Score 56 55-45 44-34 33-23 22-12 11-1 0   Modifier CH CI CJ CK CL CM CN     Medical Necessity:   · Patient is expected to demonstrate progress in strength, range of motion, balance and coordination to improve safety during daily activities. · Patient demonstrates good rehab potential due to higher previous functional level. · Skilled intervention continues to be required due to decreased mobility. Reason for Services/Other Comments:  · Patient continues to demonstrate capacity to improve overall mobility which will increase independence and increase safety.    Use of outcome tool(s) and clinical judgement create a POC that gives a: Questionable prediction of patient's progress: MODERATE COMPLEXITY            TREATMENT:   (In addition to Assessment/Re-Assessment sessions the following treatments were rendered)  Pre-treatment Symptoms/Complaints:  2/28/2018: \"Doing fine. To see Dr. Leah Che today. \"  Pain: Initial: Pain Intensity 1: 0  Post Session:  0/10     NEUROMUSCULAR RE-EDUCATION: (25 minutes):  Exercise/activities per grid below to improve balance, coordination, kinesthetic sense, posture and proprioception. Required minimal verbal and manual cues to promote static and dynamic balance in standing, promote coordination of bilateral, lower extremity(s) and promote motor control of bilateral, lower extremity(s). Date:  2/19/2018 Date   2/23/18 Date   2/15/18 Date  2/21/18 Date  2/28/18   Activity/Exercise Parameters       Stepping over half foam  2x10 reps with left LE, working on weightbearing through R LE and keeping R foot flat on floor (foot tends to invert) X 2x10 reps with left LE, working on weightbearing through R LE and keeping R foot flat on floor (foot tends to invert) 2x10 reps with left LE, working on weightbearing through R LE and keeping R foot flat on floor (foot tends to invert)    Step taps X X Onto 6 inch step with L LE working on weight bearing through R LE Onto 6 inch step with L LE working on weight bearing through R LE Onto 6 inch step with L LE working on weight bearing through R LE   Step ups 6 inch  x10 leading with right LE working on weight bearing through R LE 6 inch  x10 leading with L LE working on weight bearing through R LE 6 inch  x10 leading with L LE working on weight bearing through R LE     [place] X       Walking in the clinic with quad cane X About 100 feet with quad cane, working on weightbearing more on R foot through midstance and terminal stance, and stepping bigger with L LE. Tactile cues to pelvis to disassociate pelvis from trunk. About 100 feet with quad cane, working on weightbearing more on R foot through midstance and terminal stance, and stepping bigger with L LE About 100 feet with quad cane, working on weightbearing more on R foot through midstance and terminal stance, and stepping bigger with L LE; added working on shifting R hips forward over R foot better midstance to terminal stance About 100 feet with quad cane, working on weightbearing more on R foot through midstance and terminal stance, keeping R hip and shoulder over R foot for balance. pregait activity     Stepping with R LE, weight shifting with hips and shoulders over R foot. THERAPEUTIC EXERCISE: (20 minutes):  Exercises per grid below to improve mobility and strength. Required minimal verbal cues to promote proper body alignment. Progressed repetitions as indicated.    Date:  2/19/2018 Date:  2/23/2018 Date   2/15/18 Date   2/21/18 Date  2/28/18   Activity/Exercise Parameters Parameters      Sit to stand from chair X X       Left sidelying alternating isometrics on pelvis X X      Standing hip abduction X X      Seated LAQs 2x10 right LE  3#       Standing hamstrings curls  X X      Bridging 2x10 with right lower extremity 2x10 with right lower extremity 2x10 with right lower extremity 2x10 with right lower extremity 2x10 with right lower extremity    Supine straight leg raise 2x10 with right lower extremity  3# 2x10 with right lower extremity 3#  2x10 with right lower extremity  2x10 with right lower extremity  2x10 with right lower extremity    Supine PNF D1 and D2  2x10 each with R LE 2x10 with right lower extremity D1 and D2 2x10 with right lower extremity D1 and D2 2x10 with right lower extremity D1 and D2 2x10 with right lower extremity D1   Left sidelying hip abduction        L sidelying: picking up and moving R LE from in front of L foot to behind L foot (working on hip abd and ext) and stabilizing trunk 2x10 reps 2x10 reps, working on lifting highter 3# x10 reps, working on lifting higher x10 reps, working on lifting higher x10 reps, working on lifting higher   L sidelying R clam shells 2 x 10 reps R LE 2x10 reps right lower extremity 3# 2x10 reps right lower extremity 2x10 reps right lower extremity 2x10 reps right lower extremity   R foot and gastrocsoleus stretching X 8 minutes trying to get foot to neutral position X 8 minutes trying to get foot to neutral position X 8 minutes trying to get foot to neutral position X 8 minutes trying to get foot to neutral position X 8 minutes trying to get foot to neutral position   Standing minisquats in bars            [employer] Portal  Treatment/Session Assessment:    · Response to Treatment:  Patient tolerated exercises without complaints. Tone in R foot causes R ankle inversion out of brace at times in standing/walking, not as bad today. Worked on keeping R hip and R shoulder over R foot during R midstance to terminal stance, and placing L cane farther forward to prevent the backward weight shift during gait. Continue to progress to tolerance. · Compliance with Program/Exercises: Compliant. · Recommendations/Intent for next treatment session: \"Next visit will focus on advancements to more challenging activities\".    Total Treatment Duration:  PT Patient Time In/Time Out  Time In: 1345  Time Out: [address] [place]

## 2018-03-02 ENCOUNTER — HOSPITAL ENCOUNTER (OUTPATIENT)
Dept: PHYSICAL THERAPY | Age: 63
Discharge: HOME | End: 2018-03-02
Attending: PHYSICAL MEDICINE & REHABILITATION
Payer: COMMERCIAL

## 2018-03-02 PROCEDURE — 97110 THERAPEUTIC EXERCISES: CPT

## 2018-03-02 PROCEDURE — 97112 NEUROMUSCULAR REEDUCATION: CPT

## 2018-03-02 NOTE — PROGRESS NOTES
[facility]  : 1955  Primary: Frank Dave Ppo  Secondary:  [address] at [place] [id number] [facility], [address] [place] [address],[address], [facility] [address].  [phone]   [phone]        OUTPATIENT OCCUPATIONAL THERAPY: Daily Note 3/2/2018    ICD-10: Treatment Diagnosis: Hemiplegia and hemiparesis following cerebral infarction affecting right dominant side (I69.351)  Precautions/Allergies:   Banana; Lisinopril; and Nuts [tree nut]   Fall Risk Score: 5 (? 5 = High Risk)  MD Orders: Referral to occupational therapy MEDICAL/REFERRING DIAGNOSIS:   Cerebral infarction, unspecified [I63.9]  Weakness [R53.1]   DATE OF ONSET: 2017   REFERRING PHYSICIAN: Juliette Spain*  RETURN PHYSICIAN APPOINTMENT: unknown   18: Ms. Gela Easley is making excellent progress toward her goals; she is demonstrating improving range of motion and functional use of the right upper extremity in activities of daily living. Feel she will continue to benefit from skilled occupational therapy to maximize safety and independence with activities of daily living. INITIAL ASSESSMENT:  Ms. Gela Easley presents with impaired active movement, strength, coordination and sensation of the dominant right upper extremity that is affecting her ability to complete activities of daily living independently. Feel she may benefit from skilled occupational therapy to maximize safety and independence with activities of daily living. PLAN OF CARE:   PROBLEM LIST:  1. Decreased Strength  2. Decreased ADL/Functional Activities  3. Decreased Transfer Abilities  4. Decreased Balance  5. Decreased Flexibility/Joint Mobility  6. Decreased Cognition INTERVENTIONS PLANNED:  1. Activities of daily living training  2. Manual therapy training  3. Modalities  4. Neuromuscular re-eduation  5. Sensory reintegration training  6. Splinting  7. Therapeutic activity  8.  Therapeutic exercise   TREATMENT PLAN:  Effective Dates: 17 TO 3/20/18. Frequency/Duration: 3 times a week for 12 weeks  GOALS: (Goals have been discussed and agreed upon with patient.)  Short-Term Functional Goals: Time Frame: 6 weeks  1. Patient will demonstrate independence with home exercise program for right upper extremity range of motion. Met  2. Patient will increase use of right upper extremity as a functional assist in at least 25% of daily activities. Met  3. Patient will bathe with moderate assistance. Continue to address  Discharge Goals: Time Frame: 12 weeks  1. Patient will bathe with minimal assistance. Continue to address  2. Patient will feed self with modified independence and adaptive equipment as needed. Continue to address  3. Patient will dress with modified independence and adaptive equipment as needed. Continue to address  4. Patient will use right upper extremity as a functional assist in at least 50% of daily activities. Continue to address  Rehabilitation Potential For Stated Goals: Good  Regarding Juan Diego Iraheta's therapy, I certify that the treatment plan above will be carried out by a therapist or under their direction. Thank you for this referral,  Marin James OT     Referring Physician Signature: Juliette Parekr* _________________________  Date _________            The information in this section was collected on 18 (except where otherwise noted). OCCUPATIONAL PROFILE & HISTORY:   History of Present Injury/Illness (Reason for Referral):  CVA with hospital and [facility] stay then home health therapy. Past Medical History/Comorbidities:   Ms. Elizabeth Pham  has a past medical history of Anxiety; CVA (cerebral vascular accident) ([address] (2017); Depression; HTN (hypertension); Menopause; and PTE (pulmonary thromboembolism) ([address] (2017). Ms. Elizabeth Pham  has a past surgical history that includes hx jennifer and bso (); hx  section; hx refractive surgery (); hx other surgical (); and hx other surgical (2017).   Social History/Living Environment:      Prior Level of Function/Work/Activity:  Does seasonal taxes at [employer]  Dominant Side:         RIGHT  Current Medications:    Current Outpatient Prescriptions:     FLUoxetine (PROZAC) 20 mg tablet, Take 2 Tabs by mouth daily. , Disp: 60 Tab, Rfl: 3    methylphenidate HCl (RITALIN) 20 mg tablet, Take 1 Tab (20 mg total) by mouth daily. Max Daily Amount: 20 mg, Disp: 30 Tab, Rfl: 0    methylphenidate HCl (RITALIN) 20 mg tablet, Take 1 Tab (20 mg total) by mouth dailyEarliest Fill Date: 2/12/18. Max Daily Amount: 20 mg, Disp: 30 Tab, Rfl: 0    [START ON 3/12/2018] methylphenidate HCl (RITALIN) 20 mg tablet, Take 1 Tab (20 mg total) by mouth dailyEarliest Fill Date: 3/12/18. Max Daily Amount: 20 mg, Disp: 30 Tab, Rfl: 0    tiZANidine (ZANAFLEX) 4 mg tablet, 4mg po TID, Disp: 90 Tab, Rfl: 6    labetalol (NORMODYNE) 200 mg tablet, Take 1 Tab by mouth two (2) times a day., Disp: 60 Tab, Rfl: 5    ALPRAZolam (XANAX) 0.25 mg tablet, Take 1 Tab by mouth three (3) times daily as needed for Anxiety. Max Daily Amount: 0.75 mg., Disp: 30 Tab, Rfl: 1    rOPINIRole (REQUIP) 2 mg tablet, Take 2 mg by mouth nightly. for restless leg, Disp: , Rfl:     temazepam (RESTORIL) 15 mg capsule, Take 15 mg by mouth nightly as needed for Sleep., Disp: , Rfl:     polyethylene glycol (MIRALAX) 17 gram/dose powder, Take 17 g by mouth daily. , Disp: 510 g, Rfl: 2    losartan-hydroCHLOROthiazide (HYZAAR) 100-12.5 mg per tablet, Take 1 Tab by mouth daily. , Disp: 30 Tab, Rfl: 5            Date Last Reviewed:  3/2/2018   Complexity Level : Expanded review of therapy/medical records (1-2):  MODERATE COMPLEXITY   ASSESSMENT OF OCCUPATIONAL PERFORMANCE:   ROM:                   Balance:                   Coordination:                   Mental Status:                   Vision:                   Activities of Daily Living:           Basic ADLs (From Assessment) Complex ADLs (From Assessment) Grooming/Bathing/Dressing Activities of Daily Living                                   Sensation:         Light touch absent distal to right elbow     Physical Skills Involved:  1. Range of Motion  2. Balance  3. Sensation  4. Fine Motor Control  5. Gross Motor Control Cognitive Skills Affected (resulting in the inability to perform in a timely and safe manner):  1. Executive Function  2. Sustained Attention  3. Divided Attention  4. Comprehension Psychosocial Skills Affected:  1. None   Number of elements that affect the Plan of Care: 5+:  HIGH COMPLEXITY   CLINICAL DECISION MAKING:   Outcome Measure: Tool Used: [address] [facility] [address] [address] AM-Located within [facility] Daily Activity Outpatient Short Form  Score:  Initial: 18 Most Recent: 28 (Date: 1/31/18 )   Interpretation of Tool:  Represents clinically-significant functional categories (i.e., basic and instrumental activities of daily living, fine motor activities). Score 60 59-55 54-47 46-34 32-21 20-16 15   Modifier CH CI CJ CK CL CM CN     Medical Necessity:   · Patient demonstrates good rehab potential due to higher previous functional level. Reason for Services/Other Comments:  · Patient continues to require skilled intervention due to decreased safety and independence in activities of daily living. Clinical Decision-Making Assessment: Moderately difficult to predict patient's progress at this time due to the extent of her limitations in functional movement and use of dominant right upper extremity. Assessment process, impact of co-morbidities, assessment modification\need for assistance, and selection of interventions: Analytical Complexity:MODERATE COMPLEXITY   TREATMENT:   (In addition to Assessment/Re-Assessment sessions the following treatments were rendered)    Pre-treatment Symptoms/Complaints:  Patient states, \"I went to Dr. Yeni Salmeron on Wednesday. She says they can do one shot (Botox) in my arm and two shots in my leg. Now we are just waiting on Insurance. \"  Pain: Initial:   Pain Intensity 1: 0/10 Post Session:  0/10     Therapeutic Exercise: (  35 minutes):  Exercises per grid below to improve dynamic movement of arm - right and hand - right to improve functional lifting, carrying, reaching and grasping. Required moderate visual and verbal cues to promote proper body mechanics. Progressed range, repetitions and complexity of movement as indicated.        Date:  2/2/18 Date:  2/14/18 Date:  2/15/18 Date:   2/19/18 Date:  2/21/18 Date:  2/23/18 Date:  2/28/18 Date:   3/2/18   Activity/Exercise           Shoulder shrugs AROM  1 x 5 AROM  1 x 10 AROM  1 x 10 AROM  2 x 10 AROM  2 x 10 with mirror for visual feedback AROM  2 x 10 with mirror for visual feedback AROM  2 x 10 with mirror for visual feedback AROM  2 x 10 with mirror for visual feedback      Scapular protraction/retraction 3 x 10 with red theratubing - seated rows 2 x 10 with green theratubing - seated rows with elbow extension assisted by tubing AROM  1 x 10 AROM  2 x 10 AROM  2 x 10 with passive self range into elbow extension at end range AROM  1 x 10 with passive self range into elbow extension at end range    1 x10 with green theratubing seated AROM  1 x 10 with passive self range into elbow extension at end range AROM  1 x 10 with passive self range into elbow extension at end range   Shoulder abduction           Elbow flexion/extension AROM with assist to fully extend x 10 reps  AROM with assist to fully extend x 10 reps   Reviewed HEP Reviewed HEP    PNF D1/D2 diagonals       1 x 3 in supine    Hand helper 15 pounds x 10 reps with assist to extend fingers 20 pounds x 5 reps with assist to extend fingers 20 pounds x 5 reps with assist to extend fingers  20 pounds x 5 reps with assist to extend fingers  20 pounds x 5 reps with assist to extend fingers    Tabletop skateboard      10 minutes  AROM in all available planes; AAROM into external rotation and horizontal abduction     Shoulder flexion/extension Reviewed HEP     Chest press           UBE Min assist to sustain right  on handle   Level 0  5 mintues Min assist to sustain right  on handle   Level 0  5 mintues Min assist to sustain right  on handle   Level 0  5 mintues    Used dycem and wrist brace to keep wrist in neutral Min assist to sustain right  on handle   Level 0  5 mintues Min assist to sustain right  on handle   Level 0  5 mintues Min assist to sustain right  on handle   Level 0  6 mintues Min assist to sustain right  on handle   Level 0  5 mintues Min assist to sustain right  on handle   Level 0  5 mintues   Modified push-ups  Hands on countertop with dycem under right hand  2 x 10 Hands on countertop with dycem under right hand  2 x 10  Hands on countertop with dycem under right hand  2 x 10 Hands on countertop with dycem under right hand  2 x 10 Hands on countertop with dycem under right hand  4 x 10 Hands on countertop with dycem under right hand  3 x 10 Hands on countertop with dycem under right hand  3 x 10   Resistive clothespin Yellow  1 x 10    Yellow x 3 to  pieces of sponge and release in to container Attempted x 1 with max assist Yellow  1 x 10 Yellow  1 x 10   Shoulder arc  5 tabs on small hill only with hand over hand assist      10 tabs on small hill only with L hand assisting R hand. Wrist flexion/extension           Punching bag      Side hook x 10 reps         Access Code: [email or web address]   URL: [email or web address]   Date: 01/19/2018   Prepared by: Oscar Code     Exercises   Supine Shoulder Flexion PROM - 10 reps - 2 sets - 3 hold - 1x daily - 5x weekly   Push-Up on Counter - 10 reps - 2 sets - 1x daily - 5x weekly   Seated Shoulder Shrugs - 10 reps - 2 sets - 1x daily - 5x weekly   Seated Scapular Retraction - 10 reps - 2 sets - 1x daily - 5x weekly   Seated Weight Shifting with Arm Support - 10 reps - 2 sets - 1x daily - 5x weekly   Seated Shoulder Flexion Self PROM - 10 reps - 2 sets - 1x daily - 5x weekly   Supine Elbow Flexion Extension AROM - 10 reps - 2 sets - 1x daily - 5x weekly   Seated Elbow Flexion Extension AROM - 10 reps - 2 sets - 1x daily - 5x weekly     Neuromuscular Re-education: (  10 mintues):  Exercise/activities per grid below to improve balance, coordination, kinesthetic sense and proprioception. Required moderate verbal and tactile cues to promote static and dynamic balance in sitting, promote coordination of right, upper extremity(s) and promote motor control of right, upper extremity(s). Patient completed lateral weight shifts sitting at edge of mat x 15 reps each onto elbow/forearms  with dynamic reach with left hand outside of base of support, using right upper extremity to maintain balance. Treatment/Session Assessment:    · Response to Treatment:  Patient states that right hand continues to get stiff before eating, limiting her ability to feed self independently, however she was encouraged her to keep trying to use her R hand during these tasks. Pt demonstrated improved weight shifts today with increased dynamic reach to the R side. · Compliance with Program/Exercises: Will assess as treatment progresses. · Recommendations/Intent for next treatment session: \"Next visit will focus on advancements to more challenging activities and reduction in assistance provided\". Progress report next visit.   Total Treatment Duration:  OT Patient Time In/Time Out  Time In: 1305  Time Out: [address] [place] [address] Caro Amaral

## 2018-03-02 NOTE — PROGRESS NOTES
Venkata Se  : 1955  Primary: Andi Brandt Ppo  Secondary:  [address]  at [place] [id number] [facility], [address] [place] [address],[address], [address]  [phone]   [phone]          OUTPATIENT PHYSICAL THERAPY:Daily Note 3/2/2018    ICD-10: Treatment Diagnosis:   · Other abnormalities of gait and mobility (R26.89)  · Difficulty in walking, not elsewhere classified (R26.2)  Precautions/Allergies:   Banana; Lisinopril; and Nuts [tree nut]   Fall Risk Score: 5 (? 5 = High Risk)  MD Orders: Evaluate and Treat MEDICAL/REFERRING DIAGNOSIS:  Weakness due to cerebrovascular accident (CVA) ([facility] [address] [I63.9, R53.1]   DATE OF ONSET: CVA: 2017  REFERRING PHYSICIAN: Juliette Mccallum Che*  RETURN PHYSICIAN APPOINTMENT: TBD     INITIAL ASSESSMENT:  Ms. Enrike Walker presents with decreased mobility, decreased strength, decreased gait, and decreased balance secondary to CVA. After discussing with patient, she agreed she would benefit from physical therapy to improve above deficits. Please sign this plan of treatment if you concur. Thank you for the opportunity to serve this patient. Recertification note on 2018:   Patient has attended fourteen scheduled physical therapy appointments from 12/15/2017 to 2018. Patient is very motivated and compliant with therapy. Patient complaints of increased irritation in her right AFO due to increased tone. Patient is continuing to demonstrate improved ambulation and improved strength in right lower extremity . Patient would benefit from continuing skilled physical therapy to address problems and goals. Thank you for this referral.    PROBLEM LIST (Impacting functional limitations):  1. Decreased Strength  2. Decreased Ambulation Ability/Technique  3. Decreased Balance  4. Decreased Activity Tolerance INTERVENTIONS PLANNED:  1. Balance Exercise  2. Gait Training  3. Home Exercise Program (HEP)  4.  Neuromuscular Re-education/Strengthening  5. Range of Motion (ROM)  6. Therapeutic Exercise/Strengthening   TREATMENT PLAN:  Effective Dates: 2/12/2018 TO 5/7/2018. Frequency/Duration: 2-3 times a week for 12 weeks  GOALS: (Goals have been discussed and agreed upon with patient.)  Short-Term Functional Goals: Time Frame: 3 weeks  1. Patient will be independent with home exercise program without exacerbation of symptoms or cueing needed. Goal met. Discharge Goals: Time Frame: 8 weeks  1. Patient will be independent with all ADLs with minimal fear of falling and loss of balance with daily tasks. Goal ongoing. 2. Patient will report no fear avoidance with social or recreational activities due to fear of falling. Goal ongoing. 3. Patient will score greater than or equal to 45/56 on Gipson Balance Scale with minimal effect of imbalance on patient's ability to manage every day life activities. Goal ongoing. Rehabilitation Potential For Stated Goals: Good  Regarding Gabino Iraheta's therapy, I certify that the treatment plan above will be carried out by a therapist or under their direction. Thank you for this referral,  Faye Coronado PT     Referring Physician Signature: Juliette Chang*              Date                    The information in this section was collected on 12/15/2017 (except where otherwise noted). HISTORY:   History of Present Injury/Illness (Reason for Referral):  Patient reports she had a severe stroke on 9/6/2017. She reports that she was in the hospital and [facility] for about 61-62 days. She reports that she has progressed really well, but is still having trouble walking and using her right side (UE and LE). She reports that she walks using her AFO and quad cane all the time. She reports that she tries to be as active as possible. She reports that she is scared that she is going to fall, even though she has not.   She reports she would like to work on strengthening her right leg so her balance is better and she can walk without fear of falling. Past Medical History/Comorbidities:   Ms. Tyson Sinclair  has a past medical history of Anxiety; CVA (cerebral vascular accident) ([facility] [address] (2017); Depression; HTN (hypertension); Menopause; and [facility] Ms. Tyson Sinclair  has a past surgical history that includes hx jennifer and bso (); hx  section; hx refractive surgery (); hx other surgical (); and hx other surgical (). Social History/Living Environment:   Home Environment: Private residence  Living Alone: No  Support Systems: Family member(s), Friends \ neighbors; Spouse  Prior Level of Function/Work/Activity:  Independent  Dominant Side:         RIGHT  Personal Factors:          Sex:  female        Age:  60 y.o. Current Medications:       Current Outpatient Prescriptions:     FLUoxetine (PROZAC) 20 mg tablet, Take 2 Tabs by mouth daily. , Disp: 60 Tab, Rfl: 3    methylphenidate HCl (RITALIN) 20 mg tablet, Take 1 Tab (20 mg total) by mouth daily. Max Daily Amount: 20 mg, Disp: 30 Tab, Rfl: 0    methylphenidate HCl (RITALIN) 20 mg tablet, Take 1 Tab (20 mg total) by mouth dailyEarliest Fill Date: 18. Max Daily Amount: 20 mg, Disp: 30 Tab, Rfl: 0    [START ON 3/12/2018] methylphenidate HCl (RITALIN) 20 mg tablet, Take 1 Tab (20 mg total) by mouth dailyEarliest Fill Date: 3/12/18. Max Daily Amount: 20 mg, Disp: 30 Tab, Rfl: 0    tiZANidine (ZANAFLEX) 4 mg tablet, 4mg po TID, Disp: 90 Tab, Rfl: 6    labetalol (NORMODYNE) 200 mg tablet, Take 1 Tab by mouth two (2) times a day., Disp: 60 Tab, Rfl: 5    ALPRAZolam (XANAX) 0.25 mg tablet, Take 1 Tab by mouth three (3) times daily as needed for Anxiety. Max Daily Amount: 0.75 mg., Disp: 30 Tab, Rfl: 1    rOPINIRole (REQUIP) 2 mg tablet, Take 2 mg by mouth nightly.  for restless leg, Disp: , Rfl:     temazepam (RESTORIL) 15 mg capsule, Take 15 mg by mouth nightly as needed for Sleep., Disp: , Rfl:    polyethylene glycol (MIRALAX) 17 gram/dose powder, Take 17 g by mouth daily. , Disp: 510 g, Rfl: 2    losartan-hydroCHLOROthiazide (HYZAAR) 100-12.5 mg per tablet, Take 1 Tab by mouth daily. , Disp: 30 Tab, Rfl: 5   Date Last Reviewed:  3/2/2018    Number of Personal Factors/Comorbidities that affect the Plan of Care: 1-2: MODERATE COMPLEXITY   EXAMINATION:   Observation/Orthostatic Postural Assessment:          Posture Assessment: Rounded shoulders, Forward head   Functional Mobility:         Gait/Ambulation:     Speed/Lulu: Pace decreased (<100 feet/min)  Step Length: Left shortened, Right lengthened  Swing Pattern: Left asymmetrical, Right asymmetrical  Stance: Left increased, Right decreased  Gait Abnormalities: Antalgic, Circumduction, Foot drop, Path deviations, Trendelenburg  Ambulation - Level of Assistance: Modified independent  Assistive Device: Cane, quad  · Interventions: Verbal cues, Safety awareness training          Transfers:     Sit to Stand: Independent  Stand to Sit: Independent  Stand Pivot Transfers: Independent  · Bed to Chair: Independent          Bed Mobility:     Rolling: Independent  Supine to Sit: Independent  Sit to Supine: Independent  · Scooting: Independent    Strength:          L UE STRENGTH: 4/5 shoulder flexion, 4/5 shoulder abduction, 4/5 shoulder extension, 4/5 elbow flexion, 4/5 elbow extension. R UE STRENGTH: 2/5 shoulder flexion, 2/5 shoulder abduction, 2/5 shoulder extension, 2/5 elbow flexion, 2/5 elbow extension. R LE STRENGTH:  4+/5 hip flexion, 4+/5 hip abduction, 4+/5 hip adduction, 4+/5 hip extension, 4+/5 knee extension, 4+/5 knee flexion, 1/5 ankle dorsiflexion, 1/5 ankle plantarflexion, 1/5 ankle inversion, 1/5 ankle eversion. Sensation:         Intact for light touch and proprioception  Postural Control & Balance:  · Gipson Balance Scale:  33/56.   (A score less than 45/56 indicates high risk of falls)  . Score improved from 28/56 on initial evaluation. Body Structures Involved:  1. Nerves  2. Muscles Body Functions Affected:  1. Neuromusculoskeletal  2. Movement Related Activities and Participation Affected:  1. Mobility  2. Self Care   Number of elements (examined above) that affect the Plan of Care: 4+: HIGH COMPLEXITY   CLINICAL PRESENTATION:   Presentation: Evolving clinical presentation with changing clinical characteristics: MODERATE COMPLEXITY   CLINICAL DECISION MAKING:   Outcome Measure: Tool Used: Gipson Balance Scale  Score:  Initial: 28/56 Most Recent: 33/56 (Date: 2- )   Interpretation of Score: Each section is scored on a 0-4 scale, 0 representing the patients inability to perform the task and 4 representing independence. The scores of each section are added together for a total score of 56. The higher the patients score, the more independent the patient is. Any score below 45 indicates increased risk for falls. Score 56 55-45 44-34 33-23 22-12 11-1 0   Modifier CH CI CJ CK CL CM CN     Medical Necessity:   · Patient is expected to demonstrate progress in strength, range of motion, balance and coordination to improve safety during daily activities. · Patient demonstrates good rehab potential due to higher previous functional level. · Skilled intervention continues to be required due to decreased mobility. Reason for Services/Other Comments:  · Patient continues to demonstrate capacity to improve overall mobility which will increase independence and increase safety. Use of outcome tool(s) and clinical judgement create a POC that gives a: Questionable prediction of patient's progress: MODERATE COMPLEXITY            TREATMENT:   (In addition to Assessment/Re-Assessment sessions the following treatments were rendered)  Pre-treatment Symptoms/Complaints:  3/2/2018: \"I saw Dr. Jannell Blizzard and she is going to get approval for Botox. \"  Pain: Initial: Pain Intensity 1: 0  Post Session:  0/10     NEUROMUSCULAR RE-EDUCATION: (20 minutes): Exercise/activities per grid below to improve balance, coordination, kinesthetic sense, posture and proprioception. Required minimal verbal and manual cues to promote static and dynamic balance in standing, promote coordination of bilateral, lower extremity(s) and promote motor control of bilateral, lower extremity(s). Date:  2/19/2018 Date   2/23/18 Date   2/15/18 Date  2/21/18 Date  2/28/18   Activity/Exercise Parameters       Stepping over half foam  2x10 reps with left LE, working on weightbearing through R LE and keeping R foot flat on floor (foot tends to invert) X 2x10 reps with left LE, working on weightbearing through R LE and keeping R foot flat on floor (foot tends to invert) 2x10 reps with left LE, working on weightbearing through R LE and keeping R foot flat on floor (foot tends to invert)    Step taps X X Onto 6 inch step with L LE working on weight bearing through R LE Onto 6 inch step with L LE working on weight bearing through R LE Onto 6 inch step with L LE working on weight bearing through R LE   Step ups 6 inch  x10 leading with right LE working on weight bearing through R LE 6 inch  x10 leading with L LE working on weight bearing through R LE 6 inch  x10 leading with L LE working on weight bearing through R LE     [place] X       Walking in the clinic with quad cane About 100 feet with quad cane, working on weightbearing more on R foot through midstance and terminal stance, and stepping bigger with L LE. Tactile cues to pelvis to disassociate pelvis from trunk. About 100 feet with quad cane, working on weightbearing more on R foot through midstance and terminal stance, and stepping bigger with L LE. Tactile cues to pelvis to disassociate pelvis from trunk.   About 100 feet with quad cane, working on weightbearing more on R foot through midstance and terminal stance, and stepping bigger with L LE About 100 feet with quad cane, working on weightbearing more on R foot through midstance and terminal stance, and stepping bigger with L LE; added working on shifting R hips forward over R foot better midstance to terminal stance About 100 feet with quad cane, working on weightbearing more on R foot through midstance and terminal stance, keeping R hip and shoulder over R foot for balance. pregait activity     Stepping with R LE, weight shifting with hips and shoulders over R foot. THERAPEUTIC EXERCISE: (25 minutes):  Exercises per grid below to improve mobility and strength. Required minimal verbal cues to promote proper body alignment. Progressed repetitions as indicated.    Date:  3/2/2018 Date:  2/23/2018 Date   2/15/18 Date   2/21/18 Date  2/28/18   Activity/Exercise Parameters Parameters      Sit to stand from chair X X       Left sidelying alternating isometrics on pelvis X X      Standing hip abduction X X      Seated LAQs        Standing hamstrings curls  X X      Bridging 2x10 with right lower extremity 2x10 with right lower extremity 2x10 with right lower extremity 2x10 with right lower extremity 2x10 with right lower extremity    Supine straight leg raise 2x10 with right lower extremity  4# 2x10 with right lower extremity 3#  2x10 with right lower extremity  2x10 with right lower extremity  2x10 with right lower extremity    Supine PNF D1 and D2  2x10 each with R LE 2x10 with right lower extremity D1 and D2 2x10 with right lower extremity D1 and D2 2x10 with right lower extremity D1 and D2 2x10 with right lower extremity D1   Left sidelying hip abduction        L sidelying: picking up and moving R LE from in front of L foot to behind L foot (working on hip abd and ext) and stabilizing trunk 2x10 reps  1# 2x10 reps, working on lifting highter 3# x10 reps, working on lifting higher x10 reps, working on lifting higher x10 reps, working on lifting higher   L sidelying R clam shells 2 x 10 reps R LE  1# 2x10 reps right lower extremity 3# 2x10 reps right lower extremity 2x10 reps right lower extremity 2x10 reps right lower extremity   R foot and gastrocsoleus stretching X 8 minutes trying to get foot to neutral position X 8 minutes trying to get foot to neutral position X 8 minutes trying to get foot to neutral position X 8 minutes trying to get foot to neutral position X 8 minutes trying to get foot to neutral position   Standing minisquats in bars            [employer] Portal  Treatment/Session Assessment:    · Response to Treatment:  Patient tolerated treatment without issues. Continue to progress to tolerance. · Compliance with Program/Exercises: Compliant. · Recommendations/Intent for next treatment session: \"Next visit will focus on advancements to more challenging activities\".    Total Treatment Duration:  PT Patient Time In/Time Out  Time In: 1345  Time Out: [address]

## 2018-03-05 ENCOUNTER — HOSPITAL ENCOUNTER (OUTPATIENT)
Dept: PHYSICAL THERAPY | Age: 63
Discharge: HOME | End: 2018-03-05
Attending: PHYSICAL MEDICINE & REHABILITATION
Payer: COMMERCIAL

## 2018-03-05 PROCEDURE — 97535 SELF CARE MNGMENT TRAINING: CPT

## 2018-03-05 PROCEDURE — 97112 NEUROMUSCULAR REEDUCATION: CPT

## 2018-03-05 PROCEDURE — 97110 THERAPEUTIC EXERCISES: CPT

## 2018-03-05 NOTE — PROGRESS NOTES
Lexi Wallace  : 1955  Primary: [facility]  Secondary:  [address]  [facility] [address] [address], [address] [facility]  [phone]   [phone]        OUTPATIENT OCCUPATIONAL THERAPY: Daily Note and Progress Report 3/5/2018    ICD-10: Treatment Diagnosis: Hemiplegia and hemiparesis following cerebral infarction affecting right dominant side (I69.351)  Precautions/Allergies:   Banana; Lisinopril; and Nuts [tree nut]   Fall Risk Score: 5 (? 5 = High Risk)  MD Orders: Referral to occupational therapy MEDICAL/REFERRING DIAGNOSIS:   Cerebral infarction, unspecified [I63.9]  Weakness [R53.1]   DATE OF ONSET: 2017   REFERRING PHYSICIAN: Juliette Parker*  RETURN PHYSICIAN APPOINTMENT: unknown   3/5/18: Ms. Elizabeth Pham continues to make excellent progress towards her therapy goals; she demonstrates gradual improvements in R UE range of motion, specifically R shoulder flexion and R elbow extension/flexion, and reports increased functional use of R UE in activities of daily living, including dressing, grooming, and feeding. Patient would continue to benefit from skilled occupational therapy services to maximize safety and independence and increase functional use of R UE during activities of daily living.    18: Ms. Elizabeth Pham is making excellent progress toward her goals; she is demonstrating improving range of motion and functional use of the right upper extremity in activities of daily living. Feel she will continue to benefit from skilled occupational therapy to maximize safety and independence with activities of daily living. INITIAL ASSESSMENT:  Ms. Elizabeth Pham presents with impaired active movement, strength, coordination and sensation of the dominant right upper extremity that is affecting her ability to complete activities of daily living independently.  Feel she may benefit from skilled occupational therapy to maximize safety and independence with activities of daily living. PLAN OF CARE:   PROBLEM LIST:  1. Decreased Strength  2. Decreased ADL/Functional Activities  3. Decreased Transfer Abilities  4. Decreased Balance  5. Decreased Flexibility/Joint Mobility  6. Decreased Cognition INTERVENTIONS PLANNED:  1. Activities of daily living training  2. Manual therapy training  3. Modalities  4. Neuromuscular re-eduation  5. Sensory reintegration training  6. Splinting  7. Therapeutic activity  8. Therapeutic exercise   TREATMENT PLAN:  Effective Dates: 12/20/17 TO 3/20/18. Frequency/Duration: 3 times a week for 12 weeks  GOALS: (Goals have been discussed and agreed upon with patient.)  Short-Term Functional Goals: Time Frame: 6 weeks  1. Patient will demonstrate independence with home exercise program for right upper extremity range of motion. Met  2. Patient will increase use of right upper extremity as a functional assist in at least 25% of daily activities. Met  3. Patient will bathe with moderate assistance. Continue to address  Discharge Goals: Time Frame: 12 weeks  1. Patient will bathe with minimal assistance. Continue to address  2. Patient will feed self with modified independence and adaptive equipment as needed. Continue to address  3. Patient will dress with modified independence and adaptive equipment as needed. Continue to address  4. Patient will use right upper extremity as a functional assist in at least 50% of daily activities. Continue to address  Rehabilitation Potential For Stated Goals: Good  Regarding Estefania Iraheta's therapy, I certify that the treatment plan above will be carried out by a therapist or under their direction. Thank you for this referral,  Sidney Amador, OT     Referring Physician Signature: Juliette Grider* _________________________  Date _________            The information in this section was collected on 3/5/18 (except where otherwise noted).   OCCUPATIONAL PROFILE & HISTORY:   History of Present Injury/Illness (Reason for Referral):  CVA with hospital and [facility] stay then home health therapy. Past Medical History/Comorbidities:   Ms. Meryl Fernando  has a past medical history of Anxiety; CVA (cerebral vascular accident) ([facility] (2017); Depression; HTN (hypertension); Menopause; and PTE (pulmonary thromboembolism) ([facility] (2017). Ms. Meryl Fernando  has a past surgical history that includes hx jennifer and bso (); hx  section; hx refractive surgery (); hx other surgical (); and hx other surgical (). Social History/Living Environment:      Prior Level of Function/Work/Activity:  Does seasonal taxes at [employer]  Dominant Side:         RIGHT  Current Medications:    Current Outpatient Prescriptions:     FLUoxetine (PROZAC) 20 mg tablet, Take 2 Tabs by mouth daily. , Disp: 60 Tab, Rfl: 3    methylphenidate HCl (RITALIN) 20 mg tablet, Take 1 Tab (20 mg total) by mouth daily. Max Daily Amount: 20 mg, Disp: 30 Tab, Rfl: 0    methylphenidate HCl (RITALIN) 20 mg tablet, Take 1 Tab (20 mg total) by mouth dailyEarliest Fill Date: 18. Max Daily Amount: 20 mg, Disp: 30 Tab, Rfl: 0    [START ON 3/12/2018] methylphenidate HCl (RITALIN) 20 mg tablet, Take 1 Tab (20 mg total) by mouth dailyEarliest Fill Date: 3/12/18. Max Daily Amount: 20 mg, Disp: 30 Tab, Rfl: 0    tiZANidine (ZANAFLEX) 4 mg tablet, 4mg po TID, Disp: 90 Tab, Rfl: 6    labetalol (NORMODYNE) 200 mg tablet, Take 1 Tab by mouth two (2) times a day., Disp: 60 Tab, Rfl: 5    ALPRAZolam (XANAX) 0.25 mg tablet, Take 1 Tab by mouth three (3) times daily as needed for Anxiety. Max Daily Amount: 0.75 mg., Disp: 30 Tab, Rfl: 1    rOPINIRole (REQUIP) 2 mg tablet, Take 2 mg by mouth nightly. for restless leg, Disp: , Rfl:     temazepam (RESTORIL) 15 mg capsule, Take 15 mg by mouth nightly as needed for Sleep., Disp: , Rfl:     polyethylene glycol (MIRALAX) 17 gram/dose powder, Take 17 g by mouth daily. , Disp: 510 g, Rfl: 2   losartan-hydroCHLOROthiazide (HYZAAR) 100-12.5 mg per tablet, Take 1 Tab by mouth daily. , Disp: 30 Tab, Rfl: 5            Date Last Reviewed:  3/5/2018   Complexity Level : Expanded review of therapy/medical records (1-2):  MODERATE COMPLEXITY   ASSESSMENT OF OCCUPATIONAL PERFORMANCE:   ROM:             RUE AROM  R Shoulder Flexion: 60  R Shoulder ABduction: 100  R Scapular Elevation:  (75% of full range)  R Scapular Protraction:  (100% of full range)  R Elbow Flexion: 95  R Elbow Extension: 58  R Forearm Pronation: 0  R Forearm Supination:  (15% of full range)  R Wrist Flexion: 0  R Wrist Extension: 0  R Digital Opposition: 0  R Composite Fist:  (25%)     Balance:             Sitting: Intact  Standing: With support  Standing - Static: Good     Coordination:             Fine Motor Skills-Upper: Left Intact, Right Impaired  Gross Motor Skills-Upper: Left Intact, Right Impaired     Mental Status:             Neurologic State: Alert  Orientation Level: Disoriented X4  Cognition: Appropriate decision making  Perception: Appears intact  Perseveration: No perseveration noted  Safety/Judgement: Insight into deficits     Vision:                   Activities of Daily Living:           Basic ADLs (From Assessment) Complex ADLs (From Assessment)   Basic ADL  Feeding: Setup (Feeds independently w/use hand R hand 25%;  cuts food)  Oral Facial Hygiene/Grooming: Minimum assistance (using R hand 25% to brush hair; 50% w/ R hand to brush teeth)  Bathing: Moderate assistance (Use of R hand 50% of time)  Upper Body Dressing: Supervision  Lower Body Dressing: Minimum assistance (75% completing underwear and pants)  Toileting: Modified independent Instrumental ADL  Meal Preparation: Total assistance  Homemaking:  Total assistance  Medication Management: Maximum assistance  Financial Management: Maximum assistance   Grooming/Bathing/Dressing Activities of Daily Living     Cognitive Retraining  Safety/Judgement: Insight into deficits                 Functional Transfers  Toilet Transfer : Modified independent  Shower Transfer: Stand-by assistance     Bed/Mat Mobility  Rolling: Independent  Supine to Sit: Independent  Sit to Supine: Independent  Sit to Stand: Modified independent  Bed to Chair: Modified independent  Scooting: Independent     Sensation:         Light touch absent distal to right elbow     Physical Skills Involved:  1. Range of Motion  2. Balance  3. Sensation  4. Fine Motor Control  5. Gross Motor Control Cognitive Skills Affected (resulting in the inability to perform in a timely and safe manner):  1. Executive Function  2. Sustained Attention  3. Divided Attention  4. Comprehension Psychosocial Skills Affected:  1. None   Number of elements that affect the Plan of Care: 5+:  HIGH COMPLEXITY   CLINICAL DECISION MAKING:   Outcome Measure: Tool Used: [address] [facility] [address] [address] AM-PAC Daily Activity Outpatient Short Form  Score:  Initial: 18 Most Recent: 33 (Date: 3/5/18 )   Interpretation of Tool:  Represents clinically-significant functional categories (i.e., basic and instrumental activities of daily living, fine motor activities). Score 60 59-55 54-47 46-34 32-21 20-16 15   Modifier CH CI CJ CK CL CM CN     Medical Necessity:   · Patient demonstrates good rehab potential due to higher previous functional level. Reason for Services/Other Comments:  · Patient continues to require skilled intervention due to decreased safety and independence in activities of daily living. Clinical Decision-Making Assessment: Moderately difficult to predict patient's progress at this time due to the extent of her limitations in functional movement and use of dominant right upper extremity.     Assessment process, impact of co-morbidities, assessment modification\need for assistance, and selection of interventions: Analytical Complexity:MODERATE COMPLEXITY   TREATMENT:   (In addition to Assessment/Re-Assessment sessions the following treatments were rendered)    Pre-treatment Symptoms/Complaints:  Patient states, \"I went to Dr. Eduardo Blackmon on Wednesday. She says they can do one shot (Botox) in my arm and two shots in my leg. Now we are just waiting on Insurance. \"  Pain: Initial:   Pain Intensity 1: 0/10 Post Session:  0/10     Therapeutic Exercise: ( 35 minutes):  Exercises per grid below to improve dynamic movement of arm - right and hand - right to improve functional lifting, carrying, reaching and grasping. Required moderate visual and verbal cues to promote proper body mechanics. Progressed range, repetitions and complexity of movement as indicated.        Date:  2/14/18 Date:  2/15/18 Date:   2/19/18 Date:  2/21/18 Date:  2/23/18 Date:  2/28/18 Date:   3/2/18 Date:  3/5/18   Activity/Exercise           Shoulder shrugs AROM  1 x 10 AROM  1 x 10 AROM  2 x 10 AROM  2 x 10 with mirror for visual feedback AROM  2 x 10 with mirror for visual feedback AROM  2 x 10 with mirror for visual feedback AROM  2 x 10 with mirror for visual feedback    AROM x 5 reps   Scapular protraction/retraction 2 x 10 with green theratubing - seated rows with elbow extension assisted by tubing AROM  1 x 10 AROM  2 x 10 AROM  2 x 10 with passive self range into elbow extension at end range AROM  1 x 10 with passive self range into elbow extension at end range    1 x10 with green theratubing seated AROM  1 x 10 with passive self range into elbow extension at end range AROM  1 x 10 with passive self range into elbow extension at end range AROM x 5 reps   Shoulder abduction        AROM x 5 reps   Elbow flexion/extension  AROM with assist to fully extend x 10 reps   Reviewed HEP Reviewed HEP  AROM x 5 reps   PNF D1/D2 diagonals      1 x 3 in supine     Hand helper 20 pounds x 5 reps with assist to extend fingers 20 pounds x 5 reps with assist to extend fingers  20 pounds x 5 reps with assist to extend fingers  20 pounds x 5 reps with assist to extend fingers     Tabletop skateboard 10 minutes  AROM in all available planes; AAROM into external rotation and horizontal abduction      Shoulder flexion/extension     Reviewed HEP   AROM x 5 reps   Chest press           UBE Min assist to sustain right  on handle   Level 0  5 mintues Min assist to sustain right  on handle   Level 0  5 mintues    Used dycem and wrist brace to keep wrist in neutral Min assist to sustain right  on handle   Level 0  5 mintues Min assist to sustain right  on handle   Level 0  5 mintues Min assist to sustain right  on handle   Level 0  6 mintues Min assist to sustain right  on handle   Level 0  5 mintues Min assist to sustain right  on handle   Level 0  5 mintues Min assist to sustain right  on handle   Level 0  5 mintues   Modified push-ups  Hands on countertop with dycem under right hand  2 x 10  Hands on countertop with dycem under right hand  2 x 10 Hands on countertop with dycem under right hand  2 x 10 Hands on countertop with dycem under right hand  4 x 10 Hands on countertop with dycem under right hand  3 x 10 Hands on countertop with dycem under right hand  3 x 10 Hands on countertop with dycem under right hand  3 x 10   Resistive clothespin    Yellow x 3 to  pieces of sponge and release in to container Attempted x 1 with max assist Yellow  1 x 10 Yellow  1 x 10    Shoulder arc 5 tabs on small hill only with hand over hand assist      10 tabs on small hill only with L hand assisting R hand. Wrist flexion/extension        AROM x 5 reps   Punching bag     Side hook x 10 reps          Access Code: [email or web address]   URL: [email or web address]   Date: 01/19/2018   Prepared by: Saurav Canales     Exercises   Supine Shoulder Flexion PROM - 10 reps - 2 sets - 3 hold - 1x daily - 5x weekly   Push-Up on Counter - 10 reps - 2 sets - 1x daily - 5x weekly   Seated Shoulder Shrugs - 10 reps - 2 sets - 1x daily - 5x weekly   Seated Scapular Retraction - 10 reps - 2 sets - 1x daily - 5x weekly   Seated Weight Shifting with Arm Support - 10 reps - 2 sets - 1x daily - 5x weekly   Seated Shoulder Flexion Self PROM - 10 reps - 2 sets - 1x daily - 5x weekly   Supine Elbow Flexion Extension AROM - 10 reps - 2 sets - 1x daily - 5x weekly   Seated Elbow Flexion Extension AROM - 10 reps - 2 sets - 1x daily - 5x weekly     Self Care: (10): Procedure(s) (per grid) utilized to improve and/or restore self-care/home management as related to dressing, bathing, toileting, grooming and self feeding. Required moderate verbal and tactile cueing to facilitate activities of daily living skills. Review HEP, specifically ways to improve shoulder flexion and elbow extension. Also reviewed importance of increasing participation in basic activities of daily living and increasing use of R hand when completing these activities. Discussed ways to independently carry items around with use of grocery bag. Treatment/Session Assessment:  See above  · Response to Treatment:  Ms. Lucio Rush reports that she has gradually been able to use her R hand more for daily activities. She demo's gradual improvements in R shoulder flexion and R elbow flexion/extension today, however, R wrist ROM remains limited. Pt would continue to benefit from skilled OT services to address these limitations for increased functional use of R UE in activities of daily living. · Compliance with Program/Exercises: Will assess as treatment progresses. · Recommendations/Intent for next treatment session: \"Next visit will focus on advancements to more challenging activities and reduction in assistance provided\".    Total Treatment Duration:  OT Patient Time In/Time Out  Time In: 1300  Time Out: [address] [facility] Clementine Yepez

## 2018-03-05 NOTE — PROGRESS NOTES
Julia Serrano  : 1955  Primary: Stephen Higgins Ppo  Secondary:  [address] at [place] [id number] [facility], [address] [place] [address],[address], [facility] [address]  [phone]   [phone]          OUTPATIENT PHYSICAL THERAPY:Daily Note 3/5/2018    ICD-10: Treatment Diagnosis:   · Other abnormalities of gait and mobility (R26.89)  · Difficulty in walking, not elsewhere classified (R26.2)  Precautions/Allergies:   Banana; Lisinopril; and Nuts [tree nut]   Fall Risk Score: 5 (? 5 = High Risk)  MD Orders: Evaluate and Treat MEDICAL/REFERRING DIAGNOSIS:  Weakness due to cerebrovascular accident (CVA) ([facility] [address] [I63.9, R53.1]   DATE OF ONSET: CVA: 2017  REFERRING PHYSICIAN: Juliette Willoughby*  RETURN PHYSICIAN APPOINTMENT: TBD     INITIAL ASSESSMENT:  Ms. Layla Ang presents with decreased mobility, decreased strength, decreased gait, and decreased balance secondary to CVA. After discussing with patient, she agreed she would benefit from physical therapy to improve above deficits. Please sign this plan of treatment if you concur. Thank you for the opportunity to serve this patient. Recertification note on 2018:   Patient has attended fourteen scheduled physical therapy appointments from 12/15/2017 to 2018. Patient is very motivated and compliant with therapy. Patient complaints of increased irritation in her right AFO due to increased tone. Patient is continuing to demonstrate improved ambulation and improved strength in right lower extremity . Patient would benefit from continuing skilled physical therapy to address problems and goals. Thank you for this referral.    PROBLEM LIST (Impacting functional limitations):  1. Decreased Strength  2. Decreased Ambulation Ability/Technique  3. Decreased Balance  4. Decreased Activity Tolerance INTERVENTIONS PLANNED:  1. Balance Exercise  2. Gait Training  3. Home Exercise Program (HEP)  4.  Neuromuscular Re-education/Strengthening  5. Range of Motion (ROM)  6. Therapeutic Exercise/Strengthening   TREATMENT PLAN:  Effective Dates: 2/12/2018 TO 5/7/2018. Frequency/Duration: 2-3 times a week for 12 weeks  GOALS: (Goals have been discussed and agreed upon with patient.)  Short-Term Functional Goals: Time Frame: 3 weeks  1. Patient will be independent with home exercise program without exacerbation of symptoms or cueing needed. Goal met. Discharge Goals: Time Frame: 8 weeks  1. Patient will be independent with all ADLs with minimal fear of falling and loss of balance with daily tasks. Goal ongoing. 2. Patient will report no fear avoidance with social or recreational activities due to fear of falling. Goal ongoing. 3. Patient will score greater than or equal to 45/56 on Gipson Balance Scale with minimal effect of imbalance on patient's ability to manage every day life activities. Goal ongoing. Rehabilitation Potential For Stated Goals: Good  Regarding Meredith Iraheta's therapy, I certify that the treatment plan above will be carried out by a therapist or under their direction. Thank you for this referral,  Harlan Alvarez PT     Referring Physician Signature: Juliette Calle*              Date                    The information in this section was collected on 12/15/2017 (except where otherwise noted). HISTORY:   History of Present Injury/Illness (Reason for Referral):  Patient reports she had a severe stroke on 9/6/2017. She reports that she was in the hospital and [facility] for about 61-62 days. She reports that she has progressed really well, but is still having trouble walking and using her right side (UE and LE). She reports that she walks using her AFO and quad cane all the time. She reports that she tries to be as active as possible. She reports that she is scared that she is going to fall, even though she has not.   She reports she would like to work on strengthening her right leg so her balance is better and she can walk without fear of falling. Past Medical History/Comorbidities:   Ms. Jaja Johns  has a past medical history of Anxiety; CVA (cerebral vascular accident) ([facility] [address] (2017); Depression; HTN (hypertension); Menopause; and [facility] Ms. Jaja Johns  has a past surgical history that includes hx jennifer and bso (); hx  section; hx refractive surgery (); hx other surgical (); and hx other surgical (). Social History/Living Environment:   Home Environment: Private residence  Living Alone: No  Support Systems: Family member(s), Friends \ neighbors; Spouse  Prior Level of Function/Work/Activity:  Independent  Dominant Side:         RIGHT  Personal Factors:          Sex:  female        Age:  60 y.o. Current Medications:       Current Outpatient Prescriptions:     FLUoxetine (PROZAC) 20 mg tablet, Take 2 Tabs by mouth daily. , Disp: 60 Tab, Rfl: 3    methylphenidate HCl (RITALIN) 20 mg tablet, Take 1 Tab (20 mg total) by mouth daily. Max Daily Amount: 20 mg, Disp: 30 Tab, Rfl: 0    methylphenidate HCl (RITALIN) 20 mg tablet, Take 1 Tab (20 mg total) by mouth dailyEarliest Fill Date: 18. Max Daily Amount: 20 mg, Disp: 30 Tab, Rfl: 0    [START ON 3/12/2018] methylphenidate HCl (RITALIN) 20 mg tablet, Take 1 Tab (20 mg total) by mouth dailyEarliest Fill Date: 3/12/18. Max Daily Amount: 20 mg, Disp: 30 Tab, Rfl: 0    tiZANidine (ZANAFLEX) 4 mg tablet, 4mg po TID, Disp: 90 Tab, Rfl: 6    labetalol (NORMODYNE) 200 mg tablet, Take 1 Tab by mouth two (2) times a day., Disp: 60 Tab, Rfl: 5    ALPRAZolam (XANAX) 0.25 mg tablet, Take 1 Tab by mouth three (3) times daily as needed for Anxiety. Max Daily Amount: 0.75 mg., Disp: 30 Tab, Rfl: 1    rOPINIRole (REQUIP) 2 mg tablet, Take 2 mg by mouth nightly.  for restless leg, Disp: , Rfl:     temazepam (RESTORIL) 15 mg capsule, Take 15 mg by mouth nightly as needed for Sleep., Disp: , Rfl:    polyethylene glycol (MIRALAX) 17 gram/dose powder, Take 17 g by mouth daily. , Disp: 510 g, Rfl: 2    losartan-hydroCHLOROthiazide (HYZAAR) 100-12.5 mg per tablet, Take 1 Tab by mouth daily. , Disp: 30 Tab, Rfl: 5   Date Last Reviewed:  3/5/2018    Number of Personal Factors/Comorbidities that affect the Plan of Care: 1-2: MODERATE COMPLEXITY   EXAMINATION:   Observation/Orthostatic Postural Assessment:          Posture Assessment: Rounded shoulders, Forward head   Functional Mobility:         Gait/Ambulation:     Speed/Lulu: Pace decreased (<100 feet/min)  Step Length: Left shortened, Right lengthened  Swing Pattern: Left asymmetrical, Right asymmetrical  Stance: Left increased, Right decreased  Gait Abnormalities: Antalgic, Circumduction, Foot drop, Path deviations, Trendelenburg  Ambulation - Level of Assistance: Modified independent  Assistive Device: Cane, quad  · Interventions: Verbal cues, Safety awareness training          Transfers:     Sit to Stand: Independent  Stand to Sit: Independent  Stand Pivot Transfers: Independent  · Bed to Chair: Independent          Bed Mobility:     Rolling: Independent  Supine to Sit: Independent  Sit to Supine: Independent  · Scooting: Independent    Strength:          L UE STRENGTH: 4/5 shoulder flexion, 4/5 shoulder abduction, 4/5 shoulder extension, 4/5 elbow flexion, 4/5 elbow extension. R UE STRENGTH: 2/5 shoulder flexion, 2/5 shoulder abduction, 2/5 shoulder extension, 2/5 elbow flexion, 2/5 elbow extension. R LE STRENGTH:  4+/5 hip flexion, 4+/5 hip abduction, 4+/5 hip adduction, 4+/5 hip extension, 4+/5 knee extension, 4+/5 knee flexion, 1/5 ankle dorsiflexion, 1/5 ankle plantarflexion, 1/5 ankle inversion, 1/5 ankle eversion. Sensation:         Intact for light touch and proprioception  Postural Control & Balance:  · Gipson Balance Scale:  33/56.   (A score less than 45/56 indicates high risk of falls)  . Score improved from 28/56 on initial evaluation. Body Structures Involved:  1. Nerves  2. Muscles Body Functions Affected:  1. Neuromusculoskeletal  2. Movement Related Activities and Participation Affected:  1. Mobility  2. Self Care   Number of elements (examined above) that affect the Plan of Care: 4+: HIGH COMPLEXITY   CLINICAL PRESENTATION:   Presentation: Evolving clinical presentation with changing clinical characteristics: MODERATE COMPLEXITY   CLINICAL DECISION MAKING:   Outcome Measure: Tool Used: Gipson Balance Scale  Score:  Initial: 28/56 Most Recent: 33/56 (Date: 2- )   Interpretation of Score: Each section is scored on a 0-4 scale, 0 representing the patients inability to perform the task and 4 representing independence. The scores of each section are added together for a total score of 56. The higher the patients score, the more independent the patient is. Any score below 45 indicates increased risk for falls. Score 56 55-45 44-34 33-23 22-12 11-1 0   Modifier CH CI CJ CK CL CM CN     Medical Necessity:   · Patient is expected to demonstrate progress in strength, range of motion, balance and coordination to improve safety during daily activities. · Patient demonstrates good rehab potential due to higher previous functional level. · Skilled intervention continues to be required due to decreased mobility. Reason for Services/Other Comments:  · Patient continues to demonstrate capacity to improve overall mobility which will increase independence and increase safety. Use of outcome tool(s) and clinical judgement create a POC that gives a: Questionable prediction of patient's progress: MODERATE COMPLEXITY            TREATMENT:   (In addition to Assessment/Re-Assessment sessions the following treatments were rendered)  Pre-treatment Symptoms/Complaints:  3/5/2018: Patient reports she is doing well.   Pain: Initial: Pain Intensity 1: 0  Post Session:  0/10     NEUROMUSCULAR RE-EDUCATION: (20 minutes):  Exercise/activities per grid below to improve balance, coordination, kinesthetic sense, posture and proprioception. Required minimal verbal and manual cues to promote static and dynamic balance in standing, promote coordination of bilateral, lower extremity(s) and promote motor control of bilateral, lower extremity(s). Date:  2/19/2018 Date   2/23/18 Date   3/5/2018 Date  2/21/18 Date  2/28/18   Activity/Exercise Parameters       Stepping over half foam  2x10 reps with left LE, working on weightbearing through R LE and keeping R foot flat on floor (foot tends to invert) X 2x10 reps with left LE, working on weightbearing through R LE and keeping R foot flat on floor (foot tends to invert) 2x10 reps with left LE, working on weightbearing through R LE and keeping R foot flat on floor (foot tends to invert)    Step taps X X X Onto 6 inch step with L LE working on weight bearing through R LE Onto 6 inch step with L LE working on weight bearing through R LE   Step ups 6 inch  x10 leading with right LE working on weight bearing through R LE 6 inch  x10 leading with L LE working on weight bearing through R LE 6 inch  x10 leading with L LE working on weight bearing through [address] X       Walking in the clinic with quad cane About 100 feet with quad cane, working on weightbearing more on R foot through midstance and terminal stance, and stepping bigger with L LE. Tactile cues to pelvis to disassociate pelvis from trunk. About 100 feet with quad cane, working on weightbearing more on R foot through midstance and terminal stance, and stepping bigger with L LE. Tactile cues to pelvis to disassociate pelvis from trunk.   X About 100 feet with quad cane, working on weightbearing more on R foot through midstance and terminal stance, and stepping bigger with L LE; added working on shifting R hips forward over R foot better midstance to terminal stance About 100 feet with quad cane, working on weightbearing more on R foot through midstance and terminal stance, keeping R hip and shoulder over R foot for balance. pregait activity     Stepping with R LE, weight shifting with hips and shoulders over R foot. THERAPEUTIC EXERCISE: (25 minutes):  Exercises per grid below to improve mobility and strength. Required minimal verbal cues to promote proper body alignment. Progressed repetitions as indicated.    Date:  3/2/2018 Date:  3/5/2018 Date   2/15/18 Date   2/21/18 Date  2/28/18   Activity/Exercise Parameters Parameters      Sit to stand from chair X X       Left sidelying alternating isometrics on pelvis X X      Standing hip abduction X X      Seated LAQs        Standing hamstrings curls  X X      Bridging 2x10 with right lower extremity 2x10 with right lower extremity 2x10 with right lower extremity 2x10 with right lower extremity 2x10 with right lower extremity    Supine straight leg raise 2x10 with right lower extremity  4# 2x10 with right lower extremity 3#  2x10 with right lower extremity  2x10 with right lower extremity  2x10 with right lower extremity    Supine PNF D1 and D2  2x10 each with R LE 2x10 with right lower extremity D1 and D2 2x10 with right lower extremity D1 and D2 2x10 with right lower extremity D1 and D2 2x10 with right lower extremity D1   Left sidelying hip abduction        L sidelying: picking up and moving R LE from in front of L foot to behind L foot (working on hip abd and ext) and stabilizing trunk 2x10 reps  1# 2x10 reps, working on lifting highter 3# x10 reps, working on lifting higher x10 reps, working on lifting higher x10 reps, working on lifting higher   L sidelying R clam shells 2 x 10 reps R LE  1# 2x10 reps right lower extremity 3# 2x10 reps right lower extremity 2x10 reps right lower extremity 2x10 reps right lower extremity   R foot and gastrocsoleus stretching X 8 minutes trying to get foot to neutral position X 8 minutes trying to get foot to neutral position X 8 minutes trying to get foot to neutral position X 8 minutes trying to get foot to neutral position X 8 minutes trying to get foot to neutral position   Standing minisquats in bars            New Screens Portal  Treatment/Session Assessment:    · Response to Treatment:  Patient tolerated treatment without complaints. Continue to progress to tolerance. · Compliance with Program/Exercises: Compliant. · Recommendations/Intent for next treatment session: \"Next visit will focus on advancements to more challenging activities\".    Total Treatment Duration:  PT Patient Time In/Time Out  Time In: 1345  Time Out: [address] [facility],[address]

## 2018-03-07 ENCOUNTER — HOSPITAL ENCOUNTER (OUTPATIENT)
Dept: PHYSICAL THERAPY | Age: 63
Discharge: HOME | End: 2018-03-07
Attending: PHYSICAL MEDICINE & REHABILITATION
Payer: COMMERCIAL

## 2018-03-07 PROCEDURE — 97112 NEUROMUSCULAR REEDUCATION: CPT

## 2018-03-07 PROCEDURE — 97110 THERAPEUTIC EXERCISES: CPT

## 2018-03-07 NOTE — PROGRESS NOTES
Linda Memos  : 1955  Primary: Judy Carson Ppo  Secondary:  [address] at [place] [id number] [facility], [address] [place] [address],[address], [facility] [address]  [phone]   [phone]          OUTPATIENT PHYSICAL THERAPY:Daily Note 3/7/2018    ICD-10: Treatment Diagnosis:   · Other abnormalities of gait and mobility (R26.89)  · Difficulty in walking, not elsewhere classified (R26.2)  Precautions/Allergies:   Banana; Lisinopril; and Nuts [tree nut]   Fall Risk Score: 5 (? 5 = High Risk)  MD Orders: Evaluate and Treat MEDICAL/REFERRING DIAGNOSIS:  Weakness due to cerebrovascular accident (CVA) ([facility] [I63.9, R53.1]   DATE OF ONSET: CVA: 2017  REFERRING PHYSICIAN: Juliette Walters*  RETURN PHYSICIAN APPOINTMENT: TBD     INITIAL ASSESSMENT:  Ms. Jjaa Johns presents with decreased mobility, decreased strength, decreased gait, and decreased balance secondary to CVA. After discussing with patient, she agreed she would benefit from physical therapy to improve above deficits. Please sign this plan of treatment if you concur. Thank you for the opportunity to serve this patient. Recertification note on 2018:   Patient has attended fourteen scheduled physical therapy appointments from 12/15/2017 to 2018. Patient is very motivated and compliant with therapy. Patient complaints of increased irritation in her right AFO due to increased tone. Patient is continuing to demonstrate improved ambulation and improved strength in right lower extremity . Patient would benefit from continuing skilled physical therapy to address problems and goals. Thank you for this referral.    PROBLEM LIST (Impacting functional limitations):  1. Decreased Strength  2. Decreased Ambulation Ability/Technique  3. Decreased Balance  4. Decreased Activity Tolerance INTERVENTIONS PLANNED:  1. Balance Exercise  2. Gait Training  3. Home Exercise Program (HEP)  4.  Neuromuscular Re-education/Strengthening  5. Range of Motion (ROM)  6. Therapeutic Exercise/Strengthening   TREATMENT PLAN:  Effective Dates: 2/12/2018 TO 5/7/2018. Frequency/Duration: 2-3 times a week for 12 weeks  GOALS: (Goals have been discussed and agreed upon with patient.)  Short-Term Functional Goals: Time Frame: 3 weeks  1. Patient will be independent with home exercise program without exacerbation of symptoms or cueing needed. Goal met. Discharge Goals: Time Frame: 8 weeks  1. Patient will be independent with all ADLs with minimal fear of falling and loss of balance with daily tasks. Goal ongoing. 2. Patient will report no fear avoidance with social or recreational activities due to fear of falling. Goal ongoing. 3. Patient will score greater than or equal to 45/56 on Gipson Balance Scale with minimal effect of imbalance on patient's ability to manage every day life activities. Goal ongoing. Rehabilitation Potential For Stated Goals: Good  Regarding Mary Iraheta's therapy, I certify that the treatment plan above will be carried out by a therapist or under their direction. Thank you for this referral,  Mauro Garsia PT     Referring Physician Signature: Juliette Anglin*              Date                    The information in this section was collected on 12/15/2017 (except where otherwise noted). HISTORY:   History of Present Injury/Illness (Reason for Referral):  Patient reports she had a severe stroke on 9/6/2017. She reports that she was in the hospital and [facility] for about 61-62 days. She reports that she has progressed really well, but is still having trouble walking and using her right side (UE and LE). She reports that she walks using her AFO and quad cane all the time. She reports that she tries to be as active as possible. She reports that she is scared that she is going to fall, even though she has not.   She reports she would like to work on strengthening her right leg so her balance is better and she can walk without fear of falling. Past Medical History/Comorbidities:   Ms. Saba Munroe  has a past medical history of Anxiety; CVA (cerebral vascular accident) ([facility] [address] (2017); Depression; HTN (hypertension); Menopause; and [facility]embolism) [facility] ([facility]). Ms. Saba Munroe  has a past surgical history that includes hx jennifer and bso (); hx  section; hx refractive surgery (); hx other surgical (); and hx other surgical (). Social History/Living Environment:   Home Environment: Private residence  Living Alone: No  Support Systems: Family member(s), Friends \ neighbors; Spouse  Prior Level of Function/Work/Activity:  Independent  Dominant Side:         RIGHT  Personal Factors:          Sex:  female        Age:  60 y.o. Current Medications:       Current Outpatient Prescriptions:     FLUoxetine (PROZAC) 20 mg tablet, Take 2 Tabs by mouth daily. , Disp: 60 Tab, Rfl: 3    methylphenidate HCl (RITALIN) 20 mg tablet, Take 1 Tab (20 mg total) by mouth daily. Max Daily Amount: 20 mg, Disp: 30 Tab, Rfl: 0    methylphenidate HCl (RITALIN) 20 mg tablet, Take 1 Tab (20 mg total) by mouth dailyEarliest Fill Date: 18. Max Daily Amount: 20 mg, Disp: 30 Tab, Rfl: 0    [START ON 3/12/2018] methylphenidate HCl (RITALIN) 20 mg tablet, Take 1 Tab (20 mg total) by mouth dailyEarliest Fill Date: 3/12/18. Max Daily Amount: 20 mg, Disp: 30 Tab, Rfl: 0    tiZANidine (ZANAFLEX) 4 mg tablet, 4mg po TID, Disp: 90 Tab, Rfl: 6    labetalol (NORMODYNE) 200 mg tablet, Take 1 Tab by mouth two (2) times a day., Disp: 60 Tab, Rfl: 5    ALPRAZolam (XANAX) 0.25 mg tablet, Take 1 Tab by mouth three (3) times daily as needed for Anxiety. Max Daily Amount: 0.75 mg., Disp: 30 Tab, Rfl: 1    rOPINIRole (REQUIP) 2 mg tablet, Take 2 mg by mouth nightly.  for restless leg, Disp: , Rfl:     temazepam (RESTORIL) 15 mg capsule, Take 15 mg by mouth nightly as needed for Sleep., Disp: , Rfl:    polyethylene glycol (MIRALAX) 17 gram/dose powder, Take 17 g by mouth daily. , Disp: 510 g, Rfl: 2    losartan-hydroCHLOROthiazide (HYZAAR) 100-12.5 mg per tablet, Take 1 Tab by mouth daily. , Disp: 30 Tab, Rfl: 5   Date Last Reviewed:  3/7/2018    Number of Personal Factors/Comorbidities that affect the Plan of Care: 1-2: MODERATE COMPLEXITY   EXAMINATION:   Observation/Orthostatic Postural Assessment:          Posture Assessment: Rounded shoulders, Forward head   Functional Mobility:         Gait/Ambulation:     Speed/Lulu: Pace decreased (<100 feet/min)  Step Length: Left shortened, Right lengthened  Swing Pattern: Left asymmetrical, Right asymmetrical  Stance: Left increased, Right decreased  Gait Abnormalities: Antalgic, Circumduction, Foot drop, Path deviations, Trendelenburg  Ambulation - Level of Assistance: Modified independent  Assistive Device: Cane, quad  · Interventions: Verbal cues, Safety awareness training          Transfers:     Sit to Stand: Independent  Stand to Sit: Independent  Stand Pivot Transfers: Independent  · Bed to Chair: Independent          Bed Mobility:     Rolling: Independent  Supine to Sit: Independent  Sit to Supine: Independent  · Scooting: Independent    Strength:          L UE STRENGTH: 4/5 shoulder flexion, 4/5 shoulder abduction, 4/5 shoulder extension, 4/5 elbow flexion, 4/5 elbow extension. R UE STRENGTH: 2/5 shoulder flexion, 2/5 shoulder abduction, 2/5 shoulder extension, 2/5 elbow flexion, 2/5 elbow extension. R LE STRENGTH:  4+/5 hip flexion, 4+/5 hip abduction, 4+/5 hip adduction, 4+/5 hip extension, 4+/5 knee extension, 4+/5 knee flexion, 1/5 ankle dorsiflexion, 1/5 ankle plantarflexion, 1/5 ankle inversion, 1/5 ankle eversion. Sensation:         Intact for light touch and proprioception  Postural Control & Balance:  · Gipson Balance Scale:  33/56.   (A score less than 45/56 indicates high risk of falls)  . Score improved from 28/56 on initial evaluation. Body Structures Involved:  1. Nerves  2. Muscles Body Functions Affected:  1. Neuromusculoskeletal  2. Movement Related Activities and Participation Affected:  1. Mobility  2. Self Care   Number of elements (examined above) that affect the Plan of Care: 4+: HIGH COMPLEXITY   CLINICAL PRESENTATION:   Presentation: Evolving clinical presentation with changing clinical characteristics: MODERATE COMPLEXITY   CLINICAL DECISION MAKING:   Outcome Measure: Tool Used: Gipson Balance Scale  Score:  Initial: 28/56 Most Recent: 33/56 (Date: 2- )   Interpretation of Score: Each section is scored on a 0-4 scale, 0 representing the patients inability to perform the task and 4 representing independence. The scores of each section are added together for a total score of 56. The higher the patients score, the more independent the patient is. Any score below 45 indicates increased risk for falls. Score 56 55-45 44-34 33-23 22-12 11-1 0   Modifier CH CI CJ CK CL CM CN     Medical Necessity:   · Patient is expected to demonstrate progress in strength, range of motion, balance and coordination to improve safety during daily activities. · Patient demonstrates good rehab potential due to higher previous functional level. · Skilled intervention continues to be required due to decreased mobility. Reason for Services/Other Comments:  · Patient continues to demonstrate capacity to improve overall mobility which will increase independence and increase safety.    Use of outcome tool(s) and clinical judgement create a POC that gives a: Questionable prediction of patient's progress: MODERATE COMPLEXITY            TREATMENT:   (In addition to Assessment/Re-Assessment sessions the following treatments were rendered)  Pre-treatment Symptoms/Complaints:  3/7/2018: Doing well  Pain: Initial: Pain Intensity 1: 0  Post Session:  0/10     NEUROMUSCULAR RE-EDUCATION: (19 minutes):  Exercise/activities per grid below to improve balance, coordination, kinesthetic sense, posture and proprioception. Required minimal verbal and manual cues to promote static and dynamic balance in standing, promote coordination of bilateral, lower extremity(s) and promote motor control of bilateral, lower extremity(s). Date:  2/19/2018 Date   2/23/18 Date   3/5/2018 Date  3/7/18 Date     Activity/Exercise Parameters       Stepping over half foam  2x10 reps with left LE, working on weightbearing through R LE and keeping R foot flat on floor (foot tends to invert) X 2x10 reps with left LE, working on weightbearing through R LE and keeping R foot flat on floor (foot tends to invert) 2x10 reps with left LE, working on weightbearing through R LE and keeping R foot flat on floor     Step taps X X X Onto 6 inch step with L LE working on weight bearing through R LE    Step ups 6 inch  x10 leading with right LE working on weight bearing through R LE 6 inch  x10 leading with L LE working on weight bearing through R LE 6 inch  x10 leading with L LE working on weight bearing through R LE 6 inch  x10 leading with L LE working on weight bearing through [address] [place] X       Walking in the clinic with quad cane About 100 feet with quad cane, working on weightbearing more on R foot through midstance and terminal stance, and stepping bigger with L LE. Tactile cues to pelvis to disassociate pelvis from trunk. About 100 feet with quad cane, working on weightbearing more on R foot through midstance and terminal stance, and stepping bigger with L LE. Tactile cues to pelvis to disassociate pelvis from trunk. X About 100 feet with quad cane, working on weightbearing more on R foot through midstance and terminal stance, and stepping bigger with L LE; added working on shifting R hips forward over R foot better midstance to terminal stance    pregait activity    Stepping with R LE, weight shifting with hips and shoulders over R foot. THERAPEUTIC EXERCISE: (25 minutes):  Exercises per grid below to improve mobility and strength. Required minimal verbal cues to promote proper body alignment. Progressed repetitions as indicated. Date:  3/2/2018 Date:  3/5/2018 Date   3/7/18     Activity/Exercise Parameters Parameters      Sit to stand from chair X X       Left sidelying alternating isometrics on pelvis X X      Standing hip abduction X X      Seated LAQs        Standing hamstrings curls  X X      Bridging 2x10 with right lower extremity 2x10 with right lower extremity 2x10 with right lower extremity     Supine straight leg raise 2x10 with right lower extremity  4# 2x10 with right lower extremity 3#  2x10 with right lower extremity      Supine PNF D1 and D2  2x10 each with R LE 2x10 with right lower extremity D1 and D2 2x10 with right lower extremity D1 and D2     L sidelying R pelvis and trunk PNF   Flexion/ext, rotation     Left sidelying hip abduction        L sidelying: picking up and moving R LE from in front of L foot to behind L foot (working on hip abd and ext) and stabilizing trunk 2x10 reps  1# 2x10 reps, working on lifting highter 3# 2x10 reps, working on lifting higher 3#     L sidelying R clam shells 2 x 10 reps R LE  1# 2x10 reps right lower extremity 3# 2x10 reps right lower extremity     R foot and gastrocsoleus stretching X 8 minutes trying to get foot to neutral position X 8 minutes trying to get foot to neutral position X 8 minutes trying to get foot to neutral position     Standing minisquats in bars            [facility] Portal  Treatment/Session Assessment:    · Response to Treatment:  Patient tolerated treatment without complaints. Continue to progress to tolerance. · Compliance with Program/Exercises: Compliant. · Recommendations/Intent for next treatment session: \"Next visit will focus on advancements to more challenging activities\".    Total Treatment Duration:  PT Patient Time In/Time Out  Time In: 1316  Time Out: 14[address] [facility]

## 2018-03-07 NOTE — PROGRESS NOTES
Soila Navarro  : 1955  Primary: Shira Villegas Ppo  Secondary:  [address]  [address]  [facility] [address] [facility], [facility].  [phone]   [phone]        OUTPATIENT OCCUPATIONAL THERAPY: Daily Note 3/7/2018    ICD-10: Treatment Diagnosis: Hemiplegia and hemiparesis following cerebral infarction affecting right dominant side (I69.351)  Precautions/Allergies:   Banana; Lisinopril; and Nuts [tree nut]   Fall Risk Score: 5 (? 5 = High Risk)  MD Orders: Referral to occupational therapy MEDICAL/REFERRING DIAGNOSIS:   Cerebral infarction, unspecified [I63.9]  Weakness [R53.1]   DATE OF ONSET: 2017   REFERRING PHYSICIAN: Carlean Cabot, Amy*  RETURN PHYSICIAN APPOINTMENT: unknown   3/5/18: Ms. Lisa Robbins continues to make excellent progress towards her therapy goals; she demonstrates gradual improvements in R UE range of motion, specifically R shoulder flexion and R elbow extension/flexion, and reports increased functional use of R UE in activities of daily living, including dressing, grooming, and feeding. Patient would continue to benefit from skilled occupational therapy services to maximize safety and independence and increase functional use of R UE during activities of daily living.    18: Ms. Lisa Robbins is making excellent progress toward her goals; she is demonstrating improving range of motion and functional use of the right upper extremity in activities of daily living. Feel she will continue to benefit from skilled occupational therapy to maximize safety and independence with activities of daily living. INITIAL ASSESSMENT:  Ms. Lisa Robbins presents with impaired active movement, strength, coordination and sensation of the dominant right upper extremity that is affecting her ability to complete activities of daily living independently.  Feel she may benefit from skilled occupational therapy to maximize safety and independence with activities of daily living. PLAN OF CARE:   PROBLEM LIST:  1. Decreased Strength  2. Decreased ADL/Functional Activities  3. Decreased Transfer Abilities  4. Decreased Balance  5. Decreased Flexibility/Joint Mobility  6. Decreased Cognition INTERVENTIONS PLANNED:  1. Activities of daily living training  2. Manual therapy training  3. Modalities  4. Neuromuscular re-eduation  5. Sensory reintegration training  6. Splinting  7. Therapeutic activity  8. Therapeutic exercise   TREATMENT PLAN:  Effective Dates: 12/20/17 TO 3/20/18. Frequency/Duration: 3 times a week for 12 weeks  GOALS: (Goals have been discussed and agreed upon with patient.)  Short-Term Functional Goals: Time Frame: 6 weeks  1. Patient will demonstrate independence with home exercise program for right upper extremity range of motion. Met  2. Patient will increase use of right upper extremity as a functional assist in at least 25% of daily activities. Met  3. Patient will bathe with moderate assistance. Continue to address  Discharge Goals: Time Frame: 12 weeks  1. Patient will bathe with minimal assistance. Continue to address  2. Patient will feed self with modified independence and adaptive equipment as needed. Continue to address  3. Patient will dress with modified independence and adaptive equipment as needed. Continue to address  4. Patient will use right upper extremity as a functional assist in at least 50% of daily activities. Continue to address  Rehabilitation Potential For Stated Goals: Good  Regarding Vani Iraheta's therapy, I certify that the treatment plan above will be carried out by a therapist or under their direction. Thank you for this referral,  Kajal Childers OT     Referring Physician Signature: Juliette Barba* _________________________  Date _________            The information in this section was collected on 3/5/18 (except where otherwise noted).   OCCUPATIONAL PROFILE & HISTORY:   History of Present Injury/Illness (Reason for Referral):  CVA with hospital and [facility] stay then home health therapy. Past Medical History/Comorbidities:   Ms. Marta Kulkarni  has a past medical history of Anxiety; CVA (cerebral vascular accident) ([facility] [address] (2017); Depression; HTN (hypertension); Menopause; and PTE (pulmonary thromboembolism) ([facility] [address] (2017). Ms. Marta Kulkarni  has a past surgical history that includes hx jennifer and bso (); hx  section; hx refractive surgery (); hx other surgical (); and hx other surgical (2017). Social History/Living Environment:      Prior Level of Function/Work/Activity:  Does seasonal taxes at [employer]  Dominant Side:         RIGHT  Current Medications:    Current Outpatient Prescriptions:     FLUoxetine (PROZAC) 20 mg tablet, Take 2 Tabs by mouth daily. , Disp: 60 Tab, Rfl: 3    methylphenidate HCl (RITALIN) 20 mg tablet, Take 1 Tab (20 mg total) by mouth daily. Max Daily Amount: 20 mg, Disp: 30 Tab, Rfl: 0    methylphenidate HCl (RITALIN) 20 mg tablet, Take 1 Tab (20 mg total) by mouth dailyEarliest Fill Date: 18. Max Daily Amount: 20 mg, Disp: 30 Tab, Rfl: 0    [START ON 3/12/2018] methylphenidate HCl (RITALIN) 20 mg tablet, Take 1 Tab (20 mg total) by mouth dailyEarliest Fill Date: 3/12/18. Max Daily Amount: 20 mg, Disp: 30 Tab, Rfl: 0    tiZANidine (ZANAFLEX) 4 mg tablet, 4mg po TID, Disp: 90 Tab, Rfl: 6    labetalol (NORMODYNE) 200 mg tablet, Take 1 Tab by mouth two (2) times a day., Disp: 60 Tab, Rfl: 5    ALPRAZolam (XANAX) 0.25 mg tablet, Take 1 Tab by mouth three (3) times daily as needed for Anxiety. Max Daily Amount: 0.75 mg., Disp: 30 Tab, Rfl: 1    rOPINIRole (REQUIP) 2 mg tablet, Take 2 mg by mouth nightly. for restless leg, Disp: , Rfl:     temazepam (RESTORIL) 15 mg capsule, Take 15 mg by mouth nightly as needed for Sleep., Disp: , Rfl:     polyethylene glycol (MIRALAX) 17 gram/dose powder, Take 17 g by mouth daily. , Disp: 510 g, Rfl: 2    losartan-hydroCHLOROthiazide (HYZAAR) 100-12.5 mg per tablet, Take 1 Tab by mouth daily. , Disp: 30 Tab, Rfl: 5            Date Last Reviewed:  3/7/2018   Complexity Level : Expanded review of therapy/medical records (1-2):  MODERATE COMPLEXITY   ASSESSMENT OF OCCUPATIONAL PERFORMANCE:   ROM:                   Balance:                   Coordination:                   Mental Status:                   Vision:                   Activities of Daily Living:           Basic ADLs (From Assessment) Complex ADLs (From Assessment)         Grooming/Bathing/Dressing Activities of Daily Living                                   Sensation:         Light touch absent distal to right elbow     Physical Skills Involved:  1. Range of Motion  2. Balance  3. Sensation  4. Fine Motor Control  5. Gross Motor Control Cognitive Skills Affected (resulting in the inability to perform in a timely and safe manner):  1. Executive Function  2. Sustained Attention  3. Divided Attention  4. Comprehension Psychosocial Skills Affected:  1. None   Number of elements that affect the Plan of Care: 5+:  HIGH COMPLEXITY   CLINICAL DECISION MAKING:   Outcome Measure: Tool Used: [address] [facility] [address] [address] AM-PAC Daily Activity Outpatient Short Form  Score:  Initial: 18 Most Recent: 33 (Date: 3/5/18 )   Interpretation of Tool:  Represents clinically-significant functional categories (i.e., basic and instrumental activities of daily living, fine motor activities). Score 60 59-55 54-47 46-34 32-21 20-16 15   Modifier CH CI CJ CK CL CM CN     Medical Necessity:   · Patient demonstrates good rehab potential due to higher previous functional level. Reason for Services/Other Comments:  · Patient continues to require skilled intervention due to decreased safety and independence in activities of daily living.   Clinical Decision-Making Assessment: Moderately difficult to predict patient's progress at this time due to the extent of her limitations in functional movement and use of dominant right upper extremity. Assessment process, impact of co-morbidities, assessment modification\need for assistance, and selection of interventions: Analytical Complexity:MODERATE COMPLEXITY   TREATMENT:   (In addition to Assessment/Re-Assessment sessions the following treatments were rendered)    Pre-treatment Symptoms/Complaints:  Patient states, \"I feel pretty good today. I haven't heard back from insurance yet. \"  Pain: Initial:   Pain Intensity 1: 0/10 Post Session:  0/10     Therapeutic Exercise: ( 35 minutes):  Exercises per grid below to improve dynamic movement of arm - right and hand - right to improve functional lifting, carrying, reaching and grasping. Required moderate visual and verbal cues to promote proper body mechanics. Progressed range, repetitions and complexity of movement as indicated.        Date:  2/15/18 Date:   2/19/18 Date:  2/21/18 Date:  2/23/18 Date:  2/28/18 Date:   3/2/18 Date:  3/5/18 Date:  3/7/18   Activity/Exercise           Shoulder shrugs AROM  1 x 10 AROM  2 x 10 AROM  2 x 10 with mirror for visual feedback AROM  2 x 10 with mirror for visual feedback AROM  2 x 10 with mirror for visual feedback AROM  2 x 10 with mirror for visual feedback    AROM x 5 reps AROM x 10 reps   Scapular protraction/retraction AROM  1 x 10 AROM  2 x 10 AROM  2 x 10 with passive self range into elbow extension at end range AROM  1 x 10 with passive self range into elbow extension at end range    1 x10 with green theratubing seated AROM  1 x 10 with passive self range into elbow extension at end range AROM  1 x 10 with passive self range into elbow extension at end range AROM x 5 reps AROM   1 x 10 with passive self range into elbow extension at end range  1 x 10 with green theratubing  1 x 10 with blue theratubing   Shoulder abduction       AROM x 5 reps    Elbow flexion/extension AROM with assist to fully extend x 10 reps   Reviewed HEP Reviewed HEP  AROM x 5 reps    PNF D1/D2 diagonals     1 x 3 in supine Hand helper 20 pounds x 5 reps with assist to extend fingers  20 pounds x 5 reps with assist to extend fingers  20 pounds x 5 reps with assist to extend fingers      Tabletop skateboard    10 minutes  AROM in all available planes; AAROM into external rotation and horizontal abduction       Shoulder flexion/extension    Reviewed HEP   AROM x 5 reps    Chest press           UBE Min assist to sustain right  on handle   Level 0  5 mintues    Used dycem and wrist brace to keep wrist in neutral Min assist to sustain right  on handle   Level 0  5 mintues Min assist to sustain right  on handle   Level 0  5 mintues Min assist to sustain right  on handle   Level 0  6 mintues Min assist to sustain right  on handle   Level 0  5 mintues Min assist to sustain right  on handle   Level 0  5 mintues Min assist to sustain right  on handle   Level 0  5 mintues Min assist to sustain right  on handle   Level 0  6 mintues   Modified push-ups   Hands on countertop with dycem under right hand  2 x 10 Hands on countertop with dycem under right hand  2 x 10 Hands on countertop with dycem under right hand  4 x 10 Hands on countertop with dycem under right hand  3 x 10 Hands on countertop with dycem under right hand  3 x 10 Hands on countertop with dycem under right hand  3 x 10 Hands on countertop with dycem under right hand  3 x 10   Resistive clothespin   Yellow x 3 to  pieces of sponge and release in to container Attempted x 1 with max assist Yellow  1 x 10 Yellow  1 x 10  Yellow   1 x 5 pinch and release   Shoulder arc      10 tabs on small hill only with L hand assisting R hand. Wrist flexion/extension       AROM x 5 reps    Punching bag    Side hook x 10 reps           Access Code: [email or web address]   URL: [email or web address]   Date: 01/19/2018   Prepared by: Kiley Saunders     Exercises   Supine Shoulder Flexion PROM - 10 reps - 2 sets - 3 hold - 1x daily - 5x weekly   Push-Up on Counter - 10 reps - 2 sets - 1x daily - 5x weekly   Seated Shoulder Shrugs - 10 reps - 2 sets - 1x daily - 5x weekly   Seated Scapular Retraction - 10 reps - 2 sets - 1x daily - 5x weekly   Seated Weight Shifting with Arm Support - 10 reps - 2 sets - 1x daily - 5x weekly   Seated Shoulder Flexion Self PROM - 10 reps - 2 sets - 1x daily - 5x weekly   Supine Elbow Flexion Extension AROM - 10 reps - 2 sets - 1x daily - 5x weekly   Seated Elbow Flexion Extension AROM - 10 reps - 2 sets - 1x daily - 5x weekly     Neuromuscular Re-education: ( 10):  Exercise/activities per grid below to improve balance, coordination, kinesthetic sense and proprioception. Required moderate visual and verbal cues to promote static balance in sitting, promote coordination of right, upper extremity(s) and promote motor control of right, upper extremity(s). Patient completed lateral weight shifts sitting at edge of mat x 20 reps each onto elbow/forearms  with dynamic reach with left hand outside of base of support, using right upper extremity to maintain balance. Treatment/Session Assessment:  Ms. Eusebio Dixon continues to demo gradual improvements in ability to perform lateral weight shifts with increased dynamic reach to the R side. Pt demo'd consistent ability to straighten R UE and was able to fully open/release yellow resistive clothespin today for the first time despite significant tone throughout R UE, however, tone continues to be an issue when completing therapeutic activities in the clinic as well as when completing activities of daily living in the home, specifically self-feeding. Mrs. Eusebio Dixon would continue to benefit from skilled OT services to increase functional use of R UE during activities of daily living. · Response to Treatment:  Ms. Eusebio Dixon reports that she has gradually been able to use her R hand more for daily activities.  She demo's gradual improvements in R shoulder flexion and R elbow flexion/extension today, however, R wrist ROM remains limited. Pt would continue to benefit from skilled OT services to address these limitations for increased functional use of R UE in activities of daily living. · Compliance with Program/Exercises: Will assess as treatment progresses. · Recommendations/Intent for next treatment session: \"Next visit will focus on advancements to more challenging activities and reduction in assistance provided\".    Total Treatment Duration:  OT Patient Time In/Time Out  Time In: 1400  Time Out: 615 [facility]

## 2018-03-09 ENCOUNTER — APPOINTMENT (OUTPATIENT)
Dept: PHYSICAL THERAPY | Age: 63
End: 2018-03-09
Attending: PHYSICAL MEDICINE & REHABILITATION
Payer: COMMERCIAL

## 2018-03-12 ENCOUNTER — HOSPITAL ENCOUNTER (OUTPATIENT)
Dept: PHYSICAL THERAPY | Age: 63
Discharge: HOME | End: 2018-03-12
Attending: PHYSICAL MEDICINE & REHABILITATION
Payer: COMMERCIAL

## 2018-03-12 PROCEDURE — 97112 NEUROMUSCULAR REEDUCATION: CPT

## 2018-03-12 PROCEDURE — 97110 THERAPEUTIC EXERCISES: CPT

## 2018-03-12 NOTE — PROGRESS NOTES
Hilario Leroy  : 1955  Primary: Frans[facility]  Secondary:  [address]  at [facility]  [facility] [place] [address], [place] [place]  [phone]   [phone]          OUTPATIENT PHYSICAL THERAPY:Daily Note and Progress Report 3/12/2018    ICD-10: Treatment Diagnosis:   · Other abnormalities of gait and mobility (R26.89)  · Difficulty in walking, not elsewhere classified (R26.2)  Precautions/Allergies:   Banana; Lisinopril; and Nuts [tree nut]   Fall Risk Score: 5 (? 5 = High Risk)  MD Orders: Evaluate and Treat MEDICAL/REFERRING DIAGNOSIS:  Weakness due to cerebrovascular accident (CVA) ([facility] [address] [I63.9, R53.1]   DATE OF ONSET: CVA: 2017  REFERRING PHYSICIAN: Juliette Holly*  RETURN PHYSICIAN APPOINTMENT: TBD     INITIAL ASSESSMENT:  Ms. Lynn Anderson presents with decreased mobility, decreased strength, decreased gait, and decreased balance secondary to CVA. After discussing with patient, she agreed she would benefit from physical therapy to improve above deficits. Please sign this plan of treatment if you concur. Thank you for the opportunity to serve this patient. Progress note on 3/12/2018:   Patient has attended twenty-four scheduled physical therapy appointments from 12/15/2017 to 3/12/2018. Patient is very motivated and compliant with therapy. Patient complaints of increased irritation in her right AFO due to increased tone. Patient is waiting on her insurance to approve Botox injections to decreased tone in right ankle. Patient would benefit from continuing skilled physical therapy to address problems and goals. Thank you for this referral.    PROBLEM LIST (Impacting functional limitations):  1. Decreased Strength  2. Decreased Ambulation Ability/Technique  3. Decreased Balance  4. Decreased Activity Tolerance INTERVENTIONS PLANNED:  1. Balance Exercise  2. Gait Training  3. Home Exercise Program (HEP)  4.  Neuromuscular Re-education/Strengthening  5. Range of Motion (ROM)  6. Therapeutic Exercise/Strengthening   TREATMENT PLAN:  Effective Dates: 2/12/2018 TO 5/7/2018. Frequency/Duration: 2-3 times a week for 12 weeks  GOALS: (Goals have been discussed and agreed upon with patient.)  Short-Term Functional Goals: Time Frame: 3 weeks  1. Patient will be independent with home exercise program without exacerbation of symptoms or cueing needed. Goal met. Discharge Goals: Time Frame: 8 weeks  1. Patient will be independent with all ADLs with minimal fear of falling and loss of balance with daily tasks. Goal ongoing. 2. Patient will report no fear avoidance with social or recreational activities due to fear of falling. Goal ongoing. 3. Patient will score greater than or equal to 45/56 on Gipson Balance Scale with minimal effect of imbalance on patient's ability to manage every day life activities. Goal ongoing. Rehabilitation Potential For Stated Goals: Good  Regarding Carl Iraheta's therapy, I certify that the treatment plan above will be carried out by a therapist or under their direction. Thank you for this referral,  Alvina Mclean PT     Referring Physician Signature: Juliette Zimmer*              Date                    The information in this section was collected on 12/15/2017 (except where otherwise noted). HISTORY:   History of Present Injury/Illness (Reason for Referral):  Patient reports she had a severe stroke on 9/6/2017. She reports that she was in the hospital and [facility] for about 61-62 days. She reports that she has progressed really well, but is still having trouble walking and using her right side (UE and LE). She reports that she walks using her AFO and quad cane all the time. She reports that she tries to be as active as possible. She reports that she is scared that she is going to fall, even though she has not.   She reports she would like to work on strengthening her right leg so her balance is better and she can walk without fear of falling. Past Medical History/Comorbidities:   Ms. Elizabeth Pham  has a past medical history of Anxiety; CVA (cerebral vascular accident) ([facility] [address] (2017); Depression; HTN (hypertension); Menopause; and P[facility]embolism) [facility] ([facility]). Ms. Elizabeth Pham  has a past surgical history that includes hx jennifer and bso (); hx  section; hx refractive surgery (); hx other surgical (); and hx other surgical (). Social History/Living Environment:   Home Environment: Private residence  Living Alone: No  Support Systems: Family member(s), Friends \ neighbors; Spouse  Prior Level of Function/Work/Activity:  Independent  Dominant Side:         RIGHT  Personal Factors:          Sex:  female        Age:  60 y.o. Current Medications:       Current Outpatient Prescriptions:     FLUoxetine (PROZAC) 20 mg tablet, Take 2 Tabs by mouth daily. , Disp: 60 Tab, Rfl: 3    methylphenidate HCl (RITALIN) 20 mg tablet, Take 1 Tab (20 mg total) by mouth daily. Max Daily Amount: 20 mg, Disp: 30 Tab, Rfl: 0    methylphenidate HCl (RITALIN) 20 mg tablet, Take 1 Tab (20 mg total) by mouth dailyEarliest Fill Date: 18. Max Daily Amount: 20 mg, Disp: 30 Tab, Rfl: 0    methylphenidate HCl (RITALIN) 20 mg tablet, Take 1 Tab (20 mg total) by mouth dailyEarliest Fill Date: 3/12/18. Max Daily Amount: 20 mg, Disp: 30 Tab, Rfl: 0    tiZANidine (ZANAFLEX) 4 mg tablet, 4mg po TID, Disp: 90 Tab, Rfl: 6    labetalol (NORMODYNE) 200 mg tablet, Take 1 Tab by mouth two (2) times a day., Disp: 60 Tab, Rfl: 5    ALPRAZolam (XANAX) 0.25 mg tablet, Take 1 Tab by mouth three (3) times daily as needed for Anxiety. Max Daily Amount: 0.75 mg., Disp: 30 Tab, Rfl: 1    rOPINIRole (REQUIP) 2 mg tablet, Take 2 mg by mouth nightly.  for restless leg, Disp: , Rfl:     temazepam (RESTORIL) 15 mg capsule, Take 15 mg by mouth nightly as needed for Sleep., Disp: , Rfl:     polyethylene glycol (MIRALAX) 17 gram/dose powder, Take 17 g by mouth daily. , Disp: 510 g, Rfl: 2    losartan-hydroCHLOROthiazide (HYZAAR) 100-12.5 mg per tablet, Take 1 Tab by mouth daily. , Disp: 30 Tab, Rfl: 5   Date Last Reviewed:  3/12/2018    Number of Personal Factors/Comorbidities that affect the Plan of Care: 1-2: MODERATE COMPLEXITY   EXAMINATION:   Observation/Orthostatic Postural Assessment:          Posture Assessment: Rounded shoulders, Forward head   Functional Mobility:         Gait/Ambulation:     Speed/Lulu: Pace decreased (<100 feet/min)  Step Length: Left shortened, Right lengthened  Swing Pattern: Left asymmetrical, Right asymmetrical  Stance: Left increased, Right decreased  Gait Abnormalities: Antalgic, Circumduction, Foot drop, Path deviations, Trendelenburg  Ambulation - Level of Assistance: Modified independent  Assistive Device: Cane, quad  · Interventions: Verbal cues, Safety awareness training          Transfers:     Sit to Stand: Independent  Stand to Sit: Independent  Stand Pivot Transfers: Independent  · Bed to Chair: Independent          Bed Mobility:     Rolling: Independent  Supine to Sit: Independent  Sit to Supine: Independent  · Scooting: Independent    Strength:          L UE STRENGTH: 4/5 shoulder flexion, 4/5 shoulder abduction, 4/5 shoulder extension, 4/5 elbow flexion, 4/5 elbow extension. R UE STRENGTH: 2/5 shoulder flexion, 2/5 shoulder abduction, 2/5 shoulder extension, 2/5 elbow flexion, 2/5 elbow extension. R LE STRENGTH:  4+/5 hip flexion, 4+/5 hip abduction, 4+/5 hip adduction, 4+/5 hip extension, 4+/5 knee extension, 4+/5 knee flexion, 1/5 ankle dorsiflexion, 1/5 ankle plantarflexion, 1/5 ankle inversion, 1/5 ankle eversion. Sensation:         Intact for light touch and proprioception  Postural Control & Balance:  · Gipson Balance Scale:  33/56.   (A score less than 45/56 indicates high risk of falls)  . Score improved from 28/56 on initial evaluation.     Body Structures Involved:  1. Nerves  2. Muscles Body Functions Affected:  1. Neuromusculoskeletal  2. Movement Related Activities and Participation Affected:  1. Mobility  2. Self Care   Number of elements (examined above) that affect the Plan of Care: 4+: HIGH COMPLEXITY   CLINICAL PRESENTATION:   Presentation: Evolving clinical presentation with changing clinical characteristics: MODERATE COMPLEXITY   CLINICAL DECISION MAKING:   Outcome Measure: Tool Used: Gipson Balance Scale  Score:  Initial: 28/56 Most Recent: 33/56 (Date: 3- )   Interpretation of Score: Each section is scored on a 0-4 scale, 0 representing the patients inability to perform the task and 4 representing independence. The scores of each section are added together for a total score of 56. The higher the patients score, the more independent the patient is. Any score below 45 indicates increased risk for falls. Score 56 55-45 44-34 33-23 22-12 11-1 0   Modifier CH CI CJ CK CL CM CN     Medical Necessity:   · Patient is expected to demonstrate progress in strength, range of motion, balance and coordination to improve safety during daily activities. · Patient demonstrates good rehab potential due to higher previous functional level. · Skilled intervention continues to be required due to decreased mobility. Reason for Services/Other Comments:  · Patient continues to demonstrate capacity to improve overall mobility which will increase independence and increase safety. Use of outcome tool(s) and clinical judgement create a POC that gives a: Questionable prediction of patient's progress: MODERATE COMPLEXITY            TREATMENT:   (In addition to Assessment/Re-Assessment sessions the following treatments were rendered)  Pre-treatment Symptoms/Complaints:  3/12/2018: \"I am going to call my insurance at the end of the week to check on my Botox approval\".   Pain: Initial: Pain Intensity 1: 0  Post Session:  0/10     NEUROMUSCULAR RE-EDUCATION: (20 minutes):  Exercise/activities per grid below to improve balance, coordination, kinesthetic sense, posture and proprioception. Required minimal verbal and manual cues to promote static and dynamic balance in standing, promote coordination of bilateral, lower extremity(s) and promote motor control of bilateral, lower extremity(s). Date:  2/19/2018 Date   2/23/18 Date   3/5/2018 Date  3/7/18 Date  3/12/18   Activity/Exercise Parameters       Stepping over half foam  2x10 reps with left LE, working on weightbearing through R LE and keeping R foot flat on floor (foot tends to invert) X 2x10 reps with left LE, working on weightbearing through R LE and keeping R foot flat on floor (foot tends to invert) 2x10 reps with left LE, working on weightbearing through R LE and keeping R foot flat on floor  2x10 reps with left LE, working on weightbearing through R LE and keeping R foot flat on floor   Step taps X X X Onto 6 inch step with L LE working on weight bearing through R LE Onto 6 inch step with L LE working on weight bearing through R LE   Step ups 6 inch  x10 leading with right LE working on weight bearing through R LE 6 inch  x10 leading with L LE working on weight bearing through R LE 6 inch  x10 leading with L LE working on weight bearing through R LE 6 inch  x10 leading with L LE working on weight bearing through R LE 6 inch  x10 leading with L LE working on weight bearing through [address] X       Walking in the clinic with quad cane About 100 feet with quad cane, working on weightbearing more on R foot through midstance and terminal stance, and stepping bigger with L LE. Tactile cues to pelvis to disassociate pelvis from trunk. About 100 feet with quad cane, working on weightbearing more on R foot through midstance and terminal stance, and stepping bigger with L LE. Tactile cues to pelvis to disassociate pelvis from trunk.   X About 100 feet with quad cane, working on weightbearing more on R foot through midstance and terminal stance, and stepping bigger with L LE; added working on shifting R hips forward over R foot better midstance to terminal stance About 100 feet with quad cane, working on weightbearing more on R foot through midstance and terminal stance, and stepping bigger with L LE; added working on shifting R hips forward over R foot better midstance to terminal stance   pregait activity    Stepping with R LE, weight shifting with hips and shoulders over R foot. Stepping with R LE, weight shifting with hips and shoulders over R foot. THERAPEUTIC EXERCISE: (25 minutes):  Exercises per grid below to improve mobility and strength. Required minimal verbal cues to promote proper body alignment. Progressed repetitions as indicated.    Date:  3/2/2018 Date:  3/5/2018 Date   3/7/18 Date  3/12/18    Activity/Exercise Parameters Parameters      Sit to stand from chair X X       Left sidelying alternating isometrics on pelvis X X      Standing hip abduction X X      Seated LAQs        Standing hamstrings curls  X X      Bridging 2x10 with right lower extremity 2x10 with right lower extremity 2x10 with right lower extremity 2x10 with right lower extremity    Supine straight leg raise 2x10 with right lower extremity  4# 2x10 with right lower extremity 3#  2x10 with right lower extremity  2x10 with right lower extremity 3#    Supine PNF D1 and D2  2x10 each with R LE 2x10 with right lower extremity D1 and D2 2x10 with right lower extremity D1 and D2 2x10 with right lower extremity D1 and D2          L sidelying R pelvis and trunk PNF   Flexion/ext, rotation Flexion/ext, rotation    Left sidelying hip abduction        L sidelying: picking up and moving R LE from in front of L foot to behind L foot (working on hip abd and ext) and stabilizing trunk 2x10 reps  1# 2x10 reps, working on lifting highter 3# 2x10 reps, working on lifting higher 3# 2x10 reps, working on lifting higher 3#    L sidelying R clam shells 2 x 10 reps R LE  1# 2x10 reps right lower extremity 3# 2x10 reps right lower extremity 2x10 reps, working on lifting higher 3#    R foot and gastrocsoleus stretching X 8 minutes trying to get foot to neutral position X 8 minutes trying to get foot to neutral position X 8 minutes trying to get foot to neutral position X 8 minutes trying to get foot to neutral position    Standing minisquats in bars            [facility]  Treatment/Session Assessment:    · Response to Treatment:  Patient tolerated treatment without issues. Continue to progress to tolerance. · Compliance with Program/Exercises: Compliant. · Recommendations/Intent for next treatment session: \"Next visit will focus on advancements to more challenging activities\".    Total Treatment Duration:  PT Patient Time In/Time Out  Time In: 1345  Time Out: [address]

## 2018-03-12 NOTE — PROGRESS NOTES
Jacques Baker  : 1955  Primary: Raisa Palomares Ppo  Secondary:  [address]  at [facility]  [address], [address] [facility] [address], [facility] [address]  [phone]   [phone]        OUTPATIENT OCCUPATIONAL THERAPY: Daily Note 3/12/2018    ICD-10: Treatment Diagnosis: Hemiplegia and hemiparesis following cerebral infarction affecting right dominant side (I69.351)  Precautions/Allergies:   Banana; Lisinopril; and Nuts [tree nut]   Fall Risk Score: 5 (? 5 = High Risk)  MD Orders: Referral to occupational therapy MEDICAL/REFERRING DIAGNOSIS:   Cerebral infarction, unspecified [I63.9]  Weakness [R53.1]   DATE OF ONSET: 2017   REFERRING PHYSICIAN: Juliette Dela Cruz*  RETURN PHYSICIAN APPOINTMENT: unknown   3/5/18: Ms. Rosita Kinney continues to make excellent progress towards her therapy goals; she demonstrates gradual improvements in R UE range of motion, specifically R shoulder flexion and R elbow extension/flexion, and reports increased functional use of R UE in activities of daily living, including dressing, grooming, and feeding. Patient would continue to benefit from skilled occupational therapy services to maximize safety and independence and increase functional use of R UE during activities of daily living.    18: Ms. Rosita Kinney is making excellent progress toward her goals; she is demonstrating improving range of motion and functional use of the right upper extremity in activities of daily living. Feel she will continue to benefit from skilled occupational therapy to maximize safety and independence with activities of daily living. INITIAL ASSESSMENT:  Ms. Rosita Kinney presents with impaired active movement, strength, coordination and sensation of the dominant right upper extremity that is affecting her ability to complete activities of daily living independently.  Feel she may benefit from skilled occupational therapy to maximize safety and independence with activities of daily living. PLAN OF CARE:   PROBLEM LIST:  1. Decreased Strength  2. Decreased ADL/Functional Activities  3. Decreased Transfer Abilities  4. Decreased Balance  5. Decreased Flexibility/Joint Mobility  6. Decreased Cognition INTERVENTIONS PLANNED:  1. Activities of daily living training  2. Manual therapy training  3. Modalities  4. Neuromuscular re-eduation  5. Sensory reintegration training  6. Splinting  7. Therapeutic activity  8. Therapeutic exercise   TREATMENT PLAN:  Effective Dates: 12/20/17 TO 3/20/18. Frequency/Duration: 3 times a week for 12 weeks  GOALS: (Goals have been discussed and agreed upon with patient.)  Short-Term Functional Goals: Time Frame: 6 weeks  1. Patient will demonstrate independence with home exercise program for right upper extremity range of motion. Met  2. Patient will increase use of right upper extremity as a functional assist in at least 25% of daily activities. Met  3. Patient will bathe with moderate assistance. Continue to address  Discharge Goals: Time Frame: 12 weeks  1. Patient will bathe with minimal assistance. Continue to address  2. Patient will feed self with modified independence and adaptive equipment as needed. Continue to address  3. Patient will dress with modified independence and adaptive equipment as needed. Continue to address  4. Patient will use right upper extremity as a functional assist in at least 50% of daily activities. Continue to address  Rehabilitation Potential For Stated Goals: Good  Regarding Yovany Iraheta's therapy, I certify that the treatment plan above will be carried out by a therapist or under their direction. Thank you for this referral,  Jesenia Borrero OT     Referring Physician Signature: Juliette Garcia* _________________________  Date _________            The information in this section was collected on 3/5/18 (except where otherwise noted).   OCCUPATIONAL PROFILE & HISTORY:   History of Present Injury/Illness (Reason for Referral):  CVA with hospital and [facility] stay then home health therapy. Past Medical History/Comorbidities:   Ms. Meryl Fernando  has a past medical history of Anxiety; CVA (cerebral vascular accident) ([facility] [address] (2017); Depression; HTN (hypertension); Menopause; and PTE (pulmonary thromboembolism) ([facility] [address] (2017). Ms. Meryl Fernando  has a past surgical history that includes hx jennifer and bso (); hx  section; hx refractive surgery (); hx other surgical (); and hx other surgical (2017). Social History/Living Environment:      Prior Level of Function/Work/Activity:  Does seasonal taxes at [employer]  Dominant Side:         RIGHT  Current Medications:    Current Outpatient Prescriptions:     FLUoxetine (PROZAC) 20 mg tablet, Take 2 Tabs by mouth daily. , Disp: 60 Tab, Rfl: 3    methylphenidate HCl (RITALIN) 20 mg tablet, Take 1 Tab (20 mg total) by mouth daily. Max Daily Amount: 20 mg, Disp: 30 Tab, Rfl: 0    methylphenidate HCl (RITALIN) 20 mg tablet, Take 1 Tab (20 mg total) by mouth dailyEarliest Fill Date: 18. Max Daily Amount: 20 mg, Disp: 30 Tab, Rfl: 0    methylphenidate HCl (RITALIN) 20 mg tablet, Take 1 Tab (20 mg total) by mouth dailyEarliest Fill Date: 3/12/18. Max Daily Amount: 20 mg, Disp: 30 Tab, Rfl: 0    tiZANidine (ZANAFLEX) 4 mg tablet, 4mg po TID, Disp: 90 Tab, Rfl: 6    labetalol (NORMODYNE) 200 mg tablet, Take 1 Tab by mouth two (2) times a day., Disp: 60 Tab, Rfl: 5    ALPRAZolam (XANAX) 0.25 mg tablet, Take 1 Tab by mouth three (3) times daily as needed for Anxiety. Max Daily Amount: 0.75 mg., Disp: 30 Tab, Rfl: 1    rOPINIRole (REQUIP) 2 mg tablet, Take 2 mg by mouth nightly. for restless leg, Disp: , Rfl:     temazepam (RESTORIL) 15 mg capsule, Take 15 mg by mouth nightly as needed for Sleep., Disp: , Rfl:     polyethylene glycol (MIRALAX) 17 gram/dose powder, Take 17 g by mouth daily. , Disp: 510 g, Rfl: 2    losartan-hydroCHLOROthiazide (HYZAAR) 100-12.5 mg per tablet, Take 1 Tab by mouth daily. , Disp: 30 Tab, Rfl: 5            Date Last Reviewed:  3/12/2018   Complexity Level : Expanded review of therapy/medical records (1-2):  MODERATE COMPLEXITY   ASSESSMENT OF OCCUPATIONAL PERFORMANCE:   ROM:                   Balance:                   Coordination:                   Mental Status:                   Vision:                   Activities of Daily Living:           Basic ADLs (From Assessment) Complex ADLs (From Assessment)         Grooming/Bathing/Dressing Activities of Daily Living                                   Sensation:         Light touch absent distal to right elbow     Physical Skills Involved:  1. Range of Motion  2. Balance  3. Sensation  4. Fine Motor Control  5. Gross Motor Control Cognitive Skills Affected (resulting in the inability to perform in a timely and safe manner):  1. Executive Function  2. Sustained Attention  3. Divided Attention  4. Comprehension Psychosocial Skills Affected:  1. None   Number of elements that affect the Plan of Care: 5+:  HIGH COMPLEXITY   CLINICAL DECISION MAKING:   Outcome Measure: Tool Used: [address] [facility] [address] AM-[facility] Daily Activity Outpatient Short Form  Score:  Initial: 18 Most Recent: 33 (Date: 3/5/18 )   Interpretation of Tool:  Represents clinically-significant functional categories (i.e., basic and instrumental activities of daily living, fine motor activities). Score 60 59-55 54-47 46-34 32-21 20-16 15   Modifier CH CI CJ CK CL CM CN     Medical Necessity:   · Patient demonstrates good rehab potential due to higher previous functional level. Reason for Services/Other Comments:  · Patient continues to require skilled intervention due to decreased safety and independence in activities of daily living. Clinical Decision-Making Assessment: Moderately difficult to predict patient's progress at this time due to the extent of her limitations in functional movement and use of dominant right upper extremity.     Assessment process, impact of co-morbidities, assessment modification\need for assistance, and selection of interventions: Analytical Complexity:MODERATE COMPLEXITY   TREATMENT:   (In addition to Assessment/Re-Assessment sessions the following treatments were rendered)    Pre-treatment Symptoms/Complaints:  Patient states, \"My hand seems to be relaxing more today, but when I start thinking about having to relax it that's when it starts acting up. \"  Pain: Initial:   Pain Intensity 1: 0/10 Post Session:  0/10     Therapeutic Exercise: ( 35 minutes):  Exercises per grid below to improve dynamic movement of arm - right and hand - right to improve functional lifting, carrying, reaching and grasping. Required moderate visual and verbal cues to promote proper body mechanics. Progressed range, repetitions and complexity of movement as indicated.        Date:   2/19/18 Date:  2/21/18 Date:  2/23/18 Date:  2/28/18 Date:   3/2/18 Date:  3/5/18 Date:  3/7/18 Date:  3/12/18   Activity/Exercise           Shoulder shrugs AROM  2 x 10 AROM  2 x 10 with mirror for visual feedback AROM  2 x 10 with mirror for visual feedback AROM  2 x 10 with mirror for visual feedback AROM  2 x 10 with mirror for visual feedback    AROM x 5 reps AROM x 10 reps AROM x 10 reps   Scapular protraction/retraction AROM  2 x 10 AROM  2 x 10 with passive self range into elbow extension at end range AROM  1 x 10 with passive self range into elbow extension at end range    1 x10 with green theratubing seated AROM  1 x 10 with passive self range into elbow extension at end range AROM  1 x 10 with passive self range into elbow extension at end range AROM x 5 reps AROM   1 x 10 with passive self range into elbow extension at end range  1 x 10 with green theratubing  1 x 10 with blue theratubing AROM  1 x 10 with blue theratubing   Shoulder abduction      AROM x 5 reps     Elbow flexion/extension   Reviewed HEP Reviewed HEP  AROM x 5 reps     PNF D1/D2 diagonals    1 x 3 in supine       Hand helper  20 pounds x 5 reps with assist to extend fingers  20 pounds x 5 reps with assist to extend fingers    15 lbs x 5 reps with min assist to extend fingers   Tabletop skateboard   10 minutes  AROM in all available planes; AAROM into external rotation and horizontal abduction        Shoulder flexion/extension   Reviewed HEP   AROM x 5 reps     Chest press           UBE Min assist to sustain right  on handle   Level 0  5 mintues Min assist to sustain right  on handle   Level 0  5 mintues Min assist to sustain right  on handle   Level 0  6 mintues Min assist to sustain right  on handle   Level 0  5 mintues Min assist to sustain right  on handle   Level 0  5 mintues Min assist to sustain right  on handle   Level 0  5 mintues Min assist to sustain right  on handle   Level 0  6 mintues Min assist to sustain right  on handle   Level 1  5 mintues   Modified push-ups  Hands on countertop with dycem under right hand  2 x 10 Hands on countertop with dycem under right hand  2 x 10 Hands on countertop with dycem under right hand  4 x 10 Hands on countertop with dycem under right hand  3 x 10 Hands on countertop with dycem under right hand  3 x 10 Hands on countertop with dycem under right hand  3 x 10 Hands on countertop with dycem under right hand  3 x 10    Resistive clothespin  Yellow x 3 to  pieces of sponge and release in to container Attempted x 1 with max assist Yellow  1 x 10 Yellow  1 x 10  Yellow   1 x 5 pinch and release Yellow   1 x 5 pinch and release   Shoulder arc     10 tabs on small hill only with L hand assisting R hand. 10 tabs on small hill; Left hand assisted R hand grasping and releasing small tabs; used R arm independently to pull tabs over   Wrist flexion/extension      AROM x 5 reps     Punching bag   Side hook x 10 reps            Access Code: [email or web address]   URL: [email or web address]   Date: 01/19/2018   Prepared by: Sherry Huff Zavala     Exercises   Supine Shoulder Flexion PROM - 10 reps - 2 sets - 3 hold - 1x daily - 5x weekly   Push-Up on Counter - 10 reps - 2 sets - 1x daily - 5x weekly   Seated Shoulder Shrugs - 10 reps - 2 sets - 1x daily - 5x weekly   Seated Scapular Retraction - 10 reps - 2 sets - 1x daily - 5x weekly   Seated Weight Shifting with Arm Support - 10 reps - 2 sets - 1x daily - 5x weekly   Seated Shoulder Flexion Self PROM - 10 reps - 2 sets - 1x daily - 5x weekly   Supine Elbow Flexion Extension AROM - 10 reps - 2 sets - 1x daily - 5x weekly   Seated Elbow Flexion Extension AROM - 10 reps - 2 sets - 1x daily - 5x weekly     Neuromuscular Re-education: ( 10):  Exercise/activities per grid below to improve balance, coordination, kinesthetic sense and proprioception. Required moderate visual and verbal cues to promote static balance in sitting, promote coordination of right, upper extremity(s) and promote motor control of right, upper extremity(s). Patient completed lateral weight shifts sitting at edge of mat x 20 reps each onto elbow/forearms  with dynamic reach with left hand outside of base of support, using right upper extremity to maintain balance. Treatment/Session Assessment:  Mrs. Elizabeth Pham demo'd good Daryle Rubins today with use of shoulder arc, requiring no assistance to lift R arm today. Pt also demo'd improved ability to relax hand and fingers during grasp and release exercises with yellow resistive clothespin and hand helper. Pt would continue to benefit from skilled OT services to increase functional use of R arm/hand during activities of daily living. · Response to Treatment:  Mrs. Elizabeth Pham reports that she has gradually been able to use her R hand more for daily activities, however her tone prevents her from completing many of her ADLs specifically eating. Mrs. Elizabeth Pham continues to be very cooperative and motivated during therapy.  Mrs. Elizabeth Pham has now reduce her therapy visits to 2x/week in order to extend visits further into the year. · Compliance with Program/Exercises: Will assess as treatment progresses. · Recommendations/Intent for next treatment session: \"Next visit will focus on advancements to more challenging activities and reduction in assistance provided\".    Total Treatment Duration:  OT Patient Time In/Time Out  Time In: 1300  Time Out: 5[address] [facility]

## 2018-03-14 ENCOUNTER — HOSPITAL ENCOUNTER (OUTPATIENT)
Dept: PHYSICAL THERAPY | Age: 63
Discharge: HOME | End: 2018-03-14
Attending: PHYSICAL MEDICINE & REHABILITATION
Payer: COMMERCIAL

## 2018-03-14 PROCEDURE — 97112 NEUROMUSCULAR REEDUCATION: CPT

## 2018-03-14 PROCEDURE — 97110 THERAPEUTIC EXERCISES: CPT

## 2018-03-14 NOTE — PROGRESS NOTES
Stanley Nail  : 1955  Primary: Oneil Collazo Ppo  Secondary:  [address] at [place] [id number] [facility], [address] [place] [address],[address]  [phone]   [phone]          OUTPATIENT PHYSICAL THERAPY:Daily Note 3/14/2018    ICD-10: Treatment Diagnosis:   · Other abnormalities of gait and mobility (R26.89)  · Difficulty in walking, not elsewhere classified (R26.2)  Precautions/Allergies:   Banana; Lisinopril; and Nuts [tree nut]   Fall Risk Score: 5 (? 5 = High Risk)  MD Orders: Evaluate and Treat MEDICAL/REFERRING DIAGNOSIS:  Weakness due to cerebrovascular [facility]) ([facility] [I63.9, R53.1]   DATE OF ONSET: CVA: 2017  REFERRING PHYSICIAN: Juliette Miller*  RETURN PHYSICIAN APPOINTMENT: TBD     INITIAL ASSESSMENT:  Ms. Reeves [employer] presents with decreased mobility, decreased strength, decreased gait, and decreased balance secondary to CVA. After discussing with patient, she agreed she would benefit from physical therapy to improve above deficits. Please sign this plan of treatment if you concur. Thank you for the opportunity to serve this patient. Progress note on 3/12/2018:   Patient has attended twenty-four scheduled physical therapy appointments from 12/15/2017 to 3/12/2018. Patient is very motivated and compliant with therapy. Patient complaints of increased irritation in her right AFO due to increased tone. Patient is waiting on her insurance to approve Botox injections to decreased tone in right ankle. Patient would benefit from continuing skilled physical therapy to address problems and goals. Thank you for this referral.    PROBLEM LIST (Impacting functional limitations):  1. Decreased Strength  2. Decreased Ambulation Ability/Technique  3. Decreased Balance  4. Decreased Activity Tolerance INTERVENTIONS PLANNED:  1. Balance Exercise  2. Gait Training  3. Home Exercise Program (HEP)  4. Neuromuscular Re-education/Strengthening  5.  Range of Motion (ROM)  6. Therapeutic Exercise/Strengthening   TREATMENT PLAN:  Effective Dates: 2/12/2018 TO 5/7/2018. Frequency/Duration: 2-3 times a week for 12 weeks  GOALS: (Goals have been discussed and agreed upon with patient.)  Short-Term Functional Goals: Time Frame: 3 weeks  1. Patient will be independent with home exercise program without exacerbation of symptoms or cueing needed. Goal met. Discharge Goals: Time Frame: 8 weeks  1. Patient will be independent with all ADLs with minimal fear of falling and loss of balance with daily tasks. Goal ongoing. 2. Patient will report no fear avoidance with social or recreational activities due to fear of falling. Goal ongoing. 3. Patient will score greater than or equal to 45/56 on Gipson Balance Scale with minimal effect of imbalance on patient's ability to manage every day life activities. Goal ongoing. Rehabilitation Potential For Stated Goals: Good  Regarding Juan Diego Alvaradodwin's therapy, I certify that the treatment plan above will be carried out by a therapist or under their direction. Thank you for this referral,  Kaiden Rubalcava PT     Referring Physician Signature: Juliette Parker*              Date                    The information in this section was collected on 12/15/2017 (except where otherwise noted). HISTORY:   History of Present Injury/Illness (Reason for Referral):  Patient reports she had a severe stroke on 9/6/2017. She reports that she was in the hospital and [facility] for about 61-62 days. She reports that she has progressed really well, but is still having trouble walking and using her right side (UE and LE). She reports that she walks using her AFO and quad cane all the time. She reports that she tries to be as active as possible. She reports that she is scared that she is going to fall, even though she has not.   She reports she would like to work on strengthening her right leg so her balance is better and she can walk without fear of falling. Past Medical History/Comorbidities:   Ms. Radha Celaya  has a past medical history of Anxiety; CVA (cerebral vascular accident) ([facility] (2017); Depression; HTN (hypertension); Menopause; and PTE (pulmonary thromboembolism) ([facility] (2017). Ms. Radha Celaya  has a past surgical history that includes hx jennifer and bso (); hx  section; hx refractive surgery (); hx other surgical (); and hx other surgical (). Social History/Living Environment:   Home Environment: Private residence  Living Alone: No  Support Systems: Family member(s), Friends \ neighbors; Spouse  Prior Level of Function/Work/Activity:  Independent  Dominant Side:         RIGHT  Personal Factors:          Sex:  female        Age:  60 y.o. Current Medications:       Current Outpatient Prescriptions:     FLUoxetine (PROZAC) 20 mg tablet, Take 2 Tabs by mouth daily. , Disp: 60 Tab, Rfl: 3    methylphenidate HCl (RITALIN) 20 mg tablet, Take 1 Tab (20 mg total) by mouth daily. Max Daily Amount: 20 mg, Disp: 30 Tab, Rfl: 0    methylphenidate HCl (RITALIN) 20 mg tablet, Take 1 Tab (20 mg total) by mouth dailyEarliest Fill Date: 18. Max Daily Amount: 20 mg, Disp: 30 Tab, Rfl: 0    methylphenidate HCl (RITALIN) 20 mg tablet, Take 1 Tab (20 mg total) by mouth dailyEarliest Fill Date: 3/12/18. Max Daily Amount: 20 mg, Disp: 30 Tab, Rfl: 0    tiZANidine (ZANAFLEX) 4 mg tablet, 4mg po TID, Disp: 90 Tab, Rfl: 6    labetalol (NORMODYNE) 200 mg tablet, Take 1 Tab by mouth two (2) times a day., Disp: 60 Tab, Rfl: 5    ALPRAZolam (XANAX) 0.25 mg tablet, Take 1 Tab by mouth three (3) times daily as needed for Anxiety. Max Daily Amount: 0.75 mg., Disp: 30 Tab, Rfl: 1    rOPINIRole (REQUIP) 2 mg tablet, Take 2 mg by mouth nightly.  for restless leg, Disp: , Rfl:     temazepam (RESTORIL) 15 mg capsule, Take 15 mg by mouth nightly as needed for Sleep., Disp: , Rfl:     polyethylene glycol (MIRALAX) 17 gram/dose powder, Take 17 g by mouth daily., Disp: 510 g, Rfl: 2    losartan-hydroCHLOROthiazide (HYZAAR) 100-12.5 mg per tablet, Take 1 Tab by mouth daily. , Disp: 30 Tab, Rfl: 5   Date Last Reviewed:  3/14/2018    Number of Personal Factors/Comorbidities that affect the Plan of Care: 1-2: MODERATE COMPLEXITY   EXAMINATION:   Observation/Orthostatic Postural Assessment:          Posture Assessment: Rounded shoulders, Forward head   Functional Mobility:         Gait/Ambulation:     Speed/Lulu: Pace decreased (<100 feet/min)  Step Length: Left shortened, Right lengthened  Swing Pattern: Left asymmetrical, Right asymmetrical  Stance: Left increased, Right decreased  Gait Abnormalities: Antalgic, Circumduction, Foot drop, Path deviations, Trendelenburg  Ambulation - Level of Assistance: Modified independent  Assistive Device: Cane, quad  · Interventions: Verbal cues, Safety awareness training          Transfers:     Sit to Stand: Independent  Stand to Sit: Independent  Stand Pivot Transfers: Independent  · Bed to Chair: Independent          Bed Mobility:     Rolling: Independent  Supine to Sit: Independent  Sit to Supine: Independent  · Scooting: Independent    Strength:          L UE STRENGTH: 4/5 shoulder flexion, 4/5 shoulder abduction, 4/5 shoulder extension, 4/5 elbow flexion, 4/5 elbow extension. R UE STRENGTH: 2/5 shoulder flexion, 2/5 shoulder abduction, 2/5 shoulder extension, 2/5 elbow flexion, 2/5 elbow extension. R LE STRENGTH:  4+/5 hip flexion, 4+/5 hip abduction, 4+/5 hip adduction, 4+/5 hip extension, 4+/5 knee extension, 4+/5 knee flexion, 1/5 ankle dorsiflexion, 1/5 ankle plantarflexion, 1/5 ankle inversion, 1/5 ankle eversion. Sensation:         Intact for light touch and proprioception  Postural Control & Balance:  · Gipson Balance Scale:  33/56.   (A score less than 45/56 indicates high risk of falls)  . Score improved from 28/56 on initial evaluation. Body Structures Involved:  1. Nerves  2.  Muscles Body Functions Affected:  1. Neuromusculoskeletal  2. Movement Related Activities and Participation Affected:  1. Mobility  2. Self Care   Number of elements (examined above) that affect the Plan of Care: 4+: HIGH COMPLEXITY   CLINICAL PRESENTATION:   Presentation: Evolving clinical presentation with changing clinical characteristics: MODERATE COMPLEXITY   CLINICAL DECISION MAKING:   Outcome Measure: Tool Used: Gipson Balance Scale  Score:  Initial: 28/56 Most Recent: 33/56 (Date: 3- )   Interpretation of Score: Each section is scored on a 0-4 scale, 0 representing the patients inability to perform the task and 4 representing independence. The scores of each section are added together for a total score of 56. The higher the patients score, the more independent the patient is. Any score below 45 indicates increased risk for falls. Score 56 55-45 44-34 33-23 22-12 11-1 0   Modifier CH CI CJ CK CL CM CN     Medical Necessity:   · Patient is expected to demonstrate progress in strength, range of motion, balance and coordination to improve safety during daily activities. · Patient demonstrates good rehab potential due to higher previous functional level. · Skilled intervention continues to be required due to decreased mobility. Reason for Services/Other Comments:  · Patient continues to demonstrate capacity to improve overall mobility which will increase independence and increase safety. Use of outcome tool(s) and clinical judgement create a POC that gives a: Questionable prediction of patient's progress: MODERATE COMPLEXITY            TREATMENT:   (In addition to Assessment/Re-Assessment sessions the following treatments were rendered)  Pre-treatment Symptoms/Complaints:  3/14/2018: \"Doing well. I found a new brace to wear on my foot and it feels better. \"  Pain: Initial:    Post Session:  0/10     NEUROMUSCULAR RE-EDUCATION: (20 minutes):  Exercise/activities per grid below to improve balance, coordination, kinesthetic sense, posture and proprioception. Required minimal verbal and manual cues to promote static and dynamic balance in standing, promote coordination of bilateral, lower extremity(s) and promote motor control of bilateral, lower extremity(s). Date:  2/19/2018 Date   2/23/18 Date   3/5/2018 Date  3/7/18 Date  3/12/18 Date   3/14/18   Activity/Exercise Parameters        Stepping over half foam  2x10 reps with left LE, working on weightbearing through R LE and keeping R foot flat on floor (foot tends to invert) X 2x10 reps with left LE, working on weightbearing through R LE and keeping R foot flat on floor (foot tends to invert) 2x10 reps with left LE, working on weightbearing through R LE and keeping R foot flat on floor  2x10 reps with left LE, working on weightbearing through R LE and keeping R foot flat on floor 2x10 reps with left LE, working on weightbearing through R LE and keeping R foot flat on floor   Step taps X X X Onto 6 inch step with L LE working on weight bearing through R LE Onto 6 inch step with L LE working on weight bearing through R LE Onto 6 inch step with L LE working on weight bearing through R LE   Step ups 6 inch  x10 leading with right LE working on weight bearing through R LE 6 inch  x10 leading with L LE working on weight bearing through R LE 6 inch  x10 leading with L LE working on weight bearing through R LE 6 inch  x10 leading with L LE working on weight bearing through R LE 6 inch  x10 leading with L LE working on weight bearing through R LE 6 inch  x10 leading with L LE working on weight bearing through [address] X        Walking in the clinic with quad cane About 100 feet with quad cane, working on weightbearing more on R foot through midstance and terminal stance, and stepping bigger with L LE. Tactile cues to pelvis to disassociate pelvis from trunk.   About 100 feet with quad cane, working on weightbearing more on R foot through midstance and terminal stance, and stepping bigger with L LE. Tactile cues to pelvis to disassociate pelvis from trunk. X About 100 feet with quad cane, working on weightbearing more on R foot through midstance and terminal stance, and stepping bigger with L LE; added working on shifting R hips forward over R foot better midstance to terminal stance About 100 feet with quad cane, working on weightbearing more on R foot through midstance and terminal stance, and stepping bigger with L LE; added working on shifting R hips forward over R foot better midstance to terminal stance About 100 feet with quad cane, working on weightbearing more on R foot through midstance and terminal stance, and stepping bigger with L LE; added working on shifting R hips forward over R foot better midstance to terminal stance   pregait activity    Stepping with R LE, weight shifting with hips and shoulders over R foot. Stepping with R LE, weight shifting with hips and shoulders over R foot. Stepping with R LE, weight shifting with hips and shoulders over R foot. THERAPEUTIC EXERCISE: (25 minutes):  Exercises per grid below to improve mobility and strength. Required minimal verbal cues to promote proper body alignment. Progressed repetitions as indicated.    Date:  3/2/2018 Date:  3/5/2018 Date   3/7/18 Date  3/12/18 Date   3/14/18   Activity/Exercise Parameters Parameters      Sit to stand from chair X X       Left sidelying alternating isometrics on pelvis X X      Standing hip abduction X X      Seated LAQs        Standing hamstrings curls  X X      Bridging 2x10 with right lower extremity 2x10 with right lower extremity 2x10 with right lower extremity 2x10 with right lower extremity 2x10 with right lower extremity   Supine straight leg raise 2x10 with right lower extremity  4# 2x10 with right lower extremity 3#  2x10 with right lower extremity  2x10 with right lower extremity 3# 2x10 with right lower extremity 3#   Supine PNF D1 and D2  2x10 each with R LE 2x10 with right lower extremity D1 and D2 2x10 with right lower extremity D1 and D2 2x10 with right lower extremity D1 and D2       2x10 with right lower extremity D1 and D2   L sidelying R pelvis and trunk PNF   Flexion/ext, rotation Flexion/ext, rotation Flexion/ext, rotation   Left sidelying hip abduction        L sidelying: picking up and moving R LE from in front of L foot to behind L foot (working on hip abd and ext) and stabilizing trunk 2x10 reps  1# 2x10 reps, working on lifting highter 3# 2x10 reps, working on lifting higher 3# 2x10 reps, working on lifting higher 3# 2x10 reps, working on lifting higher 3#   L sidelying R clam shells 2 x 10 reps R LE  1# 2x10 reps right lower extremity 3# 2x10 reps right lower extremity 2x10 reps, working on lifting higher 3# 2x10 reps, working on lifting higher 3#   R foot and gastrocsoleus stretching X 8 minutes trying to get foot to neutral position X 8 minutes trying to get foot to neutral position X 8 minutes trying to get foot to neutral position X 8 minutes trying to get foot to neutral position X 10 minutes trying to get foot to neutral position   Standing minisquats in bars            [facility] Portal  Treatment/Session Assessment:    · Response to Treatment:  Patient tolerated treatment without issues. Working on step through gait pattern. Patient is able to demonstrate better step through gait with bar than with cane. Continue to progress to tolerance. · Compliance with Program/Exercises: Compliant. · Recommendations/Intent for next treatment session: \"Next visit will focus on advancements to more challenging activities\".    Total Treatment Duration:       Brittney Hernandez, PT

## 2018-03-14 NOTE — PROGRESS NOTES
Sanket Marquez  : 1955  Primary: Owen Carranza Ppo  Secondary:  [address]  [facility] [address] [facility] [address], [facility].  [phone]   [phone]        OUTPATIENT OCCUPATIONAL THERAPY: Daily Note 3/14/2018    ICD-10: Treatment Diagnosis: Hemiplegia and hemiparesis following cerebral infarction affecting right dominant side (I69.351)  Precautions/Allergies:   Banana; Lisinopril; and Nuts [tree nut]   Fall Risk Score: 5 (? 5 = High Risk)  MD Orders: Referral to occupational therapy MEDICAL/REFERRING DIAGNOSIS:   Cerebral infarction, unspecified [I63.9]  Weakness [R53.1]   DATE OF ONSET: 2017   REFERRING PHYSICIAN: Juliette Cao*  RETURN PHYSICIAN APPOINTMENT: unknown   3/5/18: Ms. Keegan Coe continues to make excellent progress towards her therapy goals; she demonstrates gradual improvements in R UE range of motion, specifically R shoulder flexion and R elbow extension/flexion, and reports increased functional use of R UE in activities of daily living, including dressing, grooming, and feeding. Patient would continue to benefit from skilled occupational therapy services to maximize safety and independence and increase functional use of R UE during activities of daily living.    18: Ms. Keegan Coe is making excellent progress toward her goals; she is demonstrating improving range of motion and functional use of the right upper extremity in activities of daily living. Feel she will continue to benefit from skilled occupational therapy to maximize safety and independence with activities of daily living. INITIAL ASSESSMENT:  Ms. Keegan Coe presents with impaired active movement, strength, coordination and sensation of the dominant right upper extremity that is affecting her ability to complete activities of daily living independently.  Feel she may benefit from skilled occupational therapy to maximize safety and independence with activities of daily living. PLAN OF CARE:   PROBLEM LIST:  1. Decreased Strength  2. Decreased ADL/Functional Activities  3. Decreased Transfer Abilities  4. Decreased Balance  5. Decreased Flexibility/Joint Mobility  6. Decreased Cognition INTERVENTIONS PLANNED:  1. Activities of daily living training  2. Manual therapy training  3. Modalities  4. Neuromuscular re-eduation  5. Sensory reintegration training  6. Splinting  7. Therapeutic activity  8. Therapeutic exercise   TREATMENT PLAN:  Effective Dates: 12/20/17 TO 3/20/18. Frequency/Duration: 3 times a week for 12 weeks  GOALS: (Goals have been discussed and agreed upon with patient.)  Short-Term Functional Goals: Time Frame: 6 weeks  1. Patient will demonstrate independence with home exercise program for right upper extremity range of motion. Met  2. Patient will increase use of right upper extremity as a functional assist in at least 25% of daily activities. Met  3. Patient will bathe with moderate assistance. Continue to address  Discharge Goals: Time Frame: 12 weeks  1. Patient will bathe with minimal assistance. Continue to address  2. Patient will feed self with modified independence and adaptive equipment as needed. Continue to address  3. Patient will dress with modified independence and adaptive equipment as needed. Continue to address  4. Patient will use right upper extremity as a functional assist in at least 50% of daily activities. Continue to address  Rehabilitation Potential For Stated Goals: Good  Regarding Mary Iraheta's therapy, I certify that the treatment plan above will be carried out by a therapist or under their direction. Thank you for this referral,  Ina Bradford OT     Referring Physician Signature: Juliette Anglin* _________________________  Date _________            The information in this section was collected on 3/5/18 (except where otherwise noted).   OCCUPATIONAL PROFILE & HISTORY:   History of Present Injury/Illness (Reason for Referral):  CVA with hospital and [facility] stay then home health therapy. Past Medical History/Comorbidities:   Ms. Enrike Walker  has a past medical history of Anxiety; CVA (cerebral vascular accident) ([facility] (2017); Depression; HTN (hypertension); Menopause; and PTE (pulmonary thromboembolism) ([facility] [address] (2017). Ms. Enrike Walker  has a past surgical history that includes hx jennifer and bso (); hx  section; hx refractive surgery (); hx other surgical (); and hx other surgical (2017). Social History/Living Environment:      Prior Level of Function/Work/Activity:  Does seasonal taxes at [employer]  Dominant Side:         RIGHT  Current Medications:    Current Outpatient Prescriptions:     FLUoxetine (PROZAC) 20 mg tablet, Take 2 Tabs by mouth daily. , Disp: 60 Tab, Rfl: 3    methylphenidate HCl (RITALIN) 20 mg tablet, Take 1 Tab (20 mg total) by mouth daily. Max Daily Amount: 20 mg, Disp: 30 Tab, Rfl: 0    methylphenidate HCl (RITALIN) 20 mg tablet, Take 1 Tab (20 mg total) by mouth dailyEarliest Fill Date: 18. Max Daily Amount: 20 mg, Disp: 30 Tab, Rfl: 0    methylphenidate HCl (RITALIN) 20 mg tablet, Take 1 Tab (20 mg total) by mouth dailyEarliest Fill Date: 3/12/18. Max Daily Amount: 20 mg, Disp: 30 Tab, Rfl: 0    tiZANidine (ZANAFLEX) 4 mg tablet, 4mg po TID, Disp: 90 Tab, Rfl: 6    labetalol (NORMODYNE) 200 mg tablet, Take 1 Tab by mouth two (2) times a day., Disp: 60 Tab, Rfl: 5    ALPRAZolam (XANAX) 0.25 mg tablet, Take 1 Tab by mouth three (3) times daily as needed for Anxiety. Max Daily Amount: 0.75 mg., Disp: 30 Tab, Rfl: 1    rOPINIRole (REQUIP) 2 mg tablet, Take 2 mg by mouth nightly. for restless leg, Disp: , Rfl:     temazepam (RESTORIL) 15 mg capsule, Take 15 mg by mouth nightly as needed for Sleep., Disp: , Rfl:     polyethylene glycol (MIRALAX) 17 gram/dose powder, Take 17 g by mouth daily. , Disp: 510 g, Rfl: 2    losartan-hydroCHLOROthiazide (HYZAAR) 100-12.5 mg per tablet, Take 1 Tab by mouth daily. , Disp: 30 Tab, Rfl: 5            Date Last Reviewed:  3/14/2018   Complexity Level : Expanded review of therapy/medical records (1-2):  MODERATE COMPLEXITY   ASSESSMENT OF OCCUPATIONAL PERFORMANCE:   ROM:                   Balance:                   Coordination:                   Mental Status:                   Vision:                   Activities of Daily Living:           Basic ADLs (From Assessment) Complex ADLs (From Assessment)         Grooming/Bathing/Dressing Activities of Daily Living                                   Sensation:         Light touch absent distal to right elbow     Physical Skills Involved:  1. Range of Motion  2. Balance  3. Sensation  4. Fine Motor Control  5. Gross Motor Control Cognitive Skills Affected (resulting in the inability to perform in a timely and safe manner):  1. Executive Function  2. Sustained Attention  3. Divided Attention  4. Comprehension Psychosocial Skills Affected:  1. None   Number of elements that affect the Plan of Care: 5+:  HIGH COMPLEXITY   CLINICAL DECISION MAKING:   Outcome Measure: Tool Used: [address] [facility] [address] AM-[facility] Daily Activity Outpatient Short Form  Score:  Initial: 18 Most Recent: 33 (Date: 3/5/18 )   Interpretation of Tool:  Represents clinically-significant functional categories (i.e., basic and instrumental activities of daily living, fine motor activities). Score 60 59-55 54-47 46-34 32-21 20-16 15   Modifier CH CI CJ CK CL CM CN     Medical Necessity:   · Patient demonstrates good rehab potential due to higher previous functional level. Reason for Services/Other Comments:  · Patient continues to require skilled intervention due to decreased safety and independence in activities of daily living. Clinical Decision-Making Assessment: Moderately difficult to predict patient's progress at this time due to the extent of her limitations in functional movement and use of dominant right upper extremity.     Assessment process, impact of co-morbidities, assessment modification\need for assistance, and selection of interventions: Analytical Complexity:MODERATE COMPLEXITY   TREATMENT:   (In addition to Assessment/Re-Assessment sessions the following treatments were rendered)    Pre-treatment Symptoms/Complaints:  Patient states, \"My hand seems to be more stiff today than usual.\"  Pain: Initial:   Pain Intensity 1: 0/10 Post Session:  0/10     Therapeutic Exercise: ( 35 minutes):  Exercises per grid below to improve dynamic movement of arm - right and hand - right to improve functional lifting, carrying, reaching and grasping. Required moderate visual and verbal cues to promote proper body mechanics. Progressed range, repetitions and complexity of movement as indicated.        Date:  2/21/18 Date:  2/23/18 Date:  2/28/18 Date:   3/2/18 Date:  3/5/18 Date:  3/7/18 Date:  3/12/18 Date:  3/14/18   Activity/Exercise           Shoulder shrugs AROM  2 x 10 with mirror for visual feedback AROM  2 x 10 with mirror for visual feedback AROM  2 x 10 with mirror for visual feedback AROM  2 x 10 with mirror for visual feedback    AROM x 5 reps AROM x 10 reps AROM x 10 reps AROM x 10 reps with mirror for visual feedback   Scapular protraction/retraction AROM  2 x 10 with passive self range into elbow extension at end range AROM  1 x 10 with passive self range into elbow extension at end range    1 x10 with green theratubing seated AROM  1 x 10 with passive self range into elbow extension at end range AROM  1 x 10 with passive self range into elbow extension at end range AROM x 5 reps AROM   1 x 10 with passive self range into elbow extension at end range  1 x 10 with green theratubing  1 x 10 with blue theratubing AROM  1 x 10 with blue theratubing AROM  1 x 10 with passive self range into elbow extension at end range   Shoulder abduction     AROM x 5 reps      Elbow flexion/extension  Reviewed HEP Reviewed HEP  AROM x 5 reps      PNF D1/D2 diagonals   1 x 3 in supine        Hand helper 20 pounds x 5 reps with assist to extend fingers  20 pounds x 5 reps with assist to extend fingers    15 lbs x 5 reps with min assist to extend fingers    Tabletop skateboard  10 minutes  AROM in all available planes; AAROM into external rotation and horizontal abduction         Shoulder flexion/extension  Reviewed HEP   AROM x 5 reps      Chest press           UBE Min assist to sustain right  on handle   Level 0  5 mintues Min assist to sustain right  on handle   Level 0  6 mintues Min assist to sustain right  on handle   Level 0  5 mintues Min assist to sustain right  on handle   Level 0  5 mintues Min assist to sustain right  on handle   Level 0  5 mintues Min assist to sustain right  on handle   Level 0  6 mintues Min assist to sustain right  on handle   Level 1  5 mintues Min assist to sustain right  on handle   Level 0  6 mintues   Modified push-ups  Hands on countertop with dycem under right hand  2 x 10 Hands on countertop with dycem under right hand  4 x 10 Hands on countertop with dycem under right hand  3 x 10 Hands on countertop with dycem under right hand  3 x 10 Hands on countertop with dycem under right hand  3 x 10 Hands on countertop with dycem under right hand  3 x 10  Hands on countertop with dycem under right hand  2 x 10   Resistive clothespin Yellow x 3 to  pieces of sponge and release in to container Attempted x 1 with max assist Yellow  1 x 10 Yellow  1 x 10  Yellow   1 x 5 pinch and release Yellow   1 x 5 pinch and release Yellow   1 x 5 pinch and release   Shoulder arc    10 tabs on small hill only with L hand assisting R hand.    10 tabs on small hill; Left hand assisted R hand grasping and releasing small tabs; used R arm independently to pull tabs over    Wrist flexion/extension     AROM x 5 reps      Punching bag  Side hook x 10 reps             Access Code: [email or web address]   URL: [email or web address]   Date: 01/19/2018   Prepared by: Juwan Francis     Exercises   Supine Shoulder Flexion PROM - 10 reps - 2 sets - 3 hold - 1x daily - 5x weekly   Push-Up on Counter - 10 reps - 2 sets - 1x daily - 5x weekly   Seated Shoulder Shrugs - 10 reps - 2 sets - 1x daily - 5x weekly   Seated Scapular Retraction - 10 reps - 2 sets - 1x daily - 5x weekly   Seated Weight Shifting with Arm Support - 10 reps - 2 sets - 1x daily - 5x weekly   Seated Shoulder Flexion Self PROM - 10 reps - 2 sets - 1x daily - 5x weekly   Supine Elbow Flexion Extension AROM - 10 reps - 2 sets - 1x daily - 5x weekly   Seated Elbow Flexion Extension AROM - 10 reps - 2 sets - 1x daily - 5x weekly     Neuromuscular Re-education: ( 10):  Exercise/activities per grid below to improve balance, coordination, kinesthetic sense and proprioception. Required moderate visual and verbal cues to promote static balance in sitting, promote coordination of right, upper extremity(s) and promote motor control of right, upper extremity(s). Patient completed lateral weight shifts sitting at edge of mat x 20 reps each onto elbow/forearms  with dynamic reach with left hand outside of base of support, using right upper extremity to maintain balance. Treatment/Session Assessment:  Mrs. Meryl Fernando demo'd good lateral weight shift with dynamic reach today, requiring less verbal cueing to achieve correct positioning. Pt continues to demo' gradual improvements in ability to pinch and release resistive clothespin. Pt would continue to benefit from skilled OT services to improve functional use of B UEs during ADLs. · Response to Treatment: Mrs. Meryl Fernando continues to be very cooperative and motivated during therapy. She tolerated treatment well today without any issues. · Compliance with Program/Exercises: Will assess as treatment progresses. · Recommendations/Intent for next treatment session:  \"Next visit will focus on advancements to more challenging activities and reduction in assistance provided\". Therapy re-certification at next visit.    Total Treatment Duration:  OT Patient Time In/Time Out  Time In: 1400  Time Out: [facility]

## 2018-03-16 ENCOUNTER — APPOINTMENT (OUTPATIENT)
Dept: PHYSICAL THERAPY | Age: 63
End: 2018-03-16
Attending: PHYSICAL MEDICINE & REHABILITATION
Payer: COMMERCIAL

## 2018-03-19 ENCOUNTER — HOSPITAL ENCOUNTER (OUTPATIENT)
Dept: PHYSICAL THERAPY | Age: 63
Discharge: HOME | End: 2018-03-19
Attending: PHYSICAL MEDICINE & REHABILITATION
Payer: COMMERCIAL

## 2018-03-19 PROCEDURE — 97112 NEUROMUSCULAR REEDUCATION: CPT

## 2018-03-19 PROCEDURE — 97110 THERAPEUTIC EXERCISES: CPT

## 2018-03-19 NOTE — THERAPY RECERTIFICATION
Neisha Reinoso  : 1955  Primary: Hilda Walker Ppo  Secondary:  [address]  at [place] [id number] [facility], [address] [place] [address],[address], [facility] [address]  [phone]   [phone]        OUTPATIENT OCCUPATIONAL THERAPY: Daily Note and Recertification 3/21/3369    ICD-10: Treatment Diagnosis: Hemiplegia and hemiparesis following cerebral infarction affecting right dominant side (I69.351)  Precautions/Allergies:   Banana; Lisinopril; and Nuts [tree nut]   Fall Risk Score: 5 (? 5 = High Risk)  MD Orders: Referral to occupational therapy MEDICAL/REFERRING DIAGNOSIS:   Cerebral infarction, unspecified [I63.9]  Weakness [R53.1]   DATE OF ONSET: 2017   REFERRING PHYSICIAN: Juliette Belle*  RETURN PHYSICIAN APPOINTMENT: unknown   3/5/18: Ms. Tyson Sinclair continues to make excellent progress towards her therapy goals; she demonstrates gradual improvements in R UE range of motion, specifically R shoulder flexion and R elbow extension/flexion, and reports increased functional use of R UE in activities of daily living, including dressing, grooming, and feeding. Patient would continue to benefit from skilled occupational therapy services to maximize safety and independence and increase functional use of R UE during activities of daily living.    18: Ms. Tyson Sinclair is making excellent progress toward her goals; she is demonstrating improving range of motion and functional use of the right upper extremity in activities of daily living. Feel she will continue to benefit from skilled occupational therapy to maximize safety and independence with activities of daily living. INITIAL ASSESSMENT:  Ms. Tyson Sinclair presents with impaired active movement, strength, coordination and sensation of the dominant right upper extremity that is affecting her ability to complete activities of daily living independently.  Feel she may benefit from skilled occupational therapy to maximize safety and independence with activities of daily living. PLAN OF CARE:   PROBLEM LIST:  1. Decreased Strength  2. Decreased ADL/Functional Activities  3. Decreased Transfer Abilities  4. Decreased Balance  5. Decreased Flexibility/Joint Mobility  6. Decreased Cognition INTERVENTIONS PLANNED:  1. Activities of daily living training  2. Manual therapy training  3. Modalities  4. Neuromuscular re-eduation  5. Sensory reintegration training  6. Splinting  7. Therapeutic activity  8. Therapeutic exercise   TREATMENT PLAN:  Effective Dates: 3/19/18 to 6/17/18. Frequency/Duration: 2 times a week for 12 weeks  GOALS: (Goals have been discussed and agreed upon with patient.)  Short-Term Functional Goals: Time Frame: 6 weeks  1. Patient will demonstrate independence with home exercise program for right upper extremity range of motion. Met  2. Patient will increase use of right upper extremity as a functional assist in at least 25% of daily activities. Met  3. Patient will bathe with moderate assistance. Continue to address  Discharge Goals: Time Frame: 12 weeks  1. Patient will bathe with minimal assistance. Continue to address  2. Patient will feed self with modified independence and adaptive equipment as needed. Continue to address  3. Patient will dress with modified independence and adaptive equipment as needed. Continue to address  4. Patient will use right upper extremity as a functional assist in at least 50% of daily activities. Continue to address  Rehabilitation Potential For Stated Goals: Good  Regarding Cam Iraheta's therapy, I certify that the treatment plan above will be carried out by a therapist or under their direction. Thank you for this referral,  Brice Patricia, OT     Referring Physician Signature: Juliette Belle* _________________________  Date _________            The information in this section was collected on 3/5/18 (except where otherwise noted).   OCCUPATIONAL PROFILE & HISTORY: History of Present Injury/Illness (Reason for Referral):  CVA with hospital and [facility] stay then home health therapy. Past Medical History/Comorbidities:   Ms. Marta Kulkarni  has a past medical history of Anxiety; CVA (cerebral vascular accident) ([facility] [address] (2017); Depression; HTN (hypertension); Menopause; and PTE (pulmonary thromboembolism) ([facility] [address] (2017). Ms. Marta Kulkarni  has a past surgical history that includes hx jennifer and bso (); hx  section; hx refractive surgery (); hx other surgical (); and hx other surgical (). Social History/Living Environment:      Prior Level of Function/Work/Activity:  Does seasonal taxes at [employer]  Dominant Side:         RIGHT  Current Medications:    Current Outpatient Prescriptions:     FLUoxetine (PROZAC) 20 mg tablet, Take 2 Tabs by mouth daily. , Disp: 60 Tab, Rfl: 3    methylphenidate HCl (RITALIN) 20 mg tablet, Take 1 Tab (20 mg total) by mouth daily. Max Daily Amount: 20 mg, Disp: 30 Tab, Rfl: 0    methylphenidate HCl (RITALIN) 20 mg tablet, Take 1 Tab (20 mg total) by mouth dailyEarliest Fill Date: 18. Max Daily Amount: 20 mg, Disp: 30 Tab, Rfl: 0    methylphenidate HCl (RITALIN) 20 mg tablet, Take 1 Tab (20 mg total) by mouth dailyEarliest Fill Date: 3/12/18. Max Daily Amount: 20 mg, Disp: 30 Tab, Rfl: 0    tiZANidine (ZANAFLEX) 4 mg tablet, 4mg po TID, Disp: 90 Tab, Rfl: 6    labetalol (NORMODYNE) 200 mg tablet, Take 1 Tab by mouth two (2) times a day., Disp: 60 Tab, Rfl: 5    ALPRAZolam (XANAX) 0.25 mg tablet, Take 1 Tab by mouth three (3) times daily as needed for Anxiety. Max Daily Amount: 0.75 mg., Disp: 30 Tab, Rfl: 1    rOPINIRole (REQUIP) 2 mg tablet, Take 2 mg by mouth nightly. for restless leg, Disp: , Rfl:     temazepam (RESTORIL) 15 mg capsule, Take 15 mg by mouth nightly as needed for Sleep., Disp: , Rfl:     polyethylene glycol (MIRALAX) 17 gram/dose powder, Take 17 g by mouth daily. , Disp: 510 g, Rfl: 2   losartan-hydroCHLOROthiazide (HYZAAR) 100-12.5 mg per tablet, Take 1 Tab by mouth daily. , Disp: 30 Tab, Rfl: 5            Date Last Reviewed:  3/19/2018   Complexity Level : Expanded review of therapy/medical records (1-2):  MODERATE COMPLEXITY   ASSESSMENT OF OCCUPATIONAL PERFORMANCE:   ROM:                   Balance:                   Coordination:                   Mental Status:                   Vision:                   Activities of Daily Living:           Basic ADLs (From Assessment) Complex ADLs (From Assessment)         Grooming/Bathing/Dressing Activities of Daily Living                                   Sensation:         Light touch absent distal to right elbow     Physical Skills Involved:  1. Range of Motion  2. Balance  3. Sensation  4. Fine Motor Control  5. Gross Motor Control Cognitive Skills Affected (resulting in the inability to perform in a timely and safe manner):  1. Executive Function  2. Sustained Attention  3. Divided Attention  4. Comprehension Psychosocial Skills Affected:  1. None   Number of elements that affect the Plan of Care: 5+:  HIGH COMPLEXITY   CLINICAL DECISION MAKING:   Outcome Measure: Tool Used: [address] [facility] [address] [facility] Daily Activity Outpatient Short Form  Score:  Initial: 18 Most Recent: 33 (Date: 3/5/18 )   Interpretation of Tool:  Represents clinically-significant functional categories (i.e., basic and instrumental activities of daily living, fine motor activities). Score 60 59-55 54-47 46-34 32-21 20-16 15   Modifier CH CI CJ CK CL CM CN     Medical Necessity:   · Patient demonstrates good rehab potential due to higher previous functional level. Reason for Services/Other Comments:  · Patient continues to require skilled intervention due to decreased safety and independence in activities of daily living.   Clinical Decision-Making Assessment: Moderately difficult to predict patient's progress at this time due to the extent of her limitations in functional movement and use of dominant right upper extremity. Assessment process, impact of co-morbidities, assessment modification\need for assistance, and selection of interventions: Analytical Complexity:MODERATE COMPLEXITY   TREATMENT:   (In addition to Assessment/Re-Assessment sessions the following treatments were rendered)    Pre-treatment Symptoms/Complaints:  Patient states, \"I got my dates for my Botox injection. I'm scheduled for April 17th. \"  Pain: Initial:   Pain Intensity 1: 0/10 Post Session:  0/10     Therapeutic Exercise: ( 35 minutes):  Exercises per grid below to improve dynamic movement of arm - right and hand - right to improve functional lifting, carrying, reaching and grasping. Required moderate visual and verbal cues to promote proper body mechanics. Progressed range, repetitions and complexity of movement as indicated.        Date:  2/23/18 Date:  2/28/18 Date:   3/2/18 Date:  3/5/18 Date:  3/7/18 Date:  3/12/18 Date:  3/14/18 Date:  3/19/18   Activity/Exercise           Shoulder shrugs AROM  2 x 10 with mirror for visual feedback AROM  2 x 10 with mirror for visual feedback AROM  2 x 10 with mirror for visual feedback    AROM x 5 reps AROM x 10 reps AROM x 10 reps AROM x 10 reps with mirror for visual feedback AROM x 10 reps with mirror for visual feedback   Scapular protraction/retraction AROM  1 x 10 with passive self range into elbow extension at end range    1 x10 with green theratubing seated AROM  1 x 10 with passive self range into elbow extension at end range AROM  1 x 10 with passive self range into elbow extension at end range AROM x 5 reps AROM   1 x 10 with passive self range into elbow extension at end range  1 x 10 with green theratubing  1 x 10 with blue theratubing AROM  1 x 10 with blue theratubing AROM  1 x 10 with passive self range into elbow extension at end range AROM  1 x 10 with passive self range into elbow extension at end range   Shoulder abduction    AROM x 5 reps       Elbow flexion/extension Reviewed HEP Reviewed HEP  AROM x 5 reps       PNF D1/D2 diagonals  1 x 3 in supine         Hand helper  20 pounds x 5 reps with assist to extend fingers    15 lbs x 5 reps with min assist to extend fingers     Tabletop skateboard 10 minutes  AROM in all available planes; AAROM into external rotation and horizontal abduction          Shoulder flexion/extension Reviewed HEP   AROM x 5 reps       Chest press           UBE Min assist to sustain right  on handle   Level 0  6 mintues Min assist to sustain right  on handle   Level 0  5 mintues Min assist to sustain right  on handle   Level 0  5 mintues Min assist to sustain right  on handle   Level 0  5 mintues Min assist to sustain right  on handle   Level 0  6 mintues Min assist to sustain right  on handle   Level 1  5 mintues Min assist to sustain right  on handle   Level 0  6 mintues Min assist to sustain right  on handle   Level 0  6 mintues   Modified push-ups  Hands on countertop with dycem under right hand  4 x 10 Hands on countertop with dycem under right hand  3 x 10 Hands on countertop with dycem under right hand  3 x 10 Hands on countertop with dycem under right hand  3 x 10 Hands on countertop with dycem under right hand  3 x 10  Hands on countertop with dycem under right hand  2 x 10 Hands on countertop with dycem under right hand  2 x 10   Resistive clothespin Attempted x 1 with max assist Yellow  1 x 10 Yellow  1 x 10  Yellow   1 x 5 pinch and release Yellow   1 x 5 pinch and release Yellow   1 x 5 pinch and release    Shoulder arc   10 tabs on small hill only with L hand assisting R hand.    10 tabs on small hill; Left hand assisted R hand grasping and releasing small tabs; used R arm independently to pull tabs over  10 tabs on small hill; Left hand assisted R hand grasping and releasing small tabs; used R arm independently to pull tabs over   Wrist flexion/extension    AROM x 5 reps       Punching bag Side hook x 10 reps              Access Code: [email or web address]   URL: [email or web address]   Date: 01/19/2018   Prepared by: Juan Lehman     Exercises   Supine Shoulder Flexion PROM - 10 reps - 2 sets - 3 hold - 1x daily - 5x weekly   Push-Up on Counter - 10 reps - 2 sets - 1x daily - 5x weekly   Seated Shoulder Shrugs - 10 reps - 2 sets - 1x daily - 5x weekly   Seated Scapular Retraction - 10 reps - 2 sets - 1x daily - 5x weekly   Seated Weight Shifting with Arm Support - 10 reps - 2 sets - 1x daily - 5x weekly   Seated Shoulder Flexion Self PROM - 10 reps - 2 sets - 1x daily - 5x weekly   Supine Elbow Flexion Extension AROM - 10 reps - 2 sets - 1x daily - 5x weekly   Seated Elbow Flexion Extension AROM - 10 reps - 2 sets - 1x daily - 5x weekly     Neuromuscular Re-education: ( 10):  Exercise/activities per grid below to improve balance, coordination, kinesthetic sense and proprioception. Required moderate visual and verbal cues to promote static balance in sitting, promote coordination of right, upper extremity(s) and promote motor control of right, upper extremity(s). Patient completed lateral weight shifts sitting at edge of mat x 20 reps each onto elbow/forearms  with dynamic reach with left hand outside of base of support, using right upper extremity to maintain balance. Treatment/Session Assessment: Pt demo'd good dynamic sitting balance with improved dynamic reach outside base of support today and states that the lateral weight shifts feel good to do. Pt continues to demo'd good R shoulder AAROM with use of shoulder arc, however still requires assistance from L hand to help  tabs with fingers. Pt continues to voice interest in use of e-stim, however pt was encouraged to wait until after Botox injections to further assess status. Pt would continue to benefit from skilled OT services to increasefunctional use of R UE and increase independence with BADLs.    · Response to Treatment: Mrs. Maria Isabel Rojo tolerated therapy well today, without any issue. She continues to be very cooperative and motivated during therapy. · Compliance with Program/Exercises: Will assess as treatment progresses. Pt reports compliance with HEP. · Recommendations/Intent for next treatment session: \"Next visit will focus on advancements to more challenging activities and reduction in assistance provided\".     Total Treatment Duration:  OT Patient Time In/Time Out  Time In: 1300  Time Out: [address] [facility]

## 2018-03-19 NOTE — PROGRESS NOTES
Charu Godoy  : 1955  Primary: Owen Matthews Ppo  Secondary:  [address] at [facility]  [address] [facility], [address] [place] [address],[address], [address] [facility]  [phone]   [phone]          OUTPATIENT PHYSICAL THERAPY:Daily Note 3/19/2018    ICD-10: Treatment Diagnosis:   · Other abnormalities of gait and mobility (R26.89)  · Difficulty in walking, not elsewhere classified (R26.2)  Precautions/Allergies:   Banana; Lisinopril; and Nuts [tree nut]   Fall Risk Score: 5 (? 5 = High Risk)  MD Orders: Evaluate and Treat MEDICAL/REFERRING DIAGNOSIS:  Weakness due to cerebrovascular accident ([facility]) ([facility] [I63.9, R53.1]   DATE OF ONSET: CVA: 2017  REFERRING PHYSICIAN: Juliette Ferrera*  RETURN PHYSICIAN APPOINTMENT: TBD     INITIAL ASSESSMENT:  Ms. Herman Nance presents with decreased mobility, decreased strength, decreased gait, and decreased balance secondary to CVA. After discussing with patient, she agreed she would benefit from physical therapy to improve above deficits. Please sign this plan of treatment if you concur. Thank you for the opportunity to serve this patient. Progress note on 3/12/2018:   Patient has attended twenty-four scheduled physical therapy appointments from 12/15/2017 to 3/12/2018. Patient is very motivated and compliant with therapy. Patient complaints of increased irritation in her right AFO due to increased tone. Patient is waiting on her insurance to approve Botox injections to decreased tone in right ankle. Patient would benefit from continuing skilled physical therapy to address problems and goals. Thank you for this referral.    PROBLEM LIST (Impacting functional limitations):  1. Decreased Strength  2. Decreased Ambulation Ability/Technique  3. Decreased Balance  4. Decreased Activity Tolerance INTERVENTIONS PLANNED:  1. Balance Exercise  2. Gait Training  3. Home Exercise Program (HEP)  4. Neuromuscular Re-education/Strengthening  5.  Range of Motion (ROM)  6. Therapeutic Exercise/Strengthening   TREATMENT PLAN:  Effective Dates: 2/12/2018 TO 5/7/2018. Frequency/Duration: 2-3 times a week for 12 weeks  GOALS: (Goals have been discussed and agreed upon with patient.)  Short-Term Functional Goals: Time Frame: 3 weeks  1. Patient will be independent with home exercise program without exacerbation of symptoms or cueing needed. Goal met. Discharge Goals: Time Frame: 8 weeks  1. Patient will be independent with all ADLs with minimal fear of falling and loss of balance with daily tasks. Goal ongoing. 2. Patient will report no fear avoidance with social or recreational activities due to fear of falling. Goal ongoing. 3. Patient will score greater than or equal to 45/56 on Gipson Balance Scale with minimal effect of imbalance on patient's ability to manage every day life activities. Goal ongoing. Rehabilitation Potential For Stated Goals: Good  Regarding Aliza Iraheta's therapy, I certify that the treatment plan above will be carried out by a therapist or under their direction. Thank you for this referral,  Olamide Valdivia PT     Referring Physician Signature: Juliette Dale*              Date                    The information in this section was collected on 12/15/2017 (except where otherwise noted). HISTORY:   History of Present Injury/Illness (Reason for Referral):  Patient reports she had a severe stroke on 9/6/2017. She reports that she was in the hospital and [facility] for about 61-62 days. She reports that she has progressed really well, but is still having trouble walking and using her right side (UE and LE). She reports that she walks using her AFO and quad cane all the time. She reports that she tries to be as active as possible. She reports that she is scared that she is going to fall, even though she has not.   She reports she would like to work on strengthening her right leg so her balance is better and she can walk without fear of falling. Past Medical History/Comorbidities:   Ms. Marta Kulkarni  has a past medical history of Anxiety; CVA (cerebral vascular accident) ([facility] (2017); Depression; HTN (hypertension); Menopause; and P[facility]embol[facility]) [facility]). Ms. Marta Kulkarni  has a past surgical history that includes hx jennifer and bso (); hx  section; hx refractive surgery (); hx other surgical (); and hx other surgical (). Social History/Living Environment:   Home Environment: Private residence  Living Alone: No  Support Systems: Family member(s), Friends \ neighbors; Spouse  Prior Level of Function/Work/Activity:  Independent  Dominant Side:         RIGHT  Personal Factors:          Sex:  female        Age:  60 y.o. Current Medications:       Current Outpatient Prescriptions:     FLUoxetine (PROZAC) 20 mg tablet, Take 2 Tabs by mouth daily. , Disp: 180 Tab, Rfl: 1    tiZANidine (ZANAFLEX) 4 mg tablet, 4mg po TID, Disp: 270 Tab, Rfl: 1    labetalol (NORMODYNE) 200 mg tablet, Take 1 Tab by mouth two (2) times a day., Disp: 180 Tab, Rfl: 1    losartan-hydroCHLOROthiazide (HYZAAR) 100-12.5 mg per tablet, Take 1 Tab by mouth daily. , Disp: 90 Tab, Rfl: 1    rOPINIRole (REQUIP) 2 mg tablet, Take 1 Tab by mouth nightly. for restless leg, Disp: 90 Tab, Rfl: 1    methylphenidate HCl (RITALIN) 20 mg tablet, Take 1 Tab (20 mg total) by mouth daily. Max Daily Amount: 20 mg, Disp: 30 Tab, Rfl: 0    methylphenidate HCl (RITALIN) 20 mg tablet, Take 1 Tab (20 mg total) by mouth dailyEarliest Fill Date: 18. Max Daily Amount: 20 mg, Disp: 30 Tab, Rfl: 0    methylphenidate HCl (RITALIN) 20 mg tablet, Take 1 Tab (20 mg total) by mouth dailyEarliest Fill Date: 3/12/18. Max Daily Amount: 20 mg, Disp: 30 Tab, Rfl: 0    ALPRAZolam (XANAX) 0.25 mg tablet, Take 1 Tab by mouth three (3) times daily as needed for Anxiety.  Max Daily Amount: 0.75 mg., Disp: 30 Tab, Rfl: 1    temazepam (RESTORIL) 15 mg capsule, Take 15 mg by mouth nightly as needed for Sleep., Disp: , Rfl:     polyethylene glycol (MIRALAX) 17 gram/dose powder, Take 17 g by mouth daily. , Disp: 510 g, Rfl: 2   Date Last Reviewed:  3/19/2018    Number of Personal Factors/Comorbidities that affect the Plan of Care: 1-2: MODERATE COMPLEXITY   EXAMINATION:   Observation/Orthostatic Postural Assessment:          Posture Assessment: Rounded shoulders, Forward head   Functional Mobility:         Gait/Ambulation:     Speed/Lulu: Pace decreased (<100 feet/min)  Step Length: Left shortened, Right lengthened  Swing Pattern: Left asymmetrical, Right asymmetrical  Stance: Left increased, Right decreased  Gait Abnormalities: Antalgic, Circumduction, Foot drop, Path deviations, Trendelenburg  Ambulation - Level of Assistance: Modified independent  Assistive Device: Cane, quad  · Interventions: Verbal cues, Safety awareness training          Transfers:     Sit to Stand: Independent  Stand to Sit: Independent  Stand Pivot Transfers: Independent  · Bed to Chair: Independent          Bed Mobility:     Rolling: Independent  Supine to Sit: Independent  Sit to Supine: Independent  · Scooting: Independent    Strength:          L UE STRENGTH: 4/5 shoulder flexion, 4/5 shoulder abduction, 4/5 shoulder extension, 4/5 elbow flexion, 4/5 elbow extension. R UE STRENGTH: 2/5 shoulder flexion, 2/5 shoulder abduction, 2/5 shoulder extension, 2/5 elbow flexion, 2/5 elbow extension. R LE STRENGTH:  4+/5 hip flexion, 4+/5 hip abduction, 4+/5 hip adduction, 4+/5 hip extension, 4+/5 knee extension, 4+/5 knee flexion, 1/5 ankle dorsiflexion, 1/5 ankle plantarflexion, 1/5 ankle inversion, 1/5 ankle eversion. Sensation:         Intact for light touch and proprioception  Postural Control & Balance:  · Gipson Balance Scale:  33/56.   (A score less than 45/56 indicates high risk of falls)  . Score improved from 28/56 on initial evaluation. Body Structures Involved:  1. Nerves  2.  Muscles Body Functions Affected:  1. Neuromusculoskeletal  2. Movement Related Activities and Participation Affected:  1. Mobility  2. Self Care   Number of elements (examined above) that affect the Plan of Care: 4+: HIGH COMPLEXITY   CLINICAL PRESENTATION:   Presentation: Evolving clinical presentation with changing clinical characteristics: MODERATE COMPLEXITY   CLINICAL DECISION MAKING:   Outcome Measure: Tool Used: Gipson Balance Scale  Score:  Initial: 28/56 Most Recent: 33/56 (Date: 3- )   Interpretation of Score: Each section is scored on a 0-4 scale, 0 representing the patients inability to perform the task and 4 representing independence. The scores of each section are added together for a total score of 56. The higher the patients score, the more independent the patient is. Any score below 45 indicates increased risk for falls. Score 56 55-45 44-34 33-23 22-12 11-1 0   Modifier CH CI CJ CK CL CM CN     Medical Necessity:   · Patient is expected to demonstrate progress in strength, range of motion, balance and coordination to improve safety during daily activities. · Patient demonstrates good rehab potential due to higher previous functional level. · Skilled intervention continues to be required due to decreased mobility. Reason for Services/Other Comments:  · Patient continues to demonstrate capacity to improve overall mobility which will increase independence and increase safety. Use of outcome tool(s) and clinical judgement create a POC that gives a: Questionable prediction of patient's progress: MODERATE COMPLEXITY            TREATMENT:   (In addition to Assessment/Re-Assessment sessions the following treatments were rendered)  Pre-treatment Symptoms/Complaints:  3/19/2018: \"No falls. Doing okay. Botox is approved and going to have it done in April. \"  Pain: Initial: Pain Intensity 1: 0  Post Session:  0/10     NEUROMUSCULAR RE-EDUCATION: (20 minutes):  Exercise/activities per grid below to improve balance, coordination, kinesthetic sense, posture and proprioception. Required minimal verbal and manual cues to promote static and dynamic balance in standing, promote coordination of bilateral, lower extremity(s) and promote motor control of bilateral, lower extremity(s). Date:  3/19/2018 Date   2/23/18 Date   3/5/2018 Date  3/7/18 Date  3/12/18 Date   3/14/18   Activity/Exercise Parameters        Stepping over half foam  2x10 reps with left LE, working on weightbearing through R LE and keeping R foot flat on floor (foot tends to invert) X 2x10 reps with left LE, working on weightbearing through R LE and keeping R foot flat on floor (foot tends to invert) 2x10 reps with left LE, working on weightbearing through R LE and keeping R foot flat on floor  2x10 reps with left LE, working on weightbearing through R LE and keeping R foot flat on floor 2x10 reps with left LE, working on weightbearing through R LE and keeping R foot flat on floor   Step taps Onto 6 inch step with L LE working on weight bearing through R LE X X Onto 6 inch step with L LE working on weight bearing through R LE Onto 6 inch step with L LE working on weight bearing through R LE Onto 6 inch step with L LE working on weight bearing through R LE   Step ups 6 inch  x10 leading with right LE working on weight bearing through R LE 6 inch  x10 leading with L LE working on weight bearing through R LE 6 inch  x10 leading with L LE working on weight bearing through R LE 6 inch  x10 leading with L LE working on weight bearing through R LE 6 inch  x10 leading with L LE working on weight bearing through R LE 6 inch  x10 leading with L LE working on weight bearing through [address] X        Walking in the clinic with quad cane About 100 feet with quad cane, working on weightbearing more on R foot through midstance and terminal stance, and stepping bigger with L LE. Tactile cues to pelvis to disassociate pelvis from trunk.   About 100 feet with quad cane, working on weightbearing more on R foot through midstance and terminal stance, and stepping bigger with L LE. Tactile cues to pelvis to disassociate pelvis from trunk. X About 100 feet with quad cane, working on weightbearing more on R foot through midstance and terminal stance, and stepping bigger with L LE; added working on shifting R hips forward over R foot better midstance to terminal stance About 100 feet with quad cane, working on weightbearing more on R foot through midstance and terminal stance, and stepping bigger with L LE; added working on shifting R hips forward over R foot better midstance to terminal stance About 100 feet with quad cane, working on weightbearing more on R foot through midstance and terminal stance, and stepping bigger with L LE; added working on shifting R hips forward over R foot better midstance to terminal stance   pregait activity Stepping with R LE, weight shifting with hips and shoulders over R foot. Stepping with R LE, weight shifting with hips and shoulders over R foot. Stepping with R LE, weight shifting with hips and shoulders over R foot. Stepping with R LE, weight shifting with hips and shoulders over R foot. THERAPEUTIC EXERCISE: (25 minutes):  Exercises per grid below to improve mobility and strength. Required minimal verbal cues to promote proper body alignment. Progressed repetitions as indicated.    Date:  3/19/2018 Date:  3/5/2018 Date   3/7/18 Date  3/12/18 Date   3/14/18   Activity/Exercise Parameters Parameters      Sit to stand from chair X X       Left sidelying alternating isometrics on pelvis X X      Standing hip abduction X X      Seated LAQs        Standing hamstrings curls  X X      Bridging 2x10 with right lower extremity 2x10 with right lower extremity 2x10 with right lower extremity 2x10 with right lower extremity 2x10 with right lower extremity   Supine straight leg raise 2x10 with right lower extremity  4# 2x10 with right lower extremity 3#  2x10 with right lower extremity  2x10 with right lower extremity 3# 2x10 with right lower extremity 3#   Supine PNF D1 and D2  2x10 each with R LE 2x10 with right lower extremity D1 and D2 2x10 with right lower extremity D1 and D2 2x10 with right lower extremity D1 and D2       2x10 with right lower extremity D1 and D2   L sidelying R pelvis and trunk PNF   Flexion/ext, rotation Flexion/ext, rotation Flexion/ext, rotation   Left sidelying hip abduction        L sidelying: picking up and moving R LE from in front of L foot to behind L foot (working on hip abd and ext) and stabilizing trunk 2x10 reps  4# 2x10 reps, working on lifting highter 3# 2x10 reps, working on lifting higher 3# 2x10 reps, working on lifting higher 3# 2x10 reps, working on lifting higher 3#   L sidelying R clam shells 2 x 10 reps R LE  4# 2x10 reps right lower extremity 3# 2x10 reps right lower extremity 2x10 reps, working on lifting higher 3# 2x10 reps, working on lifting higher 3#   R foot and gastrocsoleus stretching X 8 minutes trying to get foot to neutral position X 8 minutes trying to get foot to neutral position X 8 minutes trying to get foot to neutral position X 8 minutes trying to get foot to neutral position X 10 minutes trying to get foot to neutral position   Standing minisquats in bars            [facility] Portal  Treatment/Session Assessment:    · Response to Treatment:  Patient tolerated treatment without complaints. Continue to progress to tolerance. · Compliance with Program/Exercises: Compliant. · Recommendations/Intent for next treatment session: \"Next visit will focus on advancements to more challenging activities\".    Total Treatment Duration:  PT Patient Time In/Time Out  Time In: 1345  Time Out: [address]

## 2018-03-21 ENCOUNTER — HOSPITAL ENCOUNTER (OUTPATIENT)
Dept: PHYSICAL THERAPY | Age: 63
Discharge: HOME | End: 2018-03-21
Attending: PHYSICAL MEDICINE & REHABILITATION
Payer: COMMERCIAL

## 2018-03-21 PROCEDURE — 97110 THERAPEUTIC EXERCISES: CPT

## 2018-03-21 PROCEDURE — 97112 NEUROMUSCULAR REEDUCATION: CPT

## 2018-03-21 NOTE — PROGRESS NOTES
Laura Snow  : 1955  Primary: [facility]  Secondary:  [address]  [facility] [address] [address], [address] [place] [address]  [phone]   [phone]        OUTPATIENT OCCUPATIONAL THERAPY: Daily Note 3/21/2018    ICD-10: Treatment Diagnosis: Hemiplegia and hemiparesis following cerebral infarction affecting right dominant side (I69.351)  Precautions/Allergies:   Banana; Lisinopril; and Nuts [tree nut]   Fall Risk Score: 5 (? 5 = High Risk)  MD Orders: Referral to occupational therapy MEDICAL/REFERRING DIAGNOSIS:   Cerebral infarction, unspecified [I63.9]  Weakness [R53.1]   DATE OF ONSET: 2017   REFERRING PHYSICIAN: Juliette Ferreira*  RETURN PHYSICIAN APPOINTMENT: unknown   3/5/18: Ms. Ning Alarcon continues to make excellent progress towards her therapy goals; she demonstrates gradual improvements in R UE range of motion, specifically R shoulder flexion and R elbow extension/flexion, and reports increased functional use of R UE in activities of daily living, including dressing, grooming, and feeding. Patient would continue to benefit from skilled occupational therapy services to maximize safety and independence and increase functional use of R UE during activities of daily living.    18: Ms. Ning Alarcon is making excellent progress toward her goals; she is demonstrating improving range of motion and functional use of the right upper extremity in activities of daily living. Feel she will continue to benefit from skilled occupational therapy to maximize safety and independence with activities of daily living. INITIAL ASSESSMENT:  Ms. Ning Alarcon presents with impaired active movement, strength, coordination and sensation of the dominant right upper extremity that is affecting her ability to complete activities of daily living independently.  Feel she may benefit from skilled occupational therapy to maximize safety and independence with activities of daily living. PLAN OF CARE:   PROBLEM LIST:  1. Decreased Strength  2. Decreased ADL/Functional Activities  3. Decreased Transfer Abilities  4. Decreased Balance  5. Decreased Flexibility/Joint Mobility  6. Decreased Cognition INTERVENTIONS PLANNED:  1. Activities of daily living training  2. Manual therapy training  3. Modalities  4. Neuromuscular re-eduation  5. Sensory reintegration training  6. Splinting  7. Therapeutic activity  8. Therapeutic exercise   TREATMENT PLAN:  Effective Dates: 3/19/18 to 6/17/18. Frequency/Duration: 2 times a week for 12 weeks  GOALS: (Goals have been discussed and agreed upon with patient.)  Short-Term Functional Goals: Time Frame: 6 weeks  1. Patient will demonstrate independence with home exercise program for right upper extremity range of motion. Met  2. Patient will increase use of right upper extremity as a functional assist in at least 25% of daily activities. Met  3. Patient will bathe with moderate assistance. Continue to address  Discharge Goals: Time Frame: 12 weeks  1. Patient will bathe with minimal assistance. Continue to address  2. Patient will feed self with modified independence and adaptive equipment as needed. Continue to address  3. Patient will dress with modified independence and adaptive equipment as needed. Continue to address  4. Patient will use right upper extremity as a functional assist in at least 50% of daily activities. Continue to address  Rehabilitation Potential For Stated Goals: Good  Regarding Toi Iraheta's therapy, I certify that the treatment plan above will be carried out by a therapist or under their direction. Thank you for this referral,  Phill Campbell, OT     Referring Physician Signature: Juliette Walters* _________________________  Date _________            The information in this section was collected on 3/5/18 (except where otherwise noted).   OCCUPATIONAL PROFILE & HISTORY:   History of Present Injury/Illness (Reason for Referral):  CVA with hospital and [facility] stay then home health therapy. Past Medical History/Comorbidities:   Ms. Lucio Rush  has a past medical history of Anxiety; CVA (cerebral vascular accident) ([facility] [address] (2017); Depression; HTN (hypertension); Menopause; and PTE (pulmonary thromboembolism) ([facility] [address] (2017). Ms. Lucio Rush  has a past surgical history that includes hx jennifer and bso (); hx  section; hx refractive surgery (); hx other surgical (); and hx other surgical (2017). Social History/Living Environment:      Prior Level of Function/Work/Activity:  Does seasonal taxes at [employer]  Dominant Side:         RIGHT  Current Medications:    Current Outpatient Prescriptions:     FLUoxetine (PROZAC) 20 mg tablet, Take 2 Tabs by mouth daily. , Disp: 180 Tab, Rfl: 1    tiZANidine (ZANAFLEX) 4 mg tablet, 4mg po TID, Disp: 270 Tab, Rfl: 1    labetalol (NORMODYNE) 200 mg tablet, Take 1 Tab by mouth two (2) times a day., Disp: 180 Tab, Rfl: 1    losartan-hydroCHLOROthiazide (HYZAAR) 100-12.5 mg per tablet, Take 1 Tab by mouth daily. , Disp: 90 Tab, Rfl: 1    rOPINIRole (REQUIP) 2 mg tablet, Take 1 Tab by mouth nightly. for restless leg, Disp: 90 Tab, Rfl: 1    methylphenidate HCl (RITALIN) 20 mg tablet, Take 1 Tab (20 mg total) by mouth daily. Max Daily Amount: 20 mg, Disp: 30 Tab, Rfl: 0    methylphenidate HCl (RITALIN) 20 mg tablet, Take 1 Tab (20 mg total) by mouth dailyEarliest Fill Date: 18. Max Daily Amount: 20 mg, Disp: 30 Tab, Rfl: 0    methylphenidate HCl (RITALIN) 20 mg tablet, Take 1 Tab (20 mg total) by mouth dailyEarliest Fill Date: 3/12/18. Max Daily Amount: 20 mg, Disp: 30 Tab, Rfl: 0    ALPRAZolam (XANAX) 0.25 mg tablet, Take 1 Tab by mouth three (3) times daily as needed for Anxiety.  Max Daily Amount: 0.75 mg., Disp: 30 Tab, Rfl: 1    temazepam (RESTORIL) 15 mg capsule, Take 15 mg by mouth nightly as needed for Sleep., Disp: , Rfl:     polyethylene glycol (MIRALAX) 17 gram/dose powder, Take 17 g by mouth daily. , Disp: 510 g, Rfl: 2            Date Last Reviewed:  3/21/2018   Complexity Level : Expanded review of therapy/medical records (1-2):  MODERATE COMPLEXITY   ASSESSMENT OF OCCUPATIONAL PERFORMANCE:   ROM:                   Balance:                   Coordination:                   Mental Status:                   Vision:                   Activities of Daily Living:           Basic ADLs (From Assessment) Complex ADLs (From Assessment)         Grooming/Bathing/Dressing Activities of Daily Living                                   Sensation:         Light touch absent distal to right elbow     Physical Skills Involved:  1. Range of Motion  2. Balance  3. Sensation  4. Fine Motor Control  5. Gross Motor Control Cognitive Skills Affected (resulting in the inability to perform in a timely and safe manner):  1. Executive Function  2. Sustained Attention  3. Divided Attention  4. Comprehension Psychosocial Skills Affected:  1. None   Number of elements that affect the Plan of Care: 5+:  HIGH COMPLEXITY   CLINICAL DECISION MAKING:   Outcome Measure: Tool Used: [facility] [address] AM-PAC Daily Activity Outpatient Short Form  Score:  Initial: 18 Most Recent: 33 (Date: 3/5/18 )   Interpretation of Tool:  Represents clinically-significant functional categories (i.e., basic and instrumental activities of daily living, fine motor activities). Score 60 59-55 54-47 46-34 32-21 20-16 15   Modifier CH CI CJ CK CL CM CN     Medical Necessity:   · Patient demonstrates good rehab potential due to higher previous functional level. Reason for Services/Other Comments:  · Patient continues to require skilled intervention due to decreased safety and independence in activities of daily living. Clinical Decision-Making Assessment: Moderately difficult to predict patient's progress at this time due to the extent of her limitations in functional movement and use of dominant right upper extremity. Assessment process, impact of co-morbidities, assessment modification\need for assistance, and selection of interventions: Analytical Complexity:MODERATE COMPLEXITY   TREATMENT:   (In addition to Assessment/Re-Assessment sessions the following treatments were rendered)    Pre-treatment Symptoms/Complaints:  Patient states, \"I feel tired today. I did my exercises this morning and had PT before this. I'm just a little more tired today. \"  Pain: Initial:   Pain Intensity 1: 0/10 Post Session:  0/10     Therapeutic Exercise: ( 35 minutes):  Exercises per grid below to improve dynamic movement of arm - right and hand - right to improve functional lifting, carrying, reaching and grasping. Required moderate visual and verbal cues to promote proper body mechanics. Progressed range, repetitions and complexity of movement as indicated.        Date:  2/28/18 Date:   3/2/18 Date:  3/5/18 Date:  3/7/18 Date:  3/12/18 Date:  3/14/18 Date:  3/19/18 Date:   3/21/18   Activity/Exercise           Shoulder shrugs AROM  2 x 10 with mirror for visual feedback AROM  2 x 10 with mirror for visual feedback    AROM x 5 reps AROM x 10 reps AROM x 10 reps AROM x 10 reps with mirror for visual feedback AROM x 10 reps with mirror for visual feedback AROM x 10 reps with mirror for visual feedback   Scapular protraction/retraction AROM  1 x 10 with passive self range into elbow extension at end range AROM  1 x 10 with passive self range into elbow extension at end range AROM x 5 reps AROM   1 x 10 with passive self range into elbow extension at end range  1 x 10 with green theratubing  1 x 10 with blue theratubing AROM  1 x 10 with blue theratubing AROM  1 x 10 with passive self range into elbow extension at end range AROM  1 x 10 with passive self range into elbow extension at end range AROM  1 x 10 with passive self range into elbow extension at end range    2 x 10 reps with blue theratubing   Shoulder abduction   AROM x 5 reps        Elbow flexion/extension Reviewed HEP  AROM x 5 reps        PNF D1/D2 diagonals 1 x 3 in supine          Hand helper 20 pounds x 5 reps with assist to extend fingers    15 lbs x 5 reps with min assist to extend fingers   20 lbs x 5 reps with assist to extend fingers   Tabletop skateboard           Shoulder flexion/extension   AROM x 5 reps        Chest press           UBE Min assist to sustain right  on handle   Level 0  5 mintues Min assist to sustain right  on handle   Level 0  5 mintues Min assist to sustain right  on handle   Level 0  5 mintues Min assist to sustain right  on handle   Level 0  6 mintues Min assist to sustain right  on handle   Level 1  5 mintues Min assist to sustain right  on handle   Level 0  6 mintues Min assist to sustain right  on handle   Level 0  6 mintues Min assist to sustain right  on handle   Level 0  6 mintues   Modified push-ups  Hands on countertop with dycem under right hand  3 x 10 Hands on countertop with dycem under right hand  3 x 10 Hands on countertop with dycem under right hand  3 x 10 Hands on countertop with dycem under right hand  3 x 10  Hands on countertop with dycem under right hand  2 x 10 Hands on countertop with dycem under right hand  2 x 10 Hands on countertop with dycem under right hand  3 x 10   Resistive clothespin Yellow  1 x 10 Yellow  1 x 10  Yellow   1 x 5 pinch and release Yellow   1 x 5 pinch and release Yellow   1 x 5 pinch and release     Shoulder arc  10 tabs on small hill only with L hand assisting R hand. 10 tabs on small hill; Left hand assisted R hand grasping and releasing small tabs; used R arm independently to pull tabs over  10 tabs on small hill; Left hand assisted R hand grasping and releasing small tabs; used R arm independently to pull tabs over    Wrist flexion/extension   AROM x 5 reps        Punching bag               Access Code: [email or web address]   URL: [email or web address]   Date: 01/19/2018 Prepared by: Georgie Human     Exercises   Supine Shoulder Flexion PROM - 10 reps - 2 sets - 3 hold - 1x daily - 5x weekly   Push-Up on Counter - 10 reps - 2 sets - 1x daily - 5x weekly   Seated Shoulder Shrugs - 10 reps - 2 sets - 1x daily - 5x weekly   Seated Scapular Retraction - 10 reps - 2 sets - 1x daily - 5x weekly   Seated Weight Shifting with Arm Support - 10 reps - 2 sets - 1x daily - 5x weekly   Seated Shoulder Flexion Self PROM - 10 reps - 2 sets - 1x daily - 5x weekly   Supine Elbow Flexion Extension AROM - 10 reps - 2 sets - 1x daily - 5x weekly   Seated Elbow Flexion Extension AROM - 10 reps - 2 sets - 1x daily - 5x weekly     Neuromuscular Re-education: ( 10):  Exercise/activities per grid below to improve balance, coordination, kinesthetic sense and proprioception. Required moderate visual and verbal cues to promote static balance in sitting, promote coordination of right, upper extremity(s) and promote motor control of right, upper extremity(s). Patient completed lateral weight shifts sitting at edge of mat x 20 reps each onto elbow/forearms  with dynamic reach with left hand outside of base of support, using right upper extremity to maintain balance. Treatment/Session Assessment: Pt demo'd improved dynamic reach towards right side today, however requires assistance blocking right leg to prevent leg from kicking out during exercises. Pt was able to increase repetitions during shoulder retraction exercises using blue theraband. Pt also demo'd improved ability to relax hand during hand gripper exercises. Pt would continue to benefit from skilled OT services to increase functional use of R UE and increase independence with BADLs. · Response to Treatment: Mrs. Saba Munroe tolerated therapy well today, without any issue. She continues to be very cooperative and motivated during therapy. · Compliance with Program/Exercises: Will assess as treatment progresses.  Pt reports compliance with HEP. · Recommendations/Intent for next treatment session: \"Next visit will focus on advancements to more challenging activities and reduction in assistance provided\".     Total Treatment Duration:  OT Patient Time In/Time Out  Time In: 1400  Time Out: [facility] Madelaine Jacques

## 2018-03-21 NOTE — PROGRESS NOTES
Darcie Islas  : 1955  Primary: [facility]  Secondary:  [address] at [place] [address] [facility], [address] [place] [address],[address], [address]re [address]. [address].  [phone]   [phone]          OUTPATIENT PHYSICAL THERAPY:Daily Note 3/21/2018    ICD-10: Treatment Diagnosis:   · Other abnormalities of gait and mobility (R26.89)  · Difficulty in walking, not elsewhere classified (R26.2)  Precautions/Allergies:   Banana; Lisinopril; and Nuts [tree nut]   Fall Risk Score: 5 (? 5 = High Risk)  MD Orders: Evaluate and Treat MEDICAL/REFERRING DIAGNOSIS:  Weakness due to cerebrovascular accident (CVA) ([facility] [I63.9, R53.1]   DATE OF ONSET: CVA: 2017  REFERRING PHYSICIAN: Juliette Alexandra*  RETURN PHYSICIAN APPOINTMENT: TBD     INITIAL ASSESSMENT:  Ms. Eusebio Dixon presents with decreased mobility, decreased strength, decreased gait, and decreased balance secondary to CVA. After discussing with patient, she agreed she would benefit from physical therapy to improve above deficits. Please sign this plan of treatment if you concur. Thank you for the opportunity to serve this patient. Progress note on 3/12/2018:   Patient has attended twenty-four scheduled physical therapy appointments from 12/15/2017 to 3/12/2018. Patient is very motivated and compliant with therapy. Patient complaints of increased irritation in her right AFO due to increased tone. Patient is waiting on her insurance to approve Botox injections to decreased tone in right ankle. Patient would benefit from continuing skilled physical therapy to address problems and goals. Thank you for this referral.    PROBLEM LIST (Impacting functional limitations):  1. Decreased Strength  2. Decreased Ambulation Ability/Technique  3. Decreased Balance  4. Decreased Activity Tolerance INTERVENTIONS PLANNED:  1. Balance Exercise  2. Gait Training  3. Home Exercise Program (HEP)  4. Neuromuscular Re-education/Strengthening  5.  Range of Motion (ROM)  6. Therapeutic Exercise/Strengthening   TREATMENT PLAN:  Effective Dates: 2/12/2018 TO 5/7/2018. Frequency/Duration: 2-3 times a week for 12 weeks  GOALS: (Goals have been discussed and agreed upon with patient.)  Short-Term Functional Goals: Time Frame: 3 weeks  1. Patient will be independent with home exercise program without exacerbation of symptoms or cueing needed. Goal met. Discharge Goals: Time Frame: 8 weeks  1. Patient will be independent with all ADLs with minimal fear of falling and loss of balance with daily tasks. Goal ongoing. 2. Patient will report no fear avoidance with social or recreational activities due to fear of falling. Goal ongoing. 3. Patient will score greater than or equal to 45/56 on Gipson Balance Scale with minimal effect of imbalance on patient's ability to manage every day life activities. Goal ongoing. Rehabilitation Potential For Stated Goals: Good  Regarding Jai Iraheta's therapy, I certify that the treatment plan above will be carried out by a therapist or under their direction. Thank you for this referral,  Diana Phillips PT     Referring Physician Signature: Juliette Arreguin*              Date                    The information in this section was collected on 12/15/2017 (except where otherwise noted). HISTORY:   History of Present Injury/Illness (Reason for Referral):  Patient reports she had a severe stroke on 9/6/2017. She reports that she was in the hospital and [facility] for about 61-62 days. She reports that she has progressed really well, but is still having trouble walking and using her right side (UE and LE). She reports that she walks using her AFO and quad cane all the time. She reports that she tries to be as active as possible. She reports that she is scared that she is going to fall, even though she has not.   She reports she would like to work on strengthening her right leg so her balance is better and she can walk without fear of falling. Past Medical History/Comorbidities:   Ms. Candace Adams  has a past medical history of Anxiety; CVA (cerebral vascular accident) ([facility] (2017); Depression; HTN (hypertension); Menopause; and PTE (pulmonary thromboembolism) ([facility] (2017). Ms. Candace Adams  has a past surgical history that includes hx jennifer and bso (); hx  section; hx refractive surgery (); hx other surgical (); and hx other surgical (). Social History/Living Environment:   Home Environment: Private residence  Living Alone: No  Support Systems: Family member(s), Friends \ neighbors; Spouse  Prior Level of Function/Work/Activity:  Independent  Dominant Side:         RIGHT  Personal Factors:          Sex:  female        Age:  60 y.o. Current Medications:       Current Outpatient Prescriptions:     FLUoxetine (PROZAC) 20 mg tablet, Take 2 Tabs by mouth daily. , Disp: 180 Tab, Rfl: 1    tiZANidine (ZANAFLEX) 4 mg tablet, 4mg po TID, Disp: 270 Tab, Rfl: 1    labetalol (NORMODYNE) 200 mg tablet, Take 1 Tab by mouth two (2) times a day., Disp: 180 Tab, Rfl: 1    losartan-hydroCHLOROthiazide (HYZAAR) 100-12.5 mg per tablet, Take 1 Tab by mouth daily. , Disp: 90 Tab, Rfl: 1    rOPINIRole (REQUIP) 2 mg tablet, Take 1 Tab by mouth nightly. for restless leg, Disp: 90 Tab, Rfl: 1    methylphenidate HCl (RITALIN) 20 mg tablet, Take 1 Tab (20 mg total) by mouth daily. Max Daily Amount: 20 mg, Disp: 30 Tab, Rfl: 0    methylphenidate HCl (RITALIN) 20 mg tablet, Take 1 Tab (20 mg total) by mouth dailyEarliest Fill Date: 18. Max Daily Amount: 20 mg, Disp: 30 Tab, Rfl: 0    methylphenidate HCl (RITALIN) 20 mg tablet, Take 1 Tab (20 mg total) by mouth dailyEarliest Fill Date: 3/12/18. Max Daily Amount: 20 mg, Disp: 30 Tab, Rfl: 0    ALPRAZolam (XANAX) 0.25 mg tablet, Take 1 Tab by mouth three (3) times daily as needed for Anxiety.  Max Daily Amount: 0.75 mg., Disp: 30 Tab, Rfl: 1    temazepam (RESTORIL) 15 mg capsule, Take 15 mg by mouth nightly as needed for Sleep., Disp: , Rfl:     polyethylene glycol (MIRALAX) 17 gram/dose powder, Take 17 g by mouth daily. , Disp: 510 g, Rfl: 2   Date Last Reviewed:  3/21/2018    Number of Personal Factors/Comorbidities that affect the Plan of Care: 1-2: MODERATE COMPLEXITY   EXAMINATION:   Observation/Orthostatic Postural Assessment:          Posture Assessment: Rounded shoulders, Forward head   Functional Mobility:         Gait/Ambulation:     Speed/Lulu: Pace decreased (<100 feet/min)  Step Length: Left shortened, Right lengthened  Swing Pattern: Left asymmetrical, Right asymmetrical  Stance: Left increased, Right decreased  Gait Abnormalities: Antalgic, Circumduction, Foot drop, Path deviations, Trendelenburg  Ambulation - Level of Assistance: Modified independent  Assistive Device: Cane, quad  · Interventions: Verbal cues, Safety awareness training          Transfers:     Sit to Stand: Independent  Stand to Sit: Independent  Stand Pivot Transfers: Independent  · Bed to Chair: Independent          Bed Mobility:     Rolling: Independent  Supine to Sit: Independent  Sit to Supine: Independent  · Scooting: Independent    Strength:          L UE STRENGTH: 4/5 shoulder flexion, 4/5 shoulder abduction, 4/5 shoulder extension, 4/5 elbow flexion, 4/5 elbow extension. R UE STRENGTH: 2/5 shoulder flexion, 2/5 shoulder abduction, 2/5 shoulder extension, 2/5 elbow flexion, 2/5 elbow extension. R LE STRENGTH:  4+/5 hip flexion, 4+/5 hip abduction, 4+/5 hip adduction, 4+/5 hip extension, 4+/5 knee extension, 4+/5 knee flexion, 1/5 ankle dorsiflexion, 1/5 ankle plantarflexion, 1/5 ankle inversion, 1/5 ankle eversion. Sensation:         Intact for light touch and proprioception  Postural Control & Balance:  · Gipson Balance Scale:  33/56.   (A score less than 45/56 indicates high risk of falls)  . Score improved from 28/56 on initial evaluation. Body Structures Involved:  1. Nerves  2.  Muscles Body Functions Affected:  1. Neuromusculoskeletal  2. Movement Related Activities and Participation Affected:  1. Mobility  2. Self Care   Number of elements (examined above) that affect the Plan of Care: 4+: HIGH COMPLEXITY   CLINICAL PRESENTATION:   Presentation: Evolving clinical presentation with changing clinical characteristics: MODERATE COMPLEXITY   CLINICAL DECISION MAKING:   Outcome Measure: Tool Used: Gipson Balance Scale  Score:  Initial: 28/56 Most Recent: 33/56 (Date: 3- )   Interpretation of Score: Each section is scored on a 0-4 scale, 0 representing the patients inability to perform the task and 4 representing independence. The scores of each section are added together for a total score of 56. The higher the patients score, the more independent the patient is. Any score below 45 indicates increased risk for falls. Score 56 55-45 44-34 33-23 22-12 11-1 0   Modifier CH CI CJ CK CL CM CN     Medical Necessity:   · Patient is expected to demonstrate progress in strength, range of motion, balance and coordination to improve safety during daily activities. · Patient demonstrates good rehab potential due to higher previous functional level. · Skilled intervention continues to be required due to decreased mobility. Reason for Services/Other Comments:  · Patient continues to demonstrate capacity to improve overall mobility which will increase independence and increase safety. Use of outcome tool(s) and clinical judgement create a POC that gives a: Questionable prediction of patient's progress: MODERATE COMPLEXITY            TREATMENT:   (In addition to Assessment/Re-Assessment sessions the following treatments were rendered)  Pre-treatment Symptoms/Complaints:  3/21/2018: \"I\"m okay. \"  Pain: Initial: Pain Intensity 1: 0  Post Session:  0/10     NEUROMUSCULAR RE-EDUCATION: (20 minutes):  Exercise/activities per grid below to improve balance, coordination, kinesthetic sense, posture and proprioception. Required minimal verbal and manual cues to promote static and dynamic balance in standing, promote coordination of bilateral, lower extremity(s) and promote motor control of bilateral, lower extremity(s). Date:  3/19/2018 Date   3/21/18   Activity/Exercise Parameters    Stepping over half foam  2x10 reps with left LE, working on weightbearing through R LE and keeping R foot flat on floor (foot tends to invert) 2x10 reps with left LE, working on weightbearing through R LE and keeping R foot flat on floor   Step taps Onto 6 inch step with L LE working on weight bearing through R LE Onto 6 inch step with L LE working on weight bearing through R LE   Step ups 6 inch  x10 leading with right LE working on weight bearing through R LE 6 inch  x10 leading with L LE working on weight bearing through [address] X    Walking in the clinic with quad cane About 100 feet with quad cane, working on weightbearing more on R foot through midstance and terminal stance, and stepping bigger with L LE. Tactile cues to pelvis to disassociate pelvis from trunk. About 100 feet with quad cane, working on weightbearing more on R foot through midstance and terminal stance, and stepping bigger with L LE; added working on shifting R hips forward over R foot better midstance to terminal stance   pregait activity Stepping with R LE, weight shifting with hips and shoulders over R foot. Stepping with R LE, weight shifting with hips and shoulders over R foot. THERAPEUTIC EXERCISE: (25 minutes):  Exercises per grid below to improve mobility and strength. Required minimal verbal cues to promote proper body alignment. Progressed repetitions as indicated.    Date:  3/19/2018 Dte   3/21/18       Activity/Exercise Parameters        Sit to stand from chair X        Left sidelying alternating isometrics on pelvis X        Standing hip abduction X        Seated LAQs         Standing hamstrings curls X        Bridging 2x10 with right lower extremity 2x10 with right lower        Supine straight leg raise 2x10 with right lower extremity  4# 2x10 with right lower extremity 3#       Supine PNF D1 and D2  2x10 each with R LE 2x10 with right lower extremity D1 and D2       L sidelying R pelvis and trunk PNF  Flexion/ext, rotation       Left sidelying hip abduction         L sidelying: picking up and moving R LE from in front of L foot to behind L foot (working on hip abd and ext) and stabilizing trunk 2x10 reps  4# 2x10 reps, working on lifting higher 3#       L sidelying R clam shells 2 x 10 reps R LE  4# 2x10 reps, working on lifting higher 3#       R foot and gastrocsoleus stretching X 8 minutes trying to get foot to neutral position X 10 minutes trying to get foot to neutral position       Standing minisquats in bars  Sit to stand x 10 reps           [email or web address] Portal  Treatment/Session Assessment:    · Response to Treatment:  Patient tolerated treatment without complaints. Worked on making sure she put weight through full R foot in midstance, rather than only heel. Continue to progress to tolerance. · Compliance with Program/Exercises: Compliant. · Recommendations/Intent for next treatment session: \"Next visit will focus on advancements to more challenging activities\".    Total Treatment Duration:  PT Patient Time In/Time Out  Time In: 1306  Time Out: 161 [facility]

## 2018-03-23 ENCOUNTER — APPOINTMENT (OUTPATIENT)
Dept: PHYSICAL THERAPY | Age: 63
End: 2018-03-23
Attending: PHYSICAL MEDICINE & REHABILITATION
Payer: COMMERCIAL

## 2018-03-26 ENCOUNTER — HOSPITAL ENCOUNTER (OUTPATIENT)
Dept: PHYSICAL THERAPY | Age: 63
Discharge: HOME | End: 2018-03-26
Attending: PHYSICAL MEDICINE & REHABILITATION
Payer: COMMERCIAL

## 2018-03-26 PROBLEM — K59.09 OTHER CONSTIPATION: Status: ACTIVE | Noted: 2017-10-31

## 2018-03-26 PROCEDURE — 97110 THERAPEUTIC EXERCISES: CPT

## 2018-03-26 PROCEDURE — 97112 NEUROMUSCULAR REEDUCATION: CPT

## 2018-03-26 NOTE — PROGRESS NOTES
Vidal Das  : 1955  Primary: Max Urena Ppo  Secondary:  [address]  at [facility]  [address], [address] [facility] [address], [facility] [address]  [phone]   [phone]        OUTPATIENT OCCUPATIONAL THERAPY: Daily Note 3/26/2018    ICD-10: Treatment Diagnosis: Hemiplegia and hemiparesis following cerebral infarction affecting right dominant side (I69.351)  Precautions/Allergies:   Banana; Lisinopril; and Nuts [tree nut]   Fall Risk Score: 5 (? 5 = High Risk)  MD Orders: Referral to occupational therapy MEDICAL/REFERRING DIAGNOSIS:   Cerebral infarction, unspecified [I63.9]  Weakness [R53.1]   DATE OF ONSET: 2017   REFERRING PHYSICIAN: Juliette Ji*  RETURN PHYSICIAN APPOINTMENT: unknown   3/5/18: Ms. Anju Guy continues to make excellent progress towards her therapy goals; she demonstrates gradual improvements in R UE range of motion, specifically R shoulder flexion and R elbow extension/flexion, and reports increased functional use of R UE in activities of daily living, including dressing, grooming, and feeding. Patient would continue to benefit from skilled occupational therapy services to maximize safety and independence and increase functional use of R UE during activities of daily living.    18: Ms. Anju Guy is making excellent progress toward her goals; she is demonstrating improving range of motion and functional use of the right upper extremity in activities of daily living. Feel she will continue to benefit from skilled occupational therapy to maximize safety and independence with activities of daily living. INITIAL ASSESSMENT:  Ms. Anju Guy presents with impaired active movement, strength, coordination and sensation of the dominant right upper extremity that is affecting her ability to complete activities of daily living independently.  Feel she may benefit from skilled occupational therapy to maximize safety and independence with activities of daily living. PLAN OF CARE:   PROBLEM LIST:  1. Decreased Strength  2. Decreased ADL/Functional Activities  3. Decreased Transfer Abilities  4. Decreased Balance  5. Decreased Flexibility/Joint Mobility  6. Decreased Cognition INTERVENTIONS PLANNED:  1. Activities of daily living training  2. Manual therapy training  3. Modalities  4. Neuromuscular re-eduation  5. Sensory reintegration training  6. Splinting  7. Therapeutic activity  8. Therapeutic exercise   TREATMENT PLAN:  Effective Dates: 3/19/18 to 6/17/18. Frequency/Duration: 2 times a week for 12 weeks  GOALS: (Goals have been discussed and agreed upon with patient.)  Short-Term Functional Goals: Time Frame: 6 weeks  1. Patient will demonstrate independence with home exercise program for right upper extremity range of motion. Met  2. Patient will increase use of right upper extremity as a functional assist in at least 25% of daily activities. Met  3. Patient will bathe with moderate assistance. Continue to address  Discharge Goals: Time Frame: 12 weeks  1. Patient will bathe with minimal assistance. Continue to address  2. Patient will feed self with modified independence and adaptive equipment as needed. Continue to address  3. Patient will dress with modified independence and adaptive equipment as needed. Continue to address  4. Patient will use right upper extremity as a functional assist in at least 50% of daily activities. Continue to address  Rehabilitation Potential For Stated Goals: Good  Regarding Dread Iraheta's therapy, I certify that the treatment plan above will be carried out by a therapist or under their direction. Thank you for this referral,  Lynne Spears, OT     Referring Physician Signature: Juliette Bello* _________________________  Date _________            The information in this section was collected on 3/5/18 (except where otherwise noted).   OCCUPATIONAL PROFILE & HISTORY:   History of Present Injury/Illness (Reason for Referral):  CVA with hospital and [facility] stay then home health therapy. Past Medical History/Comorbidities:   Ms. Lala Alarcon  has a past medical history of Anxiety; CVA (cerebral vascular accident) ([facility] [address] (2017); Depression; HTN (hypertension); Menopause; and PTE (pulmonary thromboembolism) ([facility] [address] (2017). Ms. Lala Alarcon  has a past surgical history that includes hx jennifer and bso (); hx  section; hx refractive surgery (); hx other surgical (); and hx other surgical (). Social History/Living Environment:      Prior Level of Function/Work/Activity:  Does seasonal taxes at [employer]  Dominant Side:         RIGHT  Current Medications:    Current Outpatient Prescriptions:     polyethylene glycol (MIRALAX) 17 gram/dose powder, Take 17 g by mouth daily. , Disp: 510 g, Rfl: 2    [START ON 2018] methylphenidate HCl (RITALIN) 20 mg tablet, Take 1 Tab (20 mg total) by mouth daily. Max Daily Amount: 20 mg, Disp: 30 Tab, Rfl: 0    [START ON 2018] methylphenidate HCl (RITALIN) 20 mg tablet, Take 1 Tab (20 mg total) by mouth daily. Max Daily Amount: 20 mg, Disp: 30 Tab, Rfl: 0    methylphenidate HCl (RITALIN) 20 mg tablet, Take 1 Tab (20 mg total) by mouth daily. Max Daily Amount: 20 mg, Disp: 30 Tab, Rfl: 0    rOPINIRole (REQUIP) 2 mg tablet, Take 1 Tab by mouth nightly. for restless leg, Disp: 90 Tab, Rfl: 1    FLUoxetine (PROZAC) 20 mg tablet, Take 2 Tabs by mouth daily. , Disp: 180 Tab, Rfl: 1    tiZANidine (ZANAFLEX) 4 mg tablet, 4mg po TID, Disp: 270 Tab, Rfl: 1    labetalol (NORMODYNE) 200 mg tablet, Take 1 Tab by mouth two (2) times a day., Disp: 180 Tab, Rfl: 1    losartan-hydroCHLOROthiazide (HYZAAR) 100-12.5 mg per tablet, Take 1 Tab by mouth daily. , Disp: 90 Tab, Rfl: 1    ALPRAZolam (XANAX) 0.25 mg tablet, Take 1 Tab by mouth three (3) times daily as needed for Anxiety.  Max Daily Amount: 0.75 mg., Disp: 30 Tab, Rfl: 1            Date Last Reviewed:  3/26/2018   Complexity Level : Expanded review of therapy/medical records (1-2):  MODERATE COMPLEXITY   ASSESSMENT OF OCCUPATIONAL PERFORMANCE:   ROM:                   Balance:                   Coordination:                   Mental Status:                   Vision:                   Activities of Daily Living:           Basic ADLs (From Assessment) Complex ADLs (From Assessment)         Grooming/Bathing/Dressing Activities of Daily Living                                   Sensation:         Light touch absent distal to right elbow     Physical Skills Involved:  1. Range of Motion  2. Balance  3. Sensation  4. Fine Motor Control  5. Gross Motor Control Cognitive Skills Affected (resulting in the inability to perform in a timely and safe manner):  1. Executive Function  2. Sustained Attention  3. Divided Attention  4. Comprehension Psychosocial Skills Affected:  1. None   Number of elements that affect the Plan of Care: 5+:  HIGH COMPLEXITY   CLINICAL DECISION MAKING:   Outcome Measure: Tool Used: [address] [facility] [address] [address] AM-[facility] Daily Activity Outpatient Short Form  Score:  Initial: 18 Most Recent: 33 (Date: 3/5/18 )   Interpretation of Tool:  Represents clinically-significant functional categories (i.e., basic and instrumental activities of daily living, fine motor activities). Score 60 59-55 54-47 46-34 32-21 20-16 15   Modifier CH CI CJ CK CL CM CN     Medical Necessity:   · Patient demonstrates good rehab potential due to higher previous functional level. Reason for Services/Other Comments:  · Patient continues to require skilled intervention due to decreased safety and independence in activities of daily living. Clinical Decision-Making Assessment: Moderately difficult to predict patient's progress at this time due to the extent of her limitations in functional movement and use of dominant right upper extremity.     Assessment process, impact of co-morbidities, assessment modification\need for assistance, and selection of interventions: Analytical Complexity:MODERATE COMPLEXITY   TREATMENT:   (In addition to Assessment/Re-Assessment sessions the following treatments were rendered)    Pre-treatment Symptoms/Complaints:  Patient states, \"I'm just really tired today. I also had a doctor's appointment today so I wasn't able to do my exercises this morning. \"  Pain: Initial:   Pain Intensity 1: 0/10 Post Session:  0/10     Therapeutic Exercise: ( 35 minutes):  Exercises per grid below to improve dynamic movement of arm - right and hand - right to improve functional lifting, carrying, reaching and grasping. Required moderate visual and verbal cues to promote proper body mechanics. Progressed range, repetitions and complexity of movement as indicated.        Date:   3/2/18 Date:  3/5/18 Date:  3/7/18 Date:  3/12/18 Date:  3/14/18 Date:  3/19/18 Date:   3/21/18 Date:  3/26/18   Activity/Exercise           Shoulder shrugs AROM  2 x 10 with mirror for visual feedback    AROM x 5 reps AROM x 10 reps AROM x 10 reps AROM x 10 reps with mirror for visual feedback AROM x 10 reps with mirror for visual feedback AROM x 10 reps with mirror for visual feedback AROM x 10 reps with mirror for visual feedback   Scapular protraction/retraction AROM  1 x 10 with passive self range into elbow extension at end range AROM x 5 reps AROM   1 x 10 with passive self range into elbow extension at end range  1 x 10 with green theratubing  1 x 10 with blue theratubing AROM  1 x 10 with blue theratubing AROM  1 x 10 with passive self range into elbow extension at end range AROM  1 x 10 with passive self range into elbow extension at end range AROM  1 x 10 with passive self range into elbow extension at end range    2 x 10 reps with blue theratubing AROM  1 x 10 with passive self range into elbow extension at end range    2 x 10 reps with blue theratubing   Shoulder abduction  AROM x 5 reps         Elbow flexion/extension  AROM x 5 reps         PNF D1/D2 diagonals Hand helper    15 lbs x 5 reps with min assist to extend fingers   20 lbs x 5 reps with assist to extend fingers    Tabletop skateboard           Shoulder flexion/extension  AROM x 5 reps         Chest press           UBE Min assist to sustain right  on handle   Level 0  5 mintues Min assist to sustain right  on handle   Level 0  5 mintues Min assist to sustain right  on handle   Level 0  6 mintues Min assist to sustain right  on handle   Level 1  5 mintues Min assist to sustain right  on handle   Level 0  6 mintues Min assist to sustain right  on handle   Level 0  6 mintues Min assist to sustain right  on handle   Level 0  6 mintues Min assist to sustain right  on handle   Level 0  7 mintues   Modified push-ups  Hands on countertop with dycem under right hand  3 x 10 Hands on countertop with dycem under right hand  3 x 10 Hands on countertop with dycem under right hand  3 x 10  Hands on countertop with dycem under right hand  2 x 10 Hands on countertop with dycem under right hand  2 x 10 Hands on countertop with dycem under right hand  3 x 10 Hands on countertop with dycem under right hand  3 x 10   Resistive clothespin Yellow  1 x 10  Yellow   1 x 5 pinch and release Yellow   1 x 5 pinch and release Yellow   1 x 5 pinch and release   Yellow   1 x 5 pinch and release   Shoulder arc 10 tabs on small hill only with L hand assisting R hand. 10 tabs on small hill; Left hand assisted R hand grasping and releasing small tabs; used R arm independently to pull tabs over  10 tabs on small hill; Left hand assisted R hand grasping and releasing small tabs; used R arm independently to pull tabs over     Wrist flexion/extension  AROM x 5 reps         Punching bag               Access Code: [email or web address]   URL: [email or web address]   Date: 01/19/2018   Prepared by: Anh Hardin     Exercises   Supine Shoulder Flexion PROM - 10 reps - 2 sets - 3 hold - 1x daily - 5x weekly Push-Up on Counter - 10 reps - 2 sets - 1x daily - 5x weekly   Seated Shoulder Shrugs - 10 reps - 2 sets - 1x daily - 5x weekly   Seated Scapular Retraction - 10 reps - 2 sets - 1x daily - 5x weekly   Seated Weight Shifting with Arm Support - 10 reps - 2 sets - 1x daily - 5x weekly   Seated Shoulder Flexion Self PROM - 10 reps - 2 sets - 1x daily - 5x weekly   Supine Elbow Flexion Extension AROM - 10 reps - 2 sets - 1x daily - 5x weekly   Seated Elbow Flexion Extension AROM - 10 reps - 2 sets - 1x daily - 5x weekly     Neuromuscular Re-education: ( 10):  Exercise/activities per grid below to improve balance, coordination, kinesthetic sense and proprioception. Required moderate visual and verbal cues to promote static balance in sitting, promote coordination of right, upper extremity(s) and promote motor control of right, upper extremity(s). Patient completed lateral weight shifts sitting at edge of mat x 20 reps each onto elbow/forearms  with dynamic reach with left hand outside of base of support, using right upper extremity to maintain balance. Treatment/Session Assessment: Pt was able to tolerate an increase from 6 min to 7 min on UBE today. She also demo'd improved ability to relax hand/digits while completing resistive clothespin activities today. Pt did report increased stiffness today in R UE/LE, but stated that it might be due to her not completing her home exercises this morning due to a MD appointment. Pt would continue to benefit from skilled OT services to increase functional use of R UE and increase independence with BADLs. · Response to Treatment: Mrs. Jaja Johns tolerated therapy well today, without any issue. She continues to be very cooperative and motivated during therapy. · Compliance with Program/Exercises: Will assess as treatment progresses. Pt reports compliance with HEP. · Recommendations/Intent for next treatment session:  \"Next visit will focus on advancements to more challenging activities and reduction in assistance provided\".     Total Treatment Duration:  OT Patient Time In/Time Out  Time In: 1300  Time Out: 2927 [facility]

## 2018-03-26 NOTE — PROGRESS NOTES
Kasie Rossi  : 1955  Primary: Hayley Hall Ppo  Secondary:  [address] at [facility]  [facility], [address] [place] [address],[address], [facility] [address]  [phone]   [phone]          OUTPATIENT PHYSICAL THERAPY:Daily Note 3/26/2018    ICD-10: Treatment Diagnosis:   · Other abnormalities of gait and mobility (R26.89)  · Difficulty in walking, not elsewhere classified (R26.2)  Precautions/Allergies:   Banana; Lisinopril; and Nuts [tree nut]   Fall Risk Score: 5 (? 5 = High Risk)  MD Orders: Evaluate and Treat MEDICAL/REFERRING DIAGNOSIS:  Weakness due to cerebrovascular accident ([facility]) ([facility] [I63.9, R53.1]   DATE OF ONSET: CVA: 2017  REFERRING PHYSICIAN: Juliette Stout*  RETURN PHYSICIAN APPOINTMENT: TBD     INITIAL ASSESSMENT:  Ms. Aron Segovia presents with decreased mobility, decreased strength, decreased gait, and decreased balance secondary to CVA. After discussing with patient, she agreed she would benefit from physical therapy to improve above deficits. Please sign this plan of treatment if you concur. Thank you for the opportunity to serve this patient. Progress note on 3/12/2018:   Patient has attended twenty-four scheduled physical therapy appointments from 12/15/2017 to 3/12/2018. Patient is very motivated and compliant with therapy. Patient complaints of increased irritation in her right AFO due to increased tone. Patient is waiting on her insurance to approve Botox injections to decreased tone in right ankle. Patient would benefit from continuing skilled physical therapy to address problems and goals. Thank you for this referral.    PROBLEM LIST (Impacting functional limitations):  1. Decreased Strength  2. Decreased Ambulation Ability/Technique  3. Decreased Balance  4. Decreased Activity Tolerance INTERVENTIONS PLANNED:  1. Balance Exercise  2. Gait Training  3. Home Exercise Program (HEP)  4. Neuromuscular Re-education/Strengthening  5.  Range of Motion (ROM)  6. Therapeutic Exercise/Strengthening   TREATMENT PLAN:  Effective Dates: 2/12/2018 TO 5/7/2018. Frequency/Duration: 2-3 times a week for 12 weeks  GOALS: (Goals have been discussed and agreed upon with patient.)  Short-Term Functional Goals: Time Frame: 3 weeks  1. Patient will be independent with home exercise program without exacerbation of symptoms or cueing needed. Goal met. Discharge Goals: Time Frame: 8 weeks  1. Patient will be independent with all ADLs with minimal fear of falling and loss of balance with daily tasks. Goal ongoing. 2. Patient will report no fear avoidance with social or recreational activities due to fear of falling. Goal ongoing. 3. Patient will score greater than or equal to 45/56 on Gipson Balance Scale with minimal effect of imbalance on patient's ability to manage every day life activities. Goal ongoing. Rehabilitation Potential For Stated Goals: Good  Regarding Toi Chicas Esequiel's therapy, I certify that the treatment plan above will be carried out by a therapist or under their direction. Thank you for this referral,  Bryce Winters PT     Referring Physician Signature: Juliette Walters*              Date                    The information in this section was collected on 12/15/2017 (except where otherwise noted). HISTORY:   History of Present Injury/Illness (Reason for Referral):  Patient reports she had a severe stroke on 9/6/2017. She reports that she was in the hospital and [facility] for about 61-62 days. She reports that she has progressed really well, but is still having trouble walking and using her right side (UE and LE). She reports that she walks using her AFO and quad cane all the time. She reports that she tries to be as active as possible. She reports that she is scared that she is going to fall, even though she has not.   She reports she would like to work on strengthening her right leg so her balance is better and she can walk without fear of falling. Past Medical History/Comorbidities:   Ms. Eusebio Dixon  has a past medical history of Anxiety; CVA (cerebral vascular accident) ([facility] (2017); Depression; HTN (hypertension); Menopause; and PTE (pulmonary thromboembolism) ([facility] (2017). Ms. Eusebio Dixon  has a past surgical history that includes hx jennifer and bso (); hx  section; hx refractive surgery (); hx other surgical (); and hx other surgical (2017). Social History/Living Environment:   Home Environment: Private residence  Living Alone: No  Support Systems: Family member(s), Friends \ neighbors; Spouse  Prior Level of Function/Work/Activity:  Independent  Dominant Side:         RIGHT  Personal Factors:          Sex:  female        Age:  60 y.o. Current Medications:       Current Outpatient Prescriptions:     polyethylene glycol (MIRALAX) 17 gram/dose powder, Take 17 g by mouth daily. , Disp: 510 g, Rfl: 2    [START ON 2018] methylphenidate HCl (RITALIN) 20 mg tablet, Take 1 Tab (20 mg total) by mouth daily. Max Daily Amount: 20 mg, Disp: 30 Tab, Rfl: 0    [START ON 2018] methylphenidate HCl (RITALIN) 20 mg tablet, Take 1 Tab (20 mg total) by mouth daily. Max Daily Amount: 20 mg, Disp: 30 Tab, Rfl: 0    methylphenidate HCl (RITALIN) 20 mg tablet, Take 1 Tab (20 mg total) by mouth daily. Max Daily Amount: 20 mg, Disp: 30 Tab, Rfl: 0    rOPINIRole (REQUIP) 2 mg tablet, Take 1 Tab by mouth nightly. for restless leg, Disp: 90 Tab, Rfl: 1    FLUoxetine (PROZAC) 20 mg tablet, Take 2 Tabs by mouth daily. , Disp: 180 Tab, Rfl: 1    tiZANidine (ZANAFLEX) 4 mg tablet, 4mg po TID, Disp: 270 Tab, Rfl: 1    labetalol (NORMODYNE) 200 mg tablet, Take 1 Tab by mouth two (2) times a day., Disp: 180 Tab, Rfl: 1    losartan-hydroCHLOROthiazide (HYZAAR) 100-12.5 mg per tablet, Take 1 Tab by mouth daily. , Disp: 90 Tab, Rfl: 1    ALPRAZolam (XANAX) 0.25 mg tablet, Take 1 Tab by mouth three (3) times daily as needed for Anxiety.  Max Daily Amount: 0.75 mg., Disp: 30 Tab, Rfl: 1   Date Last Reviewed:  3/26/2018    Number of Personal Factors/Comorbidities that affect the Plan of Care: 1-2: MODERATE COMPLEXITY   EXAMINATION:   Observation/Orthostatic Postural Assessment:          Posture Assessment: Rounded shoulders, Forward head   Functional Mobility:         Gait/Ambulation:     Speed/Lulu: Pace decreased (<100 feet/min)  Step Length: Left shortened, Right lengthened  Swing Pattern: Left asymmetrical, Right asymmetrical  Stance: Left increased, Right decreased  Gait Abnormalities: Antalgic, Circumduction, Foot drop, Path deviations, Trendelenburg  Ambulation - Level of Assistance: Modified independent  Assistive Device: Cane, quad  · Interventions: Verbal cues, Safety awareness training          Transfers:     Sit to Stand: Independent  Stand to Sit: Independent  Stand Pivot Transfers: Independent  · Bed to Chair: Independent          Bed Mobility:     Rolling: Independent  Supine to Sit: Independent  Sit to Supine: Independent  · Scooting: Independent    Strength:          L UE STRENGTH: 4/5 shoulder flexion, 4/5 shoulder abduction, 4/5 shoulder extension, 4/5 elbow flexion, 4/5 elbow extension. R UE STRENGTH: 2/5 shoulder flexion, 2/5 shoulder abduction, 2/5 shoulder extension, 2/5 elbow flexion, 2/5 elbow extension. R LE STRENGTH:  4+/5 hip flexion, 4+/5 hip abduction, 4+/5 hip adduction, 4+/5 hip extension, 4+/5 knee extension, 4+/5 knee flexion, 1/5 ankle dorsiflexion, 1/5 ankle plantarflexion, 1/5 ankle inversion, 1/5 ankle eversion. Sensation:         Intact for light touch and proprioception  Postural Control & Balance:  · Gipson Balance Scale:  33/56.   (A score less than 45/56 indicates high risk of falls)  . Score improved from 28/56 on initial evaluation. Body Structures Involved:  1. Nerves  2. Muscles Body Functions Affected:  1. Neuromusculoskeletal  2.  Movement Related Activities and Participation Affected:  1. Mobility  2. Self Care   Number of elements (examined above) that affect the Plan of Care: 4+: HIGH COMPLEXITY   CLINICAL PRESENTATION:   Presentation: Evolving clinical presentation with changing clinical characteristics: MODERATE COMPLEXITY   CLINICAL DECISION MAKING:   Outcome Measure: Tool Used: Gipson Balance Scale  Score:  Initial: 28/56 Most Recent: 33/56 (Date: 3- )   Interpretation of Score: Each section is scored on a 0-4 scale, 0 representing the patients inability to perform the task and 4 representing independence. The scores of each section are added together for a total score of 56. The higher the patients score, the more independent the patient is. Any score below 45 indicates increased risk for falls. Score 56 55-45 44-34 33-23 22-12 11-1 0   Modifier CH CI CJ CK CL CM CN     Medical Necessity:   · Patient is expected to demonstrate progress in strength, range of motion, balance and coordination to improve safety during daily activities. · Patient demonstrates good rehab potential due to higher previous functional level. · Skilled intervention continues to be required due to decreased mobility. Reason for Services/Other Comments:  · Patient continues to demonstrate capacity to improve overall mobility which will increase independence and increase safety. Use of outcome tool(s) and clinical judgement create a POC that gives a: Questionable prediction of patient's progress: MODERATE COMPLEXITY            TREATMENT:   (In addition to Assessment/Re-Assessment sessions the following treatments were rendered)  Pre-treatment Symptoms/Complaints:  3/26/2018: Patient has no complaints. Pain: Initial: Pain Intensity 1: 0  Post Session:  0/10     NEUROMUSCULAR RE-EDUCATION: (20 minutes):  Exercise/activities per grid below to improve balance, coordination, kinesthetic sense, posture and proprioception.   Required minimal verbal and manual cues to promote static and dynamic balance in standing, promote coordination of bilateral, lower extremity(s) and promote motor control of bilateral, lower extremity(s). Date:  3/26/2018 Date   3/21/18   Activity/Exercise Parameters    Stepping over half foam  2x10 reps with left LE, working on weightbearing through R LE and keeping R foot flat on floor (foot tends to invert) 2x10 reps with left LE, working on weightbearing through R LE and keeping R foot flat on floor   Step taps Onto 6 inch step with L LE working on weight bearing through R LE Onto 6 inch step with L LE working on weight bearing through R LE   Step ups 6 inch  x10 leading with right LE working on weight bearing through R LE 6 inch  x10 leading with L LE working on weight bearing through [address] X    Walking in the clinic with quad cane About 100 feet with quad cane, working on weightbearing more on R foot through midstance and terminal stance, and stepping bigger with L LE. Tactile cues to pelvis to disassociate pelvis from trunk. About 100 feet with quad cane, working on weightbearing more on R foot through midstance and terminal stance, and stepping bigger with L LE; added working on shifting R hips forward over R foot better midstance to terminal stance   pregait activity Stepping with R LE, weight shifting with hips and shoulders over R foot. Stepping with R LE, weight shifting with hips and shoulders over R foot. THERAPEUTIC EXERCISE: (25 minutes):  Exercises per grid below to improve mobility and strength. Required minimal verbal cues to promote proper body alignment. Progressed repetitions as indicated.    Date:  3/19/2018 Dte   3/21/18 Date  3/26/18      Activity/Exercise Parameters        Sit to stand from chair X        Left sidelying alternating isometrics on pelvis X        Standing hip abduction X        Seated LAQs         Standing hamstrings curls  X        Bridging 2x10 with right lower extremity 2x10 with right lower 2x10 with right lower      Supine straight leg raise 2x10 with right lower extremity  4# 2x10 with right lower extremity 3# 2x10 with right lower extremity 3#      Supine PNF D1 and D2  2x10 each with R LE 2x10 with right lower extremity D1 and D2 2x10 with right lower extremity D1 and D2      L sidelying R pelvis and trunk PNF  Flexion/ext, rotation       Left sidelying hip abduction         L sidelying: picking up and moving R LE from in front of L foot to behind L foot (working on hip abd and ext) and stabilizing trunk 2x10 reps  4# 2x10 reps, working on lifting higher 3# 2x10 reps, working on lifting higher 3#      L sidelying R clam shells 2 x 10 reps R LE  4# 2x10 reps, working on lifting higher 3# 2x10 reps, working on lifting higher 3#      R foot and gastrocsoleus stretching X 8 minutes trying to get foot to neutral position X 10 minutes trying to get foot to neutral position X 10 minutes trying to get foot to neutral position      Standing minisquats in bars  Sit to stand x 10 reps Sit to stand x 10 reps          [facility] Portal  Treatment/Session Assessment:    · Response to Treatment:  Patient tolerated treatment without issues. Continue to progress to tolerance. · Compliance with Program/Exercises: Compliant. · Recommendations/Intent for next treatment session: \"Next visit will focus on advancements to more challenging activities\".    Total Treatment Duration:  PT Patient Time In/Time Out  Time In: 1345  Time Out: [address]

## 2018-03-28 ENCOUNTER — HOSPITAL ENCOUNTER (OUTPATIENT)
Dept: PHYSICAL THERAPY | Age: 63
Discharge: HOME | End: 2018-03-28
Attending: PHYSICAL MEDICINE & REHABILITATION
Payer: COMMERCIAL

## 2018-03-28 PROCEDURE — 97112 NEUROMUSCULAR REEDUCATION: CPT

## 2018-03-28 PROCEDURE — 97110 THERAPEUTIC EXERCISES: CPT

## 2018-03-28 NOTE — PROGRESS NOTES
Demetrio Andie  : 1955  Primary: Francesca Kuo Ppo  Secondary:  [address]  at [place] [id number] [facility], [address] [place] [address],[address], [address] [facility]  [phone]   [phone]        OUTPATIENT OCCUPATIONAL THERAPY: Daily Note 3/28/2018    ICD-10: Treatment Diagnosis: Hemiplegia and hemiparesis following cerebral infarction affecting right dominant side (I69.351)  Precautions/Allergies:   Banana; Lisinopril; and Nuts [tree nut]   Fall Risk Score: 5 (? 5 = High Risk)  MD Orders: Referral to occupational therapy MEDICAL/REFERRING DIAGNOSIS:   Cerebral infarction, unspecified [I63.9]  Weakness [R53.1]   DATE OF ONSET: 2017   REFERRING PHYSICIAN: Juliette Stone*  RETURN PHYSICIAN APPOINTMENT: unknown   3/5/18: Ms. Jaqueline Aleman continues to make excellent progress towards her therapy goals; she demonstrates gradual improvements in R UE range of motion, specifically R shoulder flexion and R elbow extension/flexion, and reports increased functional use of R UE in activities of daily living, including dressing, grooming, and feeding. Patient would continue to benefit from skilled occupational therapy services to maximize safety and independence and increase functional use of R UE during activities of daily living.    18: Ms. Jaqueline Aleman is making excellent progress toward her goals; she is demonstrating improving range of motion and functional use of the right upper extremity in activities of daily living. Feel she will continue to benefit from skilled occupational therapy to maximize safety and independence with activities of daily living. INITIAL ASSESSMENT:  Ms. Jaqueline Aleman presents with impaired active movement, strength, coordination and sensation of the dominant right upper extremity that is affecting her ability to complete activities of daily living independently.  Feel she may benefit from skilled occupational therapy to maximize safety and independence with activities of daily living. PLAN OF CARE:   PROBLEM LIST:  1. Decreased Strength  2. Decreased ADL/Functional Activities  3. Decreased Transfer Abilities  4. Decreased Balance  5. Decreased Flexibility/Joint Mobility  6. Decreased Cognition INTERVENTIONS PLANNED:  1. Activities of daily living training  2. Manual therapy training  3. Modalities  4. Neuromuscular re-eduation  5. Sensory reintegration training  6. Splinting  7. Therapeutic activity  8. Therapeutic exercise   TREATMENT PLAN:  Effective Dates: 3/19/18 to 6/17/18. Frequency/Duration: 2 times a week for 12 weeks  GOALS: (Goals have been discussed and agreed upon with patient.)  Short-Term Functional Goals: Time Frame: 6 weeks  1. Patient will demonstrate independence with home exercise program for right upper extremity range of motion. Met  2. Patient will increase use of right upper extremity as a functional assist in at least 25% of daily activities. Met  3. Patient will bathe with moderate assistance. Continue to address  Discharge Goals: Time Frame: 12 weeks  1. Patient will bathe with minimal assistance. Continue to address  2. Patient will feed self with modified independence and adaptive equipment as needed. Continue to address  3. Patient will dress with modified independence and adaptive equipment as needed. Continue to address  4. Patient will use right upper extremity as a functional assist in at least 50% of daily activities. Continue to address  Rehabilitation Potential For Stated Goals: Good  Regarding Abigail Iraheta's therapy, I certify that the treatment plan above will be carried out by a therapist or under their direction. Thank you for this referral,  Karla Webb, OT     Referring Physician Signature: Juliette Godfrey* _________________________  Date _________            The information in this section was collected on 3/5/18 (except where otherwise noted).   OCCUPATIONAL PROFILE & HISTORY:   History of Present Injury/Illness (Reason for Referral):  CVA with hospital and [facility] stay then home health therapy. Past Medical History/Comorbidities:   Ms. Bethany Freire  has a past medical history of Anxiety; CVA (cerebral vascular accident) ([facility] [address] (2017); Depression; HTN (hypertension); Menopause; and PTE (pulmonary thromboembolism) ([facility] [address] (2017). Ms. Bethany Freire  has a past surgical history that includes hx jennifer and bso (); hx  section; hx refractive surgery (); hx other surgical (); and hx other surgical (). Social History/Living Environment:      Prior Level of Function/Work/Activity:  Does seasonal taxes at [employer]  Dominant Side:         RIGHT  Current Medications:    Current Outpatient Prescriptions:     polyethylene glycol (MIRALAX) 17 gram/dose powder, Take 17 g by mouth daily. , Disp: 510 g, Rfl: 2    [START ON 2018] methylphenidate HCl (RITALIN) 20 mg tablet, Take 1 Tab (20 mg total) by mouth daily. Max Daily Amount: 20 mg, Disp: 30 Tab, Rfl: 0    [START ON 2018] methylphenidate HCl (RITALIN) 20 mg tablet, Take 1 Tab (20 mg total) by mouth daily. Max Daily Amount: 20 mg, Disp: 30 Tab, Rfl: 0    methylphenidate HCl (RITALIN) 20 mg tablet, Take 1 Tab (20 mg total) by mouth daily. Max Daily Amount: 20 mg, Disp: 30 Tab, Rfl: 0    rOPINIRole (REQUIP) 2 mg tablet, Take 1 Tab by mouth nightly. for restless leg, Disp: 90 Tab, Rfl: 1    FLUoxetine (PROZAC) 20 mg tablet, Take 2 Tabs by mouth daily. , Disp: 180 Tab, Rfl: 1    tiZANidine (ZANAFLEX) 4 mg tablet, 4mg po TID, Disp: 270 Tab, Rfl: 1    labetalol (NORMODYNE) 200 mg tablet, Take 1 Tab by mouth two (2) times a day., Disp: 180 Tab, Rfl: 1    losartan-hydroCHLOROthiazide (HYZAAR) 100-12.5 mg per tablet, Take 1 Tab by mouth daily. , Disp: 90 Tab, Rfl: 1    ALPRAZolam (XANAX) 0.25 mg tablet, Take 1 Tab by mouth three (3) times daily as needed for Anxiety.  Max Daily Amount: 0.75 mg., Disp: 30 Tab, Rfl: 1            Date Last Reviewed:  3/28/2018   Complexity Level : Expanded review of therapy/medical records (1-2):  MODERATE COMPLEXITY   ASSESSMENT OF OCCUPATIONAL PERFORMANCE:   ROM:                   Balance:                   Coordination:                   Mental Status:                   Vision:                   Activities of Daily Living:           Basic ADLs (From Assessment) Complex ADLs (From Assessment)         Grooming/Bathing/Dressing Activities of Daily Living                                   Sensation:         Light touch absent distal to right elbow     Physical Skills Involved:  1. Range of Motion  2. Balance  3. Sensation  4. Fine Motor Control  5. Gross Motor Control Cognitive Skills Affected (resulting in the inability to perform in a timely and safe manner):  1. Executive Function  2. Sustained Attention  3. Divided Attention  4. Comprehension Psychosocial Skills Affected:  1. None   Number of elements that affect the Plan of Care: 5+:  HIGH COMPLEXITY   CLINICAL DECISION MAKING:   Outcome Measure: Tool Used: [facility] [address] [address] [facility] Daily Activity Outpatient Short Form  Score:  Initial: 18 Most Recent: 33 (Date: 3/5/18 )   Interpretation of Tool:  Represents clinically-significant functional categories (i.e., basic and instrumental activities of daily living, fine motor activities). Score 60 59-55 54-47 46-34 32-21 20-16 15   Modifier CH CI CJ CK CL CM CN     Medical Necessity:   · Patient demonstrates good rehab potential due to higher previous functional level. Reason for Services/Other Comments:  · Patient continues to require skilled intervention due to decreased safety and independence in activities of daily living. Clinical Decision-Making Assessment: Moderately difficult to predict patient's progress at this time due to the extent of her limitations in functional movement and use of dominant right upper extremity.     Assessment process, impact of co-morbidities, assessment modification\need for assistance, and selection of interventions: Analytical Complexity:MODERATE COMPLEXITY   TREATMENT:   (In addition to Assessment/Re-Assessment sessions the following treatments were rendered)    Pre-treatment Symptoms/Complaints:  Patient states, \"I feel better than I did on Monday, but I'm still a little tired today. \"  Pain: Initial:   Pain Intensity 1: 0/10 Post Session:  0/10     Therapeutic Exercise: ( 35 minutes):  Exercises per grid below to improve dynamic movement of arm - right and hand - right to improve functional lifting, carrying, reaching and grasping. Required moderate visual and verbal cues to promote proper body mechanics. Progressed range, repetitions and complexity of movement as indicated.        Date:  3/5/18 Date:  3/7/18 Date:  3/12/18 Date:  3/14/18 Date:  3/19/18 Date:   3/21/18 Date:  3/26/18 Date:  3/28/18   Activity/Exercise           Shoulder shrugs AROM x 5 reps AROM x 10 reps AROM x 10 reps AROM x 10 reps with mirror for visual feedback AROM x 10 reps with mirror for visual feedback AROM x 10 reps with mirror for visual feedback AROM x 10 reps with mirror for visual feedback AROM x 10 reps with mirror for visual feedback   Scapular protraction/retraction AROM x 5 reps AROM   1 x 10 with passive self range into elbow extension at end range  1 x 10 with green theratubing  1 x 10 with blue theratubing AROM  1 x 10 with blue theratubing AROM  1 x 10 with passive self range into elbow extension at end range AROM  1 x 10 with passive self range into elbow extension at end range AROM  1 x 10 with passive self range into elbow extension at end range    2 x 10 reps with blue theratubing AROM  1 x 10 with passive self range into elbow extension at end range    2 x 10 reps with blue theratubing AROM  1 x 10 with passive self range into elbow extension at end range   Shoulder abduction AROM x 5 reps          Elbow flexion/extension AROM x 5 reps          PNF D1/D2 diagonals           Hand helper   15 lbs x 5 reps with min assist to extend fingers   20 lbs x 5 reps with assist to extend fingers     Tabletop skateboard           Shoulder flexion/extension AROM x 5 reps          Chest press           UBE Min assist to sustain right  on handle   Level 0  5 mintues Min assist to sustain right  on handle   Level 0  6 mintues Min assist to sustain right  on handle   Level 1  5 mintues Min assist to sustain right  on handle   Level 0  6 mintues Min assist to sustain right  on handle   Level 0  6 mintues Min assist to sustain right  on handle   Level 0  6 mintues Min assist to sustain right  on handle   Level 0  7 mintues Min assist to sustain right  on handle   Level 0  6 mintues   Modified push-ups  Hands on countertop with dycem under right hand  3 x 10 Hands on countertop with dycem under right hand  3 x 10  Hands on countertop with dycem under right hand  2 x 10 Hands on countertop with dycem under right hand  2 x 10 Hands on countertop with dycem under right hand  3 x 10 Hands on countertop with dycem under right hand  3 x 10 Hands on countertop with dycem under right hand  3 x 10   Resistive clothespin  Yellow   1 x 5 pinch and release Yellow   1 x 5 pinch and release Yellow   1 x 5 pinch and release   Yellow   1 x 5 pinch and release    Shoulder arc   10 tabs on small hill; Left hand assisted R hand grasping and releasing small tabs; used R arm independently to pull tabs over  10 tabs on small hill; Left hand assisted R hand grasping and releasing small tabs; used R arm independently to pull tabs over   10 tabs on small hill; Left hand assisted R hand grasping and releasing small tabs; used R arm independently to pull tabs over   Wrist flexion/extension AROM x 5 reps          Punching bag               Access Code: [email or web address]   URL: [email or web address]   Date: 01/19/2018   Prepared by: Diana Wallace     Exercises   Supine Shoulder Flexion PROM - 10 reps - 2 sets - 3 hold - 1x daily - 5x weekly   Push-Up on Counter - 10 reps - 2 sets - 1x daily - 5x weekly   Seated Shoulder Shrugs - 10 reps - 2 sets - 1x daily - 5x weekly   Seated Scapular Retraction - 10 reps - 2 sets - 1x daily - 5x weekly   Seated Weight Shifting with Arm Support - 10 reps - 2 sets - 1x daily - 5x weekly   Seated Shoulder Flexion Self PROM - 10 reps - 2 sets - 1x daily - 5x weekly   Supine Elbow Flexion Extension AROM - 10 reps - 2 sets - 1x daily - 5x weekly   Seated Elbow Flexion Extension AROM - 10 reps - 2 sets - 1x daily - 5x weekly     Neuromuscular Re-education: ( 10):  Exercise/activities per grid below to improve balance, coordination, kinesthetic sense and proprioception. Required moderate visual and verbal cues to promote static balance in sitting, promote coordination of right, upper extremity(s) and promote motor control of right, upper extremity(s). Patient completed lateral weight shifts sitting at edge of mat x 20 reps each onto elbow/forearms  with dynamic reach with left hand outside of base of support, using right upper extremity to maintain balance. Treatment/Session Assessment: Pt demo'd improved dynamic reach outside base of support today and required less verbal cueing. Pt also demo'd improved ability to relax hand/digits to release small tabs during use of shoulder arc. Pt would continue to benefit from skilled OT services to increase functional use of R UE and increase independence with BADLs. · Response to Treatment: Mrs. Alka Garcia tolerated therapy well today, without any issue. She continues to be very cooperative and motivated during therapy. · Compliance with Program/Exercises: Will assess as treatment progresses. Pt reports compliance with HEP. · Recommendations/Intent for next treatment session: \"Next visit will focus on advancements to more challenging activities and reduction in assistance provided\".     Total Treatment Duration:       Marcela Slater, OT

## 2018-03-28 NOTE — PROGRESS NOTES
Lon Garcia  : 1955  Primary: Jessicajeannie Corral Ppo  Secondary:  [address]  at [facility]  [address], [address] [facility] [address] [facility]  [phone]   [phone]          OUTPATIENT PHYSICAL THERAPY:Daily Note 3/28/2018    ICD-10: Treatment Diagnosis:   · Other abnormalities of gait and mobility (R26.89)  · Difficulty in walking, not elsewhere classified (R26.2)  Precautions/Allergies:   Banana; Lisinopril; and Nuts [tree nut]   Fall Risk Score: 5 (? 5 = High Risk)  MD Orders: Evaluate and Treat MEDICAL/REFERRING DIAGNOSIS:  Weakness due to cerebrovascular accident (CVA) ([facility] [I63.9, R53.1]   DATE OF ONSET: CVA: 2017  REFERRING PHYSICIAN: Juliette Eaton*  RETURN PHYSICIAN APPOINTMENT: TBD     INITIAL ASSESSMENT:  Ms. Ambar Jon presents with decreased mobility, decreased strength, decreased gait, and decreased balance secondary to CVA. After discussing with patient, she agreed she would benefit from physical therapy to improve above deficits. Please sign this plan of treatment if you concur. Thank you for the opportunity to serve this patient. Progress note on 3/12/2018:   Patient has attended twenty-four scheduled physical therapy appointments from 12/15/2017 to 3/12/2018. Patient is very motivated and compliant with therapy. Patient complaints of increased irritation in her right AFO due to increased tone. Patient is waiting on her insurance to approve Botox injections to decreased tone in right ankle. Patient would benefit from continuing skilled physical therapy to address problems and goals. Thank you for this referral.    PROBLEM LIST (Impacting functional limitations):  1. Decreased Strength  2. Decreased Ambulation Ability/Technique  3. Decreased Balance  4. Decreased Activity Tolerance INTERVENTIONS PLANNED:  1. Balance Exercise  2. Gait Training  3. Home Exercise Program (HEP)  4. Neuromuscular Re-education/Strengthening  5.  Range of Motion (ROM)  6. Therapeutic Exercise/Strengthening   TREATMENT PLAN:  Effective Dates: 2/12/2018 TO 5/7/2018. Frequency/Duration: 2-3 times a week for 12 weeks  GOALS: (Goals have been discussed and agreed upon with patient.)  Short-Term Functional Goals: Time Frame: 3 weeks  1. Patient will be independent with home exercise program without exacerbation of symptoms or cueing needed. Goal met. Discharge Goals: Time Frame: 8 weeks  1. Patient will be independent with all ADLs with minimal fear of falling and loss of balance with daily tasks. Goal ongoing. 2. Patient will report no fear avoidance with social or recreational activities due to fear of falling. Goal ongoing. 3. Patient will score greater than or equal to 45/56 on Gipson Balance Scale with minimal effect of imbalance on patient's ability to manage every day life activities. Goal ongoing. Rehabilitation Potential For Stated Goals: Good  Regarding Jayda Howie Iraheta's therapy, I certify that the treatment plan above will be carried out by a therapist or under their direction. Thank you for this referral,  Solo Joya PT     Referring Physician Signature: Juliette Lazcano*              Date                    The information in this section was collected on 12/15/2017 (except where otherwise noted). HISTORY:   History of Present Injury/Illness (Reason for Referral):  Patient reports she had a severe stroke on 9/6/2017. She reports that she was in the hospital and [facility] for about 61-62 days. She reports that she has progressed really well, but is still having trouble walking and using her right side (UE and LE). She reports that she walks using her AFO and quad cane all the time. She reports that she tries to be as active as possible. She reports that she is scared that she is going to fall, even though she has not.   She reports she would like to work on strengthening her right leg so her balance is better and she can walk without fear of falling. Past Medical History/Comorbidities:   Ms. Shaunna Ward  has a past medical history of Anxiety; CVA (cerebral vascular accident) ([facility] (2017); Depression; HTN (hypertension); Menopause; and PTE (pulmonary thromboembolism) ([facility] (2017). Ms. Shaunna Ward  has a past surgical history that includes hx jennifer and bso (); hx  section; hx refractive surgery (); hx other surgical (); and hx other surgical (). Social History/Living Environment:   Home Environment: Private residence  Living Alone: No  Support Systems: Family member(s), Friends \ neighbors; Spouse  Prior Level of Function/Work/Activity:  Independent  Dominant Side:         RIGHT  Personal Factors:          Sex:  female        Age:  60 y.o. Current Medications:       Current Outpatient Prescriptions:     polyethylene glycol (MIRALAX) 17 gram/dose powder, Take 17 g by mouth daily. , Disp: 510 g, Rfl: 2    [START ON 2018] methylphenidate HCl (RITALIN) 20 mg tablet, Take 1 Tab (20 mg total) by mouth daily. Max Daily Amount: 20 mg, Disp: 30 Tab, Rfl: 0    [START ON 2018] methylphenidate HCl (RITALIN) 20 mg tablet, Take 1 Tab (20 mg total) by mouth daily. Max Daily Amount: 20 mg, Disp: 30 Tab, Rfl: 0    methylphenidate HCl (RITALIN) 20 mg tablet, Take 1 Tab (20 mg total) by mouth daily. Max Daily Amount: 20 mg, Disp: 30 Tab, Rfl: 0    rOPINIRole (REQUIP) 2 mg tablet, Take 1 Tab by mouth nightly. for restless leg, Disp: 90 Tab, Rfl: 1    FLUoxetine (PROZAC) 20 mg tablet, Take 2 Tabs by mouth daily. , Disp: 180 Tab, Rfl: 1    tiZANidine (ZANAFLEX) 4 mg tablet, 4mg po TID, Disp: 270 Tab, Rfl: 1    labetalol (NORMODYNE) 200 mg tablet, Take 1 Tab by mouth two (2) times a day., Disp: 180 Tab, Rfl: 1    losartan-hydroCHLOROthiazide (HYZAAR) 100-12.5 mg per tablet, Take 1 Tab by mouth daily. , Disp: 90 Tab, Rfl: 1    ALPRAZolam (XANAX) 0.25 mg tablet, Take 1 Tab by mouth three (3) times daily as needed for Anxiety.  Max Daily Amount: 0.75 mg., Disp: 30 Tab, Rfl: 1   Date Last Reviewed:  3/28/2018    Number of Personal Factors/Comorbidities that affect the Plan of Care: 1-2: MODERATE COMPLEXITY   EXAMINATION:   Observation/Orthostatic Postural Assessment:          Posture Assessment: Rounded shoulders, Forward head   Functional Mobility:         Gait/Ambulation:     Speed/Lulu: Pace decreased (<100 feet/min)  Step Length: Left shortened, Right lengthened  Swing Pattern: Left asymmetrical, Right asymmetrical  Stance: Left increased, Right decreased  Gait Abnormalities: Antalgic, Circumduction, Foot drop, Path deviations, Trendelenburg  Ambulation - Level of Assistance: Modified independent  Assistive Device: Cane, quad  · Interventions: Verbal cues, Safety awareness training          Transfers:     Sit to Stand: Independent  Stand to Sit: Independent  Stand Pivot Transfers: Independent  · Bed to Chair: Independent          Bed Mobility:     Rolling: Independent  Supine to Sit: Independent  Sit to Supine: Independent  · Scooting: Independent    Strength:          L UE STRENGTH: 4/5 shoulder flexion, 4/5 shoulder abduction, 4/5 shoulder extension, 4/5 elbow flexion, 4/5 elbow extension. R UE STRENGTH: 2/5 shoulder flexion, 2/5 shoulder abduction, 2/5 shoulder extension, 2/5 elbow flexion, 2/5 elbow extension. R LE STRENGTH:  4+/5 hip flexion, 4+/5 hip abduction, 4+/5 hip adduction, 4+/5 hip extension, 4+/5 knee extension, 4+/5 knee flexion, 1/5 ankle dorsiflexion, 1/5 ankle plantarflexion, 1/5 ankle inversion, 1/5 ankle eversion. Sensation:         Intact for light touch and proprioception  Postural Control & Balance:  · Gipson Balance Scale:  33/56.   (A score less than 45/56 indicates high risk of falls)  . Score improved from 28/56 on initial evaluation. Body Structures Involved:  1. Nerves  2. Muscles Body Functions Affected:  1. Neuromusculoskeletal  2.  Movement Related Activities and Participation Affected:  1. Mobility  2. Self Care   Number of elements (examined above) that affect the Plan of Care: 4+: HIGH COMPLEXITY   CLINICAL PRESENTATION:   Presentation: Evolving clinical presentation with changing clinical characteristics: MODERATE COMPLEXITY   CLINICAL DECISION MAKING:   Outcome Measure: Tool Used: Gipson Balance Scale  Score:  Initial: 28/56 Most Recent: 33/56 (Date: 3- )   Interpretation of Score: Each section is scored on a 0-4 scale, 0 representing the patients inability to perform the task and 4 representing independence. The scores of each section are added together for a total score of 56. The higher the patients score, the more independent the patient is. Any score below 45 indicates increased risk for falls. Score 56 55-45 44-34 33-23 22-12 11-1 0   Modifier CH CI CJ CK CL CM CN     Medical Necessity:   · Patient is expected to demonstrate progress in strength, range of motion, balance and coordination to improve safety during daily activities. · Patient demonstrates good rehab potential due to higher previous functional level. · Skilled intervention continues to be required due to decreased mobility. Reason for Services/Other Comments:  · Patient continues to demonstrate capacity to improve overall mobility which will increase independence and increase safety. Use of outcome tool(s) and clinical judgement create a POC that gives a: Questionable prediction of patient's progress: MODERATE COMPLEXITY            TREATMENT:   (In addition to Assessment/Re-Assessment sessions the following treatments were rendered)  Pre-treatment Symptoms/Complaints:  3/28/2018: doing fine. I have bruise on my foot. It doesn't hurt. I think I may have kicked the bed when getting in.    Pain: Initial: Pain Intensity 1: 0  Post Session:  0/10     NEUROMUSCULAR RE-EDUCATION: (15 minutes):  Exercise/activities per grid below to improve balance, coordination, kinesthetic sense, posture and proprioception. Required minimal verbal and manual cues to promote static and dynamic balance in standing, promote coordination of bilateral, lower extremity(s) and promote motor control of bilateral, lower extremity(s). Date:  3/26/2018 Date   3/21/18 Date   3/28/18   Activity/Exercise Parameters     Stepping over half foam  2x10 reps with left LE, working on weightbearing through R LE and keeping R foot flat on floor (foot tends to invert) 2x10 reps with left LE, working on weightbearing through R LE and keeping R foot flat on floor    Step taps Onto 6 inch step with L LE working on weight bearing through R LE Onto 6 inch step with L LE working on weight bearing through R LE    Step ups 6 inch  x10 leading with right LE working on weight bearing through R LE 6 inch  x10 leading with L LE working on weight bearing through [address] [place] [address]     Walking in the clinic with quad cane About 100 feet with quad cane, working on weightbearing more on R foot through midstance and terminal stance, and stepping bigger with L LE. Tactile cues to pelvis to disassociate pelvis from trunk. About 100 feet with quad cane, working on weightbearing more on R foot through midstance and terminal stance, and stepping bigger with L LE; added working on shifting R hips forward over R foot better midstance to terminal stance About 100 feet with quad cane, working on weightbearing more on R foot through midstance and terminal stance, and stepping bigger with L LE; added working on shifting R hips forward over R foot better midstance to terminal stance   pregait activity Stepping with R LE, weight shifting with hips and shoulders over R foot. Stepping with R LE, weight shifting with hips and shoulders over R foot. Stepping with R LE, weight shifting with hips and shoulders over R foot. THERAPEUTIC EXERCISE: (30 minutes):  Exercises per grid below to improve mobility and strength.   Required minimal verbal cues to promote proper body alignment. Progressed repetitions as indicated. Date:  3/19/2018 Dte   3/21/18 Date  3/26/18 Date   3/28/18     Activity/Exercise Parameters        Sit to stand from chair X        Left sidelying alternating isometrics on pelvis X        Standing hip abduction X        Seated LAQs         Standing hamstrings curls  X        Bridging 2x10 with right lower extremity 2x10 with right lower  2x10 with right lower 2x10 with right lower     Supine straight leg raise 2x10 with right lower extremity  4# 2x10 with right lower extremity 3# 2x10 with right lower extremity 3# 2x10 with right lower extremity 3#     Supine PNF D1 and D2  2x10 each with R LE 2x10 with right lower extremity D1 and D2 2x10 with right lower extremity D1 and D2 2x10 with right lower extremity D1 and D2     L sidelying R pelvis and trunk PNF  Flexion/ext, rotation       Left sidelying hip abduction         L sidelying: picking up and moving R LE from in front of L foot to behind L foot (working on hip abd and ext) and stabilizing trunk 2x10 reps  4# 2x10 reps, working on lifting higher 3# 2x10 reps, working on lifting higher 3# 2x10 reps, working on lifting higher 3#     L sidelying R clam shells 2 x 10 reps R LE  4# 2x10 reps, working on lifting higher 3# 2x10 reps, working on lifting higher 3# 2x10 reps, working on lifting higher 3#     R foot and gastrocsoleus stretching X 8 minutes trying to get foot to neutral position X 10 minutes trying to get foot to neutral position X 10 minutes trying to get foot to neutral position X 10 minutes trying to get foot to neutral position     Standing minisquats in bars  Sit to stand x 10 reps Sit to stand x 10 reps      Nustep    Level 3 x 8 minutes         [facility] Portal  Treatment/Session Assessment:    · Response to Treatment:  Patient tolerated treatment without issues. Tried Nustep today without R UE, tied on L foot, and did well. Looked at patient's R foot. She has a bruise on and under great toe. No swelling or pain. No pain with palpation or ROM. Some yellowing bruised at top of R lower leg also, possibly from AFO. Continue to progress to tolerance. · Compliance with Program/Exercises: Compliant. · Recommendations/Intent for next treatment session: \"Next visit will focus on advancements to more challenging activities\".    Total Treatment Duration:  PT Patient Time In/Time Out  Time In: 1315  Time Out: 1465 [facility] Wilma Barboza

## 2018-03-30 ENCOUNTER — APPOINTMENT (OUTPATIENT)
Dept: PHYSICAL THERAPY | Age: 63
End: 2018-03-30
Attending: PHYSICAL MEDICINE & REHABILITATION
Payer: COMMERCIAL

## 2018-04-04 ENCOUNTER — HOSPITAL ENCOUNTER (OUTPATIENT)
Dept: PHYSICAL THERAPY | Age: 63
Discharge: HOME | End: 2018-04-04
Attending: PHYSICAL MEDICINE & REHABILITATION
Payer: COMMERCIAL

## 2018-04-04 PROCEDURE — 97110 THERAPEUTIC EXERCISES: CPT

## 2018-04-04 PROCEDURE — 97112 NEUROMUSCULAR REEDUCATION: CPT

## 2018-04-04 NOTE — PROGRESS NOTES
Olimpia Nova  : 1955  Primary: Abdulkadir Herrera Ppo  Secondary:  [address] at [facility], [address] [facility] [address], [address]  [phone]   [phone]          OUTPATIENT PHYSICAL THERAPY:Daily Note 2018    ICD-10: Treatment Diagnosis:   · Other abnormalities of gait and mobility (R26.89)  · Difficulty in walking, not elsewhere classified (R26.2)  Precautions/Allergies:   Banana; Lisinopril; and Nuts [tree nut]   Fall Risk Score: 5 (? 5 = High Risk)  MD Orders: Evaluate and Treat MEDICAL/REFERRING DIAGNOSIS:  Weakness due to cerebrovascular accident (CVA) ([facility] [I63.9, R53.1]   DATE OF ONSET: CVA: 2017  REFERRING PHYSICIAN: Juliette iKm*  RETURN PHYSICIAN APPOINTMENT: TBD     INITIAL ASSESSMENT:  Ms. Анна Stoll presents with decreased mobility, decreased strength, decreased gait, and decreased balance secondary to CVA. After discussing with patient, she agreed she would benefit from physical therapy to improve above deficits. Please sign this plan of treatment if you concur. Thank you for the opportunity to serve this patient. Progress note on 3/12/2018:   Patient has attended twenty-four scheduled physical therapy appointments from 12/15/2017 to 3/12/2018. Patient is very motivated and compliant with therapy. Patient complaints of increased irritation in her right AFO due to increased tone. Patient is waiting on her insurance to approve Botox injections to decreased tone in right ankle. Patient would benefit from continuing skilled physical therapy to address problems and goals. Thank you for this referral.    PROBLEM LIST (Impacting functional limitations):  1. Decreased Strength  2. Decreased Ambulation Ability/Technique  3. Decreased Balance  4. Decreased Activity Tolerance INTERVENTIONS PLANNED:  1. Balance Exercise  2. Gait Training  3. Home Exercise Program (HEP)  4. Neuromuscular Re-education/Strengthening  5.  Range of Motion (ROM)  6. Therapeutic Exercise/Strengthening   TREATMENT PLAN:  Effective Dates: 2/12/2018 TO 5/7/2018. Frequency/Duration: 2-3 times a week for 12 weeks  GOALS: (Goals have been discussed and agreed upon with patient.)  Short-Term Functional Goals: Time Frame: 3 weeks  1. Patient will be independent with home exercise program without exacerbation of symptoms or cueing needed. Goal met. Discharge Goals: Time Frame: 8 weeks  1. Patient will be independent with all ADLs with minimal fear of falling and loss of balance with daily tasks. Goal ongoing. 2. Patient will report no fear avoidance with social or recreational activities due to fear of falling. Goal ongoing. 3. Patient will score greater than or equal to 45/56 on Gipson Balance Scale with minimal effect of imbalance on patient's ability to manage every day life activities. Goal ongoing. Rehabilitation Potential For Stated Goals: Good  Regarding Jonah Iraheta's therapy, I certify that the treatment plan above will be carried out by a therapist or under their direction. Thank you for this referral,  Jeanette Roberson PT     Referring Physician Signature: Juliette Carlisle*              Date                    The information in this section was collected on 12/15/2017 (except where otherwise noted). HISTORY:   History of Present Injury/Illness (Reason for Referral):  Patient reports she had a severe stroke on 9/6/2017. She reports that she was in the hospital and [facility] for about 61-62 days. She reports that she has progressed really well, but is still having trouble walking and using her right side (UE and LE). She reports that she walks using her AFO and quad cane all the time. She reports that she tries to be as active as possible. She reports that she is scared that she is going to fall, even though she has not.   She reports she would like to work on strengthening her right leg so her balance is better and she can walk without fear of falling. Past Medical History/Comorbidities:   Ms. Nemesio Garcia  has a past medical history of Anxiety; CVA (cerebral vascular accident) ([facility] (2017); Depression; HTN (hypertension); Menopause; and PTE (pulmonary thromboembolism) ([facility] (2017). Ms. Nemesio Garcia  has a past surgical history that includes hx jennifer and bso (); hx  section; hx refractive surgery (); hx other surgical (); and hx other surgical (). Social History/Living Environment:   Home Environment: Private residence  Living Alone: No  Support Systems: Family member(s), Friends \ neighbors; Spouse  Prior Level of Function/Work/Activity:  Independent  Dominant Side:         RIGHT  Personal Factors:          Sex:  female        Age:  60 y.o. Current Medications:       Current Outpatient Prescriptions:     polyethylene glycol (MIRALAX) 17 gram/dose powder, Take 17 g by mouth daily. , Disp: 510 g, Rfl: 2    [START ON 2018] methylphenidate HCl (RITALIN) 20 mg tablet, Take 1 Tab (20 mg total) by mouth daily. Max Daily Amount: 20 mg, Disp: 30 Tab, Rfl: 0    [START ON 2018] methylphenidate HCl (RITALIN) 20 mg tablet, Take 1 Tab (20 mg total) by mouth daily. Max Daily Amount: 20 mg, Disp: 30 Tab, Rfl: 0    methylphenidate HCl (RITALIN) 20 mg tablet, Take 1 Tab (20 mg total) by mouth daily. Max Daily Amount: 20 mg, Disp: 30 Tab, Rfl: 0    rOPINIRole (REQUIP) 2 mg tablet, Take 1 Tab by mouth nightly. for restless leg, Disp: 90 Tab, Rfl: 1    FLUoxetine (PROZAC) 20 mg tablet, Take 2 Tabs by mouth daily. , Disp: 180 Tab, Rfl: 1    tiZANidine (ZANAFLEX) 4 mg tablet, 4mg po TID, Disp: 270 Tab, Rfl: 1    labetalol (NORMODYNE) 200 mg tablet, Take 1 Tab by mouth two (2) times a day., Disp: 180 Tab, Rfl: 1    losartan-hydroCHLOROthiazide (HYZAAR) 100-12.5 mg per tablet, Take 1 Tab by mouth daily. , Disp: 90 Tab, Rfl: 1    ALPRAZolam (XANAX) 0.25 mg tablet, Take 1 Tab by mouth three (3) times daily as needed for Anxiety.  Max Daily Amount: 0.75 mg., Disp: 30 Tab, Rfl: 1   Date Last Reviewed:  4/4/2018    Number of Personal Factors/Comorbidities that affect the Plan of Care: 1-2: MODERATE COMPLEXITY   EXAMINATION:   Observation/Orthostatic Postural Assessment:          Posture Assessment: Rounded shoulders, Forward head   Functional Mobility:         Gait/Ambulation:     Speed/Lulu: Pace decreased (<100 feet/min)  Step Length: Left shortened, Right lengthened  Swing Pattern: Left asymmetrical, Right asymmetrical  Stance: Left increased, Right decreased  Gait Abnormalities: Antalgic, Circumduction, Foot drop, Path deviations, Trendelenburg  Ambulation - Level of Assistance: Modified independent  Assistive Device: Cane, quad  · Interventions: Verbal cues, Safety awareness training          Transfers:     Sit to Stand: Independent  Stand to Sit: Independent  Stand Pivot Transfers: Independent  · Bed to Chair: Independent          Bed Mobility:     Rolling: Independent  Supine to Sit: Independent  Sit to Supine: Independent  · Scooting: Independent    Strength:          L UE STRENGTH: 4/5 shoulder flexion, 4/5 shoulder abduction, 4/5 shoulder extension, 4/5 elbow flexion, 4/5 elbow extension. R UE STRENGTH: 2/5 shoulder flexion, 2/5 shoulder abduction, 2/5 shoulder extension, 2/5 elbow flexion, 2/5 elbow extension. R LE STRENGTH:  4+/5 hip flexion, 4+/5 hip abduction, 4+/5 hip adduction, 4+/5 hip extension, 4+/5 knee extension, 4+/5 knee flexion, 1/5 ankle dorsiflexion, 1/5 ankle plantarflexion, 1/5 ankle inversion, 1/5 ankle eversion. Sensation:         Intact for light touch and proprioception  Postural Control & Balance:  · Gipson Balance Scale:  33/56.   (A score less than 45/56 indicates high risk of falls)  . Score improved from 28/56 on initial evaluation. Body Structures Involved:  1. Nerves  2. Muscles Body Functions Affected:  1. Neuromusculoskeletal  2.  Movement Related Activities and Participation Affected:  1. Mobility  2. Self Care   Number of elements (examined above) that affect the Plan of Care: 4+: HIGH COMPLEXITY   CLINICAL PRESENTATION:   Presentation: Evolving clinical presentation with changing clinical characteristics: MODERATE COMPLEXITY   CLINICAL DECISION MAKING:   Outcome Measure: Tool Used: Gipson Balance Scale  Score:  Initial: 28/56 Most Recent: 33/56 (Date: 3- )   Interpretation of Score: Each section is scored on a 0-4 scale, 0 representing the patients inability to perform the task and 4 representing independence. The scores of each section are added together for a total score of 56. The higher the patients score, the more independent the patient is. Any score below 45 indicates increased risk for falls. Score 56 55-45 44-34 33-23 22-12 11-1 0   Modifier CH CI CJ CK CL CM CN     Medical Necessity:   · Patient is expected to demonstrate progress in strength, range of motion, balance and coordination to improve safety during daily activities. · Patient demonstrates good rehab potential due to higher previous functional level. · Skilled intervention continues to be required due to decreased mobility. Reason for Services/Other Comments:  · Patient continues to demonstrate capacity to improve overall mobility which will increase independence and increase safety. Use of outcome tool(s) and clinical judgement create a POC that gives a: Questionable prediction of patient's progress: MODERATE COMPLEXITY            TREATMENT:   (In addition to Assessment/Re-Assessment sessions the following treatments were rendered)  Pre-treatment Symptoms/Complaints:  4/4/2018: Doing well. Nothing new. Pain: Initial: Pain Intensity 1: 0  Post Session:  0/10     NEUROMUSCULAR RE-EDUCATION: (15 minutes):  Exercise/activities per grid below to improve balance, coordination, kinesthetic sense, posture and proprioception.   Required minimal verbal and manual cues to promote static and dynamic balance in standing, promote coordination of bilateral, lower extremity(s) and promote motor control of bilateral, lower extremity(s). Date:  3/26/2018 Date   3/21/18 Date   3/28/18 Date   4/4/18   Activity/Exercise Parameters      Stepping over half foam  2x10 reps with left LE, working on weightbearing through R LE and keeping R foot flat on floor (foot tends to invert) 2x10 reps with left LE, working on weightbearing through R LE and keeping R foot flat on floor  2x10 reps B LE   Step taps Onto 6 inch step with L LE working on weight bearing through R LE Onto 6 inch step with L LE working on weight bearing through R LE     Step ups 6 inch  x10 leading with right LE working on weight bearing through R LE 6 inch  x10 leading with L LE working on weight bearing through R LE  6 inch  2 x10 leading with R LE    [place] X      Walking in the clinic with quad cane About 100 feet with quad cane, working on weightbearing more on R foot through midstance and terminal stance, and stepping bigger with L LE. Tactile cues to pelvis to disassociate pelvis from trunk. About 100 feet with quad cane, working on weightbearing more on R foot through midstance and terminal stance, and stepping bigger with L LE; added working on shifting R hips forward over R foot better midstance to terminal stance About 100 feet with quad cane, working on weightbearing more on R foot through midstance and terminal stance, and stepping bigger with L LE; added working on shifting R hips forward over R foot better midstance to terminal stance About 100 feet with quad cane, working on weightbearing more on R foot through midstance and terminal stance, and stepping bigger with L LE; added working on shifting R hips forward over R foot better midstance to terminal stance   pregait activity Stepping with R LE, weight shifting with hips and shoulders over R foot. Stepping with R LE, weight shifting with hips and shoulders over R foot.  Stepping with R LE, weight shifting with hips and shoulders over R foot. THERAPEUTIC EXERCISE: (30 minutes):  Exercises per grid below to improve mobility and strength. Required minimal verbal cues to promote proper body alignment. Progressed repetitions as indicated.    Date:  3/19/2018 Dte   3/21/18 Date  3/26/18 Date   3/28/18 Date   4/4/18    Activity/Exercise Parameters        Sit to stand from chair X        Left sidelying alternating isometrics on pelvis X        Standing hip abduction X        Seated LAQs         Standing hamstrings curls  X        Bridging 2x10 with right lower extremity 2x10 with right lower  2x10 with right lower 2x10 with right lower 2x10 with right lower    Supine straight leg raise 2x10 with right lower extremity  4# 2x10 with right lower extremity 3# 2x10 with right lower extremity 3# 2x10 with right lower extremity 3# 2x10 with right lower extremity     Supine PNF D1 and D2  2x10 each with R LE 2x10 with right lower extremity D1 and D2 2x10 with right lower extremity D1 and D2 2x10 with right lower extremity D1 and D2 2x10 with right lower extremity D1 and D2    L sidelying R pelvis and trunk PNF  Flexion/ext, rotation       Left sidelying hip abduction         L sidelying: picking up and moving R LE from in front of L foot to behind L foot (working on hip abd and ext) and stabilizing trunk 2x10 reps  4# 2x10 reps, working on lifting higher 3# 2x10 reps, working on lifting higher 3# 2x10 reps, working on lifting higher 3# 2x10 reps, working on lifting higher 3#    L sidelying R clam shells 2 x 10 reps R LE  4# 2x10 reps, working on lifting higher 3# 2x10 reps, working on lifting higher 3# 2x10 reps, working on lifting higher 3# 2x10 reps, working on lifting higher     L sidelying R hip abduction     With cuing to keep leg in alignment with body, 2 x 10 reps    R foot and gastrocsoleus stretching X 8 minutes trying to get foot to neutral position X 10 minutes trying to get foot to neutral position X 10 minutes trying to get foot to neutral position X 10 minutes trying to get foot to neutral position X 8 minutes trying to get foot to neutral position    Supine hooklying lower body rotation     X 20 reps keeping knees together. Standing minisquats in bars  Sit to stand x 10 reps Sit to stand x 10 reps      Nustep    Level 3 x 8 minutes         [facility] [employer]  Treatment/Session Assessment:    · Response to Treatment:  Patient tolerated treatment without issues. Patient is demonstrating improving strength and control of R LE. Continue to progress to tolerance. · Compliance with Program/Exercises: Compliant. · Recommendations/Intent for next treatment session: \"Next visit will focus on advancements to more challenging activities\".    Total Treatment Duration:  PT Patient Time In/Time Out  Time In: 1200  Time Out: [address] [facility]

## 2018-04-06 ENCOUNTER — HOSPITAL ENCOUNTER (OUTPATIENT)
Dept: PHYSICAL THERAPY | Age: 63
Discharge: HOME | End: 2018-04-06
Attending: PHYSICAL MEDICINE & REHABILITATION
Payer: COMMERCIAL

## 2018-04-06 PROCEDURE — 97112 NEUROMUSCULAR REEDUCATION: CPT

## 2018-04-06 PROCEDURE — 97110 THERAPEUTIC EXERCISES: CPT

## 2018-04-06 NOTE — PROGRESS NOTES
Alyx Rachel  : 1955  Primary: Ahmet Banegas Ppo  Secondary:  [address] at [place] [address] [facility], [address] [place] [address],[address], [place] [address].  [phone]   [phone]          OUTPATIENT PHYSICAL THERAPY:Daily Note 2018    ICD-10: Treatment Diagnosis:   · Other abnormalities of gait and mobility (R26.89)  · Difficulty in walking, not elsewhere classified (R26.2)  Precautions/Allergies:   Banana; Lisinopril; and Nuts [tree nut]   Fall Risk Score: 5 (? 5 = High Risk)  MD Orders: Evaluate and Treat MEDICAL/REFERRING DIAGNOSIS:  Weakness due to cerebrovascular accident ([facility]) ([facility] [I63.9, R53.1]   DATE OF ONSET: CVA: 2017  REFERRING PHYSICIAN: Juliette Powell*  RETURN PHYSICIAN APPOINTMENT: TBD     INITIAL ASSESSMENT:  Ms. Yumiko Torres presents with decreased mobility, decreased strength, decreased gait, and decreased balance secondary to CVA. After discussing with patient, she agreed she would benefit from physical therapy to improve above deficits. Please sign this plan of treatment if you concur. Thank you for the opportunity to serve this patient. Progress note on 3/12/2018:   Patient has attended twenty-four scheduled physical therapy appointments from 12/15/2017 to 3/12/2018. Patient is very motivated and compliant with therapy. Patient complaints of increased irritation in her right AFO due to increased tone. Patient is waiting on her insurance to approve Botox injections to decreased tone in right ankle. Patient would benefit from continuing skilled physical therapy to address problems and goals. Thank you for this referral.    PROBLEM LIST (Impacting functional limitations):  1. Decreased Strength  2. Decreased Ambulation Ability/Technique  3. Decreased Balance  4. Decreased Activity Tolerance INTERVENTIONS PLANNED:  1. Balance Exercise  2. Gait Training  3. Home Exercise Program (HEP)  4. Neuromuscular Re-education/Strengthening  5.  Range of Motion (ROM)  6. Therapeutic Exercise/Strengthening   TREATMENT PLAN:  Effective Dates: 2/12/2018 TO 5/7/2018. Frequency/Duration: 2-3 times a week for 12 weeks  GOALS: (Goals have been discussed and agreed upon with patient.)  Short-Term Functional Goals: Time Frame: 3 weeks  1. Patient will be independent with home exercise program without exacerbation of symptoms or cueing needed. Goal met. Discharge Goals: Time Frame: 8 weeks  1. Patient will be independent with all ADLs with minimal fear of falling and loss of balance with daily tasks. Goal ongoing. 2. Patient will report no fear avoidance with social or recreational activities due to fear of falling. Goal ongoing. 3. Patient will score greater than or equal to 45/56 on Gipson Balance Scale with minimal effect of imbalance on patient's ability to manage every day life activities. Goal ongoing. Rehabilitation Potential For Stated Goals: Good  Regarding Enrico Opitz Esequiel's therapy, I certify that the treatment plan above will be carried out by a therapist or under their direction. Thank you for this referral,  Amauri Justice PT     Referring Physician Signature: Juliette Beckett*              Date                    The information in this section was collected on 12/15/2017 (except where otherwise noted). HISTORY:   History of Present Injury/Illness (Reason for Referral):  Patient reports she had a severe stroke on 9/6/2017. She reports that she was in the hospital and [facility] for about 61-62 days. She reports that she has progressed really well, but is still having trouble walking and using her right side (UE and LE). She reports that she walks using her AFO and quad cane all the time. She reports that she tries to be as active as possible. She reports that she is scared that she is going to fall, even though she has not.   She reports she would like to work on strengthening her right leg so her balance is better and she can walk without fear of falling. Past Medical History/Comorbidities:   Ms. Laure Johnson  has a past medical history of Anxiety; CVA (cerebral vascular accident) ([facility] (2017); Depression; HTN (hypertension); Menopause; and PTE (pulmonary thromboembolism) ([facility] (2017). Ms. Laure Johnson  has a past surgical history that includes hx jennifer and bso (); hx  section; hx refractive surgery (); hx other surgical (); and hx other surgical (). Social History/Living Environment:   Home Environment: Private residence  Living Alone: No  Support Systems: Family member(s), Friends \ neighbors; Spouse  Prior Level of Function/Work/Activity:  Independent  Dominant Side:         RIGHT  Personal Factors:          Sex:  female        Age:  60 y.o. Current Medications:       Current Outpatient Prescriptions:     polyethylene glycol (MIRALAX) 17 gram/dose powder, Take 17 g by mouth daily. , Disp: 510 g, Rfl: 2    [START ON 2018] methylphenidate HCl (RITALIN) 20 mg tablet, Take 1 Tab (20 mg total) by mouth daily. Max Daily Amount: 20 mg, Disp: 30 Tab, Rfl: 0    [START ON 2018] methylphenidate HCl (RITALIN) 20 mg tablet, Take 1 Tab (20 mg total) by mouth daily. Max Daily Amount: 20 mg, Disp: 30 Tab, Rfl: 0    methylphenidate HCl (RITALIN) 20 mg tablet, Take 1 Tab (20 mg total) by mouth daily. Max Daily Amount: 20 mg, Disp: 30 Tab, Rfl: 0    rOPINIRole (REQUIP) 2 mg tablet, Take 1 Tab by mouth nightly. for restless leg, Disp: 90 Tab, Rfl: 1    FLUoxetine (PROZAC) 20 mg tablet, Take 2 Tabs by mouth daily. , Disp: 180 Tab, Rfl: 1    tiZANidine (ZANAFLEX) 4 mg tablet, 4mg po TID, Disp: 270 Tab, Rfl: 1    labetalol (NORMODYNE) 200 mg tablet, Take 1 Tab by mouth two (2) times a day., Disp: 180 Tab, Rfl: 1    losartan-hydroCHLOROthiazide (HYZAAR) 100-12.5 mg per tablet, Take 1 Tab by mouth daily. , Disp: 90 Tab, Rfl: 1    ALPRAZolam (XANAX) 0.25 mg tablet, Take 1 Tab by mouth three (3) times daily as needed for Anxiety.  Max Daily Amount: 0.75 mg., Disp: 30 Tab, Rfl: 1   Date Last Reviewed:  4/6/2018    Number of Personal Factors/Comorbidities that affect the Plan of Care: 1-2: MODERATE COMPLEXITY   EXAMINATION:   Observation/Orthostatic Postural Assessment:          Posture Assessment: Rounded shoulders, Forward head   Functional Mobility:         Gait/Ambulation:     Speed/Lulu: Pace decreased (<100 feet/min)  Step Length: Left shortened, Right lengthened  Swing Pattern: Left asymmetrical, Right asymmetrical  Stance: Left increased, Right decreased  Gait Abnormalities: Antalgic, Circumduction, Foot drop, Path deviations, Trendelenburg  Ambulation - Level of Assistance: Modified independent  Assistive Device: Cane, quad  · Interventions: Verbal cues, Safety awareness training          Transfers:     Sit to Stand: Independent  Stand to Sit: Independent  Stand Pivot Transfers: Independent  · Bed to Chair: Independent          Bed Mobility:     Rolling: Independent  Supine to Sit: Independent  Sit to Supine: Independent  · Scooting: Independent    Strength:          L UE STRENGTH: 4/5 shoulder flexion, 4/5 shoulder abduction, 4/5 shoulder extension, 4/5 elbow flexion, 4/5 elbow extension. R UE STRENGTH: 2/5 shoulder flexion, 2/5 shoulder abduction, 2/5 shoulder extension, 2/5 elbow flexion, 2/5 elbow extension. R LE STRENGTH:  4+/5 hip flexion, 4+/5 hip abduction, 4+/5 hip adduction, 4+/5 hip extension, 4+/5 knee extension, 4+/5 knee flexion, 1/5 ankle dorsiflexion, 1/5 ankle plantarflexion, 1/5 ankle inversion, 1/5 ankle eversion. Sensation:         Intact for light touch and proprioception  Postural Control & Balance:  · Gipson Balance Scale:  33/56.   (A score less than 45/56 indicates high risk of falls)  . Score improved from 28/56 on initial evaluation. Body Structures Involved:  1. Nerves  2. Muscles Body Functions Affected:  1. Neuromusculoskeletal  2.  Movement Related Activities and Participation Affected:  1. Mobility  2. Self Care   Number of elements (examined above) that affect the Plan of Care: 4+: HIGH COMPLEXITY   CLINICAL PRESENTATION:   Presentation: Evolving clinical presentation with changing clinical characteristics: MODERATE COMPLEXITY   CLINICAL DECISION MAKING:   Outcome Measure: Tool Used: Gipson Balance Scale  Score:  Initial: 28/56 Most Recent: 33/56 (Date: 3- )   Interpretation of Score: Each section is scored on a 0-4 scale, 0 representing the patients inability to perform the task and 4 representing independence. The scores of each section are added together for a total score of 56. The higher the patients score, the more independent the patient is. Any score below 45 indicates increased risk for falls. Score 56 55-45 44-34 33-23 22-12 11-1 0   Modifier CH CI CJ CK CL CM CN     Medical Necessity:   · Patient is expected to demonstrate progress in strength, range of motion, balance and coordination to improve safety during daily activities. · Patient demonstrates good rehab potential due to higher previous functional level. · Skilled intervention continues to be required due to decreased mobility. Reason for Services/Other Comments:  · Patient continues to demonstrate capacity to improve overall mobility which will increase independence and increase safety. Use of outcome tool(s) and clinical judgement create a POC that gives a: Questionable prediction of patient's progress: MODERATE COMPLEXITY            TREATMENT:   (In addition to Assessment/Re-Assessment sessions the following treatments were rendered)  Pre-treatment Symptoms/Complaints:  4/6/2018: No complaints. Pain: Initial: Pain Intensity 1: 0  Post Session:  0/10     NEUROMUSCULAR RE-EDUCATION: (15 minutes):  Exercise/activities per grid below to improve balance, coordination, kinesthetic sense, posture and proprioception.   Required minimal verbal and manual cues to promote static and dynamic balance in standing, promote coordination of bilateral, lower extremity(s) and promote motor control of bilateral, lower extremity(s). Date:  4/6/2018 Date   3/21/18 Date   3/28/18 Date   4/4/18   Activity/Exercise Parameters      Stepping over half foam  2x10 reps with left LE, working on weightbearing through R LE and keeping R foot flat on floor (foot tends to invert) 2x10 reps with left LE, working on weightbearing through R LE and keeping R foot flat on floor  2x10 reps B LE   Step taps  Onto 6 inch step with L LE working on weight bearing through R LE     Step ups 6 inch  x10 leading with right LE working on weight bearing through R LE 6 inch  x10 leading with L LE working on weight bearing through R LE  6 inch  2 x10 leading with R LE    [place] X      Walking in the clinic with quad cane About 100 feet with quad cane, working on weightbearing more on R foot through midstance and terminal stance, and stepping bigger with L LE. Tactile cues to pelvis to disassociate pelvis from trunk. About 100 feet with quad cane, working on weightbearing more on R foot through midstance and terminal stance, and stepping bigger with L LE; added working on shifting R hips forward over R foot better midstance to terminal stance About 100 feet with quad cane, working on weightbearing more on R foot through midstance and terminal stance, and stepping bigger with L LE; added working on shifting R hips forward over R foot better midstance to terminal stance About 100 feet with quad cane, working on weightbearing more on R foot through midstance and terminal stance, and stepping bigger with L LE; added working on shifting R hips forward over R foot better midstance to terminal stance   pregait activity  Stepping with R LE, weight shifting with hips and shoulders over R foot. Stepping with R LE, weight shifting with hips and shoulders over R foot.                                                  THERAPEUTIC EXERCISE: (30 minutes): Exercises per grid below to improve mobility and strength. Required minimal verbal cues to promote proper body alignment. Progressed repetitions as indicated.    Date:  3/19/2018 Dte   3/21/18 Date  3/26/18 Date   3/28/18 Date   4/4/18 Date  4/6/18   Activity/Exercise Parameters        Sit to stand from chair X        Left sidelying alternating isometrics on pelvis X        Standing hip abduction X        Seated LAQs         Standing hamstrings curls  X        Bridging 2x10 with right lower extremity 2x10 with right lower  2x10 with right lower 2x10 with right lower 2x10 with right lower 2x10 with right lower   Supine straight leg raise 2x10 with right lower extremity  4# 2x10 with right lower extremity 3# 2x10 with right lower extremity 3# 2x10 with right lower extremity 3# 2x10 with right lower extremity  2x10 with right lower extremity 3#   Supine PNF D1 and D2  2x10 each with R LE 2x10 with right lower extremity D1 and D2 2x10 with right lower extremity D1 and D2 2x10 with right lower extremity D1 and D2 2x10 with right lower extremity D1 and D2    L sidelying R pelvis and trunk PNF  Flexion/ext, rotation       Left sidelying hip abduction         L sidelying: picking up and moving R LE from in front of L foot to behind L foot (working on hip abd and ext) and stabilizing trunk 2x10 reps  4# 2x10 reps, working on lifting higher 3# 2x10 reps, working on lifting higher 3# 2x10 reps, working on lifting higher 3# 2x10 reps, working on lifting higher 3# 2x10 reps, working on lifting higher 3#   L sidelying R clam shells 2 x 10 reps R LE  4# 2x10 reps, working on lifting higher 3# 2x10 reps, working on lifting higher 3# 2x10 reps, working on lifting higher 3# 2x10 reps, working on lifting higher  2x10 reps, working on lifting higher   L sidelying R hip abduction     With cuing to keep leg in alignment with body, 2 x 10 reps With cuing to keep leg in alignment with body, 2 x 10 reps   R foot and gastrocsoleus stretching X 8 minutes trying to get foot to neutral position X 10 minutes trying to get foot to neutral position X 10 minutes trying to get foot to neutral position X 10 minutes trying to get foot to neutral position X 8 minutes trying to get foot to neutral position X 8 minutes trying to get foot to neutral position   Supine hooklying lower body rotation     X 20 reps keeping knees together. X 20 reps keeping knees together. Standing minisquats in bars  Sit to stand x 10 reps Sit to stand x 10 reps      Nustep    Level 3 x 8 minutes  Level 3 x 8 minutes       [employer] Portal  Treatment/Session Assessment:    · Response to Treatment:  Patient tolerated treatment without complaints. Continue to progress to tolerance. · Compliance with Program/Exercises: Compliant. · Recommendations/Intent for next treatment session: \"Next visit will focus on advancements to more challenging activities\".    Total Treatment Duration:  PT Patient Time In/Time Out  Time In: 5625  Time Out: 94 [facility]

## 2018-04-06 NOTE — PROGRESS NOTES
Radha Bales  : 1955  Primary: Genesis Hernandez Ppo  Secondary:  [address]  [address] [facility] [address] [facility] [address], [address] [facility]  [phone]   [phone]        OUTPATIENT OCCUPATIONAL THERAPY: Daily Note 2018    ICD-10: Treatment Diagnosis: Hemiplegia and hemiparesis following cerebral infarction affecting right dominant side (I69.351)  Precautions/Allergies:   Banana; Lisinopril; and Nuts [tree nut]   Fall Risk Score: 5 (? 5 = High Risk)  MD Orders: Referral to occupational therapy MEDICAL/REFERRING DIAGNOSIS:   Cerebral infarction, unspecified [I63.9]  Weakness [R53.1]   DATE OF ONSET: 2017   REFERRING PHYSICIAN: Juliette Dias*  RETURN PHYSICIAN APPOINTMENT: unknown   3/5/18: Ms. Etienne Humphries continues to make excellent progress towards her therapy goals; she demonstrates gradual improvements in R UE range of motion, specifically R shoulder flexion and R elbow extension/flexion, and reports increased functional use of R UE in activities of daily living, including dressing, grooming, and feeding. Patient would continue to benefit from skilled occupational therapy services to maximize safety and independence and increase functional use of R UE during activities of daily living.    18: Ms. Etienne Humphries is making excellent progress toward her goals; she is demonstrating improving range of motion and functional use of the right upper extremity in activities of daily living. Feel she will continue to benefit from skilled occupational therapy to maximize safety and independence with activities of daily living. INITIAL ASSESSMENT:  Ms. Etienne Humhpries presents with impaired active movement, strength, coordination and sensation of the dominant right upper extremity that is affecting her ability to complete activities of daily living independently.  Feel she may benefit from skilled occupational therapy to maximize safety and independence with activities of daily living. PLAN OF CARE:   PROBLEM LIST:  1. Decreased Strength  2. Decreased ADL/Functional Activities  3. Decreased Transfer Abilities  4. Decreased Balance  5. Decreased Flexibility/Joint Mobility  6. Decreased Cognition INTERVENTIONS PLANNED:  1. Activities of daily living training  2. Manual therapy training  3. Modalities  4. Neuromuscular re-eduation  5. Sensory reintegration training  6. Splinting  7. Therapeutic activity  8. Therapeutic exercise   TREATMENT PLAN:  Effective Dates: 3/19/18 to 6/17/18. Frequency/Duration: 2 times a week for 12 weeks  GOALS: (Goals have been discussed and agreed upon with patient.)  Short-Term Functional Goals: Time Frame: 6 weeks  1. Patient will demonstrate independence with home exercise program for right upper extremity range of motion. Met  2. Patient will increase use of right upper extremity as a functional assist in at least 25% of daily activities. Met  3. Patient will bathe with moderate assistance. Continue to address  Discharge Goals: Time Frame: 12 weeks  1. Patient will bathe with minimal assistance. Continue to address  2. Patient will feed self with modified independence and adaptive equipment as needed. Continue to address  3. Patient will dress with modified independence and adaptive equipment as needed. Continue to address  4. Patient will use right upper extremity as a functional assist in at least 50% of daily activities. Continue to address  Rehabilitation Potential For Stated Goals: Good  Regarding Heavenly Iraheta's therapy, I certify that the treatment plan above will be carried out by a therapist or under their direction. Thank you for this referral,  Deonte Mcfarlane OT     Referring Physician Signature: Juliette Randhawa* _________________________  Date _________            The information in this section was collected on 3/5/18 (except where otherwise noted).   OCCUPATIONAL PROFILE & HISTORY:   History of Present Injury/Illness (Reason for Referral):  CVA with hospital and [facility] stay then home health therapy. Past Medical History/Comorbidities:   Ms. Aidee Gallego  has a past medical history of Anxiety; CVA (cerebral vascular accident) ([facility] [address] (2017); Depression; HTN (hypertension); Menopause; and PTE (pulmonary thromboembolism) ([facility] [address] (2017). Ms. Aidee Gallego  has a past surgical history that includes hx jennifer and bso (); hx  section; hx refractive surgery (); hx other surgical (); and hx other surgical (2017). Social History/Living Environment:      Prior Level of Function/Work/Activity:  Does seasonal taxes at [employer]  Dominant Side:         RIGHT  Current Medications:    Current Outpatient Prescriptions:     polyethylene glycol (MIRALAX) 17 gram/dose powder, Take 17 g by mouth daily. , Disp: 510 g, Rfl: 2    [START ON 2018] methylphenidate HCl (RITALIN) 20 mg tablet, Take 1 Tab (20 mg total) by mouth daily. Max Daily Amount: 20 mg, Disp: 30 Tab, Rfl: 0    [START ON 2018] methylphenidate HCl (RITALIN) 20 mg tablet, Take 1 Tab (20 mg total) by mouth daily. Max Daily Amount: 20 mg, Disp: 30 Tab, Rfl: 0    methylphenidate HCl (RITALIN) 20 mg tablet, Take 1 Tab (20 mg total) by mouth daily. Max Daily Amount: 20 mg, Disp: 30 Tab, Rfl: 0    rOPINIRole (REQUIP) 2 mg tablet, Take 1 Tab by mouth nightly. for restless leg, Disp: 90 Tab, Rfl: 1    FLUoxetine (PROZAC) 20 mg tablet, Take 2 Tabs by mouth daily. , Disp: 180 Tab, Rfl: 1    tiZANidine (ZANAFLEX) 4 mg tablet, 4mg po TID, Disp: 270 Tab, Rfl: 1    labetalol (NORMODYNE) 200 mg tablet, Take 1 Tab by mouth two (2) times a day., Disp: 180 Tab, Rfl: 1    losartan-hydroCHLOROthiazide (HYZAAR) 100-12.5 mg per tablet, Take 1 Tab by mouth daily. , Disp: 90 Tab, Rfl: 1    ALPRAZolam (XANAX) 0.25 mg tablet, Take 1 Tab by mouth three (3) times daily as needed for Anxiety.  Max Daily Amount: 0.75 mg., Disp: 30 Tab, Rfl: 1            Date Last Reviewed:  2018   Complexity Level : Expanded review of therapy/medical records (1-2):  MODERATE COMPLEXITY   ASSESSMENT OF OCCUPATIONAL PERFORMANCE:   ROM:                   Balance:                   Coordination:                   Mental Status:                   Vision:                   Activities of Daily Living:           Basic ADLs (From Assessment) Complex ADLs (From Assessment)         Grooming/Bathing/Dressing Activities of Daily Living                                   Sensation:         Light touch absent distal to right elbow     Physical Skills Involved:  1. Range of Motion  2. Balance  3. Sensation  4. Fine Motor Control  5. Gross Motor Control Cognitive Skills Affected (resulting in the inability to perform in a timely and safe manner):  1. Executive Function  2. Sustained Attention  3. Divided Attention  4. Comprehension Psychosocial Skills Affected:  1. None   Number of elements that affect the Plan of Care: 5+:  HIGH COMPLEXITY   CLINICAL DECISION MAKING:   Outcome Measure: Tool Used: [address] [facility] [address] [id number] AM-[facility] Daily Activity Outpatient Short Form  Score:  Initial: 18 Most Recent: 33 (Date: 3/5/18 )   Interpretation of Tool:  Represents clinically-significant functional categories (i.e., basic and instrumental activities of daily living, fine motor activities). Score 60 59-55 54-47 46-34 32-21 20-16 15   Modifier CH CI CJ CK CL CM CN     Medical Necessity:   · Patient demonstrates good rehab potential due to higher previous functional level. Reason for Services/Other Comments:  · Patient continues to require skilled intervention due to decreased safety and independence in activities of daily living. Clinical Decision-Making Assessment: Moderately difficult to predict patient's progress at this time due to the extent of her limitations in functional movement and use of dominant right upper extremity.     Assessment process, impact of co-morbidities, assessment modification\need for assistance, and selection of interventions: Analytical Complexity:MODERATE COMPLEXITY   TREATMENT:   (In addition to Assessment/Re-Assessment sessions the following treatments were rendered)    Pre-treatment Symptoms/Complaints:  Patient states, \"I'm a little tired today, just drowsy feeling. \"  Pain: Initial:   Pain Intensity 1: 0/10 Post Session:  0/10     Therapeutic Exercise: ( 35 minutes):  Exercises per grid below to improve dynamic movement of arm - right and hand - right to improve functional lifting, carrying, reaching and grasping. Required moderate visual and verbal cues to promote proper body mechanics. Progressed range, repetitions and complexity of movement as indicated.        Date:  3/12/18 Date:  3/14/18 Date:  3/19/18 Date:   3/21/18 Date:  3/26/18 Date:  3/28/18 Date:  4/4/18 Date:  4/6/18   Activity/Exercise           Shoulder shrugs AROM x 10 reps AROM x 10 reps with mirror for visual feedback AROM x 10 reps with mirror for visual feedback AROM x 10 reps with mirror for visual feedback AROM x 10 reps with mirror for visual feedback AROM x 10 reps with mirror for visual feedback AROM x 10 reps with mirror for visual feedback AROM x 10 reps with mirror for visual feedback   Scapular protraction/retraction AROM  1 x 10 with blue theratubing AROM  1 x 10 with passive self range into elbow extension at end range AROM  1 x 10 with passive self range into elbow extension at end range AROM  1 x 10 with passive self range into elbow extension at end range    2 x 10 reps with blue theratubing AROM  1 x 10 with passive self range into elbow extension at end range    2 x 10 reps with blue theratubing AROM  1 x 10 with passive self range into elbow extension at end range AROM  1 x 10 with passive self range into elbow extension at end range    2 x 10 reps with blue theratubing AROM  1 x 10 with passive self range into elbow extension at end range   Shoulder abduction           Elbow flexion/extension           PNF D1/D2 diagonals Hand helper 15 lbs x 5 reps with min assist to extend fingers   20 lbs x 5 reps with assist to extend fingers   20 lbs x 5 reps with assist to extend fingers    Tabletop skateboard           Shoulder flexion/extension           Chest press           UBE Min assist to sustain right  on handle   Level 1  5 mintues Min assist to sustain right  on handle   Level 0  6 mintues Min assist to sustain right  on handle   Level 0  6 mintues Min assist to sustain right  on handle   Level 0  6 mintues Min assist to sustain right  on handle   Level 0  7 mintues Min assist to sustain right  on handle   Level 0  6 mintues Min assist to sustain right  on handle   Level 0  6 mintues Min assist to sustain right  on handle   Level 0  6 mintues   Modified push-ups   Hands on countertop with dycem under right hand  2 x 10 Hands on countertop with dycem under right hand  2 x 10 Hands on countertop with dycem under right hand  3 x 10 Hands on countertop with dycem under right hand  3 x 10 Hands on countertop with dycem under right hand  3 x 10 Hands on countertop with dycem under right hand  3 x 10    Resistive clothespin Yellow   1 x 5 pinch and release Yellow   1 x 5 pinch and release   Yellow   1 x 5 pinch and release  Yellow   1 x 5 pinch and release    Shoulder arc 10 tabs on small hill; Left hand assisted R hand grasping and releasing small tabs; used R arm independently to pull tabs over  10 tabs on small hill; Left hand assisted R hand grasping and releasing small tabs; used R arm independently to pull tabs over   10 tabs on small hill; Left hand assisted R hand grasping and releasing small tabs; used R arm independently to pull tabs over  10 tabs on small hill; Left hand assisted R hand grasping and releasing small tabs; used R arm independently to pull tabs over   Wrist flexion/extension           Punching bag               Access Code: [email or web address]   URL: [email or web address]   Date: 01/19/2018   Prepared by: Anthony Conklin     Exercises   Supine Shoulder Flexion PROM - 10 reps - 2 sets - 3 hold - 1x daily - 5x weekly   Push-Up on Counter - 10 reps - 2 sets - 1x daily - 5x weekly   Seated Shoulder Shrugs - 10 reps - 2 sets - 1x daily - 5x weekly   Seated Scapular Retraction - 10 reps - 2 sets - 1x daily - 5x weekly   Seated Weight Shifting with Arm Support - 10 reps - 2 sets - 1x daily - 5x weekly   Seated Shoulder Flexion Self PROM - 10 reps - 2 sets - 1x daily - 5x weekly   Supine Elbow Flexion Extension AROM - 10 reps - 2 sets - 1x daily - 5x weekly   Seated Elbow Flexion Extension AROM - 10 reps - 2 sets - 1x daily - 5x weekly     Neuromuscular Re-education: ( 10):  Exercise/activities per grid below to improve balance, coordination, kinesthetic sense and proprioception. Required moderate visual and verbal cues to promote static balance in sitting, promote coordination of right, upper extremity(s) and promote motor control of right, upper extremity(s). Patient completed lateral weight shifts sitting at edge of mat x 20 reps each onto elbow/forearms  with dynamic reach with left hand outside of base of support, using right upper extremity to maintain balance. Treatment/Session Assessment: Pt continues demo good lateral and forward weight shifts outside base of support, with improved balance noted today with dynamic reach. Increased tone was noted in R digits today, specifically when grasping and releasing tabs during shoulder arc activity. Pt would continue to benefit from skilled OT services to increase functional use of R UE and increase independence with BADLs. · Response to Treatment: Mrs. Siomara Crocker tolerated therapy well today, without any issue. She continues to be very cooperative and motivated during therapy. · Compliance with Program/Exercises: Will assess as treatment progresses. Pt reports compliance with HEP.   · Recommendations/Intent for next treatment session: \"Next visit will focus on advancements to more challenging activities and reduction in assistance provided\".     Total Treatment Duration:  OT Patient Time In/Time Out  Time In: 1400  Time Out: [address] [facility] Odette Aguilar

## 2018-04-09 ENCOUNTER — APPOINTMENT (OUTPATIENT)
Dept: PHYSICAL THERAPY | Age: 63
End: 2018-04-09
Attending: PHYSICAL MEDICINE & REHABILITATION
Payer: COMMERCIAL

## 2018-04-09 ENCOUNTER — HOSPITAL ENCOUNTER (OUTPATIENT)
Dept: PHYSICAL THERAPY | Age: 63
Discharge: HOME | End: 2018-04-09
Attending: PHYSICAL MEDICINE & REHABILITATION
Payer: COMMERCIAL

## 2018-04-12 ENCOUNTER — HOSPITAL ENCOUNTER (OUTPATIENT)
Dept: PHYSICAL THERAPY | Age: 63
Discharge: HOME | End: 2018-04-12
Attending: PHYSICAL MEDICINE & REHABILITATION
Payer: COMMERCIAL

## 2018-04-12 PROCEDURE — 97110 THERAPEUTIC EXERCISES: CPT

## 2018-04-12 PROCEDURE — 97112 NEUROMUSCULAR REEDUCATION: CPT

## 2018-04-12 NOTE — PROGRESS NOTES
Radha Bales  : 1955  Primary: Genesis Hernandez Ppo  Secondary:  [address] [facility] [address] [facility] [address], [address]  [phone]   [phone]          OUTPATIENT PHYSICAL THERAPY:Daily Note 2018    ICD-10: Treatment Diagnosis:   · Other abnormalities of gait and mobility (R26.89)  · Difficulty in walking, not elsewhere classified (R26.2)  Precautions/Allergies:   Banana; Lisinopril; and Nuts [tree nut]   Fall Risk Score: 5 (? 5 = High Risk)  MD Orders: Evaluate and Treat MEDICAL/REFERRING DIAGNOSIS:  Weakness due to cerebrovascular accident ([facility]) ([facility] [I63.9, R53.1]   DATE OF ONSET: CVA: 2017  REFERRING PHYSICIAN: Juliette Dias*  RETURN PHYSICIAN APPOINTMENT: TBD     INITIAL ASSESSMENT:  Ms. Etienne Humphries presents with decreased mobility, decreased strength, decreased gait, and decreased balance secondary to CVA. After discussing with patient, she agreed she would benefit from physical therapy to improve above deficits. Please sign this plan of treatment if you concur. Thank you for the opportunity to serve this patient. Progress note on 3/12/2018:   Patient has attended twenty-four scheduled physical therapy appointments from 12/15/2017 to 3/12/2018. Patient is very motivated and compliant with therapy. Patient complaints of increased irritation in her right AFO due to increased tone. Patient is waiting on her insurance to approve Botox injections to decreased tone in right ankle. Patient would benefit from continuing skilled physical therapy to address problems and goals. Thank you for this referral.    PROBLEM LIST (Impacting functional limitations):  1. Decreased Strength  2. Decreased Ambulation Ability/Technique  3. Decreased Balance  4. Decreased Activity Tolerance INTERVENTIONS PLANNED:  1. Balance Exercise  2. Gait Training  3. Home Exercise Program (HEP)  4. Neuromuscular Re-education/Strengthening  5.  Range of Motion (ROM)  6. Therapeutic Exercise/Strengthening   TREATMENT PLAN:  Effective Dates: 2/12/2018 TO 5/7/2018. Frequency/Duration: 2-3 times a week for 12 weeks  GOALS: (Goals have been discussed and agreed upon with patient.)  Short-Term Functional Goals: Time Frame: 3 weeks  1. Patient will be independent with home exercise program without exacerbation of symptoms or cueing needed. Goal met. Discharge Goals: Time Frame: 8 weeks  1. Patient will be independent with all ADLs with minimal fear of falling and loss of balance with daily tasks. Goal ongoing. 2. Patient will report no fear avoidance with social or recreational activities due to fear of falling. Goal ongoing. 3. Patient will score greater than or equal to 45/56 on Gipson Balance Scale with minimal effect of imbalance on patient's ability to manage every day life activities. Goal ongoing. Rehabilitation Potential For Stated Goals: Good  Regarding Lou Iraheta's therapy, I certify that the treatment plan above will be carried out by a therapist or under their direction. Thank you for this referral,  Ramos Concepcion, FRANCOIS     Referring Physician Signature: Juliette Vargas*              Date                    The information in this section was collected on 12/15/2017 (except where otherwise noted). HISTORY:   History of Present Injury/Illness (Reason for Referral):  Patient reports she had a severe stroke on 9/6/2017. She reports that she was in the hospital and [facility] for about 61-62 days. She reports that she has progressed really well, but is still having trouble walking and using her right side (UE and LE). She reports that she walks using her AFO and quad cane all the time. She reports that she tries to be as active as possible. She reports that she is scared that she is going to fall, even though she has not.   She reports she would like to work on strengthening her right leg so her balance is better and she can walk without fear of falling. Past Medical History/Comorbidities:   Ms. Ronn Miranda  has a past medical history of Anxiety; CVA (cerebral vascular accident) ([facility] (2017); Depression; HTN (hypertension); Menopause; and PTE (pulmonary thromboembolism) ([facility] (2017). Ms. Ronn Miranda  has a past surgical history that includes hx jennifer and bso (); hx  section; hx refractive surgery (); hx other surgical (); and hx other surgical (). Social History/Living Environment:   Home Environment: Private residence  Living Alone: No  Support Systems: Family member(s), Friends \ neighbors; Spouse  Prior Level of Function/Work/Activity:  Independent  Dominant Side:         RIGHT  Personal Factors:          Sex:  female        Age:  60 y.o. Current Medications:       Current Outpatient Prescriptions:     polyethylene glycol (MIRALAX) 17 gram/dose powder, Take 17 g by mouth daily. , Disp: 510 g, Rfl: 2    [START ON 2018] methylphenidate HCl (RITALIN) 20 mg tablet, Take 1 Tab (20 mg total) by mouth daily. Max Daily Amount: 20 mg, Disp: 30 Tab, Rfl: 0    [START ON 2018] methylphenidate HCl (RITALIN) 20 mg tablet, Take 1 Tab (20 mg total) by mouth daily. Max Daily Amount: 20 mg, Disp: 30 Tab, Rfl: 0    methylphenidate HCl (RITALIN) 20 mg tablet, Take 1 Tab (20 mg total) by mouth daily. Max Daily Amount: 20 mg, Disp: 30 Tab, Rfl: 0    rOPINIRole (REQUIP) 2 mg tablet, Take 1 Tab by mouth nightly. for restless leg, Disp: 90 Tab, Rfl: 1    FLUoxetine (PROZAC) 20 mg tablet, Take 2 Tabs by mouth daily. , Disp: 180 Tab, Rfl: 1    tiZANidine (ZANAFLEX) 4 mg tablet, 4mg po TID, Disp: 270 Tab, Rfl: 1    labetalol (NORMODYNE) 200 mg tablet, Take 1 Tab by mouth two (2) times a day., Disp: 180 Tab, Rfl: 1    losartan-hydroCHLOROthiazide (HYZAAR) 100-12.5 mg per tablet, Take 1 Tab by mouth daily. , Disp: 90 Tab, Rfl: 1    ALPRAZolam (XANAX) 0.25 mg tablet, Take 1 Tab by mouth three (3) times daily as needed for Anxiety.  Max Daily Amount: 0.75 mg., Disp: 30 Tab, Rfl: 1   Date Last Reviewed:  4/12/2018    Number of Personal Factors/Comorbidities that affect the Plan of Care: 1-2: MODERATE COMPLEXITY   EXAMINATION:   Observation/Orthostatic Postural Assessment:          Posture Assessment: Rounded shoulders, Forward head   Functional Mobility:         Gait/Ambulation:     Speed/Lulu: Pace decreased (<100 feet/min)  Step Length: Left shortened, Right lengthened  Swing Pattern: Left asymmetrical, Right asymmetrical  Stance: Left increased, Right decreased  Gait Abnormalities: Antalgic, Circumduction, Foot drop, Path deviations, Trendelenburg  Ambulation - Level of Assistance: Modified independent  Assistive Device: Cane, quad  · Interventions: Verbal cues, Safety awareness training          Transfers:     Sit to Stand: Independent  Stand to Sit: Independent  Stand Pivot Transfers: Independent  · Bed to Chair: Independent          Bed Mobility:     Rolling: Independent  Supine to Sit: Independent  Sit to Supine: Independent  · Scooting: Independent    Strength:          L UE STRENGTH: 4/5 shoulder flexion, 4/5 shoulder abduction, 4/5 shoulder extension, 4/5 elbow flexion, 4/5 elbow extension. R UE STRENGTH: 2/5 shoulder flexion, 2/5 shoulder abduction, 2/5 shoulder extension, 2/5 elbow flexion, 2/5 elbow extension. R LE STRENGTH:  4+/5 hip flexion, 4+/5 hip abduction, 4+/5 hip adduction, 4+/5 hip extension, 4+/5 knee extension, 4+/5 knee flexion, 1/5 ankle dorsiflexion, 1/5 ankle plantarflexion, 1/5 ankle inversion, 1/5 ankle eversion. Sensation:         Intact for light touch and proprioception  Postural Control & Balance:  · Gipson Balance Scale:  33/56.   (A score less than 45/56 indicates high risk of falls)  . Score improved from 28/56 on initial evaluation. Body Structures Involved:  1. Nerves  2. Muscles Body Functions Affected:  1. Neuromusculoskeletal  2.  Movement Related Activities and Participation Affected:  1. Mobility  2. Self Care   Number of elements (examined above) that affect the Plan of Care: 4+: HIGH COMPLEXITY   CLINICAL PRESENTATION:   Presentation: Evolving clinical presentation with changing clinical characteristics: MODERATE COMPLEXITY   CLINICAL DECISION MAKING:   Outcome Measure: Tool Used: Gipson Balance Scale  Score:  Initial: 28/56 Most Recent: 33/56 (Date: 3- )   Interpretation of Score: Each section is scored on a 0-4 scale, 0 representing the patients inability to perform the task and 4 representing independence. The scores of each section are added together for a total score of 56. The higher the patients score, the more independent the patient is. Any score below 45 indicates increased risk for falls. Score 56 55-45 44-34 33-23 22-12 11-1 0   Modifier CH CI CJ CK CL CM CN     Medical Necessity:   · Patient is expected to demonstrate progress in strength, range of motion, balance and coordination to improve safety during daily activities. · Patient demonstrates good rehab potential due to higher previous functional level. · Skilled intervention continues to be required due to decreased mobility. Reason for Services/Other Comments:  · Patient continues to demonstrate capacity to improve overall mobility which will increase independence and increase safety. Use of outcome tool(s) and clinical judgement create a POC that gives a: Questionable prediction of patient's progress: MODERATE COMPLEXITY            TREATMENT:   (In addition to Assessment/Re-Assessment sessions the following treatments were rendered)  Pre-treatment Symptoms/Complaints:  4/12/2018: Botox next Tuesday then I'm supposed to start therapy 3x/week to help stretch. \"  Pain: Initial: Pain Intensity 1: 0  Post Session:  0/10     NEUROMUSCULAR RE-EDUCATION: (15 minutes):  Exercise/activities per grid below to improve balance, coordination, kinesthetic sense, posture and proprioception.   Required minimal verbal and manual cues to promote static and dynamic balance in standing, promote coordination of bilateral, lower extremity(s) and promote motor control of bilateral, lower extremity(s). Date   4/12/18     Activity/Exercise      Stepping over half foam  2x10 reps B LE     Step taps      Step ups 6 inch  2 x10 leading with R LE      [place]      Walking in the clinic with quad cane About 100 feet with quad cane, working on weightbearing more on R foot through midstance and terminal stance, and stepping bigger with L LE; added working on shifting R hips forward over R foot better midstance to terminal stance     pregait activity      Walking backwards with rail 5 feet x 5 working on R hip extension AROM                                      THERAPEUTIC EXERCISE: (30 minutes):  Exercises per grid below to improve mobility and strength. Required minimal verbal cues to promote proper body alignment. Progressed repetitions as indicated.    Date  4/12/18     Activity/Exercise      Sit to stand from chair      Left sidelying alternating isometrics on pelvis      Standing hip abduction      Seated LAQs      Standing hamstrings curls       Bridging 2x10 with right lower     Supine straight leg raise 2x10 with right lower extremity working on keeping foot/hip in neutral position     Supine PNF D1 and D2 patterns x 15 reps each R LE     L sidelying R pelvis and trunk PNF      Left sidelying hip abduction      L sidelying: picking up and moving R LE from in front of L foot to behind L foot (working on hip abd and ext) and stabilizing trunk 2x10 reps, working on lifting higher and keeping foot in neutral position     L sidelying R clam shells 2x10 reps, working on lifting higher     L sidelying R hip abduction With cuing to keep leg in alignment with body, 2 x 10 reps     R foot and gastrocsoleus stretching X 8 minutes trying to get foot to neutral position     Supine hooklying lower body rotation X 20 reps keeping knees together. Standing minisquats in [facility]          [employer]  Treatment/Session Assessment:    · Response to Treatment:  Patient tolerated treatment without complaints. Patient reports no pain with exercises. She is demonstrating improving strength R LE, and overall improved gait. Patient to have Botox on Tuesday. If  comes to appointment on Monday, may go over stretches with him to start after Botox. Continue to progress to tolerance. · Compliance with Program/Exercises: Compliant. · Recommendations/Intent for next treatment session: \"Next visit will focus on advancements to more challenging activities\".    Total Treatment Duration:  PT Patient Time In/Time Out  Time In: 1400  Time Out: 0056 [facility] Juan Huggins

## 2018-04-12 NOTE — PROGRESS NOTES
Yumiko Cruz  : 1955  Primary: Meena [place] [address]  Secondary:  [address]  at [place] [address] [facility], [address] [place] [address],[address], [facility] [place] [address]  [phone]   [phone]        OUTPATIENT OCCUPATIONAL THERAPY: Daily Note 2018    ICD-10: Treatment Diagnosis: Hemiplegia and hemiparesis following cerebral infarction affecting right dominant side (I69.351)  Precautions/Allergies:   Banana; Lisinopril; and Nuts [tree nut]   Fall Risk Score: 5 (? 5 = High Risk)  MD Orders: Referral to occupational therapy MEDICAL/REFERRING DIAGNOSIS:   Cerebral infarction, unspecified [I63.9]  Weakness [R53.1]   DATE OF ONSET: 2017   REFERRING PHYSICIAN: Juliette Chandler*  RETURN PHYSICIAN APPOINTMENT: unknown   3/5/18: Ms. Chapito Bustillos continues to make excellent progress towards her therapy goals; she demonstrates gradual improvements in R UE range of motion, specifically R shoulder flexion and R elbow extension/flexion, and reports increased functional use of R UE in activities of daily living, including dressing, grooming, and feeding. Patient would continue to benefit from skilled occupational therapy services to maximize safety and independence and increase functional use of R UE during activities of daily living.    18: Ms. Chapito Bustillos is making excellent progress toward her goals; she is demonstrating improving range of motion and functional use of the right upper extremity in activities of daily living. Feel she will continue to benefit from skilled occupational therapy to maximize safety and independence with activities of daily living. INITIAL ASSESSMENT:  Ms. Chapito Bustillos presents with impaired active movement, strength, coordination and sensation of the dominant right upper extremity that is affecting her ability to complete activities of daily living independently.  Feel she may benefit from skilled occupational therapy to maximize safety and independence with activities of daily living. PLAN OF CARE:   PROBLEM LIST:  1. Decreased Strength  2. Decreased ADL/Functional Activities  3. Decreased Transfer Abilities  4. Decreased Balance  5. Decreased Flexibility/Joint Mobility  6. Decreased Cognition INTERVENTIONS PLANNED:  1. Activities of daily living training  2. Manual therapy training  3. Modalities  4. Neuromuscular re-eduation  5. Sensory reintegration training  6. Splinting  7. Therapeutic activity  8. Therapeutic exercise   TREATMENT PLAN:  Effective Dates: 3/19/18 to 6/17/18. Frequency/Duration: 2 times a week for 12 weeks  GOALS: (Goals have been discussed and agreed upon with patient.)  Short-Term Functional Goals: Time Frame: 6 weeks  1. Patient will demonstrate independence with home exercise program for right upper extremity range of motion. Met  2. Patient will increase use of right upper extremity as a functional assist in at least 25% of daily activities. Met  3. Patient will bathe with moderate assistance. Continue to address  Discharge Goals: Time Frame: 12 weeks  1. Patient will bathe with minimal assistance. Continue to address  2. Patient will feed self with modified independence and adaptive equipment as needed. Continue to address  3. Patient will dress with modified independence and adaptive equipment as needed. Continue to address  4. Patient will use right upper extremity as a functional assist in at least 50% of daily activities. Continue to address  Rehabilitation Potential For Stated Goals: Good  Regarding Araceli Iraheta's therapy, I certify that the treatment plan above will be carried out by a therapist or under their direction. Thank you for this referral,  Bambi Barbour OT     Referring Physician Signature: Thornton Goltz, Amy* _________________________  Date _________            The information in this section was collected on 3/5/18 (except where otherwise noted).   OCCUPATIONAL PROFILE & HISTORY:   History of Present Injury/Illness (Reason for Referral):  CVA with hospital and [facility] stay then home health therapy. Past Medical History/Comorbidities:   Ms. Marcella Cabello  has a past medical history of Anxiety; CVA (cerebral vascular accident) ([facility] [address] (2017); Depression; HTN (hypertension); Menopause; and PTE (pulmonary thromboembolism) ([facility] [address] (2017). Ms. Marcella Cabello  has a past surgical history that includes hx jennifer and bso (); hx  section; hx refractive surgery (); hx other surgical (); and hx other surgical (). Social History/Living Environment:      Prior Level of Function/Work/Activity:  Does seasonal taxes at [employer]  Dominant Side:         RIGHT  Current Medications:    Current Outpatient Prescriptions:     polyethylene glycol (MIRALAX) 17 gram/dose powder, Take 17 g by mouth daily. , Disp: 510 g, Rfl: 2    [START ON 2018] methylphenidate HCl (RITALIN) 20 mg tablet, Take 1 Tab (20 mg total) by mouth daily. Max Daily Amount: 20 mg, Disp: 30 Tab, Rfl: 0    [START ON 2018] methylphenidate HCl (RITALIN) 20 mg tablet, Take 1 Tab (20 mg total) by mouth daily. Max Daily Amount: 20 mg, Disp: 30 Tab, Rfl: 0    methylphenidate HCl (RITALIN) 20 mg tablet, Take 1 Tab (20 mg total) by mouth daily. Max Daily Amount: 20 mg, Disp: 30 Tab, Rfl: 0    rOPINIRole (REQUIP) 2 mg tablet, Take 1 Tab by mouth nightly. for restless leg, Disp: 90 Tab, Rfl: 1    FLUoxetine (PROZAC) 20 mg tablet, Take 2 Tabs by mouth daily. , Disp: 180 Tab, Rfl: 1    tiZANidine (ZANAFLEX) 4 mg tablet, 4mg po TID, Disp: 270 Tab, Rfl: 1    labetalol (NORMODYNE) 200 mg tablet, Take 1 Tab by mouth two (2) times a day., Disp: 180 Tab, Rfl: 1    losartan-hydroCHLOROthiazide (HYZAAR) 100-12.5 mg per tablet, Take 1 Tab by mouth daily. , Disp: 90 Tab, Rfl: 1    ALPRAZolam (XANAX) 0.25 mg tablet, Take 1 Tab by mouth three (3) times daily as needed for Anxiety.  Max Daily Amount: 0.75 mg., Disp: 30 Tab, Rfl: 1            Date Last Reviewed:  2018   Complexity Level : Expanded review of therapy/medical records (1-2):  MODERATE COMPLEXITY   ASSESSMENT OF OCCUPATIONAL PERFORMANCE:   ROM:                   Balance:                   Coordination:                   Mental Status:                   Vision:                   Activities of Daily Living:           Basic ADLs (From Assessment) Complex ADLs (From Assessment)         Grooming/Bathing/Dressing Activities of Daily Living                                   Sensation:         Light touch absent distal to right elbow     Physical Skills Involved:  1. Range of Motion  2. Balance  3. Sensation  4. Fine Motor Control  5. Gross Motor Control Cognitive Skills Affected (resulting in the inability to perform in a timely and safe manner):  1. Executive Function  2. Sustained Attention  3. Divided Attention  4. Comprehension Psychosocial Skills Affected:  1. None   Number of elements that affect the Plan of Care: 5+:  HIGH COMPLEXITY   CLINICAL DECISION MAKING:   Outcome Measure: Tool Used: [address] [facility] [address] [address] [facility] Daily Activity Outpatient Short Form  Score:  Initial: 18 Most Recent: 33 (Date: 3/5/18 )   Interpretation of Tool:  Represents clinically-significant functional categories (i.e., basic and instrumental activities of daily living, fine motor activities). Score 60 59-55 54-47 46-34 32-21 20-16 15   Modifier CH CI CJ CK CL CM CN     Medical Necessity:   · Patient demonstrates good rehab potential due to higher previous functional level. Reason for Services/Other Comments:  · Patient continues to require skilled intervention due to decreased safety and independence in activities of daily living. Clinical Decision-Making Assessment: Moderately difficult to predict patient's progress at this time due to the extent of her limitations in functional movement and use of dominant right upper extremity.     Assessment process, impact of co-morbidities, assessment modification\need for assistance, and selection of interventions: Analytical Complexity:MODERATE COMPLEXITY   TREATMENT:   (In addition to Assessment/Re-Assessment sessions the following treatments were rendered)    Pre-treatment Symptoms/Complaints:  Patient states, \"I'm getting Botox next Tuesday. \"  Pain: Initial:   Pain Intensity 1: 0/10 Post Session:  0/10     Therapeutic Exercise: ( 30 minutes):  Exercises per grid below to improve dynamic movement of arm - right and hand - right to improve functional lifting, carrying, reaching and grasping. Required moderate visual and verbal cues to promote proper body mechanics. Progressed range, repetitions and complexity of movement as indicated.        Date:  3/14/18 Date:  3/19/18 Date:   3/21/18 Date:  3/26/18 Date:  3/28/18 Date:  4/4/18 Date:  4/6/18 Date:  4/12/18   Activity/Exercise           Shoulder shrugs AROM x 10 reps with mirror for visual feedback AROM x 10 reps with mirror for visual feedback AROM x 10 reps with mirror for visual feedback AROM x 10 reps with mirror for visual feedback AROM x 10 reps with mirror for visual feedback AROM x 10 reps with mirror for visual feedback AROM x 10 reps with mirror for visual feedback AROM x 10   Scapular protraction/retraction AROM  1 x 10 with passive self range into elbow extension at end range AROM  1 x 10 with passive self range into elbow extension at end range AROM  1 x 10 with passive self range into elbow extension at end range    2 x 10 reps with blue theratubing AROM  1 x 10 with passive self range into elbow extension at end range    2 x 10 reps with blue theratubing AROM  1 x 10 with passive self range into elbow extension at end range AROM  1 x 10 with passive self range into elbow extension at end range    2 x 10 reps with blue theratubing AROM  1 x 10 with passive self range into elbow extension at end range AROM  1 x 10 with passive self range into elbow extension at end range   Shoulder abduction           Elbow flexion/extension        A/AAROM x 5 reps   PNF D1/D2 diagonals           Hand helper   20 lbs x 5 reps with assist to extend fingers   20 lbs x 5 reps with assist to extend fingers  25 lbs x 10 reps with assist to extend fingers   Tabletop skateboard           Shoulder flexion/extension        A/AAROM x 10 reps each   Chest press           UBE Min assist to sustain right  on handle   Level 0  6 mintues Min assist to sustain right  on handle   Level 0  6 mintues Min assist to sustain right  on handle   Level 0  6 mintues Min assist to sustain right  on handle   Level 0  7 mintues Min assist to sustain right  on handle   Level 0  6 mintues Min assist to sustain right  on handle   Level 0  6 mintues Min assist to sustain right  on handle   Level 0  6 mintues Min assist to sustain right  on handle   Level 0  7 mintues   Modified push-ups  Hands on countertop with dycem under right hand  2 x 10 Hands on countertop with dycem under right hand  2 x 10 Hands on countertop with dycem under right hand  3 x 10 Hands on countertop with dycem under right hand  3 x 10 Hands on countertop with dycem under right hand  3 x 10 Hands on countertop with dycem under right hand  3 x 10     Resistive clothespin Yellow   1 x 5 pinch and release   Yellow   1 x 5 pinch and release  Yellow   1 x 5 pinch and release  Yellow   1 x 5 pinch and release   Shoulder arc  10 tabs on small hill; Left hand assisted R hand grasping and releasing small tabs; used R arm independently to pull tabs over   10 tabs on small hill; Left hand assisted R hand grasping and releasing small tabs; used R arm independently to pull tabs over  10 tabs on small hill; Left hand assisted R hand grasping and releasing small tabs; used R arm independently to pull tabs over    Wrist flexion/extension           Punching bag               Access Code: [email or web address]   URL: [email or web address]   Date: 01/19/2018   Prepared by: Edgar Jason Shoulder Flexion PROM - 10 reps - 2 sets - 3 hold - 1x daily - 5x weekly   Push-Up on Counter - 10 reps - 2 sets - 1x daily - 5x weekly   Seated Shoulder Shrugs - 10 reps - 2 sets - 1x daily - 5x weekly   Seated Scapular Retraction - 10 reps - 2 sets - 1x daily - 5x weekly   Seated Weight Shifting with Arm Support - 10 reps - 2 sets - 1x daily - 5x weekly   Seated Shoulder Flexion Self PROM - 10 reps - 2 sets - 1x daily - 5x weekly   Supine Elbow Flexion Extension AROM - 10 reps - 2 sets - 1x daily - 5x weekly   Seated Elbow Flexion Extension AROM - 10 reps - 2 sets - 1x daily - 5x weekly     Neuromuscular Re-education: ( 10 minutes):  Exercise/activities per grid below to improve balance, coordination, kinesthetic sense and proprioception. Required moderate visual and verbal cues to promote static balance in sitting, promote coordination of right, upper extremity(s) and promote motor control of right, upper extremity(s). Patient completed lateral weight shifts sitting at edge of mat x 20 reps each onto elbow/forearms  with dynamic reach with left hand outside of base of support, using right upper extremity to maintain balance. Treatment/Session Assessment: Pt continues demo good lateral and forward weight shifts outside base of support, with improved balance noted today with dynamic reach. Pt would continue to benefit from skilled OT services to increase functional use of R UE and increase independence with BADLs. Noted improved active shoulder range of motion and patient reports increasing use of right upper extremity to eat meals. · Response to Treatment: Mrs. Checo Reddy tolerated therapy well today, without any issue. She continues to be very cooperative and motivated during therapy. · Compliance with Program/Exercises: Will assess as treatment progresses. Pt reports compliance with HEP. · Recommendations/Intent for next treatment session:  \"Next visit will focus on advancements to more challenging activities and reduction in assistance provided\".     Total Treatment Duration:  OT Patient Time In/Time Out  Time In: 1320  Time Out: KAYODE Parker

## 2018-04-16 ENCOUNTER — HOSPITAL ENCOUNTER (OUTPATIENT)
Dept: PHYSICAL THERAPY | Age: 63
Discharge: HOME | End: 2018-04-16
Attending: PHYSICAL MEDICINE & REHABILITATION
Payer: COMMERCIAL

## 2018-04-16 PROCEDURE — 97110 THERAPEUTIC EXERCISES: CPT

## 2018-04-16 PROCEDURE — 97112 NEUROMUSCULAR REEDUCATION: CPT

## 2018-04-16 NOTE — PROGRESS NOTES
Devon Faria  : 1955  Primary: Venecia Carrizales Ppo  Secondary:  [address]  [facility] [address], [facility] [address].  [phone]   [phone]        OUTPATIENT OCCUPATIONAL THERAPY: Daily Note 2018    ICD-10: Treatment Diagnosis: Hemiplegia and hemiparesis following cerebral infarction affecting right dominant side (I69.351)  Precautions/Allergies:   Banana; Lisinopril; and Nuts [tree nut]   Fall Risk Score: 5 (? 5 = High Risk)  MD Orders: Referral to occupational therapy MEDICAL/REFERRING DIAGNOSIS:   Cerebral infarction, unspecified [I63.9]  Weakness [R53.1]   DATE OF ONSET: 2017   REFERRING PHYSICIAN: Juliette Chiu*  RETURN PHYSICIAN APPOINTMENT: unknown   3/5/18: Ms. Yadira Franz continues to make excellent progress towards her therapy goals; she demonstrates gradual improvements in R UE range of motion, specifically R shoulder flexion and R elbow extension/flexion, and reports increased functional use of R UE in activities of daily living, including dressing, grooming, and feeding. Patient would continue to benefit from skilled occupational therapy services to maximize safety and independence and increase functional use of R UE during activities of daily living.    18: Ms. Yadira Franz is making excellent progress toward her goals; she is demonstrating improving range of motion and functional use of the right upper extremity in activities of daily living. Feel she will continue to benefit from skilled occupational therapy to maximize safety and independence with activities of daily living. INITIAL ASSESSMENT:  Ms. Yadira Franz presents with impaired active movement, strength, coordination and sensation of the dominant right upper extremity that is affecting her ability to complete activities of daily living independently.  Feel she may benefit from skilled occupational therapy to maximize safety and independence with activities of daily living. PLAN OF CARE:   PROBLEM LIST:  1. Decreased Strength  2. Decreased ADL/Functional Activities  3. Decreased Transfer Abilities  4. Decreased Balance  5. Decreased Flexibility/Joint Mobility  6. Decreased Cognition INTERVENTIONS PLANNED:  1. Activities of daily living training  2. Manual therapy training  3. Modalities  4. Neuromuscular re-eduation  5. Sensory reintegration training  6. Splinting  7. Therapeutic activity  8. Therapeutic exercise   TREATMENT PLAN:  Effective Dates: 3/19/18 to 6/17/18. Frequency/Duration: 2 times a week for 12 weeks  GOALS: (Goals have been discussed and agreed upon with patient.)  Short-Term Functional Goals: Time Frame: 6 weeks  1. Patient will demonstrate independence with home exercise program for right upper extremity range of motion. Met  2. Patient will increase use of right upper extremity as a functional assist in at least 25% of daily activities. Met  3. Patient will bathe with moderate assistance. Continue to address  Discharge Goals: Time Frame: 12 weeks  1. Patient will bathe with minimal assistance. Continue to address  2. Patient will feed self with modified independence and adaptive equipment as needed. Continue to address  3. Patient will dress with modified independence and adaptive equipment as needed. Continue to address  4. Patient will use right upper extremity as a functional assist in at least 50% of daily activities. Continue to address  Rehabilitation Potential For Stated Goals: Good  Regarding Araceli Iraheta's therapy, I certify that the treatment plan above will be carried out by a therapist or under their direction. Thank you for this referral,  Glenn Bach OT     Referring Physician Signature: Thornton Goltz, Amy* _________________________  Date _________            The information in this section was collected on 3/5/18 (except where otherwise noted).   OCCUPATIONAL PROFILE & HISTORY:   History of Present Injury/Illness (Reason for Referral):  CVA with hospital and [facility] stay then home health therapy. Past Medical History/Comorbidities:   Ms. Mitzy Cruz  has a past medical history of Anxiety; CVA (cerebral vascular accident) ([facility] [address] (2017); Depression; HTN (hypertension); Menopause; and PTE (pulmonary thromboembolism) ([facility] [address] (2017). Ms. Mitzy Cruz  has a past surgical history that includes hx jennifer and bso (); hx  section; hx refractive surgery (); hx other surgical (); and hx other surgical (2017). Social History/Living Environment:      Prior Level of Function/Work/Activity:  Does seasonal taxes at [employer]  Dominant Side:         RIGHT  Current Medications:    Current Outpatient Prescriptions:     polyethylene glycol (MIRALAX) 17 gram/dose powder, Take 17 g by mouth daily. , Disp: 510 g, Rfl: 2    [START ON 2018] methylphenidate HCl (RITALIN) 20 mg tablet, Take 1 Tab (20 mg total) by mouth daily. Max Daily Amount: 20 mg, Disp: 30 Tab, Rfl: 0    [START ON 2018] methylphenidate HCl (RITALIN) 20 mg tablet, Take 1 Tab (20 mg total) by mouth daily. Max Daily Amount: 20 mg, Disp: 30 Tab, Rfl: 0    methylphenidate HCl (RITALIN) 20 mg tablet, Take 1 Tab (20 mg total) by mouth daily. Max Daily Amount: 20 mg, Disp: 30 Tab, Rfl: 0    rOPINIRole (REQUIP) 2 mg tablet, Take 1 Tab by mouth nightly. for restless leg, Disp: 90 Tab, Rfl: 1    FLUoxetine (PROZAC) 20 mg tablet, Take 2 Tabs by mouth daily. , Disp: 180 Tab, Rfl: 1    tiZANidine (ZANAFLEX) 4 mg tablet, 4mg po TID, Disp: 270 Tab, Rfl: 1    labetalol (NORMODYNE) 200 mg tablet, Take 1 Tab by mouth two (2) times a day., Disp: 180 Tab, Rfl: 1    losartan-hydroCHLOROthiazide (HYZAAR) 100-12.5 mg per tablet, Take 1 Tab by mouth daily. , Disp: 90 Tab, Rfl: 1    ALPRAZolam (XANAX) 0.25 mg tablet, Take 1 Tab by mouth three (3) times daily as needed for Anxiety.  Max Daily Amount: 0.75 mg., Disp: 30 Tab, Rfl: 1            Date Last Reviewed:  2018   Complexity Level : Expanded review of therapy/medical records (1-2):  MODERATE COMPLEXITY   ASSESSMENT OF OCCUPATIONAL PERFORMANCE:   ROM:                   Balance:                   Coordination:                   Mental Status:                   Vision:                   Activities of Daily Living:           Basic ADLs (From Assessment) Complex ADLs (From Assessment)         Grooming/Bathing/Dressing Activities of Daily Living                                   Sensation:         Light touch absent distal to right elbow     Physical Skills Involved:  1. Range of Motion  2. Balance  3. Sensation  4. Fine Motor Control  5. Gross Motor Control Cognitive Skills Affected (resulting in the inability to perform in a timely and safe manner):  1. Executive Function  2. Sustained Attention  3. Divided Attention  4. Comprehension Psychosocial Skills Affected:  1. None   Number of elements that affect the Plan of Care: 5+:  HIGH COMPLEXITY   CLINICAL DECISION MAKING:   Outcome Measure: Tool Used: [address] [facility] [address] [address] AM-[facility] Daily Activity Outpatient Short Form  Score:  Initial: 18 Most Recent: 33 (Date: 3/5/18 )   Interpretation of Tool:  Represents clinically-significant functional categories (i.e., basic and instrumental activities of daily living, fine motor activities). Score 60 59-55 54-47 46-34 32-21 20-16 15   Modifier CH CI CJ CK CL CM CN     Medical Necessity:   · Patient demonstrates good rehab potential due to higher previous functional level. Reason for Services/Other Comments:  · Patient continues to require skilled intervention due to decreased safety and independence in activities of daily living. Clinical Decision-Making Assessment: Moderately difficult to predict patient's progress at this time due to the extent of her limitations in functional movement and use of dominant right upper extremity.     Assessment process, impact of co-morbidities, assessment modification\need for assistance, and selection of interventions: Analytical Complexity:MODERATE COMPLEXITY   TREATMENT:   (In addition to Assessment/Re-Assessment sessions the following treatments were rendered)    Pre-treatment Symptoms/Complaints:  Patient states, \"I get my Botox tomorrow afternoon. \"  Pain: Initial:   Pain Intensity 1: 0/10 Post Session:  0/10     Therapeutic Exercise: ( 35 minutes):  Exercises per grid below to improve dynamic movement of arm - right and hand - right to improve functional lifting, carrying, reaching and grasping. Required moderate visual and verbal cues to promote proper body mechanics. Progressed range, repetitions and complexity of movement as indicated.        Date:  3/19/18 Date:   3/21/18 Date:  3/26/18 Date:  3/28/18 Date:  4/4/18 Date:  4/6/18 Date:  4/12/18 Date:  4/16/18   Activity/Exercise           Shoulder shrugs AROM x 10 reps with mirror for visual feedback AROM x 10 reps with mirror for visual feedback AROM x 10 reps with mirror for visual feedback AROM x 10 reps with mirror for visual feedback AROM x 10 reps with mirror for visual feedback AROM x 10 reps with mirror for visual feedback AROM x 10 AROM x 10 reps with mirror for visual feedback   Scapular protraction/retraction AROM  1 x 10 with passive self range into elbow extension at end range AROM  1 x 10 with passive self range into elbow extension at end range    2 x 10 reps with blue theratubing AROM  1 x 10 with passive self range into elbow extension at end range    2 x 10 reps with blue theratubing AROM  1 x 10 with passive self range into elbow extension at end range AROM  1 x 10 with passive self range into elbow extension at end range    2 x 10 reps with blue theratubing AROM  1 x 10 with passive self range into elbow extension at end range AROM  1 x 10 with passive self range into elbow extension at end range AROM  1 x 10 with passive self range into elbow extension at end range   Shoulder abduction           Elbow flexion/extension       A/AAROM x 5 reps A/AAROM x 5 reps   PNF D1/D2 diagonals           Hand helper  20 lbs x 5 reps with assist to extend fingers   20 lbs x 5 reps with assist to extend fingers  25 lbs x 10 reps with assist to extend fingers    Tabletop skateboard           Shoulder flexion/extension       A/AAROM x 10 reps each A/AAROM x 10 reps each   Chest press           UBE Min assist to sustain right  on handle   Level 0  6 mintues Min assist to sustain right  on handle   Level 0  6 mintues Min assist to sustain right  on handle   Level 0  7 mintues Min assist to sustain right  on handle   Level 0  6 mintues Min assist to sustain right  on handle   Level 0  6 mintues Min assist to sustain right  on handle   Level 0  6 mintues Min assist to sustain right  on handle   Level 0  7 mintues Min assist to sustain right  on handle   Level 0  7 mintues   Modified push-ups  Hands on countertop with dycem under right hand  2 x 10 Hands on countertop with dycem under right hand  3 x 10 Hands on countertop with dycem under right hand  3 x 10 Hands on countertop with dycem under right hand  3 x 10 Hands on countertop with dycem under right hand  3 x 10   Hands on countertop with dycem under right hand  3 x 10   Resistive clothespin   Yellow   1 x 5 pinch and release  Yellow   1 x 5 pinch and release  Yellow   1 x 5 pinch and release Yellow   1 x 5 pinch and release   Shoulder arc 10 tabs on small hill; Left hand assisted R hand grasping and releasing small tabs; used R arm independently to pull tabs over   10 tabs on small hill; Left hand assisted R hand grasping and releasing small tabs; used R arm independently to pull tabs over  10 tabs on small hill; Left hand assisted R hand grasping and releasing small tabs; used R arm independently to pull tabs over     Wrist flexion/extension           Punching bag               Access Code: [email or web address]   URL: [email or web address]   Date: 01/19/2018   Prepared by: Fahad Anaya Sally     Exercises   Supine Shoulder Flexion PROM - 10 reps - 2 sets - 3 hold - 1x daily - 5x weekly   Push-Up on Counter - 10 reps - 2 sets - 1x daily - 5x weekly   Seated Shoulder Shrugs - 10 reps - 2 sets - 1x daily - 5x weekly   Seated Scapular Retraction - 10 reps - 2 sets - 1x daily - 5x weekly   Seated Weight Shifting with Arm Support - 10 reps - 2 sets - 1x daily - 5x weekly   Seated Shoulder Flexion Self PROM - 10 reps - 2 sets - 1x daily - 5x weekly   Supine Elbow Flexion Extension AROM - 10 reps - 2 sets - 1x daily - 5x weekly   Seated Elbow Flexion Extension AROM - 10 reps - 2 sets - 1x daily - 5x weekly     Neuromuscular Re-education: ( 10 minutes):  Exercise/activities per grid below to improve balance, coordination, kinesthetic sense and proprioception. Required moderate visual and verbal cues to promote static balance in sitting, promote coordination of right, upper extremity(s) and promote motor control of right, upper extremity(s). Patient completed lateral weight shifts sitting at edge of mat x 20 reps each onto elbow/forearms  with dynamic reach with left hand outside of base of support, using right upper extremity to maintain balance. Treatment/Session Assessment: Pt continues to demo improved lateral weight shifts outside base of support with dynamic reach, however does require min cueing for correct body posture and arm placement. Pt also demo's improved ability to contract and relax hand upon command. Pt would continue to benefit from skilled OT services to increase functional use of R UE and increase independence with BADLs. · Response to Treatment: Mrs. Jacobs tolerated therapy well today, without any issue. She continues to be very cooperative and motivated during therapy. · Compliance with Program/Exercises: Will assess as treatment progresses. Pt reports compliance with HEP. · Recommendations/Intent for next treatment session:  \"Next visit will focus on advancements to more challenging activities and reduction in assistance provided\".     Total Treatment Duration:  OT Patient Time In/Time Out  Time In: 1345  Time Out: [id number] [facility]

## 2018-04-16 NOTE — PROGRESS NOTES
Devon Faria  : 1955  Primary: [place]  Ppo  Secondary:  [address] at [place] [address] [facility], [address] [place] [address],[address], [facility] [place].  [phone]   [phone]          OUTPATIENT PHYSICAL THERAPY:Daily Note 2018    ICD-10: Treatment Diagnosis:   · Other abnormalities of gait and mobility (R26.89)  · Difficulty in walking, not elsewhere classified (R26.2)  Precautions/Allergies:   Banana; Lisinopril; and Nuts [tree nut]   Fall Risk Score: 5 (? 5 = High Risk)  MD Orders: Evaluate and Treat MEDICAL/REFERRING DIAGNOSIS:  Weakness due to cerebrovascular accident (CVA) ([facility] [address] [I63.9, R53.1]   DATE OF ONSET: CVA: 2017  REFERRING PHYSICIAN: Juliette Chiu*  RETURN PHYSICIAN APPOINTMENT: TBD     INITIAL ASSESSMENT:  Ms. Yadira Franz presents with decreased mobility, decreased strength, decreased gait, and decreased balance secondary to CVA. After discussing with patient, she agreed she would benefit from physical therapy to improve above deficits. Please sign this plan of treatment if you concur. Thank you for the opportunity to serve this patient. Progress note on 3/12/2018:   Patient has attended twenty-four scheduled physical therapy appointments from 12/15/2017 to 3/12/2018. Patient is very motivated and compliant with therapy. Patient complaints of increased irritation in her right AFO due to increased tone. Patient is waiting on her insurance to approve Botox injections to decreased tone in right ankle. Patient would benefit from continuing skilled physical therapy to address problems and goals. Thank you for this referral.    PROBLEM LIST (Impacting functional limitations):  1. Decreased Strength  2. Decreased Ambulation Ability/Technique  3. Decreased Balance  4. Decreased Activity Tolerance INTERVENTIONS PLANNED:  1. Balance Exercise  2. Gait Training  3. Home Exercise Program (HEP)  4. Neuromuscular Re-education/Strengthening  5.  Range of Motion (ROM)  6. Therapeutic Exercise/Strengthening   TREATMENT PLAN:  Effective Dates: 2/12/2018 TO 5/7/2018. Frequency/Duration: 2-3 times a week for 12 weeks  GOALS: (Goals have been discussed and agreed upon with patient.)  Short-Term Functional Goals: Time Frame: 3 weeks  1. Patient will be independent with home exercise program without exacerbation of symptoms or cueing needed. Goal met. Discharge Goals: Time Frame: 8 weeks  1. Patient will be independent with all ADLs with minimal fear of falling and loss of balance with daily tasks. Goal ongoing. 2. Patient will report no fear avoidance with social or recreational activities due to fear of falling. Goal ongoing. 3. Patient will score greater than or equal to 45/56 on Gipson Balance Scale with minimal effect of imbalance on patient's ability to manage every day life activities. Goal ongoing. Rehabilitation Potential For Stated Goals: Good  Regarding Heavenly Iraheta's therapy, I certify that the treatment plan above will be carried out by a therapist or under their direction. Thank you for this referral,  Jose Martin Covarrubias PT     Referring Physician Signature: Juliette Randhawa*              Date                    The information in this section was collected on 12/15/2017 (except where otherwise noted). HISTORY:   History of Present Injury/Illness (Reason for Referral):  Patient reports she had a severe stroke on 9/6/2017. She reports that she was in the hospital and [facility] for about 61-62 days. She reports that she has progressed really well, but is still having trouble walking and using her right side (UE and LE). She reports that she walks using her AFO and quad cane all the time. She reports that she tries to be as active as possible. She reports that she is scared that she is going to fall, even though she has not.   She reports she would like to work on strengthening her right leg so her balance is better and she can walk without fear of falling. Past Medical History/Comorbidities:   Ms. Adela Iglesias  has a past medical history of Anxiety; CVA (cerebral vascular accident) ([facility] [address] (2017); Depression; HTN (hypertension); Menopause; and PTE (pulmonary thromboembolism) ([facility] [address] (2017). Ms. Adela Iglesias  has a past surgical history that includes hx jennifer and bso (); hx  section; hx refractive surgery (); hx other surgical (); and hx other surgical (2017). Social History/Living Environment:   Home Environment: Private residence  Living Alone: No  Support Systems: Family member(s), Friends \ neighbors; Spouse  Prior Level of Function/Work/Activity:  Independent  Dominant Side:         RIGHT  Personal Factors:          Sex:  female        Age:  60 y.o. Current Medications:       Current Outpatient Prescriptions:     polyethylene glycol (MIRALAX) 17 gram/dose powder, Take 17 g by mouth daily. , Disp: 510 g, Rfl: 2    [START ON 2018] methylphenidate HCl (RITALIN) 20 mg tablet, Take 1 Tab (20 mg total) by mouth daily. Max Daily Amount: 20 mg, Disp: 30 Tab, Rfl: 0    [START ON 2018] methylphenidate HCl (RITALIN) 20 mg tablet, Take 1 Tab (20 mg total) by mouth daily. Max Daily Amount: 20 mg, Disp: 30 Tab, Rfl: 0    methylphenidate HCl (RITALIN) 20 mg tablet, Take 1 Tab (20 mg total) by mouth daily. Max Daily Amount: 20 mg, Disp: 30 Tab, Rfl: 0    rOPINIRole (REQUIP) 2 mg tablet, Take 1 Tab by mouth nightly. for restless leg, Disp: 90 Tab, Rfl: 1    FLUoxetine (PROZAC) 20 mg tablet, Take 2 Tabs by mouth daily. , Disp: 180 Tab, Rfl: 1    tiZANidine (ZANAFLEX) 4 mg tablet, 4mg po TID, Disp: 270 Tab, Rfl: 1    labetalol (NORMODYNE) 200 mg tablet, Take 1 Tab by mouth two (2) times a day., Disp: 180 Tab, Rfl: 1    losartan-hydroCHLOROthiazide (HYZAAR) 100-12.5 mg per tablet, Take 1 Tab by mouth daily. , Disp: 90 Tab, Rfl: 1    ALPRAZolam (XANAX) 0.25 mg tablet, Take 1 Tab by mouth three (3) times daily as needed for Anxiety.  Max Daily Amount: 0.75 mg., Disp: 30 Tab, Rfl: 1   Date Last Reviewed:  4/16/2018    Number of Personal Factors/Comorbidities that affect the Plan of Care: 1-2: MODERATE COMPLEXITY   EXAMINATION:   Observation/Orthostatic Postural Assessment:          Posture Assessment: Rounded shoulders, Forward head   Functional Mobility:         Gait/Ambulation:     Speed/Lulu: Pace decreased (<100 feet/min)  Step Length: Left shortened, Right lengthened  Swing Pattern: Left asymmetrical, Right asymmetrical  Stance: Left increased, Right decreased  Gait Abnormalities: Antalgic, Circumduction, Foot drop, Path deviations, Trendelenburg  Ambulation - Level of Assistance: Modified independent  Assistive Device: Cane, quad  · Interventions: Verbal cues, Safety awareness training          Transfers:     Sit to Stand: Independent  Stand to Sit: Independent  Stand Pivot Transfers: Independent  · Bed to Chair: Independent          Bed Mobility:     Rolling: Independent  Supine to Sit: Independent  Sit to Supine: Independent  · Scooting: Independent    Strength:          L UE STRENGTH: 4/5 shoulder flexion, 4/5 shoulder abduction, 4/5 shoulder extension, 4/5 elbow flexion, 4/5 elbow extension. R UE STRENGTH: 2/5 shoulder flexion, 2/5 shoulder abduction, 2/5 shoulder extension, 2/5 elbow flexion, 2/5 elbow extension. R LE STRENGTH:  4+/5 hip flexion, 4+/5 hip abduction, 4+/5 hip adduction, 4+/5 hip extension, 4+/5 knee extension, 4+/5 knee flexion, 1/5 ankle dorsiflexion, 1/5 ankle plantarflexion, 1/5 ankle inversion, 1/5 ankle eversion. Sensation:         Intact for light touch and proprioception  Postural Control & Balance:  · Gipson Balance Scale:  33/56.   (A score less than 45/56 indicates high risk of falls)  . Score improved from 28/56 on initial evaluation. Body Structures Involved:  1. Nerves  2. Muscles Body Functions Affected:  1. Neuromusculoskeletal  2.  Movement Related Activities and Participation Affected:  1. Mobility  2. Self Care   Number of elements (examined above) that affect the Plan of Care: 4+: HIGH COMPLEXITY   CLINICAL PRESENTATION:   Presentation: Evolving clinical presentation with changing clinical characteristics: MODERATE COMPLEXITY   CLINICAL DECISION MAKING:   Outcome Measure: Tool Used: Gipson Balance Scale  Score:  Initial: 28/56 Most Recent: 33/56 (Date: 3- )   Interpretation of Score: Each section is scored on a 0-4 scale, 0 representing the patients inability to perform the task and 4 representing independence. The scores of each section are added together for a total score of 56. The higher the patients score, the more independent the patient is. Any score below 45 indicates increased risk for falls. Score 56 55-45 44-34 33-23 22-12 11-1 0   Modifier CH CI CJ CK CL CM CN     Medical Necessity:   · Patient is expected to demonstrate progress in strength, range of motion, balance and coordination to improve safety during daily activities. · Patient demonstrates good rehab potential due to higher previous functional level. · Skilled intervention continues to be required due to decreased mobility. Reason for Services/Other Comments:  · Patient continues to demonstrate capacity to improve overall mobility which will increase independence and increase safety. Use of outcome tool(s) and clinical judgement create a POC that gives a: Questionable prediction of patient's progress: MODERATE COMPLEXITY            TREATMENT:   (In addition to Assessment/Re-Assessment sessions the following treatments were rendered)  Pre-treatment Symptoms/Complaints:  4/16/2018:\"doing well. I brought my  today to review the stretches. To have botox tomorrow afternoon\"  Pain: Initial: Pain Intensity 1: 0  Post Session:  0/10     NEUROMUSCULAR RE-EDUCATION: (15 minutes):  Exercise/activities per grid below to improve balance, coordination, kinesthetic sense, posture and proprioception. Required minimal verbal and manual cues to promote static and dynamic balance in standing, promote coordination of bilateral, lower extremity(s) and promote motor control of bilateral, lower extremity(s). Date   4/12/18 Date   4/16/18    Activity/Exercise      Stepping over half foam  2x10 reps B LE 2x10 reps B LE    Step taps      Step ups 6 inch  2 x10 leading with R LE  6 inch  2 x10 leading with R LE     [place]      Walking in the clinic with quad cane About 100 feet with quad cane, working on weightbearing more on R foot through midstance and terminal stance, and stepping bigger with L LE; added working on shifting R hips forward over R foot better midstance to terminal stance About 100 feet with quad cane, working on weightbearing more on R foot through midstance and terminal stance, and stepping bigger with L LE; added working on shifting R hips forward over R foot better midstance to terminal stance    pregait activity      Walking backwards with rail 5 feet x 5 working on R hip extension AROM     Standing weight shifting onto R LE  PT bracing R ankle position                               THERAPEUTIC EXERCISE: (30 minutes):  Exercises per grid below to improve mobility and strength. Required minimal verbal cues to promote proper body alignment. Progressed repetitions as indicated.    Date  4/12/18 Date  4/16/18    Activity/Exercise      Sit to stand   From mat, PT bracing R foot/ankle, x 20 reps    Left sidelying alternating isometrics on pelvis      Standing hip abduction      Seated LAQs      Standing hamstrings curls       Bridging 2x10 with right lower 2x10 with right lower    Supine straight leg raise 2x10 with right lower extremity working on keeping foot/hip in neutral position 2x10 with right lower extremity working on keeping foot/hip in neutral position    Supine PNF D1 and D2 patterns x 15 reps each R LE D1 and D2 patterns x 15 reps each R LE    L sidelying R pelvis and trunk PNF      Left sidelying hip abduction      L sidelying: picking up and moving R LE from in front of L foot to behind L foot (working on hip abd and ext) and stabilizing trunk 2x10 reps, working on lifting higher and keeping foot in neutral position 2x10 reps, working on lifting higher and keeping foot in neutral position    L sidelying R clam shells 2x10 reps, working on lifting higher 2x10 reps, working on lifting higher    L sidelying R hip abduction With cuing to keep leg in alignment with body, 2 x 10 reps With cuing to keep leg in alignment with body, 2 x 10 reps    R foot and gastrocsoleus stretching X 8 minutes trying to get foot to neutral position X 8 minutes trying to get foot to neutral position    Supine hooklying lower body rotation X 20 reps keeping knees together. X 20 reps keeping knees together. Standing minisquats in bars      [facility] Portal  Treatment/Session Assessment:    · Response to Treatment:  Patient tolerated treatment without complaints. Patient did well with exercises. Patient's  understood R gastrocsoleus stretching. Patient to have Botox tomorrow. Continue to progress to tolerance. · Compliance with Program/Exercises: Compliant. · Recommendations/Intent for next treatment session: \"Next visit will focus on advancements to more challenging activities\".    Total Treatment Duration:  PT Patient Time In/Time Out  Time In: 1300  Time Out: Nathalie [address]

## 2018-04-19 ENCOUNTER — HOSPITAL ENCOUNTER (OUTPATIENT)
Dept: PHYSICAL THERAPY | Age: 63
Discharge: HOME | End: 2018-04-19
Attending: PHYSICAL MEDICINE & REHABILITATION
Payer: COMMERCIAL

## 2018-04-19 PROCEDURE — 97112 NEUROMUSCULAR REEDUCATION: CPT

## 2018-04-19 PROCEDURE — 97110 THERAPEUTIC EXERCISES: CPT

## 2018-04-19 NOTE — PROGRESS NOTES
Wesley Hamilton  : 1955  Primary:   Secondary:  [address] at [facility]  [address] [facility], [address] [place] [address] [address], [address] [facility]  [phone]   [phone]          OUTPATIENT PHYSICAL THERAPY:Daily Note 2018    ICD-10: Treatment Diagnosis:   · Other abnormalities of gait and mobility (R26.89)  · Difficulty in walking, not elsewhere classified (R26.2)  Precautions/Allergies:   Banana; Lisinopril; and Nuts [tree nut]   Fall Risk Score: 5 (? 5 = High Risk)  MD Orders: Evaluate and Treat MEDICAL/REFERRING DIAGNOSIS:  Weakness due to cerebrovascular accident (CVA) ([facility] [I63.9, R53.1]   DATE OF ONSET: CVA: 2017  REFERRING PHYSICIAN: Juliette Cummins*  RETURN PHYSICIAN APPOINTMENT: TBD     INITIAL ASSESSMENT:  Ms. Ewa Rawls presents with decreased mobility, decreased strength, decreased gait, and decreased balance secondary to CVA. After discussing with patient, she agreed she would benefit from physical therapy to improve above deficits. Please sign this plan of treatment if you concur. Thank you for the opportunity to serve this patient. Progress note on 3/12/2018:   Patient has attended twenty-four scheduled physical therapy appointments from 12/15/2017 to 3/12/2018. Patient is very motivated and compliant with therapy. Patient complaints of increased irritation in her right AFO due to increased tone. Patient is waiting on her insurance to approve Botox injections to decreased tone in right ankle. Patient would benefit from continuing skilled physical therapy to address problems and goals. Thank you for this referral.    PROBLEM LIST (Impacting functional limitations):  1. Decreased Strength  2. Decreased Ambulation Ability/Technique  3. Decreased Balance  4. Decreased Activity Tolerance INTERVENTIONS PLANNED:  1. Balance Exercise  2. Gait Training  3. Home Exercise Program (HEP)  4. Neuromuscular Re-education/Strengthening  5.  Range of Motion (ROM)  6. Therapeutic Exercise/Strengthening   TREATMENT PLAN:  Effective Dates: 2/12/2018 TO 5/7/2018. Frequency/Duration: 2-3 times a week for 12 weeks  GOALS: (Goals have been discussed and agreed upon with patient.)  Short-Term Functional Goals: Time Frame: 3 weeks  1. Patient will be independent with home exercise program without exacerbation of symptoms or cueing needed. Goal met. Discharge Goals: Time Frame: 8 weeks  1. Patient will be independent with all ADLs with minimal fear of falling and loss of balance with daily tasks. Goal ongoing. 2. Patient will report no fear avoidance with social or recreational activities due to fear of falling. Goal ongoing. 3. Patient will score greater than or equal to 45/56 on Gipson Balance Scale with minimal effect of imbalance on patient's ability to manage every day life activities. Goal ongoing. Rehabilitation Potential For Stated Goals: Good  Regarding Jonah Iraheta's therapy, I certify that the treatment plan above will be carried out by a therapist or under their direction. Thank you for this referral,  Jeanette Roberson PT     Referring Physician Signature: Juliette Carlisle*              Date                    The information in this section was collected on 12/15/2017 (except where otherwise noted). HISTORY:   History of Present Injury/Illness (Reason for Referral):  Patient reports she had a severe stroke on 9/6/2017. She reports that she was in the hospital and [facility] for about 61-62 days. She reports that she has progressed really well, but is still having trouble walking and using her right side (UE and LE). She reports that she walks using her AFO and quad cane all the time. She reports that she tries to be as active as possible. She reports that she is scared that she is going to fall, even though she has not.   She reports she would like to work on strengthening her right leg so her balance is better and she can walk without fear of falling. Past Medical History/Comorbidities:   Ms. Etienne Humphries  has a past medical history of Anxiety; CVA (cerebral vascular accident) ([facility] (2017); Depression; HTN (hypertension); Menopause; and PTE (pulmonary thromboembolism) ([facility] (2017). Ms. Etienne Humphries  has a past surgical history that includes hx jennifer and bso (); hx  section; hx refractive surgery (); hx other surgical (); and hx other surgical (). Social History/Living Environment:   Home Environment: Private residence  Living Alone: No  Support Systems: Family member(s), Friends \ neighbors; Spouse  Prior Level of Function/Work/Activity:  Independent  Dominant Side:         RIGHT  Personal Factors:          Sex:  female        Age:  60 y.o. Current Medications:       Current Outpatient Prescriptions:     polyethylene glycol (MIRALAX) 17 gram/dose powder, Take 17 g by mouth daily. , Disp: 510 g, Rfl: 2    [START ON 2018] methylphenidate HCl (RITALIN) 20 mg tablet, Take 1 Tab (20 mg total) by mouth daily. Max Daily Amount: 20 mg, Disp: 30 Tab, Rfl: 0    [START ON 2018] methylphenidate HCl (RITALIN) 20 mg tablet, Take 1 Tab (20 mg total) by mouth daily. Max Daily Amount: 20 mg, Disp: 30 Tab, Rfl: 0    methylphenidate HCl (RITALIN) 20 mg tablet, Take 1 Tab (20 mg total) by mouth daily. Max Daily Amount: 20 mg, Disp: 30 Tab, Rfl: 0    rOPINIRole (REQUIP) 2 mg tablet, Take 1 Tab by mouth nightly. for restless leg, Disp: 90 Tab, Rfl: 1    FLUoxetine (PROZAC) 20 mg tablet, Take 2 Tabs by mouth daily. , Disp: 180 Tab, Rfl: 1    tiZANidine (ZANAFLEX) 4 mg tablet, 4mg po TID, Disp: 270 Tab, Rfl: 1    labetalol (NORMODYNE) 200 mg tablet, Take 1 Tab by mouth two (2) times a day., Disp: 180 Tab, Rfl: 1    losartan-hydroCHLOROthiazide (HYZAAR) 100-12.5 mg per tablet, Take 1 Tab by mouth daily. , Disp: 90 Tab, Rfl: 1    ALPRAZolam (XANAX) 0.25 mg tablet, Take 1 Tab by mouth three (3) times daily as needed for Anxiety.  Max Daily Amount: 0.75 mg., Disp: 30 Tab, Rfl: 1  No current facility-administered medications for this encounter. Date Last Reviewed:  4/19/2018    Number of Personal Factors/Comorbidities that affect the Plan of Care: 1-2: MODERATE COMPLEXITY   EXAMINATION:   Observation/Orthostatic Postural Assessment:          Posture Assessment: Rounded shoulders, Forward head   Functional Mobility:         Gait/Ambulation:     Speed/Lulu: Pace decreased (<100 feet/min)  Step Length: Left shortened, Right lengthened  Swing Pattern: Left asymmetrical, Right asymmetrical  Stance: Left increased, Right decreased  Gait Abnormalities: Antalgic, Circumduction, Foot drop, Path deviations, Trendelenburg  Ambulation - Level of Assistance: Modified independent  Assistive Device: Cane, quad  · Interventions: Verbal cues, Safety awareness training          Transfers:     Sit to Stand: Independent  Stand to Sit: Independent  Stand Pivot Transfers: Independent  · Bed to Chair: Independent          Bed Mobility:     Rolling: Independent  Supine to Sit: Independent  Sit to Supine: Independent  · Scooting: Independent    Strength:          L UE STRENGTH: 4/5 shoulder flexion, 4/5 shoulder abduction, 4/5 shoulder extension, 4/5 elbow flexion, 4/5 elbow extension. R UE STRENGTH: 2/5 shoulder flexion, 2/5 shoulder abduction, 2/5 shoulder extension, 2/5 elbow flexion, 2/5 elbow extension. R LE STRENGTH:  4+/5 hip flexion, 4+/5 hip abduction, 4+/5 hip adduction, 4+/5 hip extension, 4+/5 knee extension, 4+/5 knee flexion, 1/5 ankle dorsiflexion, 1/5 ankle plantarflexion, 1/5 ankle inversion, 1/5 ankle eversion. Sensation:         Intact for light touch and proprioception  Postural Control & Balance:  · Gipson Balance Scale:  33/56.   (A score less than 45/56 indicates high risk of falls)  . Score improved from 28/56 on initial evaluation. Body Structures Involved:  1. Nerves  2.  Muscles Body Functions Affected:  1. Neuromusculoskeletal  2. Movement Related Activities and Participation Affected:  1. Mobility  2. Self Care   Number of elements (examined above) that affect the Plan of Care: 4+: HIGH COMPLEXITY   CLINICAL PRESENTATION:   Presentation: Evolving clinical presentation with changing clinical characteristics: MODERATE COMPLEXITY   CLINICAL DECISION MAKING:   Outcome Measure: Tool Used: Gipson Balance Scale  Score:  Initial: 28/56 Most Recent: 33/56 (Date: 3- )   Interpretation of Score: Each section is scored on a 0-4 scale, 0 representing the patients inability to perform the task and 4 representing independence. The scores of each section are added together for a total score of 56. The higher the patients score, the more independent the patient is. Any score below 45 indicates increased risk for falls. Score 56 55-45 44-34 33-23 22-12 11-1 0   Modifier CH CI CJ CK CL CM CN     Medical Necessity:   · Patient is expected to demonstrate progress in strength, range of motion, balance and coordination to improve safety during daily activities. · Patient demonstrates good rehab potential due to higher previous functional level. · Skilled intervention continues to be required due to decreased mobility. Reason for Services/Other Comments:  · Patient continues to demonstrate capacity to improve overall mobility which will increase independence and increase safety. Use of outcome tool(s) and clinical judgement create a POC that gives a: Questionable prediction of patient's progress: MODERATE COMPLEXITY            TREATMENT:   (In addition to Assessment/Re-Assessment sessions the following treatments were rendered)  Pre-treatment Symptoms/Complaints:  4/19/2018:\"Doing well. Had Botox on Tuesday and  has been helping me stretch. No problems.  \"  Pain: Initial: Pain Intensity 1: 0  Post Session:  0/10     NEUROMUSCULAR RE-EDUCATION: (15 minutes):  Exercise/activities per grid below to improve balance, coordination, kinesthetic sense, posture and proprioception. Required minimal verbal and manual cues to promote static and dynamic balance in standing, promote coordination of bilateral, lower extremity(s) and promote motor control of bilateral, lower extremity(s). Date   4/12/18 Date   4/16/18 Date   4/19/18   Activity/Exercise      Stepping over half foam  2x10 reps B LE 2x10 reps B LE 2x10 reps B LE   Step taps      Step ups 6 inch  2 x10 leading with R LE  6 inch  2 x10 leading with R LE  6 inch  2 x10 leading with R LE    [place]      Walking in the clinic with quad cane About 100 feet with quad cane, working on weightbearing more on R foot through midstance and terminal stance, and stepping bigger with L LE; added working on shifting R hips forward over R foot better midstance to terminal stance About 100 feet with quad cane, working on weightbearing more on R foot through midstance and terminal stance, and stepping bigger with L LE; added working on shifting R hips forward over R foot better midstance to terminal stance About 100 feet with quad cane, working on weightbearing more on R foot through midstance and terminal stance, and stepping bigger with L LE; added working on shifting R hips forward over R foot better midstance to terminal stance   pregait activity      Walking backwards with rail 5 feet x 5 working on R hip extension AROM     Standing weight shifting onto R LE  PT bracing R ankle position                               THERAPEUTIC EXERCISE: (30 minutes):  Exercises per grid below to improve mobility and strength. Required minimal verbal cues to promote proper body alignment. Progressed repetitions as indicated.    Date  4/12/18 Date  4/16/18 Date   4/19/18   Activity/Exercise      Sit to stand   From mat, PT bracing R foot/ankle, x 20 reps    Left sidelying alternating isometrics on pelvis      Standing hip abduction      Seated LAQs      Standing hamstrings curls Bridging 2x10 with right lower 2x10 with right lower 2x10 with right lower   Supine straight leg raise 2x10 with right lower extremity working on keeping foot/hip in neutral position 2x10 with right lower extremity working on keeping foot/hip in neutral position 2x10 with right lower extremity working on keeping foot/hip in neutral position   Supine PNF D1 and D2 patterns x 15 reps each R LE D1 and D2 patterns x 15 reps each R LE D1 and D2 patterns x 15 reps each R LE   L sidelying R pelvis and trunk PNF      Left sidelying hip abduction      L sidelying: picking up and moving R LE from in front of L foot to behind L foot (working on hip abd and ext) and stabilizing trunk 2x10 reps, working on lifting higher and keeping foot in neutral position 2x10 reps, working on lifting higher and keeping foot in neutral position 2x10 reps, working on lifting higher and keeping foot in neutral position   L sidelying R clam shells 2x10 reps, working on lifting higher 2x10 reps, working on lifting higher 2x10 reps, working on lifting higher   L sidelying R hip abduction With cuing to keep leg in alignment with body, 2 x 10 reps With cuing to keep leg in alignment with body, 2 x 10 reps With cuing to keep leg in alignment with body, 2 x 10 reps   R foot and gastrocsoleus stretching X 8 minutes trying to get foot to neutral position X 8 minutes trying to get foot to neutral position X 20 minutes trying to get foot to neutral position   Supine hooklying lower body rotation X 20 reps keeping knees together. X 20 reps keeping knees together. Standing minisquats in bars      [facility] Portal  Treatment/Session Assessment:    · Response to Treatment:  Patient tolerated treatment without complaints. Patient had Botox to R UE and LE on Tuesday and her  has been stretching her at home. She goes back to [facility] in 2 weeks for recheck. R ankle foot presented with less resistance with stretching today.  Patient verbalized that stretching felt good and different than previously, possibly because we are able to stretch a little farther into eversion and dorsiflexion now. Able to get DF at least to neutral. Patient also stated that foot didn't hurt when walking today, again, probably because those muscles are less resistant to weight bearing since the Botox. Continue to progress to tolerance. · Compliance with Program/Exercises: Compliant. · Recommendations/Intent for next treatment session: \"Next visit will focus on advancements to more challenging activities\".    Total Treatment Duration:  PT Patient Time In/Time Out  Time In: 1400  Time Out: [facility]

## 2018-04-19 NOTE — PROGRESS NOTES
Babita Liner  : 1955  Primary: Jesse Day  Secondary:  [address]  at [facility]  [address], [address] [facility] [address], [address] [facility]  [phone]   [phone]        OUTPATIENT OCCUPATIONAL THERAPY: Daily Note 2018    ICD-10: Treatment Diagnosis: Hemiplegia and hemiparesis following cerebral infarction affecting right dominant side (I69.351)  Precautions/Allergies:   Banana; Lisinopril; and Nuts [tree nut]   Fall Risk Score: 5 (? 5 = High Risk)  MD Orders: Referral to occupational therapy MEDICAL/REFERRING DIAGNOSIS:   Cerebral infarction, unspecified [I63.9]  Weakness [R53.1]   DATE OF ONSET: 2017   REFERRING PHYSICIAN: Juliette Poole*  RETURN PHYSICIAN APPOINTMENT: unknown   3/5/18: Ms. Siomara Crocker continues to make excellent progress towards her therapy goals; she demonstrates gradual improvements in R UE range of motion, specifically R shoulder flexion and R elbow extension/flexion, and reports increased functional use of R UE in activities of daily living, including dressing, grooming, and feeding. Patient would continue to benefit from skilled occupational therapy services to maximize safety and independence and increase functional use of R UE during activities of daily living.    18: Ms. Siomara Crocker is making excellent progress toward her goals; she is demonstrating improving range of motion and functional use of the right upper extremity in activities of daily living. Feel she will continue to benefit from skilled occupational therapy to maximize safety and independence with activities of daily living. INITIAL ASSESSMENT:  Ms. Siomara Crocker presents with impaired active movement, strength, coordination and sensation of the dominant right upper extremity that is affecting her ability to complete activities of daily living independently.  Feel she may benefit from skilled occupational therapy to maximize safety and independence with activities of daily living. PLAN OF CARE:   PROBLEM LIST:  1. Decreased Strength  2. Decreased ADL/Functional Activities  3. Decreased Transfer Abilities  4. Decreased Balance  5. Decreased Flexibility/Joint Mobility  6. Decreased Cognition INTERVENTIONS PLANNED:  1. Activities of daily living training  2. Manual therapy training  3. Modalities  4. Neuromuscular re-eduation  5. Sensory reintegration training  6. Splinting  7. Therapeutic activity  8. Therapeutic exercise   TREATMENT PLAN:  Effective Dates: 3/19/18 to 6/17/18. Frequency/Duration: 2 times a week for 12 weeks  GOALS: (Goals have been discussed and agreed upon with patient.)  Short-Term Functional Goals: Time Frame: 6 weeks  1. Patient will demonstrate independence with home exercise program for right upper extremity range of motion. Met  2. Patient will increase use of right upper extremity as a functional assist in at least 25% of daily activities. Met  3. Patient will bathe with moderate assistance. Continue to address  Discharge Goals: Time Frame: 12 weeks  1. Patient will bathe with minimal assistance. Continue to address  2. Patient will feed self with modified independence and adaptive equipment as needed. Continue to address  3. Patient will dress with modified independence and adaptive equipment as needed. Continue to address  4. Patient will use right upper extremity as a functional assist in at least 50% of daily activities. Continue to address  Rehabilitation Potential For Stated Goals: Good  Regarding Dilcia Iraheta's therapy, I certify that the treatment plan above will be carried out by a therapist or under their direction. Thank you for this referral,  En Millan OT     Referring Physician Signature: Juliette Webb* _________________________  Date _________            The information in this section was collected on 3/5/18 (except where otherwise noted).   OCCUPATIONAL PROFILE & HISTORY:   History of Present Injury/Illness (Reason for Referral):  CVA with hospital and [facility] stay then home health therapy. Past Medical History/Comorbidities:   Ms. Kenji Merchant  has a past medical history of Anxiety; CVA (cerebral vascular accident) ([facility] [address] (2017); Depression; HTN (hypertension); Menopause; and PTE (pulmonary thromboembolism) ([facility] [address] (2017). Ms. Kenji Merchant  has a past surgical history that includes hx jennifer and bso (); hx  section; hx refractive surgery (); hx other surgical (); and hx other surgical (2017). Social History/Living Environment:      Prior Level of Function/Work/Activity:  Does seasonal taxes at [employer]  Dominant Side:         RIGHT  Current Medications:    Current Outpatient Prescriptions:     polyethylene glycol (MIRALAX) 17 gram/dose powder, Take 17 g by mouth daily. , Disp: 510 g, Rfl: 2    [START ON 2018] methylphenidate HCl (RITALIN) 20 mg tablet, Take 1 Tab (20 mg total) by mouth daily. Max Daily Amount: 20 mg, Disp: 30 Tab, Rfl: 0    [START ON 2018] methylphenidate HCl (RITALIN) 20 mg tablet, Take 1 Tab (20 mg total) by mouth daily. Max Daily Amount: 20 mg, Disp: 30 Tab, Rfl: 0    methylphenidate HCl (RITALIN) 20 mg tablet, Take 1 Tab (20 mg total) by mouth daily. Max Daily Amount: 20 mg, Disp: 30 Tab, Rfl: 0    rOPINIRole (REQUIP) 2 mg tablet, Take 1 Tab by mouth nightly. for restless leg, Disp: 90 Tab, Rfl: 1    FLUoxetine (PROZAC) 20 mg tablet, Take 2 Tabs by mouth daily. , Disp: 180 Tab, Rfl: 1    tiZANidine (ZANAFLEX) 4 mg tablet, 4mg po TID, Disp: 270 Tab, Rfl: 1    labetalol (NORMODYNE) 200 mg tablet, Take 1 Tab by mouth two (2) times a day., Disp: 180 Tab, Rfl: 1    losartan-hydroCHLOROthiazide (HYZAAR) 100-12.5 mg per tablet, Take 1 Tab by mouth daily. , Disp: 90 Tab, Rfl: 1    ALPRAZolam (XANAX) 0.25 mg tablet, Take 1 Tab by mouth three (3) times daily as needed for Anxiety.  Max Daily Amount: 0.75 mg., Disp: 30 Tab, Rfl: 1  No current facility-administered medications for this encounter. Date Last Reviewed:  4/19/2018   Complexity Level : Expanded review of therapy/medical records (1-2):  MODERATE COMPLEXITY   ASSESSMENT OF OCCUPATIONAL PERFORMANCE:   ROM:                   Balance:                   Coordination:                   Mental Status:                   Vision:                   Activities of Daily Living:           Basic ADLs (From Assessment) Complex ADLs (From Assessment)         Grooming/Bathing/Dressing Activities of Daily Living                                   Sensation:         Light touch absent distal to right elbow     Physical Skills Involved:  1. Range of Motion  2. Balance  3. Sensation  4. Fine Motor Control  5. Gross Motor Control Cognitive Skills Affected (resulting in the inability to perform in a timely and safe manner):  1. Executive Function  2. Sustained Attention  3. Divided Attention  4. Comprehension Psychosocial Skills Affected:  1. None   Number of elements that affect the Plan of Care: 5+:  HIGH COMPLEXITY   CLINICAL DECISION MAKING:   Outcome Measure: Tool Used: [address] [facility] [address] [address] AM-[facility] Daily Activity Outpatient Short Form  Score:  Initial: 18 Most Recent: 33 (Date: 3/5/18 )   Interpretation of Tool:  Represents clinically-significant functional categories (i.e., basic and instrumental activities of daily living, fine motor activities). Score 60 59-55 54-47 46-34 32-21 20-16 15   Modifier CH CI CJ CK CL CM CN     Medical Necessity:   · Patient demonstrates good rehab potential due to higher previous functional level. Reason for Services/Other Comments:  · Patient continues to require skilled intervention due to decreased safety and independence in activities of daily living. Clinical Decision-Making Assessment: Moderately difficult to predict patient's progress at this time due to the extent of her limitations in functional movement and use of dominant right upper extremity.     Assessment process, impact of co-morbidities, assessment modification\need for assistance, and selection of interventions: Analytical Complexity:MODERATE COMPLEXITY   TREATMENT:   (In addition to Assessment/Re-Assessment sessions the following treatments were rendered)    Pre-treatment Symptoms/Complaints:  Patient states, \"I can't tell much difference yet from the Botox, but she said it could take 7 days. \"  Pain: Initial:   Pain Intensity 1: 0/10 Post Session:  0/10     Therapeutic Exercise: ( 35 minutes):  Exercises per grid below to improve dynamic movement of arm - right and hand - right to improve functional lifting, carrying, reaching and grasping. Required moderate visual and verbal cues to promote proper body mechanics. Progressed range, repetitions and complexity of movement as indicated.        Date:   3/21/18 Date:  3/26/18 Date:  3/28/18 Date:  4/4/18 Date:  4/6/18 Date:  4/12/18 Date:  4/16/18 Date:  4/19/18   Activity/Exercise           Shoulder shrugs AROM x 10 reps with mirror for visual feedback AROM x 10 reps with mirror for visual feedback AROM x 10 reps with mirror for visual feedback AROM x 10 reps with mirror for visual feedback AROM x 10 reps with mirror for visual feedback AROM x 10 AROM x 10 reps with mirror for visual feedback AROM x 10   Scapular protraction/retraction AROM  1 x 10 with passive self range into elbow extension at end range    2 x 10 reps with blue theratubing AROM  1 x 10 with passive self range into elbow extension at end range    2 x 10 reps with blue theratubing AROM  1 x 10 with passive self range into elbow extension at end range AROM  1 x 10 with passive self range into elbow extension at end range    2 x 10 reps with blue theratubing AROM  1 x 10 with passive self range into elbow extension at end range AROM  1 x 10 with passive self range into elbow extension at end range AROM  1 x 10 with passive self range into elbow extension at end range AROM  1 x 10 with passive self range into elbow extension at end range   Shoulder abduction           Elbow flexion/extension      A/AAROM x 5 reps A/AAROM x 5 reps SROM x 15 reps seated  PROM x 5 reps in supine   PNF D1/D2 diagonals           Hand helper 20 lbs x 5 reps with assist to extend fingers   20 lbs x 5 reps with assist to extend fingers  25 lbs x 10 reps with assist to extend fingers     Tabletop skateboard           Shoulder flexion/extension      A/AAROM x 10 reps each A/AAROM x 10 reps each SROM x 15 reps in supine   Chest press           UBE Min assist to sustain right  on handle   Level 0  6 mintues Min assist to sustain right  on handle   Level 0  7 mintues Min assist to sustain right  on handle   Level 0  6 mintues Min assist to sustain right  on handle   Level 0  6 mintues Min assist to sustain right  on handle   Level 0  6 mintues Min assist to sustain right  on handle   Level 0  7 mintues Min assist to sustain right  on handle   Level 0  7 mintues Min assist to sustain right  on handle   Level 0  7 mintues   Modified push-ups  Hands on countertop with dycem under right hand  3 x 10 Hands on countertop with dycem under right hand  3 x 10 Hands on countertop with dycem under right hand  3 x 10 Hands on countertop with dycem under right hand  3 x 10   Hands on countertop with dycem under right hand  3 x 10 Hands on countertop with dycem under right hand  3 x 10   Resistive clothespin  Yellow   1 x 5 pinch and release  Yellow   1 x 5 pinch and release  Yellow   1 x 5 pinch and release Yellow   1 x 5 pinch and release    Shoulder arc   10 tabs on small hill; Left hand assisted R hand grasping and releasing small tabs; used R arm independently to pull tabs over  10 tabs on small hill; Left hand assisted R hand grasping and releasing small tabs; used R arm independently to pull tabs over      Wrist flexion/extension        PROM x 5 reps  SROM x 10 reps  with prolonged stretch at end range of extension   Punching bag           Finger flexion/extension        PROM x 5 reps  SROM x 5 reps with prolonged stretch at end range of extension       Access Code: [email or web address]   URL: [email or web address]   Date: 01/19/2018   Prepared by: Lorenza Smith     Exercises   Supine Shoulder Flexion PROM - 10 reps - 2 sets - 3 hold - 1x daily - 5x weekly   Push-Up on Counter - 10 reps - 2 sets - 1x daily - 5x weekly   Seated Shoulder Shrugs - 10 reps - 2 sets - 1x daily - 5x weekly   Seated Scapular Retraction - 10 reps - 2 sets - 1x daily - 5x weekly   Seated Weight Shifting with Arm Support - 10 reps - 2 sets - 1x daily - 5x weekly   Seated Shoulder Flexion Self PROM - 10 reps - 2 sets - 1x daily - 5x weekly   Supine Elbow Flexion Extension AROM - 10 reps - 2 sets - 1x daily - 5x weekly   Seated Elbow Flexion Extension AROM - 10 reps - 2 sets - 1x daily - 5x weekly     Neuromuscular Re-education: ( 10 minutes):  Exercise/activities per grid below to improve balance, coordination, kinesthetic sense and proprioception. Required moderate visual and verbal cues to promote static balance in sitting, promote coordination of right, upper extremity(s) and promote motor control of right, upper extremity(s). Patient completed lateral weight shifts sitting at edge of mat x 10 reps each onto elbow/forearms  with dynamic reach with left hand outside of base of support, using right upper extremity to maintain balance. Attempted lateral weight shifts onto outstretched hands with minimal assist to maintain position of right hand on mat. Treatment/Session Assessment: Pt continues to demo improved lateral weight shifts outside base of support with dynamic reach. Patient demonstrating independence with passive stretching of right upper extremity and agreeable to complete HEP daily. Pt would continue to benefit from skilled OT services to increase functional use of R UE and increase independence with BADLs.   · Response to Treatment: Mrs. Yadira Franz tolerated therapy well today, without any issue. She continues to be very cooperative and motivated during therapy. · Compliance with Program/Exercises: Will assess as treatment progresses. Pt reports compliance with HEP. · Recommendations/Intent for next treatment session: \"Next visit will focus on advancements to more challenging activities and reduction in assistance provided\".     Total Treatment Duration:  OT Patient Time In/Time Out  Time In: 1445  Time Out: Moose Chaudhari OT

## 2018-04-23 ENCOUNTER — HOSPITAL ENCOUNTER (OUTPATIENT)
Dept: PHYSICAL THERAPY | Age: 63
Discharge: HOME | End: 2018-04-23
Attending: PHYSICAL MEDICINE & REHABILITATION
Payer: COMMERCIAL

## 2018-04-23 PROCEDURE — 97110 THERAPEUTIC EXERCISES: CPT

## 2018-04-23 PROCEDURE — 97112 NEUROMUSCULAR REEDUCATION: CPT

## 2018-04-23 NOTE — PROGRESS NOTES
Venkatesh Lloyd  : 1955  Primary: Simón Williamson Ppo  Secondary:  [address]  [facility] [place] [id number] [address] [place] [address],[address], [address] [facility]  [phone]   [phone]        OUTPATIENT OCCUPATIONAL THERAPY: Daily Note 2018    ICD-10: Treatment Diagnosis: Hemiplegia and hemiparesis following cerebral infarction affecting right dominant side (I69.351)  Precautions/Allergies:   Banana; Lisinopril; and Nuts [tree nut]   Fall Risk Score: 5 (? 5 = High Risk)  MD Orders: Referral to occupational therapy MEDICAL/REFERRING DIAGNOSIS:   Cerebral infarction, unspecified [I63.9]  Weakness [R53.1]   DATE OF ONSET: 2017   REFERRING PHYSICIAN: Juliette Newby*  RETURN PHYSICIAN APPOINTMENT: unknown   3/5/18: Ms. Lakisha Quiñonez continues to make excellent progress towards her therapy goals; she demonstrates gradual improvements in R UE range of motion, specifically R shoulder flexion and R elbow extension/flexion, and reports increased functional use of R UE in activities of daily living, including dressing, grooming, and feeding. Patient would continue to benefit from skilled occupational therapy services to maximize safety and independence and increase functional use of R UE during activities of daily living.    18: Ms. Lakisha Quiñonez is making excellent progress toward her goals; she is demonstrating improving range of motion and functional use of the right upper extremity in activities of daily living. Feel she will continue to benefit from skilled occupational therapy to maximize safety and independence with activities of daily living. INITIAL ASSESSMENT:  Ms. Lakisha Quiñonez presents with impaired active movement, strength, coordination and sensation of the dominant right upper extremity that is affecting her ability to complete activities of daily living independently.  Feel she may benefit from skilled occupational therapy to maximize safety and independence with activities of daily living. PLAN OF CARE:   PROBLEM LIST:  1. Decreased Strength  2. Decreased ADL/Functional Activities  3. Decreased Transfer Abilities  4. Decreased Balance  5. Decreased Flexibility/Joint Mobility  6. Decreased Cognition INTERVENTIONS PLANNED:  1. Activities of daily living training  2. Manual therapy training  3. Modalities  4. Neuromuscular re-eduation  5. Sensory reintegration training  6. Splinting  7. Therapeutic activity  8. Therapeutic exercise   TREATMENT PLAN:  Effective Dates: 3/19/18 to 6/17/18. Frequency/Duration: 2 times a week for 12 weeks  GOALS: (Goals have been discussed and agreed upon with patient.)  Short-Term Functional Goals: Time Frame: 6 weeks  1. Patient will demonstrate independence with home exercise program for right upper extremity range of motion. Met  2. Patient will increase use of right upper extremity as a functional assist in at least 25% of daily activities. Met  3. Patient will bathe with moderate assistance. Continue to address  Discharge Goals: Time Frame: 12 weeks  1. Patient will bathe with minimal assistance. Continue to address  2. Patient will feed self with modified independence and adaptive equipment as needed. Continue to address  3. Patient will dress with modified independence and adaptive equipment as needed. Continue to address  4. Patient will use right upper extremity as a functional assist in at least 50% of daily activities. Continue to address  Rehabilitation Potential For Stated Goals: Good  Regarding Araceli Iraheta's therapy, I certify that the treatment plan above will be carried out by a therapist or under their direction. Thank you for this referral,  Glenn Bach OT     Referring Physician Signature: Thornton Goltz, Amy* _________________________  Date _________            The information in this section was collected on 3/5/18 (except where otherwise noted).   OCCUPATIONAL PROFILE & HISTORY:   History of Present Injury/Illness (Reason for Referral):  CVA with hospital and [facility] stay then home health therapy. Past Medical History/Comorbidities:   Ms. Adela Iglesias  has a past medical history of Anxiety; CVA (cerebral vascular accident) ([facility] [address] (2017); Depression; HTN (hypertension); Menopause; and PTE (pulmonary thromboembolism) ([facility] [address] (2017). Ms. Adela Iglesias  has a past surgical history that includes hx jennifer and bso (); hx  section; hx refractive surgery (); hx other surgical (); and hx other surgical (2017). Social History/Living Environment:      Prior Level of Function/Work/Activity:  Does seasonal taxes at [employer]  Dominant Side:         RIGHT  Current Medications:    Current Outpatient Prescriptions:     polyethylene glycol (MIRALAX) 17 gram/dose powder, Take 17 g by mouth daily. , Disp: 510 g, Rfl: 2    [START ON 2018] methylphenidate HCl (RITALIN) 20 mg tablet, Take 1 Tab (20 mg total) by mouth daily. Max Daily Amount: 20 mg, Disp: 30 Tab, Rfl: 0    [START ON 2018] methylphenidate HCl (RITALIN) 20 mg tablet, Take 1 Tab (20 mg total) by mouth daily. Max Daily Amount: 20 mg, Disp: 30 Tab, Rfl: 0    methylphenidate HCl (RITALIN) 20 mg tablet, Take 1 Tab (20 mg total) by mouth daily. Max Daily Amount: 20 mg, Disp: 30 Tab, Rfl: 0    rOPINIRole (REQUIP) 2 mg tablet, Take 1 Tab by mouth nightly. for restless leg, Disp: 90 Tab, Rfl: 1    FLUoxetine (PROZAC) 20 mg tablet, Take 2 Tabs by mouth daily. , Disp: 180 Tab, Rfl: 1    tiZANidine (ZANAFLEX) 4 mg tablet, 4mg po TID, Disp: 270 Tab, Rfl: 1    labetalol (NORMODYNE) 200 mg tablet, Take 1 Tab by mouth two (2) times a day., Disp: 180 Tab, Rfl: 1    losartan-hydroCHLOROthiazide (HYZAAR) 100-12.5 mg per tablet, Take 1 Tab by mouth daily. , Disp: 90 Tab, Rfl: 1    ALPRAZolam (XANAX) 0.25 mg tablet, Take 1 Tab by mouth three (3) times daily as needed for Anxiety.  Max Daily Amount: 0.75 mg., Disp: 30 Tab, Rfl: 1            Date Last Reviewed:  2018   Complexity Level : Expanded review of therapy/medical records (1-2):  MODERATE COMPLEXITY   ASSESSMENT OF OCCUPATIONAL PERFORMANCE:   ROM:                   Balance:                   Coordination:                   Mental Status:                   Vision:                   Activities of Daily Living:           Basic ADLs (From Assessment) Complex ADLs (From Assessment)         Grooming/Bathing/Dressing Activities of Daily Living                                   Sensation:         Light touch absent distal to right elbow     Physical Skills Involved:  1. Range of Motion  2. Balance  3. Sensation  4. Fine Motor Control  5. Gross Motor Control Cognitive Skills Affected (resulting in the inability to perform in a timely and safe manner):  1. Executive Function  2. Sustained Attention  3. Divided Attention  4. Comprehension Psychosocial Skills Affected:  1. None   Number of elements that affect the Plan of Care: 5+:  HIGH COMPLEXITY   CLINICAL DECISION MAKING:   Outcome Measure: Tool Used: [address] [facility] [address] [address] AM-[facility] Daily Activity Outpatient Short Form  Score:  Initial: 18 Most Recent: 33 (Date: 3/5/18 )   Interpretation of Tool:  Represents clinically-significant functional categories (i.e., basic and instrumental activities of daily living, fine motor activities). Score 60 59-55 54-47 46-34 32-21 20-16 15   Modifier CH CI CJ CK CL CM CN     Medical Necessity:   · Patient demonstrates good rehab potential due to higher previous functional level. Reason for Services/Other Comments:  · Patient continues to require skilled intervention due to decreased safety and independence in activities of daily living. Clinical Decision-Making Assessment: Moderately difficult to predict patient's progress at this time due to the extent of her limitations in functional movement and use of dominant right upper extremity.     Assessment process, impact of co-morbidities, assessment modification\need for assistance, and selection of interventions: Analytical Complexity:MODERATE COMPLEXITY   TREATMENT:   (In addition to Assessment/Re-Assessment sessions the following treatments were rendered)    Pre-treatment Symptoms/Complaints:  Patient states, \"I've noticed a few things that have improved since Botox. \"  Pain: Initial:   Pain Intensity 1: 0/10 Post Session:  same     Therapeutic Exercise: ( 35 minutes):  Exercises per grid below to improve dynamic movement of arm - right and hand - right to improve functional lifting, carrying, reaching and grasping. Required moderate visual and verbal cues to promote proper body mechanics. Progressed range, repetitions and complexity of movement as indicated.        Date:  3/28/18 Date:  4/4/18 Date:  4/6/18 Date:  4/12/18 Date:  4/16/18 Date:  4/19/18 Date:  4/23/18 Date   Activity/Exercise           Shoulder shrugs AROM x 10 reps with mirror for visual feedback AROM x 10 reps with mirror for visual feedback AROM x 10 reps with mirror for visual feedback AROM x 10 AROM x 10 reps with mirror for visual feedback AROM x 10 AROM x 10    Scapular protraction/retraction AROM  1 x 10 with passive self range into elbow extension at end range AROM  1 x 10 with passive self range into elbow extension at end range    2 x 10 reps with blue theratubing AROM  1 x 10 with passive self range into elbow extension at end range AROM  1 x 10 with passive self range into elbow extension at end range AROM  1 x 10 with passive self range into elbow extension at end range AROM  1 x 10 with passive self range into elbow extension at end range AROM  1 x 10 with passive self range into elbow extension at end range    Shoulder abduction           Elbow flexion/extension    A/AAROM x 5 reps A/AAROM x 5 reps SROM x 15 reps seated  PROM x 5 reps in supine PROM x 10 reps    PNF D1/D2 diagonals           Hand helper  20 lbs x 5 reps with assist to extend fingers  25 lbs x 10 reps with assist to extend fingers   20 lbs x 10 reps with assist to extend fingers    Tabletop skateboard           Shoulder flexion/extension    A/AAROM x 10 reps each A/AAROM x 10 reps each SROM x 15 reps in supine PROM x 1o reps    Chest press           UBE Min assist to sustain right  on handle   Level 0  6 mintues Min assist to sustain right  on handle   Level 0  6 mintues Min assist to sustain right  on handle   Level 0  6 mintues Min assist to sustain right  on handle   Level 0  7 mintues Min assist to sustain right  on handle   Level 0  7 mintues Min assist to sustain right  on handle   Level 0  7 mintues Min assist to sustain right  on handle   Level 0.5  7 mintues    Modified push-ups  Hands on countertop with dycem under right hand  3 x 10 Hands on countertop with dycem under right hand  3 x 10   Hands on countertop with dycem under right hand  3 x 10 Hands on countertop with dycem under right hand  3 x 10 Hands on countertop with dycem under right hand  3 x 10    Resistive clothespin  Yellow   1 x 5 pinch and release  Yellow   1 x 5 pinch and release Yellow   1 x 5 pinch and release  Yellow   1 x 5 pinch and release    Shoulder arc 10 tabs on small hill; Left hand assisted R hand grasping and releasing small tabs; used R arm independently to pull tabs over  10 tabs on small hill; Left hand assisted R hand grasping and releasing small tabs; used R arm independently to pull tabs over        Wrist flexion/extension      PROM x 5 reps  SROM x 10 reps  with prolonged stretch at end range of extension PROM x 10 reps with prolonged stretch at end range of extension    Punching bag           Finger flexion/extension      PROM x 5 reps   with prolonged stretch at end range of extension PROM x 5 reps   with prolonged stretch at end range of extension        Access Code: [email or web address]   URL: [email or web address]   Date: 01/19/2018   Prepared by: Michael Schultz     Exercises   Supine Shoulder Flexion PROM - 10 reps - 2 sets - 3 hold - 1x daily - 5x weekly   Push-Up on Counter - 10 reps - 2 sets - 1x daily - 5x weekly   Seated Shoulder Shrugs - 10 reps - 2 sets - 1x daily - 5x weekly   Seated Scapular Retraction - 10 reps - 2 sets - 1x daily - 5x weekly   Seated Weight Shifting with Arm Support - 10 reps - 2 sets - 1x daily - 5x weekly   Seated Shoulder Flexion Self PROM - 10 reps - 2 sets - 1x daily - 5x weekly   Supine Elbow Flexion Extension AROM - 10 reps - 2 sets - 1x daily - 5x weekly   Seated Elbow Flexion Extension AROM - 10 reps - 2 sets - 1x daily - 5x weekly     Neuromuscular Re-education: ( 10 minutes):  Exercise/activities per grid below to improve balance, coordination, kinesthetic sense and proprioception. Required moderate visual and verbal cues to promote static balance in sitting, promote coordination of right, upper extremity(s) and promote motor control of right, upper extremity(s). Patient completed lateral weight shifts sitting at edge of mat x 10 reps each onto elbow/forearms  with dynamic reach with left hand outside of base of support, using right upper extremity to maintain balance. Attempted lateral weight shifts onto outstretched hands with minimal assist to maintain position of right hand on mat. Treatment/Session Assessment: Since Botox, pt has demo'd improved improved R shoulder abduction ROM; R elbow also appears to be more relax with pt achieving greater elbow extension. Pt also demo's greater ability to contract and relax R hand/digits during hand strengthening activities. Pt continues to demo improved lateral weight shifts outside base of support with dynamic reach. Patient demonstrating independence with passive stretching of right upper extremity and agreeable to complete HEP daily. Pt would continue to benefit from skilled OT services to increase functional use of R UE and increase independence with BADLs.   · Response to Treatment: Mrs. Licha Shepard tolerated therapy well today, without any issue. She continues to be very cooperative and motivated during therapy. · Compliance with Program/Exercises: Will assess as treatment progresses. Pt reports compliance with HEP. · Recommendations/Intent for next treatment session: \"Next visit will focus on advancements to more challenging activities and reduction in assistance provided\".     Total Treatment Duration:  OT Patient Time In/Time Out  Time In: 1345  Time Out: [id number] [facility]

## 2018-04-23 NOTE — PROGRESS NOTES
Jodie Gottron  : 1955  Primary:   Secondary:  [address] at [facility]  [address], [address], [facility] [address]  [phone]   [phone]          OUTPATIENT PHYSICAL THERAPY:Daily Note and Progress Report 2018    ICD-10: Treatment Diagnosis:   · Other abnormalities of gait and mobility (R26.89)  · Difficulty in walking, not elsewhere classified (R26.2)  Precautions/Allergies:   Banana; Lisinopril; and Nuts [tree nut]   Fall Risk Score: 5 (? 5 = High Risk)  MD Orders: Evaluate and Treat MEDICAL/REFERRING DIAGNOSIS:  Weakness due to cerebrovascular accident (CVA) ([facility] [I63.9, R53.1]   DATE OF ONSET: CVA: 2017  REFERRING PHYSICIAN: Juliette iDaz*  RETURN PHYSICIAN APPOINTMENT: TBD     INITIAL ASSESSMENT:  Ms. Bethany Freire presents with decreased mobility, decreased strength, decreased gait, and decreased balance secondary to CVA. After discussing with patient, she agreed she would benefit from physical therapy to improve above deficits. Please sign this plan of treatment if you concur. Thank you for the opportunity to serve this patient. Progress note on 2018:   Patient has attended thirty-five scheduled physical therapy appointments from 12/15/2017 to 2018. Patient is very motivated and compliant with therapy. Patient received her first Botox injection on 2014. Patient's right ankle is less tight, and right foot is easier to get into her right AFO. Patient also reports less right foot pain with walking. Patient would benefit from continuing skilled physical therapy to address problems and goals. Thank you for this referral.    PROBLEM LIST (Impacting functional limitations):  1. Decreased Strength  2. Decreased Ambulation Ability/Technique  3. Decreased Balance  4. Decreased Activity Tolerance INTERVENTIONS PLANNED:  1. Balance Exercise  2. Gait Training  3. Home Exercise Program (HEP)  4.  Neuromuscular Re-education/Strengthening  5. Range of Motion (ROM)  6. Therapeutic Exercise/Strengthening   TREATMENT PLAN:  Effective Dates: 2/12/2018 TO 5/7/2018. Frequency/Duration: 2-3 times a week for 12 weeks  GOALS: (Goals have been discussed and agreed upon with patient.)  Short-Term Functional Goals: Time Frame: 3 weeks  1. Patient will be independent with home exercise program without exacerbation of symptoms or cueing needed. Goal met. Discharge Goals: Time Frame: 8 weeks  1. Patient will be independent with all ADLs with minimal fear of falling and loss of balance with daily tasks. Goal ongoing. 2. Patient will report no fear avoidance with social or recreational activities due to fear of falling. Goal ongoing. 3. Patient will score greater than or equal to 45/56 on Gipson Balance Scale with minimal effect of imbalance on patient's ability to manage every day life activities. Goal ongoing. Rehabilitation Potential For Stated Goals: Good  Regarding Heavenly Iraheta's therapy, I certify that the treatment plan above will be carried out by a therapist or under their direction. Thank you for this referral,  Beba Sanders PT     Referring Physician Signature: Juliette Randhawa*              Date                    The information in this section was collected on 12/15/2017 (except where otherwise noted). HISTORY:   History of Present Injury/Illness (Reason for Referral):  Patient reports she had a severe stroke on 9/6/2017. She reports that she was in the hospital and [facility] for about 61-62 days. She reports that she has progressed really well, but is still having trouble walking and using her right side (UE and LE). She reports that she walks using her AFO and quad cane all the time. She reports that she tries to be as active as possible. She reports that she is scared that she is going to fall, even though she has not.   She reports she would like to work on strengthening her right leg so her balance is better and she can walk without fear of falling. Past Medical History/Comorbidities:   Ms. Kalpesh Valdez  has a past medical history of Anxiety; CVA (cerebral vascular accident) ([facility] (2017); Depression; HTN (hypertension); Menopause; and PTE [facility]ulmonary thromboembolism) [facility] ([facility]). Ms. Kalpesh Valdez  has a past surgical history that includes hx jennifer and bso (); hx  section; hx refractive surgery (); hx other surgical (); and hx other surgical (). Social History/Living Environment:   Home Environment: Private residence  Living Alone: No  Support Systems: Family member(s), Friends \ neighbors; Spouse  Prior Level of Function/Work/Activity:  Independent  Dominant Side:         RIGHT  Personal Factors:          Sex:  female        Age:  60 y.o. Current Medications:       Current Outpatient Prescriptions:     polyethylene glycol (MIRALAX) 17 gram/dose powder, Take 17 g by mouth daily. , Disp: 510 g, Rfl: 2    [START ON 2018] methylphenidate HCl (RITALIN) 20 mg tablet, Take 1 Tab (20 mg total) by mouth daily. Max Daily Amount: 20 mg, Disp: 30 Tab, Rfl: 0    [START ON 2018] methylphenidate HCl (RITALIN) 20 mg tablet, Take 1 Tab (20 mg total) by mouth daily. Max Daily Amount: 20 mg, Disp: 30 Tab, Rfl: 0    methylphenidate HCl (RITALIN) 20 mg tablet, Take 1 Tab (20 mg total) by mouth daily. Max Daily Amount: 20 mg, Disp: 30 Tab, Rfl: 0    rOPINIRole (REQUIP) 2 mg tablet, Take 1 Tab by mouth nightly. for restless leg, Disp: 90 Tab, Rfl: 1    FLUoxetine (PROZAC) 20 mg tablet, Take 2 Tabs by mouth daily. , Disp: 180 Tab, Rfl: 1    tiZANidine (ZANAFLEX) 4 mg tablet, 4mg po TID, Disp: 270 Tab, Rfl: 1    labetalol (NORMODYNE) 200 mg tablet, Take 1 Tab by mouth two (2) times a day., Disp: 180 Tab, Rfl: 1    losartan-hydroCHLOROthiazide (HYZAAR) 100-12.5 mg per tablet, Take 1 Tab by mouth daily. , Disp: 90 Tab, Rfl: 1    ALPRAZolam (XANAX) 0.25 mg tablet, Take 1 Tab by mouth three (3) times daily as needed for Anxiety. Max Daily Amount: 0.75 mg., Disp: 30 Tab, Rfl: 1   Date Last Reviewed:  4/23/2018    Number of Personal Factors/Comorbidities that affect the Plan of Care: 1-2: MODERATE COMPLEXITY   EXAMINATION:   Observation/Orthostatic Postural Assessment:          Posture Assessment: Rounded shoulders, Forward head   Functional Mobility:         Gait/Ambulation:     Speed/Lulu: Pace decreased (<100 feet/min)  Step Length: Left shortened, Right lengthened  Swing Pattern: Left asymmetrical, Right asymmetrical  Stance: Left increased, Right decreased  Gait Abnormalities: Antalgic, Circumduction, Foot drop, Path deviations, Trendelenburg  Ambulation - Level of Assistance: Modified independent  Assistive Device: Cane, quad  · Interventions: Verbal cues, Safety awareness training          Transfers:     Sit to Stand: Independent  Stand to Sit: Independent  Stand Pivot Transfers: Independent  · Bed to Chair: Independent          Bed Mobility:     Rolling: Independent  Supine to Sit: Independent  Sit to Supine: Independent  · Scooting: Independent    Strength:          L UE STRENGTH: 4/5 shoulder flexion, 4/5 shoulder abduction, 4/5 shoulder extension, 4/5 elbow flexion, 4/5 elbow extension. R UE STRENGTH: 2/5 shoulder flexion, 2/5 shoulder abduction, 2/5 shoulder extension, 2/5 elbow flexion, 2/5 elbow extension. R LE STRENGTH:  4+/5 hip flexion, 4+/5 hip abduction, 4+/5 hip adduction, 4+/5 hip extension, 4+/5 knee extension, 4+/5 knee flexion, 1/5 ankle dorsiflexion, 1/5 ankle plantarflexion, 1/5 ankle inversion, 1/5 ankle eversion. Sensation:         Intact for light touch and proprioception  Postural Control & Balance:  · Gipson Balance Scale:  33/56.   (A score less than 45/56 indicates high risk of falls)  . Score improved from 28/56 on initial evaluation. Body Structures Involved:  1. Nerves  2. Muscles Body Functions Affected:  1. Neuromusculoskeletal  2.  Movement Related Activities and Participation Affected:  1. Mobility  2. Self Care   Number of elements (examined above) that affect the Plan of Care: 4+: HIGH COMPLEXITY   CLINICAL PRESENTATION:   Presentation: Evolving clinical presentation with changing clinical characteristics: MODERATE COMPLEXITY   CLINICAL DECISION MAKING:   Outcome Measure: Tool Used: Gipson Balance Scale  Score:  Initial: 28/56 Most Recent: 33/56 (Date: 3- )   Interpretation of Score: Each section is scored on a 0-4 scale, 0 representing the patients inability to perform the task and 4 representing independence. The scores of each section are added together for a total score of 56. The higher the patients score, the more independent the patient is. Any score below 45 indicates increased risk for falls. Score 56 55-45 44-34 33-23 22-12 11-1 0   Modifier CH CI CJ CK CL CM CN     Medical Necessity:   · Patient is expected to demonstrate progress in strength, range of motion, balance and coordination to improve safety during daily activities. · Patient demonstrates good rehab potential due to higher previous functional level. · Skilled intervention continues to be required due to decreased mobility. Reason for Services/Other Comments:  · Patient continues to demonstrate capacity to improve overall mobility which will increase independence and increase safety. Use of outcome tool(s) and clinical judgement create a POC that gives a: Questionable prediction of patient's progress: MODERATE COMPLEXITY            TREATMENT:   (In addition to Assessment/Re-Assessment sessions the following treatments were rendered)  Pre-treatment Symptoms/Complaints:  4/23/2018:\"Still stretching 2-3 times a day at home. \"  Pain: Initial: Pain Intensity 1: 0  Post Session:  0/10     NEUROMUSCULAR RE-EDUCATION: (15 minutes):  Exercise/activities per grid below to improve balance, coordination, kinesthetic sense, posture and proprioception.   Required minimal verbal and manual cues to promote static and dynamic balance in standing, promote coordination of bilateral, lower extremity(s) and promote motor control of bilateral, lower extremity(s). Date   4/23/18 Date   4/16/18 Date   4/19/18   Activity/Exercise      Stepping over half foam  2x10 reps B LE 2x10 reps B LE 2x10 reps B LE   Step taps      Step ups 6 inch  2 x10 leading with R LE  6 inch  2 x10 leading with R LE  6 inch  2 x10 leading with R LE    [place]      Walking in the clinic with quad cane About 100 feet with quad cane, working on weightbearing more on R foot through midstance and terminal stance, and stepping bigger with L LE; added working on shifting R hips forward over R foot better midstance to terminal stance About 100 feet with quad cane, working on weightbearing more on R foot through midstance and terminal stance, and stepping bigger with L LE; added working on shifting R hips forward over R foot better midstance to terminal stance About 100 feet with quad cane, working on weightbearing more on R foot through midstance and terminal stance, and stepping bigger with L LE; added working on shifting R hips forward over R foot better midstance to terminal stance   pregait activity      Walking backwards with rail 5 feet x 5 working on R hip extension AROM     Standing weight shifting onto R LE  PT bracing R ankle position                               THERAPEUTIC EXERCISE: (30 minutes):  Exercises per grid below to improve mobility and strength. Required minimal verbal cues to promote proper body alignment. Progressed repetitions as indicated.    Date  4/23/18 Date  4/16/18 Date   4/19/18   Activity/Exercise      Sit to stand   From mat, PT bracing R foot/ankle, x 20 reps    Left sidelying alternating isometrics on pelvis      Standing hip abduction      Seated LAQs      Standing hamstrings curls       Bridging 2x10 with right lower 2x10 with right lower 2x10 with right lower   Supine straight leg raise 2x10 with right lower extremity working on keeping foot/hip in neutral position 2x10 with right lower extremity working on keeping foot/hip in neutral position 2x10 with right lower extremity working on keeping foot/hip in neutral position   Supine PNF D1 and D2 patterns x 15 reps each R LE D1 and D2 patterns x 15 reps each R LE D1 and D2 patterns x 15 reps each R LE   L sidelying R pelvis and trunk PNF      Left sidelying hip abduction      L sidelying: picking up and moving R LE from in front of L foot to behind L foot (working on hip abd and ext) and stabilizing trunk 2x10 reps, working on lifting higher and keeping foot in neutral position 2x10 reps, working on lifting higher and keeping foot in neutral position 2x10 reps, working on lifting higher and keeping foot in neutral position   L sidelying R clam shells 2x10 reps, working on lifting higher 2x10 reps, working on lifting higher 2x10 reps, working on lifting higher   L sidelying R hip abduction X With cuing to keep leg in alignment with body, 2 x 10 reps With cuing to keep leg in alignment with body, 2 x 10 reps   R foot and gastrocsoleus stretching X 15 minutes trying to get foot to neutral position X 8 minutes trying to get foot to neutral position X 20 minutes trying to get foot to neutral position   Supine hooklying lower body rotation X X 20 reps keeping knees together. Standing minisquats in bars      [facility] Portal  Treatment/Session Assessment:    · Response to Treatment:  Patient tolerated treatment without issues. Patient reports less right foot pain during ambulation. Patient able to place her foot better during step overs and step taps. Right foot is easier to get into right AFO after stretching due to less muscle tone. Continue to progress to tolerance. · Compliance with Program/Exercises: Compliant. · Recommendations/Intent for next treatment session:  \"Next visit will focus on advancements to more challenging activities\".    Total Treatment Duration:  PT Patient Time In/Time Out  Time In: 1300  Time Out: [address]

## 2018-04-26 ENCOUNTER — HOSPITAL ENCOUNTER (OUTPATIENT)
Dept: PHYSICAL THERAPY | Age: 63
Discharge: HOME | End: 2018-04-26
Attending: PHYSICAL MEDICINE & REHABILITATION
Payer: COMMERCIAL

## 2018-04-26 PROCEDURE — 97112 NEUROMUSCULAR REEDUCATION: CPT

## 2018-04-26 PROCEDURE — 97110 THERAPEUTIC EXERCISES: CPT

## 2018-04-26 NOTE — PROGRESS NOTES
Christopher Loya  : 1955  Primary:   Secondary:  [address] at [place] [id number] [facility], [address] [place] [address],[address], [facility] [address]  [phone]   [phone]          OUTPATIENT PHYSICAL THERAPY:Daily Note 2018    ICD-10: Treatment Diagnosis:   · Other abnormalities of gait and mobility (R26.89)  · Difficulty in walking, not elsewhere classified (R26.2)  Precautions/Allergies:   Banana; Lisinopril; and Nuts [tree nut]   Fall Risk Score: 5 (? 5 = High Risk)  MD Orders: Evaluate and Treat MEDICAL/REFERRING DIAGNOSIS:  Weakness due to cerebrovascular accident ([facility]) ([facility] [I63.9, R53.1]   DATE OF ONSET: CVA: 2017  REFERRING PHYSICIAN: Juliette Godfrey*  RETURN PHYSICIAN APPOINTMENT: TBD     INITIAL ASSESSMENT:  Ms. Cruz Castellon presents with decreased mobility, decreased strength, decreased gait, and decreased balance secondary to CVA. After discussing with patient, she agreed she would benefit from physical therapy to improve above deficits. Please sign this plan of treatment if you concur. Thank you for the opportunity to serve this patient. Progress note on 2018:   Patient has attended thirty-five scheduled physical therapy appointments from 12/15/2017 to 2018. Patient is very motivated and compliant with therapy. Patient received her first Botox injection on 2014. Patient's right ankle is less tight, and right foot is easier to get into her right AFO. Patient also reports less right foot pain with walking. Patient would benefit from continuing skilled physical therapy to address problems and goals. Thank you for this referral.    PROBLEM LIST (Impacting functional limitations):  1. Decreased Strength  2. Decreased Ambulation Ability/Technique  3. Decreased Balance  4. Decreased Activity Tolerance INTERVENTIONS PLANNED:  1. Balance Exercise  2. Gait Training  3. Home Exercise Program (HEP)  4. Neuromuscular Re-education/Strengthening  5.  Range of Motion (ROM)  6. Therapeutic Exercise/Strengthening   TREATMENT PLAN:  Effective Dates: 2/12/2018 TO 5/7/2018. Frequency/Duration: 2-3 times a week for 12 weeks  GOALS: (Goals have been discussed and agreed upon with patient.)  Short-Term Functional Goals: Time Frame: 3 weeks  1. Patient will be independent with home exercise program without exacerbation of symptoms or cueing needed. Goal met. Discharge Goals: Time Frame: 8 weeks  1. Patient will be independent with all ADLs with minimal fear of falling and loss of balance with daily tasks. Goal ongoing. 2. Patient will report no fear avoidance with social or recreational activities due to fear of falling. Goal ongoing. 3. Patient will score greater than or equal to 45/56 on Gipson Balance Scale with minimal effect of imbalance on patient's ability to manage every day life activities. Goal ongoing. Rehabilitation Potential For Stated Goals: Good  Regarding Araceli Iraheta's therapy, I certify that the treatment plan above will be carried out by a therapist or under their direction. Thank you for this referral,  Pio Nelson PT     Referring Physician Signature: Thornton Goltz, Amy*              Date                    The information in this section was collected on 12/15/2017 (except where otherwise noted). HISTORY:   History of Present Injury/Illness (Reason for Referral):  Patient reports she had a severe stroke on 9/6/2017. She reports that she was in the hospital and [facility] for about 61-62 days. She reports that she has progressed really well, but is still having trouble walking and using her right side (UE and LE). She reports that she walks using her AFO and quad cane all the time. She reports that she tries to be as active as possible. She reports that she is scared that she is going to fall, even though she has not.   She reports she would like to work on strengthening her right leg so her balance is better and she can walk without fear of falling. Past Medical History/Comorbidities:   Ms. Catia Ellington  has a past medical history of Anxiety; CVA (cerebral vascular accident) ([facility] (2017); Depression; HTN (hypertension); Menopause; and PTE (pulmonary thromboembolism) ([facility] (2017). Ms. Catia Ellington  has a past surgical history that includes hx jennifer and bso (); hx  section; hx refractive surgery (); hx other surgical (); and hx other surgical (). Social History/Living Environment:   Home Environment: Private residence  Living Alone: No  Support Systems: Family member(s), Friends \ neighbors; Spouse  Prior Level of Function/Work/Activity:  Independent  Dominant Side:         RIGHT  Personal Factors:          Sex:  female        Age:  60 y.o. Current Medications:       Current Outpatient Prescriptions:     polyethylene glycol (MIRALAX) 17 gram/dose powder, Take 17 g by mouth daily. , Disp: 510 g, Rfl: 2    [START ON 2018] methylphenidate HCl (RITALIN) 20 mg tablet, Take 1 Tab (20 mg total) by mouth daily. Max Daily Amount: 20 mg, Disp: 30 Tab, Rfl: 0    methylphenidate HCl (RITALIN) 20 mg tablet, Take 1 Tab (20 mg total) by mouth daily. Max Daily Amount: 20 mg, Disp: 30 Tab, Rfl: 0    methylphenidate HCl (RITALIN) 20 mg tablet, Take 1 Tab (20 mg total) by mouth daily. Max Daily Amount: 20 mg, Disp: 30 Tab, Rfl: 0    rOPINIRole (REQUIP) 2 mg tablet, Take 1 Tab by mouth nightly. for restless leg, Disp: 90 Tab, Rfl: 1    FLUoxetine (PROZAC) 20 mg tablet, Take 2 Tabs by mouth daily. , Disp: 180 Tab, Rfl: 1    tiZANidine (ZANAFLEX) 4 mg tablet, 4mg po TID, Disp: 270 Tab, Rfl: 1    labetalol (NORMODYNE) 200 mg tablet, Take 1 Tab by mouth two (2) times a day., Disp: 180 Tab, Rfl: 1    losartan-hydroCHLOROthiazide (HYZAAR) 100-12.5 mg per tablet, Take 1 Tab by mouth daily. , Disp: 90 Tab, Rfl: 1    ALPRAZolam (XANAX) 0.25 mg tablet, Take 1 Tab by mouth three (3) times daily as needed for Anxiety.  Max Daily Amount: 0.75 mg., Disp: 30 Tab, Rfl: 1   Date Last Reviewed:  4/26/2018    Number of Personal Factors/Comorbidities that affect the Plan of Care: 1-2: MODERATE COMPLEXITY   EXAMINATION:   Observation/Orthostatic Postural Assessment:          Posture Assessment: Rounded shoulders, Forward head   Functional Mobility:         Gait/Ambulation:     Speed/Lulu: Pace decreased (<100 feet/min)  Step Length: Left shortened, Right lengthened  Swing Pattern: Left asymmetrical, Right asymmetrical  Stance: Left increased, Right decreased  Gait Abnormalities: Antalgic, Circumduction, Foot drop, Path deviations, Trendelenburg  Ambulation - Level of Assistance: Modified independent  Assistive Device: Cane, quad  · Interventions: Verbal cues, Safety awareness training          Transfers:     Sit to Stand: Independent  Stand to Sit: Independent  Stand Pivot Transfers: Independent  · Bed to Chair: Independent          Bed Mobility:     Rolling: Independent  Supine to Sit: Independent  Sit to Supine: Independent  · Scooting: Independent    Strength:          L UE STRENGTH: 4/5 shoulder flexion, 4/5 shoulder abduction, 4/5 shoulder extension, 4/5 elbow flexion, 4/5 elbow extension. R UE STRENGTH: 2/5 shoulder flexion, 2/5 shoulder abduction, 2/5 shoulder extension, 2/5 elbow flexion, 2/5 elbow extension. R LE STRENGTH:  4+/5 hip flexion, 4+/5 hip abduction, 4+/5 hip adduction, 4+/5 hip extension, 4+/5 knee extension, 4+/5 knee flexion, 1/5 ankle dorsiflexion, 1/5 ankle plantarflexion, 1/5 ankle inversion, 1/5 ankle eversion. Sensation:         Intact for light touch and proprioception  Postural Control & Balance:  · Gipson Balance Scale:  33/56.   (A score less than 45/56 indicates high risk of falls)  . Score improved from 28/56 on initial evaluation. Body Structures Involved:  1. Nerves  2. Muscles Body Functions Affected:  1. Neuromusculoskeletal  2. Movement Related Activities and Participation Affected:  1. Mobility  2.  Self Care   Number of elements (examined above) that affect the Plan of Care: 4+: HIGH COMPLEXITY   CLINICAL PRESENTATION:   Presentation: Evolving clinical presentation with changing clinical characteristics: MODERATE COMPLEXITY   CLINICAL DECISION MAKING:   Outcome Measure: Tool Used: Gipson Balance Scale  Score:  Initial: 28/56 Most Recent: 33/56 (Date: 3- )   Interpretation of Score: Each section is scored on a 0-4 scale, 0 representing the patients inability to perform the task and 4 representing independence. The scores of each section are added together for a total score of 56. The higher the patients score, the more independent the patient is. Any score below 45 indicates increased risk for falls. Score 56 55-45 44-34 33-23 22-12 11-1 0   Modifier CH CI CJ CK CL CM CN     Medical Necessity:   · Patient is expected to demonstrate progress in strength, range of motion, balance and coordination to improve safety during daily activities. · Patient demonstrates good rehab potential due to higher previous functional level. · Skilled intervention continues to be required due to decreased mobility. Reason for Services/Other Comments:  · Patient continues to demonstrate capacity to improve overall mobility which will increase independence and increase safety. Use of outcome tool(s) and clinical judgement create a POC that gives a: Questionable prediction of patient's progress: MODERATE COMPLEXITY            TREATMENT:   (In addition to Assessment/Re-Assessment sessions the following treatments were rendered)  Pre-treatment Symptoms/Complaints:  4/26/2018:\"Doing well. No problems\"  Pain: Initial: Pain Intensity 1: 0  Post Session:  0/10     NEUROMUSCULAR RE-EDUCATION: (15 minutes):  Exercise/activities per grid below to improve balance, coordination, kinesthetic sense, posture and proprioception.   Required minimal verbal and manual cues to promote static and dynamic balance in standing, promote coordination of bilateral, lower extremity(s) and promote motor control of bilateral, lower extremity(s). Date   4/23/18 Date   4/26/18   Activity/Exercise     Stepping over half foam  2x10 reps B LE 2x10 reps B LE   Step taps     Step ups 6 inch  2 x10 leading with R LE  6 inch  2 x10 leading with R LE    [place]     Walking in the clinic with quad cane About 100 feet with quad cane, working on weightbearing more on R foot through midstance and terminal stance, and stepping bigger with L LE; added working on shifting R hips forward over R foot better midstance to terminal stance About 100 feet with quad cane, working on weightbearing more on R foot through midstance and terminal stance, and stepping bigger with L LE; added working on shifting R hips forward over R foot better midstance to terminal stance   pregait activity     Walking backwards with rail 5 feet x 5 working on R hip extension AROM 5 feet x 5 working on R hip extension AROM   Standing weight shifting onto R LE                            THERAPEUTIC EXERCISE: (30 minutes):  Exercises per grid below to improve mobility and strength. Required minimal verbal cues to promote proper body alignment. Progressed repetitions as indicated.    Date  4/23/18 Date   4/26/18   Activity/Exercise     Sit to stand      Left sidelying alternating isometrics on pelvis     Standing hip abduction     Seated LAQs     Standing hamstrings curls      Bridging 2x10 with right lower 2x10 with right lower   Supine straight leg raise 2x10 with right lower extremity working on keeping foot/hip in neutral position 2x10 with right lower extremity working on keeping foot/hip in neutral position   Supine PNF D1 and D2 patterns x 15 reps each R LE D1 and D2 patterns x 15 reps each R LE   L sidelying R pelvis and trunk PNF     Left sidelying hip abduction     L sidelying: picking up and moving R LE from in front of L foot to behind L foot (working on hip abd and ext) and stabilizing trunk 2x10 reps, working on lifting higher and keeping foot in neutral position 2x10 reps, working on lifting higher and keeping foot in neutral position   L sidelying R clam shells 2x10 reps, working on lifting higher 2x10 reps, working on lifting higher   L sidelying R hip abduction X With cuing to keep leg in alignment with body, 2 x 10 reps   R foot and gastrocsoleus stretching X 15 minutes trying to get foot to neutral position X 20 minutes trying to get foot to neutral position   Supine hooklying lower body rotation X    Standing minisquats in bars     [facility] Portal  Treatment/Session Assessment:    · Response to Treatment:  Patient tolerated treatment well. She still inverts R foot with weightbearing, but less than before botox. Continue to progress to tolerance. · Compliance with Program/Exercises: Compliant. · Recommendations/Intent for next treatment session: \"Next visit will focus on advancements to more challenging activities\".    Total Treatment Duration:  PT Patient Time In/Time Out  Time In: 1400  Time Out: 15[facility]

## 2018-04-26 NOTE — PROGRESS NOTES
Harlan Walker  : 1955  Primary: [facility]  Secondary:  [address] at [place] [address] [place] [address]  [phone]   [phone]        OUTPATIENT OCCUPATIONAL THERAPY: Daily Note 2018    ICD-10: Treatment Diagnosis: Hemiplegia and hemiparesis following cerebral infarction affecting right dominant side (I69.351)  Precautions/Allergies:   Banana; Lisinopril; and Nuts [tree nut]   Fall Risk Score: 5 (? 5 = High Risk)  MD Orders: Referral to occupational therapy MEDICAL/REFERRING DIAGNOSIS:   Cerebral infarction, unspecified [I63.9]  Weakness [R53.1]   DATE OF ONSET: 2017   REFERRING PHYSICIAN: Juliette Santana*  RETURN PHYSICIAN APPOINTMENT: unknown   3/5/18: Ms. Kalpesh Valdez continues to make excellent progress towards her therapy goals; she demonstrates gradual improvements in R UE range of motion, specifically R shoulder flexion and R elbow extension/flexion, and reports increased functional use of R UE in activities of daily living, including dressing, grooming, and feeding. Patient would continue to benefit from skilled occupational therapy services to maximize safety and independence and increase functional use of R UE during activities of daily living.    18: Ms. Kalpesh Valdez is making excellent progress toward her goals; she is demonstrating improving range of motion and functional use of the right upper extremity in activities of daily living. Feel she will continue to benefit from skilled occupational therapy to maximize safety and independence with activities of daily living. INITIAL ASSESSMENT:  Ms. Kalpesh Valdez presents with impaired active movement, strength, coordination and sensation of the dominant right upper extremity that is affecting her ability to complete activities of daily living independently.  Feel she may benefit from skilled occupational therapy to maximize safety and independence with activities of daily living. PLAN OF CARE:   PROBLEM LIST:  1. Decreased Strength  2. Decreased ADL/Functional Activities  3. Decreased Transfer Abilities  4. Decreased Balance  5. Decreased Flexibility/Joint Mobility  6. Decreased Cognition INTERVENTIONS PLANNED:  1. Activities of daily living training  2. Manual therapy training  3. Modalities  4. Neuromuscular re-eduation  5. Sensory reintegration training  6. Splinting  7. Therapeutic activity  8. Therapeutic exercise   TREATMENT PLAN:  Effective Dates: 3/19/18 to 6/17/18. Frequency/Duration: 2 times a week for 12 weeks  GOALS: (Goals have been discussed and agreed upon with patient.)  Short-Term Functional Goals: Time Frame: 6 weeks  1. Patient will demonstrate independence with home exercise program for right upper extremity range of motion. Met  2. Patient will increase use of right upper extremity as a functional assist in at least 25% of daily activities. Met  3. Patient will bathe with moderate assistance. Continue to address  Discharge Goals: Time Frame: 12 weeks  1. Patient will bathe with minimal assistance. Continue to address  2. Patient will feed self with modified independence and adaptive equipment as needed. Continue to address  3. Patient will dress with modified independence and adaptive equipment as needed. Continue to address  4. Patient will use right upper extremity as a functional assist in at least 50% of daily activities. Continue to address  Rehabilitation Potential For Stated Goals: Good  Regarding Sandra Iraheta's therapy, I certify that the treatment plan above will be carried out by a therapist or under their direction. Thank you for this referral,  Benito Giles, OT     Referring Physician Signature: Juliette Hatfield* _________________________  Date _________            The information in this section was collected on 3/5/18 (except where otherwise noted).   OCCUPATIONAL PROFILE & HISTORY:   History of Present Injury/Illness (Reason for Referral):  CVA with hospital and [facility] stay then home health therapy. Past Medical History/Comorbidities:   Ms. Etienne Humphries  has a past medical history of Anxiety; CVA (cerebral vascular accident) ([facility] [address] (2017); Depression; HTN (hypertension); Menopause; and PTE (pulmonary thromboembolism) ([facility] [address] (2017). Ms. Etienne Humphries  has a past surgical history that includes hx jennifer and bso (); hx  section; hx refractive surgery (); hx other surgical (); and hx other surgical (). Social History/Living Environment:      Prior Level of Function/Work/Activity:  Does seasonal taxes at [employer]  Dominant Side:         RIGHT  Current Medications:    Current Outpatient Prescriptions:     polyethylene glycol (MIRALAX) 17 gram/dose powder, Take 17 g by mouth daily. , Disp: 510 g, Rfl: 2    [START ON 2018] methylphenidate HCl (RITALIN) 20 mg tablet, Take 1 Tab (20 mg total) by mouth daily. Max Daily Amount: 20 mg, Disp: 30 Tab, Rfl: 0    methylphenidate HCl (RITALIN) 20 mg tablet, Take 1 Tab (20 mg total) by mouth daily. Max Daily Amount: 20 mg, Disp: 30 Tab, Rfl: 0    methylphenidate HCl (RITALIN) 20 mg tablet, Take 1 Tab (20 mg total) by mouth daily. Max Daily Amount: 20 mg, Disp: 30 Tab, Rfl: 0    rOPINIRole (REQUIP) 2 mg tablet, Take 1 Tab by mouth nightly. for restless leg, Disp: 90 Tab, Rfl: 1    FLUoxetine (PROZAC) 20 mg tablet, Take 2 Tabs by mouth daily. , Disp: 180 Tab, Rfl: 1    tiZANidine (ZANAFLEX) 4 mg tablet, 4mg po TID, Disp: 270 Tab, Rfl: 1    labetalol (NORMODYNE) 200 mg tablet, Take 1 Tab by mouth two (2) times a day., Disp: 180 Tab, Rfl: 1    losartan-hydroCHLOROthiazide (HYZAAR) 100-12.5 mg per tablet, Take 1 Tab by mouth daily. , Disp: 90 Tab, Rfl: 1    ALPRAZolam (XANAX) 0.25 mg tablet, Take 1 Tab by mouth three (3) times daily as needed for Anxiety.  Max Daily Amount: 0.75 mg., Disp: 30 Tab, Rfl: 1            Date Last Reviewed:  2018   Complexity Level : Expanded review of therapy/medical records (1-2):  MODERATE COMPLEXITY   ASSESSMENT OF OCCUPATIONAL PERFORMANCE:   ROM:                   Balance:                   Coordination:                   Mental Status:                   Vision:                   Activities of Daily Living:           Basic ADLs (From Assessment) Complex ADLs (From Assessment)         Grooming/Bathing/Dressing Activities of Daily Living                                   Sensation:         Light touch absent distal to right elbow     Physical Skills Involved:  1. Range of Motion  2. Balance  3. Sensation  4. Fine Motor Control  5. Gross Motor Control Cognitive Skills Affected (resulting in the inability to perform in a timely and safe manner):  1. Executive Function  2. Sustained Attention  3. Divided Attention  4. Comprehension Psychosocial Skills Affected:  1. None   Number of elements that affect the Plan of Care: 5+:  HIGH COMPLEXITY   CLINICAL DECISION MAKING:   Outcome Measure: Tool Used: [address] [facility] [address] [address] [facility] Daily Activity Outpatient Short Form  Score:  Initial: 18 Most Recent: 33 (Date: 3/5/18 )   Interpretation of Tool:  Represents clinically-significant functional categories (i.e., basic and instrumental activities of daily living, fine motor activities). Score 60 59-55 54-47 46-34 32-21 20-16 15   Modifier CH CI CJ CK CL CM CN     Medical Necessity:   · Patient demonstrates good rehab potential due to higher previous functional level. Reason for Services/Other Comments:  · Patient continues to require skilled intervention due to decreased safety and independence in activities of daily living. Clinical Decision-Making Assessment: Moderately difficult to predict patient's progress at this time due to the extent of her limitations in functional movement and use of dominant right upper extremity.     Assessment process, impact of co-morbidities, assessment modification\need for assistance, and selection of interventions: Analytical Complexity:MODERATE COMPLEXITY   TREATMENT:   (In addition to Assessment/Re-Assessment sessions the following treatments were rendered)    Pre-treatment Symptoms/Complaints:  Patient states, \"I do need to work on using my right hand more at home. \"  Pain: Initial:   Pain Intensity 1: 0/10 Post Session:  same     Therapeutic Exercise: ( 35 minutes):  Exercises per grid below to improve dynamic movement of arm - right and hand - right to improve functional lifting, carrying, reaching and grasping. Required moderate visual and verbal cues to promote proper body mechanics. Progressed range, repetitions and complexity of movement as indicated.        Date:  3/28/18 Date:  4/4/18 Date:  4/6/18 Date:  4/12/18 Date:  4/16/18 Date:  4/19/18 Date:  4/23/18 Date:  4/26/18   Activity/Exercise           Shoulder shrugs AROM x 10 reps with mirror for visual feedback AROM x 10 reps with mirror for visual feedback AROM x 10 reps with mirror for visual feedback AROM x 10 AROM x 10 reps with mirror for visual feedback AROM x 10 AROM x 10 AROM x 10   Scapular protraction/retraction AROM  1 x 10 with passive self range into elbow extension at end range AROM  1 x 10 with passive self range into elbow extension at end range    2 x 10 reps with blue theratubing AROM  1 x 10 with passive self range into elbow extension at end range AROM  1 x 10 with passive self range into elbow extension at end range AROM  1 x 10 with passive self range into elbow extension at end range AROM  1 x 10 with passive self range into elbow extension at end range AROM  1 x 10 with passive self range into elbow extension at end range AROM  1 x 10 with passive self range into elbow extension at end range   Shoulder abduction           Elbow flexion/extension    A/AAROM x 5 reps A/AAROM x 5 reps SROM x 15 reps seated  PROM x 5 reps in supine PROM x 10 reps SROM x 15 reps seated   PNF D1/D2 diagonals           Hand helper  20 lbs x 5 reps with assist to extend fingers  25 lbs x 10 reps with assist to extend fingers   20 lbs x 10 reps with assist to extend fingers 20 lbs x 10 reps with assist to extend fingers   Tabletop skateboard           Shoulder flexion/extension    A/AAROM x 10 reps each A/AAROM x 10 reps each SROM x 15 reps in supine PROM x 1o reps SROM x 15 reps in supine   Chest press           UBE Min assist to sustain right  on handle   Level 0  6 mintues Min assist to sustain right  on handle   Level 0  6 mintues Min assist to sustain right  on handle   Level 0  6 mintues Min assist to sustain right  on handle   Level 0  7 mintues Min assist to sustain right  on handle   Level 0  7 mintues Min assist to sustain right  on handle   Level 0  7 mintues Min assist to sustain right  on handle   Level 0.5  7 mintues Min assist to sustain right  on handle   Level 0.5  7 mintues   Modified push-ups  Hands on countertop with dycem under right hand  3 x 10 Hands on countertop with dycem under right hand  3 x 10   Hands on countertop with dycem under right hand  3 x 10 Hands on countertop with dycem under right hand  3 x 10 Hands on countertop with dycem under right hand  3 x 10 Hands on countertop with dycem under right hand  3 x 10   Resistive clothespin  Yellow   1 x 5 pinch and release  Yellow   1 x 5 pinch and release Yellow   1 x 5 pinch and release  Yellow   1 x 5 pinch and release Yellow   1 x 5 pinch and release   Shoulder arc 10 tabs on small hill; Left hand assisted R hand grasping and releasing small tabs; used R arm independently to pull tabs over  10 tabs on small hill; Left hand assisted R hand grasping and releasing small tabs; used R arm independently to pull tabs over        Wrist flexion/extension      PROM x 5 reps  SROM x 10 reps  with prolonged stretch at end range of extension PROM x 10 reps with prolonged stretch at end range of extension    Punching bag           Finger flexion/extension      PROM x 5 reps   with prolonged stretch at end range of extension PROM x 5 reps   with prolonged stretch at end range of extension PROM x 5 reps   with prolonged stretch at end range of extension       Access Code: [email or web address]   URL: [email or web address]   Date: 01/19/2018   Prepared by: Grady Sandhu     Exercises   Supine Shoulder Flexion PROM - 10 reps - 2 sets - 3 hold - 1x daily - 5x weekly   Push-Up on Counter - 10 reps - 2 sets - 1x daily - 5x weekly   Seated Shoulder Shrugs - 10 reps - 2 sets - 1x daily - 5x weekly   Seated Scapular Retraction - 10 reps - 2 sets - 1x daily - 5x weekly   Seated Weight Shifting with Arm Support - 10 reps - 2 sets - 1x daily - 5x weekly   Seated Shoulder Flexion Self PROM - 10 reps - 2 sets - 1x daily - 5x weekly   Supine Elbow Flexion Extension AROM - 10 reps - 2 sets - 1x daily - 5x weekly   Seated Elbow Flexion Extension AROM - 10 reps - 2 sets - 1x daily - 5x weekly     Neuromuscular Re-education: ( 10 minutes):  Exercise/activities per grid below to improve balance, coordination, kinesthetic sense and proprioception. Required moderate visual and verbal cues to promote static balance in sitting, promote coordination of right, upper extremity(s) and promote motor control of right, upper extremity(s). Patient completed lateral weight shifts sitting at edge of mat x 10 reps each onto elbow/forearms  with dynamic reach with left hand outside of base of support, using right upper extremity to maintain balance. Completed lateral weight shifts onto outstretched right hand with minimal assist to maintain position of right hand on mat x 5 reps. Treatment/Session Assessment: Since Botox, pt has demo'd improved improved R shoulder abduction ROM; R elbow also appears to be more relax with pt achieving greater elbow extension. Pt also demo's greater ability to contract and relax R hand/digits during hand strengthening activities.   Pt continues to demo improved lateral weight shifts outside base of support with dynamic reach. Patient demonstrating independence with passive stretching of right upper extremity and agreeable to complete HEP daily. Pt would continue to benefit from skilled OT services to increase functional use of R UE and increase independence with BADLs. · Response to Treatment: Mrs. Jaqueline Aleman tolerated therapy well today, without any issue. She continues to be very cooperative and motivated during therapy. She is agreeable to working on increasing functional use of right upper extremity during ADLs in home environment. · Compliance with Program/Exercises: Will assess as treatment progresses. Pt reports compliance with HEP. · Recommendations/Intent for next treatment session: \"Next visit will focus on advancements to more challenging activities and reduction in assistance provided\".     Total Treatment Duration:  OT Patient Time In/Time Out  Time In: 5399  Time Out: Moose Chaudhari OT

## 2018-04-30 ENCOUNTER — HOSPITAL ENCOUNTER (OUTPATIENT)
Dept: PHYSICAL THERAPY | Age: 63
Discharge: HOME | End: 2018-04-30
Attending: PHYSICAL MEDICINE & REHABILITATION
Payer: COMMERCIAL

## 2018-04-30 PROCEDURE — 97112 NEUROMUSCULAR REEDUCATION: CPT

## 2018-04-30 PROCEDURE — 97110 THERAPEUTIC EXERCISES: CPT

## 2018-04-30 NOTE — PROGRESS NOTES
Ramiro Myles  : 1955  Primary: Jazzy Luciano Ppo  Secondary:  [address] at [facility]  [address], [address], [facility] [address]  [phone]   [phone]        OUTPATIENT OCCUPATIONAL THERAPY: Daily Note 2018    ICD-10: Treatment Diagnosis: Hemiplegia and hemiparesis following cerebral infarction affecting right dominant side (I69.351)  Precautions/Allergies:   Banana; Lisinopril; and Nuts [tree nut]   Fall Risk Score: 5 (? 5 = High Risk)  MD Orders: Referral to occupational therapy MEDICAL/REFERRING DIAGNOSIS:   Cerebral infarction, unspecified [I63.9]  Weakness [R53.1]   DATE OF ONSET: 2017   REFERRING PHYSICIAN: Juliette Peterson*  RETURN PHYSICIAN APPOINTMENT: unknown   3/5/18: Ms. Jessica Lowe continues to make excellent progress towards her therapy goals; she demonstrates gradual improvements in R UE range of motion, specifically R shoulder flexion and R elbow extension/flexion, and reports increased functional use of R UE in activities of daily living, including dressing, grooming, and feeding. Patient would continue to benefit from skilled occupational therapy services to maximize safety and independence and increase functional use of R UE during activities of daily living.    18: Ms. Jessica Lowe is making excellent progress toward her goals; she is demonstrating improving range of motion and functional use of the right upper extremity in activities of daily living. Feel she will continue to benefit from skilled occupational therapy to maximize safety and independence with activities of daily living. INITIAL ASSESSMENT:  Ms. Jessica Lowe presents with impaired active movement, strength, coordination and sensation of the dominant right upper extremity that is affecting her ability to complete activities of daily living independently.  Feel she may benefit from skilled occupational therapy to maximize safety and independence with activities of daily living. PLAN OF CARE:   PROBLEM LIST:  1. Decreased Strength  2. Decreased ADL/Functional Activities  3. Decreased Transfer Abilities  4. Decreased Balance  5. Decreased Flexibility/Joint Mobility  6. Decreased Cognition INTERVENTIONS PLANNED:  1. Activities of daily living training  2. Manual therapy training  3. Modalities  4. Neuromuscular re-eduation  5. Sensory reintegration training  6. Splinting  7. Therapeutic activity  8. Therapeutic exercise   TREATMENT PLAN:  Effective Dates: 3/19/18 to 6/17/18. Frequency/Duration: 2 times a week for 12 weeks  GOALS: (Goals have been discussed and agreed upon with patient.)  Short-Term Functional Goals: Time Frame: 6 weeks  1. Patient will demonstrate independence with home exercise program for right upper extremity range of motion. Met  2. Patient will increase use of right upper extremity as a functional assist in at least 25% of daily activities. Met  3. Patient will bathe with moderate assistance. Continue to address  Discharge Goals: Time Frame: 12 weeks  1. Patient will bathe with minimal assistance. Continue to address  2. Patient will feed self with modified independence and adaptive equipment as needed. Continue to address  3. Patient will dress with modified independence and adaptive equipment as needed. Continue to address  4. Patient will use right upper extremity as a functional assist in at least 50% of daily activities. Continue to address  Rehabilitation Potential For Stated Goals: Good  Regarding Abigail Iraheta's therapy, I certify that the treatment plan above will be carried out by a therapist or under their direction. Thank you for this referral,  Karla Webb, OT     Referring Physician Signature: Juliette Godfrey* _________________________  Date _________            The information in this section was collected on 3/5/18 (except where otherwise noted).   OCCUPATIONAL PROFILE & HISTORY:   History of Present Injury/Illness (Reason for Referral):  CVA with hospital and [facility] stay then home health therapy. Past Medical History/Comorbidities:   Ms. Catia Ellington  has a past medical history of Anxiety; CVA (cerebral vascular accident) ([facility] [address] (2017); Depression; HTN (hypertension); Menopause; and PTE (pulmonary thromboembolism) ([facility] [address] (2017). Ms. Catia Ellington  has a past surgical history that includes hx jennifer and bso (); hx  section; hx refractive surgery (); hx other surgical (); and hx other surgical (2017). Social History/Living Environment:      Prior Level of Function/Work/Activity:  Does seasonal taxes at [employer]  Dominant Side:         RIGHT  Current Medications:    Current Outpatient Prescriptions:     polyethylene glycol (MIRALAX) 17 gram/dose powder, Take 17 g by mouth daily. , Disp: 510 g, Rfl: 2    [START ON 2018] methylphenidate HCl (RITALIN) 20 mg tablet, Take 1 Tab (20 mg total) by mouth daily. Max Daily Amount: 20 mg, Disp: 30 Tab, Rfl: 0    methylphenidate HCl (RITALIN) 20 mg tablet, Take 1 Tab (20 mg total) by mouth daily. Max Daily Amount: 20 mg, Disp: 30 Tab, Rfl: 0    methylphenidate HCl (RITALIN) 20 mg tablet, Take 1 Tab (20 mg total) by mouth daily. Max Daily Amount: 20 mg, Disp: 30 Tab, Rfl: 0    rOPINIRole (REQUIP) 2 mg tablet, Take 1 Tab by mouth nightly. for restless leg, Disp: 90 Tab, Rfl: 1    FLUoxetine (PROZAC) 20 mg tablet, Take 2 Tabs by mouth daily. , Disp: 180 Tab, Rfl: 1    tiZANidine (ZANAFLEX) 4 mg tablet, 4mg po TID, Disp: 270 Tab, Rfl: 1    labetalol (NORMODYNE) 200 mg tablet, Take 1 Tab by mouth two (2) times a day., Disp: 180 Tab, Rfl: 1    losartan-hydroCHLOROthiazide (HYZAAR) 100-12.5 mg per tablet, Take 1 Tab by mouth daily. , Disp: 90 Tab, Rfl: 1    ALPRAZolam (XANAX) 0.25 mg tablet, Take 1 Tab by mouth three (3) times daily as needed for Anxiety.  Max Daily Amount: 0.75 mg., Disp: 30 Tab, Rfl: 1            Date Last Reviewed:  2018   Complexity Level : Expanded review of therapy/medical records (1-2):  MODERATE COMPLEXITY   ASSESSMENT OF OCCUPATIONAL PERFORMANCE:   ROM:                   Balance:                   Coordination:                   Mental Status:                   Vision:                   Activities of Daily Living:           Basic ADLs (From Assessment) Complex ADLs (From Assessment)         Grooming/Bathing/Dressing Activities of Daily Living                                   Sensation:         Light touch absent distal to right elbow     Physical Skills Involved:  1. Range of Motion  2. Balance  3. Sensation  4. Fine Motor Control  5. Gross Motor Control Cognitive Skills Affected (resulting in the inability to perform in a timely and safe manner):  1. Executive Function  2. Sustained Attention  3. Divided Attention  4. Comprehension Psychosocial Skills Affected:  1. None   Number of elements that affect the Plan of Care: 5+:  HIGH COMPLEXITY   CLINICAL DECISION MAKING:   Outcome Measure: Tool Used: [address] [address] AM-[facility] Daily Activity Outpatient Short Form  Score:  Initial: 18 Most Recent: 33 (Date: 3/5/18 )   Interpretation of Tool:  Represents clinically-significant functional categories (i.e., basic and instrumental activities of daily living, fine motor activities). Score 60 59-55 54-47 46-34 32-21 20-16 15   Modifier CH CI CJ CK CL CM CN     Medical Necessity:   · Patient demonstrates good rehab potential due to higher previous functional level. Reason for Services/Other Comments:  · Patient continues to require skilled intervention due to decreased safety and independence in activities of daily living. Clinical Decision-Making Assessment: Moderately difficult to predict patient's progress at this time due to the extent of her limitations in functional movement and use of dominant right upper extremity.     Assessment process, impact of co-morbidities, assessment modification\need for assistance, and selection of interventions: Analytical Complexity:MODERATE COMPLEXITY   TREATMENT:   (In addition to Assessment/Re-Assessment sessions the following treatments were rendered)    Pre-treatment Symptoms/Complaints:  Patient states, \" I've been using my hand more often to do things. \"  Pain: Initial:   Pain Intensity 1: 0/10 Post Session:  same     Therapeutic Exercise: ( 35 minutes):  Exercises per grid below to improve dynamic movement of arm - right and hand - right to improve functional lifting, carrying, reaching and grasping. Required moderate visual and verbal cues to promote proper body mechanics. Progressed range, repetitions and complexity of movement as indicated.        Date:  4/4/18 Date:  4/6/18 Date:  4/12/18 Date:  4/16/18 Date:  4/19/18 Date:  4/23/18 Date:  4/26/18 Date:  4/30/18   Activity/Exercise           Shoulder shrugs AROM x 10 reps with mirror for visual feedback AROM x 10 reps with mirror for visual feedback AROM x 10 AROM x 10 reps with mirror for visual feedback AROM x 10 AROM x 10 AROM x 10 AROM x 10   Scapular protraction/retraction AROM  1 x 10 with passive self range into elbow extension at end range    2 x 10 reps with blue theratubing AROM  1 x 10 with passive self range into elbow extension at end range AROM  1 x 10 with passive self range into elbow extension at end range AROM  1 x 10 with passive self range into elbow extension at end range AROM  1 x 10 with passive self range into elbow extension at end range AROMAROM  1 x 10 with passive self range into elbow extension at end range  1 x 10 with passive self range into elbow extension at end range AROM  1 x 10 with passive self range into elbow extension at end range AROM  1 x 10 with passive self range into elbow extension at end range   Shoulder abduction           Elbow flexion/extension   A/AAROM x 5 reps A/AAROM x 5 reps SROM x 15 reps seated  PROM x 5 reps in supine PROM x 10 reps SROM x 15 reps seated SROM x 15 reps seated   PNF D1/D2 diagonals           Hand helper 20 lbs x 5 reps with assist to extend fingers  25 lbs x 10 reps with assist to extend fingers   20 lbs x 10 reps with assist to extend fingers 20 lbs x 10 reps with assist to extend fingers    Tabletop skateboard           Shoulder flexion/extension   A/AAROM x 10 reps each A/AAROM x 10 reps each SROM x 15 reps in supine PROM x 1o reps SROM x 15 reps in supine PROM x 15 in supine   Chest press           UBE Min assist to sustain right  on handle   Level 0  6 mintues Min assist to sustain right  on handle   Level 0  6 mintues Min assist to sustain right  on handle   Level 0  7 mintues Min assist to sustain right  on handle   Level 0  7 mintues Min assist to sustain right  on handle   Level 0  7 mintues Min assist to sustain right  on handle   Level 0.5  7 mintues Min assist to sustain right  on handle   Level 0.5  7 mintues Min assist to sustain right  on handle   Level 0.5  7 mintues   Modified push-ups  Hands on countertop with dycem under right hand  3 x 10   Hands on countertop with dycem under right hand  3 x 10 Hands on countertop with dycem under right hand  3 x 10 Hands on countertop with dycem under right hand  3 x 10 Hands on countertop with dycem under right hand  3 x 10 Hands on countertop with dycem under right hand  3 x 10   Resistive clothespin Yellow   1 x 5 pinch and release  Yellow   1 x 5 pinch and release Yellow   1 x 5 pinch and release  Yellow   1 x 5 pinch and release Yellow   1 x 5 pinch and release Yellow   1 x 5 pinch and release   Shoulder arc  10 tabs on small hill; Left hand assisted R hand grasping and releasing small tabs; used R arm independently to pull tabs over         Wrist flexion/extension     PROM x 5 reps  SROM x 10 reps  with prolonged stretch at end range of extension PROM x 10 reps with prolonged stretch at end range of extension  PROM x 10 reps with prolonged stretch at end range of extension   Punching bag           Finger flexion/extension     PROM x 5 reps   with prolonged stretch at end range of extension PROM x 5 reps   with prolonged stretch at end range of extension PROM x 5 reps   with prolonged stretch at end range of extension PROM x 10 reps with prolonged stretch at end range of extension       Access Code: [email or web address]   URL: [email or web address]   Date: 01/19/2018   Prepared by: Jeffrey Mccauley     Exercises   Supine Shoulder Flexion PROM - 10 reps - 2 sets - 3 hold - 1x daily - 5x weekly   Push-Up on Counter - 10 reps - 2 sets - 1x daily - 5x weekly   Seated Shoulder Shrugs - 10 reps - 2 sets - 1x daily - 5x weekly   Seated Scapular Retraction - 10 reps - 2 sets - 1x daily - 5x weekly   Seated Weight Shifting with Arm Support - 10 reps - 2 sets - 1x daily - 5x weekly   Seated Shoulder Flexion Self PROM - 10 reps - 2 sets - 1x daily - 5x weekly   Supine Elbow Flexion Extension AROM - 10 reps - 2 sets - 1x daily - 5x weekly   Seated Elbow Flexion Extension AROM - 10 reps - 2 sets - 1x daily - 5x weekly     Neuromuscular Re-education: ( 10 minutes):  Exercise/activities per grid below to improve balance, coordination, kinesthetic sense and proprioception. Required moderate visual and verbal cues to promote static balance in sitting, promote coordination of right, upper extremity(s) and promote motor control of right, upper extremity(s). Patient completed lateral weight shifts sitting at edge of mat x 10 reps each onto elbow/forearms  with dynamic reach with left hand outside of base of support, using right upper extremity to maintain balance. Completed lateral weight shifts onto outstretched right hand with minimal assist to maintain position of right hand on mat x 5 reps. Treatment/Session Assessment: Since Botox, pt has demo'd improved improved R shoulder abduction ROM; R elbow also appears to be more relax with pt achieving greater elbow extension.  Pt also demo's greater ability to contract and relax R hand/digits during hand strengthening activities. Pt continues to demo improved lateral weight shifts outside base of support with dynamic reach. Patient demonstrating independence with passive stretching of right upper extremity and agreeable to complete HEP daily. Pt would continue to benefit from skilled OT services to increase functional use of R UE and increase independence with BADLs. · Response to Treatment: Mrs. Hoskins Self tolerated therapy well today, without any issue. She continues to be very cooperative and motivated during therapy. She is agreeable to working on increasing functional use of right upper extremity during ADLs in home environment. · Compliance with Program/Exercises: Will assess as treatment progresses. Pt reports compliance with HEP. · Recommendations/Intent for next treatment session: \"Next visit will focus on advancements to more challenging activities and reduction in assistance provided\".     Total Treatment Duration:  OT Patient Time In/Time Out  Time In: 1015  Time Out: 809 Arash Rich OT

## 2018-04-30 NOTE — PROGRESS NOTES
Ramiro Myles  : 1955  Primary:   Secondary:  [address] at [facility]  [address] [facility] [address], [address] [facility]  [phone]   [phone]          OUTPATIENT PHYSICAL THERAPY:Daily Note 2018    ICD-10: Treatment Diagnosis:   · Other abnormalities of gait and mobility (R26.89)  · Difficulty in walking, not elsewhere classified (R26.2)  Precautions/Allergies:   Banana; Lisinopril; and Nuts [tree nut]   Fall Risk Score: 5 (? 5 = High Risk)  MD Orders: Evaluate and Treat MEDICAL/REFERRING DIAGNOSIS:  Weakness due to cerebrovascular accident (CVA) ([facility] [address] [I63.9, R53.1]   DATE OF ONSET: CVA: 2017  REFERRING PHYSICIAN: Juliette Peterson*  RETURN PHYSICIAN APPOINTMENT: TBD     INITIAL ASSESSMENT:  Ms. Jessica Lowe presents with decreased mobility, decreased strength, decreased gait, and decreased balance secondary to CVA. After discussing with patient, she agreed she would benefit from physical therapy to improve above deficits. Please sign this plan of treatment if you concur. Thank you for the opportunity to serve this patient. Progress note on 2018:   Patient has attended thirty-five scheduled physical therapy appointments from 12/15/2017 to 2018. Patient is very motivated and compliant with therapy. Patient received her first Botox injection on 2014. Patient's right ankle is less tight, and right foot is easier to get into her right AFO. Patient also reports less right foot pain with walking. Patient would benefit from continuing skilled physical therapy to address problems and goals. Thank you for this referral.    PROBLEM LIST (Impacting functional limitations):  1. Decreased Strength  2. Decreased Ambulation Ability/Technique  3. Decreased Balance  4. Decreased Activity Tolerance INTERVENTIONS PLANNED:  1. Balance Exercise  2. Gait Training  3. Home Exercise Program (HEP)  4. Neuromuscular Re-education/Strengthening  5.  Range of Motion (ROM)  6. Therapeutic Exercise/Strengthening   TREATMENT PLAN:  Effective Dates: 2/12/2018 TO 5/7/2018. Frequency/Duration: 2-3 times a week for 12 weeks  GOALS: (Goals have been discussed and agreed upon with patient.)  Short-Term Functional Goals: Time Frame: 3 weeks  1. Patient will be independent with home exercise program without exacerbation of symptoms or cueing needed. Goal met. Discharge Goals: Time Frame: 8 weeks  1. Patient will be independent with all ADLs with minimal fear of falling and loss of balance with daily tasks. Goal ongoing. 2. Patient will report no fear avoidance with social or recreational activities due to fear of falling. Goal ongoing. 3. Patient will score greater than or equal to 45/56 on Gipson Balance Scale with minimal effect of imbalance on patient's ability to manage every day life activities. Goal ongoing. Rehabilitation Potential For Stated Goals: Good  Regarding Nadja Alvaradodwin's therapy, I certify that the treatment plan above will be carried out by a therapist or under their direction. Thank you for this referral,  Susie Gupta PT     Referring Physician Signature: Juliette Santana*              Date                    The information in this section was collected on 12/15/2017 (except where otherwise noted). HISTORY:   History of Present Injury/Illness (Reason for Referral):  Patient reports she had a severe stroke on 9/6/2017. She reports that she was in the hospital and [facility] for about 61-62 days. She reports that she has progressed really well, but is still having trouble walking and using her right side (UE and LE). She reports that she walks using her AFO and quad cane all the time. She reports that she tries to be as active as possible. She reports that she is scared that she is going to fall, even though she has not.   She reports she would like to work on strengthening her right leg so her balance is better and she can walk without fear of falling. Past Medical History/Comorbidities:   Ms. Ewa Rawls  has a past medical history of Anxiety; CVA (cerebral vascular accident) ([facility] [address] (2017); Depression; HTN (hypertension); Menopause; and PTE (pulmonary thromboembolism) ([facility] [address] (2017). Ms. Ewa Rawls  has a past surgical history that includes hx jennifer and bso (); hx  section; hx refractive surgery (); hx other surgical (); and hx other surgical (). Social History/Living Environment:   Home Environment: Private residence  Living Alone: No  Support Systems: Family member(s), Friends \ neighbors; Spouse  Prior Level of Function/Work/Activity:  Independent  Dominant Side:         RIGHT  Personal Factors:          Sex:  female        Age:  60 y.o. Current Medications:       Current Outpatient Prescriptions:     polyethylene glycol (MIRALAX) 17 gram/dose powder, Take 17 g by mouth daily. , Disp: 510 g, Rfl: 2    [START ON 2018] methylphenidate HCl (RITALIN) 20 mg tablet, Take 1 Tab (20 mg total) by mouth daily. Max Daily Amount: 20 mg, Disp: 30 Tab, Rfl: 0    methylphenidate HCl (RITALIN) 20 mg tablet, Take 1 Tab (20 mg total) by mouth daily. Max Daily Amount: 20 mg, Disp: 30 Tab, Rfl: 0    methylphenidate HCl (RITALIN) 20 mg tablet, Take 1 Tab (20 mg total) by mouth daily. Max Daily Amount: 20 mg, Disp: 30 Tab, Rfl: 0    rOPINIRole (REQUIP) 2 mg tablet, Take 1 Tab by mouth nightly. for restless leg, Disp: 90 Tab, Rfl: 1    FLUoxetine (PROZAC) 20 mg tablet, Take 2 Tabs by mouth daily. , Disp: 180 Tab, Rfl: 1    tiZANidine (ZANAFLEX) 4 mg tablet, 4mg po TID, Disp: 270 Tab, Rfl: 1    labetalol (NORMODYNE) 200 mg tablet, Take 1 Tab by mouth two (2) times a day., Disp: 180 Tab, Rfl: 1    losartan-hydroCHLOROthiazide (HYZAAR) 100-12.5 mg per tablet, Take 1 Tab by mouth daily. , Disp: 90 Tab, Rfl: 1    ALPRAZolam (XANAX) 0.25 mg tablet, Take 1 Tab by mouth three (3) times daily as needed for Anxiety.  Max Daily Amount: 0.75 mg., Disp: 30 Tab, Rfl: 1   Date Last Reviewed:  4/30/2018    Number of Personal Factors/Comorbidities that affect the Plan of Care: 1-2: MODERATE COMPLEXITY   EXAMINATION:   Observation/Orthostatic Postural Assessment:          Posture Assessment: Rounded shoulders, Forward head   Functional Mobility:         Gait/Ambulation:     Speed/Lulu: Pace decreased (<100 feet/min)  Step Length: Left shortened, Right lengthened  Swing Pattern: Left asymmetrical, Right asymmetrical  Stance: Left increased, Right decreased  Gait Abnormalities: Antalgic, Circumduction, Foot drop, Path deviations, Trendelenburg  Ambulation - Level of Assistance: Modified independent  Assistive Device: Cane, quad  · Interventions: Verbal cues, Safety awareness training          Transfers:     Sit to Stand: Independent  Stand to Sit: Independent  Stand Pivot Transfers: Independent  · Bed to Chair: Independent          Bed Mobility:     Rolling: Independent  Supine to Sit: Independent  Sit to Supine: Independent  · Scooting: Independent    Strength:          L UE STRENGTH: 4/5 shoulder flexion, 4/5 shoulder abduction, 4/5 shoulder extension, 4/5 elbow flexion, 4/5 elbow extension. R UE STRENGTH: 2/5 shoulder flexion, 2/5 shoulder abduction, 2/5 shoulder extension, 2/5 elbow flexion, 2/5 elbow extension. R LE STRENGTH:  4+/5 hip flexion, 4+/5 hip abduction, 4+/5 hip adduction, 4+/5 hip extension, 4+/5 knee extension, 4+/5 knee flexion, 1/5 ankle dorsiflexion, 1/5 ankle plantarflexion, 1/5 ankle inversion, 1/5 ankle eversion. Sensation:         Intact for light touch and proprioception  Postural Control & Balance:  · Gipson Balance Scale:  33/56.   (A score less than 45/56 indicates high risk of falls)  . Score improved from 28/56 on initial evaluation. Body Structures Involved:  1. Nerves  2. Muscles Body Functions Affected:  1. Neuromusculoskeletal  2. Movement Related Activities and Participation Affected:  1. Mobility  2.  Self Care Number of elements (examined above) that affect the Plan of Care: 4+: HIGH COMPLEXITY   CLINICAL PRESENTATION:   Presentation: Evolving clinical presentation with changing clinical characteristics: MODERATE COMPLEXITY   CLINICAL DECISION MAKING:   Outcome Measure: Tool Used: Gipson Balance Scale  Score:  Initial: 28/56 Most Recent: 33/56 (Date: 3- )   Interpretation of Score: Each section is scored on a 0-4 scale, 0 representing the patients inability to perform the task and 4 representing independence. The scores of each section are added together for a total score of 56. The higher the patients score, the more independent the patient is. Any score below 45 indicates increased risk for falls. Score 56 55-45 44-34 33-23 22-12 11-1 0   Modifier CH CI CJ CK CL CM CN     Medical Necessity:   · Patient is expected to demonstrate progress in strength, range of motion, balance and coordination to improve safety during daily activities. · Patient demonstrates good rehab potential due to higher previous functional level. · Skilled intervention continues to be required due to decreased mobility. Reason for Services/Other Comments:  · Patient continues to demonstrate capacity to improve overall mobility which will increase independence and increase safety. Use of outcome tool(s) and clinical judgement create a POC that gives a: Questionable prediction of patient's progress: MODERATE COMPLEXITY            TREATMENT:   (In addition to Assessment/Re-Assessment sessions the following treatments were rendered)  Pre-treatment Symptoms/Complaints:  4/30/2018:  Patient has no complaints. Pain: Initial: Pain Intensity 1: 0  Post Session:  0/10     NEUROMUSCULAR RE-EDUCATION: (15 minutes):  Exercise/activities per grid below to improve balance, coordination, kinesthetic sense, posture and proprioception.   Required minimal verbal and manual cues to promote static and dynamic balance in standing, promote coordination of bilateral, lower extremity(s) and promote motor control of bilateral, lower extremity(s). Date   4/30/18 Date   4/26/18   Activity/Exercise     Stepping over half foam  X 2x10 reps B LE   Step taps     Step ups 6 inch  2 x10 leading with R LE  6 inch  2 x10 leading with R LE    [place]     Walking in the clinic with quad cane About 200 feet with quad cane, working on weightbearing more on R foot through midstance and terminal stance, and stepping bigger with L LE; added working on shifting R hips forward over R foot better midstance to terminal stance About 100 feet with quad cane, working on weightbearing more on R foot through midstance and terminal stance, and stepping bigger with L LE; added working on shifting R hips forward over R foot better midstance to terminal stance   pregait activity     Walking backwards with rail X 5 feet x 5 working on R hip extension AROM   Standing weight shifting onto R LE                            THERAPEUTIC EXERCISE: (30 minutes):  Exercises per grid below to improve mobility and strength. Required minimal verbal cues to promote proper body alignment. Progressed repetitions as indicated.    Date  4/30/18 Date   4/26/18   Activity/Exercise     Sit to stand      Left sidelying alternating isometrics on pelvis     Standing hip abduction     Seated LAQs     Standing hamstrings curls      Bridging 2x10 with right lower 2x10 with right lower   Supine straight leg raise 2x10 with right lower extremity working on keeping foot/hip in neutral position 3# 2x10 with right lower extremity working on keeping foot/hip in neutral position   Supine PNF D1 and D2 patterns x 15 reps each R LE D1 and D2 patterns x 15 reps each R LE   L sidelying R pelvis and trunk PNF     Left sidelying hip abduction     L sidelying: picking up and moving R LE from in front of L foot to behind L foot (working on hip abd and ext) and stabilizing trunk 2x10 reps, working on lifting higher and keeping foot in neutral position 3# 2x10 reps, working on lifting higher and keeping foot in neutral position   L sidelying R clam shells 2x10 reps, working on lifting higher 2x10 reps, working on lifting higher   L sidelying R hip abduction X With cuing to keep leg in alignment with body, 2 x 10 reps   R foot and gastrocsoleus stretching X 20 minutes trying to get foot to neutral position X 20 minutes trying to get foot to neutral position   Supine hooklying lower body rotation X    Standing minisquats in bars     [facility] Portal  Treatment/Session Assessment:    · Response to Treatment:  Patient tolerated exercises without complaints. Continue to progress to tolerance. · Compliance with Program/Exercises: Compliant. · Recommendations/Intent for next treatment session: \"Next visit will focus on advancements to more challenging activities\".    Total Treatment Duration:  PT Patient Time In/Time Out  Time In: 3792  Time Out: [place] 2793

## 2018-05-02 ENCOUNTER — HOSPITAL ENCOUNTER (OUTPATIENT)
Dept: PHYSICAL THERAPY | Age: 63
Discharge: HOME | End: 2018-05-02
Attending: PHYSICAL MEDICINE & REHABILITATION
Payer: COMMERCIAL

## 2018-05-02 PROCEDURE — 97110 THERAPEUTIC EXERCISES: CPT

## 2018-05-02 PROCEDURE — 97112 NEUROMUSCULAR REEDUCATION: CPT

## 2018-05-02 NOTE — PROGRESS NOTES
Konrad Le  : 1955  Primary: Ricardo Cárdenas Ppo  Secondary:  [address]  at [place] [id number] [facility], [address] [place] [address],[address]  [phone]   [phone]        OUTPATIENT OCCUPATIONAL THERAPY: Progress Report 2018    ICD-10: Treatment Diagnosis: Hemiplegia and hemiparesis following cerebral infarction affecting right dominant side (I69.351)  Precautions/Allergies:   Banana; Lisinopril; and Nuts [tree nut]   Fall Risk Score: 5 (? 5 = High Risk)  MD Orders: Referral to occupational therapy MEDICAL/REFERRING DIAGNOSIS:   Cerebral infarction, unspecified [I63.9]  Weakness [R53.1]   DATE OF ONSET: 2017   REFERRING PHYSICIAN: Juliette Higgins*  RETURN PHYSICIAN APPOINTMENT: unknown   18: Ms. Cristine Peralta continues to demo' progress towards therapy goals. Since Botox, pt has demo'd improved improved R shoulder abduction ROM; R elbow also appears to be more relax with pt achieving greater elbow extension. Pt also demo's greater ability to contract and relax R hand/digits during hand strengthening activities. Pt would continue to benefit from skilled OT services to maximize functional use of R UE for increased independence with activities of daily living. 3/5/18: Ms. Cristine ePralta continues to make excellent progress towards her therapy goals; she demonstrates gradual improvements in R UE range of motion, specifically R shoulder flexion and R elbow extension/flexion, and reports increased functional use of R UE in activities of daily living, including dressing, grooming, and feeding.  Patient would continue to benefit from skilled occupational therapy services to maximize safety and independence and increase functional use of R UE during activities of daily living.    18: Ms. Cristine Peralta is making excellent progress toward her goals; she is demonstrating improving range of motion and functional use of the right upper extremity in activities of daily living. Feel she will continue to benefit from skilled occupational therapy to maximize safety and independence with activities of daily living. INITIAL ASSESSMENT:  Ms. Mitzy Cruz presents with impaired active movement, strength, coordination and sensation of the dominant right upper extremity that is affecting her ability to complete activities of daily living independently. Feel she may benefit from skilled occupational therapy to maximize safety and independence with activities of daily living. PLAN OF CARE:   PROBLEM LIST:  1. Decreased Strength  2. Decreased ADL/Functional Activities  3. Decreased Transfer Abilities  4. Decreased Balance  5. Decreased Flexibility/Joint Mobility  6. Decreased Cognition INTERVENTIONS PLANNED:  1. Activities of daily living training  2. Manual therapy training  3. Modalities  4. Neuromuscular re-eduation  5. Sensory reintegration training  6. Splinting  7. Therapeutic activity  8. Therapeutic exercise   TREATMENT PLAN:  Effective Dates: 3/19/18 to 6/17/18. Frequency/Duration: 2-3 times a week for 12 weeks  GOALS: (Goals have been discussed and agreed upon with patient.)  Short-Term Functional Goals: Time Frame: 6 weeks  1. Patient will demonstrate independence with home exercise program for right upper extremity range of motion. Met  2. Patient will increase use of right upper extremity as a functional assist in at least 25% of daily activities. Met  3. Patient will bathe with moderate assistance. Continue to address  Discharge Goals: Time Frame: 12 weeks  1. Patient will bathe with minimal assistance. Continue to address  2. Patient will feed self with modified independence and adaptive equipment as needed. Continue to address  3. Patient will dress with modified independence and adaptive equipment as needed. Continue to address  4. Patient will use right upper extremity as a functional assist in at least 50% of daily activities.  Continue to address  Rehabilitation Potential For Stated Goals: Good  Regarding Efraín Iraheta's therapy, I certify that the treatment plan above will be carried out by a therapist or under their direction. Thank you for this referral,  Yifan Duncan, OT     Referring Physician Signature: Juliette Cummins* _________________________  Date _________            The information in this section was collected on 18 (except where otherwise noted). OCCUPATIONAL PROFILE & HISTORY:   History of Present Injury/Illness (Reason for Referral):  CVA with hospital and [facility] stay then home health therapy. Past Medical History/Comorbidities:   Ms. Ewa Rawls  has a past medical history of Anxiety; CVA (cerebral vascular accident) ([facility] [address] (2017); Depression; HTN (hypertension); Menopause; and PTE (pulmonary thromboembolism) ([facility] [address] (2017). Ms. Ewa Rawls  has a past surgical history that includes hx jennifer and bso (); hx  section; hx refractive surgery (); hx other surgical (); and hx other surgical (). Social History/Living Environment:      Prior Level of Function/Work/Activity:  Does seasonal taxes at [employer]  Dominant Side:         RIGHT  Current Medications:    Current Outpatient Prescriptions:     polyethylene glycol (MIRALAX) 17 gram/dose powder, Take 17 g by mouth daily. , Disp: 510 g, Rfl: 2    [START ON 2018] methylphenidate HCl (RITALIN) 20 mg tablet, Take 1 Tab (20 mg total) by mouth daily. Max Daily Amount: 20 mg, Disp: 30 Tab, Rfl: 0    methylphenidate HCl (RITALIN) 20 mg tablet, Take 1 Tab (20 mg total) by mouth daily. Max Daily Amount: 20 mg, Disp: 30 Tab, Rfl: 0    methylphenidate HCl (RITALIN) 20 mg tablet, Take 1 Tab (20 mg total) by mouth daily. Max Daily Amount: 20 mg, Disp: 30 Tab, Rfl: 0    rOPINIRole (REQUIP) 2 mg tablet, Take 1 Tab by mouth nightly. for restless leg, Disp: 90 Tab, Rfl: 1    FLUoxetine (PROZAC) 20 mg tablet, Take 2 Tabs by mouth daily. , Disp: 180 Tab, Rfl: 1    tiZANidine (ZANAFLEX) 4 mg tablet, 4mg po TID, Disp: 270 Tab, Rfl: 1    labetalol (NORMODYNE) 200 mg tablet, Take 1 Tab by mouth two (2) times a day., Disp: 180 Tab, Rfl: 1    losartan-hydroCHLOROthiazide (HYZAAR) 100-12.5 mg per tablet, Take 1 Tab by mouth daily. , Disp: 90 Tab, Rfl: 1    ALPRAZolam (XANAX) 0.25 mg tablet, Take 1 Tab by mouth three (3) times daily as needed for Anxiety. Max Daily Amount: 0.75 mg., Disp: 30 Tab, Rfl: 1            Date Last Reviewed:  5/2/2018   Complexity Level : Expanded review of therapy/medical records (1-2):  MODERATE COMPLEXITY   ASSESSMENT OF OCCUPATIONAL PERFORMANCE:   ROM:                   Balance:             Sitting: Intact  Standing: With support  Standing - Static: Good  Standing - Dynamic : Fair     Coordination:             Fine Motor Skills-Upper: Left Intact, Right Impaired  Gross Motor Skills-Upper: Left Intact, Right Impaired     Mental Status:             Neurologic State: Alert  Orientation Level: Oriented X4  Cognition: Appropriate decision making  Perception: Appears intact  Perseveration: No perseveration noted  Safety/Judgement: Insight into deficits     Vision:                   Activities of Daily Living:           Basic ADLs (From Assessment) Complex ADLs (From Assessment)   Basic ADL  Feeding: Setup  Oral Facial Hygiene/Grooming: Minimum assistance  Bathing: Moderate assistance  Upper Body Dressing: Supervision  Lower Body Dressing: Minimum assistance  Toileting: Modified independent Instrumental ADL  Meal Preparation: Total assistance  Homemaking:  Total assistance   Grooming/Bathing/Dressing Activities of Daily Living     Cognitive Retraining  Safety/Judgement: Insight into deficits                 Functional Transfers  Toilet Transfer : Modified independent  Shower Transfer: Stand-by assistance;Supervision     Bed/Mat Mobility  Rolling: Independent  Supine to Sit: Independent  Sit to Supine: Independent  Sit to Stand: Modified independent  Bed to Chair: Modified independent  Scooting: Independent     Sensation:         Light touch absent distal to right elbow     Physical Skills Involved:  1. Range of Motion  2. Balance  3. Sensation  4. Fine Motor Control  5. Gross Motor Control Cognitive Skills Affected (resulting in the inability to perform in a timely and safe manner):  1. Executive Function  2. Sustained Attention  3. Divided Attention  4. Comprehension Psychosocial Skills Affected:  1. None   Number of elements that affect the Plan of Care: 5+:  HIGH COMPLEXITY   CLINICAL DECISION MAKING:   Outcome Measure: Tool Used: [address] [facility] [address] [address] [facility] Daily Activity Outpatient Short Form  Score:  Initial: 18 Most Recent: 26 (Date: 5/2/18 )   Interpretation of Tool:  Represents clinically-significant functional categories (i.e., basic and instrumental activities of daily living, fine motor activities). Score 60 59-55 54-47 46-34 32-21 20-16 15   Modifier CH CI CJ CK CL CM CN     Medical Necessity:   · Patient demonstrates good rehab potential due to higher previous functional level. Reason for Services/Other Comments:  · Patient continues to require skilled intervention due to decreased safety and independence in activities of daily living. Clinical Decision-Making Assessment: Moderately difficult to predict patient's progress at this time due to the extent of her limitations in functional movement and use of dominant right upper extremity. Assessment process, impact of co-morbidities, assessment modification\need for assistance, and selection of interventions: Analytical Complexity:MODERATE COMPLEXITY   TREATMENT:   (In addition to Assessment/Re-Assessment sessions the following treatments were rendered)    Pre-treatment Symptoms/Complaints:  Patient states, \"I feel like the Botox didn't really help my hand. \"  Pain: Initial:   Pain Intensity 1: 0/10 Post Session:  same     Therapeutic Exercise: ( 35 minutes):  Exercises per grid below to improve dynamic movement of arm - right and hand - right to improve functional lifting, carrying, reaching and grasping. Required moderate visual and verbal cues to promote proper body mechanics. Progressed range, repetitions and complexity of movement as indicated.        Date:  4/6/18 Date:  4/12/18 Date:  4/16/18 Date:  4/19/18 Date:  4/23/18 Date:  4/26/18 Date:  4/30/18 Date:  5/2/18   Activity/Exercise           Shoulder shrugs AROM x 10 reps with mirror for visual feedback AROM x 10 AROM x 10 reps with mirror for visual feedback AROM x 10 AROM x 10 AROM x 10 AROM x 10 AROM x 10   Scapular protraction/retraction AROM  1 x 10 with passive self range into elbow extension at end range AROM  1 x 10 with passive self range into elbow extension at end range AROM  1 x 10 with passive self range into elbow extension at end range AROM  1 x 10 with passive self range into elbow extension at end range AROMAROM  1 x 10 with passive self range into elbow extension at end range  1 x 10 with passive self range into elbow extension at end range AROM  1 x 10 with passive self range into elbow extension at end range AROM  1 x 10 with passive self range into elbow extension at end range AROM  1 x 10 with passive self range into elbow extension at end range   Shoulder abduction           Elbow flexion/extension  A/AAROM x 5 reps A/AAROM x 5 reps SROM x 15 reps seated  PROM x 5 reps in supine PROM x 10 reps SROM x 15 reps seated SROM x 15 reps seated SROM x 15 reps seated   PNF D1/D2 diagonals           Hand helper  25 lbs x 10 reps with assist to extend fingers   20 lbs x 10 reps with assist to extend fingers 20 lbs x 10 reps with assist to extend fingers     Tabletop skateboard           Shoulder flexion/extension  A/AAROM x 10 reps each A/AAROM x 10 reps each SROM x 15 reps in supine PROM x 1o reps SROM x 15 reps in supine PROM x 15 in supine    Chest press           UBE Min assist to sustain right  on handle   Level 0  6 mintues Min assist to sustain right  on handle   Level 0  7 mintues Min assist to sustain right  on handle   Level 0  7 mintues Min assist to sustain right  on handle   Level 0  7 mintues Min assist to sustain right  on handle   Level 0.5  7 mintues Min assist to sustain right  on handle   Level 0.5  7 mintues Min assist to sustain right  on handle   Level 0.5  7 mintues Min assist to sustain right  on handle   Level 0.5  7 mintues   Modified push-ups    Hands on countertop with dycem under right hand  3 x 10 Hands on countertop with dycem under right hand  3 x 10 Hands on countertop with dycem under right hand  3 x 10 Hands on countertop with dycem under right hand  3 x 10 Hands on countertop with dycem under right hand  3 x 10 Hands on countertop with dycem under right hand  3 x 10   Resistive clothespin  Yellow   1 x 5 pinch and release Yellow   1 x 5 pinch and release  Yellow   1 x 5 pinch and release Yellow   1 x 5 pinch and release Yellow   1 x 5 pinch and release    Shoulder arc 10 tabs on small hill; Left hand assisted R hand grasping and releasing small tabs; used R arm independently to pull tabs over          Wrist flexion/extension    PROM x 5 reps  SROM x 10 reps  with prolonged stretch at end range of extension PROM x 10 reps with prolonged stretch at end range of extension  PROM x 10 reps with prolonged stretch at end range of extension PROM x 10 reps with prolonged stretch at end range of extension   Punching bag           Finger flexion/extension    PROM x 5 reps   with prolonged stretch at end range of extension PROM x 5 reps   with prolonged stretch at end range of extension PROM x 5 reps   with prolonged stretch at end range of extension PROM x 10 reps with prolonged stretch at end range of extension PROM x 10 reps with prolonged stretch at end range of extension   Measurements Taken        Am-PAC and goals re-assessed. Access Code: [email or web address]   URL: [email or web address] Date: 01/19/2018   Prepared by: Darlene Matthews     Exercises   Supine Shoulder Flexion PROM - 10 reps - 2 sets - 3 hold - 1x daily - 5x weekly   Push-Up on Counter - 10 reps - 2 sets - 1x daily - 5x weekly   Seated Shoulder Shrugs - 10 reps - 2 sets - 1x daily - 5x weekly   Seated Scapular Retraction - 10 reps - 2 sets - 1x daily - 5x weekly   Seated Weight Shifting with Arm Support - 10 reps - 2 sets - 1x daily - 5x weekly   Seated Shoulder Flexion Self PROM - 10 reps - 2 sets - 1x daily - 5x weekly   Supine Elbow Flexion Extension AROM - 10 reps - 2 sets - 1x daily - 5x weekly   Seated Elbow Flexion Extension AROM - 10 reps - 2 sets - 1x daily - 5x weekly     Neuromuscular Re-education: ( 10 minutes):  Exercise/activities per grid below to improve balance, coordination, kinesthetic sense and proprioception. Required moderate visual and verbal cues to promote static balance in sitting, promote coordination of right, upper extremity(s) and promote motor control of right, upper extremity(s). Patient completed lateral weight shifts sitting at edge of mat x 10 reps each onto elbow/forearms  with dynamic reach with left hand outside of base of support, using right upper extremity to maintain balance. Completed lateral weight shifts onto outstretched right hand with minimal assist to maintain position of right hand on mat x 5 reps. Treatment/Session Assessment: Since Botox, pt has demo'd improved improved R shoulder abduction ROM; R elbow also appears to be more relax with pt achieving greater elbow extension. Pt also demo's greater ability to contract and relax R hand/digits during hand strengthening activities. Pt continues to demo improved lateral weight shifts outside base of support with dynamic reach. Patient demonstrating independence with passive stretching of right upper extremity and agreeable to complete HEP daily. Pt's plan of care has been updated to 2-3x/week for 90 days.  Pt would continue to benefit from skilled OT services to increase functional use of R UE and increase independence with BADLs. · Response to Treatment: Mrs. Monica Shaver tolerated therapy well today, without any issue. She continues to be very cooperative and motivated during therapy. She is agreeable to working on increasing functional use of right upper extremity during ADLs in home environment. · Compliance with Program/Exercises: Will assess as treatment progresses. Pt reports compliance with HEP. · Recommendations/Intent for next treatment session: \"Next visit will focus on advancements to more challenging activities and reduction in assistance provided\".     Total Treatment Duration:  OT Patient Time In/Time Out  Time In: [address]  Time Out: [address] [facility]

## 2018-05-02 NOTE — PROGRESS NOTES
Ivelisse Ards  : 1955  Primary:   Secondary:  [address] at [address] [id number] [facility], [address] [place] [address] [facility], [place], operated [facility] [address] [facility]  [phone]   [phone]          OUTPATIENT PHYSICAL THERAPY:Daily Note 2018    ICD-10: Treatment Diagnosis:   · Other abnormalities of gait and mobility (R26.89)  · Difficulty in walking, not elsewhere classified (R26.2)  Precautions/Allergies:   Banana; Lisinopril; and Nuts [tree nut]   Fall Risk Score: 5 (? 5 = High Risk)  MD Orders: Evaluate and Treat MEDICAL/REFERRING DIAGNOSIS:  Weakness due to cerebrovascular accident (CVA) ([facility] [I63.9, R53.1]   DATE OF ONSET: CVA: 2017  REFERRING PHYSICIAN: Juliette Eng*  RETURN PHYSICIAN APPOINTMENT: TBD     INITIAL ASSESSMENT:  Ms. Giana Santana presents with decreased mobility, decreased strength, decreased gait, and decreased balance secondary to CVA. After discussing with patient, she agreed she would benefit from physical therapy to improve above deficits. Please sign this plan of treatment if you concur. Thank you for the opportunity to serve this patient. Progress note on 2018:   Patient has attended thirty-five scheduled physical therapy appointments from 12/15/2017 to 2018. Patient is very motivated and compliant with therapy. Patient received her first Botox injection on 2014. Patient's right ankle is less tight, and right foot is easier to get into her right AFO. Patient also reports less right foot pain with walking. Patient would benefit from continuing skilled physical therapy to address problems and goals. Thank you for this referral.    PROBLEM LIST (Impacting functional limitations):  1. Decreased Strength  2. Decreased Ambulation Ability/Technique  3. Decreased Balance  4. Decreased Activity Tolerance INTERVENTIONS PLANNED:  1. Balance Exercise  2. Gait Training  3. Home Exercise Program (HEP)  4. Neuromuscular Re-education/Strengthening  5.  Range of Motion (ROM)  6. Therapeutic Exercise/Strengthening   TREATMENT PLAN:  Effective Dates: 2/12/2018 TO 5/7/2018. Frequency/Duration: 2-3 times a week for 12 weeks  GOALS: (Goals have been discussed and agreed upon with patient.)  Short-Term Functional Goals: Time Frame: 3 weeks  1. Patient will be independent with home exercise program without exacerbation of symptoms or cueing needed. Goal met. Discharge Goals: Time Frame: 8 weeks  1. Patient will be independent with all ADLs with minimal fear of falling and loss of balance with daily tasks. Goal ongoing. 2. Patient will report no fear avoidance with social or recreational activities due to fear of falling. Goal ongoing. 3. Patient will score greater than or equal to 45/56 on Gipson Balance Scale with minimal effect of imbalance on patient's ability to manage every day life activities. Goal ongoing. Rehabilitation Potential For Stated Goals: Good  Regarding Sam Iraheta's therapy, I certify that the treatment plan above will be carried out by a therapist or under their direction. Thank you for this referral,  Syd Crooks PT     Referring Physician Signature: Juliette Eaton*              Date                    The information in this section was collected on 12/15/2017 (except where otherwise noted). HISTORY:   History of Present Injury/Illness (Reason for Referral):  Patient reports she had a severe stroke on 9/6/2017. She reports that she was in the hospital and [facility] for about 61-62 days. She reports that she has progressed really well, but is still having trouble walking and using her right side (UE and LE). She reports that she walks using her AFO and quad cane all the time. She reports that she tries to be as active as possible. She reports that she is scared that she is going to fall, even though she has not.   She reports she would like to work on strengthening her right leg so her balance is better and she can walk without fear of falling. Past Medical History/Comorbidities:   Ms. Анна Stoll  has a past medical history of Anxiety; CVA (cerebral vascular accident) ([facility] (2017); Depression; HTN (hypertension); Menopause; and PTE (pulmonary thromboembolism) ([facility] (2017). Ms. Анна Stoll  has a past surgical history that includes hx jennifer and bso (); hx  section; hx refractive surgery (); hx other surgical (); and hx other surgical (). Social History/Living Environment:   Home Environment: Private residence  Living Alone: No  Support Systems: Family member(s), Friends \ neighbors; Spouse  Prior Level of Function/Work/Activity:  Independent  Dominant Side:         RIGHT  Personal Factors:          Sex:  female        Age:  60 y.o. Current Medications:       Current Outpatient Prescriptions:     polyethylene glycol (MIRALAX) 17 gram/dose powder, Take 17 g by mouth daily. , Disp: 510 g, Rfl: 2    [START ON 2018] methylphenidate HCl (RITALIN) 20 mg tablet, Take 1 Tab (20 mg total) by mouth daily. Max Daily Amount: 20 mg, Disp: 30 Tab, Rfl: 0    methylphenidate HCl (RITALIN) 20 mg tablet, Take 1 Tab (20 mg total) by mouth daily. Max Daily Amount: 20 mg, Disp: 30 Tab, Rfl: 0    methylphenidate HCl (RITALIN) 20 mg tablet, Take 1 Tab (20 mg total) by mouth daily. Max Daily Amount: 20 mg, Disp: 30 Tab, Rfl: 0    rOPINIRole (REQUIP) 2 mg tablet, Take 1 Tab by mouth nightly. for restless leg, Disp: 90 Tab, Rfl: 1    FLUoxetine (PROZAC) 20 mg tablet, Take 2 Tabs by mouth daily. , Disp: 180 Tab, Rfl: 1    tiZANidine (ZANAFLEX) 4 mg tablet, 4mg po TID, Disp: 270 Tab, Rfl: 1    labetalol (NORMODYNE) 200 mg tablet, Take 1 Tab by mouth two (2) times a day., Disp: 180 Tab, Rfl: 1    losartan-hydroCHLOROthiazide (HYZAAR) 100-12.5 mg per tablet, Take 1 Tab by mouth daily. , Disp: 90 Tab, Rfl: 1    ALPRAZolam (XANAX) 0.25 mg tablet, Take 1 Tab by mouth three (3) times daily as needed for Anxiety.  Max Daily Amount: 0.75 mg., Disp: 30 Tab, Rfl: 1   Date Last Reviewed:  5/2/2018    Number of Personal Factors/Comorbidities that affect the Plan of Care: 1-2: MODERATE COMPLEXITY   EXAMINATION:   Observation/Orthostatic Postural Assessment:          Posture Assessment: Rounded shoulders, Forward head   Functional Mobility:         Gait/Ambulation:     Speed/Lulu: Pace decreased (<100 feet/min)  Step Length: Left shortened, Right lengthened  Swing Pattern: Left asymmetrical, Right asymmetrical  Stance: Left increased, Right decreased  Gait Abnormalities: Antalgic, Circumduction, Foot drop, Path deviations, Trendelenburg  Ambulation - Level of Assistance: Modified independent  Assistive Device: Cane, quad  · Interventions: Verbal cues, Safety awareness training          Transfers:     Sit to Stand: Independent  Stand to Sit: Independent  Stand Pivot Transfers: Independent  · Bed to Chair: Independent          Bed Mobility:     Rolling: Independent  Supine to Sit: Independent  Sit to Supine: Independent  · Scooting: Independent    Strength:          L UE STRENGTH: 4/5 shoulder flexion, 4/5 shoulder abduction, 4/5 shoulder extension, 4/5 elbow flexion, 4/5 elbow extension. R UE STRENGTH: 2/5 shoulder flexion, 2/5 shoulder abduction, 2/5 shoulder extension, 2/5 elbow flexion, 2/5 elbow extension. R LE STRENGTH:  4+/5 hip flexion, 4+/5 hip abduction, 4+/5 hip adduction, 4+/5 hip extension, 4+/5 knee extension, 4+/5 knee flexion, 1/5 ankle dorsiflexion, 1/5 ankle plantarflexion, 1/5 ankle inversion, 1/5 ankle eversion. Sensation:         Intact for light touch and proprioception  Postural Control & Balance:  · Gipson Balance Scale:  33/56.   (A score less than 45/56 indicates high risk of falls)  . Score improved from 28/56 on initial evaluation. Body Structures Involved:  1. Nerves  2. Muscles Body Functions Affected:  1. Neuromusculoskeletal  2. Movement Related Activities and Participation Affected:  1. Mobility  2.  Self Care   Number of elements (examined above) that affect the Plan of Care: 4+: HIGH COMPLEXITY   CLINICAL PRESENTATION:   Presentation: Evolving clinical presentation with changing clinical characteristics: MODERATE COMPLEXITY   CLINICAL DECISION MAKING:   Outcome Measure: Tool Used: Gipson Balance Scale  Score:  Initial: 28/56 Most Recent: 33/56 (Date: 3- )   Interpretation of Score: Each section is scored on a 0-4 scale, 0 representing the patients inability to perform the task and 4 representing independence. The scores of each section are added together for a total score of 56. The higher the patients score, the more independent the patient is. Any score below 45 indicates increased risk for falls. Score 56 55-45 44-34 33-23 22-12 11-1 0   Modifier CH CI CJ CK CL CM CN     Medical Necessity:   · Patient is expected to demonstrate progress in strength, range of motion, balance and coordination to improve safety during daily activities. · Patient demonstrates good rehab potential due to higher previous functional level. · Skilled intervention continues to be required due to decreased mobility. Reason for Services/Other Comments:  · Patient continues to demonstrate capacity to improve overall mobility which will increase independence and increase safety. Use of outcome tool(s) and clinical judgement create a POC that gives a: Questionable prediction of patient's progress: MODERATE COMPLEXITY            TREATMENT:   (In addition to Assessment/Re-Assessment sessions the following treatments were rendered)  Pre-treatment Symptoms/Complaints:  5/2/2018:  \"When I've been up a lot, in the brace a while, or tired, leg gets \"wacky\" (uncoordinated). I want to work on my walking. \"  Pain: Initial: Pain Intensity 1: 0  Post Session:  0/10     NEUROMUSCULAR RE-EDUCATION: (25 minutes):  Exercise/activities per grid below to improve balance, coordination, kinesthetic sense, posture and proprioception.   Required minimal verbal and manual cues to promote static and dynamic balance in standing, promote coordination of bilateral, lower extremity(s) and promote motor control of bilateral, lower extremity(s). Date   4/30/18 Date   4/26/18 Date   5/2/18   Activity/Exercise      Stepping over half foam  X 2x10 reps B LE    Step taps      Step ups 6 inch  2 x10 leading with R LE  6 inch  2 x10 leading with R LE     [place]      Walking in the clinic with quad cane About 200 feet with quad cane, working on weightbearing more on R foot through midstance and terminal stance, and stepping bigger with L LE; added working on shifting R hips forward over R foot better midstance to terminal stance About 100 feet with quad cane, working on weightbearing more on R foot through midstance and terminal stance, and stepping bigger with L LE; added working on shifting R hips forward over R foot better midstance to terminal stance About 200 feet with quad cane, working on weightbearing more on R foot through midstance and terminal stance, and stepping bigger with L LE; added working on shifting R hips forward over R foot better midstance to terminal stance   pregait activity      Walking backwards with rail X 5 feet x 5 working on R hip extension AROM    Standing weight shifting onto R LE   Worked on marching with L LE, then stepping fwd/back with L LE, weight shifting onto R LE, trying to increase control and balance with no UE assist.    Walking in bars   Progressed above weight shifting activity to walking forward and backward in bars with no UE assist, some manual assist at pelvis                        THERAPEUTIC EXERCISE: (20 minutes):  Exercises per grid below to improve mobility and strength. Required minimal verbal cues to promote proper body alignment. Progressed repetitions as indicated.    Date  4/30/18 Date   4/26/18 Date  5/2/18   Activity/Exercise      Sit to stand       Left sidelying alternating isometrics on pelvis Standing hip abduction      Seated LAQs      Standing hamstrings curls       Bridging 2x10 with right lower 2x10 with right lower    Supine straight leg raise 2x10 with right lower extremity working on keeping foot/hip in neutral position 3# 2x10 with right lower extremity working on keeping foot/hip in neutral position    Supine PNF D1 and D2 patterns x 15 reps each R LE D1 and D2 patterns x 15 reps each R LE    L sidelying R pelvis and trunk PNF      Left sidelying hip abduction      L sidelying: picking up and moving R LE from in front of L foot to behind L foot (working on hip abd and ext) and stabilizing trunk 2x10 reps, working on lifting higher and keeping foot in neutral position 3# 2x10 reps, working on lifting higher and keeping foot in neutral position    L sidelying R clam shells 2x10 reps, working on lifting higher 2x10 reps, working on lifting higher    L sidelying R hip abduction X With cuing to keep leg in alignment with body, 2 x 10 reps    R foot and gastrocsoleus stretching X 20 minutes trying to get foot to neutral position X 20 minutes trying to get foot to neutral position X 20 minutes stretching toe flexors, gastroc, soleus, and tibialis anteriorl    Supine hooklying lower body rotation X     Standing minisquats in bars      [facility] Portal  Treatment/Session Assessment:    · Response to Treatment:  Patient tolerated exercises without complaints. She was excited to work more with her walking and did well. Need to continue to work on balance and confidence weight shifting through R LE to progress to a more normal gait pattern. Continue to progress to tolerance. NEED TO DO RECERT!  · Compliance with Program/Exercises: Compliant. · Recommendations/Intent for next treatment session: \"Next visit will focus on advancements to more challenging activities\".    Total Treatment Duration:  PT Patient Time In/Time Out  Time In: 1400  [facility]: [facility]

## 2018-05-03 ENCOUNTER — HOSPITAL ENCOUNTER (OUTPATIENT)
Dept: ULTRASOUND IMAGING | Age: 63
Discharge: HOME | End: 2018-05-03
Attending: RADIOLOGY
Payer: COMMERCIAL

## 2018-05-03 DIAGNOSIS — R60.9 SWELLING: ICD-10-CM

## 2018-05-03 PROCEDURE — 93971 EXTREMITY STUDY: CPT

## 2018-05-04 ENCOUNTER — HOSPITAL ENCOUNTER (OUTPATIENT)
Dept: PHYSICAL THERAPY | Age: 63
Discharge: HOME | End: 2018-05-04
Attending: PHYSICAL MEDICINE & REHABILITATION
Payer: COMMERCIAL

## 2018-05-04 PROCEDURE — 97112 NEUROMUSCULAR REEDUCATION: CPT

## 2018-05-04 PROCEDURE — 97110 THERAPEUTIC EXERCISES: CPT

## 2018-05-04 NOTE — PROGRESS NOTES
Fanny Montoya  : 1955  Primary:   Secondary:  [address] at [facility]  [address], [address], [facility] [address]  [phone]   [phone]          OUTPATIENT PHYSICAL THERAPY:Daily Note 2018    ICD-10: Treatment Diagnosis:   · Other abnormalities of gait and mobility (R26.89)  · Difficulty in walking, not elsewhere classified (R26.2)  Precautions/Allergies:   Banana; Lisinopril; and Nuts [tree nut]   Fall Risk Score: 5 (? 5 = High Risk)  MD Orders: Evaluate and Treat MEDICAL/REFERRING DIAGNOSIS:  Weakness due to cerebrovascular accident (CVA) ([facility] [address] [I63.9, R53.1]   DATE OF ONSET: CVA: 2017  REFERRING PHYSICIAN: Juliette Carlson*  RETURN PHYSICIAN APPOINTMENT: TBD       INITIAL ASSESSMENT:  Ms. Kenji Merchant presents with decreased mobility, decreased strength, decreased gait, and decreased balance secondary to CVA. After discussing with patient, she agreed she would benefit from physical therapy to improve above deficits. Please sign this plan of treatment if you concur. Thank you for the opportunity to serve this patient. Progress note on 2018:   Patient has attended thirty-five scheduled physical therapy appointments from 12/15/2017 to 2018. Patient is very motivated and compliant with therapy. Patient received her first Botox injection on 2014. Patient's right ankle is less tight, and right foot is easier to get into her right AFO. Patient also reports less right foot pain with walking. Patient would benefit from continuing skilled physical therapy to address problems and goals. Thank you for this referral.    PROBLEM LIST (Impacting functional limitations):  1. Decreased Strength  2. Decreased Ambulation Ability/Technique  3. Decreased Balance  4. Decreased Activity Tolerance INTERVENTIONS PLANNED:  1. Balance Exercise  2. Gait Training  3. Home Exercise Program (HEP)  4. Neuromuscular Re-education/Strengthening  5.  Range of Motion (ROM)  6. Therapeutic Exercise/Strengthening   TREATMENT PLAN:  Effective Dates: 2/12/2018 TO 5/7/2018. Frequency/Duration: 2-3 times a week for 12 weeks  GOALS: (Goals have been discussed and agreed upon with patient.)  Short-Term Functional Goals: Time Frame: 3 weeks  1. Patient will be independent with home exercise program without exacerbation of symptoms or cueing needed. Goal met. Discharge Goals: Time Frame: 8 weeks  1. Patient will be independent with all ADLs with minimal fear of falling and loss of balance with daily tasks. Goal ongoing. 2. Patient will report no fear avoidance with social or recreational activities due to fear of falling. Goal ongoing. 3. Patient will score greater than or equal to 45/56 on Gipson Balance Scale with minimal effect of imbalance on patient's ability to manage every day life activities. Goal ongoing. Rehabilitation Potential For Stated Goals: Good  Regarding Dipesh Iraheta's therapy, I certify that the treatment plan above will be carried out by a therapist or under their direction. Thank you for this referral,  Franco Kaminski PT     Referring Physician Signature: Deirdre Duverney, Amy*              Date                    The information in this section was collected on 12/15/2017 (except where otherwise noted). HISTORY:   History of Present Injury/Illness (Reason for Referral):  Patient reports she had a severe stroke on 9/6/2017. She reports that she was in the hospital and [facility] for about 61-62 days. She reports that she has progressed really well, but is still having trouble walking and using her right side (UE and LE). She reports that she walks using her AFO and quad cane all the time. She reports that she tries to be as active as possible. She reports that she is scared that she is going to fall, even though she has not.   She reports she would like to work on strengthening her right leg so her balance is better and she can walk without fear of falling. Past Medical History/Comorbidities:   Ms. Licha Shepard  has a past medical history of Anxiety; CVA (cerebral vascular accident) ([facility] [address] (2017); Depression; HTN (hypertension); Menopause; and PTE (pulmonary thromboembolism) ([facility] [address] (2017). Ms. Licha Shepard  has a past surgical history that includes hx jennifer and bso (); hx  section; hx refractive surgery (); hx other surgical (); and hx other surgical (). Social History/Living Environment:   Home Environment: Private residence  Living Alone: No  Support Systems: Family member(s), Friends \ neighbors; Spouse  Prior Level of Function/Work/Activity:  Independent  Dominant Side:         RIGHT  Personal Factors:          Sex:  female        Age:  60 y.o. Current Medications:       Current Outpatient Prescriptions:     polyethylene glycol (MIRALAX) 17 gram/dose powder, Take 17 g by mouth daily. , Disp: 510 g, Rfl: 2    [START ON 2018] methylphenidate HCl (RITALIN) 20 mg tablet, Take 1 Tab (20 mg total) by mouth daily. Max Daily Amount: 20 mg, Disp: 30 Tab, Rfl: 0    methylphenidate HCl (RITALIN) 20 mg tablet, Take 1 Tab (20 mg total) by mouth daily. Max Daily Amount: 20 mg, Disp: 30 Tab, Rfl: 0    methylphenidate HCl (RITALIN) 20 mg tablet, Take 1 Tab (20 mg total) by mouth daily. Max Daily Amount: 20 mg, Disp: 30 Tab, Rfl: 0    rOPINIRole (REQUIP) 2 mg tablet, Take 1 Tab by mouth nightly. for restless leg, Disp: 90 Tab, Rfl: 1    FLUoxetine (PROZAC) 20 mg tablet, Take 2 Tabs by mouth daily. , Disp: 180 Tab, Rfl: 1    tiZANidine (ZANAFLEX) 4 mg tablet, 4mg po TID, Disp: 270 Tab, Rfl: 1    labetalol (NORMODYNE) 200 mg tablet, Take 1 Tab by mouth two (2) times a day., Disp: 180 Tab, Rfl: 1    losartan-hydroCHLOROthiazide (HYZAAR) 100-12.5 mg per tablet, Take 1 Tab by mouth daily. , Disp: 90 Tab, Rfl: 1    ALPRAZolam (XANAX) 0.25 mg tablet, Take 1 Tab by mouth three (3) times daily as needed for Anxiety.  Max Daily Amount: 0.75 mg., Disp: 30 Tab, Rfl: 1   Date Last Reviewed:  5/4/2018    Number of Personal Factors/Comorbidities that affect the Plan of Care: 1-2: MODERATE COMPLEXITY   EXAMINATION:   Observation/Orthostatic Postural Assessment:          Posture Assessment: Rounded shoulders, Forward head   Functional Mobility:         Gait/Ambulation:     Speed/Lulu: Pace decreased (<100 feet/min)  Step Length: Left shortened, Right lengthened  Swing Pattern: Left asymmetrical, Right asymmetrical  Stance: Left increased, Right decreased  Gait Abnormalities: Antalgic, Circumduction, Foot drop, Path deviations, Trendelenburg  Ambulation - Level of Assistance: Modified independent  Assistive Device: Cane, quad  · Interventions: Verbal cues, Safety awareness training          Transfers:     Sit to Stand: Independent  Stand to Sit: Independent  Stand Pivot Transfers: Independent  · Bed to Chair: Independent          Bed Mobility:     Rolling: Independent  Supine to Sit: Independent  Sit to Supine: Independent  · Scooting: Independent    Strength:          L UE STRENGTH: 4/5 shoulder flexion, 4/5 shoulder abduction, 4/5 shoulder extension, 4/5 elbow flexion, 4/5 elbow extension. R UE STRENGTH: 2/5 shoulder flexion, 2/5 shoulder abduction, 2/5 shoulder extension, 2/5 elbow flexion, 2/5 elbow extension. R LE STRENGTH:  4+/5 hip flexion, 4+/5 hip abduction, 4+/5 hip adduction, 4+/5 hip extension, 4+/5 knee extension, 4+/5 knee flexion, 1/5 ankle dorsiflexion, 1/5 ankle plantarflexion, 1/5 ankle inversion, 1/5 ankle eversion. Sensation:         Intact for light touch and proprioception  Postural Control & Balance:  · Gipson Balance Scale:  33/56.   (A score less than 45/56 indicates high risk of falls)  . Score improved from 28/56 on initial evaluation. Body Structures Involved:  1. Nerves  2. Muscles Body Functions Affected:  1. Neuromusculoskeletal  2. Movement Related Activities and Participation Affected:  1. Mobility  2.  Self Care   Number of elements (examined above) that affect the Plan of Care: 4+: HIGH COMPLEXITY   CLINICAL PRESENTATION:   Presentation: Evolving clinical presentation with changing clinical characteristics: MODERATE COMPLEXITY   CLINICAL DECISION MAKING:   Outcome Measure: Tool Used: Gipson Balance Scale  Score:  Initial: 28/56 Most Recent: 33/56 (Date: 3- )   Interpretation of Score: Each section is scored on a 0-4 scale, 0 representing the patients inability to perform the task and 4 representing independence. The scores of each section are added together for a total score of 56. The higher the patients score, the more independent the patient is. Any score below 45 indicates increased risk for falls. Score 56 55-45 44-34 33-23 22-12 11-1 0   Modifier CH CI CJ CK CL CM CN     Medical Necessity:   · Patient is expected to demonstrate progress in strength, range of motion, balance and coordination to improve safety during daily activities. · Patient demonstrates good rehab potential due to higher previous functional level. · Skilled intervention continues to be required due to decreased mobility. Reason for Services/Other Comments:  · Patient continues to demonstrate capacity to improve overall mobility which will increase independence and increase safety. Use of outcome tool(s) and clinical judgement create a POC that gives a: Questionable prediction of patient's progress: MODERATE COMPLEXITY            TREATMENT:   (In addition to Assessment/Re-Assessment sessions the following treatments were rendered)  Pre-treatment Symptoms/Complaints:  5/4/2018:  \"No falls. At home, do exercises in the bed, took shower, ate. I can walk everywhere I want to in the house. Can get in shower by by myself, get help getting out. I just want to walk more normally. \"  Pain: Initial: Pain Intensity 1: 0  Post Session:  0/10     NEUROMUSCULAR RE-EDUCATION: (25 minutes):  Exercise/activities per grid below to improve balance, coordination, kinesthetic sense, posture and proprioception. Required minimal verbal and manual cues to promote static and dynamic balance in standing, promote coordination of bilateral, lower extremity(s) and promote motor control of bilateral, lower extremity(s).    Date   4/30/18 Date   4/26/18 Date   5/2/18 Date   5/4/18   Activity/Exercise       Stepping over half foam  X 2x10 reps B LE     Step taps       Step ups 6 inch  2 x10 leading with R LE  6 inch  2 x10 leading with R LE      [place]       Walking in the clinic with quad cane About 200 feet with quad cane, working on weightbearing more on R foot through midstance and terminal stance, and stepping bigger with L LE; added working on shifting R hips forward over R foot better midstance to terminal stance About 100 feet with quad cane, working on weightbearing more on R foot through midstance and terminal stance, and stepping bigger with L LE; added working on shifting R hips forward over R foot better midstance to terminal stance About 200 feet with quad cane, working on weightbearing more on R foot through midstance and terminal stance, and stepping bigger with L LE; added working on shifting R hips forward over R foot better midstance to terminal stance    pregait activity       Walking backwards with rail X 5 feet x 5 working on R hip extension AROM     Standing weight shifting onto R LE   Worked on marching with L LE, then stepping fwd/back with L LE, weight shifting onto R LE, trying to increase control and balance with no UE assist.  Worked on marching with L LE, then stepping fwd/back with L LE, weight shifting onto R LE, trying to increase control and balance with no UE assist.   Walking in bars   Progressed above weight shifting activity to walking forward and backward in bars with no UE assist, some manual assist at pelvis Progressed above weight shifting activity to walking forward and backward in bars with no UE assist, some manual assist at pelvis   Walked in clinic     with no cane with min to mod A working on weight bearing through R LE, to improve confidence and weight bearing. THERAPEUTIC EXERCISE: (20 minutes):  Exercises per grid below to improve mobility and strength. Required minimal verbal cues to promote proper body alignment. Progressed repetitions as indicated.    Date  4/30/18 Date   4/26/18 Date  5/2/18 Date   5/4/18   Activity/Exercise       Sit to stand        Left sidelying alternating isometrics on pelvis       Standing hip abduction       Seated LAQs       Standing hamstrings curls        Bridging 2x10 with right lower 2x10 with right lower     Supine straight leg raise 2x10 with right lower extremity working on keeping foot/hip in neutral position 3# 2x10 with right lower extremity working on keeping foot/hip in neutral position     Supine PNF D1 and D2 patterns x 15 reps each R LE D1 and D2 patterns x 15 reps each R LE     L sidelying R pelvis and trunk PNF       Left sidelying hip abduction       L sidelying: picking up and moving R LE from in front of L foot to behind L foot (working on hip abd and ext) and stabilizing trunk 2x10 reps, working on lifting higher and keeping foot in neutral position 3# 2x10 reps, working on lifting higher and keeping foot in neutral position     L sidelying R clam shells 2x10 reps, working on lifting higher 2x10 reps, working on lifting higher     L sidelying R hip abduction X With cuing to keep leg in alignment with body, 2 x 10 reps     R foot and gastrocsoleus stretching X 20 minutes trying to get foot to neutral position X 20 minutes trying to get foot to neutral position X 20 minutes stretching toe flexors, gastroc, soleus, and tibialis anterior X 20 minutes stretching toe flexors, gastroc, soleus, and tibialis anterior   Supine hooklying lower body rotation X      Standing minisquats in bars       [facility] Portal  Treatment/Session Assessment: · Response to Treatment:  Patient tolerated exercises without complaints. Reiterated to patient that she has a new normal and R LE is not going to work like L LE anymore. She wants to keep working on walking. She verbalized no other activities that she wants to practice. Worked on walking with no AD today only to increase weight bearing on R LE to normalize balance and gait, not with the intention to gait train without an AD. Patient's R foot still tends to invert mildly during weight bearing (despite initial botox injections), making balance control more challenging. Continue to progress to tolerance. RECERT IS DUE. · Compliance with Program/Exercises: Compliant. · Recommendations/Intent for next treatment session: \"Next visit will focus on advancements to more challenging activities\".    Total Treatment Duration:  PT Patient Time In/Time Out  Time In: 0169 (late)  Time Out: 111 Barbara Berman [facility]

## 2018-05-04 NOTE — PROGRESS NOTES
Elma Dickinson  : 1955  Primary: Brennan blayne Ppo  Secondary:  [address] at [place] [id number] [facility], [address] [place] [address],[address], [facility] [address].  [phone]   [phone]        OUTPATIENT OCCUPATIONAL THERAPY: Daily Note 2018    ICD-10: Treatment Diagnosis: Hemiplegia and hemiparesis following cerebral infarction affecting right dominant side (I69.351)  Precautions/Allergies:   Banana; Lisinopril; and Nuts [tree nut]   Fall Risk Score: 5 (? 5 = High Risk)  MD Orders: Referral to occupational therapy MEDICAL/REFERRING DIAGNOSIS:   Cerebral infarction, unspecified [I63.9]  Weakness [R53.1]   DATE OF ONSET: 2017   REFERRING PHYSICIAN: Juliette Weathers*  RETURN PHYSICIAN APPOINTMENT: unknown   18: Ms. Tracey Yo continues to demo' progress towards therapy goals. Since Botox, pt has demo'd improved improved R shoulder abduction ROM; R elbow also appears to be more relax with pt achieving greater elbow extension. Pt also demo's greater ability to contract and relax R hand/digits during hand strengthening activities. Pt would continue to benefit from skilled OT services to maximize functional use of R UE for increased independence with activities of daily living. 3/5/18: Ms. Tracey Yo continues to make excellent progress towards her therapy goals; she demonstrates gradual improvements in R UE range of motion, specifically R shoulder flexion and R elbow extension/flexion, and reports increased functional use of R UE in activities of daily living, including dressing, grooming, and feeding. Patient would continue to benefit from skilled occupational therapy services to maximize safety and independence and increase functional use of R UE during activities of daily living.    18: Ms. Tracey Yo is making excellent progress toward her goals; she is demonstrating improving range of motion and functional use of the right upper extremity in activities of daily living. Feel she will continue to benefit from skilled occupational therapy to maximize safety and independence with activities of daily living. INITIAL ASSESSMENT:  Ms. Siomara Crocker presents with impaired active movement, strength, coordination and sensation of the dominant right upper extremity that is affecting her ability to complete activities of daily living independently. Feel she may benefit from skilled occupational therapy to maximize safety and independence with activities of daily living. PLAN OF CARE:   PROBLEM LIST:  1. Decreased Strength  2. Decreased ADL/Functional Activities  3. Decreased Transfer Abilities  4. Decreased Balance  5. Decreased Flexibility/Joint Mobility  6. Decreased Cognition INTERVENTIONS PLANNED:  1. Activities of daily living training  2. Manual therapy training  3. Modalities  4. Neuromuscular re-eduation  5. Sensory reintegration training  6. Splinting  7. Therapeutic activity  8. Therapeutic exercise   TREATMENT PLAN:  Effective Dates: 3/19/18 to 6/17/18. Frequency/Duration: 2-3 times a week for 12 weeks  GOALS: (Goals have been discussed and agreed upon with patient.)  Short-Term Functional Goals: Time Frame: 6 weeks  1. Patient will demonstrate independence with home exercise program for right upper extremity range of motion. Met  2. Patient will increase use of right upper extremity as a functional assist in at least 25% of daily activities. Met  3. Patient will bathe with moderate assistance. Continue to address  Discharge Goals: Time Frame: 12 weeks  1. Patient will bathe with minimal assistance. Continue to address  2. Patient will feed self with modified independence and adaptive equipment as needed. Continue to address  3. Patient will dress with modified independence and adaptive equipment as needed. Continue to address  4. Patient will use right upper extremity as a functional assist in at least 50% of daily activities.  Continue to address  Rehabilitation Potential For Stated Goals: Good  Regarding Corrie Iraheta's therapy, I certify that the treatment plan above will be carried out by a therapist or under their direction. Thank you for this referral,  Lavinia Ribera, OT     Referring Physician Signature: Juliette Peterson* _________________________  Date _________            The information in this section was collected on 18 (except where otherwise noted). OCCUPATIONAL PROFILE & HISTORY:   History of Present Injury/Illness (Reason for Referral):  CVA with hospital and [facility] stay then home health therapy. Past Medical History/Comorbidities:   Ms. Jessica Lowe  has a past medical history of Anxiety; CVA (cerebral vascular accident) ([facility] [address] (2017); Depression; HTN (hypertension); Menopause; and PTE (pulmonary thromboembolism) ([facility] [address] (2017). Ms. Jessica Lowe  has a past surgical history that includes hx jennifer and bso (); hx  section; hx refractive surgery (); hx other surgical (); and hx other surgical (). Social History/Living Environment:      Prior Level of Function/Work/Activity:  Does seasonal taxes at [employer]  Dominant Side:         RIGHT  Current Medications:    Current Outpatient Prescriptions:     polyethylene glycol (MIRALAX) 17 gram/dose powder, Take 17 g by mouth daily. , Disp: 510 g, Rfl: 2    [START ON 2018] methylphenidate HCl (RITALIN) 20 mg tablet, Take 1 Tab (20 mg total) by mouth daily. Max Daily Amount: 20 mg, Disp: 30 Tab, Rfl: 0    methylphenidate HCl (RITALIN) 20 mg tablet, Take 1 Tab (20 mg total) by mouth daily. Max Daily Amount: 20 mg, Disp: 30 Tab, Rfl: 0    methylphenidate HCl (RITALIN) 20 mg tablet, Take 1 Tab (20 mg total) by mouth daily. Max Daily Amount: 20 mg, Disp: 30 Tab, Rfl: 0    rOPINIRole (REQUIP) 2 mg tablet, Take 1 Tab by mouth nightly. for restless leg, Disp: 90 Tab, Rfl: 1    FLUoxetine (PROZAC) 20 mg tablet, Take 2 Tabs by mouth daily. , Disp: 180 Tab, Rfl: 1    tiZANidine (ZANAFLEX) 4 mg tablet, 4mg po TID, Disp: 270 Tab, Rfl: 1    labetalol (NORMODYNE) 200 mg tablet, Take 1 Tab by mouth two (2) times a day., Disp: 180 Tab, Rfl: 1    losartan-hydroCHLOROthiazide (HYZAAR) 100-12.5 mg per tablet, Take 1 Tab by mouth daily. , Disp: 90 Tab, Rfl: 1    ALPRAZolam (XANAX) 0.25 mg tablet, Take 1 Tab by mouth three (3) times daily as needed for Anxiety. Max Daily Amount: 0.75 mg., Disp: 30 Tab, Rfl: 1            Date Last Reviewed:  5/4/2018   Complexity Level : Expanded review of therapy/medical records (1-2):  MODERATE COMPLEXITY   ASSESSMENT OF OCCUPATIONAL PERFORMANCE:   ROM:                   Balance:                   Coordination:                   Mental Status:                   Vision:                   Activities of Daily Living:           Basic ADLs (From Assessment) Complex ADLs (From Assessment)         Grooming/Bathing/Dressing Activities of Daily Living                                   Sensation:         Light touch absent distal to right elbow     Physical Skills Involved:  1. Range of Motion  2. Balance  3. Sensation  4. Fine Motor Control  5. Gross Motor Control Cognitive Skills Affected (resulting in the inability to perform in a timely and safe manner):  1. Executive Function  2. Sustained Attention  3. Divided Attention  4. Comprehension Psychosocial Skills Affected:  1. None   Number of elements that affect the Plan of Care: 5+:  HIGH COMPLEXITY   CLINICAL DECISION MAKING:   Outcome Measure: Tool Used: [address] [facility] [address] [address] AM-[facility] Daily Activity Outpatient Short Form  Score:  Initial: 18 Most Recent: 26 (Date: 5/2/18 )   Interpretation of Tool:  Represents clinically-significant functional categories (i.e., basic and instrumental activities of daily living, fine motor activities). Score 60 59-55 54-47 46-34 32-21 20-16 15   Modifier CH CI CJ CK CL CM CN     Medical Necessity:   · Patient demonstrates good rehab potential due to higher previous functional level.   Reason for Services/Other Comments:  · Patient continues to require skilled intervention due to decreased safety and independence in activities of daily living. Clinical Decision-Making Assessment: Moderately difficult to predict patient's progress at this time due to the extent of her limitations in functional movement and use of dominant right upper extremity. Assessment process, impact of co-morbidities, assessment modification\need for assistance, and selection of interventions: Analytical Complexity:MODERATE COMPLEXITY   TREATMENT:   (In addition to Assessment/Re-Assessment sessions the following treatments were rendered)    Pre-treatment Symptoms/Complaints:  Patient states, \"My hand is really tight sometimes, but then it will be very relaxed. \"  Pain: Initial:   Pain Intensity 1: 0/10 Post Session:  same     Therapeutic Exercise: ( 35 minutes):  Exercises per grid below to improve dynamic movement of arm - right and hand - right to improve functional lifting, carrying, reaching and grasping. Required moderate visual and verbal cues to promote proper body mechanics. Progressed range, repetitions and complexity of movement as indicated.        Date:  4/12/18 Date:  4/16/18 Date:  4/19/18 Date:  4/23/18 Date:  4/26/18 Date:  4/30/18 Date:  5/2/18 Date:   5/4/18   Activity/Exercise           Shoulder shrugs AROM x 10 AROM x 10 reps with mirror for visual feedback AROM x 10 AROM x 10 AROM x 10 AROM x 10 AROM x 10 AROM x 10   Scapular protraction/retraction AROM  1 x 10 with passive self range into elbow extension at end range AROM  1 x 10 with passive self range into elbow extension at end range AROM  1 x 10 with passive self range into elbow extension at end range AROMAROM  1 x 10 with passive self range into elbow extension at end range  1 x 10 with passive self range into elbow extension at end range AROM  1 x 10 with passive self range into elbow extension at end range AROM  1 x 10 with passive self range into elbow extension at end range AROM  1 x 10 with passive self range into elbow extension at end range AROM  1 x 10 with passive self range into elbow extension at end range   Shoulder abduction           Elbow flexion/extension A/AAROM x 5 reps A/AAROM x 5 reps SROM x 15 reps seated  PROM x 5 reps in supine PROM x 10 reps SROM x 15 reps seated SROM x 15 reps seated SROM x 15 reps seated    PNF D1/D2 diagonals           Hand helper 25 lbs x 10 reps with assist to extend fingers   20 lbs x 10 reps with assist to extend fingers 20 lbs x 10 reps with assist to extend fingers      Tabletop skateboard           Shoulder flexion/extension A/AAROM x 10 reps each A/AAROM x 10 reps each SROM x 15 reps in supine PROM x 1o reps SROM x 15 reps in supine PROM x 15 in supine     Chest press           UBE Min assist to sustain right  on handle   Level 0  7 mintues Min assist to sustain right  on handle   Level 0  7 mintues Min assist to sustain right  on handle   Level 0  7 mintues Min assist to sustain right  on handle   Level 0.5  7 mintues Min assist to sustain right  on handle   Level 0.5  7 mintues Min assist to sustain right  on handle   Level 0.5  7 mintues Min assist to sustain right  on handle   Level 0.5  7 mintues Min assist to sustain right  on handle   Level 0.5  7 mintues   Modified push-ups   Hands on countertop with dycem under right hand  3 x 10 Hands on countertop with dycem under right hand  3 x 10 Hands on countertop with dycem under right hand  3 x 10 Hands on countertop with dycem under right hand  3 x 10 Hands on countertop with dycem under right hand  3 x 10 Hands on countertop with dycem under right hand  3 x 10    Resistive clothespin Yellow   1 x 5 pinch and release Yellow   1 x 5 pinch and release  Yellow   1 x 5 pinch and release Yellow   1 x 5 pinch and release Yellow   1 x 5 pinch and release     Shoulder arc        Gripped and released ~10 tabs to pull over shoulder arc.    Wrist flexion/extension   PROM x 5 reps  SROM x 10 reps  with prolonged stretch at end range of extension PROM x 10 reps with prolonged stretch at end range of extension  PROM x 10 reps with prolonged stretch at end range of extension PROM x 10 reps with prolonged stretch at end range of extension    Punching bag           Finger flexion/extension   PROM x 5 reps   with prolonged stretch at end range of extension PROM x 5 reps   with prolonged stretch at end range of extension PROM x 5 reps   with prolonged stretch at end range of extension PROM x 10 reps with prolonged stretch at end range of extension PROM x 10 reps with prolonged stretch at end range of extension    Measurements Taken       Am-PAC and goals re-assessed. Access Code: [email or web address]   URL: [email or web address]   Date: 01/19/2018   Prepared by: Torie Echavarria     Exercises   Supine Shoulder Flexion PROM - 10 reps - 2 sets - 3 hold - 1x daily - 5x weekly   Push-Up on Counter - 10 reps - 2 sets - 1x daily - 5x weekly   Seated Shoulder Shrugs - 10 reps - 2 sets - 1x daily - 5x weekly   Seated Scapular Retraction - 10 reps - 2 sets - 1x daily - 5x weekly   Seated Weight Shifting with Arm Support - 10 reps - 2 sets - 1x daily - 5x weekly   Seated Shoulder Flexion Self PROM - 10 reps - 2 sets - 1x daily - 5x weekly   Supine Elbow Flexion Extension AROM - 10 reps - 2 sets - 1x daily - 5x weekly   Seated Elbow Flexion Extension AROM - 10 reps - 2 sets - 1x daily - 5x weekly     Neuromuscular Re-education: ( 10 minutes):  Exercise/activities per grid below to improve balance, coordination, kinesthetic sense and proprioception. Required moderate visual and verbal cues to promote static balance in sitting, promote coordination of right, upper extremity(s) and promote motor control of right, upper extremity(s).           Patient completed lateral weight shifts sitting at edge of mat x 10 reps each onto elbow/forearms  with dynamic reach with left hand outside of base of support, using right upper extremity to maintain balance. Completed lateral weight shifts onto outstretched right hand with minimal assist to maintain position of right hand on mat x 5 reps. Treatment/Session Assessment: Since Botox, pt has demo'd improved improved R shoulder abduction ROM; R elbow also appears to be more relax with pt achieving greater elbow extension. Pt also demo's greater ability to contract and relax R hand/digits during hand strengthening activities. Pt continues to demo improved lateral weight shifts outside base of support with dynamic reach. Patient demonstrating independence with passive stretching of right upper extremity and agreeable to complete HEP daily. Pt would continue to benefit from skilled OT services to increase functional use of R UE and increase independence with BADLs. · Response to Treatment: Mrs. Albert Ibanez tolerated therapy well today, without any issue. She continues to be very cooperative and motivated during therapy. She is agreeable to working on increasing functional use of right upper extremity during ADLs in home environment. · Compliance with Program/Exercises: Will assess as treatment progresses. Pt reports compliance with HEP. · Recommendations/Intent for next treatment session: \"Next visit will focus on advancements to more challenging activities and reduction in assistance provided\".     Total Treatment Duration:  OT Patient Time In/Time Out  Time In: 1345  Time Out: [address] [facility] Andria Gallardo

## 2018-05-07 ENCOUNTER — HOSPITAL ENCOUNTER (OUTPATIENT)
Dept: PHYSICAL THERAPY | Age: 63
Discharge: HOME | End: 2018-05-07
Attending: PHYSICAL MEDICINE & REHABILITATION
Payer: COMMERCIAL

## 2018-05-07 PROCEDURE — 97112 NEUROMUSCULAR REEDUCATION: CPT

## 2018-05-07 PROCEDURE — 97110 THERAPEUTIC EXERCISES: CPT

## 2018-05-07 NOTE — THERAPY RECERTIFICATION
Harrold Schaumann  : 1955  Primary:   Secondary:  [address] at [place] [id number] [facility], [address] [place] [address],[address], [facility].  [phone]   [phone]          OUTPATIENT PHYSICAL THERAPY:Recertification [id number]    ICD-10: Treatment Diagnosis:   · Other abnormalities of gait and mobility (R26.89)  · Difficulty in walking, not elsewhere classified (R26.2)  Precautions/Allergies:   Banana; Lisinopril; and Nuts [tree nut]   Fall Risk Score: 5 (? 5 = High Risk)  MD Orders: Evaluate and Treat MEDICAL/REFERRING DIAGNOSIS:  Weakness due to cerebrovascular [facility] ([facility]) ([facility] [I63.9, R53.1]   DATE OF ONSET: CVA: 2017  REFERRING PHYSICIAN: Juliette Hatfield*  RETURN PHYSICIAN APPOINTMENT: TBD       INITIAL ASSESSMENT:  Ms. Naz Marcano presents with decreased mobility, decreased strength, decreased gait, and decreased balance secondary to CVA. After discussing with patient, she agreed she would benefit from physical therapy to improve above deficits. Please sign this plan of treatment if you concur. Thank you for the opportunity to serve this patient. Recertification note on 2018:   Patient has attended [de-identified] scheduled physical therapy appointments from 12/15/2017 to 2018. Patient is very motivated and compliant with therapy. Patient received her first Botox injection on 2014. Patient's right ankle is less tight, and right foot is easier to get into her right AFO. Patient also reports less right foot pain with walking. Patient demonstrates improved balance since beginning therapy. Patient would like to continue working on ambulation throughout the community. Patient would benefit from continuing skilled physical therapy to address problems and goals. Thank you for this referral.    PROBLEM LIST (Impacting functional limitations):  1. Decreased Strength  2. Decreased Ambulation Ability/Technique  3. Decreased Balance  4.  Decreased Activity Tolerance INTERVENTIONS PLANNED:  1. Balance Exercise  2. Gait Training  3. Home Exercise Program (HEP)  4. Neuromuscular Re-education/Strengthening  5. Range of Motion (ROM)  6. Therapeutic Exercise/Strengthening   TREATMENT PLAN:  Effective Dates: 5/7/2018 TO 8/5/2018 (90 days). Frequency/Duration: 2 times a week for 90 Days  GOALS: (Goals have been discussed and agreed upon with patient.)  Short-Term Functional Goals: Time Frame: 3 weeks  1. Patient will be independent with home exercise program without exacerbation of symptoms or cueing needed. Goal met. Discharge Goals: Time Frame: 8 weeks  1. Patient will be independent with all ADLs with minimal fear of falling and loss of balance with daily tasks. Goal ongoing. 2. Patient will report no fear avoidance with social or recreational activities due to fear of falling. Goal ongoing. 3. Patient will score greater than or equal to 45/56 on Gpison Balance Scale with minimal effect of imbalance on patient's ability to manage every day life activities. Goal ongoing. Rehabilitation Potential For Stated Goals: Good  Regarding Paola Iraheta's therapy, I certify that the treatment plan above will be carried out by a therapist or under their direction. Thank you for this referral,  Flora Shultz PT     Referring Physician Signature: Juliette Otero*              Date                    The information in this section was collected on 12/15/2017 (except where otherwise noted). HISTORY:   History of Present Injury/Illness (Reason for Referral):  Patient reports she had a severe stroke on 9/6/2017. She reports that she was in the hospital and [facility] for about 61-62 days. She reports that she has progressed really well, but is still having trouble walking and using her right side (UE and LE). She reports that she walks using her AFO and quad cane all the time. She reports that she tries to be as active as possible.   She reports that she is scared that she is going to fall, even though she has not. She reports she would like to work on strengthening her right leg so her balance is better and she can walk without fear of falling. Past Medical History/Comorbidities:   Ms. Cristine Peralta  has a past medical history of Anxiety; CVA (cerebral vascular accident) ([facility] (2017); Depression; HTN (hypertension); Menopause; and P[facility]ol[facility]) [facility] ([facility]). Ms. Cristine Peralta  has a past surgical history that includes hx jennifer and bso (); hx  section; hx refractive surgery (); hx other surgical (); and hx other surgical (2017). Social History/Living Environment:   Home Environment: Private residence  Living Alone: No  Support Systems: Family member(s), Friends \ neighbors; Spouse  Prior Level of Function/Work/Activity:  Independent  Dominant Side:         RIGHT  Personal Factors:          Sex:  female        Age:  60 y.o. Current Medications:       Current Outpatient Prescriptions:     polyethylene glycol (MIRALAX) 17 gram/dose powder, Take 17 g by mouth daily. , Disp: 510 g, Rfl: 2    [START ON 2018] methylphenidate HCl (RITALIN) 20 mg tablet, Take 1 Tab (20 mg total) by mouth daily. Max Daily Amount: 20 mg, Disp: 30 Tab, Rfl: 0    methylphenidate HCl (RITALIN) 20 mg tablet, Take 1 Tab (20 mg total) by mouth daily. Max Daily Amount: 20 mg, Disp: 30 Tab, Rfl: 0    methylphenidate HCl (RITALIN) 20 mg tablet, Take 1 Tab (20 mg total) by mouth daily. Max Daily Amount: 20 mg, Disp: 30 Tab, Rfl: 0    rOPINIRole (REQUIP) 2 mg tablet, Take 1 Tab by mouth nightly. for restless leg, Disp: 90 Tab, Rfl: 1    FLUoxetine (PROZAC) 20 mg tablet, Take 2 Tabs by mouth daily. , Disp: 180 Tab, Rfl: 1    tiZANidine (ZANAFLEX) 4 mg tablet, 4mg po TID, Disp: 270 Tab, Rfl: 1    labetalol (NORMODYNE) 200 mg tablet, Take 1 Tab by mouth two (2) times a day., Disp: 180 Tab, Rfl: 1    losartan-hydroCHLOROthiazide (HYZAAR) 100-12.5 mg per tablet, Take 1 Tab by mouth daily., Disp: 90 Tab, Rfl: 1    ALPRAZolam (XANAX) 0.25 mg tablet, Take 1 Tab by mouth three (3) times daily as needed for Anxiety. Max Daily Amount: 0.75 mg., Disp: 30 Tab, Rfl: 1   Date Last Reviewed:  5/7/2018    Number of Personal Factors/Comorbidities that affect the Plan of Care: 1-2: MODERATE COMPLEXITY   EXAMINATION:   Observation/Orthostatic Postural Assessment:          Posture Assessment: Rounded shoulders, Forward head   Functional Mobility:         Gait/Ambulation:     Speed/Lulu: Pace decreased (<100 feet/min)  Step Length: Left shortened, Right lengthened  Swing Pattern: Left asymmetrical, Right asymmetrical  Stance: Left increased, Right decreased  Gait Abnormalities: Antalgic, Circumduction, Foot drop, Path deviations, Trendelenburg  Ambulation - Level of Assistance: Modified independent  Assistive Device: Cane, quad  · Interventions: Verbal cues, Safety awareness training          Transfers:     Sit to Stand: Independent  Stand to Sit: Independent  Stand Pivot Transfers: Independent  · Bed to Chair: Independent          Bed Mobility:     Rolling: Independent  Supine to Sit: Independent  Sit to Supine: Independent  · Scooting: Independent    Strength:          L UE STRENGTH: 4/5 shoulder flexion, 4/5 shoulder abduction, 4/5 shoulder extension, 4/5 elbow flexion, 4/5 elbow extension. R UE STRENGTH: 2/5 shoulder flexion, 2/5 shoulder abduction, 2/5 shoulder extension, 2/5 elbow flexion, 2/5 elbow extension. R LE STRENGTH:  4+/5 hip flexion, 4/5 hip abduction, 55 hip adduction, 4+/5 hip extension, 4+/5 5/5knee extension, 3-/5 knee flexion, 1/5 ankle dorsiflexion, 1/5 ankle plantarflexion, 1/5 ankle inversion, 1/5 ankle eversion. Sensation:         Intact for light touch and proprioception  Postural Control & Balance:  · Gipson Balance Scale:  36/56.   (A score less than 45/56 indicates high risk of falls)  . Score improved from 28/56 on initial evaluation.     Body Structures Involved:  1. Nerves  2. Muscles Body Functions Affected:  1. Neuromusculoskeletal  2. Movement Related Activities and Participation Affected:  1. Mobility  2. Self Care   Number of elements (examined above) that affect the Plan of Care: 4+: HIGH COMPLEXITY   CLINICAL PRESENTATION:   Presentation: Evolving clinical presentation with changing clinical characteristics: MODERATE COMPLEXITY   CLINICAL DECISION MAKING:   Outcome Measure: Tool Used: Gipson Balance Scale  Score:  Initial: 28/56 Most Recent: 36/56 (Date: 5-7-2018 )   Interpretation of Score: Each section is scored on a 0-4 scale, 0 representing the patients inability to perform the task and 4 representing independence. The scores of each section are added together for a total score of 56. The higher the patients score, the more independent the patient is. Any score below 45 indicates increased risk for falls. Score 56 55-45 44-34 33-23 22-12 11-1 0   Modifier CH CI CJ CK CL CM CN     Medical Necessity:   · Patient is expected to demonstrate progress in strength, range of motion, balance and coordination to improve safety during daily activities. · Patient demonstrates good rehab potential due to higher previous functional level. · Skilled intervention continues to be required due to decreased mobility. Reason for Services/Other Comments:  · Patient continues to demonstrate capacity to improve overall mobility which will increase independence and increase safety. Use of outcome tool(s) and clinical judgement create a POC that gives a: Questionable prediction of patient's progress: MODERATE COMPLEXITY            TREATMENT:   (In addition to Assessment/Re-Assessment sessions the following treatments were rendered)  Pre-treatment Symptoms/Complaints:  5/7/2018:  \"I want to work on walking normally. \"  Pain: Initial: Pain Intensity 1: 0  Post Session:  0/10     NEUROMUSCULAR RE-EDUCATION: (25 minutes):  Exercise/activities per grid below to improve balance, coordination, kinesthetic sense, posture and proprioception. Required minimal verbal and manual cues to promote static and dynamic balance in standing, promote coordination of bilateral, lower extremity(s) and promote motor control of bilateral, lower extremity(s).    Date   5/7/18 Date   4/26/18 Date   5/2/18 Date   5/4/18   Activity/Exercise       Stepping over half foam  X 2x10 reps B LE     Step taps       Step ups X  6 inch  2 x10 leading with R LE      [place]       Walking in the clinic with quad cane About 300 feet with quad cane, working on weightbearing more on R foot through midstance and terminal stance, and stepping bigger with L LE; added working on shifting R hips forward over R foot better midstance to terminal stance+ walking up and down a ramp About 100 feet with quad cane, working on weightbearing more on R foot through midstance and terminal stance, and stepping bigger with L LE; added working on shifting R hips forward over R foot better midstance to terminal stance About 200 feet with quad cane, working on weightbearing more on R foot through midstance and terminal stance, and stepping bigger with L LE; added working on shifting R hips forward over R foot better midstance to terminal stance    pregait activity       Walking backwards with rail X 5 feet x 5 working on R hip extension AROM     Standing weight shifting onto R LE Worked on marching with L LE, then stepping fwd/back with L LE, weight shifting onto R LE, trying to increase control and balance with no UE assist.  Worked on marching with L LE, then stepping fwd/back with L LE, weight shifting onto R LE, trying to increase control and balance with no UE assist.  Worked on marching with L LE, then stepping fwd/back with L LE, weight shifting onto R LE, trying to increase control and balance with no UE assist.   Walking in bars Progressed above weight shifting activity to walking forward and backward in bars with no UE assist, some manual assist at pelvis  Progressed above weight shifting activity to walking forward and backward in bars with no UE assist, some manual assist at pelvis Progressed above weight shifting activity to walking forward and backward in bars with no UE assist, some manual assist at pelvis   Walked in clinic with no cane with min to mod A working on weight bearing through R LE, to improve confidence and weight bearing. with no cane with min to mod A working on weight bearing through R LE, to improve confidence and weight bearing. THERAPEUTIC EXERCISE: (20 minutes):  Exercises per grid below to improve mobility and strength. Required minimal verbal cues to promote proper body alignment. Progressed repetitions as indicated.    Date  5/7/18 Date   4/26/18 Date  5/2/18 Date   5/4/18   Activity/Exercise       Sit to stand        Left sidelying alternating isometrics on pelvis       Standing hip abduction       Seated LAQs       Standing hamstrings curls        Bridging  2x10 with right lower     Supine straight leg raise  2x10 with right lower extremity working on keeping foot/hip in neutral position     Supine PNF  D1 and D2 patterns x 15 reps each R LE     L sidelying R pelvis and trunk PNF       Left sidelying hip abduction       L sidelying: picking up and moving R LE from in front of L foot to behind L foot (working on hip abd and ext) and stabilizing trunk  2x10 reps, working on lifting higher and keeping foot in neutral position     L sidelying R clam shells  2x10 reps, working on lifting higher     L sidelying R hip abduction  With cuing to keep leg in alignment with body, 2 x 10 reps     R foot and gastrocsoleus stretching X 20 minutes trying to get foot to neutral position X 20 minutes trying to get foot to neutral position X 20 minutes stretching toe flexors, gastroc, soleus, and tibialis anterior X 20 minutes stretching toe flexors, gastroc, soleus, and tibialis anterior   Supine hooklying lower body rotation X      Standing minisquats in bars       [facility]           [facility]  Treatment/Session Assessment:    · Response to Treatment:  Patient demonstrates improved balance since initial evaluation. · Compliance with Program/Exercises: Compliant. · Recommendations/Intent for next treatment session: \"Next visit will focus on advancements to more challenging activities\".    Total Treatment Duration:  PT Patient Time In/Time Out  Time In: 1304  Time Out: 2000 Cy Hernandez

## 2018-05-07 NOTE — PROGRESS NOTES
Maurice Abad  : 1955  Primary: Delmar Holman Ppo  Secondary:  [address] at [place] [id number] [facility], [address] [place] [address] [address], [facility] [address] [facility].  [phone]   [phone]        OUTPATIENT OCCUPATIONAL THERAPY: Daily Note 2018    ICD-10: Treatment Diagnosis: Hemiplegia and hemiparesis following cerebral infarction affecting right dominant side (I69.351)  Precautions/Allergies:   Banana; Lisinopril; and Nuts [tree nut]   Fall Risk Score: 5 (? 5 = High Risk)  MD Orders: Referral to occupational therapy MEDICAL/REFERRING DIAGNOSIS:   Cerebral infarction, unspecified [I63.9]  Weakness [R53.1]   DATE OF ONSET: 2017   REFERRING PHYSICIAN: Juliette Ruiz*  RETURN PHYSICIAN APPOINTMENT: unknown   18: Ms. Marcella Cabello continues to demo' progress towards therapy goals. Since Botox, pt has demo'd improved improved R shoulder abduction ROM; R elbow also appears to be more relax with pt achieving greater elbow extension. Pt also demo's greater ability to contract and relax R hand/digits during hand strengthening activities. Pt would continue to benefit from skilled OT services to maximize functional use of R UE for increased independence with activities of daily living. 3/5/18: Ms. Marcella Cabello continues to make excellent progress towards her therapy goals; she demonstrates gradual improvements in R UE range of motion, specifically R shoulder flexion and R elbow extension/flexion, and reports increased functional use of R UE in activities of daily living, including dressing, grooming, and feeding. Patient would continue to benefit from skilled occupational therapy services to maximize safety and independence and increase functional use of R UE during activities of daily living.    18: Ms. Marcella Cabello is making excellent progress toward her goals; she is demonstrating improving range of motion and functional use of the right upper extremity in activities of daily living. Feel she will continue to benefit from skilled occupational therapy to maximize safety and independence with activities of daily living. INITIAL ASSESSMENT:  Ms. Ambar Jon presents with impaired active movement, strength, coordination and sensation of the dominant right upper extremity that is affecting her ability to complete activities of daily living independently. Feel she may benefit from skilled occupational therapy to maximize safety and independence with activities of daily living. PLAN OF CARE:   PROBLEM LIST:  1. Decreased Strength  2. Decreased ADL/Functional Activities  3. Decreased Transfer Abilities  4. Decreased Balance  5. Decreased Flexibility/Joint Mobility  6. Decreased Cognition INTERVENTIONS PLANNED:  1. Activities of daily living training  2. Manual therapy training  3. Modalities  4. Neuromuscular re-eduation  5. Sensory reintegration training  6. Splinting  7. Therapeutic activity  8. Therapeutic exercise   TREATMENT PLAN:  Effective Dates: 3/19/18 to 6/17/18. Frequency/Duration: 2-3 times a week for 12 weeks  GOALS: (Goals have been discussed and agreed upon with patient.)  Short-Term Functional Goals: Time Frame: 6 weeks  1. Patient will demonstrate independence with home exercise program for right upper extremity range of motion. Met  2. Patient will increase use of right upper extremity as a functional assist in at least 25% of daily activities. Met  3. Patient will bathe with moderate assistance. Continue to address  Discharge Goals: Time Frame: 12 weeks  1. Patient will bathe with minimal assistance. Continue to address  2. Patient will feed self with modified independence and adaptive equipment as needed. Continue to address  3. Patient will dress with modified independence and adaptive equipment as needed. Continue to address  4. Patient will use right upper extremity as a functional assist in at least 50% of daily activities.  Continue to address  Rehabilitation Potential For Stated Goals: Good  Regarding Lawanda Smyrna Esequiel's therapy, I certify that the treatment plan above will be carried out by a therapist or under their direction. Thank you for this referral,  Francisco Larios, OT     Referring Physician Signature: Whitney RodgerJuliette delgado* _________________________  Date _________            The information in this section was collected on 18 (except where otherwise noted). OCCUPATIONAL PROFILE & HISTORY:   History of Present Injury/Illness (Reason for Referral):  CVA with hospital and [facility] stay then home health therapy. Past Medical History/Comorbidities:   Ms. Cristine Peralta  has a past medical history of Anxiety; CVA (cerebral vascular accident) ([facility] [address] (2017); Depression; HTN (hypertension); Menopause; and PTE (pulmonary thromboembolism) ([facility] [address] (2017). Ms. Cristine Peralta  has a past surgical history that includes hx jennifer and bso (); hx  section; hx refractive surgery (); hx other surgical (); and hx other surgical (). Social History/Living Environment:      Prior Level of Function/Work/Activity:  Does seasonal taxes at [employer]  Dominant Side:         RIGHT  Current Medications:    Current Outpatient Prescriptions:     polyethylene glycol (MIRALAX) 17 gram/dose powder, Take 17 g by mouth daily. , Disp: 510 g, Rfl: 2    [START ON 2018] methylphenidate HCl (RITALIN) 20 mg tablet, Take 1 Tab (20 mg total) by mouth daily. Max Daily Amount: 20 mg, Disp: 30 Tab, Rfl: 0    methylphenidate HCl (RITALIN) 20 mg tablet, Take 1 Tab (20 mg total) by mouth daily. Max Daily Amount: 20 mg, Disp: 30 Tab, Rfl: 0    methylphenidate HCl (RITALIN) 20 mg tablet, Take 1 Tab (20 mg total) by mouth daily. Max Daily Amount: 20 mg, Disp: 30 Tab, Rfl: 0    rOPINIRole (REQUIP) 2 mg tablet, Take 1 Tab by mouth nightly. for restless leg, Disp: 90 Tab, Rfl: 1    FLUoxetine (PROZAC) 20 mg tablet, Take 2 Tabs by mouth daily. , Disp: 180 Tab, Rfl: 1    tiZANidine (ZANAFLEX) 4 mg tablet, 4mg po TID, Disp: 270 Tab, Rfl: 1    labetalol (NORMODYNE) 200 mg tablet, Take 1 Tab by mouth two (2) times a day., Disp: 180 Tab, Rfl: 1    losartan-hydroCHLOROthiazide (HYZAAR) 100-12.5 mg per tablet, Take 1 Tab by mouth daily. , Disp: 90 Tab, Rfl: 1    ALPRAZolam (XANAX) 0.25 mg tablet, Take 1 Tab by mouth three (3) times daily as needed for Anxiety. Max Daily Amount: 0.75 mg., Disp: 30 Tab, Rfl: 1            Date Last Reviewed:  5/7/2018   Complexity Level : Expanded review of therapy/medical records (1-2):  MODERATE COMPLEXITY   ASSESSMENT OF OCCUPATIONAL PERFORMANCE:   ROM:                   Balance:                   Coordination:                   Mental Status:                   Vision:                   Activities of Daily Living:           Basic ADLs (From Assessment) Complex ADLs (From Assessment)         Grooming/Bathing/Dressing Activities of Daily Living                                   Sensation:         Light touch absent distal to right elbow     Physical Skills Involved:  1. Range of Motion  2. Balance  3. Sensation  4. Fine Motor Control  5. Gross Motor Control Cognitive Skills Affected (resulting in the inability to perform in a timely and safe manner):  1. Executive Function  2. Sustained Attention  3. Divided Attention  4. Comprehension Psychosocial Skills Affected:  1. None   Number of elements that affect the Plan of Care: 5+:  HIGH COMPLEXITY   CLINICAL DECISION MAKING:   Outcome Measure: Tool Used: [address] [facility] [address] [id number] [facility] Daily Activity Outpatient Short Form  Score:  Initial: 18 Most Recent: 26 (Date: 5/2/18 )   Interpretation of Tool:  Represents clinically-significant functional categories (i.e., basic and instrumental activities of daily living, fine motor activities). Score 60 59-55 54-47 46-34 32-21 20-16 15   Modifier CH CI CJ CK CL CM CN     Medical Necessity:   · Patient demonstrates good rehab potential due to higher previous functional level.   Reason for Services/Other Comments:  · Patient continues to require skilled intervention due to decreased safety and independence in activities of daily living. Clinical Decision-Making Assessment: Moderately difficult to predict patient's progress at this time due to the extent of her limitations in functional movement and use of dominant right upper extremity. Assessment process, impact of co-morbidities, assessment modification\need for assistance, and selection of interventions: Analytical Complexity:MODERATE COMPLEXITY   TREATMENT:   (In addition to Assessment/Re-Assessment sessions the following treatments were rendered)    Pre-treatment Symptoms/Complaints:  Patient states, \"I want to be able to use my R hand more. \"  Pain: Initial:   Pain Intensity 1: 0/10 Post Session:  same     Therapeutic Exercise: ( 35 minutes):  Exercises per grid below to improve dynamic movement of arm - right and hand - right to improve functional lifting, carrying, reaching and grasping. Required moderate visual and verbal cues to promote proper body mechanics. Progressed range, repetitions and complexity of movement as indicated.        Date:  4/16/18 Date:  4/19/18 Date:  4/23/18 Date:  4/26/18 Date:  4/30/18 Date:  5/2/18 Date:   5/4/18 Date:  5/7/18   Activity/Exercise           Shoulder shrugs AROM x 10 reps with mirror for visual feedback AROM x 10 AROM x 10 AROM x 10 AROM x 10 AROM x 10 AROM x 10 AROM x 10   Scapular protraction/retraction AROM  1 x 10 with passive self range into elbow extension at end range AROM  1 x 10 with passive self range into elbow extension at end range AROMAROM  1 x 10 with passive self range into elbow extension at end range  1 x 10 with passive self range into elbow extension at end range AROM  1 x 10 with passive self range into elbow extension at end range AROM  1 x 10 with passive self range into elbow extension at end range AROM  1 x 10 with passive self range into elbow extension at end range AROM  1 x 10 with passive self range into elbow extension at end range AROM  1 x 10 with passive self range into elbow extension at end range   Shoulder abduction           Elbow flexion/extension A/AAROM x 5 reps SROM x 15 reps seated  PROM x 5 reps in supine PROM x 10 reps SROM x 15 reps seated SROM x 15 reps seated SROM x 15 reps seated  SROM x 15 reps seated   PNF D1/D2 diagonals           Hand helper   20 lbs x 10 reps with assist to extend fingers 20 lbs x 10 reps with assist to extend fingers       Tabletop skateboard           Shoulder flexion/extension A/AAROM x 10 reps each SROM x 15 reps in supine PROM x 1o reps SROM x 15 reps in supine PROM x 15 in supine      Chest press           UBE Min assist to sustain right  on handle   Level 0  7 mintues Min assist to sustain right  on handle   Level 0  7 mintues Min assist to sustain right  on handle   Level 0.5  7 mintues Min assist to sustain right  on handle   Level 0.5  7 mintues Min assist to sustain right  on handle   Level 0.5  7 mintues Min assist to sustain right  on handle   Level 0.5  7 mintues Min assist to sustain right  on handle   Level 0.5  7 mintues Min assist to sustain right  on handle   Level 0.5  7 mintues   Modified push-ups  Hands on countertop with dycem under right hand  3 x 10 Hands on countertop with dycem under right hand  3 x 10 Hands on countertop with dycem under right hand  3 x 10 Hands on countertop with dycem under right hand  3 x 10 Hands on countertop with dycem under right hand  3 x 10 Hands on countertop with dycem under right hand  3 x 10  Hands on countertop with dycem under right hand  3 x 10   Resistive clothespin Yellow   1 x 5 pinch and release  Yellow   1 x 5 pinch and release Yellow   1 x 5 pinch and release Yellow   1 x 5 pinch and release   Yellow   1 x 5 pinch and release   Shoulder arc       Gripped and released ~10 tabs to pull over shoulder arc.     Wrist flexion/extension  PROM x 5 reps  SROM x 10 reps  with prolonged stretch at end range of extension PROM x 10 reps with prolonged stretch at end range of extension  PROM x 10 reps with prolonged stretch at end range of extension PROM x 10 reps with prolonged stretch at end range of extension  PROM x 10 reps with prolonged stretch at end range of extension   Punching bag           Finger flexion/extension  PROM x 5 reps   with prolonged stretch at end range of extension PROM x 5 reps   with prolonged stretch at end range of extension PROM x 5 reps   with prolonged stretch at end range of extension PROM x 10 reps with prolonged stretch at end range of extension PROM x 10 reps with prolonged stretch at end range of extension  PROM x 10 reps with prolonged stretch at end range of extension   Measurements Taken      Am-PAC and goals re-assessed. Access Code: [email or web address]   URL: [email or web address]   Date: 01/19/2018   Prepared by: Carmel Fast     Exercises   Supine Shoulder Flexion PROM - 10 reps - 2 sets - 3 hold - 1x daily - 5x weekly   Push-Up on Counter - 10 reps - 2 sets - 1x daily - 5x weekly   Seated Shoulder Shrugs - 10 reps - 2 sets - 1x daily - 5x weekly   Seated Scapular Retraction - 10 reps - 2 sets - 1x daily - 5x weekly   Seated Weight Shifting with Arm Support - 10 reps - 2 sets - 1x daily - 5x weekly   Seated Shoulder Flexion Self PROM - 10 reps - 2 sets - 1x daily - 5x weekly   Supine Elbow Flexion Extension AROM - 10 reps - 2 sets - 1x daily - 5x weekly   Seated Elbow Flexion Extension AROM - 10 reps - 2 sets - 1x daily - 5x weekly     Neuromuscular Re-education: ( 10 minutes):  Exercise/activities per grid below to improve balance, coordination, kinesthetic sense and proprioception. Required moderate visual and verbal cues to promote static balance in sitting, promote coordination of right, upper extremity(s) and promote motor control of right, upper extremity(s).           Patient completed lateral weight shifts sitting at edge of mat x 10 reps each onto elbow/forearms  with dynamic reach with left hand outside of base of support, using right upper extremity to maintain balance. Completed lateral weight shifts onto outstretched right hand with minimal assist to maintain position of right hand on mat x 5 reps. Treatment/Session Assessment: Pt states that she would like to be able to use her R UE more for functional tasks, including dressing, feeding, and carrying items. Since Botox, pt has demo'd improved improved R shoulder abduction ROM; R elbow also appears to be more relax with pt achieving greater elbow extension. Pt also demo's greater ability to contract and relax R hand/digits during hand strengthening activities. Pt continues to demo improved lateral weight shifts outside base of support with greater dynamic reach. Pt would continue to benefit from skilled OT services to increase functional use of R UE and increase independence with BADLs. · Response to Treatment: Mrs. Cruz Castellon tolerated therapy well today, without any issue. She continues to be very cooperative and motivated during therapy. She is agreeable to working on increasing functional use of right upper extremity during ADLs in home environment. · Compliance with Program/Exercises: Will assess as treatment progresses. Pt reports compliance with HEP. · Recommendations/Intent for next treatment session: \"Next visit will focus on advancements to more challenging activities and reduction in assistance provided\".     Total Treatment Duration:  OT Patient Time In/Time Out  Time In: 1345  Time Out: [address] [facility]

## 2018-05-09 ENCOUNTER — HOSPITAL ENCOUNTER (OUTPATIENT)
Dept: PHYSICAL THERAPY | Age: 63
Discharge: HOME | End: 2018-05-09
Attending: PHYSICAL MEDICINE & REHABILITATION
Payer: COMMERCIAL

## 2018-05-09 PROCEDURE — 97110 THERAPEUTIC EXERCISES: CPT

## 2018-05-09 PROCEDURE — 97535 SELF CARE MNGMENT TRAINING: CPT

## 2018-05-09 PROCEDURE — 97112 NEUROMUSCULAR REEDUCATION: CPT

## 2018-05-09 NOTE — PROGRESS NOTES
Zoey Burdick  : 1955  Primary: [facility]  Secondary:  [address] at [place] [id number] [facility], [address] [place] [address] [address]  [phone]   [phone]        OUTPATIENT OCCUPATIONAL THERAPY: Daily Note 2018    ICD-10: Treatment Diagnosis: Hemiplegia and hemiparesis following cerebral infarction affecting right dominant side (I69.351)  Precautions/Allergies:   Banana; Lisinopril; and Nuts [tree nut]   Fall Risk Score: 5 (? 5 = High Risk)  MD Orders: Referral to occupational therapy MEDICAL/REFERRING DIAGNOSIS:   Cerebral infarction, unspecified [I63.9]  Weakness [R53.1]   DATE OF ONSET: 2017   REFERRING PHYSICIAN: Juliette Chatman*  RETURN PHYSICIAN APPOINTMENT: unknown   18: Ms. Tiffany Starks continues to demo' progress towards therapy goals. Since Botox, pt has demo'd improved improved R shoulder abduction ROM; R elbow also appears to be more relax with pt achieving greater elbow extension. Pt also demo's greater ability to contract and relax R hand/digits during hand strengthening activities. Pt would continue to benefit from skilled OT services to maximize functional use of R UE for increased independence with activities of daily living. 3/5/18: Ms. Tiffany Starks continues to make excellent progress towards her therapy goals; she demonstrates gradual improvements in R UE range of motion, specifically R shoulder flexion and R elbow extension/flexion, and reports increased functional use of R UE in activities of daily living, including dressing, grooming, and feeding. Patient would continue to benefit from skilled occupational therapy services to maximize safety and independence and increase functional use of R UE during activities of daily living.    18: Ms. Tiffany Starks is making excellent progress toward her goals; she is demonstrating improving range of motion and functional use of the right upper extremity in activities of daily living. Feel she will continue to benefit from skilled occupational therapy to maximize safety and independence with activities of daily living. INITIAL ASSESSMENT:  Ms. Marcella Cabello presents with impaired active movement, strength, coordination and sensation of the dominant right upper extremity that is affecting her ability to complete activities of daily living independently. Feel she may benefit from skilled occupational therapy to maximize safety and independence with activities of daily living. PLAN OF CARE:   PROBLEM LIST:  1. Decreased Strength  2. Decreased ADL/Functional Activities  3. Decreased Transfer Abilities  4. Decreased Balance  5. Decreased Flexibility/Joint Mobility  6. Decreased Cognition INTERVENTIONS PLANNED:  1. Activities of daily living training  2. Manual therapy training  3. Modalities  4. Neuromuscular re-eduation  5. Sensory reintegration training  6. Splinting  7. Therapeutic activity  8. Therapeutic exercise   TREATMENT PLAN:  Effective Dates: 3/19/18 to 6/17/18. Frequency/Duration: 2-3 times a week for 12 weeks  GOALS: (Goals have been discussed and agreed upon with patient.)  Short-Term Functional Goals: Time Frame: 6 weeks  1. Patient will demonstrate independence with home exercise program for right upper extremity range of motion. Met  2. Patient will increase use of right upper extremity as a functional assist in at least 25% of daily activities. Met  3. Patient will bathe with moderate assistance. Continue to address  Discharge Goals: Time Frame: 12 weeks  1. Patient will bathe with minimal assistance. Continue to address  2. Patient will feed self with modified independence and adaptive equipment as needed. Continue to address  3. Patient will dress with modified independence and adaptive equipment as needed. Continue to address  4. Patient will use right upper extremity as a functional assist in at least 50% of daily activities.  Continue to address  Rehabilitation Potential For Stated Goals: Good  Regarding Paola Alvaradodwin's therapy, I certify that the treatment plan above will be carried out by a therapist or under their direction. Thank you for this referral,  Eden Acevedo, OT     Referring Physician Signature: Juliette Otero* _________________________  Date _________            The information in this section was collected on 18 (except where otherwise noted). OCCUPATIONAL PROFILE & HISTORY:   History of Present Injury/Illness (Reason for Referral):  CVA with hospital and [facility] stay then home health therapy. Past Medical History/Comorbidities:   Ms. Laure Johnson  has a past medical history of Anxiety; CVA (cerebral vascular accident) ([facility] [address] (2017); Depression; HTN (hypertension); Menopause; and PTE (pulmonary thromboembolism) ([facility] [address] (2017). Ms. Laure Johnson  has a past surgical history that includes hx jennifer and bso (); hx  section; hx refractive surgery (); hx other surgical (); and hx other surgical (). Social History/Living Environment:      Prior Level of Function/Work/Activity:  Does seasonal taxes at [employer]  Dominant Side:         RIGHT  Current Medications:    Current Outpatient Prescriptions:     polyethylene glycol (MIRALAX) 17 gram/dose powder, Take 17 g by mouth daily. , Disp: 510 g, Rfl: 2    [START ON 2018] methylphenidate HCl (RITALIN) 20 mg tablet, Take 1 Tab (20 mg total) by mouth daily. Max Daily Amount: 20 mg, Disp: 30 Tab, Rfl: 0    methylphenidate HCl (RITALIN) 20 mg tablet, Take 1 Tab (20 mg total) by mouth daily. Max Daily Amount: 20 mg, Disp: 30 Tab, Rfl: 0    methylphenidate HCl (RITALIN) 20 mg tablet, Take 1 Tab (20 mg total) by mouth daily. Max Daily Amount: 20 mg, Disp: 30 Tab, Rfl: 0    rOPINIRole (REQUIP) 2 mg tablet, Take 1 Tab by mouth nightly. for restless leg, Disp: 90 Tab, Rfl: 1    FLUoxetine (PROZAC) 20 mg tablet, Take 2 Tabs by mouth daily. , Disp: 180 Tab, Rfl: 1    tiZANidine (ZANAFLEX) 4 mg tablet, 4mg po TID, Disp: 270 Tab, Rfl: 1    labetalol (NORMODYNE) 200 mg tablet, Take 1 Tab by mouth two (2) times a day., Disp: 180 Tab, Rfl: 1    losartan-hydroCHLOROthiazide (HYZAAR) 100-12.5 mg per tablet, Take 1 Tab by mouth daily. , Disp: 90 Tab, Rfl: 1    ALPRAZolam (XANAX) 0.25 mg tablet, Take 1 Tab by mouth three (3) times daily as needed for Anxiety. Max Daily Amount: 0.75 mg., Disp: 30 Tab, Rfl: 1            Date Last Reviewed:  5/9/2018   Complexity Level : Expanded review of therapy/medical records (1-2):  MODERATE COMPLEXITY   ASSESSMENT OF OCCUPATIONAL PERFORMANCE:   ROM:                   Balance:                   Coordination:                   Mental Status:                   Vision:                   Activities of Daily Living:           Basic ADLs (From Assessment) Complex ADLs (From Assessment)         Grooming/Bathing/Dressing Activities of Daily Living                                   Sensation:         Light touch absent distal to right elbow     Physical Skills Involved:  1. Range of Motion  2. Balance  3. Sensation  4. Fine Motor Control  5. Gross Motor Control Cognitive Skills Affected (resulting in the inability to perform in a timely and safe manner):  1. Executive Function  2. Sustained Attention  3. Divided Attention  4. Comprehension Psychosocial Skills Affected:  1. None   Number of elements that affect the Plan of Care: 5+:  HIGH COMPLEXITY   CLINICAL DECISION MAKING:   Outcome Measure: Tool Used: [address] [facility] [address] [address] [facility] Daily Activity Outpatient Short Form  Score:  Initial: 18 Most Recent: 26 (Date: 5/2/18 )   Interpretation of Tool:  Represents clinically-significant functional categories (i.e., basic and instrumental activities of daily living, fine motor activities). Score 60 59-55 54-47 46-34 32-21 20-16 15   Modifier CH CI CJ CK CL CM CN     Medical Necessity:   · Patient demonstrates good rehab potential due to higher previous functional level.   Reason for Services/Other Comments:  · Patient continues to require skilled intervention due to decreased safety and independence in activities of daily living. Clinical Decision-Making Assessment: Moderately difficult to predict patient's progress at this time due to the extent of her limitations in functional movement and use of dominant right upper extremity. Assessment process, impact of co-morbidities, assessment modification\need for assistance, and selection of interventions: Analytical Complexity:MODERATE COMPLEXITY   TREATMENT:   (In addition to Assessment/Re-Assessment sessions the following treatments were rendered)    Pre-treatment Symptoms/Complaints:  Patient states, \"I am excited to have some new things to try at home now. \"  Pain: Initial:   Pain Intensity 1: 0/10 Post Session:  same     Therapeutic Exercise: ( 15 minutes):  Exercises per grid below to improve dynamic movement of arm - right and hand - right to improve functional lifting, carrying, reaching and grasping. Required moderate visual and verbal cues to promote proper body mechanics. Progressed range, repetitions and complexity of movement as indicated.        Date:  4/19/18 Date:  4/23/18 Date:  4/26/18 Date:  4/30/18 Date:  5/2/18 Date:   5/4/18 Date:  5/7/18 Date:  5/9/18   Activity/Exercise           Shoulder shrugs AROM x 10 AROM x 10 AROM x 10 AROM x 10 AROM x 10 AROM x 10 AROM x 10 AROM x 10   Scapular protraction/retraction AROM  1 x 10 with passive self range into elbow extension at end range AROMAROM  1 x 10 with passive self range into elbow extension at end range  1 x 10 with passive self range into elbow extension at end range AROM  1 x 10 with passive self range into elbow extension at end range AROM  1 x 10 with passive self range into elbow extension at end range AROM  1 x 10 with passive self range into elbow extension at end range AROM  1 x 10 with passive self range into elbow extension at end range AROM  1 x 10 with passive self range into elbow extension at end range AROM  1 x 10 with passive self range into elbow extension at end range   Shoulder abduction           Elbow flexion/extension SROM x 15 reps seated  PROM x 5 reps in supine PROM x 10 reps SROM x 15 reps seated SROM x 15 reps seated SROM x 15 reps seated  SROM x 15 reps seated SROM x 15 reps seated   PNF D1/D2 diagonals           Hand helper  20 lbs x 10 reps with assist to extend fingers 20 lbs x 10 reps with assist to extend fingers        Tabletop skateboard           Shoulder flexion/extension SROM x 15 reps in supine PROM x 1o reps SROM x 15 reps in supine PROM x 15 in supine    SROM x 15 reps seated   Chest press           UBE Min assist to sustain right  on handle   Level 0  7 mintues Min assist to sustain right  on handle   Level 0.5  7 mintues Min assist to sustain right  on handle   Level 0.5  7 mintues Min assist to sustain right  on handle   Level 0.5  7 mintues Min assist to sustain right  on handle   Level 0.5  7 mintues Min assist to sustain right  on handle   Level 0.5  7 mintues Min assist to sustain right  on handle   Level 0.5  7 mintues    Modified push-ups  Hands on countertop with dycem under right hand  3 x 10 Hands on countertop with dycem under right hand  3 x 10 Hands on countertop with dycem under right hand  3 x 10 Hands on countertop with dycem under right hand  3 x 10 Hands on countertop with dycem under right hand  3 x 10  Hands on countertop with dycem under right hand  3 x 10 Hands on countertop with dycem under right hand  3 x 10   Resistive clothespin  Yellow   1 x 5 pinch and release Yellow   1 x 5 pinch and release Yellow   1 x 5 pinch and release   Yellow   1 x 5 pinch and release    Shoulder arc      Gripped and released ~10 tabs to pull over shoulder arc.      Wrist flexion/extension PROM x 5 reps  SROM x 10 reps  with prolonged stretch at end range of extension PROM x 10 reps with prolonged stretch at end range of extension  PROM x 10 reps with prolonged stretch at end range of extension PROM x 10 reps with prolonged stretch at end range of extension  PROM x 10 reps with prolonged stretch at end range of extension    Punching bag           Finger flexion/extension PROM x 5 reps   with prolonged stretch at end range of extension PROM x 5 reps   with prolonged stretch at end range of extension PROM x 5 reps   with prolonged stretch at end range of extension PROM x 10 reps with prolonged stretch at end range of extension PROM x 10 reps with prolonged stretch at end range of extension  PROM x 10 reps with prolonged stretch at end range of extension    Measurements Taken     Am-PAC and goals re-assessed. Access Code: [email or web address]   URL: [email or web address]   Date: 01/19/2018   Prepared by: Sanket Setting     Exercises   Supine Shoulder Flexion PROM - 10 reps - 2 sets - 3 hold - 1x daily - 5x weekly   Push-Up on Counter - 10 reps - 2 sets - 1x daily - 5x weekly   Seated Shoulder Shrugs - 10 reps - 2 sets - 1x daily - 5x weekly   Seated Scapular Retraction - 10 reps - 2 sets - 1x daily - 5x weekly   Seated Weight Shifting with Arm Support - 10 reps - 2 sets - 1x daily - 5x weekly   Seated Shoulder Flexion Self PROM - 10 reps - 2 sets - 1x daily - 5x weekly   Supine Elbow Flexion Extension AROM - 10 reps - 2 sets - 1x daily - 5x weekly   Seated Elbow Flexion Extension AROM - 10 reps - 2 sets - 1x daily - 5x weekly     Neuromuscular Re-education: ( 10 minutes):  Exercise/activities per grid below to improve balance, coordination, kinesthetic sense and proprioception. Required moderate visual and verbal cues to promote static balance in sitting, promote coordination of right, upper extremity(s) and promote motor control of right, upper extremity(s).           Patient completed lateral weight shifts sitting at edge of mat x 10 reps each onto elbow/forearms  with dynamic reach with left hand outside of base of support, using right upper extremity to maintain balance. Completed lateral weight shifts onto outstretched right hand with minimal assist to maintain position of right hand on mat x 5 reps. Self Care: (15 minutes): Procedure(s) (per grid) utilized to improve and/or restore self-care/home management as related to grooming and self feeding. Required moderate visual and verbal cueing to facilitate activities of daily living skills. Patient used silicone cuff on hairbrush and toothbrush to complete self care grooming tasks using right hand as dominant, with moderate assist from left hand to support right upper extremity during tasks. Patient then used large foam  on fork to simulate self-feeding x 15 repetitions with emphasis on maintaining grasp of handle with right hand. Patient then used right hand to make last fold of hand towel, then to hold a folded pillowcase and lift it up onto the counter. Treatment/Session Assessment:   · Response to Treatment: Mrs. Shaunna Ward tolerated therapy well today; she is demonstrating increasing functional use of her right upper extremity in activities of daily living, including carrying personal items in a bag with her right hand throughout the clinic today. · Compliance with Program/Exercises: Will assess as treatment progresses. Pt reports compliance with HEP. · Recommendations/Intent for next treatment session: \"Next visit will focus on advancements to more challenging activities and reduction in assistance provided\".     Total Treatment Duration:  OT Patient Time In/Time Out  Time In: 1743  Time Out: Javier Aguirre

## 2018-05-09 NOTE — PROGRESS NOTES
Yumiko Cruz  : 1955  Primary:   Secondary:  [address] at [facility]  [facility], [address] [place] [address],[address], [place] [address].  [phone]   [phone]          OUTPATIENT PHYSICAL THERAPY:Daily Note 2018    ICD-10: Treatment Diagnosis:   · Other abnormalities of gait and mobility (R26.89)  · Difficulty in walking, not elsewhere classified (R26.2)  Precautions/Allergies:   Banana; Lisinopril; and Nuts [tree nut]   Fall Risk Score: 5 (? 5 = High Risk)  MD Orders: Evaluate and Treat MEDICAL/REFERRING DIAGNOSIS:  Weakness due to cerebrovascular accident (CVA) ([facility] [I63.9, R53.1]   DATE OF ONSET: CVA: 2017  REFERRING PHYSICIAN: Juliette Chandler*  RETURN PHYSICIAN APPOINTMENT: TBD       ASSESSMENT:  Ms. Chapito Bustillos presents with decreased mobility, decreased strength, decreased gait, and decreased balance secondary to CVA. After discussing with patient, she agreed she would benefit from physical therapy to improve above deficits. Please sign this plan of treatment if you concur. Thank you for the opportunity to serve this patient. Recertification note on 2018:   Patient has attended [de-identified] scheduled physical therapy appointments from 12/15/2017 to 2018. Patient is very motivated and compliant with therapy. Patient received her first Botox injection on 2014. Patient's right ankle is less tight, and right foot is easier to get into her right AFO. Patient also reports less right foot pain with walking. Patient demonstrates improved balance since beginning therapy. Patient would like to continue working on ambulation throughout the community. Patient would benefit from continuing skilled physical therapy to address problems and goals. Thank you for this referral.    PROBLEM LIST (Impacting functional limitations):  1. Decreased Strength  2. Decreased Ambulation Ability/Technique  3. Decreased Balance  4.  Decreased Activity Tolerance INTERVENTIONS PLANNED:  1. Balance Exercise  2. Gait Training  3. Home Exercise Program (HEP)  4. Neuromuscular Re-education/Strengthening  5. Range of Motion (ROM)  6. Therapeutic Exercise/Strengthening   TREATMENT PLAN:  Effective Dates: 5/7/2018 TO 8/5/2018 (90 days). Frequency/Duration: 2 times a week for 90 Days  GOALS: (Goals have been discussed and agreed upon with patient.)  Short-Term Functional Goals: Time Frame: 3 weeks  1. Patient will be independent with home exercise program without exacerbation of symptoms or cueing needed. Goal met. Discharge Goals: Time Frame: 8 weeks  1. Patient will be independent with all ADLs with minimal fear of falling and loss of balance with daily tasks. Goal ongoing. 2. Patient will report no fear avoidance with social or recreational activities due to fear of falling. Goal ongoing. 3. Patient will score greater than or equal to 45/56 on Gipson Balance Scale with minimal effect of imbalance on patient's ability to manage every day life activities. Goal ongoing. Rehabilitation Potential For Stated Goals: Good            The information in this section was collected on 12/15/2017 (except where otherwise noted). HISTORY:   History of Present Injury/Illness (Reason for Referral):  Patient reports she had a severe stroke on 9/6/2017. She reports that she was in the hospital and [facility] for about 61-62 days. She reports that she has progressed really well, but is still having trouble walking and using her right side (UE and LE). She reports that she walks using her AFO and quad cane all the time. She reports that she tries to be as active as possible. She reports that she is scared that she is going to fall, even though she has not. She reports she would like to work on strengthening her right leg so her balance is better and she can walk without fear of falling.   Past Medical History/Comorbidities:   Ms. Tiffany Starks  has a past medical history of Anxiety; CVA (cerebral vascular accident) ([facility] (2017); Depression; HTN (hypertension); Menopause; and PTE (pulmonary thromboembolism) ([facility] (2017). Ms. Licha Shepard  has a past surgical history that includes hx jennifer and bso (); hx  section; hx refractive surgery (); hx other surgical (); and hx other surgical (2017). Social History/Living Environment:   Home Environment: Private residence  Living Alone: No  Support Systems: Family member(s), Friends \ neighbors; Spouse  Prior Level of Function/Work/Activity:  Independent  Dominant Side:         RIGHT  Personal Factors:          Sex:  female        Age:  60 y.o. Current Medications:       Current Outpatient Prescriptions:     polyethylene glycol (MIRALAX) 17 gram/dose powder, Take 17 g by mouth daily. , Disp: 510 g, Rfl: 2    [START ON 2018] methylphenidate HCl (RITALIN) 20 mg tablet, Take 1 Tab (20 mg total) by mouth daily. Max Daily Amount: 20 mg, Disp: 30 Tab, Rfl: 0    methylphenidate HCl (RITALIN) 20 mg tablet, Take 1 Tab (20 mg total) by mouth daily. Max Daily Amount: 20 mg, Disp: 30 Tab, Rfl: 0    methylphenidate HCl (RITALIN) 20 mg tablet, Take 1 Tab (20 mg total) by mouth daily. Max Daily Amount: 20 mg, Disp: 30 Tab, Rfl: 0    rOPINIRole (REQUIP) 2 mg tablet, Take 1 Tab by mouth nightly. for restless leg, Disp: 90 Tab, Rfl: 1    FLUoxetine (PROZAC) 20 mg tablet, Take 2 Tabs by mouth daily. , Disp: 180 Tab, Rfl: 1    tiZANidine (ZANAFLEX) 4 mg tablet, 4mg po TID, Disp: 270 Tab, Rfl: 1    labetalol (NORMODYNE) 200 mg tablet, Take 1 Tab by mouth two (2) times a day., Disp: 180 Tab, Rfl: 1    losartan-hydroCHLOROthiazide (HYZAAR) 100-12.5 mg per tablet, Take 1 Tab by mouth daily. , Disp: 90 Tab, Rfl: 1    ALPRAZolam (XANAX) 0.25 mg tablet, Take 1 Tab by mouth three (3) times daily as needed for Anxiety.  Max Daily Amount: 0.75 mg., Disp: 30 Tab, Rfl: 1   Date Last Reviewed:  2018    Number of Personal Factors/Comorbidities that affect the Plan of Care: 1-2: MODERATE COMPLEXITY   EXAMINATION:   Observation/Orthostatic Postural Assessment:          Posture Assessment: Rounded shoulders, Forward head   Functional Mobility:         Gait/Ambulation:     Speed/Lulu: Pace decreased (<100 feet/min)  Step Length: Left shortened, Right lengthened  Swing Pattern: Left asymmetrical, Right asymmetrical  Stance: Left increased, Right decreased  Gait Abnormalities: Antalgic, Circumduction, Foot drop, Path deviations, Trendelenburg  Ambulation - Level of Assistance: Modified independent  Assistive Device: Cane, quad  · Interventions: Verbal cues, Safety awareness training          Transfers:     Sit to Stand: Independent  Stand to Sit: Independent  Stand Pivot Transfers: Independent  · Bed to Chair: Independent          Bed Mobility:     Rolling: Independent  Supine to Sit: Independent  Sit to Supine: Independent  · Scooting: Independent    Strength:          L UE STRENGTH: 4/5 shoulder flexion, 4/5 shoulder abduction, 4/5 shoulder extension, 4/5 elbow flexion, 4/5 elbow extension. R UE STRENGTH: 2/5 shoulder flexion, 2/5 shoulder abduction, 2/5 shoulder extension, 2/5 elbow flexion, 2/5 elbow extension. R LE STRENGTH:  4+/5 hip flexion, 4/5 hip abduction, 55 hip adduction, 4+/5 hip extension, 4+/5 5/5knee extension, 3-/5 knee flexion, 1/5 ankle dorsiflexion, 1/5 ankle plantarflexion, 1/5 ankle inversion, 1/5 ankle eversion. Sensation:         Intact for light touch and proprioception  Postural Control & Balance:  · Gipson Balance Scale:  36/56.   (A score less than 45/56 indicates high risk of falls)  . Score improved from 28/56 on initial evaluation. Body Structures Involved:  1. Nerves  2. Muscles Body Functions Affected:  1. Neuromusculoskeletal  2. Movement Related Activities and Participation Affected:  1. Mobility  2.  Self Care   Number of elements (examined above) that affect the Plan of Care: 4+: HIGH COMPLEXITY   CLINICAL PRESENTATION: Presentation: Evolving clinical presentation with changing clinical characteristics: MODERATE COMPLEXITY   CLINICAL DECISION MAKING:   Outcome Measure: Tool Used: Gipson Balance Scale  Score:  Initial: 28/56 Most Recent: 36/56 (Date: 5-7-2018 )   Interpretation of Score: Each section is scored on a 0-4 scale, 0 representing the patients inability to perform the task and 4 representing independence. The scores of each section are added together for a total score of 56. The higher the patients score, the more independent the patient is. Any score below 45 indicates increased risk for falls. Score 56 55-45 44-34 33-23 22-12 11-1 0   Modifier CH CI CJ CK CL CM CN     Medical Necessity:   · Patient is expected to demonstrate progress in strength, range of motion, balance and coordination to improve safety during daily activities. · Patient demonstrates good rehab potential due to higher previous functional level. · Skilled intervention continues to be required due to decreased mobility. Reason for Services/Other Comments:  · Patient continues to demonstrate capacity to improve overall mobility which will increase independence and increase safety. Use of outcome tool(s) and clinical judgement create a POC that gives a: Questionable prediction of patient's progress: MODERATE COMPLEXITY            TREATMENT:   (In addition to Assessment/Re-Assessment sessions the following treatments were rendered)  Pre-treatment Symptoms/Complaints:  5/9/2018:  Patient reports she is feeling ok right now. Pain: Initial: Pain Intensity 1: 0  Post Session:  0/10     NEUROMUSCULAR RE-EDUCATION: (25 minutes):  Exercise/activities per grid below to improve balance, coordination, kinesthetic sense, posture and proprioception. Required minimal verbal and manual cues to promote static and dynamic balance in standing, promote coordination of bilateral, lower extremity(s) and promote motor control of bilateral, lower extremity(s). Date   4/26/18 Date   5/2/18 Date   5/4/18 Date:  5/9/2018   Activity/Exercise    Parameters   Stepping over half foam  2x10 reps B LE      Step taps       Step ups 6 inch  2 x10 leading with R LE       [place]       Walking in the clinic with quad cane About 100 feet with quad cane, working on weightbearing more on R foot through midstance and terminal stance, and stepping bigger with L LE; added working on shifting R hips forward over R foot better midstance to terminal stance About 200 feet with quad cane, working on weightbearing more on R foot through midstance and terminal stance, and stepping bigger with L LE; added working on shifting R hips forward over R foot better midstance to terminal stance  About 200 feet with quad cane, working on weightbearing more on R foot through midstance and terminal stance, and stepping bigger with L LE; added working on shifting R hips forward over R foot better midstance to terminal stance   pregait activity       Walking backwards with rail 5 feet x 5 working on R hip extension AROM      Standing weight shifting onto R LE  Worked on marching with L LE, then stepping fwd/back with L LE, weight shifting onto R LE, trying to increase control and balance with no UE assist.  Worked on marching with L LE, then stepping fwd/back with L LE, weight shifting onto R LE, trying to increase control and balance with no UE assist. Worked on marching with L LE, then stepping fwd/back with L LE, weight shifting onto R LE, trying to increase control and balance with no UE assist.   Walking in bars  Progressed above weight shifting activity to walking forward and backward in bars with no UE assist, some manual assist at pelvis Progressed above weight shifting activity to walking forward and backward in bars with no UE assist, some manual assist at pelvis Progressed above weight shifting activity to walking forward and backward in bars with no UE assist, some manual assist at pelvis   [facility] in clinic    with no cane with min to mod A working on weight bearing through R LE, to improve confidence and weight bearing. With no cane with min to mod assist working on weight bearing through R LE, to improve confidence and weight bearing. THERAPEUTIC EXERCISE: (20 minutes):  Exercises per grid below to improve mobility and strength. Required minimal verbal cues to promote proper body alignment. Progressed repetitions as indicated.    Date   4/26/18 Date  5/2/18 Date   5/4/18 Date:  5/9/2018   Activity/Exercise       Sit to stand        Left sidelying alternating isometrics on pelvis       Standing hip abduction       Seated LAQs       Standing hamstrings curls        Bridging 2x10 with right lower      Supine straight leg raise 2x10 with right lower extremity working on keeping foot/hip in neutral position      Supine PNF D1 and D2 patterns x 15 reps each R LE      L sidelying R pelvis and trunk PNF       Left sidelying hip abduction       L sidelying: picking up and moving R LE from in front of L foot to behind L foot (working on hip abd and ext) and stabilizing trunk 2x10 reps, working on lifting higher and keeping foot in neutral position      L sidelying R clam shells 2x10 reps, working on lifting higher      L sidelying R hip abduction With cuing to keep leg in alignment with body, 2 x 10 reps      R foot and gastrocsoleus stretching X 20 minutes trying to get foot to neutral position X 20 minutes stretching toe flexors, gastroc, soleus, and tibialis anterior X 20 minutes stretching toe flexors, gastroc, soleus, and tibialis anterior  20 minutes stretching toe flexors, gastroc, soleus, and tibialis anterior   Supine hooklying lower body rotation       Standing minisquats in bars       [facility] Portal  Treatment/Session Assessment:    · Response to Treatment:  Patient tolerated treatment and verbalized understanding of taking smaller step with right LE and engaging quad muscle to normalize step through gait without swinging right LE way ahead of left. · Compliance with Program/Exercises: Compliant. · Recommendations/Intent for next treatment session: \"Next visit will focus on advancements to more challenging activities\".    Total Treatment Duration:  PT Patient Time In/Time Out  Time In: 1430  Time Out: [address] [place], [place]

## 2018-05-11 ENCOUNTER — APPOINTMENT (OUTPATIENT)
Dept: PHYSICAL THERAPY | Age: 63
End: 2018-05-11
Attending: PHYSICAL MEDICINE & REHABILITATION
Payer: COMMERCIAL

## 2018-05-14 ENCOUNTER — HOSPITAL ENCOUNTER (OUTPATIENT)
Dept: PHYSICAL THERAPY | Age: 63
Discharge: HOME | End: 2018-05-14
Attending: PHYSICAL MEDICINE & REHABILITATION
Payer: COMMERCIAL

## 2018-05-14 PROCEDURE — 97112 NEUROMUSCULAR REEDUCATION: CPT

## 2018-05-14 PROCEDURE — 97535 SELF CARE MNGMENT TRAINING: CPT

## 2018-05-14 PROCEDURE — 97110 THERAPEUTIC EXERCISES: CPT

## 2018-05-14 NOTE — PROGRESS NOTES
Sweetie Freedman  : 1955  Primary:   Secondary:  [address] at [place] [id number] [facility], [address] [place] [address],[address], [facility].  [phone]   [phone]          OUTPATIENT PHYSICAL THERAPY:Daily Note 2018    ICD-10: Treatment Diagnosis:   · Other abnormalities of gait and mobility (R26.89)  · Difficulty in walking, not elsewhere classified (R26.2)  Precautions/Allergies:   Banana; Lisinopril; and Nuts [tree nut]   Fall Risk Score: 5 (? 5 = High Risk)  MD Orders: Evaluate and Treat MEDICAL/REFERRING DIAGNOSIS:  Weakness due to cerebrovascular accident (CVA) ([facility] [I63.9, R53.1]   DATE OF ONSET: CVA: 2017  REFERRING PHYSICIAN: Juliette Luke*  RETURN PHYSICIAN APPOINTMENT: TBD       ASSESSMENT:  Ms. Albert Ibanez presents with decreased mobility, decreased strength, decreased gait, and decreased balance secondary to CVA. After discussing with patient, she agreed she would benefit from physical therapy to improve above deficits. Please sign this plan of treatment if you concur. Thank you for the opportunity to serve this patient. Recertification note on 2018:   Patient has attended [de-identified] scheduled physical therapy appointments from 12/15/2017 to 2018. Patient is very motivated and compliant with therapy. Patient received her first Botox injection on 2014. Patient's right ankle is less tight, and right foot is easier to get into her right AFO. Patient also reports less right foot pain with walking. Patient demonstrates improved balance since beginning therapy. Patient would like to continue working on ambulation throughout the community. Patient would benefit from continuing skilled physical therapy to address problems and goals. Thank you for this referral.    PROBLEM LIST (Impacting functional limitations):  1. Decreased Strength  2. Decreased Ambulation Ability/Technique  3. Decreased Balance  4.  Decreased Activity Tolerance INTERVENTIONS PLANNED:  1. Balance Exercise  2. Gait Training  3. Home Exercise Program (HEP)  4. Neuromuscular Re-education/Strengthening  5. Range of Motion (ROM)  6. Therapeutic Exercise/Strengthening   TREATMENT PLAN:  Effective Dates: 5/7/2018 TO 8/5/2018 (90 days). Frequency/Duration: 2 times a week for 90 Days  GOALS: (Goals have been discussed and agreed upon with patient.)  Short-Term Functional Goals: Time Frame: 3 weeks  1. Patient will be independent with home exercise program without exacerbation of symptoms or cueing needed. Goal met. Discharge Goals: Time Frame: 8 weeks  1. Patient will be independent with all ADLs with minimal fear of falling and loss of balance with daily tasks. Goal ongoing. 2. Patient will report no fear avoidance with social or recreational activities due to fear of falling. Goal ongoing. 3. Patient will score greater than or equal to 45/56 on Gipson Balance Scale with minimal effect of imbalance on patient's ability to manage every day life activities. Goal ongoing. Rehabilitation Potential For Stated Goals: Good            The information in this section was collected on 12/15/2017 (except where otherwise noted). HISTORY:   History of Present Injury/Illness (Reason for Referral):  Patient reports she had a severe stroke on 9/6/2017. She reports that she was in the hospital and [facility] for about 61-62 days. She reports that she has progressed really well, but is still having trouble walking and using her right side (UE and LE). She reports that she walks using her AFO and quad cane all the time. She reports that she tries to be as active as possible. She reports that she is scared that she is going to fall, even though she has not. She reports she would like to work on strengthening her right leg so her balance is better and she can walk without fear of falling.   Past Medical History/Comorbidities:   Ms. Yadira Franz  has a past medical history of Anxiety; CVA (cere[facility] vascular [facility]) ([facility] (2017); Depression; HTN (hypertension); Menopause; and PTE (pulmonary thromboembolism) ([facility] (2017). Ms. Albert Ibanez  has a past surgical history that includes hx jennifer and bso (); hx  section; hx refractive surgery (); hx other surgical (); and hx other surgical (2017). Social History/Living Environment:   Home Environment: Private residence  Living Alone: No  Support Systems: Family member(s), Friends \ neighbors; Spouse  Prior Level of Function/Work/Activity:  Independent  Dominant Side:         RIGHT  Personal Factors:          Sex:  female        Age:  60 y.o. Current Medications:       Current Outpatient Prescriptions:     polyethylene glycol (MIRALAX) 17 gram/dose powder, Take 17 g by mouth daily. , Disp: 510 g, Rfl: 2    [START ON 2018] methylphenidate HCl (RITALIN) 20 mg tablet, Take 1 Tab (20 mg total) by mouth daily. Max Daily Amount: 20 mg, Disp: 30 Tab, Rfl: 0    methylphenidate HCl (RITALIN) 20 mg tablet, Take 1 Tab (20 mg total) by mouth daily. Max Daily Amount: 20 mg, Disp: 30 Tab, Rfl: 0    methylphenidate HCl (RITALIN) 20 mg tablet, Take 1 Tab (20 mg total) by mouth daily. Max Daily Amount: 20 mg, Disp: 30 Tab, Rfl: 0    rOPINIRole (REQUIP) 2 mg tablet, Take 1 Tab by mouth nightly. for restless leg, Disp: 90 Tab, Rfl: 1    FLUoxetine (PROZAC) 20 mg tablet, Take 2 Tabs by mouth daily. , Disp: 180 Tab, Rfl: 1    tiZANidine (ZANAFLEX) 4 mg tablet, 4mg po TID, Disp: 270 Tab, Rfl: 1    labetalol (NORMODYNE) 200 mg tablet, Take 1 Tab by mouth two (2) times a day., Disp: 180 Tab, Rfl: 1    losartan-hydroCHLOROthiazide (HYZAAR) 100-12.5 mg per tablet, Take 1 Tab by mouth daily. , Disp: 90 Tab, Rfl: 1    ALPRAZolam (XANAX) 0.25 mg tablet, Take 1 Tab by mouth three (3) times daily as needed for Anxiety.  Max Daily Amount: 0.75 mg., Disp: 30 Tab, Rfl: 1   Date Last Reviewed:  2018    Number of Personal Factors/Comorbidities that affect the Plan of Care: 1-2: MODERATE COMPLEXITY   EXAMINATION:   Observation/Orthostatic Postural Assessment:          Posture Assessment: Rounded shoulders, Forward head   Functional Mobility:         Gait/Ambulation:     Speed/Lulu: Pace decreased (<100 feet/min)  Step Length: Left shortened, Right lengthened  Swing Pattern: Left asymmetrical, Right asymmetrical  Stance: Left increased, Right decreased  Gait Abnormalities: Antalgic, Circumduction, Foot drop, Path deviations, Trendelenburg  Ambulation - Level of Assistance: Modified independent  Assistive Device: Cane, quad  · Interventions: Verbal cues, Safety awareness training          Transfers:     Sit to Stand: Independent  Stand to Sit: Independent  Stand Pivot Transfers: Independent  · Bed to Chair: Independent          Bed Mobility:     Rolling: Independent  Supine to Sit: Independent  Sit to Supine: Independent  · Scooting: Independent    Strength:          L UE STRENGTH: 4/5 shoulder flexion, 4/5 shoulder abduction, 4/5 shoulder extension, 4/5 elbow flexion, 4/5 elbow extension. R UE STRENGTH: 2/5 shoulder flexion, 2/5 shoulder abduction, 2/5 shoulder extension, 2/5 elbow flexion, 2/5 elbow extension. R LE STRENGTH:  4+/5 hip flexion, 4/5 hip abduction, 55 hip adduction, 4+/5 hip extension, 4+/5 5/5knee extension, 3-/5 knee flexion, 1/5 ankle dorsiflexion, 1/5 ankle plantarflexion, 1/5 ankle inversion, 1/5 ankle eversion. Sensation:         Intact for light touch and proprioception  Postural Control & Balance:  · Gipson Balance Scale:  36/56.   (A score less than 45/56 indicates high risk of falls)  . Score improved from 28/56 on initial evaluation. Body Structures Involved:  1. Nerves  2. Muscles Body Functions Affected:  1. Neuromusculoskeletal  2. Movement Related Activities and Participation Affected:  1. Mobility  2.  Self Care   Number of elements (examined above) that affect the Plan of Care: 4+: HIGH COMPLEXITY   CLINICAL PRESENTATION: Presentation: Evolving clinical presentation with changing clinical characteristics: MODERATE COMPLEXITY   CLINICAL DECISION MAKING:   Outcome Measure: Tool Used: Gipson Balance Scale  Score:  Initial: 28/56 Most Recent: 36/56 (Date: 5-7-2018 )   Interpretation of Score: Each section is scored on a 0-4 scale, 0 representing the patients inability to perform the task and 4 representing independence. The scores of each section are added together for a total score of 56. The higher the patients score, the more independent the patient is. Any score below 45 indicates increased risk for falls. Score 56 55-45 44-34 33-23 22-12 11-1 0   Modifier CH CI CJ CK CL CM CN     Medical Necessity:   · Patient is expected to demonstrate progress in strength, range of motion, balance and coordination to improve safety during daily activities. · Patient demonstrates good rehab potential due to higher previous functional level. · Skilled intervention continues to be required due to decreased mobility. Reason for Services/Other Comments:  · Patient continues to demonstrate capacity to improve overall mobility which will increase independence and increase safety. Use of outcome tool(s) and clinical judgement create a POC that gives a: Questionable prediction of patient's progress: MODERATE COMPLEXITY            TREATMENT:   (In addition to Assessment/Re-Assessment sessions the following treatments were rendered)  Pre-treatment Symptoms/Complaints:  5/14/2018:  Doing fine. Nothing new. \"  Pain: Initial: Pain Intensity 1: 0  Post Session:  0/10     NEUROMUSCULAR RE-EDUCATION: (25 minutes):  Exercise/activities per grid below to improve balance, coordination, kinesthetic sense, posture and proprioception. Required minimal verbal and manual cues to promote static and dynamic balance in standing, promote coordination of bilateral, lower extremity(s) and promote motor control of bilateral, lower extremity(s).    Date:  5/14/2018 Activity/Exercise Parameters    Stepping over half foam      Step taps     Step ups Forward x 20 reps R, then laterally x 20 reps R with rail onto 6 inch step    [place]     Walking in the clinic with quad cane About 200 feet with quad cane, working on weightbearing more on R foot through midstance and terminal stance, and stepping bigger with L LE; added working on shifting R hips forward over R foot better midstance to terminal stance    pregait activity     Sidestepping in bars With no UE assist working on weight shifting. Standing weight shifting onto R LE Worked on marching with L LE, then stepping fwd/back with L LE, weight shifting onto R LE, trying to increase control and balance with no UE assist.    Walking in bars Progressed above weight shifting activity to walking forward and backward in bars with no UE assist, some manual assist at pelvis    Walked in clinic With no cane with min to mod assist working on weight bearing through R LE, to improve confidence and weight bearing. THERAPEUTIC EXERCISE: (20 minutes):  Exercises per grid below to improve mobility and strength. Required minimal verbal cues to promote proper body alignment. Progressed repetitions as indicated.    Date:  5/14/2018    Activity/Exercise     Sit to stand      Left sidelying alternating isometrics on pelvis     Standing hip abduction     Seated LAQs     Standing hamstrings curls      Bridging     Supine straight leg raise     Supine PNF     L sidelying R pelvis and trunk PNF     Left sidelying hip abduction     L sidelying: picking up and moving R LE from in front of L foot to behind L foot (working on hip abd and ext) and stabilizing trunk     L sidelying R clam shells     L sidelying R hip abduction     R foot and gastrocsoleus stretching  20 minutes stretching toe flexors, gastroc, soleus, and tibialis anterior    Supine hooklying lower body rotation     Standing minisquats in bars     [facility] Portal  Treatment/Session Assessment:    · Response to Treatment:  Patient tolerated session well. She is demonstrating good progress with step through gait, and improving stability with weight shifting onto R LE.   · Compliance with Program/Exercises: Compliant. · Recommendations/Intent for next treatment session: \"Next visit will focus on advancements to more challenging activities\".    Total Treatment Duration:  PT Patient Time In/Time Out  Time In: 1345  Time Out: [address]

## 2018-05-14 NOTE — PROGRESS NOTES
Wilbur Quiñonez  : 1955  Primary: Frankey Mayhew Ppo  Secondary:  [address]  at [place] [address] [facility], [address] [place] [address],[address]  [phone]   [phone]        OUTPATIENT OCCUPATIONAL THERAPY: Daily Note 2018    ICD-10: Treatment Diagnosis: Hemiplegia and hemiparesis following cerebral infarction affecting right dominant side (I69.351)  Precautions/Allergies:   Banana; Lisinopril; and Nuts [tree nut]   Fall Risk Score: 5 (? 5 = High Risk)  MD Orders: Referral to occupational therapy MEDICAL/REFERRING DIAGNOSIS:   Cerebral infarction, unspecified [I63.9]  Weakness [R53.1]   DATE OF ONSET: 2017   REFERRING PHYSICIAN: Juliette Carlisle*  RETURN PHYSICIAN APPOINTMENT: unknown   18: Ms. Audrey Serrano continues to demo' progress towards therapy goals. Since Botox, pt has demo'd improved improved R shoulder abduction ROM; R elbow also appears to be more relax with pt achieving greater elbow extension. Pt also demo's greater ability to contract and relax R hand/digits during hand strengthening activities. Pt would continue to benefit from skilled OT services to maximize functional use of R UE for increased independence with activities of daily living. 3/5/18: Ms. Audrey Serrano continues to make excellent progress towards her therapy goals; she demonstrates gradual improvements in R UE range of motion, specifically R shoulder flexion and R elbow extension/flexion, and reports increased functional use of R UE in activities of daily living, including dressing, grooming, and feeding.  Patient would continue to benefit from skilled occupational therapy services to maximize safety and independence and increase functional use of R UE during activities of daily living.    18: Ms. Audrey Serrano is making excellent progress toward her goals; she is demonstrating improving range of motion and functional use of the right upper extremity in activities of daily living. Feel she will continue to benefit from skilled occupational therapy to maximize safety and independence with activities of daily living. INITIAL ASSESSMENT:  Ms. Kalpesh Valdez presents with impaired active movement, strength, coordination and sensation of the dominant right upper extremity that is affecting her ability to complete activities of daily living independently. Feel she may benefit from skilled occupational therapy to maximize safety and independence with activities of daily living. PLAN OF CARE:   PROBLEM LIST:  1. Decreased Strength  2. Decreased ADL/Functional Activities  3. Decreased Transfer Abilities  4. Decreased Balance  5. Decreased Flexibility/Joint Mobility  6. Decreased Cognition INTERVENTIONS PLANNED:  1. Activities of daily living training  2. Manual therapy training  3. Modalities  4. Neuromuscular re-eduation  5. Sensory reintegration training  6. Splinting  7. Therapeutic activity  8. Therapeutic exercise   TREATMENT PLAN:  Effective Dates: 3/19/18 to 6/17/18. Frequency/Duration: 2-3 times a week for 12 weeks  GOALS: (Goals have been discussed and agreed upon with patient.)  Short-Term Functional Goals: Time Frame: 6 weeks  1. Patient will demonstrate independence with home exercise program for right upper extremity range of motion. Met  2. Patient will increase use of right upper extremity as a functional assist in at least 25% of daily activities. Met  3. Patient will bathe with moderate assistance. Continue to address  Discharge Goals: Time Frame: 12 weeks  1. Patient will bathe with minimal assistance. Continue to address  2. Patient will feed self with modified independence and adaptive equipment as needed. Continue to address  3. Patient will dress with modified independence and adaptive equipment as needed. Continue to address  4. Patient will use right upper extremity as a functional assist in at least 50% of daily activities.  Continue to address  Rehabilitation Potential For Stated Goals: Good  Regarding Claritza Iraheta's therapy, I certify that the treatment plan above will be carried out by a therapist or under their direction. Thank you for this referral,  Kiana Burgos, OT     Referring Physician Signature: Juliette Longoria* _________________________  Date _________            The information in this section was collected on 18 (except where otherwise noted). OCCUPATIONAL PROFILE & HISTORY:   History of Present Injury/Illness (Reason for Referral):  CVA with hospital and [facility] stay then home health therapy. Past Medical History/Comorbidities:   Ms. Mitzy Cruz  has a past medical history of Anxiety; CVA (cerebral vascular accident) ([facility] [address] (2017); Depression; HTN (hypertension); Menopause; and PTE (pulmonary thromboembolism) ([facility] [address] (2017). Ms. Mitzy Cruz  has a past surgical history that includes hx jennifer and bso (); hx  section; hx refractive surgery (); hx other surgical (); and hx other surgical (). Social History/Living Environment:      Prior Level of Function/Work/Activity:  Does seasonal taxes at [employer]  Dominant Side:         RIGHT  Current Medications:    Current Outpatient Prescriptions:     polyethylene glycol (MIRALAX) 17 gram/dose powder, Take 17 g by mouth daily. , Disp: 510 g, Rfl: 2    [START ON 2018] methylphenidate HCl (RITALIN) 20 mg tablet, Take 1 Tab (20 mg total) by mouth daily. Max Daily Amount: 20 mg, Disp: 30 Tab, Rfl: 0    methylphenidate HCl (RITALIN) 20 mg tablet, Take 1 Tab (20 mg total) by mouth daily. Max Daily Amount: 20 mg, Disp: 30 Tab, Rfl: 0    methylphenidate HCl (RITALIN) 20 mg tablet, Take 1 Tab (20 mg total) by mouth daily. Max Daily Amount: 20 mg, Disp: 30 Tab, Rfl: 0    rOPINIRole (REQUIP) 2 mg tablet, Take 1 Tab by mouth nightly. for restless leg, Disp: 90 Tab, Rfl: 1    FLUoxetine (PROZAC) 20 mg tablet, Take 2 Tabs by mouth daily. , Disp: 180 Tab, Rfl: 1    tiZANidine (ZANAFLEX) 4 mg tablet, 4mg po TID, Disp: 270 Tab, Rfl: 1    labetalol (NORMODYNE) 200 mg tablet, Take 1 Tab by mouth two (2) times a day., Disp: 180 Tab, Rfl: 1    losartan-hydroCHLOROthiazide (HYZAAR) 100-12.5 mg per tablet, Take 1 Tab by mouth daily. , Disp: 90 Tab, Rfl: 1    ALPRAZolam (XANAX) 0.25 mg tablet, Take 1 Tab by mouth three (3) times daily as needed for Anxiety. Max Daily Amount: 0.75 mg., Disp: 30 Tab, Rfl: 1            Date Last Reviewed:  5/14/2018   Complexity Level : Expanded review of therapy/medical records (1-2):  MODERATE COMPLEXITY   ASSESSMENT OF OCCUPATIONAL PERFORMANCE:   ROM:                   Balance:                   Coordination:                   Mental Status:                   Vision:                   Activities of Daily Living:           Basic ADLs (From Assessment) Complex ADLs (From Assessment)         Grooming/Bathing/Dressing Activities of Daily Living                                   Sensation:         Light touch absent distal to right elbow     Physical Skills Involved:  1. Range of Motion  2. Balance  3. Sensation  4. Fine Motor Control  5. Gross Motor Control Cognitive Skills Affected (resulting in the inability to perform in a timely and safe manner):  1. Executive Function  2. Sustained Attention  3. Divided Attention  4. Comprehension Psychosocial Skills Affected:  1. None   Number of elements that affect the Plan of Care: 5+:  HIGH COMPLEXITY   CLINICAL DECISION MAKING:   Outcome Measure: Tool Used: [address] [facility] [address] AM-[facility] Daily Activity Outpatient Short Form  Score:  Initial: 18 Most Recent: 26 (Date: 5/2/18 )   Interpretation of Tool:  Represents clinically-significant functional categories (i.e., basic and instrumental activities of daily living, fine motor activities). Score 60 59-55 54-47 46-34 32-21 20-16 15   Modifier CH CI CJ CK CL CM CN     Medical Necessity:   · Patient demonstrates good rehab potential due to higher previous functional level.   Reason for Services/Other Comments:  · Patient continues to require skilled intervention due to decreased safety and independence in activities of daily living. Clinical Decision-Making Assessment: Moderately difficult to predict patient's progress at this time due to the extent of her limitations in functional movement and use of dominant right upper extremity. Assessment process, impact of co-morbidities, assessment modification\need for assistance, and selection of interventions: Analytical Complexity:MODERATE COMPLEXITY   TREATMENT:   (In addition to Assessment/Re-Assessment sessions the following treatments were rendered)    Pre-treatment Symptoms/Complaints:  Patient states, \"I will try to use my cuff for feeding tonight. \"  Pain: Initial:   Pain Intensity 1: 0/10 Post Session:  same     Therapeutic Exercise: ( 25 minutes):  Exercises per grid below to improve dynamic movement of arm - right and hand - right to improve functional lifting, carrying, reaching and grasping. Required moderate visual and verbal cues to promote proper body mechanics. Progressed range, repetitions and complexity of movement as indicated.        Date:  4/23/18 Date:  4/26/18 Date:  4/30/18 Date:  5/2/18 Date:   5/4/18 Date:  5/7/18 Date:  5/9/18 Date:  5/14/18   Activity/Exercise           Shoulder shrugs AROM x 10 AROM x 10 AROM x 10 AROM x 10 AROM x 10 AROM x 10 AROM x 10 AROM x 10   Scapular protraction/retraction AROMAROM  1 x 10 with passive self range into elbow extension at end range  1 x 10 with passive self range into elbow extension at end range AROM  1 x 10 with passive self range into elbow extension at end range AROM  1 x 10 with passive self range into elbow extension at end range AROM  1 x 10 with passive self range into elbow extension at end range AROM  1 x 10 with passive self range into elbow extension at end range AROM  1 x 10 with passive self range into elbow extension at end range AROM  1 x 10 with passive self range into elbow extension at end range AROM  1 x 10 with passive self range into elbow extension at end range   Shoulder abduction           Elbow flexion/extension PROM x 10 reps SROM x 15 reps seated SROM x 15 reps seated SROM x 15 reps seated  SROM x 15 reps seated SROM x 15 reps seated PROM x 10 reps seated   PNF D1/D2 diagonals           Hand helper 20 lbs x 10 reps with assist to extend fingers 20 lbs x 10 reps with assist to extend fingers         Tabletop skateboard           Shoulder flexion/extension PROM x 1o reps SROM x 15 reps in supine PROM x 15 in supine    SROM x 15 reps seated    Chest press           UBE Min assist to sustain right  on handle   Level 0.5  7 mintues Min assist to sustain right  on handle   Level 0.5  7 mintues Min assist to sustain right  on handle   Level 0.5  7 mintues Min assist to sustain right  on handle   Level 0.5  7 mintues Min assist to sustain right  on handle   Level 0.5  7 mintues Min assist to sustain right  on handle   Level 0.5  7 mintues  Min assist to sustain right  on handle   Level 0.5  7 mintues   Modified push-ups  Hands on countertop with dycem under right hand  3 x 10 Hands on countertop with dycem under right hand  3 x 10 Hands on countertop with dycem under right hand  3 x 10 Hands on countertop with dycem under right hand  3 x 10  Hands on countertop with dycem under right hand  3 x 10 Hands on countertop with dycem under right hand  3 x 10    Resistive clothespin Yellow   1 x 5 pinch and release Yellow   1 x 5 pinch and release Yellow   1 x 5 pinch and release   Yellow   1 x 5 pinch and release  Yellow   1 x 5 pinch and release   Shoulder arc     Gripped and released ~10 tabs to pull over shoulder arc.       Wrist flexion/extension PROM x 10 reps with prolonged stretch at end range of extension  PROM x 10 reps with prolonged stretch at end range of extension PROM x 10 reps with prolonged stretch at end range of extension  PROM x 10 reps with prolonged stretch at end range of extension  PROM x 10 reps with prolonged stretch at end range of extension   Punching bag           Finger flexion/extension PROM x 5 reps   with prolonged stretch at end range of extension PROM x 5 reps   with prolonged stretch at end range of extension PROM x 10 reps with prolonged stretch at end range of extension PROM x 10 reps with prolonged stretch at end range of extension  PROM x 10 reps with prolonged stretch at end range of extension  PROM x 10 reps with prolonged stretch at end range of extension   Measurements Taken    Am-PAC and goals re-assessed. Access Code: [email or web address]   URL: [email or web address]   Date: 01/19/2018   Prepared by: Bal Brooks     Exercises   Supine Shoulder Flexion PROM - 10 reps - 2 sets - 3 hold - 1x daily - 5x weekly   Push-Up on Counter - 10 reps - 2 sets - 1x daily - 5x weekly   Seated Shoulder Shrugs - 10 reps - 2 sets - 1x daily - 5x weekly   Seated Scapular Retraction - 10 reps - 2 sets - 1x daily - 5x weekly   Seated Weight Shifting with Arm Support - 10 reps - 2 sets - 1x daily - 5x weekly   Seated Shoulder Flexion Self PROM - 10 reps - 2 sets - 1x daily - 5x weekly   Supine Elbow Flexion Extension AROM - 10 reps - 2 sets - 1x daily - 5x weekly   Seated Elbow Flexion Extension AROM - 10 reps - 2 sets - 1x daily - 5x weekly     Neuromuscular Re-education: ( 0  minutes):  Exercise/activities per grid below to improve balance, coordination, kinesthetic sense and proprioception. Required moderate visual and verbal cues to promote static balance in sitting, promote coordination of right, upper extremity(s) and promote motor control of right, upper extremity(s). Patient completed lateral weight shifts sitting at edge of mat x 10 reps each onto elbow/forearms  with dynamic reach with left hand outside of base of support, using right upper extremity to maintain balance.  Completed lateral weight shifts onto outstretched right hand with minimal assist to maintain position of right hand on mat x 5 reps. Self Care: (20 minutes): Procedure(s) (per grid) utilized to improve and/or restore self-care/home management as related to grooming and self feeding. Required moderate visual and verbal cueing to facilitate activities of daily living skills. Patient instructed in different movement patterns using universal cuff on R hand for self-feeding. Pt was instructed to keep R elbow by her side, with L hand providing min A to lift R arm during self-feeding. Pt demo'd good ability to saurabh universal cuff on R hand and use L hand assist during simulated feeding. Pt also practiced functional techniques when using R hand for folding towels. Treatment/Session Assessment: Pt demo'd good ability to saurabh universal cuff on R hand with L hand assist. Pt able to simulate self-feeding with use of R hand and min assist with L hand. Pt would continue to benefit from skilled OT services to increase functional use of R UE during activities of daily living. · Response to Treatment: Mrs. Cristine Peralta tolerated therapy well today; she is demonstrating increasing functional use of her right upper extremity in activities of daily living, including carrying personal items in a bag with her right hand throughout the clinic today. · Compliance with Program/Exercises: Will assess as treatment progresses. Pt reports compliance with HEP. · Recommendations/Intent for next treatment session: \"Next visit will focus on advancements to more challenging activities and reduction in assistance provided\".     Total Treatment Duration:  OT Patient Time In/Time Out  Time In: 1259  Time Out: 4267 [facility]

## 2018-05-16 ENCOUNTER — HOSPITAL ENCOUNTER (OUTPATIENT)
Dept: PHYSICAL THERAPY | Age: 63
Discharge: HOME | End: 2018-05-16
Attending: PHYSICAL MEDICINE & REHABILITATION
Payer: COMMERCIAL

## 2018-05-16 PROCEDURE — 97110 THERAPEUTIC EXERCISES: CPT

## 2018-05-16 PROCEDURE — 97112 NEUROMUSCULAR REEDUCATION: CPT

## 2018-05-16 PROCEDURE — 97535 SELF CARE MNGMENT TRAINING: CPT

## 2018-05-16 NOTE — PROGRESS NOTES
Rakesh Workman  : 1955  Primary:   Secondary:  [address] at [facility]  [address] [facility], [address] [place] [address],[address], [facility] [address]  [phone]   [phone]          OUTPATIENT PHYSICAL THERAPY:Daily Note 2018    ICD-10: Treatment Diagnosis:   · Other abnormalities of gait and mobility (R26.89)  · Difficulty in walking, not elsewhere classified (R26.2)  Precautions/Allergies:   Banana; Lisinopril; and Nuts [tree nut]   Fall Risk Score: 5 (? 5 = High Risk)  MD Orders: Evaluate and Treat MEDICAL/REFERRING DIAGNOSIS:  Weakness due to cerebrovascular accident (CVA) ([facility] [address] [I63.9, R53.1]   DATE OF ONSET: CVA: 2017  REFERRING PHYSICIAN: Juliette Randhawa*  RETURN PHYSICIAN APPOINTMENT: TBD       ASSESSMENT:  Ms. Veronika Reyes presents with decreased mobility, decreased strength, decreased gait, and decreased balance secondary to CVA. After discussing with patient, she agreed she would benefit from physical therapy to improve above deficits. Please sign this plan of treatment if you concur. Thank you for the opportunity to serve this patient. Recertification note on 2018:   Patient has attended [de-identified] scheduled physical therapy appointments from 12/15/2017 to 2018. Patient is very motivated and compliant with therapy. Patient received her first Botox injection on 2014. Patient's right ankle is less tight, and right foot is easier to get into her right AFO. Patient also reports less right foot pain with walking. Patient demonstrates improved balance since beginning therapy. Patient would like to continue working on ambulation throughout the community. Patient would benefit from continuing skilled physical therapy to address problems and goals. Thank you for this referral.    PROBLEM LIST (Impacting functional limitations):  1. Decreased Strength  2. Decreased Ambulation Ability/Technique  3. Decreased Balance  4.  Decreased Activity Tolerance INTERVENTIONS PLANNED:  1. Balance Exercise  2. Gait Training  3. Home Exercise Program (HEP)  4. Neuromuscular Re-education/Strengthening  5. Range of Motion (ROM)  6. Therapeutic Exercise/Strengthening   TREATMENT PLAN:  Effective Dates: 5/7/2018 TO 8/5/2018 (90 days). Frequency/Duration: 2 times a week for 90 Days  GOALS: (Goals have been discussed and agreed upon with patient.)  Short-Term Functional Goals: Time Frame: 3 weeks  1. Patient will be independent with home exercise program without exacerbation of symptoms or cueing needed. Goal met. Discharge Goals: Time Frame: 8 weeks  1. Patient will be independent with all ADLs with minimal fear of falling and loss of balance with daily tasks. Goal ongoing. 2. Patient will report no fear avoidance with social or recreational activities due to fear of falling. Goal ongoing. 3. Patient will score greater than or equal to 45/56 on Gipson Balance Scale with minimal effect of imbalance on patient's ability to manage every day life activities. Goal ongoing. Rehabilitation Potential For Stated Goals: Good            The information in this section was collected on 12/15/2017 (except where otherwise noted). HISTORY:   History of Present Injury/Illness (Reason for Referral):  Patient reports she had a severe stroke on 9/6/2017. She reports that she was in the hospital and [facility] for about 61-62 days. She reports that she has progressed really well, but is still having trouble walking and using her right side (UE and LE). She reports that she walks using her AFO and quad cane all the time. She reports that she tries to be as active as possible. She reports that she is scared that she is going to fall, even though she has not. She reports she would like to work on strengthening her right leg so her balance is better and she can walk without fear of falling.   Past Medical History/Comorbidities:   Ms. Анна Stoll  has a past medical history of Anxiety; CVA [facility]cere[facility] accident) ([facility] (2017); Depression; HTN (hypertension); Menopause; and PTE (pulmonary thromboembolism) ([facility] (2017). Ms. Cruz Castellon  has a past surgical history that includes hx jennifer and bso (); hx  section; hx refractive surgery (); hx other surgical (); and hx other surgical (2017). Social History/Living Environment:   Home Environment: Private residence  Living Alone: No  Support Systems: Family member(s), Friends \ neighbors; Spouse  Prior Level of Function/Work/Activity:  Independent  Dominant Side:         RIGHT  Personal Factors:          Sex:  female        Age:  60 y.o. Current Medications:       Current Outpatient Prescriptions:     polyethylene glycol (MIRALAX) 17 gram/dose powder, Take 17 g by mouth daily. , Disp: 510 g, Rfl: 2    [START ON 2018] methylphenidate HCl (RITALIN) 20 mg tablet, Take 1 Tab (20 mg total) by mouth daily. Max Daily Amount: 20 mg, Disp: 30 Tab, Rfl: 0    methylphenidate HCl (RITALIN) 20 mg tablet, Take 1 Tab (20 mg total) by mouth daily. Max Daily Amount: 20 mg, Disp: 30 Tab, Rfl: 0    methylphenidate HCl (RITALIN) 20 mg tablet, Take 1 Tab (20 mg total) by mouth daily. Max Daily Amount: 20 mg, Disp: 30 Tab, Rfl: 0    rOPINIRole (REQUIP) 2 mg tablet, Take 1 Tab by mouth nightly. for restless leg, Disp: 90 Tab, Rfl: 1    FLUoxetine (PROZAC) 20 mg tablet, Take 2 Tabs by mouth daily. , Disp: 180 Tab, Rfl: 1    tiZANidine (ZANAFLEX) 4 mg tablet, 4mg po TID, Disp: 270 Tab, Rfl: 1    labetalol (NORMODYNE) 200 mg tablet, Take 1 Tab by mouth two (2) times a day., Disp: 180 Tab, Rfl: 1    losartan-hydroCHLOROthiazide (HYZAAR) 100-12.5 mg per tablet, Take 1 Tab by mouth daily. , Disp: 90 Tab, Rfl: 1    ALPRAZolam (XANAX) 0.25 mg tablet, Take 1 Tab by mouth three (3) times daily as needed for Anxiety.  Max Daily Amount: 0.75 mg., Disp: 30 Tab, Rfl: 1   Date Last Reviewed:  2018    Number of Personal Factors/Comorbidities that affect the Plan of Care: 1-2: MODERATE COMPLEXITY   EXAMINATION:   Observation/Orthostatic Postural Assessment:          Posture Assessment: Rounded shoulders, Forward head   Functional Mobility:         Gait/Ambulation:     Speed/Lulu: Pace decreased (<100 feet/min)  Step Length: Left shortened, Right lengthened  Swing Pattern: Left asymmetrical, Right asymmetrical  Stance: Left increased, Right decreased  Gait Abnormalities: Antalgic, Circumduction, Foot drop, Path deviations, Trendelenburg  Ambulation - Level of Assistance: Modified independent  Assistive Device: Cane, quad  · Interventions: Verbal cues, Safety awareness training          Transfers:     Sit to Stand: Independent  Stand to Sit: Independent  Stand Pivot Transfers: Independent  · Bed to Chair: Independent          Bed Mobility:     Rolling: Independent  Supine to Sit: Independent  Sit to Supine: Independent  · Scooting: Independent    Strength:          L UE STRENGTH: 4/5 shoulder flexion, 4/5 shoulder abduction, 4/5 shoulder extension, 4/5 elbow flexion, 4/5 elbow extension. R UE STRENGTH: 2/5 shoulder flexion, 2/5 shoulder abduction, 2/5 shoulder extension, 2/5 elbow flexion, 2/5 elbow extension. R LE STRENGTH:  4+/5 hip flexion, 4/5 hip abduction, 55 hip adduction, 4+/5 hip extension, 4+/5 5/5knee extension, 3-/5 knee flexion, 1/5 ankle dorsiflexion, 1/5 ankle plantarflexion, 1/5 ankle inversion, 1/5 ankle eversion. Sensation:         Intact for light touch and proprioception  Postural Control & Balance:  · Gipson Balance Scale:  36/56.   (A score less than 45/56 indicates high risk of falls)  . Score improved from 28/56 on initial evaluation. Body Structures Involved:  1. Nerves  2. Muscles Body Functions Affected:  1. Neuromusculoskeletal  2. Movement Related Activities and Participation Affected:  1. Mobility  2.  Self Care   Number of elements (examined above) that affect the Plan of Care: 4+: HIGH COMPLEXITY   CLINICAL PRESENTATION: Presentation: Evolving clinical presentation with changing clinical characteristics: MODERATE COMPLEXITY   CLINICAL DECISION MAKING:   Outcome Measure: Tool Used: Gipson Balance Scale  Score:  Initial: 28/56 Most Recent: 36/56 (Date: 5-7-2018 )   Interpretation of Score: Each section is scored on a 0-4 scale, 0 representing the patients inability to perform the task and 4 representing independence. The scores of each section are added together for a total score of 56. The higher the patients score, the more independent the patient is. Any score below 45 indicates increased risk for falls. Score 56 55-45 44-34 33-23 22-12 11-1 0   Modifier CH CI CJ CK CL CM CN     Medical Necessity:   · Patient is expected to demonstrate progress in strength, range of motion, balance and coordination to improve safety during daily activities. · Patient demonstrates good rehab potential due to higher previous functional level. · Skilled intervention continues to be required due to decreased mobility. Reason for Services/Other Comments:  · Patient continues to demonstrate capacity to improve overall mobility which will increase independence and increase safety. Use of outcome tool(s) and clinical judgement create a POC that gives a: Questionable prediction of patient's progress: MODERATE COMPLEXITY            TREATMENT:   (In addition to Assessment/Re-Assessment sessions the following treatments were rendered)  Pre-treatment Symptoms/Complaints:  5/16/2018: Doing fine. I want to work on walking and stretching. Pain: Initial: Pain Intensity 1: 0  Post Session:  0/10     NEUROMUSCULAR RE-EDUCATION: (25 minutes):  Exercise/activities per grid below to improve balance, coordination, kinesthetic sense, posture and proprioception.   Required minimal verbal and manual cues to promote static and dynamic balance in standing, promote coordination of bilateral, lower extremity(s) and promote motor control of bilateral, lower extremity(s). Date:  5/14/2018 Date   5/16/18   Activity/Exercise Parameters    Stepping over half foam      Step taps     Step ups Forward x 20 reps R, then laterally x 20 reps R with rail onto 6 inch step    [place]     Walking in the clinic with quad cane About 200 feet with quad cane, working on weightbearing more on R foot through midstance and terminal stance, and stepping bigger with L LE; added working on shifting R hips forward over R foot better midstance to terminal stance Added heel lift to L shoe to help equalize leg length:   then walked about 200 feet with quad cane, working on weightbearing more on R foot through midstance and terminal stance, and stepping bigger with L LE; added working on shifting R hips forward over R foot better midstance to terminal stance   pregait activity     Sidestepping in bars With no UE assist working on weight shifting. Standing weight shifting onto R LE Worked on marching with L LE, then stepping fwd/back with L LE, weight shifting onto R LE, trying to increase control and balance with no UE assist. In Balance Master: weight shifting L/R with weight bearing mode; then weight shifting L/R and center 3 R, 55% LOS  some manual assist for weight shift. Walking in bars Progressed above weight shifting activity to walking forward and backward in bars with no UE assist, some manual assist at pelvis    Walked in clinic With no cane with min to mod assist working on weight bearing through R LE, to improve confidence and weight bearing. THERAPEUTIC EXERCISE: (20 minutes):  Exercises per grid below to improve mobility and strength. Required minimal verbal cues to promote proper body alignment. Progressed repetitions as indicated.    Date:  5/14/2018 Date   5/16/18   Activity/Exercise     Sit to stand      Left sidelying alternating isometrics on pelvis     Standing hip abduction     Seated LAQs     Standing hamstrings curls      Bridging     Supine straight leg raise     Supine PNF     L sidelying R pelvis and trunk PNF     Left sidelying hip abduction     L sidelying: picking up and moving R LE from in front of L foot to behind L foot (working on hip abd and ext) and stabilizing trunk     L sidelying R clam shells     L sidelying R hip abduction     R foot and gastrocsoleus stretching  20 minutes stretching toe flexors, gastroc, soleus, and tibialis anterior  20 minutes stretching toe flexors, gastroc, soleus, and tibialis anterior   Supine hooklying lower body rotation     Standing minisquats in bars     [facility] Portal  Treatment/Session Assessment:    · Response to Treatment:  Patient tolerated session well. Patient demonstrated better understanding of weight shifting to R with balance master. Working on weight shifting to R better during gait. Able to get R ankle to neutral with stretching. · Compliance with Program/Exercises: Compliant. · Recommendations/Intent for next treatment session: \"Next visit will focus on advancements to more challenging activities\".    Total Treatment Duration:  PT Patient Time In/Time Out  Time In: 1400  Time Out: [address] [facility] Juan Huggins

## 2018-05-16 NOTE — PROGRESS NOTES
Ivelisse Ards  : 1955  Primary: Sarmad Houser Ppo  Secondary:  [address]  [facility] [facility] [address], [address] [facility] [place]  [phone]   [phone]        OUTPATIENT OCCUPATIONAL THERAPY: Daily Note 2018    ICD-10: Treatment Diagnosis: Hemiplegia and hemiparesis following cerebral infarction affecting right dominant side (I69.351)  Precautions/Allergies:   Banana; Lisinopril; and Nuts [tree nut]   Fall Risk Score: 5 (? 5 = High Risk)  MD Orders: Referral to occupational therapy MEDICAL/REFERRING DIAGNOSIS:   Cerebral infarction, unspecified [I63.9]  Weakness [R53.1]   DATE OF ONSET: 2017   REFERRING PHYSICIAN: Juliette Eng*  RETURN PHYSICIAN APPOINTMENT: unknown   18: Ms. Giana Santana continues to demo' progress towards therapy goals. Since Botox, pt has demo'd improved improved R shoulder abduction ROM; R elbow also appears to be more relax with pt achieving greater elbow extension. Pt also demo's greater ability to contract and relax R hand/digits during hand strengthening activities. Pt would continue to benefit from skilled OT services to maximize functional use of R UE for increased independence with activities of daily living. 3/5/18: Ms. Giana Santana continues to make excellent progress towards her therapy goals; she demonstrates gradual improvements in R UE range of motion, specifically R shoulder flexion and R elbow extension/flexion, and reports increased functional use of R UE in activities of daily living, including dressing, grooming, and feeding.  Patient would continue to benefit from skilled occupational therapy services to maximize safety and independence and increase functional use of R UE during activities of daily living.    18: Ms. Giana Santana is making excellent progress toward her goals; she is demonstrating improving range of motion and functional use of the right upper extremity in activities of daily living. Feel she will continue to benefit from skilled occupational therapy to maximize safety and independence with activities of daily living. INITIAL ASSESSMENT:  Ms. Albert Ibanez presents with impaired active movement, strength, coordination and sensation of the dominant right upper extremity that is affecting her ability to complete activities of daily living independently. Feel she may benefit from skilled occupational therapy to maximize safety and independence with activities of daily living. PLAN OF CARE:   PROBLEM LIST:  1. Decreased Strength  2. Decreased ADL/Functional Activities  3. Decreased Transfer Abilities  4. Decreased Balance  5. Decreased Flexibility/Joint Mobility  6. Decreased Cognition INTERVENTIONS PLANNED:  1. Activities of daily living training  2. Manual therapy training  3. Modalities  4. Neuromuscular re-eduation  5. Sensory reintegration training  6. Splinting  7. Therapeutic activity  8. Therapeutic exercise   TREATMENT PLAN:  Effective Dates: 3/19/18 to 6/17/18. Frequency/Duration: 2-3 times a week for 12 weeks  GOALS: (Goals have been discussed and agreed upon with patient.)  Short-Term Functional Goals: Time Frame: 6 weeks  1. Patient will demonstrate independence with home exercise program for right upper extremity range of motion. Met  2. Patient will increase use of right upper extremity as a functional assist in at least 25% of daily activities. Met  3. Patient will bathe with moderate assistance. Continue to address  Discharge Goals: Time Frame: 12 weeks  1. Patient will bathe with minimal assistance. Continue to address  2. Patient will feed self with modified independence and adaptive equipment as needed. Continue to address  3. Patient will dress with modified independence and adaptive equipment as needed. Continue to address  4. Patient will use right upper extremity as a functional assist in at least 50% of daily activities.  Continue to address  Rehabilitation Potential For Stated Goals: Good  Regarding Sandra Iraheta's therapy, I certify that the treatment plan above will be carried out by a therapist or under their direction. Thank you for this referral,  Torres Rosas, OT     Referring Physician Signature: Juliette Hatfield* _________________________  Date _________            The information in this section was collected on 18 (except where otherwise noted). OCCUPATIONAL PROFILE & HISTORY:   History of Present Injury/Illness (Reason for Referral):  CVA with hospital and [facility] stay then home health therapy. Past Medical History/Comorbidities:   Ms. Naz Marcano  has a past medical history of Anxiety; CVA (cerebral vascular accident) ([facility] [address] (2017); Depression; HTN (hypertension); Menopause; and PTE (pulmonary thromboembolism) ([facility] [address] (2017). Ms. Naz Marcano  has a past surgical history that includes hx jennifer and bso (); hx  section; hx refractive surgery (); hx other surgical (); and hx other surgical (). Social History/Living Environment:      Prior Level of Function/Work/Activity:  Does seasonal taxes at [employer]  Dominant Side:         RIGHT  Current Medications:    Current Outpatient Prescriptions:     polyethylene glycol (MIRALAX) 17 gram/dose powder, Take 17 g by mouth daily. , Disp: 510 g, Rfl: 2    [START ON 2018] methylphenidate HCl (RITALIN) 20 mg tablet, Take 1 Tab (20 mg total) by mouth daily. Max Daily Amount: 20 mg, Disp: 30 Tab, Rfl: 0    methylphenidate HCl (RITALIN) 20 mg tablet, Take 1 Tab (20 mg total) by mouth daily. Max Daily Amount: 20 mg, Disp: 30 Tab, Rfl: 0    methylphenidate HCl (RITALIN) 20 mg tablet, Take 1 Tab (20 mg total) by mouth daily. Max Daily Amount: 20 mg, Disp: 30 Tab, Rfl: 0    rOPINIRole (REQUIP) 2 mg tablet, Take 1 Tab by mouth nightly. for restless leg, Disp: 90 Tab, Rfl: 1    FLUoxetine (PROZAC) 20 mg tablet, Take 2 Tabs by mouth daily. , Disp: 180 Tab, Rfl: 1    tiZANidine (ZANAFLEX) 4 mg tablet, 4mg po TID, Disp: 270 Tab, Rfl: 1    labetalol (NORMODYNE) 200 mg tablet, Take 1 Tab by mouth two (2) times a day., Disp: 180 Tab, Rfl: 1    losartan-hydroCHLOROthiazide (HYZAAR) 100-12.5 mg per tablet, Take 1 Tab by mouth daily. , Disp: 90 Tab, Rfl: 1    ALPRAZolam (XANAX) 0.25 mg tablet, Take 1 Tab by mouth three (3) times daily as needed for Anxiety. Max Daily Amount: 0.75 mg., Disp: 30 Tab, Rfl: 1            Date Last Reviewed:  5/16/2018   Complexity Level : Expanded review of therapy/medical records (1-2):  MODERATE COMPLEXITY   ASSESSMENT OF OCCUPATIONAL PERFORMANCE:   ROM:                   Balance:                   Coordination:                   Mental Status:                   Vision:                   Activities of Daily Living:           Basic ADLs (From Assessment) Complex ADLs (From Assessment)         Grooming/Bathing/Dressing Activities of Daily Living                                   Sensation:         Light touch absent distal to right elbow     Physical Skills Involved:  1. Range of Motion  2. Balance  3. Sensation  4. Fine Motor Control  5. Gross Motor Control Cognitive Skills Affected (resulting in the inability to perform in a timely and safe manner):  1. Executive Function  2. Sustained Attention  3. Divided Attention  4. Comprehension Psychosocial Skills Affected:  1. None   Number of elements that affect the Plan of Care: 5+:  HIGH COMPLEXITY   CLINICAL DECISION MAKING:   Outcome Measure: Tool Used: [address] [facility] [address] AM-[facility] Daily Activity Outpatient Short Form  Score:  Initial: 18 Most Recent: 26 (Date: 5/2/18 )   Interpretation of Tool:  Represents clinically-significant functional categories (i.e., basic and instrumental activities of daily living, fine motor activities). Score 60 59-55 54-47 46-34 32-21 20-16 15   Modifier CH CI CJ CK CL CM CN     Medical Necessity:   · Patient demonstrates good rehab potential due to higher previous functional level.   Reason for Services/Other Comments:  · Patient continues to require skilled intervention due to decreased safety and independence in activities of daily living. Clinical Decision-Making Assessment: Moderately difficult to predict patient's progress at this time due to the extent of her limitations in functional movement and use of dominant right upper extremity. Assessment process, impact of co-morbidities, assessment modification\need for assistance, and selection of interventions: Analytical Complexity:MODERATE COMPLEXITY   TREATMENT:   (In addition to Assessment/Re-Assessment sessions the following treatments were rendered)    Pre-treatment Symptoms/Complaints:  Patient states, \"I'm going to practice feeding tomorrow with my universal cuff. \"  Pain: Initial:   Pain Intensity 1: 0/10 Post Session:  same     Therapeutic Exercise: ( 15 minutes):  Exercises per grid below to improve dynamic movement of arm - right and hand - right to improve functional lifting, carrying, reaching and grasping. Required moderate visual and verbal cues to promote proper body mechanics. Progressed range, repetitions and complexity of movement as indicated.        Date:  4/26/18 Date:  4/30/18 Date:  5/2/18 Date:   5/4/18 Date:  5/7/18 Date:  5/9/18 Date:  5/14/18 Date:  5/16/18   Activity/Exercise           Shoulder shrugs AROM x 10 AROM x 10 AROM x 10 AROM x 10 AROM x 10 AROM x 10 AROM x 10    Scapular protraction/retraction AROM  1 x 10 with passive self range into elbow extension at end range AROM  1 x 10 with passive self range into elbow extension at end range AROM  1 x 10 with passive self range into elbow extension at end range AROM  1 x 10 with passive self range into elbow extension at end range AROM  1 x 10 with passive self range into elbow extension at end range AROM  1 x 10 with passive self range into elbow extension at end range AROM  1 x 10 with passive self range into elbow extension at end range AROM  1 x 10 with passive self range into elbow extension at end range   Shoulder abduction           Elbow flexion/extension SROM x 15 reps seated SROM x 15 reps seated SROM x 15 reps seated  SROM x 15 reps seated SROM x 15 reps seated PROM x 10 reps seated PROM x 10 reps seated   PNF D1/D2 diagonals           Hand helper 20 lbs x 10 reps with assist to extend fingers          Tabletop skateboard           Shoulder flexion/extension SROM x 15 reps in supine PROM x 15 in supine    SROM x 15 reps seated     Chest press           UBE Min assist to sustain right  on handle   Level 0.5  7 mintues Min assist to sustain right  on handle   Level 0.5  7 mintues Min assist to sustain right  on handle   Level 0.5  7 mintues Min assist to sustain right  on handle   Level 0.5  7 mintues Min assist to sustain right  on handle   Level 0.5  7 mintues  Min assist to sustain right  on handle   Level 0.5  7 mintues    Modified push-ups  Hands on countertop with dycem under right hand  3 x 10 Hands on countertop with dycem under right hand  3 x 10 Hands on countertop with dycem under right hand  3 x 10  Hands on countertop with dycem under right hand  3 x 10 Hands on countertop with dycem under right hand  3 x 10     Resistive clothespin Yellow   1 x 5 pinch and release Yellow   1 x 5 pinch and release   Yellow   1 x 5 pinch and release  Yellow   1 x 5 pinch and release    Shoulder arc    Gripped and released ~10 tabs to pull over shoulder arc.        Wrist flexion/extension  PROM x 10 reps with prolonged stretch at end range of extension PROM x 10 reps with prolonged stretch at end range of extension  PROM x 10 reps with prolonged stretch at end range of extension  PROM x 10 reps with prolonged stretch at end range of extension PROM x 10 reps with prolonged stretch at end range of extension   Punching bag           Finger flexion/extension PROM x 5 reps   with prolonged stretch at end range of extension PROM x 10 reps with prolonged stretch at end range of extension PROM x 10 reps with prolonged stretch at end range of extension  PROM x 10 reps with prolonged stretch at end range of extension  PROM x 10 reps with prolonged stretch at end range of extension PROM x 10 reps with prolonged stretch at end range of extension   Measurements Taken   Am-PAC and goals re-assessed. Access Code: [email or web address]   URL: [email or web address]   Date: 01/19/2018   Prepared by: Bal Brooks     Exercises   Supine Shoulder Flexion PROM - 10 reps - 2 sets - 3 hold - 1x daily - 5x weekly   Push-Up on Counter - 10 reps - 2 sets - 1x daily - 5x weekly   Seated Shoulder Shrugs - 10 reps - 2 sets - 1x daily - 5x weekly   Seated Scapular Retraction - 10 reps - 2 sets - 1x daily - 5x weekly   Seated Weight Shifting with Arm Support - 10 reps - 2 sets - 1x daily - 5x weekly   Seated Shoulder Flexion Self PROM - 10 reps - 2 sets - 1x daily - 5x weekly   Supine Elbow Flexion Extension AROM - 10 reps - 2 sets - 1x daily - 5x weekly   Seated Elbow Flexion Extension AROM - 10 reps - 2 sets - 1x daily - 5x weekly     Neuromuscular Re-education: ( 10  minutes):  Exercise/activities per grid below to improve balance, coordination, kinesthetic sense and proprioception. Required moderate visual and verbal cues to promote static balance in sitting, promote coordination of right, upper extremity(s) and promote motor control of right, upper extremity(s). Patient completed lateral weight shifts sitting at edge of mat x 10 reps each onto elbow/forearms  with dynamic reach with left hand outside of base of support, using right upper extremity to maintain balance. Completed lateral weight shifts onto outstretched right hand with minimal assist to maintain position of right hand on mat x 5 reps. Self Care: (20 minutes): Procedure(s) (per grid) utilized to improve and/or restore self-care/home management as related to grooming and self feeding.  Required moderate visual and verbal cueing to facilitate activities of daily living skills. Pt attempted self-feeding with use of universal cuff on R hand and L hand assist. Pt initially demo'd ability to self-feeding using shoulder abduction and external rotation. Pt encouraged to isolate elbow flexors to bring spoon to mouth, however at this time pt is unable to currently isolate elbow flexors to bring spoon to mouth. Pt was able to keep R arm by her side and use L hand to bring R hand to mouth. Treatment/Session Assessment: Pt demo'd good ability to saurabh universal cuff on R hand with L hand assist. Pt able complete feeding task with use of R hand and min assist with L hand. Pt is currently unable to isolate R elbow flexors to bring utensils to mouth. Pt would continue to benefit from skilled OT services to increase functional use of R UE during activities of daily living. · Response to Treatment: Mrs. Chapito Bustillos tolerated therapy well today; she is demonstrating increasing functional use of her right upper extremity in activities of daily living, including carrying personal items in a bag with her right hand throughout the clinic today. · Compliance with Program/Exercises: Will assess as treatment progresses. Pt reports compliance with HEP. · Recommendations/Intent for next treatment session: \"Next visit will focus on advancements to more challenging activities and reduction in assistance provided\".     Total Treatment Duration:  OT Patient Time In/Time Out  Time In: 1315  Time Out: [address] Joanne [place]

## 2018-05-18 ENCOUNTER — APPOINTMENT (OUTPATIENT)
Dept: PHYSICAL THERAPY | Age: 63
End: 2018-05-18
Attending: PHYSICAL MEDICINE & REHABILITATION
Payer: COMMERCIAL

## 2018-05-21 ENCOUNTER — HOSPITAL ENCOUNTER (OUTPATIENT)
Dept: PHYSICAL THERAPY | Age: 63
Discharge: HOME | End: 2018-05-21
Attending: PHYSICAL MEDICINE & REHABILITATION
Payer: COMMERCIAL

## 2018-05-21 PROCEDURE — 97112 NEUROMUSCULAR REEDUCATION: CPT

## 2018-05-21 PROCEDURE — 97110 THERAPEUTIC EXERCISES: CPT

## 2018-05-21 NOTE — PROGRESS NOTES
Christopher Loya  : 1955  Primary: Allyne Knee Ppo  Secondary:  [address] at [place] [id number] [facility], [address] [place] [address],[address]  [phone]   [phone]        OUTPATIENT OCCUPATIONAL THERAPY: Daily Note 2018    ICD-10: Treatment Diagnosis: Hemiplegia and hemiparesis following cerebral infarction affecting right dominant side (I69.351)  Precautions/Allergies:   Banana; Lisinopril; and Nuts [tree nut]   Fall Risk Score: 5 (? 5 = High Risk)  MD Orders: Referral to occupational therapy MEDICAL/REFERRING DIAGNOSIS:   Cerebral infarction, unspecified [I63.9]  Weakness [R53.1]   DATE OF ONSET: 2017   REFERRING PHYSICIAN: Juliette Godfrey*  RETURN PHYSICIAN APPOINTMENT: unknown   18: Ms. Cruz Castellon continues to demo' progress towards therapy goals. Since Botox, pt has demo'd improved improved R shoulder abduction ROM; R elbow also appears to be more relax with pt achieving greater elbow extension. Pt also demo's greater ability to contract and relax R hand/digits during hand strengthening activities. Pt would continue to benefit from skilled OT services to maximize functional use of R UE for increased independence with activities of daily living. 3/5/18: Ms. Cruz Castellon continues to make excellent progress towards her therapy goals; she demonstrates gradual improvements in R UE range of motion, specifically R shoulder flexion and R elbow extension/flexion, and reports increased functional use of R UE in activities of daily living, including dressing, grooming, and feeding.  Patient would continue to benefit from skilled occupational therapy services to maximize safety and independence and increase functional use of R UE during activities of daily living.    18: Ms. Cruz Castellon is making excellent progress toward her goals; she is demonstrating improving range of motion and functional use of the right upper extremity in activities of daily living. Feel she will continue to benefit from skilled occupational therapy to maximize safety and independence with activities of daily living. INITIAL ASSESSMENT:  Ms. Cruz Castellon presents with impaired active movement, strength, coordination and sensation of the dominant right upper extremity that is affecting her ability to complete activities of daily living independently. Feel she may benefit from skilled occupational therapy to maximize safety and independence with activities of daily living. PLAN OF CARE:   PROBLEM LIST:  1. Decreased Strength  2. Decreased ADL/Functional Activities  3. Decreased Transfer Abilities  4. Decreased Balance  5. Decreased Flexibility/Joint Mobility  6. Decreased Cognition INTERVENTIONS PLANNED:  1. Activities of daily living training  2. Manual therapy training  3. Modalities  4. Neuromuscular re-eduation  5. Sensory reintegration training  6. Splinting  7. Therapeutic activity  8. Therapeutic exercise   TREATMENT PLAN:  Effective Dates: 3/19/18 to 6/17/18. Frequency/Duration: 2-3 times a week for 12 weeks  GOALS: (Goals have been discussed and agreed upon with patient.)  Short-Term Functional Goals: Time Frame: 6 weeks  1. Patient will demonstrate independence with home exercise program for right upper extremity range of motion. Met  2. Patient will increase use of right upper extremity as a functional assist in at least 25% of daily activities. Met  3. Patient will bathe with moderate assistance. Continue to address  Discharge Goals: Time Frame: 12 weeks  1. Patient will bathe with minimal assistance. Continue to address  2. Patient will feed self with modified independence and adaptive equipment as needed. Continue to address  3. Patient will dress with modified independence and adaptive equipment as needed. Continue to address  4. Patient will use right upper extremity as a functional assist in at least 50% of daily activities.  Continue to address  Rehabilitation Potential For Stated Goals: Good  Regarding Paola Iraheta's therapy, I certify that the treatment plan above will be carried out by a therapist or under their direction. Thank you for this referral,  Donna Garcia, OT     Referring Physician Signature: Juliette Otero* _________________________  Date _________            The information in this section was collected on 18 (except where otherwise noted). OCCUPATIONAL PROFILE & HISTORY:   History of Present Injury/Illness (Reason for Referral):  CVA with hospital and [facility] stay then home health therapy. Past Medical History/Comorbidities:   Ms. Laure Johnson  has a past medical history of Anxiety; CVA (cerebral vascular accident) ([facility] [address] (2017); Depression; HTN (hypertension); Menopause; and PTE (pulmonary thromboembolism) ([facility] [address] (2017). Ms. Laure Johnson  has a past surgical history that includes hx jennifer and bso (); hx  section; hx refractive surgery (); hx other surgical (); and hx other surgical (). Social History/Living Environment:      Prior Level of Function/Work/Activity:  Does seasonal taxes at [employer]  Dominant Side:         RIGHT  Current Medications:    Current Outpatient Prescriptions:     polyethylene glycol (MIRALAX) 17 gram/dose powder, Take 17 g by mouth daily. , Disp: 510 g, Rfl: 2    [START ON 2018] methylphenidate HCl (RITALIN) 20 mg tablet, Take 1 Tab (20 mg total) by mouth daily. Max Daily Amount: 20 mg, Disp: 30 Tab, Rfl: 0    methylphenidate HCl (RITALIN) 20 mg tablet, Take 1 Tab (20 mg total) by mouth daily. Max Daily Amount: 20 mg, Disp: 30 Tab, Rfl: 0    methylphenidate HCl (RITALIN) 20 mg tablet, Take 1 Tab (20 mg total) by mouth daily. Max Daily Amount: 20 mg, Disp: 30 Tab, Rfl: 0    rOPINIRole (REQUIP) 2 mg tablet, Take 1 Tab by mouth nightly. for restless leg, Disp: 90 Tab, Rfl: 1    FLUoxetine (PROZAC) 20 mg tablet, Take 2 Tabs by mouth daily. , Disp: 180 Tab, Rfl: 1    tiZANidine (ZANAFLEX) 4 mg tablet, 4mg po TID, Disp: 270 Tab, Rfl: 1    labetalol (NORMODYNE) 200 mg tablet, Take 1 Tab by mouth two (2) times a day., Disp: 180 Tab, Rfl: 1    losartan-hydroCHLOROthiazide (HYZAAR) 100-12.5 mg per tablet, Take 1 Tab by mouth daily. , Disp: 90 Tab, Rfl: 1    ALPRAZolam (XANAX) 0.25 mg tablet, Take 1 Tab by mouth three (3) times daily as needed for Anxiety. Max Daily Amount: 0.75 mg., Disp: 30 Tab, Rfl: 1            Date Last Reviewed:  5/21/2018   Complexity Level : Expanded review of therapy/medical records (1-2):  MODERATE COMPLEXITY   ASSESSMENT OF OCCUPATIONAL PERFORMANCE:   ROM:                   Balance:                   Coordination:                   Mental Status:                   Vision:                   Activities of Daily Living:           Basic ADLs (From Assessment) Complex ADLs (From Assessment)         Grooming/Bathing/Dressing Activities of Daily Living                                   Sensation:         Light touch absent distal to right elbow     Physical Skills Involved:  1. Range of Motion  2. Balance  3. Sensation  4. Fine Motor Control  5. Gross Motor Control Cognitive Skills Affected (resulting in the inability to perform in a timely and safe manner):  1. Executive Function  2. Sustained Attention  3. Divided Attention  4. Comprehension Psychosocial Skills Affected:  1. None   Number of elements that affect the Plan of Care: 5+:  HIGH COMPLEXITY   CLINICAL DECISION MAKING:   Outcome Measure: Tool Used: [facility] [address] [facility] Daily Activity Outpatient Short Form  Score:  Initial: 18 Most Recent: 26 (Date: 5/2/18 )   Interpretation of Tool:  Represents clinically-significant functional categories (i.e., basic and instrumental activities of daily living, fine motor activities). Score 60 59-55 54-47 46-34 32-21 20-16 15   Modifier CH CI CJ CK CL CM CN     Medical Necessity:   · Patient demonstrates good rehab potential due to higher previous functional level.   Reason for Services/Other Comments:  · Patient continues to require skilled intervention due to decreased safety and independence in activities of daily living. Clinical Decision-Making Assessment: Moderately difficult to predict patient's progress at this time due to the extent of her limitations in functional movement and use of dominant right upper extremity. Assessment process, impact of co-morbidities, assessment modification\need for assistance, and selection of interventions: Analytical Complexity:MODERATE COMPLEXITY   TREATMENT:   (In addition to Assessment/Re-Assessment sessions the following treatments were rendered)    Pre-treatment Symptoms/Complaints:  Patient states, \"I've been feeding myself about half of my meals each day. I've also been dressing myself. \"    Pain: Initial:   Pain Intensity 1: 0/10 Post Session:  same     Therapeutic Exercise: ( 15 minutes):  Exercises per grid below to improve dynamic movement of arm - right and hand - right to improve functional lifting, carrying, reaching and grasping. Required moderate visual and verbal cues to promote proper body mechanics. Progressed range, repetitions and complexity of movement as indicated.        Date:  4/30/18 Date:  5/2/18 Date:   5/4/18 Date:  5/7/18 Date:  5/9/18 Date:  5/14/18 Date:  5/16/18 Date:  5/21/18   Activity/Exercise           Shoulder shrugs AROM x 10 AROM x 10 AROM x 10 AROM x 10 AROM x 10 AROM x 10  AROM x 10   Scapular protraction/retraction AROM  1 x 10 with passive self range into elbow extension at end range AROM  1 x 10 with passive self range into elbow extension at end range AROM  1 x 10 with passive self range into elbow extension at end range AROM  1 x 10 with passive self range into elbow extension at end range AROM  1 x 10 with passive self range into elbow extension at end range AROM  1 x 10 with passive self range into elbow extension at end range AROM  1 x 10 with passive self range into elbow extension at end range AROM  1 x 10 with passive self range into elbow extension at end range   Shoulder abduction           Elbow flexion/extension SROM x 15 reps seated SROM x 15 reps seated  SROM x 15 reps seated SROM x 15 reps seated PROM x 10 reps seated PROM x 10 reps seated PROM x 10 reps seated   PNF D1/D2 diagonals           Hand helper           Tabletop skateboard           Shoulder flexion/extension PROM x 15 in supine    SROM x 15 reps seated      Chest press           UBE Min assist to sustain right  on handle   Level 0.5  7 mintues Min assist to sustain right  on handle   Level 0.5  7 mintues Min assist to sustain right  on handle   Level 0.5  7 mintues Min assist to sustain right  on handle   Level 0.5  7 mintues  Min assist to sustain right  on handle   Level 0.5  7 mintues  Min assist to sustain right  on handle   Level 0.5  7 mintues   Modified push-ups  Hands on countertop with dycem under right hand  3 x 10 Hands on countertop with dycem under right hand  3 x 10  Hands on countertop with dycem under right hand  3 x 10 Hands on countertop with dycem under right hand  3 x 10   Hands on countertop with dycem under right hand  3 x 10   Resistive clothespin Yellow   1 x 5 pinch and release   Yellow   1 x 5 pinch and release  Yellow   1 x 5 pinch and release     Shoulder arc   Gripped and released ~10 tabs to pull over shoulder arc.         Wrist flexion/extension PROM x 10 reps with prolonged stretch at end range of extension PROM x 10 reps with prolonged stretch at end range of extension  PROM x 10 reps with prolonged stretch at end range of extension  PROM x 10 reps with prolonged stretch at end range of extension PROM x 10 reps with prolonged stretch at end range of extension PROM x 10 reps with prolonged stretch at end range of extension   Punching bag           Finger flexion/extension PROM x 10 reps with prolonged stretch at end range of extension PROM x 10 reps with prolonged stretch at end range of extension  PROM x 10 reps with prolonged stretch at end range of extension  PROM x 10 reps with prolonged stretch at end range of extension PROM x 10 reps with prolonged stretch at end range of extension PROM x 10 reps with prolonged stretch at end range of extension   Measurements Taken  Am-PAC and goals re-assessed. Access Code: [email or web address]   URL: [email or web address]   Date: 01/19/2018   Prepared by: Raheel Person     Exercises   Supine Shoulder Flexion PROM - 10 reps - 2 sets - 3 hold - 1x daily - 5x weekly   Push-Up on Counter - 10 reps - 2 sets - 1x daily - 5x weekly   Seated Shoulder Shrugs - 10 reps - 2 sets - 1x daily - 5x weekly   Seated Scapular Retraction - 10 reps - 2 sets - 1x daily - 5x weekly   Seated Weight Shifting with Arm Support - 10 reps - 2 sets - 1x daily - 5x weekly   Seated Shoulder Flexion Self PROM - 10 reps - 2 sets - 1x daily - 5x weekly   Supine Elbow Flexion Extension AROM - 10 reps - 2 sets - 1x daily - 5x weekly   Seated Elbow Flexion Extension AROM - 10 reps - 2 sets - 1x daily - 5x weekly     Neuromuscular Re-education: ( 10  minutes):  Exercise/activities per grid below to improve balance, coordination, kinesthetic sense and proprioception. Required moderate visual and verbal cues to promote static balance in sitting, promote coordination of right, upper extremity(s) and promote motor control of right, upper extremity(s). Patient completed lateral weight shifts sitting at edge of mat x 10 reps each onto elbow/forearms  with dynamic reach with left hand outside of base of support, using right upper extremity to maintain balance. Completed lateral weight shifts onto outstretched right hand with minimal assist to maintain position of right hand on mat x 5 reps. Self Care: (0 minutes): Procedure(s) (per grid) utilized to improve and/or restore self-care/home management as related to grooming and self feeding.  Required moderate visual and verbal cueing to facilitate activities of daily living skills. Treatment/Session Assessment: Pt reports increased use of R UE during ADLs, specfically feeding half of her meals and dressing. Pt would continue to benefit from skilled OT services to increase functional use of R UE during activities of daily living. · Response to Treatment: Mrs. Ewa Rawls tolerated therapy well today; she is demonstrating increasing functional use of her right upper extremity in activities of daily living, including carrying personal items in a bag with her right hand throughout the clinic today. · Compliance with Program/Exercises: Will assess as treatment progresses. Pt reports compliance with HEP. · Recommendations/Intent for next treatment session: \"Next visit will focus on advancements to more challenging activities and reduction in assistance provided\".     Total Treatment Duration:  OT Patient Time In/Time Out  Time In: 1305  Time Out: 3044 [facility] [place]

## 2018-05-21 NOTE — PROGRESS NOTES
Demetrio Andie  : 1955  Primary:   Secondary:  [address]  at [facility]  [address], [address], [facility] [address]  [phone]   [phone]          OUTPATIENT PHYSICAL THERAPY:Daily Note 2018    ICD-10: Treatment Diagnosis:   · Other abnormalities of gait and mobility (R26.89)  · Difficulty in walking, not elsewhere classified (R26.2)  Precautions/Allergies:   Banana; Lisinopril; and Nuts [tree nut]   Fall Risk Score: 5 (? 5 = High Risk)  MD Orders: Evaluate and Treat MEDICAL/REFERRING DIAGNOSIS:  Weakness due to cerebrovascular accident (CVA) ([facility] [I63.9, R53.1]   DATE OF ONSET: CVA: 2017  REFERRING PHYSICIAN: Juliette Stone*  RETURN PHYSICIAN APPOINTMENT: TBD       ASSESSMENT:  Ms. Jaqueline Aleman presents with decreased mobility, decreased strength, decreased gait, and decreased balance secondary to CVA. After discussing with patient, she agreed she would benefit from physical therapy to improve above deficits. Please sign this plan of treatment if you concur. Thank you for the opportunity to serve this patient. Recertification note on 2018:   Patient has attended [de-identified] scheduled physical therapy appointments from 12/15/2017 to 2018. Patient is very motivated and compliant with therapy. Patient received her first Botox injection on 2014. Patient's right ankle is less tight, and right foot is easier to get into her right AFO. Patient also reports less right foot pain with walking. Patient demonstrates improved balance since beginning therapy. Patient would like to continue working on ambulation throughout the community. Patient would benefit from continuing skilled physical therapy to address problems and goals. Thank you for this referral.    PROBLEM LIST (Impacting functional limitations):  1. Decreased Strength  2. Decreased Ambulation Ability/Technique  3. Decreased Balance  4.  Decreased Activity Tolerance INTERVENTIONS PLANNED:  1. Balance Exercise  2. Gait Training  3. Home Exercise Program (HEP)  4. Neuromuscular Re-education/Strengthening  5. Range of Motion (ROM)  6. Therapeutic Exercise/Strengthening   TREATMENT PLAN:  Effective Dates: 5/7/2018 TO 8/5/2018 (90 days). Frequency/Duration: 2 times a week for 90 Days  GOALS: (Goals have been discussed and agreed upon with patient.)  Short-Term Functional Goals: Time Frame: 3 weeks  1. Patient will be independent with home exercise program without exacerbation of symptoms or cueing needed. Goal met. Discharge Goals: Time Frame: 8 weeks  1. Patient will be independent with all ADLs with minimal fear of falling and loss of balance with daily tasks. Goal ongoing. 2. Patient will report no fear avoidance with social or recreational activities due to fear of falling. Goal ongoing. 3. Patient will score greater than or equal to 45/56 on Gipson Balance Scale with minimal effect of imbalance on patient's ability to manage every day life activities. Goal ongoing. Rehabilitation Potential For Stated Goals: Good            The information in this section was collected on 12/15/2017 (except where otherwise noted). HISTORY:   History of Present Injury/Illness (Reason for Referral):  Patient reports she had a severe stroke on 9/6/2017. She reports that she was in the hospital and [facility] for about 61-62 days. She reports that she has progressed really well, but is still having trouble walking and using her right side (UE and LE). She reports that she walks using her AFO and quad cane all the time. She reports that she tries to be as active as possible. She reports that she is scared that she is going to fall, even though she has not. She reports she would like to work on strengthening her right leg so her balance is better and she can walk without fear of falling.   Past Medical History/Comorbidities:   Ms. Naz Marcano  has a past medical history of Anxiety; CVA (cerebral vascular accident) ([facility] (2017); Depression; HTN (hypertension); Menopause; and PTE (pulmonary thromboembolism) ([facility] (2017). Ms. Aidee Gallego  has a past surgical history that includes hx jennifer and bso (); hx  section; hx refractive surgery (); hx other surgical (); and hx other surgical (2017). Social History/Living Environment:   Home Environment: Private residence  Living Alone: No  Support Systems: Family member(s), Friends \ neighbors; Spouse  Prior Level of Function/Work/Activity:  Independent  Dominant Side:         RIGHT  Personal Factors:          Sex:  female        Age:  60 y.o. Current Medications:       Current Outpatient Prescriptions:     polyethylene glycol (MIRALAX) 17 gram/dose powder, Take 17 g by mouth daily. , Disp: 510 g, Rfl: 2    [START ON 2018] methylphenidate HCl (RITALIN) 20 mg tablet, Take 1 Tab (20 mg total) by mouth daily. Max Daily Amount: 20 mg, Disp: 30 Tab, Rfl: 0    methylphenidate HCl (RITALIN) 20 mg tablet, Take 1 Tab (20 mg total) by mouth daily. Max Daily Amount: 20 mg, Disp: 30 Tab, Rfl: 0    methylphenidate HCl (RITALIN) 20 mg tablet, Take 1 Tab (20 mg total) by mouth daily. Max Daily Amount: 20 mg, Disp: 30 Tab, Rfl: 0    rOPINIRole (REQUIP) 2 mg tablet, Take 1 Tab by mouth nightly. for restless leg, Disp: 90 Tab, Rfl: 1    FLUoxetine (PROZAC) 20 mg tablet, Take 2 Tabs by mouth daily. , Disp: 180 Tab, Rfl: 1    tiZANidine (ZANAFLEX) 4 mg tablet, 4mg po TID, Disp: 270 Tab, Rfl: 1    labetalol (NORMODYNE) 200 mg tablet, Take 1 Tab by mouth two (2) times a day., Disp: 180 Tab, Rfl: 1    losartan-hydroCHLOROthiazide (HYZAAR) 100-12.5 mg per tablet, Take 1 Tab by mouth daily. , Disp: 90 Tab, Rfl: 1    ALPRAZolam (XANAX) 0.25 mg tablet, Take 1 Tab by mouth three (3) times daily as needed for Anxiety.  Max Daily Amount: 0.75 mg., Disp: 30 Tab, Rfl: 1   Date Last Reviewed:  2018    Number of Personal Factors/Comorbidities that affect the Plan of Care: 1-2: MODERATE COMPLEXITY   EXAMINATION:   Observation/Orthostatic Postural Assessment:          Posture Assessment: Rounded shoulders, Forward head   Functional Mobility:         Gait/Ambulation:     Speed/Lulu: Pace decreased (<100 feet/min)  Step Length: Left shortened, Right lengthened  Swing Pattern: Left asymmetrical, Right asymmetrical  Stance: Left increased, Right decreased  Gait Abnormalities: Antalgic, Circumduction, Foot drop, Path deviations, Trendelenburg  Ambulation - Level of Assistance: Modified independent  Assistive Device: Cane, quad  · Interventions: Verbal cues, Safety awareness training          Transfers:     Sit to Stand: Independent  Stand to Sit: Independent  Stand Pivot Transfers: Independent  · Bed to Chair: Independent          Bed Mobility:     Rolling: Independent  Supine to Sit: Independent  Sit to Supine: Independent  · Scooting: Independent    Strength:          L UE STRENGTH: 4/5 shoulder flexion, 4/5 shoulder abduction, 4/5 shoulder extension, 4/5 elbow flexion, 4/5 elbow extension. R UE STRENGTH: 2/5 shoulder flexion, 2/5 shoulder abduction, 2/5 shoulder extension, 2/5 elbow flexion, 2/5 elbow extension. R LE STRENGTH:  4+/5 hip flexion, 4/5 hip abduction, 55 hip adduction, 4+/5 hip extension, 4+/5 5/5knee extension, 3-/5 knee flexion, 1/5 ankle dorsiflexion, 1/5 ankle plantarflexion, 1/5 ankle inversion, 1/5 ankle eversion. Sensation:         Intact for light touch and proprioception  Postural Control & Balance:  · Gipson Balance Scale:  36/56.   (A score less than 45/56 indicates high risk of falls)  . Score improved from 28/56 on initial evaluation. Body Structures Involved:  1. Nerves  2. Muscles Body Functions Affected:  1. Neuromusculoskeletal  2. Movement Related Activities and Participation Affected:  1. Mobility  2.  Self Care   Number of elements (examined above) that affect the Plan of Care: 4+: HIGH COMPLEXITY   CLINICAL PRESENTATION: Presentation: Evolving clinical presentation with changing clinical characteristics: MODERATE COMPLEXITY   CLINICAL DECISION MAKING:   Outcome Measure: Tool Used: Gipson Balance Scale  Score:  Initial: 28/56 Most Recent: 36/56 (Date: 5-7-2018 )   Interpretation of Score: Each section is scored on a 0-4 scale, 0 representing the patients inability to perform the task and 4 representing independence. The scores of each section are added together for a total score of 56. The higher the patients score, the more independent the patient is. Any score below 45 indicates increased risk for falls. Score 56 55-45 44-34 33-23 22-12 11-1 0   Modifier CH CI CJ CK CL CM CN     Medical Necessity:   · Patient is expected to demonstrate progress in strength, range of motion, balance and coordination to improve safety during daily activities. · Patient demonstrates good rehab potential due to higher previous functional level. · Skilled intervention continues to be required due to decreased mobility. Reason for Services/Other Comments:  · Patient continues to demonstrate capacity to improve overall mobility which will increase independence and increase safety. Use of outcome tool(s) and clinical judgement create a POC that gives a: Questionable prediction of patient's progress: MODERATE COMPLEXITY            TREATMENT:   (In addition to Assessment/Re-Assessment sessions the following treatments were rendered)  Pre-treatment Symptoms/Complaints:  5/21/2018: MD said I was doing fine. Next botox in July, a little more dose next time. Pain: Initial: Pain Intensity 1: 0  Post Session:  0/10     NEUROMUSCULAR RE-EDUCATION: (25 minutes):  Exercise/activities per grid below to improve balance, coordination, kinesthetic sense, posture and proprioception.   Required minimal verbal and manual cues to promote static and dynamic balance in standing, promote coordination of bilateral, lower extremity(s) and promote motor control of bilateral, lower extremity(s). Date:  5/14/2018 Date   5/16/18 Date   5/21/18   Activity/Exercise Parameters     Stepping over half foam       Step taps      Step ups Forward x 20 reps R, then laterally x 20 reps R with rail onto 6 inch step     [place]      Walking in the clinic with quad cane About 200 feet with quad cane, working on weightbearing more on R foot through midstance and terminal stance, and stepping bigger with L LE; added working on shifting R hips forward over R foot better midstance to terminal stance Added heel lift to L shoe to help equalize leg length:   then walked about 200 feet with quad cane, working on weightbearing more on R foot through midstance and terminal stance, and stepping bigger with L LE; added working on shifting R hips forward over R foot better midstance to terminal stance   Walking with quad cane, working on weightbearing more on R foot through midstance and terminal stance, and stepping bigger with L LE, also cuing to continue that pattern when changing directions. pregait activity      Sidestepping in bars With no UE assist working on weight shifting. Standing weight shifting onto R LE Worked on marching with L LE, then stepping fwd/back with L LE, weight shifting onto R LE, trying to increase control and balance with no UE assist. In Balance Master: weight shifting L/R with weight bearing mode; then weight shifting L/R and center 3 R, 55% LOS  some manual assist for weight shift. In Balance Master: weight shifting L/R with weight bearing mode; then weight shifting L/R and center 3 R, 55% LOS  some manual assist for weight shift. Walking in bars Progressed above weight shifting activity to walking forward and backward in bars with no UE assist, some manual assist at pelvis     Walked in clinic With no cane with min to mod assist working on weight bearing through R LE, to improve confidence and weight bearing.         THERAPEUTIC EXERCISE: (20 minutes):  Exercises per grid below to improve mobility and strength. Required minimal verbal cues to promote proper body alignment. Progressed repetitions as indicated. Date:  5/14/2018 Date   5/16/18 Date   5/21/18   Activity/Exercise      Sit to stand       Left sidelying alternating isometrics on pelvis      Standing hip abduction      Seated LAQs      Standing hamstrings curls       Bridging      Supine straight leg raise      Supine PNF      L sidelying R pelvis and trunk PNF      Left sidelying hip abduction      L sidelying: picking up and moving R LE from in front of L foot to behind L foot (working on hip abd and ext) and stabilizing trunk      L sidelying R clam shells      L sidelying R hip abduction      R foot and gastrocsoleus stretching  20 minutes stretching toe flexors, gastroc, soleus, and tibialis anterior  20 minutes stretching toe flexors, enrique shawn, soleus, and tibialis anterior 20 minutes stretching toe flexors, enrique shawn, soleus, and tibialis anterior   Supine hooklying lower body rotation      Standing minisquats in bars      [place]          [facility] Portal  Treatment/Session Assessment:    · Response to Treatment:  Patient tolerated session well. Able to passively get R ankle to neutral. Patient has some active ankle inversion R, but not active dorsiflexion. Working on balance master to improve weight shifting for more efficient gait pattern. · Compliance with Program/Exercises: Compliant. · Recommendations/Intent for next treatment session: \"Next visit will focus on advancements to more challenging activities\".    Total Treatment Duration:  PT Patient Time In/Time Out  Time In: 1345  Time Out: [address] Lisa [address]

## 2018-05-23 ENCOUNTER — HOSPITAL ENCOUNTER (OUTPATIENT)
Dept: PHYSICAL THERAPY | Age: 63
Discharge: HOME | End: 2018-05-23
Attending: PHYSICAL MEDICINE & REHABILITATION
Payer: COMMERCIAL

## 2018-05-23 PROCEDURE — 97110 THERAPEUTIC EXERCISES: CPT

## 2018-05-23 PROCEDURE — 97112 NEUROMUSCULAR REEDUCATION: CPT

## 2018-05-23 NOTE — PROGRESS NOTES
Babita Liner  : 1955  Primary:   Secondary:  [address] at [place] [id number] [facility], [address] [place] [address],[address], [facility] [address].  [phone]   [phone]          OUTPATIENT PHYSICAL THERAPY:Daily Note 2018    ICD-10: Treatment Diagnosis:   · Other abnormalities of gait and mobility (R26.89)  · Difficulty in walking, not elsewhere classified (R26.2)  Precautions/Allergies:   Banana; Lisinopril; and Nuts [tree nut]   Fall Risk Score: 5 (? 5 = High Risk)  MD Orders: Evaluate and Treat MEDICAL/REFERRING DIAGNOSIS:  Weakness due to cerebrovascular accident (CVA) ([facility] [I63.9, R53.1]   DATE OF ONSET: CVA: 2017  REFERRING PHYSICIAN: Juliette Poole*  RETURN PHYSICIAN APPOINTMENT: TBD       ASSESSMENT:  Ms. Siomara Crocker presents with decreased mobility, decreased strength, decreased gait, and decreased balance secondary to CVA. After discussing with patient, she agreed she would benefit from physical therapy to improve above deficits. Please sign this plan of treatment if you concur. Thank you for the opportunity to serve this patient. Recertification note on 2018:   Patient has attended [de-identified] scheduled physical therapy appointments from 12/15/2017 to 2018. Patient is very motivated and compliant with therapy. Patient received her first Botox injection on 2014. Patient's right ankle is less tight, and right foot is easier to get into her right AFO. Patient also reports less right foot pain with walking. Patient demonstrates improved balance since beginning therapy. Patient would like to continue working on ambulation throughout the community. Patient would benefit from continuing skilled physical therapy to address problems and goals. Thank you for this referral.    PROBLEM LIST (Impacting functional limitations):  1. Decreased Strength  2. Decreased Ambulation Ability/Technique  3. Decreased Balance  4.  Decreased Activity Tolerance INTERVENTIONS PLANNED:  1. Balance Exercise  2. Gait Training  3. Home Exercise Program (HEP)  4. Neuromuscular Re-education/Strengthening  5. Range of Motion (ROM)  6. Therapeutic Exercise/Strengthening   TREATMENT PLAN:  Effective Dates: 5/7/2018 TO 8/5/2018 (90 days). Frequency/Duration: 2 times a week for 90 Days  GOALS: (Goals have been discussed and agreed upon with patient.)  Short-Term Functional Goals: Time Frame: 3 weeks  1. Patient will be independent with home exercise program without exacerbation of symptoms or cueing needed. Goal met. Discharge Goals: Time Frame: 8 weeks  1. Patient will be independent with all ADLs with minimal fear of falling and loss of balance with daily tasks. Goal ongoing. 2. Patient will report no fear avoidance with social or recreational activities due to fear of falling. Goal ongoing. 3. Patient will score greater than or equal to 45/56 on Gipson Balance Scale with minimal effect of imbalance on patient's ability to manage every day life activities. Goal ongoing. Rehabilitation Potential For Stated Goals: Good            The information in this section was collected on 12/15/2017 (except where otherwise noted). HISTORY:   History of Present Injury/Illness (Reason for Referral):  Patient reports she had a severe stroke on 9/6/2017. She reports that she was in the hospital and [facility] for about 61-62 days. She reports that she has progressed really well, but is still having trouble walking and using her right side (UE and LE). She reports that she walks using her AFO and quad cane all the time. She reports that she tries to be as active as possible. She reports that she is scared that she is going to fall, even though she has not. She reports she would like to work on strengthening her right leg so her balance is better and she can walk without fear of falling.   Past Medical History/Comorbidities:   Ms. Yadira Franz  has a past medical history of Anxiety; CV[facility] (cerebral vascular accident) ([facility] (2017); Depression; HTN (hypertension); Menopause; and PTE (pulmonary thromboembolism) ([facility] (2017). Ms. Siomara Crocker  has a past surgical history that includes hx jennifer and bso (); hx  section; hx refractive surgery (); hx other surgical (); and hx other surgical (2017). Social History/Living Environment:   Home Environment: Private residence  Living Alone: No  Support Systems: Family member(s), Friends \ neighbors; Spouse  Prior Level of Function/Work/Activity:  Independent  Dominant Side:         RIGHT  Personal Factors:          Sex:  female        Age:  60 y.o. Current Medications:       Current Outpatient Prescriptions:     polyethylene glycol (MIRALAX) 17 gram/dose powder, Take 17 g by mouth daily. , Disp: 510 g, Rfl: 2    [START ON 2018] methylphenidate HCl (RITALIN) 20 mg tablet, Take 1 Tab (20 mg total) by mouth daily. Max Daily Amount: 20 mg, Disp: 30 Tab, Rfl: 0    methylphenidate HCl (RITALIN) 20 mg tablet, Take 1 Tab (20 mg total) by mouth daily. Max Daily Amount: 20 mg, Disp: 30 Tab, Rfl: 0    methylphenidate HCl (RITALIN) 20 mg tablet, Take 1 Tab (20 mg total) by mouth daily. Max Daily Amount: 20 mg, Disp: 30 Tab, Rfl: 0    rOPINIRole (REQUIP) 2 mg tablet, Take 1 Tab by mouth nightly. for restless leg, Disp: 90 Tab, Rfl: 1    FLUoxetine (PROZAC) 20 mg tablet, Take 2 Tabs by mouth daily. , Disp: 180 Tab, Rfl: 1    tiZANidine (ZANAFLEX) 4 mg tablet, 4mg po TID, Disp: 270 Tab, Rfl: 1    labetalol (NORMODYNE) 200 mg tablet, Take 1 Tab by mouth two (2) times a day., Disp: 180 Tab, Rfl: 1    losartan-hydroCHLOROthiazide (HYZAAR) 100-12.5 mg per tablet, Take 1 Tab by mouth daily. , Disp: 90 Tab, Rfl: 1    ALPRAZolam (XANAX) 0.25 mg tablet, Take 1 Tab by mouth three (3) times daily as needed for Anxiety.  Max Daily Amount: 0.75 mg., Disp: 30 Tab, Rfl: 1   Date Last Reviewed:  2018    Number of Personal Factors/Comorbidities that affect the Plan of Care: 1-2: MODERATE COMPLEXITY   EXAMINATION:   Observation/Orthostatic Postural Assessment:          Posture Assessment: Rounded shoulders, Forward head   Functional Mobility:         Gait/Ambulation:     Speed/Lulu: Pace decreased (<100 feet/min)  Step Length: Left shortened, Right lengthened  Swing Pattern: Left asymmetrical, Right asymmetrical  Stance: Left increased, Right decreased  Gait Abnormalities: Antalgic, Circumduction, Foot drop, Path deviations, Trendelenburg  Ambulation - Level of Assistance: Modified independent  Assistive Device: Cane, quad  · Interventions: Verbal cues, Safety awareness training          Transfers:     Sit to Stand: Independent  Stand to Sit: Independent  Stand Pivot Transfers: Independent  · Bed to Chair: Independent          Bed Mobility:     Rolling: Independent  Supine to Sit: Independent  Sit to Supine: Independent  · Scooting: Independent    Strength:          L UE STRENGTH: 4/5 shoulder flexion, 4/5 shoulder abduction, 4/5 shoulder extension, 4/5 elbow flexion, 4/5 elbow extension. R UE STRENGTH: 2/5 shoulder flexion, 2/5 shoulder abduction, 2/5 shoulder extension, 2/5 elbow flexion, 2/5 elbow extension. R LE STRENGTH:  4+/5 hip flexion, 4/5 hip abduction, 55 hip adduction, 4+/5 hip extension, 4+/5 5/5knee extension, 3-/5 knee flexion, 1/5 ankle dorsiflexion, 1/5 ankle plantarflexion, 1/5 ankle inversion, 1/5 ankle eversion. Sensation:         Intact for light touch and proprioception  Postural Control & Balance:  · Gipson Balance Scale:  36/56.   (A score less than 45/56 indicates high risk of falls)  . Score improved from 28/56 on initial evaluation. Body Structures Involved:  1. Nerves  2. Muscles Body Functions Affected:  1. Neuromusculoskeletal  2. Movement Related Activities and Participation Affected:  1. Mobility  2.  Self Care   Number of elements (examined above) that affect the Plan of Care: 4+: HIGH COMPLEXITY   CLINICAL PRESENTATION: Presentation: Evolving clinical presentation with changing clinical characteristics: MODERATE COMPLEXITY   CLINICAL DECISION MAKING:   Outcome Measure: Tool Used: Gipson Balance Scale  Score:  Initial: 28/56 Most Recent: 36/56 (Date: 5-7-2018 )   Interpretation of Score: Each section is scored on a 0-4 scale, 0 representing the patients inability to perform the task and 4 representing independence. The scores of each section are added together for a total score of 56. The higher the patients score, the more independent the patient is. Any score below 45 indicates increased risk for falls. Score 56 55-45 44-34 33-23 22-12 11-1 0   Modifier CH CI CJ CK CL CM CN     Medical Necessity:   · Patient is expected to demonstrate progress in strength, range of motion, balance and coordination to improve safety during daily activities. · Patient demonstrates good rehab potential due to higher previous functional level. · Skilled intervention continues to be required due to decreased mobility. Reason for Services/Other Comments:  · Patient continues to demonstrate capacity to improve overall mobility which will increase independence and increase safety. Use of outcome tool(s) and clinical judgement create a POC that gives a: Questionable prediction of patient's progress: MODERATE COMPLEXITY            TREATMENT:   (In addition to Assessment/Re-Assessment sessions the following treatments were rendered)  Pre-treatment Symptoms/Complaints:  5/23/2018: \"doing fine. No complaints. \"  Pain: Initial: Pain Intensity 1: 0  Post Session:  0/10     NEUROMUSCULAR RE-EDUCATION: (25 minutes):  Exercise/activities per grid below to improve balance, coordination, kinesthetic sense, posture and proprioception. Required minimal verbal and manual cues to promote static and dynamic balance in standing, promote coordination of bilateral, lower extremity(s) and promote motor control of bilateral, lower extremity(s).    Date   5/23/18 Activity/Exercise     Stepping over half foam      Step taps     Step ups     [place]     Walking in the clinic with quad cane   Walking with quad cane and without cane working on weightbearing more on R foot through midstance and terminal stance, and stepping bigger with L L    pregait activity Standing in bars shifting onto R LE, marching or stepping with L LE, with no UE assist, some manual assist    Weight shifting on red ball (sitting) L/R, A-P, circles with CG-min A    Standing weight shifting onto R LE In Balance Master: weight shifting L/R with weight bearing mode; then weight shifting L/R and center 3 R, 60% LOS  some manual assist for weight shift. Walking in bars     [place] in clinic        THERAPEUTIC EXERCISE: (20 minutes):  Exercises per grid below to improve mobility and strength. Required minimal verbal cues to promote proper body alignment. Progressed repetitions as indicated. Date   5/23/18    Activity/Exercise     Sit to stand      Left sidelying alternating isometrics on pelvis     Standing hip abduction     Seated LAQs     Standing hamstrings curls      Bridging     Supine straight leg raise     Supine PNF     L sidelying R pelvis and trunk PNF     Left sidelying hip abduction     L sidelying: picking up and moving R LE from in front of L foot to behind L foot (working on hip abd and ext) and stabilizing trunk     L sidelying R clam shells     L sidelying R hip abduction     R foot and gastrocsoleus stretching 20 minutes stretching toe flexors, enrique shawn, soleus, and tibialis anterior    Supine hooklying lower body rotation     Standing minisquats in bars     [place]         [facility] Portal  Treatment/Session Assessment:    · Response to Treatment:  Patient tolerated session well. Patient is demonstrating improving ability to weight shift to R LE, but needs some cuing to shift through pelvis correctly. · Compliance with Program/Exercises: Compliant.   · Recommendations/Intent for next treatment session: \"Next visit will focus on advancements to more challenging activities\".    Total Treatment Duration:  PT Patient Time In/Time Out  Time In: 1200  Time Out: 1212 [facility]

## 2018-05-23 NOTE — PROGRESS NOTES
Zoey Burdick  : 1955  Primary: [place] Chand Ppo  Secondary:  [address] at [place] [id number] [facility], [address] [place] [address],[address], [facility] [address]  [phone]   [phone]        OUTPATIENT OCCUPATIONAL THERAPY: Daily Note 2018    ICD-10: Treatment Diagnosis: Hemiplegia and hemiparesis following cerebral infarction affecting right dominant side (I69.351)  Precautions/Allergies:   Banana; Lisinopril; and Nuts [tree nut]   Fall Risk Score: 5 (? 5 = High Risk)  MD Orders: Referral to occupational therapy MEDICAL/REFERRING DIAGNOSIS:   Cerebral infarction, unspecified [I63.9]  Weakness [R53.1]   DATE OF ONSET: 2017   REFERRING PHYSICIAN: Juliette Chatman*  RETURN PHYSICIAN APPOINTMENT: unknown   18: Ms. Tiffany Starks continues to demo' progress towards therapy goals. Since Botox, pt has demo'd improved improved R shoulder abduction ROM; R elbow also appears to be more relax with pt achieving greater elbow extension. Pt also demo's greater ability to contract and relax R hand/digits during hand strengthening activities. Pt would continue to benefit from skilled OT services to maximize functional use of R UE for increased independence with activities of daily living. 3/5/18: Ms. Tiffany Starks continues to make excellent progress towards her therapy goals; she demonstrates gradual improvements in R UE range of motion, specifically R shoulder flexion and R elbow extension/flexion, and reports increased functional use of R UE in activities of daily living, including dressing, grooming, and feeding.  Patient would continue to benefit from skilled occupational therapy services to maximize safety and independence and increase functional use of R UE during activities of daily living.    18: Ms. Tiffany Starks is making excellent progress toward her goals; she is demonstrating improving range of motion and functional use of the right upper extremity in activities of daily living. Feel she will continue to benefit from skilled occupational therapy to maximize safety and independence with activities of daily living. INITIAL ASSESSMENT:  Ms. Nemesio Garcia presents with impaired active movement, strength, coordination and sensation of the dominant right upper extremity that is affecting her ability to complete activities of daily living independently. Feel she may benefit from skilled occupational therapy to maximize safety and independence with activities of daily living. PLAN OF CARE:   PROBLEM LIST:  1. Decreased Strength  2. Decreased ADL/Functional Activities  3. Decreased Transfer Abilities  4. Decreased Balance  5. Decreased Flexibility/Joint Mobility  6. Decreased Cognition INTERVENTIONS PLANNED:  1. Activities of daily living training  2. Manual therapy training  3. Modalities  4. Neuromuscular re-eduation  5. Sensory reintegration training  6. Splinting  7. Therapeutic activity  8. Therapeutic exercise   TREATMENT PLAN:  Effective Dates: 3/19/18 to 6/17/18. Frequency/Duration: 2-3 times a week for 12 weeks  GOALS: (Goals have been discussed and agreed upon with patient.)  Short-Term Functional Goals: Time Frame: 6 weeks  1. Patient will demonstrate independence with home exercise program for right upper extremity range of motion. Met  2. Patient will increase use of right upper extremity as a functional assist in at least 25% of daily activities. Met  3. Patient will bathe with moderate assistance. Continue to address  Discharge Goals: Time Frame: 12 weeks  1. Patient will bathe with minimal assistance. Continue to address  2. Patient will feed self with modified independence and adaptive equipment as needed. Continue to address  3. Patient will dress with modified independence and adaptive equipment as needed. Continue to address  4. Patient will use right upper extremity as a functional assist in at least 50% of daily activities.  Continue to address  Rehabilitation Potential For Stated Goals: Good  Regarding Valentino Actis Baldwin's therapy, I certify that the treatment plan above will be carried out by a therapist or under their direction. Thank you for this referral,  Yesy Snyder, OT     Referring Physician Signature: Danny VasquezJuliette peacock* _________________________  Date _________            The information in this section was collected on 18 (except where otherwise noted). OCCUPATIONAL PROFILE & HISTORY:   History of Present Injury/Illness (Reason for Referral):  CVA with hospital and [facility] stay then home health therapy. Past Medical History/Comorbidities:   Ms. Bethany Freire  has a past medical history of Anxiety; CVA (cerebral vascular accident) ([facility] [address] (2017); Depression; HTN (hypertension); Menopause; and PTE (pulmonary thromboembolism) ([facility] [address] (2017). Ms. Bethany Freire  has a past surgical history that includes hx jennifer and bso (); hx  section; hx refractive surgery (); hx other surgical (); and hx other surgical (). Social History/Living Environment:      Prior Level of Function/Work/Activity:  Does seasonal taxes at [employer]  Dominant Side:         RIGHT  Current Medications:    Current Outpatient Prescriptions:     polyethylene glycol (MIRALAX) 17 gram/dose powder, Take 17 g by mouth daily. , Disp: 510 g, Rfl: 2    [START ON 2018] methylphenidate HCl (RITALIN) 20 mg tablet, Take 1 Tab (20 mg total) by mouth daily. Max Daily Amount: 20 mg, Disp: 30 Tab, Rfl: 0    methylphenidate HCl (RITALIN) 20 mg tablet, Take 1 Tab (20 mg total) by mouth daily. Max Daily Amount: 20 mg, Disp: 30 Tab, Rfl: 0    methylphenidate HCl (RITALIN) 20 mg tablet, Take 1 Tab (20 mg total) by mouth daily. Max Daily Amount: 20 mg, Disp: 30 Tab, Rfl: 0    rOPINIRole (REQUIP) 2 mg tablet, Take 1 Tab by mouth nightly. for restless leg, Disp: 90 Tab, Rfl: 1    FLUoxetine (PROZAC) 20 mg tablet, Take 2 Tabs by mouth daily. , Disp: 180 Tab, Rfl: 1    tiZANidine (ZANAFLEX) 4 mg tablet, 4mg po TID, Disp: 270 Tab, Rfl: 1    labetalol (NORMODYNE) 200 mg tablet, Take 1 Tab by mouth two (2) times a day., Disp: 180 Tab, Rfl: 1    losartan-hydroCHLOROthiazide (HYZAAR) 100-12.5 mg per tablet, Take 1 Tab by mouth daily. , Disp: 90 Tab, Rfl: 1    ALPRAZolam (XANAX) 0.25 mg tablet, Take 1 Tab by mouth three (3) times daily as needed for Anxiety. Max Daily Amount: 0.75 mg., Disp: 30 Tab, Rfl: 1            Date Last Reviewed:  5/23/2018   Complexity Level : Expanded review of therapy/medical records (1-2):  MODERATE COMPLEXITY   ASSESSMENT OF OCCUPATIONAL PERFORMANCE:   ROM:                   Balance:                   Coordination:                   Mental Status:                   Vision:                   Activities of Daily Living:           Basic ADLs (From Assessment) Complex ADLs (From Assessment)         Grooming/Bathing/Dressing Activities of Daily Living                                   Sensation:         Light touch absent distal to right elbow     Physical Skills Involved:  1. Range of Motion  2. Balance  3. Sensation  4. Fine Motor Control  5. Gross Motor Control Cognitive Skills Affected (resulting in the inability to perform in a timely and safe manner):  1. Executive Function  2. Sustained Attention  3. Divided Attention  4. Comprehension Psychosocial Skills Affected:  1. None   Number of elements that affect the Plan of Care: 5+:  HIGH COMPLEXITY   CLINICAL DECISION MAKING:   Outcome Measure: Tool Used: [address] [facility] [address] AM-[facility] Daily Activity Outpatient Short Form  Score:  Initial: 18 Most Recent: 26 (Date: 5/2/18 )   Interpretation of Tool:  Represents clinically-significant functional categories (i.e., basic and instrumental activities of daily living, fine motor activities). Score 60 59-55 54-47 46-34 32-21 20-16 15   Modifier CH CI CJ CK CL CM CN     Medical Necessity:   · Patient demonstrates good rehab potential due to higher previous functional level.   Reason for Services/Other Comments:  · Patient continues to require skilled intervention due to decreased safety and independence in activities of daily living. Clinical Decision-Making Assessment: Moderately difficult to predict patient's progress at this time due to the extent of her limitations in functional movement and use of dominant right upper extremity. Assessment process, impact of co-morbidities, assessment modification\need for assistance, and selection of interventions: Analytical Complexity:MODERATE COMPLEXITY   TREATMENT:   (In addition to Assessment/Re-Assessment sessions the following treatments were rendered)    Pre-treatment Symptoms/Complaints:  Patient states, \" I'm tired today. This is earlier than normal for me. \"    Pain: Initial:   Pain Intensity 1: 0/10 Post Session:  same     Therapeutic Exercise: ( 35 minutes):  Exercises per grid below to improve dynamic movement of arm - right and hand - right to improve functional lifting, carrying, reaching and grasping. Required moderate visual and verbal cues to promote proper body mechanics. Progressed range, repetitions and complexity of movement as indicated.        Date:  5/2/18 Date:   5/4/18 Date:  5/7/18 Date:  5/9/18 Date:  5/14/18 Date:  5/16/18 Date:  5/21/18 Date:  5/23/18   Activity/Exercise           Shoulder shrugs AROM x 10 AROM x 10 AROM x 10 AROM x 10 AROM x 10  AROM x 10 AROM x 10   Scapular protraction/retraction AROM  1 x 10 with passive self range into elbow extension at end range AROM  1 x 10 with passive self range into elbow extension at end range AROM  1 x 10 with passive self range into elbow extension at end range AROM  1 x 10 with passive self range into elbow extension at end range AROM  1 x 10 with passive self range into elbow extension at end range AROM  1 x 10 with passive self range into elbow extension at end range AROM  1 x 10 with passive self range into elbow extension at end range AROM  1 x 10 with passive self range into elbow extension at end range   Shoulder abduction           Elbow flexion/extension SROM x 15 reps seated  SROM x 15 reps seated SROM x 15 reps seated PROM x 10 reps seated PROM x 10 reps seated PROM x 10 reps seated    PNF D1/D2 diagonals           Hand helper        10 lbs x 5 reps   Tabletop skateboard           Shoulder flexion/extension    SROM x 15 reps seated       Chest press           UBE Min assist to sustain right  on handle   Level 0.5  7 mintues Min assist to sustain right  on handle   Level 0.5  7 mintues Min assist to sustain right  on handle   Level 0.5  7 mintues  Min assist to sustain right  on handle   Level 0.5  7 mintues  Min assist to sustain right  on handle   Level 0.5  7 mintues Min assist to sustain right  on handle   Level 0.5  7 mintues   Modified push-ups  Hands on countertop with dycem under right hand  3 x 10  Hands on countertop with dycem under right hand  3 x 10 Hands on countertop with dycem under right hand  3 x 10   Hands on countertop with dycem under right hand  3 x 10    Resistive clothespin   Yellow   1 x 5 pinch and release  Yellow   1 x 5 pinch and release   Red  1 x 5 pinch and release   Shoulder arc  Gripped and released ~10 tabs to pull over shoulder arc.          Wrist flexion/extension PROM x 10 reps with prolonged stretch at end range of extension  PROM x 10 reps with prolonged stretch at end range of extension  PROM x 10 reps with prolonged stretch at end range of extension PROM x 10 reps with prolonged stretch at end range of extension PROM x 10 reps with prolonged stretch at end range of extension PROM x 10 reps with prolonged stretch at end range of extension   Punching bag           Finger flexion/extension PROM x 10 reps with prolonged stretch at end range of extension  PROM x 10 reps with prolonged stretch at end range of extension  PROM x 10 reps with prolonged stretch at end range of extension PROM x 10 reps with prolonged stretch at end range of extension PROM x 10 reps with prolonged stretch at end range of extension PROM x 10 reps with prolonged stretch at end range of extension   Measurements Taken Am-PAC and goals re-assessed. Access Code: [email or web address]   URL: [email or web address]   Date: 01/19/2018   Prepared by: Carmel Stephens     Exercises   Supine Shoulder Flexion PROM - 10 reps - 2 sets - 3 hold - 1x daily - 5x weekly   Push-Up on Counter - 10 reps - 2 sets - 1x daily - 5x weekly   Seated Shoulder Shrugs - 10 reps - 2 sets - 1x daily - 5x weekly   Seated Scapular Retraction - 10 reps - 2 sets - 1x daily - 5x weekly   Seated Weight Shifting with Arm Support - 10 reps - 2 sets - 1x daily - 5x weekly   Seated Shoulder Flexion Self PROM - 10 reps - 2 sets - 1x daily - 5x weekly   Supine Elbow Flexion Extension AROM - 10 reps - 2 sets - 1x daily - 5x weekly   Seated Elbow Flexion Extension AROM - 10 reps - 2 sets - 1x daily - 5x weekly     Neuromuscular Re-education: ( 10  minutes):  Exercise/activities per grid below to improve balance, coordination, kinesthetic sense and proprioception. Required moderate visual and verbal cues to promote static balance in sitting, promote coordination of right, upper extremity(s) and promote motor control of right, upper extremity(s). Patient completed lateral weight shifts sitting at edge of mat x 10 reps each onto elbow/forearms  with dynamic reach with left hand outside of base of support, using right upper extremity to maintain balance. Completed lateral weight shifts onto outstretched right hand with minimal assist to maintain position of right hand on mat x 5 reps. Self Care: (0 minutes): Procedure(s) (per grid) utilized to improve and/or restore self-care/home management as related to grooming and self feeding. Required moderate visual and verbal cueing to facilitate activities of daily living skills.         Treatment/Session Assessment: Pt reports increased use of R UE during ADLs, specfically feeding half of her meals and dressing. Pt would continue to benefit from skilled OT services to increase functional use of R UE during activities of daily living. · Response to Treatment: Mrs. Cruz Castellon tolerated therapy well today; she is demonstrating increasing functional use of her right upper extremity in activities of daily living, including carrying personal items in a bag with her right hand throughout the clinic today. · Compliance with Program/Exercises: Will assess as treatment progresses. Pt reports compliance with HEP. · Recommendations/Intent for next treatment session: \"Next visit will focus on advancements to more challenging activities and reduction in assistance provided\".     Total Treatment Duration:  OT Patient Time In/Time Out  Time In: 1115  Time Out: [address] [address] [place]

## 2018-05-25 ENCOUNTER — APPOINTMENT (OUTPATIENT)
Dept: PHYSICAL THERAPY | Age: 63
End: 2018-05-25
Attending: PHYSICAL MEDICINE & REHABILITATION
Payer: COMMERCIAL

## 2018-05-30 ENCOUNTER — HOSPITAL ENCOUNTER (OUTPATIENT)
Dept: PHYSICAL THERAPY | Age: 63
Discharge: HOME | End: 2018-05-30
Attending: PHYSICAL MEDICINE & REHABILITATION
Payer: COMMERCIAL

## 2018-05-30 PROCEDURE — 97110 THERAPEUTIC EXERCISES: CPT

## 2018-05-30 PROCEDURE — 97112 NEUROMUSCULAR REEDUCATION: CPT

## 2018-05-30 NOTE — PROGRESS NOTES
Jodie Gottron  : 1955  Primary:   Secondary:  [id number] at [facility]  [address] [facility], [address] [place] [address],[address], [facility] [address]  [phone]   [phone]          OUTPATIENT PHYSICAL THERAPY:Daily Note 2018    ICD-10: Treatment Diagnosis:   · Other abnormalities of gait and mobility (R26.89)  · Difficulty in walking, not elsewhere classified (R26.2)  Precautions/Allergies:   Banana; Lisinopril; and Nuts [tree nut]   Fall Risk Score: 5 (? 5 = High Risk)  MD Orders: Evaluate and Treat MEDICAL/REFERRING DIAGNOSIS:  Weakness due to cerebrovascular accident (CVA) ([facility] [address] [address] [I63.9, R53.1]   DATE OF ONSET: CVA: 2017  REFERRING PHYSICIAN: Juliette Diaz*  RETURN PHYSICIAN APPOINTMENT: TBD       ASSESSMENT:  Ms. Bethany Freire presents with decreased mobility, decreased strength, decreased gait, and decreased balance secondary to CVA. After discussing with patient, she agreed she would benefit from physical therapy to improve above deficits. Please sign this plan of treatment if you concur. Thank you for the opportunity to serve this patient. Recertification note on 2018:   Patient has attended [de-identified] scheduled physical therapy appointments from 12/15/2017 to 2018. Patient is very motivated and compliant with therapy. Patient received her first Botox injection on 2014. Patient's right ankle is less tight, and right foot is easier to get into her right AFO. Patient also reports less right foot pain with walking. Patient demonstrates improved balance since beginning therapy. Patient would like to continue working on ambulation throughout the community. Patient would benefit from continuing skilled physical therapy to address problems and goals. Thank you for this referral.    PROBLEM LIST (Impacting functional limitations):  1. Decreased Strength  2. Decreased Ambulation Ability/Technique  3. Decreased Balance  4.  Decreased Activity Tolerance INTERVENTIONS PLANNED:  1. Balance Exercise  2. Gait Training  3. Home Exercise Program (HEP)  4. Neuromuscular Re-education/Strengthening  5. Range of Motion (ROM)  6. Therapeutic Exercise/Strengthening   TREATMENT PLAN:  Effective Dates: 5/7/2018 TO 8/5/2018 (90 days). Frequency/Duration: 2 times a week for 90 Days  GOALS: (Goals have been discussed and agreed upon with patient.)  Short-Term Functional Goals: Time Frame: 3 weeks  1. Patient will be independent with home exercise program without exacerbation of symptoms or cueing needed. Goal met. Discharge Goals: Time Frame: 8 weeks  1. Patient will be independent with all ADLs with minimal fear of falling and loss of balance with daily tasks. Goal ongoing. 2. Patient will report no fear avoidance with social or recreational activities due to fear of falling. Goal ongoing. 3. Patient will score greater than or equal to 45/56 on Gipson Balance Scale with minimal effect of imbalance on patient's ability to manage every day life activities. Goal ongoing. Rehabilitation Potential For Stated Goals: Good            The information in this section was collected on 12/15/2017 (except where otherwise noted). HISTORY:   History of Present Injury/Illness (Reason for Referral):  Patient reports she had a severe stroke on 9/6/2017. She reports that she was in the hospital and [facility] for about 61-62 days. She reports that she has progressed really well, but is still having trouble walking and using her right side (UE and LE). She reports that she walks using her AFO and quad cane all the time. She reports that she tries to be as active as possible. She reports that she is scared that she is going to fall, even though she has not. She reports she would like to work on strengthening her right leg so her balance is better and she can walk without fear of falling.   Past Medical History/Comorbidities:   Ms. Etienne Humphries  has a past medical history of Anxiety; [facility]ce[facility] ([facility] (2017); Depression; HTN (hypertension); Menopause; and PTE (pulmonary thromboembolism) ([facility] (2017). Ms. Catia Ellington  has a past surgical history that includes hx jennifer and bso (); hx  section; hx refractive surgery (); hx other surgical (); and hx other surgical (2017). Social History/Living Environment:   Home Environment: Private residence  Living Alone: No  Support Systems: Family member(s), Friends \ neighbors; Spouse  Prior Level of Function/Work/Activity:  Independent  Dominant Side:         RIGHT  Personal Factors:          Sex:  female        Age:  60 y.o. Current Medications:       Current Outpatient Prescriptions:     polyethylene glycol (MIRALAX) 17 gram/dose powder, Take 17 g by mouth daily. , Disp: 510 g, Rfl: 2    methylphenidate HCl (RITALIN) 20 mg tablet, Take 1 Tab (20 mg total) by mouth daily. Max Daily Amount: 20 mg, Disp: 30 Tab, Rfl: 0    methylphenidate HCl (RITALIN) 20 mg tablet, Take 1 Tab (20 mg total) by mouth daily. Max Daily Amount: 20 mg, Disp: 30 Tab, Rfl: 0    methylphenidate HCl (RITALIN) 20 mg tablet, Take 1 Tab (20 mg total) by mouth daily. Max Daily Amount: 20 mg, Disp: 30 Tab, Rfl: 0    rOPINIRole (REQUIP) 2 mg tablet, Take 1 Tab by mouth nightly. for restless leg, Disp: 90 Tab, Rfl: 1    FLUoxetine (PROZAC) 20 mg tablet, Take 2 Tabs by mouth daily. , Disp: 180 Tab, Rfl: 1    tiZANidine (ZANAFLEX) 4 mg tablet, 4mg po TID, Disp: 270 Tab, Rfl: 1    labetalol (NORMODYNE) 200 mg tablet, Take 1 Tab by mouth two (2) times a day., Disp: 180 Tab, Rfl: 1    losartan-hydroCHLOROthiazide (HYZAAR) 100-12.5 mg per tablet, Take 1 Tab by mouth daily. , Disp: 90 Tab, Rfl: 1    ALPRAZolam (XANAX) 0.25 mg tablet, Take 1 Tab by mouth three (3) times daily as needed for Anxiety.  Max Daily Amount: 0.75 mg., Disp: 30 Tab, Rfl: 1   Date Last Reviewed:  2018    Number of Personal Factors/Comorbidities that affect the Plan of Care: 1-2: MODERATE COMPLEXITY   EXAMINATION:   Observation/Orthostatic Postural Assessment:          Posture Assessment: Rounded shoulders, Forward head   Functional Mobility:         Gait/Ambulation:     Speed/Lulu: Pace decreased (<100 feet/min)  Step Length: Left shortened, Right lengthened  Swing Pattern: Left asymmetrical, Right asymmetrical  Stance: Left increased, Right decreased  Gait Abnormalities: Antalgic, Circumduction, Foot drop, Path deviations, Trendelenburg  Ambulation - Level of Assistance: Modified independent  Assistive Device: Cane, quad  · Interventions: Verbal cues, Safety awareness training          Transfers:     Sit to Stand: Independent  Stand to Sit: Independent  Stand Pivot Transfers: Independent  · Bed to Chair: Independent          Bed Mobility:     Rolling: Independent  Supine to Sit: Independent  Sit to Supine: Independent  · Scooting: Independent    Strength:          L UE STRENGTH: 4/5 shoulder flexion, 4/5 shoulder abduction, 4/5 shoulder extension, 4/5 elbow flexion, 4/5 elbow extension. R UE STRENGTH: 2/5 shoulder flexion, 2/5 shoulder abduction, 2/5 shoulder extension, 2/5 elbow flexion, 2/5 elbow extension. R LE STRENGTH:  4+/5 hip flexion, 4/5 hip abduction, 55 hip adduction, 4+/5 hip extension, 4+/5 5/5knee extension, 3-/5 knee flexion, 1/5 ankle dorsiflexion, 1/5 ankle plantarflexion, 1/5 ankle inversion, 1/5 ankle eversion. Sensation:         Intact for light touch and proprioception  Postural Control & Balance:  · Gipson Balance Scale:  36/56.   (A score less than 45/56 indicates high risk of falls)  . Score improved from 28/56 on initial evaluation. Body Structures Involved:  1. Nerves  2. Muscles Body Functions Affected:  1. Neuromusculoskeletal  2. Movement Related Activities and Participation Affected:  1. Mobility  2.  Self Care   Number of elements (examined above) that affect the Plan of Care: 4+: HIGH COMPLEXITY   CLINICAL PRESENTATION:   Presentation: Evolving clinical presentation with changing clinical characteristics: MODERATE COMPLEXITY   CLINICAL DECISION MAKING:   Outcome Measure: Tool Used: Gipson Balance Scale  Score:  Initial: 28/56 Most Recent: 36/56 (Date: 5-7-2018 )   Interpretation of Score: Each section is scored on a 0-4 scale, 0 representing the patients inability to perform the task and 4 representing independence. The scores of each section are added together for a total score of 56. The higher the patients score, the more independent the patient is. Any score below 45 indicates increased risk for falls. Score 56 55-45 44-34 33-23 22-12 11-1 0   Modifier CH CI CJ CK CL CM CN     Medical Necessity:   · Patient is expected to demonstrate progress in strength, range of motion, balance and coordination to improve safety during daily activities. · Patient demonstrates good rehab potential due to higher previous functional level. · Skilled intervention continues to be required due to decreased mobility. Reason for Services/Other Comments:  · Patient continues to demonstrate capacity to improve overall mobility which will increase independence and increase safety. Use of outcome tool(s) and clinical judgement create a POC that gives a: Questionable prediction of patient's progress: MODERATE COMPLEXITY            TREATMENT:   (In addition to Assessment/Re-Assessment sessions the following treatments were rendered)  Pre-treatment Symptoms/Complaints:  5/30/2018: Doing well. Doing HEP. Pain: Initial: Pain Intensity 1: 0  Post Session:  0/10     NEUROMUSCULAR RE-EDUCATION: (25 minutes):  Exercise/activities per grid below to improve balance, coordination, kinesthetic sense, posture and proprioception. Required minimal verbal and manual cues to promote static and dynamic balance in standing, promote coordination of bilateral, lower extremity(s) and promote motor control of bilateral, lower extremity(s).    Date   5/30/18    Activity/Exercise     Stepping over half foam      Step taps 6 inch x 30 reps    Step ups 6 inch step x 15 reps R LE with rail    [place]     Walking in the clinic with quad cane   Walking with quad cane and without cane working on weightbearing more on R foot through midstance and terminal stance, and stepping bigger with L L    pregait activity Standing in bars shifting onto R LE, marching or stepping with L LE, with no UE assist, some manual assist    Weight shifting on red ball (sitting)     Standing weight shifting R/L X 20 reps then working on stepping fwd/back with weight shifting. Walking in bars X 4 laps working on weight shift. Walked in clinic X 100 feet working on weight shift and step length with quad cane       THERAPEUTIC EXERCISE: (20 minutes):  Exercises per grid below to improve mobility and strength. Required minimal verbal cues to promote proper body alignment. Progressed repetitions as indicated. Date   5/30/18    Activity/Exercise     Sit to stand      Left sidelying alternating isometrics on pelvis     Standing hip abduction     Seated LAQs     Standing hamstrings curls      Bridging     Supine straight leg raise     Supine PNF     L sidelying R pelvis and trunk PNF     Left sidelying hip abduction     L sidelying: picking up and moving R LE from in front of L foot to behind L foot (working on hip abd and ext) and stabilizing trunk     L sidelying R clam shells     L sidelying R hip abduction     R foot and gastrocsoleus stretching 20 minutes stretching toe flexors, enrique shawn, soleus, and tibialis anterior    Supine hooklying lower body rotation     Standing minisquats in bars     Nustep         [employer] Portal  Treatment/Session Assessment:    · Response to Treatment:  Patient tolerated session well. Still able to get R ankle/foot to neutral. Patient's R foot is inverting more during stance phase of gait as botox is wearing off. Patient says she is meeting with an orthotist at [place] to ask about the walk aid AFO. She also thinks she might decrease PT to 1x/week next week. · Compliance with Program/Exercises: Compliant. · Recommendations/Intent for next treatment session: \"Next visit will focus on advancements to more challenging activities\".    Total Treatment Duration:  PT Patient Time In/Time Out  Time In: 1400  Time Out: [facility]

## 2018-05-30 NOTE — PROGRESS NOTES
Tammy Doin  : 1955  Primary: Oscar Baez Ppo  Secondary:  [address]  at [place] [address] [facility], [address] [place] [address],[address], [address]  [phone]   [phone]        OUTPATIENT OCCUPATIONAL THERAPY: Daily Note 2018    ICD-10: Treatment Diagnosis: Hemiplegia and hemiparesis following cerebral infarction affecting right dominant side (I69.351)  Precautions/Allergies:   Banana; Lisinopril; and Nuts [tree nut]   Fall Risk Score: 5 (? 5 = High Risk)  MD Orders: Referral to occupational therapy MEDICAL/REFERRING DIAGNOSIS:   Cerebral infarction, unspecified [I63.9]  Weakness [R53.1]   DATE OF ONSET: 2017   REFERRING PHYSICIAN: Juliette Verduzco*  RETURN PHYSICIAN APPOINTMENT: unknown   18: Ms. Hardy Mena continues to demo' progress towards therapy goals. Since Botox, pt has demo'd improved improved R shoulder abduction ROM; R elbow also appears to be more relax with pt achieving greater elbow extension. Pt also demo's greater ability to contract and relax R hand/digits during hand strengthening activities. Pt would continue to benefit from skilled OT services to maximize functional use of R UE for increased independence with activities of daily living. 3/5/18: Ms. Hardy Mena continues to make excellent progress towards her therapy goals; she demonstrates gradual improvements in R UE range of motion, specifically R shoulder flexion and R elbow extension/flexion, and reports increased functional use of R UE in activities of daily living, including dressing, grooming, and feeding.  Patient would continue to benefit from skilled occupational therapy services to maximize safety and independence and increase functional use of R UE during activities of daily living.    18: Ms. Hardy Mena is making excellent progress toward her goals; she is demonstrating improving range of motion and functional use of the right upper extremity in activities of daily living. Feel she will continue to benefit from skilled occupational therapy to maximize safety and independence with activities of daily living. INITIAL ASSESSMENT:  Ms. Siomara Crocker presents with impaired active movement, strength, coordination and sensation of the dominant right upper extremity that is affecting her ability to complete activities of daily living independently. Feel she may benefit from skilled occupational therapy to maximize safety and independence with activities of daily living. PLAN OF CARE:   PROBLEM LIST:  1. Decreased Strength  2. Decreased ADL/Functional Activities  3. Decreased Transfer Abilities  4. Decreased Balance  5. Decreased Flexibility/Joint Mobility  6. Decreased Cognition INTERVENTIONS PLANNED:  1. Activities of daily living training  2. Manual therapy training  3. Modalities  4. Neuromuscular re-eduation  5. Sensory reintegration training  6. Splinting  7. Therapeutic activity  8. Therapeutic exercise   TREATMENT PLAN:  Effective Dates: 3/19/18 to 6/17/18. Frequency/Duration: 2-3 times a week for 12 weeks  GOALS: (Goals have been discussed and agreed upon with patient.)  Short-Term Functional Goals: Time Frame: 6 weeks  1. Patient will demonstrate independence with home exercise program for right upper extremity range of motion. Met  2. Patient will increase use of right upper extremity as a functional assist in at least 25% of daily activities. Met  3. Patient will bathe with moderate assistance. Continue to address  Discharge Goals: Time Frame: 12 weeks  1. Patient will bathe with minimal assistance. Continue to address  2. Patient will feed self with modified independence and adaptive equipment as needed. Continue to address  3. Patient will dress with modified independence and adaptive equipment as needed. Continue to address  4. Patient will use right upper extremity as a functional assist in at least 50% of daily activities.  Continue to address  Rehabilitation Potential For Stated Goals: Good  Regarding Nadja Iraheta's therapy, I certify that the treatment plan above will be carried out by a therapist or under their direction. Thank you for this referral,  Enrique Art, OT     Referring Physician Signature: Juliette Santana* _________________________  Date _________            The information in this section was collected on 18 (except where otherwise noted). OCCUPATIONAL PROFILE & HISTORY:   History of Present Injury/Illness (Reason for Referral):  CVA with hospital and [facility] stay then home health therapy. Past Medical History/Comorbidities:   Ms. Kalpesh Valdez  has a past medical history of Anxiety; CVA (cerebral vascular accident) ([address] (2017); Depression; HTN (hypertension); Menopause; and PTE (pulmonary thromboembolism) ([address] (2017). Ms. Kalpesh Valdez  has a past surgical history that includes hx jennifer and bso (); hx  section; hx refractive surgery (); hx other surgical (); and hx other surgical (). Social History/Living Environment:      Prior Level of Function/Work/Activity:  Does seasonal taxes at [employer]  Dominant Side:         RIGHT  Current Medications:    Current Outpatient Prescriptions:     polyethylene glycol (MIRALAX) 17 gram/dose powder, Take 17 g by mouth daily. , Disp: 510 g, Rfl: 2    methylphenidate HCl (RITALIN) 20 mg tablet, Take 1 Tab (20 mg total) by mouth daily. Max Daily Amount: 20 mg, Disp: 30 Tab, Rfl: 0    methylphenidate HCl (RITALIN) 20 mg tablet, Take 1 Tab (20 mg total) by mouth daily. Max Daily Amount: 20 mg, Disp: 30 Tab, Rfl: 0    methylphenidate HCl (RITALIN) 20 mg tablet, Take 1 Tab (20 mg total) by mouth daily. Max Daily Amount: 20 mg, Disp: 30 Tab, Rfl: 0    rOPINIRole (REQUIP) 2 mg tablet, Take 1 Tab by mouth nightly. for restless leg, Disp: 90 Tab, Rfl: 1    FLUoxetine (PROZAC) 20 mg tablet, Take 2 Tabs by mouth daily. , Disp: 180 Tab, Rfl: 1    tiZANidine (ZANAFLEX) 4 mg tablet, 4mg po TID, Disp: 270 Tab, Rfl: 1    labetalol (NORMODYNE) 200 mg tablet, Take 1 Tab by mouth two (2) times a day., Disp: 180 Tab, Rfl: 1    losartan-hydroCHLOROthiazide (HYZAAR) 100-12.5 mg per tablet, Take 1 Tab by mouth daily. , Disp: 90 Tab, Rfl: 1    ALPRAZolam (XANAX) 0.25 mg tablet, Take 1 Tab by mouth three (3) times daily as needed for Anxiety. Max Daily Amount: 0.75 mg., Disp: 30 Tab, Rfl: 1            Date Last Reviewed:  5/30/2018   Complexity Level : Expanded review of therapy/medical records (1-2):  MODERATE COMPLEXITY   ASSESSMENT OF OCCUPATIONAL PERFORMANCE:   ROM:                   Balance:                   Coordination:                   Mental Status:                   Vision:                   Activities of Daily Living:           Basic ADLs (From Assessment) Complex ADLs (From Assessment)         Grooming/Bathing/Dressing Activities of Daily Living                                   Sensation:         Light touch absent distal to right elbow     Physical Skills Involved:  1. Range of Motion  2. Balance  3. Sensation  4. Fine Motor Control  5. Gross Motor Control Cognitive Skills Affected (resulting in the inability to perform in a timely and safe manner):  1. Executive Function  2. Sustained Attention  3. Divided Attention  4. Comprehension Psychosocial Skills Affected:  1. None   Number of elements that affect the Plan of Care: 5+:  HIGH COMPLEXITY   CLINICAL DECISION MAKING:   Outcome Measure: Tool Used: [address] [facility] [address] [address] [facility] Daily Activity Outpatient Short Form  Score:  Initial: 18 Most Recent: 26 (Date: 5/2/18 )   Interpretation of Tool:  Represents clinically-significant functional categories (i.e., basic and instrumental activities of daily living, fine motor activities). Score 60 59-55 54-47 46-34 32-21 20-16 15   Modifier CH CI CJ CK CL CM CN     Medical Necessity:   · Patient demonstrates good rehab potential due to higher previous functional level.   Reason for Services/Other Comments:  · Patient continues to require skilled intervention due to decreased safety and independence in activities of daily living. Clinical Decision-Making Assessment: Moderately difficult to predict patient's progress at this time due to the extent of her limitations in functional movement and use of dominant right upper extremity. Assessment process, impact of co-morbidities, assessment modification\need for assistance, and selection of interventions: Analytical Complexity:MODERATE COMPLEXITY   TREATMENT:   (In addition to Assessment/Re-Assessment sessions the following treatments were rendered)    Pre-treatment Symptoms/Complaints:  Patient states, \" I've been trying to use my R arm more but I would still like to work on my feeding. \"    Pain: Initial:   Pain Intensity 1: 0/10 Post Session:  same     Therapeutic Exercise: ( 35 minutes):  Exercises per grid below to improve dynamic movement of arm - right and hand - right to improve functional lifting, carrying, reaching and grasping. Required moderate visual and verbal cues to promote proper body mechanics. Progressed range, repetitions and complexity of movement as indicated.        Date:   5/4/18 Date:  5/7/18 Date:  5/9/18 Date:  5/14/18 Date:  5/16/18 Date:  5/21/18 Date:  5/23/18 Date:  5/30/18   Activity/Exercise           Shoulder shrugs AROM x 10 AROM x 10 AROM x 10 AROM x 10  AROM x 10 AROM x 10 AROM x 10   Scapular protraction/retraction AROM  1 x 10 with passive self range into elbow extension at end range AROM  1 x 10 with passive self range into elbow extension at end range AROM  1 x 10 with passive self range into elbow extension at end range AROM  1 x 10 with passive self range into elbow extension at end range AROM  1 x 10 with passive self range into elbow extension at end range AROM  1 x 10 with passive self range into elbow extension at end range AROM  1 x 10 with passive self range into elbow extension at end range AROM  1 x 10 with passive self range into elbow extension at end range   Shoulder abduction           Elbow flexion/extension  SROM x 15 reps seated SROM x 15 reps seated PROM x 10 reps seated PROM x 10 reps seated PROM x 10 reps seated     PNF D1/D2 diagonals           Hand helper       10 lbs x 5 reps    Tabletop skateboard           Shoulder flexion/extension   SROM x 15 reps seated        Chest press           UBE Min assist to sustain right  on handle   Level 0.5  7 mintues Min assist to sustain right  on handle   Level 0.5  7 mintues  Min assist to sustain right  on handle   Level 0.5  7 mintues  Min assist to sustain right  on handle   Level 0.5  7 mintues Min assist to sustain right  on handle   Level 0.5  7 mintues Min assist to sustain right  on handle   Level 0.5  7 mintues   Modified push-ups   Hands on countertop with dycem under right hand  3 x 10 Hands on countertop with dycem under right hand  3 x 10   Hands on countertop with dycem under right hand  3 x 10     Resistive clothespin  Yellow   1 x 5 pinch and release  Yellow   1 x 5 pinch and release   Red  1 x 5 pinch and release Red  1 x 5 pinch and release   Shoulder arc Gripped and released ~10 tabs to pull over shoulder arc. Gripped and released ~10 tabs to pull over shoulder arc.    Wrist flexion/extension  PROM x 10 reps with prolonged stretch at end range of extension  PROM x 10 reps with prolonged stretch at end range of extension PROM x 10 reps with prolonged stretch at end range of extension PROM x 10 reps with prolonged stretch at end range of extension PROM x 10 reps with prolonged stretch at end range of extension PROM x 10 reps with prolonged stretch at end range of extension   Punching bag           Finger flexion/extension  PROM x 10 reps with prolonged stretch at end range of extension  PROM x 10 reps with prolonged stretch at end range of extension PROM x 10 reps with prolonged stretch at end range of extension PROM x 10 reps with prolonged stretch at end range of extension PROM x 10 reps with prolonged stretch at end range of extension PROM x 10 reps with prolonged stretch at end range of extension   Measurements Taken               Access Code: [email or web address]   URL: [email or web address]   Date: 01/19/2018   Prepared by: Derick Schlatter     Exercises   Supine Shoulder Flexion PROM - 10 reps - 2 sets - 3 hold - 1x daily - 5x weekly   Push-Up on Counter - 10 reps - 2 sets - 1x daily - 5x weekly   Seated Shoulder Shrugs - 10 reps - 2 sets - 1x daily - 5x weekly   Seated Scapular Retraction - 10 reps - 2 sets - 1x daily - 5x weekly   Seated Weight Shifting with Arm Support - 10 reps - 2 sets - 1x daily - 5x weekly   Seated Shoulder Flexion Self PROM - 10 reps - 2 sets - 1x daily - 5x weekly   Supine Elbow Flexion Extension AROM - 10 reps - 2 sets - 1x daily - 5x weekly   Seated Elbow Flexion Extension AROM - 10 reps - 2 sets - 1x daily - 5x weekly     Neuromuscular Re-education: ( 10  minutes):  Exercise/activities per grid below to improve balance, coordination, kinesthetic sense and proprioception. Required moderate visual and verbal cues to promote static balance in sitting, promote coordination of right, upper extremity(s) and promote motor control of right, upper extremity(s). Patient completed lateral weight shifts sitting at edge of mat x 10 reps each onto elbow/forearms  with dynamic reach with left hand outside of base of support, using right upper extremity to maintain balance. Completed lateral weight shifts onto outstretched right hand with minimal assist to maintain position of right hand on mat x 5 reps. Self Care: (0 minutes): Procedure(s) (per grid) utilized to improve and/or restore self-care/home management as related to grooming and self feeding. Required moderate visual and verbal cueing to facilitate activities of daily living skills.         Treatment/Session Assessment: Pt reports increased use of R UE during ADLs, specfically feeding half of her meals and dressing. Pt continues to present with significant tone in R UE limiting her ability to complete BADLs independently. Pt states she would like to continue to address feeding and work on carrying items with her R hand. Pt would continue to benefit from skilled OT services to increase functional use of R UE during activities of daily living. · Response to Treatment: Mrs. Licha Shepard tolerated therapy well today; she is demonstrating increasing functional use of her right upper extremity in activities of daily living, including carrying personal items in a bag with her right hand throughout the clinic today. · Compliance with Program/Exercises: Will assess as treatment progresses. Pt reports compliance with HEP. · Recommendations/Intent for next treatment session: \"Next visit will focus on advancements to more challenging activities and reduction in assistance provided\".     Total Treatment Duration:  OT Patient Time In/Time Out  Time In: 1300  Time Out: [address]

## 2018-06-01 ENCOUNTER — HOSPITAL ENCOUNTER (OUTPATIENT)
Dept: PHYSICAL THERAPY | Age: 63
Discharge: HOME | End: 2018-06-01
Attending: PHYSICAL MEDICINE & REHABILITATION
Payer: COMMERCIAL

## 2018-06-01 PROCEDURE — 97112 NEUROMUSCULAR REEDUCATION: CPT

## 2018-06-01 PROCEDURE — 97535 SELF CARE MNGMENT TRAINING: CPT

## 2018-06-01 PROCEDURE — 97110 THERAPEUTIC EXERCISES: CPT

## 2018-06-01 NOTE — PROGRESS NOTES
Christopher Loya  : 1955  Primary:   Secondary:  [address]  at [facility]  [address] [facility] [address]  [phone]   [phone]          OUTPATIENT PHYSICAL THERAPY:Daily Note 2018    ICD-10: Treatment Diagnosis:   · Other abnormalities of gait and mobility (R26.89)  · Difficulty in walking, not elsewhere classified (R26.2)  Precautions/Allergies:   Banana; Lisinopril; and Nuts [tree nut]   Fall Risk Score: 5 (? 5 = High Risk)  MD Orders: Evaluate and Treat MEDICAL/REFERRING DIAGNOSIS:  Weakness due to cerebrovascular [facility] ([facility]) [facility] [I63.9, R53.1]   DATE OF ONSET: CVA: 2017  REFERRING PHYSICIAN: Juliette Godfrey*  RETURN PHYSICIAN APPOINTMENT: TBD       ASSESSMENT:  Ms. Cruz Castellon presents with decreased mobility, decreased strength, decreased gait, and decreased balance secondary to CVA. After discussing with patient, she agreed she would benefit from physical therapy to improve above deficits. Please sign this plan of treatment if you concur. Thank you for the opportunity to serve this patient. Recertification note on 2018:   Patient has attended [de-identified] scheduled physical therapy appointments from 12/15/2017 to 2018. Patient is very motivated and compliant with therapy. Patient received her first Botox injection on 2014. Patient's right ankle is less tight, and right foot is easier to get into her right AFO. Patient also reports less right foot pain with walking. Patient demonstrates improved balance since beginning therapy. Patient would like to continue working on ambulation throughout the community. Patient would benefit from continuing skilled physical therapy to address problems and goals. Thank you for this referral.    PROBLEM LIST (Impacting functional limitations):  1. Decreased Strength  2. Decreased Ambulation Ability/Technique  3. Decreased Balance  4.  Decreased Activity Tolerance INTERVENTIONS PLANNED:  1. Balance Exercise  2. Gait Training  3. Home Exercise Program (HEP)  4. Neuromuscular Re-education/Strengthening  5. Range of Motion (ROM)  6. Therapeutic Exercise/Strengthening   TREATMENT PLAN:  Effective Dates: 5/7/2018 TO 8/5/2018 (90 days). Frequency/Duration: 2 times a week for 90 Days  GOALS: (Goals have been discussed and agreed upon with patient.)  Short-Term Functional Goals: Time Frame: 3 weeks  1. Patient will be independent with home exercise program without exacerbation of symptoms or cueing needed. Goal met. Discharge Goals: Time Frame: 8 weeks  1. Patient will be independent with all ADLs with minimal fear of falling and loss of balance with daily tasks. Goal ongoing. 2. Patient will report no fear avoidance with social or recreational activities due to fear of falling. Goal ongoing. 3. Patient will score greater than or equal to 45/56 on Gipson Balance Scale with minimal effect of imbalance on patient's ability to manage every day life activities. Goal ongoing. Rehabilitation Potential For Stated Goals: Good            The information in this section was collected on 12/15/2017 (except where otherwise noted). HISTORY:   History of Present Injury/Illness (Reason for Referral):  Patient reports she had a severe stroke on 9/6/2017. She reports that she was in the hospital and [facility] for about 61-62 days. She reports that she has progressed really well, but is still having trouble walking and using her right side (UE and LE). She reports that she walks using her AFO and quad cane all the time. She reports that she tries to be as active as possible. She reports that she is scared that she is going to fall, even though she has not. She reports she would like to work on strengthening her right leg so her balance is better and she can walk without fear of falling.   Past Medical History/Comorbidities:   Ms. Hardy Mena  has a past medical history of Anxiety; CVA (cerebral vascular accident) ([facility] (2017); Depression; HTN (hypertension); Menopause; and PTE (pulmonary thromboembolism) ([facility] (2017). Ms. Geni Downs  has a past surgical history that includes hx jennifer and bso (); hx  section; hx refractive surgery (); hx other surgical (); and hx other surgical (2017). Social History/Living Environment:   Home Environment: Private residence  Living Alone: No  Support Systems: Family member(s), Friends \ neighbors; Spouse  Prior Level of Function/Work/Activity:  Independent  Dominant Side:         RIGHT  Personal Factors:          Sex:  female        Age:  60 y.o. Current Medications:       Current Outpatient Prescriptions:     polyethylene glycol (MIRALAX) 17 gram/dose powder, Take 17 g by mouth daily. , Disp: 510 g, Rfl: 2    methylphenidate HCl (RITALIN) 20 mg tablet, Take 1 Tab (20 mg total) by mouth daily. Max Daily Amount: 20 mg, Disp: 30 Tab, Rfl: 0    methylphenidate HCl (RITALIN) 20 mg tablet, Take 1 Tab (20 mg total) by mouth daily. Max Daily Amount: 20 mg, Disp: 30 Tab, Rfl: 0    methylphenidate HCl (RITALIN) 20 mg tablet, Take 1 Tab (20 mg total) by mouth daily. Max Daily Amount: 20 mg, Disp: 30 Tab, Rfl: 0    rOPINIRole (REQUIP) 2 mg tablet, Take 1 Tab by mouth nightly. for restless leg, Disp: 90 Tab, Rfl: 1    FLUoxetine (PROZAC) 20 mg tablet, Take 2 Tabs by mouth daily. , Disp: 180 Tab, Rfl: 1    tiZANidine (ZANAFLEX) 4 mg tablet, 4mg po TID, Disp: 270 Tab, Rfl: 1    labetalol (NORMODYNE) 200 mg tablet, Take 1 Tab by mouth two (2) times a day., Disp: 180 Tab, Rfl: 1    losartan-hydroCHLOROthiazide (HYZAAR) 100-12.5 mg per tablet, Take 1 Tab by mouth daily. , Disp: 90 Tab, Rfl: 1    ALPRAZolam (XANAX) 0.25 mg tablet, Take 1 Tab by mouth three (3) times daily as needed for Anxiety.  Max Daily Amount: 0.75 mg., Disp: 30 Tab, Rfl: 1   Date Last Reviewed:  2018    Number of Personal Factors/Comorbidities that affect the Plan of Care: 1-2: MODERATE COMPLEXITY   EXAMINATION:   Observation/Orthostatic Postural Assessment:          Posture Assessment: Rounded shoulders, Forward head   Functional Mobility:         Gait/Ambulation:     Speed/Lulu: Pace decreased (<100 feet/min)  Step Length: Left shortened, Right lengthened  Swing Pattern: Left asymmetrical, Right asymmetrical  Stance: Left increased, Right decreased  Gait Abnormalities: Antalgic, Circumduction, Foot drop, Path deviations, Trendelenburg  Ambulation - Level of Assistance: Modified independent  Assistive Device: Cane, quad  · Interventions: Verbal cues, Safety awareness training          Transfers:     Sit to Stand: Independent  Stand to Sit: Independent  Stand Pivot Transfers: Independent  · Bed to Chair: Independent          Bed Mobility:     Rolling: Independent  Supine to Sit: Independent  Sit to Supine: Independent  · Scooting: Independent    Strength:          L UE STRENGTH: 4/5 shoulder flexion, 4/5 shoulder abduction, 4/5 shoulder extension, 4/5 elbow flexion, 4/5 elbow extension. R UE STRENGTH: 2/5 shoulder flexion, 2/5 shoulder abduction, 2/5 shoulder extension, 2/5 elbow flexion, 2/5 elbow extension. R LE STRENGTH:  4+/5 hip flexion, 4/5 hip abduction, 55 hip adduction, 4+/5 hip extension, 4+/5 5/5knee extension, 3-/5 knee flexion, 1/5 ankle dorsiflexion, 1/5 ankle plantarflexion, 1/5 ankle inversion, 1/5 ankle eversion. Sensation:         Intact for light touch and proprioception  Postural Control & Balance:  · Gipson Balance Scale:  36/56.   (A score less than 45/56 indicates high risk of falls)  . Score improved from 28/56 on initial evaluation. Body Structures Involved:  1. Nerves  2. Muscles Body Functions Affected:  1. Neuromusculoskeletal  2. Movement Related Activities and Participation Affected:  1. Mobility  2.  Self Care   Number of elements (examined above) that affect the Plan of Care: 4+: HIGH COMPLEXITY   CLINICAL PRESENTATION:   Presentation: Evolving clinical presentation with changing clinical characteristics: MODERATE COMPLEXITY   CLINICAL DECISION MAKING:   Outcome Measure: Tool Used: Gipson Balance Scale  Score:  Initial: 28/56 Most Recent: 36/56 (Date: 5-7-2018 )   Interpretation of Score: Each section is scored on a 0-4 scale, 0 representing the patients inability to perform the task and 4 representing independence. The scores of each section are added together for a total score of 56. The higher the patients score, the more independent the patient is. Any score below 45 indicates increased risk for falls. Score 56 55-45 44-34 33-23 22-12 11-1 0   Modifier CH CI CJ CK CL CM CN     Medical Necessity:   · Patient is expected to demonstrate progress in strength, range of motion, balance and coordination to improve safety during daily activities. · Patient demonstrates good rehab potential due to higher previous functional level. · Skilled intervention continues to be required due to decreased mobility. Reason for Services/Other Comments:  · Patient continues to demonstrate capacity to improve overall mobility which will increase independence and increase safety. Use of outcome tool(s) and clinical judgement create a POC that gives a: Questionable prediction of patient's progress: MODERATE COMPLEXITY            TREATMENT:   (In addition to Assessment/Re-Assessment sessions the following treatments were rendered)  Pre-treatment Symptoms/Complaints:  6/1/2018: Patient has no complaints. Pain: Initial: Pain Intensity 1: 0  Post Session:  0/10     NEUROMUSCULAR RE-EDUCATION: (25 minutes):  Exercise/activities per grid below to improve balance, coordination, kinesthetic sense, posture and proprioception. Required minimal verbal and manual cues to promote static and dynamic balance in standing, promote coordination of bilateral, lower extremity(s) and promote motor control of bilateral, lower extremity(s).    Date   5/30/18 Date  6/1/18   Activity/Exercise Stepping over half foam      Step taps 6 inch x 30 reps 6 inch x 30 reps   Step ups 6 inch step x 15 reps R LE with rail 6 inch step x 15 reps R LE with rail   [place]     Walking in the clinic with quad cane   Walking with quad cane and without cane working on weightbearing more on R foot through midstance and terminal stance, and stepping bigger with L L Walking with quad cane and without cane working on weightbearing more on R foot through midstance and terminal stance, and stepping bigger with L L   pregait activity Standing in bars shifting onto R LE, marching or stepping with L LE, with no UE assist, some manual assist Standing in bars shifting onto R LE, marching or stepping with L LE, with no UE assist, some manual assist   Weight shifting on red ball (sitting)     Standing weight shifting R/L X 20 reps then working on stepping fwd/back with weight shifting. X 20 reps then working on stepping fwd/back with weight shifting. Walking in bars X 4 laps working on weight shift. X 4 laps working on weight shift. Walked in clinic X 100 feet working on weight shift and step length with quad cane X 100 feet working on weight shift and step length with quad cane      THERAPEUTIC EXERCISE: (20 minutes):  Exercises per grid below to improve mobility and strength. Required minimal verbal cues to promote proper body alignment. Progressed repetitions as indicated.    Date   5/30/18 Date  6/1/18   Activity/Exercise     Sit to stand      Left sidelying alternating isometrics on pelvis     Standing hip abduction     Seated LAQs     Standing hamstrings curls      Bridging     Supine straight leg raise     Supine PNF     L sidelying R pelvis and trunk PNF     Left sidelying hip abduction     L sidelying: picking up and moving R LE from in front of L foot to behind L foot (working on hip abd and ext) and stabilizing trunk     L sidelying R clam shells     L sidelying R hip abduction     R foot and gastrocsoleus stretching 20 minutes stretching toe flexors, enrique shawn, soleus, and tibialis anterior 20 minutes stretching toe flexors, enrique shawn, soleus, and tibialis anterior   Supine hooklying lower body rotation     Standing minisquats in bars     [facility]         [place] [facility]  Treatment/Session Assessment:    · Response to Treatment:  Patient tolerated exercises without complaints. · Compliance with Program/Exercises: Compliant. · Recommendations/Intent for next treatment session: \"Next visit will focus on advancements to more challenging activities\". Will reassess next appointment.    Total Treatment Duration:  PT Patient Time In/Time Out  Time In: 1345  Time Out: [address]

## 2018-06-01 NOTE — PROGRESS NOTES
Dalila Perdomo  : 1955  Primary: Alicia Roper Ppo  Secondary:  [address]  at [place] [id number] [facility], [address] [place] [address],[address], [address]  [phone]   [phone]        OUTPATIENT OCCUPATIONAL THERAPY: Daily Note 2018    ICD-10: Treatment Diagnosis: Hemiplegia and hemiparesis following cerebral infarction affecting right dominant side (I69.351)  Precautions/Allergies:   Banana; Lisinopril; and Nuts [tree nut]   Fall Risk Score: 5 (? 5 = High Risk)  MD Orders: Referral to occupational therapy MEDICAL/REFERRING DIAGNOSIS:   Cerebral infarction, unspecified [I63.9]  Weakness [R53.1]   DATE OF ONSET: 2017   REFERRING PHYSICIAN: Juliette Vaughan*  RETURN PHYSICIAN APPOINTMENT: unknown   18: Ms. Licha Shepard continues to demo' progress towards therapy goals. Since Botox, pt has demo'd improved improved R shoulder abduction ROM; R elbow also appears to be more relax with pt achieving greater elbow extension. Pt also demo's greater ability to contract and relax R hand/digits during hand strengthening activities. Pt would continue to benefit from skilled OT services to maximize functional use of R UE for increased independence with activities of daily living. 3/5/18: Ms. Licha Shepard continues to make excellent progress towards her therapy goals; she demonstrates gradual improvements in R UE range of motion, specifically R shoulder flexion and R elbow extension/flexion, and reports increased functional use of R UE in activities of daily living, including dressing, grooming, and feeding. Patient would continue to benefit from skilled occupational therapy services to maximize safety and independence and increase functional use of R UE during activities of daily living.    18: Ms. Licha Shepard is making excellent progress toward her goals; she is demonstrating improving range of motion and functional use of the right upper extremity in activities of daily living. Feel she will continue to benefit from skilled occupational therapy to maximize safety and independence with activities of daily living. INITIAL ASSESSMENT:  Ms. Ronn Miranda presents with impaired active movement, strength, coordination and sensation of the dominant right upper extremity that is affecting her ability to complete activities of daily living independently. Feel she may benefit from skilled occupational therapy to maximize safety and independence with activities of daily living. PLAN OF CARE:   PROBLEM LIST:  1. Decreased Strength  2. Decreased ADL/Functional Activities  3. Decreased Transfer Abilities  4. Decreased Balance  5. Decreased Flexibility/Joint Mobility  6. Decreased Cognition INTERVENTIONS PLANNED:  1. Activities of daily living training  2. Manual therapy training  3. Modalities  4. Neuromuscular re-eduation  5. Sensory reintegration training  6. Splinting  7. Therapeutic activity  8. Therapeutic exercise   TREATMENT PLAN:  Effective Dates: 3/19/18 to 6/17/18. Frequency/Duration: 2-3 times a week for 12 weeks  GOALS: (Goals have been discussed and agreed upon with patient.)  Short-Term Functional Goals: Time Frame: 6 weeks  1. Patient will demonstrate independence with home exercise program for right upper extremity range of motion. Met  2. Patient will increase use of right upper extremity as a functional assist in at least 25% of daily activities. Met  3. Patient will bathe with moderate assistance. Continue to address  Discharge Goals: Time Frame: 12 weeks  1. Patient will bathe with minimal assistance. Continue to address  2. Patient will feed self with modified independence and adaptive equipment as needed. Continue to address  3. Patient will dress with modified independence and adaptive equipment as needed. Continue to address  4. Patient will use right upper extremity as a functional assist in at least 50% of daily activities.  Continue to address  Rehabilitation Potential For Stated Goals: Good  Regarding Dmitrymary Iraheta's therapy, I certify that the treatment plan above will be carried out by a therapist or under their direction. Thank you for this referral,  Osiris Sheehan, OT     Referring Physician Signature: Juliette Eng* _________________________  Date _________            The information in this section was collected on 18 (except where otherwise noted). OCCUPATIONAL PROFILE & HISTORY:   History of Present Injury/Illness (Reason for Referral):  CVA with hospital and [facility] stay then home health therapy. Past Medical History/Comorbidities:   Ms. Giana Santana  has a past medical history of Anxiety; CVA (cerebral vascular accident) ([facility] [address] (2017); Depression; HTN (hypertension); Menopause; and PTE (pulmonary thromboembolism) ([facility] [address] (2017). Ms. Giana Santana  has a past surgical history that includes hx jennifer and bso (); hx  section; hx refractive surgery (); hx other surgical (); and hx other surgical (). Social History/Living Environment:      Prior Level of Function/Work/Activity:  Does seasonal taxes at [employer]  Dominant Side:         RIGHT  Current Medications:    Current Outpatient Prescriptions:     polyethylene glycol (MIRALAX) 17 gram/dose powder, Take 17 g by mouth daily. , Disp: 510 g, Rfl: 2    methylphenidate HCl (RITALIN) 20 mg tablet, Take 1 Tab (20 mg total) by mouth daily. Max Daily Amount: 20 mg, Disp: 30 Tab, Rfl: 0    methylphenidate HCl (RITALIN) 20 mg tablet, Take 1 Tab (20 mg total) by mouth daily. Max Daily Amount: 20 mg, Disp: 30 Tab, Rfl: 0    methylphenidate HCl (RITALIN) 20 mg tablet, Take 1 Tab (20 mg total) by mouth daily. Max Daily Amount: 20 mg, Disp: 30 Tab, Rfl: 0    rOPINIRole (REQUIP) 2 mg tablet, Take 1 Tab by mouth nightly. for restless leg, Disp: 90 Tab, Rfl: 1    FLUoxetine (PROZAC) 20 mg tablet, Take 2 Tabs by mouth daily. , Disp: 180 Tab, Rfl: 1    tiZANidine (ZANAFLEX) 4 mg tablet, 4mg po TID, Disp: 270 Tab, Rfl: 1    labetalol (NORMODYNE) 200 mg tablet, Take 1 Tab by mouth two (2) times a day., Disp: 180 Tab, Rfl: 1    losartan-hydroCHLOROthiazide (HYZAAR) 100-12.5 mg per tablet, Take 1 Tab by mouth daily. , Disp: 90 Tab, Rfl: 1    ALPRAZolam (XANAX) 0.25 mg tablet, Take 1 Tab by mouth three (3) times daily as needed for Anxiety. Max Daily Amount: 0.75 mg., Disp: 30 Tab, Rfl: 1            Date Last Reviewed:  6/1/2018   Complexity Level : Expanded review of therapy/medical records (1-2):  MODERATE COMPLEXITY   ASSESSMENT OF OCCUPATIONAL PERFORMANCE:   ROM:                   Balance:                   Coordination:                   Mental Status:                   Vision:                   Activities of Daily Living:           Basic ADLs (From Assessment) Complex ADLs (From Assessment)         Grooming/Bathing/Dressing Activities of Daily Living                                   Sensation:         Light touch absent distal to right elbow     Physical Skills Involved:  1. Range of Motion  2. Balance  3. Sensation  4. Fine Motor Control  5. Gross Motor Control Cognitive Skills Affected (resulting in the inability to perform in a timely and safe manner):  1. Executive Function  2. Sustained Attention  3. Divided Attention  4. Comprehension Psychosocial Skills Affected:  1. None   Number of elements that affect the Plan of Care: 5+:  HIGH COMPLEXITY   CLINICAL DECISION MAKING:   Outcome Measure: Tool Used: [address] [facility] [address] [address] [facility] Daily Activity Outpatient Short Form  Score:  Initial: 18 Most Recent: 26 (Date: 5/2/18 )   Interpretation of Tool:  Represents clinically-significant functional categories (i.e., basic and instrumental activities of daily living, fine motor activities). Score 60 59-55 54-47 46-34 32-21 20-16 15   Modifier CH CI CJ CK CL CM CN     Medical Necessity:   · Patient demonstrates good rehab potential due to higher previous functional level.   Reason for Services/Other Comments:  · Patient continues to require skilled intervention due to decreased safety and independence in activities of daily living. Clinical Decision-Making Assessment: Moderately difficult to predict patient's progress at this time due to the extent of her limitations in functional movement and use of dominant right upper extremity. Assessment process, impact of co-morbidities, assessment modification\need for assistance, and selection of interventions: Analytical Complexity:MODERATE COMPLEXITY   TREATMENT:   (In addition to Assessment/Re-Assessment sessions the following treatments were rendered)    Pre-treatment Symptoms/Complaints:  Patient states, \"I brought all my utensils today to work on feeding. \"    Pain: Initial:   Pain Intensity 1: 0/10 Post Session:  same     Therapeutic Exercise: ( 10 minutes):  Exercises per grid below to improve dynamic movement of arm - right and hand - right to improve functional lifting, carrying, reaching and grasping. Required moderate visual and verbal cues to promote proper body mechanics. Progressed range, repetitions and complexity of movement as indicated.        Date:  5/7/18 Date:  5/9/18 Date:  5/14/18 Date:  5/16/18 Date:  5/21/18 Date:  5/23/18 Date:  5/30/18 Date:   6/1/18   Activity/Exercise           Shoulder shrugs AROM x 10 AROM x 10 AROM x 10  AROM x 10 AROM x 10 AROM x 10    Scapular protraction/retraction AROM  1 x 10 with passive self range into elbow extension at end range AROM  1 x 10 with passive self range into elbow extension at end range AROM  1 x 10 with passive self range into elbow extension at end range AROM  1 x 10 with passive self range into elbow extension at end range AROM  1 x 10 with passive self range into elbow extension at end range AROM  1 x 10 with passive self range into elbow extension at end range AROM  1 x 10 with passive self range into elbow extension at end range    Shoulder abduction           Elbow flexion/extension SROM x 15 reps seated SROM x 15 reps seated PROM x 10 reps seated PROM x 10 reps seated PROM x 10 reps seated      PNF D1/D2 diagonals           Hand helper      10 lbs x 5 reps     Tabletop skateboard           Shoulder flexion/extension  SROM x 15 reps seated         Chest press           UBE Min assist to sustain right  on handle   Level 0.5  7 mintues  Min assist to sustain right  on handle   Level 0.5  7 mintues  Min assist to sustain right  on handle   Level 0.5  7 mintues Min assist to sustain right  on handle   Level 0.5  7 mintues Min assist to sustain right  on handle   Level 0.5  7 mintues    Modified push-ups  Hands on countertop with dycem under right hand  3 x 10 Hands on countertop with dycem under right hand  3 x 10   Hands on countertop with dycem under right hand  3 x 10      Resistive clothespin Yellow   1 x 5 pinch and release  Yellow   1 x 5 pinch and release   Red  1 x 5 pinch and release Red  1 x 5 pinch and release Red  1 x 5 pinch and release   Shoulder arc       Gripped and released ~10 tabs to pull over shoulder arc.     Wrist flexion/extension PROM x 10 reps with prolonged stretch at end range of extension  PROM x 10 reps with prolonged stretch at end range of extension PROM x 10 reps with prolonged stretch at end range of extension PROM x 10 reps with prolonged stretch at end range of extension PROM x 10 reps with prolonged stretch at end range of extension PROM x 10 reps with prolonged stretch at end range of extension    Punching bag           Finger flexion/extension PROM x 10 reps with prolonged stretch at end range of extension  PROM x 10 reps with prolonged stretch at end range of extension PROM x 10 reps with prolonged stretch at end range of extension PROM x 10 reps with prolonged stretch at end range of extension PROM x 10 reps with prolonged stretch at end range of extension PROM x 10 reps with prolonged stretch at end range of extension    Measurements Taken               Access Code: [email or web address]   URL: [email or web address]   Date: 01/19/2018   Prepared by: Lorenza Smith     Exercises   Supine Shoulder Flexion PROM - 10 reps - 2 sets - 3 hold - 1x daily - 5x weekly   Push-Up on Counter - 10 reps - 2 sets - 1x daily - 5x weekly   Seated Shoulder Shrugs - 10 reps - 2 sets - 1x daily - 5x weekly   Seated Scapular Retraction - 10 reps - 2 sets - 1x daily - 5x weekly   Seated Weight Shifting with Arm Support - 10 reps - 2 sets - 1x daily - 5x weekly   Seated Shoulder Flexion Self PROM - 10 reps - 2 sets - 1x daily - 5x weekly   Supine Elbow Flexion Extension AROM - 10 reps - 2 sets - 1x daily - 5x weekly   Seated Elbow Flexion Extension AROM - 10 reps - 2 sets - 1x daily - 5x weekly     Neuromuscular Re-education: ( 10  minutes):  Exercise/activities per grid below to improve balance, coordination, kinesthetic sense and proprioception. Required moderate visual and verbal cues to promote static balance in sitting, promote coordination of right, upper extremity(s) and promote motor control of right, upper extremity(s). Patient completed lateral weight shifts sitting at edge of mat x 10 reps each onto elbow/forearms  with dynamic reach with left hand outside of base of support, using right upper extremity to maintain balance. Completed lateral weight shifts onto outstretched right hand with minimal assist to maintain position of right hand on mat x 5 reps. Self Care: (25 minutes): Procedure(s) (per grid) utilized to improve and/or restore self-care/home management as related to grooming and self feeding. Required moderate visual and verbal cueing to facilitate activities of daily living skills. Pt practiced self-feeding with adapted spoon and fork utensils. Pt brought in her bendable adapted utensils to practice self-feeding with applesauce. Pt demo'd ability to self feed with R hand using shoulder abduction and internal rotation.  Pt currently unable to isolate elbow flexion and extension to bring food to mouth. Pt was instructed to use L hand to assist R hand with feeding. Pt demo'd good ability to use adapted spoon to self feed with min L hand assist.        Treatment/Session Assessment: Pt reports increased use of R UE during ADLs, specfically feeding half of her meals, dressing, and brushing her teeth. Pt continues to present with significant tone in R UE limiting her ability to complete BADLs independently. Pt able to complete self-feeding with R hand and adapted utensil today, however pt has difficulty isolating elbow flexion to bring food to mouth. Pt was was able to feed using R hand and adapted utensil with min A from L hand. Pt would continue to benefit from skilled OT services to increase functional use of R UE during activities of daily living. · Response to Treatment: Mrs. Giana Santana tolerated therapy well today; she is demonstrating increasing functional use of her right upper extremity in activities of daily living, including carrying personal items in a bag with her right hand throughout the clinic today. · Compliance with Program/Exercises: Will assess as treatment progresses. Pt reports compliance with HEP. · Recommendations/Intent for next treatment session: \"Next visit will focus on advancements to more challenging activities and reduction in assistance provided\".     Total Treatment Duration:  OT Patient Time In/Time Out  Time In: 1300  Time Out: [facility]

## 2018-06-08 ENCOUNTER — HOSPITAL ENCOUNTER (OUTPATIENT)
Dept: PHYSICAL THERAPY | Age: 63
Discharge: HOME | End: 2018-06-08
Attending: PHYSICAL MEDICINE & REHABILITATION
Payer: COMMERCIAL

## 2018-06-08 PROCEDURE — 97110 THERAPEUTIC EXERCISES: CPT

## 2018-06-08 PROCEDURE — 97112 NEUROMUSCULAR REEDUCATION: CPT

## 2018-06-08 NOTE — PROGRESS NOTES
Carmelo Higginbotham  : 1955  Primary: Sandip Turner Ppo  Secondary:  [address]  at [place] [address] [facility], [address] [place] [address],[address]  [phone]   [phone]        OUTPATIENT OCCUPATIONAL THERAPY: Progress Report and Recertification [id number]    ICD-10: Treatment Diagnosis: Hemiplegia and hemiparesis following cerebral infarction affecting right dominant side (I69.351)  Precautions/Allergies:   Banana; Lisinopril; and Nuts [tree nut]   Fall Risk Score: 5 (? 5 = High Risk)  MD Orders: Referral to occupational therapy MEDICAL/REFERRING DIAGNOSIS:   Cerebral infarction, unspecified [I63.9]  Weakness [R53.1]   DATE OF ONSET: 2017   REFERRING PHYSICIAN: Juliette Otero*  RETURN PHYSICIAN APPOINTMENT: unknown   18: Ms. Laure Johnson has now met all STGs and is progressing towards her LTGs. Pt states that she has been bathing with only mod A from caregiver and/or  and has been dressing independently. Pt still reports limitations when self-feeding. Despite reports of increased functional use of the R UE, pt is still limited due to significant flexor tone. Pt would continue to benefit from skilled OT services to increase functional use of RUE during activities of daily living. 18: Ms. Laure Johnson continues to demo' progress towards therapy goals. Since Botox, pt has demo'd improved improved R shoulder abduction ROM; R elbow also appears to be more relax with pt achieving greater elbow extension. Pt also demo's greater ability to contract and relax R hand/digits during hand strengthening activities. Pt would continue to benefit from skilled OT services to maximize functional use of R UE for increased independence with activities of daily living.      3/5/18: Ms. Laure Johnson continues to make excellent progress towards her therapy goals; she demonstrates gradual improvements in R UE range of motion, specifically R shoulder flexion and R elbow extension/flexion, and reports increased functional use of R UE in activities of daily living, including dressing, grooming, and feeding. Patient would continue to benefit from skilled occupational therapy services to maximize safety and independence and increase functional use of R UE during activities of daily living.    1/31/18: Ms. Yadira Franz is making excellent progress toward her goals; she is demonstrating improving range of motion and functional use of the right upper extremity in activities of daily living. Feel she will continue to benefit from skilled occupational therapy to maximize safety and independence with activities of daily living. INITIAL ASSESSMENT:  Ms. Yadira Franz presents with impaired active movement, strength, coordination and sensation of the dominant right upper extremity that is affecting her ability to complete activities of daily living independently. Feel she may benefit from skilled occupational therapy to maximize safety and independence with activities of daily living. PLAN OF CARE:   PROBLEM LIST:  1. Decreased Strength  2. Decreased ADL/Functional Activities  3. Decreased Transfer Abilities  4. Decreased Balance  5. Decreased Flexibility/Joint Mobility  6. Decreased Cognition INTERVENTIONS PLANNED:  1. Activities of daily living training  2. Manual therapy training  3. Modalities  4. Neuromuscular re-eduation  5. Sensory reintegration training  6. Splinting  7. Therapeutic activity  8. Therapeutic exercise   TREATMENT PLAN:  Effective Dates:6/8/18 to 8/7/18. Frequency/Duration: 1-2 times a week for 12 weeks  GOALS: (Goals have been discussed and agreed upon with patient.)  Short-Term Functional Goals: Time Frame: 6 weeks  1. Patient will demonstrate independence with home exercise program for right upper extremity range of motion. Met  2. Patient will increase use of right upper extremity as a functional assist in at least 25% of daily activities. Met  3.  Patient will bathe with moderate assistance. -MET  Discharge Goals: Time Frame: 12 weeks  1. Patient will bathe with minimal assistance. Continue to address  2. Patient will feed self with modified independence and adaptive equipment as needed. Continue to address  3. Patient will dress with modified independence and adaptive equipment as needed. PROGRESSING  4. Patient will use right upper extremity as a functional assist in at least 50% of daily activities. Continue to address  Rehabilitation Potential For Stated Goals: Good  Regarding Roma Iraheta's therapy, I certify that the treatment plan above will be carried out by a therapist or under their direction. Thank you for this referral,  Daniele Arrington, OT     Referring Physician Signature: Eve Wray, Juliette* _________________________  Date _________            The information in this section was collected on 18 (except where otherwise noted). OCCUPATIONAL PROFILE & HISTORY:   History of Present Injury/Illness (Reason for Referral):  CVA with hospital and [facility] stay then home health therapy. Past Medical History/Comorbidities:   Ms. Marcella Cabello  has a past medical history of Anxiety; CVA (cerebral vascular accident) ([facility] [address] (2017); Depression; HTN (hypertension); Menopause; and PTE (pulmonary thromboembolism) ([facility] [address] (2017). Ms. Marcella Cabello  has a past surgical history that includes hx jennifer and bso (); hx  section; hx refractive surgery (); hx other surgical (); and hx other surgical (2017). Social History/Living Environment:      Prior Level of Function/Work/Activity:  Does seasonal taxes at [employer]  Dominant Side:         RIGHT  Current Medications:    Current Outpatient Prescriptions:     polyethylene glycol (MIRALAX) 17 gram/dose powder, Take 17 g by mouth daily. , Disp: 510 g, Rfl: 2    methylphenidate HCl (RITALIN) 20 mg tablet, Take 1 Tab (20 mg total) by mouth daily.   Max Daily Amount: 20 mg, Disp: 30 Tab, Rfl: 0    methylphenidate HCl (RITALIN) 20 mg tablet, Take 1 Tab (20 mg total) by mouth daily. Max Daily Amount: 20 mg, Disp: 30 Tab, Rfl: 0    methylphenidate HCl (RITALIN) 20 mg tablet, Take 1 Tab (20 mg total) by mouth daily. Max Daily Amount: 20 mg, Disp: 30 Tab, Rfl: 0    rOPINIRole (REQUIP) 2 mg tablet, Take 1 Tab by mouth nightly. for restless leg, Disp: 90 Tab, Rfl: 1    FLUoxetine (PROZAC) 20 mg tablet, Take 2 Tabs by mouth daily. , Disp: 180 Tab, Rfl: 1    tiZANidine (ZANAFLEX) 4 mg tablet, 4mg po TID, Disp: 270 Tab, Rfl: 1    labetalol (NORMODYNE) 200 mg tablet, Take 1 Tab by mouth two (2) times a day., Disp: 180 Tab, Rfl: 1    losartan-hydroCHLOROthiazide (HYZAAR) 100-12.5 mg per tablet, Take 1 Tab by mouth daily. , Disp: 90 Tab, Rfl: 1    ALPRAZolam (XANAX) 0.25 mg tablet, Take 1 Tab by mouth three (3) times daily as needed for Anxiety. Max Daily Amount: 0.75 mg., Disp: 30 Tab, Rfl: 1            Date Last Reviewed:  6/8/2018   Complexity Level : Expanded review of therapy/medical records (1-2):  MODERATE COMPLEXITY   ASSESSMENT OF OCCUPATIONAL PERFORMANCE:   ROM:                   Balance:                   Coordination:                   Mental Status:                   Vision:                   Activities of Daily Living:           Basic ADLs (From Assessment) Complex ADLs (From Assessment)         Grooming/Bathing/Dressing Activities of Daily Living                                   Sensation:         Light touch absent distal to right elbow     Physical Skills Involved:  1. Range of Motion  2. Balance  3. Sensation  4. Fine Motor Control  5. Gross Motor Control Cognitive Skills Affected (resulting in the inability to perform in a timely and safe manner):  1. Executive Function  2. Sustained Attention  3. Divided Attention  4. Comprehension Psychosocial Skills Affected:  1. None   Number of elements that affect the Plan of Care: 5+:  HIGH COMPLEXITY   CLINICAL DECISION MAKING:   Outcome Measure:    Tool Used: [address] [facility] [address] [address] AM-PAC Daily Activity Outpatient Short Form  Score:  Initial: 18 Most Recent: 37 (Date: 6/8/18/18 )   Interpretation of Tool:  Represents clinically-significant functional categories (i.e., basic and instrumental activities of daily living, fine motor activities). Score 60 59-55 54-47 46-34 32-21 20-16 15   Modifier CH CI CJ CK CL CM CN     Medical Necessity:   · Patient demonstrates good rehab potential due to higher previous functional level. Reason for Services/Other Comments:  · Patient continues to require skilled intervention due to decreased safety and independence in activities of daily living. Clinical Decision-Making Assessment: Moderately difficult to predict patient's progress at this time due to the extent of her limitations in functional movement and use of dominant right upper extremity. Assessment process, impact of co-morbidities, assessment modification\need for assistance, and selection of interventions: Analytical Complexity:MODERATE COMPLEXITY   TREATMENT:   (In addition to Assessment/Re-Assessment sessions the following treatments were rendered)    Pre-treatment Symptoms/Complaints:  Patient states, \"I haven't been able to sleep the last few nights because of my leg. \"    Pain: Initial:   Pain Intensity 1: 0/10 Post Session:  same     Therapeutic Exercise: ( 30 minutes):  Exercises per grid below to improve dynamic movement of arm - right and hand - right to improve functional lifting, carrying, reaching and grasping. Required moderate visual and verbal cues to promote proper body mechanics. Progressed range, repetitions and complexity of movement as indicated.        Date:  5/9/18 Date:  5/14/18 Date:  5/16/18 Date:  5/21/18 Date:  5/23/18 Date:  5/30/18 Date:   6/1/18 Date:  6/8/18   Activity/Exercise           Shoulder shrugs AROM x 10 AROM x 10  AROM x 10 AROM x 10 AROM x 10  AROM x 10   Scapular protraction/retraction AROM  1 x 10 with passive self range into elbow extension at end range AROM  1 x 10 with passive self range into elbow extension at end range AROM  1 x 10 with passive self range into elbow extension at end range AROM  1 x 10 with passive self range into elbow extension at end range AROM  1 x 10 with passive self range into elbow extension at end range AROM  1 x 10 with passive self range into elbow extension at end range  AROM  1 x 10 with passive self range into elbow extension at end range   Shoulder abduction           Elbow flexion/extension SROM x 15 reps seated PROM x 10 reps seated PROM x 10 reps seated PROM x 10 reps seated       PNF D1/D2 diagonals           Hand helper     10 lbs x 5 reps      Tabletop skateboard           Shoulder flexion/extension SROM x 15 reps seated          Chest press           UBE  Min assist to sustain right  on handle   Level 0.5  7 mintues  Min assist to sustain right  on handle   Level 0.5  7 mintues Min assist to sustain right  on handle   Level 0.5  7 mintues Min assist to sustain right  on handle   Level 0.5  7 mintues  Min assist to sustain right  on handle   Level 0.5  7 mintues   Modified push-ups  Hands on countertop with dycem under right hand  3 x 10   Hands on countertop with dycem under right hand  3 x 10       Resistive clothespin  Yellow   1 x 5 pinch and release   Red  1 x 5 pinch and release Red  1 x 5 pinch and release Red  1 x 5 pinch and release    Shoulder arc      Gripped and released ~10 tabs to pull over shoulder arc. Gripped and released ~10 tabs to pull over shoulder arc.    Wrist flexion/extension  PROM x 10 reps with prolonged stretch at end range of extension PROM x 10 reps with prolonged stretch at end range of extension PROM x 10 reps with prolonged stretch at end range of extension PROM x 10 reps with prolonged stretch at end range of extension PROM x 10 reps with prolonged stretch at end range of extension  PROM x 10 reps with prolonged stretch at end range of extension   Punching bag Finger flexion/extension  PROM x 10 reps with prolonged stretch at end range of extension PROM x 10 reps with prolonged stretch at end range of extension PROM x 10 reps with prolonged stretch at end range of extension PROM x 10 reps with prolonged stretch at end range of extension PROM x 10 reps with prolonged stretch at end range of extension     Measurements Taken        AM-PAC re-assessed; Access Code: [email or web address]   URL: [email or web address]   Date: 01/19/2018   Prepared by: Daniela Ramos     Exercises   Supine Shoulder Flexion PROM - 10 reps - 2 sets - 3 hold - 1x daily - 5x weekly   Push-Up on Counter - 10 reps - 2 sets - 1x daily - 5x weekly   Seated Shoulder Shrugs - 10 reps - 2 sets - 1x daily - 5x weekly   Seated Scapular Retraction - 10 reps - 2 sets - 1x daily - 5x weekly   Seated Weight Shifting with Arm Support - 10 reps - 2 sets - 1x daily - 5x weekly   Seated Shoulder Flexion Self PROM - 10 reps - 2 sets - 1x daily - 5x weekly   Supine Elbow Flexion Extension AROM - 10 reps - 2 sets - 1x daily - 5x weekly   Seated Elbow Flexion Extension AROM - 10 reps - 2 sets - 1x daily - 5x weekly     Neuromuscular Re-education: ( 10  minutes):  Exercise/activities per grid below to improve balance, coordination, kinesthetic sense and proprioception. Required moderate visual and verbal cues to promote static balance in sitting, promote coordination of right, upper extremity(s) and promote motor control of right, upper extremity(s). Patient completed lateral weight shifts sitting at edge of mat x 10 reps each onto elbow/forearms  with dynamic reach with left hand outside of base of support, using right upper extremity to maintain balance. Completed lateral weight shifts onto outstretched right hand with minimal assist to maintain position of right hand on mat x 5 reps.           Self Care: (0 minutes): Procedure(s) (per grid) utilized to improve and/or restore self-care/home management as related to grooming and self feeding. Required moderate visual and verbal cueing to facilitate activities of daily living skills. Treatment/Session Assessment: Pt reports increased use of R UE during ADLs, specfically feeding half of her meals, dressing, and brushing her teeth. Pt continues to present with significant tone in R UE limiting her ability to complete BADLs independently. Pt reports that she is now completing bathing ADLs with mod assist and has been completing her dressing independently. Pt would continue to benefit from skilled OT services to increase functional use of R UE during activities of daily living. · Response to Treatment: Mrs. Cristine Peralta tolerated therapy well today; she is demonstrating increasing functional use of her right upper extremity in activities of daily living, including carrying personal items in a bag with her right hand throughout the clinic today. · Compliance with Program/Exercises: Will assess as treatment progresses. Pt reports compliance with HEP. --  · Recommendations/Intent for next treatment session: \"Next visit will focus on advancements to more challenging activities and reduction in assistance provided\".     Total Treatment Duration:  OT Patient Time In/Time Out  Time In: 1300  Time Out: [address] [facility]

## 2018-06-08 NOTE — PROGRESS NOTES
Ramiro Myles  : 1955  Primary:   Secondary:  [address] at [facility]  [address], [address], [facility] [address]  [phone]   [phone]          OUTPATIENT PHYSICAL THERAPY:Daily Note and Progress Report 2018    ICD-10: Treatment Diagnosis:   · Other abnormalities of gait and mobility (R26.89)  · Difficulty in walking, not elsewhere classified (R26.2)  Precautions/Allergies:   Banana; Lisinopril; and Nuts [tree nut]   Fall Risk Score: 5 (? 5 = High Risk)  MD Orders: Evaluate and Treat MEDICAL/REFERRING DIAGNOSIS:  Weakness due to cerebrovascular accident (CVA) ([facility] [I63.9, R53.1]   DATE OF ONSET: CVA: 2017  REFERRING PHYSICIAN: Juliette Peterson*  RETURN PHYSICIAN APPOINTMENT: TBD       ASSESSMENT:  Ms. Jessica Lowe presents with decreased mobility, decreased strength, decreased gait, and decreased balance secondary to CVA. After discussing with patient, she agreed she would benefit from physical therapy to improve above deficits. Please sign this plan of treatment if you concur. Thank you for the opportunity to serve this patient. Progress note on 2018:   Patient has attended forty-eight scheduled physical therapy appointments from 12/15/2017 to 2018. Patient is very motivated and compliant with therapy. Patient would like to continue working on ambulation throughout the community. Patient would benefit from continuing skilled physical therapy to address problems and goals. Thank you for this referral.    PROBLEM LIST (Impacting functional limitations):  1. Decreased Strength  2. Decreased Ambulation Ability/Technique  3. Decreased Balance  4. Decreased Activity Tolerance INTERVENTIONS PLANNED:  1. Balance Exercise  2. Gait Training  3. Home Exercise Program (HEP)  4. Neuromuscular Re-education/Strengthening  5. Range of Motion (ROM)  6.  Therapeutic Exercise/Strengthening   TREATMENT PLAN:  Effective Dates: 2018 TO 2018 (90 days). Frequency/Duration: 2 times a week for 90 Days  GOALS: (Goals have been discussed and agreed upon with patient.)  Short-Term Functional Goals: Time Frame: 3 weeks  1. Patient will be independent with home exercise program without exacerbation of symptoms or cueing needed. Goal met. Discharge Goals: Time Frame: 8 weeks  1. Patient will be independent with all ADLs with minimal fear of falling and loss of balance with daily tasks. Goal ongoing. 2. Patient will report no fear avoidance with social or recreational activities due to fear of falling. Goal ongoing. 3. Patient will score greater than or equal to 45/56 on Gipson Balance Scale with minimal effect of imbalance on patient's ability to manage every day life activities. Goal ongoing. Rehabilitation Potential For Stated Goals: Good            The information in this section was collected on 12/15/2017 (except where otherwise noted). HISTORY:   History of Present Injury/Illness (Reason for Referral):  Patient reports she had a severe stroke on 2017. She reports that she was in the hospital and [facility] for about 61-62 days. She reports that she has progressed really well, but is still having trouble walking and using her right side (UE and LE). She reports that she walks using her AFO and quad cane all the time. She reports that she tries to be as active as possible. She reports that she is scared that she is going to fall, even though she has not. She reports she would like to work on strengthening her right leg so her balance is better and she can walk without fear of falling. Past Medical History/Comorbidities:   Ms. Marcella Cabello  has a past medical history of Anxiety; CVA (cerebral vascular accident) ([address] (2017); Depression; HTN (hypertension); Menopause; and PTE (pulmonary thromboembolism) ([address] (2017).   Ms. Marcella Cabello  has a past surgical history that includes hx jennifer and bso (); hx  section; hx refractive surgery (); hx other surgical (2012); and hx other surgical (2017). Social History/Living Environment:   Home Environment: Private residence  Living Alone: No  Support Systems: Family member(s), Friends \ neighbors; Spouse  Prior Level of Function/Work/Activity:  Independent  Dominant Side:         RIGHT  Personal Factors:          Sex:  female        Age:  60 y.o. Current Medications:       Current Outpatient Prescriptions:     polyethylene glycol (MIRALAX) 17 gram/dose powder, Take 17 g by mouth daily. , Disp: 510 g, Rfl: 2    methylphenidate HCl (RITALIN) 20 mg tablet, Take 1 Tab (20 mg total) by mouth daily. Max Daily Amount: 20 mg, Disp: 30 Tab, Rfl: 0    methylphenidate HCl (RITALIN) 20 mg tablet, Take 1 Tab (20 mg total) by mouth daily. Max Daily Amount: 20 mg, Disp: 30 Tab, Rfl: 0    methylphenidate HCl (RITALIN) 20 mg tablet, Take 1 Tab (20 mg total) by mouth daily. Max Daily Amount: 20 mg, Disp: 30 Tab, Rfl: 0    rOPINIRole (REQUIP) 2 mg tablet, Take 1 Tab by mouth nightly. for restless leg, Disp: 90 Tab, Rfl: 1    FLUoxetine (PROZAC) 20 mg tablet, Take 2 Tabs by mouth daily. , Disp: 180 Tab, Rfl: 1    tiZANidine (ZANAFLEX) 4 mg tablet, 4mg po TID, Disp: 270 Tab, Rfl: 1    labetalol (NORMODYNE) 200 mg tablet, Take 1 Tab by mouth two (2) times a day., Disp: 180 Tab, Rfl: 1    losartan-hydroCHLOROthiazide (HYZAAR) 100-12.5 mg per tablet, Take 1 Tab by mouth daily. , Disp: 90 Tab, Rfl: 1    ALPRAZolam (XANAX) 0.25 mg tablet, Take 1 Tab by mouth three (3) times daily as needed for Anxiety.  Max Daily Amount: 0.75 mg., Disp: 30 Tab, Rfl: 1   Date Last Reviewed:  6/8/2018    Number of Personal Factors/Comorbidities that affect the Plan of Care: 1-2: MODERATE COMPLEXITY   EXAMINATION:   Observation/Orthostatic Postural Assessment:          Posture Assessment: Rounded shoulders, Forward head   Functional Mobility:         Gait/Ambulation:     Speed/Lulu: Pace decreased (<100 feet/min)  Step Length: Left shortened, Right lengthened  Swing Pattern: Left asymmetrical, Right asymmetrical  Stance: Left increased, Right decreased  Gait Abnormalities: Antalgic, Circumduction, Foot drop, Path deviations, Trendelenburg  Ambulation - Level of Assistance: Modified independent  Assistive Device: Cane, quad  · Interventions: Verbal cues, Safety awareness training          Transfers:     Sit to Stand: Independent  Stand to Sit: Independent  Stand Pivot Transfers: Independent  · Bed to Chair: Independent          Bed Mobility:     Rolling: Independent  Supine to Sit: Independent  Sit to Supine: Independent  · Scooting: Independent    Strength:          L UE STRENGTH: 4/5 shoulder flexion, 4/5 shoulder abduction, 4/5 shoulder extension, 4/5 elbow flexion, 4/5 elbow extension. R UE STRENGTH: 2/5 shoulder flexion, 2/5 shoulder abduction, 2/5 shoulder extension, 2/5 elbow flexion, 2/5 elbow extension. R LE STRENGTH:  4+/5 hip flexion, 4/5 hip abduction, 55 hip adduction, 4+/5 hip extension, 4+/5 5/5knee extension, 3-/5 knee flexion, 1/5 ankle dorsiflexion, 1/5 ankle plantarflexion, 1/5 ankle inversion, 1/5 ankle eversion. Sensation:         Intact for light touch and proprioception  Postural Control & Balance:  · Gipson Balance Scale:  38/56.   (A score less than 45/56 indicates high risk of falls)  . Score improved from 28/56 on initial evaluation. Body Structures Involved:  1. Nerves  2. Muscles Body Functions Affected:  1. Neuromusculoskeletal  2. Movement Related Activities and Participation Affected:  1. Mobility  2. Self Care   Number of elements (examined above) that affect the Plan of Care: 4+: HIGH COMPLEXITY   CLINICAL PRESENTATION:   Presentation: Evolving clinical presentation with changing clinical characteristics: MODERATE COMPLEXITY   CLINICAL DECISION MAKING:   Outcome Measure:    Tool Used: Gipson Balance Scale  Score:  Initial: 28/56 Most Recent: 38/56 (Date: 6-8-2018 )   Interpretation of Score: Each section is scored on a 0-4 scale, 0 representing the patients inability to perform the task and 4 representing independence. The scores of each section are added together for a total score of 56. The higher the patients score, the more independent the patient is. Any score below 45 indicates increased risk for falls. Score 56 55-45 44-34 33-23 22-12 11-1 0   Modifier CH CI CJ CK CL CM CN     Medical Necessity:   · Patient is expected to demonstrate progress in strength, range of motion, balance and coordination to improve safety during daily activities. · Patient demonstrates good rehab potential due to higher previous functional level. · Skilled intervention continues to be required due to decreased mobility. Reason for Services/Other Comments:  · Patient continues to demonstrate capacity to improve overall mobility which will increase independence and increase safety. Use of outcome tool(s) and clinical judgement create a POC that gives a: Questionable prediction of patient's progress: MODERATE COMPLEXITY            TREATMENT:   (In addition to Assessment/Re-Assessment sessions the following treatments were rendered)  Pre-treatment Symptoms/Complaints:  6/8/2018: Patient has no complaints. Pain: Initial: Pain Intensity 1: 0  Post Session:  0/10     NEUROMUSCULAR RE-EDUCATION: (25 minutes):  Exercise/activities per grid below to improve balance, coordination, kinesthetic sense, posture and proprioception. Required minimal verbal and manual cues to promote static and dynamic balance in standing, promote coordination of bilateral, lower extremity(s) and promote motor control of bilateral, lower extremity(s).    Date   6/8/18 Date  6/1/18   Activity/Exercise     Stepping over half foam      Step taps 6 inch x 30 reps 6 inch x 30 reps   Step ups 6 inch step x 15 reps R LE with rail 6 inch step x 15 reps R LE with rail   [place]     Walking in the clinic with quad cane   Walking with quad cane and without cane working on weightbearing more on R foot through midstance and terminal stance, and stepping bigger with L L Walking with quad cane and without cane working on weightbearing more on R foot through midstance and terminal stance, and stepping bigger with L L   pregait activity Standing in bars shifting onto R LE, marching or stepping with L LE, with no UE assist, some manual assist Standing in bars shifting onto R LE, marching or stepping with L LE, with no UE assist, some manual assist   Weight shifting on red ball (sitting)     Standing weight shifting R/L X 20 reps then working on stepping fwd/back with weight shifting. X 20 reps then working on stepping fwd/back with weight shifting. Walking in bars X 4 laps working on weight shift. X 4 laps working on weight shift. Walked in clinic X 100 feet working on weight shift and step length with quad cane X 100 feet working on weight shift and step length with quad cane      THERAPEUTIC EXERCISE: (20 minutes):  Exercises per grid below to improve mobility and strength. Required minimal verbal cues to promote proper body alignment. Progressed repetitions as indicated.    Date   6/8/18 Date  6/1/18   Activity/Exercise     Sit to stand      Left sidelying alternating isometrics on pelvis     Standing hip abduction     Seated LAQs     Standing hamstrings curls      Bridging     Supine straight leg raise     Supine PNF     L sidelying R pelvis and trunk PNF     Left sidelying hip abduction     L sidelying: picking up and moving R LE from in front of L foot to behind L foot (working on hip abd and ext) and stabilizing trunk     L sidelying R clam shells     L sidelying R hip abduction     R foot and gastrocsoleus stretching 20 minutes stretching toe flexors, enrique shawn, soleus, and tibialis anterior 20 minutes stretching toe flexors, enrique shawn, soleus, and tibialis anterior   Supine hooklying lower body rotation     Standing minisquats in bars     [facility] Portal  Treatment/Session Assessment:    · Response to Treatment:  Patient tolerated exercises without issues. · Compliance with Program/Exercises: Compliant. · Recommendations/Intent for next treatment session: \"Next visit will focus on advancements to more challenging activities\".      Total Treatment Duration:  PT Patient Time In/Time Out  Time In: 1345  Time Out: [address]

## 2018-06-11 ENCOUNTER — HOSPITAL ENCOUNTER (OUTPATIENT)
Dept: PHYSICAL THERAPY | Age: 63
Discharge: HOME | End: 2018-06-11
Attending: PHYSICAL MEDICINE & REHABILITATION
Payer: COMMERCIAL

## 2018-06-11 NOTE — PROGRESS NOTES
OT NOTE:    Mrs. Isabel Silverton called today to cancel her appointment.     Dominga Lindquist, OTR/L

## 2018-06-11 NOTE — PROGRESS NOTES
Natalya Brown  : 1955  Primary: Lennie Dozier Ppo  Secondary:  [facility] at [place] [id number] [facility], [address] [place] [address],[address], [facility] [address]  [phone]   [phone]     OUTPATIENT DAILY NOTE    NAME/AGE/GENDER: Natalya Brown is a 60 y.o. female. DATE: 2018    Patient canceled for appointment today due to unknown reason. Will plan to follow up on next scheduled visit.     Adryan Nugent, PT

## 2018-06-15 ENCOUNTER — HOSPITAL ENCOUNTER (OUTPATIENT)
Dept: PHYSICAL THERAPY | Age: 63
Discharge: HOME | End: 2018-06-15
Attending: PHYSICAL MEDICINE & REHABILITATION
Payer: COMMERCIAL

## 2018-06-15 NOTE — PROGRESS NOTES
Yoel Ling  : 1955  Primary: Montse Thapa Ppo  Secondary:  Therapy Center at [address] [place] [address],[address], [place] [address].  [phone]   [phone]     OUTPATIENT DAILY NOTE    NAME/AGE/GENDER: Yoel Ling is a 60 y.o. female. DATE: 6/15/2018    Patient canceled for appointment today due to not feeling well. Will plan to follow up on next scheduled visit.     Beatris Garcia PT

## 2018-06-15 NOTE — PROGRESS NOTES
OT NOTE:    Mrs. Angie Jaimes called to cancel her appointment for today as well as Monday, June, 18th. Pt states that she has not been feeling well. Will follow-up with patient at her next appointment.      Carisa Padilla, OTR/L

## 2018-06-18 ENCOUNTER — APPOINTMENT (OUTPATIENT)
Dept: PHYSICAL THERAPY | Age: 63
End: 2018-06-18
Attending: PHYSICAL MEDICINE & REHABILITATION
Payer: COMMERCIAL

## 2018-06-21 ENCOUNTER — HOSPITAL ENCOUNTER (OUTPATIENT)
Dept: PHYSICAL THERAPY | Age: 63
Discharge: HOME | End: 2018-06-21
Attending: PHYSICAL MEDICINE & REHABILITATION
Payer: COMMERCIAL

## 2018-06-21 PROCEDURE — 97110 THERAPEUTIC EXERCISES: CPT

## 2018-06-21 PROCEDURE — 97535 SELF CARE MNGMENT TRAINING: CPT

## 2018-06-21 PROCEDURE — 97112 NEUROMUSCULAR REEDUCATION: CPT

## 2018-06-21 NOTE — PROGRESS NOTES
Sarah Velazquez  : 1955  Primary:   Secondary:  [address] at [facility]  [address] [facility], [address] [place] [address],[address], [facility] [address]  [phone]   [phone]          OUTPATIENT PHYSICAL THERAPY:Daily Note 2018    ICD-10: Treatment Diagnosis:   · Other abnormalities of gait and mobility (R26.89)  · Difficulty in walking, not elsewhere classified (R26.2)  Precautions/Allergies:   Banana; Lisinopril; and Nuts [tree nut]   Fall Risk Score: 5 (? 5 = High Risk)  MD Orders: Evaluate and Treat MEDICAL/REFERRING DIAGNOSIS:  Weakness due to cerebrovascular accident (CVA) ([facility] [address] [I63.9, R53.1]   DATE OF ONSET: CVA: 2017  REFERRING PHYSICIAN: Deirdre Duverney, Amy*  RETURN PHYSICIAN APPOINTMENT: TBD       ASSESSMENT:  Ms. Checo Reddy presents with decreased mobility, decreased strength, decreased gait, and decreased balance secondary to CVA. After discussing with patient, she agreed she would benefit from physical therapy to improve above deficits. Please sign this plan of treatment if you concur. Thank you for the opportunity to serve this patient. Progress note on 2018:   Patient has attended forty-eight scheduled physical therapy appointments from 12/15/2017 to 2018. Patient is very motivated and compliant with therapy. Patient would like to continue working on ambulation throughout the community. Patient would benefit from continuing skilled physical therapy to address problems and goals. Thank you for this referral.    PROBLEM LIST (Impacting functional limitations):  1. Decreased Strength  2. Decreased Ambulation Ability/Technique  3. Decreased Balance  4. Decreased Activity Tolerance INTERVENTIONS PLANNED:  1. Balance Exercise  2. Gait Training  3. Home Exercise Program (HEP)  4. Neuromuscular Re-education/Strengthening  5. Range of Motion (ROM)  6. Therapeutic Exercise/Strengthening   TREATMENT PLAN:  Effective Dates: 2018 TO 2018 (90 days). Frequency/Duration: 2 times a week for 90 Days  GOALS: (Goals have been discussed and agreed upon with patient.)  Short-Term Functional Goals: Time Frame: 3 weeks  1. Patient will be independent with home exercise program without exacerbation of symptoms or cueing needed. Goal met. Discharge Goals: Time Frame: 8 weeks  1. Patient will be independent with all ADLs with minimal fear of falling and loss of balance with daily tasks. Goal ongoing. 2. Patient will report no fear avoidance with social or recreational activities due to fear of falling. Goal ongoing. 3. Patient will score greater than or equal to 45/56 on Gipson Balance Scale with minimal effect of imbalance on patient's ability to manage every day life activities. Goal ongoing. Rehabilitation Potential For Stated Goals: Good            The information in this section was collected on 12/15/2017 (except where otherwise noted). HISTORY:   History of Present Injury/Illness (Reason for Referral):  Patient reports she had a severe stroke on 2017. She reports that she was in the hospital and [facility] for about 61-62 days. She reports that she has progressed really well, but is still having trouble walking and using her right side (UE and LE). She reports that she walks using her AFO and quad cane all the time. She reports that she tries to be as active as possible. She reports that she is scared that she is going to fall, even though she has not. She reports she would like to work on strengthening her right leg so her balance is better and she can walk without fear of falling. Past Medical History/Comorbidities:   Ms. Kalpesh Valdez  has a past medical history of Anxiety; CVA (cerebral vascular accident) ([address] (2017); Depression; HTN (hypertension); Menopause; and PTE (pulmonary thromboembolism) ([address] (2017).   Ms. Kalpesh Valdez  has a past surgical history that includes hx jennifer and bso (); hx  section; hx refractive surgery (); hx other surgical (2012); and hx other surgical (2017). Social History/Living Environment:   Home Environment: Private residence  Living Alone: No  Support Systems: Family member(s), Friends \ neighbors; Spouse  Prior Level of Function/Work/Activity:  Independent  Dominant Side:         RIGHT  Personal Factors:          Sex:  female        Age:  60 y.o. Current Medications:       Current Outpatient Prescriptions:     ALPRAZolam (XANAX) 0.25 mg tablet, Take 1 Tab by mouth three (3) times daily as needed for Anxiety. Max Daily Amount: 0.75 mg., Disp: 30 Tab, Rfl: 0    polyethylene glycol (MIRALAX) 17 gram/dose powder, Take 17 g by mouth daily. , Disp: 510 g, Rfl: 2    methylphenidate HCl (RITALIN) 20 mg tablet, Take 1 Tab (20 mg total) by mouth daily. Max Daily Amount: 20 mg, Disp: 30 Tab, Rfl: 0    methylphenidate HCl (RITALIN) 20 mg tablet, Take 1 Tab (20 mg total) by mouth daily. Max Daily Amount: 20 mg, Disp: 30 Tab, Rfl: 0    methylphenidate HCl (RITALIN) 20 mg tablet, Take 1 Tab (20 mg total) by mouth daily. Max Daily Amount: 20 mg, Disp: 30 Tab, Rfl: 0    rOPINIRole (REQUIP) 2 mg tablet, Take 1 Tab by mouth nightly. for restless leg, Disp: 90 Tab, Rfl: 1    FLUoxetine (PROZAC) 20 mg tablet, Take 2 Tabs by mouth daily. , Disp: 180 Tab, Rfl: 1    tiZANidine (ZANAFLEX) 4 mg tablet, 4mg po TID, Disp: 270 Tab, Rfl: 1    labetalol (NORMODYNE) 200 mg tablet, Take 1 Tab by mouth two (2) times a day., Disp: 180 Tab, Rfl: 1    losartan-hydroCHLOROthiazide (HYZAAR) 100-12.5 mg per tablet, Take 1 Tab by mouth daily. , Disp: 90 Tab, Rfl: 1   Date Last Reviewed:  6/21/2018    Number of Personal Factors/Comorbidities that affect the Plan of Care: 1-2: MODERATE COMPLEXITY   EXAMINATION:   Observation/Orthostatic Postural Assessment:          Posture Assessment: Rounded shoulders, Forward head   Functional Mobility:         Gait/Ambulation:     Speed/Lulu: Pace decreased (<100 feet/min)  Step Length: Left shortened, Right lengthened  Swing Pattern: Left asymmetrical, Right asymmetrical  Stance: Left increased, Right decreased  Gait Abnormalities: Antalgic, Circumduction, Foot drop, Path deviations, Trendelenburg  Ambulation - Level of Assistance: Modified independent  Assistive Device: Cane, quad  · Interventions: Verbal cues, Safety awareness training          Transfers:     Sit to Stand: Independent  Stand to Sit: Independent  Stand Pivot Transfers: Independent  · Bed to Chair: Independent          Bed Mobility:     Rolling: Independent  Supine to Sit: Independent  Sit to Supine: Independent  · Scooting: Independent    Strength:          L UE STRENGTH: 4/5 shoulder flexion, 4/5 shoulder abduction, 4/5 shoulder extension, 4/5 elbow flexion, 4/5 elbow extension. R UE STRENGTH: 2/5 shoulder flexion, 2/5 shoulder abduction, 2/5 shoulder extension, 2/5 elbow flexion, 2/5 elbow extension. R LE STRENGTH:  4+/5 hip flexion, 4/5 hip abduction, 55 hip adduction, 4+/5 hip extension, 4+/5 5/5knee extension, 3-/5 knee flexion, 1/5 ankle dorsiflexion, 1/5 ankle plantarflexion, 1/5 ankle inversion, 1/5 ankle eversion. Sensation:         Intact for light touch and proprioception  Postural Control & Balance:  · Gipson Balance Scale:  38/56.   (A score less than 45/56 indicates high risk of falls)  . Score improved from 28/56 on initial evaluation. Body Structures Involved:  1. Nerves  2. Muscles Body Functions Affected:  1. Neuromusculoskeletal  2. Movement Related Activities and Participation Affected:  1. Mobility  2. Self Care   Number of elements (examined above) that affect the Plan of Care: 4+: HIGH COMPLEXITY   CLINICAL PRESENTATION:   Presentation: Evolving clinical presentation with changing clinical characteristics: MODERATE COMPLEXITY   CLINICAL DECISION MAKING:   Outcome Measure:    Tool Used: Gipson Balance Scale  Score:  Initial: 28/56 Most Recent: 38/56 (Date: 6-8-2018 )   Interpretation of Score: Each section is scored on a 0-4 scale, 0 representing the patients inability to perform the task and 4 representing independence. The scores of each section are added together for a total score of 56. The higher the patients score, the more independent the patient is. Any score below 45 indicates increased risk for falls. Score 56 55-45 44-34 33-23 22-12 11-1 0   Modifier CH CI CJ CK CL CM CN     Medical Necessity:   · Patient is expected to demonstrate progress in strength, range of motion, balance and coordination to improve safety during daily activities. · Patient demonstrates good rehab potential due to higher previous functional level. · Skilled intervention continues to be required due to decreased mobility. Reason for Services/Other Comments:  · Patient continues to demonstrate capacity to improve overall mobility which will increase independence and increase safety. Use of outcome tool(s) and clinical judgement create a POC that gives a: Questionable prediction of patient's progress: MODERATE COMPLEXITY            TREATMENT:   (In addition to Assessment/Re-Assessment sessions the following treatments were rendered)  Pre-treatment Symptoms/Complaints:  6/21/2018: Slipped on the wet deck 2 weeks ago, falling on R buttock. Muscles have been cramping more. No pain. MD gave me muscle relaxers which helps some. Pain: Initial: Pain Intensity 1: 0  Post Session:  0/10     NEUROMUSCULAR RE-EDUCATION: (15 minutes):  Exercise/activities per grid below to improve balance, coordination, kinesthetic sense, posture and proprioception. Required minimal verbal and manual cues to promote static and dynamic balance in standing, promote coordination of bilateral, lower extremity(s) and promote motor control of bilateral, lower extremity(s).    Date   6/21/18    Activity/Exercise     Stepping over half foam      Step taps     Step ups 8 inch step x 15 reps R LE with rail; then step ups onto sanddune x 15 reps R LE with rail    Sanddune Standing eyes open and closed    Walking in the clinic with quad cane   Walking with quad cane working on increasing L LE step length, increasing R LE weight bearing. pregait activity     Weight shifting on red ball (sitting)     Standing weight shifting R/L     Walking in bars . THERAPEUTIC EXERCISE: (30 minutes):  Exercises per grid below to improve mobility and strength. Required minimal verbal cues to promote proper body alignment. Progressed repetitions as indicated. Date   6/21/18    Activity/Exercise     Sit to stand      Left sidelying alternating isometrics on pelvis     Standing hip abduction     Seated LAQs     Standing hamstrings curls      Bridging     Piriformis stretch R LE 3 x 30 sec hold; to do at home also    Supine straight leg raise     Supine PNF D1 and D2 R LE x 15 reps each with min to mod resistance    L sidelying R pelvis and trunk PNF     Left sidelying hip abduction     L sidelying: picking up and moving R LE from in front of L foot to behind L foot (working on hip abd and ext) and stabilizing trunk     L sidelying R clam shells     L sidelying R hip abduction     R foot and gastrocsoleus stretching 20 minutes stretching toe flexors, enrique shawn, soleus, and tibialis anterior    Supine hooklying lower body rotation     Standing minisquats in bars     [facility] Portal  Treatment/Session Assessment:    · Response to Treatment:  Patient tolerated exercises well today. She reported no pain or muscle cramping with exercise. Showed patient R piriformis stretch to help with hip soreness, demonstrated understanding and took a picture with her phone to help her remember. R ankle DF still to neutral with stretching. Patient is demonstrating good gait pattern with small base quad cane, but slow. NEED TO DO PROGRESS NOTE NEXT SESSION. · Compliance with Program/Exercises: Compliant. · Recommendations/Intent for next treatment session:  \"Next visit will focus on advancements to more challenging activities\".      Total Treatment Duration:  PT Patient Time In/Time Out  Time In: 1400  Time Out: [facility]

## 2018-06-21 NOTE — PROGRESS NOTES
Fanny Jan  : 1955  Primary: Mojgan De Dios  Secondary:  [address]  at [place] [id number] [facility], [address] [place] [address],[address], [address] [facility]  [phone]   [phone]        OUTPATIENT OCCUPATIONAL THERAPY: Daily Note 2018    ICD-10: Treatment Diagnosis: Hemiplegia and hemiparesis following cerebral infarction affecting right dominant side (I69.351)  Precautions/Allergies:   Banana; Lisinopril; and Nuts [tree nut]   Fall Risk Score: 5 (? 5 = High Risk)  MD Orders: Referral to occupational therapy MEDICAL/REFERRING DIAGNOSIS:   Cerebral infarction, unspecified [I63.9]  Weakness [R53.1]   DATE OF ONSET: 2017   REFERRING PHYSICIAN: Juliette Carlson*  RETURN PHYSICIAN APPOINTMENT: unknown   18: Ms. Kenji Merchant has now met all STGs and is progressing towards her LTGs. Pt states that she has been bathing with only mod A from caregiver and/or  and has been dressing independently. Pt still reports limitations when self-feeding. Despite reports of increased functional use of the R UE, pt is still limited due to significant flexor tone. Pt would continue to benefit from skilled OT services to increase functional use of RUE during activities of daily living. 18: Ms. Kenji Merchant continues to demo' progress towards therapy goals. Since Botox, pt has demo'd improved improved R shoulder abduction ROM; R elbow also appears to be more relax with pt achieving greater elbow extension. Pt also demo's greater ability to contract and relax R hand/digits during hand strengthening activities. Pt would continue to benefit from skilled OT services to maximize functional use of R UE for increased independence with activities of daily living.      3/5/18: Ms. Kenji Merchant continues to make excellent progress towards her therapy goals; she demonstrates gradual improvements in R UE range of motion, specifically R shoulder flexion and R elbow extension/flexion, and reports increased functional use of R UE in activities of daily living, including dressing, grooming, and feeding. Patient would continue to benefit from skilled occupational therapy services to maximize safety and independence and increase functional use of R UE during activities of daily living.    1/31/18: Ms. Giana Santana is making excellent progress toward her goals; she is demonstrating improving range of motion and functional use of the right upper extremity in activities of daily living. Feel she will continue to benefit from skilled occupational therapy to maximize safety and independence with activities of daily living. INITIAL ASSESSMENT:  Ms. Giana Santana presents with impaired active movement, strength, coordination and sensation of the dominant right upper extremity that is affecting her ability to complete activities of daily living independently. Feel she may benefit from skilled occupational therapy to maximize safety and independence with activities of daily living. PLAN OF CARE:   PROBLEM LIST:  1. Decreased Strength  2. Decreased ADL/Functional Activities  3. Decreased Transfer Abilities  4. Decreased Balance  5. Decreased Flexibility/Joint Mobility  6. Decreased Cognition INTERVENTIONS PLANNED:  1. Activities of daily living training  2. Manual therapy training  3. Modalities  4. Neuromuscular re-eduation  5. Sensory reintegration training  6. Splinting  7. Therapeutic activity  8. Therapeutic exercise   TREATMENT PLAN:  Effective Dates:6/8/18 to 8/7/18. Frequency/Duration: 1-2 times a week for 12 weeks  GOALS: (Goals have been discussed and agreed upon with patient.)  Short-Term Functional Goals: Time Frame: 6 weeks  1. Patient will demonstrate independence with home exercise program for right upper extremity range of motion. Met  2. Patient will increase use of right upper extremity as a functional assist in at least 25% of daily activities. Met  3. Patient will bathe with moderate assistance. -MET  Discharge Goals: Time Frame: 12 weeks  1. Patient will bathe with minimal assistance. Continue to address  2. Patient will feed self with modified independence and adaptive equipment as needed. Continue to address  3. Patient will dress with modified independence and adaptive equipment as needed. PROGRESSING  4. Patient will use right upper extremity as a functional assist in at least 50% of daily activities. Continue to address  Rehabilitation Potential For Stated Goals: Good  Regarding Efraín Iraheta's therapy, I certify that the treatment plan above will be carried out by a therapist or under their direction. Thank you for this referral,  James Gutierrez, OT     Referring Physician Signature: Juliette Cummins* _________________________  Date _________            The information in this section was collected on 18 (except where otherwise noted). OCCUPATIONAL PROFILE & HISTORY:   History of Present Injury/Illness (Reason for Referral):  CVA with hospital and [facility] stay then home health therapy. Past Medical History/Comorbidities:   Ms. Ewa Rawls  has a past medical history of Anxiety; CVA (cerebral vascular accident) ([facility] [address] (2017); Depression; HTN (hypertension); Menopause; and PTE (pulmonary thromboembolism) ([facility] [address] (2017). Ms. Ewa Rawls  has a past surgical history that includes hx jennifer and bso (); hx  section; hx refractive surgery (); hx other surgical (); and hx other surgical (2017). Social History/Living Environment:      Prior Level of Function/Work/Activity:  Does seasonal taxes at [employer]  Dominant Side:         RIGHT  Current Medications:    Current Outpatient Prescriptions:     ALPRAZolam (XANAX) 0.25 mg tablet, Take 1 Tab by mouth three (3) times daily as needed for Anxiety. Max Daily Amount: 0.75 mg., Disp: 30 Tab, Rfl: 0    polyethylene glycol (MIRALAX) 17 gram/dose powder, Take 17 g by mouth daily. , Disp: 510 g, Rfl: 2    methylphenidate HCl (RITALIN) 20 mg tablet, Take 1 Tab (20 mg total) by mouth daily. Max Daily Amount: 20 mg, Disp: 30 Tab, Rfl: 0    methylphenidate HCl (RITALIN) 20 mg tablet, Take 1 Tab (20 mg total) by mouth daily. Max Daily Amount: 20 mg, Disp: 30 Tab, Rfl: 0    methylphenidate HCl (RITALIN) 20 mg tablet, Take 1 Tab (20 mg total) by mouth daily. Max Daily Amount: 20 mg, Disp: 30 Tab, Rfl: 0    rOPINIRole (REQUIP) 2 mg tablet, Take 1 Tab by mouth nightly. for restless leg, Disp: 90 Tab, Rfl: 1    FLUoxetine (PROZAC) 20 mg tablet, Take 2 Tabs by mouth daily. , Disp: 180 Tab, Rfl: 1    tiZANidine (ZANAFLEX) 4 mg tablet, 4mg po TID, Disp: 270 Tab, Rfl: 1    labetalol (NORMODYNE) 200 mg tablet, Take 1 Tab by mouth two (2) times a day., Disp: 180 Tab, Rfl: 1    losartan-hydroCHLOROthiazide (HYZAAR) 100-12.5 mg per tablet, Take 1 Tab by mouth daily. , Disp: 90 Tab, Rfl: 1            Date Last Reviewed:  6/21/2018   Complexity Level : Expanded review of therapy/medical records (1-2):  MODERATE COMPLEXITY   ASSESSMENT OF OCCUPATIONAL PERFORMANCE:   ROM:                   Balance:                   Coordination:                   Mental Status:                   Vision:                   Activities of Daily Living:           Basic ADLs (From Assessment) Complex ADLs (From Assessment)         Grooming/Bathing/Dressing Activities of Daily Living                                   Sensation:         Light touch absent distal to right elbow     Physical Skills Involved:  1. Range of Motion  2. Balance  3. Sensation  4. Fine Motor Control  5. Gross Motor Control Cognitive Skills Affected (resulting in the inability to perform in a timely and safe manner):  1. Executive Function  2. Sustained Attention  3. Divided Attention  4. Comprehension Psychosocial Skills Affected:  1. None   Number of elements that affect the Plan of Care: 5+:  HIGH COMPLEXITY   CLINICAL DECISION MAKING:   Outcome Measure:    Tool Used: [employer] AM-PACMARTHA Daily Activity Outpatient Short Form  Score:  Initial: 18 Most Recent: 37 (Date: 6/8/18/18 )   Interpretation of Tool:  Represents clinically-significant functional categories (i.e., basic and instrumental activities of daily living, fine motor activities). Score 60 59-55 54-47 46-34 32-21 20-16 15   Modifier CH CI CJ CK CL CM CN     Medical Necessity:   · Patient demonstrates good rehab potential due to higher previous functional level. Reason for Services/Other Comments:  · Patient continues to require skilled intervention due to decreased safety and independence in activities of daily living. Clinical Decision-Making Assessment: Moderately difficult to predict patient's progress at this time due to the extent of her limitations in functional movement and use of dominant right upper extremity. Assessment process, impact of co-morbidities, assessment modification\need for assistance, and selection of interventions: Analytical Complexity:MODERATE COMPLEXITY   TREATMENT:   (In addition to Assessment/Re-Assessment sessions the following treatments were rendered)    Pre-treatment Symptoms/Complaints:  Patient states, \"I am glad we went through all of this today; I need reminders. It is just so easy for me to use my left hand instead of trying to use my right. \"    Pain: Initial:   Pain Intensity 1: 0/10 Post Session:  same     Therapeutic Exercise: ( 30 minutes):  Exercises per grid below to improve dynamic movement of arm - right and hand - right to improve functional lifting, carrying, reaching and grasping. Required moderate visual and verbal cues to promote proper body mechanics. Progressed range, repetitions and complexity of movement as indicated.        Date:  5/14/18 Date:  5/16/18 Date:  5/21/18 Date:  5/23/18 Date:  5/30/18 Date:   6/1/18 Date:  6/8/18 Date:  6/21/18   Activity/Exercise           Shoulder shrugs AROM x 10  AROM x 10 AROM x 10 AROM x 10  AROM x 10 AROM x 5   Scapular protraction/retraction AROM  1 x 10 with passive self range into elbow extension at end range AROM  1 x 10 with passive self range into elbow extension at end range AROM  1 x 10 with passive self range into elbow extension at end range AROM  1 x 10 with passive self range into elbow extension at end range AROM  1 x 10 with passive self range into elbow extension at end range  AROM  1 x 10 with passive self range into elbow extension at end range AROM  1 x 5 with passive self range into elbow extension at end range   Shoulder abduction           Elbow flexion/extension PROM x 10 reps seated PROM x 10 reps seated PROM x 10 reps seated     PROM x 10 reps seated   PNF D1/D2 diagonals           Hand helper    10 lbs x 5 reps       Tabletop skateboard           Shoulder flexion/extension        PROM x 5 reps seated   Chest press           UBE Min assist to sustain right  on handle   Level 0.5  7 mintues  Min assist to sustain right  on handle   Level 0.5  7 mintues Min assist to sustain right  on handle   Level 0.5  7 mintues Min assist to sustain right  on handle   Level 0.5  7 mintues  Min assist to sustain right  on handle   Level 0.5  7 mintues Min assist to sustain right  on handle   Level 0.5  5 mintues   Modified push-ups    Hands on countertop with dycem under right hand  3 x 10        Resistive clothespin Yellow   1 x 5 pinch and release   Red  1 x 5 pinch and release Red  1 x 5 pinch and release Red  1 x 5 pinch and release     Shoulder arc     Gripped and released ~10 tabs to pull over shoulder arc. Gripped and released ~10 tabs to pull over shoulder arc. Gripped and released ~10 tabs to pull over shoulder arc.    Wrist flexion/extension PROM x 10 reps with prolonged stretch at end range of extension PROM x 10 reps with prolonged stretch at end range of extension PROM x 10 reps with prolonged stretch at end range of extension PROM x 10 reps with prolonged stretch at end range of extension PROM x 10 reps with prolonged stretch at end range of extension  PROM x 10 reps with prolonged stretch at end range of extension PROM x 10 reps with prolonged stretch at end range of extension   Punching bag           Finger flexion/extension PROM x 10 reps with prolonged stretch at end range of extension PROM x 10 reps with prolonged stretch at end range of extension PROM x 10 reps with prolonged stretch at end range of extension PROM x 10 reps with prolonged stretch at end range of extension PROM x 10 reps with prolonged stretch at end range of extension   PROM x 10 reps with prolonged stretch at end range of extension   Measurements Taken       AM-PAC re-assessed; Reviewed HEP with patient as well as added new exercises; see below. Issued HEP in written form with pictures and instructions for home use. Access Code: [email or web address]   URL: [email or web address]   Date: 06/21/2018   Prepared by: Edgar Perez     Exercises   Supine Shoulder Flexion PROM - 10 reps - 2 sets - 3 hold - 1x daily - 5x weekly   Push-Up on Counter - 10 reps - 2 sets - 1x daily - 5x weekly   Seated Shoulder Shrugs - 10 reps - 2 sets - 1x daily - 5x weekly   Seated Scapular Retraction - 10 reps - 2 sets - 1x daily - 5x weekly   Seated Weight Shifting with Arm Support - 10 reps - 2 sets - 1x daily - 5x weekly   Seated Shoulder Flexion Self PROM - 10 reps - 2 sets - 1x daily - 5x weekly   Supine Elbow Flexion Extension AROM - 10 reps - 2 sets - 1x daily - 5x weekly   Seated Elbow Flexion Extension AROM - 10 reps - 2 sets - 1x daily - 5x weekly   Seated Wrist Flexion PROM Stretch - 10 reps - 1 sets - 1x daily - 7x weekly   Seated Wrist Extension PROM - 10 reps - 1 sets - 1x daily - 7x weekly   Seated Wrist Radial Deviation Stretch - 10 reps - 1 sets - 1x daily - 7x weekly   Seated Wrist Ulnar Deviation Stretch - 10 reps - 1 sets - 1x daily - 7x weekly   Seated Gross Finger Flexion Extension PROM with Caregiver - 10 reps - 1 sets - 1x daily - 7x weekly Access Code: [email or web address]   URL: [email or web address]   Date: 01/19/2018   Prepared by: Ebenezer Leigh     Exercises   Supine Shoulder Flexion PROM - 10 reps - 2 sets - 3 hold - 1x daily - 5x weekly   Push-Up on Counter - 10 reps - 2 sets - 1x daily - 5x weekly   Seated Shoulder Shrugs - 10 reps - 2 sets - 1x daily - 5x weekly   Seated Scapular Retraction - 10 reps - 2 sets - 1x daily - 5x weekly   Seated Weight Shifting with Arm Support - 10 reps - 2 sets - 1x daily - 5x weekly   Seated Shoulder Flexion Self PROM - 10 reps - 2 sets - 1x daily - 5x weekly   Supine Elbow Flexion Extension AROM - 10 reps - 2 sets - 1x daily - 5x weekly   Seated Elbow Flexion Extension AROM - 10 reps - 2 sets - 1x daily - 5x weekly     Neuromuscular Re-education: ( 0  minutes):  Exercise/activities per grid below to improve balance, coordination, kinesthetic sense and proprioception. Required moderate visual and verbal cues to promote static balance in sitting, promote coordination of right, upper extremity(s) and promote motor control of right, upper extremity(s). Self Care: (15 minutes): Procedure(s) (per grid) utilized to improve and/or restore self-care/home management as related to grooming and self feeding. Required moderate visual and verbal cueing to facilitate activities of daily living skills. Reviewed use of universal cuff for feeding since patient reports she is having difficulty holding foam handle of utensils; also reviewed use of assistance from left hand to support the right upper extremity during self feeding tasks; patient verbalized and demonstrated understanding of techniques. Treatment/Session Assessment: Pt continues to present with significant tone in R UE limiting her ability to complete BADLs independently. Pt reports that she is now completing bathing ADLs with mod assist and has been completing her dressing independently.    Pt would continue to benefit from skilled OT services to increase functional use of R UE during activities of daily living. Reviewed all home exercises with patient and techniques for increasing functional use of right upper extremity as dominant hand in activities of daily living; patient was receptive and verbalized understanding of the importance of follow-through at home with these exercises and activities. · Response to Treatment: Mrs. Kash Muhammad tolerated therapy well today. · Compliance with Program/Exercises: Will assess as treatment progresses. · Recommendations/Intent for next treatment session: \"Next visit will focus on advancements to more challenging activities and reduction in assistance provided\".     Total Treatment Duration:  OT Patient Time In/Time Out  Time In: 1300  Time Out: 35[facility]

## 2018-06-25 ENCOUNTER — APPOINTMENT (OUTPATIENT)
Dept: PHYSICAL THERAPY | Age: 63
End: 2018-06-25
Attending: PHYSICAL MEDICINE & REHABILITATION
Payer: COMMERCIAL

## 2018-07-06 ENCOUNTER — APPOINTMENT (OUTPATIENT)
Dept: PHYSICAL THERAPY | Age: 63
End: 2018-07-06
Attending: PHYSICAL MEDICINE & REHABILITATION

## 2018-07-06 PROBLEM — R25.2 CRAMP IN LIMB: Status: ACTIVE | Noted: 2018-07-06

## 2018-07-06 PROBLEM — I16.0 HYPERTENSIVE URGENCY, MALIGNANT: Status: RESOLVED | Noted: 2017-09-11 | Resolved: 2018-07-06

## 2018-07-06 PROBLEM — G47.09 OTHER INSOMNIA: Status: ACTIVE | Noted: 2018-07-06

## 2018-07-11 ENCOUNTER — APPOINTMENT (OUTPATIENT)
Dept: PHYSICAL THERAPY | Age: 63
End: 2018-07-11
Attending: PHYSICAL MEDICINE & REHABILITATION

## 2018-07-18 ENCOUNTER — APPOINTMENT (OUTPATIENT)
Dept: PHYSICAL THERAPY | Age: 63
End: 2018-07-18
Attending: PHYSICAL MEDICINE & REHABILITATION

## 2018-07-25 ENCOUNTER — APPOINTMENT (OUTPATIENT)
Dept: PHYSICAL THERAPY | Age: 63
End: 2018-07-25
Attending: PHYSICAL MEDICINE & REHABILITATION

## 2018-08-10 ENCOUNTER — APPOINTMENT (OUTPATIENT)
Dept: PHYSICAL THERAPY | Age: 63
End: 2018-08-10
Attending: PHYSICAL MEDICINE & REHABILITATION

## 2018-08-15 ENCOUNTER — APPOINTMENT (OUTPATIENT)
Dept: PHYSICAL THERAPY | Age: 63
End: 2018-08-15
Attending: PHYSICAL MEDICINE & REHABILITATION

## 2018-08-17 ENCOUNTER — APPOINTMENT (OUTPATIENT)
Dept: PHYSICAL THERAPY | Age: 63
End: 2018-08-17
Attending: PHYSICAL MEDICINE & REHABILITATION

## 2018-08-22 NOTE — THERAPY DISCHARGE
Tyler Frank  : 1955  Primary:   Secondary:  [address] at [place] [id number] [facility], [address] [place] [address],[address], [facility] [address].  [phone]   [phone]          OUTPATIENT PHYSICAL THERAPY:Discontinuation Summary 2018    ICD-10: Treatment Diagnosis:   · Other abnormalities of gait and mobility (R26.89)  · Difficulty in walking, not elsewhere classified (R26.2)  Precautions/Allergies:   Banana; Lisinopril; and Nuts [tree nut]   Fall Risk Score: 5 (? 5 = High Risk)  MD Orders: Evaluate and Treat MEDICAL/REFERRING DIAGNOSIS:  Weakness due to cerebrovascular accident (CVA) ([facility] [I63.9, R53.1]   DATE OF ONSET: CVA: 2017  REFERRING PHYSICIAN: Juliette Pickens*  RETURN PHYSICIAN APPOINTMENT: TBD       DISCONTINUATION 18:  Ms. Hoskins Self attended 20 PT sessions from 18 to 18 for decreased mobility, decreased strength, decreased gait, and decreased balance secondary to CVA. Patient made some progress with PT and was educated in [address] [place] [address]. We will discharge patient at this time as plan of treatment has . PROBLEM LIST (Impacting functional limitations):  1. Decreased Strength  2. Decreased Ambulation Ability/Technique  3. Decreased Balance  4. Decreased Activity Tolerance INTERVENTIONS PLANNED:  1. Balance Exercise  2. Gait Training  3. Home Exercise Program (HEP)  4. Neuromuscular Re-education/Strengthening  5. Range of Motion (ROM)  6. Therapeutic Exercise/Strengthening   TREATMENT PLAN:  Effective Dates: 2018 TO 2018 (90 days). Frequency/Duration:discharge  GOALS: (Goals have been discussed and agreed upon with patient.)  Short-Term Functional Goals: Time Frame: 3 weeks  1. Patient will be independent with home exercise program without exacerbation of symptoms or cueing needed. Goal met. Discharge Goals: Time Frame: 8 weeks  1. Patient will be independent with all ADLs with minimal fear of falling and loss of balance with daily tasks. Unable to reassess  2. Patient will report no fear avoidance with social or recreational activities due to fear of falling. Unable to reassess. 3. Patient will score greater than or equal to 45/56 on Gipson Balance Scale with minimal effect of imbalance on patient's ability to manage every day life activities. Unable to reassess  Rehabilitation Potential For Stated Goals: Good            The information in this section was collected on 12/15/2017 (except where otherwise noted). HISTORY:   History of Present Injury/Illness (Reason for Referral):  Patient reports she had a severe stroke on 2017. She reports that she was in the hospital and [facility] for about 61-62 days. She reports that she has progressed really well, but is still having trouble walking and using her right side (UE and LE). She reports that she walks using her AFO and quad cane all the time. She reports that she tries to be as active as possible. She reports that she is scared that she is going to fall, even though she has not. She reports she would like to work on strengthening her right leg so her balance is better and she can walk without fear of falling. Past Medical History/Comorbidities:   Ms. Ronn Miranda  has a past medical history of Anxiety; CVA (cerebral vascular accident) ([facility] (2017); Depression; HTN (hypertension); Menopause; and PTE (pulmonary thromboembolism) ([facility] (2017). Ms. Ronn Miranda  has a past surgical history that includes hx jennifer and bso (); hx  section; hx refractive surgery (); hx other surgical (); and hx other surgical (). Social History/Living Environment:   Home Environment: Private residence  Living Alone: No  Support Systems: Family member(s), Friends \ neighbors; Spouse  Prior Level of Function/Work/Activity:  Independent  Dominant Side:         RIGHT  Personal Factors:          Sex:  female        Age:  58 y.o.   Current Medications:       Current Outpatient Prescriptions:     temazepam (RESTORIL) 15 mg capsule, Take 1 Cap by mouth nightly as needed for Sleep. Max Daily Amount: 15 mg., Disp: 30 Cap, Rfl: 2    polyethylene glycol (MIRALAX) 17 gram/dose powder, Take 17 g by mouth daily. , Disp: 510 g, Rfl: 2    rOPINIRole (REQUIP) 2 mg tablet, Take 1 Tab by mouth nightly. for restless leg, Disp: 90 Tab, Rfl: 1    FLUoxetine (PROZAC) 20 mg tablet, Take 2 Tabs by mouth daily. , Disp: 180 Tab, Rfl: 1    tiZANidine (ZANAFLEX) 4 mg tablet, 4mg po TID, Disp: 270 Tab, Rfl: 1    labetalol (NORMODYNE) 200 mg tablet, Take 1 Tab by mouth two (2) times a day., Disp: 180 Tab, Rfl: 1    losartan-hydroCHLOROthiazide (HYZAAR) 100-12.5 mg per tablet, Take 1 Tab by mouth daily. , Disp: 90 Tab, Rfl: 1   Date Last Reviewed:  6/21/2018    Number of Personal Factors/Comorbidities that affect the Plan of Care: 1-2: MODERATE COMPLEXITY   EXAMINATION:   Observation/Orthostatic Postural Assessment:          Posture Assessment: Rounded shoulders, Forward head   Functional Mobility:         Gait/Ambulation:     Speed/Lulu: Pace decreased (<100 feet/min)  Step Length: Left shortened, Right lengthened  Swing Pattern: Left asymmetrical, Right asymmetrical  Stance: Left increased, Right decreased  Gait Abnormalities: Antalgic, Circumduction, Foot drop, Path deviations, Trendelenburg  Ambulation - Level of Assistance: Modified independent  Assistive Device: Cane, quad  · Interventions: Verbal cues, Safety awareness training          Transfers:     Sit to Stand: Independent  Stand to Sit: Independent  Stand Pivot Transfers: Independent  · Bed to Chair: Independent          Bed Mobility:     Rolling: Independent  Supine to Sit: Independent  Sit to Supine: Independent  · Scooting: Independent    Strength:          L UE STRENGTH: 4/5 shoulder flexion, 4/5 shoulder abduction, 4/5 shoulder extension, 4/5 elbow flexion, 4/5 elbow extension.         R UE STRENGTH: 2/5 shoulder flexion, 2/5 shoulder abduction, 2/5 shoulder extension, 2/5 elbow flexion, 2/5 elbow extension. R LE STRENGTH:  4+/5 hip flexion, 4/5 hip abduction, 55 hip adduction, 4+/5 hip extension, 4+/5 5/5knee extension, 3-/5 knee flexion, 1/5 ankle dorsiflexion, 1/5 ankle plantarflexion, 1/5 ankle inversion, 1/5 ankle eversion. Sensation:         Intact for light touch and proprioception  Postural Control & Balance:  · Gipson Balance Scale:  38/56.   (A score less than 45/56 indicates high risk of falls)  . Score improved from 28/56 on initial evaluation. Body Structures Involved:  1. Nerves  2. Muscles Body Functions Affected:  1. Neuromusculoskeletal  2. Movement Related Activities and Participation Affected:  1. Mobility  2. Self Care   Number of elements (examined above) that affect the Plan of Care: 4+: HIGH COMPLEXITY   CLINICAL PRESENTATION:   Presentation: Evolving clinical presentation with changing clinical characteristics: MODERATE COMPLEXITY   CLINICAL DECISION MAKING:   Outcome Measure: Tool Used: Gipson Balance Scale  Score:  Initial: 28/56 Most Recent: 38/56 (Date: 6-8-2018 )   Interpretation of Score: Each section is scored on a 0-4 scale, 0 representing the patients inability to perform the task and 4 representing independence. The scores of each section are added together for a total score of 56. The higher the patients score, the more independent the patient is. Any score below 45 indicates increased risk for falls. Score 56 55-45 44-34 33-23 22-12 11-1 0   Modifier CH CI CJ CK CL CM CN     Medical Necessity:   · Patient is expected to demonstrate progress in strength, range of motion, balance and coordination to improve safety during daily activities. · Patient demonstrates good rehab potential due to higher previous functional level. · Skilled intervention continues to be required due to decreased mobility.   Reason for Services/Other Comments:  · Patient continues to demonstrate capacity to improve overall mobility which will increase independence and increase safety.    Use of outcome tool(s) and clinical judgement create a POC that gives a: Questionable prediction of patient's progress: MODERATE COMPLEXITY            TREATMENT:   (In addition to Assessment/Re-Assessment sessions the following treatments were rendered)    Kiesha Desai, PT

## 2018-09-05 ENCOUNTER — HOSPITAL ENCOUNTER (OUTPATIENT)
Dept: PHYSICAL THERAPY | Age: 63
Discharge: HOME | End: 2018-09-05
Attending: PHYSICAL MEDICINE & REHABILITATION
Payer: COMMERCIAL

## 2018-09-05 DIAGNOSIS — I61.9 STROKE, HEMORRHAGIC (HCC): ICD-10-CM

## 2018-09-05 PROCEDURE — 97110 THERAPEUTIC EXERCISES: CPT

## 2018-09-05 PROCEDURE — 97162 PT EVAL MOD COMPLEX 30 MIN: CPT

## 2018-09-05 PROCEDURE — 97166 OT EVAL MOD COMPLEX 45 MIN: CPT

## 2018-09-05 PROCEDURE — 97168 OT RE-EVAL EST PLAN CARE: CPT

## 2018-09-05 NOTE — THERAPY EVALUATION
Elvia Freed  : 1955  Primary: Tita Vieyra Ppo  Secondary:  [address] at [facility]  [facility], [address] [place] [address] 83[address], [address]  [phone]   [phone]          OUTPATIENT OCCUPATIONAL THERAPY: Re-evaluation 2018    ICD-10: Treatment Diagnosis:   Hemiplegia and hemiparesis following cerebral infarction affecting right dominant side (I69.351)  Muscle weakness (generalized) (M62.81)    Precautions/Allergies:   Banana; Lisinopril; and Nuts [tree nut]   Fall Risk Score: 5 (? 5 = High Risk)  MD Orders: eval and treat MEDICAL/REFERRING DIAGNOSIS:   Stroke, hemorrhagic ([facility] [I61.9]   DATE OF ONSET: 2017  REFERRING PHYSICIAN: Juliette Casey*  RETURN PHYSICIAN APPOINTMENT: unknown     18 ASSESSMENT:  Ms. Juan Flanagan presents impaired AROM, strength, coordination, and sensation of her dominant RUE. Upon evaluation, pt reports her problems are completing feeding, self-grooming, and LB dressing. Pt would benefit from skilled OT services to increase functional use of RUE during ADL performance and maximize safety during occupational performance. PLAN OF CARE:   PROBLEM LIST:  1. Decreased Strength  2. Decreased ADL/Functional Activities  3. Decreased Transfer Abilities  4. Decreased Balance  5. Decreased Flexibility/Joint Mobility  6. Decreased Cognition INTERVENTIONS PLANNED:  1. Activities of daily living training  2. Adaptive equipment training  3. Balance training  4. Clothing management  5. Community reintergration  6. Donning&doffing training  7. Manual therapy training  8. Modalities  9. Neuromuscular re-eduation  10. Re-evaluation  11. Sensory reintegration training  12. Therapeutic activity  13. Therapeutic exercise   TREATMENT PLAN:  Effective Dates: 2018 TO 2018 (90 days).   Frequency/Duration: 1-2x/day for 90 Days  GOALS: (Goals have been discussed and agreed upon with patient.)  Short-Term Functional Goals: Time Frame: 45 days  1. Patient will be independent with HEP within 3 visits of initial evaluation in order to maintain gains achieved during therapy. 2. Patient will complete self-grooming with modified independence and adaptive equipment as needed. 3. Patient will increase use of RUE as a functional assist in at least 50% of daily activities   Discharge Goals: Time Frame: 60 days  1. Patient will bathe with minimal assistance and adaptive equipment as needed. 2. Patient will feed self with modified independence and adaptive equipment as needed. 3. Patient will dress with modified independence and adaptive equipment as needed. 4. Patient will use right upper extremity as a functional assist in at least 75% of daily activities. Rehabilitation Potential For Stated Goals: Fair  Regarding Izzy Janine Russellwin's therapy, I certify that the treatment plan above will be carried out by a therapist or under their direction. Thank you for this referral,  Sandi Peck, OT     Referring Physician Signature: Juliette Valerio* _________________________  Date _________            The information in this section was collected on 18 (except where otherwise noted). OCCUPATIONAL PROFILE & HISTORY:   History of Present Injury/Illness (Reason for Referral):  CVA with hospital and inpatient rehab. Pt had been receiving home health after her discharge from inpatient. Past Medical History/Comorbidities:   Ms. Lesia Montes  has a past medical history of Anxiety; CVA (cerebral vascular accident) ([facility] (2017); Depression; HTN (hypertension); Menopause; and PTE (pulmonary thromboembolism) ([facility] (2017). Ms. Lesia Montes  has a past surgical history that includes hx jennifer and bso (); hx  section; hx refractive surgery (); hx other surgical (); and hx other surgical (2017).   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 Dominant Side:         RIGHT  Personal Factors:          Sex:  female        Age:  58 y.o. Current Medications:    Current Outpatient Prescriptions:     temazepam (RESTORIL) 15 mg capsule, Take 1 Cap by mouth nightly as needed for Sleep. Max Daily Amount: 15 mg., Disp: 30 Cap, Rfl: 2    polyethylene glycol (MIRALAX) 17 gram/dose powder, Take 17 g by mouth daily. , Disp: 510 g, Rfl: 2    rOPINIRole (REQUIP) 2 mg tablet, Take 1 Tab by mouth nightly. for restless leg, Disp: 90 Tab, Rfl: 1    FLUoxetine (PROZAC) 20 mg tablet, Take 2 Tabs by mouth daily. , Disp: 180 Tab, Rfl: 1    tiZANidine (ZANAFLEX) 4 mg tablet, 4mg po TID, Disp: 270 Tab, Rfl: 1    labetalol (NORMODYNE) 200 mg tablet, Take 1 Tab by mouth two (2) times a day., Disp: 180 Tab, Rfl: 1    losartan-hydroCHLOROthiazide (HYZAAR) 100-12.5 mg per tablet, Take 1 Tab by mouth daily. , Disp: 90 Tab, Rfl: 1            Date Last Reviewed:  9/5/2018     Complexity Level : Expanded review of therapy/medical records (1-2):  MODERATE COMPLEXITY   ASSESSMENT OF OCCUPATIONAL PERFORMANCE:   ROM:             RUE AROM  R Shoulder ABduction: 125  R Elbow Flexion: 95  R Elbow Extension: 60  R Forearm Pronation: 0  R Wrist Flexion: 0  R Wrist Extension: 0  R Digital Opposition: 0       Strength:                     Mental Status:             Neurologic State: Alert  Orientation Level: Oriented X4  Cognition: Appropriate decision making, Follows commands  Perception: Appears intact  Perseveration: No perseveration noted  Safety/Judgement: Insight into deficits, Awareness of environment       Activities of Daily Living:         Basic ADLs (From Assessment) Complex ADLs (From Assessment)   Basic ADL  Feeding: Setup  Oral Facial Hygiene/Grooming: Minimum assistance  Bathing: Moderate assistance  Upper Body Dressing: Supervision  Lower Body Dressing: Minimum assistance  Toileting: Modified independent Instrumental ADL  Meal Preparation: Total assistance  Homemaking: Total assistance   Grooming/Bathing/Dressing Activities of Daily Living     Cognitive Retraining  Safety/Judgement: Insight into deficits; Awareness of environment                 Functional Transfers  Toilet Transfer : Modified independent     Bed/Mat Mobility  Rolling: Independent  Supine to Sit: Independent  Sit to Supine: Independent  Sit to Stand: Modified independent  Bed to Chair: Modified independent  Scooting: Independent     Sensation:         Impaired light sensation    Physical Skills Involved:  1. Range of Motion  2. Balance  3. Strength  4. Activity Tolerance  5. Sensation  6. Fine Motor Control  7. Gross Motor Control Cognitive Skills Affected (resulting in the inability to perform in a timely and safe manner):  1. Executive Function  2. Immediate Memory  3. Short Term Recall  4. Long Term Memory  5. Sustained Attention  6. Divided Attention Psychosocial Skills Affected:  1. Habits/Routines  2. Environmental Adaptation   Number of elements that affect the Plan of Care: 5+:  HIGH COMPLEXITY   CLINICAL DECISION MAKING:   Outcome Measure:     Barthel Index of Activities of Daily Living  CN CM CL CK CJ CI CH   0 1-3 4-7 8-11 12-15 16-19 20     Total Score: 15/20 (9/5/18)      Medical Necessity:   · Patient is expected to demonstrate progress in strength, range of motion, balance, coordination and functional technique to decrease assistance required with ADLs and functional mobilityl. Reason for Services/Other Comments:  · Patient continues to require skilled intervention due to medical complications and patient unable to attend/participate in therapy as expected.   Clinical Decision-Making Assessment: This case is of moderate clinical decision making due to pt's limited functional use of RUE and RLE limiting occupational performance for all ADLs. Assessment process, impact of co-morbidities, assessment modification\need for assistance, and selection of interventions: Analytical Complexity:MODERATE COMPLEXITY   TREATMENT:   (In addition to Assessment/Re-Assessment sessions the following treatments were rendered)    Pre-treatment Symptoms/Complaints:  Pt states, \" I want to work on more daily life skills. \"  Pain: Initial:   Pain Intensity 1: 0 /10  Post Session:  same     Therapeutic Exercise: ( 15 min):  Exercises per grid below to improve mobility, strength, balance and coordination. Required moderate visual and verbal cues to promote proper body alignment, promote proper body posture and promote proper body mechanics. Progressed resistance, range, repetitions and complexity of movement as indicated. Date:  9/5/18 Date:   Date:     Activity/Exercise Parameters Parameters Parameters   Shoulder Shrugs      Scapular Protraction/retraction      Hand Manawa      UBE      Modified Push-ups      Resistive clothespin      Shoulder Arc      Measurements Taken      HEP      Shoulder Flexion/Extension PROM X 10 reps     Wrist flexion/extension PROM x 10 reps     Elbow Flexion/Extension PROM x 10 reps     Finger Flexion/Extension PROM x 10 reps         Neuromuscular Re-education: ( ):  Exercise/activities per grid below to improve balance, coordination, kinesthetic sense and proprioception. Required moderate visual and verbal cues to promote motor control of right, upper extremity(s). Patient completed lateral weight shifts sitting at edge of mat x 10 reps each onto elbow/forearms with dynamic reach with left hand outside of base of support, suing RUE to maintain balance.  Completed lateral weight shifts onto outstretched right hand with min A to laminarin position of right hand on mat x 10 reps           Treatment/Session Assessment:    · Response to Treatment:  Pt tolerated exercises well with no issues noted. · Compliance with Program/Exercises: Will assess as treatment progresses. · Recommendations/Intent for next treatment session: \"Next visit will focus on advancements to more challenging activities and reduction in assistance provided\".   Total Treatment Duration:  OT Patient Time In/Time Out  Time In: 1300  Time Out: [facility] Shows

## 2018-09-05 NOTE — PROGRESS NOTES
Shauna Mahan  : 1955 [facility] [place] [address], [place] [address].  [phone]   [phone]          OUTPATIENT ORTHOPAEDIC PHYSICAL THERAPY    NAME/AGE/GENDER: Shauna Mahan is a 58 y.o. female    DATE: 2018                       Ambulatory/Rehab Services H2 Model Falls Risk Assessment    Risk Factor Pts. ·   Confusion/Disorientation/Impulsivity  []    4 ·   Symptomatic Depression  []   2 ·   Altered Elimination  []   1 ·   Dizziness/Vertigo  []   1 ·   Gender (Male)  []   1 ·   Any administered antiepileptics (anticonvulsants):  []   2 ·   Any administered benzodiazepines:  []   1 ·   Visual Impairment (specify):  []   1 ·   Portable Oxygen Use  []   1 ·   Orthostatic ? BP  []   1 ·   History of Recent Falls (within 3 mos.)  []   5     Ability to Rise from Chair (choose one) Pts. ·   Ability to rise in a single movement  [x]   0 ·   Pushes up, successful in one attempt  []   1 ·   Multiple attempts, but unsuccessful  []   3 ·   Unable to rise without assistance  []   4   Total: (5 or greater = High Risk) 0     Falls Prevention Plan:   []                Physical Limitations to Exercise (specify):   []                Mobility Assistance Device (type):   []                Exercise/Equipment Adaptation (specify):    © [facility] . [facility] Patent #[id number].  Federal Law prohibits the replication, distribution or use without written permission from [facility] of [address] [place] [address]

## 2018-09-05 NOTE — THERAPY EVALUATION
Jose Fermin  : 1955  Primary:   Secondary:  [address] at [facility]  [address], [address], [facility] [address]  [phone]   [phone]          OUTPATIENT PHYSICAL THERAPY:Initial Assessment 2018    ICD-10: Treatment Diagnosis:   · Other abnormalities of gait and mobility (R26.89)  · Difficulty in walking, not elsewhere classified (R26.2)  Precautions/Allergies:   Banana; Lisinopril; and Nuts [tree nut]   Fall Risk Score: 5 (? 5 = High Risk)  MD Orders: Evaluate and Treat MEDICAL/REFERRING DIAGNOSIS:  Weakness due to cerebrovascular accident (CVA) ([facility] [address] [I63.9, R53.1]   DATE OF ONSET: CVA: 2017  REFERRING PHYSICIAN: Juliette Valerio*   RETURN PHYSICIAN APPOINTMENT: TBD     INITIAL ASSESSMENT:  Ms. Lesia Montes presents with decreased mobility, decreased strength, decreased gait, and decreased balance secondary to CVA. After discussing with patient, she agreed she would benefit from physical therapy to improve above deficits. Please sign this plan of treatment if you concur. Thank you for the opportunity to serve this patient. PROBLEM LIST (Impacting functional limitations):  1. Decreased Strength  2. Decreased Ambulation Ability/Technique  3. Decreased Balance  4. Decreased Activity Tolerance INTERVENTIONS PLANNED:  1. Balance Exercise  2. Gait Training  3. Home Exercise Program (HEP)  4. Neuromuscular Re-education/Strengthening  5. Range of Motion (ROM)  6. Therapeutic Exercise/Strengthening   TREATMENT PLAN:  Effective Dates: 2018 TO 2018. Frequency/Duration: 2 times a week for 60 Days  GOALS: (Goals have been discussed and agreed upon with patient.)  Short-Term Functional Goals: Time Frame: 30 days  1. Patient will be independent with home exercise program without exacerbation of symptoms or cueing needed. Discharge Goals: Time Frame: 60 days  1.  Patient will be independent with all ADLs with minimal fear of falling and loss of balance with daily tasks. 2. Patient will report no fear avoidance with social or recreational activities due to fear of falling. 3. Patient will score greater than or equal to 45/56 on Gipson Balance Scale with minimal effect of imbalance on patient's ability to manage every day life activities. Rehabilitation Potential For Stated Goals: Good  Regarding Juanita R EMILIO Iraheta's therapy, I certify that the treatment plan above will be carried out by a therapist or under their direction. Thank you for this referral,  Patricia Tierney, PT     Referring Physician Signature: Juliette Simon*          Date                     The information in this section was collected on 12/15/2017 (except where otherwise noted). HISTORY:   History of Present Injury/Illness (Reason for Referral):  Patient is well known to PT from previous treatment sessions. Patient reports she had a severe stroke on 2017. She reports that she was in the hospital and [facility] for about 61-62 days. She reports that she has progressed really well, but is still having trouble walking and using her right side (UE and LE). She reports that she walks using her AFO and quad cane all the time. She reports that she tries to be as active as possible. She reports that she is scared that she is going to fall, even though she has not. She reports she would like to work on strengthening her right leg so her balance is better and she can walk without fear of falling. Past Medical History/Comorbidities:   Ms. Savita Robbins  has a past medical history of Anxiety; CVA (cerebral vascular accident) ([address] (2017); Depression; HTN (hypertension); Menopause; and PTE (pulmonary thromboembolism) ([address] (2017). Ms. Savita Robbins  has a past surgical history that includes hx jennifer and bso (); hx  section; hx refractive surgery (); hx other surgical (); and hx other surgical (2017).   Social History/Living Environment:   Home Environment: Private residence  Living Alone: No  Support Systems: Family member(s), Friends \ neighbors; Spouse  Prior Level of Function/Work/Activity:  Independent  Dominant Side:         RIGHT  Personal Factors:          Sex:  female        Age:  58 y.o. Current Medications:       Current Outpatient Prescriptions:     temazepam (RESTORIL) 15 mg capsule, Take 1 Cap by mouth nightly as needed for Sleep. Max Daily Amount: 15 mg., Disp: 30 Cap, Rfl: 2    polyethylene glycol (MIRALAX) 17 gram/dose powder, Take 17 g by mouth daily. , Disp: 510 g, Rfl: 2    rOPINIRole (REQUIP) 2 mg tablet, Take 1 Tab by mouth nightly. for restless leg, Disp: 90 Tab, Rfl: 1    FLUoxetine (PROZAC) 20 mg tablet, Take 2 Tabs by mouth daily. , Disp: 180 Tab, Rfl: 1    tiZANidine (ZANAFLEX) 4 mg tablet, 4mg po TID, Disp: 270 Tab, Rfl: 1    labetalol (NORMODYNE) 200 mg tablet, Take 1 Tab by mouth two (2) times a day., Disp: 180 Tab, Rfl: 1    losartan-hydroCHLOROthiazide (HYZAAR) 100-12.5 mg per tablet, Take 1 Tab by mouth daily. , Disp: 90 Tab, Rfl: 1   Date Last Reviewed:  9/5/2018    Number of Personal Factors/Comorbidities that affect the Plan of Care: 1-2: MODERATE COMPLEXITY   EXAMINATION:   Observation/Orthostatic Postural Assessment:          Posture Assessment: Rounded shoulders, Forward head   Functional Mobility:         Gait/Ambulation:     Speed/Lulu: Pace decreased (<100 feet/min)  Step Length: Left shortened, Right lengthened  Swing Pattern: Left asymmetrical, Right asymmetrical  Stance: Left increased, Right decreased  Gait Abnormalities: Antalgic, Circumduction, Foot drop, Path deviations, Trendelenburg  Ambulation - Level of Assistance: Modified independent  Assistive Device: Cane, quad  · Interventions: Verbal cues, Safety awareness training          Transfers:     Sit to Stand: Independent  Stand to Sit: Independent  Stand Pivot Transfers: Independent  · Bed to Chair: Independent          Bed Mobility:     Rolling: Independent  Supine to Sit: Independent  Sit to Supine: Independent  · Scooting: Independent    Strength:          L UE STRENGTH: 4/5 shoulder flexion, 4/5 shoulder abduction, 4/5 shoulder extension, 4/5 elbow flexion, 4/5 elbow extension. R UE STRENGTH: 2/5 shoulder flexion, 2/5 shoulder abduction, 2/5 shoulder extension, 2/5 elbow flexion, 2/5 elbow extension. R LE STRENGTH:  4+/5 hip flexion, 4/5 hip abduction, 55 hip adduction, 4+/5 hip extension, 4+/5 5/5knee extension, 3-/5 knee flexion, 1/5 ankle dorsiflexion, 1/5 ankle plantarflexion, 1/5 ankle inversion, 1/5 ankle eversion. Sensation:         Intact for light touch and proprioception  Postural Control & Balance:  · Gipson Balance Scale:  38/56.   (A score less than 45/56 indicates high risk of falls)  . Score improved from 28/56 on initial evaluation. Body Structures Involved:  1. Nerves  2. Muscles Body Functions Affected:  1. Neuromusculoskeletal  2. Movement Related Activities and Participation Affected:  1. Mobility  2. Self Care   Number of elements (examined above) that affect the Plan of Care: 4+: HIGH COMPLEXITY   CLINICAL PRESENTATION:   Presentation: Evolving clinical presentation with changing clinical characteristics: MODERATE COMPLEXITY   CLINICAL DECISION MAKING:   Outcome Measure: Tool Used: Gipson Balance Scale  Score:  Initial: 35/56 Most Recent: X/56 (Date: -- )   Interpretation of Score: Each section is scored on a 0-4 scale, 0 representing the patients inability to perform the task and 4 representing independence. The scores of each section are added together for a total score of 56. The higher the patients score, the more independent the patient is. Any score below 45 indicates increased risk for falls.     Score 56 55-45 44-34 33-23 22-12 11-1 0   Modifier CH CI CJ CK CL CM CN     Medical Necessity:   · Patient is expected to demonstrate progress in strength, range of motion, balance and coordination to improve safety during daily activities. · Patient demonstrates good rehab potential due to higher previous functional level. · Skilled intervention continues to be required due to decreased mobility. Reason for Services/Other Comments:  · Patient continues to demonstrate capacity to improve overall mobility which will increase independence and increase safety. Use of outcome tool(s) and clinical judgement create a POC that gives a: Questionable prediction of patient's progress: MODERATE COMPLEXITY        TREATMENT:   (In addition to Assessment/Re-Assessment sessions the following treatments were rendered)  Pre-treatment Symptoms/Complaints:  9/5/2018: Patient reports she has had botox injections, but feels about the same. She reports she has been doing her exercises and stretches as home. Pain: Initial: Pain Intensity 1: 0  Post Session:  0/10     THERAPEUTIC EXERCISE: (15 minutes):  Exercises per grid below to improve mobility and strength. Required minimal verbal cues to promote proper body alignment. Progressed repetitions as indicated.     Date:  9/5/2018   Activity/Exercise Parameters   Ambulation with step through verbal cueing for L LE and decreasing step length with R LE 10 minutes   Stretching of achilles (supine) and quadriceps/hip rotators (prone) Manual  5 minutes   Sit to stand     Left sidelying alternating isometrics on pelvis    Standing hip abduction    Seated LAQs    Standing hamstrings curls     Bridging    Piriformis stretch    Supine straight leg raise    Supine PNF    L sidelying R pelvis and trunk PNF    Left sidelying hip abduction    L sidelying: picking up and moving R LE from in front of L foot to behind L foot (working on hip abd and ext) and stabilizing trunk    L sidelying R clam shells    L sidelying R hip abduction    R foot and gastrocsoleus stretching    Supine hooklying lower body rotation    Standing minisquats in bars    [facility] Portal  Treatment/Session Assessment:    · Response to Treatment:  Patient tolerated assessment without complaints of increased pain. Patient verbalized and demonstrated understanding of HEP. · Compliance with Program/Exercises: Compliant. · Recommendations/Intent for next treatment session: \"Next visit will focus on advancements to more challenging activities\".      Total Treatment Duration:  PT Patient Time In/Time Out  Time In: 1300  Time Out: [address], PT

## 2018-09-10 ENCOUNTER — HOSPITAL ENCOUNTER (OUTPATIENT)
Dept: PHYSICAL THERAPY | Age: 63
Discharge: HOME | End: 2018-09-10
Attending: PHYSICAL MEDICINE & REHABILITATION
Payer: COMMERCIAL

## 2018-09-10 PROCEDURE — 97110 THERAPEUTIC EXERCISES: CPT

## 2018-09-10 PROCEDURE — 97112 NEUROMUSCULAR REEDUCATION: CPT

## 2018-09-10 PROCEDURE — 97535 SELF CARE MNGMENT TRAINING: CPT

## 2018-09-10 NOTE — PROGRESS NOTES
Odilia Oas  : 1955  Primary: Radha Denny Ppo  Secondary:  [address] at [facility]  [address], [address], [address] [facility]  [phone]   [phone]          OUTPATIENT OCCUPATIONAL THERAPY: Daily Note 9/10/2018    ICD-10: Treatment Diagnosis:   Hemiplegia and hemiparesis following cerebral infarction affecting right dominant side (I69.351)  Muscle weakness (generalized) (M62.81)    Precautions/Allergies:   Banana; Lisinopril; and Nuts [tree nut]   Fall Risk Score: 5 (? 5 = High Risk)  MD Orders: eval and treat MEDICAL/REFERRING DIAGNOSIS:   Cerebral infarction, unspecified [I63.9]  Weakness [R53.1]   DATE OF ONSET: 2017  REFERRING PHYSICIAN: Juliette Kraus*  RETURN PHYSICIAN APPOINTMENT: unknown     INITIAL ASSESSMENT:  Ms. Claudette Marrufo presents impaired AROM, strength, coordination, and sensation of her dominant RUE. Upon evaluation, pt reports her problems are completing feeding, self-grooming, and LB dressing. Pt would benefit from skilled OT services to increase functional use of RUE during ADL performance and maximize safety during occupational performance. PLAN OF CARE:   PROBLEM LIST:  1. Decreased Strength  2. Decreased ADL/Functional Activities  3. Decreased Transfer Abilities  4. Decreased Balance  5. Decreased Flexibility/Joint Mobility  6. Decreased Cognition INTERVENTIONS PLANNED:  1. Activities of daily living training  2. Adaptive equipment training  3. Balance training  4. Clothing management  5. Community reintergration  6. Donning&doffing training  7. Manual therapy training  8. Modalities  9. Neuromuscular re-eduation  10. Re-evaluation  11. Sensory reintegration training  12. Therapeutic activity  13. Therapeutic exercise   TREATMENT PLAN:  Effective Dates: 2018 TO 2018 (90 days).   Frequency/Duration: 1-2x/day for 90 Days  GOALS: (Goals have been discussed and agreed upon with patient.)  Short-Term Functional Goals: Time Frame: 45 days  1. Patient will be independent with HEP within 3 visits of initial evaluation in order to maintain gains achieved during therapy. 2. Patient will complete self-grooming with modified independence and adaptive equipment as needed. 3. Patient will increase use of RUE as a functional assist in at least 50% of daily activities   Discharge Goals: Time Frame: 60 days  1. Patient will bathe with minimal assistance and adaptive equipment as needed. 2. Patient will feed self with modified independence and adaptive equipment as needed. 3. Patient will dress with modified independence and adaptive equipment as needed. 4. Patient will use right upper extremity as a functional assist in at least 75% of daily activities. Rehabilitation Potential For Stated Goals: Fair  Regarding Noel Iraheta's therapy, I certify that the treatment plan above will be carried out by a therapist or under their direction. Thank you for this referral,  Alphonse Stone OT     Referring Physician Signature: Juliette Lacy* _________________________  Date _________            The information in this section was collected on 18 (except where otherwise noted). OCCUPATIONAL PROFILE & HISTORY:   History of Present Injury/Illness (Reason for Referral):  CVA with hospital and inpatient rehab. Pt had been receiving home health after her discharge from inpatient. Past Medical History/Comorbidities:   Ms. Delio Serrano  has a past medical history of Anxiety; CVA (cerebral vascular accident) ([facility] [address] (2017); Depression; HTN (hypertension); Menopause; and PTE (pulmonary thromboembolism) ([facility] [address] (2017). Ms. Delio Serrano  has a past surgical history that includes hx jennifer and bso (); hx  section; hx refractive surgery (); hx other surgical (); and hx other surgical (2017).   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 Dominant Side:         RIGHT  Personal Factors:          Sex:  female        Age:  58 y.o. Current Medications:    Current Outpatient Prescriptions:     temazepam (RESTORIL) 15 mg capsule, Take 1 Cap by mouth nightly as needed for Sleep. Max Daily Amount: 15 mg., Disp: 30 Cap, Rfl: 2    polyethylene glycol (MIRALAX) 17 gram/dose powder, Take 17 g by mouth daily. , Disp: 510 g, Rfl: 2    rOPINIRole (REQUIP) 2 mg tablet, Take 1 Tab by mouth nightly. for restless leg, Disp: 90 Tab, Rfl: 1    FLUoxetine (PROZAC) 20 mg tablet, Take 2 Tabs by mouth daily. , Disp: 180 Tab, Rfl: 1    tiZANidine (ZANAFLEX) 4 mg tablet, 4mg po TID, Disp: 270 Tab, Rfl: 1    labetalol (NORMODYNE) 200 mg tablet, Take 1 Tab by mouth two (2) times a day., Disp: 180 Tab, Rfl: 1    losartan-hydroCHLOROthiazide (HYZAAR) 100-12.5 mg per tablet, Take 1 Tab by mouth daily. , Disp: 90 Tab, Rfl: 1            Date Last Reviewed:  9/10/2018     Complexity Level : Expanded review of therapy/medical records (1-2):  MODERATE COMPLEXITY   ASSESSMENT OF OCCUPATIONAL PERFORMANCE:   ROM:                     Strength:                     Mental Status:                     Activities of Daily Living:         Basic ADLs (From Assessment) Complex ADLs (From Assessment)         Grooming/Bathing/Dressing Activities of Daily Living                                   Sensation:         Impaired light sensation    Physical Skills Involved:  1. Range of Motion  2. Balance  3. Strength  4. Activity Tolerance  5. Sensation  6. Fine Motor Control  7. Gross Motor Control Cognitive Skills Affected (resulting in the inability to perform in a timely and safe manner):  1. Executive Function  2. Immediate Memory  3. Short Term Recall  4. Long Term Memory  5. Sustained Attention  6. Divided Attention Psychosocial Skills Affected:  1. Habits/Routines  2.  Environmental Adaptation   Number of elements that affect the Plan of Care: 5+:  HIGH COMPLEXITY   CLINICAL DECISION MAKING:   Outcome Measure:     Barthel Index of Activities of Daily Living  CN CM CL CK CJ CI CH   0 1-3 4-7 8-11 12-15 16-19 20     Total Score: 15/20 (9/5/18)      Medical Necessity:   · Patient is expected to demonstrate progress in strength, range of motion, balance, coordination and functional technique to decrease assistance required with ADLs and functional mobilityl. Reason for Services/Other Comments:  · Patient continues to require skilled intervention due to medical complications and patient unable to attend/participate in therapy as expected. Clinical Decision-Making Assessment: This case is of moderate clinical decision making due to pt's limited functional use of RUE and RLE limiting occupational performance for all ADLs. Assessment process, impact of co-morbidities, assessment modification\need for assistance, and selection of interventions: Analytical Complexity:MODERATE COMPLEXITY   TREATMENT:   (In addition to Assessment/Re-Assessment sessions the following treatments were rendered)    Pre-treatment Symptoms/Complaints:  Pt states, \"I can tell I haven't been here in a while  Pain: Initial:   Pain Intensity 1: 0 /10  Post Session:  same     Therapeutic Exercise: ( 15 min):  Exercises per grid below to improve mobility, strength, balance and coordination. Required moderate visual and verbal cues to promote proper body alignment, promote proper body posture and promote proper body mechanics. Progressed resistance, range, repetitions and complexity of movement as indicated.      Date:  9/5/18 Date:  9/10/18 Date:     Activity/Exercise Parameters Parameters Parameters   Shoulder Shrugs      Scapular Protraction/retraction      Hand [place]      UBE  Level 2.5 - 7 minutes     Modified Push-ups      Resistive clothespin      Shoulder Arc      Measurements Taken      HEP      Shoulder Flexion/Extension PROM X 10 reps PROM X 10 reps    Wrist flexion/extension PROM x 10 reps PROM X 10 reps    Elbow Flexion/Extension PROM x 10 reps PROM X 10 reps    Finger Flexion/Extension PROM x 10 reps PROM X 10 reps        Neuromuscular Re-education: ( 15 minutes):  Exercise/activities per grid below to improve balance, coordination, kinesthetic sense and proprioception. Required moderate visual and verbal cues to promote motor control of right, upper extremity(s). Patient completed lateral weight shifts sitting at edge of mat x 10 reps each onto elbow/forearms with dynamic reach with left hand outside of base of support, suing RUE to maintain balance. Completed lateral weight shifts onto outstretched right hand with min A to laminarin position of right hand on mat x 10 reps     Self Care: (15): Procedure(s) (per grid) utilized to improve and/or restore self-care/home management as related to self feeding. Required minimal verbal and   cueing to facilitate activities of daily living skills. Practiced self-feeding with adapted utensil and universal cuff. Treatment/Session Assessment: Pt presents with strong shoulder abduction when attempting to completing functional hand-to-mouth feeding pattern. Upon attempting to isolate elbow flexion, pt demo's strong tendency to draw into shoulder abduction with internal rotation. This limits pt's ability to complete feeding and self-grooming ADLs. Pt would continue to benefit from skilled OT services to increase functional use of RUE during ADLs and for decreased caregiver burden. · Response to Treatment:  Pt tolerated exercises well with no issues noted. · Compliance with Program/Exercises: Will assess as treatment progresses. · Recommendations/Intent for next treatment session: \"Next visit will focus on advancements to more challenging activities and reduction in assistance provided\".   Total Treatment Duration:  OT Patient Time In/Time Out  Time In: 1345  Time Out: [address] [facility]

## 2018-09-10 NOTE — PROGRESS NOTES
[place] V[place]  : 1955  Primary:   Secondary:  [address] at [place] [id number] [facility], [address] [place] [address],[address], [facility] [address]  [phone]   [phone]          OUTPATIENT PHYSICAL THERAPY:Daily Note 9/10/2018    ICD-10: Treatment Diagnosis:   · Other abnormalities of gait and mobility (R26.89)  · Difficulty in walking, not elsewhere classified (R26.2)  Precautions/Allergies:   Banana; Lisinopril; and Nuts [tree nut]   Fall Risk Score: 5 (? 5 = High Risk)  MD Orders: Evaluate and Treat MEDICAL/REFERRING DIAGNOSIS:  Weakness due to cerebrovascular accident (CVA) ([facility] [I63.9, R53.1]   DATE OF ONSET: CVA: 2017  REFERRING PHYSICIAN: Juliette Carrington*   RETURN PHYSICIAN APPOINTMENT: TBD     INITIAL ASSESSMENT:  Ms. Dusty Jean Baptiste presents with decreased mobility, decreased strength, decreased gait, and decreased balance secondary to CVA. After discussing with patient, she agreed she would benefit from physical therapy to improve above deficits. Please sign this plan of treatment if you concur. Thank you for the opportunity to serve this patient. PROBLEM LIST (Impacting functional limitations):  1. Decreased Strength  2. Decreased Ambulation Ability/Technique  3. Decreased Balance  4. Decreased Activity Tolerance INTERVENTIONS PLANNED:  1. Balance Exercise  2. Gait Training  3. Home Exercise Program (HEP)  4. Neuromuscular Re-education/Strengthening  5. Range of Motion (ROM)  6. Therapeutic Exercise/Strengthening   TREATMENT PLAN:  Effective Dates: 2018 TO 2018. Frequency/Duration: 2 times a week for 60 Days  GOALS: (Goals have been discussed and agreed upon with patient.)  Short-Term Functional Goals: Time Frame: 30 days  1. Patient will be independent with home exercise program without exacerbation of symptoms or cueing needed. Discharge Goals: Time Frame: 60 days  1.  Patient will be independent with all ADLs with minimal fear of falling and loss of balance with daily tasks. 2. Patient will report no fear avoidance with social or recreational activities due to fear of falling. 3. Patient will score greater than or equal to 45/56 on Gipson Balance Scale with minimal effect of imbalance on patient's ability to manage every day life activities. Rehabilitation Potential For Stated Goals: Good  Regarding Meme Iraheta's therapy, I certify that the treatment plan above will be carried out by a therapist or under their direction. Thank you for this referral,  Gregg Fair PT     Referring Physician Signature: Juliette Jeff*          Date                     The information in this section was collected on 12/15/2017 (except where otherwise noted). HISTORY:   History of Present Injury/Illness (Reason for Referral):  Patient is well known to PT from previous treatment sessions. Patient reports she had a severe stroke on 2017. She reports that she was in the hospital and [facility] for about 61-62 days. She reports that she has progressed really well, but is still having trouble walking and using her right side (UE and LE). She reports that she walks using her AFO and quad cane all the time. She reports that she tries to be as active as possible. She reports that she is scared that she is going to fall, even though she has not. She reports she would like to work on strengthening her right leg so her balance is better and she can walk without fear of falling. Past Medical History/Comorbidities:   Ms. Capo Moon  has a past medical history of Anxiety; CVA (cerebral vascular accident) ([address] (2017); Depression; HTN (hypertension); Menopause; and PTE (pulmonary thromboembolism) ([address] (2017). Ms. Capo Moon  has a past surgical history that includes hx jennifer and bso (); hx  section; hx refractive surgery (); hx other surgical (); and hx other surgical (2017).   Social History/Living Environment:   Home Environment: Private residence  Living Alone: No  Support Systems: Family member(s), Friends \ neighbors; Spouse  Prior Level of Function/Work/Activity:  Independent  Dominant Side:         RIGHT  Personal Factors:          Sex:  female        Age:  58 y.o. Current Medications:       Current Outpatient Prescriptions:     temazepam (RESTORIL) 15 mg capsule, Take 1 Cap by mouth nightly as needed for Sleep. Max Daily Amount: 15 mg., Disp: 30 Cap, Rfl: 2    polyethylene glycol (MIRALAX) 17 gram/dose powder, Take 17 g by mouth daily. , Disp: 510 g, Rfl: 2    rOPINIRole (REQUIP) 2 mg tablet, Take 1 Tab by mouth nightly. for restless leg, Disp: 90 Tab, Rfl: 1    FLUoxetine (PROZAC) 20 mg tablet, Take 2 Tabs by mouth daily. , Disp: 180 Tab, Rfl: 1    tiZANidine (ZANAFLEX) 4 mg tablet, 4mg po TID, Disp: 270 Tab, Rfl: 1    labetalol (NORMODYNE) 200 mg tablet, Take 1 Tab by mouth two (2) times a day., Disp: 180 Tab, Rfl: 1    losartan-hydroCHLOROthiazide (HYZAAR) 100-12.5 mg per tablet, Take 1 Tab by mouth daily. , Disp: 90 Tab, Rfl: 1   Date Last Reviewed:  9/10/2018    Number of Personal Factors/Comorbidities that affect the Plan of Care: 1-2: MODERATE COMPLEXITY   EXAMINATION:   Observation/Orthostatic Postural Assessment:          Posture Assessment: Rounded shoulders, Forward head   Functional Mobility:         Gait/Ambulation:     Speed/Lulu: Pace decreased (<100 feet/min)  Step Length: Left shortened, Right lengthened  Swing Pattern: Left asymmetrical, Right asymmetrical  Stance: Left increased, Right decreased  Gait Abnormalities: Antalgic, Circumduction, Foot drop, Path deviations, Trendelenburg  Ambulation - Level of Assistance: Modified independent  Assistive Device: Cane, quad  · Interventions: Verbal cues, Safety awareness training          Transfers:     Sit to Stand: Independent  Stand to Sit: Independent  Stand Pivot Transfers: Independent  · Bed to Chair: Independent          Bed Mobility:     Rolling: Independent  Supine to Sit: Independent  Sit to Supine: Independent  · Scooting: Independent    Strength:          L UE STRENGTH: 4/5 shoulder flexion, 4/5 shoulder abduction, 4/5 shoulder extension, 4/5 elbow flexion, 4/5 elbow extension. R UE STRENGTH: 2/5 shoulder flexion, 2/5 shoulder abduction, 2/5 shoulder extension, 2/5 elbow flexion, 2/5 elbow extension. R LE STRENGTH:  4+/5 hip flexion, 4/5 hip abduction, 55 hip adduction, 4+/5 hip extension, 4+/5 5/5knee extension, 3-/5 knee flexion, 1/5 ankle dorsiflexion, 1/5 ankle plantarflexion, 1/5 ankle inversion, 1/5 ankle eversion. Sensation:         Intact for light touch and proprioception  Postural Control & Balance:  · Gipson Balance Scale:  38/56.   (A score less than 45/56 indicates high risk of falls)  . Score improved from 28/56 on initial evaluation. Body Structures Involved:  1. Nerves  2. Muscles Body Functions Affected:  1. Neuromusculoskeletal  2. Movement Related Activities and Participation Affected:  1. Mobility  2. Self Care   Number of elements (examined above) that affect the Plan of Care: 4+: HIGH COMPLEXITY   CLINICAL PRESENTATION:   Presentation: Evolving clinical presentation with changing clinical characteristics: MODERATE COMPLEXITY   CLINICAL DECISION MAKING:   Outcome Measure: Tool Used: Gipson Balance Scale  Score:  Initial: 35/56 Most Recent: X/56 (Date: -- )   Interpretation of Score: Each section is scored on a 0-4 scale, 0 representing the patients inability to perform the task and 4 representing independence. The scores of each section are added together for a total score of 56. The higher the patients score, the more independent the patient is. Any score below 45 indicates increased risk for falls.     Score 56 55-45 44-34 33-23 22-12 11-1 0   Modifier CH CI CJ CK CL CM CN     Medical Necessity:   · Patient is expected to demonstrate progress in strength, range of motion, balance and coordination to improve safety during daily activities. · Patient demonstrates good rehab potential due to higher previous functional level. · Skilled intervention continues to be required due to decreased mobility. Reason for Services/Other Comments:  · Patient continues to demonstrate capacity to improve overall mobility which will increase independence and increase safety. Use of outcome tool(s) and clinical judgement create a POC that gives a: Questionable prediction of patient's progress: MODERATE COMPLEXITY        TREATMENT:   (In addition to Assessment/Re-Assessment sessions the following treatments were rendered)  Pre-treatment Symptoms/Complaints:  9/10/2018: Patient reports she is doing okay. Pain: Initial: Pain Intensity 1: 0  Post Session:  0/10     THERAPEUTIC EXERCISE: (45 minutes):  Exercises per grid below to improve mobility and strength. Required minimal verbal cues to promote proper body alignment. Progressed repetitions as indicated.     Date:  9/10/2018   Activity/Exercise Parameters   Ambulation with step through verbal cueing for L LE and decreasing step length with R LE 10 minutes   Stretching of achilles (supine) and quadriceps/hip rotators (prone) Manual  10 minutes   Sit to stand     Left sidelying alternating isometrics on pelvis    Standing hip abduction    Step ups 6 inch  X 10   Standing hamstrings curls     Bridging Right leg straight  2x10   Piriformis stretch 3x30 sec    Supine straight leg raise 2x10 3#   Supine PNF    L sidelying R pelvis and trunk PNF    Left sidelying hip abduction 2x10 3#   L sidelying: picking up and moving R LE from in front of L foot to behind L foot (working on hip abd and ext) and stabilizing trunk    L sidelying R clam shells 2x10   L sidelying R hip abduction    R foot and gastrocsoleus stretching 5 minutes   Supine hooklying lower body rotation    Standing minisquats in bars    Nustep Level 3 x 6 minutes       [facility] Portal  Treatment/Session Assessment:    · Response to Treatment: Patient reports increased fatigue in right lower extremity after treatment. · Compliance with Program/Exercises: Compliant. · Recommendations/Intent for next treatment session: \"Next visit will focus on advancements to more challenging activities\".      Total Treatment Duration:  PT Patient Time In/Time Out  Time In: 1300  Time Out: 2000 Cy Hernandez

## 2018-09-13 PROBLEM — I26.99 PULMONARY EMBOLI (HCC): Status: RESOLVED | Noted: 2017-09-25 | Resolved: 2018-09-13

## 2018-09-13 PROBLEM — H61.21 IMPACTED CERUMEN OF RIGHT EAR: Status: ACTIVE | Noted: 2018-09-13

## 2018-09-13 PROBLEM — H91.21 SUDDEN IDIOPATHIC HEARING LOSS OF RIGHT EAR WITH UNRESTRICTED HEARING OF LEFT EAR: Status: ACTIVE | Noted: 2018-09-13

## 2018-09-14 ENCOUNTER — HOSPITAL ENCOUNTER (OUTPATIENT)
Dept: PHYSICAL THERAPY | Age: 63
Discharge: HOME | End: 2018-09-14
Attending: PHYSICAL MEDICINE & REHABILITATION
Payer: COMMERCIAL

## 2018-09-14 PROCEDURE — 97110 THERAPEUTIC EXERCISES: CPT

## 2018-09-14 PROCEDURE — 97535 SELF CARE MNGMENT TRAINING: CPT

## 2018-09-14 NOTE — PROGRESS NOTES
Kate Elliott  : 1955  Primary:   Secondary:  [address] at [place] [id number] [facility], [address] [place] [address],[address], [facility] [address]  [phone]   [phone]          OUTPATIENT PHYSICAL THERAPY:Daily Note 2018    ICD-10: Treatment Diagnosis:   · Other abnormalities of gait and mobility (R26.89)  · Difficulty in walking, not elsewhere classified (R26.2)  Precautions/Allergies:   Banana; Lisinopril; and Nuts [tree nut]   Fall Risk Score: 5 (? 5 = High Risk)  MD Orders: Evaluate and Treat MEDICAL/REFERRING DIAGNOSIS:  Weakness due to cerebrovascular accident (CVA) ([facility] [I63.9, R53.1]   DATE OF ONSET: CVA: 2017  REFERRING PHYSICIAN: Juliette Hernandez*   RETURN PHYSICIAN APPOINTMENT: TBD     INITIAL ASSESSMENT:  Ms. Little Peers presents with decreased mobility, decreased strength, decreased gait, and decreased balance secondary to CVA. After discussing with patient, she agreed she would benefit from physical therapy to improve above deficits. Please sign this plan of treatment if you concur. Thank you for the opportunity to serve this patient. PROBLEM LIST (Impacting functional limitations):  1. Decreased Strength  2. Decreased Ambulation Ability/Technique  3. Decreased Balance  4. Decreased Activity Tolerance INTERVENTIONS PLANNED:  1. Balance Exercise  2. Gait Training  3. Home Exercise Program (HEP)  4. Neuromuscular Re-education/Strengthening  5. Range of Motion (ROM)  6. Therapeutic Exercise/Strengthening   TREATMENT PLAN:  Effective Dates: 2018 TO 2018. Frequency/Duration: 2 times a week for 60 Days  GOALS: (Goals have been discussed and agreed upon with patient.)  Short-Term Functional Goals: Time Frame: 30 days  1. Patient will be independent with home exercise program without exacerbation of symptoms or cueing needed. Discharge Goals: Time Frame: 60 days  1.  Patient will be independent with all ADLs with minimal fear of falling and loss of balance with daily tasks. 2. Patient will report no fear avoidance with social or recreational activities due to fear of falling. 3. Patient will score greater than or equal to 45/56 on Gipson Balance Scale with minimal effect of imbalance on patient's ability to manage every day life activities. Rehabilitation Potential For Stated Goals: Good  Regarding Jaleesa Iraheta's therapy, I certify that the treatment plan above will be carried out by a therapist or under their direction. Thank you for this referral,  Yuly Mark PT     Referring Physician Signature: Juliette Vera*          Date                     The information in this section was collected on 12/15/2017 (except where otherwise noted). HISTORY:   History of Present Injury/Illness (Reason for Referral):  Patient is well known to PT from previous treatment sessions. Patient reports she had a severe stroke on 2017. She reports that she was in the hospital and [facility] for about 61-62 days. She reports that she has progressed really well, but is still having trouble walking and using her right side (UE and LE). She reports that she walks using her AFO and quad cane all the time. She reports that she tries to be as active as possible. She reports that she is scared that she is going to fall, even though she has not. She reports she would like to work on strengthening her right leg so her balance is better and she can walk without fear of falling. Past Medical History/Comorbidities:   Ms. Anu Lamar  has a past medical history of Anxiety; CVA (cerebral vascular accident) ([facility] (2017); Depression; HTN (hypertension); Menopause; and PTE (pulmonary thromboembolism) ([address] (2017). Ms. Anu Lamar  has a past surgical history that includes hx jennifer and bso (); hx  section; hx refractive surgery (); hx other surgical (); and hx other surgical (2017).   Social History/Living Environment:   Home Environment: Private residence  Living Alone: No  Support Systems: Family member(s), Friends \ neighbors; Spouse  Prior Level of Function/Work/Activity:  Independent  Dominant Side:         RIGHT  Personal Factors:          Sex:  female        Age:  58 y.o. Current Medications:       Current Outpatient Prescriptions:     polyethylene glycol (MIRALAX) 17 gram/dose powder, Take 17 g by mouth daily. , Disp: 510 g, Rfl: 2    rOPINIRole (REQUIP) 2 mg tablet, Take 1 Tab by mouth nightly. for restless leg, Disp: 90 Tab, Rfl: 1    FLUoxetine (PROZAC) 20 mg tablet, Take 2 Tabs by mouth daily. , Disp: 180 Tab, Rfl: 1    tiZANidine (ZANAFLEX) 4 mg tablet, 4mg po TID, Disp: 270 Tab, Rfl: 1    labetalol (NORMODYNE) 200 mg tablet, Take 1 Tab by mouth two (2) times a day., Disp: 180 Tab, Rfl: 1    losartan-hydroCHLOROthiazide (HYZAAR) 100-12.5 mg per tablet, Take 1 Tab by mouth daily. , Disp: 90 Tab, Rfl: 1   Date Last Reviewed:  9/14/2018    Number of Personal Factors/Comorbidities that affect the Plan of Care: 1-2: MODERATE COMPLEXITY   EXAMINATION:   Observation/Orthostatic Postural Assessment:          Posture Assessment: Rounded shoulders, Forward head   Functional Mobility:         Gait/Ambulation:     Speed/Lulu: Pace decreased (<100 feet/min)  Step Length: Left shortened, Right lengthened  Swing Pattern: Left asymmetrical, Right asymmetrical  Stance: Left increased, Right decreased  Gait Abnormalities: Antalgic, Circumduction, Foot drop, Path deviations, Trendelenburg  Ambulation - Level of Assistance: Modified independent  Assistive Device: Cane, quad  · Interventions: Verbal cues, Safety awareness training          Transfers:     Sit to Stand: Independent  Stand to Sit: Independent  Stand Pivot Transfers: Independent  · Bed to Chair: Independent          Bed Mobility:     Rolling: Independent  Supine to Sit: Independent  Sit to Supine: Independent  · Scooting: Independent    Strength:          L UE STRENGTH: 4/5 shoulder flexion, 4/5 shoulder abduction, 4/5 shoulder extension, 4/5 elbow flexion, 4/5 elbow extension. R UE STRENGTH: 2/5 shoulder flexion, 2/5 shoulder abduction, 2/5 shoulder extension, 2/5 elbow flexion, 2/5 elbow extension. R LE STRENGTH:  4+/5 hip flexion, 4/5 hip abduction, 55 hip adduction, 4+/5 hip extension, 4+/5 5/5knee extension, 3-/5 knee flexion, 1/5 ankle dorsiflexion, 1/5 ankle plantarflexion, 1/5 ankle inversion, 1/5 ankle eversion. Sensation:         Intact for light touch and proprioception  Postural Control & Balance:  · Gipson Balance Scale:  38/56.   (A score less than 45/56 indicates high risk of falls)  . Score improved from 28/56 on initial evaluation. Body Structures Involved:  1. Nerves  2. Muscles Body Functions Affected:  1. Neuromusculoskeletal  2. Movement Related Activities and Participation Affected:  1. Mobility  2. Self Care   Number of elements (examined above) that affect the Plan of Care: 4+: HIGH COMPLEXITY   CLINICAL PRESENTATION:   Presentation: Evolving clinical presentation with changing clinical characteristics: MODERATE COMPLEXITY   CLINICAL DECISION MAKING:   Outcome Measure: Tool Used: Gipson Balance Scale  Score:  Initial: 35/56 Most Recent: X/56 (Date: -- )   Interpretation of Score: Each section is scored on a 0-4 scale, 0 representing the patients inability to perform the task and 4 representing independence. The scores of each section are added together for a total score of 56. The higher the patients score, the more independent the patient is. Any score below 45 indicates increased risk for falls. Score 56 55-45 44-34 33-23 22-12 11-1 0   Modifier CH CI CJ CK CL CM CN     Medical Necessity:   · Patient is expected to demonstrate progress in strength, range of motion, balance and coordination to improve safety during daily activities. · Patient demonstrates good rehab potential due to higher previous functional level.   · Skilled intervention continues to be required due to decreased mobility. Reason for Services/Other Comments:  · Patient continues to demonstrate capacity to improve overall mobility which will increase independence and increase safety. Use of outcome tool(s) and clinical judgement create a POC that gives a: Questionable prediction of patient's progress: MODERATE COMPLEXITY        TREATMENT:   (In addition to Assessment/Re-Assessment sessions the following treatments were rendered)  Pre-treatment Symptoms/Complaints:  9/14/2018: Patient reports she is doing okay. Pain: Initial: Pain Intensity 1: 0  Post Session:  0/10     THERAPEUTIC EXERCISE: (45 minutes):  Exercises per grid below to improve mobility and strength. Required minimal verbal cues to promote proper body alignment. Progressed repetitions as indicated. Date:  9/14/2018   Activity/Exercise Parameters   Ambulation with step through verbal cueing for L LE and decreasing step length with R LE 10 minutes   Stretching of achilles (supine) and quadriceps/hip rotators (prone) Manual  10 minutes   Sit to stand     Left sidelying alternating isometrics on pelvis    Standing hip abduction    Step ups 6 inch  X 10   Prone hip extension 2x10   Bridging Right leg straight  2x10   Piriformis stretch 3x30 sec    Supine straight leg raise 2x10 3#   Supine PNF    L sidelying R pelvis and trunk PNF    Left sidelying hip abduction 2x10 3#   L sidelying: picking up and moving R LE from in front of L foot to behind L foot (working on hip abd and ext) and stabilizing trunk    L sidelying R clam shells 2x10   L sidelying R hip abduction    R foot and gastrocsoleus stretching 5 minutes   Supine hooklying lower body rotation    Standing minisquats in bars    Nustep Level 4 x 6 minutes       [facility] Portal  Treatment/Session Assessment:    · Response to Treatment:  Patient tolerated exercises without complaints. · Compliance with Program/Exercises: Compliant. · Recommendations/Intent for next treatment session:  \"Next visit will focus on advancements to more challenging activities\".      Total Treatment Duration:  PT Patient Time In/Time Out  Time In: 1345  Time Out: [address]

## 2018-09-14 NOTE — PROGRESS NOTES
Lorenza Murraycy  : 1955  Primary: Agnes Given Ppo  Secondary:  [address] at [place] [id number] [facility], [address] [place] [address] [address], [address] [facility]  [phone]   [phone]          OUTPATIENT OCCUPATIONAL THERAPY: Daily Note 2018    ICD-10: Treatment Diagnosis:   Hemiplegia and hemiparesis following cerebral infarction affecting right dominant side (I69.351)  Muscle weakness (generalized) (M62.81)    Precautions/Allergies:   Banana; Lisinopril; and Nuts [tree nut]   Fall Risk Score: 5 (? 5 = High Risk)  MD Orders: eval and treat MEDICAL/REFERRING DIAGNOSIS:   Cerebral infarction, unspecified [I63.9]  Weakness [R53.1]   DATE OF ONSET: 2017  REFERRING PHYSICIAN: Juliette Weems*  RETURN PHYSICIAN APPOINTMENT: unknown     INITIAL ASSESSMENT:  Ms. Refugio Salgado presents impaired AROM, strength, coordination, and sensation of her dominant RUE. Upon evaluation, pt reports her problems are completing feeding, self-grooming, and LB dressing. Pt would benefit from skilled OT services to increase functional use of RUE during ADL performance and maximize safety during occupational performance. PLAN OF CARE:   PROBLEM LIST:  1. Decreased Strength  2. Decreased ADL/Functional Activities  3. Decreased Transfer Abilities  4. Decreased Balance  5. Decreased Flexibility/Joint Mobility  6. Decreased Cognition INTERVENTIONS PLANNED:  1. Activities of daily living training  2. Adaptive equipment training  3. Balance training  4. Clothing management  5. Community reintergration  6. Donning&doffing training  7. Manual therapy training  8. Modalities  9. Neuromuscular re-eduation  10. Re-evaluation  11. Sensory reintegration training  12. Therapeutic activity  13. Therapeutic exercise   TREATMENT PLAN:  Effective Dates: 2018 TO 2018 (90 days).   Frequency/Duration: 1-2x/day for 90 Days  GOALS: (Goals have been discussed and agreed upon with patient.)  Short-Term Functional Goals: Time Frame: 45 days  1. Patient will be independent with HEP within 3 visits of initial evaluation in order to maintain gains achieved during therapy. 2. Patient will complete self-grooming with modified independence and adaptive equipment as needed. 3. Patient will increase use of RUE as a functional assist in at least 50% of daily activities   Discharge Goals: Time Frame: 60 days  1. Patient will bathe with minimal assistance and adaptive equipment as needed. 2. Patient will feed self with modified independence and adaptive equipment as needed. 3. Patient will dress with modified independence and adaptive equipment as needed. 4. Patient will use right upper extremity as a functional assist in at least 75% of daily activities. Rehabilitation Potential For Stated Goals: Fair  Regarding Oral Iraheta's therapy, I certify that the treatment plan above will be carried out by a therapist or under their direction. Thank you for this referral,  Tremaine Wood, OT     Referring Physician Signature: Juliette Hernandez* _________________________  Date _________            The information in this section was collected on 18 (except where otherwise noted). OCCUPATIONAL PROFILE & HISTORY:   History of Present Injury/Illness (Reason for Referral):  CVA with hospital and inpatient rehab. Pt had been receiving home health after her discharge from inpatient. Past Medical History/Comorbidities:   Ms. Jessica Marte  has a past medical history of Anxiety; CVA (cerebral vascular accident) ([facility] (2017); Depression; HTN (hypertension); Menopause; and PTE (pulmonary thromboembolism) ([facility] (2017). Ms. Jessica Marte  has a past surgical history that includes hx jennifer and bso (); hx  section; hx refractive surgery (); hx other surgical (); and hx other surgical (2017).   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 Dominant Side:         RIGHT  Personal Factors:          Sex:  female        Age:  58 y.o. Current Medications:    Current Outpatient Prescriptions:     polyethylene glycol (MIRALAX) 17 gram/dose powder, Take 17 g by mouth daily. , Disp: 510 g, Rfl: 2    rOPINIRole (REQUIP) 2 mg tablet, Take 1 Tab by mouth nightly. for restless leg, Disp: 90 Tab, Rfl: 1    FLUoxetine (PROZAC) 20 mg tablet, Take 2 Tabs by mouth daily. , Disp: 180 Tab, Rfl: 1    tiZANidine (ZANAFLEX) 4 mg tablet, 4mg po TID, Disp: 270 Tab, Rfl: 1    labetalol (NORMODYNE) 200 mg tablet, Take 1 Tab by mouth two (2) times a day., Disp: 180 Tab, Rfl: 1    losartan-hydroCHLOROthiazide (HYZAAR) 100-12.5 mg per tablet, Take 1 Tab by mouth daily. , Disp: 90 Tab, Rfl: 1            Date Last Reviewed:  9/14/2018     Complexity Level : Expanded review of therapy/medical records (1-2):  MODERATE COMPLEXITY   ASSESSMENT OF OCCUPATIONAL PERFORMANCE:   ROM:                     Strength:                     Mental Status:                     Activities of Daily Living:         Basic ADLs (From Assessment) Complex ADLs (From Assessment)         Grooming/Bathing/Dressing Activities of Daily Living                                   Sensation:         Impaired light sensation    Physical Skills Involved:  1. Range of Motion  2. Balance  3. Strength  4. Activity Tolerance  5. Sensation  6. Fine Motor Control  7. Gross Motor Control Cognitive Skills Affected (resulting in the inability to perform in a timely and safe manner):  1. Executive Function  2. Immediate Memory  3. Short Term Recall  4. Long Term Memory  5. Sustained Attention  6. Divided Attention Psychosocial Skills Affected:  1. Habits/Routines  2.  Environmental Adaptation   Number of elements that affect the Plan of Care: 5+:  HIGH COMPLEXITY   CLINICAL DECISION MAKING:   Outcome Measure:     Barthel Index of Activities of Daily Living  CN CM CL CK CJ CI CH   0 1-3 4-7 8-11 12-15 16-19 20     Total Score: 15/20 (9/5/18)      Medical Necessity:   · Patient is expected to demonstrate progress in strength, range of motion, balance, coordination and functional technique to decrease assistance required with ADLs and functional mobilityl. Reason for Services/Other Comments:  · Patient continues to require skilled intervention due to medical complications and patient unable to attend/participate in therapy as expected. Clinical Decision-Making Assessment: This case is of moderate clinical decision making due to pt's limited functional use of RUE and RLE limiting occupational performance for all ADLs. Assessment process, impact of co-morbidities, assessment modification\need for assistance, and selection of interventions: Analytical Complexity:MODERATE COMPLEXITY   TREATMENT:   (In addition to Assessment/Re-Assessment sessions the following treatments were rendered)    Pre-treatment Symptoms/Complaints:  Pt states, \"I still want to try and use my R hand to feed myself. \"  Pain: Initial:   Pain Intensity 1: 0 /10  Post Session:  same     Therapeutic Exercise: ( 25 min):  Exercises per grid below to improve mobility, strength, balance and coordination. Required moderate visual and verbal cues to promote proper body alignment, promote proper body posture and promote proper body mechanics. Progressed resistance, range, repetitions and complexity of movement as indicated.      Date:  9/5/18 Date:  9/10/18 Date:  9/14/18   Activity/Exercise Parameters Parameters Parameters   Shoulder Shrugs   X 10 reps   Scapular Protraction/retraction   X 10 reps   Hand [place]      UBE  Level 2.5 - 7 minutes  Level 2.5 - 7 minutes    Modified Push-ups      Resistive clothespin      Shoulder Arc      Measurements Taken      HEP      Shoulder Flexion/Extension PROM X 10 reps PROM X 10 reps PROM X 10 reps   Wrist flexion/extension PROM x 10 reps PROM X 10 reps PROM X 10 reps   Elbow Flexion/Extension PROM x 10 reps PROM X 10 reps PROM X 10 reps   Finger Flexion/Extension PROM x 10 reps PROM X 10 reps PROM X 10 reps       Neuromuscular Re-education: ( 0 minutes):  Exercise/activities per grid below to improve balance, coordination, kinesthetic sense and proprioception. Required moderate visual and verbal cues to promote motor control of right, upper extremity(s). Patient completed lateral weight shifts sitting at edge of mat x 10 reps each onto elbow/forearms with dynamic reach with left hand outside of base of support, suing RUE to maintain balance. Completed lateral weight shifts onto outstretched right hand with min A to laminarin position of right hand on mat x 10 reps     Self Care: (20 min): Procedure(s) (per grid) utilized to improve and/or restore self-care/home management as related to self feeding. Required minimal verbal and   cueing to facilitate activities of daily living skills. Practiced self-feeding with universal cuff. Pt encouraged to use L hand assist to complete elbow flexion. Treatment/Session Assessment: Pt presents with strong shoulder abduction when attempting to completing functional hand-to-mouth feeding pattern. Pt encouraged to use L hand to assist functional hand to mouth pattern. Pt would continue to benefit from skilled OT services to increase functional use of RUE during ADLs and for decreased caregiver burden. · Response to Treatment:  Pt tolerated exercises well with no issues noted. · Compliance with Program/Exercises: Will assess as treatment progresses. · Recommendations/Intent for next treatment session: \"Next visit will focus on advancements to more challenging activities and reduction in assistance provided\".   Total Treatment Duration:  OT Patient Time In/Time Out  Time In: 1300  Time Out: [facility]

## 2018-09-21 ENCOUNTER — HOSPITAL ENCOUNTER (OUTPATIENT)
Dept: PHYSICAL THERAPY | Age: 63
Discharge: HOME | End: 2018-09-21
Attending: PHYSICAL MEDICINE & REHABILITATION
Payer: COMMERCIAL

## 2018-09-21 NOTE — PROGRESS NOTES
OT NOTE:    Patient called to cancel her OT appointment today. Will follow-up at next scheduled visit.     Cierra Tim OTR/L

## 2018-09-21 NOTE — PROGRESS NOTES
Michael Luciano  : 1955  Primary: Raheel Gonzalez Ppo  Secondary:  [facility] [place] [address] [place] [address], [address] [facility]  [phone]   [phone]     OUTPATIENT DAILY NOTE    NAME/AGE/GENDER: Michael Luciano is a 58 y.o. female. DATE: 2018    Patient canceled for appointment today. Will plan to follow up on next scheduled visit.     Vanessa Coleman, PT

## 2018-10-12 ENCOUNTER — HOSPITAL ENCOUNTER (OUTPATIENT)
Dept: PHYSICAL THERAPY | Age: 63
Discharge: HOME | End: 2018-10-12
Attending: PHYSICAL MEDICINE & REHABILITATION
Payer: COMMERCIAL

## 2018-10-12 DIAGNOSIS — I69.322 CVA, OLD, DYSARTHRIA: ICD-10-CM

## 2018-10-12 DIAGNOSIS — I61.9 STROKE, HEMORRHAGIC (HCC): ICD-10-CM

## 2018-10-12 PROCEDURE — 97112 NEUROMUSCULAR REEDUCATION: CPT

## 2018-10-12 PROCEDURE — 97110 THERAPEUTIC EXERCISES: CPT

## 2018-10-12 PROCEDURE — 92523 SPEECH SOUND LANG COMPREHEN: CPT | Performed by: SPEECH-LANGUAGE PATHOLOGIST

## 2018-10-12 NOTE — PROGRESS NOTES
Tai Rivera  : 1955  Primary:   Secondary:  [address] at [place] [address] [facility], [address] [place] [address],[address], [facility] [address] [facility]  [phone]   [phone]          OUTPATIENT PHYSICAL THERAPY:Daily Note and Progress Report 10/12/2018    ICD-10: Treatment Diagnosis:   · Other abnormalities of gait and mobility (R26.89)  · Difficulty in walking, not elsewhere classified (R26.2)  Precautions/Allergies:   Banana; Lisinopril; and Nuts [tree nut]   Fall Risk Score: 5 (? 5 = High Risk)  MD Orders: Evaluate and Treat MEDICAL/REFERRING DIAGNOSIS:  Weakness due to [facility]vascular [facility] [I63.9, R53.1]   DATE OF ONSET: CVA: 2017  REFERRING PHYSICIAN: Juliette Mc*   RETURN PHYSICIAN APPOINTMENT: TBD     INITIAL ASSESSMENT:  Ms. Lilian Palmer presents with decreased mobility, decreased strength, decreased gait, and decreased balance secondary to CVA. After discussing with patient, she agreed she would benefit from physical therapy to improve above deficits. Please sign this plan of treatment if you concur. Thank you for the opportunity to serve this patient. Progress note on 10/12/2018:  Patient has attended four scheduled physical therapy appointments from 2018 to 10/12/2018. Patient reports she is having right ankle/foot surgery on . Patient would benefit from continuing skilled physical therapy to address problems and goals. Thank you for this referral.     PROBLEM LIST (Impacting functional limitations):  1. Decreased Strength  2. Decreased Ambulation Ability/Technique  3. Decreased Balance  4. Decreased Activity Tolerance INTERVENTIONS PLANNED:  1. Balance Exercise  2. Gait Training  3. Home Exercise Program (HEP)  4. Neuromuscular Re-education/Strengthening  5. Range of Motion (ROM)  6. Therapeutic Exercise/Strengthening   TREATMENT PLAN:  Effective Dates: 2018 TO 2018.  Frequency/Duration: 2 times a week for 60 Days  GOALS: (Goals have been discussed and agreed upon with patient.)  Short-Term Functional Goals: Time Frame: 30 days  1. Patient will be independent with home exercise program without exacerbation of symptoms or cueing needed. Goal met. Discharge Goals: Time Frame: 60 days  1. Patient will be independent with all ADLs with minimal fear of falling and loss of balance with daily tasks. Goal ongoing. 2. Patient will report no fear avoidance with social or recreational activities due to fear of falling. Goal ongoing. 3. Patient will score greater than or equal to 45/56 on Gipson Balance Scale with minimal effect of imbalance on patient's ability to manage every day life activities. Goal ongoing. Rehabilitation Potential For Stated Goals: Good  Regarding Calvin Iraheta's therapy, I certify that the treatment plan above will be carried out by a therapist or under their direction. Thank you for this referral,  Doni Merrill, PT     Referring Physician Signature: Juliette Alvarenga*          Date                     The information in this section was collected on 12/15/2017 (except where otherwise noted). HISTORY:   History of Present Injury/Illness (Reason for Referral):  Patient is well known to PT from previous treatment sessions. Patient reports she had a severe stroke on 9/6/2017. She reports that she was in the hospital and [facility] for about 61-62 days. She reports that she has progressed really well, but is still having trouble walking and using her right side (UE and LE). She reports that she walks using her AFO and quad cane all the time. She reports that she tries to be as active as possible. She reports that she is scared that she is going to fall, even though she has not. She reports she would like to work on strengthening her right leg so her balance is better and she can walk without fear of falling.   Past Medical History/Comorbidities:   Ms. Rivas Zuluaga  has a past medical history of Anxiety; [facility]); Depression; HTN (hypertension); Menopause; and PTE [facility]embolism) [facility] ([facility]). Ms. Bridgette Isaacs  has a past surgical history that includes hx jennifer and bso (); hx  section; hx refractive surgery (); hx other surgical (); and hx other surgical (2017). Social History/Living Environment:   Home Environment: Private residence  Living Alone: No  Support Systems: Family member(s), Friends \ neighbors; Spouse  Prior Level of Function/Work/Activity:  Independent  Dominant Side:         RIGHT  Personal Factors:          Sex:  female        Age:  58 y.o. Current Medications:       Current Outpatient Prescriptions:     rOPINIRole (REQUIP) 2 mg tablet, Take 1 Tab by mouth nightly. for restless leg, Disp: 90 Tab, Rfl: 0    FLUoxetine (PROZAC) 20 mg tablet, Take 2 Tabs by mouth daily. , Disp: 180 Tab, Rfl: 0    tiZANidine (ZANAFLEX) 4 mg tablet, 4mg po TID, Disp: 270 Tab, Rfl: 0    labetalol (NORMODYNE) 200 mg tablet, Take 1 Tab by mouth two (2) times a day., Disp: 180 Tab, Rfl: 0    losartan-hydroCHLOROthiazide (HYZAAR) 100-12.5 mg per tablet, Take 1 Tab by mouth daily. , Disp: 90 Tab, Rfl: 0    polyethylene glycol (MIRALAX) 17 gram/dose powder, Take 17 g by mouth daily. , Disp: 510 g, Rfl: 2   Date Last Reviewed:  10/12/2018    Number of Personal Factors/Comorbidities that affect the Plan of Care: 1-2: MODERATE COMPLEXITY   EXAMINATION:   Observation/Orthostatic Postural Assessment:          Posture Assessment: Rounded shoulders, Forward head   Functional Mobility:         Gait/Ambulation:     Speed/Lulu: Pace decreased (<100 feet/min)  Step Length: Left shortened, Right lengthened  Swing Pattern: Left asymmetrical, Right asymmetrical  Stance: Left increased, Right decreased  Gait Abnormalities: Antalgic, Circumduction, Foot drop, Path deviations, Trendelenburg  Ambulation - Level of Assistance: Modified independent  Assistive Device: [address] [facility], quad  · Interventions: Verbal cues, Safety awareness training          Transfers:     Sit to Stand: Independent  Stand to Sit: Independent  Stand Pivot Transfers: Independent  · Bed to Chair: Independent          Bed Mobility:     Rolling: Independent  Supine to Sit: Independent  Sit to Supine: Independent  · Scooting: Independent    Strength:          L UE STRENGTH: 4/5 shoulder flexion, 4/5 shoulder abduction, 4/5 shoulder extension, 4/5 elbow flexion, 4/5 elbow extension. R UE STRENGTH: 2/5 shoulder flexion, 2/5 shoulder abduction, 2/5 shoulder extension, 2/5 elbow flexion, 2/5 elbow extension. R LE STRENGTH:  4+/5 hip flexion, 4/5 hip abduction, 55 hip adduction, 4+/5 hip extension, 4+/5 5/5knee extension, 3-/5 knee flexion, 1/5 ankle dorsiflexion, 1/5 ankle plantarflexion, 1/5 ankle inversion, 1/5 ankle eversion. Sensation:         Intact for light touch and proprioception  Postural Control & Balance:  · Gipson Balance Scale:  38/56.   (A score less than 45/56 indicates high risk of falls)  . Score improved from 28/56 on initial evaluation. Body Structures Involved:  1. Nerves  2. Muscles Body Functions Affected:  1. Neuromusculoskeletal  2. Movement Related Activities and Participation Affected:  1. Mobility  2. Self Care   Number of elements (examined above) that affect the Plan of Care: 4+: HIGH COMPLEXITY   CLINICAL PRESENTATION:   Presentation: Evolving clinical presentation with changing clinical characteristics: MODERATE COMPLEXITY   CLINICAL DECISION MAKING:   Outcome Measure: Tool Used: Gipson Balance Scale  Score:  Initial: 35/56 Most Recent: 38/56 (Date: 10- )   Interpretation of Score: Each section is scored on a 0-4 scale, 0 representing the patients inability to perform the task and 4 representing independence. The scores of each section are added together for a total score of 56. The higher the patients score, the more independent the patient is.   Any score below 45 indicates increased risk for falls. Score 56 55-45 44-34 33-23 22-12 11-1 0   Modifier CH CI CJ CK CL CM CN     Medical Necessity:   · Patient is expected to demonstrate progress in strength, range of motion, balance and coordination to improve safety during daily activities. · Patient demonstrates good rehab potential due to higher previous functional level. · Skilled intervention continues to be required due to decreased mobility. Reason for Services/Other Comments:  · Patient continues to demonstrate capacity to improve overall mobility which will increase independence and increase safety. Use of outcome tool(s) and clinical judgement create a POC that gives a: Questionable prediction of patient's progress: MODERATE COMPLEXITY        TREATMENT:   (In addition to Assessment/Re-Assessment sessions the following treatments were rendered)  Pre-treatment Symptoms/Complaints:  10/12/2018: Patient reports she is having surgery on her right foot on December 5th. No falls. Pain: Initial: Pain Intensity 1: 0  Post Session:  0/10     THERAPEUTIC EXERCISE: (45 minutes):  Exercises per grid below to improve mobility and strength. Required minimal verbal cues to promote proper body alignment. Progressed repetitions as indicated.     Date:  10/12/2018   Activity/Exercise Parameters   Ambulation with step through verbal cueing for L LE and decreasing step length with R LE 10 minutes   Stretching of achilles (supine) and quadriceps/hip rotators (prone)    Sit to stand     Left sidelying alternating isometrics on pelvis    Standing hip abduction    Step ups 6 inch  2 X 10   Prone hip extension 2x10   Bridging Right leg straight  2x10   Piriformis stretch 3x30 sec    Supine straight leg raise 2x10 3#   Supine PNF    L sidelying R pelvis and trunk PNF    Left sidelying hip abduction 2x10 3#   L sidelying: picking up and moving R LE from in front of L foot to behind L foot (working on hip abd and ext) and stabilizing trunk    L sidelying R clam shells 2x10   L sidelying R hip abduction    R foot and gastrocsoleus stretching 5 minutes   Supine hooklying lower body rotation    Standing minisquats in bars    Nustep Level 4 x 7 minutes       [employer] Portal  Treatment/Session Assessment:    · Response to Treatment:  Patient tolerated exercises without issues. · Compliance with Program/Exercises: Compliant. · Recommendations/Intent for next treatment session: \"Next visit will focus on advancements to more challenging activities\".      Total Treatment Duration:  PT Patient Time In/Time Out  Time In: 1300  Time Out: 2000 Cy Hernandez

## 2018-10-12 NOTE — THERAPY EVALUATION
José Antonio Ahumada  : 1955  Primary: Elise Pratt Ppo  Secondary:  Therapy Center at [address] [place] [address], [address], [address] [facility]  [phone]   [phone]         OUTPATIENT SPEECH LANGUAGE PATHOLOGY: Initial Assessment    ICD-10: Treatment Diagnosis: Other Speech Disturbance R47.89   Slurred Speech R47.81   REFERRING PHYSICIAN: Alice Jarquin, *  MD Orders: evaluate and treat  Return Physician Appointment: Unknown  PAST MEDICAL HISTORY:   Ms. Rivas Zuluaga is a 58 y.o. female who  has a past medical history of Anxiety; CVA (cerebral vascular accident) ([facility] (2017); Depression; HTN (hypertension); Menopause; and PTE (pulmonary thromboembolism) ([facility] (). She also  has a past surgical history that includes hx jennifer and bso (); hx  section; hx refractive surgery (); hx other surgical (); and hx other surgical (). MEDICAL/REFERRING DIAGNOSIS: Stroke, hemorrhagic ([facility] [I61.9]  CVA, old, dysarthria [I69.322]  DATE OF ONSET: 2017   PRIOR LEVEL OF FUNCTION: requires assistance  PRECAUTIONS/ALLERGIES: NKDA  ASSESSMENT:  Patient is a 58year old female who was referred for speech evaluation due to late effects of CVA. Patient reports CVA in 2017. Patient stated that she did not receive speech therapy once she was discharged from inpatient rehab, reason unknown. She complains of decreased voice, memory and difficulty swallowing liquids. Based on the objective data described below, the patient presents with mild-moderate cognitive deficits in the areas of: immediate and recent memory, problem solving and reasoning, temporal orientation recent memory and organization. Also presents with mild dysarthria due to decreased lingual strength and coordination although her speech is 100% intelligible, mild slurring observed. Patient would benefit from skilled treatment to introduce HEP in order to help maximize her functional independence. Patient would also benefit from MBSS to further assess swallow function for proper diet recommendations and or strategies. Patient will benefit from skilled intervention to address the above impairments. ?????? ? ? This section established at most recent assessment??????????  PROBLEM LIST (Impairments causing functional limitations):  1. Late effects of CVA  GOALS: (Goals have been discussed and agreed upon with patient.)  SHORT-TERM FUNCTIONAL GOALS: Time Frame: 3-6 months  1. Patient will independently utilize speech intelligibility strategies at 100% accuracy. 2. Patient will complete OME independently at 100% accuracy. 3. Patient will complete thought organization tasks at Mod I 90% accuracy. 4. Patient will complete immediate and delayed memory recall tasks at [address] with 85% accuracy. 5. Patient will complete attention tasks at Mod I 90% accuracy. DISCHARGE GOALS: Time Frame: 12 weeks  1. Patient will increase cognitive, linguistic and speech across all settings with ADL's at [address] [place] [address] with 85% accuracy. REHABILITATION POTENTIAL FOR STATED GOALS: FairPLAN OF CARE:  INTERVENTIONS PLANNED: (Benefits and precautions of therapy have been discussed with the patient.)  1. Speech therapy  TREATMENT PLAN EFFECTIVE DATES: 10/12/2018 TO 1/10/2019 (90 days). FREQUENCY/DURATION: Continue to follow patient 1 times a week for 90 days to address above goals. Regarding Eric Iraheta's therapy, I certify that the treatment plan above will be carried out by a therapist or under their direction. Thank you for this referral,  JOSE Hurtado Ed CCC-SLP                   Referring Physician Signature: Madonna Vasques, *     Date      SUBJECTIVE:  Alert  Present Symptoms:    Pain Intensity 1: 0  Current Medications:   Current Outpatient Prescriptions on File Prior to Encounter   Medication Sig Dispense Refill    rOPINIRole (REQUIP) 2 mg tablet Take 1 Tab by mouth nightly.  for restless leg 90 Tab 0    FLUoxetine (PROZAC) 20 mg tablet Take 2 Tabs by mouth daily. 180 Tab 0    tiZANidine (ZANAFLEX) 4 mg tablet 4mg po  Tab 0    labetalol (NORMODYNE) 200 mg tablet Take 1 Tab by mouth two (2) times a day. 180 Tab 0    losartan-hydroCHLOROthiazide (HYZAAR) 100-12.5 mg per tablet Take 1 Tab by mouth daily. 90 Tab 0    polyethylene glycol (MIRALAX) 17 gram/dose powder Take 17 g by mouth daily. 510 g 2     No current facility-administered medications on file prior to encounter. Date Last Reviewed: 10/12/18  Social History/Home Situation: Lives with spouse      Work/Activity History: Unemployed    OBJECTIVE:  Objective Measure: Tool Used: National Outcomes Measurement System: Functional Communication Measures: MEMORY  Score:  Initial: 5 Most Recent: X (Date: -- )   Interpretation of Tool: This measure describes the change in functional communication status subsequent to speech-language pathology treatment of patients who have memory deficits. o Level 1:  The individual is unable to recall any information, regardless of cueing. o Level 2: The individual consistently requires maximal verbal cues or uses external aids to recall personal information (e.g., family members, biographical information, physical location, etc.) in structured environments. o Level 3: The individual usually requires maximum cues to recall or use external aids for simple routine and personal information (e.g., schedule, names of familiar staff, location of therapy areas, etc.) in structured environments. o Level 4: The individual occasionally requires minimal cues to recall or use external memory aids for simple routine and personal information in structured environments.  The individual requires consistent maximal cues to recall or use memory aids for complex and novel information (e.g., carry out multiple steps activities, accommodate schedule changes, anticipate meal times, etc.), plan and follow through on simple future events (e.g., use calendar to keep appointments, use log books to complete a single assignment/task, etc.) in structured environments. o Level 5 The individual consistently requires minimal cues to recall or use external memory aids for complex and novel information. The individual consistently requires minimal cues to plan and follow through on complex future events (e.g., menu planning and meal preparation, planning a party, etc.).  o Level 6: The individual is able to recall or use external aids/memory strategies for complex information and planning complex future events most of the time. When there is a breakdown in the use of recall/memory strategies/external memory aids, the individual occasionally requires minimal cues. These breakdowns may occasionally interfere with the individuals functioning in vocational, avocational, and social activities. o Level 7: The individual is successful and independent in recalling or using external aids/memory strategies for complex information and planning future events in all vocational, avocational, and social activities. Score Level 7 Level 6 Level 5 Level 4 Level 3 Level 2 Level 1   Modifier CH CI CJ CK CL CM CN   ?  Memory:     - CURRENT STATUS: CJ - 20%-39% impaired, limited or restricted    - GOAL STATUS:  CI - 1%-19% impaired, limited or restricted    - D/C STATUS:  ---------------To be determined---------------           Oral Motor Structure/Speech:  Oral-Motor Structure/Motor Speech  Face: No impairment  Dentition: Natural  Oral Hygiene: good  Lingual: Decreased strength  Velum: Palate (comment) (decreased)  Apraxic Characteristics: None  Dysarthric Characteristics: Blended word boundaries, Decreased breath support, Increased rate, Decreased prosody  Intelligibility: No impairment  Intonation: Impaired  Rate: Impaired   Prosody: Impaired  Overall Impairment Severity: Mild    SPEECH-LANGUAGE COGNITIVE EVALUATION  Tests Given:RIPA 2    Mental Status:  Neurologic State: Alert  Orientation Level: Oriented to person;Oriented to situation;Disoriented to time;Oriented to place  Cognition: Follows commands  Perception: Appears intact  Perseveration: No perseveration noted  Safety/Judgement: Not assessed    Motor Speech:  Oral-Motor Structure/Motor Speech  Face: No impairment  Dentition: Natural  Oral Hygiene: good  Lingual: Decreased strength  Velum: Palate (comment) (decreased)  Apraxic Characteristics: None  Dysarthric Characteristics: Blended word boundaries; Decreased breath support; Increased rate;Decreased prosody  Intelligibility: No impairment  Intonation: Impaired  Rate: Impaired   Prosody: Impaired  Overall Impairment Severity: Mild    Auditory Comprehension:   Auditory Comprehension  Auditory Impairment: No     Reading Comprehension:   Reading Comprehension  Visual Impairment: No impairment  Scanning/Tracking : No impairment  Sentence: No Impairment  Paragraph : No impairment  Oral Reading: No impairment  Overall Impairment Severity: None    Verbal Expression:   Verbal Expression  Initiation: No impairment  Automatic Speech Task: No impairment  Repetition: No impairment  Naming: No impairment  Sentence Completion: No impairment  Sentence Formulation: No impairment  Conversation: No impairment  Overall Impairment: None      Neuro-Linguistics: Impaired     Assessment/Reassessment only, no treatment provided today  __________________________________________________________________________________________________  History of Present Injury/Illness (Reason for Referral): Late CVA  Treatment Assessment:  Evaluation completed. Progression/Medical Necessity:   · Skilled intervention continues to be required due to decreased cognitive skills and decreased independence with activities of daily living. Compliance with Program/Exercises: Will assess as treatment progresses.    Reason for Continuation of Services/Other Comments:  · Patient continues to require skilled intervention due to medical complications. Recommendations/Intent for next treatment session: \"Treatment next visit will focus on goals\". Total Treatment Duration:  Time In: 1430  Time Out: [address], Tanya Cee. Trey Osorio

## 2018-10-12 NOTE — PROGRESS NOTES
Jose Domínguez  : 1955  Primary: Ana Lilia Puentes Ppo  Secondary:  [address] at [facility]  [address], [address] [facility] [address], [facility] [address]  [phone]   [phone]          OUTPATIENT OCCUPATIONAL THERAPY: Daily Note 10/12/2018    ICD-10: Treatment Diagnosis:   Hemiplegia and hemiparesis following cerebral infarction affecting right dominant side (I69.351)  Muscle weakness (generalized) (M62.81)    Precautions/Allergies:   Banana; Lisinopril; and Nuts [tree nut]   Fall Risk Score: 5 (? 5 = High Risk)  MD Orders: eval and treat MEDICAL/REFERRING DIAGNOSIS:   Cerebral infarction, unspecified [I63.9]  Weakness [R53.1]   DATE OF ONSET: 2017  REFERRING PHYSICIAN: Juliette Mae*  RETURN PHYSICIAN APPOINTMENT: unknown     INITIAL ASSESSMENT:  Ms. Tiffany Aguilar presents impaired AROM, strength, coordination, and sensation of her dominant RUE. Upon evaluation, pt reports her problems are completing feeding, self-grooming, and LB dressing. Pt would benefit from skilled OT services to increase functional use of RUE during ADL performance and maximize safety during occupational performance. PLAN OF CARE:   PROBLEM LIST:  1. Decreased Strength  2. Decreased ADL/Functional Activities  3. Decreased Transfer Abilities  4. Decreased Balance  5. Decreased Flexibility/Joint Mobility  6. Decreased Cognition INTERVENTIONS PLANNED:  1. Activities of daily living training  2. Adaptive equipment training  3. Balance training  4. Clothing management  5. Community reintergration  6. Donning&doffing training  7. Manual therapy training  8. Modalities  9. Neuromuscular re-eduation  10. Re-evaluation  11. Sensory reintegration training  12. Therapeutic activity  13. Therapeutic exercise   TREATMENT PLAN:  Effective Dates: 2018 TO 2018 (90 days).   Frequency/Duration: 1-2x/day for 90 Days  GOALS: (Goals have been discussed and agreed upon with patient.)  Short-Term Functional Goals: Time Frame: 45 days  1. Patient will be independent with HEP within 3 visits of initial evaluation in order to maintain gains achieved during therapy. 2. Patient will complete self-grooming with modified independence and adaptive equipment as needed. 3. Patient will increase use of RUE as a functional assist in at least 50% of daily activities   Discharge Goals: Time Frame: 60 days  1. Patient will bathe with minimal assistance and adaptive equipment as needed. 2. Patient will feed self with modified independence and adaptive equipment as needed. 3. Patient will dress with modified independence and adaptive equipment as needed. 4. Patient will use right upper extremity as a functional assist in at least 75% of daily activities. Rehabilitation Potential For Stated Goals: Fair  Regarding Thi Iraheta's therapy, I certify that the treatment plan above will be carried out by a therapist or under their direction. Thank you for this referral,  Candace Oshea, OT     Referring Physician Signature: Juliette Harrington* _________________________  Date _________            The information in this section was collected on 18 (except where otherwise noted). OCCUPATIONAL PROFILE & HISTORY:   History of Present Injury/Illness (Reason for Referral):  CVA with hospital and inpatient rehab. Pt had been receiving home health after her discharge from inpatient. Past Medical History/Comorbidities:   Ms. Yumiko Hinds  has a past medical history of Anxiety; CVA (cerebral vascular accident) ([facility] (2017); Depression; HTN (hypertension); Menopause; and PTE (pulmonary thromboembolism) ([facility] (2017). Ms. Yumiko Hinds  has a past surgical history that includes hx jennifer and bso (); hx  section; hx refractive surgery (); hx other surgical (); and hx other surgical (2017).   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 Dominant Side:         RIGHT  Personal Factors:          Sex:  female        Age:  58 y.o. Current Medications:    Current Outpatient Prescriptions:     rOPINIRole (REQUIP) 2 mg tablet, Take 1 Tab by mouth nightly. for restless leg, Disp: 90 Tab, Rfl: 0    FLUoxetine (PROZAC) 20 mg tablet, Take 2 Tabs by mouth daily. , Disp: 180 Tab, Rfl: 0    tiZANidine (ZANAFLEX) 4 mg tablet, 4mg po TID, Disp: 270 Tab, Rfl: 0    labetalol (NORMODYNE) 200 mg tablet, Take 1 Tab by mouth two (2) times a day., Disp: 180 Tab, Rfl: 0    losartan-hydroCHLOROthiazide (HYZAAR) 100-12.5 mg per tablet, Take 1 Tab by mouth daily. , Disp: 90 Tab, Rfl: 0    polyethylene glycol (MIRALAX) 17 gram/dose powder, Take 17 g by mouth daily. , Disp: 510 g, Rfl: 2            Date Last Reviewed:  10/12/2018     Complexity Level : Expanded review of therapy/medical records (1-2):  MODERATE COMPLEXITY   ASSESSMENT OF OCCUPATIONAL PERFORMANCE:   ROM:                     Strength:                     Mental Status:                     Activities of Daily Living:         Basic ADLs (From Assessment) Complex ADLs (From Assessment)         Grooming/Bathing/Dressing Activities of Daily Living                                   Sensation:         Impaired light sensation    Physical Skills Involved:  1. Range of Motion  2. Balance  3. Strength  4. Activity Tolerance  5. Sensation  6. Fine Motor Control  7. Gross Motor Control Cognitive Skills Affected (resulting in the inability to perform in a timely and safe manner):  1. Executive Function  2. Immediate Memory  3. Short Term Recall  4. Long Term Memory  5. Sustained Attention  6. Divided Attention Psychosocial Skills Affected:  1. Habits/Routines  2.  Environmental Adaptation   Number of elements that affect the Plan of Care: 5+:  HIGH COMPLEXITY   CLINICAL DECISION MAKING:   Outcome Measure:     Barthel Index of Activities of Daily Living  CN CM CL CK CJ CI CH   0 1-3 4-7 8-11 12-15 16-19 20     Total Score: 15/20 (9/5/18)      Medical Necessity:   · Patient is expected to demonstrate progress in strength, range of motion, balance, coordination and functional technique to decrease assistance required with ADLs and functional mobilityl. Reason for Services/Other Comments:  · Patient continues to require skilled intervention due to medical complications and patient unable to attend/participate in therapy as expected. Clinical Decision-Making Assessment: This case is of moderate clinical decision making due to pt's limited functional use of RUE and RLE limiting occupational performance for all ADLs. Assessment process, impact of co-morbidities, assessment modification\need for assistance, and selection of interventions: Analytical Complexity:MODERATE COMPLEXITY   TREATMENT:   (In addition to Assessment/Re-Assessment sessions the following treatments were rendered)    Pre-treatment Symptoms/Complaints:  Pt states, \" I'm worn out from physical therapy. \"  Pain: Initial:   Pain Intensity 1: 0 /10  Post Session:  same     Therapeutic Exercise: ( 30 min):  Exercises per grid below to improve mobility, strength, balance and coordination. Required moderate visual and verbal cues to promote proper body alignment, promote proper body posture and promote proper body mechanics. Progressed resistance, range, repetitions and complexity of movement as indicated. Date:  9/5/18 Date:  9/10/18 Date:  9/14/18 Date:  10/12/18   Activity/Exercise Parameters Parameters Parameters    Shoulder Shrugs   X 10 reps X 10 reps   Scapular Protraction/retraction   X 10 reps X 10 reps   Hand [place]    10# for 2 sets 10 10 reps    UBE  Level 2.5 - 7 minutes  Level 2.5 - 7 minutes  Level 2.5 - 7 minutes    Modified Push-ups    3 sets x 10 reps   Resistive clothespin    Red resistive clothespin 1-x 10 reps    Shoulder Arc    Use of LUE to grasp and release ~10 pegs over arc.    Measurements Taken       HEP Shoulder Flexion/Extension PROM X 10 reps PROM X 10 reps PROM X 10 reps    Wrist flexion/extension PROM x 10 reps PROM X 10 reps PROM X 10 reps PROM X 10 reps   Elbow Flexion/Extension PROM x 10 reps PROM X 10 reps PROM X 10 reps PROM X 10 reps   Finger Flexion/Extension PROM x 10 reps PROM X 10 reps PROM X 10 reps        Neuromuscular Re-education: ( 15 minutes):  Exercise/activities per grid below to improve balance, coordination, kinesthetic sense and proprioception. Required moderate visual and verbal cues to promote motor control of right, upper extremity(s). Patient completed lateral weight shifts sitting at edge of mat x 10 reps each onto elbow/forearms with dynamic reach with left hand outside of base of support, suing RUE to maintain balance. Completed lateral weight shifts onto outstretched right hand with min A to laminarin position of right hand on mat x 10 reps     Self Care: (0 min): Procedure(s) (per grid) utilized to improve and/or restore self-care/home management as related to self feeding. Required minimal verbal and   cueing to facilitate activities of daily living skills. Treatment/Session Assessment: Decreased tone noted in RUE, particularly with elbow extension and finger flexion. Pt demo'd good lateral weight shifts with dynamic reach today, with min verbal cueing required. Pt would continue to benefit from skilled OT services to increase functional use of RUE during ADLs and for decreased caregiver burden. · Response to Treatment:  Pt tolerated exercises well with no issues noted. · Compliance with Program/Exercises: Will assess as treatment progresses. · Recommendations/Intent for next treatment session: \"Next visit will focus on advancements to more challenging activities and reduction in assistance provided\".   Total Treatment Duration:  OT Patient Time In/Time Out  Time In: 3335  Time Out: [address] [facility]

## 2018-10-24 PROBLEM — T17.308A CHOKING: Status: ACTIVE | Noted: 2018-10-24

## 2018-10-24 PROBLEM — I69.151: Status: ACTIVE | Noted: 2018-10-24

## 2018-10-24 PROBLEM — M21.371 RIGHT FOOT DROP: Status: ACTIVE | Noted: 2018-10-24

## 2018-10-26 ENCOUNTER — HOSPITAL ENCOUNTER (OUTPATIENT)
Dept: PHYSICAL THERAPY | Age: 63
End: 2018-10-26
Attending: PHYSICAL MEDICINE & REHABILITATION
Payer: COMMERCIAL

## 2018-10-26 ENCOUNTER — HOSPITAL ENCOUNTER (OUTPATIENT)
Dept: PHYSICAL THERAPY | Age: 63
Discharge: HOME | End: 2018-10-26
Attending: PHYSICAL MEDICINE & REHABILITATION
Payer: COMMERCIAL

## 2018-10-26 NOTE — PROGRESS NOTES
Husam Su  : 1955  Primary: Beatriz Stevens Ppo  Secondary:  Therapy Center at [place] [id number] [facility], [address] [place] [address],[address], [address]  [phone]   [phone]     OUTPATIENT DAILY NOTE    NAME/AGE/GENDER: Husam Su is a 58 y.o. female. DATE: 10/26/2018    Patient canceled for appointment today due to transportation issues. Will plan to follow up on next scheduled visit.     Lisa Barnard, PT

## 2018-10-26 NOTE — PROGRESS NOTES
OT NOTE:    Pt's caregiver called to cancel Ms. Iraheta's appointment for today. Will follow-up at next visit.     Clara Knapp OTR/L

## 2018-11-02 ENCOUNTER — HOSPITAL ENCOUNTER (OUTPATIENT)
Dept: PHYSICAL THERAPY | Age: 63
Discharge: HOME | End: 2018-11-02
Attending: PHYSICAL MEDICINE & REHABILITATION
Payer: COMMERCIAL

## 2018-11-02 PROCEDURE — 97112 NEUROMUSCULAR REEDUCATION: CPT

## 2018-11-02 PROCEDURE — 97127 HC ST THER IVNTJ W/FOCUS COG FUNCJ: CPT | Performed by: SPEECH-LANGUAGE PATHOLOGIST

## 2018-11-02 PROCEDURE — 97110 THERAPEUTIC EXERCISES: CPT

## 2018-11-02 PROCEDURE — 92507 TX SP LANG VOICE COMM INDIV: CPT | Performed by: SPEECH-LANGUAGE PATHOLOGIST

## 2018-11-02 NOTE — PROGRESS NOTES
Jose Domínguez : 1955 Primary:  
Secondary:  [facility] [place] [id number] [facility], [address], [place]. [phone]   [phone] OUTPATIENT PHYSICAL THERAPY:Daily Note 2018 ICD-10: Treatment Diagnosis:  
· Other abnormalities of gait and mobility (R26.89) · Difficulty in walking, not elsewhere classified (R26.2) Precautions/Allergies:  
Banana; Lisinopril; and Nuts [tree nut] Fall Risk Score: 5 (? 5 = High Risk) MD Orders: Evaluate and Treat MEDICAL/REFERRING DIAGNOSIS: 
Weakness due to cerebrovascular accident (CVA) ([facility] [I63.9, R53.1] DATE OF ONSET: CVA: 2017 REFERRING PHYSICIAN: Juliette Mae* RETURN PHYSICIAN APPOINTMENT: TBD INITIAL ASSESSMENT:  Ms. Tiffany Aguilar presents with decreased mobility, decreased strength, decreased gait, and decreased balance secondary to CVA. After discussing with patient, she agreed she would benefit from physical therapy to improve above deficits. Please sign this plan of treatment if you concur. Thank you for the opportunity to serve this patient. Progress note on 10/12/2018:  Patient has attended four scheduled physical therapy appointments from 2018 to 10/12/2018. Patient reports she is having right ankle/foot surgery on . Patient would benefit from continuing skilled physical therapy to address problems and goals. Thank you for this referral.    
PROBLEM LIST (Impacting functional limitations): 1. Decreased Strength 2. Decreased Ambulation Ability/Technique 3. Decreased Balance 4. Decreased Activity Tolerance INTERVENTIONS PLANNED: 
1. Balance Exercise 2. Gait Training 3. Home Exercise Program (HEP) 4. Neuromuscular Re-education/Strengthening 5. Range of Motion (ROM) 6. Therapeutic Exercise/Strengthening TREATMENT PLAN: 
Effective Dates: 2018 TO 2018.  Frequency/Duration: 2 times a week for 61 DaysGOALS: (Goals have been discussed and agreed upon with patient.) Short-Term Functional Goals: Time Frame: 30 days 1. Patient will be independent with home exercise program without exacerbation of symptoms or cueing needed. Goal met. Discharge Goals: Time Frame: 60 days 1. Patient will be independent with all ADLs with minimal fear of falling and loss of balance with daily tasks. Goal ongoing. 2. Patient will report no fear avoidance with social or recreational activities due to fear of falling. Goal ongoing. 3. Patient will score greater than or equal to 45/56 on Gipson Balance Scale with minimal effect of imbalance on patient's ability to manage every day life activities. Goal ongoing. Rehabilitation Potential For Stated Goals: Good Regarding Gigi Iraheta's therapy, I certify that the treatment plan above will be carried out by a therapist or under their direction. Thank you for this referral, 
Helio Azevedo PT Referring Physician Signature: Juliette Johnston*         Date The information in this section was collected on 12/15/2017 (except where otherwise noted). HISTORY:  
History of Present Injury/Illness (Reason for Referral): 
Patient is well known to PT from previous treatment sessions. Patient reports she had a severe stroke on 9/6/2017. She reports that she was in the hospital and [facility] for about 61-62 days. She reports that she has progressed really well, but is still having trouble walking and using her right side (UE and LE). She reports that she walks using her AFO and quad cane all the time. She reports that she tries to be as active as possible. She reports that she is scared that she is going to fall, even though she has not. She reports she would like to work on strengthening her right leg so her balance is better and she can walk without fear of falling. Past Medical History/Comorbidities: Ms. Naomy Suarez  has a past medical history of Anxiety, CVA (cerebral vascular accident) ([facility] [address] Depression, HTN (hypertension), Menopause, and PTE (pulmonary thromboembolism) ([facility] [address] Ms. Naomy Suarez  has a past surgical history that includes hx jennifer and bso (); hx  section; hx refractive surgery (); hx other surgical (); and hx other surgical (). Social History/Living Environment:  
Home Environment: Private residence Living Alone: No 
Support Systems: Family member(s), Friends \ neighbors; Spouse Prior Level of Function/Work/Activity: 
Independent Dominant Side:  
      RIGHT Personal Factors:   
      Sex:  female Age:  58 y.o. Current Medications:   
  
Current Outpatient Medications:  
  rOPINIRole (REQUIP) 2 mg tablet, Take 1 Tab by mouth nightly. for restless leg, Disp: 90 Tab, Rfl: 0 
  FLUoxetine (PROZAC) 20 mg tablet, Take 2 Tabs by mouth daily. , Disp: 180 Tab, Rfl: 0 
  tiZANidine (ZANAFLEX) 4 mg tablet, 4mg po TID, Disp: 270 Tab, Rfl: 0 
  labetalol (NORMODYNE) 200 mg tablet, Take 1 Tab by mouth two (2) times a day., Disp: 180 Tab, Rfl: 0 
  losartan-hydroCHLOROthiazide (HYZAAR) 100-12.5 mg per tablet, Take 1 Tab by mouth daily. , Disp: 90 Tab, Rfl: 0 
  polyethylene glycol (MIRALAX) 17 gram/dose powder, Take 17 g by mouth daily. , Disp: 510 g, Rfl: 2 Date Last Reviewed:  2018 Number of Personal Factors/Comorbidities that affect the Plan of Care: 1-2: MODERATE COMPLEXITY EXAMINATION:  
Observation/Orthostatic Postural Assessment:   
      Posture Assessment: Rounded shoulders, Forward head Functional Mobility:  
      Gait/Ambulation:   
 Speed/Lulu: Pace decreased (<100 feet/min) Step Length: Left shortened, Right lengthened Swing Pattern: Left asymmetrical, Right asymmetrical 
Stance: Left increased, Right decreased Gait Abnormalities: Antalgic, Circumduction, Foot drop, Path deviations, Trendelenburg Ambulation - Level of Assistance: Modified independent Assistive Device: [employer], quad · Interventions: Verbal cues, Safety awareness training Transfers:   
 Sit to Stand: Independent Stand to Sit: Independent Stand Pivot Transfers: Independent · Bed to Chair: Independent Bed Mobility:   
 Rolling: Independent Supine to Sit: Independent Sit to Supine: Independent · Scooting: Independent Strength: L UE STRENGTH: 4/5 shoulder flexion, 4/5 shoulder abduction, 4/5 shoulder extension, 4/5 elbow flexion, 4/5 elbow extension. R UE STRENGTH: 2/5 shoulder flexion, 2/5 shoulder abduction, 2/5 shoulder extension, 2/5 elbow flexion, 2/5 elbow extension. R LE STRENGTH:  4+/5 hip flexion, 4/5 hip abduction, 55 hip adduction, 4+/5 hip extension, 4+/5 5/5knee extension, 3-/5 knee flexion, 1/5 ankle dorsiflexion, 1/5 ankle plantarflexion, 1/5 ankle inversion, 1/5 ankle eversion. Sensation:  
      Intact for light touch and proprioception Postural Control & Balance:· Gipson Balance Scale:  38/56.   (A score less than 45/56 indicates high risk of falls)  . Score improved from 28/56 on initial evaluation. Body Structures Involved: 1. Nerves 2. Muscles Body Functions Affected: 1. Neuromusculoskeletal 
2. Movement Related Activities and Participation Affected: 1. Mobility 2. Self Care Number of elements (examined above) that affect the Plan of Care: 4+: HIGH COMPLEXITY CLINICAL PRESENTATION:  
Presentation: Evolving clinical presentation with changing clinical characteristics: MODERATE COMPLEXITY CLINICAL DECISION MAKING:  
Outcome Measure: Tool Used: Cuellar Balance Scale Score:  Initial: 35/56 Most Recent: 38/56 (Date: 10- ) Interpretation of Score: Each section is scored on a 0-4 scale, 0 representing the patients inability to perform the task and 4 representing independence.   The scores of each section are added together for a total score of 56. The higher the patients score, the more independent the patient is. Any score below 45 indicates increased risk for falls. Score 56 55-45 44-34 33-23 22-12 11-1 0 Modifier CH CI CJ CK CL CM CN Medical Necessity:  
· Patient is expected to demonstrate progress in strength, range of motion, balance and coordination to improve safety during daily activities. · Patient demonstrates good rehab potential due to higher previous functional level. · Skilled intervention continues to be required due to decreased mobility. Reason for Services/Other Comments: 
· Patient continues to demonstrate capacity to improve overall mobility which will increase independence and increase safety. Use of outcome tool(s) and clinical judgement create a POC that gives a: Questionable prediction of patient's progress: MODERATE COMPLEXITY  
  
 
TREATMENT:  
(In addition to Assessment/Re-Assessment sessions the following treatments were rendered) Pre-treatment Symptoms/Complaints:  11/2/2018: Patient has no complaints. Pain: Initial: Pain Intensity 1: 0  Post Session:  0/10 THERAPEUTIC EXERCISE: (43 minutes):  Exercises per grid below to improve mobility and strength. Required minimal verbal cues to promote proper body alignment. Progressed repetitions as indicated. Date: 
11/2/2018 Activity/Exercise Parameters Ambulation with step through verbal cueing for L LE and decreasing step length with R LE 10 minutes Stretching of achilles (supine) and quadriceps/hip rotators (prone) Sit to stand Left sidelying alternating isometrics on pelvis Standing hip abduction Step ups 6 inch 2 X 10 Prone hip extension 2x10 Bridging Left leg straight 2x10 Piriformis stretch 3x30 sec Supine straight leg raise 2x10 3# Supine PNF L sidelying R pelvis and trunk PNF Left sidelying hip abduction 2x10 3# L sidelying: picking up and moving R LE from in front of L foot to behind L foot (working on hip abd and ext) and stabilizing trunk L sidelying R clam shells 2x10 L sidelying R hip abduction R foot and gastrocsoleus stretching 5 minutes Supine hooklying lower body rotation Standing minisquats in bars Nustep Level 4 x 8 minutes [employer] 
Treatment/Session Assessment:   
· Response to Treatment:  Patient tolerated exercises without complaints · Compliance with Program/Exercises: Compliant. · Recommendations/Intent for next treatment session: \"Next visit will focus on advancements to more challenging activities\". Will recert/discharge next appointment. Total Treatment Duration: PT Patient Time In/Time Out Time In: 9794 Time Out: 4581 Sarah Cuellar, PT

## 2018-11-02 NOTE — PROGRESS NOTES
Fransico Beck : 1955 Primary: [employer] Secondary:  [facility] [address] [facility], [address], [facility] [address] [phone]   [phone] OUTPATIENT SPEECH LANGUAGE PATHOLOGY: Daily Note 1 ICD-10: Treatment Diagnosis: Other Speech Disturbance R47.89 Slurred Speech S01.63 REFERRING PHYSICIAN: Kermit Lopez, * MD Orders: evaluate and treat Return Physician Appointment: Unknown PAST MEDICAL HISTORY:  
Ms. Jai Alfred is a 58 y.o. female who  has a past medical history of Anxiety, CVA (cerebral vascular accident) ([facility] [address] Depression, HTN (hypertension), Menopause, and PTE (pulmonary thromboembolism) ([facility] [address] She also  has a past surgical history that includes hx jennifer and bso (); hx  section; hx refractive surgery (); hx other surgical (); and hx other surgical (). 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 
Patient without complaints this date. Order was signed for MBSS. Patient will schedule. Patient will benefit from skilled intervention to address the above impairments. ?????? ? ? This section established at most recent assessment?????????? 
PROBLEM LIST (Impairments causing functional limitations): 1. Late effects of CVA GOALS: (Goals have been discussed and agreed upon with patient.) SHORT-TERM FUNCTIONAL GOALS: Time Frame: 3-6 months 1. Patient will independently utilize speech intelligibility strategies at 100% accuracy. 2. Patient will complete OME independently at 100% accuracy. 3. Patient will complete thought organization tasks at Mod I 90% accuracy. 4. Patient will complete immediate and delayed memory recall tasks at [address] with 85% accuracy. 5. Patient will complete attention tasks at Mod I 90% accuracy. DISCHARGE GOALS: Time Frame: 12 weeks 1. Patient will increase cognitive, linguistic and speech across all settings with ADL's at [address] [place] [address] with 85% accuracy. REHABILITATION POTENTIAL FOR STATED GOALS: FairPLAN OF CARE: 
INTERVENTIONS PLANNED: (Benefits and precautions of therapy have been discussed with the patient.) 1. Speech therapy TREATMENT PLAN EFFECTIVE DATES: 10/12/2018 TO 1/10/2019 (90 days). FREQUENCY/DURATION: Continue to follow patient 1 times a week for 90 days to address above goals. Regarding Gigi Iraheta's therapy, I certify that the treatment plan above will be carried out by a therapist or under their direction. Thank you for this referral, 
Holmes Frankel, M. Karmen Simpler Referring Physician Signature: Valentin Covington, *     Date SUBJECTIVE: 
Alert Present Symptoms:   
Pain Intensity 1: 0 Current Medications:  
Current Outpatient Medications on File Prior to Encounter Medication Sig Dispense Refill  rOPINIRole (REQUIP) 2 mg tablet Take 1 Tab by mouth nightly. for restless leg 90 Tab 0  
 FLUoxetine (PROZAC) 20 mg tablet Take 2 Tabs by mouth daily. 180 Tab 0  
 tiZANidine (ZANAFLEX) 4 mg tablet 4mg po  Tab 0  
 labetalol (NORMODYNE) 200 mg tablet Take 1 Tab by mouth two (2) times a day. 180 Tab 0  
 losartan-hydroCHLOROthiazide (HYZAAR) 100-12.5 mg per tablet Take 1 Tab by mouth daily. 90 Tab 0  
 polyethylene glycol (MIRALAX) 17 gram/dose powder Take 17 g by mouth daily. 510 g 2 No current facility-administered medications on file prior to encounter. Date Last Reviewed: 11/2/18 Social History/Home Situation: Lives with spouse Work/Activity History: Unemployed OBJECTIVE: 
Objective Measure: Tool Used: National Outcomes Measurement System: Functional Communication Measures: MEMORY Score:  Initial: 5 Most Recent: X (Date: -- ) Interpretation of Tool: This measure describes the change in functional communication status subsequent to speech-language pathology treatment of patients who have memory deficits. o Level 1:  The individual is unable to recall any information, regardless of cueing. o Level 2: The individual consistently requires maximal verbal cues or uses external aids to recall personal information (e.g., family members, biographical information, physical location, etc.) in structured environments. o Level 3: The individual usually requires maximum cues to recall or use external aids for simple routine and personal information (e.g., schedule, names of familiar staff, location of therapy areas, etc.) in structured environments. o Level 4: The individual occasionally requires minimal cues to recall or use external memory aids for simple routine and personal information in structured environments. The individual requires consistent maximal cues to recall or use memory aids for complex and novel information (e.g., carry out multiple steps activities, accommodate schedule changes, anticipate meal times, etc.), plan and follow through on simple future events (e.g., use calendar to keep appointments, use log books to complete a single assignment/task, etc.) in structured environments. o Level 5 The individual consistently requires minimal cues to recall or use external memory aids for complex and novel information. The individual consistently requires minimal cues to plan and follow through on complex future events (e.g., menu planning and meal preparation, planning a party, etc.). o Level 6: The individual is able to recall or use external aids/memory strategies for complex information and planning complex future events most of the time. When there is a breakdown in the use of recall/memory strategies/external memory aids, the individual occasionally requires minimal cues.   These breakdowns may occasionally interfere with the individuals functioning in vocational, avocational, and social activities. o Level 7: The individual is successful and independent in recalling or using external aids/memory strategies for complex information and planning future events in all vocational, avocational, and social activities. Score Level 7 Level 6 Level 5 Level 4 Level 3 Level 2 Level 1 Modifier CH CI CJ CK CL CM CN ? Memory:  
  - CURRENT STATUS: CJ - 20%-39% impaired, limited or restricted  - GOAL STATUS:  CI - 1%-19% impaired, limited or restricted  - D/C STATUS:  ---------------To be determined--------------- Verbal Expression: 
 
 
Neuro-Linguistics: Impaired Completed visual attention task at:  
1st trial: 0 errors 2nd trial: 0 errors 3rd trial: 0 errors 4th trial: 0 errors Completed functional memory task: Min A with 80% accuracy Patient completed mental manipulation: Min to Mod I 87% accuracy Cognitive Skills Activities: Activities/Procedures listed utilized to improve and/or restore cognitive function as related to attention to tasks, memory and compensatory strategies for recall. Required minimal visual, verbal and   cueing to improve perform graded activities focusing on attention skills. __________________________________________________________________________________________________ History of Present Injury/Illness (Reason for Referral): Late CVA Treatment Assessment:  Evaluation completed. Progression/Medical Necessity:  
· Skilled intervention continues to be required due to decreased cognitive skills and decreased independence with activities of daily living. Compliance with Program/Exercises: Will assess as treatment progresses. Reason for Continuation of Services/Other Comments: 
· Patient continues to require skilled intervention due to medical complications. Recommendations/Intent for next treatment session: \"Treatment next visit will focus on goals\". Total Treatment Duration: 
Time In: 1300 Time Out: 9389 Holmes Frankel, M. Karmen Simpler

## 2018-11-02 NOTE — PROGRESS NOTES
Shraddha Kimble : 1955 Primary: [facility] Duke Ppo Secondary:  [facility] [place] [id number] [facility], [address], [place] [address]. [phone]   [phone] OUTPATIENT OCCUPATIONAL THERAPY: Daily Note 2018 ICD-10: Treatment Diagnosis:  
Hemiplegia and hemiparesis following cerebral infarction affecting right dominant side (I69.351) Muscle weakness (generalized) (M62.81) Precautions/Allergies:  
Banana; Lisinopril; and Nuts [tree nut] Fall Risk Score: 5 (? 5 = High Risk) MD Orders: eval and treat MEDICAL/REFERRING DIAGNOSIS:  
Cerebral infarction, unspecified [I63.9] Weakness [R53.1] DATE OF ONSET: 2017 REFERRING PHYSICIAN: Juliette Do* RETURN PHYSICIAN APPOINTMENT: unknown INITIAL ASSESSMENT:  Ms. Jael Madrigal presents impaired AROM, strength, coordination, and sensation of her dominant RUE. Upon evaluation, pt reports her problems are completing feeding, self-grooming, and LB dressing. Pt would benefit from skilled OT services to increase functional use of RUE during ADL performance and maximize safety during occupational performance. PLAN OF CARE:  
PROBLEM LIST: 
1. Decreased Strength 2. Decreased ADL/Functional Activities 3. Decreased Transfer Abilities 4. Decreased Balance 5. Decreased Flexibility/Joint Mobility 6. Decreased Cognition INTERVENTIONS PLANNED: 
1. Activities of daily living training 2. Adaptive equipment training 3. Balance training 4. Clothing management 5. Community reintergration 6. Donning&doffing training 7. Manual therapy training 8. Modalities 9. Neuromuscular re-eduation 10. Re-evaluation 11. Sensory reintegration training 12. Therapeutic activity 13. Therapeutic exercise TREATMENT PLAN: 
Effective Dates: 2018 TO 2018 (90 days). Frequency/Duration: 1-2x/day for 90 Days GOALS: (Goals have been discussed and agreed upon with patient.) Short-Term Functional Goals: Time Frame: 45 days 1. Patient will be independent with HEP within 3 visits of initial evaluation in order to maintain gains achieved during therapy. 2. Patient will complete self-grooming with modified independence and adaptive equipment as needed. 3. Patient will increase use of RUE as a functional assist in at least 50% of daily activities Discharge Goals: Time Frame: 60 days 1. Patient will bathe with minimal assistance and adaptive equipment as needed. 2. Patient will feed self with modified independence and adaptive equipment as needed. 3. Patient will dress with modified independence and adaptive equipment as needed. 4. Patient will use right upper extremity as a functional assist in at least 75% of daily activities. Rehabilitation Potential For Stated Goals: Fair Regarding Marcia Iraheta's therapy, I certify that the treatment plan above will be carried out by a therapist or under their direction. Thank you for this referral, 
Mirna France, OT Referring Physician Signature: Juliette Smith* _________________________  Date _________ The information in this section was collected on 18 (except where otherwise noted). OCCUPATIONAL PROFILE & HISTORY:  
History of Present Injury/Illness (Reason for Referral): CVA with hospital and inpatient rehab. Pt had been receiving home health after her discharge from inpatient. Past Medical History/Comorbidities: Ms. Alvie Kussmaul  has a past medical history of Anxiety, CVA (cerebral vascular accident) ([facility] [address] Depression, HTN (hypertension), Menopause, and PTE (pulmonary thromboembolism) ([facility] [address] Ms. Alvie Kussmaul  has a past surgical history that includes hx jennifer and bso (); hx  section; hx refractive surgery (); hx other surgical (); and hx other surgical (). Social History/Living Environment:  
   
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 Dominant Side:  
      RIGHT Personal Factors:   
      Sex:  female Age:  58 y.o. Current Medications:   
Current Outpatient Medications:  
  rOPINIRole (REQUIP) 2 mg tablet, Take 1 Tab by mouth nightly. for restless leg, Disp: 90 Tab, Rfl: 0 
  FLUoxetine (PROZAC) 20 mg tablet, Take 2 Tabs by mouth daily. , Disp: 180 Tab, Rfl: 0 
  tiZANidine (ZANAFLEX) 4 mg tablet, 4mg po TID, Disp: 270 Tab, Rfl: 0 
  labetalol (NORMODYNE) 200 mg tablet, Take 1 Tab by mouth two (2) times a day., Disp: 180 Tab, Rfl: 0 
  losartan-hydroCHLOROthiazide (HYZAAR) 100-12.5 mg per tablet, Take 1 Tab by mouth daily. , Disp: 90 Tab, Rfl: 0 
  polyethylene glycol (MIRALAX) 17 gram/dose powder, Take 17 g by mouth daily. , Disp: 510 g, Rfl: 2 Date Last Reviewed:  11/2/2018 Complexity Level : Expanded review of therapy/medical records (1-2):  MODERATE COMPLEXITY ASSESSMENT OF OCCUPATIONAL PERFORMANCE:  
ROM:   
       
    
 
 
Strength:   
       
    
 
 
Mental Status: Activities of Daily Living:  
     
Basic ADLs (From Assessment) Complex ADLs (From Assessment) Grooming/Bathing/Dressing Activities of Daily Living Sensation:  
      Impaired light sensation Physical Skills Involved: 1. Range of Motion 2. Balance 3. Strength 4. Activity Tolerance 5. Sensation 6. Fine Motor Control 7. Gross Motor Control Cognitive Skills Affected (resulting in the inability to perform in a timely and safe manner): 1. Executive Function 2. Immediate Memory 3. Short Term Recall 4. Long Term Memory 5. Sustained Attention 6. Divided Attention Psychosocial Skills Affected: 1. Habits/Routines 2. Environmental Adaptation Number of elements that affect the Plan of Care: 5+:  HIGH COMPLEXITY CLINICAL DECISION MAKING:  
Outcome Measure: Barthel Index of Activities of Daily Living CN CM CL CK CJ CI CH  
0 1-3 4-7 8-11 12-15 16-19 20 Total Score: 15/20 (9/5/18) Medical Necessity:  
· Patient is expected to demonstrate progress in strength, range of motion, balance, coordination and functional technique to decrease assistance required with ADLs and functional mobilityl. Reason for Services/Other Comments: 
· Patient continues to require skilled intervention due to medical complications and patient unable to attend/participate in therapy as expected. Clinical Decision-Making Assessment: This case is of moderate clinical decision making due to pt's limited functional use of RUE and RLE limiting occupational performance for all ADLs. Assessment process, impact of co-morbidities, assessment modification\need for assistance, and selection of interventions: Analytical Complexity:MODERATE COMPLEXITY  
TREATMENT:  
(In addition to Assessment/Re-Assessment sessions the following treatments were rendered) Pre-treatment Symptoms/Complaints:  Pt states, \" Im tired today. .. I had speech and physical therapy before you. \" 
Pain: Initial:  
Pain Intensity 1: 0 /10  Post Session:  same Therapeutic Exercise: ( 25 min):  Exercises per grid below to improve mobility, strength, balance and coordination. Required moderate visual and verbal cues to promote proper body alignment, promote proper body posture and promote proper body mechanics. Progressed resistance, range, repetitions and complexity of movement as indicated. Date: 
9/5/18 Date: 
9/10/18 Date: 
9/14/18 Date: 
10/12/18 Date: 
11/2/18 Activity/Exercise Parameters Parameters Parameters Shoulder Shrugs   X 10 reps X 10 reps X 10 reps Scapular Protraction/retraction   X 10 reps X 10 reps X 10 reps Hand Rosenhayn    10# for 2 sets 10 10 reps  10# for 2 sets 10 10 reps UBE  Level 2.5 - 7 minutes  Level 2.5 - 7 minutes  Level 2.5 - 7 minutes  Level 2.5 - 7 minutes Modified Push-ups    3 sets x 10 reps 3 sets x 10 reps Resistive clothespin    Red resistive clothespin 1-x 10 reps  Red resistive clothespin 1-x 10 reps Shoulder Arc    Use of LUE to grasp and release ~10 pegs over arc. Measurements Taken HEP Shoulder Flexion/Extension PROM X 10 reps PROM X 10 reps PROM X 10 reps Wrist flexion/extension PROM x 10 reps PROM X 10 reps PROM X 10 reps PROM X 10 reps PROM X 10 reps Elbow Flexion/Extension PROM x 10 reps PROM X 10 reps PROM X 10 reps PROM X 10 reps PROM X 10 reps Finger Flexion/Extension PROM x 10 reps PROM X 10 reps PROM X 10 reps Neuromuscular Re-education: ( 15 minutes):  Exercise/activities per grid below to improve balance, coordination, kinesthetic sense and proprioception. Required moderate visual and verbal cues to promote motor control of right, upper extremity(s). Patient completed lateral weight shifts sitting at edge of mat x 10 reps each onto elbow/forearms with dynamic reach with left hand outside of base of support, suing RUE to maintain balance. Completed lateral weight shifts onto outstretched right hand with min A to laminarin position of right hand on mat x 10 reps Self Care: (0 min): Procedure(s) (per grid) utilized to improve and/or restore self-care/home management as related to self feeding. Required minimal verbal and   cueing to facilitate activities of daily living skills. Treatment/Session Assessment:Pt presents with increased elbow, wrist and digit flexor tone today. Pt's required no verbal or visual cueing for dynamic weight shifts outside base of support; demo'd improved dynamic sitting balance today. Pt would continue to benefit from skilled OT services to increase functional use of RUE during ADLs and for decreased caregiver burden. · Response to Treatment:  Pt tolerated exercises well with no issues noted. · Compliance with Program/Exercises: Will assess as treatment progresses. · Recommendations/Intent for next treatment session: \"Next visit will focus on advancements to more challenging activities and reduction in assistance provided\". Total Treatment Duration: OT Patient Time In/Time Out Time In: 9583 Time Out: 6818 Gunnar Renee OT

## 2018-11-09 ENCOUNTER — HOSPITAL ENCOUNTER (OUTPATIENT)
Dept: PHYSICAL THERAPY | Age: 63
Discharge: HOME | End: 2018-11-09
Attending: PHYSICAL MEDICINE & REHABILITATION
Payer: COMMERCIAL

## 2018-11-09 ENCOUNTER — HOSPITAL ENCOUNTER (OUTPATIENT)
Dept: PHYSICAL THERAPY | Age: 63
Discharge: HOME | End: 2018-11-09

## 2018-11-09 NOTE — PROGRESS NOTES
[facility] [address], [address] [place] [address],[address] [place], [address]  Phone: [phone]   Fax: [phone]    OUTPATIENT DAILY NOTE    NAME/AGE/GENDER: Emmie Vinson is a 58 y.o. female. DATE: 11/9/2018    Patient cancelled her appointment for today due to illness. Will plan to follow up on next scheduled visit.     Brooklynn Clark PT

## 2018-11-09 NOTE — PROGRESS NOTES
Paul Mendes : 1955 Primary: Kerri Duke Ppo Secondary:  [facility] [place] [id number] [facility], [address], [place] [address]. [phone]   [phone] OT NOTE: 
 
Pt called to cancel her OT appointment for this afternoon due to caregiver having shingles. Will follow up at next visit.   
 
Jared Campbell OTR/[place]

## 2018-11-09 NOTE — PROGRESS NOTES
Emre Schuler MD,   Medical Director  [address] [facility], [address] [facility]  Tel: [phone]       SFD PROGRESS NOTE    Hung Ching  Admit Date: 9/15/2017  Admit Diagnosis: stroke, left brain involvement;ICH (intracerebral hemorrhag*    Subjective     Slept better with increased trazadone. Was a little dizzy during noc. Feels well this a.m. No bm x 2 d, taking MOM; no n/v or pain. Appetite is good    Objective:     Current Facility-Administered Medications   Medication Dose Route Frequency    rOPINIRole (REQUIP) tablet 2 mg  2 mg Oral QHS    tiZANidine (ZANAFLEX) tablet 2 mg  2 mg Oral BID    tiZANidine (ZANAFLEX) tablet 4 mg  4 mg Oral QHS    amLODIPine (NORVASC) tablet 2.5 mg  2.5 mg Oral DAILY    potassium chloride (K-DUR, KLOR-CON) SR tablet 20 mEq  20 mEq Oral DAILY    senna-docusate (PERICOLACE) 8.6-50 mg per tablet 1 Tab  1 Tab Oral DAILY    polyethylene glycol (MIRALAX) packet 17 g  17 g Oral DAILY    methylphenidate HCl (RITALIN) tablet 5 mg  5 mg Oral BID    labetalol (NORMODYNE) tablet 100 mg  100 mg Oral BID    losartan/hydroCHLOROthiazide (HYZAAR) 100/12.5 mg   Oral DAILY    acetaminophen (TYLENOL) tablet 500 mg  500 mg Per NG tube Q4H PRN    alum-mag hydroxide-simeth (MYLANTA) oral suspension 30 mL  30 mL Oral Q4H PRN    bisacodyl (DULCOLAX) suppository 10 mg  10 mg Rectal DAILY PRN    hydrALAZINE (APRESOLINE) tablet 50 mg  50 mg Oral QID PRN    lip protectant (BLISTEX) ointment   Topical PRN    ondansetron (ZOFRAN ODT) tablet 4 mg  4 mg Oral Q6H PRN    sodium phosphate (FLEET'S) enema 118 mL  1 Enema Rectal PRN    traMADol (ULTRAM) tablet 50 mg  50 mg Oral Q6H PRN    traZODone (DESYREL) tablet 50 mg  50 mg Oral QHS PRN    tamsulosin (FLOMAX) capsule 0.4 mg  0.4 mg Oral QHS     Review of Systems:Denies chest pain, shortness of breath, cough, headache, visual problems, abdominal pain, dysurea, calf pain.  Pertinent positives are as noted in the medical records and unremarkable otherwise. Visit Vitals    /61 (BP 1 Location: Left arm, BP Patient Position: At rest)    Pulse 85    Temp 99.4 °F (37.4 °C)    Resp 14    SpO2 96%        Physical Exam:   General: Alert and age appropriately oriented. No acute cardio respiratory distress. Affect bright   HEENT: Normocephalic,no scleral icterus  Oral mucosa moist without cyanosis, right facial weakness   Lungs: Clear to auscultation  bilaterally. Respiration even and unlabored   Heart: Regular rate and rhythm, S1, S2   No  murmurs, clicks, rub or gallops   Abdomen: Soft, non-tender, nondistended. Bowel sounds present. No organomegaly. Genitourinary: Benign . Neuromuscular:      No significant change from exam yesterday  No incr in hypertonicity  No clonus at right ankle  Right hemiplegia. prox shoulder retraction, a trace of deltoid o/w 0/5  RLE KE 2+, hip flexion 2+, 0/5 distally   Skin/extremity: No rashes, no erythema.  No calf tenderness BLE  No edema                                                                            Functional Assessment:  Gross Assessment  AROM:  (LLE WFL, RLE AAROM WFL except DF -10) (10/13/17 1300)  PROM:  (WFL except right ankle PF contracture) (10/13/17 1300)  Strength:  (LLE WFL, RLE) (10/13/17 1300)  Coordination:  (LLE intact, RLE impaired) (10/13/17 1300)  Tone:  (extensor spasticity) (10/13/17 1300)  Sensation: Impaired (RLE to light touch and proprioception) (10/13/17 1300)       Balance  Sitting - Static: Good (unsupported) (10/13/17 1300)  Sitting - Dynamic: Fair (occasional) (10/13/17 1300)  Standing - Static: Fair (with hand rail) (10/13/17 1300)  Standing - Dynamic : Impaired (10/13/17 1300)           Toileting  Adaptive Equipment: Elevated seat;Walker (09/29/17 9594)         Samuel King Fall Risk Assessment:  Samuel King Fall Risk  Mobility: Ambulates or transfers with assist devices or assistance/unsteady gait (10/14/17 8944)  Mobility Interventions: Bed/chair exit alarm (10/14/17 0747)  Mentation: Alert, oriented x 3 (10/14/17 0747)  Mentation Interventions: Bed/chair exit alarm (10/14/17 0747)  Medication: Patient receiving anticonvulsants, sedatives(tranquilizers), psychotropics or hypnotics, hypoglycemics, narcotics, sleep aids, antihypertensives, laxatives, or diuretics (10/14/17 0747)  Medication Interventions: Bed/chair exit alarm (10/14/17 0747)  Elimination: Needs assistance with toileting (10/14/17 0747)  Elimination Interventions: Call light in reach (10/14/17 0747)  Prior Fall History: No (10/14/17 0747)  History of Falls Interventions: Bed/chair exit alarm (10/14/17 0747)  Total Score: 3 (10/14/17 0747)  Standard Fall Precautions: Yes (10/14/17 0747)  High Fall Risk: Yes (10/14/17 0747)     Speech Assessment:         Ambulation:  Gait  Base of Support: Narrowed; Center of gravity altered (09/22/17 1200)  Speed/Lulu: Delayed (09/22/17 1200)  Step Length: Right shortened (09/22/17 1200)  Distance (ft): 30 Feet (ft) (30ft x 1 with hemiwalker,20ft x 3 with left hand rail) (10/13/17 1300)  Assistive Device: Gait belt;Walker icndy;Brace/Splint (PRAFO, hand rail) (10/13/17 1300)     Labs/Studies:  No results found for this or any previous visit (from the past 72 hour(s)).     Assessment:     Problem List as of 10/14/2017  Date Reviewed: 9/12/2017          Codes Class Noted - Resolved    Thrombocytopenia ([facility] ICD-10-CM: D69.6  ICD-9-CM: 287.5  9/25/2017 - Present        Pulmonary emboli (HCC) ICD-10-CM: I26.99  ICD-9-CM: 415.19  9/25/2017 - Present        DVT (deep venous thrombosis) ([facility] ICD-10-CM: I82.409  ICD-9-CM: 453.40  9/25/2017 - Present        * (Principal)ICH (intracerebral hemorrhage) ([facility] ICD-10-CM: I61.9  ICD-9-CM: 609  9/15/2017 - Present        Hypertensive urgency, malignant ICD-10-CM: I16.0  ICD-9-CM: 401.0  9/11/2017 - Present        Stroke, hemorrhagic ([facility] ICD-10-CM: I61.9  ICD-9-CM: 135  9/7/2017 - Present        Accelerated hypertension ICD-10-CM: I10  ICD-9-CM: 401.0  9/7/2017 - Present        At risk for aspiration (Chronic) ICD-10-CM: Z91.89  ICD-9-CM: V49.89  9/7/2017 - Present        Acute spontaneous intraventricular hemorrhage assoc w/ hypertension ([facility] ICD-10-CM: I61.5, I10  ICD-9-CM: 958, 401.9  9/7/2017 - Present        Screening for breast cancer ICD-10-CM: Z12.31  ICD-9-CM: V76.10  2/27/2017 - Present        Atrophic vaginitis ICD-10-CM: N95.2  ICD-9-CM: 627.3  7/11/2016 - Present        HTN (hypertension) (Chronic) ICD-10-CM: I10  ICD-9-CM: 401.9  10/23/2015 - Present        Depression ICD-10-CM: F32.9  ICD-9-CM: 447  10/23/2015 - Present        Anxiety (Chronic) ICD-10-CM: F41.9  ICD-9-CM: 300.00  10/23/2015 - Present        RESOLVED: Hypoxemia ICD-10-CM: R09.02  ICD-9-CM: 799.02  9/10/2017 - 9/12/2017        RESOLVED: Acute respiratory failure ([facility] ICD-10-CM: J96.00  ICD-9-CM: 518.81  9/8/2017 - 9/10/2017        RESOLVED: Hemorrhagic stroke ([facility] ICD-10-CM: I61.9  ICD-9-CM: 600  9/7/2017 - 9/7/2017        RESOLVED: Non-intractable vomiting with nausea ICD-10-CM: R11.2  ICD-9-CM: 787.01  9/7/2017 - 9/12/2017        RESOLVED: Seborrheic keratosis, inflamed ICD-10-CM: L82.0  ICD-9-CM: 702.11  7/11/2016 - 9/20/2016        RESOLVED: Cough ICD-10-CM: R05  ICD-9-CM: 786.2  7/11/2016 - 9/20/2016        RESOLVED: ADD (attention deficit disorder) (Chronic) ICD-10-CM: F98.8  ICD-9-CM: 314.00  10/23/2015 - 10/13/2017        RESOLVED: Shoulder pain ICD-10-CM: M25.519  ICD-9-CM: 719.41  10/23/2015 - 2/27/2017                     Left BG Hemorrhage with intraventricular extension due to HTN emergency; resultant R hemiplegia, right neglect, dysarthria, aphasia, dysphagia with significant decline in mobility and self care  Plan:   Continue daily physician medical management:  Pneumonia prophylaxis- Incentive spirometer every hour while awake. Will need instruction and assistance.  Not sure if she can get a proper oral seal to be effective  -10/9 no pulmonary issues      Dysphagia; Pureed diet with NTL; at risk for malnutrition and dehydration. PICC line removed prior to transfer; may need a line for IVFs if BUN continues to increase. Low K, Ca; check mg. Supplementation as needed. Na elevated  -mag ok, K much improved; cont to supplement while on diuretic  -9/19 replace K; 3.0; 2.8 this am 9/20; inc supplement; may need to add to IVFs  -9/25 fluid status and K nl; 9/28 cont NTL; high risk aspiration; still needs supervision /assist with meals  -10/2 MBS today; thin liq!!  -10/9 po intake, especially fluids, has improved; feeding self      Thrombocytopenia; has been trending down for no apparent reason. hgb slt down as well. Has not been on Hep products; consult hematology today 9/19  -plts 40K, continues to trend down 9/20; await consult by hematology. ? reln to [address]. Mild anemia as well  Check HIT panel, d dimer , fibrinogen  -9/21 plts 36k ; HIT PROFILE POSITIVE, D DIMER ELEVATED 32.79, FIBRINOGEN  ; consistent with DIC; Etiology unclear  WILL D/W HEMATOLOGY; LFTs ok, no hx of blood transfusion,no hx pancreatitis, no sign of bacteremia. Has not been on any heparin products. Can see in head injury; has ICH. Cannot r/o blood disorder (leukemia)   per hematology \"Recommend transfusing platelets for <33,260 and Cryoprecipitate for fibrinogen < 100. \"  -MARK pending  -9/22 bilateral LE venous duplex ordered; low suspicion but at risk and having calf tenderness  -9/22 plts improved today; monitor closely; 9/23 small right peroneal thrombus. No antic per hematology. IR refused/advised against placing IVF due to low risk of clot propagation given size and location  -9/23 counts bumping up; pts now 53k from 45 and previously 36  -9/24 plts cont to improve. 69K. MARK still pending; can resume therapies  9/26 Ddimer pending, fibrinogen now nl  -9/27 D dimer 11+, improved.  Per Hematology; likely due to consumption coagulopathy. Will monitor  -10/2 labs have improved; MARK + 68% confirming dx of HIT; 10/4 recheck cbc (last done 9/28)    -10/5 plts 263; 10/9 plts 283      Bilateral PEs 9/25, filter placed in IR; looks good today. sats good without O2 supplement 94-97% sats; d/w Hematology, no agaratroban  -apprec Pulm assessment. Clinically looks good and no longer symptomatic  -9/28 clinically stable, asymptomatic ; 10/3 stable. No sob, no O2 supplements; 10/9 remains asymptomatic      Hypernatremia; last head CT did not show significant cerebral edema. Clinically improving. Likely due to prerenal azotemia; 9/15 MAY REQ isotonic / hypotonic saline IV;  -9/18 events of weekend noted; Na 156; asymptomatic, on 0.5 dextrose with 1/4 Na; na q 6hrs; pts serum osmolality was normal. Will check urine Na and urine osmolality; depending on findings, will consider consulting Hospitalist vs Nephrology  Neuro improving despite this  -9/19 Na down to 149, urine osmo nl, serum osmo min hi; cont dex/and 1/4 NS; repeat in am. Not DI as uop not increased  -9/20 Na 147; cont fluids; 9/21 not resulted; will stop fluids when Na below 142  -Na 140 9/22; dc IVFs; 9/25 136; 9/27 136; recheck 10/4 134; mildly low; f/u 10/9 pending      ICH/IVH; 9/22 clinically improves daily. F/u HCT yest due to transient left eye visual disturbance. Overall, resolving hemorrhage. There is more pronounced edema; expected. Ventricular size now nl      Anemia ; 9.6-10/ stable; recheck 10/9 10.5 improved       DVT risk / DVT Prophylaxis- Will require daily physician exam to assess for signs and symptoms as patient is at increased risk for of thromboembolism. Mobilization as tolerated. Intermittent pneumatic compression devices when in bed Thigh-high or knee-high thromboembolic deterrent hose when out of bed.  NO  anticoag due to ICH/HIT; has right small right peroneal vein; 9/25 spoke with IR again today; more risk of putting in IVC filter than benefits especially since she is asymptomatic with no swelling or tenderness and small size of clot  -10/3 no edema, right calf soft. No SOB  -10/9 no calf swelling or tenderness      Pain Control: stable, mild-to-moderate joint symptoms intermittently, reasonably well controlled by PRN meds. Will require regular pain assessment and comprenhensive pain management,       Wound Care: Monitor wound status daily per staff and physician. At risk for failure. Will require 24/7 rehab nursing. None needed at this time      Hypertension - BP uncontrolled, fluctuating, managed medically. Add prn hydralazine for sbp> 180. Goal SBP is 140-160 given ICH. Cont Normodyne, norvasc and hyzaar; consider Verapamil or scheduled Hydralazine  -9/18 BP increased 169/95; add hydralazine 25 tid  -9/20 BP much improved  -9/22 /74; 9/24 bp stable; may dec hydralazine to bid  -9/25 dec hydralazine  -9/27 bp 116-119  -9/28 SBP in the 90s to 116, asymptomatic; dc hydralazine and monitor; decrease Normodyne to bid  -9/29 BP still low; lower Norvasc and normodyne, cont Hyzaar; all have parameters of when to hold  - 9/30 SBP overnight and this am 108-111, will d/c norvasc, on labetolol and losartan/HCTZ , HR acceptable  -10/2 115/71; controlled. Cont current meds  -130-151/77; 10/5 118/77; 10/6 BP stable; goal 120-130 given ICH  -10/9 148/81; add low dose Norvasc; ideally want less than 130; 10/11 130/73  10/13 remains better controlled      Mild hyperglycemia, likely stress induced vs borderline diabetes; monitor loosely  -9/27 bs 145 on BMP; follow bs qam x 3d; no issues      Spasticity; had added zanaflex; 10/12 adjust for desired effect; change to 2mg tid and 4mg qhs; AMBULATED YESTERDAY AND ADVANCED OWN LEG!!!!!!  -10/13 ? Component of RLS at noc; consider trial of Requip  -10/14 increased trazadone, slept better. Add low dose requip at noc      Dysphagia; cont NTL, mech soft; hopefully can upgrade liquids soon 9/25  -9/29 remains on NTL.  MBS scheduled for 10/2.  -10/3 now on THINS! !      Leukocytosis; recheck in a.m. Check UA due to stafford. No pulmonary signs (had neg CXR today), no s/s of infxn at old PICC site, no wounds, afebrile. Watch monocytes. -9/18 12.2 improving. Urine neg  -9/19 up to 13.2, ? Reactive. No source of infxn identified. Afebrile  -9/20 WBC now normal      Urinary retention/ neurogenic bladder - start flomax but continue stafford until mobility improves a bit. Strict I/o's  -9/20 keeping stafford to monitor UOP until Na normalizes. No significant output to suggest DI  -9/21 dc stafford; cont Flomax, follow bladder scan  9/22 voiding without issue; incontinence but no retention  -9/28 new onset urinary retention; check UA  -9/29 UA neg but cx saying > 100K gram neg cocci; will need to f/u ID/sens; Start Cipro 500 bid  - 9/30 preliminary urine cx >110K mixed skin denis  -10/2 stop Cipro; cx nl denis  -10/6 recurrent urinary retention. No flomax; re check UA, consider urecholine. CIC for PVRs  -10/9 dc scans; 10/13 per nursing intermittent need for CIC      bowel program - add prn meds; incontinent, want to keep stool on the firmer side. Monitor skin.       GERD - add a PPI. At times may need additional antacids, Maalox prn.      -continues to benefit from and is showing improvements in a multidisc team approach. Participating well. 10/5 progress slow; 10/6 OT is seeing improvements. Starting to get movement back into the right arm. Awareness improving  -10/6 add ritalin for attn and distractibility   -10/9 family reports increased attn with them; await therapy reports   -10/13 continues to benefit from [address]   9/21   Addendum; in therapy this afternoon, transient left eye blindness. Will f/u head CT for extension of or new ICH, especially with low plts   Results  1. Interval decrease in size of intraventricular hemorrhage within the left  basal ganglia/thalamus with near complete resolution of intraventricular  hemorrhage.  Surrounding edema is more pronounced with slight increase in  left-to-right midline shift. 2. Interval decrease in ventricular size, approaching normal in caliber.           - FAMILY [id number]/5 REGARDING PROGRESS, NEEDS, DC PLAN; family overwhelmed with decision that need to be made. Family training Monday in case insurance denies as of 10/10      10/9 will await [employer]'s decision; in order to dc home, as of 10/5 we were asking for 2-3 more weeks since she is improving , albeit slowly. If a further stay is denied; will need SNF; family not prepared to bring home  10/10 excellent progress made. Surprising to PT. Definitely have made a step forward and would benefit from ongoing acute inpt rehab to maximize recovery      -10/11 Insurance has graciously extended stay with next update 10/17! !  -10/13 continues to show progress in PT, continues with OT and ST; speech improvements noted             Time spent was 25 minutes with over 1/2 in direct patient care/examination, consultation and coordination of care.      Signed By: Gino Avila MD     October 14, 2017 No

## 2018-11-09 NOTE — PROGRESS NOTES
Speech Pathology:  
 
Patient cancelled due to caregiver having the shingles. Will continue with current plan of care. JOSE Ko

## 2018-11-16 ENCOUNTER — HOSPITAL ENCOUNTER (OUTPATIENT)
Dept: PHYSICAL THERAPY | Age: 63
Discharge: HOME | End: 2018-11-16

## 2018-11-16 ENCOUNTER — HOSPITAL ENCOUNTER (OUTPATIENT)
Dept: PHYSICAL THERAPY | Age: 63
Discharge: HOME | End: 2018-11-16
Payer: COMMERCIAL

## 2018-11-16 NOTE — PROGRESS NOTES
Troy Case : [id number] Primary: [facility] Aep[facility] Ppo Secondary:  Therapy Center at [address] [place] [address], [address], [facility]. [phone]   [phone] OT NOTE: 
 
Ms. Flores Estrada called to cancel her 1:45 OT appointment for today. Pt did not give reason. Will follow-up at next scheduled visit.   
 
Thanks, 
Chevy Mcclendon, OTR/L

## 2018-11-16 NOTE — PROGRESS NOTES
Patient cancelled today's appointment, reason unknown. Will plan to follow up on next visit. JOSE Toscano

## 2018-11-16 NOTE — PROGRESS NOTES
Therapy Center at [address], [address] [facility],[address] [place], [address]  Phone: [phone]   Fax: [phone]    OUTPATIENT DAILY NOTE    NAME/AGE/GENDER: Priya Warren is a 58 y.o. female. DATE: 11/16/2018    Patient cancelled her appointment for today due to unknown reasons. Will plan to follow up on next scheduled visit.     Jud Potts, PT

## 2018-11-22 NOTE — PROGRESS NOTES
Active Problems   1. Cardiomyopathy, ischemic (I25.5)   2. Hyperlipidemia (E78.5)   3. Hypotension (I95.9)   4. NSTEMI, initial episode of care (I21.4)    Current Meds   1. Delta Thyroid 30 MG Oral Tablet; TAKE 1 TABLET DAILY;   Therapy: (Recorded:[id number]) to Recorded   2. Aspir-Low 81 MG Oral Tablet Delayed Release; TAKE 1 TABLET DAILY;   Therapy: (Recorded:61Ghe5184) to Recorded   3. Atorvastatin Calcium 20 MG Oral Tablet; TAKE 1 TABLET BY MOUTH EVERY NIGHT AT   BEDTIME;   Therapy: [id number] to (Evaluate:12Jan2018)  Requested for: [id number]; Last   Rx:[id number] Ordered   4. Carvedilol 12.5 MG Oral Tablet; TAKE 1 TABLET BY MOUTH TWICE DAILY;   Therapy: 61Ksa9970 to (Evaluate:12Jan2018)  Requested for: 67Ivr5156; Last   Rx:[id number] Ordered   5. Clopidogrel Bisulfate 75 MG Oral Tablet; TAKE 1 TABLET BY MOUTH DAILY;   Therapy: (Recorded:74Tad7376) to Recorded   6. Levothyroxine Sodium 75 MCG Oral Tablet; TAKE 1 TABLET DAILY;   Therapy: (Recorded:[id number]) to Recorded   7. Multivitamins Oral Capsule;   Therapy: (Recorded:01Mwc5438) to Recorded   8. Omeprazole 40 MG Oral Capsule Delayed Release; TAKE 1 CAPSULE BY MOUTH   EVERY DAY;   Therapy: (Recorded:34Kve5145) to Recorded   9. Torsemide 20 MG Oral Tablet; TAKE 1 TABLET ONCE DAILY;   Therapy: (Recorded:18Oct2017) to Recorded   10. Vitamin D-3 TABS;    Therapy: (Recorded:73Oys0122) to Recorded    Allergies   1. No Known Drug Allergies    Orders   1. Entresto 24-26 MG Oral Tablet; TAKE 1 TABLET BY MOUTH TWICE A DAY    Message   Recorded as Task   Date: 10/24/2017 10:02 AM, Created By: Omar Nuno   Task Name: 4. Patient Message   Assigned To: SILVINA LANDAVERDE   Regarding Patient: KAI OWEN, Status: Inactive   Comment:    Omar Nuno - 24 Oct 2017 10:02 AM     TASK CREATED  Patient's PCP:  none  Message:  Patient's daughter (Antonella) called with concerns of her mother's erratic weight change. Daughter states that her mother's weight has been varied by 4-5  Dr Val Kearney unsuccessful attempts to place art line. Bilateral radial sites, no bleeding or hematoma. lbs daily since her visit with Dr Gonsalez. Daughter wants to know how she should adjust patient's Torsemide. Patient and daughter have been tracking weight daily with the following readings:  Oct 18th: 124.6 lbs  Oct 19th: 129.2 lbs  Oct 20th: 124.2 lbs  Oct 21st: 130.6 lbs  Oct 22nd: 124.4 lbs  Oct 23rd: 129.8 lbs  Oct 24th: 131.0 lbs    **Pls advise  Caller's relationship to patient:  other     If other, name and relationship:  Antonella Stovall (daughter)  Contact phone number:  [phone]  cell   Jodi Suero - 24 Oct 2017 10:22 AM     TASK EDITED  spoke with daughter, she states she has been having her mother taking 2 torsemides daily if weight is over 125#, daughter states that it seems like a daily occurrence lately, will discuss with SG   Joel Mckeon - 24 Oct 2017 10:33 AM     TASK EDITED  PT CALLED SHE HAD MORE IN FOR POLLY NAZARIO. CALL HANDED OFF FOR Jodi Donohue RN - 24 Oct 2017 11:25 AM     TASK EDITED  daughter called back, per her mother pt only slightly SOB at night and denies any abd bloating.    Discussed with SG, the following recommendations:  -Dc Isosorbide Mononitrate daily  -Start Entresto 24mg/26mg BID    Daughter called with plan, reviewed her mothers medications with include NO ACE or ARB's, Daughter will stop by office    EMR med list updated   Jodi Suero 24 Oct 2017 11:42 AM     TASK EDITED  per SG, pt to continue present Torsemide dosing, 2 tablets if weight over 125#, continue to monitor and call in a week with status  [facility]-script sent to [facility] for Entresto     Signatures   Electronically signed by : Jodi Suero R.N.; Oct 24 2017 11:43AM CST

## 2018-11-30 ENCOUNTER — HOSPITAL ENCOUNTER (OUTPATIENT)
Dept: PHYSICAL THERAPY | Age: 63
End: 2018-11-30
Payer: COMMERCIAL

## 2018-12-04 ENCOUNTER — ANESTHESIA EVENT (OUTPATIENT)
Dept: SURGERY | Age: 63
End: 2018-12-04
Payer: COMMERCIAL

## 2018-12-05 ENCOUNTER — ANESTHESIA (OUTPATIENT)
Dept: SURGERY | Age: 63
End: 2018-12-05
Payer: COMMERCIAL

## 2018-12-05 ENCOUNTER — HOSPITAL ENCOUNTER (OUTPATIENT)
Age: 63
Setting detail: OUTPATIENT SURGERY
Discharge: HOME | End: 2018-12-05
Attending: ORTHOPAEDIC SURGERY | Admitting: ORTHOPAEDIC SURGERY
Payer: COMMERCIAL

## 2018-12-05 VITALS
SYSTOLIC BLOOD PRESSURE: 143 MMHG | OXYGEN SATURATION: 96 % | RESPIRATION RATE: 16 BRPM | BODY MASS INDEX: 23.23 KG/M2 | WEIGHT: 115 LBS | DIASTOLIC BLOOD PRESSURE: 76 MMHG | HEART RATE: 93 BPM | TEMPERATURE: 97.6 F

## 2018-12-05 LAB — POTASSIUM BLD-SCNC: 4 MMOL/L (ref 3.5–5.1)

## 2018-12-05 PROCEDURE — 76010010054 HC POST OP PAIN BLOCK

## 2018-12-05 PROCEDURE — 77030018836 HC SOL IRR NACL ICUM -A: Performed by: ORTHOPAEDIC SURGERY

## 2018-12-05 PROCEDURE — 76942 ECHO GUIDE FOR BIOPSY: CPT | Performed by: ORTHOPAEDIC SURGERY

## 2018-12-05 PROCEDURE — 77030000032 HC CUF TRNQT ZIMM -B: Performed by: ORTHOPAEDIC SURGERY

## 2018-12-05 PROCEDURE — 76010000162 HC OR TIME 1.5 TO 2 HR INTENSV-TIER 1: Performed by: ORTHOPAEDIC SURGERY

## 2018-12-05 PROCEDURE — 77030003602 HC NDL NRV BLK BBMI -B: Performed by: ANESTHESIOLOGY

## 2018-12-05 PROCEDURE — 84132 ASSAY OF SERUM POTASSIUM: CPT

## 2018-12-05 PROCEDURE — 76060000035 HC ANESTHESIA 2 TO 2.5 HR: Performed by: ORTHOPAEDIC SURGERY

## 2018-12-05 PROCEDURE — 76010010054 HC POST OP PAIN BLOCK: Performed by: ORTHOPAEDIC SURGERY

## 2018-12-05 PROCEDURE — 77030002916 HC SUT ETHLN J&J -A: Performed by: ORTHOPAEDIC SURGERY

## 2018-12-05 PROCEDURE — 76210000063 HC OR PH I REC FIRST 0.5 HR: Performed by: ORTHOPAEDIC SURGERY

## 2018-12-05 PROCEDURE — 77030031139 HC SUT VCRL2 J&J -A: Performed by: ORTHOPAEDIC SURGERY

## 2018-12-05 PROCEDURE — 77030019940 HC BLNKT HYPOTHRM STRY -B: Performed by: ANESTHESIOLOGY

## 2018-12-05 PROCEDURE — 74011000250 HC RX REV CODE- 250

## 2018-12-05 PROCEDURE — 74011250636 HC RX REV CODE- 250/636: Performed by: ANESTHESIOLOGY

## 2018-12-05 PROCEDURE — 74011250636 HC RX REV CODE- 250/636

## 2018-12-05 PROCEDURE — 77030021122 HC SPLNT MAT FST BSNM -A: Performed by: ORTHOPAEDIC SURGERY

## 2018-12-05 PROCEDURE — 77030002966 HC SUT PDS J&J -A: Performed by: ORTHOPAEDIC SURGERY

## 2018-12-05 PROCEDURE — 77030010509 HC AIRWY LMA MSK TELE -A: Performed by: ANESTHESIOLOGY

## 2018-12-05 PROCEDURE — 77030002933 HC SUT MCRYL J&J -A: Performed by: ORTHOPAEDIC SURGERY

## 2018-12-05 PROCEDURE — 76210000021 HC REC RM PH II 0.5 TO 1 HR: Performed by: ORTHOPAEDIC SURGERY

## 2018-12-05 PROCEDURE — C1713 ANCHOR/SCREW BN/BN,TIS/BN: HCPCS | Performed by: ORTHOPAEDIC SURGERY

## 2018-12-05 DEVICE — TWINFIX ULTRA 4.5 MM POLY L LACTIC                                    ACID-HA SUTURE ANCHOR WITH TWO NO.2                                    ULTRABRAID SUTURES BLUE,                                    BLUE-COBRAID WITH NEEDLES
Type: IMPLANTABLE DEVICE | Site: FOOT | Status: FUNCTIONAL
Brand: TWINFIX

## 2018-12-05 RX ORDER — PROPOFOL 10 MG/ML
INJECTION, EMULSION INTRAVENOUS
Status: DISCONTINUED | OUTPATIENT
Start: 2018-12-05 | End: 2018-12-05 | Stop reason: HOSPADM

## 2018-12-05 RX ORDER — SODIUM CHLORIDE 0.9 % (FLUSH) 0.9 %
5-10 SYRINGE (ML) INJECTION EVERY 8 HOURS
Status: DISCONTINUED | OUTPATIENT
Start: 2018-12-05 | End: 2018-12-05 | Stop reason: HOSPADM

## 2018-12-05 RX ORDER — SODIUM CHLORIDE 0.9 % (FLUSH) 0.9 %
5-10 SYRINGE (ML) INJECTION EVERY 8 HOURS
Status: CANCELLED | OUTPATIENT
Start: 2018-12-05

## 2018-12-05 RX ORDER — SODIUM CHLORIDE, SODIUM LACTATE, POTASSIUM CHLORIDE, CALCIUM CHLORIDE 600; 310; 30; 20 MG/100ML; MG/100ML; MG/100ML; MG/100ML
25 INJECTION, SOLUTION INTRAVENOUS CONTINUOUS
Status: DISCONTINUED | OUTPATIENT
Start: 2018-12-05 | End: 2018-12-05 | Stop reason: HOSPADM

## 2018-12-05 RX ORDER — NALOXONE HYDROCHLORIDE 0.4 MG/ML
0.2 INJECTION, SOLUTION INTRAMUSCULAR; INTRAVENOUS; SUBCUTANEOUS AS NEEDED
Status: DISCONTINUED | OUTPATIENT
Start: 2018-12-05 | End: 2018-12-05 | Stop reason: HOSPADM

## 2018-12-05 RX ORDER — MIDAZOLAM HYDROCHLORIDE 1 MG/ML
2 INJECTION, SOLUTION INTRAMUSCULAR; INTRAVENOUS
Status: DISCONTINUED | OUTPATIENT
Start: 2018-12-05 | End: 2018-12-05 | Stop reason: HOSPADM

## 2018-12-05 RX ORDER — CEFAZOLIN SODIUM/WATER 2 G/20 ML
2 SYRINGE (ML) INTRAVENOUS ONCE
Status: DISCONTINUED | OUTPATIENT
Start: 2018-12-05 | End: 2018-12-05 | Stop reason: HOSPADM

## 2018-12-05 RX ORDER — FENTANYL CITRATE 50 UG/ML
INJECTION, SOLUTION INTRAMUSCULAR; INTRAVENOUS AS NEEDED
Status: DISCONTINUED | OUTPATIENT
Start: 2018-12-05 | End: 2018-12-05 | Stop reason: HOSPADM

## 2018-12-05 RX ORDER — SODIUM CHLORIDE 0.9 % (FLUSH) 0.9 %
5-10 SYRINGE (ML) INJECTION AS NEEDED
Status: CANCELLED | OUTPATIENT
Start: 2018-12-05

## 2018-12-05 RX ORDER — NALOXONE HYDROCHLORIDE 0.4 MG/ML
0.2 INJECTION, SOLUTION INTRAMUSCULAR; INTRAVENOUS; SUBCUTANEOUS
Status: DISCONTINUED | OUTPATIENT
Start: 2018-12-05 | End: 2018-12-05 | Stop reason: HOSPADM

## 2018-12-05 RX ORDER — PROPOFOL 10 MG/ML
INJECTION, EMULSION INTRAVENOUS AS NEEDED
Status: DISCONTINUED | OUTPATIENT
Start: 2018-12-05 | End: 2018-12-05 | Stop reason: HOSPADM

## 2018-12-05 RX ORDER — ROPIVACAINE HYDROCHLORIDE 5 MG/ML
INJECTION, SOLUTION EPIDURAL; INFILTRATION; PERINEURAL
Status: COMPLETED | OUTPATIENT
Start: 2018-12-05 | End: 2018-12-05

## 2018-12-05 RX ORDER — HYDRALAZINE HYDROCHLORIDE 20 MG/ML
10 INJECTION INTRAMUSCULAR; INTRAVENOUS ONCE
Status: COMPLETED | OUTPATIENT
Start: 2018-12-05 | End: 2018-12-05

## 2018-12-05 RX ORDER — SODIUM CHLORIDE, SODIUM LACTATE, POTASSIUM CHLORIDE, CALCIUM CHLORIDE 600; 310; 30; 20 MG/100ML; MG/100ML; MG/100ML; MG/100ML
75 INJECTION, SOLUTION INTRAVENOUS CONTINUOUS
Status: DISCONTINUED | OUTPATIENT
Start: 2018-12-05 | End: 2018-12-05 | Stop reason: HOSPADM

## 2018-12-05 RX ORDER — HYDROMORPHONE HYDROCHLORIDE 2 MG/ML
0.5 INJECTION, SOLUTION INTRAMUSCULAR; INTRAVENOUS; SUBCUTANEOUS
Status: DISCONTINUED | OUTPATIENT
Start: 2018-12-05 | End: 2018-12-05 | Stop reason: HOSPADM

## 2018-12-05 RX ORDER — OXYCODONE HYDROCHLORIDE 5 MG/1
5 TABLET ORAL
Status: DISCONTINUED | OUTPATIENT
Start: 2018-12-05 | End: 2018-12-05 | Stop reason: HOSPADM

## 2018-12-05 RX ORDER — PROPOFOL 10 MG/ML
INJECTION, EMULSION INTRAVENOUS AS NEEDED
Status: DISCONTINUED | OUTPATIENT
Start: 2018-12-05 | End: 2018-12-05

## 2018-12-05 RX ORDER — SODIUM CHLORIDE 0.9 % (FLUSH) 0.9 %
5-10 SYRINGE (ML) INJECTION AS NEEDED
Status: DISCONTINUED | OUTPATIENT
Start: 2018-12-05 | End: 2018-12-05 | Stop reason: HOSPADM

## 2018-12-05 RX ORDER — FENTANYL CITRATE 50 UG/ML
100 INJECTION, SOLUTION INTRAMUSCULAR; INTRAVENOUS ONCE
Status: COMPLETED | OUTPATIENT
Start: 2018-12-05 | End: 2018-12-05

## 2018-12-05 RX ORDER — LIDOCAINE HYDROCHLORIDE 10 MG/ML
0.1 INJECTION INFILTRATION; PERINEURAL AS NEEDED
Status: DISCONTINUED | OUTPATIENT
Start: 2018-12-05 | End: 2018-12-05 | Stop reason: HOSPADM

## 2018-12-05 RX ORDER — ONDANSETRON 2 MG/ML
4 INJECTION INTRAMUSCULAR; INTRAVENOUS
Status: DISCONTINUED | OUTPATIENT
Start: 2018-12-05 | End: 2018-12-05 | Stop reason: HOSPADM

## 2018-12-05 RX ORDER — MIDAZOLAM HYDROCHLORIDE 1 MG/ML
2 INJECTION, SOLUTION INTRAMUSCULAR; INTRAVENOUS ONCE
Status: DISCONTINUED | OUTPATIENT
Start: 2018-12-05 | End: 2018-12-05 | Stop reason: HOSPADM

## 2018-12-05 RX ORDER — SODIUM CHLORIDE, SODIUM LACTATE, POTASSIUM CHLORIDE, CALCIUM CHLORIDE 600; 310; 30; 20 MG/100ML; MG/100ML; MG/100ML; MG/100ML
INJECTION, SOLUTION INTRAVENOUS
Status: DISCONTINUED | OUTPATIENT
Start: 2018-12-05 | End: 2018-12-05 | Stop reason: HOSPADM

## 2018-12-05 RX ORDER — EPHEDRINE SULFATE 50 MG/ML
INJECTION, SOLUTION INTRAVENOUS AS NEEDED
Status: DISCONTINUED | OUTPATIENT
Start: 2018-12-05 | End: 2018-12-05 | Stop reason: HOSPADM

## 2018-12-05 RX ADMIN — LIDOCAINE HYDROCHLORIDE 50 MG: 10 INJECTION, SOLUTION INFILTRATION; PERINEURAL at 08:35

## 2018-12-05 RX ADMIN — EPHEDRINE SULFATE 10 MG: 50 INJECTION, SOLUTION INTRAVENOUS at 09:34

## 2018-12-05 RX ADMIN — PROPOFOL 100 MG: 10 INJECTION, EMULSION INTRAVENOUS at 08:50

## 2018-12-05 RX ADMIN — EPHEDRINE SULFATE 5 MG: 50 INJECTION, SOLUTION INTRAVENOUS at 09:15

## 2018-12-05 RX ADMIN — EPHEDRINE SULFATE 5 MG: 50 INJECTION, SOLUTION INTRAVENOUS at 09:48

## 2018-12-05 RX ADMIN — EPHEDRINE SULFATE 5 MG: 50 INJECTION, SOLUTION INTRAVENOUS at 10:07

## 2018-12-05 RX ADMIN — EPHEDRINE SULFATE 10 MG: 50 INJECTION, SOLUTION INTRAVENOUS at 09:08

## 2018-12-05 RX ADMIN — SODIUM CHLORIDE, SODIUM LACTATE, POTASSIUM CHLORIDE, CALCIUM CHLORIDE: 600; 310; 30; 20 INJECTION, SOLUTION INTRAVENOUS at 07:45

## 2018-12-05 RX ADMIN — FENTANYL CITRATE 50 MCG: 50 INJECTION, SOLUTION INTRAMUSCULAR; INTRAVENOUS at 08:54

## 2018-12-05 RX ADMIN — HYDRALAZINE HYDROCHLORIDE 10 MG: 20 INJECTION INTRAMUSCULAR; INTRAVENOUS at 08:06

## 2018-12-05 RX ADMIN — FENTANYL CITRATE 50 MCG: 50 INJECTION INTRAMUSCULAR; INTRAVENOUS at 07:41

## 2018-12-05 RX ADMIN — ROPIVACAINE HYDROCHLORIDE 25 ML: 5 INJECTION, SOLUTION EPIDURAL; INFILTRATION; PERINEURAL at 07:45

## 2018-12-05 RX ADMIN — PROPOFOL 100 MCG/KG/MIN: 10 INJECTION, EMULSION INTRAVENOUS at 08:37

## 2018-12-05 RX ADMIN — SODIUM CHLORIDE, SODIUM LACTATE, POTASSIUM CHLORIDE, CALCIUM CHLORIDE: 600; 310; 30; 20 INJECTION, SOLUTION INTRAVENOUS at 09:00

## 2018-12-05 RX ADMIN — EPHEDRINE SULFATE 10 MG: 50 INJECTION, SOLUTION INTRAVENOUS at 09:07

## 2018-12-05 RX ADMIN — EPHEDRINE SULFATE 5 MG: 50 INJECTION, SOLUTION INTRAVENOUS at 09:13

## 2018-12-05 RX ADMIN — ROPIVACAINE HYDROCHLORIDE 10 ML: 5 INJECTION, SOLUTION EPIDURAL; INFILTRATION; PERINEURAL at 07:46

## 2018-12-05 RX ADMIN — SODIUM CHLORIDE, SODIUM LACTATE, POTASSIUM CHLORIDE, AND CALCIUM CHLORIDE 25 ML/HR: 600; 310; 30; 20 INJECTION, SOLUTION INTRAVENOUS at 07:00

## 2018-12-05 NOTE — ANESTHESIA PROCEDURE NOTES
Peripheral Block Start time: 12/5/2018 7:45 AM 
End time: 12/5/2018 7:46 AM 
Performed by: Sal Ghosh MD 
Authorized by: Sal Ghosh MD  
 
 
Pre-procedure: Indications: at surgeon's request and post-op pain management Preanesthetic Checklist: patient identified, risks and benefits discussed, site marked, timeout performed, anesthesia consent given and patient being monitored Timeout Time: 07:45 Block Type:  
Block Type: Adductor canal 
Laterality:  Right Monitoring:  Standard ASA monitoring, responsive to questions, continuous pulse ox, oxygen, frequent vital sign checks and heart rate Injection Technique:  Single shot Procedures: nerve stimulator Patient Position: supine Prep: chlorhexidine Location:  Mid thigh Needle Type:  Stimuplex Needle Gauge:  22 G Needle Localization:  Ultrasound guidance Assessment: 
Number of attempts:  1 Injection Assessment:  Incremental injection every 5 mL, negative aspiration for CSF, ultrasound image on chart, no paresthesia, local visualized surrounding nerve on ultrasound, negative aspiration for blood and no intravascular symptoms Patient tolerance:  Patient tolerated the procedure well with no immediate complications

## 2018-12-05 NOTE — ANESTHESIA PREPROCEDURE EVALUATION
Anesthetic History No history of anesthetic complications Review of Systems / Medical History Patient summary reviewed and pertinent labs reviewed Pulmonary Within defined limits Neuro/Psych CVA Psychiatric history (Depression/Anxiety) Cardiovascular Hypertension: well controlled Exercise tolerance: >4 METS Comments: Denies CP or SOB  
GI/Hepatic/Renal 
Within defined limits Endo/Other Within defined limits Other Findings Comments: Zi filter Physical Exam 
 
Airway Mallampati: II 
TM Distance: 4 - 6 cm Neck ROM: normal range of motion Mouth opening: Normal 
 
 Cardiovascular Rhythm: regular Rate: normal 
 
Murmur: Grade 2, Aortic area Dental 
 
Dentition: Implants Pulmonary Breath sounds clear to auscultation Abdominal 
GI exam deferred Other Findings Anesthetic Plan ASA: 3 Anesthesia type: total IV anesthesia Post-op pain plan if not by surgeon: peripheral nerve block single Induction: Intravenous Anesthetic plan and risks discussed with: Patient and Family

## 2018-12-05 NOTE — ANESTHESIA POSTPROCEDURE EVALUATION
Procedure(s): RIGHT PLIT ANTERIOR TIBIAL TENDON TRANSFER 
RIGHT ACHILLES LENGTHENING 
RIGHT COMPLETE POSTEROMEDIAL RELEASE 
RIGHT ANKLE LIGAMENT RECONSTRUCTION/ CHOICE. Anesthesia Post Evaluation Multimodal analgesia: multimodal analgesia used between 6 hours prior to anesthesia start to PACU discharge Patient location during evaluation: bedside Patient participation: complete - patient participated Level of consciousness: awake and alert Pain score: 0 Pain management: adequate Airway patency: patent Anesthetic complications: no 
Cardiovascular status: acceptable Respiratory status: acceptable Hydration status: acceptable Comments: Pt doing well. Ok to d/c home. Post anesthesia nausea and vomiting:  none Visit Vitals BP (P) 138/73 (BP 1 Location: Left arm) Pulse 92 Temp 36.4 °C (97.6 °F) Resp 14 Wt 52.2 kg (115 lb) SpO2 97% BMI 23.23 kg/m²

## 2018-12-05 NOTE — DISCHARGE INSTRUCTIONS
INSTRUCTIONS FOLLOWING FOOT SURGERY    ACTIVITY    Elevate foot (feet) for 48 hours. NO ICE   Use wheel chair as directed by your doctor. No weight bearing on operative foot. Reduce risk of blood clots by Lovenox injections and movement. DIET  Clear liquids until no nausea or vomiting; then light diet for the first day. Advance to regular diet on second day, unless your doctor orders otherwise. Avoid greasy and spicy food today to reduce nausea. PAIN  Take pain medications as directed by your doctor. Call your doctor if pain is NOT relieved by medication. DO NOT take aspirin or blood thinners until directed by your doctor. Begin takimng pain pills when sensation returns or at bedtime even if still numb  Take with food to reduce nausea. May cause constipation Use stool softener    DRESSING CARE  Keep clean and dry until follow up appointment. Must wrap in plastic when bathing       Wearing a Fiberglass Cast: Care Instructions  Your Care Instructions    A cast protects a broken bone or other injury while it heals. Most casts are made of fiberglass. After a cast is put on, you can't remove it yourself. Your doctor or a technician will take it off. Follow-up care is a key part of your treatment and safety. Be sure to make and go to all appointments, and call your doctor if you are having problems. It's also a good idea to know your test results and keep a list of the medicines you take. How can you care for yourself at home? General care  · Follow your doctor's instructions for when you can start using the limb that has the cast. Fiberglass casts dry quickly and are soon hard enough to protect the injured arm or leg. · When it's okay to put weight on your leg or foot cast, don't stand or walk on it unless it's designed for walking. · Prop up the injured arm or leg on a pillow anytime you sit or lie down during the first 3 days. Try to keep it above the level of your heart.  This will help reduce swelling. · Put ice or a cold pack on your cast for 10 to 20 minutes at a time. Try to do this every 1 to 2 hours for the next 3 days (when you are awake). Put a thin cloth between the ice and your cast. Keep the cast dry. · Be safe with medicines. Read and follow all instructions on the label. ? If the doctor gave you a prescription medicine for pain, take it as prescribed. ? If you are not taking a prescription pain medicine, ask your doctor if you can take an over-the-counter medicine. · Do exercises as instructed by your doctor or physical therapist. These exercises will help keep your muscles strong and your joints flexible while you heal.  · Wiggle your fingers or toes on the injured arm or leg often. This helps reduce swelling and stiffness. Water and your cast  · Try to keep your cast as dry as you can. The fiberglass part of your cast can get wet. But getting the inside wet can cause problems. · Use a bag or tape a sheet of plastic to cover your cast when you take a shower or bath or when you have any other contact with water. (Don't take a bath unless you can keep the cast out of the water.) Moisture can collect under the cast and cause skin irritation and itching. It can make infection more likely if you have had surgery or have a wound under the cast.  · If you have a water-resistant cast, ask your doctor how often it can get wet and how to take care of it. Cast and skin care  · Try blowing cool air from a hair dryer or fan into the cast to help relieve itching. Never stick items under your cast to scratch the skin. · Don't use oils or lotions near your cast. If the skin gets red or irritated around the edge of the cast, you may pad the edges with a soft material or use tape to cover them. When should you call for help?   Call your doctor now or seek immediate medical care if:    · You have increased or severe pain.     · You feel a warm or painful spot under the cast.     · You have problems with your cast. For example:  ? The skin under the cast burns or stings. ? The cast feels too tight. ? There is a lot of swelling near the cast. (Some swelling is normal.)  ? You have a new fever. ? There is drainage or a bad smell coming from the cast.     · Your foot or hand is cool or pale or changes color.     · You have trouble moving your fingers or toes.     · You have symptoms of a blood clot in your arm or leg (called a deep vein thrombosis). These may include:  ? Pain in the arm, calf, back of the knee, thigh, or groin. ? Redness and swelling in the arm, leg, or groin.    Watch closely for changes in your health, and be sure to contact your doctor if:    · The cast is breaking apart.     · You are not getting better as expected. Where can you learn more? Go to http://johan-grazyna.info/. Enter 454 [id number] in the search box to learn more about \"Wearing a Fiberglass Cast: Care Instructions. \"  Current as of: December 9, 2017  Content Version: 11.8  © 4435-0877 [employer]. Care instructions adapted under license by [employer] (which disclaims liability or warranty for this information). If you have questions about a medical condition or this instruction, always ask your healthcare professional. [place] [address] any warranty or liability for your use of this information. FOLLOW-UP PHONE CALLS  Calls will be made by nursing staff. If you have any problems, call your doctor as needed. CALL YOUR DOCTOR IF YOU HAVE  Excessive bleeding that does not stop after holding mild pressure over the area. Temperature of 101 degrees or above. Redness, excessive swelling or bruising, and/or green or yellow, smelly discharge from incision. Loss of sensation - cold, white or blue toes. AFTER ANESTHESIA  For the first 24 hours and while taking narcotics for pain: DO NOT Drive, Drink Alcoholic beverages, or make important Decisions.   Be aware of dizziness following anesthesia and while taking pain medication. OTHER INSTRUCTIONS    APPOINTMENT DATE/TIME: Keep scheduled appointment    YOUR DOCTOR'S PHONE NUMBER [phone]      DISCHARGE SUMMARY from Nurse    PATIENT INSTRUCTIONS:    After general anesthesia or intravenous sedation, for 24 hours or while taking prescription Narcotics:  · Limit your activities  · Do not drive and operate hazardous machinery  · Do not make important personal or business decisions  · Do  not drink alcoholic beverages  · If you have not urinated within 8 hours after discharge, please contact your surgeon on call. *  Please give a list of your current medications to your Primary Care Provider. *  Please update this list whenever your medications are discontinued, doses are      changed, or new medications (including over-the-counter products) are added. *  Please carry medication information at all times in case of emergency situations. These are general instructions for a healthy lifestyle:    No smoking/ No tobacco products/ Avoid exposure to second hand smoke    Surgeon General's Warning:  Quitting smoking now greatly reduces serious risk to your health. Obesity, smoking, and sedentary lifestyle greatly increases your risk for illness    A healthy diet, regular physical exercise & weight monitoring are important for maintaining a healthy lifestyle    You may be retaining fluid if you have a history of heart failure or if you experience any of the following symptoms:  Weight gain of 3 pounds or more overnight or 5 pounds in a week, increased swelling in our hands or feet or shortness of breath while lying flat in bed. Please call your doctor as soon as you notice any of these symptoms; do not wait until your next office visit.     Recognize signs and symptoms of STROKE:    F-face looks uneven    A-arms unable to move or move unevenly    S-speech slurred or non-existent    T-time-call 911 as soon as signs and symptoms begin-DO NOT go       Back to bed or wait to see if you get better-TIME IS BRAIN.

## 2018-12-05 NOTE — ANESTHESIA PROCEDURE NOTES
Peripheral Block Start time: 12/5/2018 7:41 AM 
End time: 12/5/2018 7:45 AM 
Performed by: Hailey Hernandez MD 
Authorized by: Hailey Hernandez MD  
 
 
Pre-procedure: Indications: at surgeon's request and post-op pain management Preanesthetic Checklist: patient identified, risks and benefits discussed, site marked, timeout performed, anesthesia consent given and patient being monitored Timeout Time: 07:41 Block Type:  
Block Type:  Popliteal 
Laterality:  Right Monitoring:  Standard ASA monitoring, responsive to questions, continuous pulse ox, oxygen, frequent vital sign checks and heart rate Injection Technique:  Single shot Procedures: ultrasound guided and nerve stimulator Patient Position: supine Prep: chlorhexidine Location:  Lower thigh Needle Type:  Stimuplex Needle Gauge:  22 G Needle Localization:  Nerve stimulator and ultrasound guidance Motor Response: minimal motor response >0.4 mA Assessment: 
Number of attempts:  1 Injection Assessment:  Incremental injection every 5 mL, negative aspiration for CSF, ultrasound image on chart, no paresthesia, local visualized surrounding nerve on ultrasound, negative aspiration for blood and no intravascular symptoms Patient tolerance:  Patient tolerated the procedure well with no immediate complications

## 2018-12-05 NOTE — H&P
HPI:    2017 CVA - RLE involvement - recovered speech, swallowing   She tried a [employer] [facility]; Dr. Fahad Osorio Botox x 2     5/3/2018- ultrasound = negative for DVT      Location of pain - ankle/foot   Type/severity pain -    dull aching throbbing  Aggravating factors -  all standing, walking and a lot of activity  Alleviating factors -  rest    PMSFH:  Included on the patient history form ; reviewed  MEDS:          FLUoxetine HCl; Labetalol HCl; Losartan Potassium; ROPINIRole HCl; TiZANidine HCl  ALLERGIES:  NKDA  PMH:  2017 CVA - HTN, DVT/PE - [place] filter, RLS   SOCIAL: Unemployed: : non smoker   ROS:  Per POA form: positive and negative system reviews were discussed. I tried to relate any positive system review to the musculoskeletal complaint. For all others, recommend the PCP or any specialists    PE: The physical exam encompasses evaluation of both lower extremities:  Patient is alert and oriented. Nutrition, appearance and mood all okay. RESPIRATIONS:  Unlabored with no obvious shortness of breath. CV:  Appears to have pedal pulses, color, capillary refill, subungual color. Varicosities     NEURO:  Abnormal reflexes; no clonus. Sensation appears mostly intact to light touch and sharp/dull discrimination. SLR negative. No popliteal tenderness     SKIN:  Age nails; no swelling; no keloids    MS:  Hard to assess gait - equinovarus   Equinocavovarus is not completely reducible, but appears soft tissue; no bone block  Had to discern strength; contractures;  limited motion and gait; ?? instability; no crepitance                UE exam - contracture w/ poor non-functioning        XR: Right side - NWB Mortise, AP, lateral ankle and AP, oblique foot taken prior w/ no collapse arthritis or fracture, but osteopenia.   XR Impression:  As above      DX:  Right equinocavovarus, multiple contractures    S/p neurological event, motor neuropathy      The patient and I discussed the above diagnoses and explored different options. Due to her contractures - she is not braceable       Discussed her DVT/PE - recommend Lovenox post-op      Surgery - Right   split anterior tibial tendon transfer     Achilles lengthening   complete posteromedial release      ankle ligament reconstruction     op - anesthesia choice - 1 ½ hours    Martinez/Nephmary anchors, drills      The patient understands the diagnosis w/ conservative and surgical treatment. Surgery, the risks and complications, and the expected postoperative course are all outlined. Understands generalized risks and complications associated with any surgery, but specifically with the cavovarus repair/reconstruction, understands malposition, malunion, and/or delayed union, any of which may require repeat surgical treatment. Understands the risk of skin and deep infection, the risk of bone infection and the use of internal and possible external fixation that may require future hardware removal.  Understands the goal of surgery is attempted realignment and pain reduction but not complete pain relief. Understands failure and the possible need to then proceed w/ a fusion, or long-term use of a brace or insole. Understands the risk of skin and deep infection, the risk of bone infection.   The patient accepts      Work - regular - sitting

## 2018-12-05 NOTE — H&P
Outpatient Surgery History and Physical: 
Yimi Bran CHIEF COMPLAINT:   LE 
 
PE: 
  
Visit Vitals BP (!) 170/93 (BP 1 Location: Left arm, BP Patient Position: Supine) Pulse 81 Temp 97.8 °F (36.6 °C) Resp 18 Wt 52.2 kg (115 lb) SpO2 94% BMI 23.23 kg/m² Heart:  No noted problems Lungs:  No noted problems Past Medical History:   
Patient Active Problem List  
 Diagnosis  Spastic hemiplegia of right dominant side as late effect of nontraumatic intraparenchymal hemorrhage of brain ([address]  Right foot drop  Choking  Sudden idiopathic hearing loss of right ear with unrestricted hearing of left ear  Impacted cerumen of right ear  Cramp in limb  Other insomnia  Other constipation  Thrombocytopenia ([address]  DVT (deep venous thrombosis) ([address]  ICH (intracerebral hemorrhage) ([address]  Stroke, hemorrhagic ([address]  Accelerated hypertension  At risk for aspiration  Acute spontaneous intraventricular hemorrhage assoc w/ hypertension (HCC)  Screening for breast cancer  CVA (cerebral vascular accident) ([address] ICH Left BG with residual R hemiparesis  PTE (pulmonary thromboembolism) ([address] Related DVT  Atrophic vaginitis  HTN (hypertension)  Unipolar depression ([address]  Anxiety Surgical History:  
Past Surgical History:  
Procedure Laterality Date  HX  SECTION    
 x 3  
 HX OTHER SURGICAL  2012 Face lift  HX OTHER SURGICAL  2017 IVC filter  HX REFRACTIVE SURGERY   [address] [place] [address] Social History: Patient  reports that she has quit smoking. she has never used smokeless tobacco. She reports that she does not drink alcohol or use drugs. Family History:  
Family History Problem Relation Age of Onset  Alcohol abuse Mother  Cancer Mother   
     lung cancer  Cancer Father   
     throat cancer  Psychiatric Disorder Father   
     anxiety, depression  No Known Problems Sister Allergies: Reviewed per EMR Allergies Allergen Reactions  Banana Shortness of Breath  Lisinopril Cough  Nuts [Tree Nut] Hives Medications: No current facility-administered medications on file prior to encounter. Current Outpatient Medications on File Prior to Encounter Medication Sig  
 rOPINIRole (REQUIP) 2 mg tablet Take 1 Tab by mouth nightly. for restless leg  FLUoxetine (PROZAC) 20 mg tablet Take 2 Tabs by mouth daily.  tiZANidine (ZANAFLEX) 4 mg tablet 4mg po TID (Patient taking differently: Take 4 mg by mouth three (3) times daily as needed. Pt is taking this only at night)  labetalol (NORMODYNE) 200 mg tablet Take 1 Tab by mouth two (2) times a day.  losartan-hydroCHLOROthiazide (HYZAAR) 100-12.5 mg per tablet Take 1 Tab by mouth daily.  polyethylene glycol (MIRALAX) 17 gram/dose powder Take 17 g by mouth daily. (Patient taking differently: Take 17 g by mouth daily as needed.) The surgery is planned for the Right ankle History and physical have been reviewed. There have been no significant  changes since the completion of the updated history and physical. 
 
Necessity for the procedure/care is still present and the updated history and physical office notes outline the full long term history of the problem. Please see the updated office notes for the full musculoskeletal examination and surgical plan.  
 
Signed By: Vianey Lockwood MD   
 December 5, 2018 7:20 AM

## 2018-12-06 NOTE — OP NOTES
[address] [place] [address]  MR#: [id number]  : 1955  ACCOUNT #: [de-identified]   DATE OF SERVICE: 2018    PREOPERATIVE DIAGNOSIS:  Right equinocavovarus deformity status post stroke. POSTOPERATIVE DIAGNOSES:  Right equinocavovarus deformity status post stroke. PROCEDURES PERFORMED:  1. Right split anterior tibial tendon transfer. 2.  Right Achilles lengthening. 3.  Right complete posteromedial release with joint and multiple tendon lengthenings. 4.  Plantar fascia release/Steindler stripping. 5.  Right Mora ankle reconstruction. SURGEON:  Gregory Gross MD    ANESTHESIA:  Regional plus general.    IV FLUIDS:  Crystalloid. ESTIMATED BLOOD LOSS:  Minimal.    SPECIMENS REMOVED:  None. DRAINS:  None. COMPLICATIONS:  None. TOURNIQUET TIME:  None. FINDINGS:  Intraoperatively, the patient had significant contractures of the posteromedial part of her ankle and hindfoot. She also had what appeared to be a completely deficient anterior tibial tendon or a prior ruptured anterior tibial tendon. I was able to use the extensor tendons and the peroneus tertius to try to reconstruct the tendon in a split transfer. SURGERY IN DETAIL:  After successful induction of regional and general anesthesia, right leg was scrubbed, prepped and draped in usual sterile fashion. Antibiotic issued. Time-out procedure and identification of surgical marking was done by me. The right leg was addressed with multiple approaches. Posterior approach for the Achilles for an Achilles lengthening. This was lengthened and about 4 cm and held with 0 Vicryl, Monocryl and Ethilon for the skin. Plantar fascia Steindler stripping performed through a plantar approach without difficulty. Wound closed with interrupted Ethilon. Through a dorsal approach, the split anterior tibial tendon transfer was performed without difficulty.   This allowed for tightening of the extensor tendons and peroneus tertius, as well as transferring a portion of the anterior tibial tendon to allow dorsiflexion. With that maneuver, ankle seemed to stay in dorsiflexion. The tendons were reconstructed with 0 PDS, Monocryl and Ethilon for the skin. Through a lateral approach, an Mora reconstruction was performed using a TwinFix anchor into the lateral tibia. The peroneus brevis was attached to the tibia with the TwinFix and then a 0 PDS for reconstruction of the ankle ligaments. Wound was then thoroughly cleaned and closed with Monocryl and Ethilon. The patient placed in a sterile dressing and a short leg splint and seemed to tolerate the procedures well.       MD LISA Dooley / RN  D: 12/05/2018 16:05     T: 12/06/2018 01:16  JOB #: [id number]

## 2019-01-02 NOTE — PROGRESS NOTES
Alize Leiva : 1955 Primary: [employer] [facility] Secondary:  [facility] [place] [id number] [facility], [address], [place] [address]. [phone]   [phone] OUTPATIENT OCCUPATIONAL THERAPY: Discontinuation Summary 2018 ICD-10: Treatment Diagnosis:  
Hemiplegia and hemiparesis following cerebral infarction affecting right dominant side (I69.351) Muscle weakness (generalized) (M62.81) Precautions/Allergies:  
Banana; Lisinopril; and Nuts [tree nut] Fall Risk Score: 5 (? 5 = High Risk) MD Orders: eval and treat MEDICAL/REFERRING DIAGNOSIS:  
Cerebral infarction, unspecified [I63.9] Weakness [R53.1] DATE OF ONSET: 2017 REFERRING PHYSICIAN: Juliette Shelby* RETURN PHYSICIAN APPOINTMENT: unknown Ms. Milton Boone did not return to OP OT following her 18 appointment. Ms. Milton Boone attended OT for a total of 5 visits, however progress towards goals was unable to be assessed. Pt will be discontinued from OT services at this time. Thank you for the opportunity to serve this patient. INITIAL ASSESSMENT:  Ms. Milton Boone presents impaired AROM, strength, coordination, and sensation of her dominant RUE. Upon evaluation, pt reports her problems are completing feeding, self-grooming, and LB dressing. Pt would benefit from skilled OT services to increase functional use of RUE during ADL performance and maximize safety during occupational performance. PLAN OF CARE:  
PROBLEM LIST: 
1. Decreased Strength 2. Decreased ADL/Functional Activities 3. Decreased Transfer Abilities 4. Decreased Balance 5. Decreased Flexibility/Joint Mobility 6. Decreased Cognition INTERVENTIONS PLANNED: 
1. Activities of daily living training 2. Adaptive equipment training 3. Balance training 4. Clothing management 5. Community reintergration 6. Donning&doffing training 7. Manual therapy training 8. Modalities 9. Neuromuscular re-eduation 10. Re-evaluation 11. Sensory reintegration training 12. Therapeutic activity 13. Therapeutic exercise TREATMENT PLAN: 
Effective Dates: 9/5/2018 TO 12/4/2018 (90 days). Frequency/Duration: 1-2x/day for 90 Days GOALS: (Goals have been discussed and agreed upon with patient.) Short-Term Functional Goals: Time Frame: 45 days 1. Patient will be independent with HEP within 3 visits of initial evaluation in order to maintain gains achieved during therapy. 2. Patient will complete self-grooming with modified independence and adaptive equipment as needed. 3. Patient will increase use of RUE as a functional assist in at least 50% of daily activities Discharge Goals: Time Frame: 60 days 1. Patient will bathe with minimal assistance and adaptive equipment as needed. 2. Patient will feed self with modified independence and adaptive equipment as needed. 3. Patient will dress with modified independence and adaptive equipment as needed. 4. Patient will use right upper extremity as a functional assist in at least 75% of daily activities. Rehabilitation Potential For Stated Goals: Fair Regarding Belkis Iraheta's therapy, I certify that the treatment plan above will be carried out by a therapist or under their direction. Thank you for this referral, 
Deborah Flores, OT Referring Physician Signature: Juliette Macedo* _________________________  Date _________

## 2019-01-08 ENCOUNTER — APPOINTMENT (OUTPATIENT)
Dept: PHYSICAL THERAPY | Age: 64
End: 2019-01-08
Payer: COMMERCIAL

## 2019-01-15 ENCOUNTER — HOSPITAL ENCOUNTER (OUTPATIENT)
Dept: PHYSICAL THERAPY | Age: 64
Discharge: HOME | End: 2019-01-15
Payer: COMMERCIAL

## 2019-01-15 PROCEDURE — 97161 PT EVAL LOW COMPLEX 20 MIN: CPT

## 2019-01-18 ENCOUNTER — HOSPITAL ENCOUNTER (OUTPATIENT)
Dept: PHYSICAL THERAPY | Age: 64
Discharge: HOME | End: 2019-01-18
Payer: COMMERCIAL

## 2019-01-18 PROCEDURE — 97110 THERAPEUTIC EXERCISES: CPT

## 2019-01-18 NOTE — PROGRESS NOTES
Dustin Aguirre  : 1955  Primary: Manjeet Samson Ppo  Secondary:  [address] at [address]  [phone]   [phone]       OUTPATIENT PHYSICAL THERAPY:Daily Note 2019   ICD-10: Treatment Diagnosis: Contracture of joint, foot, right M24.574, Difficulty in walking,not elsewhere classified R26.2  Precautions/Allergies:   Banana; Lisinopril; and Nuts Gwenlyn Slay nut]   MD Orders: Evaluate and treat MEDICAL/REFERRING DIAGNOSIS:  Balance disorder [R26.89]   DATE OF ONSET: Surgery 18  REFERRING PHYSICIAN: Guy Dale MD  RETURN PHYSICIAN APPOINTMENT: 19     INITIAL ASSESSMENT:  Ms. Veronika Reyes presents with decreased LE strength and balance deficits following recent surgery (18) for right equinocavovarus deformity in which the surgeon did the following: right split anterior tibial tendon transfer, right Achilles lengthening, right complete posteromedial release with joint and multiple tendon lengthenings, plantar fascia release/Steindler stripping, and right Mora ankle reconstruction. Pt has a complicated PMH with a CVA in 2017. However pt is motivated towards all goals established below. PROBLEM LIST (Impacting functional limitations):  1. Decreased Strength  2. Decreased Ambulation Ability/Technique  3. Decreased Balance  4. Decreased Flexibility/Joint Mobility INTERVENTIONS PLANNED: (Treatment may consist of any combination of the following)  1. Gait Training  2. Home Exercise Program (HEP)  3. Manual Therapy  4. Neuromuscular Re-education/Strengthening  5. Therapeutic Exercise/Strengthening   TREATMENT PLAN:  Effective Dates: 1/15/2019 TO 4/15/2019 (90 days). Frequency/Duration: 2 times a week for 90 Day(s)  GOALS: (Goals have been discussed and agreed upon with patient.)  Discharge Goals: Time Frame: 4/15/19  1.  Pt to ambulate 48' with quad cane without any increased pain or difficulty with independence. 2. Pt to demonstrate improvement as per Gipson score with a total score >45/56 points indicating decreased risk for falls. 3. Pt to achieve independence with HEP to maintain goals achieved. Rehabilitation Potential For Stated Goals: Good  Regarding Safia Iraheta's therapy, I certify that the treatment plan above will be carried out by a therapist or under their direction. Thank you for this referral,  Arin Zambrano PT               The information in this section was collected on 1/15/19 (except where otherwise noted). HISTORY:   History of Present Injury/Illness (Reason for Referral): Pt is a 58year old female s/p surgery to the right foot for right equinocavovarus deformity. She presents to therapy with impaired balance and decreased lower extremity strength limiting ability to participate at max functional potential. Pt stated that she had muscle spasms immediately after surgery into her right leg which have gone away. Pt denies any pain at this time however is uncomfortable due to having a boot on her right foot. Pt is eager to don her new foot brace and shoes once surgeon allows it. She was recently discharged from OT due to upcoming foot surgery. Pt has had PT following her CVA back in 2017 in which she was discharged following plateau of progress. Past Medical History/Comorbidities:   Ms. Nemesio Garcia  has a past medical history of Anxiety, CVA (cerebral vascular accident) ([address] (2017), Depression, HTN (hypertension), Menopause, and PTE (pulmonary thromboembolism) ([facility] [address] (2017). Ms. Nemesio Garcia  has a past surgical history that includes hx refractive surgery (); hx jennifer and bso (); hx  section; hx other surgical (); and hx other surgical (2017). Social History/Living Environment: Pt lives in a [facility] with her . The home has two floors with master bedroom on the 1st floor. Pt transfers and performs bed mobility with independence.       Prior Level of Function/Work/Activity: Pt prior to surgery was able to ambulate up to 50' at a time with her quad cane. She used her wheelchair for community distances. Pt has an aid that comes to the home 6 hours a day as her  works still. Ambulatory/Rehab Services H2 Model Falls Risk Assessment    Risk Factors:       No Risk Factors Identified Ability to Rise from Chair:       (1)  Pushes up, successful in one attempt    Falls Prevention Plan: Mobility Assistance Device (specify):  Quad cane and wheelchair for long distances   Total: (5 or greater = High Risk): 1    ©2010 [facility] . [facility] Patent #[id number]. Federal Law prohibits the replication, distribution or use without written permission from [facility] [employer]     Current Medications:       Current Outpatient Medications:     amLODIPine (NORVASC) 5 mg tablet, Take 1 Tab by mouth daily. , Disp: 30 Tab, Rfl: 0    baclofen (LIORESAL) 10 mg tablet, Take 1 Tab by mouth three (3) times daily. , Disp: 90 Tab, Rfl: 2    ALPRAZolam (XANAX) 0.25 mg tablet, Take 1 Tab by mouth three (3) times daily as needed for Anxiety. Max Daily Amount: 0.75 mg., Disp: 30 Tab, Rfl: 1    labetalol (NORMODYNE) 300 mg tablet, Take 1 Tab by mouth two (2) times a day., Disp: 60 Tab, Rfl: 1    rOPINIRole (REQUIP) 2 mg tablet, Take 1 Tab by mouth nightly. for restless leg, Disp: 90 Tab, Rfl: 0    FLUoxetine (PROZAC) 20 mg tablet, Take 2 Tabs by mouth daily. , Disp: 180 Tab, Rfl: 0    tiZANidine (ZANAFLEX) 4 mg tablet, 4mg po TID (Patient taking differently: Take 4 mg by mouth three (3) times daily as needed. Pt is taking this only at night), Disp: 270 Tab, Rfl: 0    losartan-hydroCHLOROthiazide (HYZAAR) 100-12.5 mg per tablet, Take 1 Tab by mouth daily. , Disp: 90 Tab, Rfl: 0    polyethylene glycol (MIRALAX) 17 gram/dose powder, Take 17 g by mouth daily.  (Patient taking differently: Take 17 g by mouth daily as needed.), Disp: 510 g, Rfl: 2   Date Last Reviewed:  1/15/19   Number of Personal Factors/Comorbidities that affect the Plan of Care: 0: LOW COMPLEXITY   EXAMINATION:   Observation/Orthostatic Postural Assessment:  Cam boot on right foot, right LE postures into ER. Right UE contracture in elbow and wrist/hand, right shoulder abduction 80 degrees of AROM        ROM:     AROM(PROM) Right Left   Knee flexion 120 125   Knee extension 0 0   Hip flexion 100 110   Ankle dorsiflexion (DF) - knee extended 0 5   Ankle plantarflexion 0 50     Strength:     Manual Muscle Test (*/5) Right Left   Knee extension 3-/5 4/5   Knee flexion 3/5 4/5   Hip flexion 3/5 4/5   Hip ER 3/5 4/5   Hip IR 3/5 4/5   Hip extension 3/5 4/5   Hip abduction 3/5 4/5   Hip adduction 2+/5 4/5   Ankle DF 1/5 4/5   Ankle PF 1/5 4/5      Body Structures Involved:  1. Joints  2. Muscles Body Functions Affected:  1. Neuromusculoskeletal  2. Movement Related Activities and Participation Affected:  1. Mobility  2. Self Care  3. Domestic Life   Number of elements (examined above) that affect the Plan of Care: 4+: HIGH COMPLEXITY   CLINICAL PRESENTATION:   Presentation: Stable and uncomplicated: LOW COMPLEXITY   CLINICAL DECISION MAKING:   Outcome Measure: Tool Used: Gipson Balance Scale  Score:  Initial: 38/56 Most Recent: X/56 (Date: -- )   Interpretation of Score: Each section is scored on a 0-4 scale, 0 representing the patients inability to perform the task and 4 representing independence. The scores of each section are added together for a total score of 56. The higher the patients score, the more independent the patient is. Any score below 45 indicates increased risk for falls. Medical Necessity:   · Patient demonstrates good rehab potential due to higher previous functional level. Reason for Services/Other Comments:  · Patient will benefit from therapy at this time to achieve goals established above.    Use of outcome tool(s) and clinical judgement create a POC that gives a: Clear prediction of patient's progress: LOW COMPLEXITY            TREATMENT:   (In addition to Assessment/Re-Assessment sessions the following treatments were rendered)  Pre-treatment Symptoms/Complaints:  Pt with no new complaints today. Is eager to see the doctor on Monday, hoping to start wearing the new foot brace for easier ambulation. Pain: Initial:   Pain Intensity 1: 0 0/10 Post Session:    0/10     Therapeutic Exercise: ( 60):  Exercises per grid below to improve mobility and strength. Required minimal verbal cues to promote proper body posture. Progressed complexity of movement as indicated. Date:  1/18/19 Date:   Date:     Activity/Exercise Parameters Parameters Parameters   Seated:   LAQ  Hip flexion  Adduction ball squeeze 3x10  2# LLE     Sit to stand 2x10     Supine:   SLR flexion, abduction  Bridging 3x10  2# LLE     Prone:  Hip extension, knee curls 2x10     Pt ambulated 50' with quad cane x3          Treatment/Session Assessment:    · Response to Treatment:  Pt given updated HEP and tolerated initiation of strengthening of the LE to prepare for gait training well. · Compliance with Program/Exercises: Will assess as treatment progresses. · Recommendations/Intent for next treatment session: \"Next visit will focus on advancements to more challenging activities\".   Total Treatment Duration:   60 minutes  PT Patient Time In/Time Out  Time In: 1400  Time Out: Kiko García [address], PT    Future Appointments   Date Time Provider Azalia Alberto   1/22/2019  2:00 PM Ralph Pandey [facility]   1/23/2019 11:15 AM MD TANG [facility]   1/25/2019  2:00 PM Tiffanie Metz, FRANCOIS [facility]   1/29/2019  2:00 PM Tiffanie Metz, PT [facility]   2/1/2019  2:00 PM Tiffanie Metz, PT [facility]   2/5/2019  2:00 PM Tiffanie Metz, PT [facility]   2/8/2019  2:00 PM Tiffanie Metz, PT [facility]   2/12/2019  2:00 PM Tiffanie Metz, PT [facility]   2/15/2019  2:00 PM Delmar Schofield, PT [facility]   2/19/2019  2:00 PM Delmar Schofield, PT [facility]   2/20/2019  9:00 AM Jessie Poole MD [facility]   2/22/2019  2:00 PM Delmar Schofield, PT [facility]   2/26/2019  2:00 PM FRANCOIS Rebolledo [facility]   3/1/2019  2:00 PM Delmar Schofield PT [facility]

## 2019-01-22 ENCOUNTER — HOSPITAL ENCOUNTER (OUTPATIENT)
Dept: PHYSICAL THERAPY | Age: 64
Discharge: HOME | End: 2019-01-22
Payer: COMMERCIAL

## 2019-01-22 PROCEDURE — 97110 THERAPEUTIC EXERCISES: CPT

## 2019-01-22 NOTE — PROGRESS NOTES
Andrews Hill  : 1955  Primary: Izella Dubin Ppo  Secondary:  [address] at [address]  [phone]   [phone]       OUTPATIENT PHYSICAL THERAPY:Daily Note 2019   ICD-10: Treatment Diagnosis: Contracture of joint, foot, right M24.574, Difficulty in walking,not elsewhere classified R26.2  Precautions/Allergies:   Banana; Lisinopril; and Nuts [employer] nut]   MD Orders: Evaluate and treat MEDICAL/REFERRING DIAGNOSIS:  Balance disorder [R26.89]   DATE OF ONSET: Surgery 18  REFERRING PHYSICIAN: Jaison Urbina MD  RETURN PHYSICIAN APPOINTMENT: 19     INITIAL ASSESSMENT:  Ms. Kash Muhammad presents with decreased LE strength and balance deficits following recent surgery (18) for right equinocavovarus deformity in which the surgeon did the following: right split anterior tibial tendon transfer, right Achilles lengthening, right complete posteromedial release with joint and multiple tendon lengthenings, plantar fascia release/Steindler stripping, and right Mora ankle reconstruction. Pt has a complicated PMH with a CVA in 2017. However pt is motivated towards all goals established below. PROBLEM LIST (Impacting functional limitations):  1. Decreased Strength  2. Decreased Ambulation Ability/Technique  3. Decreased Balance  4. Decreased Flexibility/Joint Mobility INTERVENTIONS PLANNED: (Treatment may consist of any combination of the following)  1. Gait Training  2. Home Exercise Program (HEP)  3. Manual Therapy  4. Neuromuscular Re-education/Strengthening  5. Therapeutic Exercise/Strengthening   TREATMENT PLAN:  Effective Dates: 1/15/2019 TO 4/15/2019 (90 days). Frequency/Duration: 2 times a week for 90 Day(s)  GOALS: (Goals have been discussed and agreed upon with patient.)  Discharge Goals: Time Frame: 4/15/19  1.  Pt to ambulate 48' with quad cane without any increased pain or difficulty with independence. 2. Pt to demonstrate improvement as per Gipson score with a total score >45/56 points indicating decreased risk for falls. 3. Pt to achieve independence with HEP to maintain goals achieved. Rehabilitation Potential For Stated Goals: Good  Regarding Ruddy Mccormacksourav Iraheta's therapy, I certify that the treatment plan above will be carried out by a therapist or under their direction. Thank you for this referral,  Tima Barnard, PT               The information in this section was collected on 1/15/19 (except where otherwise noted). HISTORY:   History of Present Injury/Illness (Reason for Referral): Pt is a 58year old female s/p surgery to the right foot for right equinocavovarus deformity. She presents to therapy with impaired balance and decreased lower extremity strength limiting ability to participate at max functional potential. Pt stated that she had muscle spasms immediately after surgery into her right leg which have gone away. Pt denies any pain at this time however is uncomfortable due to having a boot on her right foot. Pt is eager to don her new foot brace and shoes once surgeon allows it. She was recently discharged from OT due to upcoming foot surgery. Pt has had PT following her CVA back in 2017 in which she was discharged following plateau of progress. Past Medical History/Comorbidities:   Ms. Yadira Franz  has a past medical history of Anxiety, CVA (cerebral vascular accident) ([address] (2017), Depression, HTN (hypertension), Menopause, and PTE (pulmonary thromboembolism) ([address] (2017). Ms. Yadira Franz  has a past surgical history that includes hx refractive surgery (); hx jennifer and bso (); hx  section; hx other surgical (); and hx other surgical (2017). Social History/Living Environment: Pt lives in a [facility] with her . The home has two floors with master bedroom on the 1st floor. Pt transfers and performs bed mobility with independence.       Prior Level of Function/Work/Activity: Pt prior to surgery was able to ambulate up to 50' at a time with her quad cane. She used her wheelchair for community distances. Pt has an aid that comes to the home 6 hours a day as her  works still. Ambulatory/Rehab Services H2 Model Falls Risk Assessment    Risk Factors:       No Risk Factors Identified Ability to Rise from Chair:       (1)  Pushes up, successful in one attempt    Falls Prevention Plan: Mobility Assistance Device (specify):  Quad cane and wheelchair for long distances   Total: (5 or greater = High Risk): 1    ©2010 [facility] [place] . [facility] Patent #[id number]. Federal Law prohibits the replication, distribution or use without written permission from [facility]      Current Medications:       Current Outpatient Medications:     losartan-hydroCHLOROthiazide (HYZAAR) 100-12.5 mg per tablet, Take 1 Tab by mouth daily. , Disp: 90 Tab, Rfl: 1    FLUoxetine (PROZAC) 20 mg tablet, Take 2 Tabs by mouth daily. , Disp: 180 Tab, Rfl: 1    amLODIPine (NORVASC) 5 mg tablet, Take 1 Tab by mouth daily. , Disp: 30 Tab, Rfl: 0    baclofen (LIORESAL) 10 mg tablet, Take 1 Tab by mouth three (3) times daily. , Disp: 90 Tab, Rfl: 2    ALPRAZolam (XANAX) 0.25 mg tablet, Take 1 Tab by mouth three (3) times daily as needed for Anxiety. Max Daily Amount: 0.75 mg., Disp: 30 Tab, Rfl: 1    labetalol (NORMODYNE) 300 mg tablet, Take 1 Tab by mouth two (2) times a day., Disp: 60 Tab, Rfl: 1    rOPINIRole (REQUIP) 2 mg tablet, Take 1 Tab by mouth nightly. for restless leg, Disp: 90 Tab, Rfl: 0    tiZANidine (ZANAFLEX) 4 mg tablet, 4mg po TID (Patient taking differently: Take 4 mg by mouth three (3) times daily as needed. Pt is taking this only at night), Disp: 270 Tab, Rfl: 0    polyethylene glycol (MIRALAX) 17 gram/dose powder, Take 17 g by mouth daily.  (Patient taking differently: Take 17 g by mouth daily as needed.), Disp: 510 g, Rfl: 2   Date Last Reviewed:  1/15/19   Number of Personal Factors/Comorbidities that affect the Plan of Care: 0: LOW COMPLEXITY   EXAMINATION:   Observation/Orthostatic Postural Assessment:  Cam boot on right foot, right LE postures into ER. Right UE contracture in elbow and wrist/hand, right shoulder abduction 80 degrees of AROM        ROM:     AROM(PROM) Right Left   Knee flexion 120 125   Knee extension 0 0   Hip flexion 100 110   Ankle dorsiflexion (DF) - knee extended 0 5   Ankle plantarflexion 0 50     Strength:     Manual Muscle Test (*/5) Right Left   Knee extension 3-/5 4/5   Knee flexion 3/5 4/5   Hip flexion 3/5 4/5   Hip ER 3/5 4/5   Hip IR 3/5 4/5   Hip extension 3/5 4/5   Hip abduction 3/5 4/5   Hip adduction 2+/5 4/5   Ankle DF 1/5 4/5   Ankle PF 1/5 4/5      Body Structures Involved:  1. Joints  2. Muscles Body Functions Affected:  1. Neuromusculoskeletal  2. Movement Related Activities and Participation Affected:  1. Mobility  2. Self Care  3. Domestic Life   Number of elements (examined above) that affect the Plan of Care: 4+: HIGH COMPLEXITY   CLINICAL PRESENTATION:   Presentation: Stable and uncomplicated: LOW COMPLEXITY   CLINICAL DECISION MAKING:   Outcome Measure: Tool Used: Gipson Balance Scale  Score:  Initial: 38/56 Most Recent: X/56 (Date: -- )   Interpretation of Score: Each section is scored on a 0-4 scale, 0 representing the patients inability to perform the task and 4 representing independence. The scores of each section are added together for a total score of 56. The higher the patients score, the more independent the patient is. Any score below 45 indicates increased risk for falls. Medical Necessity:   · Patient demonstrates good rehab potential due to higher previous functional level. Reason for Services/Other Comments:  · Patient will benefit from therapy at this time to achieve goals established above.    Use of outcome tool(s) and clinical judgement create a POC that gives a: Clear prediction of patient's progress: LOW COMPLEXITY            TREATMENT:   (In addition to Assessment/Re-Assessment sessions the following treatments were rendered)  Pre-treatment Symptoms/Complaints:  No new complaints. Pt to go for a fitting for an new brace for her right foot next Monday. Pain: Initial:   Pain Intensity 1: 0  Post Session:    0/10     Therapeutic Exercise: ( 60):  Exercises per grid below to improve mobility and strength. Required minimal verbal cues to promote proper body posture. Progressed complexity of movement as indicated. Date:  1/18/19 Date:  1/22/19 Date:     Activity/Exercise Parameters Parameters Parameters   Seated:   LAQ  Hip flexion  Adduction ball squeeze 3x10  2# LLE 3x10  2# LLE    Sit to stand 2x10 2x10    Supine:   SLR flexion, abduction  Bridging 3x10  2# LLE 3x10  2# LLE    Prone:  Hip extension, knee curls 2x10 2x10    Pt ambulated 50' with quad cane x3  x3        Treatment/Session Assessment:    · Response to Treatment:  Pt needs vc's for proper alignment of right LE when performing exercises. Pt is extremely eager to participate during session. Pt's caregiver brought her and  picked her up today. · Compliance with Program/Exercises: Will assess as treatment progresses. · Recommendations/Intent for next treatment session: \"Next visit will focus on advancements to more challenging activities\". Stair training next visit.    Total Treatment Duration:   60 minutes  PT Patient Time In/Time Out  Time In: 5241  Time Out: [address], PT    Future Appointments   Date Time Provider Azalia Sheridani   1/23/2019 11:15 AM Good Liz MD [facility]   1/25/2019  2:00 PM Maurice June, PT [facility]   1/29/2019  2:00 PM Maurice June, PT [facility]   2/1/2019  2:00 PM Maurice June, PT [facility]   2/5/2019  2:00 PM Maurice June, PT [facility]   2/8/2019  2:00 PM Maurice June, PT [facility]   2/12/2019 2:00 PM Ralph Rodriguez [facility]   2/14/2019 11:15 AM Didier Lopez MD [facility] FABIO FABIO [facility]   2/15/2019  2:00 PM FRANCOIS Rodriguez [facility]   2/19/2019  2:00 PM FRANCOIS Rodriguez [facility]   2/20/2019  9:00 AM Sia Cummins MD [facility]   2/22/2019  2:00 PM FRANCOIS Rodriguez [facility]   2/26/2019  2:00 PM FRANCOIS Rodriguez [facility]   3/1/2019  2:00 PM Isabel Bertrand PT [facility]

## 2019-01-25 ENCOUNTER — HOSPITAL ENCOUNTER (OUTPATIENT)
Dept: PHYSICAL THERAPY | Age: 64
Discharge: HOME | End: 2019-01-25
Payer: COMMERCIAL

## 2019-01-25 PROCEDURE — 97110 THERAPEUTIC EXERCISES: CPT

## 2019-01-25 NOTE — PROGRESS NOTES
Terrell Guerin  : 1955  Primary: Jazzy Lucaino Ppo  Secondary:  [address]  at [address]  [address], [address]  [phone]   [phone]       OUTPATIENT PHYSICAL THERAPY:Daily Note 2019   ICD-10: Treatment Diagnosis: Contracture of joint, foot, right M24.574, Difficulty in walking,not elsewhere classified R26.2  Precautions/Allergies:   Banana; Lisinopril; and Nuts Ronda Aye nut]   MD Orders: Evaluate and treat MEDICAL/REFERRING DIAGNOSIS:  Balance disorder [R26.89]   DATE OF ONSET: Surgery 18  REFERRING PHYSICIAN: Romero Springer MD  RETURN PHYSICIAN APPOINTMENT: 19     INITIAL ASSESSMENT:  Ms. Jessica Lowe presents with decreased LE strength and balance deficits following recent surgery (18) for right equinocavovarus deformity in which the surgeon did the following: right split anterior tibial tendon transfer, right Achilles lengthening, right complete posteromedial release with joint and multiple tendon lengthenings, plantar fascia release/Steindler stripping, and right Mora ankle reconstruction. Pt has a complicated PMH with a CVA in 2017. However pt is motivated towards all goals established below. PROBLEM LIST (Impacting functional limitations):  1. Decreased Strength  2. Decreased Ambulation Ability/Technique  3. Decreased Balance  4. Decreased Flexibility/Joint Mobility INTERVENTIONS PLANNED: (Treatment may consist of any combination of the following)  1. Gait Training  2. Home Exercise Program (HEP)  3. Manual Therapy  4. Neuromuscular Re-education/Strengthening  5. Therapeutic Exercise/Strengthening   TREATMENT PLAN:  Effective Dates: 1/15/2019 TO 4/15/2019 (90 days). Frequency/Duration: 2 times a week for 90 Day(s)  GOALS: (Goals have been discussed and agreed upon with patient.)  Discharge Goals: Time Frame: 4/15/19  1.  Pt to ambulate 48' with quad cane without any increased pain or difficulty with independence. 2. Pt to demonstrate improvement as per Gipson score with a total score >45/56 points indicating decreased risk for falls. 3. Pt to achieve independence with HEP to maintain goals achieved. Rehabilitation Potential For Stated Goals: Good  Regarding Justice Geraldo Iraheta's therapy, I certify that the treatment plan above will be carried out by a therapist or under their direction. Thank you for this referral,  Jw Cates PT               The information in this section was collected on 1/15/19 (except where otherwise noted). HISTORY:   History of Present Injury/Illness (Reason for Referral): Pt is a 58year old female s/p surgery to the right foot for right equinocavovarus deformity. She presents to therapy with impaired balance and decreased lower extremity strength limiting ability to participate at max functional potential. Pt stated that she had muscle spasms immediately after surgery into her right leg which have gone away. Pt denies any pain at this time however is uncomfortable due to having a boot on her right foot. Pt is eager to don her new foot brace and shoes once surgeon allows it. She was recently discharged from OT due to upcoming foot surgery. Pt has had PT following her CVA back in 2017 in which she was discharged following plateau of progress. Past Medical History/Comorbidities:   Ms. Анна Stoll  has a past medical history of Anxiety, CVA (cerebral vascular accident) ([address] (2017), Depression, HTN (hypertension), Menopause, and PTE (pulmonary thromboembolism) ([address] (2017). Ms. Анна Stoll  has a past surgical history that includes hx refractive surgery (); hx jennifer and bso (); hx  section; hx other surgical (); and hx other surgical (2017). Social History/Living Environment: Pt lives in a [facility] with her . The home has two floors with master bedroom on the 1st floor. Pt transfers and performs bed mobility with independence.       Prior Level of Function/Work/Activity: Pt prior to surgery was able to ambulate up to 50' at a time with her quad cane. She used her wheelchair for community distances. Pt has an aid that comes to the home 6 hours a day as her  works still. Ambulatory/Rehab Services H2 Model Falls Risk Assessment    Risk Factors:       No Risk Factors Identified Ability to Rise from Chair:       (1)  Pushes up, successful in one attempt    Falls Prevention Plan: Mobility Assistance Device (specify):  Quad cane and wheelchair for long distances   Total: (5 or greater = High Risk): 1    ©2010 [facility] [place] [address]. [facility] States Patent #[id number]. Federal Law prohibits the replication, distribution or use without written permission from [facility] of [address] [place] [address]     Current Medications:       Current Outpatient Medications:     amLODIPine (NORVASC) 5 mg tablet, Take 1 Tab by mouth daily. , Disp: 90 Tab, Rfl: 1    ALPRAZolam (XANAX) 0.25 mg tablet, Take 1 Tab by mouth three (3) times daily as needed for Anxiety. Max Daily Amount: 0.75 mg., Disp: 30 Tab, Rfl: 1    labetalol (NORMODYNE) 300 mg tablet, Take 1 Tab by mouth two (2) times a day., Disp: 180 Tab, Rfl: 1    rOPINIRole (REQUIP) 2 mg tablet, Take 1 Tab by mouth nightly. for restless leg, Disp: 90 Tab, Rfl: 1    tiZANidine (ZANAFLEX) 4 mg tablet, 4mg po TID, Disp: 270 Tab, Rfl: 1    losartan-hydroCHLOROthiazide (HYZAAR) 100-25 mg per tablet, Take 1 Tab by mouth daily. , Disp: 90 Tab, Rfl: 1    FLUoxetine (PROZAC) 20 mg tablet, Take 2 Tabs by mouth daily. , Disp: 180 Tab, Rfl: 1    baclofen (LIORESAL) 10 mg tablet, Take 1 Tab by mouth three (3) times daily. , Disp: 90 Tab, Rfl: 2    polyethylene glycol (MIRALAX) 17 gram/dose powder, Take 17 g by mouth daily.  (Patient taking differently: Take 17 g by mouth daily as needed.), Disp: 510 g, Rfl: 2   Date Last Reviewed:  1/15/19   Number of Personal Factors/Comorbidities that affect the Plan of Care: 0: LOW COMPLEXITY   EXAMINATION:   Observation/Orthostatic Postural Assessment:  Cam boot on right foot, right LE postures into ER. Right UE contracture in elbow and wrist/hand, right shoulder abduction 80 degrees of AROM        ROM:     AROM(PROM) Right Left   Knee flexion 120 125   Knee extension 0 0   Hip flexion 100 110   Ankle dorsiflexion (DF) - knee extended 0 5   Ankle plantarflexion 0 50     Strength:     Manual Muscle Test (*/5) Right Left   Knee extension 3-/5 4/5   Knee flexion 3/5 4/5   Hip flexion 3/5 4/5   Hip ER 3/5 4/5   Hip IR 3/5 4/5   Hip extension 3/5 4/5   Hip abduction 3/5 4/5   Hip adduction 2+/5 4/5   Ankle DF 1/5 4/5   Ankle PF 1/5 4/5      Body Structures Involved:  1. Joints  2. Muscles Body Functions Affected:  1. Neuromusculoskeletal  2. Movement Related Activities and Participation Affected:  1. Mobility  2. Self Care  3. Domestic Life   Number of elements (examined above) that affect the Plan of Care: 4+: HIGH COMPLEXITY   CLINICAL PRESENTATION:   Presentation: Stable and uncomplicated: LOW COMPLEXITY   CLINICAL DECISION MAKING:   Outcome Measure: Tool Used: Gipson Balance Scale  Score:  Initial: 38/56 Most Recent: X/56 (Date: -- )   Interpretation of Score: Each section is scored on a 0-4 scale, 0 representing the patients inability to perform the task and 4 representing independence. The scores of each section are added together for a total score of 56. The higher the patients score, the more independent the patient is. Any score below 45 indicates increased risk for falls. Medical Necessity:   · Patient demonstrates good rehab potential due to higher previous functional level. Reason for Services/Other Comments:  · Patient will benefit from therapy at this time to achieve goals established above.    Use of outcome tool(s) and clinical judgement create a POC that gives a: Clear prediction of patient's progress: LOW COMPLEXITY            TREATMENT:   (In addition to Assessment/Re-Assessment sessions the following treatments were rendered)  Pre-treatment Symptoms/Complaints: Pt came in with some soreness in her arms due to her caregiver doing some stretches with her this morning. Pt wanted to go over stair negotiation today so she can go upstairs in her home as well as her father in laws house. 'I need to strengthen the right side of my waits, abdominals\"  Pain: Initial:   Pain Intensity 1: 0  Post Session:    0/10     Therapeutic Exercise: ( 60):  Exercises per grid below to improve mobility and strength. Required minimal verbal cues to promote proper body posture. Progressed complexity of movement as indicated. Date:  1/18/19 Date:  1/22/19 Date:  1/25/19   Activity/Exercise Parameters Parameters Parameters   Seated:   LAQ  Hip flexion  Adduction ball squeeze 3x10  2# LLE 3x10  2# LLE 3x10  3# LLE   Sit to stand 2x10 2x10 2x10   Supine: abdominal crunch up; to both sides   x10   Supine:   SLR flexion, abduction  Bridging 3x10  2# LLE 3x10  2# LLE 3x10  3# LLE   Prone:  Hip extension, knee curls 2x10 2x10 2x10  3# LLE   Pt ambulated 50' with quad cane x3  x3 x3   Pt negotiated up/down one flight of stairs without any difficulty utilizing the left side HR going up. Pt needed minimal vc's when coming down to go down with her right leg first.     Treatment/Session Assessment:    · Response to Treatment: Pt demonstrated some difficulty performing right knee curls in prone. Required frequent rest breaks and reminder to bend the leg instead of keeping it straight. Pt was able to perform abdominal crunches while therapist supported right leg in a bent position. Pt was able to place right hand into left and punch up towards the ceiling. · Compliance with Program/Exercises: Will assess as treatment progresses. · Recommendations/Intent for next treatment session: \"Next visit will focus on advancements to more challenging activities\".    Total Treatment Duration:   60 minutes  PT Patient Time In/Time Out  Time In: 1400  Time Out: [address], PT    Future Appointments   Date Time Provider Azalia Alberto   1/29/2019  2:00 PM Maurice Pricila, [place] [facility]   2/1/2019  2:00 PM Maurice June, PT [facility]   2/5/2019  2:00 PM Maurice June, PT [facility]   2/8/2019  2:00 PM Maurice June, PT [facility]   2/12/2019  2:00 PM Maurice June, PT [facility]   2/14/2019 11:15 AM Mark Cruz MD [facility] MRMD   2/15/2019  2:00 PM Maurice June, PT [facility]   2/19/2019  2:00 PM Maurice June, PT [facility]   2/20/2019  9:00 AM Aracelis Chandler MD [facility] PMR PMR   2/22/2019  2:00 PM Maurice June, PT [facility]   2/26/2019  2:00 PM Maurice June, PT [facility]   3/1/2019  2:00 PM Maurice June, PT [facility]   4/23/2019 10:45 AM Good Liz MD [facility]

## 2019-01-29 ENCOUNTER — HOSPITAL ENCOUNTER (OUTPATIENT)
Dept: PHYSICAL THERAPY | Age: 64
Discharge: HOME | End: 2019-01-29
Payer: COMMERCIAL

## 2019-01-29 PROCEDURE — 97110 THERAPEUTIC EXERCISES: CPT

## 2019-01-29 NOTE — PROGRESS NOTES
Dmitry Ryan  : 1955  Primary: Gunner Jones Ppo  Secondary:  [address] at [address]  [phone]   [id number]       OUTPATIENT PHYSICAL THERAPY:Daily Note 2019   ICD-10: Treatment Diagnosis: Contracture of joint, foot, right M24.574, Difficulty in walking,not elsewhere classified R26.2  Precautions/Allergies:   Banana; Lisinopril; and Nuts Sammuel Anchors nut]   MD Orders: Evaluate and treat MEDICAL/REFERRING DIAGNOSIS:  Balance disorder [R26.89]   DATE OF ONSET: Surgery 18  REFERRING PHYSICIAN: Chalo Stallworth MD  RETURN PHYSICIAN APPOINTMENT: 19     INITIAL ASSESSMENT:  Ms. Monica Shaver presents with decreased LE strength and balance deficits following recent surgery (18) for right equinocavovarus deformity in which the surgeon did the following: right split anterior tibial tendon transfer, right Achilles lengthening, right complete posteromedial release with joint and multiple tendon lengthenings, plantar fascia release/Steindler stripping, and right Mora ankle reconstruction. Pt has a complicated PMH with a CVA in 2017. However pt is motivated towards all goals established below. PROBLEM LIST (Impacting functional limitations):  1. Decreased Strength  2. Decreased Ambulation Ability/Technique  3. Decreased Balance  4. Decreased Flexibility/Joint Mobility INTERVENTIONS PLANNED: (Treatment may consist of any combination of the following)  1. Gait Training  2. Home Exercise Program (HEP)  3. Manual Therapy  4. Neuromuscular Re-education/Strengthening  5. Therapeutic Exercise/Strengthening   TREATMENT PLAN:  Effective Dates: 1/15/2019 TO 4/15/2019 (90 days). Frequency/Duration: 2 times a week for 90 Day(s)  GOALS: (Goals have been discussed and agreed upon with patient.)  Discharge Goals: Time Frame: 4/15/19  1.  Pt to ambulate 48' with quad cane without any increased pain or difficulty with independence. 2. Pt to demonstrate improvement as per Gipson score with a total score >45/56 points indicating decreased risk for falls. 3. Pt to achieve independence with HEP to maintain goals achieved. Rehabilitation Potential For Stated Goals: Good  Regarding Nunu Iraheta's therapy, I certify that the treatment plan above will be carried out by a therapist or under their direction. Thank you for this referral,  Lemuel Stark, PT               The information in this section was collected on 1/15/19 (except where otherwise noted). HISTORY:   History of Present Injury/Illness (Reason for Referral): Pt is a 58year old female s/p surgery to the right foot for right equinocavovarus deformity. She presents to therapy with impaired balance and decreased lower extremity strength limiting ability to participate at max functional potential. Pt stated that she had muscle spasms immediately after surgery into her right leg which have gone away. Pt denies any pain at this time however is uncomfortable due to having a boot on her right foot. Pt is eager to don her new foot brace and shoes once surgeon allows it. She was recently discharged from OT due to upcoming foot surgery. Pt has had PT following her CVA back in 2017 in which she was discharged following plateau of progress. Past Medical History/Comorbidities:   Ms. Cristine Peralta  has a past medical history of Anxiety, CVA (cerebral vascular accident) ([address] (2017), Depression, HTN (hypertension), Menopause, and PTE (pulmonary thromboembolism) ([address] (2017). Ms. Cristine Peralta  has a past surgical history that includes hx refractive surgery (); hx jennifer and bso (); hx  section; hx other surgical (); and hx other surgical (2017). Social History/Living Environment: Pt lives in a [facility] with her . The home has two floors with master bedroom on the 1st floor. Pt transfers and performs bed mobility with independence.       Prior Level of Function/Work/Activity: Pt prior to surgery was able to ambulate up to 50' at a time with her quad cane. She used her wheelchair for community distances. Pt has an aid that comes to the home 6 hours a day as her  works still. Ambulatory/Rehab Services H2 Model Falls Risk Assessment    Risk Factors:       No Risk Factors Identified Ability to Rise from Chair:       (1)  Pushes up, successful in one attempt    Falls Prevention Plan: Mobility Assistance Device (specify):  Quad cane and wheelchair for long distances   Total: (5 or greater = High Risk): 1    ©2010 [facility] of [place] . [facility] Patent #[id number]. Federal Law prohibits the replication, distribution or use without written permission from [facility] [employer]     Current Medications:       Current Outpatient Medications:     amLODIPine (NORVASC) 5 mg tablet, Take 1 Tab by mouth daily. , Disp: 90 Tab, Rfl: 1    ALPRAZolam (XANAX) 0.25 mg tablet, Take 1 Tab by mouth three (3) times daily as needed for Anxiety. Max Daily Amount: 0.75 mg., Disp: 30 Tab, Rfl: 1    labetalol (NORMODYNE) 300 mg tablet, Take 1 Tab by mouth two (2) times a day., Disp: 180 Tab, Rfl: 1    rOPINIRole (REQUIP) 2 mg tablet, Take 1 Tab by mouth nightly. for restless leg, Disp: 90 Tab, Rfl: 1    tiZANidine (ZANAFLEX) 4 mg tablet, 4mg po TID, Disp: 270 Tab, Rfl: 1    losartan-hydroCHLOROthiazide (HYZAAR) 100-25 mg per tablet, Take 1 Tab by mouth daily. , Disp: 90 Tab, Rfl: 1    FLUoxetine (PROZAC) 20 mg tablet, Take 2 Tabs by mouth daily. , Disp: 180 Tab, Rfl: 1    baclofen (LIORESAL) 10 mg tablet, Take 1 Tab by mouth three (3) times daily. , Disp: 90 Tab, Rfl: 2    polyethylene glycol (MIRALAX) 17 gram/dose powder, Take 17 g by mouth daily.  (Patient taking differently: Take 17 g by mouth daily as needed.), Disp: 510 g, Rfl: 2   Date Last Reviewed:  1/15/19   Number of Personal Factors/Comorbidities that affect the Plan of Care: 0: LOW COMPLEXITY   EXAMINATION:   Observation/Orthostatic Postural Assessment:  Cam boot on right foot, right LE postures into ER. Right UE contracture in elbow and wrist/hand, right shoulder abduction 80 degrees of AROM        ROM:     AROM(PROM) Right Left   Knee flexion 120 125   Knee extension 0 0   Hip flexion 100 110   Ankle dorsiflexion (DF) - knee extended 0 5   Ankle plantarflexion 0 50     Strength:     Manual Muscle Test (*/5) Right Left   Knee extension 3-/5 4/5   Knee flexion 3/5 4/5   Hip flexion 3/5 4/5   Hip ER 3/5 4/5   Hip IR 3/5 4/5   Hip extension 3/5 4/5   Hip abduction 3/5 4/5   Hip adduction 2+/5 4/5   Ankle DF 1/5 4/5   Ankle PF 1/5 4/5      Body Structures Involved:  1. Joints  2. Muscles Body Functions Affected:  1. Neuromusculoskeletal  2. Movement Related Activities and Participation Affected:  1. Mobility  2. Self Care  3. Domestic Life   Number of elements (examined above) that affect the Plan of Care: 4+: HIGH COMPLEXITY   CLINICAL PRESENTATION:   Presentation: Stable and uncomplicated: LOW COMPLEXITY   CLINICAL DECISION MAKING:   Outcome Measure: Tool Used: Gipson Balance Scale  Score:  Initial: 38/56 Most Recent: X/56 (Date: -- )   Interpretation of Score: Each section is scored on a 0-4 scale, 0 representing the patients inability to perform the task and 4 representing independence. The scores of each section are added together for a total score of 56. The higher the patients score, the more independent the patient is. Any score below 45 indicates increased risk for falls. Medical Necessity:   · Patient demonstrates good rehab potential due to higher previous functional level. Reason for Services/Other Comments:  · Patient will benefit from therapy at this time to achieve goals established above.    Use of outcome tool(s) and clinical judgement create a POC that gives a: Clear prediction of patient's progress: LOW COMPLEXITY            TREATMENT:   (In addition to Assessment/Re-Assessment sessions the following treatments were rendered)  Pre-treatment Symptoms/Complaints: Pt with no new complaints. She stated that she does her arm exercises on the days she comes to therapy. Pain: Initial:   Pain Intensity 1: 0  Post Session:    0/10     Therapeutic Exercise: ( 60):  Exercises per grid below to improve mobility and strength. Required minimal verbal cues to promote proper body posture. Progressed complexity of movement as indicated. Date:  1/29/19   Activity/Exercise Parameters   Seated:   LAQ  Hip flexion  Adduction ball squeeze 3x10  3# LLE   Sit to stand 2x10   Supine: abdominal crunch up; to both sides x10   Supine:   SLR flexion, abduction  Bridging 3x10  3# LLE   Prone:  Hip extension, knee curls 2x10  3# LLE   Pt ambulated 50' with quad cane x3   Standing at the handrails: Squats 2x10. Weight shifting x 20 side to side    Treatment/Session Assessment:    · Response to Treatment: Pt tolerated initiation standing exercises to assist with gait once she gets her brace. · Compliance with Program/Exercises: Will assess as treatment progresses. · Recommendations/Intent for next treatment session: \"Next visit will focus on advancements to more challenging activities\".    Total Treatment Duration:   60 minutes  PT Patient Time In/Time Out  Time In: 1400  Time Out: [address], PT    Future Appointments   Date Time Provider Azalia Alberto   2/1/2019  2:00 PM Ralph Hutchinson [facility]   2/5/2019  2:00 PM Osmar Fields, PT [facility]   2/8/2019  2:00 PM Osmar Fields PT [facility]   2/12/2019  2:00 PM Osmar Fields, PT [facility]   2/14/2019 11:15 AM Bk Hall MD [facility] MRMD   2/15/2019  2:00 PM Osmar Fields, PT [facility]   2/19/2019  2:00 PM Osmar Fields, PT [facility]   2/20/2019  9:00 AM Pilar Pickens MD [facility] PMR PMR   2/22/2019  2:00 PM Osmar Fields, PT [facility] 2/26/2019  2:00 PM FRANCOIS Mcneal [facility]   3/1/2019  2:00 PM FRANCOIS Mcneal [facility]   4/23/2019 10:45 AM MD TANG Pedro M [facility]

## 2019-02-01 ENCOUNTER — HOSPITAL ENCOUNTER (OUTPATIENT)
Dept: PHYSICAL THERAPY | Age: 64
Discharge: HOME | End: 2019-02-01
Payer: COMMERCIAL

## 2019-02-01 PROCEDURE — 97110 THERAPEUTIC EXERCISES: CPT

## 2019-02-01 NOTE — PROGRESS NOTES
Lexus Elizabeth : 1955 Primary: [employer] P[employer] Secondary:  Therapy Center at [address] [phone]   [phone] OUTPATIENT PHYSICAL THERAPY:Daily Note 2019 ICD-10: Treatment Diagnosis: Contracture of joint, foot, right M24.574, Difficulty in walking,not elsewhere classified R26.2 Precautions/Allergies:  
Banana; Lisinopril; and Nuts [tree nut] MD Orders: Evaluate and treat MEDICAL/REFERRING DIAGNOSIS: 
Balance disorder [R26.89] DATE OF ONSET: Surgery 18 REFERRING PHYSICIAN: Shabana Alexander MD 
RETURN PHYSICIAN APPOINTMENT: 19 INITIAL ASSESSMENT:  Ms. Licha Shepard presents with decreased LE strength and balance deficits following recent surgery (18) for right equinocavovarus deformity in which the surgeon did the following: right split anterior tibial tendon transfer, right Achilles lengthening, right complete posteromedial release with joint and multiple tendon lengthenings, plantar fascia release/Steindler stripping, and right Mora ankle reconstruction. Pt has a complicated PMH with a CVA in 2017. However pt is motivated towards all goals established below. PROBLEM LIST (Impacting functional limitations): 1. Decreased Strength 2. Decreased Ambulation Ability/Technique 3. Decreased Balance 4. Decreased Flexibility/Joint Mobility INTERVENTIONS PLANNED: (Treatment may consist of any combination of the following) 1. Gait Training 2. Home Exercise Program (HEP) 3. Manual Therapy 4. Neuromuscular Re-education/Strengthening 5. Therapeutic Exercise/Strengthening TREATMENT PLAN: 
Effective Dates: 1/15/2019 TO 4/15/2019 (90 days). Frequency/Duration: 2 times a week for 90 Day(s) GOALS: (Goals have been discussed and agreed upon with patient.) Discharge Goals: Time Frame: 4/15/19 1. Pt to ambulate 48' with quad cane without any increased pain or difficulty with independence. 2. Pt to demonstrate improvement as per Gipson score with a total score >45/56 points indicating decreased risk for falls. 3. Pt to achieve independence with HEP to maintain goals achieved. Rehabilitation Potential For Stated Goals: Good Regarding Linda Iraheta's therapy, I certify that the treatment plan above will be carried out by a therapist or under their direction. Thank you for this referral, Jay Herrera, PT The information in this section was collected on 1/15/19 (except where otherwise noted). HISTORY:  
History of Present Injury/Illness (Reason for Referral): Pt is a 58year old female s/p surgery to the right foot for right equinocavovarus deformity. She presents to therapy with impaired balance and decreased lower extremity strength limiting ability to participate at max functional potential. Pt stated that she had muscle spasms immediately after surgery into her right leg which have gone away. Pt denies any pain at this time however is uncomfortable due to having a boot on her right foot. Pt is eager to don her new foot brace and shoes once surgeon allows it. She was recently discharged from OT due to upcoming foot surgery. Pt has had PT following her CVA back in 2017 in which she was discharged following plateau of progress. Past Medical History/Comorbidities: Ms. Ewa Rawls  has a past medical history of Anxiety, CVA (cerebral vascular accident) ([address] (2017), Depression, HTN (hypertension), Menopause, and PTE (pulmonary thromboembolism) ([address] (2017). Ms. Ewa Rawls  has a past surgical history that includes hx refractive surgery (); hx jennifer and bso (); hx  section; hx other surgical (); and hx other surgical (2017). Social History/Living Environment: Pt lives in a [facility] with her . The home has two floors with master bedroom on the 1st floor. Pt transfers and performs bed mobility with independence. Prior Level of Function/Work/Activity: Pt prior to surgery was able to ambulate up to 50' at a time with her quad cane. She used her wheelchair for community distances. Pt has an aid that comes to the home 6 hours a day as her  works still. Ambulatory/Rehab Services H2 Model Falls Risk Assessment Risk Factors: 
     No Risk Factors Identified Ability to Rise from Chair: 
     (1)  Pushes up, successful in one attempt Falls Prevention Plan: Mobility Assistance Device (specify):  Quad cane and wheelchair for long distances Total: (5 or greater = High Risk): 1 ©2010 [facility] States Patent #[id number]. Federal Law prohibits the replication, distribution or use without written permission from [facility] [employer] Current Medications:   
  
Current Outpatient Medications:  
  amLODIPine (NORVASC) 5 mg tablet, Take 1 Tab by mouth daily. , Disp: 90 Tab, Rfl: 1   ALPRAZolam (XANAX) 0.25 mg tablet, Take 1 Tab by mouth three (3) times daily as needed for Anxiety. Max Daily Amount: 0.75 mg., Disp: 30 Tab, Rfl: 1 
  labetalol (NORMODYNE) 300 mg tablet, Take 1 Tab by mouth two (2) times a day., Disp: 180 Tab, Rfl: 1 
  rOPINIRole (REQUIP) 2 mg tablet, Take 1 Tab by mouth nightly. for restless leg, Disp: 90 Tab, Rfl: 1 
  tiZANidine (ZANAFLEX) 4 mg tablet, 4mg po TID, Disp: 270 Tab, Rfl: 1 
  losartan-hydroCHLOROthiazide (HYZAAR) 100-25 mg per tablet, Take 1 Tab by mouth daily. , Disp: 90 Tab, Rfl: 1 
  FLUoxetine (PROZAC) 20 mg tablet, Take 2 Tabs by mouth daily. , Disp: 180 Tab, Rfl: 1 
  baclofen (LIORESAL) 10 mg tablet, Take 1 Tab by mouth three (3) times daily. , Disp: 90 Tab, Rfl: 2 
  polyethylene glycol (MIRALAX) 17 gram/dose powder, Take 17 g by mouth daily. (Patient taking differently: Take 17 g by mouth daily as needed.), Disp: 510 g, Rfl: 2 Date Last Reviewed:  1/15/19 Number of Personal Factors/Comorbidities that affect the Plan of Care: 0: LOW COMPLEXITY EXAMINATION:  
Observation/Orthostatic Postural Assessment:  Cam boot on right foot, right LE postures into ER. Right UE contracture in elbow and wrist/hand, right shoulder abduction 80 degrees of AROM 
      ROM:    
AROM(PROM) Right Left Knee flexion 120 125 Knee extension 0 0 Hip flexion 100 110 Ankle dorsiflexion (DF) - knee extended 0 5 Ankle plantarflexion 0 50 Strength:    
Manual Muscle Test (*/5) Right Left Knee extension 3-/5 4/5 Knee flexion 3/5 4/5 Hip flexion 3/5 4/5 Hip ER 3/5 4/5 Hip IR 3/5 4/5 Hip extension 3/5 4/5 Hip abduction 3/5 4/5 Hip adduction 2+/5 4/5 Ankle DF 1/5 4/5 Ankle PF 1/5 4/5 Body Structures Involved: 1. Joints 2. Muscles Body Functions Affected: 1. Neuromusculoskeletal 
2. Movement Related Activities and Participation Affected: 1. Mobility 2. Self Care 3. Domestic Life Number of elements (examined above) that affect the Plan of Care: 4+: HIGH COMPLEXITY CLINICAL PRESENTATION:  
Presentation: Stable and uncomplicated: LOW COMPLEXITY CLINICAL DECISION MAKING:  
Outcome Measure: Tool Used: Joselo Salterller Balance Scale Score:  Initial: 38/56 Most Recent: X/56 (Date: -- ) Interpretation of Score: Each section is scored on a 0-4 scale, 0 representing the patients inability to perform the task and 4 representing independence. The scores of each section are added together for a total score of 56. The higher the patients score, the more independent the patient is. Any score below 45 indicates increased risk for falls. Medical Necessity:  
· Patient demonstrates good rehab potential due to higher previous functional level. Reason for Services/Other Comments: 
· Patient will benefit from therapy at this time to achieve goals established above.   
Use of outcome tool(s) and clinical judgement create a POC that gives a: Clear prediction of patient's progress: LOW COMPLEXITY  
  
 
 
 
TREATMENT:  
(In addition to Assessment/Re-Assessment sessions the following treatments were rendered) Pre-treatment Symptoms/Complaints:  Pt with no new complaints. She is eager for her new leg brace that she should be receiving next week. Pain: Initial:  
Pain Intensity 1: 0  Post Session:   
0/10 Therapeutic Exercise: ( 60):  Exercises per grid below to improve mobility and strength. Required minimal verbal cues to promote proper body posture. Progressed complexity of movement as indicated. Date: 
1/29/19 Date: 
2/1/19 Activity/Exercise Parameters Parameters Nu-step Level 2  X 4 minutes Seated:  
LAQ Hip flexion Adduction ball squeeze 3x10 
3# LLE 3x10 
3# LLE Sit to stand 2x10 2x10 Supine: abdominal crunch up; to both sides x10 2x10 Supine: SLR flexion, abduction Bridging 3x10 
3# LLE 3x10 
3# LLE Prone: 
Hip extension, knee curls 2x10 
3# LLE 2x10 
3# LLE Pt ambulated 50' with quad cane x3 x3 Squats 2x10 2x10 Weight shifting in standing  x20 Treatment/Session Assessment:   
· Response to Treatment: Pt continues to have difficulty with right knee flexion in prone. Abdominal exercise continue to challenge her, pt was given updated HEP. · Compliance with Program/Exercises: Will assess as treatment progresses. · Recommendations/Intent for next treatment session: \"Next visit will focus on advancements to more challenging activities\". Total Treatment Duration: 
 60 minutes PT Patient Time In/Time Out Time In: 1400 Klarissa James, FRANCOIS Future Appointments Date Time Provider Azalia Alberto 2/5/2019  2:00 PM FRANCOIS Corley  
2/8/2019  2:00 PM FRANCOIS Corley  
2/12/2019  2:00 PM FRANCOIS Corley  
2/14/2019 11:15 AM Ciro Rivera MD [facility] F[facility]IO FABIO MRMD  
2/15/2019  2:00 PM FRANCOIS Corley 2/19/2019  2:00 PM Laverne Rogers, PT [facility]  
2/20/2019  9:00 AM David Eaton MD [facility] PMR PMR  
2/22/2019  2:00 PM Laverne Rogers, PT [facility]  
2/26/2019  2:00 PM Laverne Rogers, PT [facility]  
3/1/2019  2:00 PM Laverne Rogers, PT [facility]  
4/23/2019 10:45 AM Du Huitron MD [facility]

## 2019-02-05 ENCOUNTER — HOSPITAL ENCOUNTER (OUTPATIENT)
Dept: PHYSICAL THERAPY | Age: 64
Discharge: HOME | End: 2019-02-05
Payer: COMMERCIAL

## 2019-02-05 PROCEDURE — 97110 THERAPEUTIC EXERCISES: CPT

## 2019-02-05 NOTE — PROGRESS NOTES
Sandrita Hart : 1955 Primary: [employer] Ppo Secondary:  Therapy Center at [address], [address] [place] [address] [phone]   [phone] OUTPATIENT PHYSICAL THERAPY:Daily Note 2019 ICD-10: Treatment Diagnosis: Contracture of joint, foot, right M24.574, Difficulty in walking,not elsewhere classified R26.2 Precautions/Allergies:  
Banana; Lisinopril; and Nuts [tree nut] MD Orders: Evaluate and treat MEDICAL/REFERRING DIAGNOSIS: 
Balance disorder [R26.89] DATE OF ONSET: Surgery 18 REFERRING PHYSICIAN: Pricila Quiles MD 
RETURN PHYSICIAN APPOINTMENT: 19 INITIAL ASSESSMENT:  Ms. Giana Santana presents with decreased LE strength and balance deficits following recent surgery (18) for right equinocavovarus deformity in which the surgeon did the following: right split anterior tibial tendon transfer, right Achilles lengthening, right complete posteromedial release with joint and multiple tendon lengthenings, plantar fascia release/Steindler stripping, and right Mora ankle reconstruction. Pt has a complicated PMH with a CVA in 2017. However pt is motivated towards all goals established below. PROBLEM LIST (Impacting functional limitations): 1. Decreased Strength 2. Decreased Ambulation Ability/Technique 3. Decreased Balance 4. Decreased Flexibility/Joint Mobility INTERVENTIONS PLANNED: (Treatment may consist of any combination of the following) 1. Gait Training 2. Home Exercise Program (HEP) 3. Manual Therapy 4. Neuromuscular Re-education/Strengthening 5. Therapeutic Exercise/Strengthening TREATMENT PLAN: 
Effective Dates: 1/15/2019 TO 4/15/2019 (90 days). Frequency/Duration: 2 times a week for 90 Day(s) GOALS: (Goals have been discussed and agreed upon with patient.) Discharge Goals: Time Frame: 4/15/19 1. Pt to ambulate 48' with quad cane without any increased pain or difficulty with independence. 2. Pt to demonstrate improvement as per Gipson score with a total score >45/56 points indicating decreased risk for falls. 3. Pt to achieve independence with HEP to maintain goals achieved. Rehabilitation Potential For Stated Goals: Good Regarding Sary Iraheta's therapy, I certify that the treatment plan above will be carried out by a therapist or under their direction. Thank you for this referral, Iman Ardon, PT The information in this section was collected on 1/15/19 (except where otherwise noted). HISTORY:  
History of Present Injury/Illness (Reason for Referral): Pt is a 58year old female s/p surgery to the right foot for right equinocavovarus deformity. She presents to therapy with impaired balance and decreased lower extremity strength limiting ability to participate at max functional potential. Pt stated that she had muscle spasms immediately after surgery into her right leg which have gone away. Pt denies any pain at this time however is uncomfortable due to having a boot on her right foot. Pt is eager to don her new foot brace and shoes once surgeon allows it. She was recently discharged from OT due to upcoming foot surgery. Pt has had PT following her CVA back in 2017 in which she was discharged following plateau of progress. Past Medical History/Comorbidities: Ms. Albert Ibanez  has a past medical history of Anxiety, CVA (cerebral vascular accident) ([address] (2017), Depression, HTN (hypertension), Menopause, and PTE (pulmonary thromboembolism) ([address] (2017). Ms. Albert Ibanez  has a past surgical history that includes hx refractive surgery (); hx jennifer and bso (); hx  section; hx other surgical (); and hx other surgical (2017). Social History/Living Environment: Pt lives in a [facility] with her . The home has two floors with master bedroom on the 1st floor. Pt transfers and performs bed mobility with independence. Prior Level of Function/Work/Activity: Pt prior to surgery was able to ambulate up to 50' at a time with her quad cane. She used her wheelchair for community distances. Pt has an aid that comes to the home 6 hours a day as her  works still. Ambulatory/Rehab Services H2 Model Falls Risk Assessment Risk Factors: 
     No Risk Factors Identified Ability to Rise from Chair: 
     (1)  Pushes up, successful in one attempt Falls Prevention Plan: Mobility Assistance Device (specify):  Quad cane and wheelchair for long distances Total: (5 or greater = High Risk): 1 ©2010 [facility]. [facility] States Patent #[id number]. Federal Law prohibits the replication, distribution or use without written permission from [facility] [employer] Current Medications:   
  
Current Outpatient Medications:  
  amLODIPine (NORVASC) 5 mg tablet, Take 1 Tab by mouth daily. , Disp: 90 Tab, Rfl: 1   ALPRAZolam (XANAX) 0.25 mg tablet, Take 1 Tab by mouth three (3) times daily as needed for Anxiety. Max Daily Amount: 0.75 mg., Disp: 30 Tab, Rfl: 1 
  labetalol (NORMODYNE) 300 mg tablet, Take 1 Tab by mouth two (2) times a day., Disp: 180 Tab, Rfl: 1 
  rOPINIRole (REQUIP) 2 mg tablet, Take 1 Tab by mouth nightly. for restless leg, Disp: 90 Tab, Rfl: 1 
  tiZANidine (ZANAFLEX) 4 mg tablet, 4mg po TID, Disp: 270 Tab, Rfl: 1 
  losartan-hydroCHLOROthiazide (HYZAAR) 100-25 mg per tablet, Take 1 Tab by mouth daily. , Disp: 90 Tab, Rfl: 1 
  FLUoxetine (PROZAC) 20 mg tablet, Take 2 Tabs by mouth daily. , Disp: 180 Tab, Rfl: 1 
  baclofen (LIORESAL) 10 mg tablet, Take 1 Tab by mouth three (3) times daily. , Disp: 90 Tab, Rfl: 2 
  polyethylene glycol (MIRALAX) 17 gram/dose powder, Take 17 g by mouth daily. (Patient taking differently: Take 17 g by mouth daily as needed.), Disp: 510 g, Rfl: 2 Date Last Reviewed:  1/15/19 Number of Personal Factors/Comorbidities that affect the Plan of Care: 0: LOW COMPLEXITY EXAMINATION:  
Observation/Orthostatic Postural Assessment:  Cam boot on right foot, right LE postures into ER. Right UE contracture in elbow and wrist/hand, right shoulder abduction 80 degrees of AROM 
      ROM:    
AROM(PROM) Right Left Knee flexion 120 125 Knee extension 0 0 Hip flexion 100 110 Ankle dorsiflexion (DF) - knee extended 0 5 Ankle plantarflexion 0 50 Strength:    
Manual Muscle Test (*/5) Right Left Knee extension 3-/5 4/5 Knee flexion 3/5 4/5 Hip flexion 3/5 4/5 Hip ER 3/5 4/5 Hip IR 3/5 4/5 Hip extension 3/5 4/5 Hip abduction 3/5 4/5 Hip adduction 2+/5 4/5 Ankle DF 1/5 4/5 Ankle PF 1/5 4/5 Body Structures Involved: 1. Joints 2. Muscles Body Functions Affected: 1. Neuromusculoskeletal 
2. Movement Related Activities and Participation Affected: 1. Mobility 2. Self Care 3. Domestic Life Number of elements (examined above) that affect the Plan of Care: 4+: HIGH COMPLEXITY CLINICAL PRESENTATION:  
Presentation: Stable and uncomplicated: LOW COMPLEXITY CLINICAL DECISION MAKING:  
Outcome Measure: Tool Used: Jaime Civatte Balance Scale Score:  Initial: 38/56 Most Recent: X/56 (Date: -- ) Interpretation of Score: Each section is scored on a 0-4 scale, 0 representing the patients inability to perform the task and 4 representing independence. The scores of each section are added together for a total score of 56. The higher the patients score, the more independent the patient is. Any score below 45 indicates increased risk for falls. Medical Necessity:  
· Patient demonstrates good rehab potential due to higher previous functional level. Reason for Services/Other Comments: 
· Patient will benefit from therapy at this time to achieve goals established above.   
Use of outcome tool(s) and clinical judgement create a POC that gives a: Clear prediction of patient's progress: LOW COMPLEXITY  
  
 
 
 
TREATMENT:  
(In addition to Assessment/Re-Assessment sessions the following treatments were rendered) Pre-treatment Symptoms/Complaints:  Pt still awaiting her new brace, she expressed how anxious she is to get it and start walking with it. Pain: Initial:  
Pain Intensity 1: 0 0/10 Post Session:   
0/10 Therapeutic Exercise: ( 60):  Exercises per grid below to improve mobility and strength. Required minimal verbal cues to promote proper body posture. Progressed complexity of movement as indicated. Date: 
1/29/19 Date: 
2/1/19 Date: 
2/5/19 Activity/Exercise Parameters Parameters Parameters Nu-step Level 2 X 4 minutes Seated:  
LAQ Hip flexion Adduction ball squeeze 3x10 
3# LLE 3x10 
3# LLE 3x10 
3# LLE Sit to stand 2x10 2x10 3x10 Supine: abdominal crunch up; to both sides x10 2x10 x10 each way Supine: SLR flexion, abduction Bridging 3x10 
3# LLE 3x10 
3# LLE 3x10 
3# LLE Prone: 
Hip extension, knee curls 2x10 
3# LLE 2x10 
3# LLE 3x10 
3# LLE Pt ambulated 50' with quad cane x3 x3 x3 Squats 2x10 2x10 2x10 Weight shifting in standing  x20 x20 Treatment/Session Assessment:   
· Response to Treatment:  Pt tolerated session well. Continues to have difficulty with right knee flexion based exercises. · Compliance with Program/Exercises: Will assess as treatment progresses. · Recommendations/Intent for next treatment session: \"Next visit will focus on advancements to more challenging activities\". Total Treatment Duration: 
 60 minutes PT Patient Time In/Time Out Time In: 1400 Time Out: 1500 Marco Altamirano PT Future Appointments Date Time Provider Azalia Alberto 2/12/2019  2:00 PM Mauriec Pricila PT [facility]  
2/14/2019 11:15 AM Mark Cruz MD [facility] FABIO MRMD  
2/15/2019  2:00 PM [facility] 2/19/2019  2:00 PM [facility]  
2/20/2019  9:00 AM Juliette Newby MD [facility] PMR PMR  
2/21/2019  9:30 AM [facility]  
2/22/2019  2:00 PM [facility]  
2/26/2019  2:00 PM Bradley Barker[facility]  
3/1/2019  2:00 PM [facility]  
4/23/2019 10:45 AM Andi Grace MD [facility] MFM

## 2019-02-08 ENCOUNTER — HOSPITAL ENCOUNTER (OUTPATIENT)
Dept: PHYSICAL THERAPY | Age: 64
Discharge: HOME | End: 2019-02-08
Payer: COMMERCIAL

## 2019-02-08 PROCEDURE — 97110 THERAPEUTIC EXERCISES: CPT

## 2019-02-08 NOTE — PROGRESS NOTES
Wally Noble : 1955 Primary: [employer] Secondary:  Therapy Center at [address] [phone]   [phone] OUTPATIENT PHYSICAL THERAPY:Daily Note 2019 ICD-10: Treatment Diagnosis: Contracture of joint, foot, right M24.574, Difficulty in walking,not elsewhere classified R26.2 Precautions/Allergies:  
Banana; Lisinopril; and Nuts [tree nut] MD Orders: Evaluate and treat MEDICAL/REFERRING DIAGNOSIS: 
Balance disorder [R26.89] DATE OF ONSET: Surgery 18 REFERRING PHYSICIAN: Vanessa Alcocer MD 
RETURN PHYSICIAN APPOINTMENT: 19 INITIAL ASSESSMENT:  Ms. Lakisha Quiñonez presents with decreased LE strength and balance deficits following recent surgery (18) for right equinocavovarus deformity in which the surgeon did the following: right split anterior tibial tendon transfer, right Achilles lengthening, right complete posteromedial release with joint and multiple tendon lengthenings, plantar fascia release/Steindler stripping, and right Mora ankle reconstruction. Pt has a complicated PMH with a CVA in 2017. However pt is motivated towards all goals established below. PROBLEM LIST (Impacting functional limitations): 1. Decreased Strength 2. Decreased Ambulation Ability/Technique 3. Decreased Balance 4. Decreased Flexibility/Joint Mobility INTERVENTIONS PLANNED: (Treatment may consist of any combination of the following) 1. Gait Training 2. Home Exercise Program (HEP) 3. Manual Therapy 4. Neuromuscular Re-education/Strengthening 5. Therapeutic Exercise/Strengthening TREATMENT PLAN: 
Effective Dates: 1/15/2019 TO 4/15/2019 (90 days). Frequency/Duration: 2 times a week for 90 Day(s) GOALS: (Goals have been discussed and agreed upon with patient.) Discharge Goals: Time Frame: 4/15/19 1. Pt to ambulate 48' with quad cane without any increased pain or difficulty with independence. 2. Pt to demonstrate improvement as per Gipson score with a total score >45/56 points indicating decreased risk for falls. 3. Pt to achieve independence with HEP to maintain goals achieved. Rehabilitation Potential For Stated Goals: Good Regarding Avery Iraheta's therapy, I certify that the treatment plan above will be carried out by a therapist or under their direction. Thank you for this referral, Mi Fenton, PT The information in this section was collected on 1/15/19 (except where otherwise noted). HISTORY:  
History of Present Injury/Illness (Reason for Referral): Pt is a 58year old female s/p surgery to the right foot for right equinocavovarus deformity. She presents to therapy with impaired balance and decreased lower extremity strength limiting ability to participate at max functional potential. Pt stated that she had muscle spasms immediately after surgery into her right leg which have gone away. Pt denies any pain at this time however is uncomfortable due to having a boot on her right foot. Pt is eager to don her new foot brace and shoes once surgeon allows it. She was recently discharged from OT due to upcoming foot surgery. Pt has had PT following her CVA back in 2017 in which she was discharged following plateau of progress. Past Medical History/Comorbidities: Ms. Siomara Crocker  has a past medical history of Anxiety, CVA (cerebral vascular accident) ([address] (2017), Depression, HTN (hypertension), Menopause, and PTE (pulmonary thromboembolism) ([address] (2017). Ms. Siomara Crocker  has a past surgical history that includes hx refractive surgery (); hx jennifer and bso (); hx  section; hx other surgical (); and hx other surgical (2017). Social History/Living Environment: Pt lives in a [facility] with her . The home has two floors with master bedroom on the 1st floor. Pt transfers and performs bed mobility with independence. Prior Level of Function/Work/Activity: Pt prior to surgery was able to ambulate up to 50' at a time with her quad cane. She used her wheelchair for community distances. Pt has an aid that comes to the home 6 hours a day as her  works still. Ambulatory/Rehab Services H2 Model Falls Risk Assessment Risk Factors: 
     No Risk Factors Identified Ability to Rise from Chair: 
     (1)  Pushes up, successful in one attempt Falls Prevention Plan: Mobility Assistance Device (specify):  Quad cane and wheelchair for long distances Total: (5 or greater = High Risk): 1 ©2010 [facility] [place] . [facility] States Patent #[id number]. Federal Law prohibits the replication, distribution or use without written permission from [facility] Schedulicity Current Medications:   
  
Current Outpatient Medications:  
  amLODIPine (NORVASC) 5 mg tablet, Take 1 Tab by mouth daily. , Disp: 90 Tab, Rfl: 1   ALPRAZolam (XANAX) 0.25 mg tablet, Take 1 Tab by mouth three (3) times daily as needed for Anxiety. Max Daily Amount: 0.75 mg., Disp: 30 Tab, Rfl: 1 
  labetalol (NORMODYNE) 300 mg tablet, Take 1 Tab by mouth two (2) times a day., Disp: 180 Tab, Rfl: 1 
  rOPINIRole (REQUIP) 2 mg tablet, Take 1 Tab by mouth nightly. for restless leg, Disp: 90 Tab, Rfl: 1 
  tiZANidine (ZANAFLEX) 4 mg tablet, 4mg po TID, Disp: 270 Tab, Rfl: 1 
  losartan-hydroCHLOROthiazide (HYZAAR) 100-25 mg per tablet, Take 1 Tab by mouth daily. , Disp: 90 Tab, Rfl: 1 
  FLUoxetine (PROZAC) 20 mg tablet, Take 2 Tabs by mouth daily. , Disp: 180 Tab, Rfl: 1 
  baclofen (LIORESAL) 10 mg tablet, Take 1 Tab by mouth three (3) times daily. , Disp: 90 Tab, Rfl: 2 
  polyethylene glycol (MIRALAX) 17 gram/dose powder, Take 17 g by mouth daily. (Patient taking differently: Take 17 g by mouth daily as needed.), Disp: 510 g, Rfl: 2 Date Last Reviewed:  1/15/19 Number of Personal Factors/Comorbidities that affect the Plan of Care: 0: LOW COMPLEXITY EXAMINATION:  
Observation/Orthostatic Postural Assessment:  Cam boot on right foot, right LE postures into ER. Right UE contracture in elbow and wrist/hand, right shoulder abduction 80 degrees of AROM 
      ROM:    
AROM(PROM) Right Left Knee flexion 120 125 Knee extension 0 0 Hip flexion 100 110 Ankle dorsiflexion (DF) - knee extended 0 5 Ankle plantarflexion 0 50 Strength:    
Manual Muscle Test (*/5) Right Left Knee extension 3-/5 4/5 Knee flexion 3/5 4/5 Hip flexion 3/5 4/5 Hip ER 3/5 4/5 Hip IR 3/5 4/5 Hip extension 3/5 4/5 Hip abduction 3/5 4/5 Hip adduction 2+/5 4/5 Ankle DF 1/5 4/5 Ankle PF 1/5 4/5 Body Structures Involved: 1. Joints 2. Muscles Body Functions Affected: 1. Neuromusculoskeletal 
2. Movement Related Activities and Participation Affected: 1. Mobility 2. Self Care 3. Domestic Life Number of elements (examined above) that affect the Plan of Care: 4+: HIGH COMPLEXITY CLINICAL PRESENTATION:  
Presentation: Stable and uncomplicated: LOW COMPLEXITY CLINICAL DECISION MAKING:  
Outcome Measure: Tool Used: Devonte Ponds Balance Scale Score:  Initial: 38/56 Most Recent: X/56 (Date: -- ) Interpretation of Score: Each section is scored on a 0-4 scale, 0 representing the patients inability to perform the task and 4 representing independence. The scores of each section are added together for a total score of 56. The higher the patients score, the more independent the patient is. Any score below 45 indicates increased risk for falls. Medical Necessity:  
· Patient demonstrates good rehab potential due to higher previous functional level. Reason for Services/Other Comments: 
· Patient will benefit from therapy at this time to achieve goals established above.   
Use of outcome tool(s) and clinical judgement create a POC that gives a: Clear prediction of patient's progress: LOW COMPLEXITY  
  
 
 
 
TREATMENT:  
(In addition to Assessment/Re-Assessment sessions the following treatments were rendered) Pre-treatment Symptoms/Complaints:  \"I'm going to start OT at [place] and have to go downtown for speech\" Pt has received brace however awaiting the shoe for it. Pt expressed that she was disappointed that she cannot practice walking with the brace as of yet. Pain: Initial:  
   Post Session:   
0/10 Therapeutic Exercise: ( 60):  Exercises per grid below to improve mobility and strength. Required minimal verbal cues to promote proper body posture. Progressed complexity of movement as indicated. Date: 
1/29/19 Date: 
2/1/19 Date: 
2/8/19 Activity/Exercise Parameters Parameters Parameters Nu-step Level 2  X 4 minutes  X 5 minutes Seated:  
LAQ Hip flexion Adduction ball squeeze 3x10 
3# LLE 3x10 
3# LLE 3x10 
3# LLE Sit to stand 2x10 2x10 2x10 Supine: abdominal crunch up; to both sides x10 2x10 2x5 Supine: SLR flexion, abduction Bridging 3x10 
3# LLE 3x10 
3# LLE 3x10 
3# LLE Prone: 
Hip extension, knee curls 2x10 
3# LLE 2x10 
3# LLE 2x10 
3# LLE Pt ambulated 50' with quad cane x3 x3 x3 Squats 2x10 2x10 2x10 Weight shifting in standing  x20 x20 Treatment/Session Assessment:   
· Response to Treatment: Pt ambulated 20' without foot brace and demonstrated safer ambulation than with brace. Pt demonstrated improved control of hamstrings when performing knee flexion exercise in prone. · Compliance with Program/Exercises: Will assess as treatment progresses. · Recommendations/Intent for next treatment session: \"Next visit will focus on advancements to more challenging activities\". Total Treatment Duration: 
 60 minutes PT Patient Time In/Time Out Time In: 1400 Time Out: 1500 Lemuel Stark PT Future Appointments Date Time Provider Azalia Alberto 2/12/2019  2:00 PM Christ Dupree, PT [facility]  
2/14/2019 11:15 AM MD TANG Lewis [facility]IO MRMD  
2/15/2019  2:00 PM Christ Dupree, PT [facility]  
2/19/2019  2:00 PM Christ Dupree, PT [facility]  
2/20/2019  9:00 AM Juliette Randhawa MD [facility] PMR PMR  
2/21/2019  9:30 AM [facility]  
2/22/2019  2:00 PM Christ Dupree, PT [facility]  
2/26/2019  2:00 PM Christ Dupree, PT [facility]  
3/1/2019  2:00 PM Christ Dupree, PT [facility]  
4/23/2019 10:45 AM [facility]

## 2019-02-08 NOTE — THERAPY DISCHARGE
Priya Diop : 1955 Primary:  
Secondary:  Therapy Center at [facility] AT [place] [address] [facility], [address], [place]. [phone]   [phone] OUTPATIENT PHYSICAL [place] Loza ICD-10: Treatment Diagnosis:  
· Other abnormalities of gait and mobility (R26.89) · Difficulty in walking, not elsewhere classified (R26.2) Precautions/Allergies:  
Banana; Lisinopril; and Nuts [tree nut] Fall Risk Score: 5 (? 5 = High Risk) MD Orders: Evaluate and Treat MEDICAL/REFERRING DIAGNOSIS: 
Weakness due to cerebrovascular accident (CVA) ([facility] [I63.9, R53.1] DATE OF ONSET: CVA: 2017 REFERRING PHYSICIAN: Juliette Thompson* RETURN PHYSICIAN APPOINTMENT: TBD INITIAL ASSESSMENT:  Ms. Azul Caballero presents with decreased mobility, decreased strength, decreased gait, and decreased balance secondary to CVA. After discussing with patient, she agreed she would benefit from physical therapy to improve above deficits. Please sign this plan of treatment if you concur. Thank you for the opportunity to serve this patient. Discharge note:  Patient attended five scheduled physical therapy appointments from 2018 to 2018. Patient canceled her last two scheduled appointments. Problems and goals unable to be reassessed. Patient discharged from physical therapy. Thank you for this referral.    
PROBLEM LIST (Impacting functional limitations): 1. Decreased Strength 2. Decreased Ambulation Ability/Technique 3. Decreased Balance 4. Decreased Activity Tolerance INTERVENTIONS PLANNED: 
1. Balance Exercise 2. Gait Training 3. Home Exercise Program (HEP) 4. Neuromuscular Re-education/Strengthening 5. Range of Motion (ROM) 6. Therapeutic Exercise/Strengthening TREATMENT PLAN: 
 Frequency/Duration: Patient discharged from physical therapy. GOALS: (Goals have been discussed and agreed upon with patient.) Short-Term Functional Goals: Time Frame: 30 days 1. Patient will be independent with home exercise program without exacerbation of symptoms or cueing needed. Goal met. Discharge Goals: Time Frame: 60 days 1. Patient will be independent with all ADLs with minimal fear of falling and loss of balance with daily tasks. Goal unable to be reassessed. 2. Patient will report no fear avoidance with social or recreational activities due to fear of falling. Goal unable to be reassessed. 3. Patient will score greater than or equal to 45/56 on Gipson Balance Scale with minimal effect of imbalance on patient's ability to manage every day life activities. Goal unable to be reassessed. The information in this section was collected on 12/15/2017 (except where otherwise noted). HISTORY:  
History of Present Injury/Illness (Reason for Referral): 
Patient is well known to PT from previous treatment sessions. Patient reports she had a severe stroke on 2017. She reports that she was in the hospital and [facility] for about 61-62 days. She reports that she has progressed really well, but is still having trouble walking and using her right side (UE and LE). She reports that she walks using her AFO and quad cane all the time. She reports that she tries to be as active as possible. She reports that she is scared that she is going to fall, even though she has not. She reports she would like to work on strengthening her right leg so her balance is better and she can walk without fear of falling. Past Medical History/Comorbidities: Ms. Delano Ware  has a past medical history of Anxiety, CVA (cerebral vascular accident) ([facility] (2017), Depression, HTN (hypertension), Menopause, and PTE (pulmonary thromboembolism) ([facility] (2017). Ms. Delano Ware  has a past surgical history that includes hx refractive surgery (); hx jennifer and bso (); hx  section; hx other surgical (); and hx other surgical (). Social History/Living Environment:  
Home Environment: Private residence Living Alone: No 
Support Systems: Family member(s), Friends \ neighbors; Spouse Prior Level of Function/Work/Activity: 
Independent Dominant Side:  
      RIGHT Personal Factors:   
      Sex:  female Age:  58 y.o. Current Medications:   
  
Current Outpatient Medications:  
  amLODIPine (NORVASC) 5 mg tablet, Take 1 Tab by mouth daily. , Disp: 90 Tab, Rfl: 1   ALPRAZolam (XANAX) 0.25 mg tablet, Take 1 Tab by mouth three (3) times daily as needed for Anxiety. Max Daily Amount: 0.75 mg., Disp: 30 Tab, Rfl: 1 
  labetalol (NORMODYNE) 300 mg tablet, Take 1 Tab by mouth two (2) times a day., Disp: 180 Tab, Rfl: 1 
  rOPINIRole (REQUIP) 2 mg tablet, Take 1 Tab by mouth nightly. for restless leg, Disp: 90 Tab, Rfl: 1 
  tiZANidine (ZANAFLEX) 4 mg tablet, 4mg po TID, Disp: 270 Tab, Rfl: 1 
  losartan-hydroCHLOROthiazide (HYZAAR) 100-25 mg per tablet, Take 1 Tab by mouth daily. , Disp: 90 Tab, Rfl: 1 
  FLUoxetine (PROZAC) 20 mg tablet, Take 2 Tabs by mouth daily. , Disp: 180 Tab, Rfl: 1 
  baclofen (LIORESAL) 10 mg tablet, Take 1 Tab by mouth three (3) times daily. , Disp: 90 Tab, Rfl: 2 
  polyethylene glycol (MIRALAX) 17 gram/dose powder, Take 17 g by mouth daily. (Patient taking differently: Take 17 g by mouth daily as needed.), Disp: 510 g, Rfl: 2 Number of Personal Factors/Comorbidities that affect the Plan of Care: 1-2: MODERATE COMPLEXITY EXAMINATION:  
Observation/Orthostatic Postural Assessment:   
      Posture Assessment: Rounded shoulders, Forward head Functional Mobility:  
      Gait/Ambulation:   
 Speed/Lulu: Pace decreased (<100 feet/min) Step Length: Left shortened, Right lengthened Swing Pattern: Left asymmetrical, Right asymmetrical 
Stance: Left increased, Right decreased Gait Abnormalities: Antalgic, Circumduction, Foot drop, Path deviations, Trendelenburg Ambulation - Level of Assistance: Modified independent Assistive Device: [place], quad · Interventions: Verbal cues, Safety awareness training Transfers:   
 Sit to Stand: Independent Stand to Sit: Independent Stand Pivot Transfers: Independent · Bed to Chair: Independent Bed Mobility:   
 Rolling: Independent Supine to Sit: Independent Sit to Supine: Independent · Scooting: Independent Strength: L UE STRENGTH: 4/5 shoulder flexion, 4/5 shoulder abduction, 4/5 shoulder extension, 4/5 elbow flexion, 4/5 elbow extension. R UE STRENGTH: 2/5 shoulder flexion, 2/5 shoulder abduction, 2/5 shoulder extension, 2/5 elbow flexion, 2/5 elbow extension. R LE STRENGTH:  4+/5 hip flexion, 4/5 hip abduction, 55 hip adduction, 4+/5 hip extension, 4+/5 5/5knee extension, 3-/5 knee flexion, 1/5 ankle dorsiflexion, 1/5 ankle plantarflexion, 1/5 ankle inversion, 1/5 ankle eversion. Sensation:  
      Intact for light touch and proprioception Postural Control & Balance:· Gipson Balance Scale:  38/56.   (A score less than 45/56 indicates high risk of falls)  . Score improved from 28/56 on initial evaluation. Body Structures Involved: 1. Nerves 2. Muscles Body Functions Affected: 1. Neuromusculoskeletal 
2. Movement Related Activities and Participation Affected: 1. Mobility 2. Self Care Number of elements (examined above) that affect the Plan of Care: 4+: HIGH COMPLEXITY CLINICAL PRESENTATION:  
Presentation: Evolving clinical presentation with changing clinical characteristics: MODERATE COMPLEXITY CLINICAL DECISION MAKING:  
Outcome Measure: Tool Used: Zander Romo Balance Scale Score:  Initial: 35/56 Most Recent: 38/56 (Date: 10- ) Interpretation of Score: Each section is scored on a 0-4 scale, 0 representing the patients inability to perform the task and 4 representing independence.   The scores of each section are added together for a total score of 56. The higher the patients score, the more independent the patient is. Any score below 45 indicates increased risk for falls. Score 56 55-45 44-34 33-23 22-12 11-1 0 Modifier CH CI CJ CK CL CM CN Use of outcome tool(s) and clinical judgement create a POC that gives a: Questionable prediction of patient's progress: MODERATE COMPLEXITY  
  
 
TREATMENT:  
 
   · Recommendations/Intent for next treatment session: Patient discharged from physical therapy.  
 
Marya Brittle, PT

## 2019-02-12 ENCOUNTER — HOSPITAL ENCOUNTER (OUTPATIENT)
Dept: PHYSICAL THERAPY | Age: 64
Discharge: HOME | End: 2019-02-12
Payer: COMMERCIAL

## 2019-02-12 PROCEDURE — 97110 THERAPEUTIC EXERCISES: CPT

## 2019-02-12 NOTE — PROGRESS NOTES
Coby Janis : 1955 Primary: [employer] Ppo Secondary:  Therapy Center at [address] [phone]   [phone] OUTPATIENT PHYSICAL THERAPY:Daily Note and Progress Report 2019 ICD-10: Treatment Diagnosis: Contracture of joint, foot, right M24.574, Difficulty in walking,not elsewhere classified R26.2 Precautions/Allergies:  
Banana; Lisinopril; and Nuts [tree nut] MD Orders: Evaluate and treat MEDICAL/REFERRING DIAGNOSIS: 
Balance disorder [R26.89] DATE OF ONSET: Surgery 18 REFERRING PHYSICIAN: Sarah Jon MD 
RETURN PHYSICIAN APPOINTMENT: 19: Pt has been seen for 9 visits, she is awaiting her new shoe so she can don her new brace next week. Pt's therapy has progressed from supine and seated LE strengthening to standing at the bar. INITIAL ASSESSMENT:  Ms. Siomara Crocker presents with decreased LE strength and balance deficits following recent surgery (18) for right equinocavovarus deformity in which the surgeon did the following: right split anterior tibial tendon transfer, right Achilles lengthening, right complete posteromedial release with joint and multiple tendon lengthenings, plantar fascia release/Steindler stripping, and right Mora ankle reconstruction. Pt has a complicated PMH with a CVA in 2017. However pt is motivated towards all goals established below. PROBLEM LIST (Impacting functional limitations): 1. Decreased Strength 2. Decreased Ambulation Ability/Technique 3. Decreased Balance 4. Decreased Flexibility/Joint Mobility INTERVENTIONS PLANNED: (Treatment may consist of any combination of the following) 1. Gait Training 2. Home Exercise Program (HEP) 3. Manual Therapy 4. Neuromuscular Re-education/Strengthening 5. Therapeutic Exercise/Strengthening TREATMENT PLAN: 
Effective Dates: 1/15/2019 TO 4/15/2019 (90 days).   Frequency/Duration: 2 times a week for 90 Day(s) GOALS: (Goals have been discussed and agreed upon with patient.) Discharge Goals: Time Frame: 4/15/19 1. Pt to ambulate 48' with quad cane without any increased pain or difficulty with independence. GOAL ONGOING, AWAITING  BRACE 2/12/19 2. Pt to demonstrate improvement as per Bhakti Mccoy score with a total score >45/56 points indicating decreased risk for falls. GOAL ONGOING 3. Pt to achieve independence with HEP to maintain goals achieved. GOAL MET 2/12/19 Rehabilitation Potential For Stated Goals: Good Regarding Angelita Stephen Iraheta's therapy, I certify that the treatment plan above will be carried out by a therapist or under their direction. Thank you for this referral, Ashley Bailey PT The information in this section was collected on 1/15/19 (except where otherwise noted). HISTORY:  
History of Present Injury/Illness (Reason for Referral): Pt is a 58year old female s/p surgery to the right foot for right equinocavovarus deformity. She presents to therapy with impaired balance and decreased lower extremity strength limiting ability to participate at max functional potential. Pt stated that she had muscle spasms immediately after surgery into her right leg which have gone away. Pt denies any pain at this time however is uncomfortable due to having a boot on her right foot. Pt is eager to don her new foot brace and shoes once surgeon allows it. She was recently discharged from OT due to upcoming foot surgery. Pt has had PT following her CVA back in 2017 in which she was discharged following plateau of progress. Past Medical History/Comorbidities: Ms. Shaunna Ward  has a past medical history of Anxiety, CVA (cerebral vascular accident) ([address] (2017), Depression, HTN (hypertension), Menopause, and PTE (pulmonary thromboembolism) ([facility] [address] (2017).   Ms. Shaunna Ward  has a past surgical history that includes hx refractive surgery (2006); hx jennifer and bso (); hx  section; hx other surgical (); and hx other surgical (2017). Social History/Living Environment: Pt lives in a [facility] with her . The home has two floors with master bedroom on the 1st floor. Pt transfers and performs bed mobility with independence. Prior Level of Function/Work/Activity: Pt prior to surgery was able to ambulate up to 50' at a time with her quad cane. She used her wheelchair for community distances. Pt has an aid that comes to the home 6 hours a day as her  works still. Ambulatory/Rehab Services H2 Model Falls Risk Assessment Risk Factors: 
     No Risk Factors Identified Ability to Rise from Chair: 
     (1)  Pushes up, successful in one attempt Falls Prevention Plan: Mobility Assistance Device (specify):  Quad cane and wheelchair for long distances Total: (5 or greater = High Risk): 1 © [facility] of [place] . [facility] States Patent #[id number]. Federal Law prohibits the replication, distribution or use without written permission from [facility] of [employer] Current Medications:   
  
Current Outpatient Medications:  
  amLODIPine (NORVASC) 5 mg tablet, Take 1 Tab by mouth daily. , Disp: 90 Tab, Rfl: 1   ALPRAZolam (XANAX) 0.25 mg tablet, Take 1 Tab by mouth three (3) times daily as needed for Anxiety. Max Daily Amount: 0.75 mg., Disp: 30 Tab, Rfl: 1 
  labetalol (NORMODYNE) 300 mg tablet, Take 1 Tab by mouth two (2) times a day., Disp: 180 Tab, Rfl: 1 
  rOPINIRole (REQUIP) 2 mg tablet, Take 1 Tab by mouth nightly. for restless leg, Disp: 90 Tab, Rfl: 1 
  tiZANidine (ZANAFLEX) 4 mg tablet, 4mg po TID, Disp: 270 Tab, Rfl: 1 
  losartan-hydroCHLOROthiazide (HYZAAR) 100-25 mg per tablet, Take 1 Tab by mouth daily. , Disp: 90 Tab, Rfl: 1 
  FLUoxetine (PROZAC) 20 mg tablet, Take 2 Tabs by mouth daily. , Disp: 180 Tab, Rfl: 1 
  baclofen (LIORESAL) 10 mg tablet, Take 1 Tab by mouth three (3) times daily., Disp: 90 Tab, Rfl: 2 
  polyethylene glycol (MIRALAX) 17 gram/dose powder, Take 17 g by mouth daily. (Patient taking differently: Take 17 g by mouth daily as needed.), Disp: 510 g, Rfl: 2 Date Last Reviewed:  1/15/19 Number of Personal Factors/Comorbidities that affect the Plan of Care: 0: LOW COMPLEXITY EXAMINATION:  
Observation/Orthostatic Postural Assessment:  Cam boot on right foot, right LE postures into ER. Right UE contracture in elbow and wrist/hand, right shoulder abduction 80 degrees of AROM 
      ROM:    
AROM(PROM) Right Left Knee flexion 120 125 Knee extension 0 0 Hip flexion 100 110 Ankle dorsiflexion (DF) - knee extended 0 5 Ankle plantarflexion 0 50 Strength:    
Manual Muscle Test (*/5) Right Left Knee extension 3-/5 4/5 Knee flexion 3/5 4/5 Hip flexion 3/5 4/5 Hip ER 3/5 4/5 Hip IR 3/5 4/5 Hip extension 3/5 4/5 Hip abduction 3/5 4/5 Hip adduction 2+/5 4/5 Ankle DF 1/5 4/5 Ankle PF 1/5 4/5 Body Structures Involved: 1. Joints 2. Muscles Body Functions Affected: 1. Neuromusculoskeletal 
2. Movement Related Activities and Participation Affected: 1. Mobility 2. Self Care 3. Domestic Life Number of elements (examined above) that affect the Plan of Care: 4+: HIGH COMPLEXITY CLINICAL PRESENTATION:  
Presentation: Stable and uncomplicated: LOW COMPLEXITY CLINICAL DECISION MAKING:  
Outcome Measure: Tool Used: [place]ra [place] Balance Scale Score:  Initial: 38/56 Most Recent: X/56 (Date: -- ) Interpretation of Score: Each section is scored on a 0-4 scale, 0 representing the patients inability to perform the task and 4 representing independence. The scores of each section are added together for a total score of 56. The higher the patients score, the more independent the patient is. Any score below 45 indicates increased risk for falls. Medical Necessity:  
· Patient demonstrates good rehab potential due to higher previous functional level. Reason for Services/Other Comments: 
· Patient will benefit from therapy at this time to achieve goals established above. Use of outcome tool(s) and clinical judgement create a POC that gives a: Clear prediction of patient's progress: LOW COMPLEXITY  
  
 
 
 
TREATMENT:  
(In addition to Assessment/Re-Assessment sessions the following treatments were rendered) Pre-treatment Symptoms/Complaints: \"I should be getting my new shoe and brace by next Friday\"  Pt stated that her 's father is ill and in the hospital, \"it doesn't look to good\" Pain: Initial:  
Pain Intensity 1: 0  Post Session:   
0/10 Therapeutic Exercise: ( 60):  Exercises per grid below to improve mobility and strength. Required minimal verbal cues to promote proper body posture. Progressed complexity of movement as indicated. Date: 
2/1219 Activity/Exercise Parameters Nu-step Level 2 X 5 minutes Standing: RLE Hip abduction Knee extension Knee flexion  3x10 Seated: LAQ 3# BLE Hip flexion 3# BLE Adduction ball squeeze 3x10 Sit to stand no UE 3x10 Supine: abdominal crunch up; to both sides 2x5 Supine: LLE 3# SLR flexion, abduction Bridging 3x10 Prone: LLE 3# Hip extension, knee curls 3x10 Treatment/Session Assessment:   
· Response to Treatment: Pt tolerated standing exercises at the bar to strengthen right lower extremity. Pt's LE demonstrating improved strength with increased tolerance to repetitions with ankle weighted exercises. · Compliance with Program/Exercises: Will assess as treatment progresses. · Recommendations/Intent for next treatment session: \"Next visit will focus on advancements to more challenging activities\". Total Treatment Duration: 
 60 minutes PT Patient Time In/Time Out Time In: 1400 Time Out: 1500 Shira Porras PT Future Appointments Date Time Provider Azalia Alberto 2/14/2019 11:15 AM Catalina Mora MD [facility]  
 2/15/2019  2:00 PM Bernardine Sons, PT [facility]  
2/19/2019  2:00 PM Bernardine Sons, PT [facility]  
2/20/2019  9:00 AM Juliette Sanchez MD [facility] PMR  
2/21/2019  9:30 AM [facility]  
2/22/2019  2:00 PM Bernardine Sons, PT [facility]  
2/26/2019  2:00 PM Bernshannanne Sons, PT [facility]  
3/1/2019  2:00 PM Bernardine Sons, PT [facility]  
4/23/2019 10:45 AM Shira Luis MD [facility] MFM

## 2019-02-15 ENCOUNTER — HOSPITAL ENCOUNTER (OUTPATIENT)
Dept: PHYSICAL THERAPY | Age: 64
Discharge: HOME | End: 2019-02-15
Payer: COMMERCIAL

## 2019-02-15 PROCEDURE — 97110 THERAPEUTIC EXERCISES: CPT

## 2019-02-15 NOTE — PROGRESS NOTES
Kaylyn Coppola : 1955 Primary: [employer] Ppo Secondary:  Therapy Center at [address] [phone]   [phone] OUTPATIENT PHYSICAL THERAPY:Daily Note 2/15/2019 ICD-10: Treatment Diagnosis: Contracture of joint, foot, right M24.574, Difficulty in walking,not elsewhere classified R26.2 Precautions/Allergies:  
Banana; Lisinopril; and Nuts [tree nut] MD Orders: Evaluate and treat MEDICAL/REFERRING DIAGNOSIS: 
Balance disorder [R26.89] DATE OF ONSET: Surgery 18 REFERRING PHYSICIAN: Lesley Gifford MD 
RETURN PHYSICIAN APPOINTMENT: 19: Pt has been seen for 9 visits, she is awaiting her new shoe so she can don her new brace next week. Pt's therapy has progressed from supine and seated LE strengthening to standing at the bar. INITIAL ASSESSMENT:  Ms. Audrey Serrano presents with decreased LE strength and balance deficits following recent surgery (18) for right equinocavovarus deformity in which the surgeon did the following: right split anterior tibial tendon transfer, right Achilles lengthening, right complete posteromedial release with joint and multiple tendon lengthenings, plantar fascia release/Steindler stripping, and right Mora ankle reconstruction. Pt has a complicated PMH with a CVA in 2017. However pt is motivated towards all goals established below. PROBLEM LIST (Impacting functional limitations): 1. Decreased Strength 2. Decreased Ambulation Ability/Technique 3. Decreased Balance 4. Decreased Flexibility/Joint Mobility INTERVENTIONS PLANNED: (Treatment may consist of any combination of the following) 1. Gait Training 2. Home Exercise Program (HEP) 3. Manual Therapy 4. Neuromuscular Re-education/Strengthening 5. Therapeutic Exercise/Strengthening TREATMENT PLAN: 
Effective Dates: 1/15/2019 TO 4/15/2019 (90 days). Frequency/Duration: 2 times a week for 90 Day(s) GOALS: (Goals have been discussed and agreed upon with patient.) Discharge Goals: Time Frame: 4/15/19 1. Pt to ambulate 48' with quad cane without any increased pain or difficulty with independence. GOAL ONGOING, AWAITING  BRACE 2/12/19 2. Pt to demonstrate improvement as per Noel Chick score with a total score >45/56 points indicating decreased risk for falls. GOAL ONGOING 3. Pt to achieve independence with HEP to maintain goals achieved. GOAL MET 2/12/19 Rehabilitation Potential For Stated Goals: Good Regarding Francis Iraheta's therapy, I certify that the treatment plan above will be carried out by a therapist or under their direction. Thank you for this referral, Yoly Burroughs, PT The information in this section was collected on 1/15/19 (except where otherwise noted). HISTORY:  
History of Present Injury/Illness (Reason for Referral): Pt is a 58year old female s/p surgery to the right foot for right equinocavovarus deformity. She presents to therapy with impaired balance and decreased lower extremity strength limiting ability to participate at max functional potential. Pt stated that she had muscle spasms immediately after surgery into her right leg which have gone away. Pt denies any pain at this time however is uncomfortable due to having a boot on her right foot. Pt is eager to don her new foot brace and shoes once surgeon allows it. She was recently discharged from OT due to upcoming foot surgery. Pt has had PT following her CVA back in 2017 in which she was discharged following plateau of progress. Past Medical History/Comorbidities: Ms. Audrey Serrano  has a past medical history of Anxiety, CVA (cerebral vascular accident) ([address] (2017), Depression, HTN (hypertension), Menopause, and PTE (pulmonary thromboembolism) ([address] (2017).   Ms. Audrey Serrano  has a past surgical history that includes hx refractive surgery (2006); hx jennifer and bso (); hx  section; hx other surgical (); and hx other surgical (2017). Social History/Living Environment: Pt lives in a [facility] with her . The home has two floors with master bedroom on the 1st floor. Pt transfers and performs bed mobility with independence. Prior Level of Function/Work/Activity: Pt prior to surgery was able to ambulate up to 50' at a time with her quad cane. She used her wheelchair for community distances. Pt has an aid that comes to the home 6 hours a day as her  works still. Ambulatory/Rehab Services H2 Model Falls Risk Assessment Risk Factors: 
     No Risk Factors Identified Ability to Rise from Chair: 
     (1)  Pushes up, successful in one attempt Falls Prevention Plan: Mobility Assistance Device (specify):  Quad cane and wheelchair for long distances Total: (5 or greater = High Risk): 1 © [facility] of [place] . [facility] States Patent #[id number]. Federal Law prohibits the replication, distribution or use without written permission from [facility] of [employer] Current Medications:   
  
Current Outpatient Medications:  
  amLODIPine (NORVASC) 5 mg tablet, Take 1 Tab by mouth daily. , Disp: 90 Tab, Rfl: 1   ALPRAZolam (XANAX) 0.25 mg tablet, Take 1 Tab by mouth three (3) times daily as needed for Anxiety. Max Daily Amount: 0.75 mg., Disp: 30 Tab, Rfl: 1 
  labetalol (NORMODYNE) 300 mg tablet, Take 1 Tab by mouth two (2) times a day., Disp: 180 Tab, Rfl: 1 
  rOPINIRole (REQUIP) 2 mg tablet, Take 1 Tab by mouth nightly. for restless leg, Disp: 90 Tab, Rfl: 1 
  tiZANidine (ZANAFLEX) 4 mg tablet, 4mg po TID, Disp: 270 Tab, Rfl: 1 
  losartan-hydroCHLOROthiazide (HYZAAR) 100-25 mg per tablet, Take 1 Tab by mouth daily. , Disp: 90 Tab, Rfl: 1 
  FLUoxetine (PROZAC) 20 mg tablet, Take 2 Tabs by mouth daily. , Disp: 180 Tab, Rfl: 1 
  baclofen (LIORESAL) 10 mg tablet, Take 1 Tab by mouth three (3) times daily., Disp: 90 Tab, Rfl: 2 
  polyethylene glycol (MIRALAX) 17 gram/dose powder, Take 17 g by mouth daily. (Patient taking differently: Take 17 g by mouth daily as needed.), Disp: 510 g, Rfl: 2 Date Last Reviewed:  1/15/19 Number of Personal Factors/Comorbidities that affect the Plan of Care: 0: LOW COMPLEXITY EXAMINATION:  
Observation/Orthostatic Postural Assessment:  Cam boot on right foot, right LE postures into ER. Right UE contracture in elbow and wrist/hand, right shoulder abduction 80 degrees of AROM 
      ROM:    
AROM(PROM) Right Left Knee flexion 120 125 Knee extension 0 0 Hip flexion 100 110 Ankle dorsiflexion (DF) - knee extended 0 5 Ankle plantarflexion 0 50 Strength:    
Manual Muscle Test (*/5) Right Left Knee extension 3-/5 4/5 Knee flexion 3/5 4/5 Hip flexion 3/5 4/5 Hip ER 3/5 4/5 Hip IR 3/5 4/5 Hip extension 3/5 4/5 Hip abduction 3/5 4/5 Hip adduction 2+/5 4/5 Ankle DF 1/5 4/5 Ankle PF 1/5 4/5 Body Structures Involved: 1. Joints 2. Muscles Body Functions Affected: 1. Neuromusculoskeletal 
2. Movement Related Activities and Participation Affected: 1. Mobility 2. Self Care 3. Domestic Life Number of elements (examined above) that affect the Plan of Care: 4+: HIGH COMPLEXITY CLINICAL PRESENTATION:  
Presentation: Stable and uncomplicated: LOW COMPLEXITY CLINICAL DECISION MAKING:  
Outcome Measure: Tool Used: Sean Garcia Balance Scale Score:  Initial: 38/56 Most Recent: X/56 (Date: -- ) Interpretation of Score: Each section is scored on a 0-4 scale, 0 representing the patients inability to perform the task and 4 representing independence. The scores of each section are added together for a total score of 56. The higher the patients score, the more independent the patient is. Any score below 45 indicates increased risk for falls. Medical Necessity:  
· Patient demonstrates good rehab potential due to higher previous functional level. Reason for Services/Other Comments: 
· Patient will benefit from therapy at this time to achieve goals established above. Use of outcome tool(s) and clinical judgement create a POC that gives a: Clear prediction of patient's progress: LOW COMPLEXITY  
  
 
 
 
TREATMENT:  
(In addition to Assessment/Re-Assessment sessions the following treatments were rendered) Pre-treatment Symptoms/Complaints: Pt is not getting brace until next Friday. Pain: Initial:  
Pain Intensity 1: 0  Post Session:   
0/10 Therapeutic Exercise: ( 60):  Exercises per grid below to improve mobility and strength. Required minimal verbal cues to promote proper body posture. Progressed complexity of movement as indicated. Date: 
2/15/19 Activity/Exercise Parameters Nu-step Level 2 X 5 minutes Standing: RLE Hip abduction Knee extension Knee flexion  3x10 Seated: LAQ 3# BLE Hip flexion 3# BLE Adduction ball squeeze 3x10 Sit to stand no UE 3x10 Supine: abdominal crunch up; to both sides 2x5 Supine: LLE 3# SLR flexion, abduction Bridging 3x10 Prone: LLE 3# Hip extension, knee curls 3x10 Treatment/Session Assessment:   
· Response to Treatment: Pt performed knee flexion well in prone with some overpressure to hip/buttock region. · Compliance with Program/Exercises: Will assess as treatment progresses. · Recommendations/Intent for next treatment session: \"Next visit will focus on advancements to more challenging activities\". Total Treatment Duration: 
 60 minutes PT Patient Time In/Time Out Time In: 1400 Time Out: 1500 Slim Walton, FRANCOIS Future Appointments Date Time Provider Azalia Bossisti 2/19/2019  2:00 PM FR[facility]CO[facility] Vinson  
2/20/2019  9:00 AM Juliette Newby MD [facility] PMR PMR  
2/21/2019  9:30 AM [facility]  
2/22/2019  2:00 PM FRANCOIS Vinson  
2/26/2019  2:00 PM FRANCOIS Vinson 3/1/2019  2:00 PM Alfredo Avila, PT [facility]  
4/23/2019 10:45 AM Maria Del Carmen Cantrell., MD [facility]  
5/23/2019 11:30 AM [facility]

## 2019-02-18 NOTE — PROGRESS NOTES
I am accessing Ms. Iraheta's chart as a part of our department's internal chart auditing process. I certify that Ms. Audrey Serrano is, or was, a patient in our department. Thank you, [place] 2/18/2019

## 2019-02-19 ENCOUNTER — APPOINTMENT (OUTPATIENT)
Dept: PHYSICAL THERAPY | Age: 64
End: 2019-02-19
Payer: COMMERCIAL

## 2019-02-21 ENCOUNTER — HOSPITAL ENCOUNTER (OUTPATIENT)
Dept: PHYSICAL THERAPY | Age: 64
Discharge: HOME | End: 2019-02-21
Attending: FAMILY MEDICINE
Payer: COMMERCIAL

## 2019-02-21 DIAGNOSIS — I61.9 STROKE, HEMORRHAGIC (HCC): ICD-10-CM

## 2019-02-21 PROCEDURE — 92610 EVALUATE SWALLOWING FUNCTION: CPT | Performed by: SPEECH-LANGUAGE PATHOLOGIST

## 2019-02-21 NOTE — PROGRESS NOTES
Outpatient Rehab Services  Referral Form/Physician Order  Central Scheduling Fax: [phone]  Speak to a :  Call [phone]   Please review and co-sign (electronically) OR sign and return to the below indicated clinic's fax number if you agree with this request.  Thank you! NAME:  Abdiel Steinberg SSN:  [id number] :  1955   ADDRESS:  [address] [place] [address]  [id number] Daytime Phone:  [phone] (mobile)    Diagnosis & Date of Onset:  Stroke, hemorrhagic ([facility] [address] [I61.9] Special Precautions:   Frequency:  [] to be determined by therapist after evaluation   OR   ____ X per week X ____ weeks    Services    [] Evaluate & Treat  [x] Special Orders____Modifed Barium Speech ____________________   [] Physical Therapy  [] Occupational Therapy   [] Speech Therapy  [] MBS w/ Speech Therapy    Specialized Programs    [] Aquatic Therapy [] Lymphedema [] Oncology Rehab   [] Balance Rehab [] Parkinson's Program [] Osteoporosis   [] Fibromyalgia [] Motion Analysis [] Spine Rehab   [] Industrial Rehab [] Hand & Upper Extremity [] Sports Injury Rehab    [] Urinary Incontinence     Locations    @ [address] [place] [address] 68, [facility] [address] [facility]  Ph: [phone]  Fax: [phone] @ [address] [place] [address], [address] [facility] [address]  [facility] [address]  Ph: [phone]  Fax: [phone] @ [address] Mcconnell, [address] [place] [address]  Ph: [phone]  Fax: [phone]        @ [address] [place] [address] Dianna Alexander 2  Ph: [phone]  Fax: [phone] @ [address] [place] [address] Samuel Mcconnell [address]   Ph: [phone]  Fax: [phone] @ [address] Mcclendon [address]  [address]  [place], [address]  Ph: [phone]  Fax: [phone]        @ [address]  Ph: [phone]  Fax: [phone] @ [facility] [address] [place] [address]  Ph: [phone]  Fax: [phone] @ [address], [address]  Dagmar Adams   Ph: [phone]         @ [address], [address] [place] [address]  Ph: [phone]  Fax: [phone]      This section is not needed if signing electronically   I certify that I have examined the above patient and outpatient rehab services are medically necessary.    ___________________________________________           Signature of Physician/Provider    ___________________________________________            Practice Name   ______________________   Date    ______________________   Referral Contact

## 2019-02-21 NOTE — THERAPY EVALUATION
Kamran Sotelo : 1955 Primary: [employer] [facility] Secondary:  [facility] [place] [address], [address], [address] [phone]   [phone] OUTPATIENT SPEECH LANGUAGE PATHOLOGY: Initial Assessment ICD-10: Treatment Diagnosis: Oropharyngeal Dysphagia REFERRING PHYSICIAN: Alanis Fischer MD MD Orders: Evaluate and Treat PAST MEDICAL HISTORY:  
Ms. Aric Armstrong is a 58 y.o. female who  has a past medical history of Anxiety, CVA (cerebral vascular accident) ([facility] (2017), Depression, HTN (hypertension), Menopause, and PTE (pulmonary thromboembolism) ([facility] (). She also  has a past surgical history that includes hx refractive surgery (); hx jennifer and bso (); hx  section; hx other surgical (); hx other surgical (); and hx orthopaedic. MEDICAL/REFERRING DIAGNOSIS: Stroke, hemorrhagic ([facility] [I61.9] DATE OF ONSET: two years PRIOR LEVEL OF FUNCTION: requires assistance PRECAUTIONS/ALLERGIES: Banana; Lisinopril; and Nuts [tree nut] ASSESSMENT: 
Patient is a 58year old female who was referred for speech evaluation s/p CVA two years ago. She reports difficulty with swallowing mostly solids. She denies difficulty with liquids. She has not lost any weight due to this and denies GERD. Currently she is on a regular diet with thin liquids. Based on the objective data described below, the patient presents with minimal clinical s/sx of Dysphagia. OME was completed and essentially WNL's. She was given trials of thin liquids via cup/straw, puree, mixed and solids. (+) overt clinical s/sx of aspiration was observed with thin liquids via straw only. No other clinical s/sx of aspiration observed with puree, mixed, solids and thin liquids via cup. ST recommends continue with current diet however she would benefit from MBSS to further assess swallow function in order to recommend appropriate diet and or strategies. Patient stated \" my speech doesn't sound right. \" Patient's speech intelligibility is 100% intelligible at the word, phrase, sentence and conversational level. Minimal dysarthria observed however patient was given strategies to help improve and was able to demonstrate these independently. Also given strategies for managing attention and processing. Patient will benefit from skilled intervention to address the below impairments. ?????? ? ? This section established at most recent assessment?????????? 
PROBLEM LIST (Impairments causing functional limitations): 1. Dysphagia GOALS: (Goals have been discussed and agreed upon with patient.) SHORT-TERM FUNCTIONAL GOALS: Time Frame: 6 weeks 1. Complete MBSS at 100% participation. 2. Complete laryngeal strengthening exercises independently at 100% accuracy. DISCHARGE GOALS: Time Frame: 6 weeks 1. Patient will tolerate least restrictive diet without signs/symptoms of aspiration at discharge for safe swallow function. REHABILITATION POTENTIAL FOR STATED GOALS: GoodPLAN OF CARE: 
Patient will benefit from skilled intervention to address the following impairments. RECOMMENDATIONS AND PLANNED INTERVENTIONS (Benefits and precautions of therapy have been discussed with the patient.): 
· continue prescribed diet · Liquids:  regular thin MEDICATIONS: 
· With liquid COMPENSATORY STRATEGIES/MODIFICATIONS INCLUDING: 
· Small sips and bites OTHER RECOMMENDATIONS (including follow up treatment recommendations): · MBSS 
RECOMMENDED DIET MODIFICATIONS DISCUSSED WITH: 
· Patient TREATMENT PLAN EFFECTIVE DATES: 2/21/2019 TO 4/27/2019 (60 days). FREQUENCY/DURATION: Continue to follow patient 1 time a week for 60 days to address above goals. Regarding Archie Iraheta's therapy, I certify that the treatment plan above will be carried out by a therapist or under their direction. Thank you for this referral, 
JOSE Car 
 
          
 Referring Physician Signature: Mehdi Zee MD    Date SUBJECTIVE: 
Alert Present Symptoms:   
Pain Intensity 1: 0 Current Medications:  
Current Outpatient Medications on File Prior to Encounter Medication Sig Dispense Refill  amLODIPine (NORVASC) 5 mg tablet Take 1 Tab by mouth daily. 90 Tab 1  ALPRAZolam (XANAX) 0.25 mg tablet Take 1 Tab by mouth three (3) times daily as needed for Anxiety. Max Daily Amount: 0.75 mg. 30 Tab 1  
 labetalol (NORMODYNE) 300 mg tablet Take 1 Tab by mouth two (2) times a day. 180 Tab 1  
 rOPINIRole (REQUIP) 2 mg tablet Take 1 Tab by mouth nightly. for restless leg 90 Tab 1  
 tiZANidine (ZANAFLEX) 4 mg tablet 4mg po  Tab 1  
 losartan-hydroCHLOROthiazide (HYZAAR) 100-25 mg per tablet Take 1 Tab by mouth daily. 90 Tab 1  
 FLUoxetine (PROZAC) 20 mg tablet Take 2 Tabs by mouth daily. 180 Tab 1  
 baclofen (LIORESAL) 10 mg tablet Take 1 Tab by mouth three (3) times daily. 90 Tab 2  polyethylene glycol (MIRALAX) 17 gram/dose powder Take 17 g by mouth daily. (Patient taking differently: Take 17 g by mouth daily as needed.) 510 g 2 No current facility-administered medications on file prior to encounter. Date Last Reviewed: 2/21/19 Current Dietary Status:  Regular History of reflux:  NO  
? Reflux medication:N/A Social History/Home Situation:  Work/Activity History: N/A OBJECTIVE: 
Objective Measure: Tool Used: National Outcomes Measurement System: Functional Communication Measures: SWALLOWING Score:  Initial: 6 Most Recent: X (Date: -- ) Interpretation of Tool: This measure describes the change in functional communication status subsequent to speech-language pathology treatment of patients with dysphagia. o Level 1:  Individual is not able to swallow anything safely by mouth. All nutrition and hydration is received through non-oral means (e.g., nasogastric tube, PEG). o Level 2:   Individual is not able to swallow safely by mouth for nutrition and hydration, but may take some consistency with consistent maximal cues in therapy only. Alternative method of feeding required. o Level 3:  Alternative method of feeding required as individual takes less than 50% of nutrition and hydration by mouth, and/or swallowing is safe with consistent use of moderate cues to use compensatory strategies and/or requires maximum diet restriction. o Level 4:  Swallowing is safe, but usually requires moderate cues to use compensatory strategies, and/or the individual has moderate diet restrictions and/or still requires tube feeding and/or oral supplements. o Level 5:  Swallowing is safe with minimal diet restriction and/or occasionally requires minimal cueing to use compensatory strategies. The individual may occasionally self-cue. All nutrition and hydration needs are met by mouth at mealtime. o Level 6:  Swallowing is safe, and the individual eats and drinks independently and may rarely require minimal cueing. The individual usually self-cues when difficulty occurs. May need to avoid specific food items (e.g., popcorn and nuts), or require additional time (due to dysphagia). o Level 7: The individuals ability to eat independently is not limited by swallow function. Swallowing would be safe and efficient for all consistencies. Compensatory strategies are effectively used when needed. Score Level 7 Level 6 Level 5 Level 4 Level 3 Level 2 Level 1 Modifier CH CI CJ CK CL CM CN Respiratory Status:      Room Air CXR Results:N/A 
MRI/CT Results: 
Oral Motor Structure/Speech:  Oral-Motor Structure/Motor Speech Labial: No impairment Dentition: Upper dentures, Lower dentures Oral Hygiene: adequate Lingual: No impairment Velum: No impairment Mandible: No impairment Cognitive and Communication Status: 
Neurologic State: Alert Orientation Level: Oriented X4 Cognition: Appropriate decision making; Follows commands Perception: Appears intact Perseveration: No perseveration noted Safety/Judgement: Awareness of environment BEDSIDE SWALLOW EVALUATIONOral Assessment: 
Oral Assessment Labial: No impairment Dentition: Upper dentures; Lower dentures Oral Hygiene: adequate Lingual: No impairment Velum: No impairment Mandible: No impairment Gag Reflex: Present P.O. Trials: 
Patient Position: upright in chair The patient was given teaspoon to tablespoon 
 amounts of the following:  
Consistency Presented: Solid;Puree; Thin liquid;Mixed consistency How Presented: Self-fed/presented;Cup/sip;Spoon;Straw;Successive swallows ORAL PHASE: 
Bolus Acceptance: No impairment Bolus Formation/Control: No impairment Propulsion: No impairment Oral Residue: None PHARYNGEAL PHASE: 
Initiation of Swallow: No impairment Laryngeal Elevation: Functional 
Aspiration Signs/Symptoms: Delayed cough/throat clear(with straw only) Vocal Quality: No impairment Pharyngeal Phase Characteristics: No impairment, issues, or problems OTHER OBSERVATIONS: 
Rate/bite size: WNL Endurance: WNL Coments:   
 
TREATMENT:  
 (In addition to Assessment/Re-Assessment sessions the following treatments were rendered) Assessment/Reassessment only, no treatment provided today LARYNGEAL / PHARYNGEAL EXERCISES: 
    
  
  
  
  
  
  
  
  
  
  
  
  
  
  
  
  
  
  
  
   
 
__________________________________________________________________________________________________ Treatment Assessment:  Dysphagia evaluation completed Progression/Medical Necessity:  
· Skilled intervention continues to be required due to complaint of dysphagia. Compliance with Program/Exercises: Will assess as treatment progresses. Reason for Continuation of Services/Other Comments: 
· Patient continues to require skilled intervention due to CVA/Dysphagia . Recommendations/Intent for next treatment session: \"Treatment next visit will focus on goals\". Total Treatment Duration: 
Time In: 0930 Time Out: 1015 JOSE Escobedo

## 2019-02-21 NOTE — PROGRESS NOTES
Outpatient Rehab Services  Referral Form/Physician Order  Central Scheduling Fax: [phone]  Speak to a :  Call [phone]   Please review and co-sign (electronically) OR sign and return to the below indicated clinic's fax number if you agree with this request.  Thank you! NAME:  Amina Adrian SSN:  [id number] :  1955   ADDRESS:  [address] [place] [address]  [address] Daytime Phone:  [phone] (mobile)    Diagnosis & Date of Onset:  Stroke, hemorrhagic ([facility] [I61.9] Special Precautions:   Frequency:  [] to be determined by therapist after evaluation   OR   ____ X per week X ____ weeks    Services    [] Evaluate & Treat  [x] Special Orders_______Modifed Barium Swallow Study _________________   [] Physical Therapy  [] Occupational Therapy   [] Speech Therapy  [] MBS w/ Speech Therapy    Specialized Programs    [] Aquatic Therapy [] Lymphedema [] Oncology Rehab   [] Balance Rehab [] Parkinson's Program [] Osteoporosis   [] Fibromyalgia [] Motion Analysis [] Spine Rehab   [] Industrial Rehab [] Hand & Upper Extremity [] Sports Injury Rehab    [] Urinary Incontinence     Locations    @ [address] [place] [address] [address] [facility] [address]  Ph: [phone]  Fax: [phone] @ [address] [place] [address], [address] [place] [address],[address]  [place], [address]  Ph: [phone]  Fax: [phone] @ [address] Mcconnell, [address] [place] [address]  Ph: [phone]  Fax: [phone]        @ [address] [place] [address] Dianna Alexander 2  Ph: [phone]  Fax: [phone] @ [address] [place] [address] Samuel Mcconnell, [address]   Ph: [phone]  Fax: [phone] @ [facility] [address]  [address]  [place], [address]  Ph: [phone]  Fax: [phone]        @ [address] [place] [address]  [address] [facility] Vicki Simmonsen, [address] [facility]  Ph: [phone]  Fax: [phone] @ [address] [place] [address]  [facility] [address]  Ph: [phone]  Fax: [phone] @ [address] [place] [address] Dagmar Adams   Ph: [phone]         @ [address] [place] [address]  Ph: [phone]  Fax: [phone]      This section is not needed if signing electronically   I certify that I have examined the above patient and outpatient rehab services are medically necessary.    ___________________________________________           Signature of Physician/Provider    ___________________________________________            Practice Name   ______________________   Date    ______________________   Referral Contact

## 2019-02-21 NOTE — PROGRESS NOTES
Outpatient Rehab Services  Referral Form/Physician Order  Central Scheduling Fax: [phone]  Speak to a :  Call [phone]   Please review and co-sign (electronically) OR sign and return to the below indicated clinic's fax number if you agree with this request.  Thank you! NAME:  Zehra Myers SSN:  [id number] :  1955   ADDRESS:  [address] [place] [address]  [address] Daytime Phone:  [phone] (mobile)    Diagnosis & Date of Onset:  Stroke, hemorrhagic ([facility] [address] [I61.9] Special Precautions:   Frequency:  [] to be determined by therapist after evaluation   OR   ____ X per week X ____ weeks    Services    [] Evaluate & Treat  [x] Special Orders_Modified Barium Swallow Study  _______________________   [] Physical Therapy  [] Occupational Therapy   [] Speech Therapy  [] MBS w/ Speech Therapy    Specialized Programs    [] Aquatic Therapy [] Lymphedema [] Oncology Rehab   [] Balance Rehab [] Parkinson's Program [] Osteoporosis   [] Fibromyalgia [] Motion Analysis [] Spine Rehab   [] Industrial Rehab [] Hand & Upper Extremity [] Sports Injury Rehab    [] Urinary Incontinence     Locations    @ [address] [place] [address] [address] [facility]  Ph: [phone]  Fax: [phone] @ [address] [place] [address], [address] [place] [address],[address]  [place], [address] [facility] [address] [address]  Ph: [phone]  Fax: [phone] @ [address] Mcconnell, [address] [place] [address]  Ph: [phone]  Fax: [phone]        @ [address] [place] [address] Dianna Alexander 2  Ph: [phone]  Fax: [phone] @ [address] [place] [address] Samuel Mcconnell, [address]   Ph: [phone]  Fax: [phone] @ Gabbi Mcclendon   [address]  [place], [address]  Ph: [phone]  Fax: [phone]        @ [address]  Ph: [phone]  Fax: [phone] @ [address] [place] [address]  Ph: [phone]  Fax: [phone] @ [address] [place] [address] Dagmar Adams   Ph: [phone]         @ [address]  Ph: [phone]  Fax: [phone]      This section is not needed if signing electronically   I certify that I have examined the above patient and outpatient rehab services are medically necessary.    ___________________________________________           Signature of Physician/Provider    ___________________________________________            Practice Name   ______________________   Date    ______________________   Referral Contact

## 2019-02-22 ENCOUNTER — APPOINTMENT (OUTPATIENT)
Dept: PHYSICAL THERAPY | Age: 64
End: 2019-02-22
Payer: COMMERCIAL

## 2019-02-26 ENCOUNTER — APPOINTMENT (OUTPATIENT)
Dept: PHYSICAL THERAPY | Age: 64
End: 2019-02-26
Payer: COMMERCIAL

## 2019-03-01 ENCOUNTER — HOSPITAL ENCOUNTER (OUTPATIENT)
Dept: PHYSICAL THERAPY | Age: 64
Discharge: HOME | End: 2019-03-01
Payer: COMMERCIAL

## 2019-03-01 PROCEDURE — 97116 GAIT TRAINING THERAPY: CPT

## 2019-03-01 PROCEDURE — 97110 THERAPEUTIC EXERCISES: CPT

## 2019-03-01 NOTE — PROGRESS NOTES
Wally Noble  : 1955  Primary: Simón Williamson Ppo  Secondary:  [address]  at [facility], [address] [facility]  [phone]   [phone]      OUTPATIENT PHYSICAL THERAPY: Daily Treatment Note 3/1/2019  Pre-treatment Symptoms/Complaints:  Pt has received her AFO and new shoe for the right side and is expected to see the the prosthetist on Monday. She expressed that she is quite happy with it. Pain: Initial: Pain Intensity 1: 0  Post Session:  0/10   Medications Last Reviewed:  3/1/2019  Updated Objective Findings:  None Today   TREATMENT:   GAIT TRAINING: (45 minutes):  Gait training to improve and/or restore physical functioning as related to strength, balance and coordination. Ambulated 25 feet with quad cane and maximal verbal cueing related to their stride length to {PT ALIGNMENT:38444068). .  Instruction in performance of forward propulslion to correct stride length. Date:  3/1/2019   Activity/Exercise Parameters   Walking without AD 25'x4   Walking with quad cane 25'x4   Side stepping with quad cane 25'x4       Therapeutic Exercise: ( 15):  Exercises per grid below to improve mobility and strength.  Required minimal verbal cues to promote proper body posture.  Progressed complexity of movement as indicated.       Date:  3/1/2019   Activity/Exercise Parameters   Sit to stand no UE 3x10   Standing hip adduction 3x10         Treatment/Session Summary:    · Response to Treatment:  Pt was pleased with progress of ambulation as she was able to ambulate up to 50' without quad cane and just contact guard. .  · Communication/Consultation:  None today  · Equipment provided today:  None today  · Recommendations/Intent for next treatment session: Next visit will focus on continuation and progression of gait training for safe ambulation with least restrictive device. .  Treatment Plan of Care Effective Dates:  1/15/19 to 4/15/19  Total Treatment Billable Duration:  60 minutes  PT Patient Time In/Time Out  Time In: 1400  Time Out: 1500  Etkenya Marshall, PT    Future Appointments   Date Time Provider Azalia Dolly   3/8/2019  9:00 AM [facility] XR RF ROOM 2 [facility]   3/8/2019  2:00 PM Osmar Abramst, PT [facility]   3/12/2019  2:00 PM Osmar Abramst, PT [facility]   3/15/2019  2:00 PM Osmar Abramst, PT [facility]   3/19/2019  2:00 PM Osmar Abramst, PT [facility]   3/22/2019  2:00 PM Osmar Fields, PT [facility]   3/26/2019  2:00 PM Osmar Fields, PT [facility]   3/29/2019  2:00 PM Osmar Fields, PT [facility]   4/1/2019 10:30 AM [facility] SPEECH LANGUAGE PATHOLOGISTS [facility]   4/2/2019  2:00 PM Osmar Fields, PT [facility]   4/5/2019  2:00 PM Osmar Fields, PT [facility]   4/9/2019  2:00 PM Osmar Fields, PT [facility]   4/16/2019  2:00 PM Osmar Fields, PT [facility]   4/23/2019 10:45 AM Kristine Ross MD [facility]   5/23/2019 11:30 AM [facility]k Hall [facility]

## 2019-03-08 ENCOUNTER — HOSPITAL ENCOUNTER (OUTPATIENT)
Dept: PHYSICAL THERAPY | Age: 64
Discharge: HOME | End: 2019-03-08
Payer: COMMERCIAL

## 2019-03-08 ENCOUNTER — HOSPITAL ENCOUNTER (OUTPATIENT)
Dept: GENERAL RADIOLOGY | Age: 64
Discharge: HOME | End: 2019-03-08
Attending: PHYSICAL MEDICINE & REHABILITATION
Payer: COMMERCIAL

## 2019-03-08 DIAGNOSIS — I69.391 DYSPHAGIA AS LATE EFFECT OF CEREBROVASCULAR ACCIDENT (CVA): ICD-10-CM

## 2019-03-08 PROCEDURE — 97110 THERAPEUTIC EXERCISES: CPT

## 2019-03-08 PROCEDURE — 74011000255 HC RX REV CODE- 255: Performed by: PHYSICAL MEDICINE & REHABILITATION

## 2019-03-08 PROCEDURE — 97116 GAIT TRAINING THERAPY: CPT

## 2019-03-08 PROCEDURE — 92611 MOTION FLUOROSCOPY/SWALLOW: CPT

## 2019-03-08 PROCEDURE — 74230 X-RAY XM SWLNG FUNCJ C+: CPT

## 2019-03-08 PROCEDURE — 92610 EVALUATE SWALLOWING FUNCTION: CPT

## 2019-03-08 RX ADMIN — BARIUM SULFATE 15 ML: 400 PASTE ORAL at 09:34

## 2019-03-08 RX ADMIN — BARIUM SULFATE 30 ML: 980 POWDER, FOR SUSPENSION ORAL at 09:33

## 2019-03-08 NOTE — PROGRESS NOTES
Laure Rivera  : 1955  Primary: Kenia Cuellar Ppo  Secondary:  [address] at [place] [address] [address], [address] [place] [address]  [phone]   [phone]      OUTPATIENT PHYSICAL THERAPY: Daily Treatment Note 3/8/2019  Pre-treatment Symptoms/Complaints: Pt had a swallow test this morning and has to follow up with a GI doctor next week. Pt stated that she saw the surgeon for her foot this week who suggested that she should flex her leg more when ambulating and that her LE should not be in so much ER. Pain: Initial: Pain Intensity 1: 0  Post Session:  0/10   Medications Last Reviewed:  3/8/2019  Updated Objective Findings:  None Today   TREATMENT:   GAIT TRAINING: (30 minutes):  Gait training to improve and/or restore physical functioning as related to strength, balance and coordination. Ambulated 30 feet with quad cane and maximal verbal cueing related to their stride length to {PT ALIGNMENT:86192515). .  Instruction in performance of forward propulslion to correct stride length. Date:  3/8/2019   Activity/Exercise Parameters   Walking without AD 25'x4   Walking with quad cane 25'x4   Side stepping with quad cane 25'x4       Therapeutic Exercise: ( 30):  Exercises per grid below to improve mobility and strength.  Required minimal verbal cues to promote proper body posture.  Progressed complexity of movement as indicated.       Date:  3/8/2019   Activity/Exercise Parameters   Sit to stand no UE 3x10   Standing hip adduction 3x10   Seated adduction ball squeeze 3x10   Seated hip IR 3x10   Seated hip adduction unilaterally 3x10         Treatment/Session Summary:    · Response to Treatment:  Pt needs constant vc's to bring her right hip into more IR and to flex LE when left hip is propeling forward.   · Communication/Consultation:  None today  · Equipment provided today:  None today  · Recommendations/Intent for next treatment session: Next visit will focus on continuation and progression of gait training for safe ambulation with least restrictive device. .  Treatment Plan of Care Effective Dates:  1/15/19 to 4/15/19  Total Treatment Billable Duration:  60 minutes  PT Patient Time In/Time Out  Time In: 1400  Time Out: 1500  Yoly Burroughs, PT    Future Appointments   Date Time Provider Azalia Alberto   3/12/2019  2:00 PM Giselle Cinnamon, PT [facility]   3/15/2019  2:00 PM Giselle Cinnamon, PT [facility]   3/19/2019  2:00 PM Giselle Cinnamon, PT [facility]   3/22/2019  2:00 PM Giselle Cinnamon, PT [facility]   3/26/2019  2:00 PM Giselle Cinnamon, PT [facility]   3/29/2019  2:00 PM Giselle Cinnamon, PT [facility]   4/1/2019 10:30 AM [facility] SPEECH LANGUAGE PATHOLOGISTS [facility]   4/2/2019  2:00 PM Giselle Cinnamon, PT [facility]   4/5/2019  2:00 PM Giselle Cinnamon, PT [facility]   4/16/2019  2:00 PM Giselle Cinnamon, PT [facility]   4/23/2019 10:45 AM Bam Sanchez MD [facility]   5/23/2019 11:30 AM Lucian Mcmillan MD [facility]

## 2019-03-08 NOTE — PROGRESS NOTES
Bryan Martin  : 1955  Primary: Giuliano David Ppo  Secondary:  [address]  at [facility]  [address], [place], [address] [place] [address]  [phone]   [phone]       OUTPATIENT SPEECH LANGUAGE PATHOLOGY: MODIFIED BARIUM SWALLOW    ICD-10: Treatment Diagnosis: Oropharyngeal dysphagia (R13.12)  DATE: 3/8/2019  REFERRING PHYSICIAN: Jason Diaz MD Orders: Modifed Barium Swallow  PAST MEDICAL HISTORY:   Ms. Bethany Freire is a 58 y.o. female who  has a past medical history of Anxiety, CVA (cerebral vascular accident) ([facility] [address] (2017), Depression, HTN (hypertension), Menopause, and PTE (pulmonary thromboembolism) ([facility] [address] (2017). She also  has a past surgical history that includes hx refractive surgery (); hx jennifer and bso (); hx  section; hx other surgical (); hx other surgical (); and hx orthopaedic. RADIOLOGIST:  Dr. Olivia Lau  MEDICAL/REFERRING DIAGNOSIS: Dysphagia as late effect of cerebrovascular accident (CVA) [I69.391]    PRECAUTIONS/ALLERGIES: Banana; Lisinopril; and Nuts [tree nut]   ASSESSMENT/PLAN OF CARE:  Based on the objective data described below, the patient presents with functional oral and pharyngeal swallow of all consistencies assessed. All swallows were timely with no laryngeal penetration or aspiration observed. There was persistent decreased relaxation in upper esophageal segment. Recommend GI consult for further assessment of esophageal phase of swallow.     RECOMMENDATIONS AND PLANNED INTERVENTIONS (Benefits and precautions of therapy have been discussed with the patient.):  · continue prescribed diet  MEDICATIONS:  · Whole as tolerated  COMPENSATORY STRATEGIES/MODIFICATIONS INCLUDING:  · Alternate liquids/solids  · Upright for all PO  · Remain upright for 20-30 min after any PO  OTHER RECOMMENDATIONS (including follow up treatment recommendations):   · GI consult    Thank you for this referral,  Rachell Rogers MA, CCC-SLP  SUBJECTIVE:    Present Symptoms: Intermittent choking on solids and liquids      Current Dietary Status:   Mechanical soft, thin liquids    OBJECTIVE:  Objective Measure: Tool Used: National Outcomes Measurement System: Functional Communication Measures: SWALLOWING  Score:  Initial: 6 Most Recent: X (Date: -- )   Interpretation of Tool: This measure describes the change in functional communication status subsequent to speech-language pathology treatment of patients with dysphagia.  o Level 1:  Individual is not able to swallow anything safely by mouth. All nutrition and hydration is received through non-oral means (e.g., nasogastric tube, PEG). o Level 2: Individual is not able to swallow safely by mouth for nutrition and hydration, but may take some consistency with consistent maximal cues in therapy only. Alternative method of feeding required. o Level 3:  Alternative method of feeding required as individual takes less than 50% of nutrition and hydration by mouth, and/or swallowing is safe with consistent use of moderate cues to use compensatory strategies and/or requires maximum diet restriction. o Level 4:  Swallowing is safe, but usually requires moderate cues to use compensatory strategies, and/or the individual has moderate diet restrictions and/or still requires tube feeding and/or oral supplements. o Level 5:  Swallowing is safe with minimal diet restriction and/or occasionally requires minimal cueing to use compensatory strategies. The individual may occasionally self-cue. All nutrition and hydration needs are met by mouth at mealtime. o Level 6:  Swallowing is safe, and the individual eats and drinks independently and may rarely require minimal cueing. The individual usually self-cues when difficulty occurs. May need to avoid specific food items (e.g., popcorn and nuts), or require additional time (due to dysphagia). o Level 7:   The individuals ability to eat independently is not limited by swallow function. Swallowing would be safe and efficient for all consistencies. Compensatory strategies are effectively used when needed. Score Level 7 Level 6 Level 5 Level 4 Level 3 Level 2 Level 1   Modifier CH CI CJ CK CL CM CN     Cognitive/Communication Status:  Mental Status  Neurologic State: Alert  Orientation Level: Appropriate for age  Cognition: Appropriate decision making  Perception: Appears intact  Perseveration: No perseveration noted  Safety/Judgement: Awareness of environment    Oral Assessment:  Oral Assessment  Labial: No impairment  Dentition: Natural  Lingual: No impairment  Velum: No impairment    Vocal Quality: adequate    Patient Viewed:    Film Views: Lateral, Fluoro    Oral Prepatory:  The patient was given the following: Consistency Presented:  Thin liquid, Solid, Pudding, Mixed consistency  How Presented: Self-fed/presented, SLP-fed/presented, Cup/sip, Cup/gulp, Spoon, Straw, Successive swallows    Oral Phase:  Bolus Acceptance: No impairment  Bolus Formation/Control: No impairment  Propulsion: No impairment     Oral Residue: None  Initiation of Swallow: No impairment  Oral Phase Severity: No impairment    Pharyngeal Phase:  Timing: No impairment  Decreased Tongue Base Retraction?: No  Laryngeal Elevation: WFL (within functional limits)  Penetration: None  Aspiration/Timing: No evidence of aspiration  Aspiration/Penetration Score: 1 (No penetration or aspiration-Contrast does not enter the airway)     Pharyngeal Dysfunction: None  Pharyngeal Phase Severity: N/A  Pharyngeal-Esophageal Segment: Suspected esophageal dysphagia, Decreased relaxation of upper esophageal segment    Assessment/Reassessment only, no treatment provided today    Recommendations for treatment: GI consult  Total Treatment Duration:  Time In: 0910   Time Out: [address], [place], CCC-SLP

## 2019-03-12 ENCOUNTER — HOSPITAL ENCOUNTER (OUTPATIENT)
Dept: PHYSICAL THERAPY | Age: 64
Discharge: HOME | End: 2019-03-12
Payer: COMMERCIAL

## 2019-03-12 PROCEDURE — 97116 GAIT TRAINING THERAPY: CPT

## 2019-03-12 PROCEDURE — 97110 THERAPEUTIC EXERCISES: CPT

## 2019-03-12 NOTE — PROGRESS NOTES
Fernando Padilla  : 1955  Primary: Jayda Vogel Ppo  Secondary:  [address] at [place] [id number] [address]  [phone]   [phone]      OUTPATIENT PHYSICAL THERAPY: Daily Treatment Note 3/12/2019  Pre-treatment Symptoms/Complaints: Pt with no new complaints. She wishes to be able to walk without an AD as she would like to free up her left hand to be able to carry things around the house and help with household chores. Pain: Initial: Pain Intensity 1: 0  Post Session:  0/10   Medications Last Reviewed:  3/12/2019  Updated Objective Findings:  None Today   TREATMENT:   GAIT TRAINING: (45 minutes):  Gait training to improve and/or restore physical functioning as related to strength, balance and coordination. Ambulated 95',61'728' feet with quad cane and without cueing. Instruction in performance of bending right LE to correct push off. Date:  3/12/2019   Activity/Exercise Parameters   Walking without AD 25'x4   Walking with quad cane 25'x4   Side stepping with quad cane 25'x4       Therapeutic Exercise: ( 15):  Exercises per grid below to improve mobility and strength.  Required minimal verbal cues to promote proper body posture.  Progressed complexity of movement as indicated.       Date:  3/12/2019   Activity/Exercise Parameters   Sit to stand no UE 3x10   Standing hip adduction 3x10   Seated adduction ball squeeze 3x10   Seated hip IR 3x10   Seated hip adduction unilaterally with yellow t-band 3x10       Treatment/Session Summary:    · Response to Treatment:  Pt given updated HEP and explained to  as well. Pt continues to demonstrate trouble and difficultly with right hip IR and adduction as well as flexion in the knee when standing.   · Communication/Consultation:  None today  · Equipment provided today:  None today  · Recommendations/Intent for next treatment session: Next visit will focus on continuation and progression of gait training for safe ambulation with least restrictive device. .  Treatment Plan of Care Effective Dates:  1/15/19 to 4/15/19  Total Treatment Billable Duration:  60 minutes  PT Patient Time In/Time Out  Time In: 1400  Time Out: 1500  Jess Luz, FRANCOIS    Future Appointments   Date Time Provider Azalia Alberto   3/15/2019  2:00 PM Geradine Pride, PT OFF [facility]   3/19/2019  2:00 PM Geradine Pride, PT OFF [facility]   3/22/2019  2:00 PM [facility]   3/26/2019  2:00 PM [facility]adine Pride, [facility]   3/29/2019  2:00 PM Geradine Pride, PT [facility]   4/1/2019 10:30 AM E [facility] PATHOLOGISTS E[facility]PT E   4/2/2019  2:00 PM Geradine Pride, [facility]   4/5/2019  2:00 PM Geradine Pride, PT [facility]   4/16/2019  2:00 PM Geradine Pride, PT [facility]   4/23/2019 10:45 AM [facility]idi Olivier [facility]   5/23/2019 11:30 AM Amalia Canas MD [facility]MD

## 2019-03-15 ENCOUNTER — HOSPITAL ENCOUNTER (OUTPATIENT)
Dept: PHYSICAL THERAPY | Age: 64
Discharge: HOME | End: 2019-03-15
Payer: COMMERCIAL

## 2019-03-15 NOTE — PROGRESS NOTES
Ranjana Lou  : 1955  Primary: Yo Ochoa Ppo  Secondary:  [address] at [address]  [phone]   [phone]      OUTPATIENT DAILY NOTE    NAME/AGE/GENDER: Ranjana Lou is a 58 y.o. female. DATE: 3/15/2019    Patient canceled for appointment today due to not feeling well. Will plan to follow up on next scheduled visit.     Len Wren, PT

## 2019-03-19 ENCOUNTER — HOSPITAL ENCOUNTER (OUTPATIENT)
Dept: PHYSICAL THERAPY | Age: 64
Discharge: HOME | End: 2019-03-19
Payer: COMMERCIAL

## 2019-03-19 PROCEDURE — 97110 THERAPEUTIC EXERCISES: CPT

## 2019-03-19 PROCEDURE — 97116 GAIT TRAINING THERAPY: CPT

## 2019-03-19 NOTE — PROGRESS NOTES
Shola Valdez  : 1955  Primary: Venecia  Ppo  Secondary:  [address] at [facility], [facility]  [phone]   [phone]      OUTPATIENT PHYSICAL THERAPY: Daily Treatment Note 3/19/2019  Pre-treatment Symptoms/Complaints: Pt states that she has been doing her exercises daily and apologized for missing her appointment last week due to not feeling well. Pain: Initial: Pain Intensity 1: 0  Post Session:  0/10   Medications Last Reviewed:  3/19/2019  Updated Objective Findings:  None Today   TREATMENT:   GAIT TRAINING: (30 minutes):  Gait training to improve and/or restore physical functioning as related to strength, balance and coordination. Ambulated 300',450',300' feet with quad cane and without cueing. Instruction in performance of bending right LE to correct push off. Date:  3/19/2019   Activity/Exercise Parameters   Walking without AD 25'x4   Walking with quad cane 25'x4   Side stepping with quad cane 25'x4       Therapeutic Exercise: ( 30):  Exercises per grid below to improve mobility and strength.  Required minimal verbal cues to promote proper body posture.  Progressed complexity of movement as indicated.       Date:  3/19/2019   Activity/Exercise Parameters   Sit to stand no UE 3x10   Standing hip adduction 3x10   Seated adduction ball squeeze 3x10   Seated hip IR 3x10   Seated hip adduction unilaterally with yellow t-band 3x10   Standing taps to front,side, across 2x10       Treatment/Session Summary:    · Response to Treatment:  Pt told to work on right hip IR and adduction exercises at home. .  · Communication/Consultation:  None today  · Equipment provided today:  None today  · Recommendations/Intent for next treatment session: Next visit will focus on continuation and progression of gait training for safe ambulation with least restrictive device. .  Treatment Plan of Care Effective Dates:  1/15/19 to 4/15/19  Total Treatment Billable Duration:  60 minutes  PT Patient Time In/Time Out  Time In: 1400  Time Out: 1500  Hussein Carrasco, PT    Future Appointments   Date Time Provider Azalia Dolly   3/22/2019  2:00 PM Arlina Petite, [place] [facility]   3/26/2019  2:00 PM Arlina Petite, PT [facility]   3/29/2019  2:00 PM Arlina Petite, PT [facility]   4/1/2019 10:30 AM [facility]   4/2/2019  2:00 PM Arlina Petite, PT [facility]   4/5/2019  2:00 PM Arlina Petite, PT [facility]   4/16/2019  2:00 PM Aresthera Petite, PT [facility]   4/23/2019 10:45 AM Maria Del Carmen B[facility]ia [facility]   5/23/2019 11:30 AM Kyara Dsouza [facility]

## 2019-03-22 ENCOUNTER — HOSPITAL ENCOUNTER (OUTPATIENT)
Dept: PHYSICAL THERAPY | Age: 64
Discharge: HOME | End: 2019-03-22
Payer: COMMERCIAL

## 2019-03-22 PROCEDURE — 97110 THERAPEUTIC EXERCISES: CPT

## 2019-03-22 NOTE — PROGRESS NOTES
Ximena Rush  : 1955  Primary: Ahmet Banegas Ppo  Secondary:  [address] at [address]  [phone]   [phone]      OUTPATIENT PHYSICAL THERAPY: Daily Treatment Note 3/22/2019  Pre-treatment Symptoms/Complaints: Pt with no new complaints. She stated that she has been doing her exercises on a daily basis. Pain: Initial: Pain Intensity 1: 0  Post Session:  0/10   Medications Last Reviewed:  3/22/2019  Updated Objective Findings:  None Today   TREATMENT:   Therapeutic Exercise: ( 60):  Exercises per grid below to improve mobility and strength.  Required minimal verbal cues to promote proper body posture.  Progressed complexity of movement as indicated.       Date:  3/22/2019   Activity/Exercise Parameters   LAQ , hip flexion  3x10   Standing hip adduction 3x10   Seated adduction ball squeeze 3x10   Seated hip IR 3x10   Seated hip adduction unilaterally with yellow t-band 3x10   Standing taps to front,side, across 2x10   Supine SLR flexion, abduction 3x10       Treatment/Session Summary:    · Response to Treatment:  Pt observed to benefit with visual feedback (blue stick) on her shoe to focus on keeping her LE straight when performing right hip abduction/adduction, IR/ER. .  · Communication/Consultation:  None today  · Equipment provided today:  None today  · Recommendations/Intent for next treatment session: Continue to focus on strengthening right LE IR, adduction strengthening.    Treatment Plan of Care Effective Dates:  1/15/19 to 4/15/19  Total Treatment Billable Duration:  60 minutes  PT Patient Time In/Time Out  Time In: 1400  Time Out: 1500  Jess Luz PT    Future Appointments   Date Time Provider Azalia Alberto   3/26/2019  2:00 PM Ralph Escudero [facility]   3/29/2019  2:00 [facility]   2019 10:30 AM [facility] KATHERIN DIAMOND   2019  2:00 PM Bridgette Flower Anita Garcia, PT [facility]   4/5/2019  2:00 PM Alfredo Avila, PT [facility]   4/16/2019  2:00 PM Alfredo Avila, PT [facility]   4/23/2019 10:45 AM Felisa Max MD [facility]   5/23/2019 11:30 AM Socorro Garcia MD [facility]

## 2019-03-26 ENCOUNTER — HOSPITAL ENCOUNTER (OUTPATIENT)
Dept: PHYSICAL THERAPY | Age: 64
Discharge: HOME | End: 2019-03-26
Payer: COMMERCIAL

## 2019-03-26 PROCEDURE — 97110 THERAPEUTIC EXERCISES: CPT

## 2019-03-26 NOTE — PROGRESS NOTES
Terrell Guerin  : 1955  Primary: Jazzy Luciano Ppo  Secondary:  [address] at [place] [id number] [address], [address] [place] [address]  [phone]   [phone]      OUTPATIENT PHYSICAL THERAPY: Daily Treatment Note 3/26/2019  Pre-treatment Symptoms/Complaints: Pt with no new complaints. She stated that she wants to try to use her right arm more with daily activities. Pain: Initial: Pain Intensity 1: 0  Post Session:  0/10   Medications Last Reviewed:  3/26/2019  Updated Objective Findings:  None Today   TREATMENT:   Therapeutic Exercise: ( 60):  Exercises per grid below to improve mobility and strength.  Required minimal verbal cues to promote proper body posture.  Progressed complexity of movement as indicated.       Date:  3/26/2019   Activity/Exercise Parameters   LAQ , hip flexion  3x10   Standing hip adduction 3x10   Seated adduction ball squeeze 3x10   Seated hip IR 3x10   Seated hip adduction unilaterally with yellow t-band 3x10   Standing taps to front,side, across 2x10   Supine SLR flexion, abduction 3x10       Treatment/Session Summary:    · Response to Treatment:  Pt demonstrates right LE hip ER when ambulating more than 50' due to weakness. .  · Communication/Consultation:  None today  · Equipment provided today:  None today  · Recommendations/Intent for next treatment session: Continue to focus on strengthening right LE IR, adduction strengthening.    Treatment Plan of Care Effective Dates:  1/15/19 to 4/15/19  Total Treatment Billable Duration:  60 minutes  PT Patient Time In/Time Out  Time In: 1400  Time Out: 1500  Todd Bill PT    Future Appointments   Date Time Provider Azalia Alberto   3/29/2019  2:00 PM Ralph Amaya [facility]   2019 10:30 AM [facility] LANG[facility] PATHOLOG[facility]   2019  2:00 PM Selena Roy PT [facility]   2019  2:00 PM Selena Roy PT [facility]   2019  2:00 PM [facility] N, PT [facility]   4/23/2019 10:45 AM Hardik Gee MD [facility]   5/23/2019 11:30 AM [facility]

## 2019-03-29 ENCOUNTER — HOSPITAL ENCOUNTER (OUTPATIENT)
Dept: PHYSICAL THERAPY | Age: 64
Discharge: HOME | End: 2019-03-29
Payer: COMMERCIAL

## 2019-03-29 PROCEDURE — 97110 THERAPEUTIC EXERCISES: CPT

## 2019-03-29 NOTE — PROGRESS NOTES
Nuria Nguyen  : 1955  Primary: Merlin Jeanette Ppo  Secondary:  [address] at [place] [id number] [address], [address] [place] [address]  [phone]   [phone]      OUTPATIENT PHYSICAL THERAPY: Daily Treatment Note 3/29/2019  Pre-treatment Symptoms/Complaints:  Pt stated that she would like to walk more without using her quad cane. Pt stated that if she did not have an AD she would be able to use her left hand more for other chores. Pain: Initial: Pain Intensity 1: 0  Post Session:  0/10   Medications Last Reviewed:  3/29/2019  Updated Objective Findings:  None Today   TREATMENT:   Therapeutic Exercise: (55):  Exercises per grid below to improve mobility and strength.  Required minimal verbal cues to promote proper body posture.  Progressed complexity of movement as indicated.       Date:  3/29/2019   Activity/Exercise Parameters   LAQ , hip flexion  3x10   Standing hip adduction 3x10   Seated adduction ball squeeze 3x10   Seated hip IR 3x10   Seated hip adduction and abduction, unilaterally  3x10   Standing forward tap on 6 inch step  2x10   Standing taps to front,side, across, back 3x10     Pt ambulated 25'x4 without use of cane and SBA. Treatment/Session Summary:    · Response to Treatment:  Pt continues to ambulate without bending right LE without vc's. Pt had an episode when she was drinking some water, she gasped for air, however she stated that it happens once a month. Pt to see a speech therapist on 19. · Communication/Consultation:  None today  · Equipment provided today:  None today  · Recommendations/Intent for next treatment session: Continue to focus on strengthening right LE IR, adduction strengthening.    Treatment Plan of Care Effective Dates:  1/15/19 to 4/15/19  Total Treatment Billable Duration: 55 minutes  PT Patient Time In/Time Out  Time In: 1400  Time Out: [address] Crispin Mistry PT    Future Appointments   Date Time Provider Department [place]   4/1/2019 10:30 AM [facility]   4/2/2019  2:00 PM Harshad Todd, PT [facility]   4/5/2019  2:00 PM Harshad Todd, PT [facility]   4/16/2019  2:00 PM Harshad Todd, PT [facility]   4/23/2019 10:45 AM Dalila Roberts MD [facility]   4/23/2019  2:00 PM Harshad Todd, PT [facility]   4/26/2019  2:00 PM Harshad Todd, PT [facility]   4/30/2019  2:00 PM Harshad Todd, PT [facility]   5/3/2019  2:00 PM Harshad Todd, PT [facility]   5/7/2019  2:00 PM Harshad Todd, PT [facility]   5/10/2019  2:00 PM Harshad Todd, PT [facility]   5/14/2019  2:00 PM Harshad Todd, PT [facility]   5/17/2019  2:00 PM Harshad Todd, PT [facility]   5/21/2019  2:00 PM Harshad Todd, PT [facility]   5/23/2019 11:30 AM David Perez MD [facility]

## 2019-04-01 ENCOUNTER — HOSPITAL ENCOUNTER (OUTPATIENT)
Dept: PHYSICAL THERAPY | Age: 64
Discharge: HOME | End: 2019-04-01
Attending: FAMILY MEDICINE
Payer: COMMERCIAL

## 2019-04-01 PROCEDURE — 92526 ORAL FUNCTION THERAPY: CPT | Performed by: SPEECH-LANGUAGE PATHOLOGIST

## 2019-04-01 NOTE — THERAPY EVALUATION
Venice Palacios  : 1955  Primary: Darlene Kelsey  Secondary:  [address] at [place] [id number] [facility], [address] [place] [address],[address], [address] [facility]  [phone]   [phone]         OUTPATIENT SPEECH LANGUAGE PATHOLOGY: Daily Note and Discharge Summary  ICD-10: Treatment Diagnosis: Oropharyngeal Dysphagia   REFERRING PHYSICIAN: Viral Lamar MD MD Orders: Evaluate and Treat  PAST MEDICAL HISTORY:   Ms. Vic Abebe is a 58 y.o. female who  has a past medical history of Anxiety, CVA (cerebral vascular accident) ([facility] [address] (2017), Depression, HTN (hypertension), Menopause, and PTE (pulmonary thromboembolism) ([facility] [address] (2017). She also  has a past surgical history that includes hx refractive surgery (); hx jennifer and bso (); hx  section; hx other surgical (); hx other surgical (); and hx orthopaedic. MEDICAL/REFERRING DIAGNOSIS: Nontraumatic intracerebral hemorrhage, unspecified [I61.9]  DATE OF ONSET: two years   PRIOR LEVEL OF FUNCTION: requires assistance  PRECAUTIONS/ALLERGIES: Banana; Lisinopril; and Nuts [tree nut]     ASSESSMENT:  Patient was seen for f/u Dysphagia treatment s/p MBSS. She completed MBSS last month. The results are as follows:Based on the objective data described below, the patient presents with functional oral and pharyngeal swallow of all consistencies assessed. All swallows were timely with no laryngeal penetration or aspiration observed. There was persistent decreased relaxation in upper esophageal segment. Recommend GI consult for further assessment of esophageal phase of swallow. Patient stated that she was awaiting her GI consult. She was encouraged to call her PCP for this referral. Otherwise, no skilled treatment is recommended as patient's swallow and speech are WNL's. She does have c/o her fatigued voice. Breathing strategies and vocal exercises given.  Also told her if these problems persists, she can ask for referral for ENT but I feel that she just needs better vocal hygiene as she doesn't drink any water at all. We also reviewed swallowing precautions and these were written down for her as well.      Patient will benefit from skilled intervention to address the below impairments. ?????? ? ? This section established at most recent assessment??????????  PROBLEM LIST (Impairments causing functional limitations):  1. Dysphagia  GOALS: (Goals have been discussed and agreed upon with patient.)  SHORT-TERM FUNCTIONAL GOALS: Time Frame: 6 weeks  1. Complete MBSS at 100% participation. Met 3/8/19  2. Complete laryngeal strengthening exercises independently at 100% accuracy. Discontinued 4/1/19  DISCHARGE GOALS: Time Frame: 6 weeks  1. Patient will tolerate least restrictive diet without signs/symptoms of aspiration at discharge for safe swallow function. Met 3/8/19    REHABILITATION POTENTIAL FOR STATED GOALS: Good     SUBJECTIVE:  Alert  Present Symptoms:    Pain Intensity 1: 0  Current Medications:   Current Outpatient Medications on File Prior to Encounter   Medication Sig Dispense Refill    amLODIPine (NORVASC) 5 mg tablet Take 1 Tab by mouth daily. 90 Tab 1    ALPRAZolam (XANAX) 0.25 mg tablet Take 1 Tab by mouth three (3) times daily as needed for Anxiety. Max Daily Amount: 0.75 mg. 30 Tab 1    labetalol (NORMODYNE) 300 mg tablet Take 1 Tab by mouth two (2) times a day. 180 Tab 1    rOPINIRole (REQUIP) 2 mg tablet Take 1 Tab by mouth nightly. for restless leg 90 Tab 1    tiZANidine (ZANAFLEX) 4 mg tablet 4mg po  Tab 1    losartan-hydroCHLOROthiazide (HYZAAR) 100-25 mg per tablet Take 1 Tab by mouth daily. 90 Tab 1    FLUoxetine (PROZAC) 20 mg tablet Take 2 Tabs by mouth daily. 180 Tab 1    baclofen (LIORESAL) 10 mg tablet Take 1 Tab by mouth three (3) times daily. 90 Tab 2    polyethylene glycol (MIRALAX) 17 gram/dose powder Take 17 g by mouth daily.  (Patient taking differently: Take 17 g by mouth daily as needed.) 510 g 2     No current facility-administered medications on file prior to encounter. Date Last Reviewed: 2/21/19  Current Dietary Status:  Regular      History of reflux:  NO    Reflux medication:N/A  Social History/Home Situation:       Work/Activity History: N/A    OBJECTIVE:  Objective Measure: Tool Used: National Outcomes Measurement System: Functional Communication Measures: SWALLOWING  Score:  Initial: 6 Most Recent: X (Date: -- )   Interpretation of Tool: This measure describes the change in functional communication status subsequent to speech-language pathology treatment of patients with dysphagia.  o Level 1:  Individual is not able to swallow anything safely by mouth. All nutrition and hydration is received through non-oral means (e.g., nasogastric tube, PEG). o Level 2: Individual is not able to swallow safely by mouth for nutrition and hydration, but may take some consistency with consistent maximal cues in therapy only. Alternative method of feeding required. o Level 3:  Alternative method of feeding required as individual takes less than 50% of nutrition and hydration by mouth, and/or swallowing is safe with consistent use of moderate cues to use compensatory strategies and/or requires maximum diet restriction. o Level 4:  Swallowing is safe, but usually requires moderate cues to use compensatory strategies, and/or the individual has moderate diet restrictions and/or still requires tube feeding and/or oral supplements. o Level 5:  Swallowing is safe with minimal diet restriction and/or occasionally requires minimal cueing to use compensatory strategies. The individual may occasionally self-cue. All nutrition and hydration needs are met by mouth at mealtime. o Level 6:  Swallowing is safe, and the individual eats and drinks independently and may rarely require minimal cueing. The individual usually self-cues when difficulty occurs.  May need to avoid specific food items (e.g., popcorn and nuts), or require additional time (due to dysphagia). o Level 7: The individuals ability to eat independently is not limited by swallow function. Swallowing would be safe and efficient for all consistencies. Compensatory strategies are effectively used when needed. Score Level 7 Level 6 Level 5 Level 4 Level 3 Level 2 Level 1   Modifier CH CI CJ CK CL CM CN     Respiratory Status:      Room Air  CXR Results:N/A  MRI/CT Results:  Oral Motor Structure/Speech:       Cognitive and Communication Status:                      BEDSIDE SWALLOW EVALUATION  Oral Assessment:     P.O. Trials: The patient was given teaspoon to tablespoon   amounts of the following:           ORAL PHASE:                   PHARYNGEAL PHASE:                            OTHER OBSERVATIONS:  Rate/bite size: WNL   Endurance: WNL   Coments:      TREATMENT:    (In addition to Assessment/Re-Assessment sessions the following treatments were rendered)  Dysphagia Activities: Activities/Procedures listed utilized to improve progress in swallow function and swallow safety. Required minimal cueing to decrease aspiration risk.      __________________________________________________________________________________________________  Discharge this date. Swallow/speech and language WNL's. No skilled treatment is warranted at this time. Total Treatment Duration:  Time In: 1662  Time Out: [address] [facility], Bong Boo. Cecile Gregg

## 2019-04-02 ENCOUNTER — HOSPITAL ENCOUNTER (OUTPATIENT)
Dept: PHYSICAL THERAPY | Age: 64
Discharge: HOME | End: 2019-04-02
Payer: COMMERCIAL

## 2019-04-02 PROCEDURE — 97110 THERAPEUTIC EXERCISES: CPT

## 2019-04-02 NOTE — PROGRESS NOTES
Curt Ellen  : 1955  Primary: Ravi Pike Ppo  Secondary:  [address]  at [facility], [address]  [phone]   [phone]       OUTPATIENT PHYSICAL THERAPY:Progress Report 2019   ICD-10:  Treatment Diagnosis: Contracture of joint, foot, right M24.574, Difficulty in fwalking,not elsewhere classified R26.2  Precautions/Allergies:   Banana; Lisinopril; and Nuts Ximena Andrey nut]   MD Orders: Evaluate and treat MEDICAL/REFERRING DIAGNOSIS:  Other abnormalities of gait and mobility [R26.89]   DATE OF ONSET: Surgery 18  REFERRING PHYSICIAN: Savita Ramos MD  RETURN PHYSICIAN APPOINTMENT: Unknown      Pt was evaluated on 1/15/19 and pt was seen today for her 18 th visits today. She continues to present with decreased LE strength R >L. Ms. Delio Serrano presents with decreased LE strength and balance deficits following recent surgery (18) for right equinocavovarus deformity in which the surgeon did the following: right split anterior tibial tendon transfer, right Achilles lengthening, right complete posteromedial release with joint and multiple tendon lengthenings, plantar fascia release/Steindler stripping, and right Mora ankle reconstruction. She is ambulating with a quad cane for household distances however she would like to ambulate without any AD to increase her independence within the household. Pt right now can ambulate up to 48' without use of AD with SBA. PROBLEM LIST (Impacting functional limitations):  1. Decreased Strength  2. Decreased Ambulation Ability/Technique  3. Decreased Balance  4. Decreased Flexibility/Joint Mobility INTERVENTIONS PLANNED: (Treatment may consist of any combination of the following)  1. Gait Training  2. Home Exercise Program (HEP)  3. Manual Therapy  4. Neuromuscular Re-education/Strengthening  5.  Therapeutic Exercise/Strengthening      TREATMENT PLAN:  Effective Dates: 1/15/2019 TO 4/15/2019 (90 days). Frequency/Duration: 2 times a week for 90 Day(s)  GOALS: (Goals have been discussed and agreed upon with patient.)  Discharge Goals: Time Frame: 4/15/19  1. Pt to ambulate 48' with quad cane without any increased pain or difficulty with independence. Goal met 4/2/19  2. Pt to demonstrate improvement as per Julia Beach score with a total score >45/56 points indicating decreased risk for falls. Goal ongoing 4/2/19      3. Pt to achieve independence with HEP to maintain goals achieved. Goal met 4/2/19    OUTCOME MEASURE:   Tool Used: Julia Beach Balance Scale  Score:  Initial: 38/56 Most Recent: 39/56 (Date: 4/2/19)   Interpretation of Score: Each section is scored on a 0-4 scale, 0 representing the patients inability to perform the task and 4 representing independence. The scores of each section are added together for a total score of 56. The higher the patients score, the more independent the patient is. Any score below 45 indicates increased risk for falls. Observation/Orthostatic Postural Assessment:  RLE postures into ER. Difficulty with right hip flexion in weightbearing. Right UE contracture in elbow and wrist/hand, right shoulder abduction 80 degrees of AROM    ROM:     AROM(PROM)   Right Left   Knee flexion 120 125   Knee extension 0 0   Hip flexion 100 110   Ankle dorsiflexion (DF) - knee extended 0 5   Ankle plantarflexion 0 50      Strength:     Manual Muscle Test (*/5)   Right Left   Knee extension 3-/5 4/5   Knee flexion 3/5 4/5   Hip flexion 3/5 4/5   Hip ER 3/5 4/5   Hip IR 3/5 4/5   Hip extension 3/5 4/5   Hip abduction 3/5 4/5   Hip adduction 2+/5 4/5   Ankle DF 1/5 4/5   Ankle PF 1/5 4/5          Ambulatory/Rehab Services H2 Model Falls Risk Assessment    Risk Factors:       No Risk Factors Identified Ability to Rise from Chair:       (1)  Pushes up, successful in one attempt    Falls Prevention Plan:        Mobility Assistance Device (specify):  Quad cane   Total: (5 or greater = High Risk): 1    ©2010 [facility] [place]. [place] [place] Patent #[id number]. Federal Law prohibits the replication, distribution or use without written permission from [facility] [address]:   · Patient demonstrates fair rehab potential due to higher previous functional level. REASON FOR SERVICES/OTHER COMMENTS:  · Pt continues to work towards goals, would like to walk without an AD within her home in order to gain more independence. .  Total Duration:     Rehabilitation Potential For Stated Goals: [facility] therapy, I certify that the treatment plan above will be carried out by a therapist or under their direction.   Thank you for this referral,  Deanna Sandhu, PT

## 2019-04-02 NOTE — PROGRESS NOTES
Darral Ring  : 1955  Primary: Chan Coley Ppo  Secondary:  [address]  at [facility], [address] [facility]  [phone]   [phone]      OUTPATIENT PHYSICAL THERAPY: Daily Treatment Note 2019  Pre-treatment Symptoms/Complaints:  Pt stated that she was able to make herself some hot tea last night and has been ambulating without her quad cane within the home with supervision of a caregiver. Pain: Initial: Pain Intensity 1: 0  Post Session:  0/10   Medications Last Reviewed:  2019  Updated Objective Findings:  None Today   TREATMENT:   Therapeutic Exercise: (60):  Exercises per grid below to improve mobility and strength.  Required minimal verbal cues to promote proper body posture.  Progressed complexity of movement as indicated.       Date:  2019   Activity/Exercise Parameters   LAQ , hip flexion  3x10   Standing hip adduction 3x10   Seated adduction ball squeeze 3x10   Seated hip IR 3x10   Seated hip adduction and abduction, unilaterally  3x10   Standing forward tap on 6 inch step  2x10   Standing taps to front,side, across, back 3x10   Step taps onto 6' step at rail 3x10   Unilateral stance on RLE 4x30''     Pt ambulated 25'x4 without use of cane and SBA. Treatment/Session Summary:    · Response to Treatment:  Pt continues to require verbal cues when ambulating to keep RLE into IR. Pt continues to demonstrate difficulty with right hip IR and flexion. Improved chau score by 1 point however difficulty with tandem stance and activities that require hip flexion of the RLE. · Communication/Consultation:  None today  · Equipment provided today:  None today  · Recommendations/Intent for next treatment session: Continue to focus on strengthening right LE IR, adduction strengthening.    Treatment Plan of Care Effective Dates:  1/15/19 to 4/15/19  Total Treatment Billable Duration: 60 minutes  PT Patient Time In/Time Out  Time In: 1400  Time Out: [address] Joe Shook, PT    Future Appointments   Date Time Provider Azalia Alberto   4/5/2019  2:00 PM Tyshawn Purpura, [place] [facility]   4/16/2019  2:00 PM Tyshawn Purpura, PT [facility]   4/23/2019 10:45 AM Fabricio Quinteros MD [facility]   4/23/2019  2:00 PM Tyshawn Purpura, PT [facility]   4/26/2019  2:00 PM Tyshawn Purpura, PT [facility]   4/30/2019  2:00 PM Tyshawn Purpura, PT [facility]   5/3/2019  2:00 PM Tyshawn Purpura, PT [facility]   5/7/2019  2:00 PM Tyshawn Purpura, PT [facility]   5/10/2019  2:00 PM Tyshawn Purpura, PT [facility]   5/14/2019  2:00 PM Tyshawn Purpura, PT [facility]   5/17/2019  2:00 PM Tyshawn Purpura, PT [facility]   5/21/2019  2:00 PM Tyshawn Purpura, PT [facility]   5/23/2019 11:30 AM [facility] Doe [facility]

## 2019-04-05 ENCOUNTER — HOSPITAL ENCOUNTER (OUTPATIENT)
Dept: PHYSICAL THERAPY | Age: 64
Discharge: HOME | End: 2019-04-05
Payer: COMMERCIAL

## 2019-04-05 PROCEDURE — 97116 GAIT TRAINING THERAPY: CPT

## 2019-04-05 PROCEDURE — 97110 THERAPEUTIC EXERCISES: CPT

## 2019-04-05 NOTE — PROGRESS NOTES
Lissett Vicky  : 1955  Primary: Danial Be Ppo  Secondary:  [address] at [facility], [address] [facility]  [phone]   [phone]      OUTPATIENT PHYSICAL THERAPY: Daily Treatment Note 2019  Pre-treatment Symptoms/Complaints:  Pt stated that she would like to go over how to walk up/down a ramp today. Pain: Initial: Pain Intensity 1: 0  Post Session:  0/10   Medications Last Reviewed:  2019  Updated Objective Findings:  None Today   TREATMENT:   Therapeutic Exercise: (45):  Exercises per grid below to improve mobility and strength.  Required minimal verbal cues to promote proper body posture.  Progressed complexity of movement as indicated.       Date:  2019   Activity/Exercise Parameters   LAQ , hip flexion  3x10   Standing hip adduction 3x10   Seated adduction ball squeeze 3x10   Seated hip IR 3x10   Seated hip adduction and abduction, unilaterally  3x10   Standing taps to front,side, across, back 3x10   Step taps onto 6' step at rail 3x10     Gait Training ( 15):  Gait training to improve and/or restore physical functioning as related to strength and balance. Ambulated   with supervision/set-up and minimal verbal cues related to their push offto promote proper body alignment. Instruction in performance of ambulating up/down stairs and 2 ramps. Treatment/Session Summary:    · Response to Treatment:  Pt ambulated up/down ramp and negotiates up/down stairs with some vc's however demonstorated safely. Pt required manual cues when perfomring step up onto 6 inch step. · Communication/Consultation:  None today  · Equipment provided today:  None today  · Recommendations/Intent for next treatment session: Continue to focus on strengthening right LE IR, adduction strengthening.    Treatment Plan of Care Effective Dates:  1/15/19 to 4/15/19  Total Treatment Billable Duration: 60 minutes  PT Patient Time In/Time Out  Time In: 1400  Time Out: [address] [facility] [address] [facility] [address] Ethel Easleyer, PT    Future Appointments   Date Time Provider Azalia Alberto   4/10/2019  8:30 AM [facility] PHONE APPOINTMENT ANA EUGENE [facility] A   4/16/2019  2:00 PM Jenny Fruits, PT [facility]   4/23/2019 10:45 AM Irvin Mondragon MD [facility]   4/23/2019  2:00 PM Jenny Fruits, PT [facility]   4/26/2019  2:00 PM Jenny Fruits, PT [facility]   4/30/2019  2:00 PM Jenny Fruits, PT [facility]   5/3/2019  2:00 PM Jenny Fruits, PT [facility]   5/7/2019  2:00 PM Jenny Fruits, PT [facility]   5/10/2019  2:00 PM Jenny Fruits, PT [facility]   5/14/2019  2:00 PM Jenny Fruits, PT [facility]   5/17/2019  2:00 PM Jenny Fruits, PT [facility]   5/21/2019  2:00 PM Jenny Fruits, PT [facility]   5/23/2019 11:30 AM [facility]

## 2019-04-16 ENCOUNTER — HOSPITAL ENCOUNTER (OUTPATIENT)
Dept: PHYSICAL THERAPY | Age: 64
Discharge: HOME | End: 2019-04-16
Payer: COMMERCIAL

## 2019-04-16 ENCOUNTER — ANESTHESIA EVENT (OUTPATIENT)
Dept: ENDOSCOPY | Age: 64
End: 2019-04-16
Payer: COMMERCIAL

## 2019-04-16 PROCEDURE — 97110 THERAPEUTIC EXERCISES: CPT

## 2019-04-16 RX ORDER — SODIUM CHLORIDE, SODIUM LACTATE, POTASSIUM CHLORIDE, CALCIUM CHLORIDE 600; 310; 30; 20 MG/100ML; MG/100ML; MG/100ML; MG/100ML
100 INJECTION, SOLUTION INTRAVENOUS CONTINUOUS
Status: CANCELLED | OUTPATIENT
Start: 2019-04-16

## 2019-04-16 NOTE — PROGRESS NOTES
Pedro Renee : 1955 Primary: Bshsi [employer] Ppo Secondary:  Therapy Center at [facility] [address] [facility] [place], [address], [place]. [phone]   [phone] OUTPATIENT SPEECH LANGUAGE PATHOLOGY: Discontinuation Summary ICD-10: Treatment Diagnosis: Other Speech Disturbance R47.89 Slurred Speech N45.88 REFERRING PHYSICIAN: Cyril Bell, * MD Orders: evaluate and treat Return Physician Appointment: Unknown PAST MEDICAL HISTORY:  
Ms. Karis Barger is a 58 y.o. female who  has a past medical history of Anxiety, CVA (cerebral vascular accident) ([facility] [address] (2017), Depression, HTN (hypertension), Menopause, and PTE (pulmonary thromboembolism) ([facility] [address] (2017). She also  has a past surgical history that includes hx refractive surgery (); hx jennifer and bso (); hx  section; hx other surgical (); hx other surgical (); and hx orthopaedic (2018). 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 
Patient cancelled the remainder of her appointments. ?????? ? ? This section established at most recent assessment?????????? 
PROBLEM LIST (Impairments causing functional limitations): 1. Late effects of CVA GOALS: (Goals have been discussed and agreed upon with patient.) SHORT-TERM FUNCTIONAL GOALS: Time Frame: 3-6 months 1. Patient will independently utilize speech intelligibility strategies at 100% accuracy. Not Met 2. Patient will complete OME independently at 100% accuracy. Not Met 3. Patient will complete thought organization tasks at Mod I 90% accuracy. Not met 4. Patient will complete immediate and delayed memory recall tasks at [facility] A with 85% accuracy. Not met 5. Patient will complete attention tasks at Mod I 90% accuracy. Not edvin DISCHARGE GOALS: Time Frame: 12 weeks 1. Patient will increase cognitive, linguistic and speech across all settings with ADL's at [address] [place] [address] with 85% accuracy. Not met REHABILITATION POTENTIAL FOR STATED GOALS: Fair SUBJECTIVE: 
Alert Present Symptoms:   
Pain Intensity 1: 0 Current Medications:  
Current Outpatient Medications on File Prior to Encounter Medication Sig Dispense Refill  polyethylene glycol (MIRALAX) 17 gram/dose powder Take 17 g by mouth daily. (Patient taking differently: Take 17 g by mouth daily as needed.) 510 g 2 No current facility-administered medications on file prior to encounter. Date Last Reviewed: 11/2/18 Social History/Home Situation: Lives with spouse Work/Activity History: Unemployed OBJECTIVE: 
Objective Measure: Tool Used: National Outcomes Measurement System: Functional Communication Measures: MEMORY Score:  Initial: 5 Most Recent: X (Date: -- ) Interpretation of Tool: This measure describes the change in functional communication status subsequent to speech-language pathology treatment of patients who have memory deficits. o Level 1:  The individual is unable to recall any information, regardless of cueing. o Level 2: The individual consistently requires maximal verbal cues or uses external aids to recall personal information (e.g., family members, biographical information, physical location, etc.) in structured environments. o Level 3: The individual usually requires maximum cues to recall or use external aids for simple routine and personal information (e.g., schedule, names of familiar staff, location of therapy areas, etc.) in structured environments. o Level 4: The individual occasionally requires minimal cues to recall or use external memory aids for simple routine and personal information in structured environments.  The individual requires consistent maximal cues to recall or use memory aids for complex and novel information (e.g., carry out multiple steps activities, accommodate schedule changes, anticipate meal times, etc.), plan and follow through on simple future events (e.g., use calendar to keep appointments, use log books to complete a single assignment/task, etc.) in structured environments. o Level 5 The individual consistently requires minimal cues to recall or use external memory aids for complex and novel information. The individual consistently requires minimal cues to plan and follow through on complex future events (e.g., menu planning and meal preparation, planning a party, etc.). o Level 6: The individual is able to recall or use external aids/memory strategies for complex information and planning complex future events most of the time. When there is a breakdown in the use of recall/memory strategies/external memory aids, the individual occasionally requires minimal cues. These breakdowns may occasionally interfere with the individuals functioning in vocational, avocational, and social activities. o Level 7: The individual is successful and independent in recalling or using external aids/memory strategies for complex information and planning future events in all vocational, avocational, and social activities. Score Level 7 Level 6 Level 5 Level 4 Level 3 Level 2 Level 1 Modifier CH CI CJ CK CL CM CN ? Memory:  
  - CURRENT STATUS: CJ - 20%-39% impaired, limited or restricted  - GOAL STATUS:  CI - 1%-19% impaired, limited or restricted  - D/C STATUS:  unable to asess Verbal Expression: 
 
 
Neuro-Linguistics: Impaired Completed visual attention task at:  
1st trial: 0 errors 2nd trial: 0 errors 3rd trial: 0 errors 4th trial: 0 errors Completed functional memory task: Min A with 80% accuracy Patient completed mental manipulation: Min to Mod I 87% accuracy JOSE Sood Lose

## 2019-04-16 NOTE — THERAPY RECERTIFICATION
Dar Hawkins  : 1955  Primary: Shelton Francois Ppo  Secondary:  [address] at [facility], [address] [facility]  [phone]   [phone]       OUTPATIENT PHYSICAL THERAPY:Recertification 3/76/3174   ICD-10: Treatment Diagnosis: Contracture of joint, foot, right M24.574, Difficulty in walking,not elsewhere classified R26.2  Precautions/Allergies:   Aspirin; Banana; Lisinopril; and Nuts Marci Cables nut]   MD Orders: Evaluate and treat MEDICAL/REFERRING DIAGNOSIS:  Other abnormalities of gait and mobility [R26.89]   DATE OF ONSET: Surgery 18  REFERRING PHYSICIAN: Harlan Le MD  RETURN PHYSICIAN APPOINTMENT: Unknown     Re-Certification: As of 2019, Dar Hawkins has attended 20 out of 20 scheduled visits, with 0 cancellation(s) and 0 no shows. Ms. Anu Lamar continues to demonstrate decreased overall right LE strength affecting her balance however she has demonstrated a significant improvement in her chau balance score. Pt continues to have trouble keeping right hip in internal rotation and has a tendency to relax it into ER however with cues is able to change her positioning and maintain it for up to 25 feet at a time. Pt is also able to ambulate without any assisted device for up to 15' without difficulty allowing her to be more independent at home. Pt continues to be highly motivated towards goals stated below. PROBLEM LIST (Impacting functional limitations):  1. Decreased Strength  2. Decreased Ambulation Ability/Technique  3. Decreased Balance  4. Decreased Flexibility/Joint Mobility INTERVENTIONS PLANNED: (Treatment may consist of any combination of the following)  1. Gait Training  2. Home Exercise Program (HEP)  3. Manual Therapy  4. Neuromuscular Re-education/Strengthening  5. Therapeutic Exercise/Strengthening   TREATMENT PLAN:  Effective Dates: 4/15/2019 TO 2019 (90 days).   Frequency/Duration: 2 times a week for 90 Day(s)  GOALS: (Goals have been discussed and agreed upon with patient.)  Discharge Goals: Time Frame:7/14 /19  1. Pt to ambulate 48' with quad cane without any increased pain or difficulty with independence. GOAL MET 4/16/19  2. Pt to demonstrate improvement as per Claudette Griffon score with a total score >45/56 points indicating decreased risk for falls. GOAL ONGOING  3. Pt to achieve independence with HEP to maintain goals achieved. GOAL MET 4/16/19      Outcome Measure: Tool Used: Gipson Balance Scale  Score:  Initial: 38/56 Most Recent: 43/56 (Date: 4/16/19)   Interpretation of Score: Each section is scored on a 0-4 scale, 0 representing the patients inability to perform the task and 4 representing independence. The scores of each section are added together for a total score of 56. The higher the patients score, the more independent the patient is. Any score below 45 indicates increased risk for falls. UPDATED OBJECTIVE FINDINGS:      Observation/Orthostatic Postural Assessment: Right LE postures into ER. Right UE contracture in elbow and wrist/hand, right shoulder abduction 80 degrees of AROM        ROM:     AROM(PROM) Right Left   Knee flexion 120 125   Knee extension 0 0   Hip flexion 100 110   Ankle dorsiflexion (DF) - knee extended 0 5   Ankle plantarflexion 0 50     Strength:     Manual Muscle Test (*/5) Right Left   Knee extension 3-/5 4/5   Knee flexion 3/5 4/5   Hip flexion 3/5 4/5   Hip ER 3/5 4/5   Hip IR 3/5 4/5   Hip extension 3/5 4/5   Hip abduction 3/5 4/5   Hip adduction 2+/5 4/5   Ankle DF 1/5 4/5   Ankle PF 1/5 4/5          Ambulatory/Rehab Services H2 Model Falls Risk Assessment    Risk Factors:       No Risk Factors Identified Ability to Rise from Chair:       (0)  Ability to rise in a single movement    Falls Prevention Plan: Mobility Assistance Device (specify):  Quad cane   Total: (5 or greater = High Risk): 1    ©2010 [employer].  [facility] Patent [id number]. Federal Law prohibits the replication, distribution or use without written permission from [facility] Necessity:   · Patient demonstrates good rehab potential due to higher previous functional level and high level of motivation. Reason for Services/Other Comments:  · Patient will benefit from therapy at this time to achieve goals established above as she has improved since initial evaluation. Total Duration: 60 minutes    Rehabilitation Potential For Stated Goals: Good     Regarding Yadira Iraheta's therapy, I certify that the treatment plan above will be carried out by a therapist or under their direction. Thank you for this referral,  Kamila Chong PT     Referring Physician Signature:  Carlos Burdick MD          Date

## 2019-04-16 NOTE — PROGRESS NOTES
Lemuel Olszewski  : 1955  Primary: Claudeen Sella Ppo  Secondary:  [address] at [facility], [address] [facility]  [phone]   [phone]      OUTPATIENT PHYSICAL THERAPY: Daily Treatment Note 2019  Pre-treatment Symptoms/Complaints: \"I'm trying to walk more at home\" Pt may be undergoing some toe surgery in the next few weeks to straighten them out. Pain: Initial: Pain Intensity 1: 0  Post Session:  0/10   Medications Last Reviewed:  2019  Updated Objective Findings:  See Re-certification   TREATMENT:   Therapeutic Exercise: (60):  Exercises per grid below to improve mobility and strength.  Required minimal verbal cues to promote proper body posture.  Progressed complexity of movement as indicated.       Date:  2019   Activity/Exercise Parameters   LAQ , hip flexion  3x10   Standing hip adduction 3x10   Seated adduction ball squeeze 3x10   Seated hip IR 3x10   Seated hip adduction and abduction, unilaterally  3x10   Standing taps to front,side, across, back 3x10   Step taps onto 6' step at rail 3x10   Negotiation up/down ramp x2        Treatment/Session Summary:    · Response to Treatment:  Pt continues to require vc's when negotiating up/down stairs with which one to negotitate first. Pt was able to hold her right LE in better posture with improved IR of the hip noted. Please refer to recertification for further details in objective changes. · Communication/Consultation:  None today  · Equipment provided today:  None today  · Recommendations/Intent for next treatment session: Continue to focus on strengthening right LE IR, adduction strengthening.    Treatment Plan of Care Effective Dates: 4/15/19 to 19  Total Treatment Billable Duration: 60 minutes  PT Patient Time In/Time Out  Time In: 1400  Time Out: 82 Dhara David PT    Future Appointments   Date Time Provider Azalia Alberto   2019 10:45 AM Keesha Loo MD [facility]   4/23/2019  2:00 PM Wes Lights, PT [facility]   4/26/2019  2:00 PM Wes Lights, PT [facility]   4/30/2019  2:00 PM Wes Lights, PT [facility]   5/3/2019  2:00 PM Wes Lights, PT [facility]   5/7/2019  2:00 PM Wes Lights, PT [facility]   5/10/2019  2:00 PM Wes Lights, PT [facility]   5/14/2019  2:00 PM Wes Lights, PT [facility]   5/17/2019  2:00 PM Wes Lights, PT [facility]   5/21/2019  2:00 PM Wes Lights, PT [facility]   5/23/2019 11:30 AM MD TANG Hunt [facility] FABIO RAY

## 2019-04-17 ENCOUNTER — HOSPITAL ENCOUNTER (OUTPATIENT)
Age: 64
Setting detail: OUTPATIENT SURGERY
Discharge: HOME | End: 2019-04-17
Attending: INTERNAL MEDICINE | Admitting: INTERNAL MEDICINE
Payer: COMMERCIAL

## 2019-04-17 ENCOUNTER — ANESTHESIA (OUTPATIENT)
Dept: ENDOSCOPY | Age: 64
End: 2019-04-17
Payer: COMMERCIAL

## 2019-04-17 VITALS
OXYGEN SATURATION: 97 % | HEART RATE: 77 BPM | WEIGHT: 115 LBS | SYSTOLIC BLOOD PRESSURE: 164 MMHG | DIASTOLIC BLOOD PRESSURE: 94 MMHG | BODY MASS INDEX: 23.18 KG/M2 | HEIGHT: 59 IN | RESPIRATION RATE: 18 BRPM | TEMPERATURE: 97 F

## 2019-04-17 PROCEDURE — 88305 TISSUE EXAM BY PATHOLOGIST: CPT

## 2019-04-17 PROCEDURE — 74011250636 HC RX REV CODE- 250/636

## 2019-04-17 PROCEDURE — 76040000025: Performed by: INTERNAL MEDICINE

## 2019-04-17 PROCEDURE — 74011250636 HC RX REV CODE- 250/636: Performed by: ANESTHESIOLOGY

## 2019-04-17 PROCEDURE — 76060000031 HC ANESTHESIA FIRST 0.5 HR: Performed by: INTERNAL MEDICINE

## 2019-04-17 PROCEDURE — 77030009426 HC FCPS BIOP ENDOSC BSC -B: Performed by: INTERNAL MEDICINE

## 2019-04-17 RX ORDER — PROPOFOL 10 MG/ML
INJECTION, EMULSION INTRAVENOUS AS NEEDED
Status: DISCONTINUED | OUTPATIENT
Start: 2019-04-17 | End: 2019-04-17 | Stop reason: HOSPADM

## 2019-04-17 RX ORDER — LIDOCAINE HYDROCHLORIDE 20 MG/ML
INJECTION, SOLUTION EPIDURAL; INFILTRATION; INTRACAUDAL; PERINEURAL AS NEEDED
Status: DISCONTINUED | OUTPATIENT
Start: 2019-04-17 | End: 2019-04-17 | Stop reason: HOSPADM

## 2019-04-17 RX ORDER — SODIUM CHLORIDE, SODIUM LACTATE, POTASSIUM CHLORIDE, CALCIUM CHLORIDE 600; 310; 30; 20 MG/100ML; MG/100ML; MG/100ML; MG/100ML
100 INJECTION, SOLUTION INTRAVENOUS CONTINUOUS
Status: DISCONTINUED | OUTPATIENT
Start: 2019-04-17 | End: 2019-04-17 | Stop reason: HOSPADM

## 2019-04-17 RX ADMIN — SODIUM CHLORIDE, SODIUM LACTATE, POTASSIUM CHLORIDE, AND CALCIUM CHLORIDE 100 ML/HR: 600; 310; 30; 20 INJECTION, SOLUTION INTRAVENOUS at 10:40

## 2019-04-17 RX ADMIN — PROPOFOL 50 MG: 10 INJECTION, EMULSION INTRAVENOUS at 11:07

## 2019-04-17 RX ADMIN — LIDOCAINE HYDROCHLORIDE 20 MG: 20 INJECTION, SOLUTION EPIDURAL; INFILTRATION; INTRACAUDAL; PERINEURAL at 11:05

## 2019-04-17 RX ADMIN — PROPOFOL 20 MG: 10 INJECTION, EMULSION INTRAVENOUS at 11:10

## 2019-04-17 RX ADMIN — PROPOFOL 20 MG: 10 INJECTION, EMULSION INTRAVENOUS at 11:17

## 2019-04-17 RX ADMIN — SODIUM CHLORIDE, SODIUM LACTATE, POTASSIUM CHLORIDE, AND CALCIUM CHLORIDE: 600; 310; 30; 20 INJECTION, SOLUTION INTRAVENOUS at 11:00

## 2019-04-17 RX ADMIN — PROPOFOL 30 MG: 10 INJECTION, EMULSION INTRAVENOUS at 11:14

## 2019-04-17 RX ADMIN — PROPOFOL 30 MG: 10 INJECTION, EMULSION INTRAVENOUS at 11:09

## 2019-04-17 NOTE — OP NOTES
Endoscopic Gastroduodenoscopy Procedure Note    Indications: dysphagia    Anesthesia/Sedation: MAC IV          Procedure Details     Informed consent was obtained for the procedure, including conscious sedation. Risks of infection, perforation, hemorrhage, adverse drug reaction and aspiration were discussed. The patient was placed in the left lateral decubitus position. She was monitored continuously with ECG tracing, pulse oximetry, blood pressure monitoring, and direct observation. The [id number] gastroscope was inserted into the mouth and advanced under direct vision to the second portion of the duodenum. A careful inspection was made as the gastroscope was withdrawn, including a retroflexed view of the proximal stomach; findings and interventions are described below. Appropriate photodocumentation was obtained. Findings:       ESOPHAGUS: The esophageal mucosa had a slightly hyperplastic appearance, with circular rings. Biopsies were taken. After initial EGD, the esophagus was dilated with a 50 Fr and 50 Fr Elise. Re-inspection revealed a mild to moderate tear in the proximal esophageal mucosa. STOMACH: The fundus on antegrade and retroflex views is normal. The body, antrum, and pylorus are normal.   DUODENUM: The bulb and second portions are normal.      Specimens: esophageal     Estimated Blood Loss: Less than 85FE           Complications:   None; patient tolerated the procedure well. Attending Attestation:  I performed the procedure. Impression:    1. Abnormal appearance of esophagus, likely due to chronic GERD  2. Possible proximal esophageal web, successfully dilated     Recommendations:  1. Follow up with referring MD   2. Repeat EGD dilation as needed   3. PPI   4.  Follow up pathology

## 2019-04-17 NOTE — ANESTHESIA POSTPROCEDURE EVALUATION
Procedure(s): ESOPHAGOGASTRODUODENOSCOPY (EGD)/ 25 
ESOPHAGOGASTRODUODENAL (EGD) BIOPSY 
ESOPHAGEAL DILATION. total IV anesthesia Anesthesia Post Evaluation Multimodal analgesia: multimodal analgesia used between 6 hours prior to anesthesia start to PACU discharge Patient location during evaluation: bedside Patient participation: complete - patient participated Level of consciousness: awake and responsive to light touch Pain management: adequate Airway patency: patent Anesthetic complications: no 
Cardiovascular status: acceptable, hemodynamically stable, blood pressure returned to baseline and stable Respiratory status: acceptable, unassisted, spontaneous ventilation and nonlabored ventilation Hydration status: acceptable Post anesthesia nausea and vomiting:  controlled Vitals Value Taken Time /94 4/17/2019 11:56 AM  
Temp 36.1 °C (97 °F) 4/17/2019 11:27 AM  
Pulse 76 4/17/2019 11:57 AM  
Resp 18 4/17/2019 11:46 AM  
SpO2 97 % 4/17/2019 11:57 AM  
Vitals shown include unvalidated device data.

## 2019-04-17 NOTE — H&P
[facility] Pre [facility] H and P          Chief Complaint:  DYSPHAGIA     Subjective:     History of Present Illness:  Patient is a 58 y.o. Woman who presents for outpatient EGD    PMH:  Past Medical History:   Diagnosis Date    Anxiety     CVA (cerebral vascular accident) ([facility] [address] [facility] 2017    ICH Left BG with residual R hemiparesis (> 536 systolic)-severe CVA    Depression     controlled with medications    HTN (hypertension)     controlled with med    Menopause     PTE (pulmonary thromboembolism) ([facility] [address] 2017    Related DVT post CVA       PSH:  Past Surgical History:   Procedure Laterality Date    HX  SECTION      x 3    HX ORTHOPAEDIC  2018    right foot    HX OTHER SURGICAL      Face lift    HX OTHER SURGICAL      IVC filter    HX REFRACTIVE SURGERY      HX SANJUANITA AND BSO         Allergies: Allergies   Allergen Reactions    Latex Hives    Aspirin Other (comments)     Pt states make her feel weird and breath funny    Banana Shortness of Breath    Lisinopril Cough    Nuts [Tree Nut] Hives       Home Medications:  Prior to Admission medications    Medication Sig Start Date End Date Taking? Authorizing Provider   amLODIPine (NORVASC) 5 mg tablet Take 1 Tab by mouth daily. 19  Yes Reed Hunt MD   labetalol (NORMODYNE) 300 mg tablet Take 1 Tab by mouth two (2) times a day. 19  Yes Reed Hunt MD   rOPINIRole (REQUIP) 2 mg tablet Take 1 Tab by mouth nightly. for restless leg 19  Yes Reed Hunt MD   tiZANidine (ZANAFLEX) 4 mg tablet 4mg po TID 19  Yes Reed Hunt MD   losartan-hydroCHLOROthiazide [facility]) 100-25 mg per tablet Take 1 Tab by mouth daily. 19  Yes Reed Hunt MD   FLUoxetine (PROZAC) 20 mg tablet Take 2 Tabs by mouth daily. 19  Yes Reed Hunt MD   baclofen (LIORESAL) 10 mg tablet Take 1 Tab by mouth three (3) times daily.  18   Diana Hairston MD   polyethylene glycol [facility]) 17 gram/dose powder Take 17 g by mouth daily. Patient taking differently: Take 17 g by mouth daily as needed. 18   Polly Nageotte, MD       Hospital Medications:  Current Facility-Administered Medications   Medication Dose Route Frequency    lactated Ringers infusion  100 mL/hr IntraVENous CONTINUOUS       Social History:  Social History     Tobacco Use    Smoking status: Former Smoker     Last attempt to quit: 1980     Years since quittin.3    Smokeless tobacco: Never Used   Substance Use Topics    Alcohol use: No     Pt denies any history of IV drug use, blood transfusions, tattoos     Family History:  Family History   Problem Relation Age of Onset    Alcohol abuse Mother     Cancer Mother         lung cancer    Cancer Father         throat cancer    Psychiatric Disorder Father         anxiety, depression    No Known Problems Sister        Review of Systems:  A detailed 10 system ROS is obtained, with pertinent positives as listed above. All others are negative. Diet:      Objective:     Physical Exam:  Vitals:  Visit Vitals  /80   Pulse 69   Temp 97.8 °F (36.6 °C)   Resp 18   Ht 4' 11\" (1.499 m)   Wt 52.2 kg (115 lb)   SpO2 97%   BMI 23.23 kg/m²     Gen:  Pt is alert, cooperative, no acute distress  Skin:  Extremities and face reveal no rashes. HEENT: Sclerae anicteric. Extra-occular muscles are intact. No oral ulcers. The neck is supple. Cardiovascular: Regular rate and rhythm. No murmurs, gallops, or rubs. Respiratory:  Comfortable breathing with no accessory muscle use. Clear breath sounds anteriorly with no wheezes, rales, or rhonchi. GI:  Abdomen nondistended, soft, and nontender. Normal active bowel sounds. No masses palpable. Musculoskeletal:  Extremities have good range of motion. No costovertebral tenderness. Neurological:  Gross memory appears intact. Patient is alert and oriented. Psychiatric:  Mood appears appropriate with judgement intact.   Lymphatic:  No cervical or supraclavicular adenopathy. Assessment:       Active Problems:    * No active hospital problems. *      Plan:       EGD as planned.  ASA III

## 2019-04-17 NOTE — ANESTHESIA PREPROCEDURE EVALUATION
Anesthetic History No history of anesthetic complications Review of Systems / Medical History Patient summary reviewed and pertinent labs reviewed Pulmonary Within defined limits Neuro/Psych  
 
 
CVA (R sided hemiparesis.) Psychiatric history (Depression/Anxiety) Cardiovascular Hypertension: well controlled Exercise tolerance: <4 METS Comments: Denies CP or SOB  
GI/Hepatic/Renal 
Within defined limits Endo/Other Within defined limits Other Findings Comments: Zi filter Physical Exam 
 
Airway Mallampati: II 
TM Distance: 4 - 6 cm Neck ROM: normal range of motion Mouth opening: Normal 
 
 Cardiovascular Rhythm: regular Rate: normal 
 
Murmur Pertinent negatives: No peripheral edema Dental 
 
Dentition: Implants Pulmonary Breath sounds clear to auscultation Abdominal 
GI exam deferred Other Findings Anesthetic Plan ASA: 3 Anesthesia type: total IV anesthesia Induction: Intravenous Anesthetic plan and risks discussed with: Patient and Spouse

## 2019-04-17 NOTE — DISCHARGE INSTRUCTIONS
Gastrointestinal Esophagogastroduodenoscopy (EGD)/ Endoscopic Ultrasound(EUS)- Upper Exam Discharge Instructions    1. Call Dr. Reji Metcalf at [phone]  for any problems or questions. 2. Contact the doctor's office for follow up appointment as directed. 3. Medication may cause drowsiness for several hours, therefore, do not drive or operate machinery for remainder of the day. 4. No alcohol today. 5. Do not make legal decisions today. 6. Ordinarily, you may resume regular diet and activity after exam unless otherwise specified by your physician. 7. For mild soreness in your throat you may use Cepacol throat lozenges or warm  salt-water gargles as needed. Any additional instructions:   1. Repeat EGD with dilation as needed   2. Start PPI   3.  Follow up with office for pathology results            Instructions given to Curt Hughes and other family members

## 2019-04-23 ENCOUNTER — APPOINTMENT (OUTPATIENT)
Dept: PHYSICAL THERAPY | Age: 64
End: 2019-04-23
Payer: COMMERCIAL

## 2019-04-26 ENCOUNTER — HOSPITAL ENCOUNTER (OUTPATIENT)
Dept: PHYSICAL THERAPY | Age: 64
Discharge: HOME | End: 2019-04-26
Payer: COMMERCIAL

## 2019-04-26 PROCEDURE — 97110 THERAPEUTIC EXERCISES: CPT

## 2019-04-26 NOTE — PROGRESS NOTES
Nuria Nguyen  : 1955  Primary: Merlin Jeanette Ppo  Secondary:  [address]  at [facility], [address] [facility]  [phone]   [phone]      OUTPATIENT PHYSICAL THERAPY: Daily Treatment Note 2019  Pre-treatment Symptoms/Complaints:  Pt to have toe surgery in a few weeks, unsure when however. Pt was apologetic on missing therapy x 10 days due to busy schedule. Pain: Initial: Pain Intensity 1: 0  Post Session:  0/10   Medications Last Reviewed:  2019  Updated Objective Findings:  None Today   TREATMENT:   Therapeutic Exercise: (60):  Exercises per grid below to improve mobility and strength.  Required minimal verbal cues to promote proper body posture.  Progressed complexity of movement as indicated.       Date:  2019   Activity/Exercise Parameters   LAQ , hip flexion  3x10   Standing hip adduction 3x10   Seated adduction ball squeeze 3x10   Seated hip IR 3x10   Seated hip adduction and abduction, unilaterally  3x10   Standing taps to front,side, across, back 3x10   Step taps onto 6' step at rail 3x10   Ambulation in/out of various rooms while holding onto cup of water without cane x15 minutes        Treatment/Session Summary:    · Response to Treatment:  Pt had 1 episode of loss of balance while ambulating without cane however was able to catch herself. · Communication/Consultation:  None today  · Equipment provided today:  None today  · Recommendations/Intent for next treatment session: Continue to focus on strengthening right LE IR, adduction strengthening.    Treatment Plan of Care Effective Dates: 4/15/19 to 19  Total Treatment Billable Duration: 60 minutes  PT Patient Time In/Time Out  Time In: 1400  Time Out: 1500  Tyree Manlye PT    Future Appointments   Date Time Provider Azalia Alberto   2019  2:00 PM Price Hashimoto, [place] [facility]   5/3/2019  2:00 PM Price Hashimoto, PT [facility]   2019  2:00 PM Fabio Catherine, PT [facility]   5/10/2019  2:00 PM Fabio Catherine, PT [facility]   5/14/2019  2:00 PM Fabio Catherine, PT [facility]   5/17/2019  2:00 PM Fabio Catherine, PT [facility]   5/21/2019  2:00 PM Fabio Catherine, PT [facility]   5/22/2019  2:00 PM Fabio Catherine, PT [facility]   5/23/2019 11:30 AM Andry Vargas MD [facility] FABIO FABIO MRMD   6/4/2019  2:00 PM Fabio Catherine, PT [facility]

## 2019-04-30 ENCOUNTER — HOSPITAL ENCOUNTER (OUTPATIENT)
Dept: PHYSICAL THERAPY | Age: 64
Discharge: HOME | End: 2019-04-30
Payer: COMMERCIAL

## 2019-04-30 PROCEDURE — 97110 THERAPEUTIC EXERCISES: CPT

## 2019-04-30 PROCEDURE — 97116 GAIT TRAINING THERAPY: CPT

## 2019-04-30 NOTE — PROGRESS NOTES
[facility]  : 1955  Primary: Montse Thapa Ppo  Secondary:  [address]  at [facility], [address] [facility]  [phone]   [phone]      OUTPATIENT PHYSICAL THERAPY: Daily Treatment Note 2019  Pre-treatment Symptoms/Complaints:  Pt scheduled to have surgery on her right foot on May 22. Pt stated that she had a fall 3 weeks ago when her left foot cramped. Pain: Initial: Pain Intensity 1: 0  Post Session:  0/10   Medications Last Reviewed:  2019  Updated Objective Findings:  Pt ambulated 32' without assisted device and supervision while carrying a paper glass of water. TREATMENT:   Therapeutic Exercise: (45):  Exercises per grid below to improve mobility and strength.  Required minimal verbal cues to promote proper body posture.  Progressed complexity of movement as indicated.       Date:  2019   Activity/Exercise Parameters   LAQ , hip flexion  3x10   Standing hip adduction 3x10   Seated hip IR 3x10   Seated hip adduction and abduction, unilaterally  3x10   Standing taps to front,side, across, back 3x10   Step taps onto 6' step at rail 3x10   Terminal knee extension RLE 3x10   Weight shifting lateral 1 minute       Gait Training ( 15):  Gait training to improve and/or restore physical functioning as related to mobility and strength. Ambulated   with supervision/set-up and minimal verbal cues for 32' at a time without quad cane while holding onto a paper glass of water. Pt ambulated 4 times without cane and ambulated x 4 with cane demonstrating improved gait pattern and increased stride length. Loss of balance x 1. Treatment/Session Summary:    · Response to Treatment:  Pt demonstrating improved gait with quad cane however is unable to ambulate without it for longer than 20' without demonstrating loss of balance.    · Communication/Consultation:  None today  · Equipment provided today:  None today  · Recommendations/Intent for next treatment session: Continue to focus on strengthening right LE IR, adduction strengthening. Therapy to include UE may be necessary to improve arm swing during gait as well as improved flexibility during functional mobility tasks.    Treatment Plan of Care Effective Dates: 4/15/19 to 7/14/19  Total Treatment Billable Duration: 55 minutes  PT Patient Time In/Time Out  Time In: 1400  Time Out: 3018  Yousif Maria, FRANCOIS    Future Appointments   Date Time Provider Azalia Alberto   5/3/2019  2:00 PM FRANCOIS Ho   5/7/2019  2:00 PM FRANCOIS Ho   5/10/2019  2:00 PM FRANCOIS oH   5/14/2019  2:00 PM FRANCOIS Ho   5/15/2019  8:30 AM VALORIE PHONE APPOINTMENT ANA EUGENE OR PRE A   5/17/2019  2:00 PM FRANCOIS Ho   6/4/2019  2:00 PM FRANCOIS Ho

## 2019-05-03 ENCOUNTER — HOSPITAL ENCOUNTER (OUTPATIENT)
Dept: PHYSICAL THERAPY | Age: 64
Discharge: HOME | End: 2019-05-03
Payer: COMMERCIAL

## 2019-05-03 PROCEDURE — 97110 THERAPEUTIC EXERCISES: CPT

## 2019-05-03 NOTE — PROGRESS NOTES
Annita Reed  : 1955  Primary: [employer] Ppo  Secondary:  [address]  at [facility], [address]  [phone]   [phone]      OUTPATIENT PHYSICAL THERAPY: Daily Treatment Note 5/3/2019  Pre-treatment Symptoms/Complaints:  \"I am trying to walk more around the house\" Pt has bought bars for her gym area to work on HEP. Pain: Initial: Pain Intensity 1: 0  Post Session:  0/10   Medications Last Reviewed:  5/3/2019  Updated Objective Findings:  Refer to recertification note   TREATMENT:   Therapeutic Exercise: (60):  Exercises per grid below to improve mobility and strength.  Required minimal verbal cues to promote proper body posture.  Progressed complexity of movement as indicated.       Date:  5/3/2019   Activity/Exercise Parameters   LAQ , hip flexion  3x10   Standing hip adduction 3x10   Seated hip IR 3x10   Seated hip adduction and abduction, unilaterally  3x10   Standing taps to front,side, across, back 3x10   Step taps onto 6' step at rail 3x10   Terminal knee extension RLE 3x10   Weight shifting lateral 1 minute         Treatment/Session Summary:    · Response to Treatment:  Pt did not demonstrate any LOB during treatment, she was more focused and did not need many cues to keep her right LE into IR. · Communication/Consultation:  None today  · Equipment provided today:  None today  · Recommendations/Intent for next treatment session: Continue to focus on strengthening right LE IR, adduction strengthening. Therapy to include UE seems necessary at this time to improve arm swing during gait as well as improved flexibility during functional mobility tasks.    Treatment Plan of Care Effective Dates: 5/3/19 to 19  Total Treatment Billable Duration: 60 minutes  PT Patient Time In/Time Out  Time In: 1405  Time Out: [address] [facility] Lizzy Noonan PT    Future Appointments   Date Time Provider Azalia Alberto   2019  2:00 PM Lizzy Noonan Michell An, PT [facility]   5/10/2019  2:00 PM Elveria Leak, PT [facility]   5/14/2019  2:00 PM Elveria Leak, PT [facility]   5/15/2019  8:30 AM [facility] PHONE APPOINTMENT ANA EUGENE OR PRE A   5/17/2019  2:00 PM Elveria Leak, PT [facility]   6/4/2019  2:00 PM Elveria Leak, PT [facility]

## 2019-05-03 NOTE — THERAPY RECERTIFICATION
Lemuel Olszewski : 1955 Primary: Ali Huatna Ppo Secondary:  Therapy Center at [address] [phone]   [phone] OUTPATIENT PHYSICAL THERAPY:Recertification [id number] ICD-10: Treatment Diagnosis: Contracture of joint, foot, right M24.574, Difficulty in walking,not elsewhere classified R26.2, Hemiplegia and hemiparesis following cerebral infarction affecting right dominant side I69.351, Muscle weakness (generalized) M62.81 Precautions/Allergies:  
Latex; Aspirin; Banana; Lisinopril; and Nuts [tree nut] MD Orders: Evaluate and treat MEDICAL/REFERRING DIAGNOSIS: 
Other abnormalities of gait and mobility [R26.89] DATE OF ONSET: Surgery 18 REFERRING PHYSICIAN: Jabari Hart MD 
RETURN PHYSICIAN APPOINTMENT: Unknown Re-Certification: As of 5/3/2019, Lemuel Olszewski has attended 23 out of 23 scheduled visits, with 0 cancellation(s) and 0 no shows. Pt will benefit from continued therapy to address LE weakness and ROM deficits as well as therapy to work on UE, particularly the RUE to aid in functional mobility such as gait (arm swing) and ADL's with more independence. Pt is able to ambulate up to 25' without an AD while carrying a paper glass of water. PROBLEM LIST (Impacting functional limitations): 1. Decreased Strength 2. Decreased Ambulation Ability/Technique 3. Decreased Balance 4. Decreased Flexibility/Joint Mobility INTERVENTIONS PLANNED: (Treatment may consist of any combination of the following) 1. Gait Training 2. Home Exercise Program (HEP) 3. Manual Therapy 4. Neuromuscular Re-education/Strengthening 5. Therapeutic Exercise/Strengthening TREATMENT PLAN: 
Effective Dates: 5/3/2019 TO 2019 (90 days). Frequency/Duration: 2 times a week for 90 Day(s) GOALS: (Goals have been discussed and agreed upon with patient.) Discharge Goals: Time Frame: 1. Pt to ambulate 48' with quad cane without any increased pain or difficulty with independence. GOAL MET 4/16/19 2. Pt to demonstrate improvement as per Karlynn Neve score with a total score >45/56 points indicating decreased risk for falls. GOAL ONGOING 3. Pt to achieve independence with HEP to maintain goals achieved. GOAL MET 4/16/19 NEW GOAL: Pt to demonstrate right shoulder flexion AROM to >110 degrees to aid in assistance with ADL's. Outcome Measure: Tool Used: Karlynn Neve Balance Scale Score:  Initial: 38/56 Most Recent: 43/56 (Date: 4/16/19) Interpretation of Score: Each section is scored on a 0-4 scale, 0 representing the patients inability to perform the task and 4 representing independence. The scores of each section are added together for a total score of 56. The higher the patients score, the more independent the patient is. Any score below 45 indicates increased risk for falls. UPDATED OBJECTIVE FINDINGS:   
 
Observation/Orthostatic Postural Assessment: Right LE postures into ER 
      ROM:    
AROM(PROM) Right Left Knee flexion 120 125 Knee extension 0 0 Hip flexion 100 110 Ankle dorsiflexion (DF) - knee extended 0 5 Ankle plantarflexion 0 50 ROM: Left:  Shoulder flexion 160/180 degrees A/PROM, shoulder abduction 165/180 A/PROM, IR 45 degrees AROM. Elbow 0-155 AROM Right: Shoulder flexion 90/120 degrees A/PROM, shoulder abduction 90/110 degrees A/PROM, Elbow PROM 0-145 degrees Cervical spine right lateral flexion 25% WNL, right rotation 50% Strength:    
Manual Muscle Test (*/5) Right Left Knee extension 3-/5 4/5 Knee flexion 3/5 4/5 Hip flexion 3/5 4/5 Hip ER 3/5 4/5 Hip IR 3/5 4/5 Hip extension 3/5 4/5 Hip abduction 3/5 4/5 Hip adduction 2+/5 4/5 Ankle DF 1/5 4/5 Ankle PF 1/5 4/5 Shoulder flexion 2/5 4/5 Shoulder abduction 2/5 4/5 Shoulder extension 2/5 4/5 Elbow flexion No active movement 4/5  
 Elbow extension No active movement 4/5 Ambulatory/Rehab Services H2 Model Falls Risk Assessment Risk Factors: 
     No Risk Factors Identified Ability to Rise from Chair: 
     (0)  Ability to rise in a single movement Falls Prevention Plan: Mobility Assistance Device (specify):  Quad cane Total: (5 or greater = High Risk): 1 ©2010 [facility] of [employer]. [facility] States Patent #[id number]. Federal Law prohibits the replication, distribution or use without written permission from [facility] [employer] [facility] Necessity:  
· Patient demonstrates good rehab potential due to higher previous functional level and high level of motivation. Reason for Services/Other Comments: 
· Patient will benefit from therapy at this time to achieve goals established above as she has improved since initial evaluation. Total Duration: 60 minutes Rehabilitation Potential For Stated Goals: Good Regarding Siri Iraheta's therapy, I certify that the treatment plan above will be carried out by a therapist or under their direction. Thank you for this referral, Yusef Astorga PT Referring Physician Signature: Yue Marcum MD         Date

## 2019-05-03 NOTE — THERAPY RECERTIFICATION
Dar Hawkins  : 1955  Primary: Shelton Francois Ppo  Secondary:  [address] [facility], [address] [facility]  [phone]   [phone]       OUTPATIENT PHYSICAL THERAPY:Recertification [id number]   ICD-10: Treatment Diagnosis: Contracture of joint, foot, right M24.574, Difficulty in walking,not elsewhere classified R26.2, Hemiplegia and hemiparesis following cerebral infarction affecting right dominant side I69.351, Muscle weakness (generalized) M62.81  Precautions/Allergies:   Latex; Aspirin; Banana; Lisinopril; and Nuts Marci Cables nut]   MD Orders: Evaluate and treat MEDICAL/REFERRING DIAGNOSIS:  Other abnormalities of gait and mobility [R26.89]   DATE OF ONSET: Surgery 18  REFERRING PHYSICIAN: Harlan Le MD  RETURN PHYSICIAN APPOINTMENT: Unknown     Re-Certification: As of 5/3/2019, Dar Hawkins has attended 23 out of 23 scheduled visits, with 0 cancellation(s) and 0 no shows. Pt will benefit from continued therapy to address LE weakness and ROM deficits as well as therapy to work on UE, particularly the RUE to aid in functional mobility such as gait (arm swing) and ADL's with more independence. Pt is able to ambulate up to 25' without an AD while carrying a paper glass of water. PROBLEM LIST (Impacting functional limitations):  1. Decreased Strength  2. Decreased Ambulation Ability/Technique  3. Decreased Balance  4. Decreased Flexibility/Joint Mobility INTERVENTIONS PLANNED: (Treatment may consist of any combination of the following)  1. Gait Training  2. Home Exercise Program (HEP)  3. Manual Therapy  4. Neuromuscular Re-education/Strengthening  5. Therapeutic Exercise/Strengthening   TREATMENT PLAN:  Effective Dates: 5/3/2019 TO 2019 (90 days). Frequency/Duration: 2 times a week for 90 Day(s)  GOALS: (Goals have been discussed and agreed upon with patient.)  Discharge Goals: Time Frame:  1.  Pt to ambulate 48' with quad cane without any increased pain or difficulty with independence. GOAL MET 4/16/19  2. Pt to demonstrate improvement as per Tabby Lux score with a total score >45/56 points indicating decreased risk for falls. GOAL ONGOING  3. Pt to achieve independence with HEP to maintain goals achieved. GOAL MET 4/16/19  NEW GOAL: Pt to demonstrate right shoulder flexion AROM to >110 degrees to aid in assistance with ADL's. Outcome Measure: Tool Used: Gipson Balance Scale  Score:  Initial: 38/56 Most Recent: 43/56 (Date: 4/16/19)   Interpretation of Score: Each section is scored on a 0-4 scale, 0 representing the patients inability to perform the task and 4 representing independence. The scores of each section are added together for a total score of 56. The higher the patients score, the more independent the patient is. Any score below 45 indicates increased risk for falls. UPDATED OBJECTIVE FINDINGS:      Observation/Orthostatic Postural Assessment: Right LE postures into ER        ROM:     AROM(PROM) Right Left   Knee flexion 120 125   Knee extension 0 0   Hip flexion 100 110   Ankle dorsiflexion (DF) - knee extended 0 5   Ankle plantarflexion 0 50               ROM: Left:  Shoulder flexion 160/180 degrees A/PROM, shoulder abduction 165/180 A/PROM, IR 45 degrees AROM.  Elbow 0-155 AROM                   Right: Shoulder flexion 90/120 degrees A/PROM, shoulder abduction 90/110 degrees A/PROM, Elbow PROM 0-145 degrees                   Cervical spine right lateral flexion 25% WNL, right rotation 50%     Strength:     Manual Muscle Test (*/5) Right Left   Knee extension 3-/5 4/5   Knee flexion 3/5 4/5   Hip flexion 3/5 4/5   Hip ER 3/5 4/5   Hip IR 3/5 4/5   Hip extension 3/5 4/5   Hip abduction 3/5 4/5   Hip adduction 2+/5 4/5   Ankle DF 1/5 4/5   Ankle PF 1/5 4/5   Shoulder flexion 2/5 4/5   Shoulder abduction 2/5 4/5   Shoulder extension 2/5 4/5   Elbow flexion No active movement 4/5   Elbow extension No active movement 4/5          Ambulatory/Rehab Services H2 Model Falls Risk Assessment    Risk Factors:       No Risk Factors Identified Ability to Rise from Chair:       (0)  Ability to rise in a single movement    Falls Prevention Plan: Mobility Assistance Device (specify):  Quad cane   Total: (5 or greater = High Risk): 1    ©2010 [facility] [employer]. [facility] States Patent #[id number]. Federal Law prohibits the replication, distribution or use without written permission from [facility] [employer] Necessity:   · Patient demonstrates good rehab potential due to higher previous functional level and high level of motivation. Reason for Services/Other Comments:  · Patient will benefit from therapy at this time to achieve goals established above as she has improved since initial evaluation. Total Duration: 60 minutes    Rehabilitation Potential For Stated Goals: Good     Regarding Zakiya Iraheta's therapy, I certify that the treatment plan above will be carried out by a therapist or under their direction. Thank you for this referral,  Wilbert Barnett PT     Referring Physician Signature:  Roya Tan MD          Date

## 2019-05-07 ENCOUNTER — HOSPITAL ENCOUNTER (OUTPATIENT)
Dept: PHYSICAL THERAPY | Age: 64
Discharge: HOME | End: 2019-05-07
Payer: COMMERCIAL

## 2019-05-07 PROCEDURE — 97110 THERAPEUTIC EXERCISES: CPT

## 2019-05-07 NOTE — PROGRESS NOTES
Chad Hooker  : 1955  Primary: Everitt Spurling Ppo  Secondary:  [address]  at [facility], [address] [facility]  [phone]   [phone]      OUTPATIENT PHYSICAL THERAPY: Daily Treatment Note 2019  Pre-treatment Symptoms/Complaints: \"I am getting a bar to do my exercises with\"      Pain: Initial: Pain Intensity 1: 0  Post Session:  0/10   Medications Last Reviewed:  2019  Updated Objective Findings:  None Today   TREATMENT:   Therapeutic Exercise: (60):  Exercises per grid below to improve mobility and strength.  Required minimal verbal cues to promote proper body posture.  Progressed complexity of movement as indicated.       Date:  2019   Activity/Exercise Parameters   LAQ  3x10   Standing hip adduction 3x10   Seated hip IR 3x10   Seated hip adduction and abduction, unilaterally  3x10   Standing hip abduction, hip flexion 3x10   Step taps onto 6' step at rail 3x10   Terminal knee extension RLE 3x10   Weight shifting lateral 1 minute   Pt ambulated 30'x2 without cane and SBA . Treatment/Session Summary:    · Response to Treatment:  Pt with LOB x 1 while carrying a paper glass of water after walking 30' without her cane. · Communication/Consultation:  None today  · Equipment provided today:  None today  · Recommendations/Intent for next treatment session: Work on combining both UE and LE stretching/strengthening to assist with improving balance and gait.  .   Treatment Plan of Care Effective Dates: 5/3/19 to 19  Total Treatment Billable Duration: 60 minutes  PT Patient Time In/Time Out  Time In: 1400  Time Out: 1500  Malik Ramos PT    Future Appointments   Date Time Provider Azalia Alberto   5/10/2019  2:00 PM Ralph Briseno   2019  2:00 PM FRANCOIS Briseno   5/15/2019  8:30 AM VALORIE PHONE APPOINTMENT ANA EUGENE OR PRE A   2019  2:00 PM FRANCOIS Briseno 6/4/2019  2:00 PM Caitlin Diallo, PT [facility]

## 2019-05-10 ENCOUNTER — HOSPITAL ENCOUNTER (OUTPATIENT)
Dept: PHYSICAL THERAPY | Age: 64
Discharge: HOME | End: 2019-05-10
Payer: COMMERCIAL

## 2019-05-10 PROCEDURE — 97110 THERAPEUTIC EXERCISES: CPT

## 2019-05-10 NOTE — PROGRESS NOTES
Avery Bookbinder  : 1955  Primary: Rachellea Hashimoto Ppo  Secondary:  [address] at [facility], [address] [facility]  [phone]   [phone]      OUTPATIENT PHYSICAL THERAPY: Daily Treatment Note 5/10/2019  Pre-treatment Symptoms/Complaints: Pt stated that she had some right leg cramping last night however is not feeling it today. Pain: Initial: Pain Intensity 1: 0  Post Session:  0/10   Medications Last Reviewed:  5/10/2019  Updated Objective Findings:  Right hip IR noted throughout session. TREATMENT:   Therapeutic Exercise: (60):  Exercises per grid below to improve mobility and strength.  Required minimal verbal cues to promote proper body posture.  Progressed complexity of movement as indicated.       Date:  5/10/2019   Activity/Exercise Parameters   LAQ  3x10 BLE   Seated hip IR 3x10 BLE   Seated bicep curl PROM  3x10   Seated shoulder abduction with arm flexed 3x10   Seated elbow extension 3x10   Seated scapular retraction unilateral RUE 3x10   Seated shoulder flexion with cane AAROM 3x10   Seated hip adduction and abduction, unilaterally  3x10 BLE   Standing hip abduction, hip flexion 3x10   Step taps onto 6' step at rail 3x10   Terminal knee extension RLE 3x10   Weight shifting lateral 1 minute     Pt ambulated 30'x2 without cane and SBA . No LOB noted observed. Pt ambulated 30'x3 with quad cane. Treatment/Session Summary:    · Response to Treatment:  Pt ambulated without any episodes of LOB today. Pt was having issues with IR of the right hip today.    · Communication/Consultation:  None today  · Equipment provided today:  None today  · Recommendations/Intent for next treatment session:    Treatment Plan of Care Effective Dates: 5/3/19 to 19  Total Treatment Billable Duration: 60 minutes  PT Patient Time In/Time Out  Time In: 1400  Time Out: 82 Dhara David PT    Future Appointments   Date Time Provider Azalia Alberto 5/15/2019  8:30 AM VALORIE PHONE APPOINTMENT ANA EUGENE OR PRE A   5/17/2019  2:00 PM Mortimer Crest, PT [facility]   6/4/2019  2:00 PM Mortimer Crest, PT [facility]

## 2019-05-14 ENCOUNTER — APPOINTMENT (OUTPATIENT)
Dept: PHYSICAL THERAPY | Age: 64
End: 2019-05-14
Payer: COMMERCIAL

## 2019-05-17 ENCOUNTER — HOSPITAL ENCOUNTER (OUTPATIENT)
Dept: PHYSICAL THERAPY | Age: 64
Discharge: HOME | End: 2019-05-17
Payer: COMMERCIAL

## 2019-05-17 PROCEDURE — 97110 THERAPEUTIC EXERCISES: CPT

## 2019-05-21 ENCOUNTER — APPOINTMENT (OUTPATIENT)
Dept: PHYSICAL THERAPY | Age: 64
End: 2019-05-21
Payer: COMMERCIAL

## 2019-05-21 ENCOUNTER — ANESTHESIA EVENT (OUTPATIENT)
Dept: SURGERY | Age: 64
End: 2019-05-21
Payer: COMMERCIAL

## 2019-05-21 RX ORDER — SODIUM CHLORIDE 0.9 % (FLUSH) 0.9 %
5-40 SYRINGE (ML) INJECTION EVERY 8 HOURS
Status: CANCELLED | OUTPATIENT
Start: 2019-05-21

## 2019-05-21 RX ORDER — SODIUM CHLORIDE 0.9 % (FLUSH) 0.9 %
5-40 SYRINGE (ML) INJECTION AS NEEDED
Status: CANCELLED | OUTPATIENT
Start: 2019-05-21

## 2019-05-21 NOTE — H&P
HPI:    2017 CVA - RLE involvement - recovered speech, swallowing   She tried a [facility]; Dr. Ashli Hilton Botox x 2     5/3/2018- ultrasound = negative for DVT    12/5/2018: Right split anterior tibial tendon transfer, Achilles lengthening, complete posteromedial release, ankle ligament reconstruction, PF/Steindler stripping     She recovered great and is now fully ambulating with a toe-off AFO and cane  Her issues/concerns now are her claw toes     Location of pain - toes    Type/severity pain -  dull grabbing rubbing   Aggravating factors -  shoes and a lot of activity  Alleviating factors -  rest    PMSFH:  Included on the patient history form ; reviewed and updated     MEDS:          FLUoxetine HCl; Labetalol HCl; Losartan Potassium; ROPINIRole HCl; TiZANidine HCl  ALLERGIES:  NKDA  PMH:  2017 CVA - HTN, DVT/PE - [place] filter, RLS   SOCIAL: Unemployed: : non smoker   ROS:  Per POA form: positive and negative system reviews were discussed. I tried to relate any positive system review to the musculoskeletal complaint. For all others, recommend the PCP or any specialists    PE:  She is alert and oriented. Nutrition, appearance and mood all okay. RESPIRATIONS:  Unlabored with no obvious shortness of breath. CV:  Appears to have pedal pulses, color, capillary refill, subungual color. Varicosities     NEURO:  Abnormal reflexes; no clonus. Sensation appears mostly intact to light touch and sharp/dull discrimination. SLR negative. No popliteal tenderness     SKIN:  Age nails; no swelling; expected soft tissue thickening; no keloids healed wounds    MS:  She is plantigrade; 1-5 claw toes; full gait    She has passive motion; has no anterior compartment strength; no instability   UE exam - contracture w/ poor non-functioning        XR: Right side - NWB Mortise, AP, lateral ankle and AP, oblique foot taken 9- w/ no collapse arthritis or fracture, but osteopenia.   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 Due to her toe contractures - she would like surgery   Continue her ambulation with her cane and toe-off AFO           Discussed her DVT/PE - suspected short course of Lovenox post-op      Surgery - Right   Right big toe interphalangeal fusion, 2-5 claw toe resections    op - anesthesia choice - 1 hour     sag. saw, k-wires, Synthes headless, c-arm       The patient understands the dx, conservative and surgical treatment. Surgical risks/complications outlined including the risk of risk of recurrence and the fact that the toe will never lie flat, risk of non-healing of skin and bone with or without infection, potential loss/amputation of a toe or toes secondary to circulation loss, stiffness in the toes, and the overall risk of swelling that could be prolonged. Understands the use of internal and/or external k-wire type fixation and that it may take 6 months to a year before the patient can comfortably get back into regular/ dress shoes. Generalized surgical risks and complications were discussed.   The patient accepts

## 2019-05-22 ENCOUNTER — APPOINTMENT (OUTPATIENT)
Dept: PHYSICAL THERAPY | Age: 64
End: 2019-05-22
Payer: COMMERCIAL

## 2019-05-22 ENCOUNTER — APPOINTMENT (OUTPATIENT)
Dept: GENERAL RADIOLOGY | Age: 64
End: 2019-05-22
Attending: ORTHOPAEDIC SURGERY
Payer: COMMERCIAL

## 2019-05-22 ENCOUNTER — HOSPITAL ENCOUNTER (OUTPATIENT)
Age: 64
Setting detail: OUTPATIENT SURGERY
Discharge: HOME | End: 2019-05-22
Attending: ORTHOPAEDIC SURGERY | Admitting: ORTHOPAEDIC SURGERY
Payer: COMMERCIAL

## 2019-05-22 ENCOUNTER — ANESTHESIA (OUTPATIENT)
Dept: SURGERY | Age: 64
End: 2019-05-22
Payer: COMMERCIAL

## 2019-05-22 VITALS
HEART RATE: 83 BPM | WEIGHT: 122 LBS | SYSTOLIC BLOOD PRESSURE: 154 MMHG | DIASTOLIC BLOOD PRESSURE: 92 MMHG | TEMPERATURE: 98 F | OXYGEN SATURATION: 95 % | RESPIRATION RATE: 16 BRPM | BODY MASS INDEX: 23.83 KG/M2

## 2019-05-22 LAB — POTASSIUM BLD-SCNC: 2.8 MMOL/L (ref 3.5–5.1)

## 2019-05-22 PROCEDURE — C1713 ANCHOR/SCREW BN/BN,TIS/BN: HCPCS | Performed by: ORTHOPAEDIC SURGERY

## 2019-05-22 PROCEDURE — 76942 ECHO GUIDE FOR BIOPSY: CPT | Performed by: ORTHOPAEDIC SURGERY

## 2019-05-22 PROCEDURE — 74011000250 HC RX REV CODE- 250

## 2019-05-22 PROCEDURE — 74011250636 HC RX REV CODE- 250/636: Performed by: ANESTHESIOLOGY

## 2019-05-22 PROCEDURE — 77030031139 HC SUT VCRL2 J&J -A: Performed by: ORTHOPAEDIC SURGERY

## 2019-05-22 PROCEDURE — 77030008848 HC WRE K SYNT -B: Performed by: ORTHOPAEDIC SURGERY

## 2019-05-22 PROCEDURE — 77030006788 HC BLD SAW OSC STRY -B: Performed by: ORTHOPAEDIC SURGERY

## 2019-05-22 PROCEDURE — 76210000021 HC REC RM PH II 0.5 TO 1 HR: Performed by: ORTHOPAEDIC SURGERY

## 2019-05-22 PROCEDURE — 76210000006 HC OR PH I REC 0.5 TO 1 HR: Performed by: ORTHOPAEDIC SURGERY

## 2019-05-22 PROCEDURE — 77030003602 HC NDL NRV BLK BBMI -B: Performed by: ANESTHESIOLOGY

## 2019-05-22 PROCEDURE — 77030010509 HC AIRWY LMA MSK TELE -A: Performed by: ANESTHESIOLOGY

## 2019-05-22 PROCEDURE — 76060000033 HC ANESTHESIA 1 TO 1.5 HR: Performed by: ORTHOPAEDIC SURGERY

## 2019-05-22 PROCEDURE — 74011250636 HC RX REV CODE- 250/636

## 2019-05-22 PROCEDURE — 76010010054 HC POST OP PAIN BLOCK

## 2019-05-22 PROCEDURE — 77030002916 HC SUT ETHLN J&J -A: Performed by: ORTHOPAEDIC SURGERY

## 2019-05-22 PROCEDURE — 77030019908 HC STETH ESOPH SIMS -A: Performed by: ANESTHESIOLOGY

## 2019-05-22 PROCEDURE — 77030000032 HC CUF TRNQT ZIMM -B: Performed by: ORTHOPAEDIC SURGERY

## 2019-05-22 PROCEDURE — 84132 ASSAY OF SERUM POTASSIUM: CPT

## 2019-05-22 PROCEDURE — 76010010054 HC POST OP PAIN BLOCK: Performed by: ORTHOPAEDIC SURGERY

## 2019-05-22 PROCEDURE — 77030018836 HC SOL IRR NACL ICUM -A: Performed by: ORTHOPAEDIC SURGERY

## 2019-05-22 PROCEDURE — 74011250636 HC RX REV CODE- 250/636: Performed by: PHYSICIAN ASSISTANT

## 2019-05-22 PROCEDURE — 76010000161 HC OR TIME 1 TO 1.5 HR INTENSV-TIER 1: Performed by: ORTHOPAEDIC SURGERY

## 2019-05-22 DEVICE — SCREW BNE COMPR SHT THRD 3X18 MM HDLSS TI NS: Type: IMPLANTABLE DEVICE | Site: FOOT | Status: FUNCTIONAL

## 2019-05-22 DEVICE — WIRE ORTH 1.1MM DIA 229MM SMOOTH DBL BAYNT TIP S STL K: Type: IMPLANTABLE DEVICE | Site: FOOT | Status: FUNCTIONAL

## 2019-05-22 RX ORDER — SODIUM CHLORIDE 0.9 % (FLUSH) 0.9 %
5-40 SYRINGE (ML) INJECTION AS NEEDED
Status: DISCONTINUED | OUTPATIENT
Start: 2019-05-22 | End: 2019-05-22 | Stop reason: HOSPADM

## 2019-05-22 RX ORDER — LIDOCAINE HYDROCHLORIDE 10 MG/ML
0.1 INJECTION INFILTRATION; PERINEURAL AS NEEDED
Status: DISCONTINUED | OUTPATIENT
Start: 2019-05-22 | End: 2019-05-22 | Stop reason: HOSPADM

## 2019-05-22 RX ORDER — SODIUM CHLORIDE 0.9 % (FLUSH) 0.9 %
5-40 SYRINGE (ML) INJECTION EVERY 8 HOURS
Status: CANCELLED | OUTPATIENT
Start: 2019-05-22

## 2019-05-22 RX ORDER — MIDAZOLAM HYDROCHLORIDE 1 MG/ML
2 INJECTION, SOLUTION INTRAMUSCULAR; INTRAVENOUS
Status: DISCONTINUED | OUTPATIENT
Start: 2019-05-22 | End: 2019-05-22 | Stop reason: HOSPADM

## 2019-05-22 RX ORDER — EPHEDRINE SULFATE 50 MG/ML
INJECTION, SOLUTION INTRAVENOUS AS NEEDED
Status: DISCONTINUED | OUTPATIENT
Start: 2019-05-22 | End: 2019-05-22 | Stop reason: HOSPADM

## 2019-05-22 RX ORDER — SODIUM CHLORIDE 0.9 % (FLUSH) 0.9 %
5-40 SYRINGE (ML) INJECTION EVERY 8 HOURS
Status: DISCONTINUED | OUTPATIENT
Start: 2019-05-22 | End: 2019-05-22 | Stop reason: HOSPADM

## 2019-05-22 RX ORDER — OXYCODONE HYDROCHLORIDE 5 MG/1
10 TABLET ORAL
Status: DISCONTINUED | OUTPATIENT
Start: 2019-05-22 | End: 2019-05-22 | Stop reason: HOSPADM

## 2019-05-22 RX ORDER — PROPOFOL 10 MG/ML
INJECTION, EMULSION INTRAVENOUS AS NEEDED
Status: DISCONTINUED | OUTPATIENT
Start: 2019-05-22 | End: 2019-05-22 | Stop reason: HOSPADM

## 2019-05-22 RX ORDER — SODIUM CHLORIDE, SODIUM LACTATE, POTASSIUM CHLORIDE, CALCIUM CHLORIDE 600; 310; 30; 20 MG/100ML; MG/100ML; MG/100ML; MG/100ML
100 INJECTION, SOLUTION INTRAVENOUS CONTINUOUS
Status: DISCONTINUED | OUTPATIENT
Start: 2019-05-22 | End: 2019-05-22 | Stop reason: HOSPADM

## 2019-05-22 RX ORDER — DEXAMETHASONE SODIUM PHOSPHATE 4 MG/ML
INJECTION, SOLUTION INTRA-ARTICULAR; INTRALESIONAL; INTRAMUSCULAR; INTRAVENOUS; SOFT TISSUE
Status: COMPLETED | OUTPATIENT
Start: 2019-05-22 | End: 2019-05-22

## 2019-05-22 RX ORDER — NALOXONE HYDROCHLORIDE 0.4 MG/ML
0.2 INJECTION, SOLUTION INTRAMUSCULAR; INTRAVENOUS; SUBCUTANEOUS AS NEEDED
Status: DISCONTINUED | OUTPATIENT
Start: 2019-05-22 | End: 2019-05-22 | Stop reason: HOSPADM

## 2019-05-22 RX ORDER — SODIUM CHLORIDE 0.9 % (FLUSH) 0.9 %
5-40 SYRINGE (ML) INJECTION AS NEEDED
Status: CANCELLED | OUTPATIENT
Start: 2019-05-22

## 2019-05-22 RX ORDER — MIDAZOLAM HYDROCHLORIDE 1 MG/ML
2 INJECTION, SOLUTION INTRAMUSCULAR; INTRAVENOUS ONCE
Status: COMPLETED | OUTPATIENT
Start: 2019-05-22 | End: 2019-05-22

## 2019-05-22 RX ORDER — ONDANSETRON 2 MG/ML
INJECTION INTRAMUSCULAR; INTRAVENOUS AS NEEDED
Status: DISCONTINUED | OUTPATIENT
Start: 2019-05-22 | End: 2019-05-22 | Stop reason: HOSPADM

## 2019-05-22 RX ORDER — ACETAMINOPHEN 500 MG
1000 TABLET ORAL ONCE
Status: DISCONTINUED | OUTPATIENT
Start: 2019-05-22 | End: 2019-05-22 | Stop reason: HOSPADM

## 2019-05-22 RX ORDER — DIPHENHYDRAMINE HYDROCHLORIDE 50 MG/ML
12.5 INJECTION, SOLUTION INTRAMUSCULAR; INTRAVENOUS
Status: DISCONTINUED | OUTPATIENT
Start: 2019-05-22 | End: 2019-05-22 | Stop reason: HOSPADM

## 2019-05-22 RX ORDER — HYDROMORPHONE HYDROCHLORIDE 2 MG/ML
0.5 INJECTION, SOLUTION INTRAMUSCULAR; INTRAVENOUS; SUBCUTANEOUS
Status: DISCONTINUED | OUTPATIENT
Start: 2019-05-22 | End: 2019-05-22 | Stop reason: HOSPADM

## 2019-05-22 RX ORDER — FENTANYL CITRATE 50 UG/ML
100 INJECTION, SOLUTION INTRAMUSCULAR; INTRAVENOUS ONCE
Status: COMPLETED | OUTPATIENT
Start: 2019-05-22 | End: 2019-05-22

## 2019-05-22 RX ORDER — CEFAZOLIN SODIUM/WATER 2 G/20 ML
2 SYRINGE (ML) INTRAVENOUS ONCE
Status: COMPLETED | OUTPATIENT
Start: 2019-05-22 | End: 2019-05-22

## 2019-05-22 RX ORDER — DEXAMETHASONE SODIUM PHOSPHATE 4 MG/ML
INJECTION, SOLUTION INTRA-ARTICULAR; INTRALESIONAL; INTRAMUSCULAR; INTRAVENOUS; SOFT TISSUE AS NEEDED
Status: DISCONTINUED | OUTPATIENT
Start: 2019-05-22 | End: 2019-05-22 | Stop reason: HOSPADM

## 2019-05-22 RX ORDER — LIDOCAINE HYDROCHLORIDE 20 MG/ML
INJECTION, SOLUTION EPIDURAL; INFILTRATION; INTRACAUDAL; PERINEURAL AS NEEDED
Status: DISCONTINUED | OUTPATIENT
Start: 2019-05-22 | End: 2019-05-22 | Stop reason: HOSPADM

## 2019-05-22 RX ORDER — OXYCODONE HYDROCHLORIDE 5 MG/1
5 TABLET ORAL
Status: DISCONTINUED | OUTPATIENT
Start: 2019-05-22 | End: 2019-05-22 | Stop reason: HOSPADM

## 2019-05-22 RX ORDER — FLUMAZENIL 0.1 MG/ML
0.2 INJECTION INTRAVENOUS
Status: DISCONTINUED | OUTPATIENT
Start: 2019-05-22 | End: 2019-05-22 | Stop reason: HOSPADM

## 2019-05-22 RX ADMIN — DEXAMETHASONE SODIUM PHOSPHATE 4 MG: 4 INJECTION, SOLUTION INTRA-ARTICULAR; INTRALESIONAL; INTRAMUSCULAR; INTRAVENOUS; SOFT TISSUE at 06:55

## 2019-05-22 RX ADMIN — EPHEDRINE SULFATE 10 MG: 50 INJECTION, SOLUTION INTRAVENOUS at 07:49

## 2019-05-22 RX ADMIN — DEXAMETHASONE SODIUM PHOSPHATE 4 MG: 4 INJECTION, SOLUTION INTRA-ARTICULAR; INTRALESIONAL; INTRAMUSCULAR; INTRAVENOUS; SOFT TISSUE at 08:16

## 2019-05-22 RX ADMIN — SODIUM CHLORIDE, SODIUM LACTATE, POTASSIUM CHLORIDE, AND CALCIUM CHLORIDE: 600; 310; 30; 20 INJECTION, SOLUTION INTRAVENOUS at 08:11

## 2019-05-22 RX ADMIN — FENTANYL CITRATE 50 MCG: 50 INJECTION INTRAMUSCULAR; INTRAVENOUS at 06:50

## 2019-05-22 RX ADMIN — ONDANSETRON 4 MG: 2 INJECTION INTRAMUSCULAR; INTRAVENOUS at 08:16

## 2019-05-22 RX ADMIN — EPHEDRINE SULFATE 15 MG: 50 INJECTION, SOLUTION INTRAVENOUS at 07:56

## 2019-05-22 RX ADMIN — SODIUM CHLORIDE, SODIUM LACTATE, POTASSIUM CHLORIDE, AND CALCIUM CHLORIDE 100 ML/HR: 600; 310; 30; 20 INJECTION, SOLUTION INTRAVENOUS at 06:29

## 2019-05-22 RX ADMIN — Medication 2 G: at 07:29

## 2019-05-22 RX ADMIN — PROPOFOL 180 MG: 10 INJECTION, EMULSION INTRAVENOUS at 07:36

## 2019-05-22 RX ADMIN — MIDAZOLAM HYDROCHLORIDE 1 MG: 2 INJECTION, SOLUTION INTRAMUSCULAR; INTRAVENOUS at 06:50

## 2019-05-22 RX ADMIN — EPHEDRINE SULFATE 15 MG: 50 INJECTION, SOLUTION INTRAVENOUS at 07:55

## 2019-05-22 RX ADMIN — LIDOCAINE HYDROCHLORIDE 20 MG: 20 INJECTION, SOLUTION EPIDURAL; INFILTRATION; INTRACAUDAL; PERINEURAL at 07:36

## 2019-05-22 RX ADMIN — SODIUM CHLORIDE, SODIUM LACTATE, POTASSIUM CHLORIDE, AND CALCIUM CHLORIDE: 600; 310; 30; 20 INJECTION, SOLUTION INTRAVENOUS at 07:29

## 2019-05-22 RX ADMIN — DEXAMETHASONE SODIUM PHOSPHATE 4 MG: 4 INJECTION, SOLUTION INTRA-ARTICULAR; INTRALESIONAL; INTRAMUSCULAR; INTRAVENOUS; SOFT TISSUE at 06:58

## 2019-05-22 NOTE — H&P
Outpatient Surgery History and Physical:  Sheeba Valle     CHIEF COMPLAINT:   LE    PE:     Visit Vitals  /85 (BP 1 Location: Left arm, BP Patient Position: At rest)   Pulse 74   Temp 97.9 °F (36.6 °C)   Resp 16   Wt 55.3 kg (122 lb)   SpO2 99%   BMI 23.83 kg/m²       Heart:  No noted problems   Lungs:  No noted problems        Past Medical History:    Patient Active Problem List    Diagnosis    Spastic hemiplegia of right dominant side as late effect of nontraumatic intraparenchymal hemorrhage of brain (HCC)    Right foot drop    Choking    Sudden idiopathic hearing loss of right ear with unrestricted hearing of left ear    Impacted cerumen of right ear    Cramp in limb    Other insomnia    Other constipation    Thrombocytopenia (HCC)    DVT (deep venous thrombosis) ([address]    ICH (intracerebral hemorrhage) ([address]    Stroke, hemorrhagic ([address]    Accelerated hypertension    At risk for aspiration    Acute spontaneous intraventricular hemorrhage assoc w/ hypertension ([address]    Screening for breast cancer    CVA (cerebral vascular accident) ([address]     [address] Left BG with residual R hemiparesis      PTE (pulmonary thromboembolism) ([address]     Related DVT      Atrophic vaginitis    HTN (hypertension)    Unipolar depression ([address]    Anxiety       Surgical History:   Past Surgical History:   Procedure Laterality Date    HX  SECTION      x 3    HX ORTHOPAEDIC Right 2018    right foot    HX OTHER SURGICAL  2012    Face lift    HX REFRACTIVE SURGERY  2006    HX SANJUANITA AND BSO  1992    VASCULAR SURGERY PROCEDURE UNLIST  2017    IVC filter placed 2017 after CVA       Social History: Patient  reports that she quit smoking about 39 years ago. She has never used smokeless tobacco. She reports that she does not drink alcohol or use drugs.     Family History:   Family History   Problem Relation Age of Onset    Alcohol abuse Mother     Cancer Mother         lung cancer    Cancer Father         throat cancer    Psychiatric Disorder Father         anxiety, depression    No Known Problems Sister        Allergies: Reviewed per EMR  Allergies   Allergen Reactions    Latex Hives    Aspirin Other (comments)     Pt states make her feel weird and breath funny    Banana Shortness of Breath    Lisinopril Cough    Nuts [Tree Nut] Hives       Medications:    No current facility-administered medications on file prior to encounter. Current Outpatient Medications on File Prior to Encounter   Medication Sig    amLODIPine (NORVASC) 5 mg tablet Take 1 Tab by mouth daily. (Patient taking differently: Take 5 mg by mouth every morning.)    labetalol (NORMODYNE) 300 mg tablet Take 1 Tab by mouth two (2) times a day.  rOPINIRole (REQUIP) 2 mg tablet Take 1 Tab by mouth nightly. for restless leg    tiZANidine (ZANAFLEX) 4 mg tablet 4mg po TID (Patient taking differently: Take 4 mg by mouth two (2) times a day.)    losartan-hydroCHLOROthiazide (HYZAAR) 100-25 mg per tablet Take 1 Tab by mouth daily.  FLUoxetine (PROZAC) 20 mg tablet Take 2 Tabs by mouth daily. (Patient taking differently: Take 40 mg by mouth every morning.)    polyethylene glycol (MIRALAX) 17 gram/dose powder Take 17 g by mouth daily. (Patient taking differently: Take 17 g by mouth as needed.)       The surgery is planned for the               Right big toe interphalangeal fusion, 2-5 claw toe resections         History and physical have been reviewed. There have been no significant  changes since the completion of the updated history and physical.    Necessity for the procedure/care is still present and the updated history and physical office notes outline the full long term history of the problem. Please see the updated office notes for the full musculoskeletal examination and surgical plan.     Signed By: Missy Yoder MD     May 22, 2019 7:26 AM

## 2019-05-22 NOTE — OP NOTES
[facility]  OPERATIVE REPORT    Name:  Anne Elder  MR#:  [id number]  :  1955  ACCOUNT #:  [de-identified]  DATE OF SERVICE:  2019    PREOPERATIVE DIAGNOSIS:  Right one, two, three, four, five rigid clawtoes. POSTOPERATIVE DIAGNOSIS:  Right one, two, three, four, five rigid clawtoes. PROCEDURE PERFORMED:  1. Right big toe interphalangeal joint fusion. 2.  Right two through five interphalangeal resections, metatarsal releases. SURGEON:  Lebron Garcia MD    ANESTHESIA:  Regional plus general.    FLUIDS:  Crystalloid. COMPLICATIONS:  None. SPECIMENS REMOVED:  None. DRAINS:  None. ESTIMATED BLOOD LOSS:  Minimal.    TOURNIQUET TIME:  Less than an hour. FINDINGS:  Intraoperatively, the patient had contractures of all of her toes due to her prior stroke. SURGERY IN DETAIL:  After successful induction of regional anesthesia and general anesthesia, the right leg was scrubbed, prepped, and draped in the usual sterile fashion. Antibiotic issued. Timeout procedure, identification, and surgical markings were done by me. The right leg was addressed with multiple approaches. Each toe had individual approaches. Resection arthroplasty was performed at the interphalangeal joint and resection of the interphalangeal joint and release of the metatarsal joints. Great toe was held with 3-0 Synthes headless screw with good fixation and the interphalangeal joints of toes two, three, four, and five held with 0.045 K-wires. The wound was thoroughly cleaned and then closed with Ethilon. The patient was placed in a sterile dressing and seemed to tolerate the procedure well. Capillary refill returned and tourniquet released.         Alexis Ochoa MD [id number]/  D:  2019 8:44  T:  2019 18:34  JOB #:  [id number]

## 2019-05-22 NOTE — ANESTHESIA PROCEDURE NOTES
Peripheral Block    Start time: 5/22/2019 6:56 AM  End time: 5/22/2019 6:58 AM  Performed by: Zoie Bishop MD  Authorized by: Zoie Bishop MD       Pre-procedure: Indications: at surgeon's request and post-op pain management    Preanesthetic Checklist: patient identified, risks and benefits discussed, site marked, timeout performed, anesthesia consent given and patient being monitored      Block Type:   Block Type:   Adductor canal  Laterality:  Right  Monitoring:  Continuous pulse ox, frequent vital sign checks, heart rate, responsive to questions and oxygen  Injection Technique:  Single shot  Procedures: ultrasound guided    Prep: chlorhexidine    Location:  Mid thigh  Needle Type:  Stimuplex  Needle Gauge:  20 G  Needle Localization:  Anatomical landmarks, infiltration and ultrasound guidance    Assessment:  Number of attempts:  1  Injection Assessment:  Incremental injection every 5 mL, negative aspiration for CSF, local visualized surrounding nerve on ultrasound, negative aspiration for blood, no intravascular symptoms, no paresthesia and ultrasound image on chart  Patient tolerance:  Patient tolerated the procedure well with no immediate complications

## 2019-05-22 NOTE — ANESTHESIA PREPROCEDURE EVALUATION
Anesthetic History   No history of anesthetic complications            Review of Systems / Medical History  Patient summary reviewed and pertinent labs reviewed    Pulmonary  Within defined limits                 Neuro/Psych       CVA  Psychiatric history (Depression/Anxiety)     Cardiovascular    Hypertension: well controlled              Exercise tolerance: >4 METS  Comments: Denies CP or SOB   GI/Hepatic/Renal  Within defined limits              Endo/Other  Within defined limits           Other Findings   Comments: Zi filter           Physical Exam    Airway  Mallampati: II  TM Distance: 4 - 6 cm  Neck ROM: normal range of motion   Mouth opening: Normal     Cardiovascular    Rhythm: regular  Rate: normal    Murmur: Grade 2, Aortic area     Dental    Dentition: Implants     Pulmonary  Breath sounds clear to auscultation               Abdominal  GI exam deferred       Other Findings            Anesthetic Plan    ASA: 3  Anesthesia type: general      Post-op pain plan if not by surgeon: peripheral nerve block single    Induction: Intravenous  Anesthetic plan and risks discussed with: Patient and Family      Patient with involuntary contraction of operative extremity during block.   Will plan for GA as I don't think patient will tolerate TIVA

## 2019-05-22 NOTE — ANESTHESIA POSTPROCEDURE EVALUATION
Procedure(s):  RIGHT BIG TOE INTERPHALANGEAL FUSION  RIGHT 2-5 TOE CLAW CORRECTION CHOICE ANES.     total IV anesthesia    Anesthesia Post Evaluation      Multimodal analgesia: multimodal analgesia used between 6 hours prior to anesthesia start to PACU discharge  Patient location during evaluation: PACU  Patient participation: complete - patient participated  Level of consciousness: awake and alert  Pain management: adequate  Airway patency: patent  Anesthetic complications: no  Cardiovascular status: acceptable and hemodynamically stable  Respiratory status: acceptable  Hydration status: acceptable        Vitals Value Taken Time   /92 5/22/2019  9:42 AM   Temp 36.7 °C (98 °F) 5/22/2019  9:21 AM   Pulse 83 5/22/2019  9:42 AM   Resp 16 5/22/2019  9:42 AM   SpO2 95 % 5/22/2019  9:42 AM

## 2019-05-22 NOTE — DISCHARGE INSTRUCTIONS
INSTRUCTIONS FOLLOWING FOOT SURGERY    ACTIVITY  Elevate foot. No Ice  Protected partial weight bearing on the heel only as tolerated in post op shoe after full sensation returns. Let the office know if dressing gets saturated with water . Use crutches or knee walker until seen in follow up appointment   Get up and out of bed frequently. While in bed move the legs as much as possible)    DIET  Day of Surgery: Clear liquids until no nausea or vomiting; then light, bland diet (Baked chicken, plain rice, grits, scrambled egg, toast). Nothing Greasy, fried or spicy today  Advance to regular diet on second day, unless your doctor orders otherwise. PAIN  Take pain medications as directed by your doctor. Call your doctor if pain is NOT relieved by medication. PAIN MED SIDE EFFECTS  Constipation: Lots of fluids, try prune juice, then OTC stool softeners if necessary  Nausea: Take medication with food. DRESSING CARE Keep dry and in place until follow up appointment    CALL YOUR DOCTOR IF YOU HAVE  Excessive bleeding that does not stop after holding mild pressure over the area. Temperature of 101 degrees or above. Redness, excessive swelling or bruising, and/or green or yellow, smelly discharge from incision. Loss of sensation - cold, white or blue toes. AFTER ANESTHESIA  For the first 24 hours and while taking narcotics for pain: DO NOT Drive, Drink Alcoholic beverages, or make important Decisions. Be aware of dizziness following anesthesia and while taking pain medication. Preventing Infection at Home  We care about preventing infection and avoiding the spread of germs - not only when you are in the hospital but also when you return home. When you return home from the hospital, its important to take the following steps to help prevent infection and avoid spreading germs that could infect you and others. Ask everyone in your home to follow these guidelines, too.     Clean Your Hands  · Clean your hands whenever your hands are visibly dirty, before you eat, before or after touching your mouth, nose or eyes, and before preparing food. Clean them after contact with body fluids, using the restroom, touching animals or changing diapers. · When washing hands, wet them with warm water and work up a lather. Rub hands for at least 15 seconds, then rinse them and pat them dry with a clean towel or paper towel. · When using hand sanitizers, it should take about 15 seconds to rub your hands dry. If not, you probably didnt apply enough . Cover Your Sneeze or Cough  Germs are released into the air whenever you sneeze or cough. To prevent the spread of infection:  · Turn away from other people before coughing or sneezing. · Cover your mouth or nose with a tissue when you cough or sneeze. Put the tissue in the trash. · If you dont have a tissue, cough or sneeze into your upper sleeve, not your hands. · Always clean your hands after coughing or sneezing. Care for Wounds  Your skin is your bodys first line of defense against germs, but an open wound leaves an easy way for germs to enter your body. To prevent infection:  · Clean your hands before and after changing wound dressings, and wear gloves to change dressings if recommended by your doctor. · Take special care with IV lines or other devices inserted into the body. If you must touch them, clean your hands first.  · Follow any specific instructions from your doctor to care for your wounds. Contact your doctor if you experience any signs of infection, such as fever or increased redness at the surgical or wound site. Keep a Clean Home  · Clean or wipe commonly touched hard surfaces like door handles, sinks, tabletops, phones and TV remotes. · Use products labeled disinfectant to kill harmful bacteria and viruses. · Use a clean cloth or paper towel to clean and dry surfaces.  Wiping surfaces with a dirty dishcloth, sponge or towel will only spread germs. · Never share toothbrushes, bolivar, drinking glasses, utensils, razor blades, face cloths or bath towels to avoid spreading germs. · Be sure that the linens that you sleep on are clean. · Keep pets away from wounds and wash your hands after touching pets, their toys or bedding. We care about you and your health. Remember, preventing infections is a team effort between you, your family, friends and health care providers. DISCHARGE SUMMARY from Nurse    PATIENT INSTRUCTIONS:    After general anesthesia or intravenous sedation, for 24 hours or while taking prescription Narcotics:  · Limit your activities  · Do not drive and operate hazardous machinery  · Do not make important personal or business decisions  · Do  not drink alcoholic beverages  · If you have not urinated within 8 hours after discharge, please contact your surgeon on call. *  Please give a list of your current medications to your Primary Care Provider. *  Please update this list whenever your medications are discontinued, doses are      changed, or new medications (including over-the-counter products) are added. *  Please carry medication information at all times in case of emergency situations. These are general instructions for a healthy lifestyle:    No smoking/ No tobacco products/ Avoid exposure to second hand smoke    Surgeon General's Warning:  Quitting smoking now greatly reduces serious risk to your health. Obesity, smoking, and sedentary lifestyle greatly increases your risk for illness    A healthy diet, regular physical exercise & weight monitoring are important for maintaining a healthy lifestyle    You may be retaining fluid if you have a history of heart failure or if you experience any of the following symptoms:  Weight gain of 3 pounds or more overnight or 5 pounds in a week, increased swelling in our hands or feet or shortness of breath while lying flat in bed.   Please call your doctor as soon as you notice any of these symptoms; do not wait until your next office visit. Recognize signs and symptoms of STROKE:    F-face looks uneven    A-arms unable to move or move unevenly    S-speech slurred or non-existent    T-time-call 911 as soon as signs and symptoms begin-DO NOT go       Back to bed or wait to see if you get better-TIME IS BRAIN. Pt uses wheelchair at home for ambulation, does not use crutches.

## 2019-05-22 NOTE — ANESTHESIA PROCEDURE NOTES
Peripheral Block    Start time: 5/22/2019 6:49 AM  End time: 5/22/2019 6:55 AM  Performed by: Zoie Bishop MD  Authorized by: Zoie Bishop MD       Pre-procedure: Indications: at surgeon's request and post-op pain management    Preanesthetic Checklist: patient identified, risks and benefits discussed, site marked, timeout performed, anesthesia consent given and patient being monitored      Block Type:   Block Type:   Adductor canal and popliteal  Laterality:  Right  Monitoring:  Continuous pulse ox, frequent vital sign checks, heart rate, responsive to questions and oxygen  Injection Technique:  Single shot  Procedures: ultrasound guided    Patient Position: supine  Prep: chlorhexidine    Location:  Mid thigh  Needle Type:  Stimuplex  Needle Gauge:  20 G  Needle Localization:  Anatomical landmarks, infiltration and ultrasound guidance    Assessment:  Number of attempts:  1  Injection Assessment:  Incremental injection every 5 mL, negative aspiration for CSF, local visualized surrounding nerve on ultrasound, negative aspiration for blood, no intravascular symptoms, no paresthesia and ultrasound image on chart  Patient tolerance:  Patient tolerated the procedure well with no immediate complications

## 2019-06-04 ENCOUNTER — APPOINTMENT (OUTPATIENT)
Dept: PHYSICAL THERAPY | Age: 64
End: 2019-06-04

## 2019-06-11 ENCOUNTER — APPOINTMENT (OUTPATIENT)
Dept: PHYSICAL THERAPY | Age: 64
End: 2019-06-11

## 2019-06-14 ENCOUNTER — HOSPITAL ENCOUNTER (OUTPATIENT)
Dept: PHYSICAL THERAPY | Age: 64
Discharge: HOME | End: 2019-06-14

## 2019-06-14 NOTE — PROGRESS NOTES
Alfredo Collins  : 1955  Primary: [employer]  Secondary:  [address] at [address], [address] [place] [address]  [phone]   [phone]      OUTPATIENT DAILY NOTE    NAME/AGE/GENDER: Alfredo Collins is a 58 y.o. female. DATE: 2019    Ms. Wiley January for today's appointment due to pt unable to walk at this time.     Amee Bower, PT

## 2019-06-18 ENCOUNTER — APPOINTMENT (OUTPATIENT)
Dept: PHYSICAL THERAPY | Age: 64
End: 2019-06-18

## 2019-07-12 PROBLEM — I61.9 ICH (INTRACEREBRAL HEMORRHAGE) (HCC): Chronic | Status: ACTIVE | Noted: 2017-09-15

## 2019-07-15 ENCOUNTER — APPOINTMENT (OUTPATIENT)
Dept: PHYSICAL THERAPY | Age: 64
End: 2019-07-15
Payer: COMMERCIAL

## 2019-07-17 ENCOUNTER — HOSPITAL ENCOUNTER (OUTPATIENT)
Dept: PHYSICAL THERAPY | Age: 64
Discharge: HOME | End: 2019-07-17
Payer: COMMERCIAL

## 2019-07-17 PROCEDURE — 97110 THERAPEUTIC EXERCISES: CPT

## 2019-07-17 NOTE — THERAPY RECERTIFICATION
Hitesh Martin  : 1955  Primary: [employer]  Secondary:  [address]  [facility] [address]  [phone]   [phone]       OUTPATIENT PHYSICAL THERAPY:Recertification 3/18/2820   ICD-10: Treatment Diagnosis: Contracture of joint, foot, right M24.574, Difficulty in walking,not elsewhere classified R26.2, Hemiplegia and hemiparesis following cerebral infarction affecting right dominant side I69.351, Muscle weakness (generalized) M62.81  Precautions/Allergies:   Latex; Aspirin; Banana; Lisinopril; and Nuts Lonney Zeus nut]   MD Orders: Evaluate and treat MEDICAL/REFERRING DIAGNOSIS:  Foot drop, right foot [M21.371]  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 Pt returns to therapy following surgery for right big toe interphalangeal fusion, right 2-5 interphalangeal resections,metatarsal releases. Pt had not been able to resume the therapy she was already participating in due to the pain in the right foot and increased swelling that she did not expect. Pt returns to therapy with continued willingness to work on increasing independence with ADL's and all functional mobility, particularly gait. She continues a to demonstrate right LE and UE weakness however pt has not regressed from being away from therapy.          PROBLEM LIST (Impacting functional limitations):  1. Decreased Strength  2. Decreased Ambulation Ability/Technique  3. Decreased Balance  4. Decreased Flexibility/Joint Mobility INTERVENTIONS PLANNED: (Treatment may consist of any combination of the following)  1. Gait Training  2. Home Exercise Program (HEP)  3.  Manual Therapy  4. Neuromuscular Re-education/Strengthening  5. Therapeutic Exercise/Strengthening   TREATMENT PLAN:  Effective Dates: 7/17/2019 TO  10/15/2019 (90 days). Frequency/Duration: 2 times a week for 90 Day(s)  GOALS: (Goals have been discussed and agreed upon with patient.)  Discharge Goals: Time Frame:10/15/2019  1. Pt to ambulate 48' with quad cane without any increased pain or difficulty with independence. GOAL MET 4/16/19  2. Pt to demonstrate improvement as per [place] Atkins score with a total score >45/56 points indicating decreased risk for falls. GOAL ONGOING  3. Pt to achieve independence with HEP to maintain goals achieved. GOAL MET 4/16/19  NEW GOAL: Pt to demonstrate right shoulder flexion AROM to >110 degrees to aid in assistance with ADL's. NEW GOAL: Pt to ambulate up to 50' without assisted device to allow for carrying of item in left hand. GOAL ONGOING      Outcome Measure: Tool Used: Gipson Balance Scale  Score:  Initial: 38/56 Most Recent: 43/56 (Date: 7/17/19)   Interpretation of Score: Each section is scored on a 0-4 scale, 0 representing the patients inability to perform the task and 4 representing independence. The scores of each section are added together for a total score of 56. The higher the patients score, the more independent the patient is. Any score below 45 indicates increased risk for falls. UPDATED OBJECTIVE FINDINGS:      Observation/Orthostatic Postural Assessment: Right LE postures into ER        ROM:     AROM(PROM) Right Left   Knee flexion 120 125   Knee extension 0 0   Hip flexion 100 110   Ankle dorsiflexion (DF) - knee extended 0/5 5   Ankle plantarflexion 30 PROM 50               ROM: Left:  Shoulder flexion 160/180 degrees A/PROM, shoulder abduction 165/180 A/PROM, IR 45 degrees AROM. Elbow 0-155 AROM                   Right: Shoulder flexion 90/120 degrees A/PROM, shoulder abduction 90/110 degrees A/PROM, Elbow PROM 0-145 degrees.                     Cervical spine right lateral flexion 25% WNL, right rotation 50%     Strength:     Manual Muscle Test (*/5) Right Left   Knee extension 3-/5 4/5   Knee flexion 3/5 4/5   Hip flexion 3/5 4/5   Hip ER 3/5 4/5   Hip IR 3/5 4/5   Hip extension 3/5 4/5   Hip abduction 3/5 4/5   Hip adduction 2+/5 4/5   Ankle DF 1/5 4/5   Ankle PF 1/5 4/5   Shoulder flexion 2/5 4/5   Shoulder abduction 2/5 4/5   Shoulder extension 2/5 4/5   Elbow flexion No active movement 4/5   Elbow extension No active movement 4/5          Ambulatory/Rehab Services H2 Model Falls Risk Assessment    Risk Factors:       No Risk Factors Identified Ability to Rise from Chair:       (0)  Ability to rise in a single movement    Falls Prevention Plan: Mobility Assistance Device (specify):  Quad cane   Total: (5 or greater = High Risk): 1    ©2010 [facility] . [facility] States Patent #[id number]. Federal Law prohibits the replication, distribution or use without written permission from [facility] Necessity:   · Patient demonstrates good rehab potential due to higher previous functional level and high level of motivation. Reason for Services/Other Comments:  · Patient will benefit from therapy at this time to achieve goals established above as she has improved since initial evaluation. Total Duration: 55 minutes    Rehabilitation Potential For Stated Goals: Good     Regarding Tony White Esequiel's therapy, I certify that the treatment plan above will be carried out by a therapist or under their direction. Thank you for this referral,  Candice Rodriguez PT     Referring Physician Signature:  Evelyn Thurman MD          Date

## 2019-07-17 NOTE — PROGRESS NOTES
Sally Austin  : 1955  Primary: [employer]  Secondary:  [address] at [facility], [address] [facility]  [phone]   [phone]      OUTPATIENT PHYSICAL THERAPY: Daily Treatment Note 2019  ICD-10: Treatment Diagnosis: Contracture of joint, foot, right M24.574, Difficulty in walking,not elsewhere classified R26.2, Hemiplegia and hemiparesis following cerebral infarction affecting right dominant side I69.351, Muscle weakness (generalized) M62.81  Precautions/Allergies:   Latex; Aspirin; Banana; Lisinopril; and Nuts Emmanuelle [place] nut]   MD Orders: Evaluate and treat MEDICAL/REFERRING DIAGNOSIS:  Foot drop, right foot [M21.371]  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 Pain: Initial: Pain Intensity 1: 0  Post Session:  0/10   Medications Last Reviewed:  2019  Updated Objective Findings:  Refer to re-certification dated    TREATMENT:   Therapeutic Exercise: (55):  Exercises per grid below to improve mobility and strength.  Required minimal verbal cues to promote proper body posture.  Progressed complexity of movement as indicated.       Date:  2019   Activity/Exercise Parameters   LAQ  3x10 BLE   Seated hip IR 3x10 BLE   Supine bicep curl PROM  3x10   Supine PROM shoulder abduction with arm flexed 3x10   Seated hip adduction and abduction, unilaterally .  Supine abduction, flexion 3x10 BLE   Standing hip abduction, hip flexion 3x10   Step taps onto 3' step at rail 3x10   Terminal knee extension RLE 3x10         Treatment/Session Summary:    · Response to Treatment:  Pt tolerated session well, needed frequent cues to work on gait, heel/toe strike. · Communication/Consultation:  None today  · Equipment provided today:  None today  · Recommendations/Intent for next treatment session:  Progress BUE and BLE strengthening and ROM exercises as tolerated as pt returns to therapy after foot surgery.      Total Treatment Billable Duration: 55 minutes  PT Patient Time In/Time Out  Time In: 1400  Time Out: 1500  Ez Gregory PT    Future Appointments   Date Time Provider Azalia Dolly   7/22/2019  1:00 PM Ralph Malloy [facility]   7/24/2019  1:00 PM Miky Demarco PT [facility]   7/25/2019  1:45 PM Yaniv Zavala MD [facility]

## 2019-07-22 ENCOUNTER — APPOINTMENT (OUTPATIENT)
Dept: PHYSICAL THERAPY | Age: 64
End: 2019-07-22
Payer: COMMERCIAL

## 2019-07-22 ENCOUNTER — HOSPITAL ENCOUNTER (OUTPATIENT)
Dept: PHYSICAL THERAPY | Age: 64
Discharge: HOME | End: 2019-07-22
Payer: COMMERCIAL

## 2019-07-22 PROCEDURE — 97116 GAIT TRAINING THERAPY: CPT

## 2019-07-22 PROCEDURE — 97110 THERAPEUTIC EXERCISES: CPT

## 2019-07-22 NOTE — PROGRESS NOTES
Jayda Maddox  : 1955  Primary:   Secondary:  [address] [facility] [address] [facility]  [phone]   [phone]      OUTPATIENT PHYSICAL THERAPY: Daily Treatment Note 2019  ICD-10: Treatment Diagnosis: Contracture of joint, foot, right M24.574, Difficulty in walking,not elsewhere classified R26.2, Hemiplegia and hemiparesis following cerebral infarction affecting right dominant side I69.351, Muscle weakness (generalized) M62.81  Precautions/Allergies:   Latex; Aspirin; Banana; Lisinopril; and Nuts Antoni Theresa nut]   MD Orders: Evaluate and treat MEDICAL/REFERRING DIAGNOSIS:  Foot drop, right foot [M21.371]  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 Pt to have botox in her right arm on Thursday. Pain: Initial: Pain Intensity 1: 0  Post Session:  0/10   Medications Last Reviewed:  2019  Updated Objective Findings:  Ambulation without AD for 25' , shortened stride length.     TREATMENT:   Therapeutic Exercise: (45):  Exercises per grid below to improve mobility and strength.  Required minimal verbal cues to promote proper body posture.  Progressed complexity of movement as indicated.       Date:  2019   Activity/Exercise Parameters   LAQ  3x10 RLE   Seated RUE rows, flexion 3x10 RUE   Seated hip IR,ER 3x10 BLE   Supine PROM shoulder abduction with arm flexed 3x10 RUE   Supine bicep curl PROM 3x10 RUE   Seated hip adduction and abduction, unilaterally  3x10 BLE   Standing hip abduction, hip flexion 3x10   Step taps onto 3' step at rail 3x10   Terminal knee extension RLE 3x10    Terminal knee extension RLE + hip flexion 3x10 AAROM     Gait Training ( 10 minutes):  Gait training to improve and/or restore physical functioning as related to mobility. Ambulated   with supervision/set-up and minimal verbal cues related to their stride lengthto promote proper body alignment. Pt ambulated 50'x4 with quad cane and independence. Pt ambulated 25' x 4 without quad cane with supervision and cues to increase stride length. Treatment/Session Summary:    · Response to Treatment:  Pt demonstrated hip IR on the right LE well however unable to coordinate it with gait. · Communication/Consultation:  None today  · Equipment provided today:  None today  · Recommendations/Intent for next treatment session:  Progress BUE and BLE strengthening and ROM exercises as tolerated.  Work on progression of  ambulation without AD    Total Treatment Billable Duration: 55 minutes  PT Patient Time In/Time Out  Time In: 1400  Time Out: 82 Dhara David PT    Future Appointments   Date Time Provider Azalia Alberto   7/24/2019  1:00 PM Sequoyah Balint, PT [facility]   7/25/2019  1:45 PM Ron Becerra MD [facility]   7/29/2019  2:00 PM Sequoyah Balint, PT [facility]   7/31/2019  2:00 PM Sequoyah Balint, PT [facility]   8/5/2019  2:00 PM Sequoyah Balint, PT [facility]   8/7/2019  2:00 PM Sequoyah Balint, PT [facility]   8/12/2019  2:00 PM Sequoyah Balint, PT [facility]   8/14/2019  2:00 PM Sequoyah Balint, PT [facility]   8/19/2019  2:00 PM Sequoyah Balint, PT [facility]   8/21/2019  2:00 PM Sequoyah Balint, PT [facility]   8/26/2019  2:00 PM Sequoyah Balint, PT [facility]   8/28/2019  2:00 PM Sequoyah Balint, PT [facility]   9/3/2019  2:00 PM Sequoyah Balint, PT [facility]   9/6/2019  2:00 PM Sequoyah Balint, PT [facility]   9/9/2019  2:00 PM Sequoyah Balint, PT [facility]   9/11/2019  2:00 PM FRANCOIS Boudreaux [facility]   9/16/2019  2:00 PM Tucker Pina, [facility]   9/18/2019  2:00 PM Tucker Pina, [facility]   9/23/2019  2:00 PM Tucker Pina, [facility]   9/25/2019  2:00 PM Tucker Pina, [facility]   9/30/2019  2:00 PM [facility] Pina[facility]

## 2019-07-24 ENCOUNTER — HOSPITAL ENCOUNTER (OUTPATIENT)
Dept: PHYSICAL THERAPY | Age: 64
Discharge: HOME | End: 2019-07-24
Payer: COMMERCIAL

## 2019-07-24 PROCEDURE — 97110 THERAPEUTIC EXERCISES: CPT

## 2019-07-24 PROCEDURE — 97116 GAIT TRAINING THERAPY: CPT

## 2019-07-24 NOTE — PROGRESS NOTES
Georges Garcia  : 1955  Primary:   Secondary:  [address]  at [facility], [address] [facility]  [phone]   [phone]      OUTPATIENT PHYSICAL THERAPY: Daily Treatment Note 2019  ICD-10: Treatment Diagnosis: Contracture of joint, foot, right M24.574, Difficulty in walking,not elsewhere classified R26.2, Hemiplegia and hemiparesis following cerebral infarction affecting right dominant side I69.351, Muscle weakness (generalized) M62.81  Precautions/Allergies:   Latex; Aspirin; Banana; Lisinopril; and Nuts Azalee Liter nut]   MD Orders: Evaluate and treat MEDICAL/REFERRING DIAGNOSIS:  Foot drop, right foot [M21.371]  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 She stated that she felt good after last session, felt that her legs were not that weak. Pain: Initial: Pain Intensity 1: 0  Post Session:  0/10   Medications Last Reviewed:  2019  Updated Objective Findings:  Ambulation without AD for 25' , shortened stride length.     TREATMENT:   Therapeutic Exercise: (45):  Exercises per grid below to improve mobility and strength.  Required minimal verbal cues to promote proper body posture.  Progressed complexity of movement as indicated.       Date:  2019   Activity/Exercise Parameters   LAQ  3x10 RLE   Pushups at the bar 3x10 BUE   Seated hip IR,ER 3x10 BLE   Supine PROM shoulder abduction with arm flexed 3x10 RUE   Supine bicep curl PROM 3x10 RUE   Seated hip adduction and abduction, unilaterally  3x10 BLE   Standing hip abduction, hip flexion 3x10   Step taps onto 3' step at rail 3x10   Terminal knee extension RLE 3x10    Terminal knee extension RLE + hip flexion 3x10 AAROM     Gait Training ( 10 minutes):  Gait training to improve and/or restore physical functioning as related to mobility. Ambulated   with supervision/set-up and minimal verbal cues related to their stride lengthto promote proper body alignment. Pt ambulated 50'x4 with quad cane and independence. Pt ambulated 25' x 4 without quad cane with supervision and cues to increase stride length. Treatment/Session Summary:    · Response to Treatment:  Pt continues to require vc's when ambulating, right LE ER increases when she is not using her quad cane. · Communication/Consultation:  None today  · Equipment provided today:  None today  · Recommendations/Intent for next treatment session:  Progress BUE and BLE strengthening and ROM exercises as tolerated.  Work on progression of  ambulation without AD    Total Treatment Billable Duration: 55 minutes  PT Patient Time In/Time Out  Time In: 1400  Time Out: 82 Dhara David PT    Future Appointments   Date Time Provider Azalia Alberto   7/29/2019  2:00 PM Han Wells, [place] [facility]   7/30/2019  2:30 PM Ron Becerra MD [facility]ABIO MRMD   7/31/2019  2:00 PM Fayette Balint, PT [facility]   8/5/2019  2:00 PM Fayette Balint, PT [facility]   8/7/2019  2:00 PM Fayette Balint, PT [facility]   8/12/2019  2:00 PM Fayette Balint, PT [facility]   8/14/2019  2:00 PM Fayette Balint, PT [facility]   8/19/2019  2:00 PM Fayette Balint, PT [facility]   8/21/2019  2:00 PM Fayette Balint, PT [facility]   8/26/2019  2:00 PM Fayette Balint, PT [facility]   8/28/2019  2:00 PM Fayette Balint, PT [facility]   9/3/2019  2:00 PM Fayette Balint, PT [facility]   9/6/2019  2:00 PM Fayette Balint, PT [facility]   9/9/2019  2:00 PM Fayette Balint, PT [facility]   9/11/2019  2:00 PM Fayette Balint, PT [facility]   9/16/2019  2:00 PM FRANCOIS Boudreaux 9/18/2019  2:00 PM Miky Demarco, PT [facility]   9/23/2019  2:00 PM Miky Demarco, PT [facility]   9/25/2019  2:00 PM Miky Demarco, PT [facility]   9/30/2019  2:00 PM Miky Demarco, PT [facility]

## 2019-07-29 ENCOUNTER — HOSPITAL ENCOUNTER (OUTPATIENT)
Dept: PHYSICAL THERAPY | Age: 64
Discharge: HOME | End: 2019-07-29
Payer: COMMERCIAL

## 2019-07-29 PROCEDURE — 97110 THERAPEUTIC EXERCISES: CPT

## 2019-07-29 NOTE — PROGRESS NOTES
Caleb Aguero  : 1955  Primary:   Secondary:  [address] [facility] [address] [facility]  [phone]   [phone]      OUTPATIENT PHYSICAL THERAPY: Daily Treatment Note 2019  ICD-10: Treatment Diagnosis: Contracture of joint, foot, right M24.574, Difficulty in walking,not elsewhere classified R26.2, Hemiplegia and hemiparesis following cerebral infarction affecting right dominant side I69.351, Muscle weakness (generalized) M62.81  Precautions/Allergies:   Latex; Aspirin; Banana; Lisinopril; and Nuts Balinda Alert nut]   MD Orders: Evaluate and treat MEDICAL/REFERRING DIAGNOSIS:  Foot drop, right foot [M21.371]  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 She stated that she would like to be able to walk in between rooms in her house while holding her tea cup. She would like to use her right arm more. Pain: Initial: Pain Intensity 1: 0  Post Session:  0/10   Medications Last Reviewed:  2019  Updated Objective Findings:  Ambulation without AD for 40' while holding onto a plastic cup with some water with supervision.     TREATMENT:   Therapeutic Exercise: (55):  Exercises per grid below to improve mobility and strength.  Required minimal verbal cues to promote proper body posture.  Progressed complexity of movement as indicated.       Date:  2019   Activity/Exercise Parameters   LAQ  3x10 RLE   Sit to stand without UE 3x10   Seated hip IR,ER 3x10 BLE   Seated elbow flexion,extension PROM 3x10 RUE   Seated hip adduction and abduction, unilaterally  3x10 BLE   Standing hip abduction, hip adduction 3x10   Side stepping  10'x4   Ambulation 15' up to 36' with standing breaks only without AD x20 minutes       Treatment/Session Summary:    · Response to Treatment:  Pt educated on how she can use a fork with both hands when eating. She demonstrated safe gait when simulating walking between rooms without her quad cane. Pt was able to open the tap at the sink take some water and walk to a chair 40' away without the use of a cane. · Communication/Consultation:  None today  · Equipment provided today:  None today  · Recommendations/Intent for next treatment session:  Progress BUE and BLE strengthening and ROM exercises as tolerated. Work on progression of  ambulation without AD while holding onto a cup of water.      Total Treatment Billable Duration: 55 minutes  PT Patient Time In/Time Out  Time In: 1400  Time Out: 82 Dhara David, FRANCOIS    Future Appointments   Date Time Provider Azalia Alberto   7/30/2019  2:30 PM Chuck Montano MD [facility] FABIO MRMD   7/31/2019  2:00 PM Av Voss, PT [facility]   8/5/2019  2:00 PM Av oVss, PT [facility]   8/7/2019  2:00 PM Av Voss, PT [facility]   8/12/2019  2:00 PM Av Voss, PT [facility]   8/14/2019  2:00 PM Av Voss, PT [facility]   8/19/2019  2:00 PM Av Voss, PT [facility]   8/21/2019  2:00 PM Av Voss, PT [facility]   8/26/2019  2:00 PM Av Voss, PT [facility]   8/28/2019  2:00 PM Av Voss, PT [facility]   9/3/2019  2:00 PM Av Voss, PT [facility]   9/6/2019  2:00 PM Av Voss, PT [facility]   9/9/2019  2:00 PM Av Voss, PT [facility]   9/11/2019  2:00 PM Av Voss, PT [facility]   9/16/2019  2:00 PM Av Voss, PT [facility]   9/18/2019  2:00 PM Av oVss, PT [facility]   9/23/2019  2:00 PM Av Voss, PT [facility]   9/25/2019  2:00 PM FRANCOIS Smith   9/30/2019  2:00 PM FRANCOIS Smith

## 2019-07-31 ENCOUNTER — HOSPITAL ENCOUNTER (OUTPATIENT)
Dept: PHYSICAL THERAPY | Age: 64
Discharge: HOME | End: 2019-07-31
Payer: COMMERCIAL

## 2019-07-31 PROCEDURE — 97110 THERAPEUTIC EXERCISES: CPT

## 2019-07-31 NOTE — PROGRESS NOTES
Brianna Carlton  : 1955  Primary:   Secondary:  [address] at [facility], [address] [facility]  [phone]   [phone]      OUTPATIENT PHYSICAL THERAPY: Daily Treatment Note 2019  ICD-10: Treatment Diagnosis: Contracture of joint, foot, right M24.574, Difficulty in walking,not elsewhere classified R26.2, Hemiplegia and hemiparesis following cerebral infarction affecting right dominant side I69.351, Muscle weakness (generalized) M62.81  Precautions/Allergies:   Latex; Aspirin; Banana; Lisinopril; and Nuts Jennyfer [place] nut]   MD Orders: Evaluate and treat MEDICAL/REFERRING DIAGNOSIS:  Foot drop, right foot [M21.371]  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 She stated that she is trying to use her right UE more when eating. Pt owns a standard cane. Pain: Initial:    Post Session:  0/10   Medications Last Reviewed:  2019  Updated Objective Findings:  Ambulation of 100' with single point cane with contact guard.     TREATMENT:   Therapeutic Exercise: (55):  Exercises per grid below to improve mobility and strength.  Required minimal verbal cues to promote proper body posture.  Progressed complexity of movement as indicated.       Date:  2019   Activity/Exercise Parameters   LAQ  3x10 RLE   Sit to stand without UE 3x10   Seated hip IR,ER 3x10 BLE   Seated elbow flexion,extension PROM 3x10 RUE   Seated hip adduction and abduction, unilaterally  3x10 BLE   Standing hip abduction, hip adduction 3x10   Side stepping  10'x4   Ambulation 15' up to 36' with standing breaks only without AD  Ambulation of 100' x2 with single point cane with Contact guard x20 minutes       Treatment/Session Summary:    · Response to Treatment:  Pt educated on how to utilize right UE when eating, she had brought in some cut up fruit and was told not to flex neck when eating but to bring fork up to mouth. Pt ambulated well with single point cane, further training will be necessary. · Communication/Consultation:  None today  · Equipment provided today:  None today  · Recommendations/Intent for next treatment session:   Continue with work on ambulation with single point cane and without any AD.      Total Treatment Billable Duration: 55 minutes     Martin Salvador PT    Future Appointments   Date Time Provider Azalia Alberto   7/31/2019  2:00 PM [place] Mary, [place] [facility]   8/5/2019  2:00 PM [place] Mary, PT [facility]   8/7/2019  2:00 PM [place] Mary, PT [facility]   8/12/2019  2:00 PM [place] Mary, PT [facility]   8/14/2019  2:00 PM [place] Mary, PT [facility]   8/19/2019  2:00 PM [place] Mary, PT [facility]   8/21/2019  2:00 PM [place] Mary, PT [facility]   8/26/2019  2:00 PM [place] Mary, PT [facility]   8/28/2019  2:00 PM [place] Mary, PT [facility]   9/3/2019  2:00 PM [place] Mary, PT [facility]   9/6/2019  2:00 PM [place] Mary, PT [facility]   9/9/2019  2:00 PM [place] Mary, PT [facility]   9/11/2019  2:00 PM [place] Mary, PT [facility]   9/16/2019  2:00 PM [place] Mary, PT [facility]   9/18/2019  2:00 PM [place] Mary, PT [facility]   9/23/2019  2:00 PM [place] Mary, PT [facility]   9/25/2019  2:00 PM [place] Mary, PT [facility]   9/30/2019  2:00 PM [place] Mary, PT [facility]   11/5/2019  1:45 PM Rishi Logan MD [facility] RAY

## 2019-08-05 ENCOUNTER — HOSPITAL ENCOUNTER (OUTPATIENT)
Dept: PHYSICAL THERAPY | Age: 64
Discharge: HOME | End: 2019-08-05
Payer: MEDICARE

## 2019-08-05 PROCEDURE — 97110 THERAPEUTIC EXERCISES: CPT

## 2019-08-05 NOTE — PROGRESS NOTES
Chris Matthewshoward  : 1955  Primary:   Secondary:  [address] at [facility], [address] [facility]  [phone]   [phone]      OUTPATIENT PHYSICAL THERAPY: Daily Treatment Note 2019  ICD-10: Treatment Diagnosis: Contracture of joint, foot, right M24.574, Difficulty in walking,not elsewhere classified R26.2, Hemiplegia and hemiparesis following cerebral infarction affecting right dominant side I69.351, Muscle weakness (generalized) M62.81  Precautions/Allergies:   Latex; Aspirin; Banana;  Lisinopril; and Nuts [place] Saravanan nut]   MD Orders: Evaluate and treat MEDICAL/REFERRING DIAGNOSIS:  Foot drop, right foot [M21.371]  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  Required minimal verbal cues to promote proper body posture.  Progressed complexity of movement as indicated.       Date:  2019   Activity/Exercise Parameters   LAQ  3x10 RLE   Sit to stand without UE 3x10   Seated hip IR,ER 3x10 BLE   Seated elbow flexion,extension PROM 3x10 RUE   Seated hip adduction and abduction, unilaterally  3x10 BLE   Standing hip abduction, hip adduction 3x10   Side stepping  20'x4   Ambulation 15' up to 36' with standing breaks only without AD  Ambulation of 100' x2 with single point cane with Contact guard x20 minutes       Treatment/Session Summary:    · Response to Treatment:  Pt demonstrating improved balance and gait with single point cane, asked to bring hers in next visit. · Communication/Consultation:  None today  · Equipment provided today:  None today  · Recommendations/Intent for next treatment session:   Continue with work on ambulation with single point cane and without any AD.      Total Treatment Billable Duration: 55 minutes  PT Patient Time In/Time Out  Time In: 1400  Time Out: 82 Dhara David, FRANCOIS    Future Appointments   Date Time Provider Azalia Dolly   8/7/2019  2:00 PM Loreda Plants, [place] [facility]   8/12/2019  2:00 PM Loreda Plants, PT [facility]   8/14/2019  2:00 PM Loreda Plants, PT [facility]   8/19/2019  2:00 PM Loreda Plants, PT [facility]   8/21/2019  2:00 PM Loreda Plants, PT [facility]   8/26/2019  2:00 PM Loreda Plants, PT [facility]   8/28/2019  2:00 PM Loreda Plants, PT [facility]   9/3/2019  2:00 PM Loreda Plants, PT [facility]   9/6/2019  2:00 PM Loreda Plants, PT [facility]   9/9/2019  2:00 PM Loreda Plants, PT [facility]   9/11/2019  2:00 PM Loreda Plants, PT [facility]   9/16/2019  2:00 PM Loreda Plants, PT [facility]   9/18/2019  2:00 PM Loreda Plants, PT [facility]   9/23/2019  2:00 PM Loreda Plants, PT [facility]   9/25/2019  2:00 PM Loreda Plants, PT [facility]   9/30/2019  2:00 PM Loreda Plants, PT [facility]   11/5/2019  1:45 PM Raegan Juarez MD [facility] FABIO RAY

## 2019-08-07 ENCOUNTER — HOSPITAL ENCOUNTER (OUTPATIENT)
Dept: PHYSICAL THERAPY | Age: 64
Discharge: HOME | End: 2019-08-07
Payer: MEDICARE

## 2019-08-07 PROCEDURE — 97110 THERAPEUTIC EXERCISES: CPT

## 2019-08-07 NOTE — PROGRESS NOTES
Henok Joshi  : 1955  Primary:   Secondary:  [address] [facility], [address] [facility]  [phone]   [phone]      OUTPATIENT PHYSICAL THERAPY: Daily Treatment Note 2019  ICD-10: Treatment Diagnosis: Contracture of joint, foot, right M24.574, Difficulty in walking,not elsewhere classified R26.2, Hemiplegia and hemiparesis following cerebral infarction affecting right dominant side I69.351, Muscle weakness (generalized) M62.81  Precautions/Allergies:   Latex; Aspirin; Banana; Lisinopril; and Nuts Christine Pleasant nut]   MD Orders: Evaluate and treat MEDICAL/REFERRING DIAGNOSIS:  Foot drop, right foot [M21.371]  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       Pain: Initial: Pain Intensity 1: 0  Post Session:  0/10   Medications Last Reviewed:  2019  Updated Objective Findings:    TREATMENT:   Therapeutic Exercise: (55):  Exercises per grid below to improve mobility and strength.  Required minimal verbal cues to promote proper body posture.  Progressed complexity of movement as indicated.       Date:  2019   Activity/Exercise Parameters   LAQ  3x10 RLE   Sit to stand without UE 3x10   Seated hip IR,ER 3x10 BLE   Seated elbow flexion,extension PROM 3x10 RUE   Seated hip adduction and abduction, unilaterally  3x10 BLE   Standing hip abduction, hip adduction 3x10   Side stepping  20'x4   Ambulation 15' up to 36' with standing breaks only without AD  Ambulation of 100' x2 with single point cane with Contact guard x20 minutes       Treatment/Session Summary:    · Response to Treatment:  Pt's single point cane is too high for her at the lowest setting, pt demonstrating increased stride length with cane and has increased her fang speed.    · Communication/Consultation:  None today  · Equipment provided today:  None today  · Recommendations/Intent for next treatment session:    Continue with single point cane, progress activities that utilize right UE    Total Treatment Billable Duration: 55 minutes  PT Patient Time In/Time Out  Time In: 1400  Time Out: 82 Dhara David PT    Future Appointments   Date Time Provider Azalia Alberto   8/12/2019  2:00 PM Benjamine Irons, PT [facility]   8/14/2019  2:00 PM Benjamine Irons, PT [facility]   8/19/2019  2:00 PM Benjamine Irons, PT [facility]   8/21/2019  2:00 PM Benjamine Irons, PT [facility]   8/26/2019  2:00 PM Benjamine Irons, PT [facility]   8/28/2019  2:00 PM Benjamine Irons, PT [facility]   9/3/2019  2:00 PM Benjamine Irons, PT [facility]   9/6/2019  2:00 PM Benjamine Irons, PT [facility]   9/9/2019  2:00 PM Benjamine Irons, PT [facility]   9/11/2019  2:00 PM Benjamine Irons, PT [facility]   9/16/2019  2:00 PM Benjamine Irons, PT [facility]   9/18/2019  2:00 PM Benjamine Irons, PT [facility]   9/23/2019  2:00 PM Benjamine Irons, PT [facility]   9/25/2019  2:00 PM Benjamine Irons, PT [facility]   9/30/2019  2:00 PM Benjamine Irons, PT [facility]   11/5/2019  1:45 PM Magui Barbour MD [facility] FABIO RAY

## 2019-08-12 ENCOUNTER — HOSPITAL ENCOUNTER (OUTPATIENT)
Dept: PHYSICAL THERAPY | Age: 64
Discharge: HOME | End: 2019-08-12
Payer: MEDICARE

## 2019-08-12 PROCEDURE — 97110 THERAPEUTIC EXERCISES: CPT

## 2019-08-12 NOTE — PROGRESS NOTES
Henok Joshi  : 1955  Primary:   Secondary:  [address] [facility], [address] [facility]  [phone]   [phone]      OUTPATIENT PHYSICAL THERAPY: Daily Treatment Note 2019  ICD-10: Treatment Diagnosis: Contracture of joint, foot, right M24.574, Difficulty in walking,not elsewhere classified R26.2, Hemiplegia and hemiparesis following cerebral infarction affecting right dominant side I69.351, Muscle weakness (generalized) M62.81  Precautions/Allergies:   Latex; Aspirin; Banana; Lisinopril; and Nuts Christine Pleasant nut]   MD Orders: Evaluate and treat MEDICAL/REFERRING DIAGNOSIS:  Foot drop, right foot [M21.371]  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 She also brought her quad cane with her. Pain: Initial: Pain Intensity 1: 0  Post Session:  0/10   Medications Last Reviewed:  2019  Updated Objective Findings: Pt ambulated 76' with single point cane with distant supervision. TREATMENT:   Therapeutic Exercise: (55):  Exercises per grid below to improve mobility and strength.  Required minimal verbal cues to promote proper body posture.  Progressed complexity of movement as indicated.       Date:  2019   Activity/Exercise Parameters   LAQ  3x10 RLE   Sit to stand without UE 3x10   Seated hip IR,ER 3x10 BLE   Seated elbow flexion,extension PROM 3x10 RUE   Seated hip adduction and abduction, unilaterally  3x10 BLE   Standing hip abduction, hip adduction 3x10   Side stepping  20'x4   Ambulation 76' with single point cane x 5 with distant supervision.  x20 minutes       Treatment/Session Summary:    · Response to Treatment:  Pt told to leave quad cane at home next session, pt demonstrating safe balance with single point cane.    · Communication/Consultation:  None today  · Equipment provided today:  None today  · Recommendations/Intent for next treatment session:    Continue with single point cane, progress activities that utilize right UE    Total Treatment Billable Duration: 55 minutes  PT Patient Time In/Time Out  Time In: 1400  Time Out: 82 Dhara David, PT    Future Appointments   Date Time Provider Azalia Alberto   8/14/2019  2:00 PM oMjgan Pompa, PT [facility]   8/19/2019  2:00 PM Mojgan Pompa, PT [facility]   8/21/2019  2:00 PM Mojgan Pompa, PT [facility]   8/26/2019  2:00 PM Mojgan Pompa, PT [facility]   8/28/2019  2:00 PM Mojgan Peña, PT [facility]   9/3/2019  2:00 PM Mojgan Pompa, PT [facility]   9/6/2019  2:00 PM Mojgan Pompa, PT [facility]   9/9/2019  2:00 PM Mojgan Peña, PT [facility]   9/11/2019  2:00 PM Mojgan Peña, PT [facility]   9/16/2019  2:00 PM Mojgan Peña, PT [facility]   9/18/2019  2:00 PM Mojgan Peña, PT [facility]   9/23/2019  2:00 PM Mojgan Pompa, PT [facility]   9/25/2019  2:00 PM Mojgan Pompa, PT [facility]   9/30/2019  2:00 PM Mojgan Pompa, PT [facility]   11/5/2019  1:45 PM Che Wilburn MD [facility] JHONATAND

## 2019-08-14 ENCOUNTER — HOSPITAL ENCOUNTER (OUTPATIENT)
Dept: PHYSICAL THERAPY | Age: 64
Discharge: HOME | End: 2019-08-14
Payer: MEDICARE

## 2019-08-14 PROCEDURE — 97110 THERAPEUTIC EXERCISES: CPT

## 2019-08-14 NOTE — PROGRESS NOTES
Dru Portillo  : 1955  Primary:   Secondary:  [address]  [facility] [address] [facility]  [phone]   [phone]      OUTPATIENT PHYSICAL THERAPY: Daily Treatment Note 2019  ICD-10: Treatment Diagnosis: Contracture of joint, foot, right M24.574, Difficulty in walking,not elsewhere classified R26.2, Hemiplegia and hemiparesis following cerebral infarction affecting right dominant side I69.351, Muscle weakness (generalized) M62.81  Precautions/Allergies:   Latex; Aspirin; Banana; Lisinopril; and Nuts Loralyn Johnny nut]   MD Orders: Evaluate and treat MEDICAL/REFERRING DIAGNOSIS:  Foot drop, right foot [M21.371]  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 She is having a bar put in her home; handrail where she can exercise. Pt unable to ambulate with her single point cane outside the home due to sharp incline therefore came in with her quad cane. Pain: Initial: Pain Intensity 1: 0  Post Session:  0/10   Medications Last Reviewed:  2019  Updated Objective Findings: Pt ambulated 200' with single point cane; up /down 4 steps and up a ramp and down with supervision.     TREATMENT:   Therapeutic Exercise: (55):  Exercises per grid below to improve mobility and strength.  Required minimal verbal cues to promote proper body posture.  Progressed complexity of movement as indicated.       Date:  2019   Activity/Exercise Parameters   LAQ  3x10 RLE   Sit to stand without UE 3x10   Seated hip IR,ER 3x10 BLE   Seated elbow flexion,extension PROM 3x10 RUE   Seated hip adduction and abduction, unilaterally  3x10 BLE   Standing hip abduction, hip adduction 3x10 Side stepping  20'x4   Ambulation 50' without AD x 2 with close supervision. x10 minutes   Community ambulation; up/down ramps, up/down stairs without using handrail  x10 minutes       Treatment/Session Summary:    · Response to Treatment:  Pt encouraged to use single point cane more at home. Pt did not need assistance holding cane with right hand when performing shoulder flexion.    · Communication/Consultation:  None today  · Equipment provided today:  None today  · Recommendations/Intent for next treatment session:    Continue with single point cane, progress activities that utilize right UE    Total Treatment Billable Duration: 55 minutes  PT Patient Time In/Time Out  Time In: 1400  Time Out: 82 Dhara David PT    Future Appointments   Date Time Provider Azalia Alberto   8/19/2019  2:00 PM Goliad Balint, PT [facility]   8/21/2019  2:00 PM Goliad Balint, PT [facility]   8/26/2019  2:00 PM Goliad Balint, PT [facility]   8/28/2019  2:00 PM Goliad Balint, PT [facility]   9/3/2019  2:00 PM Goliad Balint, PT [facility]   9/6/2019  2:00 PM Goliad Balint, PT [facility]   9/9/2019  2:00 PM Goliad Balint, PT [facility]   9/11/2019  2:00 PM Goliad Balint, PT [facility]   9/16/2019  2:00 PM Goliad Balint, PT [facility]   9/18/2019  2:00 PM Goliad Balint, PT [facility]   9/23/2019  2:00 PM Goliad Balint, PT [facility]   9/25/2019  2:00 PM Goliad Balint, PT [facility]   9/30/2019  2:00 PM Goliad Balint, PT [facility]   11/5/2019  1:45 PM Ron Becerra MD [facility]

## 2019-08-19 ENCOUNTER — HOSPITAL ENCOUNTER (OUTPATIENT)
Dept: PHYSICAL THERAPY | Age: 64
Discharge: HOME | End: 2019-08-19
Payer: MEDICARE

## 2019-08-19 PROCEDURE — 97530 THERAPEUTIC ACTIVITIES: CPT

## 2019-08-19 PROCEDURE — 97110 THERAPEUTIC EXERCISES: CPT

## 2019-08-19 NOTE — PROGRESS NOTES
Darius Campoverde  : 1955  Primary:   Secondary:  [address] at [facility], [address]  [phone]   [phone]      OUTPATIENT PHYSICAL THERAPY: Daily Treatment Note 2019  ICD-10: Treatment Diagnosis: Contracture of joint, foot, right M24.574, Difficulty in walking,not elsewhere classified R26.2, Hemiplegia and hemiparesis following cerebral infarction affecting right dominant side I69.351, Muscle weakness (generalized) M62.81  Precautions/Allergies:   Latex; Aspirin; Banana; Lisinopril; and Nuts Rozella Beer nut]   MD Orders: Evaluate and treat MEDICAL/REFERRING DIAGNOSIS:  Foot drop, right foot [M21.371]  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 Pt interested in attempting the stairs today. She only has one rail going up to the left. Pain: Initial: Pain Intensity 1: 0  Post Session:  0/10   Medications Last Reviewed:  2019  Updated Objective Findings: See below on pt's negotiation of steps and ambulation progress.     TREATMENT:   Therapeutic Exercise: (45):  Exercises per grid below to improve mobility and strength.  Required minimal verbal cues to promote proper body posture.  Progressed complexity of movement as indicated.       Date:  2019   Activity/Exercise Parameters   LAQ  3x10 RLE   Sit to stand without UE 3x10   Seated hip IR,ER 3x10 BLE   Seated elbow flexion,extension PROM 3x10 RUE   Seated hip adduction and abduction, unilaterally  3x10 BLE   Standing hip abduction, hip adduction 3x10   Side stepping  20'x4   Ambulation 50' without AD x 2 with close supervision. Ambulation of 100' with quad cane x 2 x10 minutes     Therapeutic Activity (   10 minutes): Therapeutic activities per grid below to improve mobility. Required minimal verbal cues to promote coordination of right . Pt negotiated up/down full flight of stairs using left handrail going up, going down pt used left handrail initially used while going backwards. Pt's right LE stayed in knee extension the whole time. Pt had difficulty negotiating down the stairs facing therapist as she did not have a rail to hold on to and could not get right LE to flex properly for safe negotiation. Treatment/Session Summary:    · Response to Treatment: Pt encouraged to bring  to therapy to trial negotiating up/down the stairs in the next few weeks to make her more comfortable with the negotiation. · Communication/Consultation:  None today  · Equipment provided today:  None today  · Recommendations/Intent for next treatment session: Progress note next visit.      Total Treatment Billable Duration: 55 minutes  PT Patient Time In/Time Out  Time In: 1400  Time Out: 82 Dhara David PT    Future Appointments   Date Time Provider Azalia Alberto   8/21/2019  2:00 PM Toy Frieze, [place] [facility]   8/26/2019  2:00 PM Toy Frieze, PT [facility]   8/28/2019  2:00 PM Toy Frieze, PT [facility]   9/3/2019  2:00 PM Toy Frieze, PT [facility]   9/6/2019  2:00 PM Toy Frieze, PT [facility]   9/9/2019  2:00 PM Toy Frieze, PT [facility]   9/11/2019  2:00 PM Toy Frieze, PT [facility]   9/16/2019  2:00 PM Toy Frieze, PT [facility]   9/18/2019  2:00 PM Toy Frieze, PT [facility]   9/23/2019  2:00 PM Toy Frieze, PT [facility]   9/25/2019  2:00 PM Toy Frieze, PT [facility]   9/30/2019  2:00 PM Toy Frieze, PT [facility]   11/5/2019  1:45 PM Hiral Shannon MD [facility]

## 2019-08-21 ENCOUNTER — HOSPITAL ENCOUNTER (OUTPATIENT)
Dept: PHYSICAL THERAPY | Age: 64
Discharge: HOME | End: 2019-08-21
Payer: MEDICARE

## 2019-08-21 PROCEDURE — 97110 THERAPEUTIC EXERCISES: CPT

## 2019-08-21 NOTE — PROGRESS NOTES
Alfredo Collins  : 1955  Primary:   Secondary:  [address]  [facility], [address] [facility]  [phone]   [phone]      OUTPATIENT PHYSICAL THERAPY: Daily Treatment Note 2019  ICD-10: Treatment Diagnosis: Contracture of joint, foot, right M24.574, Difficulty in walking,not elsewhere classified R26.2, Hemiplegia and hemiparesis following cerebral infarction affecting right dominant side I69.351, Muscle weakness (generalized) M62.81  Precautions/Allergies:   Latex; Aspirin; Banana; Lisinopril; and Nuts Shamir Silvia nut]   MD Orders: Evaluate and treat MEDICAL/REFERRING DIAGNOSIS:  Foot drop, right foot [M21.371]  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 Pt expressed interest in working on walking without an assisted device so she can carry a glass of water from her kitchen to the family room. Pt stated that she feels that her balance may be off. Pt stated that she will bring her  next Monday to see her negotiate up/down stairs.        Pain: Initial:   0/10 Post Session:  0/10   Medications Last Reviewed:  2019  Updated Objective Findings: Refer to PN dated 19   TREATMENT:   Therapeutic Exercise: (55):  Exercises per grid below to improve mobility and strength.  Required minimal verbal cues to promote proper body posture.  Progressed complexity of movement as indicated.       Date:  2019   Activity/Exercise Parameters   LAQ  3x10 RLE   Sit to stand without UE 3x10   Seated hip IR,ER 3x10 BLE   Seated elbow flexion,extension PROM 3x10 RUE   Seated hip adduction and abduction, unilaterally  3x10 BLE Standing hip abduction, hip adduction 3x10   Side stepping  20'x4   Ambulation 50' without AD x 2 with close supervision. Ambulation of 100' with quad cane x 2 x10 minutes         Treatment/Session Summary:    · Response to Treatment: Pt's balance assessed today ,refer to PN for full details, she is not at risk for falls but has issues in balance due to lack of coordination in RLE. · Communication/Consultation:  None today  · Equipment provided today:  None today  · Recommendations/Intent for next treatment session: Progress ambulation to work on patient goal of walking without a device holding a glass of water.      Total Treatment Billable Duration: 55 minutes     Amee Bower PT    Future Appointments   Date Time Provider Azalia Dolly   8/26/2019  2:00 PM Katya Starch, [place] [facility]   8/28/2019  2:00 PM Katya Starch, PT [facility]   9/3/2019  2:00 PM Katya Starch, PT [facility]   9/6/2019  2:00 PM Katya Starch, PT [facility]   9/9/2019  2:00 PM Katya Starch, PT [facility]   9/11/2019  2:00 PM Katya Starch, PT [facility]   9/16/2019  2:00 PM Katya Starch, PT [facility]   9/18/2019  2:00 PM Katya Starch, PT [facility]   9/23/2019  2:00 PM Katya Starch, PT [facility]   9/25/2019  2:00 PM Katya Starch, PT [facility]   9/30/2019  2:00 PM Katya Starch, PT [facility]   11/5/2019  1:45 PM Roverto Guerrero MD [facility]ORY

## 2019-08-21 NOTE — PROGRESS NOTES
Kristine Lew  : 1955  [place]  Secondary:  [address] at [address] [place] [address], [address] [place] [address]  [phone]   [phone]       OUTPATIENT PHYSICAL THERAPY:Progress Report 2019   ICD-10: Treatment Diagnosis: Contracture of joint, foot, right M24.574, Difficulty in walking,not elsewhere classified R26.2, Hemiplegia and hemiparesis following cerebral infarction affecting right dominant side I69.351, Muscle weakness (generalized) M62.81  Precautions/Allergies:   Latex; Aspirin; Banana; Lisinopril; and Nuts Idania Lota nut]   MD Orders: Evaluate and treat MEDICAL/REFERRING DIAGNOSIS:  Foot drop, right foot [M21.371]  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 Pt continues to demonstrate defecits in ambulation and functional mobility due to lack of RLE coordination and decreased strength. Therapist has observed an increase in right hip IR strength during treatment sessions which is allowing pt to keep RLE in less of an ER posture when ambulating. Pt also demonstrates an increase in step length at each visit while maintaining balance with and without the assisted device. Since last progress note pt has started to ambulate with a single point cane demonstrating improved overall balance. Pt will benefit from therapy at this time to continue to work on goals established below. New goals added ; please note to address pt's needs at home.           PROBLEM LIST (Impacting functional limitations):  1. Decreased Strength  2. Decreased Ambulation Ability/Technique  3. Decreased Balance  4.  Decreased Flexibility/Joint Mobility INTERVENTIONS PLANNED: (Treatment may consist of any combination of the following)  1. Gait Training  2. Home Exercise Program (HEP)  3. Manual Therapy  4. Neuromuscular Re-education/Strengthening  5. Therapeutic Exercise/Strengthening   TREATMENT PLAN:  Effective Dates: 7/17/2019 TO  10/15/2019 (90 days). Frequency/Duration: 2 times a week for 90 Day(s)  GOALS: (Goals have been discussed and agreed upon with patient.)  Discharge Goals: Time Frame:10/15/2019  1. Pt to ambulate 48' with quad cane without any increased pain or difficulty with independence. GOAL MET 4/16/19  2. Pt to demonstrate improvement as per Haley Spatz score with a total score >45/56 points indicating decreased risk for falls. GOAL MET 8/23/19  3. Pt to achieve independence with HEP to maintain goals achieved. GOAL MET 4/16/19  NEW GOAL: Pt to demonstrate right shoulder flexion AROM to >110 degrees to aid in assistance with ADL's. GOAL ONGOING  NEW GOAL: Pt to ambulate up to 50' without assisted device to allow for carrying of item in left hand. GOAL MET 8/23/19  NEW GOAL: Pt to negotiate up/down full flight of stairs using left handrail going up and down without assistance. Outcome Measure: Tool Used: Gipson Balance Scale  Score:  Initial: 38/56 Most Recent: 46/56 (Date: 8/23/19)   Interpretation of Score: Each section is scored on a 0-4 scale, 0 representing the patients inability to perform the task and 4 representing independence. The scores of each section are added together for a total score of 56. The higher the patients score, the more independent the patient is. Any score below 45 indicates increased risk for falls.       UPDATED OBJECTIVE FINDINGS:      Observation/Orthostatic Postural Assessment: Right LE postures into ER        ROM:     AROM(PROM) Right Left   Knee flexion 120 125   Knee extension 0 0   Hip flexion 100 110   Ankle dorsiflexion (DF) - knee extended 0/5 5   Ankle plantarflexion 30 PROM 50               ROM: Left: Shoulder flexion 160/180 degrees A/PROM, shoulder abduction 165/180 A/PROM, IR 45 degrees AROM. Elbow 0-155 AROM                   Right: Shoulder flexion 95/120 degrees A/PROM, shoulder abduction 95/110 degrees A/PROM, Elbow PROM 0-145 degrees. Cervical spine right lateral flexion 25% WNL, right rotation 50%     Strength:     Manual Muscle Test (*/5) Right Left   Knee extension 3-/5 4/5   Knee flexion 3/5 4/5   Hip flexion 3/5 4/5   Hip ER 3/5 4/5   Hip IR 3/5 4/5   Hip extension 3/5 4/5   Hip abduction 3/5 4/5   Hip adduction 2+/5 4/5   Ankle DF 1/5 4/5   Ankle PF 1/5 4/5   Shoulder flexion 2/5 4/5   Shoulder abduction 2/5 4/5   Shoulder extension 2/5 4/5   Elbow flexion No active movement 4/5   Elbow extension No active movement 4/5       Medical Necessity:   · Patient demonstrates good rehab potential due to higher previous functional level and high level of motivation. Reason for Services/Other Comments:  · Patient will benefit from therapy at this time to achieve goals established above as she has improved since progress note. Total Duration: 55 minutes    Rehabilitation Potential For Stated Goals: Good     Regarding Shelia Iraheta's therapy, I certify that the treatment plan above will be carried out by a therapist or under their direction.   Thank you for this referral,  Danisha Berman PT

## 2019-08-26 ENCOUNTER — HOSPITAL ENCOUNTER (OUTPATIENT)
Dept: PHYSICAL THERAPY | Age: 64
Discharge: HOME | End: 2019-08-26
Payer: MEDICARE

## 2019-08-26 PROCEDURE — 97530 THERAPEUTIC ACTIVITIES: CPT

## 2019-08-26 PROCEDURE — 97110 THERAPEUTIC EXERCISES: CPT

## 2019-08-26 NOTE — PROGRESS NOTES
Samantha Dobbins  : 1955  Primary:   Secondary:  [address] at [facility] 14, [address] [facility]  [phone]   [phone]      OUTPATIENT PHYSICAL THERAPY: Daily Treatment Note 2019  ICD-10: Treatment Diagnosis: Contracture of joint, foot, right M24.574, Difficulty in walking,not elsewhere classified R26.2, Hemiplegia and hemiparesis following cerebral infarction affecting right dominant side I69.351, Muscle weakness (generalized) M62.81  Precautions/Allergies:   Latex; Aspirin; Banana; Lisinopril; and Nuts [address] nut]   MD Orders: Evaluate and treat MEDICAL/REFERRING DIAGNOSIS:  Foot drop, right foot [M21.371]  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 Pt came in with her quad cane today. Pain: Initial: Pain Intensity 1: 0  Post Session:  0/10   Medications Last Reviewed:  2019  Updated Objective Findings: Pt negotiated up/down a full flight of stairs using left handrail with supervision. Pt then negotiated the same flight again using left handrail going up, right handrail going down with close supervision. Pt ambulated 200' with a single point cane x 2 with distant supervision.     TREATMENT:   Therapeutic Exercise: (25):  Exercises per grid below to improve mobility and strength.  Required minimal verbal cues to promote proper body posture.  Progressed complexity of movement as indicated.       Date:  2019   Activity/Exercise Parameters   LAQ  3x10 RLE   Sit to stand without UE 3x10   Seated hip IR,ER 3x10 BLE   Seated elbow flexion,extension PROM 3x10 RUE   Seated hip adduction and abduction, unilaterally  3x10 BLE   Standing hip abduction, hip adduction 3x10   Side stepping  20'x4         Therapeutic Activity ( 30 minutes): Therapeutic activities per grid below to improve mobility, strength, balance and coordination. Required minimal verbal cues to promote coordination of right lower extremity. Pt negotiated up/down a full flight of stairs using left handrail with supervision. She negotiated down backwards still utilizing the left handrail to mimic her situation at home. Pt then negotiated the same flight again using left handrail going up, right handrail going down with close supervision. She is planning on putting another handrail on the right side going up however is in the plans for the future. Pt ambulated 200' with a single point cane x 2 with distant supervision. Treatment/Session Summary:    · Response to Treatment: Pt's gait improving at each visit however she was having difficulty flexing right LE when negotiating up the stairs today. Pt had only minimal difficulty with knee flexion seated however. · Communication/Consultation:  None today  · Equipment provided today:  None today  · Recommendations/Intent for next treatment session: Progress ambulation to work on patient goal of walking without a device holding a glass of water.      Total Treatment Billable Duration: 55 minutes  PT Patient Time In/Time Out  Time In: 1400  Time Out: [address], PT    Future Appointments   Date Time Provider Azalia Alberto   8/28/2019  2:00 PM Rachell Sandhu, [employer] [facility]   9/3/2019  2:00 PM Rachell Sandhu, PT [facility]   9/6/2019  2:00 PM Rachell Sandhu, PT [facility]   9/9/2019  2:00 PM Rachell Sandhu, PT [facility]   9/11/2019  2:00 PM Rachell Sandhu, PT [facility]   9/16/2019  2:00 PM Rachell Sandhu, PT [facility]   9/18/2019  2:00 PM Rachell Sandhu, PT [facility]   9/23/2019  2:00 PM Rachell Sandhu, PT [facility]   9/25/2019  2:00 PM Leesa Ayon, PT [facility]   9/30/2019  2:00 PM FRANCOIS Peñaloza [facility]   11/5/2019  1:45 PM [facility]

## 2019-08-28 ENCOUNTER — HOSPITAL ENCOUNTER (OUTPATIENT)
Dept: PHYSICAL THERAPY | Age: 64
Discharge: HOME | End: 2019-08-28
Payer: MEDICARE

## 2019-08-28 PROCEDURE — 97110 THERAPEUTIC EXERCISES: CPT

## 2019-08-28 NOTE — PROGRESS NOTES
Darius Campoverde  : 1955  Primary:   Secondary:  [address]  at [facility] 10, [address] [facility]  [phone]   [phone]      OUTPATIENT PHYSICAL THERAPY: Daily Treatment Note 2019  ICD-10: Treatment Diagnosis: Contracture of joint, foot, right M24.574, Difficulty in walking,not elsewhere classified R26.2, Hemiplegia and hemiparesis following cerebral infarction affecting right dominant side I69.351, Muscle weakness (generalized) M62.81  Precautions/Allergies:   Latex; Aspirin; Banana; Lisinopril; and Nuts Rozella Beer nut]   MD Orders: Evaluate and treat MEDICAL/REFERRING DIAGNOSIS:  Foot drop, right foot [M21.371]  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 Pt's  had dropped her off however could not stay to see her negotiate up/down steps however is planning on doing so today. Pain: Initial: Pain Intensity 1: 0  Post Session:  0/10   Medications Last Reviewed:  2019     Updated Objective Findings: Right LE postures in hip adduction when ambulating without an AD.        TREATMENT:   Therapeutic Exercise: (55):  Exercises per grid below to improve mobility and strength.  Required minimal verbal cues to promote proper body posture.  Progressed complexity of movement as indicated.       Date:  2019   Activity/Exercise Parameters   LAQ  3x10 RLE   Sit to stand without UE 3x10   Seated hip IR,ER 3x10 BLE   Seated elbow flexion,extension PROM 3x10 RUE   Seated hip adduction and abduction, unilaterally  3x10 BLE   Standing toe taps forward,lateral,adduction 3x10   Side stepping without AD 20'x4   Shoulder cane AROM flexion, scapular retraction 3x10   Up/down full flight with left HR x2   Ambulation 15' x 2 with water bottle in  hand x2   Ambulation 7' x 2 with water bottle in hand x2         Treatment/Session Summary:    · Response to Treatment: Pt did not need vc's when negotiating up/down steps regarding lead leg. Pt continues to demonstrate extension tone in the RLE. · Communication/Consultation:  None today  · Equipment provided today:  None today   · Recommendations/Intent for next treatment session: Continue to work on ambulation without AD.   Total Treatment Billable Duration: 55 minutes  PT Patient Time In/Time Out  Time In: 1400  Time Out: 82 Dhara David PT    Future Appointments   Date Time Provider Azalia Alberto   9/3/2019  2:00 PM Tucker Crews, [place] [facility]   9/6/2019  2:00 PM Tucker Crews, PT [facility]   9/9/2019  2:00 PM Tucker Crews, PT [facility]   9/11/2019  2:00 PM Tucker Crews, PT [facility]   9/16/2019  2:00 PM Tucker Crews, PT [facility]   9/18/2019  2:00 PM Tucker Crews, PT [facility]   9/23/2019  2:00 PM Tucker Crews, PT [facility]   9/25/2019  2:00 PM Tucker Crews, PT [facility]   9/30/2019  2:00 PM Tucker Crews, PT [facility]   11/5/2019  1:45 PM Stan Luke MD [email or web address]] J[facility]ONATAND

## 2019-09-06 ENCOUNTER — HOSPITAL ENCOUNTER (OUTPATIENT)
Dept: PHYSICAL THERAPY | Age: 64
Discharge: HOME | End: 2019-09-06
Payer: MEDICARE

## 2019-09-06 PROCEDURE — 97110 THERAPEUTIC EXERCISES: CPT

## 2019-09-06 NOTE — PROGRESS NOTES
Sharlon Cheadle  : 1955  Primary:   Secondary:  [address]  [facility] [address] [facility]  [phone]   [phone]      OUTPATIENT PHYSICAL THERAPY: Daily Treatment Note 2019  ICD-10: Treatment Diagnosis: Contracture of joint, foot, right M24.574, Difficulty in walking,not elsewhere classified R26.2, Hemiplegia and hemiparesis following cerebral infarction affecting right dominant side I69.351, Muscle weakness (generalized) M62.81  Precautions/Allergies:   Latex; Aspirin; Banana; Lisinopril; and Nuts Thuy Ambrosia nut]   MD Orders: Evaluate and treat MEDICAL/REFERRING DIAGNOSIS:  Foot drop, right foot [M21.371]  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 She states that she has more confidence short distances while carrying something. Pt has more time by herself in the past two weeks as she has decreased the amount of help coming to the house. Pain: Initial: Pain Intensity 1: 0  Post Session:  0/10   Medications Last Reviewed:  2019     Updated Objective Findings: Right LE postures in hip abduction and ER when ambulating without a cane. Pt is able to correct right hip abduction and ER when walking with quad cane. Right LE goes into hyperextension when ambulating without AD.        TREATMENT:   Therapeutic Exercise: (55):  Exercises per grid below to improve mobility and strength.  Required minimal verbal cues to promote proper body posture.  Progressed complexity of movement as indicated.       Date:  2019   Activity/Exercise Parameters   LAQ  3x10 RLE   Sit to stand without UE 3x10   Seated hip IR,ER 3x10 BLE   Seated elbow flexion,extension PROM 3x10 RUE   Seated hip adduction and abduction, unilaterally  3x10 BLE   Standing toe taps forward,lateral,adduction 3x10   Side stepping without AD 20'x4   Shoulder cane AROM flexion, scapular retraction 3x10   Ambulation 25' x 2 with water bottle in  hand x2   Ambulation 15' x 2 with water bottle in hand x2         Treatment/Session Summary:    · Response to Treatment: Pt needs assistance bending right LE when performing step taps on 2 inch step. · Communication/Consultation:  None today  · Equipment provided today:  None today   · Recommendations/Intent for next treatment session: Continue to work on ambulation without AD.   Total Treatment Billable Duration: 55 minutes  PT Patient Time In/Time Out  Time In: 1400  Time Out: 1740 [facility], PT    Future Appointments   Date Time Provider Azalia Alberto   9/9/2019  2:00 PM Toy Frieze, [place] OFF [facility]   9/11/2019  2:00 PM [facility]   9/16/2019  2:00 PM Toy Friphuce, PT [facility]   9/18/2019  2:00 PM Toy Frieze, PT [facility]   9/23/2019  2:00 PM [facility]   9/25/2019  2:00 PM Toy Friphuce, PT [facility]   9/30/2019  2:00 PM Toy Jose, PT [facility]   11/5/2019  1:45 PM [facility]

## 2019-09-09 ENCOUNTER — HOSPITAL ENCOUNTER (OUTPATIENT)
Dept: PHYSICAL THERAPY | Age: 64
Discharge: HOME | End: 2019-09-09
Payer: MEDICARE

## 2019-09-09 PROCEDURE — 97110 THERAPEUTIC EXERCISES: CPT

## 2019-09-09 NOTE — PROGRESS NOTES
Bryant Heart  : 1955  Primary:   Secondary:  [address]  at [facility], [address] [facility]  [phone]   [phone]      OUTPATIENT PHYSICAL THERAPY: Daily Treatment Note 2019  ICD-10: Treatment Diagnosis: Contracture of joint, foot, right M24.574, Difficulty in walking,not elsewhere classified R26.2, Hemiplegia and hemiparesis following cerebral infarction affecting right dominant side I69.351, Muscle weakness (generalized) M62.81  Precautions/Allergies:   Latex; Aspirin; Banana; Lisinopril; and Nuts Emma Greg nut]   MD Orders: Evaluate and treat MEDICAL/REFERRING DIAGNOSIS:  Foot drop, right foot [M21.371]  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 Pt stated that she exercised her legs right before coming to therapy so she may be a bit tired to do a lot of walking. Pain: Initial: Pain Intensity 1: 0  Post Session:  0/10   Medications Last Reviewed:  2019     Updated Objective Findings: Ambulation without cane demonstrated increased right hip ER. Right LE postured out at a 90 degree angle while ambulating without cane.         TREATMENT:   Therapeutic Exercise: (55 minutes)  Exercises per grid below to improve mobility and strength. Required minimal verbal cues to promote proper body posture.  Progressed complexity of movement as indicated.       Date:  2019   Activity/Exercise Parameters   LAQ  3x10 RLE   Sit to stand without UE 3x10   Seated hip IR,ER 3x10 BLE   Seated elbow flexion,extension PROM 3x10 RUE   Seated hip adduction and abduction, unilaterally  3x10 BLE   Standing toe taps forward,lateral onto 2 inch step 3x10   Side stepping without AD 20'x4   Shoulder cane AROM flexion, scapular retraction 3x10   Ambulation 15' x 2 with water bottle in hand x2         Treatment/Session Summary:    · Response to Treatment: Pt did not need assistance tapping 2 inch step today, improved flexion in the right LE. Pt encouraged to not work on LE exercises before coming to therapy as she was quite tired. · Communication/Consultation:  None today  · Equipment provided today:  None today   · Recommendations/Intent for next treatment session: Continue to work on ambulation without AD.   Total Treatment Billable Duration: 55 minutes  PT Patient Time In/Time Out  Time In: 1400  Time Out: 82 Dhara David PT    Future Appointments   Date Time Provider Azalia Alberto   9/11/2019  2:00 PM Salma Cee PT [facility]   9/16/2019  2:00 PM Salma Cee, PT [facility]   9/18/2019  2:00 PM Salma Cee PT [facility]   9/23/2019  2:00 PM Salma Cee PT [facility]   9/25/2019  2:00 PM Salma Cee, PT [facility]   9/30/2019  2:00 PM Salma Cee, PT [facility]   11/5/2019  1:45 PM Diane Rizvi MD [facility]

## 2019-09-11 ENCOUNTER — HOSPITAL ENCOUNTER (OUTPATIENT)
Dept: PHYSICAL THERAPY | Age: 64
Discharge: HOME | End: 2019-09-11
Payer: MEDICARE

## 2019-09-11 PROCEDURE — 97110 THERAPEUTIC EXERCISES: CPT

## 2019-09-11 NOTE — PROGRESS NOTES
Christ Gutierrez  : 1955  Primary:   Secondary:  [address]  at [facility] [address], [address] [facility]  [phone]   [phone]      OUTPATIENT PHYSICAL THERAPY: Daily Treatment Note 2019  ICD-10: Treatment Diagnosis: Contracture of joint, foot, right M24.574, Difficulty in walking,not elsewhere classified R26.2, Hemiplegia and hemiparesis following cerebral infarction affecting right dominant side I69.351, Muscle weakness (generalized) M62.81  Precautions/Allergies:   Latex; Aspirin; Banana; Lisinopril; and Nuts Immanuel Reek nut]   MD Orders: Evaluate and treat MEDICAL/REFERRING DIAGNOSIS:  Foot drop, right foot [M21.371]  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 Pain: Initial: Pain Intensity 1: 0  Post Session:  0/10   Medications Last Reviewed:  2019     Updated Objective Findings: Pt ambulated today while constantly looking down at the ground. Needed max vc's to look up when performing LE exercises at the bar as well.          TREATMENT:   Therapeutic Exercise: (55 minutes)  Exercises per grid below to improve mobility and strength. Required minimal verbal cues to promote proper body posture.  Progressed complexity of movement as indicated.       Date:  2019   Activity/Exercise Parameters   LAQ  3x10 RLE   Sit to stand without UE 3x10   Seated hip IR,ER 3x10 BLE   Seated elbow flexion,extension PROM 3x10 RUE   Seated hip adduction and abduction, unilaterally  3x10 BLE   Standing toe taps forward,lateral onto 2 inch step 3x10   Side stepping without AD 20'x4   Shoulder cane AROM flexion, scapular retraction 3x10   Ambulation 25', 15' with water bottle in hand x1   Ambulation 25', 15' with single point cane x1         Treatment/Session Summary:    · Response to Treatment: Pt performed exercises much better than last visit. She needed constant reminders to look up when ambulating. Improved right knee flexion with step taps. · Communication/Consultation:  None today  · Equipment provided today:  None today   · Recommendations/Intent for next treatment session: Continue to work on ambulation without AD.   Total Treatment Billable Duration: 55 minutes  PT Patient Time In/Time Out  Time In: 1400  Time Out: 82 Dhara David PT    Future Appointments   Date Time Provider Azalia Alberto   9/16/2019  2:00 PM Miky Demarco, PT [facility]   9/18/2019  2:00 PM [facility]   9/23/2019  2:00 PM [facility]   9/25/2019  2:00 PM Miky Demarco, PT [facility]   9/30/2019  2:00 PM Miky Demarco, PT [facility]   11/5/2019  1:45 PM Yaniv Zavala MD [facility] JHONATAND

## 2019-09-16 ENCOUNTER — HOSPITAL ENCOUNTER (OUTPATIENT)
Dept: PHYSICAL THERAPY | Age: 64
Discharge: HOME | End: 2019-09-16
Payer: MEDICARE

## 2019-09-16 PROCEDURE — 97110 THERAPEUTIC EXERCISES: CPT

## 2019-09-16 NOTE — PROGRESS NOTES
Radha Marti  : 1955  Primary:   Secondary:  [address] at [facility], [address] [facility]  [phone]   [phone]      OUTPATIENT PHYSICAL THERAPY: Daily Treatment Note 2019  ICD-10: Treatment Diagnosis: Contracture of joint, foot, right M24.574, Difficulty in walking,not elsewhere classified R26.2, Hemiplegia and hemiparesis following cerebral infarction affecting right dominant side I69.351, Muscle weakness (generalized) M62.81  Precautions/Allergies:   Latex; Aspirin; Banana; Lisinopril; and Nuts Ilona Basset nut]   MD Orders: Evaluate and treat MEDICAL/REFERRING DIAGNOSIS:  Foot drop, right foot [M21.371]  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 She stated that she rode her bike a bit this morning.      Pain: Initial: Pain Intensity 1: 0  Post Session:  0/10   Medications Last Reviewed:  2019     Updated Objective Findings: Pt sit to stand without use of UE and without therapist to guide LE's         TREATMENT:   Therapeutic Exercise: (55 minutes)  Exercises per grid below to improve mobility and strength. Required minimal verbal cues to promote proper body posture.  Progressed complexity of movement as indicated.       Date:  2019   Activity/Exercise Parameters   LAQ  3x10 RLE   Sit to stand without UE 3x10   Seated hip IR,ER 3x10 BLE   Seated elbow flexion,extension PROM 3x10 RUE   Seated hip adduction and abduction, unilaterally  3x10 BLE   Standing toe taps forward,lateral onto 2 inch step 3x10   Side stepping without AD 20'x4   Shoulder cane AROM flexion, scapular retraction 3x10   Ambulation 25', 15',25' with water bottle in hand x1 Ambulation 25', 15',25' with single point cane x1         Treatment/Session Summary:    · Response to Treatment:   Pt provided with HEP sheets today for carryover on the days she does not come to therapy. Pt with improved gait, looked up with occasional looking down for reassurance. Pt had some difficulty with eccentric LAQ on right LE while seated. · Communication/Consultation:  None today  · Equipment provided today:  None today   · Recommendations/Intent for next treatment session: Continue to work on ambulation without AD.   Total Treatment Billable Duration: 55 minutes  PT Patient Time In/Time Out  Time In: 1400  Time Out: 1500  Mansoor Mejias PT    Future Appointments   Date Time Provider Azalia Alberto   9/18/2019  2:00 PM Michael Underwood, PT [facility]   9/23/2019  2:00 PM Michael Underwood, PT [facility]   9/25/2019  2:00 PM Elesa Underwood, PT [facility]   9/30/2019  2:00 PM Michael Underwood, PT [facility]   11/5/2019  1:45 PM Mayda Dahl MD [facility] FABIO RAY

## 2019-09-18 ENCOUNTER — HOSPITAL ENCOUNTER (OUTPATIENT)
Dept: PHYSICAL THERAPY | Age: 64
Discharge: HOME | End: 2019-09-18
Payer: MEDICARE

## 2019-09-18 PROCEDURE — 97110 THERAPEUTIC EXERCISES: CPT

## 2019-09-18 NOTE — PROGRESS NOTES
Harris Apo  : 1955  Primary:   Secondary:  [address]  at [facility] [address], [address] [facility]  [phone]   [phone]      OUTPATIENT PHYSICAL THERAPY: Daily Treatment Note 2019  ICD-10: Treatment Diagnosis: Contracture of joint, foot, right M24.574, Difficulty in walking,not elsewhere classified R26.2, Hemiplegia and hemiparesis following cerebral infarction affecting right dominant side I69.351, Muscle weakness (generalized) M62.81  Precautions/Allergies:   Latex; Aspirin; Banana; Lisinopril; and Nuts Lena Bougie nut]   MD Orders: Evaluate and treat MEDICAL/REFERRING DIAGNOSIS:  Foot drop, right foot [M21.371]  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 She has received her exercise mat/bed.      Pain: Initial: Pain Intensity 1: 0  Post Session:  0/10   Medications Last Reviewed:  2019     Updated Objective Findings: Pt sit to stand without use of UE and without therapist to guide LE's         TREATMENT:   Therapeutic Exercise: (55 minutes)  Exercises per grid below to improve mobility and strength. Required minimal verbal cues to promote proper body posture.  Progressed complexity of movement as indicated.       Date:  2019   Activity/Exercise Parameters   Seated bilateral shoulder flexion with cane 3x10    Ambulation outdoor up/down inclines  200' with single point cane  25 minutes   Standing toe taps forward,lateral onto 2 inch step while flexing each leg onto step 3x10   Elbow stretching bilateral UE's at the bar 3x10 Treatment/Session Summary:    · Response to Treatment:  Pt performed above exercises well; pt's distance of ambulation was pushed today to 200' including inclines, she tends to widen her base by ER her right LE when walking down the incline however did perform it safely which will help her get out of her front door safely. · Communication/Consultation:  None today  · Equipment provided today:  None today   · Recommendations/Intent for next treatment session: Continue to work on ambulation without AD as well as inclines with single point cane.    Total Treatment Billable Duration: 55 minutes  PT Patient Time In/Time Out  Time In: 1400  Time Out: 82 Dhara David PT    Future Appointments   Date Time Provider Azalia Alberto   9/23/2019  2:00 PM Salma Cee [place] [facility]   9/25/2019  2:00 PM Salma Cee PT [facility]   9/30/2019  2:00 PM Salma Cee PT [facility]   10/4/2019  2:00 PM Salma Cee PT [facility]   10/14/2019  2:00 PM Salma Cee PT [facility]   10/16/2019  2:00 PM Salma Cee PT [facility]   10/21/2019  2:00 PM Salma Cee, PT [facility]   10/23/2019  2:00 PM Salma Cee PT [facility]   11/5/2019  1:45 PM Diane Rizvi MD [facility]

## 2019-09-23 ENCOUNTER — HOSPITAL ENCOUNTER (OUTPATIENT)
Dept: PHYSICAL THERAPY | Age: 64
Discharge: HOME | End: 2019-09-23
Payer: MEDICARE

## 2019-09-23 PROCEDURE — 97110 THERAPEUTIC EXERCISES: CPT

## 2019-09-25 ENCOUNTER — HOSPITAL ENCOUNTER (OUTPATIENT)
Dept: PHYSICAL THERAPY | Age: 64
Discharge: HOME | End: 2019-09-25
Payer: MEDICARE

## 2019-09-25 PROCEDURE — 97110 THERAPEUTIC EXERCISES: CPT

## 2019-09-25 NOTE — PROGRESS NOTES
Ignacio Fritz  : 1955  Primary:   Secondary:  [address] at [facility] [address], [address] [facility]  [phone]   [phone]      OUTPATIENT PHYSICAL THERAPY: Daily Treatment Note 2019  ICD-10: Treatment Diagnosis: Contracture of joint, foot, right M24.574, Difficulty in walking,not elsewhere classified R26.2, Hemiplegia and hemiparesis following cerebral infarction affecting right dominant side I69.351, Muscle weakness (generalized) M62.81  Precautions/Allergies:   Latex; Aspirin; Banana; Lisinopril; and Nuts Everet Lowers nut]   MD Orders: Evaluate and treat MEDICAL/REFERRING DIAGNOSIS:  Foot drop, right foot [M21.371]  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 Pain: Initial: Pain Intensity 1: 0  Post Session:  0/10   Medications Last Reviewed:  2019     Updated Objective Findings: Step taps onto 8 inch step difficult with forward stepping without physical assistance.          TREATMENT:   Therapeutic Exercise: (55 minutes)  Exercises per grid below to improve mobility and strength. Required minimal verbal cues to promote proper body posture.  Progressed complexity of movement as indicated.       Date:  2019   Activity/Exercise Parameters   Seated bilateral shoulder flexion with cane, seated scapular retraction 3x10    Ambulation indoors; with single point cane ; 50',75',50,'25,'75,'50,'25' 25 minutes   Standing eyes closed without UE support 3 x 1 minutes   Standing toe taps forward 6 inch step while flexing each leg onto step 3x10   Standing toe taps lateral onto 8 inch step while flexing each leg onto step 3x10   Elbow stretching bilateral UE's at the bar 3x10   Seated hip abduction/adduction, LAQ 3x10         Treatment/Session Summary:    · Response to Treatment:  Pt needs continued encouragement to not look down when performing exercises or ambulating. LOB x 1 today when performing step taps forward with only right UE for support. Pt unable to perform forward step taps with 8 inch step , 6 inch step tolerated however needed contact guard support when performing them. Pt continues to have difficulty with right knee flexion when stepping up. · Communication/Consultation:  None today  · Equipment provided today:  None today   Recommendations/Intent for next treatment session: Progress LE strengthening and UE stretching as tolerated. Continue to progress balance and gait training.    Total Treatment Billable Duration: 55 minutes  PT Patient Time In/Time Out  Time In: 1400  Time Out: 1500  Iron Lara PT    Future Appointments   Date Time Provider Azalia Dolly   9/30/2019  2:00 PM Trent Grain, [place] [facility]   10/4/2019  2:00 PM Trent Grain, PT [facility]   10/9/2019  2:00 PM Trent Grain, PT [facility]   10/14/2019  2:00 PM Trent Grain, PT [facility]   10/16/2019  2:00 PM Trent Grain, PT [facility]   10/21/2019  2:00 PM Trent Grain, PT [facility]   10/23/2019  2:00 PM Trent Grain, PT [facility]   11/5/2019  1:45 PM Sarah Rodrigez MD [facility] FABIO [facility]ON[facility]

## 2019-09-30 ENCOUNTER — HOSPITAL ENCOUNTER (OUTPATIENT)
Dept: PHYSICAL THERAPY | Age: 64
Discharge: HOME | End: 2019-09-30
Payer: MEDICARE

## 2019-09-30 PROCEDURE — 97110 THERAPEUTIC EXERCISES: CPT

## 2019-09-30 NOTE — PROGRESS NOTES
Lonell Patella  : 1955  Primary:   Secondary:  [address] at [facility] [address], [address] [facility]  [phone]   [phone]      OUTPATIENT PHYSICAL THERAPY: Daily Treatment Note 2019  ICD-10: Treatment Diagnosis: Contracture of joint, foot, right M24.574, Difficulty in walking,not elsewhere classified R26.2, Hemiplegia and hemiparesis following cerebral infarction affecting right dominant side I69.351, Muscle weakness (generalized) M62.81  Precautions/Allergies:   Latex; Aspirin; Banana; Lisinopril; and Nuts Donel Freddie nut]   MD Orders: Evaluate and treat MEDICAL/REFERRING DIAGNOSIS:  Foot drop, right foot [M21.371]  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 She continues to express her fear of walking with the single point cane. Pain: Initial:    Post Session:  0/10   Medications Last Reviewed:  2019     Updated Objective Findings: Pt performed forward 6 inch taps with increased right knee flexion. Ambulated up/down incline sidewalk without difficulty with single point cane.       TREATMENT:   Therapeutic Exercise: (55 minutes)  Exercises per grid below to improve mobility and strength. Required minimal verbal cues to promote proper body posture.  Progressed complexity of movement as indicated.       Date:  2019   Activity/Exercise Parameters   Seated bilateral shoulder flexion with cane, seated scapular retraction 3x10    Ambulation indoors; with single point cane ; 50',75',50, Outdoors up/down incline 20' x 4 25 minutes   Standing eyes closed without UE support 3 x 1 minutes   Standing toe taps forward 6 inch step while flexing each leg onto step 3x10   Standing toe taps lateral onto 8 inch step while flexing each leg onto step 3x10   Elbow stretching bilateral UE's at the bar 3x10   Seated hip abduction/adduction, LAQ 3x10         Treatment/Session Summary:    · Response to Treatment:  Pt encouraged to use single point cane at home and told to start performing her HEP on a regular basis to prepare for discharge in the next month. · Communication/Consultation:  None today  · Equipment provided today:  None today   Recommendations/Intent for next treatment session: Progress LE strengthening and UE stretching as tolerated. Continue to progress balance and gait training.    Total Treatment Billable Duration: 55 minutes     Agapito Greene PT    Future Appointments   Date Time Provider Azalia Dolly   9/30/2019  2:00 PM [facility]   10/4/2019  2:00 PM Carmen Vang, PT [facility]   10/9/2019  2:00 PM Carmen Vang, PT [facility]   10/14/2019  2:00 PM Carmen Vang, PT [facility]   10/16/2019  2:00 PM Carmen Vang, PT [facility]   10/21/2019  2:00 PM Carmen Vang PT [facility]   10/23/2019  2:00 PM Carmen Vang, PT [facility]   11/5/2019  1:45 PM Nick Mckeon MD [facility] CORY

## 2019-10-04 ENCOUNTER — HOSPITAL ENCOUNTER (OUTPATIENT)
Dept: PHYSICAL THERAPY | Age: 64
Discharge: HOME | End: 2019-10-04
Payer: MEDICARE

## 2019-10-04 PROCEDURE — 97110 THERAPEUTIC EXERCISES: CPT

## 2019-10-04 NOTE — PROGRESS NOTES
Sourav Mckeon  : 1955  Primary:   Secondary:  [address] at [facility], [address]  [phone]   [phone]      OUTPATIENT PHYSICAL THERAPY: Daily Treatment Note 10/4/2019  ICD-10: Treatment Diagnosis: Contracture of joint, foot, right M24.574, Difficulty in walking,not elsewhere classified R26.2, Hemiplegia and hemiparesis following cerebral infarction affecting right dominant side I69.351, Muscle weakness (generalized) M62.81  Precautions/Allergies:   Latex; Aspirin; Banana; Lisinopril; and Nuts Rony Quiet nut]   MD Orders: Evaluate and treat MEDICAL/REFERRING DIAGNOSIS:  Foot drop, right foot [M21.371]  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 Pt has been negotiating up/down stairs at home to get to her second floor at least 3 times a week.      Pain: Initial:   0/10 Post Session:  0/10   Medications Last Reviewed:  10/4/2019     Updated Objective Findings:       TREATMENT:   Therapeutic Exercise: (55 minutes)  Exercises per grid below to improve mobility and strength. Required minimal verbal cues to promote proper body posture.  Progressed complexity of movement as indicated.       Date:  10/4/2019   Activity/Exercise Parameters   Seated bilateral shoulder flexion with cane, seated scapular retraction 3x10    Ambulation with single point cane 60',70'   Standing toe taps forward 6 inch step while flexing each leg onto step; right UE only for support 3x10   Standing toe taps lateral onto 6 inch step while flexing each leg onto step; right UE only for support 3x10 Elbow stretching bilateral UE's at the bar 3x10   Seated hip abduction/adduction, LAQ 3x10         Treatment/Session Summary:    · Response to Treatment:  Pt demonstrated how she would fold laundry at home while standing and using a high table. Pt had one loss of balance when performing step taps forward while holding on with only the right hand.     · Communication/Consultation:  None today  · Equipment provided today:  None today   Recommendations/Intent for next treatment session:   Total Treatment Billable Duration: 55 minutes     Lupis Bradshaw PT    Future Appointments   Date Time Provider Azalia Alberto   10/4/2019  2:00 PM Jaylon Cruz PT [facility]   10/9/2019  2:00 PM Jaylon Cruz PT OFF [facility]   10/16/2019  2:00 PM Jaylon Cruz PT OFF [facility]   10/23/2019  2:00 [facility]   11/5/2019  1:45 PM Frankey Dirk, MD [facility] CORY

## 2019-10-09 ENCOUNTER — HOSPITAL ENCOUNTER (OUTPATIENT)
Dept: PHYSICAL THERAPY | Age: 64
Discharge: HOME | End: 2019-10-09
Payer: MEDICARE

## 2019-10-09 PROCEDURE — 97110 THERAPEUTIC EXERCISES: CPT

## 2019-10-09 PROCEDURE — 97116 GAIT TRAINING THERAPY: CPT

## 2019-10-09 NOTE — PROGRESS NOTES
Patric Nazario  : 1955  Primary:   Secondary:  [address] at [facility], [address]  [phone]   [phone]      OUTPATIENT PHYSICAL THERAPY: Daily Treatment Note 10/9/2019  ICD-10: Treatment Diagnosis: Contracture of joint, foot, right M24.574, Difficulty in walking,not elsewhere classified R26.2, Hemiplegia and hemiparesis following cerebral infarction affecting right dominant side I69.351, Muscle weakness (generalized) M62.81  Precautions/Allergies:   Latex; Aspirin; Banana; Lisinopril; and Nuts Rexanne Jonn nut]   MD Orders: Evaluate and treat MEDICAL/REFERRING DIAGNOSIS:  Foot drop, right foot [M21.371]  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 She has been doing her exercises at home however has not been using the single point cane , continues to use the quad cane for every day use. Pt agreeable for change in frequency of once a week until the end of the month. .     Pain: Initial: Pain Intensity 1: 0 0/10 Post Session:  0/10   Medications Last Reviewed:  10/9/2019     Updated Objective Findings: Ambulation of up to 150' with single point cane with modified independence. Negotiation up/down 2 flights with use of left handrail with supervision only.       TREATMENT:   Therapeutic Exercise: (15 minutes)  Exercises per grid below to improve mobility and strength. Required minimal verbal cues to promote proper body posture.  Progressed complexity of movement as indicated.       Date:  10/9/2019   Activity/Exercise Parameters   Seated bilateral shoulder flexion with cane, seated scapular retraction 3x10    Elbow stretching bilateral UE's at the bar 3x10       Gait Training ( 40 minutes):  Gait training to improve and/or restore physical functioning as related to balance and coordination. Ambulated   with modified independence and minimal verbal cues related to their stance phase, stride length, ankle position and motion and hip position and motionto promote proper body alignment. Pt needed instruction to decreased stance phase and improve speed and efficiency of her gait. Pt was able to walk further distances today before asking for breaks. Negotiated a total of 600' with a single point cane with breaks in between. Pt also negotiated up/down inclines outdoors to mimic incline she has outside of her home with supervision and single point cane. Treatment/Session Summary:    · Response to Treatment:   Pt demonstrated improved gait with increased distance of ambulation tolerated as well as improved IR of the right LE. Pt needed vc's to negotiate down steps as she was done performing the right sequencing. · Communication/Consultation:  None today  · Equipment provided today:  None today   Recommendations/Intent for next treatment session: Pt to drop down in frequency to once a week with her supplementing exercises at home to prepare for discharge this week. Re-certification next visit.    Total Treatment Billable Duration: 55 minutes  PT Patient Time In/Time Out  Time In: 1400  Time Out: 1500  Rosalina Raines PT    Future Appointments   Date Time Provider Azalia Alberto   10/14/2019  2:00 PM Ralph Zeng OFF [facility]   10/23/2019  2:00 PM Namrata Horne PT [facility]   10/30/2019  2:00 PM Namrata Horne PT [facility]   11/5/2019  1:45 PM Tessa Alejandra MD [facility] FABIO RAY

## 2019-10-14 ENCOUNTER — APPOINTMENT (OUTPATIENT)
Dept: PHYSICAL THERAPY | Age: 64
End: 2019-10-14
Payer: MEDICARE

## 2019-10-16 ENCOUNTER — HOSPITAL ENCOUNTER (OUTPATIENT)
Dept: PHYSICAL THERAPY | Age: 64
Discharge: HOME | End: 2019-10-16
Payer: MEDICARE

## 2019-10-16 ENCOUNTER — APPOINTMENT (OUTPATIENT)
Dept: PHYSICAL THERAPY | Age: 64
End: 2019-10-16
Payer: MEDICARE

## 2019-10-16 PROCEDURE — 97116 GAIT TRAINING THERAPY: CPT

## 2019-10-16 PROCEDURE — 97110 THERAPEUTIC EXERCISES: CPT

## 2019-10-16 NOTE — PROGRESS NOTES
Fitz Robledo  : 1955  Primary:   Secondary:  [address]  [facility] [address] [facility]  [phone]   [phone]      OUTPATIENT PHYSICAL THERAPY: Daily Treatment Note 10/16/2019  ICD-10: Treatment Diagnosis: Contracture of joint, foot, right M24.574, Difficulty in walking,not elsewhere classified R26.2, Hemiplegia and hemiparesis following cerebral infarction affecting right dominant side I69.351, Muscle weakness (generalized) M62.81  Precautions/Allergies:   Latex; Aspirin; Banana; Lisinopril; and Nuts Loreda Ann nut]   MD Orders: Evaluate and treat MEDICAL/REFERRING DIAGNOSIS:  Foot drop, right foot [M21.371]  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 She stated that she is moving more around the kitchen without any walking aid and that she is doing her HEP that was revised a few weeks ago by therapist.     Pain: Initial: Pain Intensity 1: 0 0/10 Post Session:  0/10   Medications Last Reviewed:  10/16/2019     Updated Objective Findings: See re-certification     TREATMENT:   Therapeutic Exercise: (30 minutes)  Exercises per grid below to improve mobility and strength. Required minimal verbal cues to promote proper body posture.  Progressed complexity of movement as indicated.       Date:  10/16/2019   Activity/Exercise Parameters   Seated bilateral shoulder flexion with cane, seated scapular retraction 3x10    Elbow stretching bilateral UE's at the bar 3x10       Gait Training ( 25 minutes):  Gait training to improve and/or restore physical functioning as related to balance and coordination.   Ambulated   with modified independence and minimal verbal cues related to their stance phase, stride length, ankle position and motion and hip position and motionto promote proper body alignment. Pt negotiated 36' at a time without use of any walking device demonstrating safe gait however did exhibit right LE in maximum ER for increasing base of support. Pt's right UE was in a great deal of flexion today not allowing for normal swing of UE during gait. Treatment/Session Summary:    · Response to Treatment:     Pt has demonstrated a significant change in chau score with a score >45 not making her a risk for falls. Please refer to re-certification for full details. · Communication/Consultation:  None today  · Equipment provided today:  None today   Recommendations/Intent for next treatment session: Discharge in two visits pt agreeable.    Total Treatment Billable Duration: 55 minutes  PT Patient Time In/Time Out  Time In: 1400  Time Out: 1500  Gregoria Oviedo PT    Future Appointments   Date Time Provider Azalia Alberto   10/21/2019  2:00 PM Colette Dela Cruz [facility]   10/30/2019  2:00 PM Colette Dela Cruz [facility]   11/5/2019  1:45 PM Kwesi Walker MD [facility] MRMBENJI

## 2019-10-16 NOTE — PROGRESS NOTES
Oma Garcia  : 1955  [place]  Secondary:  [address]  at [address] [place] [address]  [phone]   [phone]       OUTPATIENT PHYSICAL THERAPY:Progress Report 2019   ICD-10: Treatment Diagnosis: Contracture of joint, foot, right M24.574, Difficulty in walking,not elsewhere classified R26.2, Hemiplegia and hemiparesis following cerebral infarction affecting right dominant side I69.351, Muscle weakness (generalized) M62.81  Precautions/Allergies:   Latex; Aspirin; Banana; Lisinopril; and Nuts Carmel Cobble nut]   MD Orders: Evaluate and treat MEDICAL/REFERRING DIAGNOSIS:  Foot drop, right foot [M21.371]  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 Pt continues to demonstrate an improvement in functional mobility at each visit with progression of distance tolerated ambulating with an assisted device.   Pt needs continued encouragement to not look down when performing exercises or ambulating. LOB x 1 today when performing step taps forward with only right UE for support. Pt unable to perform forward step taps with 8 inch step , 6 inch step tolerated however needed contact guard support when performing them. Pt continues to have difficulty with right knee flexion when stepping up. PROBLEM LIST (Impacting functional limitations):  1. Decreased Strength  2. Decreased Ambulation Ability/Technique  3. Decreased Balance  4. Decreased Flexibility/Joint Mobility INTERVENTIONS PLANNED: (Treatment may consist of any combination of the following)  1. Gait Training  2. Home Exercise Program (HEP)  3.  Manual Therapy  4. Neuromuscular Re-education/Strengthening  5. Therapeutic Exercise/Strengthening   TREATMENT PLAN:  Effective Dates: 7/17/2019 TO  10/15/2019 (90 days). Frequency/Duration: 2 times a week for 90 Day(s)  GOALS: (Goals have been discussed and agreed upon with patient.)  Discharge Goals: Time Frame:10/15/2019  1. Pt to ambulate 48' with quad cane without any increased pain or difficulty with independence. GOAL MET 4/16/19  2. Pt to demonstrate improvement as per Ken Spindle score with a total score >45/56 points indicating decreased risk for falls. GOAL MET 8/23/19  3. Pt to achieve independence with HEP to maintain goals achieved. GOAL MET 4/16/19  NEW GOAL: Pt to demonstrate right shoulder flexion AROM to >110 degrees to aid in assistance with ADL's. GOAL ONGOING  NEW GOAL: Pt to ambulate up to 50' without assisted device to allow for carrying of item in left hand. GOAL MET 8/23/19  NEW GOAL: Pt to negotiate up/down full flight of stairs using left handrail going up and down without assistance. GOAL ONGOING      Outcome Measure: Tool Used: Gipson Balance Scale  Score:  Initial: 38/56 Most Recent: 46/56 (Date: 9/25/19)   Interpretation of Score: Each section is scored on a 0-4 scale, 0 representing the patients inability to perform the task and 4 representing independence. The scores of each section are added together for a total score of 56. The higher the patients score, the more independent the patient is. Any score below 45 indicates increased risk for falls. UPDATED OBJECTIVE FINDINGS:      Observation/Orthostatic Postural Assessment: Right LE postures into ER        ROM:     AROM(PROM) Right Left   Knee flexion 120 125   Knee extension 0 0   Hip flexion 100 110   Ankle dorsiflexion (DF) - knee extended 0/5 5   Ankle plantarflexion 30 PROM 50               ROM: Left:  Shoulder flexion 160/180 degrees A/PROM, shoulder abduction 165/180 A/PROM, IR 45 degrees AROM.  Elbow 0-155 AROM Right: Shoulder flexion 95/120 degrees A/PROM, shoulder abduction 95/110 degrees A/PROM, Elbow PROM 0-145 degrees. Cervical spine right lateral flexion 25% WNL, right rotation 50%     Strength:     Manual Muscle Test (*/5) Right Left   Knee extension 3-/5 4/5   Knee flexion 3/5 4/5   Hip flexion 3/5 4/5   Hip ER 3/5 4/5   Hip IR 3/5 4/5   Hip extension 3/5 4/5   Hip abduction 3/5 4/5   Hip adduction 2+/5 4/5   Ankle DF 1/5 4/5   Ankle PF 1/5 4/5   Shoulder flexion 2/5 4/5   Shoulder abduction 2/5 4/5   Shoulder extension 2/5 4/5   Elbow flexion No active movement 4/5   Elbow extension No active movement 4/5       Medical Necessity:   · Patient demonstrates good rehab potential due to higher previous functional level and high level of motivation. Reason for Services/Other Comments:  · Patient will benefit from therapy at this time to achieve goals established above as she has improved since progress note. Total Duration: 55 minutes    Rehabilitation Potential For Stated Goals: Good     Regarding Meera Iraheta's therapy, I certify that the treatment plan above will be carried out by a therapist or under their direction.   Thank you for this referral,  Kathryn Glass, PT

## 2019-10-16 NOTE — PROGRESS NOTES
Emre Oh : 1955 [place] Secondary:  Therapy Center at [address] [phone]   [phone] OUTPATIENT PHYSICAL THERAPY:Recertification  ICD-10: Treatment Diagnosis: Contracture of joint, foot, right M24.574, Difficulty in walking,not elsewhere classified R26.2, Hemiplegia and hemiparesis following cerebral infarction affecting right dominant side I69.351, Muscle weakness (generalized) M62.81 Precautions/Allergies:  
Latex; Aspirin; Banana; Lisinopril; and Nuts [tree nut] MD Orders: Evaluate and treat MEDICAL/REFERRING DIAGNOSIS: 
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 
RETURN PHYSICIAN APPOINTMENT: Unknown 10/16/19: Mrs. Matthew Kincaid has been seen for 49 visits with anticipated discharge in two weeks. Gipson score of 49/56 indicating she is not at risk for falls due to improved balance and an increase in BLE strength. Pt has had great success with therapy as she is now ambulating short distances/household with a single point cane and able to ambulate within her lower level floor without any device allowing for her to be more independent with caring for herself such as making herself tea and carrying it to the family room. Pt has been pro active in helping therapist develop an extensive HEP for her to do daily at home to maintain the goals she has achieved in her LE's.   
  
   
PROBLEM LIST (Impacting functional limitations): 1. Decreased Strength 2. Decreased Ambulation Ability/Technique 3. Decreased Balance 4. Decreased Flexibility/Joint Mobility INTERVENTIONS PLANNED: (Treatment may consist of any combination of the following) 1. Gait Training 2. Home Exercise Program (HEP) 3. Manual Therapy 4. Neuromuscular Re-education/Strengthening 5. Therapeutic Exercise/Strengthening TREATMENT PLAN: 
Effective Dates: 10/15/2019 TO  11/15/2019 (30 days). Frequency/Duration: 1 time a week for 30 Day(s) GOALS: (Goals have been discussed and agreed upon with patient.) Discharge Goals: Time Frame:11/15/2019 1. Pt to ambulate 48' with quad cane without any increased pain or difficulty with independence. GOAL MET 4/16/19 2. Pt to demonstrate improvement as per Lajune [place] score with a total score >45/56 points indicating decreased risk for falls. GOAL MET 8/23/19 3. Pt to achieve independence with HEP to maintain goals achieved. GOAL MET 4/16/19 NEW GOAL: Pt to demonstrate right shoulder flexion AROM to >110 degrees to aid in assistance with ADL's. GOAL ONGOING 
NEW GOAL: Pt to ambulate up to 50' without assisted device to allow for carrying of item in left hand. GOAL MET 8/23/19 NEW GOAL: Pt to negotiate up/down full flight of stairs using left handrail going up and down without assistance. GOAL MET 10/16/19 Outcome Measure: Tool Used: Ortegae [place] Balance Scale Score:  Initial: 38/56 Most Recent: 49/56 (Date:10/16 /19) Interpretation of Score: Each section is scored on a 0-4 scale, 0 representing the patients inability to perform the task and 4 representing independence. The scores of each section are added together for a total score of 56. The higher the patients score, the more independent the patient is. Any score below 45 indicates increased risk for falls. UPDATED OBJECTIVE FINDINGS:   
 
Observation/Orthostatic Postural Assessment: Right LE postures into ER however decreased with single point cane as well as quad cane. ROM:    
AROM(PROM) Right Left Knee flexion 120 125 Knee extension 0 0 Hip flexion 100 110 Ankle dorsiflexion (DF) - knee extended 0/5 5 Ankle plantarflexion 30 PROM 50       ROM: Left:  Shoulder flexion 160/180 degrees A/PROM, shoulder abduction 165/180 A/PROM, IR 45 degrees AROM. Elbow 0-155 AROM Right: Shoulder flexion 95/120 degrees A/PROM, shoulder abduction 95/110 degrees A/PROM, Elbow PROM 0-145 degrees. Cervical spine right lateral flexion 75% WNL, right rotation 75% Strength:    
Manual Muscle Test (*/5) Right Left Knee extension 3-/5 4/5 Knee flexion 3/5 4/5 Hip flexion 3/5 4/5 Hip ER 3/5 4/5 Hip IR 3/5 4/5 Hip extension 3/5 4/5 Hip abduction 3/5 4/5 Hip adduction 2+/5 4/5 Ankle DF 1/5 4/5 Ankle PF 1/5 4/5 Shoulder flexion 2/5 4/5 Shoulder abduction 2/5 4/5 Shoulder extension 2/5 4/5 Elbow flexion No active movement 4/5 Elbow extension No active movement 4/5 Medical Necessity:  
· Patient has made significant gains in functional mobility and increased independence. Reason for Services/Other Comments: 
Pt to be discharged at the end of this month so she can work on her HEP to maintain goals achieved. Rehabilitation Potential For Stated Goals: Good Regarding Maurice Iraheta's therapy, I certify that the treatment plan above will be carried out by a therapist or under their direction. Thank you for this referral, Rajni Crowder, PT Referring Physician Signature: Luz Elena Keen MD _______________________________ Date _____________

## 2019-10-21 ENCOUNTER — HOSPITAL ENCOUNTER (OUTPATIENT)
Dept: PHYSICAL THERAPY | Age: 64
Discharge: HOME | End: 2019-10-21
Payer: MEDICARE

## 2019-10-21 ENCOUNTER — APPOINTMENT (OUTPATIENT)
Dept: PHYSICAL THERAPY | Age: 64
End: 2019-10-21
Payer: MEDICARE

## 2019-10-21 PROCEDURE — 97110 THERAPEUTIC EXERCISES: CPT

## 2019-10-21 NOTE — THERAPY RECERTIFICATION
Bony Fields  : 1955  Primary:   Secondary:  [address] at [facility], [address] [facility]  [phone]   [phone]      OUTPATIENT PHYSICAL THERAPY: Daily Treatment Note 10/16/2019  ICD-10: Treatment Diagnosis: Contracture of joint, foot, right M24.574, Difficulty in walking,not elsewhere classified R26.2, Hemiplegia and hemiparesis following cerebral infarction affecting right dominant side I69.351, Muscle weakness (generalized) M62.81  Precautions/Allergies:   Latex; Aspirin; Banana; Lisinopril; and Nuts Chidi Denise nut]   MD Orders: Evaluate and treat MEDICAL/REFERRING DIAGNOSIS:  Foot drop, right foot [M21.371]  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 She stated that she is moving more around the kitchen without any walking aid and that she is doing her HEP that was revised a few weeks ago by therapist.     Pain: Initial: Pain Intensity 1: 0 0/10 Post Session:  0/10   Medications Last Reviewed:  10/21/2019     Updated Objective Findings: See re-certification     TREATMENT:   Therapeutic Exercise: (30 minutes)  Exercises per grid below to improve mobility and strength. Required minimal verbal cues to promote proper body posture.  Progressed complexity of movement as indicated.       Date:  10/21/2019   Activity/Exercise Parameters   Seated bilateral shoulder flexion with cane, seated scapular retraction 3x10    Elbow stretching bilateral UE's at the bar 3x10       Gait Training ( 25 minutes):  Gait training to improve and/or restore physical functioning as related to balance and coordination.   Ambulated   with modified independence and minimal verbal cues related to their stance phase, stride length, ankle position and motion and hip position and motionto promote proper body alignment. Pt negotiated 36' at a time without use of any walking device demonstrating safe gait however did exhibit right LE in maximum ER for increasing base of support. Pt's right UE was in a great deal of flexion today not allowing for normal swing of UE during gait. Treatment/Session Summary:    · Response to Treatment:     Pt has demonstrated a significant change in chau score with a score >45 not making her a risk for falls. Please refer to re-certification for full details. · Communication/Consultation:  None today  · Equipment provided today:  None today   Recommendations/Intent for next treatment session: Discharge in two visits pt agreeable.    Total Treatment Billable Duration: 55 minutes  PT Patient Time In/Time Out  Time In: 1400  Time Out: 1500  Tank Khoury PT    Future Appointments   Date Time Provider Azalia Alberto   10/21/2019  2:00 PM Ralph Krueger [facility]   10/30/2019  2:00 PM Hermelinda Shaw PT [facility]   11/5/2019  1:45 PM Rene Guan MD [facility] MRMD

## 2019-10-21 NOTE — PROGRESS NOTES
Larry Cruz  : 1955  Primary:   Secondary:  [address]  [facility] [address] [facility]  [phone]   [phone]      OUTPATIENT PHYSICAL THERAPY: Daily Treatment Note 10/21/2019  ICD-10: Treatment Diagnosis: Contracture of joint, foot, right M24.574, Difficulty in walking,not elsewhere classified R26.2, Hemiplegia and hemiparesis following cerebral infarction affecting right dominant side I69.351, Muscle weakness (generalized) M62.81  Precautions/Allergies:   Latex; Aspirin; Banana; Lisinopril; and Nuts [place] nut]   MD Orders: Evaluate and treat MEDICAL/REFERRING DIAGNOSIS:  Foot drop, right foot [M21.371]  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 She will be moving her exercise room upstairs as her father in law and sister in law are moving in with her for short term. Pain: Initial: Pain Intensity 1: 0  Post Session:  0/10   Medications Last Reviewed:  10/21/2019     Updated Objective Findings:  Pt demonstrated toilet transfer and task with independence. She negotiated up/down 3 steps with supervision as well as a 10' ramp while using a single point cane.  Ambulation of 200' with single point cane before having to sit and rest.      TREATMENT:   Therapeutic Exercise: (55 minutes)  Exercises per grid below to improve mobility and strength. Required minimal verbal cues to promote proper body posture.  Progressed complexity of movement as indicated.       Date:  10/21/2019   Activity/Exercise Parameters   Seated bilateral shoulder flexion with cane, seated scapular retraction 3x10 Elbow stretching bilateral UE's at the bar 3x10   Step taps 6 inch with unilateral hand support; forward and lateral 3x10   Sit to stand without UE support 3x10   LAQ 3x10   Ambulation 200', 100',150' with single point cane 15 minutes   Ramp negotiation x 2 10' with single point cane   Negotiation of 3 steps with left handrail Supervision only needed       Treatment/Session Summary:    · Response to Treatment:     Pt needed some verbal cues when ambulating to bring right LE into more internal rotation. Pt used a public restroom for the first time independently , normally her  goes with her for safety. Pt was proud of herself for being able to do the task of toileting on her home with the use of a handrail on the left. · Communication/Consultation:  None today  · Equipment provided today:  None today   Recommendations/Intent for next treatment session: Discharge following next session.    Total Treatment Billable Duration: 55 minutes  PT Patient Time In/Time Out  Time In: 1400  Time Out: 1500  Nick Perrin PT    Future Appointments   Date Time Provider Azalia Alberto   10/30/2019  2:00 PM Ralph Avendaño [facility]   11/5/2019  1:45 PM Kisha Guerin MD [facility]

## 2019-10-23 ENCOUNTER — APPOINTMENT (OUTPATIENT)
Dept: PHYSICAL THERAPY | Age: 64
End: 2019-10-23
Payer: MEDICARE

## 2019-10-23 NOTE — THERAPY RECERTIFICATION
Alexis Bosworth  : 1955  [place]  Secondary:  [address]  at [address]  [phone]   [phone]       OUTPATIENT PHYSICAL THERAPY:Recertification    ICD-10: Treatment Diagnosis: Contracture of joint, foot, right M24.574, Difficulty in walking,not elsewhere classified R26.2, Hemiplegia and hemiparesis following cerebral infarction affecting right dominant side I69.351, Muscle weakness (generalized) M62.81  Precautions/Allergies:   Latex; Aspirin; Banana; Lisinopril; and Nuts Andry Elks nut]   MD Orders: Evaluate and treat MEDICAL/REFERRING DIAGNOSIS:  Foot drop, right foot [M21.371]  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 Perez Boss has been seen for 49 visits with anticipated discharge in two weeks. Gipson score of 49/56 indicating she is not at risk for falls due to improved balance and an increase in BLE strength. Pt has had great success with therapy as she is now ambulating short distances/household with a single point cane and able to ambulate within her lower level floor without any device allowing for her to be more independent with caring for herself such as making herself tea and carrying it to the family room. Pt has been pro active in helping therapist develop an extensive HEP for her to do daily at home to maintain the goals she has achieved in her LE's.           PROBLEM LIST (Impacting functional limitations):  1. Decreased Strength  2. Decreased Ambulation Ability/Technique  3. Decreased Balance  4. Decreased Flexibility/Joint Mobility INTERVENTIONS PLANNED: (Treatment may consist of any combination of the following)  1. Gait Training  2. Home Exercise Program (HEP)  3. Manual Therapy  4.  Neuromuscular Re-education/Strengthening  5. Therapeutic Exercise/Strengthening   TREATMENT PLAN:  Effective Dates: 10/15/2019 TO  11/15/2019 (30 days). Frequency/Duration: 1 time a week for 30 Day(s)  GOALS: (Goals have been discussed and agreed upon with patient.)  Discharge Goals: Time Frame:11/15/2019  1. Pt to ambulate 48' with quad cane without any increased pain or difficulty with independence. GOAL MET 4/16/19  2. Pt to demonstrate improvement as per Venetta Protestant score with a total score >45/56 points indicating decreased risk for falls. GOAL MET 8/23/19  3. Pt to achieve independence with HEP to maintain goals achieved. GOAL MET 4/16/19  NEW GOAL: Pt to demonstrate right shoulder flexion AROM to >110 degrees to aid in assistance with ADL's. GOAL ONGOING  NEW GOAL: Pt to ambulate up to 50' without assisted device to allow for carrying of item in left hand. GOAL MET 8/23/19  NEW GOAL: Pt to negotiate up/down full flight of stairs using left handrail going up and down without assistance. GOAL MET 10/16/19      Outcome Measure: Tool Used: Gipson Balance Scale  Score:  Initial: 38/56 Most Recent: 49/56 (Date:10/16 /19)   Interpretation of Score: Each section is scored on a 0-4 scale, 0 representing the patients inability to perform the task and 4 representing independence. The scores of each section are added together for a total score of 56. The higher the patients score, the more independent the patient is. Any score below 45 indicates increased risk for falls. UPDATED OBJECTIVE FINDINGS:      Observation/Orthostatic Postural Assessment: Right LE postures into ER however decreased with single point cane as well as quad cane.          ROM:     AROM(PROM) Right Left   Knee flexion 120 125   Knee extension 0 0   Hip flexion 100 110   Ankle dorsiflexion (DF) - knee extended 0/5 5   Ankle plantarflexion 30 PROM 50               ROM: Left:  Shoulder flexion 160/180 degrees A/PROM, shoulder abduction 165/180 A/PROM, IR 45 degrees AROM. Elbow 0-155 AROM                   Right: Shoulder flexion 95/120 degrees A/PROM, shoulder abduction 95/110 degrees A/PROM, Elbow PROM 0-145 degrees. Cervical spine right lateral flexion 75% WNL, right rotation 75%     Strength:     Manual Muscle Test (*/5) Right Left   Knee extension 3-/5 4/5   Knee flexion 3/5 4/5   Hip flexion 3/5 4/5   Hip ER 3/5 4/5   Hip IR 3/5 4/5   Hip extension 3/5 4/5   Hip abduction 3/5 4/5   Hip adduction 2+/5 4/5   Ankle DF 1/5 4/5   Ankle PF 1/5 4/5   Shoulder flexion 2/5 4/5   Shoulder abduction 2/5 4/5   Shoulder extension 2/5 4/5   Elbow flexion No active movement 4/5   Elbow extension No active movement 4/5       Medical Necessity:   · Patient has made significant gains in functional mobility and increased independence. Reason for Services/Other Comments:  Pt to be discharged at the end of this month so she can work on her HEP to maintain goals achieved. Rehabilitation Potential For Stated Goals: Good       Regarding Mary Iraheta's therapy, I certify that the treatment plan above will be carried out by a therapist or under their direction. Thank you for this referral,  Roldan Valentine PT     Referring Physician Signature:  Medford Burkitt, MD _______________________________ Date _____________

## 2019-10-30 ENCOUNTER — HOSPITAL ENCOUNTER (OUTPATIENT)
Dept: PHYSICAL THERAPY | Age: 64
Discharge: HOME | End: 2019-10-30
Payer: MEDICARE

## 2019-10-30 PROCEDURE — 97110 THERAPEUTIC EXERCISES: CPT

## 2019-10-30 NOTE — PROGRESS NOTES
Clarice Hussein  : 1955  Primary:   Secondary:  [address]  [facility] [address] [facility]  [phone]   [phone]      OUTPATIENT PHYSICAL THERAPY: Daily Treatment Note 10/30/2019  ICD-10: Treatment Diagnosis: Contracture of joint, foot, right M24.574, Difficulty in walking,not elsewhere classified R26.2, Hemiplegia and hemiparesis following cerebral infarction affecting right dominant side I69.351, Muscle weakness (generalized) M62.81  Precautions/Allergies:   Latex; Aspirin; Banana; Lisinopril; and Nuts Carlena Lanius nut]   MD Orders: Evaluate and treat MEDICAL/REFERRING DIAGNOSIS:  Foot drop, right foot [M21.371]  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 Pt agreeable for discharge today.  .     Pain: Initial: Pain Intensity 1: 0  Post Session:  0/10   Medications Last Reviewed:  10/30/2019     Updated Objective Findings:  Refer to discharge summary dated 10/30/19/      TREATMENT:   Therapeutic Exercise: (55 minutes)  Exercises per grid below to improve mobility and strength. Required minimal verbal cues to promote proper body posture.  Progressed complexity of movement as indicated.       Date:  10/30/2019   Activity/Exercise Parameters   Seated bilateral shoulder flexion with cane, seated scapular retraction 3x10    Elbow stretching bilateral UE's at the bar 3x10   Step taps 6 inch with unilateral hand support; forward and lateral 3x10   Sit to stand without UE support 3x10   LAQ 3x10   Ambulation of 200' at a time with single point cane 25 minutes Negotiation of 2 flights of stairs using unilateral handrail Supervision only needed  x2       Treatment/Session Summary:    · Response to Treatment:     Pt with improved ambulation to using only a single point cane. She negotiated up/ down 2 flights of stairs however only needed vc's once when negotiating down to use the appropriate LE first. Pt continues to demonstrate safety with all transfers and mobility and is agreeable for discharge with HEP. Kristopher Phelps · Communication/Consultation:  None today  · Equipment provided today:  None today   Recommendations/Intent for next treatment session: Discharge from therapy at this time as pt has the tools for her to continue with a HEP, will see the patient next year if a decline is seen and physician feels that therapy is indicated.    Total Treatment Billable Duration: 55 minutes  PT Patient Time In/Time Out  Time In: 1400  Time Out: 1500  Celestino Patel PT    Future Appointments   Date Time Provider Azalia Alberto   11/5/2019  1:45 PM Esau Munroe MD [facility]

## 2019-11-04 NOTE — THERAPY DISCHARGE
Perez Hussein  : 1955  [place]  Secondary:  Laura Sasha at [place] [id number] [address], [address]  [phone]   [phone]       OUTPATIENT PHYSICAL THERAPY:Discharge Summary 2019   ICD-10: Treatment Diagnosis: Contracture of joint, foot, right M24.574, Difficulty in walking,not elsewhere classified R26.2, Hemiplegia and hemiparesis following cerebral infarction affecting right dominant side I69.351, Muscle weakness (generalized) M62.81  Precautions/Allergies:   Latex; Aspirin; Banana; Lisinopril; and Nuts Roanna Spray nut]   MD Orders: Evaluate and treat MEDICAL/REFERRING DIAGNOSIS:  Foot drop, right foot [M21.371]  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 Pt has made great progress in her functional mobility with a significance improvement in her balance making her less of a risk for falls. Pt has achieved independence with her HEP and was given a weekly plan on how to keep her strength and mobility up prior to discharge. Pt has in the past few weeks set up a room in her house to exercise as well as to improve her strength. Pt agreeable for discharge at this time.          PROBLEM LIST (Impacting functional limitations):  1. Decreased Strength  2. Decreased Ambulation Ability/Technique  3. Decreased Balance  4. Decreased Flexibility/Joint Mobility INTERVENTIONS PLANNED: (Treatment may consist of any combination of the following)  1. Gait Training  2. Home Exercise Program (HEP)  3. Manual Therapy  4. Neuromuscular Re-education/Strengthening  5.  Therapeutic Exercise/Strengthening   TREATMENT PLAN:  Effective Dates: 10/15/2019 TO 11/15/2019 (30 days). Frequency/Duration: 1 time a week for 30 Day(s)  GOALS: (Goals have been discussed and agreed upon with patient.)  Discharge Goals: Time Frame:11/15/2019  1. Pt to ambulate 48' with quad cane without any increased pain or difficulty with independence. GOAL MET 4/16/19  2. Pt to demonstrate improvement as per Ez Pontiff score with a total score >45/56 points indicating decreased risk for falls. GOAL MET 8/23/19  3. Pt to achieve independence with HEP to maintain goals achieved. GOAL MET 4/16/19  NEW GOAL: Pt to demonstrate right shoulder flexion AROM to >110 degrees to aid in assistance with ADL's. GOAL ONGOING  NEW GOAL: Pt to ambulate up to 50' without assisted device to allow for carrying of item in left hand. GOAL MET 8/23/19  NEW GOAL: Pt to negotiate up/down full flight of stairs using left handrail going up and down without assistance. GOAL MET 10/16/19      Outcome Measure: Tool Used: Gipson Balance Scale  Score:  Initial: 38/56 Most Recent: 49/56 (Date:10/16 /19)   Interpretation of Score: Each section is scored on a 0-4 scale, 0 representing the patients inability to perform the task and 4 representing independence. The scores of each section are added together for a total score of 56. The higher the patients score, the more independent the patient is. Any score below 45 indicates increased risk for falls. UPDATED OBJECTIVE FINDINGS:      Observation/Orthostatic Postural Assessment: Right LE postures into ER however decreased with single point cane as well as quad cane. ROM:     AROM(PROM) Right Left   Knee flexion 120 125   Knee extension 0 0   Hip flexion 100 110   Ankle dorsiflexion (DF) - knee extended 0/5 5   Ankle plantarflexion 30 PROM 50               ROM: Left:  Shoulder flexion 160/180 degrees A/PROM, shoulder abduction 165/180 A/PROM, IR 45 degrees AROM.  Elbow 0-155 AROM                   Right: Shoulder flexion 95/120 degrees A/PROM, shoulder abduction 95/110 degrees A/PROM, Elbow PROM 0-145 degrees. Cervical spine right lateral flexion 75% WNL, right rotation 75%     Strength:     Manual Muscle Test (*/5) Right Left   Knee extension 3-/5 4/5   Knee flexion 3/5 4/5   Hip flexion 3/5 4/5   Hip ER 3/5 4/5   Hip IR 3/5 4/5   Hip extension 3/5 4/5   Hip abduction 3/5 4/5   Hip adduction 2+/5 4/5   Ankle DF 1/5 4/5   Ankle PF 1/5 4/5   Shoulder flexion 2/5 4/5   Shoulder abduction 2/5 4/5   Shoulder extension 2/5 4/5   Elbow flexion No active movement 4/5   Elbow extension No active movement 4/5        Reason for Services/Other Comments:  Pt to be discharged at this time.      Thank you for this referral,  Imtiaz Knight, PT

## 2020-01-28 ENCOUNTER — HOSPITAL ENCOUNTER (OUTPATIENT)
Dept: PHYSICAL THERAPY | Age: 65
End: 2020-01-28

## 2020-01-30 ENCOUNTER — APPOINTMENT (OUTPATIENT)
Dept: PHYSICAL THERAPY | Age: 65
End: 2020-01-30

## 2020-02-04 ENCOUNTER — APPOINTMENT (OUTPATIENT)
Dept: PHYSICAL THERAPY | Age: 65
End: 2020-02-04
Payer: MEDICARE

## 2020-02-06 ENCOUNTER — APPOINTMENT (OUTPATIENT)
Dept: PHYSICAL THERAPY | Age: 65
End: 2020-02-06
Payer: MEDICARE

## 2020-02-11 ENCOUNTER — APPOINTMENT (OUTPATIENT)
Dept: PHYSICAL THERAPY | Age: 65
End: 2020-02-11
Payer: MEDICARE

## 2020-02-13 ENCOUNTER — APPOINTMENT (OUTPATIENT)
Dept: PHYSICAL THERAPY | Age: 65
End: 2020-02-13
Payer: MEDICARE

## 2020-02-18 ENCOUNTER — HOSPITAL ENCOUNTER (OUTPATIENT)
Dept: PHYSICAL THERAPY | Age: 65
Discharge: HOME | End: 2020-02-18
Payer: MEDICARE

## 2020-02-18 PROCEDURE — 97112 NEUROMUSCULAR REEDUCATION: CPT

## 2020-02-18 PROCEDURE — 97110 THERAPEUTIC EXERCISES: CPT

## 2020-02-18 PROCEDURE — 97162 PT EVAL MOD COMPLEX 30 MIN: CPT

## 2020-02-18 PROCEDURE — 97166 OT EVAL MOD COMPLEX 45 MIN: CPT

## 2020-02-18 NOTE — PROGRESS NOTES
Jose Armando Story  : 1955  [place]  [place]: [place]  [address] Therapy  [address], [facility], [address]  [phone]   [phone]        OUTPATIENT PHYSICAL THERAPY: Daily Treatment Note 2020  Visit Count:  Visit count could not be calculated. Make sure you are using a visit which is associated with an episode. Visit count:  1    ICD-10: Treatment Diagnosis: 169.35  Hemiplegia and hemiparesis following cerebral infarction  R26.81  Unsteadiness on feet  R26.2  Difficulty walking  Precautions/Allergies:   Latex; Aspirin; Banana; Lisinopril; and Nuts [tree nut]     Effective Dates: 2020 TO 2020 (90 days). Pre-treatment Symptoms/Complaints:  Pt reports that she wants to walk better. Pain: Initial: Pain Intensity 1: 0 /10 Post Session:  0/10   Medications Last Reviewed:  2020  Updated Objective Findings:  See evaluation note from today  TREATMENT:     Evaluation   (X)     Therapeutic Exercise   ( 20 min  ):  To decrease pain, improve flexibility and motion, and increase strength. Will provide verbal and manual cues as needed to ensure proper performance of the exercises. Will increase range of motion, resistance and intensity as pt tolerates. Worked in supine on hip flex, extn, knee flex, isolating knee extn. Did DF/PF against manual resistance  bridging--2 x 10, with some physical and verbal cues  Lower trunk rotation  Hip abd/add against manual resistance  Seated knee extn with R.  Standing balance, wt shift ex. [place]  Treatment/Session Summary:    · Response to Treatment:  Pt tolerated therapy well today. She has had lots of PT in the past.  She has very strong and obvious tone in the R leg; walks with AFO and NBQC. Her goal is to walk better. Initial emphasis with gait will be with narrowing her REVA and initiation of flex in gait.   She seems well motivated and very pleasant. · Communication/Consultation:  None today  · Equipment provided today:  None today  · Recommendations/Intent for next treatment session: Next visit will focus on improving motor control with R leg, improving ease of movement, balance, and gait . Total Treatment Billable Duration:   60 min   AngelaalSTEVEN     Effective Dates: 2/18/2020 TO 5/16/2020 (90 days).   PT Patient Time In/Time Out  Time In: 0200  Time Out: 0300  Hina Foy, PT    Future Appointments   Date Time Provider Azalia Alberto   2/20/2020  1:00 PM Celesta Roof, OT [facility]   2/20/2020  2:00 PM Shea Corbin, PT [facility]   2/25/2020  1:00 PM Celesta Roof, OT [facility]   2/25/2020  2:00 PM Shea Blankenship, PT [facility]   2/27/2020  1:00 PM Celesta Roof, OT [facility]   2/27/2020  2:15 PM Shea Corbin, PT [facility]   2/28/2020 12:10 PM [facility] DEXA BI GE LUNAR DEXA [facility]   2/28/2020  1:00 PM [facility] ART BI ROOM 2 [facility]   5/26/2020  1:45 PM Jocelin Rosas MD [facility]

## 2020-02-18 NOTE — THERAPY EVALUATION
Pk Astorga : 1955 [place] Secondary: 656 [facility] Therapy 
[address], [address] [phone]   [phone] OUTPATIENT PHYSICAL THERAPY:  Initial Assessment 2020 ICD-10: Treatment Diagnosis: 169.35  Hemiplegia and hemiparesis following cerebral infarction R26.81  Unsteadiness on feet R26.2  Difficulty walking Precautions/Allergies:  
Latex; Aspirin; Banana; Lisinopril; and Nuts [tree nut] TREATMENT PLAN: 
Effective Dates: 2020 TO 2020 (90 days). Frequency/Duration: 2 times a week for 90 Day(s), and upon reassessment will adjust frequency and duration as progress indicates. MEDICAL/REFERRING DIAGNOSIS: 
Other cerebral infarction [I63.89] DATE OF ONSET: 2017 REFERRING PHYSICIAN: Reed Hunt MD MD Orders: Daryle Hare and Treat Return MD Appointment: TBD INITIAL ASSESSMENT:  Ms. Lesia Montes presents with R hemiplegia from CVA in 2017. She has had a good bit of therapy, both in-pt and out-pt. She still should benefit from continuing with out-pt therapies for help with improving motor control, motor patterns, mobility and ambulation. She is well motivated, eager about PT. She has strong tone and given the length of time since onset, it will be difficult to make major gains. However given her recovery to this point and her apparent motivation about therapy she is expected to continue to maximize her recovery to allow improved ease and independence in moving. PROBLEM LIST (Impacting functional limitations): 1. Decreased Strength 2. Decreased ADL/Functional Activities 3. Decreased Ambulation Ability/Technique 4. Decreased Balance 5. Decreased Activity Tolerance 6. Decreased Flexibility/Joint Mobility 7.  Decreased [place] with Home Exercise Program 
 8. INTERVENTIONS PLANNED: (Treatment may consist of any combination of the following) 1. Balance Exercise 2. Gait Training 3. Home Exercise Program (HEP) 4. Manual Therapy 5. Neuromuscular Re-education/Strengthening 6. Range of Motion (ROM) 7. Therapeutic Exercise/Strengthening 8. Modalities as needed and appropriate, including Aquatics and taping 9. GOALS: (Goals have been discussed and agreed upon with patient.) Short-Term Functional Goals: Time Frame: 4 weeks 1. Pt to be able to perform sit to stand 8 x in 30 sec, for improved balance with transitions 2. Pt able to isolate hip and knee flexion in ex in supine 3. Pt able to initiate step up to 4\" step with R leg, for improved strength Discharge Goals: Time Frame: 10 weeks 1. Pt to amb > 50' without asst device, for ease of household activities 2. Assess amb with st cane, for  Improved balance 3. Pt to be independent in HEP for maintaining gains made in PT 
 
OUTCOME MEASURE:  
Tool Used: Lower Extremity Functional Scale (LEFS) Score:  Initial: 15/80 Most Recent: X/80 (Date: -- ) Interpretation of Score: 20 questions each scored on a 5 point scale with 0 representing \"extreme difficulty or unable to perform\" and 4 representing \"no difficulty\". The lower the score, the greater the functional disability. 80/80 represents no disability. Minimal detectable change is 9 points. MEDICAL NECESSITY:  
· Patient is expected to demonstrate progress in strength, balance, coordination and functional technique ·  to increase independence with mobility and ambulation and help to promote return to walking with better pattern and pace. · . REASON FOR SERVICES/OTHER COMMENTS: 
· Patient continues to require skilled intervention due to hemiplegia on R side from CVA in sept 2017. · . Total Duration: PT Patient Time In/Time Out Time In: 0200 Time Out: 0300 Rehabilitation Potential For Stated Goals: Fair Regarding Radha Alvaradodwin's therapy, I certify that the treatment plan above will be carried out by a therapist or under their direction. Thank you for this referral, Tammi Chavarria, PT Referring Physician Signature: Reed Hunt MD _______________________________ Date _____________ Information below was gathered on Initial Assessment--    PAIN/SUBJECTIVE:  
Initial: Pain Intensity 1: 0  Post Session:  0/10 HISTORY:  
History of Injury/Illness (Reason for Referral): 
Pt had a CVA affecting R side in 2017. She had dense R hemiplegia in the arm and leg, in addition to some moderate to mild speech and language deficits. She had intensive in-pt therapy on the 9th floor [facility]. She continued with out-pt rehab much of  at [address] [facility], part of . Pt had 2 surgeries on the R foot due to severe spasticity--one a heel cord lengthening, and another for the hammer toes. She reported that prior to the stroke she was healthy and had few risk factors. Past Medical History/Comorbidities: Ms. Lesia Montes  has a past medical history of Anxiety, CVA (cerebral vascular accident) ([address] (2017), Depression, HTN (hypertension), Impaired mobility, Menopause, and PTE (pulmonary thromboembolism) ([address] (2017). Ms. Lesia Montes  has a past surgical history that includes hx refractive surgery (); hx jennifer and bso (); hx  section; hx other surgical (); vascular surgery procedure unlist (); hx orthopaedic (Right, 2018); and hx colonoscopy. Social History/Living Environment:  
  .  still works. Lives in a 2 level house with master bedroom downstairs Prior Level of Function/Work/Activity: 
Was a . Dominant Side:  
      RIGHT   Before the CVA Personal Factors:   
      Age:  71 y.o. Ambulatory/Rehab Services H2 Model Falls Risk Assessment Risk Factors: 
     (5)  History of Recent Falls [w/in 3 months] Ability to Rise from Chair: 
     (1)  Pushes up, successful in one attempt Falls Prevention Plan: 
     Physical Limitations to Exercise (specify):  R hemiplegia, wears R AFO, used NBQC for ambulation Total: (5 or greater = High Risk): 6  
©2010 [facility] [address] [place] [address] States Patent #[id number]. Federal Law prohibits the replication, distribution or use without written permission from [facility] [employer] Current Medications:   
  
Current Outpatient Medications:  
  omega 3-dha-epa-fish oil (FISH OIL) 100-160-1,000 mg cap, Take 1 Cap by mouth daily. , Disp: , Rfl:  
  amLODIPine (NORVASC) 5 mg tablet, Take 1 Tab by mouth daily. (Patient taking differently: Take 5 mg by mouth every morning.), Disp: 90 Tab, Rfl: 1 
  labetalol (NORMODYNE) 300 mg tablet, Take 1 Tab by mouth two (2) times a day., Disp: 180 Tab, Rfl: 1 
  rOPINIRole (REQUIP) 2 mg tablet, Take 1 Tab by mouth nightly. for restless leg, Disp: 90 Tab, Rfl: 1 
  tiZANidine (ZANAFLEX) 4 mg tablet, 4mg po TID (Patient taking differently: Take 4 mg by mouth two (2) times a day.), Disp: 270 Tab, Rfl: 1 
  losartan-hydroCHLOROthiazide (HYZAAR) 100-25 mg per tablet, Take 1 Tab by mouth daily. , Disp: 90 Tab, Rfl: 1 
  FLUoxetine (PROZAC) 20 mg tablet, Take 2 Tabs by mouth daily. (Patient taking differently: Take 40 mg by mouth every morning.), Disp: 180 Tab, Rfl: 1 
  polyethylene glycol (MIRALAX) 17 gram/dose powder, Take 17 g by mouth daily. (Patient taking differently: Take 17 g by mouth as needed.), Disp: 510 g, Rfl: 2 Date Last Reviewed:  2/18/2020 Number of Personal Factors/Comorbidities that affect the Plan of Care: 3+: HIGH COMPLEXITY EXAMINATION:  
Observation: 
Pt is a bright, short lady, eager and pleasant.   No obvious communication or cognitive deficits. R UE shows obvious flexor tone/pattern, held fairly tightly to the body. ROM:   
      Passive ROM in the R leg is WDL. Tight for hip internal rotation, limiting passive motion to just past neutral .  Has R ankle DF to 95-98 °. Inversion and eversion intact. R UE not aggressively tested. Did brief PROM to loosen tone. Strength:   
      R LE has strong evidence of abnormal tone, worse in standing. Cannot isolate hip and knee movements in supine. Does have isolated DF, generally 3 to 3+  Strength. R LE has strong pull into abd and ER--very evident in gait. L LE has normal strength and tone. Neurological Screen: 
      Sensation: not formally tested. Pt has diminished light touch in the R foot, but better proximally. Functional Mobility:  
      Gait/Ambulation:  Pt ambs with NBQC with very altered gait:  Moderate R hip retraction, keeps R leg stiff to advance it. Leg is severely ER'd and toed out. Most of the wt bearing in stance seems to be on R heel. Pt wears Walk-on AFO. Stairs:  NT Balance:   
      Impaired due to R hemiplegia. Pt denies frequent falls. Had one rare fall within 3 months but she calls that a rare occurrence. Body Structures Involved: 1. Nerves 2. Bones 3. Joints 4. Muscles 5. Ligaments Body Functions Affected: 1. Mental 
2. Sensory/Pain 3. Neuromusculoskeletal 
4. Movement Related Activities and Participation Affected: 1. General Tasks and Demands 2. Mobility 3. Self Care 4. Domestic Life 5. Community, Social and [place] Number of elements (examined above) that affect the Plan of Care: 3: MODERATE COMPLEXITY CLINICAL PRESENTATION:  
Presentation: Evolving clinical presentation with changing clinical characteristics: MODERATE COMPLEXITY CLINICAL DECISION MAKING:  
Use of outcome tool(s) and clinical judgement create a POC that gives a: Questionable prediction of patient's progress: MODERATE COMPLEXITY

## 2020-02-18 NOTE — THERAPY EVALUATION
Abbey Warren : 1955 [place] Secondary:  [address] Therapy 
[address], [facility], [address] [phone]   [phone] OUTPATIENT OCCUPATIONAL THERAPY:Initial Assessment 2020 ICD-10: Treatment Diagnosis: 169.35 hemiplegia and hemiparesis following a CVA 
                                             R27.8 lack of coordination Precautions/Allergies:  
Latex; Aspirin; Banana; Lisinopril; and Nuts [tree nut] TREATMENT PLAN: 
Effective Dates: 2020 TO 2020 (90 days). Frequency/Duration: 2 times a week for 90 Day(s) MEDICAL/REFERRING DIAGNOSIS: 
Other cerebral infarction [I63.89] DATE OF ONSET:  REFERRING PHYSICIAN: Ana Mcbride MD MD Orders: eval and treat Return MD Appointment: 2020 INITIAL ASSESSMENT:  Ms. Nancy Varela  was referred to occupational therapy for rehab following a L CVA sustained in  leaving her with severe increased muscle tone in the R hemibody. Patient had inpatient therapy following her CVA as well as outpatient PT. To date she has not had any outpatient OT. Patient presents with severe increased muscle tone in the R hemibody limiting her ability to functionally use it. She will benefit from OT to address severe increased muscle tone through neuromuscular techniques and orthokinetic splinting to decrease/normalize muscle tone, therapeutic exercise/activity to address activity tolerance and functional use of the RUE and Self care. Patient education will also take place with patient/caregiver. PROBLEM LIST (Impacting functional limitations): 1. Decreased ADL/Functional Activities 2. Decreased Activity Tolerance 3. Decreased Flexibility/Joint Mobility 4. increased muscle tone R hemibody INTERVENTIONS PLANNED: (Treatment may consist of any combination of the following) 1. Activities of daily living training 2. Adaptive equipment training 3. Anil tech training 4. Manual therapy training 5. Modalities prn 6. Neuromuscular re-eduation 7. Splinting 8. Therapeutic activity 9. Therapeutic exercise 10. Patient/caregiver education GOALS: (Goals have been discussed and agreed upon with patient.) Short-Term Functional Goals: Time Frame: 45 
1. Fabricate an orthokinetic splint for the R hand/wrist to aid in postioning and decreasing tone of the hand/wrist 
2. Patient/caregiver to be modified independent in HEP for tone reduction techniques/PROM for the RUE 3. Patient to be able to independently saurabh/doff orthokinetic splint Discharge Goals: Time Frame: 90 days 1. Patient/caregiver to be independent in HEP for PROM/tone reduction techniques for the RUE 2. Patient to improve DASH rating by 5-6 points indicating improved functional use of the RUE 3. Patient to be able to carry a purse using the RUE   
 
OUTCOME MEASURE:  
Tool Used: Disabilities of the Arm, Shoulder and Hand (DASH) Questionnaire - Quick Version Score:  Initial: 41/55  Most Recent: X/55 (Date: -- ) Interpretation of Score: The DASH is designed to measure the activities of daily living in person's with upper extremity dysfunction or pain. Each section is scored on a 1-5 scale, 5 representing the greatest disability. The scores of each section are added together for a total score of 55. Score 11 12-19 20-28 29-37 38-45 46-54 55 Modifier CH CI CJ CK CL CM CN ? Carrying, Moving, and Handling Objects:  
  - CURRENT STATUS: CL - 60%-79% impaired, limited or restricted  - GOAL STATUS: CK - 40%-59% impaired, limited or restricted  - D/C STATUS:  ---------------To be determined--------------- MEDICAL NECESSITY:  
· Skilled intervention continues to be required due to R hemiparesis with sever tone limiting functional use of RUE.  
REASON FOR SERVICES/OTHER COMMENTS: 
 · Patient continues to require skilled intervention due to decreased functional use of the RUE with severe tone as a result of L CVA. Total Duration: OT Patient Time In/Time Out Time In: 100 Time Out: 200 Rehabilitation Potential For Stated Goals: Good Regarding Mukesh Iraheta's therapy, I certify that the treatment plan above will be carried out by a therapist or under their direction. Thank you for this referral, 
Sunita Pelaez, OT Referring Physician Signature: Todd Campa MD _______________________________ Date _____________ PAIN/SUBJECTIVE:  
Initial: Pain Intensity 1: 2   Post Session:  2/10 OCCUPATIONAL PROFILE & HISTORY:  
History of Injury/Illness (Reason for Referral): 
Patient was referred to occupational therapy for rehab following a L CVA sustained in  leaving her with severe increased muscle tone in the R hemibody. Patient had inpatient therapy following her CVA as well as outpatient PT. To date she has not had any outpatient OT. Past Medical History/Comorbidities: Ms. Sidney Hassan  has a past medical history of Anxiety, CVA (cerebral vascular accident) ([facility] (2017), Depression, HTN (hypertension), Impaired mobility, Menopause, and PTE (pulmonary thromboembolism) ([facility] (2017). Ms. Sidney Hassan  has a past surgical history that includes hx refractive surgery (); hx jennifer and bso (); hx  section; hx other surgical (); vascular surgery procedure unlist (); hx orthopaedic (Right, 2018); and hx colonoscopy. Social History/Living Environment:  
 Patient is  and lives in a 2 story home with her . Patient has an aid 3x/week to assist with with self care. Prior Level of Function/Work/Activity: 
retired Dominant Side:  
      RIGHT Ambulatory/Rehab Services H2 Model Falls Risk Assessment Risk Factors: 
     No Risk Factors Identified Ability to Rise from Chair: 
     (3)  Multiple attempts, but successful Falls Prevention Plan: Mobility Assistance Device (specify):  quad cane, R AFO, wheelchair Total: (5 or greater = High Risk): 3  
©2010 [facility] [place] States Patent #[id number]. Federal Law prohibits the replication, distribution or use without written permission from [facility] [employer] Current Medications:   
  
Current Outpatient Medications:  
  omega 3-dha-epa-fish oil (FISH OIL) 100-160-1,000 mg cap, Take 1 Cap by mouth daily. , Disp: , Rfl:  
  amLODIPine (NORVASC) 5 mg tablet, Take 1 Tab by mouth daily. (Patient taking differently: Take 5 mg by mouth every morning.), Disp: 90 Tab, Rfl: 1 
  labetalol (NORMODYNE) 300 mg tablet, Take 1 Tab by mouth two (2) times a day., Disp: 180 Tab, Rfl: 1 
  rOPINIRole (REQUIP) 2 mg tablet, Take 1 Tab by mouth nightly. for restless leg, Disp: 90 Tab, Rfl: 1 
  tiZANidine (ZANAFLEX) 4 mg tablet, 4mg po TID (Patient taking differently: Take 4 mg by mouth two (2) times a day.), Disp: 270 Tab, Rfl: 1 
  losartan-hydroCHLOROthiazide (HYZAAR) 100-25 mg per tablet, Take 1 Tab by mouth daily. , Disp: 90 Tab, Rfl: 1 
  FLUoxetine (PROZAC) 20 mg tablet, Take 2 Tabs by mouth daily. (Patient taking differently: Take 40 mg by mouth every morning.), Disp: 180 Tab, Rfl: 1 
  polyethylene glycol (MIRALAX) 17 gram/dose powder, Take 17 g by mouth daily. (Patient taking differently: Take 17 g by mouth as needed.), Disp: 510 g, Rfl: 2 Date Last Reviewed:  2/18/2020 Complexity Level: Expanded review of therapy/medical records (1-2):  MODERATE COMPLEXITY ASSESSMENT OF OCCUPATIONAL PERFORMANCE:  
Palpation:   
      Patient exhibits severe increased muscle tone in the R hemibody with R hand in a fisted position at rest. 
ROM:   
      Limited in all ranges in the RUE secondary to increased muscle tone. Functional Mobility:  
      Gait/Ambulation:  Patient ambulates with a quad cane and R AFO. Gait is slow and stiff Transfers:  independent Balance:   
      Sitting balance is good; Static standing is good-  
Physical Skills Involved: 1. Range of Motion 2. Balance 3. Activity Tolerance 4. Fine Motor Control 1. Cognitive Skills Affected (resulting in the inability to perform in a timely and safe manner): Psychosocial Skills Affected: 1. Habits/Routines Number of elements that affect the Plan of Care[de-identified] 3-5:  MODERATE COMPLEXITY CLINICAL DECISION MAKING:  
  
Assessment process, impact of co-morbidities, assessment modification\need for assistance, and selection of interventions: Analytical Complexity:MODERATE COMPLEXITY

## 2020-02-19 NOTE — PROGRESS NOTES
OUTPATIENT OCCUPATIONAL THERAPY: Daily Treatment Note 2/18/2020  Visit Count:  1      Pre-treatment Symptoms/Complaints:  Decreased functional use of the RUE secondary to R hemiparesis with severe increased tone  Pain: Initial: Pain Intensity 1: 2  Post Session:  2/10   Medications Last Reviewed:  2/18/2020  Updated Objective Findings:  See evaluation note from today   TREATMENT:     Therapeutic Exercise: ( 15 minutes): PROM to RUE followed by patient/caregiver education for PROM to RUE      Neuromuscular Re-education: (  15 minutes): Inhibition techniques to the RUE with WB and scapular protraction to decrease muscle tone of the RUE while in sitting. By end of session wrist was in neutral with fingers extended. [employer]  Treatment/Session Summary:    · Response to Treatment:  Patient tolerated treatment without complication. she was encouraged with tone reduction by end of session. .  · Communication/Consultation:  None today  · Equipment provided today:  None today  · Recommendations/Intent for next treatment session: Next visit will focus on more challenging activities to aid in improving functional use of the RUE. Zoila Prasad     Total Treatment Billable Duration:  60 minutes  OT Patient Time In/Time Out  Time In: 0100  Time Out: [address], OT    Future Appointments   Date Time Provider Azalia Alberto   2/20/2020  1:00 PM Gwendolyn Poldana, OT [facility]   2/20/2020  2:00 PM Erik Corbin, PT [facility]   2/25/2020  1:00 PM Gwendolyn Polo, OT [facility]   2/25/2020  2:00 PM rEik Corbin, PT [facility]   2/27/2020  1:00 PM Gwendolyn Poldana, OT [facility]   2/27/2020  2:15 PM Erik Corbin, PT [facility]   2/28/2020 12:10 PM [facility] DEXA BI GE LUNAR DEXA [facility]   2/28/2020  1:00 PM [facility] ART BI ROOM 2 [facility]   5/26/2020  1:45 PM MD TANG Morelos

## 2020-02-20 ENCOUNTER — HOSPITAL ENCOUNTER (OUTPATIENT)
Dept: PHYSICAL THERAPY | Age: 65
Discharge: HOME | End: 2020-02-20
Payer: MEDICARE

## 2020-02-20 PROCEDURE — 97760 ORTHOTIC MGMT&TRAING 1ST ENC: CPT

## 2020-02-20 PROCEDURE — 97112 NEUROMUSCULAR REEDUCATION: CPT

## 2020-02-20 PROCEDURE — 97110 THERAPEUTIC EXERCISES: CPT

## 2020-02-20 NOTE — PROGRESS NOTES
OUTPATIENT OCCUPATIONAL THERAPY: Daily Treatment Note 2/20/2020  Visit Count:  2      Pre-treatment Symptoms/Complaints:  Decreased functional use of the RUE secondary to R hemiparesis with severe increased tone  Pain: Initial: Pain Intensity 1: 2  Post Session:  2/10   Medications Last Reviewed:  2/20/2020  Updated Objective Findings:  See evaluation note from today   TREATMENT:     Therapeutic Exercise: (  minutes): PROM to RUE followed by patient/caregiver education for PROM to RUE      Neuromuscular Re-education: (  45 minutes): Inhibition techniques to the RUE with WB, scapular protraction and arm pulls to decrease muscle tone of the RUE while in sitting and supine. By end of session wrist was in neutral with fingers extended and patient was able to WB in sitting through an extended arm, elbow and wrist with fingers extended too! Orthotic management (15 minutes): Patient measured today and pattern made for an orthokinetic splint to aid in tone reduction and provide positioning for the R hand/wrist in a neutral position. Patient also brought existing postioning splints -  Saebo resting hand splint, this splint is very difficult for patient/caregiver to saurabh. She also has a Bioness splint that she uses daily for extension of fingers - this provides a short term benefit with little to no carryover. for the  [employer] Portal  Treatment/Session Summary:    · Response to Treatment:  Patient tolerated treatment without complication. she was encouraged with tone reduction by end of session. .  · Communication/Consultation:  None today  · Equipment provided today:  None today  · Recommendations/Intent for next treatment session: Next visit will focus on more challenging activities to aid in improving functional use of the RUE. Mendez Boone     Total Treatment Billable Duration:  60 minutes  OT Patient Time In/Time Out  Time In: 0100  Time Out: Elvin 55, OT    Future Appointments   Date Time Provider Azalia [facility]   2/25/2020  1:00 PM Kala Iyer, OT [facility]   2/25/2020  2:00 PM Kaitlin Corbin, PT [facility]   2/27/2020  1:00 PM [facility]   2/27/2020  2:15 PM [facility]   2/28/2020 12:10 PM [facility] DEXA BI GE LUNAR DEXA [facility]   2/28/2020  1:00 PM [facility] ART BI ROOM 2 [facility]   5/26/2020  1:45 PM Andry Vargas MD [facility]

## 2020-02-20 NOTE — PROGRESS NOTES
Delfina Lacy  : 1955  [place]  Secondary: [place] 3500 [facility]  [address], [address]  [phone]   [phone]        OUTPATIENT PHYSICAL THERAPY: Daily Treatment Note 2020  Visit Count:  Visit count could not be calculated. Make sure you are using a visit which is associated with an episode. Visit count:  2  ICD-10: Treatment Diagnosis: 169.35  Hemiplegia and hemiparesis following cerebral infarction  R26.81  Unsteadiness on feet  R26.2  Difficulty walking  Precautions/Allergies:   Latex; Aspirin; Banana; Lisinopril; and Nuts [tree nut]     Effective Dates: 2020 TO 2020 (90 days). Pre-treatment Symptoms/Complaints:  Pt reports that she wants to walk better. Nothing new to report today since starting therapy earlier this week. Pain: Initial: Pain Intensity 1: 0 /10 Post Session:  0/10   Medications Last Reviewed:  2020  Updated Objective Findings:   TREATMENT:     Therapeutic Exercise   ( 60 min  ):  To decrease pain, improve flexibility and motion, and increase strength. Will provide verbal and manual cues as needed to ensure proper performance of the exercises. Will increase range of motion, resistance and intensity as pt tolerates. Worked a lot in supine on hip flex, extn, knee flex, isolating knee extn. Did DF/PF against manual resistance  bridging--2 x 10, with some physical and verbal cues. Needs to work to get better wt bearing through R LE  Lower trunk rotation  Hip abd/add against manual resistance  Clam shell ex--2 x 15  Alternating step touch to 2\" and 4\"  Step ups to 4\"  Lots of gait training--main emphasis is on improving step length with L, initiating knee flex on R  Nustep--level 2  X 8 min      [employer] Portal  Treatment/Session Summary:    · Response to Treatment:  Pt tolerated therapy well today.   She did quite well starting to make some changes to improve gait. She could take several steps with better pattern, then reverted back to bad pattern. Will work on making improvements/ changes more consistent. She has very strong and obvious tone in the R leg; walks with AFO and NBQC. Her goal is to walk better. Initial emphasis with gait will be with narrowing her REVA and initiation of flex in gait. She seems well motivated and very pleasant. · Communication/Consultation:  None today  · Equipment provided today:  None today  · Recommendations/Intent for next treatment session: Next visit will focus on improving motor control with R leg, improving ease of movement, balance, and gait . Total Treatment Billable Duration:   60 min        Effective Dates: 2/18/2020 TO 5/16/2020 (90 days).   PT Patient Time In/Time Out  Time In: 0205  Time Out: [address], PT    Future Appointments   Date Time Provider Azalia Alberto   2/25/2020  1:00 PM Phoebe Dense, OT [facility]   2/25/2020  2:00 PM Roni Corbin, PT [facility]   2/27/2020  1:00 PM Phoebe Dense, OT [facility]   2/27/2020  2:15 PM Roni Corbin, PT [facility]   2/28/2020 12:10 PM [facility]FE DEXA BI GE LUNAR DEXA [facility]   2/28/2020  1:00 PM SFE ART BI ROOM 2 [facility]   3/2/2020  2:00 PM Phoebe Dense, OT [facility]   3/2/2020  3:00 PM Raysa Corbin, PT [facility]   3/5/2020  2:00 PM Phoebe Dense, OT [facility]   3/10/2020  1:00 PM Phoebe Dense, OT [facility]   3/10/2020  2:00 PM Roni Corbin, PT [facility]   3/12/2020  2:00 PM Phoebe Dense, OT [facility]   3/12/2020  3:00 PM Roni Corbin, PT [facility]   3/17/2020  1:00 PM Phoebe Dense, OT [facility]   3/17/2020  2:00 PM Roni Corbin, PT [facility]   3/19/2020  1:00 PM Phoebe Law OT [facility]   3/19/2020  2:00 PM Roni Corbin, PT [facility]   3/24/2020  1:00 PM Cassandra Ramon, Felipa Esteves, OT [facility]   3/24/2020  2:00 PM Roni Corbin, PT [facility]   3/26/2020  1:00 PM Phoebe Law, OT [facility]   3/26/2020  2:00 PM Roni Whitman, PT [facility]   3/31/2020  1:00 PM Phoebe Law, OT [facility]   3/31/2020  2:00 PM Roni Whitman, PT [facility]   5/26/2020  1:45 PM Fabricio Pierre MD [facility] FABIO MRMD

## 2020-02-25 ENCOUNTER — HOSPITAL ENCOUNTER (OUTPATIENT)
Dept: PHYSICAL THERAPY | Age: 65
Discharge: HOME | End: 2020-02-25
Payer: MEDICARE

## 2020-02-25 PROCEDURE — 97760 ORTHOTIC MGMT&TRAING 1ST ENC: CPT

## 2020-02-25 PROCEDURE — 97110 THERAPEUTIC EXERCISES: CPT

## 2020-02-26 NOTE — PROGRESS NOTES
Rex Coelho  : 1955  [place]  Secondary: [place] 3500 [facility] Therapy  [address], [address]  [phone]   [phone]        OUTPATIENT PHYSICAL THERAPY: Daily Treatment Note 2020  Visit Count:  Visit count could not be calculated. Make sure you are using a visit which is associated with an episode. Visit count:  3  ICD-10: Treatment Diagnosis: 169.35  Hemiplegia and hemiparesis following cerebral infarction  R26.81  Unsteadiness on feet  R26.2  Difficulty walking  Precautions/Allergies:   Latex; Aspirin; Banana; Lisinopril; and Nuts [tree nut]     Effective Dates: 2020 TO 2020 (90 days). Pre-treatment Symptoms/Complaints:   Nothing new to report today since starting therapy. She said she is trying to remember all the thing we try in therapy to help her walking. Pain: Initial: Pain Intensity 1: 0 /10 Post Session:  0/10   Medications Last Reviewed:  2020  Updated Objective Findings:   TREATMENT:     Therapeutic Exercise   ( 60 min  ):  To decrease pain, improve flexibility and motion, and increase strength. Will provide verbal and manual cues as needed to ensure proper performance of the exercises. Will increase range of motion, resistance and intensity as pt tolerates. Worked a lot in supine on hip flex, extn, knee flex, isolating knee extn. Hip flexion starting with leg off mat  Did DF/PF against manual resistance  bridging--2 x 10, with some physical and verbal cues.   Needs to work to get better wt bearing through R LE  Lower trunk rotation  Hip abd/add against manual resistance  Clam shell ex--2 x 15  Alternating step touch to 2\" and 4\"  Step ups to 4\"  Lots of gait training--main emphasis is on improving step length with L, initiating knee flex on R      [employer] Portal  Treatment/Session Summary:    · Response to Treatment:  Pt tolerated therapy well today. She did quite well starting to make some changes to improve gait. She could take several steps with better pattern, then reverted back to bad pattern. Will try to work on making improvements/ changes more consistent. She has very strong and obvious tone in the R leg; walks with AFO and NBQC. Her goal is to walk better. Initial emphasis with gait will be with narrowing her REVA and initiation of flex in gait. She seems well motivated and very pleasant. ·    · Communication/Consultation:  None today  · Equipment provided today:  None today  · Recommendations/Intent for next treatment session: Next visit will focus on improving motor control with R leg, improving ease of movement, balance, and gait . Total Treatment Billable Duration:   60 min        Effective Dates: 2/18/2020 TO 5/16/2020 (90 days).   PT Patient Time In/Time Out  Time In: 0200  Time Out: 0300  Vic Torres, PT    Future Appointments   Date Time Provider Azalia Alberto   2/27/2020  1:00 PM Jose Self, OT [facility]   2/27/2020  2:15 PM Rena Corbin Officer, PT [facility]   2/28/2020 12:10 PM [facility] DEXA BI GE LUNAR DEXA [facility]   2/28/2020  1:00 PM SFE ART BI ROOM 2 [facility]   3/2/2020  2:00 PM Jose Self, OT [facility]   3/2/2020  3:00 PM Raysa Corbin, PT [facility]   3/5/2020  2:00 PM Jv Brewer, OT [facility]   3/10/2020  1:00 PM Jose Self, OT [facility]   3/10/2020  2:00 PM Rena Corbin Officer, PT [facility]   3/12/2020  2:00 PM Jose Self, OT [facility]   3/12/2020  3:00 PM Rena Corbin Officer, PT [facility]   3/17/2020  1:00 PM Jose Self, OT [facility]   3/17/2020  2:00 PM Rena Corbin Officer, PT [facility]   3/19/2020  1:00 PM Jose Self, OT [facility]   3/19/2020  2:00 PM Rena Rodríguez Officer, PT [facility]   3/24/2020  1:00 PM Jose Self OT [facility]   3/24/2020  2:00 PM Rena Corbin Officer, PT [facility]   3/26/2020  1:00 PM Kevin Escalera, OT [facility]   3/26/2020  2:00 PM Emma Raphael, PT [facility]   3/31/2020  1:00 PM Kevin Escalera, OT [facility]   3/31/2020  2:00 PM Emma Raphael, PT [facility]   5/26/2020  1:45 PM Nadiya Arevalo MD [facility] CORY

## 2020-02-27 ENCOUNTER — HOSPITAL ENCOUNTER (OUTPATIENT)
Dept: PHYSICAL THERAPY | Age: 65
Discharge: HOME | End: 2020-02-27
Payer: MEDICARE

## 2020-02-27 PROCEDURE — 97112 NEUROMUSCULAR REEDUCATION: CPT

## 2020-02-27 PROCEDURE — 97110 THERAPEUTIC EXERCISES: CPT

## 2020-02-27 NOTE — PROGRESS NOTES
OUTPATIENT OCCUPATIONAL THERAPY: Daily Treatment Note 2/27/2020  Visit Count:  2      Pre-treatment Symptoms/Complaints:  Decreased functional use of the RUE secondary to R hemiparesis with severe increased tone. Patient has been wearing orthokinetic cuff and splint since it was issued at last appointment. She is able to saurabh/doff splint independently. Her  applies cuff. Pain: Initial: Pain Intensity 1: 2  Post Session:  2/10   Medications Last Reviewed:  2/27/2020  Updated Objective Findings:  See evaluation note from today   TREATMENT:     Therapeutic Exercise: (  minutes): PROM to RUE followed by patient/caregiver education for PROM to RUE      Neuromuscular Re-education: (  60 minutes): Inhibition techniques to the RUE with WB, scapular protraction and arm pulls to decrease muscle tone of the RUE while in sitting and supine. By end of session wrist was in neutral with fingers extended and patient was able to WB in sitting through an extended arm, elbow and wrist with fingers extended too! Orthotic management ( minutes): Orthokinetic splint and cuff fabricated today to aid in tone reduction and provide positioning for the R hand/wrist in a neutral position and cuff for tone reduction of biceps. Patient was able to saurabh/doff splint independently and min assist is needed to saurabh cuff.  was trained today on application of cuff. She will wear cuff for day time and splint at night and day prn. After wear of the splint fingers/wrist tone greatly reduced!     for the  [employer] Portal  Treatment/Session Summary:    · Response to Treatment:  Patient tolerated treatment without complication. she was encouraged with tone reduction by end of session. . Tone reduction of the RUE is necessary to allow for improved functional use of the RUE. Due to severe tone this may take time to allow for carryover.   · Communication/Consultation:  None today  · Equipment provided today:  Orthokinetic splint/cuff  · Recommendations/Intent for next treatment session: Next visit will focus on more challenging activities to aid in improving functional use of the RUE. Zoilacristina Prasad     Total Treatment Billable Duration:  60 minutes  OT Patient Time In/Time Out  Time In: 0100  Time Out: [address], OT    Future Appointments   Date Time Provider Azalia Alberto   2/27/2020  2:15 PM Raysa Corbin, PT [facility]   2/28/2020 12:10 PM [facility] DEXA BI GE LUNAR DEXA [facility]   2/28/2020  1:00 PM SFE ART BI ROOM 2 [facility]   3/2/2020  2:00 PM Gwendolyn Polo, OT [facility]   3/2/2020  3:00 PM Raysa Corbin, PT [facility]   3/5/2020  2:00 PM Jessica Davila, OT [facility]   3/10/2020  1:00 PM Gwendolyn Polo, OT [facility]   3/10/2020  2:00 PM Raysa Corbin, PT [facility]   3/12/2020  2:00 PM Gwendolyn Polo, OT [facility]   3/12/2020  3:00 PM Erik Corbin, PT [facility]   3/17/2020  1:00 PM Gwendolyn Polo, OT [facility]   3/17/2020  2:00 PM Erik Corbin, PT [facility]   3/19/2020  1:00 PM Gwendolyn Polo, OT [facility]   3/19/2020  2:00 PM Erik Corbin, PT [facility]   3/24/2020  1:00 PM Gwendolyn Polo, OT [facility]   3/24/2020  2:00 PM Erik Corbin, PT [facility]   3/26/2020  1:00 PM Gwendolyn Polo, OT [facility]   3/26/2020  2:00 PM Erik Corbin, PT [facility]   3/31/2020  1:00 PM Gwendolyn Polo, OT [facility]   3/31/2020  2:00 PM Erik Corbin, PT [facility]   5/26/2020  1:45 PM Isiah Beckman MD [facility] FAB[facility] MRMD

## 2020-02-27 NOTE — PROGRESS NOTES
Nuria Nguyen  : 1955  [place]  Secondary: Jesús Boateng 3500 [facility] Therapy  [address], [address]  [phone]   [phone]        OUTPATIENT PHYSICAL THERAPY: Daily Treatment Note 2020  Visit Count:  Visit count could not be calculated. Make sure you are using a visit which is associated with an episode. Visit count:  4  ICD-10: Treatment Diagnosis: 169.35  Hemiplegia and hemiparesis following cerebral infarction  R26.81  Unsteadiness on feet  R26.2  Difficulty walking  Precautions/Allergies:   Latex; Aspirin; Banana; Lisinopril; and Nuts [tree nut]     Effective Dates: 2020 TO 2020     Pre-treatment Symptoms/Complaints:  She said she is trying to remember all the thing we try in therapy to help her walking. Pain: Initial: Pain Intensity 1: 0 /10 Post Session:  0/10   Medications Last Reviewed:  2020  Updated Objective Findings:   TREATMENT:     Therapeutic Exercise   ( 55 min  ):  To decrease pain, improve flexibility and motion, and increase strength. Will provide verbal and manual cues as needed to ensure proper performance of the exercises. Will increase range of motion, resistance and intensity as pt tolerates. Nustep--10 min. Zaki Broach level 2    Working on keeping knee is good plane of movement  Worked a lot in supine on hip flex, extn, knee flex, isolating knee extn. AAROM for heel slides with R, to initiate knee flex  SAQs--30 reps  Controlled knee flex/extn against green theraband in standing   Alternating step touch to 2\" and 4\"  Step ups to 4\"  Lots of gait training--main emphasis is on improving step length with L, initiating knee flex on R  Added heel lift to R shoe to try to decrease R knee hyperextension.   Pt did well with gt training with cues for smooth swing phase on R and trying to take longer step with L      [employer] Portal  Treatment/Session Summary:    · Response to Treatment:  Pt tolerated therapy well today. She did quite well starting to make some changes to improve gait. She is making subtle changes with longer walks here, and her ability to maintain the changes more consistently is getting better. Will try to work on making improvements/ changes more consistent. She has very strong and obvious tone in the R leg; walks with AFO and NBQC. Her goal is to walk better. Initial emphasis with gait will be with narrowing her REVA and initiation of flex in gait. She seems well motivated and very pleasant. ·    · Communication/Consultation:  None today  · Equipment provided today:  None today  · Recommendations/Intent for next treatment session: Next visit will focus on improving motor control with R leg, improving ease of movement, balance, and gait . Total Treatment Billable Duration:   55 min        Effective Dates: 2/18/2020 TO 5/16/2020 (90 days).   PT Patient Time In/Time Out  Time In: 0215  Time Out: 3126  Mindy Mercado, PT    Future Appointments   Date Time Provider Azalia Alberto   2/28/2020 12:10 PM [facility] DEXA BI GE LUNAR DEXA [facility]   2/28/2020  1:00 PM SFE ART BI ROOM 2 [facility]   3/2/2020  2:00 PM Xiao Laming, OT [facility]   3/2/2020  3:00 PM Estela Corbin, PT [facility]   3/5/2020  2:00 PM Vanessa Bailey, OT [facility]   3/10/2020  1:00 PM Xiao Laming, OT [facility]   3/10/2020  2:00 PM Estela Corbin, PT [facility]   3/12/2020  2:00 PM Crawford Laming, OT [facility]   3/12/2020  3:00 PM Estela Corbin, PT [facility]   3/17/2020  1:00 PM Crawford Laming, OT [facility]   3/17/2020  2:00 PM Estela Corbin, PT [facility]   3/19/2020  1:00 PM Xiao Laming, OT [facility]   3/19/2020  2:00 PM Estela Vargas, PT [facility]   3/24/2020  1:00 PM Xiao Laming, OT [facility]   3/24/2020  2:00 PM Estela Corbin, PT [facility] 3/26/2020  1:00 PM Phoebe Thanh, OT [facility]   3/26/2020  2:00 PM Roni Whitman, PT [facility]   3/31/2020  1:00 PM Phoebe Dense, OT [facility]   3/31/2020  2:00 PM Roni Whitman, PT [facility]   5/26/2020  1:45 PM Fabric[facility] Pierre [facility]

## 2020-02-28 ENCOUNTER — HOSPITAL ENCOUNTER (OUTPATIENT)
Dept: MAMMOGRAPHY | Age: 65
Discharge: HOME | End: 2020-02-28
Attending: FAMILY MEDICINE
Payer: MEDICARE

## 2020-02-28 DIAGNOSIS — Z12.31 VISIT FOR SCREENING MAMMOGRAM: ICD-10-CM

## 2020-02-28 DIAGNOSIS — Z78.0 MENOPAUSE: ICD-10-CM

## 2020-02-28 PROCEDURE — 77067 SCR MAMMO BI INCL CAD: CPT

## 2020-02-28 PROCEDURE — 77080 DXA BONE DENSITY AXIAL: CPT

## 2020-03-02 ENCOUNTER — HOSPITAL ENCOUNTER (OUTPATIENT)
Dept: PHYSICAL THERAPY | Age: 65
Discharge: HOME | End: 2020-03-02
Payer: MEDICARE

## 2020-03-02 PROCEDURE — 97112 NEUROMUSCULAR REEDUCATION: CPT

## 2020-03-02 PROCEDURE — 97110 THERAPEUTIC EXERCISES: CPT

## 2020-03-02 NOTE — PROGRESS NOTES
OUTPATIENT OCCUPATIONAL THERAPY: Daily Treatment Note 3/2/2020  Visit Count:  2      Pre-treatment Symptoms/Complaints:  Decreased functional use of the RUE secondary to R hemiparesis with severe increased tone. Patient has been wearing orthokinetic cuff and splint. \"I think my hand is relaxing more. \"  Pain: Initial: Pain Intensity 1: 2  Post Session:  2/10   Medications Last Reviewed:  3/2/2020  Updated Objective Findings:  See evaluation note from today   TREATMENT:     Therapeutic Exercise: (  minutes): PROM to RUE followed by patient/caregiver education for PROM to RUE      Neuromuscular Re-education: (  60 minutes): Inhibition techniques to the RUE with WB, scapular protraction and arm pulls to decrease muscle tone of the RUE while in sitting and supine. By end of session wrist was in neutral with fingers extended and patient was able to WB in sitting through an extended arm, elbow and wrist with fingers extended too! Orthotic management ( minutes): Orthokinetic splint and cuff fabricated today to aid in tone reduction and provide positioning for the R hand/wrist in a neutral position and cuff for tone reduction of biceps. Patient was able to saurabh/doff splint independently and min assist is needed to saurabh cuff.  was trained today on application of cuff. She will wear cuff for day time and splint at night and day prn. After wear of the splint fingers/wrist tone greatly reduced!     for the  [employer] Portal  Treatment/Session Summary:    · Response to Treatment:  Patient tolerated treatment without complication. she was encouraged with tone reduction by end of session. . Tone reduction of the RUE is necessary to allow for improved functional use of the RUE. Due to severe tone this may take time to allow for carryover.   · Communication/Consultation:  None today  · Equipment provided today:  Orthokinetic splint/cuff  · Recommendations/Intent for next treatment session: Next visit will focus on more challenging activities to aid in improving functional use of the RUE. Shyanne Jara     Total Treatment Billable Duration:  60 minutes  OT Patient Time In/Time Out  Time In: 0200  Time Out: [address], OT    Future Appointments   Date Time Provider Azalia Alberto   3/5/2020  2:00 PM Yamilex Ibarra, [place] [facility]   3/10/2020  1:00 PM Delia Ehsan, OT [facility]   3/10/2020  2:00 PM Abdulaziz Corbin Ales, PT [facility]   3/12/2020  2:00 PM Delia [place], OT [facility]   3/12/2020  3:00 PM Abdulaziz Corbin Ales, PT [facility]   3/17/2020  1:00 PM Delia Ehsan, OT [facility]   3/17/2020  2:00 PM Abdulaziz Corbin Ales, PT [facility]   3/19/2020  1:00 PM Delia Ehsan, OT [facility]   3/19/2020  2:00 PM Abdulaziz Corbin Ales, PT [facility]   3/24/2020  1:00 PM Delia Ehsan, OT [facility]   3/24/2020  2:00 PM Abdulaziz Corbin Ales, PT [facility]   3/26/2020  1:00 PM Delia Ehsan, OT [facility]   3/26/2020  2:00 PM Abdulaziz Cruz Ales, PT [facility]   3/31/2020  1:00 PM Delia [place], OT [facility]   3/31/2020  2:00 PM Abdulaziz Corbin Ales, PT [facility]   5/26/2020  1:45 PM Vkitoriya Vazquez MD [facility]

## 2020-03-02 NOTE — PROGRESS NOTES
Lissett Perry  : 1955  [place]  Secondary: Jesús Boateng 3500 [facility] [place] [address] Therapy  [address], [address]  [phone]   [phone]        OUTPATIENT PHYSICAL THERAPY: Daily Treatment Note 3/2/2020    Visit count:  5  ICD-10: Treatment Diagnosis: 169.35  Hemiplegia and hemiparesis following cerebral infarction  R26.81  Unsteadiness on feet  R26.2  Difficulty walking  Precautions/Allergies:   Latex; Aspirin; Banana; Lisinopril; and Nuts [tree nut]     Effective Dates: 2020 TO 2020     Pre-treatment Symptoms/Complaints:  She said she is trying to remember all the thing we try in therapy to help her walking. Feels like she is improving a little. Pain: Initial: Pain Intensity 1: 0 /10 Post Session:  0/10   Medications Last Reviewed:  3/2/2020  Updated Objective Findings:   TREATMENT:     Therapeutic Exercise   ( 55 min  ):  To decrease pain, improve flexibility and motion, and increase strength. Will provide verbal and manual cues as needed to ensure proper performance of the exercises. Will increase range of motion, resistance and intensity as pt tolerates. Controlled knee flex/extn against green theraband in standing--each leg   Alternating step touch to 2\" and 4\"  Step ups to 4\"  Lots of gait training--main emphasis is on improving step length with L, making smooth step with R  heel lift to R shoe seems to help decrease R knee hyperextension a little . Pt did well with gt training with cues for smooth swing phase on R and trying to take longer step with L  Did some work using [employer] for [employer] seemed to appreciate this    [employer] Portal  Treatment/Session Summary:    · Response to Treatment:  Pt seems to enjoy therapy; she certainly works hard to do all we ask. She did quite well starting to make some changes to improve gait.  She is making subtle changes with longer walks here, and her ability to maintain the changes more consistently is getting better. Will try to work on making improvements/ changes more consistent. Discussed the things for her to work on some at home with her  so he can help reinforce a better gait pattern. She has very strong and obvious tone in the R leg; walks with AFO and NBQC. Her goal is to walk better. Initial emphasis with gait will be with narrowing her REVA and initiation of flex in gait. ·    · Communication/Consultation:  None today  · Equipment provided today:  None today  · Recommendations/Intent for next treatment session: Next visit will focus on improving motor control with R leg, improving ease of movement, balance, and gait . Total Treatment Billable Duration:   55 min        Effective Dates: 2/18/2020 TO 5/16/2020 (90 days).   PT Patient Time In/Time Out  Time In: 0594  Time Out: 4[facility],     Future Appointments   Date Time Provider Azalia Alberto   3/5/2020  2:00 PM Sylvia Sen [facility]   3/10/2020  1:00 PM Eri Kady, OT [facility]   3/10/2020  2:00 PM Keila Corbin, PT [facility]   3/12/2020  2:00 PM Eri Kady, OT [facility]   3/12/2020  3:00 PM Keila Corbin, PT [facility]   3/17/2020  1:00 PM Eri Kady, OT [facility]   3/17/2020  2:00 PM Keila Corbin, PT [facility]   3/19/2020  1:00 PM Eri Kady, OT [facility]   3/19/2020  2:00 PM Keila Corbin, PT [facility]   3/24/2020  1:00 PM Eri Kady, OT [facility]   3/24/2020  2:00 PM Keila Corbin, PT [facility]   3/26/2020  1:00 PM Eri Kady, OT [facility]   3/26/2020  2:00 PM Keila Becerra, PT [facility]   3/31/2020  1:00 PM Eri Kady, OT [facility]   3/31/2020  2:00 PM Keila Corbin, [place] [facility]   5/26/2020  1:45 PM Chantal Zapata MD [facility] JHONATAND

## 2020-03-05 ENCOUNTER — HOSPITAL ENCOUNTER (OUTPATIENT)
Dept: PHYSICAL THERAPY | Age: 65
Discharge: HOME | End: 2020-03-05
Payer: MEDICARE

## 2020-03-05 PROCEDURE — 97112 NEUROMUSCULAR REEDUCATION: CPT

## 2020-03-05 NOTE — PROGRESS NOTES
OUTPATIENT OCCUPATIONAL THERAPY: Daily Treatment Note 3/5/2020  Visit Count:  2      Pre-treatment Symptoms/Complaints:  No new complaints   Pain: Initial: Pain Intensity 1: 2  Post Session:  2/10   Medications Last Reviewed:  3/5/2020  Updated Objective Findings:  None Today   TREATMENT:     Therapeutic Exercise: (  minutes): PROM to RUE followed by patient/caregiver education for PROM to RUE      Neuromuscular Re-education: (  60 minutes): PROM and Inhibition techniques to the RUE with WB, scapular protraction to decrease muscle tone of the RUE while in sitting and supine. PNF technique of working in diagonal pattern with min verbal cues and max tactile cues utilized to stretch muscles and improve ROM. Orthotic management ( minutes):      for the  [employer] Portal  Treatment/Session Summary:    · Response to Treatment:  Patient tolerated treatment without complication. she was encouraged with tone reduction by end of session. . Tone reduction of the RUE is necessary to allow for improved functional use of the RUE. She is tolerating splint well and pleased with results. · Communication/Consultation:  None today  · Equipment provided today:  None today  · Recommendations/Intent for next treatment session: Next visit will focus on more challenging activities to aid in improving functional use of the RUE. [facility]     Total Treatment Billable Duration:  60 minutes  OT Patient Time In/Time Out  Time In: 0200  Time Out: Sydney Miles, OT    Future Appointments   Date Time Provider Azalia Alberto   3/10/2020  1:00 PM Sabina Prasad, OT [facility]   3/10/2020  2:00 PM Aman Corbin, PT [facility]   3/12/2020  2:00 PM Sabina Prasad OT [facility]   3/12/2020  3:00 PM Aman Corbin, PT [facility]   3/17/2020  1:00 PM Sabina Prasad OT [facility]   3/17/2020  2:00 PM Aman Owusu, PT [facility]   3/19/2020  1:00 PM Sabina Prasad, OT [facility] 3/19/2020  2:00 PM Raysa Corbin, PT [facility]   3/24/2020  1:00 PM Phoebe Dense, OT [facility]   3/24/2020  2:00 PM Roni Whitman, PT [facility]   3/26/2020  1:00 PM Phoebe Dense, OT [facility]   3/26/2020  2:00 PM Roni Whitman, PT [facility]   3/31/2020  1:00 PM Phoebe Dense, OT [facility]   3/31/2020  2:00 PM Roni Whitman, PT [facility]   5/26/2020  1:45 PM Fabricio Pierre MD [facility]

## 2020-03-10 ENCOUNTER — HOSPITAL ENCOUNTER (OUTPATIENT)
Dept: PHYSICAL THERAPY | Age: 65
Discharge: HOME | End: 2020-03-10
Payer: MEDICARE

## 2020-03-10 PROCEDURE — 97112 NEUROMUSCULAR REEDUCATION: CPT

## 2020-03-10 PROCEDURE — 97110 THERAPEUTIC EXERCISES: CPT

## 2020-03-10 NOTE — PROGRESS NOTES
Lindsey Roche  : 1955  [place]  Secondary: Jesús Boateng [address]00 [facility] Therapy  [address], [address]  [phone]   [phone]        OUTPATIENT PHYSICAL THERAPY: Daily Treatment Note 3/10/2020    Visit count:  6  ICD-10: Treatment Diagnosis: 169.35  Hemiplegia and hemiparesis following cerebral infarction  R26.81  Unsteadiness on feet  R26.2  Difficulty walking  Precautions/Allergies:   Latex; Aspirin; Banana; Lisinopril; and Nuts [tree nut]     Effective Dates: 2020 TO 2020     Pre-treatment Symptoms/Complaints:  She said she is trying to remember all the thing we try in therapy to help her walking. Feels like she is improving a little. Pain: Initial: Pain Intensity 1: 0 /10 Post Session:  0/10   Medications Last Reviewed:  3/10/2020  Updated Objective Findings:   TREATMENT:     Therapeutic Exercise   ( 55 min  ):  To decrease pain, improve flexibility and motion, and increase strength. Will provide verbal and manual cues as needed to ensure proper performance of the exercises. Will increase range of motion, resistance and intensity as pt tolerates. Lots of work on the mat:  R foot stretching, bridging, bridging with L leg crossed on R ( to increase RLE wt bearing). Hip abd/add in hooklying, against manual resistance  Mass pattern ex in supine for RLE--has significant difficulty initiating flexion  Flexion in sidelying  Alternating step touch to 2\" and 4\"  Step ups to 4\"  Lots of gait training--main emphasis is on improving step length with L, making smooth step with R  Pt did well with gt training with cues for smooth swing phase on R and trying to take longer step with L  Also worked on sit to stand, maintaining R leg in proper position--shared this with  so he can cue her.      [employer] Portal  Treatment/Session Summary:    · Response to Treatment:  Pt seems to enjoy therapy; she certainly works hard to do all we ask. She did quite well starting to make some changes to improve gait. She is making subtle changes with longer walks here, and her ability to maintain the changes more consistently is getting better. Will try to work on making improvements/ changes more consistent. Discussed the things for her to work on some at home with her  so he can help reinforce a better gait pattern. She has very strong and obvious tone in the R leg; walks with AFO and NBQC. Initial emphasis with gait will be with narrowing her REVA and taking equal length steps. ·    · Communication/Consultation:  None today  · Equipment provided today:  None today  · Recommendations/Intent for next treatment session: Next visit will focus on improving motor control with R leg, improving ease of movement, balance, and gait . Total Treatment Billable Duration:   55 min        Effective Dates: 2/18/2020 TO 5/16/2020 (90 days).   PT Patient Time In/Time Out  Time In: 0200  Time Out: 0300  [facility], PT    Future Appointments   Date Time Provider Azalia Alberto   3/12/2020  2:00 PM Lelon Raring, OT [facility]   3/12/2020  3:00 PM Myra Corbin, PT [facility]   3/17/2020  1:00 PM Lelon Raring, OT [facility]   3/17/2020  2:00 PM Myra Corbin, PT [facility]   3/19/2020  1:00 PM Lelon Raring, OT [facility]   3/19/2020  2:00 PM Myra Corbin, PT [facility]   3/24/2020  1:00 PM Lelon Raring, OT [facility]   3/24/2020  2:00 PM Myra Corbin, PT [facility]   3/26/2020  1:00 PM Lelon Raring, OT [facility]   3/26/2020  2:00 PM Myra Bell, PT [facility]   3/31/2020  1:00 PM Lelon Raring, OT [facility]   3/31/2020  2:00 PM Myra Bell, PT [facility]   5/26/2020  1:45 PM Danny Leon MD [facility] MRMD

## 2020-03-10 NOTE — PROGRESS NOTES
OUTPATIENT OCCUPATIONAL THERAPY: Daily Treatment Note 3/10/2020  Visit Count:  2      Pre-treatment Symptoms/Complaints:  No new complaints - patient tolerates orthokinetic splint well and reports R hand/wrist feel looser. Pain: Initial: Pain Intensity 1: 2  Post Session:  2/10   Medications Last Reviewed:  3/10/2020  Updated Objective Findings:  None Today   TREATMENT:     Therapeutic Exercise: (  minutes): PROM to RUE followed by patient/caregiver education for PROM to RUE      Neuromuscular Re-education: (  60 minutes): PROM and Inhibition techniques to the RUE with WB, scapular protraction to decrease muscle tone of the RUE while in sitting and supine. PNF technique of working in diagonal pattern with min verbal cues and max tactile cues utilized to stretch muscles, improve ROM and dynamic sitting balance on the R hemibody. Orthotic management ( minutes):      for the  [employer] Portal  Treatment/Session Summary:    · Response to Treatment:  Patient tolerated treatment without complication. she was encouraged with tone reduction by end of session. . Tone reduction of the RUE is necessary to allow for improved functional use of the RUE. She is tolerating splint well and pleased with results. · Communication/Consultation:  None today  · Equipment provided today:  None today  · Recommendations/Intent for next treatment session: Next visit will focus on more challenging activities to aid in improving functional use of the RUE. Estelle Bajwa     Total Treatment Billable Duration:  60 minutes  OT Patient Time In/Time Out  Time In: 0100  Time Out: [address], OT    Future Appointments   Date Time Provider Azalia Alberto   3/12/2020  2:00 PM Cal Baca OT [facility]   3/12/2020  3:00 PM Teresa Corbin, PT [facility]   3/17/2020  1:00 PM Cal Baca OT [facility]   3/17/2020  2:00 PM Teresa Rebolledo, PT [facility]   3/19/2020  1:00 PM Cal Baca OT [facility]   3/19/2020  2:00 PM Raysa Corbin, PT [facility]   3/24/2020  1:00 PM Cookie Parjessica, OT [facility]   3/24/2020  2:00 PM SummerTawnya Trotter, PT [facility]   3/26/2020  1:00 PM Cookie Parjessica, OT [facility]   3/26/2020  2:00 PM SummerTawnya Trotter, PT [facility]   3/31/2020  1:00 PM Cookie Parjessica, OT [facility]   3/31/2020  2:00 PM SummerTawnya Trotter, PT [facility]   5/26/2020  1:45 PM Campos Licona MD [facility]

## 2020-03-12 ENCOUNTER — HOSPITAL ENCOUNTER (OUTPATIENT)
Dept: PHYSICAL THERAPY | Age: 65
Discharge: HOME | End: 2020-03-12
Payer: MEDICARE

## 2020-03-12 PROCEDURE — 97112 NEUROMUSCULAR REEDUCATION: CPT

## 2020-03-12 PROCEDURE — 97110 THERAPEUTIC EXERCISES: CPT

## 2020-03-12 NOTE — PROGRESS NOTES
OUTPATIENT OCCUPATIONAL THERAPY: Daily Treatment Note 3/12/2020  Visit Count:  2      Pre-treatment Symptoms/Complaints:  No new complaints - patient tolerates orthokinetic splint well and reports R hand/wrist feel looser. Pain: Initial: Pain Intensity 1: 2  Post Session:  2/10   Medications Last Reviewed:  3/12/2020  Updated Objective Findings:  None Today   TREATMENT:     Therapeutic Exercise: (  minutes): PROM to RUE followed by patient/caregiver education for PROM to RUE      Neuromuscular Re-education: (  60 minutes): PROM and Inhibition techniques to the RUE with WB, scapular protraction to decrease muscle tone of the RUE while in sitting and supine. PNF technique of working in diagonal pattern with min verbal cues and max tactile cues utilized to stretch muscles, improve ROM and dynamic sitting balance on the R hemibody. Arm glide was used with RUE and patient was able to grasp with R hand and glide in forward flexion in gravity eliminated plane! This was encouraging for patient as it did not increased flexor tone of RUE. Orthokinetic splint/cuff donned at end of session. Orthotic management ( minutes):      for the  [employer] Portal  Treatment/Session Summary:    · Response to Treatment:  Patient tolerated treatment without complication. she was encouraged with tone reduction by end of session. . Tone reduction of the RUE is necessary to allow for improved functional use of the RUE. She is tolerating splint well and pleased with results. · Communication/Consultation:  None today  · Equipment provided today:  None today  · Recommendations/Intent for next treatment session: Next visit will focus on more challenging activities to aid in improving functional use of the RUE. Efraín Mcdermott     Total Treatment Billable Duration:  60 minutes  OT Patient Time In/Time Out  Time In: 0200  Time Out: [address], OT    Future Appointments   Date Time Provider Azalia Alberto   3/17/2020  1:00 PM Birgit Mar Traci Noonan, OT [facility]   3/17/2020  2:00 PM Estela Corbin, PT [facility]   3/19/2020  1:00 PM Xiao Laming, OT [facility]   3/19/2020  2:00 PM Estela Vargas, PT [facility]   3/24/2020  1:00 PM [place] Laming, OT [facility]   3/24/2020  2:00 PM Estela Vargas, PT [facility]   3/26/2020  1:00 PM Xiao Laming, OT [facility]   3/26/2020  2:00 PM Estela Vargas, PT [facility]   3/31/2020  1:00 PM Xiao Laming, OT [facility]   3/31/2020  2:00 PM Estela Vargas, PT [facility]   5/26/2020  1:45 PM Daryl Dela Cruz MD [facility]

## 2020-03-12 NOTE — PROGRESS NOTES
Tanisha Ivory  : 1955  [place]  Secondary: Jesús Boateng 3500 [facility] Therapy  [address], [address]  [phone]   [phone]        OUTPATIENT PHYSICAL THERAPY: Daily Treatment Note 3/12/2020    Visit count: 7  ICD-10: Treatment Diagnosis: 169.35  Hemiplegia and hemiparesis following cerebral infarction  R26.81  Unsteadiness on feet  R26.2  Difficulty walking  Precautions/Allergies:   Latex; Aspirin; Banana; Lisinopril; and Nuts [tree nut]     Effective Dates: 2020 TO 2020     Pre-treatment Symptoms/Complaints:  She said she feels like she is improving a little. Pain: Initial: Pain Intensity 1: 0 /10 Post Session:  0/10   Medications Last Reviewed:  3/12/2020  Updated Objective Findings:   TREATMENT:     Therapeutic Exercise   ( 55 min  ):  To decrease pain, improve flexibility and motion, and increase strength. Will provide verbal and manual cues as needed to ensure proper performance of the exercises. Will increase range of motion, resistance and intensity as pt tolerates. Lots of work on the mat:  R foot stretching, bridging,  Hip abd/add in hooklying, against manual resistance  Mass pattern ex in supine for RLE--has significant difficulty initiating flexion  Alternating step touch to 2\" and 4\"  Step ups to 4\", to work on stepping down backward for eccentric R knee   Lots of gait training--main emphasis is on improving step length with L, making smooth step with R  Pt did well with gt training with cues for smooth swing phase on R and trying to take longer step with L and a more narrow REVA  Also worked on sit to stand, maintaining R leg in proper position--    [employer] Portal  Treatment/Session Summary:    · Response to Treatment:  Pt seems to enjoy therapy; she certainly works hard to do all we ask. She did quite well starting to make some changes to improve gait. She is making subtle changes with longer walks here, and her ability to maintain the changes more consistently is getting better. She was showing nice improvement in REVA and initiating swing phase with R. Will try to work on making improvements/ changes more consistent. Discussed the things for her to work on some at home with her  so he can help reinforce a better gait pattern. Initial emphasis with gait will be with narrowing her REVA and taking equal length steps. ·    · Communication/Consultation:  None today  · Equipment provided today:  None today  · Recommendations/Intent for next treatment session: Next visit will focus on improving motor control with R leg, improving ease of movement, balance, and gait . Total Treatment Billable Duration:   55 min        Effective Dates: 2/18/2020 TO 5/16/2020 (90 days).   PT Patient Time In/Time Out  Time In: 0300  Time Out: 0400  Itzel Kaufman PT    Future Appointments   Date Time Provider Azalia Alberto   3/17/2020  1:00 PM Lonell Large, OT [facility]   3/17/2020  2:00 PM Peggy Corbin, PT [facility]   3/19/2020  1:00 PM Lonell Large, OT [facility]   3/19/2020  2:00 PM Shaquille Peggyjuan Santo, PT [facility]   3/24/2020  1:00 PM Lonell Large, OT [facility]   3/24/2020  2:00 PM Peggy Corbin, PT [facility]   3/26/2020  1:00 PM Lonell Large, OT [facility]   3/26/2020  2:00 PM Peggy Kaufman, PT [facility]   3/31/2020  1:00 PM Lonell Large, OT [facility]   3/31/2020  2:00 PM Julien Rangel Peggyjuan Santo, PT [facility]   5/26/2020  1:45 PM Patric Elizabeth MD [facility]

## 2020-03-17 ENCOUNTER — HOSPITAL ENCOUNTER (OUTPATIENT)
Dept: PHYSICAL THERAPY | Age: 65
Discharge: HOME | End: 2020-03-17
Payer: MEDICARE

## 2020-03-17 ENCOUNTER — HOSPITAL ENCOUNTER (OUTPATIENT)
Dept: PHYSICAL THERAPY | Age: 65
End: 2020-03-17
Payer: MEDICARE

## 2020-03-17 NOTE — PROGRESS NOTES
OUTPATIENT DAILY NOTE    NAME/AGE/GENDER: Yoanna Tello is a 71 y.o. female. DATE: 3/17/2020    Ms. Edmonds Earnest for today's appointment due to self quarantine for [address]. Beba Jiménez, OT

## 2020-03-19 ENCOUNTER — APPOINTMENT (OUTPATIENT)
Dept: PHYSICAL THERAPY | Age: 65
End: 2020-03-19
Payer: MEDICARE

## 2020-03-24 ENCOUNTER — APPOINTMENT (OUTPATIENT)
Dept: PHYSICAL THERAPY | Age: 65
End: 2020-03-24
Payer: MEDICARE

## 2020-03-26 ENCOUNTER — APPOINTMENT (OUTPATIENT)
Dept: PHYSICAL THERAPY | Age: 65
End: 2020-03-26
Payer: MEDICARE

## 2020-03-31 ENCOUNTER — APPOINTMENT (OUTPATIENT)
Dept: PHYSICAL THERAPY | Age: 65
End: 2020-03-31
Payer: MEDICARE

## 2020-05-13 ENCOUNTER — HOSPITAL ENCOUNTER (OUTPATIENT)
Dept: PHYSICAL THERAPY | Age: 65
Discharge: HOME | End: 2020-05-13
Payer: MEDICARE

## 2020-05-13 PROCEDURE — 97110 THERAPEUTIC EXERCISES: CPT

## 2020-05-13 PROCEDURE — 97112 NEUROMUSCULAR REEDUCATION: CPT

## 2020-05-13 PROCEDURE — 97763 ORTHC/PROSTC MGMT SBSQ ENC: CPT

## 2020-05-13 NOTE — PROGRESS NOTES
OUTPATIENT OCCUPATIONAL THERAPY: Daily Treatment Note 5/13/2020  Visit Count:  2      Pre-treatment Symptoms/Complaints:  No new complaints - patient has not been seen at this facility since 3/12/20 due to closure of this clinic secondary to [address]. She is here today and reports her splint has caused a blister in the web space of her thumb and that her RUE is very tight. She is scheduled for Botox injections on 5/19/20. She has continued with HEP for inhibition techniques for the RUE as well as B PROM. Pain: Initial:    Post Session:  2/10   Medications Last Reviewed:  5/13/2020  Updated Objective Findings:  None Today   TREATMENT:     Therapeutic Exercise: (  minutes): PROM to RUE followed by patient/caregiver education for PROM to RUE      Neuromuscular Re-education: (  30 minutes): PROM and Inhibition techniques to the RUE  scapular protraction and arm pulls to decrease muscle tone of the RUE while in supine. Arm glide was used with RUE and patient was able to grasp with R hand and glide in forward flexion in gravity eliminated plane although did increase tone of RUE today - most likely due to need for Botox injection. Orthotic management (25 minutes): Orthokinetic splint adjusted at web space of R thumb and padding added to ease pressure and increase comfort. Splint donned at end of session. for the  [employer] Portal  Treatment/Session Summary:    · Response to Treatment:  Patient tolerated treatment without complication. she was encouraged with tone reduction by end of session. . Tone reduction of the RUE is necessary to allow for improved functional use of the RUE. Once patient has her Botox injection next week tone in RUE should decrease allowing ability to address functional of RUE.    · Communication/Consultation:  None today  · Equipment provided today:  None today  · Recommendations/Intent for next treatment session: Next visit will focus on more challenging activities to aid in improving functional use of the RUE. Abbie Lauren     Total Treatment Billable Duration:  60 minutes  OT Patient Time In/Time Out  Time In: 0300  Time Out: Braden Lovett, OT    Future Appointments   Date Time Provider Azalia Alberto   5/18/2020  2:00 PM Peterson Corbin, PT [facility]   5/18/2020  3:00 PM Lawerence Delude, OT [facility]   5/21/2020  2:00 PM Lawerence Delude, OT [facility]   5/21/2020  3:00 PM Peterson Lane, PT [facility]   5/26/2020  1:45 PM Richar Lawrence MD [facility]   5/28/2020  2:00 PM Lawerence Delude, OT [facility]   5/28/2020  3:00 PM Peterson Corbin, [facility]

## 2020-05-13 NOTE — PROGRESS NOTES
Dayne Gomez  : 1955  [place]  Secondary: Jesús Boateng 3500 [facility] Therapy  [address], [address]  [phone]   [phone]        OUTPATIENT PHYSICAL THERAPY: Daily Treatment Note 2020    Visit count:  8  ICD-10: Treatment Diagnosis: 169.35  Hemiplegia and hemiparesis following cerebral infarction  R26.81  Unsteadiness on feet  R26.2  Difficulty walking  Precautions/Allergies:   Latex; Aspirin; Banana; Lisinopril; and Nuts [tree nut]     Effective Dates: 2020 TO 2020     Pre-treatment Symptoms/Complaints:  She said she feels very stiff. Has not walked much today. Overall does not feel too much different since she stopped therapy. Pain: Initial: Pain Intensity 1: 0 /10 Post Session:  0/10   Medications Last Reviewed:  2020  Updated Objective Findings:   TREATMENT:     Therapeutic Exercise   ( 55 min  ):  To decrease pain, improve flexibility and motion, and increase strength. Will provide verbal and manual cues as needed to ensure proper performance of the exercises. Will increase range of motion, resistance and intensity as pt tolerates. Lots of work on the mat:  R foot stretching, AAROM to try to promote isolated and controlled active movement. She has lots of difficulty with active DF.     bridging,  Hip abd/add in hooklying, against manual resistance. SLR with L with focus on R hip stabilization  Mass pattern ex in supine for RLE--has significant difficulty initiating flexion  Worked in sidelying, with manual asst, on isolated hip flex and extn, and on isolating knee flex/extn----lots of difficulty with this due to strong tone  Lots of work on stand to sit, keeping R foot on the floor.   Also worked on sit to stand with R foot on floor  Static stepping with L to take longer steps and promote better RLE wt bearing   Lots of gait training--main emphasis is on improving step length with L, making smooth step with R  Pt did well with gt training with cues for smooth swing phase on R and trying to take longer step with L and a more narrow REVA    [employer] Portal  Treatment/Session Summary:    · Response to Treatment:  Pt seems to enjoy therapy; she certainly works hard to do all we ask. Her R leg motion and positioning are strongly influenced by tone and since her stroke was almost 3 yrs ago, she may not be able to make tremendous changes. Gait should still benefit with subtle changes of patterns. Before we stopped therapy due to [address] she was starting to make some improvements with gait. Will try to recapture those improvements and work to improve her habits of moving to help decrease influence of tone. Will try to work on making improvements/ changes more consistent. ·    · Communication/Consultation:  None today  · Equipment provided today:  None today  · Recommendations/Intent for next treatment session: Next visit will focus on improving motor control with R leg, improving ease of movement, balance, and gait . Total Treatment Billable Duration:   55 min        Effective Dates: 2/18/2020 TO 5/16/2020 (90 days).   PT Patient Time In/Time Out  Time In: 0200  Time Out: 0300  Partha Deleon, FRANCOIS    Future Appointments   Date Time Provider Azalia Alberto   5/18/2020  2:00 PM Jennifer Corbin Cera, PT [facility]   5/18/2020  3:00 PM Marcellus Babinski, OT [facility]   5/21/2020  2:00 PM Marcellus Babinski, OT [facility]   5/21/2020  3:00 PM Jennifer Corbin Cera, PT [facility]   5/26/2020  1:45 PM Robert Barba MD [facility]IO MRMD   5/26/2020  3:00 PM Jennifer Corbin Cera, PT [facility]   5/28/2020  2:00 PM Marcellus Babinski, OT [facility]   5/28/2020  3:00 PM Jennifer Corbin Cera, PT [facility]

## 2020-05-18 ENCOUNTER — HOSPITAL ENCOUNTER (OUTPATIENT)
Dept: PHYSICAL THERAPY | Age: 65
Discharge: HOME | End: 2020-05-18
Payer: MEDICARE

## 2020-05-18 PROCEDURE — 97112 NEUROMUSCULAR REEDUCATION: CPT

## 2020-05-18 PROCEDURE — 97535 SELF CARE MNGMENT TRAINING: CPT

## 2020-05-18 PROCEDURE — 97110 THERAPEUTIC EXERCISES: CPT

## 2020-05-18 NOTE — PROGRESS NOTES
Mabeline Patches  : 1955  [place]  Secondary: Jesús Boateng [address] [facility] at [facility] Therapy  [address], [address]  [phone]   [phone]        OUTPATIENT PHYSICAL THERAPY: Daily Treatment Note 2020    Visit count:  9  ICD-10: Treatment Diagnosis: 169.35  Hemiplegia and hemiparesis following cerebral infarction  R26.81  Unsteadiness on feet  R26.2  Difficulty walking  Precautions/Allergies:   Latex; Aspirin; Banana; Lisinopril; and Nuts [tree nut]     Effective Dates: 2020 through 8 -     Pre-treatment Symptoms/Complaints:  Nothing new to report. Pain: Initial: Pain Intensity 1: 0 /10 Post Session:  0/10   Medications Last Reviewed:  2020  Updated Objective Findings:   TREATMENT:     Therapeutic Exercise   ( 55 min  ):  To decrease pain, improve flexibility and motion, and increase strength. Will provide verbal and manual cues as needed to ensure proper performance of the exercises. Will increase range of motion, resistance and intensity as pt tolerates. Lots of work on the mat:  R foot stretching, AAROM to try to promote isolated and controlled active movement. She has lots of difficulty with active DF R ankle. .     bridging,  Hip abd/add in hooklying, against manual resistance. SLR with L with focus on R hip stabilization  Mass pattern ex in supine for RLE--pt did much better today with initiating R hip and knee flexion  Worked in sidelying, with manual asst for hip abd  Did lots of work on the mat for stretching:  Able to get into sitting erendira-cross. She did lots of overhead and rotation stretches from this posn  Also got into kneeling--with arm support on stool. Able to maintain pretty equal wt bearing and balance in kneeling. Gait training with emphasis on more equal step length and fang in gait.      [facility]  Treatment/Session Summary: · Response to Treatment:  Pt seems to enjoy therapy; she certainly works hard to do all we ask. She was very pleased to be able to work in cross leg sitting and in kneeling. She would like to be able to get on the floor to exercise at home. Problem will be getting up. Her R leg motion and positioning are strongly influenced by tone and since her stroke was almost 3 yrs ago, she may not be able to make tremendous changes in motor control. Gait should still benefit with subtle changes of patterns. Before we stopped therapy due to [address] she was starting to make some improvements with gait. Will try to recapture those improvements and work to improve her habits of moving to help decrease influence of tone. Will try to work on making improvements/ changes more consistent. ·    · Communication/Consultation:  None today  Pt also has OT here. Therapists have frequent discussions re: progress and plans. · Equipment provided today:  None today  · Recommendations/Intent for next treatment session: Next visit will focus on improving motor control with R leg, improving ease of movement, balance, and gait . Total Treatment Billable Duration:   55 min        Effective Dates: 2/18/2020 TO 5/16/2020 (90 days).   PT Patient Time In/Time Out  Time In: 0200  Time Out: 0255  Francheska Elliott PT    Future Appointments   Date Time Provider Azalia Alberto   5/21/2020  2:00 PM Jamee Barahona OT [facility]   5/21/2020  3:00 PM Katiuska Corbin, PT [facility]   5/26/2020  1:45 PM Raya Story MD [facility] MRMD   5/28/2020  2:00 PM Jamee Barahona OT [facility]   5/28/2020  3:00 PM Katiuska Corbin, PT [facility]

## 2020-05-18 NOTE — PROGRESS NOTES
OUTPATIENT OCCUPATIONAL THERAPY: Daily Treatment Note 5/18/2020  Visit Count:  2      Pre-treatment Symptoms/Complaints:  No new complaints - she reports splint is more comfortable after adjustments that were made at last appointment. She is scheduled for Botox injections on 5/26/20. She has continued with HEP for inhibition techniques for the RUE as well as B PROM. Pain: Initial: Pain Intensity 1: 2  Post Session:  2/10   Medications Last Reviewed:  5/18/2020  Updated Objective Findings:  None Today   TREATMENT:     Therapeutic Exercise: (  minutes): PROM to RUE followed by patient/caregiver education for PROM to RUE      Neuromuscular Re-education: (  30 minutes): PROM and Inhibition techniques to the RUE  scapular protraction and arm pulls to decrease muscle tone of the RUE while in supine. Arm glide was used with RUE and patient was able to grasp with R hand and glide in forward flexion in gravity eliminated plane with less increased tone noted today. Orthotic management ( minutes): Orthokinetic splint adjusted at web space of R thumb and padding added to ease pressure and increase comfort. Splint donned at end of session. Self care: (25 minutes):  Patient expressed she would like to be able to carry items from one place to another on her own - presently she is not able to do this due to hemiplegia. Explored adaptive rollator possibility for ease in carrying items one handed -- one was found on the internet. Patient was very encouraged such a rollator existed. Therapy will also incorporate functional mobility allowing patient to use rollator one handed to carry items from one room to another. [email or web address] Portal  Treatment/Session Summary:    · Response to Treatment:  Patient tolerated treatment without complication. she was encouraged with tone reduction by end of session. . Tone reduction of the RUE is necessary to allow for improved functional use of the RUE.  Once patient has her Botox injection next week tone in RUE should decrease allowing ability to address functional of RUE. · Communication/Consultation:  None today  · Equipment provided today:  None today  · Recommendations/Intent for next treatment session: Next visit will focus on more challenging activities to aid in improving functional use of the RUE. Jose Rafael Mckeon     Total Treatment Billable Duration:  55 minutes  OT Patient Time In/Time Out  Time In: 0300  Time Out: Braden Lovett, OT    Future Appointments   Date Time Provider Azalia Alberto   5/21/2020  2:00 PM KAYODE Romero [facility]   5/21/2020  3:00 PM Mary Kay Corbin, PT LOYD FERNANDO   5/26/2020  1:45 PM Av Lu MD [facility] FABIO FABIO MRMD   5/28/2020  2:00 PM KAYODE Romero   5/28/2020  3:00 PM Mary Kay Corbin, PT JEANETTEOST MARIYAIUM

## 2020-05-18 NOTE — THERAPY RECERTIFICATION
Magali Obando  : 1955  [place]  Secondary: Jesús Boateng [address] [facility]  [address], [address]  [phone]   [phone]          OUTPATIENT PHYSICAL THERAPY: Recertification     ICD-10: Treatment Diagnosis: 169.35  Hemiplegia and hemiparesis following cerebral infarction  R26.81  Unsteadiness on feet  R26.2  Difficulty walking  Precautions/Allergies:   Latex; Aspirin; Banana; Lisinopril; and Nuts [tree nut]   TREATMENT PLAN:  ---->  Effective Dates:   through 8- (90 days). Frequency/Duration: 2 times a week for 90 Day(s), and upon reassessment will adjust frequency and duration as progress indicates. MEDICAL/REFERRING DIAGNOSIS:  Other cerebral infarction [I63.89]   DATE OF ONSET: 2017  REFERRING PHYSICIAN: Cadence Nguyen MD MD Orders: Rogelio Cabrera and Treat    Return MD Appointment: TBD     INITIAL ASSESSMENT:  Ms. Lluvia Lobo presents with R hemiplegia from CVA in 2017. She has had a good bit of therapy, both in-pt and out-pt. She still should benefit from continuing with out-pt therapies for help with improving motor control, motor patterns, mobility and ambulation. She is well motivated, eager about PT. She has strong tone and given the length of time since onset, it will be difficult to make major gains. However given her recovery to this point and her apparent motivation about therapy she is expected to continue to maximize her recovery to allow improved ease and independence in moving. RECERTIFICATION UPDATE:  Pt has attended 8 visits to PT for rehab due to R hemiplegia from CVA in 2017. She is independent in simple, basic mobility for household distances, but has developed bad gait patterns. Has lots of extensor tone in the R LE and little functional active ankle movement. Gait is with NBQC and with R AFO.  She has little to no R knee flexion, R hip is severely retracted and moderate to severe toe out. She probably has a little potential to improve some of those habits, chilo step length and hip positioning. Pt works very hard in PT. Goals listed below are still pertinent. Pt is also getting OT for RUE. Will continue PT for a brief period longer to make more progress toward improving gait. PROBLEM LIST (Impacting functional limitations):  1. Decreased Strength  2. Decreased ADL/Functional Activities  3. Decreased Ambulation Ability/Technique  4. Decreased Balance  5. Decreased Activity Tolerance  6. Decreased Flexibility/Joint Mobility  7. Decreased Hughes with Home Exercise Program  8. INTERVENTIONS PLANNED: (Treatment may consist of any combination of the following)  1. Balance Exercise  2. Gait Training  3. Home Exercise Program (HEP)  4. Manual Therapy  5. Neuromuscular Re-education/Strengthening  6. Range of Motion (ROM)  7. Therapeutic Exercise/Strengthening  8. Modalities as needed and appropriate, including Aquatics and taping  9. GOALS: (Goals have been discussed and agreed upon with patient.)  Short-Term Functional Goals: Time Frame: 4 weeks  1. Pt to be able to perform sit to stand 8 x in 30 sec, for improved balance with transitions-- on going  2. Pt able to isolate hip and knee flexion in ex in supine-- improving but not fully met  3. Pt able to initiate step up to 4\" step with R leg, for improved strength-- not met  Discharge Goals: Time Frame: 10 weeks-  LTGs not reassessed  1. Pt to amb > 50' without asst device, for ease of household activities   2. Assess amb with st cane, for  Improved balance  3.  Pt to be independent in HEP for maintaining gains made in PT    OUTCOME MEASURE:   Tool Used: Lower Extremity Functional Scale (LEFS)  Score:  Initial: 15/80 Most Recent: 28 /80 (Date: 5-18 )   Interpretation of Score: 20 questions each scored on a 5 point scale with 0 representing \"extreme difficulty or unable to perform\" and 4 representing \"no difficulty\". The lower the score, the greater the functional disability. 80/80 represents no disability. Minimal detectable change is 9 points. MEDICAL NECESSITY:   · Patient is expected to demonstrate progress in strength, balance, coordination and functional technique  ·  to increase independence with mobility and ambulation and help to promote return to walking with better pattern and pace. · .  REASON FOR SERVICES/OTHER COMMENTS:  · Patient continues to require skilled intervention due to hemiplegia on R side from CVA in sept 2017. · .  Total Duration:  PT Patient Time In/Time Out  Time In: 0200  Time Out: 6208    Rehabilitation Potential For Stated Goals: [facility] therapy, I certify that the treatment plan above will be carried out by a therapist or under their direction. Thank you for this referral,  Sampson Johnson PT     Referring Physician Signature: Daisy Jacinto MD _______________________________ Date _____________                                                                                                      Information below was gathered on Initial Assessment--   2-18  PAIN/SUBJECTIVE:   Initial: Pain Intensity 1: 0  Post Session:  0/10   HISTORY:   History of Injury/Illness (Reason for Referral):  Pt had a CVA affecting R side in Sept 2017. She had dense R hemiplegia in the arm and leg, in addition to some moderate to mild speech and language deficits. She had intensive in-pt therapy on the 9th floor [facility]. She continued with out-pt rehab much of 2018 at [address] [facility], part of . Pt had 2 surgeries on the R foot due to severe spasticity--one a heel cord lengthening, and another for the hammer toes. She reported that prior to the stroke she was healthy and had few risk factors.       Past Medical History/Comorbidities:   Ms. Mya Melo  has a past medical history of Anxiety, CVA (cerebral vascular accident) [facility]) (2017), Depression, HTN (hypertension), Impaired mobility, Menopause, and PTE (pulmonary thromboembolism) ([facility] [address] (2017). Ms. Víctor Neal  has a past surgical history that includes hx refractive surgery (); hx jennifer and bso (); hx  section; hx other surgical (); vascular surgery procedure unlist (2017); hx orthopaedic (Right, 2018); hx colonoscopy; and hx breast biopsy. Social History/Living Environment:     .  still works. Lives in a 2 level house with master bedroom downstairs  Prior Level of Function/Work/Activity:  Was a . Dominant Side:         RIGHT   Before the CVA    Personal Factors:          Age:  71 y.o. Ambulatory/Rehab Services H2 Model Falls Risk Assessment   Risk Factors:       (5)  History of Recent Falls [w/in 3 months] Ability to Rise from Chair:       (1)  Pushes up, successful in one attempt   Falls Prevention Plan:       Physical Limitations to Exercise (specify):  R hemiplegia, wears R AFO, used NBQC for ambulation   Total: (5 or greater = High Risk): 6    [facility] [employer]. All Rights Reserved. [facility] Patent #[id number]. Federal Law prohibits the replication, distribution or use without written permission from [facility] [employer]   Current Medications:       Current Outpatient Medications:     baclofen (LIORESAL) 10 mg tablet, , Disp: , Rfl:     omega 3-dha-epa-fish oil (FISH OIL) 100-160-1,000 mg cap, Take 1 Cap by mouth daily. , Disp: , Rfl:     amLODIPine (NORVASC) 5 mg tablet, Take 1 Tab by mouth daily. (Patient taking differently: Take 5 mg by mouth every morning.), Disp: 90 Tab, Rfl: 1    labetalol (NORMODYNE) 300 mg tablet, Take 1 Tab by mouth two (2) times a day., Disp: 180 Tab, Rfl: 1    rOPINIRole (REQUIP) 2 mg tablet, Take 1 Tab by mouth nightly.  for restless leg, Disp: 90 Tab, Rfl: 1    tiZANidine (ZANAFLEX) 4 mg tablet, 4mg po TID (Patient taking differently: Take 4 mg by mouth two (2) times a day.), Disp: 270 Tab, Rfl: 1    losartan-hydroCHLOROthiazide (HYZAAR) 100-25 mg per tablet, Take 1 Tab by mouth daily. , Disp: 90 Tab, Rfl: 1    FLUoxetine (PROZAC) 20 mg tablet, Take 2 Tabs by mouth daily. (Patient taking differently: Take 40 mg by mouth every morning.), Disp: 180 Tab, Rfl: 1    polyethylene glycol (MIRALAX) 17 gram/dose powder, Take 17 g by mouth daily. (Patient taking differently: Take 17 g by mouth as needed.), Disp: 510 g, Rfl: 2   Date Last Reviewed:  5/18/2020     Number of Personal Factors/Comorbidities that affect the Plan of Care: 3+: HIGH COMPLEXITY   EXAMINATION:   Observation:  Pt is a bright, short lady, eager and pleasant. No obvious communication or cognitive deficits. R UE shows obvious flexor tone/pattern, held fairly tightly to the body. ROM:          Passive ROM in the R leg is WDL. Tight for hip internal rotation, limiting passive motion to just past neutral .  Has R ankle DF to 95-98 °. Inversion and eversion intact. R UE not aggressively tested. Did brief PROM to loosen tone. Strength:          R LE has strong evidence of abnormal tone, worse in standing. Cannot isolate hip and knee movements in supine. Does have isolated DF, generally 3 to 3+  Strength. R LE has strong pull into abd and ER--very evident in gait. L LE has normal strength and tone. Neurological Screen:        Sensation: not formally tested. Pt has diminished light touch in the R foot, but better proximally. Functional Mobility:         Gait/Ambulation:  Pt ambs with NBQC with very altered gait:  Moderate R hip retraction, keeps R leg stiff to advance it. Leg is severely ER'd and toed out. Most of the wt bearing in stance seems to be on R heel. Pt wears Walk-on AFO. Stairs:  NT         Balance:          Impaired due to R hemiplegia. Pt denies frequent falls. Had one rare fall within 3 months but she calls that a rare occurrence.               Body Structures Involved:  1. Nerves  2. Bones  3. Joints  4. Muscles  5. Ligaments Body Functions Affected:  1. Mental  2. Sensory/Pain  3. Neuromusculoskeletal  4. Movement Related Activities and Participation Affected:  1. General Tasks and Demands  2. Mobility  3. Self Care  4. Domestic Life  5.  Community, Social and [place]   Number of elements (examined above) that affect the Plan of Care: 3: MODERATE COMPLEXITY   CLINICAL PRESENTATION:   Presentation: Evolving clinical presentation with changing clinical characteristics: MODERATE COMPLEXITY   CLINICAL DECISION MAKING:   Use of outcome tool(s) and clinical judgement create a POC that gives a: Questionable prediction of patient's progress: MODERATE COMPLEXITY

## 2020-05-18 NOTE — THERAPY RECERTIFICATION
Flavio Cons  : 1955  [place]  [place] [address] [facility] at [facility] Therapy  [address], [address]  [phone]   [phone]          OUTPATIENT OCCUPATIONAL THERAPY:Recertification Note 8955   ICD-10: Treatment Diagnosis: 169.35 hemiplegia and hemiparesis following a CVA                                               R27.8 lack of coordination  Precautions/Allergies:   Latex; Aspirin; Banana; Lisinopril; and Nuts [tree nut]   TREATMENT PLAN:  Effective Dates: 2020 TO 2020 (90 days). Frequency/Duration: 2 times a week for 90 Day(s) MEDICAL/REFERRING DIAGNOSIS:  Other cerebral infarction [I63.89]   DATE OF ONSET:   REFERRING PHYSICIAN: Rosette Hull MD MD Orders: eval and treat  Return MD Appointment: 2020     INITIAL ASSESSMENT:  Ms. Vick Robledo  was referred to occupational therapy for rehab following a L CVA sustained in  leaving her with severe increased muscle tone in the R hemibody. Patient had inpatient therapy following her CVA as well as outpatient PT. To date she has not had any outpatient OT. Patient presents with severe increased muscle tone in the R hemibody limiting her ability to functionally use it. She will benefit from OT to address severe increased muscle tone through neuromuscular techniques and orthokinetic splinting to decrease/normalize muscle tone, therapeutic exercise/activity to address activity tolerance and functional use of the RUE and Self care. Patient education will also take place with patient/caregiver. PROBLEM LIST (Impacting functional limitations):  1. Decreased ADL/Functional Activities  2. Decreased Activity Tolerance  3. Decreased Flexibility/Joint Mobility  4. increased muscle tone R hemibody INTERVENTIONS PLANNED: (Treatment may consist of any combination of the following)  1.  Activities of daily living training  2. Adaptive equipment training  3. Anil tech training  4. Manual therapy training  5. Modalities prn  6. Neuromuscular re-eduation  7. Splinting  8. Therapeutic activity  9. Therapeutic exercise  10. Patient/caregiver education     GOALS: (Goals have been discussed and agreed upon with patient.)  Short-Term Functional Goals: Time Frame: 45 GOALS MET 5/18/2020  1. Fabricate an orthokinetic splint for the R hand/wrist to aid in postioning and decreasing tone of the hand/wrist  2. Patient/caregiver to be modified independent in HEP for tone reduction techniques/PROM for the RUE  3. Patient to be able to independently saurabh/doff orthokinetic splint  Discharge Goals: Time Frame: 90 days CONTINUE GOALS (5/18/2020)  1. Patient/caregiver to be independent in HEP for PROM/tone reduction techniques for the RUE  2. Patient to improve DASH rating by 5-6 points indicating improved functional use of the RUE  3. Patient to be able to carry a purse using the [address] therapy, I certify that the treatment plan above will be carried out by a therapist or under their direction. Thank you for this referral,  Isaac Nicholson, OT     Referring Physician Signature: Jakob Leach MD _______________________________ Date _____________    OUTCOME MEASURE:   Tool Used: Disabilities of the Arm, Shoulder and Hand (DASH) Questionnaire - Quick Version  Score:  Initial: 41/55  Most Recent: X/55 (Date: -- )   Interpretation of Score: The DASH is designed to measure the activities of daily living in person's with upper extremity dysfunction or pain. Each section is scored on a 1-5 scale, 5 representing the greatest disability. The scores of each section are added together for a total score of 55. Score 11 12-19 20-28 29-37 38-45 46-54 55   Modifier CH CI CJ CK CL CM CN     ?  Carrying, Moving, and Handling Objects:     - CURRENT STATUS: CL - 60%-79% impaired, limited or restricted    - GOAL STATUS: CK - 40%-59% impaired, limited or restricted    - D/C STATUS:  ---------------To be determined---------------    MEDICAL NECESSITY:   · Skilled intervention continues to be required due to R hemiparesis with sever tone limiting functional use of RUE. REASON FOR SERVICES/OTHER COMMENTS:  · Patient continues to require skilled intervention due to decreased functional use of the RUE with severe tone as a result of L CVA. Total Duration:  OT Patient Time In/Time Out  Time In: 0300  Time Out: 9320    Rehabilitation Potential For Stated Goals: Good  Regarding Loni Iraheta's therapy, I certify that the treatment plan above will be carried out by a therapist or under their direction. Thank you for this referral,  Jeimy Lu, OT     Referring Physician Signature: Rosanna Sheehan MD _______________________________ Date _____________     PAIN/SUBJECTIVE:   Initial: Pain Intensity 1: 2   Post Session:  2/10   OCCUPATIONAL PROFILE & HISTORY:   History of Injury/Illness (Reason for Referral):  Patient was referred to occupational therapy for rehab following a L CVA sustained in  leaving her with severe increased muscle tone in the R hemibody. Patient had inpatient therapy following her CVA as well as outpatient PT. To date she has not had any outpatient OT. Past Medical History/Comorbidities:   Ms. Estrella Palacios  has a past medical history of Anxiety, CVA (cerebral vascular accident) ([facility] [address] (2017), Depression, HTN (hypertension), Impaired mobility, Menopause, and PTE (pulmonary thromboembolism) ([facility] [address] (2017). Ms. Estrella Palacios  has a past surgical history that includes hx refractive surgery (); hx jennifer and bso (); hx  section; hx other surgical (); vascular surgery procedure unlist (); hx orthopaedic (Right, 2018); hx colonoscopy; and hx breast biopsy. Social History/Living Environment:    Patient is  and lives in a 2 story home with her .   Patient has an aid 3x/week to assist with with self care. Prior Level of Function/Work/Activity:  retired  Dominant Side:         RIGHT   Ambulatory/Rehab Services H2 Model Falls Risk Assessment   Risk Factors:       No Risk Factors Identified Ability to Rise from Chair:       (3)  Multiple attempts, but successful   Falls Prevention Plan: Mobility Assistance Device (specify):  quad cane, R AFO, wheelchair   Total: (5 or greater = High Risk): 3   ©2010 [facility]. All Rights Reserved. [facility] Patent #[id number]. Federal Law prohibits the replication, distribution or use without written permission from [facility] Market Force Information   Current Medications:       Current Outpatient Medications:     baclofen (LIORESAL) 10 mg tablet, , Disp: , Rfl:     omega 3-dha-epa-fish oil (FISH OIL) 100-160-1,000 mg cap, Take 1 Cap by mouth daily. , Disp: , Rfl:     amLODIPine (NORVASC) 5 mg tablet, Take 1 Tab by mouth daily. (Patient taking differently: Take 5 mg by mouth every morning.), Disp: 90 Tab, Rfl: 1    labetalol (NORMODYNE) 300 mg tablet, Take 1 Tab by mouth two (2) times a day., Disp: 180 Tab, Rfl: 1    rOPINIRole (REQUIP) 2 mg tablet, Take 1 Tab by mouth nightly. for restless leg, Disp: 90 Tab, Rfl: 1    tiZANidine (ZANAFLEX) 4 mg tablet, 4mg po TID (Patient taking differently: Take 4 mg by mouth two (2) times a day.), Disp: 270 Tab, Rfl: 1    losartan-hydroCHLOROthiazide (HYZAAR) 100-25 mg per tablet, Take 1 Tab by mouth daily. , Disp: 90 Tab, Rfl: 1    FLUoxetine (PROZAC) 20 mg tablet, Take 2 Tabs by mouth daily. (Patient taking differently: Take 40 mg by mouth every morning.), Disp: 180 Tab, Rfl: 1    polyethylene glycol (MIRALAX) 17 gram/dose powder, Take 17 g by mouth daily.  (Patient taking differently: Take 17 g by mouth as needed.), Disp: 510 g, Rfl: 2   Date Last Reviewed:  5/18/2020   Complexity Level: Expanded review of therapy/medical records (1-2):  MODERATE COMPLEXITY   ASSESSMENT OF OCCUPATIONAL PERFORMANCE:   Palpation:          Patient exhibits severe increased muscle tone in the R hemibody with R hand in a fisted position at rest.  ROM:          Limited in all ranges in the RUE secondary to increased muscle tone. Functional Mobility:         Gait/Ambulation:  Patient ambulates with a quad cane and R AFO. Gait is slow and stiff        Transfers:  independent  Balance:          Sitting balance is good; Static standing is good-   Physical Skills Involved:  1. Range of Motion  2. Balance  3. Activity Tolerance  4. Fine Motor Control 1. Cognitive Skills Affected (resulting in the inability to perform in a timely and safe manner): Psychosocial Skills Affected:  1.  Habits/Routines   Number of elements that affect the Plan of Care[de-identified] 3-5:  MODERATE COMPLEXITY   CLINICAL DECISION MAKING:      Assessment process, impact of co-morbidities, assessment modification\need for assistance, and selection of interventions: Analytical Complexity:MODERATE COMPLEXITY

## 2020-05-21 ENCOUNTER — HOSPITAL ENCOUNTER (OUTPATIENT)
Dept: PHYSICAL THERAPY | Age: 65
Discharge: HOME | End: 2020-05-21
Payer: MEDICARE

## 2020-05-21 PROCEDURE — 97110 THERAPEUTIC EXERCISES: CPT

## 2020-05-21 PROCEDURE — 97112 NEUROMUSCULAR REEDUCATION: CPT

## 2020-05-21 NOTE — PROGRESS NOTES
OUTPATIENT OCCUPATIONAL THERAPY: Daily Treatment Note 5/21/2020  Visit Count:  2      Pre-treatment Symptoms/Complaints:  No new complaints - she reports splint is more comfortable after adjustments that were made at last appointment. She is scheduled for Botox injections on 5/26/20. She has continued with HEP for inhibition techniques for the RUE as well as B PROM. Pain: Initial: Pain Intensity 1: 2  Post Session:  2/10   Medications Last Reviewed:  5/21/2020  Updated Objective Findings:  None Today   TREATMENT:     Therapeutic Exercise: (  minutes): PROM to RUE followed by patient/caregiver education for PROM to RUE      Neuromuscular Re-education: (  55 minutes): PROM and Inhibition techniques to the RUE  scapular protraction and arm pulls to decrease muscle tone of the RUE while in supine. Arm glide was used with RUE and patient was able to grasp with R hand and glide in forward flexion in gravity eliminated plane for horizontal ABD and flexion. PNF diagonals in sitting for tone reduction followed by WB through an extended wrist in sitting! She was able to maintain this. .    Orthotic management ( minutes): Orthokinetic splint adjusted at web space of R thumb and padding added to ease pressure and increase comfort. Splint donned at end of session. Self care: ( minutes):  Patient expressed she would like to be able to carry items from one place to another on her own - presently she is not able to do this due to hemiplegia. Explored adaptive rollator possibility for ease in carrying items one handed -- one was found on the internet. Patient was very encouraged such a rollator existed. Therapy will also incorporate functional mobility allowing patient to use rollator one handed to carry items from one room to another. [facility] Portal  Treatment/Session Summary:    · Response to Treatment:  Patient tolerated treatment without complication. she was encouraged with tone reduction by end of session. [place] Tone reduction of the RUE is necessary to allow for improved functional use of the RUE. Once patient has her Botox injection next week tone in RUE should decrease allowing ability to address functional of RUE. Patient remains very motivated in therapy. · Communication/Consultation:  Patient is s een by PT at this clinic as well and regular communication occurs with PT .  · Equipment provided today:  None today  · Recommendations/Intent for next treatment session: Next visit will focus on more challenging activities to aid in improving functional use of the RUE. Jemima Elder     Total Treatment Billable Duration:  55 minutes  OT Patient Time In/Time Out  Time In: 0200  Time Out: 110 Raysa Vazquez OT    Future Appointments   Date Time Provider Azalia Alberto   5/26/2020  2:15 PM Sage Springer MD [facility] FABIO ISRAELD   5/28/2020  2:00 PM KAYODE Bahena   5/28/2020  3:00 PM Elmira Corbin, PT [facility]

## 2020-05-21 NOTE — PROGRESS NOTES
Flavio Cons  : 1955  [place]  Secondary: Jesús Boateng [address] [facility] at [facility] Therapy  [address], [address]  [phone]   [phone]        OUTPATIENT PHYSICAL THERAPY: Daily Treatment Note 2020    Visit count:  10  ICD-10: Treatment Diagnosis: 169.35  Hemiplegia and hemiparesis following cerebral infarction  R26.81  Unsteadiness on feet  R26.2  Difficulty walking  Precautions/Allergies:   Latex; Aspirin; Banana; Lisinopril; and Nuts [tree nut]     Effective Dates: 2020 through 8 -     Pre-treatment Symptoms/Complaints:  Nothing new to report. Pain: Initial: Pain Intensity 1: 0 /10 Post Session:  0/10   Medications Last Reviewed:  2020  Updated Objective Findings:   TREATMENT:     Therapeutic Exercise   ( 55 min  ):  To decrease pain, improve flexibility and motion, and increase strength. Will provide verbal and manual cues as needed to ensure proper performance of the exercises. Will increase range of motion, resistance and intensity as pt tolerates. Lots of work on the mat:  Did lots of work in [address] [place] [address] on isolating hip retraction/protraction and shoulder girdle retraction. Had pt lying over a pillow under chest for elongation of R lateral trunk  In sidelying worked on isolating hip flex and extn, and on isolated knee flexion and extn   bridging,  Also did bridging with L ankle on R knee, for better R LE wt bearing  Hip abd/add in hooklying, against manual resistance. Alternating knee extn in hooklying  Did lots of work on the mat for stretching:  Able to get into sitting erendira-cross. She did lots of overhead and rotation stretches from this posn  Gait training with emphasis on more equal step length and fang in gait.      [employer] Portal  Treatment/Session Summary:    · Response to Treatment:  Pt seems to enjoy therapy; she certainly works hard to do all we ask. She was very pleased to be able to work in cross leg sitting. She would like to be able to get on the floor to exercise at home. Problem will be getting up. ·   Gait should still benefit with subtle changes of patterns. · Before we stopped therapy due to [address] she was starting to make some improvements with gait. Will try to recapture those improvements and work to improve her habits of moving to help decrease influence of tone. Will try to work on making improvements/ changes more consistent. ·    · Communication/Consultation:  None today  Pt also has OT here. Therapists have frequent discussions re: progress and plans. · Equipment provided today:  None today  · Recommendations/Intent for next treatment session: Next visit will focus on improving motor control with R leg, improving ease of movement, balance, and gait .     Total Treatment Billable Duration:   55 min        Effective Dates:    5- through 8 -   PT Patient Time In/Time Out  Time In: 0300  Time Out: Ajit Dye PT    Future Appointments   Date Time Provider Azalia Alberto   5/26/2020  2:15 PM Tanya Noyola MD [facility] MRMD   5/28/2020  2:00 PM Stephen Ríos[facility]   5/28/2020  3:00 PM Isa Corbin, PT [facility]

## 2020-05-26 ENCOUNTER — APPOINTMENT (OUTPATIENT)
Dept: PHYSICAL THERAPY | Age: 65
End: 2020-05-26
Payer: MEDICARE

## 2020-05-28 ENCOUNTER — HOSPITAL ENCOUNTER (OUTPATIENT)
Dept: PHYSICAL THERAPY | Age: 65
Discharge: HOME | End: 2020-05-28
Payer: MEDICARE

## 2020-05-28 PROCEDURE — 97112 NEUROMUSCULAR REEDUCATION: CPT

## 2020-05-28 PROCEDURE — 97110 THERAPEUTIC EXERCISES: CPT

## 2020-05-28 NOTE — PROGRESS NOTES
OUTPATIENT OCCUPATIONAL THERAPY: Daily Treatment Note 5/28/2020  Visit Count:  2      Pre-treatment Symptoms/Complaints:  No new complaints - she received her Botox injections on 5/26/20 and reported MD was pleased with orhokinetic splint and how it reasonably maintained reduced tone in R hand/wrist as compared to not wearing it. .  She has continued with HEP for inhibition techniques for the RUE as well as B PROM. Pain: Initial: Pain Intensity 1: 2  Post Session:  2/10   Medications Last Reviewed:  5/28/2020  Updated Objective Findings:  None Today   TREATMENT:     Therapeutic Exercise: (  minutes): PROM to RUE followed by patient/caregiver education for PROM to RUE      Neuromuscular Re-education: (  55 minutes): PROM and Inhibition techniques to the RUE  scapular protraction and arm pulls to decrease muscle tone of the RUE while in supine. B dowel soheila flexion in supine - patient able to grasp dowel soheila with R hand independently. Arm glide was used in sitting with RUE and patient was able to grasp with R hand and glide in forward flexion in gravity eliminated plane for horizontal ABD and flexion. PNF diagonals in sitting for tone reduction followed by WB through an extended wrist in sitting! She was able to maintain this. .    Orthotic management ( minutes): Orthokinetic splint adjusted at web space of R thumb and padding added to ease pressure and increase comfort. Splint donned at end of session. Self care: ( minutes):  Patient expressed she would like to be able to carry items from one place to another on her own - presently she is not able to do this due to hemiplegia. Explored adaptive rollator possibility for ease in carrying items one handed -- one was found on the internet. Patient was very encouraged such a rollator existed. Therapy will also incorporate functional mobility allowing patient to use rollator one handed to carry items from one room to another.   [employer] Portal  Treatment/Session Summary:    · Response to Treatment:  Patient tolerated treatment without complication. she was encouraged with tone reduction by end of session. . Tone reduction of the RUE is necessary to allow for improved functional use of the RUE. Botox injection received for RUE should decrease allowing ability to address functional of RUE. Patient remains very motivated in therapy. · Communication/Consultation:  Patient is seen by PT at this clinic as well and regular communication occurs with PT .  · Equipment provided today:  None today  · Recommendations/Intent for next treatment session: Next visit will focus on more challenging activities to aid in improving functional use of the RUE. [address]     Total Treatment Billable Duration:  55 minutes  OT Patient Time In/Time Out  Time In: 0200  Time Out: 110 Raysa Vazquez OT    Future Appointments   Date Time Provider Azalia Alberto   6/1/2020  3:00 PM Raysa Corbin, PT [facility]   6/2/2020  4:00 PM Florestine Aaron, OT [facility]   6/9/2020  1:00 PM Florestine Aaron, OT [facility]   6/9/2020  2:00 PM Basilio Corbin, PT [facility]   6/11/2020  2:00 PM Florestine Aaron, OT [facility]   6/16/2020  2:00 PM Florestine Aaron, OT [facility]   6/16/2020  3:00 PM Basilio Corbin, PT [facility]   6/18/2020  2:00 PM Florestine Aaron, OT [facility]   6/18/2020  3:00 PM Basilio Corbin, PT [facility]   6/23/2020  2:00 PM Florestine Aaron, OT [facility]   6/23/2020  3:00 PM Basilio Lopez, PT [facility]   6/25/2020  2:00 PM Florestine Aaron, OT [facility]

## 2020-05-28 NOTE — PROGRESS NOTES
Carmenlory Tali  : 1955  [place]  Secondary: Jesús Boateng [address] [facility] Therapy  [address], [address]  [phone]   [phone]        OUTPATIENT PHYSICAL THERAPY: Daily Treatment Note 2020    Visit count:  11  ICD-10: Treatment Diagnosis: 169.35  Hemiplegia and hemiparesis following cerebral infarction  R26.81  Unsteadiness on feet  R26.2  Difficulty walking  Precautions/Allergies:   Latex; Aspirin; Banana; Lisinopril; and Nuts [tree nut]     Effective Dates: 2020 through 8 -     Pre-treatment Symptoms/Complaints:  Nothing new to report. Pain: Initial: Pain Intensity 1: 0 /10 Post Session:  0/10   Medications Last Reviewed:  2020  Updated Objective Findings:   TREATMENT:     Therapeutic Exercise   ( 55 min  ):  To decrease pain, improve flexibility and motion, and increase strength. Will provide verbal and manual cues as needed to ensure proper performance of the exercises. Will increase range of motion, resistance and intensity as pt tolerates. Lots of work on the mat with the RLE, without the brace on: hip and knee flexion, trying to modulate the  Motion. Worked on supine hip abd. Did lots of work in [address] [place] [address] on isolating hip retraction/protraction and shoulder girdle retraction. Worked in [address] [place] [address] with min asst for hip abd  In sidelying worked on isolating hip flex and extn, and on isolated knee flexion and extn   bridging,  Also did bridging with L ankle on R knee, for better R LE wt bearing  Trunk extn with LEs on ball. Tried ball rolling with just R leg--not effective  Alternating knee extn in hooklying  Passive hip flexor stretching and active hip flexor strengthening with R leg off the mat    Gait training with emphasis on more equal step length and fang in gait.      [employer] Portal  Treatment/Session Summary:    · Response to Treatment:  Pt did well in PT today. Walking out of the clinic her gait showed the most equal step length she has done. Still needs cues to keep R foot on the floor when going sit to stand. Pt seems to enjoy therapy; she certainly works hard to do all we ask. ·   Gait should still benefit with subtle changes of patterns. · Before we stopped therapy due to [address] she was starting to make some improvements with gait. Will try to recapture those improvements and work to improve her habits of moving to help decrease influence of tone. Will try to work on making improvements/ changes more consistent. ·    · Communication/Consultation:  Pt also has OT here. Therapists have frequent discussions re: progress and plans. · Equipment provided today:  None today  · Recommendations/Intent for next treatment session: Next visit will focus on improving motor control with R leg, improving ease of movement, balance, and gait .     Total Treatment Billable Duration:   55 min        Effective Dates: 5- through 8 -     PT Patient Time In/Time Out  Time In: 0300  Time Out: Ajit Dye PT    Future Appointments   Date Time Provider Azalia Alberto   6/1/2020  3:00 PM Raysa Corbin, PT [facility]   6/2/2020  4:00 PM Sweet Grass Mas, OT [facility]   6/9/2020  1:00 PM Sigrid Mas, OT [facility]   6/9/2020  2:00 PM Nilda Corbin, PT [facility]   6/11/2020  2:00 PM Sigrid Mas, OT [facility]   6/16/2020  2:00 PM Sigrid Mas, OT [facility]   6/16/2020  3:00 PM Nilda Corbin, PT [facility]   6/18/2020  2:00 PM Sigrid Mas, OT [facility]   6/18/2020  3:00 PM Nilda Corbin, PT [facility]   6/23/2020  2:00 PM Sigrid Mas, OT [facility]   6/23/2020  3:00 PM Nilda Prajapati, PT [facility]   6/25/2020  2:00 PM Sweet Grass Mas, OT [facility]

## 2020-06-01 ENCOUNTER — HOSPITAL ENCOUNTER (OUTPATIENT)
Dept: PHYSICAL THERAPY | Age: 65
Discharge: HOME | End: 2020-06-01
Payer: MEDICARE

## 2020-06-01 PROCEDURE — 97110 THERAPEUTIC EXERCISES: CPT

## 2020-06-01 NOTE — PROGRESS NOTES
Mabeline Patches  : 1955  [place]  Secondary[place] [address] [facility]  [address], [address]  [phone]   [phone]        OUTPATIENT PHYSICAL THERAPY: Daily Treatment Note 2020    Visit count:  12  ICD-10: Treatment Diagnosis: 169.35  Hemiplegia and hemiparesis following cerebral infarction  R26.81  Unsteadiness on feet  R26.2  Difficulty walking  Precautions/Allergies:   Latex; Aspirin; Banana; Lisinopril; and Nuts [tree nut]     Effective Dates: 2020 through 8 -     Pre-treatment Symptoms/Complaints:  Pt reported that she is compliant with ex at home; has a bar where she can stand and do exercises and stretches. .    Pain: Initial: Pain Intensity 1: 0 /10 Post Session:  0/10   Medications Last Reviewed:  2020  Updated Objective Findings:   TREATMENT:     Therapeutic Exercise   ( 55 min  ):  To decrease pain, improve flexibility and motion, and increase strength. Will provide verbal and manual cues as needed to ensure proper performance of the exercises. Will increase range of motion, resistance and intensity as pt tolerates. Lots of work on the mat with the RLE, without the brace on, all with lots of manual cues and faciliation: hip and knee flexion in supine, trying to modulate the motion. Worked on supine hip abd. Knee extn without hip extn    Did lots of work in [address] [place] [address] on isolating hip retraction/protraction and shoulder girdle retraction. In sidelying worked on isolating hip flex and extn, and on isolated knee flexion and extn   bridging,  And SLR, working to promote good patterns of movement  Alternating knee extn in hooklying  Gait training with emphasis on more equal step length and fang in gait. She responded well to cues to unlock R knee to initiate swing and to move the leg with much less effort.        [facility]  Treatment/Session Summary:    · Response to Treatment:  Pt did well in PT today. Walking out of the clinic her gait showed better equal step length and smoother swing phase with R.  Still needs cues to keep R foot on the floor when going sit to stand. Pt seems to enjoy therapy; she certainly works hard to do all we ask. ·   Gait should still benefit with subtle changes of patterns. · Before we stopped therapy due to [address] she was starting to make some improvements with gait. Will try to recapture those improvements and work to improve her habits of moving to help decrease influence of tone. Will try to work on making improvements/ changes more consistent. ·    · Communication/Consultation:  Pt also has OT here. Therapists have frequent discussions re: progress and plans. · Equipment provided today:  None today  · Recommendations/Intent for next treatment session: Next visit will focus on improving motor control with R leg, improving ease of movement, balance, and gait .     Total Treatment Billable Duration:   55 min        Effective Dates:    5- through 8 -   PT Patient Time In/Time Out  Time In: 0300  Time Out: Ajit Dye, PT    Future Appointments   Date Time Provider Azalia Alberto   6/2/2020  3:00 PM Donshereearia Marcell, OT [facility]   6/9/2020  1:00 PM Donshereearia Marcell, OT [facility]   6/9/2020  2:00 PM Guerrero Corbin, PT [facility]   6/11/2020  2:00 PM Donnamaria Marcell, OT [facility]   6/16/2020  2:00 PM Donshereearia Marcell, OT [facility]   6/16/2020  3:00 PM Guerrero Corbin, PT [facility]   6/18/2020  2:00 PM Donnamaria Marcell, OT [facility]   6/18/2020  3:00 PM Guerrero Corbin, PT [facility]   6/23/2020  2:00 PM Donnamaria Marcell, OT [facility]   6/23/2020  3:00 PM Guerrero Smart, PT [facility]   6/25/2020  2:00 PM Donshereearia Marcell, OT [facility]

## 2020-06-02 ENCOUNTER — HOSPITAL ENCOUNTER (OUTPATIENT)
Dept: PHYSICAL THERAPY | Age: 65
Discharge: HOME | End: 2020-06-02
Payer: MEDICARE

## 2020-06-02 PROCEDURE — 97112 NEUROMUSCULAR REEDUCATION: CPT

## 2020-06-02 PROCEDURE — 97110 THERAPEUTIC EXERCISES: CPT

## 2020-06-02 NOTE — PROGRESS NOTES
OUTPATIENT OCCUPATIONAL THERAPY: Daily Treatment Note 6/2/2020  Visit Count:  2      Pre-treatment Symptoms/Complaints:  No new complaints - she received her Botox injections on 5/26/20 and reported MD was pleased with orhokinetic splint and how it reasonably maintained reduced tone in R hand/wrist as compared to not wearing it. .  She has continued with HEP for inhibition techniques for the RUE as well as B PROM. Pain: Initial: Pain Intensity 1: 2  Post Session:  2/10   Medications Last Reviewed:  6/2/2020  Updated Objective Findings:  None Today   TREATMENT:     Therapeutic Exercise: (  minutes): PROM to RUE followed by patient/caregiver education for PROM to RUE      Neuromuscular Re-education: (  60 minutes): PROM and Inhibition techniques to the RUE  scapular protraction and arm pulls to decrease muscle tone of the RUE while in supine. B dowel soheila flexion in supine - patient able to grasp dowel soheila with R hand independently. Arm glide was used in sitting with RUE and patient was able to grasp with R hand and glide in forward flexion in gravity eliminated plane for horizontal ABD and flexion. PNF diagonals in sitting for tone reduction followed by WB through an extended wrist in sitting! She was able to maintain this. Patient education with  for completing PNF diagonals bilaterally. He demonstrated ability to perform and will do with patient as part of her HEP. Orthotic management ( minutes): Orthokinetic splint adjusted at web space of R thumb and padding added to ease pressure and increase comfort. Splint donned at end of session. Self care: ( minutes):  Patient expressed she would like to be able to carry items from one place to another on her own - presently she is not able to do this due to hemiplegia. Explored adaptive rollator possibility for ease in carrying items one handed -- one was found on the internet. Patient was very encouraged such a rollator existed.   Therapy will also incorporate functional mobility allowing patient to use rollator one handed to carry items from one room to another. [employer] Portal  Treatment/Session Summary:    · Response to Treatment:  Patient tolerated treatment without complication. she was encouraged with tone reduction by end of session. . Tone reduction of the RUE is necessary to allow for improved functional use of the RUE. Botox injection received for RUE should decrease allowing ability to address functional of RUE. Patient remains very motivated in therapy. · Communication/Consultation:  Patient is seen by PT at this clinic as well and regular communication occurs with PT .  · Equipment provided today:  None today  · Recommendations/Intent for next treatment session: Next visit will focus on more challenging activities to aid in improving functional use of the RUE. Cheryl Ulloa     Total Treatment Billable Duration:  60 minutes  OT Patient Time In/Time Out  Time In: 0300  Time Out: [address] 68, OT    Future Appointments   Date Time Provider Azalia Alberto   6/9/2020  1:00 PM Vola Codding, OT [facility]   6/9/2020  2:00 PM Rolo Corbin, PT [facility]   6/11/2020  2:00 PM Vola Codding, OT [facility]   6/16/2020  2:00 PM Vola Codding, OT [facility]   6/16/2020  3:00 PM Rolo Corbin, PT [facility]   6/18/2020  2:00 PM Vola Codding, OT [facility]   6/18/2020  3:00 PM Rolo Corbin, PT [facility]   6/23/2020  2:00 PM Vola Codding, OT [facility]   6/23/2020  3:00 PM Rolo Ibarra, PT [facility]   6/25/2020  2:00 PM Vola Codding, OT [facility]

## 2020-06-09 ENCOUNTER — HOSPITAL ENCOUNTER (OUTPATIENT)
Dept: PHYSICAL THERAPY | Age: 65
Discharge: HOME | End: 2020-06-09
Payer: MEDICARE

## 2020-06-09 PROCEDURE — 97112 NEUROMUSCULAR REEDUCATION: CPT

## 2020-06-09 PROCEDURE — 97110 THERAPEUTIC EXERCISES: CPT

## 2020-06-09 NOTE — PROGRESS NOTES
OUTPATIENT OCCUPATIONAL THERAPY: Daily Treatment Note 6/9/2020  Visit Count:  2      Pre-treatment Symptoms/Complaints:  She has no no new complaints. She has continued with HEP for inhibition techniques for the RUE as well as B PROM. Pain: Initial: Pain Intensity 1: 2  Post Session:  2/10   Medications Last Reviewed:  6/9/2020  Updated Objective Findings:  None Today   TREATMENT:     Therapeutic Exercise: (  minutes): PROM to RUE followed by patient/caregiver education for PROM to RUE      Neuromuscular Re-education: (  60 minutes): PROM and Inhibition techniques to the RUE  scapular protraction and arm pulls to decrease muscle tone of the RUE while in supine. B dowel soheila flexion in supine - patient able to grasp dowel soheila with R hand independently. Arm glide was used in sitting with RUE and patient was able to grasp with R hand and glide in forward flexion in gravity eliminated plane for horizontal ABD and flexion. PNF diagonals in sitting for tone reduction followed by WB through an extended wrist in sitting! She was able to maintain this. Orthotic management ( minutes): Orthokinetic splint adjusted at web space of R thumb and padding added to ease pressure and increase comfort. Splint donned at end of session. Self care: ( minutes):  Patient expressed she would like to be able to carry items from one place to another on her own - presently she is not able to do this due to hemiplegia. Explored adaptive rollator possibility for ease in carrying items one handed -- one was found on the internet. Patient was very encouraged such a rollator existed. Therapy will also incorporate functional mobility allowing patient to use rollator one handed to carry items from one room to another. [facility] Portal  Treatment/Session Summary:    · Response to Treatment:  Patient tolerated treatment without complication. she was encouraged with tone reduction by end of session. . Tone reduction of the RUE is necessary to allow for improved functional use of the RUE. Patient remains very motivated in therapy. · Communication/Consultation:  Patient is seen by PT at this clinic as well and regular communication occurs with PT .  · Equipment provided today:  None today  · Recommendations/Intent for next treatment session: Next visit will focus on more challenging activities to aid in improving functional use of the RUE. Nyasia Billing     Total Treatment Billable Duration:  60 minutes  OT Patient Time In/Time Out  Time In: 0100  Time Out: [address], OT    Future Appointments   Date Time Provider Azalia Alberto   6/11/2020  2:00 PM Susana Lye, OT [facility]   6/16/2020  2:00 PM Susana Lye, OT [facility]   6/16/2020  3:15 PM Mallory Corbin, PT [facility]   6/18/2020  2:00 PM Susana Lye, OT [facility]   6/18/2020  3:15 PM Mallory Thibodeaux, PT [facility]   6/23/2020  2:00 PM Susana Lye, OT [facility]   6/23/2020  3:00 PM Mallory Thibodeaux, PT [facility]   6/25/2020  2:00 PM Susana Lye, OT [facility]

## 2020-06-09 NOTE — PROGRESS NOTES
Jaimie Max  : 1955  [place]  Secondary: Jesús Boateng [address] [facility]  [address], [facility], [address]  [phone]   [phone]        OUTPATIENT PHYSICAL THERAPY: Daily Treatment Note 2020    Visit count:  13  ICD-10: Treatment Diagnosis: 169.35  Hemiplegia and hemiparesis following cerebral infarction  R26.81  Unsteadiness on feet  R26.2  Difficulty walking  Precautions/Allergies:   Latex; Aspirin; Banana; Lisinopril; and Nuts [tree nut]     Effective Dates: 2020 through 8 -     Pre-treatment Symptoms/Complaints:  Pt reported that she is compliant with ex at home; has a bar where she can stand and do exercises and dance, and stretches. .    Pain: Initial: Pain Intensity 1: 0 /10 Post Session:  0/10   Medications Last Reviewed:  2020  Updated Objective Findings:   TREATMENT:     Therapeutic Exercise   ( 55 min  ):  To decrease pain, improve flexibility and motion, and increase strength. Will provide verbal and manual cues as needed to ensure proper performance of the exercises. Will increase range of motion, resistance and intensity as pt tolerates. Lots of work on the mat with the RLE, without the brace off, all with lots of manual cues and faciliation: hip and knee flexion in supine, trying to modulate the motion. Worked on supine hip abd. Knee extn without hip extn. Bridging with equal wt bearing, knee to chest stretch. Did lots of work in [address] [place] [address] on isolating hip flexion and knee extn, and  on  Hip retraction/protraction and shoulder girdle retraction. And SLR, working to promote good patterns of movement  Alternating knee extn in hooklying  Did lots of core and balance work in sitting on the ball--she did surprisingly well with balance and isolating trunk motions  Gait training with emphasis on more equal step length and fang in gait.   She responded well to cues to unlock R knee to initiate swing and to move the leg with much less effort. [employer] Portal  Treatment/Session Summary:    · Response to Treatment:  Pt did well in PT today. She seemed pleased about being able to sit on the ball. Walking out of the clinic her gait showed better equal step length and smoother swing phase with R.  Still needs cues to keep R foot on the floor when going sit to stand. Pt seems to enjoy therapy; she certainly works hard to do all we ask. ·   Gait should still benefit from therapy to make some subtle changes of patterns. · Before we stopped therapy due to [address] she was starting to make some improvements with gait. Will try to recapture those improvements and work to improve her habits of moving to help decrease influence of tone. Will try to work on making improvements/ changes more consistent. ·    · Communication/Consultation:  Pt also has OT here. Therapists have frequent discussions re: progress and plans. · Equipment provided today:  None today  · Recommendations/Intent for next treatment session: Next visit will focus on improving motor control with R leg, improving ease of movement, balance, and gait .     Total Treatment Billable Duration:   55 min        Effective Dates:    5- through 8 -   PT Patient Time In/Time Out  Time In: 0200  Time Out: [address] PT    Future Appointments   Date Time Provider Azalia Alberto   6/11/2020  2:00 PM Esaw Party, OT [facility]   6/16/2020  2:00 PM Esaw Party, OT [facility]   6/16/2020  3:15 PM Jose Alberto Corbin, PT [facility]   6/18/2020  2:00 PM Esaw Party, OT [facility]   6/18/2020  3:15 PM Jose Alberto Corbin, PT [facility]   6/23/2020  2:00 PM Esaw Party, OT [facility]   6/23/2020  3:00 PM Jose Alberto Scott, PT [facility]   6/25/2020  2:00 PM Esaw Party, OT [facility]

## 2020-06-11 ENCOUNTER — HOSPITAL ENCOUNTER (OUTPATIENT)
Dept: PHYSICAL THERAPY | Age: 65
Discharge: HOME | End: 2020-06-11
Payer: MEDICARE

## 2020-06-11 NOTE — PROGRESS NOTES
OUTPATIENT DAILY NOTE    NAME/AGE/GENDER: Cecilia Dejesus is a 71 y.o. female. DATE: 6/11/2020    Ms. Choi  for today's appointment due to a schedule conflict. Claudine Mckeon, OT

## 2020-06-16 ENCOUNTER — HOSPITAL ENCOUNTER (OUTPATIENT)
Dept: PHYSICAL THERAPY | Age: 65
Discharge: HOME | End: 2020-06-16
Payer: MEDICARE

## 2020-06-16 PROCEDURE — 97110 THERAPEUTIC EXERCISES: CPT

## 2020-06-16 PROCEDURE — 97112 NEUROMUSCULAR REEDUCATION: CPT

## 2020-06-16 NOTE — PROGRESS NOTES
OUTPATIENT OCCUPATIONAL THERAPY: Daily Treatment Note 6/16/2020  Visit Count:  2      Pre-treatment Symptoms/Complaints:  She has no no new complaints. She has continued with HEP for inhibition techniques for the RUE as well as B PROM. Patient reports it is getting easier to put her shirts on due to tone improving in RUE. Pain: Initial: Pain Intensity 1: 2  Post Session:  2/10   Medications Last Reviewed:  6/16/2020  Updated Objective Findings:  None Today   TREATMENT:     Therapeutic Exercise: (  minutes): PROM to RUE followed by patient/caregiver education for PROM to RUE      Neuromuscular Re-education: (  60 minutes): PROM and Inhibition techniques to the RUE  scapular protraction and arm pulls to decrease muscle tone of the RUE while in supine. B dowel soheila flexion in supine - patient able to grasp dowel shoeila with R hand independently. Arm glide was used in sitting with RUE and patient was able to grasp with R hand and glide in forward flexion in gravity eliminated plane for horizontal ABD and flexion. PNF diagonals in sitting for tone reduction followed by WB through an extended wrist in sitting! She was able to maintain this. Orthotic management ( minutes): Orthokinetic splint adjusted at web space of R thumb and padding added to ease pressure and increase comfort. Splint donned at end of session. Self care: ( minutes):  Patient expressed she would like to be able to carry items from one place to another on her own - presently she is not able to do this due to hemiplegia. Explored adaptive rollator possibility for ease in carrying items one handed -- one was found on the internet. Patient was very encouraged such a rollator existed. Therapy will also incorporate functional mobility allowing patient to use rollator one handed to carry items from one room to another. [employer] Portal  Treatment/Session Summary:    · Response to Treatment:  Patient tolerated treatment without complication. she was encouraged with tone reduction by end of session. . Tone reduction of the RUE is necessary to allow for improved functional use of the RUE. This is improving as evidenced by patient reports of ability to saurabh a shirt with greater ease as tone is reducing in RUE. Patient remains very motivated in therapy. · Communication/Consultation:  Patient is seen by PT at this clinic as well and regular communication occurs with PT .  · Equipment provided today:  None today  · Recommendations/Intent for next treatment session: Next visit will focus on more challenging activities to aid in improving functional use of the RUE. Kayy Interiano     Total Treatment Billable Duration:  60 minutes  OT Patient Time In/Time Out  Time In: 0200  Time Out: [address], OT    Future Appointments   Date Time Provider Azalia Alberto   6/16/2020  3:15 PM Guerrero Corbin, PT [facility]   6/18/2020  2:00 PM Jai Faith, OT [facility]   6/18/2020  3:15 PM Guerrero Corbin, PT [facility]   6/23/2020  2:00 PM Jai Faith, OT [facility]   6/23/2020  3:00 PM Guerrero Smart, PT [facility]   6/25/2020  2:00 PM Jai Faith, OT [facility]

## 2020-06-16 NOTE — PROGRESS NOTES
Roverto Amador  : 1955  [place]  Secondary: Jesús Boateng [address] [facility]  [address], [address]  [phone]   [phone]        OUTPATIENT PHYSICAL THERAPY: Daily Treatment Note 2020    Visit count:  14  ICD-10: Treatment Diagnosis: 169.35  Hemiplegia and hemiparesis following cerebral infarction  R26.81  Unsteadiness on feet  R26.2  Difficulty walking  Precautions/Allergies:   Latex; Aspirin; Banana; Lisinopril; and Nuts [tree nut]     Effective Dates: 2020 through 8 -     Pre-treatment Symptoms/Complaints:  Nothing new to report today. Pt was screened for COVID prior to entering the clinic. Pain: Initial: Pain Intensity 1: 0 /10 Post Session:  0/10   Medications Last Reviewed:  2020  Updated Objective Findings:   TREATMENT:     Therapeutic Exercise   (45 min  ):  To decrease pain, improve flexibility and motion, and increase strength. Will provide verbal and manual cues as needed to ensure proper performance of the exercises. Will increase range of motion, resistance and intensity as pt tolerates. Sitting at [facility]:  Controlled alternating hip flex. LAQs with good control for extn, and fluid eccentric lowering  Repeated sit to stand--for work on pattern  Stepping--for improved wt bearing through R LE, and for step length with L  Gait training with st cane--this was challenging for her  Did gait training with 1# on R leg, to aid with proprioception and placement  Gait training with emphasis on more equal step length and fang in gait.   She responded well to cues to unlock R knee to initiate swing and to move the leg with much less effort.       --------------------exercises below were held today:  Lots of work on the mat with the RLE, without the brace off, all with lots of manual cues and faciliation: hip and knee flexion in supine, trying to modulate the motion. Worked on supine hip abd. Knee extn without hip extn. Bridging with equal wt bearing, knee to chest stretch. Did lots of work in [address] [place] [address] on isolating hip flexion and knee extn, and  on  Hip retraction/protraction and shoulder girdle retraction. [facility] Portal  Treatment/Session Summary:    · Response to Treatment:   Pt did well in PT today. Walking out of the clinic her gait showed better equal step length and smoother swing phase with R. She can do better with almost constant cuing but left to her own walking she seems to return to her established patterns of advancing the R leg. Still needs cues to keep R foot on the floor when going sit to stand. Pt seems to enjoy therapy; she certainly works hard to do all we ask. Will try to work on making improvements/ changes more consistent. ·    · Communication/Consultation:  Pt also has OT here. Therapists have frequent discussions re: progress and plans. · Equipment provided today:  None today  · Recommendations/Intent for next treatment session: Next visit will focus on improving motor control with R leg, improving ease of movement, balance, and gait .     Total Treatment Billable Duration:   45 min        Effective Dates:    5- through 8 -   PT Patient Time In/Time Out  Time In: 0315  Time Out: Ajit Dye, FRANCOIS    Future Appointments   Date Time Provider Azalia Alberto   6/18/2020  2:00 PM Corrinne Galli, OT [facility]   6/18/2020  3:15 PM Wale Corbin, PT [facility]   6/23/2020  2:00 PM Corrinne Galli, OT [facility]   6/23/2020  3:00 PM Wale Lipscomb, PT [facility]   6/25/2020  2:00 PM Corrinne Galli, OT [facility]

## 2020-06-18 ENCOUNTER — HOSPITAL ENCOUNTER (OUTPATIENT)
Dept: PHYSICAL THERAPY | Age: 65
Discharge: HOME | End: 2020-06-18
Payer: MEDICARE

## 2020-06-18 PROCEDURE — 97112 NEUROMUSCULAR REEDUCATION: CPT

## 2020-06-18 PROCEDURE — 97110 THERAPEUTIC EXERCISES: CPT

## 2020-06-18 NOTE — PROGRESS NOTES
Eddie Jha  : 1955  [place]  Secondary: Jesús Boateng [address] [facility] at [facility] Therapy  [address], [address]  [phone]   [phone]        OUTPATIENT PHYSICAL THERAPY: Daily Treatment Note 2020    Visit count:  15  ICD-10: Treatment Diagnosis: 169.35  Hemiplegia and hemiparesis following cerebral infarction  R26.81  Unsteadiness on feet  R26.2  Difficulty walking  Precautions/Allergies:   Latex; Aspirin; Banana; Lisinopril; and Nuts [tree nut]     Effective Dates: 2020 through 8 -     Pre-treatment Symptoms/Complaints:  Nothing new to report today. Pt was screened for COVID prior to entering the clinic. Pain: Initial: Pain Intensity 1: 0 /10 Post Session:  0/10   Medications Last Reviewed:  2020  Updated Objective Findings:   TREATMENT:     Therapeutic Exercise   (45 min  ):  To decrease pain, improve flexibility and motion, and increase strength. Will provide verbal and manual cues as needed to ensure proper performance of the exercises. Will increase range of motion, resistance and intensity as pt tolerates. Sitting at [facility]:  Controlled alternating hip flex. LAQs with good control for extn, and fluid eccentric lowering  Worked in supine on the mat:   Bridging, lower trunk rotation, alternating hip flexion from hooklying. worked with R leg off the mat--on isolated hip flex,ion, and on hip flexion then knee extn  Worked in sidelying on hip and knee extn  Stepping--for improved wt bearing through R LE, and for step length with L  Gait training with st cane--this was challenging for her  Gait training with emphasis on more equal step length and fang in gait.   She responded well to cues to unlock R knee to initiate swing and to move the leg with much less effort.       --------------------exercises below were held today:  Lots of work on the mat with the RLE, without the brace off, all with lots of manual cues and faciliation: hip and knee flexion in supine, trying to modulate the motion. Worked on supine hip abd. Knee extn without hip extn. Bridging with equal wt bearing, knee to chest stretch. Did lots of work in [address] [place] [address] on isolating hip flexion and knee extn, and  on  Hip retraction/protraction and shoulder girdle retraction. [employer] Portal  Treatment/Session Summary:    Response to Treatment:   Pt did well in PT today. Walking out of the clinic her gait showed better equal step length and smoother swing phase with R. She can do better with almost constant cuing but left to her own walking she seems to return to her established patterns of advancing the R leg. Still needs cues to keep R foot on the floor when going sit to stand. Will try to work on making improvements/ changes more consistent. Had discussion with pt and her  about weaning from PT; she has plateaued with her progress and her ability to make changes in her motor patterns. Will decrease PT to 1 x per week for 2 more weeks. ·    · Communication/Consultation:  Pt also has OT here. Therapists have frequent discussions re: progress and plans. · Equipment provided today:  None today  · Recommendations/Intent for next treatment session: Next visit will focus on improving motor control with R leg, improving ease of movement, balance, and gait .     Total Treatment Billable Duration:   45 min        Effective Dates:    5- through 8 -   PT Patient Time In/Time Out  Time In: 0315  Time Out: Ajit Dye PT    Future Appointments   Date Time Provider Azalia Alberto   6/23/2020  2:00 PM Tristen Church OT [facility]   6/23/2020  3:00 PM Yolanda Raring, Gerhardt Cords, PT [facility]   6/25/2020  2:00 PM Tristen Church OT [facility]

## 2020-06-18 NOTE — PROGRESS NOTES
OUTPATIENT OCCUPATIONAL THERAPY: Daily Treatment Note 6/18/2020  Visit Count:  2      Pre-treatment Symptoms/Complaints:  She has no no new complaints. She has continued with HEP for inhibition techniques for the RUE as well as B PROM. Patient reports it is getting easier to put her shirts on due to tone improving in RUE. Pain: Initial: Pain Intensity 1: 2  Post Session:  2/10   Medications Last Reviewed:  6/18/2020  Updated Objective Findings:  None Today   TREATMENT:     Therapeutic Exercise: (  minutes): PROM to RUE followed by patient/caregiver education for PROM to RUE      Neuromuscular Re-education: (  60 minutes): PROM and Inhibition techniques to the RUE  scapular protraction and arm pulls to decrease muscle tone of the RUE while in supine. B dowel soheila flexion in supine - patient able to grasp dowel soheila with R hand independently. Arm glide was used in sitting with RUE and patient was able to grasp with R hand and glide in forward flexion in gravity eliminated plane for horizontal ABD and flexion. PNF diagonals in sitting for tone reduction followed by WB through an extended wrist in sitting! She was able to maintain this. Pulleys used with BUE's - she was able to grasp with R hand independently and maintain while using for shoulder flexion bilaterally. Patient able to ambulate 40 ft today without orthokinetic splint on keeping RUE at her side instead of flexed synergy pattern -- her RUE remained relaxed! Orthotic management ( minutes): Orthokinetic splint adjusted at web space of R thumb and padding added to ease pressure and increase comfort. Splint donned at end of session. Self care: ( minutes):  Patient expressed she would like to be able to carry items from one place to another on her own - presently she is not able to do this due to hemiplegia. Explored adaptive rollator possibility for ease in carrying items one handed -- one was found on the internet.   Patient was very encouraged such a rollator existed. Therapy will also incorporate functional mobility allowing patient to use rollator one handed to carry items from one room to another. [employer] Portal  Treatment/Session Summary:    · Response to Treatment:  Patient tolerated treatment without complication. she was encouraged with tone reduction by end of session. . Tone reduction of the RUE is necessary to allow for improved functional use of the RUE. This is improving as evidenced by patient reports of ability to saurabh a shirt with greater ease as tone is reducing in RUE and ability to ambulate with RUE in a relaxed position versus a flexor synergy pattern! Patient remains very motivated in therapy. · Communication/Consultation:  Patient is seen by PT at this clinic as well and regular communication occurs with PT .  · Equipment provided today:  None today  · Recommendations/Intent for next treatment session: Next visit will focus on more challenging activities to aid in improving functional use of the RUE. Cheryl Ulloa     Total Treatment Billable Duration:  60 minutes  OT Patient Time In/Time Out  Time In: 0200  Time Out: [address]    Future Appointments   Date Time Provider Azalia Alberto   6/23/2020  2:00 PM Marvin Harris OT [facility]   6/23/2020  3:00 PM Rolo Ibarra, PT [facility]   6/25/2020  2:00 PM Marvin Harris OT [facility]

## 2020-06-23 ENCOUNTER — HOSPITAL ENCOUNTER (OUTPATIENT)
Dept: PHYSICAL THERAPY | Age: 65
Discharge: HOME | End: 2020-06-23
Payer: MEDICARE

## 2020-06-23 PROCEDURE — 97112 NEUROMUSCULAR REEDUCATION: CPT

## 2020-06-23 PROCEDURE — 97110 THERAPEUTIC EXERCISES: CPT

## 2020-06-23 NOTE — THERAPY DISCHARGE
Lonnie Tate  : 1955  [place]  Secondary: Jesús Boateng [address] [facility] Therapy  [address], [address]  [phone]   [phone]          OUTPATIENT PHYSICAL THERAPY: DISCHARGE SUMMARY  2020   ICD-10: Treatment Diagnosis: 169.35  Hemiplegia and hemiparesis following cerebral infarction  R26.81  Unsteadiness on feet  R26.2  Difficulty walking  Precautions/Allergies:   Latex; Aspirin; Banana; Lisinopril; and Nuts [tree nut]   TREATMENT PLAN:  ---->  Effective Dates:   through 8- (90 days). Frequency/Duration: Pt is DC'd from PT today. MEDICAL/REFERRING DIAGNOSIS:  Other cerebral infarction [I63.89]   DATE OF ONSET: 2017  REFERRING PHYSICIAN: Demetris Chua MD MD Orders: [address] and Treat    Return MD Appointment: TBD     INITIAL ASSESSMENT:  Ms. Ayan Ruff presents with R hemiplegia from CVA in 2017. She has had a good bit of therapy, both in-pt and out-pt. She still should benefit from continuing with out-pt therapies for help with improving motor control, motor patterns, mobility and ambulation. She is well motivated, eager about PT. She has strong tone and given the length of time since onset, it will be difficult to make major gains. However given her recovery to this point and her apparent motivation about therapy she is expected to continue to maximize her recovery to allow improved ease and independence in moving. DISCHARGE SUMMARY:  Pt has [id number] visits to PT for rehab due to R hemiplegia from CVA in 2017. She is independent in simple, basic mobility for household distances, but has developed bad gait patterns. Has lots of extensor tone in the R LE and little functional active ankle movement. Gait is with NBQC and with R AFO. She has little to no R knee flexion, R hip is severely retracted and moderate to severe toe out.   PT worked toward establishing better gait pattern, with more initial hip and knee flexion rather than throwing the R leg forward. Also tried to emphasise wt bearing through a flat foot rather than on the heel. Due to to long time since the stroke and the ingrained tonal patterns, pt was not able to make significant changes to her gait. She remains functional for household and short distance community ambulation with her established gait pattern. She remained very pleasant and was a hard worker throughout her rehab stint. She realizes that now is the appropriate time for DC. PROBLEM LIST (Impacting functional limitations):  1. Decreased Strength  2. Decreased ADL/Functional Activities  3. Decreased Ambulation Ability/Technique  4. Decreased Balance  5. Decreased Activity Tolerance  6. Decreased Flexibility/Joint Mobility  7. Decreased [place] with Home Exercise Program  8. GOALS: (Goals have been discussed and agreed upon with patient.)  Short-Term Functional Goals: Time Frame: 4 weeks  1. Pt to be able to perform sit to stand 8 x in 30 sec, for improved balance with transitions-- goal met  2. Pt able to isolate hip and knee flexion in ex in supine-- improving but not fully met  3. Pt able to initiate step up to 4\" step with R leg, for improved strength-- not met  Discharge Goals: Time Frame: 10 weeks-    1. Pt to amb > 50' without asst device, for ease of household activities- goal not met   2. Assess amb with st cane, for  Improved balance-- worked on this but pt not able to make the transition  3. Pt to be independent in HEP for maintaining gains made in PT-- goal met    OUTCOME MEASURE:   Tool Used: Lower Extremity Functional Scale (LEFS)  Score:  Initial: 15/80 Most Recent: 28 /80 (Date: 5-18 )   Interpretation of Score: 20 questions each scored on a 5 point scale with 0 representing \"extreme difficulty or unable to perform\" and 4 representing \"no difficulty\".   The lower the score, the greater the functional disability. 80/80 represents no disability. Minimal detectable change is 9 points. Bert Bowman PT     Referring Physician Signature: Riaz Koehler MD No Signature is Required for this note.

## 2020-06-23 NOTE — PROGRESS NOTES
Simi Norris  : 1955  [place]  [place] [address] [facility] at [facility] Therapy  [address], [address]  [phone]   [phone]        OUTPATIENT PHYSICAL THERAPY: Daily Treatment Note 2020    Visit count:  16  ICD-10: Treatment Diagnosis: 169.35  Hemiplegia and hemiparesis following cerebral infarction  R26.81  Unsteadiness on feet  R26.2  Difficulty walking  Precautions/Allergies:   Latex; Aspirin; Banana; Lisinopril; and Nuts [tree nut]     Effective Dates: 2020 through 8 -     Pre-treatment Symptoms/Complaints:  Nothing new to report today. Pt reports that she feels ready for DC from PT. Pt was screened for COVID prior to entering the clinic. Pain: Initial: Pain Intensity 1: 0 /10 Post Session:  0/10   Medications Last Reviewed:  2020  Updated Objective Findings:   TREATMENT:     Therapeutic Exercise   (45 min  ):  To decrease pain, improve flexibility and motion, and increase strength. Will provide verbal and manual cues as needed to ensure proper performance of the exercises. Will increase range of motion, resistance and intensity as pt tolerates. Reviewed exercises that she does at home for HEP:  Did work in standing while holding a rail:  Lots of hip rotation, lateral wt shift, placing foot on step--tweaked this to to try get better hip flex  Worked in supine on the mat:   Bridging, lower trunk rotation, alternating hip flexion from hooklying. Resisted mass pattern ex with R leg--manual cues for better hip and knee flex  Observed pt walking without brace--lots of toe curling and increased varus wt bearing--she is much safer to use brace  PT contacted [facility], where she got current brace. Discussed possible changes for a brace to help with gait, to decrease excessive wt bearing on the heel.   They need physician referral.  Pt sees PCP on  26--sent a note with pt to ask for referral for orthotist.  Gait training with emphasis on more equal step length and fang in gait. She responded well to cues to unlock R knee to initiate swing and to move the leg with much less effort. [employer] Portal  Treatment/Session Summary:    Response to Treatment:   Pt did well in PT today. Walking out of the clinic her gait showed better equal step length and smoother swing phase with R. She can do better with almost constant cuing but left to her own walking she seems to return to her established patterns of advancing the R leg. Pt said she remains committed to continuing to work on her HEP. She feels ready for DC from PT today. ·    · Communication/Consultation:  Pt also has OT here. Therapists have frequent discussions re: progress and plans. · Equipment provided today:  None today  · Recommendations/Intent for next treatment session:  Pt is DC'd from PT today.     Total Treatment Billable Duration:   45 min        Effective Dates:    5- through 8 -   PT Patient Time In/Time Out  Time In: 0300  Time Out: [facility], PT    Future Appointments   Date Time Provider Azalia Alberto   6/25/2020  2:00 PM Pearl Fraser OT [facility]

## 2020-06-23 NOTE — PROGRESS NOTES
OUTPATIENT OCCUPATIONAL THERAPY: Daily Treatment Note 6/23/2020  Visit Count:  2      Pre-treatment Symptoms/Complaints:  She reports orthokinetic splint is loose on hinges - \"Can this be fixed? I wear it everyday and it really helps my arm. \". She has continued with HEP for inhibition techniques for the RUE as well as B PROM. Patient reports it is getting easier to put her shirts on due to tone improving in RUE. Pain: Initial: Pain Intensity 1: 2  Post Session:  2/10   Medications Last Reviewed:  6/23/2020  Updated Objective Findings:  None Today   TREATMENT:     Therapeutic Exercise: (  minutes): PROM to RUE followed by patient/caregiver education for PROM to RUE      Neuromuscular Re-education: (  55 minutes): PROM and Inhibition techniques to the RUE  scapular protraction and arm pulls to decrease muscle tone of the RUE while in supine. B dowel soheila flexion in supine - patient able to grasp dowel soheila with R hand independently. Arm glide was used in sitting with RUE and patient was able to grasp with R hand and glide in forward flexion in gravity eliminated plane for horizontal ABD and flexion. PNF diagonals in sitting for tone reduction followed by WB through an extended wrist in sitting! She was able to maintain this. Pulleys used with BUE's - she was able to grasp with R hand independently and maintain while using for shoulder flexion bilaterally. Patient able to ambulate 100 ft today without orthokinetic splint on keeping RUE at her side instead of flexed synergy pattern -- her RUE remained relaxed! Orthotic management ( minutes): Orthokinetic splint adjusted at web space of R thumb and padding added to ease pressure and increase comfort. Splint donned at end of session. Self care: ( minutes):  Patient expressed she would like to be able to carry items from one place to another on her own - presently she is not able to do this due to hemiplegia.  Explored adaptive rollator possibility for ease in carrying items one handed -- one was found on the internet. Patient was very encouraged such a rollator existed. Therapy will also incorporate functional mobility allowing patient to use rollator one handed to carry items from one room to another. [employer] Portal  Treatment/Session Summary:    · Response to Treatment:  Patient tolerated treatment without complication. she was encouraged with tone reduction by end of session. . Tone reduction of the RUE is necessary to allow for improved functional use of the RUE. This is improving as evidenced by patient reports of ability to saurabh a shirt with greater ease as tone is reducing in RUE and ability to ambulate with RUE in a relaxed position versus a flexor synergy pattern! Patient remains very motivated in therapy, but therapy benefits are running out. Due to this she will be seen for 1 more visit to address repair of orthokinetic splint and review of HEP for inhibition techniques for RUE tone reduction. · Communication/Consultation:  Patient is seen by PT at this clinic as well and regular communication occurs with PT .  · Equipment provided today:  None today  · Recommendations/Intent for next treatment session: She will be seen for 1 more visit to address repair of orthokinetic splint and review of HEP for inhibition techniques for RUE tone reduction.   ·                            Total Treatment Billable Duration:  55 minutes  OT Patient Time In/Time Out  Time In: 0200  Time Out: 110 Raysa Vazquez OT    Future Appointments   Date Time Provider Azalia Alberto   6/25/2020  2:00 PM Krishna Ardon OT [facility]

## 2020-06-25 ENCOUNTER — HOSPITAL ENCOUNTER (OUTPATIENT)
Dept: PHYSICAL THERAPY | Age: 65
Discharge: HOME | End: 2020-06-25
Payer: MEDICARE

## 2020-06-25 PROCEDURE — 97760 ORTHOTIC MGMT&TRAING 1ST ENC: CPT

## 2020-06-25 NOTE — PROGRESS NOTES
OUTPATIENT OCCUPATIONAL THERAPY: Daily Treatment Note 6/25/2020  Visit Count:  2      Pre-treatment Symptoms/Complaints:  She reports orthokinetic splint is loose on hinges - \"Can this be fixed? I wear it everyday and it really helps my arm. \". She has continued with [facility] for inhibition techniques for the RUE as well as B PROM. Patient reports it is getting easier to put her shirts on due to tone improving in RUE. Pain: Initial: Pain Intensity 1: 2  Post Session:  2/10   Medications Last Reviewed:  6/25/2020  Updated Objective Findings:  None Today   TREATMENT:     Therapeutic Exercise: (  minutes):  Neuromuscular Re-education: (   minutes):    Orthotic management (55 minutes): Orthokinetic splint inspected today and a new one was fabricated due to hinges not being repairable. She will continue to wear this for postioning of R hand/wrist and to aid in reducing tone of the R hand/wrist.  While splint was being fabricated review of inhibition techniques took place along with B PROM for RUE for HEP . She has a good understanding of [facility] and will continue to perform daily. Self care: ( minutes):  [employer] Portal  Treatment/Session Summary:    · Response to Treatment:  Patient tolerated treatment without complication. she was encouraged with tone reduction by end of session. .Communication/Consultation:  Patient is seen by PT at this clinic as well and regular communication occurs with PT .  · Equipment provided today:  None today  · Recommendations/Intent for next treatment session: Discharge from OT to [facility] for tone reduction/PROM for RUE. Total Treatment Billable Duration:  55 minutes  OT Patient Time In/Time Out  Time In: 0200  Time Out: 110 Raysa Vazquez OT    No future appointments.

## 2020-06-25 NOTE — THERAPY DISCHARGE
Lonnie Tate  : 1955  [place]  [place] [address] [facility] at [facility] Therapy  [address], [address], [address]  [phone]   [phone]          OUTPATIENT OCCUPATIONAL THERAPY:Discharge Note 2020   ICD-10: Treatment Diagnosis: 169.35 hemiplegia and hemiparesis following a CVA                                               R27.8 lack of coordination  Precautions/Allergies:   Latex; Aspirin; Banana; Lisinopril; and Nuts [tree nut]   TREATMENT PLAN:  Effective Dates: 2020 TO 2020 (90 days). Frequency/Duration: 2 times a week for 90 Day(s) MEDICAL/REFERRING DIAGNOSIS:  Other cerebral infarction [I63.89]   DATE OF ONSET:   REFERRING PHYSICIAN: Demetris Chua MD MD Orders: eval and treat  Return MD Appointment: 2020     INITIAL ASSESSMENT:  Ms. Ayan Ruff  was referred to occupational therapy for rehab following a L CVA sustained in  leaving her with severe increased muscle tone in the R hemibody. Patient had inpatient therapy following her CVA as well as outpatient PT. To date she has not had any outpatient OT. Patient presents with severe increased muscle tone in the R hemibody limiting her ability to functionally use it. She will benefit from OT to address severe increased muscle tone through neuromuscular techniques and orthokinetic splinting to decrease/normalize muscle tone, therapeutic exercise/activity to address activity tolerance and functional use of the RUE and Self care. Patient education will also take place with patient/caregiver. PROBLEM LIST (Impacting functional limitations):  1. Decreased ADL/Functional Activities  2. Decreased Activity Tolerance  3. Decreased Flexibility/Joint Mobility  4. increased muscle tone R hemibody INTERVENTIONS PLANNED: (Treatment may consist of any combination of the following)  1.  Activities of daily living training  2. Adaptive equipment training  3. Anil tech training  4. Manual therapy training  5. Modalities prn  6. Neuromuscular re-eduation  7. Splinting  8. Therapeutic activity  9. Therapeutic exercise  10. Patient/caregiver education     GOALS: (Goals have been discussed and agreed upon with patient.)  Short-Term Functional Goals: Time Frame: 45 GOALS MET 6/25/2020  1. Fabricate an orthokinetic splint for the R hand/wrist to aid in postioning and decreasing tone of the hand/wrist  2. Patient/caregiver to be modified independent in HEP for tone reduction techniques/PROM for the RUE  3. Patient to be able to independently saurabh/doff orthokinetic splint  Discharge Goals: Time Frame: 90 days   1. Patient/caregiver to be independent in HEP for PROM/tone reduction techniques for the RUE GOAL MET 6/25/2020  2. Patient to improve DASH rating by 5-6 points indicating improved functional use of the RUE goal not met  6/25/2020  3. Patient to be able to carry a purse using the RUE  Goal was progressing 6/25/2020    Regarding Jose Iraheta's therapy, I certify that the treatment plan above will be carried out by a therapist or under their direction. Thank you for this referral,  Vance Alcala OT     Referring Physician Signature: Ki Lang MD _______________________________ Date _____________    OUTCOME MEASURE:   Tool Used: Disabilities of the Arm, Shoulder and Hand (DASH) Questionnaire - Quick Version  Score:  Initial: 41/55  Most Recent: 39   (6/25/2020)   Interpretation of Score: The DASH is designed to measure the activities of daily living in person's with upper extremity dysfunction or pain. Each section is scored on a 1-5 scale, 5 representing the greatest disability. The scores of each section are added together for a total score of 55. Score 11 12-19 20-28 29-37 38-45 46-54 55   Modifier CH CI CJ CK CL CM CN     ?  Carrying, Moving, and Handling Objects:     - CURRENT STATUS: CL - 60%-79% impaired, limited or restricted    - GOAL STATUS: CK - 40%-59% impaired, limited or restricted    - D/C STATUS:  CL - 60%-79% impaired, limited or restricted    MEDICAL NECESSITY:   · Skilled intervention continues to be required due to R hemiparesis with sever tone limiting functional use of RUE. REASON FOR SERVICES/OTHER COMMENTS:  · Patient continues to require skilled intervention due to decreased functional use of the RUE with severe tone as a result of L CVA. Total Duration:  OT Patient Time In/Time Out  Time In: 200  Time Out: 255    Rehabilitation Potential For Stated Goals: Good  Regarding Wilmer Iraheta's therapy, I certify that the treatment plan above will be carried out by a therapist or under their direction. Thank you for this referral,  Percy Landau, OT          PAIN/SUBJECTIVE:   Initial: Pain Intensity 1: 2   Post Session:  2/10   OCCUPATIONAL PROFILE & HISTORY:   History of Injury/Illness (Reason for Referral):  Patient was referred to occupational therapy for rehab following a L CVA sustained in  leaving her with severe increased muscle tone in the R hemibody. Patient had inpatient therapy following her CVA as well as outpatient PT. To date she has not had any outpatient OT. Past Medical History/Comorbidities:   Ms. Miguelina Vasquez  has a past medical history of Anxiety, CVA (cerebral vascular accident) ([address] (2017), Depression, HTN (hypertension), Impaired mobility, Menopause, and PTE (pulmonary thromboembolism) ([address] (2017). Ms. Miguelina Vasquez  has a past surgical history that includes hx refractive surgery (); hx jennifer and bso (); hx  section; hx other surgical (); vascular surgery procedure unlist (); hx orthopaedic (Right, 2018); hx colonoscopy; and hx breast biopsy. Social History/Living Environment:    Patient is  and lives in a 2 story home with her . Patient has an aid 3x/week to assist with with self care.   Prior Level of Function/Work/Activity:  retired  Dominant Side:         RIGHT   Ambulatory/Rehab Services H2 Model Falls Risk Assessment   Risk Factors:       No Risk Factors Identified Ability to Rise from Chair:       (3)  Multiple attempts, but successful   Falls Prevention Plan: Mobility Assistance Device (specify):  quad cane, R AFO, wheelchair   Total: (5 or greater = High Risk): 3   ©2010 [facility] of [employer]. All Rights Reserved. [facility] [employer] Patent #[id number]. Federal Law prohibits the replication, distribution or use without written permission from [facility] [employer]   Current Medications:       Current Outpatient Medications:     baclofen (LIORESAL) 10 mg tablet, , Disp: , Rfl:     omega 3-dha-epa-fish oil (FISH OIL) 100-160-1,000 mg cap, Take 1 Cap by mouth daily. , Disp: , Rfl:     amLODIPine (NORVASC) 5 mg tablet, Take 1 Tab by mouth daily. (Patient taking differently: Take 5 mg by mouth every morning.), Disp: 90 Tab, Rfl: 1    labetalol (NORMODYNE) 300 mg tablet, Take 1 Tab by mouth two (2) times a day., Disp: 180 Tab, Rfl: 1    rOPINIRole (REQUIP) 2 mg tablet, Take 1 Tab by mouth nightly. for restless leg, Disp: 90 Tab, Rfl: 1    tiZANidine (ZANAFLEX) 4 mg tablet, 4mg po TID (Patient taking differently: Take 4 mg by mouth two (2) times a day.), Disp: 270 Tab, Rfl: 1    losartan-hydroCHLOROthiazide (HYZAAR) 100-25 mg per tablet, Take 1 Tab by mouth daily. , Disp: 90 Tab, Rfl: 1    FLUoxetine (PROZAC) 20 mg tablet, Take 2 Tabs by mouth daily. (Patient taking differently: Take 40 mg by mouth every morning.), Disp: 180 Tab, Rfl: 1    polyethylene glycol (MIRALAX) 17 gram/dose powder, Take 17 g by mouth daily.  (Patient taking differently: Take 17 g by mouth as needed.), Disp: 510 g, Rfl: 2   Date Last Reviewed:  6/25/2020   Complexity Level: Expanded review of therapy/medical records (1-2):  MODERATE COMPLEXITY   ASSESSMENT OF OCCUPATIONAL PERFORMANCE:   Palpation:          Patient exhibits moderate/severe increased muscle tone in the R hemibody with R hand in a semi fisted position at rest.  Patient now wears an orthokinetic splint for the R wrist/hand that maintains R hand/wrist in a neutral position . She now ambulated with her RUE at her side and no longer in a flexor synergy pattern. ROM:          Limited in all ranges in the RUE secondary to increased muscle tone. Patient is able to now use a dowel soheila in supine for B shoulder flexion exercises - she is able to hold soheila independently using B hands. Functional Mobility:         Gait/Ambulation:  Patient ambulates with a quad cane and R AFO. Gait is slow and stiff        Transfers:  independent  Balance:          Sitting balance is good; Static standing is good-   Physical Skills Involved:  1. Range of Motion  2. Balance  3. Activity Tolerance  4. Fine Motor Control 1. Cognitive Skills Affected (resulting in the inability to perform in a timely and safe manner): Psychosocial Skills Affected:  1.  Habits/Routines   Number of elements that affect the Plan of Care[de-identified] 3-5:  MODERATE COMPLEXITY   CLINICAL DECISION MAKING:      Assessment process, impact of co-morbidities, assessment modification\need for assistance, and selection of interventions: Analytical Complexity:MODERATE COMPLEXITY

## 2020-10-07 ENCOUNTER — HOSPITAL ENCOUNTER (OUTPATIENT)
Dept: LAB | Age: 65
Discharge: HOME | End: 2020-10-07

## 2020-10-07 PROCEDURE — 88305 TISSUE EXAM BY PATHOLOGIST: CPT

## 2021-10-14 ENCOUNTER — TRANSCRIBE ORDER (OUTPATIENT)
Dept: SCHEDULING | Age: 66
End: 2021-10-14

## 2021-10-14 DIAGNOSIS — Z12.31 ENCOUNTER FOR SCREENING MAMMOGRAM FOR MALIGNANT NEOPLASM OF BREAST: Primary | ICD-10-CM

## 2022-03-18 PROBLEM — I61.9 STROKE, HEMORRHAGIC (HCC): Status: ACTIVE | Noted: 2017-09-07

## 2022-03-18 PROBLEM — I82.409 DVT (DEEP VENOUS THROMBOSIS) (HCC): Status: ACTIVE | Noted: 2017-09-25

## 2022-03-18 PROBLEM — I10 ACUTE SPONTANEOUS INTRAVENTRICULAR HEMORRHAGE ASSOC W/ HYPERTENSION (HCC): Status: ACTIVE | Noted: 2017-09-07

## 2022-03-18 PROBLEM — I61.5 ACUTE SPONTANEOUS INTRAVENTRICULAR HEMORRHAGE ASSOC W/ HYPERTENSION (HCC): Status: ACTIVE | Noted: 2017-09-07

## 2022-03-19 PROBLEM — K59.09 OTHER CONSTIPATION: Status: ACTIVE | Noted: 2017-10-31

## 2022-03-19 PROBLEM — H61.21 IMPACTED CERUMEN OF RIGHT EAR: Status: ACTIVE | Noted: 2018-09-13

## 2022-03-19 PROBLEM — Z12.39 SCREENING FOR BREAST CANCER: Status: ACTIVE | Noted: 2017-02-27

## 2022-03-19 PROBLEM — T17.308A CHOKING: Status: ACTIVE | Noted: 2018-10-24

## 2022-03-19 PROBLEM — I61.9 ICH (INTRACEREBRAL HEMORRHAGE) (HCC): Status: ACTIVE | Noted: 2017-09-15

## 2022-03-19 PROBLEM — I63.9 CVA (CEREBRAL VASCULAR ACCIDENT) (HCC): Status: ACTIVE | Noted: 2017-01-01

## 2022-03-19 PROBLEM — M21.371 RIGHT FOOT DROP: Status: ACTIVE | Noted: 2018-10-24

## 2022-03-19 PROBLEM — D69.6 THROMBOCYTOPENIA (HCC): Status: ACTIVE | Noted: 2017-09-25

## 2022-03-19 PROBLEM — I69.151: Status: ACTIVE | Noted: 2018-10-24

## 2022-03-19 PROBLEM — Z91.89 AT RISK FOR ASPIRATION: Status: ACTIVE | Noted: 2017-09-07

## 2022-03-19 PROBLEM — I10 ACCELERATED HYPERTENSION: Status: ACTIVE | Noted: 2017-09-07

## 2022-03-20 PROBLEM — R25.2 CRAMP IN LIMB: Status: ACTIVE | Noted: 2018-07-06

## 2022-03-20 PROBLEM — G47.09 OTHER INSOMNIA: Status: ACTIVE | Noted: 2018-07-06

## 2022-03-20 PROBLEM — I26.99 PTE (PULMONARY THROMBOEMBOLISM) (HCC): Status: ACTIVE | Noted: 2017-01-01

## 2022-03-20 PROBLEM — H91.21 SUDDEN IDIOPATHIC HEARING LOSS OF RIGHT EAR WITH UNRESTRICTED HEARING OF LEFT EAR: Status: ACTIVE | Noted: 2018-09-13

## 2022-04-15 ENCOUNTER — TRANSCRIBE ORDER (OUTPATIENT)
Dept: SCHEDULING | Age: 67
End: 2022-04-15

## 2022-04-15 DIAGNOSIS — N63.22 BREAST LUMP ON LEFT SIDE AT 10 O'CLOCK POSITION: Primary | ICD-10-CM

## 2022-05-12 ENCOUNTER — HOSPITAL ENCOUNTER (OUTPATIENT)
Dept: MAMMOGRAPHY | Age: 67
Discharge: HOME | End: 2022-05-12
Attending: FAMILY MEDICINE
Payer: MEDICARE

## 2022-05-12 DIAGNOSIS — N63.22 BREAST LUMP ON LEFT SIDE AT 10 O'CLOCK POSITION: ICD-10-CM

## 2022-05-12 PROCEDURE — 76642 ULTRASOUND BREAST LIMITED: CPT

## 2022-05-12 PROCEDURE — 77066 DX MAMMO INCL CAD BI: CPT

## 2022-06-28 ENCOUNTER — OFFICE VISIT (OUTPATIENT)
Dept: PHYSICAL MEDICINE AND REHAB | Age: 67
End: 2022-06-28
Payer: MEDICARE

## 2022-06-28 VITALS
BODY MASS INDEX: 24.14 KG/M2 | DIASTOLIC BLOOD PRESSURE: 78 MMHG | WEIGHT: 115 LBS | SYSTOLIC BLOOD PRESSURE: 130 MMHG | HEART RATE: 71 BPM | HEIGHT: 58 IN

## 2022-06-28 DIAGNOSIS — I69.151 SPASTIC HEMIPLEGIA OF RIGHT DOMINANT SIDE AS LATE EFFECT OF NONTRAUMATIC INTRAPARENCHYMAL HEMORRHAGE OF BRAIN (HCC): Primary | ICD-10-CM

## 2022-06-28 PROCEDURE — 1090F PRES/ABSN URINE INCON ASSESS: CPT | Performed by: PHYSICAL MEDICINE & REHABILITATION

## 2022-06-28 PROCEDURE — 1123F ACP DISCUSS/DSCN MKR DOCD: CPT | Performed by: PHYSICAL MEDICINE & REHABILITATION

## 2022-06-28 PROCEDURE — 99212 OFFICE O/P EST SF 10 MIN: CPT | Performed by: PHYSICAL MEDICINE & REHABILITATION

## 2022-06-28 PROCEDURE — G8427 DOCREV CUR MEDS BY ELIG CLIN: HCPCS | Performed by: PHYSICAL MEDICINE & REHABILITATION

## 2022-06-28 PROCEDURE — G8399 PT W/DXA RESULTS DOCUMENT: HCPCS | Performed by: PHYSICAL MEDICINE & REHABILITATION

## 2022-06-28 PROCEDURE — 1036F TOBACCO NON-USER: CPT | Performed by: PHYSICAL MEDICINE & REHABILITATION

## 2022-06-28 PROCEDURE — G8420 CALC BMI NORM PARAMETERS: HCPCS | Performed by: PHYSICAL MEDICINE & REHABILITATION

## 2022-06-28 PROCEDURE — 3017F COLORECTAL CA SCREEN DOC REV: CPT | Performed by: PHYSICAL MEDICINE & REHABILITATION

## 2022-06-28 RX ORDER — ATORVASTATIN CALCIUM 20 MG/1
TABLET, FILM COATED ORAL
COMMUNITY
Start: 2022-06-20

## 2022-06-28 RX ORDER — LINACLOTIDE 145 UG/1
CAPSULE, GELATIN COATED ORAL
COMMUNITY
Start: 2022-06-20

## 2022-06-28 ASSESSMENT — ENCOUNTER SYMPTOMS: GASTROINTESTINAL NEGATIVE: 1

## 2022-06-28 NOTE — PROGRESS NOTES
SUBJECTIVE:   Trevor Pritchard is a 78 y.o. female seen for a follow up visit regarding the following:     Chief Complaint   Patient presents with    Follow-up     Botox       HPI:  She returns today for follow up after receiving Botox injections into selected muscles of the right upper extremity. This occurred on 5/10/2022. She had no side effects. She comes in today accompanied by her  and both state that she is very pleased with the results. She can perform her home exercise program much easier. She had no bruising or other side effects. Fever or chills. She continues to use a quad cane for ambulation and a brace for the right lower extremity. In terms of her medications she is no longer taking baclofen. She felt that it was causing her to be too sleepy and her primary care physician stopped it for her spasticity management. She remains on 4 mg of tizanidine twice daily. So takes requip. Past Medical History, Past Surgical History, Family history, Social History, and Medications were all reviewed with the patient today and updated as necessary. Current Outpatient Medications   Medication Sig Dispense Refill    atorvastatin (LIPITOR) 20 MG tablet       LINZESS 145 MCG capsule       amLODIPine (NORVASC) 5 MG tablet Take 5 mg by mouth daily      FLUoxetine HCl, PMDD, 20 MG TABS Take 40 mg by mouth daily      labetalol (NORMODYNE) 300 MG tablet Take 300 mg by mouth 2 times daily      losartan-hydroCHLOROthiazide (HYZAAR) 100-25 MG per tablet Take 1 tablet by mouth daily      omeprazole (PRILOSEC) 40 MG delayed release capsule Take 40 mg by mouth daily      polyethylene glycol (GLYCOLAX) 17 GM/SCOOP powder Take 17 g by mouth daily      rOPINIRole (REQUIP) 2 MG tablet Take 2 mg by mouth      tiZANidine (ZANAFLEX) 4 MG tablet 4mg po TID       No current facility-administered medications for this visit.      Allergies   Allergen Reactions    Latex Hives    Banana Shortness Of Breath    Aspirin Other (See Comments)     Pt states make her feel weird and breath funny    Lisinopril Other (See Comments)    Macadamia Nut Oil Hives     Patient Active Problem List   Diagnosis    Anxiety    Acute spontaneous intraventricular hemorrhage assoc w/ hypertension ([address]    Stroke, hemorrhagic ([address]    DVT (deep venous thrombosis) ([address]    CVA (cerebral vascular accident) ([address]    Impacted cerumen of right ear    ICH (intracerebral hemorrhage) ([address]    Unipolar depression ([address]    Right foot drop    Atrophic vaginitis    Choking    Spastic hemiplegia of right dominant side as late effect of nontraumatic intraparenchymal hemorrhage of brain ([address]    Accelerated hypertension    Screening for breast cancer    At risk for aspiration    Thrombocytopenia ([address]    Other constipation    Sudden idiopathic hearing loss of right ear with unrestricted hearing of left ear    PTE (pulmonary thromboembolism) ([address]    Other insomnia    Cramp in limb     Past Medical History:   Diagnosis Date    Anxiety     CVA (cerebral vascular accident) ([address] 2017    ICH Left BG with residual R hemiparesis (> 832 systolic)-severe CVA    Depression     controlled with medications    HTN (hypertension)     controlled with med    Impaired mobility     Menopause     PTE (pulmonary thromboembolism) ([address] 2017    Related DVT post CVA     Past Surgical History:   Procedure Laterality Date    BREAST BIOPSY       SECTION      x 3    COLONOSCOPY      IR IVC FILTER PLACEMENT W IMAGING  2017    IR IVC FILTER PLACEMENT W IMAGING 2017 [facility]    ORTHOPEDIC SURGERY Right 2018    right foot x 2    OTHER SURGICAL HISTORY      Face lift    REFRACTIVE SURGERY  2006    VIJAYA AND BSO (CERVIX REMOVED)  1992    VASCULAR SURGERY  2017    IVC filter placed 2017 after CVA     Family History   Problem Relation Age of Onset    Psychiatric Disorder Father         anxiety, state that she benefits greatly from the Botox injections every 3 months. We will continue with this plan of care. I will see her back in approximately 6 to 7 weeks for her next series of injections. TIME:  If total time for today's E/M service is used for level of service it is documented below. This time includes physician non-face-to-face service time visit on the date of service and includes    Preparing to see the patient (eg, review of tests)  Obtaining and/or reviewing separately obtained history  Performing a medically necessary appropriate examination and/or evaluation  Counseling and educating the patient/family/caregiver  Ordering medications, tests, or procedures  Referring and communicating with other health care professionals as needed  Documenting clinical information in the electronic or other health record  Independently interpreting results (not reported separately) and communicating results to the patient/family/caregiver  Care coordination (not reported separately)    E/M time =   19       minutes.    Pritesh Maciel MD   6/28/22

## 2022-07-16 NOTE — PROGRESS NOTES
Therapy Center at [address], [address] [place] [address],[address] [place], [address]  Phone: [phone]   Fax: [phone]    OUTPATIENT DAILY NOTE    NAME/AGE/GENDER: Tyrone Brittle is a 60 y.o. female. DATE: 1/17/2018    Patient's appointment for today was cancelled due to weather. Will plan to follow up on next scheduled visit.     Severa Brittle, PT Previously Declined (within the last year)

## 2022-08-11 ENCOUNTER — OFFICE VISIT (OUTPATIENT)
Dept: PHYSICAL MEDICINE AND REHAB | Age: 67
End: 2022-08-11
Payer: MEDICARE

## 2022-08-11 VITALS
SYSTOLIC BLOOD PRESSURE: 132 MMHG | WEIGHT: 115 LBS | BODY MASS INDEX: 24.14 KG/M2 | DIASTOLIC BLOOD PRESSURE: 83 MMHG | HEIGHT: 58 IN | HEART RATE: 74 BPM

## 2022-08-11 DIAGNOSIS — I61.9 STROKE, HEMORRHAGIC (HCC): ICD-10-CM

## 2022-08-11 DIAGNOSIS — I69.151 SPASTIC HEMIPLEGIA OF RIGHT DOMINANT SIDE AS LATE EFFECT OF NONTRAUMATIC INTRAPARENCHYMAL HEMORRHAGE OF BRAIN (HCC): Primary | ICD-10-CM

## 2022-08-11 PROCEDURE — 95874 GUIDE NERV DESTR NEEDLE EMG: CPT | Performed by: PHYSICAL MEDICINE & REHABILITATION

## 2022-08-11 PROCEDURE — 64644 CHEMODENERV 1 EXTREM 5/> MUS: CPT | Performed by: PHYSICAL MEDICINE & REHABILITATION

## 2022-08-11 RX ORDER — HYDROCHLOROTHIAZIDE 25 MG/1
TABLET ORAL
COMMUNITY
Start: 2022-06-25

## 2022-08-11 RX ORDER — LOSARTAN POTASSIUM 100 MG/1
TABLET ORAL
COMMUNITY
Start: 2022-06-25

## 2022-08-11 NOTE — PROGRESS NOTES
Chief Complaint   Patient presents with    Botox Injection     RUE Botox - 300 units         HPI:  Marielle Tripathi is a 75 y.o. female seen for a follow up visit regarding the following: Patient presents for Botox injection to be performed in spastic muscles in the right upper limb. Patient agrees for Botox injection in hopes to improve her arm positioning, hand positioning and ADLs and discomfort. Patient has no other significant focal spasticity areas to warrant systemic oral anti-spasticity medications presently. The patient has had a course of oral spasticity medication, therapeutic modalities, and stretching exercises and has not had relief of symptoms. She has had previous BOTOX injections with excellent results. Her last injection was on 5/10/22. The patient has read the consent and been given a copy of potential risks associated with BOTOX injections. All questions were answered to the best of my ability. Risks and benefits discussed. Patient agrees with injection. Past Medical History, Past Surgical History, Family history, Social History, and Medications were all reviewed with the patient today and updated as necessary. Current Outpatient Medications   Medication Sig Dispense Refill    losartan (COZAAR) 100 MG tablet       hydroCHLOROthiazide (HYDRODIURIL) 25 MG tablet       atorvastatin (LIPITOR) 20 MG tablet       LINZESS 145 MCG capsule       amLODIPine (NORVASC) 5 MG tablet Take 5 mg by mouth daily      FLUoxetine HCl, PMDD, 20 MG TABS Take 40 mg by mouth daily      labetalol (NORMODYNE) 300 MG tablet Take 300 mg by mouth 2 times daily      polyethylene glycol (GLYCOLAX) 17 GM/SCOOP powder Take 17 g by mouth daily      rOPINIRole (REQUIP) 2 MG tablet Take 2 mg by mouth      tiZANidine (ZANAFLEX) 4 MG tablet 4mg po TID       No current facility-administered medications for this visit.      Allergies   Allergen Reactions    Latex Hives    Banana Shortness Of Breath    Aspirin Other (See Comments)     Pt states make her feel weird and breath funny    Lisinopril Other (See Comments)    Macadamia Nut Oil Hives     Patient Active Problem List   Diagnosis    Anxiety    Acute spontaneous intraventricular hemorrhage assoc w/ hypertension ([address]    Stroke, hemorrhagic ([address]    DVT (deep venous thrombosis) (HCC)    CVA (cerebral vascular accident) ([address]    Impacted cerumen of right ear    ICH (intracerebral hemorrhage) ([address]    Unipolar depression ([address]    Right foot drop    Atrophic vaginitis    Choking    Spastic hemiplegia of right dominant side as late effect of nontraumatic intraparenchymal hemorrhage of brain ([address]    Accelerated hypertension    Screening for breast cancer    At risk for aspiration    Thrombocytopenia (HCC)    Other constipation    Sudden idiopathic hearing loss of right ear with unrestricted hearing of left ear    PTE (pulmonary thromboembolism) ([address]    Other insomnia    Cramp in limb     Past Medical History:   Diagnosis Date    Anxiety     CVA (cerebral vascular accident) ([address] 2017     [address] Left BG with residual R hemiparesis (> 677 systolic)-severe CVA    Depression     controlled with medications    HTN (hypertension)     controlled with med    Impaired mobility     Menopause     PTE (pulmonary thromboembolism) ([address] 2017    Related DVT post CVA     Past Surgical History:   Procedure Laterality Date    BREAST BIOPSY       SECTION      x 3    COLONOSCOPY      IR IVC FILTER PLACEMENT W IMAGING  2017    IR IVC FILTER PLACEMENT W IMAGING 2017 [facility]    ORTHOPEDIC SURGERY Right 2018    right foot x 2    OTHER SURGICAL HISTORY  2012    Face lift    REFRACTIVE SURGERY  2006    VIJAYA AND BSO (CERVIX REMOVED)  1992    VASCULAR SURGERY      IVC filter placed 2017 after CVA     Family History   Problem Relation Age of Onset    Psychiatric Disorder Father         anxiety, depression    Cancer Father         throat cancer    Cancer Mother lung cancer    Alcohol Abuse Mother     Breast Cancer Neg Hx     No Known Problems Sister      Social History     Tobacco Use    Smoking status: Former     Types: Cigarettes     Quit date: 1980     Years since quittin.6    Smokeless tobacco: Never   Substance Use Topics    Alcohol use: No       PHYSICAL EXAM:    /83 (Site: Left Upper Arm, Position: Sitting)   Pulse 74   Ht 4' 10\" (1.473 m)   Wt 115 lb (52.2 kg)   BMI 24.04 kg/m²      General appearance - alert, well appearing, and in no distress and oriented to person, place, and time  Mental status - alert, oriented to person, place, and time  Neurological - alert, oriented, normal speech, increased muscle tone noted in the right upper limb especially with finger flexors 3/4, wrist flexors 2/4, elbow pronators 1+/4, and elbow flexors 2/4. Extremities - peripheral pulses normal, no pedal edema, no ulcers, gangrene or atrophic changes  Skin - normal coloration and turgor, no rashes, no suspicious skin lesions noted      ASSESSMENT and PLAN    Time out observed. Patient was injected Botox Lot # [id number] with expiration # 2024 after being reconstituted with preservative free NS. The following muscles were injected using needle EMG guidance:    Right biceps was injected with 50 units. Right Brachioradialis was injected with 25 units. Right pronator teres was injected with 25 units. Right FCU was injected with 50 units. Right FCR was injected with 50 units. Right FDS was injected with 50 units. Right FDP was injected with 50 units. Grand total of 300 units was injected. Patient tolerated the procedure well. Patient was observed post procedure for acute complaints including dyspnea, chest pain, numbness, tingling, weakness , headache, dizziness. All were denied. Diagnosis Orders   1.  Spastic hemiplegia of right dominant side as late effect of nontraumatic intraparenchymal hemorrhage of brain ([address]        2. Stroke, hemorrhagic ([address] Follow up 4-6 weeks for efficacy check.

## 2022-09-22 ENCOUNTER — OFFICE VISIT (OUTPATIENT)
Dept: PHYSICAL MEDICINE AND REHAB | Age: 67
End: 2022-09-22
Payer: MEDICARE

## 2022-09-22 VITALS
SYSTOLIC BLOOD PRESSURE: 102 MMHG | WEIGHT: 118 LBS | DIASTOLIC BLOOD PRESSURE: 66 MMHG | BODY MASS INDEX: 24.66 KG/M2 | HEART RATE: 82 BPM

## 2022-09-22 DIAGNOSIS — I69.151 SPASTIC HEMIPLEGIA OF RIGHT DOMINANT SIDE AS LATE EFFECT OF NONTRAUMATIC INTRAPARENCHYMAL HEMORRHAGE OF BRAIN (HCC): Primary | ICD-10-CM

## 2022-09-22 DIAGNOSIS — I69.151 HEMIPLEGIA AND HEMIPARESIS FOLLOWING NONTRAUMATIC INTRACEREBRAL HEMORRHAGE AFFECTING RIGHT DOMINANT SIDE (HCC): ICD-10-CM

## 2022-09-22 PROCEDURE — G8427 DOCREV CUR MEDS BY ELIG CLIN: HCPCS | Performed by: PHYSICAL MEDICINE & REHABILITATION

## 2022-09-22 PROCEDURE — 1036F TOBACCO NON-USER: CPT | Performed by: PHYSICAL MEDICINE & REHABILITATION

## 2022-09-22 PROCEDURE — G8399 PT W/DXA RESULTS DOCUMENT: HCPCS | Performed by: PHYSICAL MEDICINE & REHABILITATION

## 2022-09-22 PROCEDURE — 1090F PRES/ABSN URINE INCON ASSESS: CPT | Performed by: PHYSICAL MEDICINE & REHABILITATION

## 2022-09-22 PROCEDURE — 1123F ACP DISCUSS/DSCN MKR DOCD: CPT | Performed by: PHYSICAL MEDICINE & REHABILITATION

## 2022-09-22 PROCEDURE — 99212 OFFICE O/P EST SF 10 MIN: CPT | Performed by: PHYSICAL MEDICINE & REHABILITATION

## 2022-09-22 PROCEDURE — 3017F COLORECTAL CA SCREEN DOC REV: CPT | Performed by: PHYSICAL MEDICINE & REHABILITATION

## 2022-09-22 PROCEDURE — G8420 CALC BMI NORM PARAMETERS: HCPCS | Performed by: PHYSICAL MEDICINE & REHABILITATION

## 2022-09-22 RX ORDER — FLUOXETINE HYDROCHLORIDE 20 MG/1
CAPSULE ORAL
COMMUNITY
Start: 2022-09-15

## 2022-09-22 ASSESSMENT — ENCOUNTER SYMPTOMS: RESPIRATORY NEGATIVE: 1

## 2022-09-22 NOTE — PROGRESS NOTES
SUBJECTIVE:   George Munguia is a 75 y.o. female seen for a follow up visit regarding the following:     Chief Complaint   Patient presents with    Follow-up     F/u to RUE Botox 8/11/2022         HPI:  She returns today for follow up. She underwent BOTOX injections into the spastic muscles of the right upper limb on 8/11/22. She and her , Remington Cox, are pleased with her results. No fever or chills or bruising. No immediate or late complications. She is sitting with her right arm to her side and hand comfortably in her lap. Past Medical History, Past Surgical History, Family history, Social History, and Medications were all reviewed with the patient today and updated as necessary. Current Outpatient Medications   Medication Sig Dispense Refill    FLUoxetine (PROZAC) 20 MG capsule       losartan (COZAAR) 100 MG tablet       hydroCHLOROthiazide (HYDRODIURIL) 25 MG tablet       atorvastatin (LIPITOR) 20 MG tablet       LINZESS 145 MCG capsule       amLODIPine (NORVASC) 5 MG tablet Take 5 mg by mouth daily      labetalol (NORMODYNE) 300 MG tablet Take 300 mg by mouth 2 times daily      polyethylene glycol (GLYCOLAX) 17 GM/SCOOP powder Take 17 g by mouth daily      rOPINIRole (REQUIP) 2 MG tablet Take 2 mg by mouth      tiZANidine (ZANAFLEX) 4 MG tablet 4mg po TID       No current facility-administered medications for this visit.      Allergies   Allergen Reactions    Latex Hives    Banana Shortness Of Breath    Nut [Peanut-Containing Drug Products] Anaphylaxis     All nuts    Peanut Oil Anaphylaxis    Aspirin Other (See Comments)     Pt states make her feel weird and breath funny    Lisinopril Other (See Comments)    Macadamia Nut Oil Hives     Patient Active Problem List   Diagnosis    Anxiety    Acute spontaneous intraventricular hemorrhage assoc w/ hypertension (HCC)    Stroke, hemorrhagic (HCC)    DVT (deep venous thrombosis) ([facility])    CVA (cerebral vascular accident) ([facility] [address]    Impacted cerumen of right ear    ICH (intracerebral hemorrhage) (HCC)    Unipolar depression (HCC)    Right foot drop    Atrophic vaginitis    Choking    Spastic hemiplegia of right dominant side as late effect of nontraumatic intraparenchymal hemorrhage of brain (HCC)    Accelerated hypertension    Screening for breast cancer    At risk for aspiration    Thrombocytopenia (HCC)    Other constipation    Sudden idiopathic hearing loss of right ear with unrestricted hearing of left ear    PTE (pulmonary thromboembolism) ([address]    Other insomnia    Cramp in limb     Past Medical History:   Diagnosis Date    Anxiety     CVA (cerebral vascular accident) ([address] 2017     [address] Left BG with residual R hemiparesis (> 647 systolic)-severe CVA    Depression     controlled with medications    HTN (hypertension)     controlled with med    Impaired mobility     Menopause     PTE (pulmonary thromboembolism) ([address] 2017    Related DVT post CVA     Past Surgical History:   Procedure Laterality Date    BREAST BIOPSY       SECTION      x 3    COLONOSCOPY      IR IVC FILTER PLACEMENT W IMAGING  2017    IR IVC FILTER PLACEMENT W IMAGING 2017 [facility]    ORTHOPEDIC SURGERY Right 2018    right foot x 2    OTHER SURGICAL HISTORY  2012    Face lift    REFRACTIVE SURGERY  2006    VIJAYA AND BSO (CERVIX REMOVED)  1992    VASCULAR SURGERY      IVC filter placed 2017 after CVA     Family History   Problem Relation Age of Onset    Psychiatric Disorder Father         anxiety, depression    Cancer Father         throat cancer    Cancer Mother         lung cancer    Alcohol Abuse Mother     Breast Cancer Neg Hx     No Known Problems Sister      Social History     Tobacco Use    Smoking status: Former     Types: Cigarettes     Quit date: 1980     Years since quittin.7    Smokeless tobacco: Never   Substance Use Topics    Alcohol use: No         ROS:  Review of Systems   Constitutional: Negative. Respiratory: Negative. Allergic/Immunologic: Positive for food allergies (recently had a [place] nut that caused anaphylaxis. EMS was called to the house and symptoms did resolve. ). Hematological: Negative. Psychiatric/Behavioral: Negative. PHYSICAL EXAM:  Vitals:    09/22/22 1259   BP: 102/66   Pulse: 82        Physical Exam  Pulmonary:      Effort: Pulmonary effort is normal.   Neurological:      Mental Status: Mental status is at baseline. Comments: Her right elbow flexors 1/4. Right pronators 1+/4. Right wrist flexors 1/4  Right finger flexors 1+/4        ASSESSMENT and PLAN     Diagnosis Orders   1. Spastic hemiplegia of right dominant side as late effect of nontraumatic intraparenchymal hemorrhage of brain ([facility] [address]        2. Hemiplegia and hemiparesis following nontraumatic intracerebral hemorrhage affecting right dominant side (HCC)               TIME:  If total time for today's E/M service is used for level of service it is documented below. This time includes physician non-face-to-face service time visit on the date of service and includes    Preparing to see the patient (eg, review of tests)  Obtaining and/or reviewing separately obtained history  Performing a medically necessary appropriate examination and/or evaluation  Counseling and educating the patient/family/caregiver  Ordering medications, tests, or procedures  Referring and communicating with other health care professionals as needed  Documenting clinical information in the electronic or other health record  Independently interpreting results (not reported separately) and communicating results to the patient/family/caregiver  Care coordination (not reported separately)    E/M time =     19     minutes. Excellent response to BOTOX injections. We went over a HEP again. Anticipate re injections in 6 weeks.        Morris Traylor MD   9/22/22

## 2022-10-06 ENCOUNTER — HOSPITAL ENCOUNTER (EMERGENCY)
Age: 67
Discharge: HOME | End: 2022-10-06
Attending: EMERGENCY MEDICINE
Payer: MEDICARE

## 2022-10-06 ENCOUNTER — APPOINTMENT (OUTPATIENT)
Dept: GENERAL RADIOLOGY | Age: 67
End: 2022-10-06
Payer: MEDICARE

## 2022-10-06 VITALS
BODY MASS INDEX: 24.77 KG/M2 | WEIGHT: 118 LBS | RESPIRATION RATE: 18 BRPM | DIASTOLIC BLOOD PRESSURE: 77 MMHG | HEIGHT: 58 IN | TEMPERATURE: 98.2 F | OXYGEN SATURATION: 95 % | HEART RATE: 76 BPM | SYSTOLIC BLOOD PRESSURE: 131 MMHG

## 2022-10-06 DIAGNOSIS — N30.00 ACUTE CYSTITIS WITHOUT HEMATURIA: ICD-10-CM

## 2022-10-06 DIAGNOSIS — R55 VASOVAGAL SYNCOPE: Primary | ICD-10-CM

## 2022-10-06 LAB
ALBUMIN SERPL-MCNC: 3.4 G/DL (ref 3.2–4.6)
ALBUMIN/GLOB SERPL: 1.3 {RATIO} (ref 1.2–3.5)
ALP SERPL-CCNC: 60 U/L (ref 50–136)
ALT SERPL-CCNC: 26 U/L (ref 12–65)
ANION GAP SERPL CALC-SCNC: 4 MMOL/L (ref 4–13)
APPEARANCE UR: CLEAR
AST SERPL-CCNC: 19 U/L (ref 15–37)
BACTERIA URNS QL MICRO: ABNORMAL /HPF
BASOPHILS # BLD: 0 K/UL (ref 0–0.2)
BASOPHILS NFR BLD: 0 % (ref 0–2)
BILIRUB SERPL-MCNC: 0.5 MG/DL (ref 0.2–1.1)
BILIRUB UR QL: NEGATIVE
BUN SERPL-MCNC: 23 MG/DL (ref 8–23)
CALCIUM SERPL-MCNC: 8.5 MG/DL (ref 8.3–10.4)
CASTS URNS QL MICRO: ABNORMAL /LPF
CHLORIDE SERPL-SCNC: 108 MMOL/L (ref 101–110)
CO2 SERPL-SCNC: 28 MMOL/L (ref 21–32)
COLOR UR: ABNORMAL
CREAT SERPL-MCNC: 0.8 MG/DL (ref 0.6–1)
DIFFERENTIAL METHOD BLD: ABNORMAL
EKG ATRIAL RATE: 74 BPM
EKG DIAGNOSIS: NORMAL
EKG P AXIS: 74 DEGREES
EKG P-R INTERVAL: 189 MS
EKG Q-T INTERVAL: 422 MS
EKG QRS DURATION: 87 MS
EKG QTC CALCULATION (BAZETT): 469 MS
EKG R AXIS: 56 DEGREES
EKG T AXIS: 76 DEGREES
EKG VENTRICULAR RATE: 74 BPM
EOSINOPHIL # BLD: 0.1 K/UL (ref 0–0.8)
EOSINOPHIL NFR BLD: 1 % (ref 0.5–7.8)
EPI CELLS #/AREA URNS HPF: ABNORMAL /HPF
ERYTHROCYTE [DISTWIDTH] IN BLOOD BY AUTOMATED COUNT: 12.7 % (ref 11.9–14.6)
GLOBULIN SER CALC-MCNC: 2.6 G/DL (ref 2.3–3.5)
GLUCOSE SERPL-MCNC: 114 MG/DL (ref 65–100)
GLUCOSE UR STRIP.AUTO-MCNC: NEGATIVE MG/DL
HCT VFR BLD AUTO: 32.3 % (ref 35.8–46.3)
HGB BLD-MCNC: 10.6 G/DL (ref 11.7–15.4)
HGB UR QL STRIP: NEGATIVE
IMM GRANULOCYTES # BLD AUTO: 0 K/UL (ref 0–0.5)
IMM GRANULOCYTES NFR BLD AUTO: 0 % (ref 0–5)
KETONES UR QL STRIP.AUTO: NEGATIVE MG/DL
LEUKOCYTE ESTERASE UR QL STRIP.AUTO: NEGATIVE
LYMPHOCYTES # BLD: 1.2 K/UL (ref 0.5–4.6)
LYMPHOCYTES NFR BLD: 17 % (ref 13–44)
MCH RBC QN AUTO: 29.7 PG (ref 26.1–32.9)
MCHC RBC AUTO-ENTMCNC: 32.8 G/DL (ref 31.4–35)
MCV RBC AUTO: 90.5 FL (ref 79.6–97.8)
MONOCYTES # BLD: 0.7 K/UL (ref 0.1–1.3)
MONOCYTES NFR BLD: 10 % (ref 4–12)
MUCOUS THREADS URNS QL MICRO: 0 /LPF
NEUTS SEG # BLD: 4.9 K/UL (ref 1.7–8.2)
NEUTS SEG NFR BLD: 72 % (ref 43–78)
NITRITE UR QL STRIP.AUTO: NEGATIVE
NRBC # BLD: 0 K/UL (ref 0–0.2)
PH UR STRIP: 8 [PH] (ref 5–9)
PLATELET # BLD AUTO: 214 K/UL (ref 150–450)
PMV BLD AUTO: 10.1 FL (ref 9.4–12.3)
POTASSIUM SERPL-SCNC: 3.2 MMOL/L (ref 3.5–5.1)
PROT SERPL-MCNC: 6 G/DL (ref 6.3–8.2)
PROT UR STRIP-MCNC: NEGATIVE MG/DL
RBC # BLD AUTO: 3.57 M/UL (ref 4.05–5.2)
RBC #/AREA URNS HPF: ABNORMAL /HPF
SODIUM SERPL-SCNC: 140 MMOL/L (ref 136–145)
SP GR UR REFRACTOMETRY: 1.01 (ref 1–1.02)
TROPONIN I SERPL HS-MCNC: 4.8 PG/ML (ref 0–14)
TROPONIN I SERPL HS-MCNC: 4.8 PG/ML (ref 0–14)
URINE CULTURE IF INDICATED: ABNORMAL
UROBILINOGEN UR QL STRIP.AUTO: 0.2 EU/DL (ref 0.2–1)
WBC # BLD AUTO: 6.9 K/UL (ref 4.3–11.1)
WBC URNS QL MICRO: ABNORMAL /HPF

## 2022-10-06 PROCEDURE — 81001 URINALYSIS AUTO W/SCOPE: CPT

## 2022-10-06 PROCEDURE — 71045 X-RAY EXAM CHEST 1 VIEW: CPT

## 2022-10-06 PROCEDURE — 93005 ELECTROCARDIOGRAM TRACING: CPT | Performed by: EMERGENCY MEDICINE

## 2022-10-06 PROCEDURE — 85025 COMPLETE CBC W/AUTO DIFF WBC: CPT

## 2022-10-06 PROCEDURE — 84484 ASSAY OF TROPONIN QUANT: CPT

## 2022-10-06 PROCEDURE — 80053 COMPREHEN METABOLIC PANEL: CPT

## 2022-10-06 PROCEDURE — 2580000003 HC RX 258: Performed by: EMERGENCY MEDICINE

## 2022-10-06 PROCEDURE — 99285 EMERGENCY DEPT VISIT HI MDM: CPT

## 2022-10-06 RX ORDER — 0.9 % SODIUM CHLORIDE 0.9 %
1000 INTRAVENOUS SOLUTION INTRAVENOUS
Status: COMPLETED | OUTPATIENT
Start: 2022-10-06 | End: 2022-10-06

## 2022-10-06 RX ORDER — CEFUROXIME AXETIL 500 MG/1
500 TABLET ORAL 2 TIMES DAILY
Qty: 14 TABLET | Refills: 0 | Status: SHIPPED | OUTPATIENT
Start: 2022-10-06 | End: 2022-10-13

## 2022-10-06 RX ADMIN — SODIUM CHLORIDE 1000 ML: 9 INJECTION, SOLUTION INTRAVENOUS at 12:09

## 2022-10-06 ASSESSMENT — PAIN - FUNCTIONAL ASSESSMENT: PAIN_FUNCTIONAL_ASSESSMENT: NONE - DENIES PAIN

## 2022-10-06 ASSESSMENT — ENCOUNTER SYMPTOMS
BACK PAIN: 0
VOMITING: 0
SHORTNESS OF BREATH: 0
NAUSEA: 0
DIARRHEA: 0
ABDOMINAL PAIN: 0
SORE THROAT: 0
DIFFICULTY BREATHING: 0
COUGH: 0
RHINORRHEA: 0
COLOR CHANGE: 0
CONSTIPATION: 0

## 2022-10-06 NOTE — ED NOTES
Discharge instructions including 1 prescription reviewed with pt. Pt verbalized understanding. VSS, PIV removed. Pt escorted to exit in stable condition with family escort.      Nell Moreno RN  10/06/22 3447

## 2022-10-06 NOTE — ED TRIAGE NOTES
Pt arrives to ED via EMS from home for near syncopal episode that occurred around 09:30 this morning. Pt  called EMS. Pt denies losing consciousness & remembers all events. Pt orthostatic on EMS arrival. BP lying 100/60, sitting 68/45. Pt given 500mL IVF in route. Pt arrives a&ox4. Last /70. .

## 2022-10-06 NOTE — ED PROVIDER NOTES
Emergency Department Provider Note                   PCP:                Honey Cruz MD               Age: 75 y.o. Sex: female       ICD-10-CM    1. Vasovagal syncope  R55       2. Acute cystitis without hematuria  N30.00           DISPOSITION Decision To Discharge 10/06/2022 02:53:51 PM       MDM  Number of Diagnoses or Management Options  Acute cystitis without hematuria  Vasovagal syncope  Diagnosis management comments: Patient with vasovagal syncope getting out of bed this morning. Has a UTI. Was orthostatic here but feels much better after fluids and no longer orthostatic. Will treat with antibiotics and discharged with PCP follow-up. Amount and/or Complexity of Data Reviewed  Clinical lab tests: ordered and reviewed  Tests in the radiology section of CPT®: ordered and reviewed  Tests in the medicine section of CPT®: ordered and reviewed    Patient Progress  Patient progress: stable             Orders Placed This Encounter   Procedures    XR CHEST PORTABLE    CBC with Auto Differential    Comprehensive Metabolic Panel    Troponin    Urinalysis with Reflex to Culture    Cardiac Monitor - ED Only    Continuous Pulse Oximetry    Orthostatic blood pressure and pulse    EKG 12 Lead    Saline lock IV        Medications   0.9 % sodium chloride bolus (0 mLs IntraVENous Stopped 10/6/22 1318)       New Prescriptions    CEFUROXIME (CEFTIN) 500 MG TABLET    Take 1 tablet by mouth 2 times daily for 7 days        Makenzie Hernandez is a 75 y.o. female who presents to the Emergency Department with chief complaint of    Chief Complaint   Patient presents with    Loss of Consciousness      Patient with syncope versus near syncope at home after getting up out of bed.  was with her and caught her. He states she was unconscious for couple of minutes. On EMS arrival patient was hypotensive. Patient is much more awake now. She did have a bowel movement during the syncopal episode.    reports patient has had similar episodes in the past and with each episode patient will have grunting and bearing down motion. Unsure if this is causing a vasovagal response. Patient denies any preceding headache blurry vision chest pain or lightheadedness. She denies any post headache or chest pain. Does have some lightheadedness worse with standing. Was orthostatic here in the ER prior to my examination. The history is provided by the patient and the spouse. No  was used. Loss of Consciousness  Episode history:  Single  Most recent episode: Today  Duration:  2 minutes  Timing:  Constant  Progression:  Resolved  Context: standing up    Witnessed: yes    Relieved by:  Lying down  Worsened by:  Posture  Associated symptoms: malaise/fatigue and weakness    Associated symptoms: no chest pain, no diaphoresis, no difficulty breathing, no fever, no headaches, no nausea, no palpitations, no shortness of breath and no vomiting      All other systems reviewed and are negative unless otherwise stated in the history of present illness section. Review of Systems   Constitutional:  Positive for malaise/fatigue. Negative for chills, diaphoresis and fever. HENT:  Negative for rhinorrhea and sore throat. Respiratory:  Negative for cough and shortness of breath. Cardiovascular:  Positive for syncope. Negative for chest pain and palpitations. Gastrointestinal:  Negative for abdominal pain, constipation, diarrhea, nausea and vomiting. Genitourinary:  Negative for dysuria and hematuria. Musculoskeletal:  Negative for back pain and neck pain. Skin:  Negative for color change and rash. Neurological:  Positive for syncope, weakness and light-headedness. Negative for numbness and headaches. All other systems reviewed and are negative.     Past Medical History:   Diagnosis Date    Anxiety     CVA (cerebral vascular accident) ([facility] [address] 2017    ICH Left BG with residual R hemiparesis (> 200 systolic)-severe CVA    Depression     controlled with medications    HTN (hypertension)     controlled with med    Impaired mobility     Menopause     PTE (pulmonary thromboembolism) ([address] 2017    Related DVT post CVA        Past Surgical History:   Procedure Laterality Date    BREAST BIOPSY       SECTION      x 3    COLONOSCOPY      IR IVC FILTER PLACEMENT W IMAGING  2017    IR IVC FILTER PLACEMENT W IMAGING 2017 [facility]    ORTHOPEDIC SURGERY Right 2018    right foot x 2    OTHER SURGICAL HISTORY  2012    Face lift    REFRACTIVE SURGERY  2006    VIJAYA AND BSO (CERVIX REMOVED)  1992    VASCULAR SURGERY  2017    IVC filter placed 2017 after CVA        Family History   Problem Relation Age of Onset    Psychiatric Disorder Father         anxiety, depression    Cancer Father         throat cancer    Cancer Mother         lung cancer    Alcohol Abuse Mother     Breast Cancer Neg Hx     No Known Problems Sister         Social History     Socioeconomic History    Marital status:    Tobacco Use    Smoking status: Former     Types: Cigarettes     Quit date: 1980     Years since quittin.7    Smokeless tobacco: Never   Substance and Sexual Activity    Alcohol use: No    Drug use: Not Currently        Allergies: Latex, Banana, Nut [peanut-containing drug products], Peanut oil, Aspirin, Lisinopril, and Macadamia nut oil    Previous Medications    AMLODIPINE (NORVASC) 5 MG TABLET    Take 5 mg by mouth daily    ATORVASTATIN (LIPITOR) 20 MG TABLET        FLUOXETINE (PROZAC) 20 MG CAPSULE        HYDROCHLOROTHIAZIDE (HYDRODIURIL) 25 MG TABLET        LABETALOL (NORMODYNE) 300 MG TABLET    Take 300 mg by mouth 2 times daily    LINZESS 145 MCG CAPSULE        LOSARTAN (COZAAR) 100 MG TABLET        POLYETHYLENE GLYCOL (GLYCOLAX) 17 GM/SCOOP POWDER    Take 17 g by mouth daily    ROPINIROLE (REQUIP) 2 MG TABLET    Take 2 mg by mouth    TIZANIDINE (ZANAFLEX) 4 MG TABLET    4mg po TID Vitals signs and nursing note reviewed. Patient Vitals for the past 4 hrs:   Pulse Resp BP SpO2   10/06/22 1330 76 18 131/77 95 %   10/06/22 1230 79 18 138/82 95 %   10/06/22 1142 75 10 81/65 95 %   10/06/22 1141 74 13 131/74 96 %          Physical Exam  Vitals and nursing note reviewed. Constitutional:       Appearance: Normal appearance. HENT:      Head: Normocephalic and atraumatic. Eyes:      Extraocular Movements: Extraocular movements intact. Pupils: Pupils are equal, round, and reactive to light. Cardiovascular:      Rate and Rhythm: Normal rate and regular rhythm. Pulmonary:      Effort: Pulmonary effort is normal.      Breath sounds: Normal breath sounds. No wheezing. Abdominal:      General: Bowel sounds are normal.      Palpations: Abdomen is soft. Tenderness: There is no abdominal tenderness. Musculoskeletal:         General: No swelling. Normal range of motion. Cervical back: Normal range of motion. No tenderness. Skin:     General: Skin is warm and dry. Neurological:      Mental Status: She is alert. Mental status is at baseline. Cranial Nerves: No cranial nerve deficit. Procedures    ED EKG Interpretation  EKG was interpreted in the absence of a cardiologist.    Rate: 74  EKG Interpretation: EKG Interpretation: sinus rhythm  ST Segments: Nonspecific ST segments - NO STEMI    Results for orders placed or performed during the hospital encounter of 10/06/22   XR CHEST PORTABLE    Narrative    EXAMINATION: XR CHEST PORTABLE 10/6/2022 11:47 AM    ACCESSION NUMBER: [id number]    COMPARISON: 9/15/2017    INDICATION: Syncope    TECHNIQUE: A single view of the chest was obtained. FINDINGS:   Support Devices:   *  None    Cardiac Silhouette: Within normal limits in size. Mediastinum: Normal mediastinal contours. Lungs: No airspace consolidation. No pneumothorax or sizable pleural effusion.     Upper Abdomen: Normal     Miscellaneous: No fracture or suspicious osseous lesion. Advanced degenerative  changes of the left glenohumeral joint. Impression    No acute cardiopulmonary abnormality.        CBC with Auto Differential   Result Value Ref Range    WBC 6.9 4.3 - 11.1 K/uL    RBC 3.57 (L) 4.05 - 5.2 M/uL    Hemoglobin 10.6 (L) 11.7 - 15.4 g/dL    Hematocrit 32.3 (L) 35.8 - 46.3 %    MCV 90.5 79.6 - 97.8 FL    MCH 29.7 26.1 - 32.9 PG    MCHC 32.8 31.4 - 35.0 g/dL    RDW 12.7 11.9 - 14.6 %    Platelets 784 141 - 226 K/uL    MPV 10.1 9.4 - 12.3 FL    nRBC 0.00 0.0 - 0.2 K/uL    Differential Type AUTOMATED      Seg Neutrophils 72 43 - 78 %    Lymphocytes 17 13 - 44 %    Monocytes 10 4.0 - 12.0 %    Eosinophils % 1 0.5 - 7.8 %    Basophils 0 0.0 - 2.0 %    Immature Granulocytes 0 0.0 - 5.0 %    Segs Absolute 4.9 1.7 - 8.2 K/UL    Absolute Lymph # 1.2 0.5 - 4.6 K/UL    Absolute Mono # 0.7 0.1 - 1.3 K/UL    Absolute Eos # 0.1 0.0 - 0.8 K/UL    Basophils Absolute 0.0 0.0 - 0.2 K/UL    Absolute Immature Granulocyte 0.0 0.0 - 0.5 K/UL   Comprehensive Metabolic Panel   Result Value Ref Range    Sodium 140 136 - 145 mmol/L    Potassium 3.2 (L) 3.5 - 5.1 mmol/L    Chloride 108 101 - 110 mmol/L    CO2 28 21 - 32 mmol/L    Anion Gap 4 4 - 13 mmol/L    Glucose 114 (H) 65 - 100 mg/dL    BUN 23 8 - 23 MG/DL    Creatinine 0.80 0.6 - 1.0 MG/DL    Est, Glom Filt Rate >60 >60 ml/min/1.73m2    Calcium 8.5 8.3 - 10.4 MG/DL    Total Bilirubin 0.5 0.2 - 1.1 MG/DL    ALT 26 12 - 65 U/L    AST 19 15 - 37 U/L    Alk Phosphatase 60 50 - 136 U/L    Total Protein 6.0 (L) 6.3 - 8.2 g/dL    Albumin 3.4 3.2 - 4.6 g/dL    Globulin 2.6 2.3 - 3.5 g/dL    Albumin/Globulin Ratio 1.3 1.2 - 3.5     Troponin   Result Value Ref Range    Troponin, High Sensitivity 4.8 0 - 14 pg/mL   Troponin   Result Value Ref Range    Troponin, High Sensitivity 4.8 0 - 14 pg/mL   Urinalysis with Reflex to Culture    Specimen: Urine   Result Value Ref Range    Color, UA YELLOW/STRAW      Appearance CLEAR Specific Gravity, UA 1.007 1.001 - 1.023      pH, Urine 8.0 5.0 - 9.0      Protein, UA Negative NEG mg/dL    Glucose, UA Negative mg/dL    Ketones, Urine Negative NEG mg/dL    Bilirubin Urine Negative NEG      Blood, Urine Negative NEG      Urobilinogen, Urine 0.2 0.2 - 1.0 EU/dL    Nitrite, Urine Negative NEG      Leukocyte Esterase, Urine Negative NEG      Urine Culture if Indicated CULTURE NOT INDICATED BY UA RESULT      WBC, UA 0-4 (A) NORM /hpf    RBC, UA 0-5 (A) NORM /hpf    BACTERIA, URINE 4+ (A) NORM /hpf    Epithelial Cells UA 0-5 (A) NORM /hpf    Casts 0-2 (A) NORM /lpf    Mucus, UA 0 0 /lpf   EKG 12 Lead   Result Value Ref Range    Ventricular Rate 74 BPM    Atrial Rate 74 BPM    P-R Interval 189 ms    QRS Duration 87 ms    Q-T Interval 422 ms    QTc Calculation (Bazett) 469 ms    P Axis 74 degrees    R Axis 56 degrees    T Axis 76 degrees    Diagnosis Sinus rhythm         XR CHEST PORTABLE   Final Result   No acute cardiopulmonary abnormality. Voice dictation software was used during the making of this note. This software is not perfect and grammatical and other typographical errors may be present. This note has not been completely proofread for errors.      Jeferson Thomas MD  10/06/22 7041

## 2022-10-06 NOTE — ED NOTES
Orthostatic Vitals Repeat after IVF:    Lying: /81 , HR 59  Sitting: BP  139/84 , HR 76     Shadi Plascencia RN  10/06/22 1447

## 2022-11-15 ENCOUNTER — OFFICE VISIT (OUTPATIENT)
Dept: PHYSICAL MEDICINE AND REHAB | Age: 67
End: 2022-11-15
Payer: MEDICARE

## 2022-11-15 VITALS
BODY MASS INDEX: 24.77 KG/M2 | HEIGHT: 58 IN | WEIGHT: 118 LBS | DIASTOLIC BLOOD PRESSURE: 77 MMHG | SYSTOLIC BLOOD PRESSURE: 131 MMHG | HEART RATE: 73 BPM

## 2022-11-15 DIAGNOSIS — I69.359 HEMIPLEGIA AND HEMIPARESIS FOLLOWING CEREBRAL INFARCTION AFFECTING UNSPECIFIED SIDE (HCC): ICD-10-CM

## 2022-11-15 DIAGNOSIS — I69.151 HEMIPLEGIA AND HEMIPARESIS FOLLOWING NONTRAUMATIC INTRACEREBRAL HEMORRHAGE AFFECTING RIGHT DOMINANT SIDE (HCC): Primary | ICD-10-CM

## 2022-11-15 PROCEDURE — 64644 CHEMODENERV 1 EXTREM 5/> MUS: CPT | Performed by: PHYSICAL MEDICINE & REHABILITATION

## 2022-11-15 PROCEDURE — 95874 GUIDE NERV DESTR NEEDLE EMG: CPT | Performed by: PHYSICAL MEDICINE & REHABILITATION

## 2022-11-15 NOTE — PROGRESS NOTES
Chief Complaint   Patient presents with    Botox Injection      units Botox         HPI:  Win Ag is a 75 y.o. female seen for a follow up visit regarding the following: Patient presents for Botox injection to selected muscles of the right upper limb. Patient agrees for Botox injection in hopes to improve her spasticity, tightness, and pain and discomfort. Patient has no other significant focal spasticity areas to warrant systemic oral anti-spasticity medications presently. The patient has had a course of oral spasticity medication, therapeutic modalities, and stretching exercises and has not had relief of symptoms. The patient has read the consent and been given a copy of potential risks associated with BOTOX injections. All questions were answered to the best of my ability. Risks and benefits discussed. Patient agrees with injection. Past Medical History, Past Surgical History, Family history, Social History, and Medications were all reviewed with the patient today and updated as necessary. Current Outpatient Medications   Medication Sig Dispense Refill    FLUoxetine (PROZAC) 20 MG capsule       losartan (COZAAR) 100 MG tablet       hydroCHLOROthiazide (HYDRODIURIL) 25 MG tablet       atorvastatin (LIPITOR) 20 MG tablet       LINZESS 145 MCG capsule       amLODIPine (NORVASC) 5 MG tablet Take 5 mg by mouth daily      labetalol (NORMODYNE) 300 MG tablet Take 300 mg by mouth 2 times daily      polyethylene glycol (GLYCOLAX) 17 GM/SCOOP powder Take 17 g by mouth daily      rOPINIRole (REQUIP) 2 MG tablet Take 2 mg by mouth      tiZANidine (ZANAFLEX) 4 MG tablet 4mg po TID       No current facility-administered medications for this visit.      Allergies   Allergen Reactions    Latex Hives    Banana Shortness Of Breath    Nut [Peanut-Containing Drug Products] Anaphylaxis     All nuts    Peanut Oil Anaphylaxis    Aspirin Other (See Comments)     Pt states make her feel weird and breath funny    Lisinopril Other (See Comments)    Macadamia Nut Oil Hives     Patient Active Problem List   Diagnosis    Anxiety    Acute spontaneous intraventricular hemorrhage assoc w/ hypertension ([address]    Stroke, hemorrhagic (HCC)    DVT (deep venous thrombosis) (HCC)    CVA (cerebral vascular accident) ([address]    Impacted cerumen of right ear    ICH (intracerebral hemorrhage) ([address]    Unipolar depression ([address]    Right foot drop    Atrophic vaginitis    Choking    Spastic hemiplegia of right dominant side as late effect of nontraumatic intraparenchymal hemorrhage of brain ([address]    Accelerated hypertension    Screening for breast cancer    At risk for aspiration    Thrombocytopenia (HCC)    Other constipation    Sudden idiopathic hearing loss of right ear with unrestricted hearing of left ear    PTE (pulmonary thromboembolism) ([address]    Other insomnia    Cramp in limb     Past Medical History:   Diagnosis Date    Anxiety     CVA (cerebral vascular accident) ([address] Left BG with residual R hemiparesis (> 835 systolic)-severe CVA    Depression     controlled with medications    HTN (hypertension)     controlled with med    Impaired mobility     Menopause     PTE (pulmonary thromboembolism) ([address] 2017    Related DVT post CVA     Past Surgical History:   Procedure Laterality Date    BREAST BIOPSY       SECTION      x 3    COLONOSCOPY      IR IVC FILTER PLACEMENT W IMAGING  2017    IR IVC FILTER PLACEMENT W IMAGING 2017 [facility]    ORTHOPEDIC SURGERY Right 2018    right foot x 2    OTHER SURGICAL HISTORY  2012    Face lift    REFRACTIVE SURGERY  2006    VIJAYA AND BSO (CERVIX REMOVED)  1992    VASCULAR SURGERY      IVC filter placed 2017 after CVA     Family History   Problem Relation Age of Onset    Psychiatric Disorder Father         anxiety, depression    Cancer Father         throat cancer    Cancer Mother         lung cancer    Alcohol Abuse Mother     Breast Cancer Neg Hx     No Known Problems Sister      Social History     Tobacco Use    Smoking status: Former     Types: Cigarettes     Quit date: 1980     Years since quittin.9    Smokeless tobacco: Never   Substance Use Topics    Alcohol use: No       PHYSICAL EXAM:    /77 (Site: Left Upper Arm, Position: Sitting)   Pulse 73   Ht 4' 10\" (1.473 m)   Wt 118 lb (53.5 kg)   BMI 24.66 kg/m²      General appearance - alert, well appearing, and in no distress and oriented to person, place, and time  Mental status - alert, oriented to person, place, and time  Neurological - alert, oriented, normal speech, increased muscle tone noted in the right finger flexors, wrsit flexors, and elbow pronators/flexors  Extremities - peripheral pulses normal, no pedal edema, no ulcers, gangrene or atrophic changes  Skin - normal coloration and turgor, no rashes, no suspicious skin lesions noted      No results found for this or any previous visit (from the past 672 hour(s)). ASSESSMENT and PLAN    Time out observed. Patient was injected Botox Lot # [id number] with expiration # 2024 after being reconstituted with preservative free NS. The following muscles were injected using needle EMG guidance:    Right biceps was injected with 50 units for a total of 50 units. Right brachioradialis was injected with 50 units for a total of 50 units. Right pronator teres was injected with 50 units for a total of 50 units. Right FCR was injected with 50 units for a total of 50 units. Right FCU was injected with 50 units for a total of 50 units. Right FDP was injected with 50 units for a total of 50 units. Grand total of 300 units was injected. Patient tolerated the procedure well. Patient was observed post procedure for acute complaints including dyspnea, chest pain, numbness, tingling, weakness , headache, dizziness. All were denied. Diagnosis Orders   1.  Hemiplegia and hemiparesis following nontraumatic intracerebral hemorrhage affecting right dominant side ([facility] [address]        2. Hemiplegia and hemiparesis following cerebral infarction affecting unspecified side ([facility] [address]            Follow up with virtual visit in approx 6 weeks.

## 2023-01-03 ENCOUNTER — TELEPHONE (OUTPATIENT)
Dept: PHYSICAL MEDICINE AND REHAB | Age: 68
End: 2023-01-03

## 2023-01-03 NOTE — TELEPHONE ENCOUNTER
She underwent BOTOX injections into the spastic muscles of the right upper limb on 11/15/22. She and her , Viktoria Buitrago, are pleased with her results. No fever or chills or bruising. No immediate or late complications. She and Vitkoria Buitrago comment that RUE is noticeable relaxed and comfortable during daily activity and stretching therapy. She does wish to continue with 3 month injections as this has been most beneficial. Scheduled for 2/16/2023.

## 2023-02-16 ENCOUNTER — PROCEDURE VISIT (OUTPATIENT)
Dept: PHYSICAL MEDICINE AND REHAB | Age: 68
End: 2023-02-16

## 2023-02-16 VITALS
DIASTOLIC BLOOD PRESSURE: 77 MMHG | HEART RATE: 74 BPM | HEIGHT: 58 IN | SYSTOLIC BLOOD PRESSURE: 129 MMHG | WEIGHT: 118 LBS | BODY MASS INDEX: 24.77 KG/M2

## 2023-02-16 DIAGNOSIS — M62.838 OTHER MUSCLE SPASM: ICD-10-CM

## 2023-02-16 DIAGNOSIS — I69.151 SPASTIC HEMIPLEGIA OF RIGHT DOMINANT SIDE AS LATE EFFECT OF NONTRAUMATIC INTRAPARENCHYMAL HEMORRHAGE OF BRAIN (HCC): Primary | ICD-10-CM

## 2023-02-16 DIAGNOSIS — I69.151 HEMIPLEGIA AND HEMIPARESIS FOLLOWING NONTRAUMATIC INTRACEREBRAL HEMORRHAGE AFFECTING RIGHT DOMINANT SIDE (HCC): ICD-10-CM

## 2023-02-16 NOTE — PROGRESS NOTES
Chief Complaint   Patient presents with    Botox Injection      units Botox         HPI:  Dong Sheridan is a 75 y.o. female seen for a follow up visit regarding the following: Patient presents for Botox injection to select muscles of the right arm. Patient requests Botox injections in hopes to improve her right upper limb spasticity and discomfort. Patient has no other significant focal spasticity areas to warrant systemic oral anti-spasticity medications presently. The patient has had a course of oral spasticity medication, therapeutic modalities, and stretching exercises and has not had relief of symptoms. The patient has read the consent and been given a copy of potential risks associated with BOTOX injections. All questions were answered to the best of my ability. Risks and benefits discussed. Patient agrees with injection. Past Medical History, Past Surgical History, Family history, Social History, and Medications were all reviewed with the patient today and updated as necessary. Current Outpatient Medications   Medication Sig Dispense Refill    FLUoxetine (PROZAC) 20 MG capsule       losartan (COZAAR) 100 MG tablet       hydroCHLOROthiazide (HYDRODIURIL) 25 MG tablet       atorvastatin (LIPITOR) 20 MG tablet       LINZESS 145 MCG capsule       amLODIPine (NORVASC) 5 MG tablet Take 5 mg by mouth daily      labetalol (NORMODYNE) 300 MG tablet Take 300 mg by mouth 2 times daily      polyethylene glycol (GLYCOLAX) 17 GM/SCOOP powder Take 17 g by mouth daily      rOPINIRole (REQUIP) 2 MG tablet Take 2 mg by mouth      tiZANidine (ZANAFLEX) 4 MG tablet 4mg po TID       No current facility-administered medications for this visit.      Allergies   Allergen Reactions    Latex Hives    Banana Shortness Of Breath    Nut [Peanut-Containing Drug Products] Anaphylaxis     All nuts    Peanut Oil Anaphylaxis    Aspirin Other (See Comments)     Pt states make her feel weird and breath funny Lisinopril Other (See Comments)    Macadamia Nut Oil Hives     Patient Active Problem List   Diagnosis    Anxiety    Acute spontaneous intraventricular hemorrhage assoc w/ hypertension (HCC)    Stroke, hemorrhagic (HCC)    DVT (deep venous thrombosis) (HCC)    CVA (cerebral vascular accident) ([address]    Impacted cerumen of right ear    ICH (intracerebral hemorrhage) ([address]    Unipolar depression ([address]    Right foot drop    Atrophic vaginitis    Choking    Spastic hemiplegia of right dominant side as late effect of nontraumatic intraparenchymal hemorrhage of brain ([address]    Accelerated hypertension    Screening for breast cancer    At risk for aspiration    Thrombocytopenia (HCC)    Other constipation    Sudden idiopathic hearing loss of right ear with unrestricted hearing of left ear    PTE (pulmonary thromboembolism) ([address]    Other insomnia    Cramp in limb     Past Medical History:   Diagnosis Date    Anxiety     CVA (cerebral vascular accident) ([address] Left BG with residual R hemiparesis (> 323 systolic)-severe CVA    Depression     controlled with medications    HTN (hypertension)     controlled with med    Impaired mobility     Menopause     PTE (pulmonary thromboembolism) ([address] 2017    Related DVT post CVA     Past Surgical History:   Procedure Laterality Date    BREAST BIOPSY       SECTION      x 3    COLONOSCOPY      IR IVC FILTER PLACEMENT W IMAGING  2017    IR IVC FILTER PLACEMENT W IMAGING 2017 [facility]    ORTHOPEDIC SURGERY Right 2018    right foot x 2    OTHER SURGICAL HISTORY  2012    Face lift    REFRACTIVE SURGERY  2006    VIJAYA AND BSO (CERVIX REMOVED)  1992    VASCULAR SURGERY      IVC filter placed 2017 after CVA     Family History   Problem Relation Age of Onset    Psychiatric Disorder Father         anxiety, depression    Cancer Father         throat cancer    Cancer Mother         lung cancer    Alcohol Abuse Mother     Breast Cancer Neg Hx     No Known Problems Sister      Social History     Tobacco Use    Smoking status: Former     Types: Cigarettes     Quit date: 1980     Years since quittin.1    Smokeless tobacco: Never   Substance Use Topics    Alcohol use: No       PHYSICAL EXAM:    /77 (Site: Left Upper Arm, Position: Sitting)   Pulse 74   Ht 4' 10\" (1.473 m)   Wt 118 lb (53.5 kg)   BMI 24.66 kg/m²      General appearance - alert, well appearing, and in no distress and oriented to person, place, and time  Mental status - alert, oriented to person, place, and time  Neurological - alert, oriented, normal speech, increased muscle tone noted in the right wrist flexors, finger flexors, elbow flexors, and elbow pronators  Extremities - peripheral pulses normal, no pedal edema, no ulcers, gangrene or atrophic changes  Skin - normal coloration and turgor, no rashes, no suspicious skin lesions noted      ASSESSMENT and PLAN    Time out observed. Patient was injected Botox Lot # [id number] with expiration # 2025 after being reconstituted with preservative free NS. The following muscles were injected using needle EMG guidance:    Right biceps with 50 units. Right pronator teres with 25 units. Right flexor carpi radialis with 25 units. Right flexor carpi ulnaris with 50 units. Right flexor digitorum profundus with 50 units. Right flexor digitorum superficialis with 50 units. Right pronator teres with 50 units. A total of 300 units were injected. No pain. No bleeding. Patient tolerated the procedure well. Patient was observed post procedure for acute complaints including dyspnea, chest pain, numbness, tingling, weakness , headache, dizziness. All were denied. Diagnosis Orders   1. Spastic hemiplegia of right dominant side as late effect of nontraumatic intraparenchymal hemorrhage of brain (HCC)        2. Other muscle spasm        3.  Hemiplegia and hemiparesis following nontraumatic intracerebral hemorrhage affecting right dominant side ([facility]            Virtual visit for efficacy check in 6 weeks. Recheck in office in 3 months.

## 2023-05-08 ENCOUNTER — TELEPHONE (OUTPATIENT)
Dept: PHYSICAL MEDICINE AND REHAB | Age: 68
End: 2023-05-08

## 2023-05-08 NOTE — TELEPHONE ENCOUNTER
She underwent BOTOX injections into the spastic muscles of the right upper limb on 2/16/2023. She and her , Lieutenant Gibson, continue to be pleased with results. No fever, chills, or bruising. No complications. She and Lolitateresa Paige both endorse that RANDALL is noticeably relaxed and comfortable during daily activity and stretching therapy. She does want to continue with 3 month injections as this has been most beneficial. Scheduled for 5/18/2023.

## 2023-05-18 ENCOUNTER — PROCEDURE VISIT (OUTPATIENT)
Dept: PHYSICAL MEDICINE AND REHAB | Age: 68
End: 2023-05-18

## 2023-05-18 VITALS
BODY MASS INDEX: 24.56 KG/M2 | HEART RATE: 73 BPM | HEIGHT: 58 IN | WEIGHT: 117 LBS | DIASTOLIC BLOOD PRESSURE: 77 MMHG | SYSTOLIC BLOOD PRESSURE: 123 MMHG

## 2023-05-18 DIAGNOSIS — M62.838 OTHER MUSCLE SPASM: ICD-10-CM

## 2023-05-18 DIAGNOSIS — I69.151 HEMIPLEGIA AND HEMIPARESIS FOLLOWING NONTRAUMATIC INTRACEREBRAL HEMORRHAGE AFFECTING RIGHT DOMINANT SIDE (HCC): Primary | ICD-10-CM

## 2023-05-18 RX ORDER — EPINEPHRINE 0.3 MG/.3ML
INJECTION SUBCUTANEOUS
COMMUNITY
Start: 2023-04-18

## 2023-05-18 NOTE — PROGRESS NOTES
Chief Complaint   Patient presents with    Botox Injection     RUE Botox 300 units         HPI:  Aston Chavez is a 75 y.o. female seen for a follow up visit regarding the following: Patient presents for Botox injection to spastic muscles of the right upper limb. Patient agrees for Botox injection in hopes to improve her hand hygiene and self care ADLs and discomfort. Patient has no other significant focal spasticity areas to warrant systemic oral anti-spasticity medications presently. The patient has had a course of oral spasticity medication, therapeutic modalities, and stretching exercises and has not had relief of symptoms. The patient has read the consent and been given a copy of potential risks associated with BOTOX injections. All questions were answered to the best of my ability. Risks and benefits discussed. Patient agrees with injection. Past Medical History, Past Surgical History, Family history, Social History, and Medications were all reviewed with the patient today and updated as necessary. Current Outpatient Medications   Medication Sig Dispense Refill    EPINEPHrine (EPIPEN) 0.3 MG/0.3ML SOAJ injection       FLUoxetine (PROZAC) 20 MG capsule       losartan (COZAAR) 100 MG tablet       hydroCHLOROthiazide (HYDRODIURIL) 25 MG tablet       atorvastatin (LIPITOR) 20 MG tablet       LINZESS 145 MCG capsule       amLODIPine (NORVASC) 5 MG tablet Take 5 mg by mouth daily      labetalol (NORMODYNE) 300 MG tablet Take 300 mg by mouth 2 times daily      polyethylene glycol (GLYCOLAX) 17 GM/SCOOP powder Take 17 g by mouth daily      rOPINIRole (REQUIP) 2 MG tablet Take 2 mg by mouth      tiZANidine (ZANAFLEX) 4 MG tablet 4mg po TID       No current facility-administered medications for this visit.      Allergies   Allergen Reactions    Latex Hives    Banana Shortness Of Breath    Nut [Peanut-Containing Drug Products] Anaphylaxis     All nuts    Peanut Oil Anaphylaxis    Aspirin Other (See

## 2023-06-27 ENCOUNTER — CLINICAL DOCUMENTATION (OUTPATIENT)
Dept: PHYSICAL MEDICINE AND REHAB | Age: 68
End: 2023-06-27

## 2023-08-22 ENCOUNTER — OFFICE VISIT (OUTPATIENT)
Dept: PHYSICAL MEDICINE AND REHAB | Age: 68
End: 2023-08-22

## 2023-08-22 VITALS
DIASTOLIC BLOOD PRESSURE: 82 MMHG | SYSTOLIC BLOOD PRESSURE: 132 MMHG | HEIGHT: 58 IN | HEART RATE: 75 BPM | BODY MASS INDEX: 24.56 KG/M2 | WEIGHT: 117 LBS

## 2023-08-22 DIAGNOSIS — I69.151 HEMIPLEGIA AND HEMIPARESIS FOLLOWING NONTRAUMATIC INTRACEREBRAL HEMORRHAGE AFFECTING RIGHT DOMINANT SIDE (HCC): Primary | ICD-10-CM

## 2023-08-22 DIAGNOSIS — I69.151 SPASTIC HEMIPLEGIA OF RIGHT DOMINANT SIDE AS LATE EFFECT OF NONTRAUMATIC INTRAPARENCHYMAL HEMORRHAGE OF BRAIN (HCC): ICD-10-CM

## 2023-08-22 NOTE — PROGRESS NOTES
Chief Complaint   Patient presents with    Botox Injection      units Botox         HPI:  Prince Berrios is a 70 y.o. female seen for a follow up visit regarding the following: Patient presents for Botox injection to spastic muscles of the right upper limb. Patient agrees for Botox injection in hopes to improve her pain, positioning, function, and discomfort. Patient has no other significant focal spasticity areas to warrant systemic oral anti-spasticity medications presently. She has had a right ankle fusion due to spasticity and equinovarus of the right foot. She ambulates with a cane. The patient has had a course of oral spasticity medication, therapeutic modalities, and stretching exercises and has not had relief of symptoms. The patient has read the consent and been given a copy of potential risks associated with BOTOX injections. All questions were answered to the best of my ability. Risks and benefits discussed. Patient agrees with injection. Past Medical History, Past Surgical History, Family history, Social History, and Medications were all reviewed with the patient today and updated as necessary. Current Outpatient Medications   Medication Sig Dispense Refill    EPINEPHrine (EPIPEN) 0.3 MG/0.3ML SOAJ injection       FLUoxetine (PROZAC) 20 MG capsule       losartan (COZAAR) 100 MG tablet       hydroCHLOROthiazide (HYDRODIURIL) 25 MG tablet       atorvastatin (LIPITOR) 20 MG tablet       LINZESS 145 MCG capsule       amLODIPine (NORVASC) 5 MG tablet Take 5 mg by mouth daily      labetalol (NORMODYNE) 300 MG tablet Take 300 mg by mouth 2 times daily      polyethylene glycol (GLYCOLAX) 17 GM/SCOOP powder Take 17 g by mouth daily      rOPINIRole (REQUIP) 2 MG tablet Take 2 mg by mouth      tiZANidine (ZANAFLEX) 4 MG tablet 4mg po TID       No current facility-administered medications for this visit.      Allergies   Allergen Reactions    Latex Hives    Banana Shortness Of Breath    Nut

## 2023-10-03 ENCOUNTER — TELEPHONE (OUTPATIENT)
Dept: PHYSICAL MEDICINE AND REHAB | Age: 68
End: 2023-10-03

## 2023-10-03 NOTE — TELEPHONE ENCOUNTER
She underwent BOTOX injections into the spastic muscles of the right upper limb on 8/22/23. She and her , Dorothy Thomas, continue to be pleased with results. No fever, chills, or bruising from injections. No complications. She and Dorothy Thomas both endorse that RUE is noticeably relaxed. Does have mild pain (ranked 2/10) in the mornings, but pain \"gummie\" helps. She does want to continue with 3 month injections as this has been most beneficial. Scheduled for 11/28/203.
PAST MEDICAL HISTORY:  No pertinent past medical history

## 2023-11-28 ENCOUNTER — OFFICE VISIT (OUTPATIENT)
Dept: PHYSICAL MEDICINE AND REHAB | Age: 68
End: 2023-11-28
Payer: MEDICARE

## 2023-11-28 VITALS — BODY MASS INDEX: 24.56 KG/M2 | WEIGHT: 117 LBS | HEIGHT: 58 IN

## 2023-11-28 DIAGNOSIS — I69.151 SPASTIC HEMIPLEGIA OF RIGHT DOMINANT SIDE AS LATE EFFECT OF NONTRAUMATIC INTRAPARENCHYMAL HEMORRHAGE OF BRAIN (HCC): Primary | ICD-10-CM

## 2023-11-28 DIAGNOSIS — I69.151 HEMIPLEGIA AND HEMIPARESIS FOLLOWING NONTRAUMATIC INTRACEREBRAL HEMORRHAGE AFFECTING RIGHT DOMINANT SIDE (HCC): ICD-10-CM

## 2023-11-28 PROCEDURE — 95874 GUIDE NERV DESTR NEEDLE EMG: CPT | Performed by: PHYSICAL MEDICINE & REHABILITATION

## 2023-11-28 PROCEDURE — 64644 CHEMODENERV 1 EXTREM 5/> MUS: CPT | Performed by: PHYSICAL MEDICINE & REHABILITATION

## 2024-01-10 DIAGNOSIS — I69.151 HEMIPLEGIA AND HEMIPARESIS FOLLOWING NONTRAUMATIC INTRACEREBRAL HEMORRHAGE AFFECTING RIGHT DOMINANT SIDE (HCC): Primary | ICD-10-CM

## 2024-01-10 NOTE — PROGRESS NOTES
10/04/17 1040   Time Spent With Patient   Time In 0915   Time Out 0955   Patient Seen For: AM;Neuro-linguistics; Verbal activities   Mental Status   Neurologic State Alert   Orientation Level Oriented X4   Cognition Impaired decision making; Appropriate decision making;Decreased command following;Memory loss   Perseveration No perseveration noted   Safety/Judgement Fall prevention   Team expressing concern about pt's cognitive abilities. Explained pt does better with simple 1 step commands, simple yes/no questions and limiting distractions. Pt answered simple yes/no question with 90% accuracy. Pt had increased difficulty with comparison yes/no questions, pt needed min cues with 70% accuracy. Pt is easily distracted. Pt followed 1 step commands with 90% accuracy. Pt with increased difficulty with 2 step commands, pt completed with 70% accuracy. Pt needed mod-max cues with word finding task with 80% accuracy.    Jaylan Marrufo MA/CCC/SLP None (2) Patient Placed in Bed

## 2024-01-29 ENCOUNTER — OFFICE VISIT (OUTPATIENT)
Dept: NEUROLOGY | Age: 69
End: 2024-01-29
Payer: MEDICARE

## 2024-01-29 VITALS
HEART RATE: 72 BPM | DIASTOLIC BLOOD PRESSURE: 72 MMHG | SYSTOLIC BLOOD PRESSURE: 112 MMHG | OXYGEN SATURATION: 96 % | BODY MASS INDEX: 24.66 KG/M2 | WEIGHT: 118 LBS

## 2024-01-29 DIAGNOSIS — M62.838 MUSCLE SPASM: ICD-10-CM

## 2024-01-29 DIAGNOSIS — G25.81 RLS (RESTLESS LEGS SYNDROME): ICD-10-CM

## 2024-01-29 DIAGNOSIS — I69.151 SPASTIC HEMIPLEGIA OF RIGHT DOMINANT SIDE AS LATE EFFECT OF NONTRAUMATIC INTRAPARENCHYMAL HEMORRHAGE OF BRAIN (HCC): Primary | ICD-10-CM

## 2024-01-29 DIAGNOSIS — R26.9 GAIT DISTURBANCE: ICD-10-CM

## 2024-01-29 PROCEDURE — 99203 OFFICE O/P NEW LOW 30 MIN: CPT | Performed by: PSYCHIATRY & NEUROLOGY

## 2024-01-29 PROCEDURE — 1090F PRES/ABSN URINE INCON ASSESS: CPT | Performed by: PSYCHIATRY & NEUROLOGY

## 2024-01-29 PROCEDURE — G8420 CALC BMI NORM PARAMETERS: HCPCS | Performed by: PSYCHIATRY & NEUROLOGY

## 2024-01-29 PROCEDURE — G8427 DOCREV CUR MEDS BY ELIG CLIN: HCPCS | Performed by: PSYCHIATRY & NEUROLOGY

## 2024-01-29 PROCEDURE — G8484 FLU IMMUNIZE NO ADMIN: HCPCS | Performed by: PSYCHIATRY & NEUROLOGY

## 2024-01-29 PROCEDURE — 3078F DIAST BP <80 MM HG: CPT | Performed by: PSYCHIATRY & NEUROLOGY

## 2024-01-29 PROCEDURE — G8399 PT W/DXA RESULTS DOCUMENT: HCPCS | Performed by: PSYCHIATRY & NEUROLOGY

## 2024-01-29 PROCEDURE — 3074F SYST BP LT 130 MM HG: CPT | Performed by: PSYCHIATRY & NEUROLOGY

## 2024-01-29 PROCEDURE — 1036F TOBACCO NON-USER: CPT | Performed by: PSYCHIATRY & NEUROLOGY

## 2024-01-29 PROCEDURE — 3017F COLORECTAL CA SCREEN DOC REV: CPT | Performed by: PSYCHIATRY & NEUROLOGY

## 2024-01-29 PROCEDURE — 1123F ACP DISCUSS/DSCN MKR DOCD: CPT | Performed by: PSYCHIATRY & NEUROLOGY

## 2024-01-29 ASSESSMENT — PATIENT HEALTH QUESTIONNAIRE - PHQ9
1. LITTLE INTEREST OR PLEASURE IN DOING THINGS: 0
SUM OF ALL RESPONSES TO PHQ9 QUESTIONS 1 & 2: 0
SUM OF ALL RESPONSES TO PHQ QUESTIONS 1-9: 0
2. FEELING DOWN, DEPRESSED OR HOPELESS: 0
SUM OF ALL RESPONSES TO PHQ QUESTIONS 1-9: 0

## 2024-01-29 ASSESSMENT — ENCOUNTER SYMPTOMS
VOICE CHANGE: 0
COUGH: 0
ABDOMINAL PAIN: 0

## 2024-01-29 NOTE — PROGRESS NOTES
[facility] NEUROLOGY  [address], [address]  [place] [address]  Phone: [phone] Fax [phone]  Sal Soto MD      Patient: Nicole Camilo  Provider: Sal Soto MD    CC:   Chief Complaint   Patient presents with    New Patient     Hemiplegia     Referring Provider:    History of Present Illness:     Nicole Camilo is a 68 y.o. RH female who presents for evaluation of spastic hemiplegia.     She is accompanied by her spouse.      Patient presents for further evaluation of a history of spastic hemiplegia secondary to an intraparenchymal hemorrhage in 2017.  Prior imaging is reviewed below and shows evidence of a left basal ganglia hemorrhage.    She has previously been followed by Dr. Camacho since approximately 2020 and has been receiving Botox injections into the right upper extremity for treatment of her spasticity.  I have reviewed her last office note from November 2023.  In general, Botox has been felt to be very helpful with reduced stiffness and increasing range of motion of the right upper extremity.  They do feel the effect wears off within the last few weeks prior to her next injection.  She denies any prior adverse effects.    She has significant weakness in the right upper extremity with forced flexion and limited range of motion at the right elbow in addition to finger flexion giving her a clenched fist.  She has rather significant supination of the right forearm.  She has weakness in the right lower extremity with spasticity as well and has an AFO brace to assist with ambulation.  She is able to walk with the assistance of a cane, though is seen today in a wheelchair due to the relatively long distance to get to the clinic.    She takes tizanidine twice a day for muscle spasms and also takes ropinirole at night for restless leg symptoms.  She recalls previous trials of baclofen which were poorly tolerated due to oversedation.    Current medications include

## 2024-03-07 ENCOUNTER — OFFICE VISIT (OUTPATIENT)
Dept: NEUROLOGY | Age: 69
End: 2024-03-07

## 2024-03-07 VITALS
WEIGHT: 118 LBS | SYSTOLIC BLOOD PRESSURE: 122 MMHG | HEART RATE: 68 BPM | HEIGHT: 58 IN | DIASTOLIC BLOOD PRESSURE: 70 MMHG | BODY MASS INDEX: 24.77 KG/M2

## 2024-03-07 DIAGNOSIS — I69.151 SPASTIC HEMIPLEGIA OF RIGHT DOMINANT SIDE AS LATE EFFECT OF NONTRAUMATIC INTRAPARENCHYMAL HEMORRHAGE OF BRAIN (HCC): Primary | ICD-10-CM

## 2024-03-07 NOTE — PROGRESS NOTES
[facility] NEUROLOGY  [address], [address]  [place] [address]  Phone: [phone] Fax [phone]  Dr. Sal Soto        Patient: Mrs. Nicole Camilo  Provider: Sal Soto MD     Procedure note:  Botulinum Toxin injections     Indication: Spasticity        Subjective:      Patient presents for chemodenervation for spastic hemiplegia.  She was last seen in the office in January 2024 for consultation.  She has a right spastic hemiplegia secondary to an intraparenchymal hemorrhage in 2017. Previous imaging has been reviewed.    Previous injections were done with Dr. Camacho since approximately 2020. She has been receiving Botox injections to the right upper extremity for spasticity.  I have reviewed prior office notes. In general, injections have been felt to be very helpful with reduced stiffness and increasing range of motion of the right upper extremity.  There is a tendency to wear off within the last couple of weeks prior to her next injection. She denies any adverse effects.  This will be her first injection with our group.    She has significant weakness in the right upper extremity with forced flexion and limited range of motion at the right elbow in addition to finger flexion giving her a clenched fist.  She has a very significant supination of the right forearm.  She also has a right lower extremity weakness with spasticity requiring an AFO brace to assist with ambulation.    She continues to take tizanidine twice a day for breakthrough muscle spasms.  She also takes ropinirole at night for restless leg symptoms.  Previous trials of baclofen were poorly tolerated due to oversedation.    Current medications include (but not limited to):   Tizanidine 4 mg 1 tablet at morning and night  Ropinirole 2 mg 1 tablet at night  Prozac 20 mg daily     Previous medications include: N/A       Past medical history, surgical history, social history, family history, medications and allergies were

## 2024-05-13 NOTE — ED NOTES
[facility]  [id number] [facility] [address]  Dept Phone: [phone]    Melinda Sams :1998 MRN:[id number]    FOLLOW UP PROGRESS NOTE    This visit was performed via live interactive two-way Video visit with patient's verbal consent.   Clinician Location:Home.  Patient Location: Home.  Verified patient identity:  [x] Yes      2024 Time Session Began: 1300  Time Session Ended: 1345    Session Type: Therapy 38-52 minutes (65744)    Others Present: N/A     Intervention:  Behavioral, Cognitive Behavioral    Suicide/Homicide/Violence Ideation: No    If Yes, explain: N/A     Current Outpatient Medications   Medication Sig    Fremanezumab-vfrm (Ajovy) 225 MG/1.5ML Solution Auto-injector Inject 675 mg into the skin every 3 months.    prochlorperazine (COMPAZINE) 10 MG tablet Take 1 tablet by mouth daily as needed for Nausea (Migraine cocktail).    amitriptyline (ELAVIL) 25 MG tablet Take 1 tablet by mouth nightly.    buPROPion XL (WELLBUTRIN XL) 300 MG 24 hr tablet TAKE ONE TABLET BY MOUTH DAILY    escitalopram (LEXAPRO) 20 MG tablet TAKE ONE TABLET BY MOUTH DAILY    ibuprofen (MOTRIN) 200 MG tablet Take 600 mg by mouth every 6 hours as needed for Pain.    diphenhydrAMINE (BENADRYL) 25 MG capsule Take 25 mg by mouth every 6 hours as needed for Itching.    Ubrogepant 100 MG Tab Take 100 mg by mouth every 2 hours as needed (headache). Max 200mg/24hours     No current facility-administered medications for this visit.       Change in Medication(s) Reported: No      If Yes, explain: N/A     Patient/Family Education Provided: Yes  Patient/Family Displays Understanding: Yes    If No, explain: N/A    Chief complaint in patient's own words: \"I'm having some issues with spending. Buying clothes I don't even wear.\"    Progress Note containing chief complaint and symptoms/problems related to the complaint:    D: Patient and writer met for scheduled video psychotherapy session  NG tube placed 00:05  Additional 20mg labetalol given 00:12 a total of 60mg of labetalol has been given at this time. and patient discussed concerns around spending habits. Patient and writer developed treatment plan and patient identified goals in regards to improving her reactions to other's behaviors. Patient processed interpersonal conflict with her in-laws and identified with catastrophizing past incidents.   Patient denied current S/I,H/I, or thoughts to self-harm.     A: Writer offered supportive listening, feedback, and validated patient on their concerns. Writer utilized CBT approach. Writer discussed tools to implement to minimize spending.  Writer encouraged patient to reach out to her if future concerns arise through office number or my advocateaurora. Writer discussed mindfulness skills and deep breathing in social situations.  Patient agrees to contact [facility], [facility], or to go to an emergency room in the event of any safety concerns and/or a life-threatening emergency.  Writer reminded patient of attendance policy and need to provide 24 hour notice to cancel.     R: Patient presented as alert, oriented, and engaged in conversation.     P: Patient to benefit from continued treatment. Video session scheduled for three weeks.     Need for Community Resources Assessed: Yes    Resources Needed: No    If Yes, what resources: N/A    Primary Diagnosis: Major depressive disorder, recurrent episode, moderate  (CMD)  (primary encounter diagnosis)  Generalized anxiety disorder      Treatment Plan:  See Treatment Plan     Discharge Plan: Strategies Discussed to Maintain Gains     Next Appointment: June 3rd Nguyen Ruiz LCSW

## 2024-06-06 ENCOUNTER — OFFICE VISIT (OUTPATIENT)
Dept: NEUROLOGY | Age: 69
End: 2024-06-06
Payer: MEDICARE

## 2024-06-06 VITALS
DIASTOLIC BLOOD PRESSURE: 69 MMHG | HEART RATE: 79 BPM | SYSTOLIC BLOOD PRESSURE: 114 MMHG | HEIGHT: 58 IN | BODY MASS INDEX: 24.66 KG/M2

## 2024-06-06 DIAGNOSIS — I69.151 SPASTIC HEMIPLEGIA OF RIGHT DOMINANT SIDE AS LATE EFFECT OF NONTRAUMATIC INTRAPARENCHYMAL HEMORRHAGE OF BRAIN (HCC): Primary | ICD-10-CM

## 2024-06-06 PROCEDURE — 95874 GUIDE NERV DESTR NEEDLE EMG: CPT | Performed by: PSYCHIATRY & NEUROLOGY

## 2024-06-06 PROCEDURE — 64644 CHEMODENERV 1 EXTREM 5/> MUS: CPT | Performed by: PSYCHIATRY & NEUROLOGY

## 2024-06-06 NOTE — PROGRESS NOTES
pronator teres 50 units  R FDS   50 units  R FDP   50 units      Total injected: 300 units BOTOX 100units/1 cc under EMG guidance.   Waste: 0 units     Comments: There were no complications.  The patient tolerated the procedure well.     She will follow up in 3 months for review/re-injection.         Signature: Sal Soto MD  Date: 6/6/24  [place] [id number]  Ph: [phone]  Fax: [phone]       I have personally interviewed and examined Mrs. Nicole Camilo and I have personally reviewed all relevant records including labs and imaging as noted above. I have written all aspects of this note. More than 50% of this time was used for counseling regarding my diagnosis, prognosis, and plans for management.   Procedure Time: 20 minutes  Total visit time (including procedures): N/A

## 2024-08-29 ENCOUNTER — OFFICE VISIT (OUTPATIENT)
Dept: NEUROLOGY | Age: 69
End: 2024-08-29

## 2024-08-29 VITALS — OXYGEN SATURATION: 97 % | HEART RATE: 73 BPM | SYSTOLIC BLOOD PRESSURE: 108 MMHG | DIASTOLIC BLOOD PRESSURE: 68 MMHG

## 2024-08-29 DIAGNOSIS — I69.151 SPASTIC HEMIPLEGIA OF RIGHT DOMINANT SIDE AS LATE EFFECT OF NONTRAUMATIC INTRAPARENCHYMAL HEMORRHAGE OF BRAIN (HCC): Primary | ICD-10-CM

## 2024-08-29 NOTE — PROGRESS NOTES
[facility] NEUROLOGY  [address], [address]  [place] [address]  Phone: [phone] Fax [phone]  Dr. Sal Soto        Patient: Mrs. Nicole Camilo  Provider: Sal Soto MD     Procedure note:  Botulinum Toxin injections     Indication: Spasticity        Subjective:      Patient presents for follow-up and chemodenervation for right sided spastic hemiplegia.    She has accompanied for today's visit.  She was last seen June 2024.    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 She was previously receiving injections with Dr. Camacho since approximately 2020 but since has established care with our clinic.  Prior office notes and imaging has been reviewed.    Current medications include (but not limited to):   Tizanidine 4 mg 1 tablet at morning and night  Ropinirole 2 mg 1 tablet at night  Prozac 20 mg daily     Previous medications include: N/A    She has significant weakness in the right upper extremity with forced flexion and limited range of motion at the right elbow in addition to finger flexion giving her a clenched fist.  She has a very significant supination of the right forearm.  She also has a right lower extremity weakness with spasticity requiring an AFO brace to assist with ambulation.    Injections have been felt to be very helpful with reduced stiffness and increasing range of motion of the right upper extremity.  There is a tendency to wear off within the last couple of weeks prior to her next injection. She denies any adverse effects.      This will be her third injection with our group.  Previous injections have gone very well with improved range of motion of the right upper extremity. She denies any adverse effects and only slight wearing off within the last week.     She continues to take tizanidine twice a day for breakthrough muscle spasms.  She also takes ropinirole at night for  restless leg symptoms.  Previous trials of baclofen were poorly tolerated due to oversedation.       Past medical history, surgical history, social history, family history, medications and allergies were all reviewed and updated as appropriate.      Outpatient Encounter Medications as of 2024   Medication Sig Dispense Refill    EPINEPHrine (EPIPEN) 0.3 MG/0.3ML SOAJ injection       FLUoxetine (PROZAC) 20 MG capsule       losartan (COZAAR) 100 MG tablet       hydroCHLOROthiazide (HYDRODIURIL) 25 MG tablet       atorvastatin (LIPITOR) 20 MG tablet       LINZESS 145 MCG capsule       amLODIPine (NORVASC) 5 MG tablet Take 1 tablet by mouth daily      labetalol (NORMODYNE) 300 MG tablet Take 1 tablet by mouth 2 times daily      polyethylene glycol (GLYCOLAX) 17 GM/SCOOP powder Take 17 g by mouth daily      rOPINIRole (REQUIP) 2 MG tablet Take 1 tablet by mouth      tiZANidine (ZANAFLEX) 4 MG tablet 4mg po TID      [] Onabotulinumtoxin A (BOTOX) injection 200 Units       [] onabotulinumtoxinA (BOTOX) injection 100 Units        No facility-administered encounter medications on file as of 2024.         Exam:      Visit Vitals  Vitals:    24 0850   BP: 108/68   Pulse: 73   SpO2: 97%       General Exam:  General: Well developed, well nourished, in no apparent distress.   HEENT: Normocephalic, atraumatic. Sclera anicteric. Oropharynx clear.   Neck: Supple without masses  Cardiovascular: Regular rate and rhythm. No carotid bruits.   Lungs: Non-labored breathing.   Abdomen: Soft, nontender, nondistended.   Extremities: +AFO brace in place to right lower extremity.      Neurological Exam:      MS/Language/Speech:  Alert. Oriented to person, place, and time. She is attentive and cooperative, but slowed cognitive processing noted.  Spouse provides most of the history.  Able to follow simple commands.  Language fluent. Speech relatively clear.     Cranial Nerves: PERRL. Eye movements full with normal

## 2024-11-29 ENCOUNTER — OFFICE VISIT (OUTPATIENT)
Dept: NEUROLOGY | Age: 69
End: 2024-11-29

## 2024-11-29 VITALS
BODY MASS INDEX: 24.66 KG/M2 | DIASTOLIC BLOOD PRESSURE: 70 MMHG | HEART RATE: 72 BPM | SYSTOLIC BLOOD PRESSURE: 120 MMHG | HEIGHT: 58 IN

## 2024-11-29 DIAGNOSIS — I69.151 SPASTIC HEMIPLEGIA OF RIGHT DOMINANT SIDE AS LATE EFFECT OF NONTRAUMATIC INTRAPARENCHYMAL HEMORRHAGE OF BRAIN (HCC): Primary | ICD-10-CM

## 2024-11-29 NOTE — PROGRESS NOTES
utilizing EMG and injected as noted:       Injection:                                                        R biceps  50 units x 2 = 100 units  R FCU   50 units  R pronator teres 50 units  R FDS   50 units  R FDP   50 units      Total injected: 300 units BOTOX 100units/1 cc under EMG guidance.   Waste: 0 units     Comments: There were no complications.  The patient tolerated the procedure well.     She will follow up in 3 months for review/re-injection.         Signature: Sal Soto MD  Date: 11/29/24  [place] [address]  Ph: [phone]  Fax: [phone]       I have personally interviewed and examined Mrs. Nicole Camilo and I have personally reviewed all relevant records including labs and imaging as noted above. I have written all aspects of this note. More than 50% of this time was used for counseling regarding my diagnosis, prognosis, and plans for management.   Procedure Time: 15 minutes  Total visit time (including procedures): N/A

## 2024-12-10 NOTE — PROGRESS NOTES
Bryant Heart  : 1955  Primary:   Secondary:  [address] at [facility], [address]  [phone]   [phone]      OUTPATIENT PHYSICAL THERAPY: Daily Treatment Note 2019  ICD-10: Treatment Diagnosis: Contracture of joint, foot, right M24.574, Difficulty in walking,not elsewhere classified R26.2, Hemiplegia and hemiparesis following cerebral infarction affecting right dominant side I69.351, Muscle weakness (generalized) M62.81  Precautions/Allergies:   Latex; Aspirin; Banana; Lisinopril; and Nuts Emma Greg nut]   MD Orders: Evaluate and treat MEDICAL/REFERRING DIAGNOSIS:  Foot drop, right foot [M21.371]  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 Ivone Macias) this morning who stated that she was doing well. Pt with no new complaints. She has been trying to exercise more at home. Pain: Initial: Pain Intensity 1: 0  Post Session:  0/10   Medications Last Reviewed:  2019     Updated Objective Findings: Pt to stand without UE support for up to 1 minute with eyes closed.          TREATMENT:   Therapeutic Exercise: (55 minutes)  Exercises per grid below to improve mobility and strength. Required minimal verbal cues to promote proper body posture.  Progressed complexity of movement as indicated.       Date:  2019   Activity/Exercise Parameters   Seated bilateral shoulder flexion with cane, seated scapular retraction 3x10    Ambulation outdoor up/down inclines  200' with quad cane  15 minutes   Standing eyes closed without UE support 3 x 1 minutes   Standing toe taps forward,lateral onto 2 inch step while flexing each leg onto step 3x10   Elbow Is she followed by hematology?  She reported hx of iron transfusions.  Was she seeing them for this?     She is scheduled for ablation.     If not, if cannot tolerate oral iron, suggest she see hematology preferably prior to surgery.     Also sebastian works for very heavy menses to take only at the time of menses. Does she want some of this to try ?  Number 30 to take 2 tabs three times a day only with menses not more than 5 days in a row.  Should not have hx of thrombosis.    stretching bilateral UE's at the bar 3x10   Seated hip abduction/adduction, LAQ 3x10         Treatment/Session Summary:    · Response to Treatment:  Pt progressing well with balance and LE strength training. · Communication/Consultation:  None today  · Equipment provided today:  None today   · Recommendations/Intent for next treatment session: Continue to work on ambulation without AD as well as inclines with single point cane.    Total Treatment Billable Duration: 55 minutes  PT Patient Time In/Time Out  Time In: 1400  Time Out: 82 Dhara David PT    Future Appointments   Date Time Provider Azalia Alberto   9/25/2019  2:00 PM Michael Underwood, [place] [facility]   9/30/2019  2:00 PM Elesa Yasmine, PT [facility]   10/4/2019  2:00 PM Elesa Underwood, PT [facility]   10/9/2019  2:00 PM Elesa Marilauren, PT [facility]   10/14/2019  2:00 PM Elesa Yasmine, PT [facility]   10/16/2019  2:00 PM Elesa Marilauren, PT [facility]   10/21/2019  2:00 PM Elesa Marinas, PT [facility]   10/23/2019  2:00 PM Elesa Marilauren, PT [facility]   11/5/2019  1:45 PM Mayda Dahl MD [facility]

## 2025-03-06 ENCOUNTER — OFFICE VISIT (OUTPATIENT)
Dept: NEUROLOGY | Age: 70
End: 2025-03-06

## 2025-03-06 VITALS
HEIGHT: 58 IN | SYSTOLIC BLOOD PRESSURE: 115 MMHG | BODY MASS INDEX: 24.66 KG/M2 | HEART RATE: 79 BPM | DIASTOLIC BLOOD PRESSURE: 73 MMHG

## 2025-03-06 DIAGNOSIS — I69.151 SPASTIC HEMIPLEGIA OF RIGHT DOMINANT SIDE AS LATE EFFECT OF NONTRAUMATIC INTRAPARENCHYMAL HEMORRHAGE OF BRAIN (HCC): Primary | ICD-10-CM

## 2025-03-06 NOTE — PROGRESS NOTES
[facility] NEUROLOGY  [address], [address]  [place] [address]  Phone: [phone] Fax [phone]  Dr. Sal Soto        Patient: Mrs. Nicole Camilo  Provider: Sal Soto MD     Procedure note:  Botulinum Toxin injections     Indication: Spasticity        Subjective:      Patient presents for follow-up and chemodenervation for right sided spastic hemiplegia.    She is accompanied for today's visit.  She was last seen for injections November 2024.    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 She was previously receiving injections with Dr. Camacho since approximately 2020 but since has established care with our clinic.  Prior office notes and imaging has been reviewed.    Current medications include (but not limited to):   Tizanidine 4 mg 1 tablet at morning and night  Ropinirole 2 mg 1 tablet at night  Prozac 20 mg daily     Previous medications include: N/A    She has significant weakness in the right upper extremity with forced flexion and limited range of motion at the right elbow in addition to finger flexion giving her a clenched fist.  She has a very significant supination of the right forearm.  She also has a right lower extremity weakness with spasticity requiring an AFO brace to assist with ambulation.    She has had several injections with our group over the last year.  Injections have been felt to be very helpful with reduced stiffness and increasing range of motion of the right upper extremity.  There is a tendency to wear off within the last couple of weeks prior to her next injection. She denies any adverse effects.      She continues to take tizanidine twice a day for breakthrough muscle spasms.  She also takes ropinirole at night for restless leg symptoms.  Previous trials of baclofen were poorly tolerated due to oversedation.       Past medical history, surgical history, social

## 2025-05-29 ENCOUNTER — OFFICE VISIT (OUTPATIENT)
Dept: NEUROLOGY | Age: 70
End: 2025-05-29
Payer: MEDICARE

## 2025-05-29 VITALS
DIASTOLIC BLOOD PRESSURE: 61 MMHG | SYSTOLIC BLOOD PRESSURE: 97 MMHG | BODY MASS INDEX: 24.66 KG/M2 | HEIGHT: 58 IN | HEART RATE: 88 BPM

## 2025-05-29 DIAGNOSIS — I69.151 SPASTIC HEMIPLEGIA OF RIGHT DOMINANT SIDE AS LATE EFFECT OF NONTRAUMATIC INTRAPARENCHYMAL HEMORRHAGE OF BRAIN (HCC): Primary | ICD-10-CM

## 2025-05-29 PROCEDURE — 95874 GUIDE NERV DESTR NEEDLE EMG: CPT | Performed by: PSYCHIATRY & NEUROLOGY

## 2025-05-29 PROCEDURE — 64644 CHEMODENERV 1 EXTREM 5/> MUS: CPT | Performed by: PSYCHIATRY & NEUROLOGY

## 2025-05-29 NOTE — PROGRESS NOTES
units  R FCU   50 units  R pronator teres 50 units  R FDS   50 units  R FDP   50 units      Total injected: 300 units BOTOX 100units/1 cc under EMG guidance.   Waste: 0 units     Comments: There were no complications.  The patient tolerated the procedure well.     She will follow up in 3 months for review/re-injection.         Signature: Sal Soto MD  Date: 5/29/25  [place] [address]  Ph: [phone]  Fax: [phone]       I have personally interviewed and examined Mrs. Nicole Camilo and I have personally reviewed all relevant records including labs and imaging as noted above. I have written all aspects of this note. More than 50% of this time was used for counseling regarding my diagnosis, prognosis, and plans for management.   Procedure Time: 15 minutes  Total visit time (including procedures): N/A

## 2025-06-26 NOTE — PROGRESS NOTES
OUTPATIENT OCCUPATIONAL THERAPY: Daily Treatment Note 2/25/2020  Visit Count:  2      Pre-treatment Symptoms/Complaints:  Decreased functional use of the RUE secondary to R hemiparesis with severe increased tone  Pain: Initial: Pain Intensity 1: 2  Post Session:  2/10   Medications Last Reviewed:  2/25/2020  Updated Objective Findings:  See evaluation note from today   TREATMENT:     Therapeutic Exercise: (  minutes): PROM to RUE followed by patient/caregiver education for PROM to RUE      Neuromuscular Re-education: (   minutes): Inhibition techniques to the RUE with WB, scapular protraction and arm pulls to decrease muscle tone of the RUE while in sitting and supine. By end of session wrist was in neutral with fingers extended and patient was able to WB in sitting through an extended arm, elbow and wrist with fingers extended too! Orthotic management (60 minutes): Orthokinetic splint and cuff fabricated today to aid in tone reduction and provide positioning for the R hand/wrist in a neutral position and cuff for tone reduction of biceps. Patient was able to saurabh/doff splint independently and min assist is needed to saurabh cuff.  was trained today on application of cuff. She will wear cuff for day time and splint at night and day prn. After wear of the splint fingers/wrist tone greatly reduced!     for the  Verified Person Portal  Treatment/Session Summary:    · Response to Treatment:  Patient tolerated treatment without complication. she was encouraged with tone reduction by end of session. . Orthokinetic splint/cuff fabricated/issued today for positioning and tone reduction of the RUE. · Communication/Consultation:  None today  · Equipment provided today:  Orthokinetic splint/cuff  · Recommendations/Intent for next treatment session: Next visit will focus on more challenging activities to aid in improving functional use of the RUE. Emanuel Mcgrath     Total Treatment Billable Duration:  60 minutes  OT Patient Time In/Time Out  Time In: 0100  Time Out: [address], OT    Future Appointments   Date Time Provider Azalia Alberto   2/27/2020  1:00 PM Binta Guerrero, OT [facility]   2/27/2020  2:15 PM Gene Corbin, PT [facility]   2/28/2020 12:10 PM [facility] DEXA BI GE LUNAR DEXA [facility]   2/28/2020  1:00 PM [facility] ART BI ROOM 2 [facility]   3/2/2020  2:00 PM Binta Wilye, OT [facility]   3/2/2020  3:00 PM Raysa Corbin, PT [facility]   3/5/2020  2:00 PM Bety Rodriguez, OT [facility]   3/10/2020  1:00 PM Binta Guerrero, OT [facility]   3/10/2020  2:00 PM Gene Corbin, PT [facility]   3/12/2020  2:00 PM Binta Guerrero, OT [facility]   3/12/2020  3:00 PM Gene Corbin, PT [facility]   3/17/2020  1:00 PM Binta Guerrero, OT [facility]   3/17/2020  2:00 PM Gene Corbin, PT [facility]   3/19/2020  1:00 PM Bnita Guerrero, OT [facility]   3/19/2020  2:00 PM Gene Corbin, PT [facility]   3/24/2020  1:00 PM Binta Guerrero, OT [facility]   3/24/2020  2:00 PM Gene Corbin, PT [facility]   3/26/2020  1:00 PM Binta Guerrero, OT [facility]   3/26/2020  2:00 PM Gene Corbin, PT [facility]   3/31/2020  1:00 PM Bintazana Juliene, OT [facility]   3/31/2020  2:00 PM Gene Corbin, PT [facility]   5/26/2020  1:45 PM Mell Alonso MD [facility]

## 2025-07-01 NOTE — PROGRESS NOTES
10/05/17 1015   Time Spent With Patient   Time In [facility] 65 22   Time Out 0915   Patient Seen For: AM;ADLs   Feeding/Eating   Feeding/Eating Assistance S   Grooming   Grooming Assistance  SBA   Upper Body Bathing   Bathing Assistance, Upper Min A   Upper Body  Ainl technique training   Position Performed Seated in chair   Comments Improving right side awareness, incorporated anil technique to wash left hand. Lower Body Bathing   Bathing Assistance, Lower  Mod A   Position Performed Seated in chair;Standing   Comments Improving dynamic flexion sitting balance and standing balance to initate washing of buttocks and RLE. Easily can wash LLE now. Toileting   Toileting Assistance (FIM Score) Max A   Upper Body Dressing    Dressing Assistance  Mod A   Comments improving hemitechniqu, but requires multiple verbal cues. Lower Body Dressing    Dressing Assistance  Mod A   Comments Improving ability to pull over waist in stance, significnat steady assist with blocking RLE. Functional Transfers   Toilet Transfer  Grab bars   Amount of Assistance Required Mod A     S: \"Lets sponge off today. \" Agreeable to therapy. Focus of session was on morning ADL routine in room, RUE functional and balance in gym. Patient was able to SPT with min/moderate assist.   In gym worked on side to side weight bearing at edge of mat with improving lateral trunk control. E-Stim completed for 8 minutes in 2 minutes intervals, reciprocating pattern in digit flexion and extension, table slides completed during this. Pain not indicated during session. Collaborated with PT, Esme Baer and confirmed patient is on track to reach goals as documented in the care plan. Patient tolerated session well, but strength, balance, attention to task, memory, coordination are still below baseline and requires skilled facilitation to successfully and safely complete ADL's and transfers. Patient ended session in w/c with call remote and phone within reach. Jerrell Lora, OTR [FreeTextEntry1] : Gen: Patient is A&O x 3, NAD HEENT: EOMI, hearing grossly normal Resp: regular, non - labored CV: pulses regular Skin: no rashes, erythema Lymph: no clubbing, cyanosis, edema Inspection: no instability  ROM: full throughout Palpation:no tenderness to palpation Sensation: intact to light touch Reflexes: 1+ and symmetric throughout Strength: 5/5 throughout Gait: normal, non-antalgic   Disha-MA present for encounter as chaperone

## (undated) DEVICE — AMD ANTIMICROBIAL BANDAGE ROLL,6 PLY: Brand: KERLIX

## (undated) DEVICE — STRETCH BANDAGE ROLL: Brand: DERMACEA

## (undated) DEVICE — SUT ETHLN 3-0 18IN PS2 BLK --

## (undated) DEVICE — STERILE HOOK LOCK LATEX FREE ELASTIC BANDAGE 4INX5YD: Brand: HOOK LOCK™

## (undated) DEVICE — SUTURE VCRL SZ 0 L27IN ABSRB VLT L26MM CT-2 1/2 CIR J334H

## (undated) DEVICE — PRECISION THIN, OFFSET (5.5 X 0.38 X 25.0MM)

## (undated) DEVICE — CONTAINER PREFIL FRMLN 40ML --

## (undated) DEVICE — SUTURE MCRYL SZ 3-0 L27IN ABSRB UD L24MM PS-1 3/8 CIR PRIM Y936H

## (undated) DEVICE — NON-ADHERING DRESSING: Brand: ADAPTIC®

## (undated) DEVICE — SUTURE PDS II SZ 2-0 L27IN ABSRB VLT L26MM CT-2 1/2 CIR Z333H

## (undated) DEVICE — FORCEPS BX L240CM JAW DIA2.8MM L CAP W/ NDL MIC MESH TOOTH

## (undated) DEVICE — SUTURE ETHLN 4-0 L18IN NONABSORBABLE UD PS-2 L19MM 3/8 CIR 1611G

## (undated) DEVICE — PADDING CAST COHESIVE 4 YDX3 IN HND TEARABLE COTTON SPEC 100

## (undated) DEVICE — CURITY NON-ADHERENT STRIPS: Brand: CURITY

## (undated) DEVICE — SUTURE ETHLN SZ 4-0 L18IN NONABSORBABLE BLK L19MM PS-2 3/8 1667H

## (undated) DEVICE — PADDING CAST W4INXL4YD ST COT COHESIVE HND TEARABLE SPEC

## (undated) DEVICE — ABDOMINAL PAD: Brand: DERMACEA

## (undated) DEVICE — SUT ETHLN 3-0 18IN PS1 BLK --

## (undated) DEVICE — CONNECTOR TBNG OD5-7MM O2 END DISP

## (undated) DEVICE — SOLUTION IV 1000ML 0.9% SOD CHL

## (undated) DEVICE — Device

## (undated) DEVICE — STERILE HOOK LOCK LATEX FREE ELASTIC BANDAGE 6INX5YD: Brand: HOOK LOCK™

## (undated) DEVICE — BLOCK BITE AD 60FR W/ VELC STRP ADDRESSES MOST PT AND

## (undated) DEVICE — AMD ANTIMICROBIAL GAUZE SPONGES,12 PLY USP TYPE VII, 0.2% POLYHEXAMETHYLENE BIGUANIDE HCI (PHMB): Brand: CURITY

## (undated) DEVICE — PRECISION THIN (9.0 X 0.38 X 31.0MM)

## (undated) DEVICE — 1200 GUARD II KIT W/5MM TUBE W/O VAC TUBE: Brand: GUARDIAN

## (undated) DEVICE — SUTURE VCRL SZ 0 L27IN ABSRB UD L26MM CT-2 1/2 CIR J270H

## (undated) DEVICE — KENDALL RADIOLUCENT FOAM MONITORING ELECTRODE RECTANGULAR SHAPE: Brand: KENDALL

## (undated) DEVICE — (D)PREP SKN CHLRAPRP APPL 26ML -- CONVERT TO ITEM 371833

## (undated) DEVICE — SPLINT MAT XF SPEC 5X30IN --

## (undated) DEVICE — SUTURE PDS II SZ 0 L27IN ABSRB VLT L26MM CT-2 1/2 CIR Z334H

## (undated) DEVICE — SUTURE PDS II SZ 3-0 L27IN ABSRB VLT CT-2 L26MM 1/2 CIR Z332H

## (undated) DEVICE — CANNULA NSL ORAL AD FOR CAPNOFLEX CO2 O2 AIRLFE

## (undated) DEVICE — REM POLYHESIVE ADULT PATIENT RETURN ELECTRODE: Brand: VALLEYLAB

## (undated) DEVICE — BUTTON SWITCH PENCIL BLADE ELECTRODE, HOLSTER: Brand: EDGE

## (undated) DEVICE — 2000CC GUARDIAN II: Brand: GUARDIAN

## (undated) DEVICE — SUTURE MCRYL SZ 3-0 L27IN ABSRB UD L19MM PS-2 3/8 CIR PRIM Y427H

## (undated) DEVICE — DRAPE C ARM W54XL84IN MINI FOR OEC 6800

## (undated) DEVICE — ZIMMER® STERILE DISPOSABLE TOURNIQUET CUFF, SINGLE PORT, SINGLE BLADDER, 12 IN. (30 CM)

## (undated) DEVICE — MEDI-VAC YANKAUER SUCTION HANDLE W/BULBOUS TIP: Brand: CARDINAL HEALTH

## (undated) DEVICE — K WIRE FIX L150MM DIA1MM S STL TRCR PNT BOTH END
Type: IMPLANTABLE DEVICE | Site: FOOT | Status: NON-FUNCTIONAL
Removed: 2019-05-22

## (undated) DEVICE — ZIMMER® STERILE DISPOSABLE TOURNIQUET CUFF WITH PLC, DUAL PORT, SINGLE BLADDER, 18 IN. (46 CM)